# Patient Record
Sex: FEMALE | Race: WHITE | NOT HISPANIC OR LATINO | Employment: FULL TIME | URBAN - METROPOLITAN AREA
[De-identification: names, ages, dates, MRNs, and addresses within clinical notes are randomized per-mention and may not be internally consistent; named-entity substitution may affect disease eponyms.]

---

## 2017-01-20 ENCOUNTER — OFFICE VISIT (OUTPATIENT)
Dept: FAMILY MEDICINE | Facility: CLINIC | Age: 57
End: 2017-01-20
Payer: COMMERCIAL

## 2017-01-20 VITALS
DIASTOLIC BLOOD PRESSURE: 90 MMHG | HEIGHT: 70 IN | TEMPERATURE: 99 F | HEART RATE: 76 BPM | SYSTOLIC BLOOD PRESSURE: 122 MMHG

## 2017-01-20 DIAGNOSIS — R50.9 FEVER, UNSPECIFIED FEVER CAUSE: ICD-10-CM

## 2017-01-20 DIAGNOSIS — J02.9 PHARYNGITIS, UNSPECIFIED ETIOLOGY: Primary | ICD-10-CM

## 2017-01-20 LAB
CTP QC/QA: YES
FLUAV AG SPEC QL IA: NEGATIVE
FLUBV AG SPEC QL IA: NEGATIVE
S PYO RRNA THROAT QL PROBE: NEGATIVE
SPECIMEN SOURCE: NORMAL

## 2017-01-20 PROCEDURE — 87081 CULTURE SCREEN ONLY: CPT

## 2017-01-20 PROCEDURE — 99999 PR PBB SHADOW E&M-EST. PATIENT-LVL IV: CPT | Mod: PBBFAC,,, | Performed by: NURSE PRACTITIONER

## 2017-01-20 PROCEDURE — 87400 INFLUENZA A/B EACH AG IA: CPT | Mod: 59,PO

## 2017-01-20 PROCEDURE — 99213 OFFICE O/P EST LOW 20 MIN: CPT | Mod: S$GLB,,, | Performed by: NURSE PRACTITIONER

## 2017-01-20 NOTE — MR AVS SNAPSHOT
[facility]  [address]  [place] [address]  Phone: [phone]  Fax: [phone]                  Yumiko Ariasp   2017 12:20 PM   Office Visit    Description:  Female : 1960   Provider:  Jenna Roth NP   Department:  [facility]           Reason for Visit     Nasal Congestion     Sore Throat     Headache           Diagnoses this Visit        Comments    Pharyngitis, unspecified etiology    -  Primary     Fever, unspecified fever cause                To Do List           Future Appointments        Provider Department Dept Phone    2017 12:20 PM Jenna Roth NP [facility] [phone]      Goals (5 Years of Data)     None      [facility]     [facility] On Call Nurse Care Line -  Assistance  Registered nurses in the [facility] provide clinical advisement, health education, appointment booking, and other advisory services.  Call for this free service at [phone].             Medications           Message regarding Medications     Verify the changes and/or additions to your medication regime listed below are the same as discussed with your clinician today.  If any of these changes or additions are incorrect, please notify your healthcare provider.             Verify that the below list of medications is an accurate representation of the medications you are currently taking.  If none reported, the list may be blank. If incorrect, please contact your healthcare provider. Carry this list with you in case of emergency.           Current Medications     albuterol (PROVENTIL) 2.5 mg /3 mL (0.083 %) nebulizer solution Take 3 mLs (2.5 mg total) by nebulization every 6 (six) hours as needed for Wheezing.    alprazolam (XANAX) 0.25 MG tablet TAKE ONE TABLET BY MOUTH NIGHTLY AS NEEDED FOR INSOMNIA    buPROPion (WELLBUTRIN XL) 150 MG TB24 tablet TAKE TWO TABLETS BY MOUTH EVERY DAY    cyanocobalamin (VITAMIN B-12)  "1000 MCG tablet Take 500 mcg by mouth once daily.     fluconazole (DIFLUCAN) 150 MG Tab Take 150 mg by mouth once daily.    fluconazole (DIFLUCAN) 200 MG Tab TAKE 1 TABLET BY MOUTH ONCE DAILY AS SINGLE DOSE    meloxicam (MOBIC) 15 MG tablet Take 1 tablet (15 mg total) by mouth once daily.    metaxalone (SKELAXIN) 800 MG tablet Take 800 mg by mouth daily as needed for Pain.    metoprolol tartrate (LOPRESSOR) 25 MG tablet TAKE ONE TABLET BY MOUTH TWICE A DAY    naltrexone-bupropion 8-90 mg TbSR Take 2 tablets by mouth 2 (two) times daily. If this is the first month then follow  or physicians specific instructions.    nystatin-triamcinolone (MYCOLOG II) cream Apply to affected area BID as needed    tizanidine (ZANAFLEX) 2 MG tablet Take 1-2 po TID prn muscle spasm    vitamin E 400 UNIT capsule Take 400 Units by mouth once daily.    acetaminophen (TYLENOL) 650 MG TbSR Take 1,300 mg by mouth as needed.           Clinical Reference Information           Vital Signs - Last Recorded  Most recent update: 1/20/2017 10:48 AM by Danyelle Nichols LPN    BP Pulse Temp Ht          (!) 122/90 (BP Location: Left arm, Patient Position: Sitting, BP Method: Manual) 76 99 °F (37.2 °C) (Oral) 5' 10" (1.778 m)        Blood Pressure          Most Recent Value    BP  (!)  122/90      Allergies as of 1/20/2017     No Known Drug Allergies      Immunizations Administered on Date of Encounter - 1/20/2017     None      Orders Placed During Today's Visit      Normal Orders This Visit    Influenza antigen Nasopharyngeal Swab     POCT Rapid Strep A     Strep A culture, throat          1/20/2017 11:09 AM - Casandra Haro LPN      Component Results     Component Value Flag Ref Range Units Status    Rapid Strep A Screen Negative  Negative  Final     Acceptable Yes    Final      "

## 2017-01-20 NOTE — PROGRESS NOTES
Subjective:       Patient ID: Yumiko Gonzalez is a 56 y.o. female.    Chief Complaint: Nasal Congestion; Sore Throat; and Headache    Sinus Problem   This is a new problem. Episode onset: 3 days ago. The problem is unchanged. The maximum temperature recorded prior to her arrival was 100.4 - 100.9 F. Associated symptoms include congestion, coughing, headaches, sinus pressure, sneezing, a sore throat and swollen glands. Pertinent negatives include no chills, diaphoresis, ear pain, neck pain or shortness of breath. Treatments tried: otc medications. The treatment provided mild relief.     Review of Systems   Constitutional: Negative for chills and diaphoresis.   HENT: Positive for congestion, sinus pressure, sneezing and sore throat. Negative for ear pain.    Respiratory: Positive for cough. Negative for shortness of breath.    Musculoskeletal: Negative for neck pain.   Neurological: Positive for headaches.       Objective:      Physical Exam   Constitutional: She is oriented to person, place, and time. She appears well-nourished.   HENT:   Head: Normocephalic and atraumatic.   Right Ear: Hearing, tympanic membrane, external ear and ear canal normal.   Left Ear: Hearing, tympanic membrane, external ear and ear canal normal.   Nose: Mucosal edema and rhinorrhea present.   Mouth/Throat: Oropharynx is clear and moist. No oropharyngeal exudate or posterior oropharyngeal edema.   Eyes: Conjunctivae and EOM are normal. Pupils are equal, round, and reactive to light.   Neck: Normal range of motion. Neck supple.   Cardiovascular: Normal rate, regular rhythm, normal heart sounds and intact distal pulses.    Pulmonary/Chest: Effort normal and breath sounds normal. She has no wheezes. She has no rales.   Abdominal: Soft. Bowel sounds are normal. There is no tenderness.   Lymphadenopathy:     She has no cervical adenopathy.   Neurological: She is alert and oriented to person, place, and time.   Skin: Skin is warm and dry. No rash  noted.   Vitals reviewed.      Assessment:       1. Pharyngitis, unspecified etiology    2. Fever, unspecified fever cause        Plan:       Yumiko was seen today for nasal congestion, sore throat and headache.    Diagnoses and all orders for this visit:    Pharyngitis, unspecified etiology  -     POCT Rapid Strep A  -     Strep A culture, throat  -     Influenza antigen Nasopharyngeal Swab    Fever, unspecified fever cause  -     POCT Rapid Strep A  -     Strep A culture, throat  -     Influenza antigen Nasopharyngeal Swab    Symptomatic treatment with otc meds  Rest and increase fluids  Return if symptoms worsen or fail to improve.

## 2017-01-23 LAB — BACTERIA THROAT CULT: NORMAL

## 2017-01-25 ENCOUNTER — PATIENT MESSAGE (OUTPATIENT)
Dept: FAMILY MEDICINE | Facility: CLINIC | Age: 57
End: 2017-01-25

## 2017-02-16 ENCOUNTER — TELEPHONE (OUTPATIENT)
Dept: PRIMARY CARE CLINIC | Facility: CLINIC | Age: 57
End: 2017-02-16

## 2017-02-16 DIAGNOSIS — J01.90 ACUTE SINUSITIS, RECURRENCE NOT SPECIFIED, UNSPECIFIED LOCATION: Primary | ICD-10-CM

## 2017-02-16 RX ORDER — AMOXICILLIN AND CLAVULANATE POTASSIUM 875; 125 MG/1; MG/1
1 TABLET, FILM COATED ORAL 2 TIMES DAILY
Qty: 14 TABLET | Refills: 0 | Status: SHIPPED | OUTPATIENT
Start: 2017-02-16 | End: 2017-02-23

## 2017-02-16 RX ORDER — AMOXICILLIN AND CLAVULANATE POTASSIUM 875; 125 MG/1; MG/1
1 TABLET, FILM COATED ORAL 2 TIMES DAILY
Qty: 14 TABLET | Refills: 0 | Status: SHIPPED | OUTPATIENT
Start: 2017-02-16 | End: 2017-02-16 | Stop reason: SDUPTHER

## 2017-02-17 NOTE — TELEPHONE ENCOUNTER
S:  Pt was ill 2 weeks ago with a viral illness, missed 5 days being ill.  Now with green nasal d/c, swollen glands and feeling ill 4 days ago.  Worsening.    O:  Alert, coop 56 yr old female in NAD.        TMs dull, mildly erythematous.        Pharynx not injected.        Bilat ant cerv nodes tender, palp.        Lungs CTA bilat.        Heart RRR, no MRG.        Responds normally t/o the interview.    A:  Sinusitis, acute    P:  Augmentin 875 bid for 7days.       Fluids, increased rest, saline and/or flonase nasal spray, salt water gargles.  RTC if not better in 3-5 days, if worse or concerns.  Pt states understanding.

## 2017-03-27 ENCOUNTER — PATIENT MESSAGE (OUTPATIENT)
Dept: FAMILY MEDICINE | Facility: CLINIC | Age: 57
End: 2017-03-27

## 2017-03-27 DIAGNOSIS — E66.01 MORBID OBESITY, UNSPECIFIED OBESITY TYPE: Primary | ICD-10-CM

## 2017-03-28 NOTE — TELEPHONE ENCOUNTER
Orders signed.  I think that's the right referral.  When they contact the patient to schedule I think they will go over their options and adjust accordingly

## 2017-03-31 RX ORDER — METOPROLOL TARTRATE 25 MG/1
TABLET, FILM COATED ORAL
Qty: 60 TABLET | Refills: 5 | Status: SHIPPED | OUTPATIENT
Start: 2017-03-31 | End: 2017-04-11 | Stop reason: SDUPTHER

## 2017-04-11 ENCOUNTER — OFFICE VISIT (OUTPATIENT)
Dept: BARIATRICS | Facility: CLINIC | Age: 57
End: 2017-04-11
Payer: COMMERCIAL

## 2017-04-11 VITALS
TEMPERATURE: 99 F | DIASTOLIC BLOOD PRESSURE: 89 MMHG | RESPIRATION RATE: 20 BRPM | BODY MASS INDEX: 41.95 KG/M2 | WEIGHT: 293 LBS | HEIGHT: 70 IN | HEART RATE: 82 BPM | SYSTOLIC BLOOD PRESSURE: 176 MMHG

## 2017-04-11 DIAGNOSIS — E66.01 MORBID OBESITY DUE TO EXCESS CALORIES: Primary | ICD-10-CM

## 2017-04-11 DIAGNOSIS — E66.01 MORBID OBESITY WITH BMI OF 40.0-44.9, ADULT: ICD-10-CM

## 2017-04-11 DIAGNOSIS — I10 ESSENTIAL HYPERTENSION: ICD-10-CM

## 2017-04-11 DIAGNOSIS — G47.33 OSA (OBSTRUCTIVE SLEEP APNEA): ICD-10-CM

## 2017-04-11 DIAGNOSIS — F32.A DEPRESSION, UNSPECIFIED DEPRESSION TYPE: ICD-10-CM

## 2017-04-11 DIAGNOSIS — R16.1 SPLENOMEGALY: ICD-10-CM

## 2017-04-11 PROCEDURE — 99999 PR PBB SHADOW E&M-EST. PATIENT-LVL IV: CPT | Mod: PBBFAC,,, | Performed by: SURGERY

## 2017-04-11 PROCEDURE — 99205 OFFICE O/P NEW HI 60 MIN: CPT | Mod: S$GLB,,, | Performed by: SURGERY

## 2017-04-11 NOTE — MR AVS SNAPSHOT
[facility]   [facility], [address]  [place] [address]  Phone: [phone]  Fax: [phone]                  Yumiko Ariasp   2017 10:30 AM   Office Visit    Description:  Female : 1960   Provider:  Cuco Ulrich MD   Department:  [place] [facility]           Reason for Visit     Weight Loss           Diagnoses this Visit        Comments    Morbid obesity due to excess calories    -  Primary     Morbid obesity with BMI of 40.0-44.9, adult         Essential hypertension         DUNG (obstructive sleep apnea)         Depression, unspecified depression type         Splenomegaly                To Do List           Future Appointments        Provider Department Dept Phone    2017 8:30 AM LAB, [place] [facility] [phone]    2017 9:15 AM [facility] CT1 LIMIT [address] [facility] [phone]    2017 1:30 PM EKG, [facility] [phone]    2017 1:40 PM Aneudy Tanner MD [facility] [phone]    2017 10:30 AM [facility] [phone]      Goals (5 Years of Data)     None      Follow-Up and Disposition     Return in about 1 month (around 2017).      [facility]     [facility] Nurse Care [facility] -  Assistance  Unless otherwise directed by your provider, please contact [facility], our nurse care line that is available for  assistance.     Registered nurses in the [facility] provide: appointment scheduling, clinical advisement, health education, and other advisory services.  Call: [phone] (toll free)               Medications           Message regarding Medications     Verify the changes and/or additions to your medication regime listed below are the same as discussed with your clinician today.  If any of these changes or additions are incorrect, please notify your healthcare provider.             Verify  "that the below list of medications is an accurate representation of the medications you are currently taking.  If none reported, the list may be blank. If incorrect, please contact your healthcare provider. Carry this list with you in case of emergency.           Current Medications     buPROPion (WELLBUTRIN XL) 150 MG TB24 tablet TAKE TWO TABLETS BY MOUTH EVERY DAY    cyanocobalamin (VITAMIN B-12) 1000 MCG tablet Take 500 mcg by mouth once daily.     metaxalone (SKELAXIN) 800 MG tablet Take 800 mg by mouth daily as needed for Pain.    metoprolol tartrate (LOPRESSOR) 25 MG tablet TAKE ONE TABLET BY MOUTH TWICE A DAY    vitamin E 400 UNIT capsule Take 400 Units by mouth once daily.    acetaminophen (TYLENOL) 650 MG TbSR Take 1,300 mg by mouth as needed.    albuterol (PROVENTIL) 2.5 mg /3 mL (0.083 %) nebulizer solution Take 3 mLs (2.5 mg total) by nebulization every 6 (six) hours as needed for Wheezing.    alprazolam (XANAX) 0.25 MG tablet TAKE ONE TABLET BY MOUTH NIGHTLY AS NEEDED FOR INSOMNIA    fluconazole (DIFLUCAN) 150 MG Tab Take 150 mg by mouth once daily.    fluconazole (DIFLUCAN) 200 MG Tab TAKE 1 TABLET BY MOUTH ONCE DAILY AS SINGLE DOSE    meloxicam (MOBIC) 15 MG tablet Take 1 tablet (15 mg total) by mouth once daily.    naltrexone-bupropion 8-90 mg TbSR Take 2 tablets by mouth 2 (two) times daily. If this is the first month then follow  or physicians specific instructions.    nystatin-triamcinolone (MYCOLOG II) cream Apply to affected area BID as needed    tizanidine (ZANAFLEX) 2 MG tablet Take 1-2 po TID prn muscle spasm           Clinical Reference Information           Your Vitals Were     BP Pulse Temp Resp Height Weight    176/89 82 98.8 °F (37.1 °C) (Oral) 20 5' 10" (1.778 m) 138 kg (304 lb 5.5 oz)    BMI                43.67 kg/m2          Blood Pressure          Most Recent Value    BP  (!)  176/89      Allergies as of 4/11/2017     No Known Drug Allergies      Immunizations " Administered on Date of Encounter - 4/11/2017     None      Orders Placed During Today's Visit      Normal Orders This Visit    Ambulatory consult to Cardiology     Ambulatory consult to Nutrition Services     Ambulatory consult to Psychology     Ambulatory Referral to Hepatology     Future Labs/Procedures Expected by Expires    BMP  4/11/2017 6/10/2018    CBC w/ Auto Differential  4/11/2017 6/10/2018    CT Abdomen Pelvis  Without Contrast  4/11/2017 4/11/2018    Folate Serum  4/11/2017 6/10/2018    Free T4  4/11/2017 6/10/2018    H. Pylori Antibody, IGG  4/11/2017 6/10/2018    Hepatic Panel  4/11/2017 6/10/2018    Hg A1c  4/11/2017 6/10/2018    Iron & TIBC  4/11/2017 6/10/2018    Lipid Profile  4/11/2017 6/10/2018    Magnesium  4/11/2017 6/10/2018    Phosphorus  4/11/2017 6/10/2018    PROTIME-INR  4/11/2017 6/10/2018    T3  4/11/2017 6/10/2018    T4  4/11/2017 6/10/2018    TSH  4/11/2017 6/10/2018    Vitamin B12  4/11/2017 6/10/2018    Vitamin B1  4/11/2017 6/10/2018    Vitamin D 25 Hydroxy  4/11/2017 6/10/2018    EKG 12-lead  As directed 4/11/2018      Language Assistance Services     ATTENTION: Language assistance services are available, free of charge. Please call [phone].      ATENCIÓN: Si habla español, tiene a coughlin disposición servicios gratuitos de asistencia lingüística. Llame al [phone].     CHÚ Ý: N?u b?n nói Ti?ng Vi?t, có các d?ch v? h? tr? ngôn ng? mi?n phí dành cho b?n. G?i s? [phone].         [facility] complies with applicable Federal civil rights laws and does not discriminate on the basis of race, color, national origin, age, disability, or sex.

## 2017-04-11 NOTE — PROGRESS NOTES
Initial Consult    Chief Complaint   Patient presents with    Weight Loss     New Bariatric/Weight Loss       History of Present Illness:  Patient is a 56 y.o. female who is referred for evaluation of surgical treatment of morbid obesity. Her Body mass index is 43.67 kg/(m^2). She has known comorbidities of depression, GERD and hypertension. She has attended the bariatric seminar and is most interested in hearing options.      Past attempts at weight loss include: Unsupervised: calorie counting, high protein, hypnosis, low carbohydrates, slim fast, liquid protein diet, many fad diets, cabbage soup diet, hot dog diet, egg diet;  Supervised:  Physician weight loss center, weight watchers;  Diet pills: amphetamines, dexatrim, fen-phen, herbal remedies, phentermine;  Exercise attempts: walking or running    Weight history:   At current weight:  3 years  Obese for 40 years.  More than 35 pounds overweight for 40 years.  More than 100 pounds overweight for 25 years.  Started dieting at 15 years old.  Maximum weight reached: 301 pounds  Most weight [facility].  She describes Her eating habits as emotional eater, snacker/grazer, sweet eater, volume eater, binge eater.    DUNG screening: wakes frequently at night, daytime fatigue, morning headaches, snores    Reflux screening: occasionally and takes tums    MELD: 10 on recent labs    Review of patient's allergies indicates:   Allergen Reactions    No known drug allergies        Current Outpatient Prescriptions   Medication Sig Dispense Refill    buPROPion (WELLBUTRIN XL) 150 MG TB24 tablet TAKE TWO TABLETS BY MOUTH EVERY  tablet 1    cyanocobalamin (VITAMIN B-12) 1000 MCG tablet Take 500 mcg by mouth once daily.       metaxalone (SKELAXIN) 800 MG tablet Take 800 mg by mouth daily as needed for Pain.      metoprolol tartrate (LOPRESSOR) 25 MG tablet TAKE ONE TABLET BY MOUTH TWICE A DAY 60 tablet 5     vitamin E 400 UNIT capsule Take 400 Units by mouth once daily.      acetaminophen (TYLENOL) 650 MG TbSR Take 1,300 mg by mouth as needed.      albuterol (PROVENTIL) 2.5 mg /3 mL (0.083 %) nebulizer solution Take 3 mLs (2.5 mg total) by nebulization every 6 (six) hours as needed for Wheezing. 72 mL 2    alprazolam (XANAX) 0.25 MG tablet TAKE ONE TABLET BY MOUTH NIGHTLY AS NEEDED FOR INSOMNIA 30 tablet 2    fluconazole (DIFLUCAN) 150 MG Tab Take 150 mg by mouth once daily.      fluconazole (DIFLUCAN) 200 MG Tab TAKE 1 TABLET BY MOUTH ONCE DAILY AS SINGLE DOSE 1 tablet 1    meloxicam (MOBIC) 15 MG tablet Take 1 tablet (15 mg total) by mouth once daily. 30 tablet 0    naltrexone-bupropion 8-90 mg TbSR Take 2 tablets by mouth 2 (two) times daily. If this is the first month then follow  or physicians specific instructions. 120 tablet 5    nystatin-triamcinolone (MYCOLOG II) cream Apply to affected area BID as needed 60 g 3    tizanidine (ZANAFLEX) 2 MG tablet Take 1-2 po TID prn muscle spasm 30 tablet 1     No current facility-administered medications for this visit.      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, postitive antismooth muscle antibody    Blood transfusion     Bronchitis     bronchospasm on occasion     Cancer 1987    cervical CA    Cataract     OU    Cervical neck pain with evidence of disc disease 3/22/2012    can bend neck    Cirrhosis     non-alcoholic, compensated    Colon polyps 3/22/2012    Depression 3/22/2012    Hx panic attacks    Diverticular disease 3/22/2012    never diverticulitis    Fatty liver 3/22/2012     "Fibrocystic breast     Fine tremor     hands,chronic    Hepatosplenomegaly     History of cervical cancer 3/22/2012    carcinoma in situ    Hypertension 2013    Knee pain, bilateral     chronic, with hands,feet,fingers also painful    Liver disease     "partially sclerosed liver" per pt    Migraines past Hx    Pleural effusion     small, compensated, good bilateral breath sounds per Dr Suresh GUTIERRES (postoperative nausea and vomiting)     twice after childbirth    Postpartum depression     Splenomegaly     Thrombocytopenia      Past Surgical History:   Procedure Laterality Date    ADENOIDECTOMY      CERVICAL CONIZATION   W/ LASER       SECTION      x3    COLONOSCOPY N/A 2016    Procedure: COLONOSCOPY;  Surgeon: James Palomares MD;  Location: [facility] ENDO;  Service: Endoscopy;  Laterality: N/A;    ENDOMETRIAL ABLATION      LIVER BIOPSY      PELVIC LAPAROSCOPY      TONSILLECTOMY      TUBAL LIGATION       Family History   Problem Relation Age of Onset    Breast cancer Maternal Grandmother     Diabetes Father     Heart disease Father     Breast cancer Mother     Heart disease Mother     Hypertension Mother     Diabetes Brother     Diabetes Sister     Breast cancer Maternal Aunt     Breast cancer Other     Ovarian cancer Neg Hx      Social History   Substance Use Topics    Smoking status: Former Smoker     Quit date: 2/3/2006    Smokeless tobacco: Never Used    Alcohol use Yes      Comment: rarely        Chart review:    16: Dr. Cervantes: annual gyn exam0 PAP, MMG  16: Dr. Webb: Treated for HTN- monitor bp; leukopenia- cbc; thrombocytopenia: CBC; consider starting contrave   3/22/12: Dr. 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 Amato: hepatology: Cirrhosis of liver, avoid salts, HCC surveillance, US q 6 months, annual EGD, " labs q6 months, and propranolol    Lab review:    Lab Results   Component Value Date    WBC 4.33 10/04/2016    HGB 15.4 10/04/2016    HCT 45.7 10/04/2016    MCV 92 10/04/2016    PLT 72 (L) 10/04/2016     CMP  Sodium   Date Value Ref Range Status   09/16/2016 144 136 - 145 mmol/L Final     Potassium   Date Value Ref Range Status   09/16/2016 4.3 3.5 - 5.1 mmol/L Final     Chloride   Date Value Ref Range Status   09/16/2016 113 (H) 95 - 110 mmol/L Final     CO2   Date Value Ref Range Status   09/16/2016 22 (L) 23 - 29 mmol/L Final     Glucose   Date Value Ref Range Status   09/16/2016 111 (H) 70 - 110 mg/dL Final     BUN, Bld   Date Value Ref Range Status   09/16/2016 21 (H) 6 - 20 mg/dL Final     Creatinine   Date Value Ref Range Status   09/16/2016 0.8 0.5 - 1.4 mg/dL Final     Calcium   Date Value Ref Range Status   09/16/2016 9.1 8.7 - 10.5 mg/dL Final     Total Protein   Date Value Ref Range Status   09/16/2016 5.9 (L) 6.0 - 8.4 g/dL Final     Albumin   Date Value Ref Range Status   09/16/2016 3.5 3.5 - 5.2 g/dL Final     Total Bilirubin   Date Value Ref Range Status   09/16/2016 1.5 (H) 0.1 - 1.0 mg/dL Final     Comment:     For infants and newborns, interpretation of results should be based  on gestational age, weight and in agreement with clinical  observations.  Premature Infant recommended reference ranges:  Up to 24 hours.............<8.0 mg/dL  Up to 48 hours............<12.0 mg/dL  3-5 days..................<15.0 mg/dL  6-29 days.................<15.0 mg/dL       Alkaline Phosphatase   Date Value Ref Range Status   09/16/2016 102 55 - 135 U/L Final     AST   Date Value Ref Range Status   09/16/2016 60 (H) 10 - 40 U/L Final     ALT   Date Value Ref Range Status   09/16/2016 53 (H) 10 - 44 U/L Final     Anion Gap   Date Value Ref Range Status   09/16/2016 9 8 - 16 mmol/L Final     eGFR if    Date Value Ref Range Status   09/16/2016 >60.0 >60 mL/min/1.73 m^2 Final     eGFR if non African  American   Date Value Ref Range Status   09/16/2016 >60.0 >60 mL/min/1.73 m^2 Final     Comment:     Calculation used to obtain the estimated glomerular filtration  rate (eGFR) is the CKD-EPI equation. Since race is unknown   in our information system, the eGFR values for   -American and Non--American patients are given   for each creatinine result.       Lab Results   Component Value Date    INR 1.2 04/12/2017    INR 1.2 12/16/2013    INR 1.2 12/26/2012         Radiology review:    7/15/16 US abdomen: recanalized umbilical vein and splenomegaly- evidence of cirrhosis- images independently reviewed- no gallstones, normal cbd, enlarged spleen, irregular contour of spleen    Review of Systems:  Review of Systems   Constitutional: Negative for activity change, appetite change, chills, diaphoresis, fatigue, fever and unexpected weight change.   HENT: Positive for sneezing. Negative for congestion, sinus pressure, sore throat, tinnitus, trouble swallowing and voice change.    Eyes: Positive for itching. Negative for pain, redness and visual disturbance.   Respiratory: Negative for apnea, cough, choking, chest tightness, shortness of breath, wheezing and stridor.    Cardiovascular: Positive for palpitations and leg swelling. Negative for chest pain.   Gastrointestinal: Positive for diarrhea. Negative for abdominal distention, abdominal pain, anal bleeding, blood in stool, constipation, nausea, rectal pain and vomiting.   Endocrine: Negative for cold intolerance and heat intolerance.   Genitourinary: Negative for difficulty urinating and dysuria.   Musculoskeletal: Positive for arthralgias, back pain and joint swelling. Negative for gait problem, myalgias, neck pain and neck stiffness.   Skin: Positive for rash. Negative for wound.   Allergic/Immunologic: Positive for environmental allergies. Negative for food allergies.   Neurological: Negative for dizziness, light-headedness and headaches.   Hematological:  "Positive for adenopathy. Bruises/bleeds easily.   Psychiatric/Behavioral: Negative for agitation and confusion.       Physical:     Vital Signs (Most Recent)  Temp: 98.8 °F (37.1 °C) (04/11/17 1033)  Pulse: 82 (04/11/17 1033)  Resp: 20 (04/11/17 1033)  BP: (!) 176/89 (04/11/17 1033)  5' 10" (1.778 m)  (!) 138 kg (304 lb 5.5 oz)   Neck 15.5 inches     Body comp:  Fat Percent:  50.5 %  Fat Mass:  153.4 lb  FFM:  150.4 lb  TBW: 111.4 lb  TBW %:  36.7 %  BMR: 2163 kcal          Physical Exam:  Physical Exam   Constitutional: She is oriented to person, place, and time. She appears well-developed and well-nourished. No distress.   HENT:   Head: Normocephalic and atraumatic.   Mouth/Throat: No oropharyngeal exudate.   Eyes: Conjunctivae and EOM are normal. Pupils are equal, round, and reactive to light. No scleral icterus.   Neck: Normal range of motion. Neck supple. No JVD present. No tracheal deviation present. No thyromegaly present.   Cardiovascular: Normal rate, regular rhythm and normal heart sounds.  Exam reveals no gallop and no friction rub.    No murmur heard.  Pulmonary/Chest: Effort normal and breath sounds normal. No stridor. No respiratory distress. She has no wheezes. She has no rales. She exhibits no tenderness.   Abdominal: Soft. Bowel sounds are normal. She exhibits no distension and no mass. There is no tenderness. There is no rebound and no guarding.   Lower midline   Musculoskeletal: Normal range of motion. She exhibits edema. She exhibits no tenderness.   Lymphadenopathy:     She has no cervical adenopathy.   Neurological: She is alert and oriented to person, place, and time. No cranial nerve deficit.   Skin: Skin is warm and dry. No rash noted. She is not diaphoretic. No erythema.   Psychiatric: She has a normal mood and affect. Her behavior is normal.   Nursing note and vitals reviewed.      ASSESSMENT/PLAN:        1. Morbid obesity due to excess calories  BMP    CBC w/ Auto Differential    Folate " Serum    H. Pylori Antibody, IGG    Hg A1c    Hepatic Panel    Iron & TIBC    Lipid Profile    Magnesium    Phosphorus    T3    T4    TSH    Free T4    Vitamin B12    Vitamin B1    Vitamin D 25 Hydroxy    Ambulatory consult to Nutrition Services    EKG 12-lead    Ambulatory consult to Psychology    Ambulatory consult to Cardiology   2. Morbid obesity with BMI of 40.0-44.9, adult     3. Essential hypertension     4. DUNG (obstructive sleep apnea)     5. Depression, unspecified depression type     6. Splenomegaly  CT Abdomen Pelvis  Without Contrast    Ambulatory Referral to Hepatology    PROTIME-INR       Plan:  Yumiko Gonzalez has morbid obesity as their Body mass index is 43.67 kg/(m^2). She would benefit from weight loss surgery and has chosen gastric sleeve surgery as the preferred procedure. She understands that this is a tool and lifestyle change will be necessary to keep weight off. I went over possible complications of all surgeries with the patient and she is agreeable to surgery.    She will need:    Labs  dietary consult  EKG    Ct can  psych consult   Seminar- done    I will obtain the following clearances prior to surgery: Cardiology, hepatology        Diet plan: high protein low carb- mainly meats and vegetables  Exercise plan: Cardiovascular exercise, get HR over 100 for 20 minutes 3 times per week.  Start multivitamin

## 2017-04-12 ENCOUNTER — HOSPITAL ENCOUNTER (OUTPATIENT)
Dept: RADIOLOGY | Facility: HOSPITAL | Age: 57
Discharge: HOME | End: 2017-04-12
Attending: SURGERY
Payer: COMMERCIAL

## 2017-04-12 DIAGNOSIS — R16.1 SPLENOMEGALY: ICD-10-CM

## 2017-04-12 PROCEDURE — 74176 CT ABD & PELVIS W/O CONTRAST: CPT | Mod: 26,,, | Performed by: RADIOLOGY

## 2017-04-12 PROCEDURE — 74176 CT ABD & PELVIS W/O CONTRAST: CPT | Mod: TC,PO

## 2017-04-12 PROCEDURE — 25500020 PHARM REV CODE 255: Mod: PO | Performed by: SURGERY

## 2017-04-12 RX ADMIN — IOHEXOL 30 ML: 350 INJECTION, SOLUTION INTRAVENOUS at 10:04

## 2017-04-13 ENCOUNTER — DOCUMENTATION ONLY (OUTPATIENT)
Dept: TRANSPLANT | Facility: CLINIC | Age: 57
End: 2017-04-13

## 2017-04-13 ENCOUNTER — TELEPHONE (OUTPATIENT)
Dept: HEPATOLOGY | Facility: CLINIC | Age: 57
End: 2017-04-13

## 2017-04-13 NOTE — NURSING
Pt records reviewed.   Pt will be referred to Hepatology.  Splenomegaly  Initial referral received  from the [facility].   Referring Provider/diagnosis  Cuco Ulrich MD  Referral letter sent to provider and patient.

## 2017-04-13 NOTE — LETTER
April 13, 2017    Tiffanie Gonzalez  [address]  [place] [address]      Dear Tiffanie Gonzalez:    Your doctor has referred you to the [facility]. You will be contacted by our office and an initial appointment will then be scheduled for you.    We look forward to seeing you soon. If you have any further questions, please contact us at [phone].       Sincerely,        [facility]   [address] [place] [address]  [phone]

## 2017-04-13 NOTE — LETTER
April 13, 2017    Cuco Ulrich MD  [address] [address]      Dear Dr. Ulrich    Patient: Yumiko Gonzalez   MR Number: [id number]   YOB: 1960     Thank you for the referral of Yumiko Gonzalez to the [facility]. An initial appointment will be scheduled for your patient with one of our Hepatologists.      Thank you again for your trust in our program.  If there is anything we can do for you or your staff, please feel free to contact us.        Sincerely,        [facility]  [address] [place] [id number]  [phone]

## 2017-04-13 NOTE — TELEPHONE ENCOUNTER
----- Message from Alyssa Starr sent at 4/13/2017 10:36 AM CDT -----  Contact: pt  Called to schedule an appt in [place], please call back [phone]

## 2017-04-13 NOTE — TELEPHONE ENCOUNTER
MA spoke with pt no available appt in [place] or [facility]. I offer patient an appt with NP or another provider sooner.   Pt decline. Will wait to see Dr. Prince in June. Pt name put on waiting list.

## 2017-04-18 ENCOUNTER — CLINICAL SUPPORT (OUTPATIENT)
Dept: CARDIOLOGY | Facility: CLINIC | Age: 57
End: 2017-04-18
Payer: COMMERCIAL

## 2017-04-18 ENCOUNTER — OFFICE VISIT (OUTPATIENT)
Dept: CARDIOLOGY | Facility: CLINIC | Age: 57
End: 2017-04-18
Payer: COMMERCIAL

## 2017-04-18 VITALS
HEIGHT: 70 IN | DIASTOLIC BLOOD PRESSURE: 86 MMHG | HEART RATE: 66 BPM | SYSTOLIC BLOOD PRESSURE: 144 MMHG | BODY MASS INDEX: 41.95 KG/M2 | WEIGHT: 293 LBS

## 2017-04-18 DIAGNOSIS — Z01.810 PREOP CARDIOVASCULAR EXAM: ICD-10-CM

## 2017-04-18 DIAGNOSIS — E66.01 MORBID OBESITY WITH BMI OF 40.0-44.9, ADULT: ICD-10-CM

## 2017-04-18 DIAGNOSIS — I10 ESSENTIAL HYPERTENSION: ICD-10-CM

## 2017-04-18 DIAGNOSIS — E66.01 MORBID OBESITY DUE TO EXCESS CALORIES: ICD-10-CM

## 2017-04-18 DIAGNOSIS — K76.0 FATTY LIVER: ICD-10-CM

## 2017-04-18 PROCEDURE — 99999 PR PBB SHADOW E&M-EST. PATIENT-LVL III: CPT | Mod: PBBFAC,,, | Performed by: INTERNAL MEDICINE

## 2017-04-18 PROCEDURE — 93000 ELECTROCARDIOGRAM COMPLETE: CPT | Mod: S$GLB,,, | Performed by: INTERNAL MEDICINE

## 2017-04-18 PROCEDURE — 99204 OFFICE O/P NEW MOD 45 MIN: CPT | Mod: S$GLB,,, | Performed by: INTERNAL MEDICINE

## 2017-04-18 RX ORDER — HYDROCHLOROTHIAZIDE 12.5 MG/1
12.5 TABLET ORAL DAILY
Qty: 30 TABLET | Refills: 11 | Status: SHIPPED | OUTPATIENT
Start: 2017-04-18 | End: 2017-09-26 | Stop reason: SDUPTHER

## 2017-04-18 RX ORDER — MULTIVITAMIN
1 TABLET ORAL DAILY
Status: ON HOLD | COMMUNITY
End: 2023-12-18 | Stop reason: HOSPADM

## 2017-04-18 NOTE — PROGRESS NOTES
Subjective:    Patient ID:  Yumiko Gonzalez is a 56 y.o. female who presents for evaluation of Hypertension (cardiac clearance bariatric surgery)      HPI Comments: Pt with no previous HD here for pre-op evaluation for bariatric surgery. She reports no symptoms and generally feels well. She has started walking for exercise and seems to be able to increase the exercise w/o too much difficulty. She denies any dizziness, syncope or pre-syncope. She denies any chest pain, SOB, PND, orthopnea.      Review of Systems   Constitution: Negative for weight gain and weight loss.   HENT: Negative.    Eyes: Negative.    Cardiovascular: Negative for chest pain, claudication, cyanosis, irregular heartbeat, leg swelling, near-syncope, orthopnea (no PND), palpitations and syncope.   Respiratory: Negative for cough, hemoptysis, shortness of breath and snoring.    Endocrine: Negative.    Skin: Negative.    Musculoskeletal: Negative for joint pain, muscle cramps, muscle weakness and myalgias.   Gastrointestinal: Negative for diarrhea, hematemesis, nausea and vomiting.   Genitourinary: Negative.    Neurological: Negative for dizziness, focal weakness, light-headedness, loss of balance, numbness, paresthesias and seizures.   Psychiatric/Behavioral: Negative.         Objective:    Physical Exam   Constitutional: She is oriented to person, place, and time. She appears well-developed and well-nourished.   Eyes: Pupils are equal, round, and reactive to light.   Neck: Normal range of motion. No thyromegaly present.   Cardiovascular: Normal rate, regular rhythm, S1 normal, S2 normal, normal heart sounds, intact distal pulses and normal pulses.   No extrasystoles are present. PMI is not displaced.  Exam reveals no friction rub.    No murmur heard.  Pulmonary/Chest: Effort normal and breath sounds normal. She has no wheezes. She has no rales. She exhibits no tenderness.   Abdominal: Soft. Bowel sounds are normal. She exhibits no distension and no  mass. There is no tenderness.   Musculoskeletal: Normal range of motion. She exhibits edema (1+).   Neurological: She is alert and oriented to person, place, and time.   Skin: Skin is warm and dry.   Vitals reviewed.      Test(s) Reviewed  I have reviewed the following in detail:  [] Stress test   [] Angiography   [] Echocardiogram   [x] Labs   [x] Other:  ECG - NSR; voltage for LVH       Assessment:       1. Essential hypertension    2. Fatty liver    3. Morbid obesity with BMI of 40.0-44.9, adult    4. Preop cardiovascular exam         Plan:       Add HCTZ 12.5 mg daily  Echo to evaluate LV function  If Echo ok, see no contraindication to planned bariatric procedure.  Continue all meds forrest-op.

## 2017-04-18 NOTE — LETTER
April 18, 2017      Cuco Ulrich MD  [address] [facility] [facility] [address]  [place] [address]           [facility]  [address] [facility] [address] [place] [address]  Phone: [phone]          Patient: Yumiko Gonzalez   MR Number: [id number]   YOB: 1960   Date of Visit: 4/18/2017       Dear Dr. Cuco Ulrich:    Thank you for referring Yumiko Gonzalez to me for evaluation. Attached you will find relevant portions of my assessment and plan of care.    If you have questions, please do not hesitate to call me. I look forward to following Yumiko Gonzalez along with you.    Sincerely,    Aneudy Tanner MD    Enclosure  CC:  No Recipients    If you would like to receive this communication electronically, please contact [email or web address] or [phone] to request more information on [employer] access.    For providers and/or their staff who would like to refer a patient to [facility], please contact us through our one-stop-shop provider referral line, [facility], at [phone].    If you feel you have received this communication in error or would no longer like to receive these types of communications, please e-mail [email or web address]

## 2017-04-18 NOTE — MR AVS SNAPSHOT
[facility]  [address] [facility] [address] [place] [address]  Phone: [phone]                  Yumiko Gonzalez   2017 1:40 PM   Office Visit    Description:  Female : 1960   Provider:  Aneudy Tanner MD   Department:  [place] - Cardiology           Reason for Visit     Hypertension           Diagnoses this Visit        Comments    Essential hypertension         Fatty liver         Morbid obesity with BMI of 40.0-44.9, adult         Preop cardiovascular exam                To Do List           Future Appointments        Provider Department Dept Phone    2017 10:30 AM Sue Bello RD [facility] [phone]    2017 9:15 AM VASCULAR [facility] [phone]    2017 10:30 AM Cuco Ulrich MD [facility] [phone]      Goals (5 Years of Data)     None      Follow-Up and Disposition     Call patient with results       These Medications        Disp Refills Start End    hydrochlorothiazide (HYDRODIURIL) 12.5 MG Tab 30 tablet 11 2017    Take 1 tablet (12.5 mg total) by mouth once daily. - Oral    Pharmacy: [facility] [place], [place] - [address] [facility] [address] Ph #: [phone]         [facility]     [facility] Nurse Care Line -  Assistance  Unless otherwise directed by your provider, please contact [facility], our nurse care line that is available for  assistance.     Registered nurses in the [facility] provide: appointment scheduling, clinical advisement, health education, and other advisory services.  Call: [phone] (toll free)               Medications           Message regarding Medications     Verify the changes and/or additions to your medication regime listed below are the same as discussed with your clinician today.  If any of these changes or additions are incorrect, please notify your healthcare provider.        START taking these NEW medications         Refills    hydrochlorothiazide (HYDRODIURIL) 12.5 MG Tab 11    Sig: Take 1 tablet (12.5 mg total) by mouth once daily.    Class: Normal    Route: Oral      STOP taking these medications     meloxicam (MOBIC) 15 MG tablet Take 1 tablet (15 mg total) by mouth once daily.    metaxalone (SKELAXIN) 800 MG tablet Take 800 mg by mouth daily as needed for Pain.    tizanidine (ZANAFLEX) 2 MG tablet Take 1-2 po TID prn muscle spasm    naltrexone-bupropion 8-90 mg TbSR Take 2 tablets by mouth 2 (two) times daily. If this is the first month then follow  or physicians specific instructions.           Verify that the below list of medications is an accurate representation of the medications you are currently taking.  If none reported, the list may be blank. If incorrect, please contact your healthcare provider. Carry this list with you in case of emergency.           Current Medications     acetaminophen (TYLENOL) 650 MG TbSR Take 1,300 mg by mouth as needed.    albuterol (PROVENTIL) 2.5 mg /3 mL (0.083 %) nebulizer solution Take 3 mLs (2.5 mg total) by nebulization every 6 (six) hours as needed for Wheezing.    alprazolam (XANAX) 0.25 MG tablet TAKE ONE TABLET BY MOUTH NIGHTLY AS NEEDED FOR INSOMNIA    buPROPion (WELLBUTRIN XL) 150 MG TB24 tablet TAKE TWO TABLETS BY MOUTH EVERY DAY    cyanocobalamin (VITAMIN B-12) 1000 MCG tablet Take 500 mcg by mouth once daily.     fluconazole (DIFLUCAN) 150 MG Tab Take 150 mg by mouth once daily.    hydrochlorothiazide (HYDRODIURIL) 12.5 MG Tab Take 1 tablet (12.5 mg total) by mouth once daily.    metoprolol tartrate (LOPRESSOR) 25 MG tablet TAKE ONE TABLET BY MOUTH TWICE A DAY    multivitamin (THERAGRAN) per tablet Take 1 tablet by mouth once daily.    nystatin-triamcinolone (MYCOLOG II) cream Apply to affected area BID as needed    vitamin E 400 UNIT capsule Take 400 Units by mouth once daily.           Clinical Reference Information           Your Vitals Were     BP Pulse  "Height Weight BMI    144/86 66 5' 10" (1.778 m) 138 kg (304 lb 3.8 oz) 43.65 kg/m2      Blood Pressure          Most Recent Value    BP  (!)  144/86      Allergies as of 4/18/2017     No Known Drug Allergies      Immunizations Administered on Date of Encounter - 4/18/2017     None      Orders Placed During Today's Visit     Future Labs/Procedures Expected by Expires    2D Echo w/ Color Flow Doppler  As directed 4/18/2018      Language Assistance Services     ATTENTION: Language assistance services are available, free of charge. Please call [phone].      ATENCIÓN: Si habla español, tiene a coughlin disposición servicios gratuitos de asistencia lingüística. Llame al [phone].     CHÚ Ý: N?u b?n nói Ti?ng Vi?t, có các d?ch v? h? tr? ngôn ng? mi?n phí dành cho b?n. G?i s? [phone].         [facility] complies with applicable Federal civil rights laws and does not discriminate on the basis of race, color, national origin, age, disability, or sex.        "

## 2017-04-25 ENCOUNTER — CLINICAL SUPPORT (OUTPATIENT)
Dept: BARIATRICS | Facility: CLINIC | Age: 57
End: 2017-04-25
Payer: COMMERCIAL

## 2017-04-25 VITALS — WEIGHT: 291.69 LBS | BODY MASS INDEX: 41.85 KG/M2

## 2017-04-25 DIAGNOSIS — E66.01 MORBID OBESITY WITH BMI OF 40.0-44.9, ADULT: Primary | ICD-10-CM

## 2017-04-25 PROCEDURE — 97802 MEDICAL NUTRITION INDIV IN: CPT | Mod: S$GLB,,, | Performed by: DIETITIAN, REGISTERED

## 2017-04-25 NOTE — PROGRESS NOTES
PCP: Cuco Ulrich MD  REFERRING PROVIDER:  Cuco Ulrich MD      HISTORY OF PRESENT ILLNESS:  56 y.o. female patient is in clinic today for bariatric surgery assessment. Patient interested in sleeve or dieting.    Patient denies nausea, vomiting and diarrhea. Weight difficulties for 40 years.  Weight loss strategies include Unsupervised: calorie counting, high protein, hypnosis, low carbohydrates, slim fast, liquid protein diet, many fad diets, cabbage soup diet, hot dog diet, egg diet; Supervised: Physician weight loss center, weight watchers; Diet pills: amphetamines, dexatrim, fen-phen, herbal remedies, phentermine; Exercise attempts: walking or running.    VITAL SIGNS:  Height: 70 in  Weight: 290 lb  IBW: 150 lbs +/-10%    ALLERGIES & MEDICATIONS: Reviewed and Reconciled  MEDICAL/SURGICAL & FAMILY HISTORY: Reviewed and Reconciled    LABORATORY:  A1C:   Hemoglobin A1C   Date Value Ref Range Status   04/12/2017 4.9 4.5 - 6.2 % Final     Comment:     According to ADA guidelines, hemoglobin A1C <7.0% represents  optimal control in non-pregnant diabetic patients.  Different  metrics may apply to specific populations.   Standards of Medical Care in Diabetes - 2016.  For the purpose of screening for the presence of diabetes:  <5.7%     Consistent with the absence of diabetes  5.7-6.4%  Consistent with increasing risk for diabetes   (prediabetes)  >or=6.5%  Consistent with diabetes  Currently no consensus exists for use of hemoglobin A1C  for diagnosis of diabetes for children.       Chol:   Cholesterol   Date Value Ref Range Status   04/12/2017 124 120 - 199 mg/dL Final     Comment:     The National Cholesterol Education Program (NCEP) has set the  following guidelines (reference ranges) for Cholesterol:  Optimal.....................<200 mg/dL  Borderline High.............200-239 mg/dL  High........................> or = 240 mg/dL       Trig:   Triglycerides   Date Value Ref Range Status   04/12/2017 58 30 -  150 mg/dL Final     Comment:     The National Cholesterol Education Program (NCEP) has set the  following guidelines (reference values) for triglycerides:  Normal......................<150 mg/dL  Borderline High.............150-199 mg/dL  High........................200-499 mg/dL       HDL:   HDL   Date Value Ref Range Status   04/12/2017 58 40 - 75 mg/dL Final     Comment:     The National Cholesterol Education Program (NCEP) has set the  following guidelines (reference values) for HDL Cholesterol:  Low...............<40 mg/dL  Optimal...........>60 mg/dL       LDL: No components found for: LDL   BUN:      BUN, Bld   Date Value Ref Range Status   04/12/2017 12 6 - 20 mg/dL Final     AST:    AST   Date Value Ref Range Status   04/12/2017 76 (H) 10 - 40 U/L Final         ALT:   ALT   Date Value Ref Range Status   04/12/2017 69 (H) 10 - 44 U/L Final     Micro/Cr Ratio:    Creatinine   Date Value Ref Range Status   04/12/2017 0.7 0.5 - 1.4 mg/dL Final       SELF-MONITORING:  Food - none are avail    ACTIVITY LEVEL: Aerobic 20 min 2 d/wk. Resistance none. 2x a week walking for 20min, ab lounger     NUTRITION INTAKE: Meal patterns include 3 meals, 1-2 snacks daily  Wake up: 6am   730 B - 2 scrambled eggs with 1tbsp of cheese and 1 cup of coffee with half and half or yogurt or slim fast advantage or premier protein shake   2 pmL - 2 slices of ham and cheese with avacado. Lettuce light raspberry vignerette, apple or   S - apple   730 D - cheese stick  830 S - yogurt and apple with penut butter   Beverages - water- not getting full amount yet, unsweet tea   Dining out - 1x every 2 weeks   Alochol- very little   Supplements- MVI, b-12, vit e     Feeling pretty good since starting diet    Stress relivers- read, go in room    supportive of surgery    PSYCHOSOCIAL: Stage of change - action  Barriers to change - none  Motivation to change (10high): 8  Predicted compliance (10high): 9  Realistic expectation for wt loss  (10high): 8  Verbalizes understanding of dietary changes post procedure and willingness to participate in physical activity (10high): 9    MNT ASSESSMENT:   1200 calories, 60-70 grams protein daily  increase fruit 2 serv/d, vegetables 2+ cups/d  low-fat, low-sodium  150 min physical activity per week, moderate intensity, as tolerated    PLAN:    Reviewed MNT guidelines for obesity and weight management.   Encouraged daily self-monitoring of food & activity patterns.    Provided pre & post surgery meal-planning instruction via bariatric diet manual, foodlists, plate method, food models, food labels and/or ADA booklet. Reviewed micro/macronutrient effect on weight management. Discussed carbohydrate counting and spacing techniques with emphasis on supplementation necessary for altered metabolism. Reviewed principles of energy metabolism to assist weight and health management.                                                          Discussed physical activity with review of benefits, methods, precautions.    Discussed bx strategies for improving, strategies for improving social & environmental support of lifestyle changes.     GOALS: Self monitoring: daily food & activity journal. Meal plan-90% accuracy, Physical activity-150 minutes per week.   FU: as per bariatric MD   Visit Time Spent:  60 minutes    Thank you for the opportunity to work with your patient.  Sue Bello RD, LDN  Bariatric Dietitian

## 2017-04-28 ENCOUNTER — CLINICAL SUPPORT (OUTPATIENT)
Dept: CARDIOLOGY | Facility: CLINIC | Age: 57
End: 2017-04-28
Payer: COMMERCIAL

## 2017-04-28 DIAGNOSIS — I10 ESSENTIAL HYPERTENSION: ICD-10-CM

## 2017-04-28 DIAGNOSIS — E66.01 MORBID OBESITY WITH BMI OF 40.0-44.9, ADULT: ICD-10-CM

## 2017-04-28 DIAGNOSIS — Z01.810 PREOP CARDIOVASCULAR EXAM: ICD-10-CM

## 2017-04-28 DIAGNOSIS — K76.0 FATTY LIVER: ICD-10-CM

## 2017-04-28 PROCEDURE — 93306 TTE W/DOPPLER COMPLETE: CPT | Mod: S$GLB,,, | Performed by: INTERNAL MEDICINE

## 2017-05-01 LAB
DIASTOLIC DYSFUNCTION: NO
ESTIMATED PA SYSTOLIC PRESSURE: 26.43
MITRAL VALVE REGURGITATION: NORMAL
RETIRED EF AND QEF - SEE NOTES: 60 (ref 55–65)
TRICUSPID VALVE REGURGITATION: NORMAL

## 2017-05-04 ENCOUNTER — TELEPHONE (OUTPATIENT)
Dept: CARDIOLOGY | Facility: CLINIC | Age: 57
End: 2017-05-04

## 2017-05-04 NOTE — TELEPHONE ENCOUNTER
Test(s) Reviewed  I have reviewed the following in detail:  [] Stress test   [] Angiography   [x] Echocardiogram   [] Labs   [] Other:       Call Pt and tell her tests are ok  No contraindication to planned bariatric surgery. Continue all meds forrest-op.

## 2017-05-05 ENCOUNTER — PATIENT MESSAGE (OUTPATIENT)
Dept: CARDIOLOGY | Facility: CLINIC | Age: 57
End: 2017-05-05

## 2017-05-11 ENCOUNTER — OFFICE VISIT (OUTPATIENT)
Dept: BARIATRICS | Facility: CLINIC | Age: 57
End: 2017-05-11
Payer: COMMERCIAL

## 2017-05-11 VITALS
TEMPERATURE: 99 F | WEIGHT: 280.19 LBS | SYSTOLIC BLOOD PRESSURE: 145 MMHG | HEIGHT: 70 IN | HEART RATE: 68 BPM | DIASTOLIC BLOOD PRESSURE: 69 MMHG | RESPIRATION RATE: 16 BRPM | BODY MASS INDEX: 40.11 KG/M2

## 2017-05-11 DIAGNOSIS — E66.01 MORBID OBESITY DUE TO EXCESS CALORIES: Primary | ICD-10-CM

## 2017-05-11 DIAGNOSIS — E66.01 MORBID OBESITY WITH BMI OF 40.0-44.9, ADULT: ICD-10-CM

## 2017-05-11 DIAGNOSIS — R16.1 SPLENOMEGALY: ICD-10-CM

## 2017-05-11 DIAGNOSIS — I10 ESSENTIAL HYPERTENSION: ICD-10-CM

## 2017-05-11 DIAGNOSIS — D69.6 THROMBOCYTOPENIA: ICD-10-CM

## 2017-05-11 PROCEDURE — 99213 OFFICE O/P EST LOW 20 MIN: CPT | Mod: S$GLB,,, | Performed by: SURGERY

## 2017-05-11 PROCEDURE — 99999 PR PBB SHADOW E&M-EST. PATIENT-LVL III: CPT | Mod: PBBFAC,,, | Performed by: SURGERY

## 2017-05-11 NOTE — MR AVS SNAPSHOT
[facility]   [facility], [address]  [place] [address]  Phone: [phone]  Fax: [phone]                  Yumiko Gonzalez   2017 10:30 AM   Office Visit    Description:  Female : 1960   Provider:  Cuco Ulrich MD   Department:  [place] [facility]           Reason for Visit     Follow-up           Diagnoses this Visit        Comments    Thrombocytopenia    -  Primary     Morbid obesity due to excess calories         Morbid obesity with BMI of 40.0-44.9, adult         Essential hypertension         Splenomegaly                To Do List           Future Appointments        Provider Department Dept Phone    2017 10:15 AM MD [facility] [phone]    2017 4:00 PM Hetal Prince MD [facility] Hepatology [phone]      Goals (5 Years of Data)     None      [facility]     [facility] On Call Nurse Care Line -  Assistance  Unless otherwise directed by your provider, please contact [facility], our nurse care line that is available for  assistance.     Registered nurses in the [facility] On Call Center provide: appointment scheduling, clinical advisement, health education, and other advisory services.  Call: [phone] (toll free)               Medications           Message regarding Medications     Verify the changes and/or additions to your medication regime listed below are the same as discussed with your clinician today.  If any of these changes or additions are incorrect, please notify your healthcare provider.             Verify that the below list of medications is an accurate representation of the medications you are currently taking.  If none reported, the list may be blank. If incorrect, please contact your healthcare provider. Carry this list with you in case of emergency.           Current Medications     buPROPion (WELLBUTRIN XL) 150 MG TB24 tablet TAKE TWO TABLETS BY MOUTH EVERY DAY     "cyanocobalamin (VITAMIN B-12) 1000 MCG tablet Take 500 mcg by mouth once daily.     hydrochlorothiazide (HYDRODIURIL) 12.5 MG Tab Take 1 tablet (12.5 mg total) by mouth once daily.    metoprolol tartrate (LOPRESSOR) 25 MG tablet TAKE ONE TABLET BY MOUTH TWICE A DAY    multivitamin (THERAGRAN) per tablet Take 1 tablet by mouth once daily.    vitamin E 400 UNIT capsule Take 400 Units by mouth once daily.    acetaminophen (TYLENOL) 650 MG TbSR Take 1,300 mg by mouth as needed.    albuterol (PROVENTIL) 2.5 mg /3 mL (0.083 %) nebulizer solution Take 3 mLs (2.5 mg total) by nebulization every 6 (six) hours as needed for Wheezing.    alprazolam (XANAX) 0.25 MG tablet TAKE ONE TABLET BY MOUTH NIGHTLY AS NEEDED FOR INSOMNIA    fluconazole (DIFLUCAN) 150 MG Tab Take 150 mg by mouth once daily.    nystatin-triamcinolone (MYCOLOG II) cream Apply to affected area BID as needed           Clinical Reference Information           Your Vitals Were     BP Pulse Temp Resp Height Weight    145/69 68 98.6 °F (37 °C) (Oral) 16 5' 10" (1.778 m) 127.1 kg (280 lb 3.2 oz)    BMI                40.2 kg/m2          Blood Pressure          Most Recent Value    BP  (!)  145/69      Allergies as of 5/11/2017     No Known Drug Allergies      Immunizations Administered on Date of Encounter - 5/11/2017     None      Orders Placed During Today's Visit      Normal Orders This Visit    Ambulatory consult to Hematology       Language Assistance Services     ATTENTION: Language assistance services are available, free of charge. Please call [phone].      ATENCIÓN: Si veronica gibson, tiene a coughlin disposición servicios gratuitos de asistencia lingüística. Llame al [phone].     RENEE Ý: N?u b?n nói Ti?ng Vi?t, có các d?ch v? h? tr? ngôn ng? mi?n phí dành cho b?n. G?i s? [phone].         [facility] complies with applicable Federal civil rights laws and does not discriminate on the basis of race, color, national origin, age, " disability, or sex.

## 2017-05-11 NOTE — PROGRESS NOTES
"Medically Supervised Weight Loss Documentation    Date of Visit: 05/11/2017    Patient: Yumiko Gonzalez    Current Weight: 280  Current BMI: Body mass index is 40.2 kg/(m^2).  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, postitive antismooth muscle antibody    Blood transfusion     Bronchitis     bronchospasm on occasion     Cancer 1987    cervical CA    Cataract     OU    Cervical neck pain with evidence of disc disease 3/22/2012    can bend neck    Cirrhosis     non-alcoholic, compensated    Colon polyps 3/22/2012    Depression 3/22/2012    Hx panic attacks    Diverticular disease 3/22/2012    never diverticulitis    Fatty liver 3/22/2012    Fibrocystic breast     Fine tremor     hands,chronic    Hepatosplenomegaly     History of cervical cancer 3/22/2012    carcinoma in situ    Hypertension 4/24/2013    Knee pain, bilateral     chronic, with hands,feet,fingers also painful    Liver disease     "partially sclerosed liver" per pt    Migraines past Hx    Pleural effusion     small, compensated, good bilateral breath sounds per Dr Suresh GUTIERRES (postoperative nausea and vomiting)     twice after childbirth    Postpartum depression     Splenomegaly     Thrombocytopenia        Medications:  Current Outpatient Prescriptions on File Prior to Visit   Medication Sig Dispense Refill    buPROPion (WELLBUTRIN XL) 150 MG TB24 tablet TAKE TWO TABLETS BY MOUTH EVERY  tablet 1    cyanocobalamin (VITAMIN B-12) 1000 MCG tablet Take 500 mcg by mouth once daily.       hydrochlorothiazide (HYDRODIURIL) 12.5 MG Tab Take 1 tablet (12.5 mg total) by mouth once daily. " 30 tablet 11    metoprolol tartrate (LOPRESSOR) 25 MG tablet TAKE ONE TABLET BY MOUTH TWICE A DAY 60 tablet 5    multivitamin (THERAGRAN) per tablet Take 1 tablet by mouth once daily.      vitamin E 400 UNIT capsule Take 400 Units by mouth once daily.      acetaminophen (TYLENOL) 650 MG TbSR Take 1,300 mg by mouth as needed.      albuterol (PROVENTIL) 2.5 mg /3 mL (0.083 %) nebulizer solution Take 3 mLs (2.5 mg total) by nebulization every 6 (six) hours as needed for Wheezing. 72 mL 2    alprazolam (XANAX) 0.25 MG tablet TAKE ONE TABLET BY MOUTH NIGHTLY AS NEEDED FOR INSOMNIA 30 tablet 2    fluconazole (DIFLUCAN) 150 MG Tab Take 150 mg by mouth once daily.      nystatin-triamcinolone (MYCOLOG II) cream Apply to affected area BID as needed 60 g 3     No current facility-administered medications on file prior to visit.          Body comp:  Fat Percent:  49.2 %  Fat Mass:  137.8 lb  FFM:  142.4 lb  TBW: 104.8 lb  TBW %:  37.4 %  BMR: 2030 kcal      Diet Education Discussed:    Breakfast:  2 scrambled eggs sometimes with cheese or yogurt or protein shake  Lunch:  Tuna fish with a slice of cheese provalone with lettuce with 2 tbsp of vinagerrette light  Dinner:  Cottage cheese with peaches    Stopped eating sweets and cakes altogether    Exercise/Activity Discussed:    Walking when possible- every day of the week- more active at work  No dedicated time to exercising    Behavior or Diet Goals for this patient:    She has done well with diet and continues to lose weight.  We talked about surgery.  I will consider operating on her if she can continue to lose weight.  I would like to see what consultants say about her having surgery and will refer her to hematology for their advice on what to do about her thrombocytopenia perioperatively.  I am cautiously optimistic that if she continues to lose weight that she will be a better candidate for surgery.    Needs to dedicate at least 20 minutes 3 times per week to  exercising.    Stop b12    She will need:    Labs- reviewed  dietary consult- done  EKG- done    Ct can- reviewed  psych consult - pending- June 6, 2017  Seminar- done    I will obtain the following clearances prior to surgery:   Cardiology- Cleared with Dr. Tanner- started on HCTZ  Hepatology- June 20, 2017- Dr. Prince    : I met with the patient for 15 minutes and counseled her for over 50% of that time

## 2017-05-25 ENCOUNTER — TELEPHONE (OUTPATIENT)
Dept: INFUSION THERAPY | Facility: HOSPITAL | Age: 57
End: 2017-05-25

## 2017-06-06 ENCOUNTER — OFFICE VISIT (OUTPATIENT)
Dept: PSYCHIATRY | Facility: CLINIC | Age: 57
End: 2017-06-06
Payer: COMMERCIAL

## 2017-06-06 VITALS
DIASTOLIC BLOOD PRESSURE: 75 MMHG | WEIGHT: 279.19 LBS | HEIGHT: 70 IN | HEART RATE: 62 BPM | BODY MASS INDEX: 39.97 KG/M2 | SYSTOLIC BLOOD PRESSURE: 127 MMHG

## 2017-06-06 DIAGNOSIS — Z86.59 HISTORY OF POSTTRAUMATIC STRESS DISORDER (PTSD): ICD-10-CM

## 2017-06-06 DIAGNOSIS — E66.01 MORBID OBESITY WITH BMI OF 40.0-44.9, ADULT: ICD-10-CM

## 2017-06-06 DIAGNOSIS — F33.42 RECURRENT MAJOR DEPRESSIVE DISORDER, IN FULL REMISSION: Primary | ICD-10-CM

## 2017-06-06 PROCEDURE — 99999 PR PBB SHADOW E&M-EST. PATIENT-LVL II: CPT | Mod: PBBFAC,,, | Performed by: PSYCHIATRY & NEUROLOGY

## 2017-06-06 PROCEDURE — 90792 PSYCH DIAG EVAL W/MED SRVCS: CPT | Mod: S$GLB,,, | Performed by: PSYCHIATRY & NEUROLOGY
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
## 2017-06-12 RX ORDER — BUPROPION HYDROCHLORIDE 150 MG/1
TABLET ORAL
Qty: 180 TABLET | Refills: 1 | Status: SHIPPED | OUTPATIENT
Start: 2017-06-12 | End: 2017-09-26 | Stop reason: SDUPTHER

## 2017-06-13 ENCOUNTER — TELEPHONE (OUTPATIENT)
Dept: INFUSION THERAPY | Facility: HOSPITAL | Age: 57
End: 2017-06-13

## 2017-06-20 ENCOUNTER — OFFICE VISIT (OUTPATIENT)
Dept: BARIATRICS | Facility: CLINIC | Age: 57
End: 2017-06-20
Payer: COMMERCIAL

## 2017-06-20 ENCOUNTER — OFFICE VISIT (OUTPATIENT)
Dept: HEPATOLOGY | Facility: CLINIC | Age: 57
End: 2017-06-20
Payer: COMMERCIAL

## 2017-06-20 VITALS
TEMPERATURE: 99 F | RESPIRATION RATE: 16 BRPM | WEIGHT: 271.63 LBS | HEART RATE: 67 BPM | BODY MASS INDEX: 38.89 KG/M2 | SYSTOLIC BLOOD PRESSURE: 152 MMHG | HEIGHT: 70 IN | DIASTOLIC BLOOD PRESSURE: 74 MMHG

## 2017-06-20 VITALS
RESPIRATION RATE: 20 BRPM | TEMPERATURE: 98 F | OXYGEN SATURATION: 95 % | SYSTOLIC BLOOD PRESSURE: 138 MMHG | HEIGHT: 70 IN | BODY MASS INDEX: 39.42 KG/M2 | WEIGHT: 275.38 LBS | DIASTOLIC BLOOD PRESSURE: 63 MMHG | HEART RATE: 71 BPM

## 2017-06-20 DIAGNOSIS — E66.01 MORBID OBESITY WITH BMI OF 40.0-44.9, ADULT: ICD-10-CM

## 2017-06-20 DIAGNOSIS — K75.81 NONALCOHOLIC STEATOHEPATITIS: ICD-10-CM

## 2017-06-20 DIAGNOSIS — K74.60 CIRRHOSIS OF LIVER WITHOUT ASCITES, UNSPECIFIED HEPATIC CIRRHOSIS TYPE: Primary | ICD-10-CM

## 2017-06-20 DIAGNOSIS — E66.01 MORBID OBESITY DUE TO EXCESS CALORIES: Primary | ICD-10-CM

## 2017-06-20 PROCEDURE — 99204 OFFICE O/P NEW MOD 45 MIN: CPT | Mod: S$GLB,,, | Performed by: INTERNAL MEDICINE

## 2017-06-20 PROCEDURE — 99213 OFFICE O/P EST LOW 20 MIN: CPT | Mod: S$GLB,,, | Performed by: SURGERY

## 2017-06-20 PROCEDURE — 99999 PR PBB SHADOW E&M-EST. PATIENT-LVL III: CPT | Mod: PBBFAC,,, | Performed by: INTERNAL MEDICINE

## 2017-06-20 PROCEDURE — 99999 PR PBB SHADOW E&M-EST. PATIENT-LVL III: CPT | Mod: PBBFAC,,, | Performed by: SURGERY

## 2017-06-20 NOTE — Clinical Note
1. Continue low-carbohydrate diet.  2. Reduce Vitamin E to 400 units daily.  3. The patient advised generally to be on salt restriction in the diet.  4. HCC surveillance with AFP and ultrasound of the abdomen every six months, next due in October 2017.    5. CBC, CMP, PT/INR every 6 months, starting October 2017. 6.  Annual EGD to be done in [facility] 7. Switch to Propranolol 10 mg BID if varices are found, hold dose if pulse <50/min, or SBP <100.  8.  Return in 1 yr.  9.  Concern for decompensation of liver with abd surgery, hence defer bariatric surgery

## 2017-06-20 NOTE — PROGRESS NOTES
"Medically Supervised Weight Loss Documentation    Date of Visit: 06/20/2017    Patient: Yumiko Gonzalez    Current Weight: 271   Current BMI: Body mass index is 38.97 kg/m².  Weight Change: - 33 pounds    Last Weight: 280    Beginning Weight: 304      Vitals:   Vitals:    06/20/17 1115   BP: (!) 152/74   Pulse: 67   Resp: 16   Temp: 98.5 °F (36.9 °C)       Comorbidities:   Past Medical History:   Diagnosis Date    Abnormal Pap smear     ckc/leep/ablation    Anemia     Asthma     Hx bronchospasm, mostly when exposed to cold    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, postitive antismooth muscle antibody    Blood transfusion     Bronchitis     bronchospasm on occasion     Cancer 1987    cervical CA    Cataract     OU    Cervical neck pain with evidence of disc disease 3/22/2012    can bend neck    Cirrhosis     non-alcoholic, compensated    Colon polyps 3/22/2012    Depression 3/22/2012    Hx panic attacks    Diverticular disease 3/22/2012    never diverticulitis    Fatty liver 3/22/2012    Fibrocystic breast     Fine tremor     hands,chronic    Hepatosplenomegaly     History of cervical cancer 3/22/2012    carcinoma in situ    Hypertension 4/24/2013    Knee pain, bilateral     chronic, with hands,feet,fingers also painful    Liver disease     "partially sclerosed liver" per pt    Migraines past Hx    Pleural effusion     small, compensated, good bilateral breath sounds per Dr Suresh GUTIERRES (postoperative nausea and vomiting)     twice after childbirth    Postpartum depression     Splenomegaly     Thrombocytopenia        Medications:  Current Outpatient Prescriptions on File Prior to Visit   Medication Sig Dispense Refill    buPROPion (WELLBUTRIN XL) 150 MG TB24 tablet TAKE TWO TABLETS BY MOUTH EVERY  tablet 1    hydrochlorothiazide (HYDRODIURIL) 12.5 MG Tab Take 1 tablet (12.5 mg total) by mouth once daily. 30 tablet 11    metoprolol tartrate (LOPRESSOR) 25 MG tablet TAKE ONE TABLET BY " MOUTH TWICE A DAY 60 tablet 5    multivitamin (THERAGRAN) per tablet Take 1 tablet by mouth once daily.      vitamin E 400 UNIT capsule Take 400 Units by mouth once daily.      albuterol (PROVENTIL) 2.5 mg /3 mL (0.083 %) nebulizer solution Take 3 mLs (2.5 mg total) by nebulization every 6 (six) hours as needed for Wheezing. 72 mL 2    alprazolam (XANAX) 0.25 MG tablet TAKE ONE TABLET BY MOUTH NIGHTLY AS NEEDED FOR INSOMNIA 30 tablet 2    fluconazole (DIFLUCAN) 150 MG Tab Take 150 mg by mouth once daily.      nystatin-triamcinolone (MYCOLOG II) cream Apply to affected area BID as needed 60 g 3     No current facility-administered medications on file prior to visit.            Diet Education Discussed:    Continues to eat mainly meats and vegetables    Exercise/Activity Discussed:    No dedicated time    Behavior or Diet Goals for this patient:    We talked about starting an exercise routine at least 30 minutes 3 times per week.  Continue to eat low carb high protein      Labs- reviewed  dietary consult- done  EKG- done    Ct can- reviewed  psych consult - clear  Seminar- done    I will obtain the following clearances prior to surgery:   Cardiology- Cleared with Dr. Tanner- started on HCTZ  Hepatology- June 20, 2017- Dr. Prince    : I met with the patient for 15 minutes and counseled her for over 50% of that time

## 2017-06-20 NOTE — PROGRESS NOTES
Subjective:       Patient ID: Yumiko Gonzalez is a 57 y.o. female.    Chief Complaint: Cirrhosis      HPI:     The patient with   1.  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   2.  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   3.  Obesity.   4.  Weakly positive antismooth muscle antibody, is probably not significant  5.  Being evaluated for bariatric surgery (gastric sleeve) by Yarely Lezama, since mid-April 2017 (two months).  Currently on very low carb diet.  Has lost 32 lbs in last 2 months.         Offers no complaints.    Review of Systems   Constitutional: Positive for fatigue. Negative for appetite change, chills, fever and unexpected weight change.        Lost 320 lb since 4/2017.    HENT: Negative for congestion, nosebleeds and trouble swallowing.    Eyes: Negative for photophobia and visual disturbance.        Partially deaf for a long time.     Respiratory: Positive for cough. Negative for chest tightness and shortness of breath.         Cough at night.   Cardiovascular: Negative for chest pain and leg swelling.        Left Leg swelling improved since starting HCTZ.      Gastrointestinal: Positive for constipation. Negative for abdominal distention, abdominal pain, diarrhea, nausea and vomiting.        Occasional right sided pain.    Genitourinary: Negative for difficulty urinating, flank pain and frequency.   Musculoskeletal: Negative for arthralgias, back pain, gait problem and joint swelling.   Skin: Negative for color change and rash.   Neurological: Positive for tremors. Negative for dizziness, seizures, syncope, speech difficulty, weakness, light-headedness, numbness and headaches.        Long standing familial tremors   Hematological: Does not bruise/bleed easily.   Psychiatric/Behavioral: Negative for confusion, decreased concentration, sleep disturbance and suicidal ideas. The patient is not  nervous/anxious.         Stress related to 's illness.        Objective:      Physical Exam   Constitutional: She is oriented to person, place, and time. Vital signs are normal. She appears well-developed and well-nourished.   HENT:   Head: Normocephalic.   Mouth/Throat: No oropharyngeal exudate.   Eyes: Conjunctivae are normal. Pupils are equal, round, and reactive to light. Right eye exhibits no discharge. Left eye exhibits no discharge. No scleral icterus.   Cardiovascular: Normal rate, regular rhythm and normal heart sounds.    No murmur heard.  Pulmonary/Chest: No respiratory distress. She has no wheezes. She has no rales.   Abdominal: Soft. Bowel sounds are normal. She exhibits no distension and no mass. There is no tenderness. There is no rebound and no guarding.   Musculoskeletal: Normal range of motion. She exhibits no edema or tenderness.   Lymphadenopathy:     She has no cervical adenopathy.   Neurological: She is alert and oriented to person, place, and time. She has normal reflexes. Coordination normal.   Skin: Skin is warm and dry. No rash noted. No erythema. No pallor.   Psychiatric: She has a normal mood and affect. Her behavior is normal. Judgment and thought content normal.         CT abd 4/12/17:  Nodular liver, no mass in the liver. Multiple collaterals and splenomegaly 20 cm.    NO AFP since 2013.     Assessment:       1. Cirrhosis of liver without ascites, unspecified hepatic cirrhosis type    2. Nonalcoholic steatohepatitis      well compensated cirrhosis, but with severe thrombocytopenia, splenomegaly, small edema in left leg.  Losing wt 32 lb in 2 months.    No HCC, no SBP No GI bleeding, no encephalopathy.  Cirrhosis with marked splenomegaly are of concern and don't believe she should get upper abdominal surgery.  Chances of decompensation likely to occur following surgery.      Plan:   1. Continue low-carbohydrate diet.   2. Reduce Vitamin E to 400 units daily.   3. The patient  advised generally to be on salt restriction in the diet.   4. HCC surveillance with AFP and ultrasound of the abdomen every six months, next due in October 2017.     5. CBC, CMP, PT/INR every 6 months, starting October 2017.  6.  Annual EGD to be done in [facility] endoscopy center  7. Switch to Propranolol 10 mg BID if varices are found, hold dose if pulse <50/min, or SBP <100.   8.  Return in 1 yr.   9.  Concern for decompensation of liver with abd surgery, hence defer bariatric surgery

## 2017-06-20 NOTE — PATIENT INSTRUCTIONS
1. Continue low-carbohydrate diet.   2. Reduce Vitamin E to 400 units daily.   3. The patient advised generally to be on salt restriction in the diet.   4. HCC surveillance with AFP and ultrasound of the abdomen every six months, next due in October 2017.     5. CBC, CMP, PT/INR every 6 months, starting October 2017.  6.  Annual EGD to be done in [facility] endoscopy center  7. Switch to Propranolol 10 mg BID if varices are found, hold dose if pulse <50/min, or SBP <100.   8.  Return in 1 yr.   9.  Concern for decompensation of liver with abd surgery, hence defer bariatric surgery

## 2017-06-22 ENCOUNTER — TELEPHONE (OUTPATIENT)
Dept: HEMATOLOGY/ONCOLOGY | Facility: CLINIC | Age: 57
End: 2017-06-22

## 2017-06-23 ENCOUNTER — LAB VISIT (OUTPATIENT)
Dept: LAB | Facility: HOSPITAL | Age: 57
End: 2017-06-23
Attending: INTERNAL MEDICINE
Payer: COMMERCIAL

## 2017-06-23 DIAGNOSIS — K74.60 CIRRHOSIS OF LIVER WITHOUT ASCITES, UNSPECIFIED HEPATIC CIRRHOSIS TYPE: ICD-10-CM

## 2017-06-23 DIAGNOSIS — K75.81 NONALCOHOLIC STEATOHEPATITIS: ICD-10-CM

## 2017-06-23 LAB — AFP SERPL-MCNC: 3.4 NG/ML

## 2017-06-23 PROCEDURE — 82105 ALPHA-FETOPROTEIN SERUM: CPT

## 2017-06-23 PROCEDURE — 36415 COLL VENOUS BLD VENIPUNCTURE: CPT | Mod: PO

## 2017-06-26 ENCOUNTER — TELEPHONE (OUTPATIENT)
Dept: HEPATOLOGY | Facility: CLINIC | Age: 57
End: 2017-06-26

## 2017-06-26 NOTE — TELEPHONE ENCOUNTER
----- Message from Hetal Prince MD sent at 6/24/2017  5:23 PM CDT -----  Please inform patient results are OK.

## 2017-07-11 ENCOUNTER — OFFICE VISIT (OUTPATIENT)
Dept: HEMATOLOGY/ONCOLOGY | Facility: CLINIC | Age: 57
End: 2017-07-11
Payer: COMMERCIAL

## 2017-07-11 VITALS
TEMPERATURE: 98 F | WEIGHT: 270.06 LBS | HEART RATE: 66 BPM | DIASTOLIC BLOOD PRESSURE: 63 MMHG | SYSTOLIC BLOOD PRESSURE: 140 MMHG | HEIGHT: 70 IN | RESPIRATION RATE: 16 BRPM | BODY MASS INDEX: 38.66 KG/M2

## 2017-07-11 DIAGNOSIS — D69.6 THROMBOCYTOPENIA: Primary | ICD-10-CM

## 2017-07-11 DIAGNOSIS — D73.1 HYPERSPLENISM: ICD-10-CM

## 2017-07-11 DIAGNOSIS — K75.81 LIVER CIRRHOSIS SECONDARY TO NASH: ICD-10-CM

## 2017-07-11 DIAGNOSIS — D72.819 LEUKOPENIA, UNSPECIFIED TYPE: ICD-10-CM

## 2017-07-11 DIAGNOSIS — K74.60 LIVER CIRRHOSIS SECONDARY TO NASH: ICD-10-CM

## 2017-07-11 PROCEDURE — 99999 PR PBB SHADOW E&M-EST. PATIENT-LVL III: CPT | Mod: PBBFAC,,, | Performed by: INTERNAL MEDICINE

## 2017-07-11 PROCEDURE — 99244 OFF/OP CNSLTJ NEW/EST MOD 40: CPT | Mod: S$GLB,,, | Performed by: INTERNAL MEDICINE

## 2017-07-11 NOTE — LETTER
July 11, 2017        Cuco Ulrich MD  [address] [facility]  [address]  [place] [address]             [facility]  [address] [place] [address] [address]  [facility] [address]  Phone: [phone]  Fax: [phone]   Patient: Yumiko Gonzalez   MR Number: [id number]   YOB: 1960   Date of Visit: 7/11/2017       Dear Dr. Ulrich:    Thank you for referring Yumiko Gonzalez to me for evaluation of chronic leukopenia and thrombocytopenia in the setting of cirrhosis and hypersplenism. Below are the relevant portions of my assessment and plan of care.  If you have questions, please do not hesitate to call me. I look forward to following Yumiko along with you.    Sincerely,      Donis Hernandez MD           CC  Hetal Prince MD

## 2017-07-11 NOTE — PROGRESS NOTES
Chief complaint: Thrombocytopenia    History of present illness:  The patient is a 57-year-old white female who suffers from fatty infiltration liver/cirrhosis/hypersplenism and is chronically thrombocytopenic.  She has been actively dieting and managed to lose 33 pounds.  She has been seen and evaluated by Dr. Ulrich in regard to possible bariatric surgery.  However, in light of the degree of splenomegaly she has and the severity of her thrombocytopenia, they have decided to pursue a more conservative clinical course at this time.  Consultation is requested regarding management of thrombocytopenia should the patient elect for surgery in the future.  No other complaints or pertinent findings on a 14 point review of systems.    Past medical history:  1.  Morbid obesity.  2.  Fatty infiltration of the liver with resultant cirrhosis and hypersplenism as detailed above.  3.  Hypertension.  4.  Posterior Mattix stress disorder.  5.  History of colon polyps.  6.  Diverticulosis.  7.  DJD of the cervical spine.  8.  Status post tonsillectomy.    ALLERGIES:  None known.    Medications:  1.  Albuterol nebulizer treatments every 6 hours as needed for dyspnea.  2.  Xanax 0.25 mg 3 times daily as needed for anxiety.  3.  Wellbutrin  mg by mouth daily.  4.  Diflucan 150 mg by mouth daily.  5.  Hydrodiuril 12.5 mg daily.  6.  Lopressor 25 mg twice daily.  7.  MVI one daily.  8.  Mycolog cream as directed.  9.  Vitamin E 400 units daily.    Family/social history:  For smoker has not consumed tobacco products and 12 years.  No alcohol consumption.  Patient's follow suffers from hypertension, diabetes mellitus, and cardiomyopathy.  Mother is  due to old age.    Physical examination:  Well-developed, obese, white female, in no acute distress, who is alert and oriented ×4, and has a weight of 270 pounds.  VITAL SIGNS: Documented  and reviewed this visit.  HEENT: Normocephalic, atraumatic. Oral mucosa pink and moist.  Lips without   lesions. Tongue midline. Oropharynx clear. Nonicteric sclerae.   NECK: Supple, no adenopathy. No carotid bruits, thyromegaly or thyroid nodule.   HEART: Regular rate and rhythm without murmur, gallop or rub.   LUNGS: Clear to auscultation bilaterally. Normal respiratory effort.   ABDOMEN: Soft, nontender, nondistended with positive normoactive bowel sounds, patient's spleen tip is palpable below the left costal margin.  EXTREMITIES: No cyanosis, clubbing or edema. Distal pulses are intact.   AXILLAE AND GROIN: No palpable pathologic lymphadenopathy is appreciated.   SKIN: Intact/turgor normal   NEUROLOGIC: Sensorimotor function is grossly intact.    Laboratory:  White count 2.3, hemoglobin 14, hematocrit 41.7, platelets 49.  Indices and differential are unremarkable.  Serum iron 145, TIBC 376, saturated iron 39%, folate 10, B12 greater than 2000.  Sodium 142, potassium 4, chloride 110, CO2 26, BUN 12, creatinine 0.7, glucose 120, calcium 8.7, magnesium 1.7, phosphorus 4.2, total bilirubin 1.8, AST 76, ALT 69, alkaline phosphatase 94, GFR greater than 60.    Impression:  1.  Fatty infiltration liver/cirrhosis/hypersplenism.  2.  Chronic leukopenia and thrombocytopenia secondary to #1.  3.  Morbid obesity with improvement on current diet regimen.    Plan:  1.  No specific hematologic evaluation or treatment to be rendered at this time.  Should patient elect for bariatric surgery in the future she could be supported with platelet transfusion in the perioperative period.  2.  Patient may return to clinic on an as-needed basis.

## 2017-07-13 ENCOUNTER — ANESTHESIA (OUTPATIENT)
Dept: ENDOSCOPY | Facility: HOSPITAL | Age: 57
End: 2017-07-13
Payer: COMMERCIAL

## 2017-07-13 ENCOUNTER — HOSPITAL ENCOUNTER (OUTPATIENT)
Facility: HOSPITAL | Age: 57
Discharge: HOME | End: 2017-07-13
Attending: INTERNAL MEDICINE | Admitting: INTERNAL MEDICINE
Payer: COMMERCIAL

## 2017-07-13 ENCOUNTER — SURGERY (OUTPATIENT)
Age: 57
End: 2017-07-13

## 2017-07-13 ENCOUNTER — ANESTHESIA EVENT (OUTPATIENT)
Dept: ENDOSCOPY | Facility: HOSPITAL | Age: 57
End: 2017-07-13
Payer: COMMERCIAL

## 2017-07-13 VITALS
OXYGEN SATURATION: 96 % | DIASTOLIC BLOOD PRESSURE: 78 MMHG | BODY MASS INDEX: 38.8 KG/M2 | RESPIRATION RATE: 15 BRPM | RESPIRATION RATE: 16 BRPM | WEIGHT: 271 LBS | TEMPERATURE: 98 F | HEIGHT: 70 IN | HEART RATE: 63 BPM | SYSTOLIC BLOOD PRESSURE: 135 MMHG

## 2017-07-13 DIAGNOSIS — K74.60 CIRRHOSIS: ICD-10-CM

## 2017-07-13 PROCEDURE — 63600175 PHARM REV CODE 636 W HCPCS: Mod: PO | Performed by: NURSE ANESTHETIST, CERTIFIED REGISTERED

## 2017-07-13 PROCEDURE — D9220A PRA ANESTHESIA: Mod: ANES,,, | Performed by: ANESTHESIOLOGY

## 2017-07-13 PROCEDURE — 43235 EGD DIAGNOSTIC BRUSH WASH: CPT | Mod: PO | Performed by: INTERNAL MEDICINE

## 2017-07-13 PROCEDURE — D9220A PRA ANESTHESIA: Mod: CRNA,,, | Performed by: NURSE ANESTHETIST, CERTIFIED REGISTERED

## 2017-07-13 PROCEDURE — 37000009 HC ANESTHESIA EA ADD 15 MINS: Mod: PO | Performed by: INTERNAL MEDICINE

## 2017-07-13 PROCEDURE — 37000008 HC ANESTHESIA 1ST 15 MINUTES: Mod: PO | Performed by: INTERNAL MEDICINE

## 2017-07-13 PROCEDURE — 25000003 PHARM REV CODE 250: Mod: PO | Performed by: INTERNAL MEDICINE

## 2017-07-13 PROCEDURE — 43235 EGD DIAGNOSTIC BRUSH WASH: CPT | Mod: ,,, | Performed by: INTERNAL MEDICINE

## 2017-07-13 PROCEDURE — 25000003 PHARM REV CODE 250: Mod: PO | Performed by: NURSE ANESTHETIST, CERTIFIED REGISTERED

## 2017-07-13 RX ORDER — PROPOFOL 10 MG/ML
VIAL (ML) INTRAVENOUS
Status: DISCONTINUED | OUTPATIENT
Start: 2017-07-13 | End: 2017-07-13

## 2017-07-13 RX ORDER — SODIUM CHLORIDE, SODIUM LACTATE, POTASSIUM CHLORIDE, CALCIUM CHLORIDE 600; 310; 30; 20 MG/100ML; MG/100ML; MG/100ML; MG/100ML
INJECTION, SOLUTION INTRAVENOUS CONTINUOUS
Status: DISCONTINUED | OUTPATIENT
Start: 2017-07-13 | End: 2017-07-13 | Stop reason: HOSPADM

## 2017-07-13 RX ORDER — LIDOCAINE HCL/PF 100 MG/5ML
SYRINGE (ML) INTRAVENOUS
Status: DISCONTINUED | OUTPATIENT
Start: 2017-07-13 | End: 2017-07-13

## 2017-07-13 RX ADMIN — PROPOFOL 50 MG: 10 INJECTION, EMULSION INTRAVENOUS at 09:07

## 2017-07-13 RX ADMIN — PROPOFOL 150 MG: 10 INJECTION, EMULSION INTRAVENOUS at 09:07

## 2017-07-13 RX ADMIN — LIDOCAINE HYDROCHLORIDE 100 MG: 20 INJECTION, SOLUTION INTRAVENOUS at 09:07

## 2017-07-13 RX ADMIN — SODIUM CHLORIDE, SODIUM LACTATE, POTASSIUM CHLORIDE, AND CALCIUM CHLORIDE: .6; .31; .03; .02 INJECTION, SOLUTION INTRAVENOUS at 07:07

## 2017-07-13 NOTE — H&P
History & Physical - Short Stay  Gastroenterology      SUBJECTIVE:     Procedure: EGD    Chief Complaint/Indication for Procedure: Cirrhosis    PTA Medications   Medication Sig    alprazolam (XANAX) 0.25 MG tablet TAKE ONE TABLET BY MOUTH NIGHTLY AS NEEDED FOR INSOMNIA    buPROPion (WELLBUTRIN XL) 150 MG TB24 tablet TAKE TWO TABLETS BY MOUTH EVERY DAY    hydrochlorothiazide (HYDRODIURIL) 12.5 MG Tab Take 1 tablet (12.5 mg total) by mouth once daily.    metoprolol tartrate (LOPRESSOR) 25 MG tablet TAKE ONE TABLET BY MOUTH TWICE A DAY    multivitamin (THERAGRAN) per tablet Take 1 tablet by mouth once daily.    vitamin E 400 UNIT capsule Take 400 Units by mouth once daily.    albuterol (PROVENTIL) 2.5 mg /3 mL (0.083 %) nebulizer solution Take 3 mLs (2.5 mg total) by nebulization every 6 (six) hours as needed for Wheezing.    fluconazole (DIFLUCAN) 150 MG Tab Take 150 mg by mouth once daily.    nystatin-triamcinolone (MYCOLOG II) cream Apply to affected area BID as needed       Review of patient's allergies indicates:   Allergen Reactions    No known drug allergies         Past Medical History:   Diagnosis Date    Abnormal Pap smear     ckc/leep/ablation    Anemia     Asthma     Hx bronchospasm, mostly when exposed to cold    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, postitive antismooth muscle antibody    Blood transfusion     Bronchitis     bronchospasm on occasion     Cancer 1987    cervical CA    Cervical neck pain with evidence of disc disease 3/22/2012    can bend neck    Cirrhosis     non-alcoholic, compensated    Colon polyps 3/22/2012    Depression 3/22/2012    Hx panic attacks    Diverticular disease 3/22/2012    never diverticulitis    Fatty liver 3/22/2012    Fibrocystic breast     Fine tremor     hands,chronic    Hepatosplenomegaly     History of cervical cancer 3/22/2012    carcinoma in situ    Hypertension 4/24/2013    Knee pain, bilateral     chronic, with hands,feet,fingers also  "painful    Liver disease     "partially sclerosed liver" per pt    Migraines past Hx    Pleural effusion     small, compensated, good bilateral breath sounds per Dr Suresh GUTIERRES (postoperative nausea and vomiting)     twice after childbirth    Postpartum depression     Splenomegaly     Thrombocytopenia      Past Surgical History:   Procedure Laterality Date    ADENOIDECTOMY      CERVICAL CONIZATION   W/ LASER       SECTION      x3    COLONOSCOPY N/A 2016    Procedure: COLONOSCOPY;  Surgeon: James Palomares MD;  [facility];  Service: Endoscopy;  Laterality: N/A;    ENDOMETRIAL ABLATION      LIVER BIOPSY      PELVIC LAPAROSCOPY      TONSILLECTOMY      TUBAL LIGATION       Family History   Problem Relation Age of Onset    Breast cancer Maternal Grandmother     Diabetes Father     Heart disease Father     Breast cancer Mother     Heart disease Mother     Hypertension Mother     Diabetes Brother     Diabetes Sister     Breast cancer Maternal Aunt     Breast cancer Other     Diabetes Sister     Emphysema Brother     Ovarian cancer Neg Hx      Social History   Substance Use Topics    Smoking status: Former Smoker     Quit date: 2/3/2006    Smokeless tobacco: Never Used    Alcohol use Yes      Comment: rarely         OBJECTIVE:     Vital Signs (Most Recent)  Temp: 97.9 °F (36.6 °C) (17 0750)  Pulse: 66 (17 0750)  Resp: 18 (17 0750)  BP: 135/76 (17 0750)  SpO2: 96 % (17 0750)    Physical Exam:                                                       GENERAL:  Comfortable, in no acute distress.                                 HEENT EXAM:  Nonicteric.  No adenopathy.  Oropharynx is clear.               NECK:  Supple.                                                               LUNGS:  Clear.                                                               CARDIAC:  Regular rate and rhythm.  S1, S2.  No murmur.                      ABDOMEN:  " Soft, positive bowel sounds, nontender.  No hepatosplenomegaly or masses.  No rebound or guarding.                                             EXTREMITIES:  No edema.     MENTAL STATUS:  Normal, alert and oriented.      ASSESSMENT/PLAN:     Assessment: Cirrhosis    Plan: EGD    Anesthesia Plan: General    ASA Grade: ASA 3 - Patient with moderate systemic disease with functional limitations    MALLAMPATI SCORE:  I (soft palate, uvula, fauces, and tonsillar pillars visible)

## 2017-07-13 NOTE — PLAN OF CARE
PT TO PACU S/P DX F/U EGD.  STABLE AND SLEEPY.  SEE EPIC DETAILS FURTHER.  DISCHARGE INSTRUCTIONS TO PT AND  INCLUDING DR. MATA'S PRE-PRINTED INSTRUCTIONS.

## 2017-07-13 NOTE — ANESTHESIA PREPROCEDURE EVALUATION
07/13/2017  Yumiko Gonzalez is a 57 y.o., female.    Anesthesia Evaluation    I have reviewed the Patient Summary Reports.    I have reviewed the Nursing Notes.      Review of Systems  Cardiovascular:   Hypertension, well controlled    Pulmonary:   Asthma asymptomatic        Physical Exam  General:  Obesity                 Anesthesia Plan  Type of Anesthesia, risks & benefits discussed:  Anesthesia Type:  general  Patient's Preference:   Intra-op Monitoring Plan:   Intra-op Monitoring Plan Comments:   Post Op Pain Control Plan:   Post Op Pain Control Plan Comments:   Induction:   IV  Beta Blocker:  Patient is not currently on a Beta-Blocker (No further documentation required).       Informed Consent: Patient understands risks and agrees with Anesthesia plan.  Questions answered. Anesthesia consent signed with patient.  ASA Score: 2     Day of Surgery Review of History & Physical:    H&P update referred to the surgeon.         Ready For Surgery From Anesthesia Perspective.

## 2017-07-13 NOTE — DISCHARGE INSTRUCTIONS
Procedural Sedation (Adult)  You have been given medicine by vein to make you sleep during your surgery. This may have included both a pain medicine and sleeping medicine. Most of the effects have worn off. But you may still have some drowsiness for the next 6 to 8 hours.  Home care  Follow these guidelines when you get home:  · For the next 8 hours, you should be watched by a responsible adult. This person should make sure your condition is not getting worse.  · Don't take any medicine by mouth for pain or for sleep during the next 4 hours. These might react with the medicines you were given in the hospital. This could cause a much stronger response than usual.  · Don't drink any alcohol for the next 24 hours.  · Don't drive, operate dangerous machinery, or make important business or personal decisions during the next 24 hours.  Follow-up care  Follow up with your healthcare provider if you are not alert and back to your usual level of activity within 12 hours.  When to seek medical advice  Call your healthcare provider right away if any of these occur:  · Drowsiness gets worse  · Weakness or dizziness gets worse  · Repeated vomiting  · You cannot be awakened   Date Last Reviewed: 10/18/2016  © 9956-6634 [employer]. [address] [place] [address], [place] [address]. All rights reserved. This information is not intended as a substitute for professional medical care. Always follow your healthcare professional's instructions.

## 2017-07-13 NOTE — DISCHARGE SUMMARY
Discharge Note  Short Stay      SUMMARY     Admit Date: 7/13/2017    Attending Physician: James Palomares MD     Discharge Physician: James Palomares MD    Discharge Date: 7/13/2017 9:14 AM    Final Diagnosis: Nonalcoholic steatohepatitis [K75.81]  Cirrhosis of liver without ascites, unspecified hepatic cirrhosis type [K74.60]    Disposition: HOME OR SELF CARE    Patient Instructions:   Current Discharge Medication List      CONTINUE these medications which have NOT CHANGED    Details   alprazolam (XANAX) 0.25 MG tablet TAKE ONE TABLET BY MOUTH NIGHTLY AS NEEDED FOR INSOMNIA  Qty: 30 tablet, Refills: 2      buPROPion (WELLBUTRIN XL) 150 MG TB24 tablet TAKE TWO TABLETS BY MOUTH EVERY DAY  Qty: 180 tablet, Refills: 1      hydrochlorothiazide (HYDRODIURIL) 12.5 MG Tab Take 1 tablet (12.5 mg total) by mouth once daily.  Qty: 30 tablet, Refills: 11      metoprolol tartrate (LOPRESSOR) 25 MG tablet TAKE ONE TABLET BY MOUTH TWICE A DAY  Qty: 60 tablet, Refills: 5      multivitamin (THERAGRAN) per tablet Take 1 tablet by mouth once daily.      vitamin E 400 UNIT capsule Take 400 Units by mouth once daily.      albuterol (PROVENTIL) 2.5 mg /3 mL (0.083 %) nebulizer solution Take 3 mLs (2.5 mg total) by nebulization every 6 (six) hours as needed for Wheezing.  Qty: 72 mL, Refills: 2      fluconazole (DIFLUCAN) 150 MG Tab Take 150 mg by mouth once daily.      nystatin-triamcinolone (MYCOLOG II) cream Apply to affected area BID as needed  Qty: 60 g, Refills: 3             Discharge Procedure Orders (must include Diet, Follow-up, Activity)    Follow Up:  Follow up with PCP as previously scheduled  Resume routine diet.  Activity as tolerated.    No driving day of procedure.

## 2017-07-13 NOTE — ANESTHESIA POSTPROCEDURE EVALUATION
"Anesthesia Post Evaluation    Patient: Yumiko Gonzalez    Procedure(s) Performed: Procedure(s) (LRB):  ESOPHAGOGASTRODUODENOSCOPY (EGD) (N/A)    Final Anesthesia Type: general  Patient location during evaluation: PACU  Patient participation: Yes- Able to Participate  Level of consciousness: awake and alert  Post-procedure vital signs: reviewed and stable  Pain management: adequate  Airway patency: patent  PONV status at discharge: No PONV  Anesthetic complications: no      Cardiovascular status: blood pressure returned to baseline and hemodynamically stable  Respiratory status: unassisted  Hydration status: euvolemic  Follow-up not needed.        Visit Vitals  /78   Pulse 63   Temp 36.6 °C (97.9 °F)   Resp 16   Ht 5' 10" (1.778 m)   Wt 122.9 kg (271 lb)   SpO2 96%   Breastfeeding? No   BMI 38.88 kg/m²       Pain/Niharika Score: Pain Assessment Performed: Yes (7/13/2017  9:35 AM)  Presence of Pain: denies (7/13/2017  9:35 AM)  Niharika Score: 10 (7/13/2017  9:35 AM)      "

## 2017-07-14 ENCOUNTER — TELEPHONE (OUTPATIENT)
Dept: BARIATRICS | Facility: CLINIC | Age: 57
End: 2017-07-14

## 2017-07-26 ENCOUNTER — OFFICE VISIT (OUTPATIENT)
Dept: BARIATRICS | Facility: CLINIC | Age: 57
End: 2017-07-26
Payer: COMMERCIAL

## 2017-07-26 VITALS
HEIGHT: 70 IN | HEART RATE: 66 BPM | BODY MASS INDEX: 37.42 KG/M2 | RESPIRATION RATE: 16 BRPM | DIASTOLIC BLOOD PRESSURE: 69 MMHG | WEIGHT: 261.38 LBS | SYSTOLIC BLOOD PRESSURE: 145 MMHG | TEMPERATURE: 98 F

## 2017-07-26 DIAGNOSIS — E66.01 MORBID OBESITY DUE TO EXCESS CALORIES: Primary | ICD-10-CM

## 2017-07-26 DIAGNOSIS — E66.01 MORBID OBESITY WITH BMI OF 40.0-44.9, ADULT: ICD-10-CM

## 2017-07-26 DIAGNOSIS — I10 ESSENTIAL HYPERTENSION: ICD-10-CM

## 2017-07-26 PROCEDURE — 99213 OFFICE O/P EST LOW 20 MIN: CPT | Mod: S$GLB,,, | Performed by: SURGERY

## 2017-07-26 PROCEDURE — 99999 PR PBB SHADOW E&M-EST. PATIENT-LVL III: CPT | Mod: PBBFAC,,, | Performed by: SURGERY

## 2017-07-26 NOTE — PROGRESS NOTES
"Medically Supervised Weight Loss Documentation    Date of Visit: 07/26/2017    Patient: Yumiko Gonzalez    Current Weight: 261  Current BMI: Body mass index is 37.51 kg/m².  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, postitive antismooth muscle antibody    Blood transfusion     Bronchitis     bronchospasm on occasion     Cancer 1987    cervical CA    Cervical neck pain with evidence of disc disease 3/22/2012    can bend neck    Cirrhosis     non-alcoholic, compensated    Colon polyps 3/22/2012    Depression 3/22/2012    Hx panic attacks    Diverticular disease 3/22/2012    never diverticulitis    Fatty liver 3/22/2012    Fibrocystic breast     Fine tremor     hands,chronic    Hepatosplenomegaly     History of cervical cancer 3/22/2012    carcinoma in situ    Hypertension 4/24/2013    Knee pain, bilateral     chronic, with hands,feet,fingers also painful    Liver disease     "partially sclerosed liver" per pt    Migraines past Hx    Pleural effusion     small, compensated, good bilateral breath sounds per Dr Suresh GUTIERRES (postoperative nausea and vomiting)     twice after childbirth    Postpartum depression     Splenomegaly     Thrombocytopenia        Medications:  Current Outpatient Prescriptions on File Prior to Visit   Medication Sig Dispense Refill    alprazolam (XANAX) 0.25 MG tablet TAKE ONE TABLET BY MOUTH NIGHTLY AS NEEDED FOR INSOMNIA 30 tablet 2    buPROPion (WELLBUTRIN XL) 150 MG TB24 tablet TAKE TWO TABLETS BY MOUTH EVERY  tablet 1    fluconazole (DIFLUCAN) 150 MG Tab Take 150 mg by mouth once daily.      hydrochlorothiazide " (HYDRODIURIL) 12.5 MG Tab Take 1 tablet (12.5 mg total) by mouth once daily. 30 tablet 11    metoprolol tartrate (LOPRESSOR) 25 MG tablet TAKE ONE TABLET BY MOUTH TWICE A DAY 60 tablet 5    multivitamin (THERAGRAN) per tablet Take 1 tablet by mouth once daily.      nystatin-triamcinolone (MYCOLOG II) cream Apply to affected area BID as needed 60 g 3    vitamin E 400 UNIT capsule Take 400 Units by mouth once daily.      albuterol (PROVENTIL) 2.5 mg /3 mL (0.083 %) nebulizer solution Take 3 mLs (2.5 mg total) by nebulization every 6 (six) hours as needed for Wheezing. 72 mL 2     No current facility-administered medications on file prior to visit.          Body comp:  Fat Percent:  48.6 %  Fat Mass:  127 lb  FFM:  134.4 lb  TBW: 98.4 lb  TBW %:  37.6 %  BMR: 1906 kcal      Diet Education Discussed:    Following high protein low carb diet  Recently added pre made meal which has some carbs but not many    Exercise/Activity Discussed:    Walking when has a chance  Mow lawn     Behavior or Diet Goals for this patient:    Keep following the low carb lifestyle. We talked about portion control.  We talked about surgery and deferring based on Dr. Prince recommendations.    Must come up with exercise routine, 20 minutes 3 times  Per week    Labs- reviewed  dietary consult- done  EKG- done    Ct can- reviewed  psych consult - clear  Seminar- done    I will obtain the following clearances prior to surgery:   Cardiology- Cleared with Dr. Tanner- started on HCTZ  Hepatology- June 20, 2017- Dr. Prince- defer surgery      : I met with the patient for 15 minutes and counseled her for over 50% of that time

## 2017-08-10 ENCOUNTER — OFFICE VISIT (OUTPATIENT)
Dept: OPTOMETRY | Facility: CLINIC | Age: 57
End: 2017-08-10
Payer: COMMERCIAL

## 2017-08-10 DIAGNOSIS — H52.203 HYPEROPIA WITH ASTIGMATISM AND PRESBYOPIA, BILATERAL: Primary | ICD-10-CM

## 2017-08-10 DIAGNOSIS — H52.03 HYPEROPIA WITH ASTIGMATISM AND PRESBYOPIA, BILATERAL: Primary | ICD-10-CM

## 2017-08-10 DIAGNOSIS — H25.13 NUCLEAR SCLEROSIS, BILATERAL: ICD-10-CM

## 2017-08-10 DIAGNOSIS — H52.4 HYPEROPIA WITH ASTIGMATISM AND PRESBYOPIA, BILATERAL: Primary | ICD-10-CM

## 2017-08-10 PROCEDURE — 92015 DETERMINE REFRACTIVE STATE: CPT | Mod: S$GLB,,, | Performed by: OPTOMETRIST

## 2017-08-10 PROCEDURE — 92014 COMPRE OPH EXAM EST PT 1/>: CPT | Mod: S$GLB,,, | Performed by: OPTOMETRIST

## 2017-08-10 PROCEDURE — 99999 PR PBB SHADOW E&M-EST. PATIENT-LVL II: CPT | Mod: PBBFAC,,, | Performed by: OPTOMETRIST

## 2017-08-10 NOTE — PROGRESS NOTES
HPI     Annual Exam    Additional comments: MELVI  (isabel)    ocular health exam            Blurred Vision    Additional comments: at both near & distance            Dry Eye    Additional comments: +ATS OU prn           Spots and/or Floaters    Additional comments: OU -- no light flashes           Comments   Agree above  Notes [facility] slightly reduced   Floaters, no flash  Needs new specs       Last edited by CHARLES Basilio, OD on 8/10/2017 10:07 AM. (History)        ROS     Positive for: Eyes    Negative for: Constitutional, Gastrointestinal, Neurological, Skin,   Genitourinary, Musculoskeletal, HENT, Endocrine, Cardiovascular,   Respiratory, Psychiatric, Allergic/Imm, Heme/Lymph    Last edited by CHARLES Basilio, OD on 8/10/2017 10:07 AM. (History)        Assessment /Plan     For exam results, see Encounter Report.    Hyperopia with astigmatism and presbyopia, bilateral    Nuclear sclerosis, bilateral      1. Updated specs rx, gave copy  Prefers FT, advised higher add will blur computer distance  Still using computer readers with success    2. Early changes, not vis sig    Discussed and educated patient on current findings /plan.  RTC 1 year, prn if any changes / issues

## 2017-09-12 ENCOUNTER — PATIENT OUTREACH (OUTPATIENT)
Dept: ADMINISTRATIVE | Facility: HOSPITAL | Age: 57
End: 2017-09-12

## 2017-09-12 NOTE — LETTER
September 18, 2017    Yumiko Gonzalez  [id number]  [place] [address]             [facility]  [address]  [facility] [address]  Phone: [phone] Dear Mrs. Gonzalez:    We have tried to reach you by levihart unsuccessfully.    [facility] is committed to your overall health.  To help you get the most out of each of your visits, we will review your information to make sure you are up to date on all of your recommended tests and/or procedures.       Dr. Garrett Webb has found that you may be due for pneumonia, flu, and tetanus immunizations.     If you have had any of the above done at another facility, please bring the records or information with you so that your record at [facility] will be complete.  If you would like to schedule any of these, please contact me.     If you are currently taking medication, please bring it with you to your appointment for review.     If you have any questions or concerns, please don't hesitate to call.    Thank you for letting us care for you,  Pratima Walters LPN Clinical Care Coordinator  [facility]  [phone]

## 2017-09-26 ENCOUNTER — OFFICE VISIT (OUTPATIENT)
Dept: FAMILY MEDICINE | Facility: CLINIC | Age: 57
End: 2017-09-26
Payer: COMMERCIAL

## 2017-09-26 ENCOUNTER — OFFICE VISIT (OUTPATIENT)
Dept: OBSTETRICS AND GYNECOLOGY | Facility: CLINIC | Age: 57
End: 2017-09-26
Payer: COMMERCIAL

## 2017-09-26 ENCOUNTER — HOSPITAL ENCOUNTER (OUTPATIENT)
Dept: RADIOLOGY | Facility: HOSPITAL | Age: 57
Discharge: HOME | End: 2017-09-26
Attending: OBSTETRICS & GYNECOLOGY
Payer: COMMERCIAL

## 2017-09-26 VITALS
TEMPERATURE: 99 F | BODY MASS INDEX: 37.29 KG/M2 | HEIGHT: 70 IN | OXYGEN SATURATION: 99 % | DIASTOLIC BLOOD PRESSURE: 82 MMHG | HEART RATE: 73 BPM | WEIGHT: 260.5 LBS | SYSTOLIC BLOOD PRESSURE: 126 MMHG

## 2017-09-26 VITALS — HEIGHT: 70 IN | BODY MASS INDEX: 37.22 KG/M2 | WEIGHT: 260 LBS

## 2017-09-26 VITALS
SYSTOLIC BLOOD PRESSURE: 124 MMHG | HEIGHT: 70 IN | WEIGHT: 260.38 LBS | BODY MASS INDEX: 37.28 KG/M2 | DIASTOLIC BLOOD PRESSURE: 72 MMHG

## 2017-09-26 DIAGNOSIS — D69.6 THROMBOCYTOPENIA: ICD-10-CM

## 2017-09-26 DIAGNOSIS — M79.671 PAIN IN BOTH FEET: ICD-10-CM

## 2017-09-26 DIAGNOSIS — Z23 NEED FOR PNEUMOCOCCAL VACCINE: ICD-10-CM

## 2017-09-26 DIAGNOSIS — M79.672 PAIN IN BOTH FEET: ICD-10-CM

## 2017-09-26 DIAGNOSIS — D72.819 LEUKOPENIA, UNSPECIFIED TYPE: ICD-10-CM

## 2017-09-26 DIAGNOSIS — K75.81 NONALCOHOLIC STEATOHEPATITIS: ICD-10-CM

## 2017-09-26 DIAGNOSIS — Z01.419 VISIT FOR GYNECOLOGIC EXAMINATION: Primary | ICD-10-CM

## 2017-09-26 DIAGNOSIS — I10 ESSENTIAL HYPERTENSION: ICD-10-CM

## 2017-09-26 DIAGNOSIS — Z12.31 VISIT FOR SCREENING MAMMOGRAM: ICD-10-CM

## 2017-09-26 DIAGNOSIS — M25.569 KNEE PAIN, UNSPECIFIED CHRONICITY, UNSPECIFIED LATERALITY: ICD-10-CM

## 2017-09-26 DIAGNOSIS — Z00.00 WELLNESS EXAMINATION: Primary | ICD-10-CM

## 2017-09-26 DIAGNOSIS — Z12.4 ENCOUNTER FOR PAP SMEAR OF CERVIX WITH HPV DNA COTESTING: ICD-10-CM

## 2017-09-26 PROCEDURE — 90670 PCV13 VACCINE IM: CPT | Mod: S$GLB,,, | Performed by: FAMILY MEDICINE

## 2017-09-26 PROCEDURE — 99999 PR PBB SHADOW E&M-EST. PATIENT-LVL IV: CPT | Mod: PBBFAC,,, | Performed by: FAMILY MEDICINE

## 2017-09-26 PROCEDURE — 88175 CYTOPATH C/V AUTO FLUID REDO: CPT

## 2017-09-26 PROCEDURE — 77063 BREAST TOMOSYNTHESIS BI: CPT | Mod: 26,,, | Performed by: RADIOLOGY

## 2017-09-26 PROCEDURE — 99396 PREV VISIT EST AGE 40-64: CPT | Mod: 25,S$GLB,, | Performed by: FAMILY MEDICINE

## 2017-09-26 PROCEDURE — 99999 PR PBB SHADOW E&M-EST. PATIENT-LVL III: CPT | Mod: PBBFAC,,, | Performed by: OBSTETRICS & GYNECOLOGY

## 2017-09-26 PROCEDURE — 77067 SCR MAMMO BI INCL CAD: CPT | Mod: 26,,, | Performed by: RADIOLOGY

## 2017-09-26 PROCEDURE — 87624 HPV HI-RISK TYP POOLED RSLT: CPT

## 2017-09-26 PROCEDURE — 99396 PREV VISIT EST AGE 40-64: CPT | Mod: S$GLB,,, | Performed by: OBSTETRICS & GYNECOLOGY

## 2017-09-26 PROCEDURE — 90471 IMMUNIZATION ADMIN: CPT | Mod: S$GLB,,, | Performed by: FAMILY MEDICINE

## 2017-09-26 PROCEDURE — 77067 SCR MAMMO BI INCL CAD: CPT | Mod: TC

## 2017-09-26 RX ORDER — BUPROPION HYDROCHLORIDE 150 MG/1
300 TABLET ORAL DAILY
Qty: 180 TABLET | Refills: 3 | Status: SHIPPED | OUTPATIENT
Start: 2017-09-26 | End: 2018-10-08

## 2017-09-26 RX ORDER — HYDROCHLOROTHIAZIDE 12.5 MG/1
12.5 TABLET ORAL DAILY
Qty: 90 TABLET | Refills: 3 | Status: SHIPPED | OUTPATIENT
Start: 2017-09-26 | End: 2018-02-02 | Stop reason: SDUPTHER

## 2017-09-26 RX ORDER — METOPROLOL TARTRATE 25 MG/1
25 TABLET, FILM COATED ORAL 2 TIMES DAILY
Qty: 180 TABLET | Refills: 3 | Status: SHIPPED | OUTPATIENT
Start: 2017-09-26 | End: 2018-10-30 | Stop reason: SDUPTHER

## 2017-09-26 NOTE — PROGRESS NOTES
"Chief Complaint   Patient presents with    Well Woman       History and Physical:  No LMP recorded. Patient is postmenopausal.       Yumiko Gonzalez is a 57 y.o.  female who presents today for her routine annual GYN exam. The patient has no Gynecology complaints today. No bowel or bladder complaints.       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, postitive antismooth muscle antibody    Blood transfusion     Bronchitis     bronchospasm on occasion     Cancer     cervical CA    Cervical neck pain with evidence of disc disease 3/22/2012    can bend neck    Cirrhosis     non-alcoholic, compensated    Colon polyps 3/22/2012    Depression 3/22/2012    Hx panic attacks    Diverticular disease 3/22/2012    never diverticulitis    Fatty liver 3/22/2012    Fibrocystic breast     Fine tremor     hands,chronic    Hepatosplenomegaly     History of cervical cancer 3/22/2012    carcinoma in situ    Hypertension 2013    Knee pain, bilateral     chronic, with hands,feet,fingers also painful    Liver disease     "partially sclerosed liver" per pt    Migraines past Hx    Pleural effusion     small, compensated, good bilateral breath sounds per Dr Suresh GUTIERRES (postoperative nausea and vomiting)     twice after childbirth    Postpartum depression     Splenomegaly     Thrombocytopenia        Past Surgical History:   Procedure Laterality Date    ADENOIDECTOMY      CERVICAL CONIZATION   W/ LASER       SECTION      x3    COLONOSCOPY N/A 2016    Procedure: COLONOSCOPY;  Surgeon: James Palomares MD;  Location[facility];  Service: Endoscopy;  Laterality: N/A;    ENDOMETRIAL ABLATION      LIVER BIOPSY      PELVIC LAPAROSCOPY      TONSILLECTOMY      " TUBAL LIGATION         MEDS:   Current Outpatient Prescriptions on File Prior to Visit   Medication Sig Dispense Refill    alprazolam (XANAX) 0.25 MG tablet TAKE ONE TABLET BY MOUTH NIGHTLY AS NEEDED FOR INSOMNIA 30 tablet 2    buPROPion (WELLBUTRIN XL) 150 MG TB24 tablet TAKE TWO TABLETS BY MOUTH EVERY  tablet 1    hydrochlorothiazide (HYDRODIURIL) 12.5 MG Tab Take 1 tablet (12.5 mg total) by mouth once daily. 30 tablet 11    metoprolol tartrate (LOPRESSOR) 25 MG tablet TAKE ONE TABLET BY MOUTH TWICE A DAY 60 tablet 5    multivitamin (THERAGRAN) per tablet Take 1 tablet by mouth once daily.      nystatin-triamcinolone (MYCOLOG II) cream Apply to affected area BID as needed 60 g 3    vitamin E 400 UNIT capsule Take 400 Units by mouth once daily.      albuterol (PROVENTIL) 2.5 mg /3 mL (0.083 %) nebulizer solution Take 3 mLs (2.5 mg total) by nebulization every 6 (six) hours as needed for Wheezing. 72 mL 2    fluconazole (DIFLUCAN) 150 MG Tab Take 150 mg by mouth once daily.       No current facility-administered medications on file prior to visit.        OB History      Para Term  AB Living    4 3     1      SAB TAB Ectopic Multiple Live Births    1                  Social History     Social History    Marital status:      Spouse name: N/A    Number of children: N/A    Years of education: N/A     Occupational History    Not on file.     Social History Main Topics    Smoking status: Former Smoker     Quit date: 2/3/2006    Smokeless tobacco: Never Used    Alcohol use Yes      Comment: rarely    Drug use: No    Sexual activity: Not Currently     Other Topics Concern    Not on file     Social History Narrative    No narrative on file       Family History   Problem Relation Age of Onset    Breast cancer Maternal Grandmother     Diabetes Father     Heart disease Father     Breast cancer Mother     Heart disease Mother     Hypertension Mother     Diabetes Brother      "Diabetes Sister     Breast cancer Maternal Aunt     Breast cancer Other     Diabetes Sister     Emphysema Brother     Ovarian cancer Neg Hx          Past medical and surgical history reviewed.   I have reviewed the patient's medical history in detail and updated the computerized patient record.        Review of System:   General: no chills, fever, night sweats, weight gain or weight loss  Psychological: no depression or suicidal ideation  Breasts: no new or changing breast lumps, nipple discharge or masses.  Respiratory: no cough, shortness of breath, or wheezing  Cardiovascular: no chest pain or dyspnea on exertion  Gastrointestinal: no abdominal pain, change in bowel habits, or black or bloody stools  Genito-Urinary: no incontinence, urinary frequency/urgency or vulvar/vaginal symptoms, pelvic pain or abnormal vaginal bleeding.  Musculoskeletal: no gait disturbance or muscular weakness      Physical Exam:     /72   Ht 5' 10" (1.778 m)   Wt 118.1 kg (260 lb 5.8 oz)   BMI 37.36 kg/m²   Constitutional: She is oriented to person, place, and time. She appears well-developed and well-nourished. No distress.   HENT:   Head: Normocephalic and atraumatic.   Eyes: Conjunctivae and EOM are normal. No scleral icterus.   Neck: Normal range of motion. Neck supple. No tracheal deviation present.   Cardiovascular: Normal rate.    Pulmonary/Chest: Effort normal. No respiratory distress. She exhibits no tenderness.  Breasts: are symmetrical.   Right breast exhibits no inverted nipple, no mass, no nipple discharge, no skin change and no tenderness.   Left breast exhibits no inverted nipple, no mass, no nipple discharge, no skin change and no tenderness.  Abdominal: Soft. She exhibits no distension and no mass. There is no tenderness. There is no rebound and no guarding.   Genitourinary:    External rectal exam shows no thrombosed external hemorrhoids.    Pelvic exam was performed with patient supine.   No labial " fusion.   There is no rash, lesion or injury on the right labia.   There is no rash, lesion or injury on the left labia.   No bleeding and no signs of injury around the vaginal introitus, urethra is without lesions and well supported. The cervix is visualized with no discharge, lesions or friability.   No vaginal discharge found.    No significant Cystocele, Enterocele or rectocele, and uterus well supported.   Bimanual exam:   The urethra is normal to palpation and there are no palpable vaginal wall masses.   Uterus is not deviated, not enlarged, not fixed, normal shape and not tender.   Cervix exhibits no motion tenderness.    Right adnexum displays no mass and no tenderness.   Left adnexum displays no mass and no tenderness.  Musculoskeletal: Normal range of motion.   Lymphadenopathy: No inguinal adenopathy present.   Neurological: She is alert and oriented to person, place, and time. Coordination normal.   Skin: Skin is warm and dry. She is not diaphoretic.   Psychiatric: She has a normal mood and affect.      Assessment:   Normal annual GYN exam  1. Encounter for Pap smear of cervix with HPV DNA cotesting  Liquid-based pap smear, screening    HPV High Risk Genotypes, PCR   2. Visit for screening mammogram  CANCELED: Mammo Digital Screening Bilat With CAD       Plan:   PAP  Mammogram done  Follow up in 1 year.

## 2017-09-26 NOTE — PROGRESS NOTES
Subjective:     THIS DOCUMENT WAS MADE IN PART WITH [employer] DICTATION SOFTWARE.  OCCASIONALLY THIS SOFTWARE WILL MISINTERPRET WORDS OR PHRASES.     Patient ID: Yumiko Gonzalez is a 57 y.o. female.    Chief Complaint: Annual Exam    HPI    annual checkup/wellness     some concerns about memory loss. Minor concerns developed several years ago but perhaps mild progression. She is generally sleeping well. There's some stress. Really no new medications or medications that would cause chronic memory impairment are identified.    DURAN/ cirrhosis, She does follow with hepatology. But she's concerned if this could be having impact on memory and concentration    Bilateral knee pain, left worse, feels unstable  Feet hurt, no injury.     She has been eating healthy and has lost significant weight but unfortunately the lower extremity arthralgias have not improved    Active Ambulatory Problems     Diagnosis Date Noted    Acute bronchospasm 10/13/2010    Hypermetropia 09/01/2010    Enthesopathy of ankle and tarsus, unspecified 10/26/2011    Cervical neck pain with evidence of disc disease 03/22/2012    Fatty liver 03/22/2012    History of cervical cancer 03/22/2012    Depression 03/22/2012    Diverticular disease 03/22/2012    Colon polyps 03/22/2012    Hypertension 04/24/2013    Hx of colonic polyps 12/12/2016    Morbid obesity due to excess calories 04/11/2017    Morbid obesity with BMI of 40.0-44.9, adult 04/11/2017    Splenomegaly 04/11/2017    Preop cardiovascular exam 04/18/2017    History of posttraumatic stress disorder (PTSD) 06/06/2017    Nonalcoholic steatohepatitis 06/20/2017    Cirrhosis of liver without ascites 06/20/2017    Cirrhosis 07/13/2017     Resolved Ambulatory Problems     Diagnosis Date Noted    DUNG (obstructive sleep apnea) 03/22/2012    Postop check 05/24/2013     Past Medical History:   Diagnosis Date    Abnormal Pap smear     Anemia     Asthma     Autoimmune disease     Blood  transfusion     Bronchitis     Cancer 1987    Cervical neck pain with evidence of disc disease 3/22/2012    Cirrhosis     Colon polyps 3/22/2012    Depression 3/22/2012    Diverticular disease 3/22/2012    Fatty liver 3/22/2012    Fibrocystic breast     Fine tremor     Hepatosplenomegaly     History of cervical cancer 3/22/2012    Hypertension 4/24/2013    Knee pain, bilateral     Liver disease     Migraines past Hx    Pleural effusion     PONV (postoperative nausea and vomiting)     Postpartum depression     Splenomegaly     Thrombocytopenia      Current Outpatient Prescriptions on File Prior to Visit   Medication Sig Dispense Refill    albuterol (PROVENTIL) 2.5 mg /3 mL (0.083 %) nebulizer solution Take 3 mLs (2.5 mg total) by nebulization every 6 (six) hours as needed for Wheezing. 72 mL 2    alprazolam (XANAX) 0.25 MG tablet TAKE ONE TABLET BY MOUTH NIGHTLY AS NEEDED FOR INSOMNIA 30 tablet 2    fluconazole (DIFLUCAN) 150 MG Tab Take 150 mg by mouth once daily.      multivitamin (THERAGRAN) per tablet Take 1 tablet by mouth once daily.      nystatin-triamcinolone (MYCOLOG II) cream Apply to affected area BID as needed 60 g 3    vitamin E 400 UNIT capsule Take 400 Units by mouth once daily.      [DISCONTINUED] buPROPion (WELLBUTRIN XL) 150 MG TB24 tablet TAKE TWO TABLETS BY MOUTH EVERY  tablet 1    [DISCONTINUED] hydrochlorothiazide (HYDRODIURIL) 12.5 MG Tab Take 1 tablet (12.5 mg total) by mouth once daily. 30 tablet 11    [DISCONTINUED] metoprolol tartrate (LOPRESSOR) 25 MG tablet TAKE ONE TABLET BY MOUTH TWICE A DAY 60 tablet 5     No current facility-administered medications on file prior to visit.      Review of patient's allergies indicates:   Allergen Reactions    No known drug allergies      Social History   Substance Use Topics    Smoking status: Former Smoker     Quit date: 2/3/2006    Smokeless tobacco: Never Used    Alcohol use Yes      Comment: rarely      Family History   Problem Relation Age of Onset    Breast cancer Maternal Grandmother     Diabetes Father     Heart disease Father     Breast cancer Mother     Heart disease Mother     Hypertension Mother     Diabetes Brother     Diabetes Sister     Breast cancer Maternal Aunt     Breast cancer Other     Diabetes Sister     Emphysema Brother     Ovarian cancer Neg Hx          Review of Systems   Constitutional: Negative for unexpected weight change.   Eyes: Negative.    Respiratory: Negative.    Cardiovascular: Negative.    Gastrointestinal: Negative.  Negative for abdominal pain.   Endocrine: Negative.    Genitourinary: Negative.    Musculoskeletal: Positive for arthralgias and gait problem.   Psychiatric/Behavioral: Positive for decreased concentration.       Objective:      Physical Exam   Constitutional: She is oriented to person, place, and time. She appears well-developed and well-nourished.   HENT:   Head: Normocephalic and atraumatic.   Right Ear: Tympanic membrane, external ear and ear canal normal.   Left Ear: Tympanic membrane, external ear and ear canal normal.   Nose: Nose normal.   Mouth/Throat: Oropharynx is clear and moist.   Eyes: Conjunctivae are normal. Pupils are equal, round, and reactive to light. Right eye exhibits no discharge. Left eye exhibits no discharge. No scleral icterus.   Neck: Normal range of motion. Neck supple. No JVD present.   Cardiovascular: Normal rate, regular rhythm and normal heart sounds.    No murmur heard.  Pulmonary/Chest: Effort normal and breath sounds normal. No respiratory distress.   Abdominal: Soft. Bowel sounds are normal. She exhibits no distension and no mass. There is no tenderness.   Musculoskeletal:   She does have arthritis changes of the knees with mild valgus deformity, left greater than right   Lymphadenopathy:     She has no cervical adenopathy.   Neurological: She is alert and oriented to person, place, and time.   Skin: Skin is dry. No  rash noted. She is not diaphoretic.   Psychiatric: She has a normal mood and affect. Her behavior is normal.   Vitals reviewed.      Assessment:       1. Wellness examination    2. Essential hypertension    3. Thrombocytopenia    4. Leukopenia, unspecified type    5. Nonalcoholic steatohepatitis    6. Need for pneumococcal vaccine    7. Knee pain, unspecified chronicity, unspecified laterality    8. Pain in both feet        Plan:       Yumiko was seen today for annual exam.    Diagnoses and all orders for this visit:    Wellness examination  -     (In Office Administered) Pneumococcal Conjugate Vaccine (13 Valent) (IM)    Essential hypertension  -     Comprehensive metabolic panel; Future  -     Lipid panel; Future  -     TSH; Future    Thrombocytopenia  -     CBC auto differential; Future    Leukopenia, unspecified type    Nonalcoholic steatohepatitis  -     Ammonia; Future    Need for pneumococcal vaccine  -     (In Office Administered) Pneumococcal Conjugate Vaccine (13 Valent) (IM)    Knee pain, unspecified chronicity, unspecified laterality  -     X-ray AP Standing Knees with Both Lateral; Future    Pain in both feet  -     Ambulatory referral to Podiatry    Other orders  -     buPROPion (WELLBUTRIN XL) 150 MG TB24 tablet; Take 2 tablets (300 mg total) by mouth once daily.  -     hydrochlorothiazide (HYDRODIURIL) 12.5 MG Tab; Take 1 tablet (12.5 mg total) by mouth once daily.  -     metoprolol tartrate (LOPRESSOR) 25 MG tablet; Take 1 tablet (25 mg total) by mouth 2 (two) times daily.

## 2017-09-27 ENCOUNTER — LAB VISIT (OUTPATIENT)
Dept: LAB | Facility: HOSPITAL | Age: 57
End: 2017-09-27
Attending: FAMILY MEDICINE
Payer: COMMERCIAL

## 2017-09-27 DIAGNOSIS — K75.81 NONALCOHOLIC STEATOHEPATITIS: ICD-10-CM

## 2017-09-27 DIAGNOSIS — I10 ESSENTIAL HYPERTENSION: ICD-10-CM

## 2017-09-27 DIAGNOSIS — D69.6 THROMBOCYTOPENIA: ICD-10-CM

## 2017-09-27 LAB
ALBUMIN SERPL BCP-MCNC: 3.5 G/DL
ALP SERPL-CCNC: 92 U/L
ALT SERPL W/O P-5'-P-CCNC: 52 U/L
AMMONIA PLAS-SCNC: 34 UMOL/L
ANION GAP SERPL CALC-SCNC: 8 MMOL/L
AST SERPL-CCNC: 52 U/L
BASOPHILS # BLD AUTO: 0.01 K/UL
BASOPHILS NFR BLD: 0.3 %
BILIRUB SERPL-MCNC: 1.7 MG/DL
BUN SERPL-MCNC: 14 MG/DL
CALCIUM SERPL-MCNC: 10.1 MG/DL
CHLORIDE SERPL-SCNC: 106 MMOL/L
CHOLEST SERPL-MCNC: 126 MG/DL
CHOLEST/HDLC SERPL: 2.3 {RATIO}
CO2 SERPL-SCNC: 27 MMOL/L
CREAT SERPL-MCNC: 0.8 MG/DL
DIFFERENTIAL METHOD: ABNORMAL
EOSINOPHIL # BLD AUTO: 0.2 K/UL
EOSINOPHIL NFR BLD: 6.9 %
ERYTHROCYTE [DISTWIDTH] IN BLOOD BY AUTOMATED COUNT: 14.1 %
EST. GFR  (AFRICAN AMERICAN): >60 ML/MIN/1.73 M^2
EST. GFR  (NON AFRICAN AMERICAN): >60 ML/MIN/1.73 M^2
GLUCOSE SERPL-MCNC: 105 MG/DL
HCT VFR BLD AUTO: 43.5 %
HDLC SERPL-MCNC: 55 MG/DL
HDLC SERPL: 43.7 %
HGB BLD-MCNC: 14.4 G/DL
LDLC SERPL CALC-MCNC: 59.8 MG/DL
LYMPHOCYTES # BLD AUTO: 0.7 K/UL
LYMPHOCYTES NFR BLD: 20.4 %
MCH RBC QN AUTO: 30.8 PG
MCHC RBC AUTO-ENTMCNC: 33.1 G/DL
MCV RBC AUTO: 93 FL
MONOCYTES # BLD AUTO: 0.4 K/UL
MONOCYTES NFR BLD: 12.6 %
NEUTROPHILS # BLD AUTO: 2.1 K/UL
NEUTROPHILS NFR BLD: 59.5 %
NONHDLC SERPL-MCNC: 71 MG/DL
PLATELET # BLD AUTO: 63 K/UL
PMV BLD AUTO: 12.1 FL
POTASSIUM SERPL-SCNC: 3.9 MMOL/L
PROT SERPL-MCNC: 6.2 G/DL
RBC # BLD AUTO: 4.67 M/UL
SODIUM SERPL-SCNC: 141 MMOL/L
TRIGL SERPL-MCNC: 56 MG/DL
TSH SERPL DL<=0.005 MIU/L-ACNC: 1.36 UIU/ML
WBC # BLD AUTO: 3.48 K/UL

## 2017-09-27 PROCEDURE — 85025 COMPLETE CBC W/AUTO DIFF WBC: CPT

## 2017-09-27 PROCEDURE — 80061 LIPID PANEL: CPT

## 2017-09-27 PROCEDURE — 80053 COMPREHEN METABOLIC PANEL: CPT

## 2017-09-27 PROCEDURE — 36415 COLL VENOUS BLD VENIPUNCTURE: CPT | Mod: PO

## 2017-09-27 PROCEDURE — 82140 ASSAY OF AMMONIA: CPT

## 2017-09-27 PROCEDURE — 84443 ASSAY THYROID STIM HORMONE: CPT

## 2017-09-29 ENCOUNTER — HOSPITAL ENCOUNTER (OUTPATIENT)
Dept: RADIOLOGY | Facility: HOSPITAL | Age: 57
Discharge: HOME | End: 2017-09-29
Attending: FAMILY MEDICINE
Payer: COMMERCIAL

## 2017-09-29 ENCOUNTER — PATIENT MESSAGE (OUTPATIENT)
Dept: FAMILY MEDICINE | Facility: CLINIC | Age: 57
End: 2017-09-29

## 2017-09-29 DIAGNOSIS — M25.569 KNEE PAIN, UNSPECIFIED CHRONICITY, UNSPECIFIED LATERALITY: ICD-10-CM

## 2017-09-29 LAB
HPV HR 12 DNA CVX QL NAA+PROBE: NEGATIVE
HPV16 DNA SPEC QL NAA+PROBE: NEGATIVE
HPV18 DNA SPEC QL NAA+PROBE: NEGATIVE

## 2017-09-29 PROCEDURE — 73562 X-RAY EXAM OF KNEE 3: CPT | Mod: 26,50,, | Performed by: RADIOLOGY

## 2017-09-29 PROCEDURE — 73562 X-RAY EXAM OF KNEE 3: CPT | Mod: TC,50,PO

## 2017-10-06 ENCOUNTER — OFFICE VISIT (OUTPATIENT)
Dept: PODIATRY | Facility: CLINIC | Age: 57
End: 2017-10-06
Payer: COMMERCIAL

## 2017-10-06 VITALS — BODY MASS INDEX: 37.02 KG/M2 | WEIGHT: 258.63 LBS | HEIGHT: 70 IN

## 2017-10-06 DIAGNOSIS — M21.6X1 ACQUIRED EQUINUS DEFORMITY OF BOTH FEET: ICD-10-CM

## 2017-10-06 DIAGNOSIS — M25.572 SINUS TARSI SYNDROME, LEFT: ICD-10-CM

## 2017-10-06 DIAGNOSIS — M21.41 ACQUIRED PES PLANOVALGUS OF RIGHT FOOT: ICD-10-CM

## 2017-10-06 DIAGNOSIS — M25.571 SINUS TARSI SYNDROME, RIGHT: ICD-10-CM

## 2017-10-06 DIAGNOSIS — M21.42 ACQUIRED PES PLANOVALGUS OF LEFT FOOT: Primary | ICD-10-CM

## 2017-10-06 DIAGNOSIS — M21.6X2 ACQUIRED EQUINUS DEFORMITY OF BOTH FEET: ICD-10-CM

## 2017-10-06 PROCEDURE — 99999 PR PBB SHADOW E&M-EST. PATIENT-LVL III: CPT | Mod: PBBFAC,,, | Performed by: PODIATRIST

## 2017-10-06 PROCEDURE — 99203 OFFICE O/P NEW LOW 30 MIN: CPT | Mod: S$GLB,,, | Performed by: PODIATRIST

## 2017-10-06 RX ORDER — DICLOFENAC SODIUM 10 MG/G
2 GEL TOPICAL 3 TIMES DAILY
Qty: 1 TUBE | Refills: 3 | Status: SHIPPED | OUTPATIENT
Start: 2017-10-06 | End: 2022-12-09

## 2017-10-06 NOTE — PROGRESS NOTES
Subjective:      Patient ID: Yumiko Gonzalez is a 57 y.o. female.    Chief Complaint: Foot Pain (Aldo - right foot worse at times) and Other Misc (Dr Webb  9/26/17)    Yumiko is a 57 y.o. female who presents to the podiatry clinic  with complaint of  bilateral foot pain R>L. Onset of the symptoms was several years ago. Precipitating event: none known. Current symptoms include: ability to bear weight, but with some pain, worsening symptoms after a period of activity and sharp pains at times even with sof touch to lateral foot. Aggravating factors: any weight bearing. Symptoms have gradually worsened. Patient has had prior foot problems, she saw Dr. Simms several years ago and was given Alimed inserts but was unable to tolerate them. Patients rates pain 2/10 on pain scale today, bu on other days it gets to 8-9/10 at times.    Review of Systems   Constitution: Negative for chills and fever.   Cardiovascular: Negative for claudication and leg swelling.   Respiratory: Negative for shortness of breath.    Skin: Negative for color change, itching, nail changes, poor wound healing and rash.   Musculoskeletal: Positive for arthritis. Negative for muscle cramps, muscle weakness and myalgias.   Gastrointestinal: Negative for nausea and vomiting.   Neurological: Negative for focal weakness, loss of balance, numbness and paresthesias.           Objective:      Physical Exam   Constitutional: She is oriented to person, place, and time. She appears well-developed and well-nourished. No distress.   Cardiovascular:   Pulses:       Dorsalis pedis pulses are 2+ on the right side, and 2+ on the left side.        Posterior tibial pulses are 2+ on the right side, and 2+ on the left side.   < 3 sec capillary refill time to toes 1-5 bilateral. Toes and feet are warm to touch proximally with normal distal cooling b/l. There is some hair growth on the feet and toes b/l. There is no edema b/l. No spider veins or varicosities present b/l.       Musculoskeletal:   Pain with palpation to the sinus tarsi bilateral, no pain along the peroneals, PT tendon, achilles.     She has flexible flat foot with medial arch collapse with weight bearing with forefoot abduction with ambulation, apropulsive gait and noted lateral column impingement.     Equinus noted b/l ankles with < 10 deg DF noted. MMT 5/5 in DF/PF/Inv/Ev resistance with no reproduction of pain in any direction. Passive range of motion of ankle and pedal joints is painless b/l.     Neurological: She is alert and oriented to person, place, and time. She has normal strength. She displays no atrophy and no tremor. No sensory deficit. She exhibits normal muscle tone.   Reflex Scores:       Achilles reflexes are 2+ on the right side and 2+ on the left side.  Negative tinel sign bilateral.   Skin: Skin is warm, dry and intact. No abrasion, no bruising, no burn, no ecchymosis, no laceration, no lesion, no petechiae and no rash noted. She is not diaphoretic. No cyanosis or erythema. No pallor. Nails show no clubbing.   Skin temperature, texture and turgor within normal limits.   Psychiatric: She has a normal mood and affect. Her behavior is normal.             Assessment:       Encounter Diagnoses   Name Primary?    Acquired pes planovalgus of left foot Yes    Acquired pes planovalgus of right foot     Acquired equinus deformity of both feet     Sinus tarsi syndrome, left     Sinus tarsi syndrome, right          Plan:       Yumiko was seen today for foot pain and other misc.    Diagnoses and all orders for this visit:    Acquired pes planovalgus of left foot    Acquired pes planovalgus of right foot    Acquired equinus deformity of both feet    Sinus tarsi syndrome, left    Sinus tarsi syndrome, right    Other orders  -     diclofenac sodium (VOLTAREN) 1 % Gel; Apply 2 g topically 3 (three) times daily.      I counseled the patient on her conditions, their implications and medical  management.    Voltaren gel for pain and inflammation    Patient will stretch the tendo achilles complex three times daily as demonstrated in the office.  Literature was dispensed illustrating proper stretching technique.    Patient will obtain over the counter arch supports and wear them in shoes whenever possible.  Athletic shoes intended for walking or running are usually best. Recommended sof sole fit series neutral arch or spenco inserts, discussed that she may need to gradually break the inserts in over two weeks wearing them incrementally more each day to allow her feet to adjust to them. Avoid walking barefoot or in flats.    Ice as needed for inflammation    Return in 6 weeks for follow up, consider PT, injections to tarsal tunnel if there is no improvement.    Mehran Reid DPM

## 2017-10-06 NOTE — LETTER
October 6, 2017      Cuco Ulrich MD  [address]  [place] [address]           [facility]  [address] [facility] [address] [place] [address]  Phone: [phone]          Patient: Yumiko Gonzalez   MR Number: [id number]   YOB: 1960   Date of Visit: 10/6/2017       Dear Dr. Cuco Ulrich:    Thank you for referring Yumiko Gonzalez to me for evaluation. Attached you will find relevant portions of my assessment and plan of care.    If you have questions, please do not hesitate to call me. I look forward to following Yumiko Gonzalez along with you.    Sincerely,    Mehran Reid, DPKAILEY    Enclosure  CC:  No Recipients    If you would like to receive this communication electronically, please contact [email or web address] or [phone] to request more information on [employer] access.    For providers and/or their staff who would like to refer a patient to [facility], please contact us through our one-stop-shop provider referral line, [facility] , at [phone].    If you feel you have received this communication in error or would no longer like to receive these types of communications, please e-mail [email or web address]

## 2017-10-06 NOTE — PATIENT INSTRUCTIONS
1. Stretch calf 3x per day for 30 sec     2. Supportive shoes at all times (athletic shoe including samano, new balance, asics, HOKA or casual shoes like Dansko, Jenn, Naot, Vionoic, Fit flop  clog or wedge with extra heel padding and arch support.    (Varsity sports, [employer], [place] running company, Masseys, Goodfeet, Cantilever, Feet First, Foot Solutions, Therapeutic shoes, SAS, [facility]ge Networks[facility] fitness center pro shop) http://[email or web address]/    3. Orthotic (recommend the following brands: Superfeet, Spenco, Powerstep, Sof Sole Fit Series)    4. Voltaren gel up to 3 times a day as needed    5. ICE as needed.    6. Consider custom orthotics, physical therapy and/or steroid injection      Lower Body Exercises: Calf Stretch    This exercise both stretches and strengthens your lower body to help your back. Do the exercise as often as suggested by your health care provider. As you work out, dont rush or strain. Use an exercise mat, pillow, or folded towel to protect your knees and other sensitive areas.  · Face a wall 2 feet away. Step toward the wall with one foot.  · Place both palms on the wall and bend your front knee.  · Lean forward, keeping the back leg straight and the heel on the floor.  · Hold for 20 seconds. Switch legs.  © 8232-0058 [employer]. [address] [place] [address], [place] [address]. All rights reserved. This information is not intended as a substitute for professional medical care. Always follow your healthcare professional's instructions.

## 2017-10-17 ENCOUNTER — PATIENT MESSAGE (OUTPATIENT)
Dept: FAMILY MEDICINE | Facility: CLINIC | Age: 57
End: 2017-10-17

## 2017-10-17 DIAGNOSIS — M25.562 LEFT KNEE PAIN, UNSPECIFIED CHRONICITY: Primary | ICD-10-CM

## 2017-10-18 ENCOUNTER — OFFICE VISIT (OUTPATIENT)
Dept: ORTHOPEDICS | Facility: CLINIC | Age: 57
End: 2017-10-18
Payer: COMMERCIAL

## 2017-10-18 VITALS — HEIGHT: 70 IN | WEIGHT: 258 LBS | BODY MASS INDEX: 36.94 KG/M2

## 2017-10-18 DIAGNOSIS — M17.12 ARTHRITIS OF LEFT KNEE: Primary | ICD-10-CM

## 2017-10-18 PROCEDURE — 99999 PR PBB SHADOW E&M-EST. PATIENT-LVL II: CPT | Mod: PBBFAC,,, | Performed by: ORTHOPAEDIC SURGERY

## 2017-10-18 PROCEDURE — 20610 DRAIN/INJ JOINT/BURSA W/O US: CPT | Mod: LT,S$GLB,, | Performed by: ORTHOPAEDIC SURGERY

## 2017-10-18 PROCEDURE — 99243 OFF/OP CNSLTJ NEW/EST LOW 30: CPT | Mod: 25,S$GLB,, | Performed by: ORTHOPAEDIC SURGERY

## 2017-10-18 RX ORDER — TRIAMCINOLONE ACETONIDE 40 MG/ML
40 INJECTION, SUSPENSION INTRA-ARTICULAR; INTRAMUSCULAR
Status: DISCONTINUED | OUTPATIENT
Start: 2017-10-18 | End: 2017-10-18 | Stop reason: HOSPADM

## 2017-10-18 RX ORDER — METOPROLOL TARTRATE 25 MG/1
TABLET, FILM COATED ORAL
Qty: 60 TABLET | Refills: 5 | Status: SHIPPED | OUTPATIENT
Start: 2017-10-18 | End: 2018-05-24 | Stop reason: SDUPTHER

## 2017-10-18 RX ADMIN — TRIAMCINOLONE ACETONIDE 40 MG: 40 INJECTION, SUSPENSION INTRA-ARTICULAR; INTRAMUSCULAR at 03:10

## 2017-10-18 NOTE — LETTER
October 18, 2017        Garrett Webb MD  [address] [facility] [address] [place] [address]             [facility]  [address] [facility] [address] [place] [address]  Phone: [phone]   Patient: Yumiko Gonzalez   MR Number: [id number]   YOB: 1960   Date of Visit: 10/18/2017       Dear Dr. Webb:    Thank you for referring Yumiko Gonzalez to me for evaluation. Below are the relevant portions of my assessment and plan of care.            If you have questions, please do not hesitate to call me. I look forward to following Yumiko along with you.    Sincerely,      Pop Schneider MD           CC  No Recipients

## 2017-10-18 NOTE — PROCEDURES
Large Joint Aspiration/Injection  Date/Time: 10/18/2017 3:02 PM  Performed by: JUDY NEGRO  Authorized by: JUDY NEGRO     Consent Done?:  Yes (Verbal)  Indications:  Pain  Procedure site marked: Yes    Timeout: Prior to procedure the correct patient, procedure, and site was verified      Location:  Knee  Site:  L knee  Prep: Patient was prepped and draped in usual sterile fashion    Needle size:  20 G  Approach:  Anterolateral  Medications:  40 mg triamcinolone acetonide 40 mg/mL  Patient tolerance:  Patient tolerated the procedure well with no immediate complications

## 2017-10-18 NOTE — PROGRESS NOTES
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, postitive antismooth muscle antibody    Blood transfusion     Bronchitis     bronchospasm on occasion     Cancer 1987    cervical CA    Cervical neck pain with evidence of disc disease 3/22/2012    can bend neck    Cirrhosis     non-alcoholic, compensated    Colon polyps 3/22/2012    Depression 3/22/2012    Hx panic attacks    Diverticular disease 3/22/2012    never diverticulitis    Fatty liver 3/22/2012    Fibrocystic breast     Fine tremor     hands,chronic    Hepatosplenomegaly     History of cervical cancer 3/22/2012    carcinoma in situ    Hypertension 2013    Knee pain, bilateral     chronic, with hands,feet,fingers also painful    Liver disease     "partially sclerosed liver" per pt    Migraines past Hx    Pleural effusion     small, compensated, good bilateral breath sounds per Dr Suresh GUTIERRES (postoperative nausea and vomiting)     twice after childbirth    Postpartum depression     Splenomegaly     Thrombocytopenia        Past Surgical History:   Procedure Laterality Date    ADENOIDECTOMY      CERVICAL CONIZATION   W/ LASER       SECTION      x3    COLONOSCOPY N/A 2016    Procedure: COLONOSCOPY;  Surgeon: James Palomares MD;  Location: [facility];  Service: Endoscopy;  Laterality: N/A;    ENDOMETRIAL ABLATION      LIVER BIOPSY      PELVIC LAPAROSCOPY      TONSILLECTOMY      TUBAL LIGATION         Current Outpatient Prescriptions   Medication Sig    alprazolam (XANAX) 0.25 MG tablet TAKE ONE TABLET BY MOUTH NIGHTLY AS NEEDED FOR INSOMNIA    buPROPion (WELLBUTRIN XL) 150 MG TB24 tablet Take 2 tablets (300 mg total) by mouth once daily.    fluconazole (DIFLUCAN) 150 MG Tab Take 150 mg by mouth once daily.    hydrochlorothiazide (HYDRODIURIL) 12.5 MG Tab Take 1 " tablet (12.5 mg total) by mouth once daily.    metoprolol tartrate (LOPRESSOR) 25 MG tablet Take 1 tablet (25 mg total) by mouth 2 (two) times daily.    metoprolol tartrate (LOPRESSOR) 25 MG tablet TAKE ONE TABLET BY MOUTH TWICE A DAY    multivitamin (THERAGRAN) per tablet Take 1 tablet by mouth once daily.    nystatin-triamcinolone (MYCOLOG II) cream Apply to affected area BID as needed    vitamin E 400 UNIT capsule Take 400 Units by mouth once daily.    albuterol (PROVENTIL) 2.5 mg /3 mL (0.083 %) nebulizer solution Take 3 mLs (2.5 mg total) by nebulization every 6 (six) hours as needed for Wheezing.    diclofenac sodium (VOLTAREN) 1 % Gel Apply 2 g topically 3 (three) times daily.     No current facility-administered medications for this visit.        Review of patient's allergies indicates:   Allergen Reactions    No known drug allergies        Family History   Problem Relation Age of Onset    Breast cancer Maternal Grandmother     Diabetes Father     Heart disease Father     Breast cancer Mother     Heart disease Mother     Hypertension Mother     Diabetes Brother     Diabetes Sister     Breast cancer Maternal Aunt     Breast cancer Other     Diabetes Sister     Emphysema Brother     Ovarian cancer Neg Hx        Social History     Social History    Marital status:      Spouse name: N/A    Number of children: N/A    Years of education: N/A     Occupational History    Not on file.     Social History Main Topics    Smoking status: Former Smoker     Quit date: 2/3/2006    Smokeless tobacco: Never Used    Alcohol use Yes      Comment: rarely    Drug use: No    Sexual activity: Not Currently     Other Topics Concern    Not on file     Social History Narrative    No narrative on file       Chief Complaint:   Chief Complaint   Patient presents with    Left Knee - Pain       History of present illness: 57-year-old female seen in consultation for Dr. Webb for left knee pain.   Pain started about a month ago.  No known injury.  No trauma.  Patient has been trying to lose weight but has difficulty because of the knee pain.  Knee feels very tight and stiff.  Pain located Globally around the knee.  She has been using some AndroGel.  Pain as a 4 out of 10.      Answers for HPI/ROS submitted by the patient on 10/13/2017   Leg pain  unexpected weight change: No  appetite change : No  sleep disturbance: No  IMMUNOCOMPROMISED: No  nervous/ anxious: No  dysphoric mood: No  rash: No  visual disturbance: No  eye redness: No  eye pain: No  ear pain: No  tinnitus: No  hearing loss: No  sinus pressure : No  nosebleeds: No  enviro allergies: No  food allergies: No  cough: No  shortness of breath: No  sweating: No  dysuria: No  frequency: No  difficulty urinating: No  hematuria: No  painful intercourse: No  chest pain: No  palpitations: No  nausea: No  vomiting: No  diarrhea: No  blood in stool: No  constipation: No  headaches: No  dizziness: No  numbness: No  seizures: No  joint swelling: Yes  myalgia: Yes  weakness: Yes  back pain: No  Pain Chronicity: new  History of trauma: No  Onset: more than 1 month ago  Frequency: constantly  Progression since onset: waxing and waning  injury location: at work  pain- numeric: 6/10  pain location: left knee  pain quality: throbbing  Radiating Pain: No  Aggravating factors: standing  fever: No  inability to bear weight: No  itching: No  joint locking: No  limited range of motion: Yes  stiffness: Yes  tingling: No  Treatments tried: movement  physical therapy: not tried  Improvement on treatment: no relief        Physical Examination:    Vital Signs:  There were no vitals filed for this visit.    Body mass index is 37.02 kg/m².    This a well-developed, well nourished patient in no acute distress.  They are alert and oriented and cooperative to examination.  Pt. walks without an antalgic gait.      Examination of the left knee shows no rashes or erythema. There are  no masses ecchymosis and trace effusion. Patient has full range of motion from 0-130°. Patient is nontender to palpation over lateral joint line and nontender to palpation over the medial joint line. Patient has a - Lachman exam, - anterior drawer exam, and - posterior drawer exam. - Dwain's exam. Knee is stable to varus and valgus stress. 5 out of 5 motor strength. Palpable distal pulses. Intact light touch sensation. Negative Patellofemoral crepitus    Examination of the right knee shows no rashes or erythema. There are no masses ecchymosis or effusion. Patient has full range of motion from 0-130°. Patient is nontender to palpation over lateral joint line and nontender to palpation over the medial joint line. Patient has a - Lachman exam, - anterior drawer exam, and - posterior drawer exam. - Dwain's exam. Knee is stable to varus and valgus stress. 5 out of 5 motor strength. Palpable distal pulses. Intact light touch sensation. Negative Patellofemoral crepitus    X-rays: X-rays left knee are ordered and reviewed which show small ossicle along the medial patellar retinaculum possibly from old injury.  Mild left knee effusion.  Mild knee arthritis     Assessment:: Left knee arthritic flare    Plan:  I reviewed the findings and the x-rays with her today.  I recommended a cortisone injection to hopefully calm her knee down and get her back in the gym.    This note was created using Dragon voice recognition software that occasionally misinterpreted phrases or words.    Consult note is delivered via Epic messaging service.

## 2017-11-17 ENCOUNTER — OFFICE VISIT (OUTPATIENT)
Dept: PODIATRY | Facility: CLINIC | Age: 57
End: 2017-11-17
Payer: COMMERCIAL

## 2017-11-17 VITALS — BODY MASS INDEX: 36.93 KG/M2 | WEIGHT: 257.94 LBS | HEIGHT: 70 IN

## 2017-11-17 DIAGNOSIS — M21.6X1 ACQUIRED EQUINUS DEFORMITY OF BOTH FEET: ICD-10-CM

## 2017-11-17 DIAGNOSIS — M21.6X2 ACQUIRED EQUINUS DEFORMITY OF BOTH FEET: ICD-10-CM

## 2017-11-17 DIAGNOSIS — M21.41 ACQUIRED PES PLANOVALGUS OF RIGHT FOOT: ICD-10-CM

## 2017-11-17 DIAGNOSIS — M21.42 ACQUIRED PES PLANOVALGUS OF LEFT FOOT: Primary | ICD-10-CM

## 2017-11-17 PROCEDURE — 99212 OFFICE O/P EST SF 10 MIN: CPT | Mod: S$GLB,,, | Performed by: PODIATRIST

## 2017-11-17 PROCEDURE — 99999 PR PBB SHADOW E&M-EST. PATIENT-LVL II: CPT | Mod: PBBFAC,,, | Performed by: PODIATRIST

## 2017-11-17 NOTE — PROGRESS NOTES
Subjective:      Patient ID: Yumiko Gonzalez is a 57 y.o. female.    Chief Complaint: Follow-up (6 week f/u for renee foot pain) and Other Misc (PCP Dr. Webb 9/26/2017)    Yumiko is a 57 y.o. female who presents to the podiatry clinic  with complaint of  bilateral foot pain R>L. Onset of the symptoms was several years ago. Precipitating event: none known. Current symptoms include: ability to bear weight, but with some pain, worsening symptoms after a period of activity and sharp pains at times even with sof touch to lateral foot. Aggravating factors: any weight bearing. Symptoms have gradually worsened. Patient has had prior foot problems, she saw Dr. Simms several years ago and was given Alimed inserts but was unable to tolerate them. Patients rates pain 2/10 on pain scale today, bu on other days it gets to 8-9/10 at times.    11/17/17: Patient presents for follow up 6 weeks bilateral foot pain. She got new shoes, inserts, has been stretching daily. Reports pain has about resolved, her feet have no pain today. She is doing well, no new complaints.    Review of Systems   Constitution: Negative for chills and fever.   Cardiovascular: Negative for claudication and leg swelling.   Respiratory: Negative for shortness of breath.    Skin: Negative for color change, itching, nail changes, poor wound healing and rash.   Musculoskeletal: Positive for arthritis. Negative for muscle cramps, muscle weakness and myalgias.   Gastrointestinal: Negative for nausea and vomiting.   Neurological: Negative for focal weakness, loss of balance, numbness and paresthesias.           Objective:      Physical Exam   Constitutional: She is oriented to person, place, and time. She appears well-developed and well-nourished. No distress.   Cardiovascular:   Pulses:       Dorsalis pedis pulses are 2+ on the right side, and 2+ on the left side.        Posterior tibial pulses are 2+ on the right side, and 2+ on the left side.   < 3 sec capillary  refill time to toes 1-5 bilateral. Toes and feet are warm to touch proximally with normal distal cooling b/l. There is some hair growth on the feet and toes b/l. There is no edema b/l. No spider veins or varicosities present b/l.      Musculoskeletal:   Pain with palpation to the sinus tarsi bilateral has improved greatly, minimal discomfort noted, no pain along the peroneals, PT tendon, achilles.     She has flexible flat foot with medial arch collapse with weight bearing with forefoot abduction with ambulation, apropulsive gait and noted lateral column impingement.     Equinus noted b/l ankles with < 10 deg DF noted. MMT 5/5 in DF/PF/Inv/Ev resistance with no reproduction of pain in any direction. Passive range of motion of ankle and pedal joints is painless b/l.     Neurological: She is alert and oriented to person, place, and time. She has normal strength. She displays no atrophy and no tremor. No sensory deficit. She exhibits normal muscle tone.   Reflex Scores:       Achilles reflexes are 2+ on the right side and 2+ on the left side.  Negative tinel sign bilateral.   Skin: Skin is warm, dry and intact. No abrasion, no bruising, no burn, no ecchymosis, no laceration, no lesion, no petechiae and no rash noted. She is not diaphoretic. No cyanosis or erythema. No pallor. Nails show no clubbing.   Skin temperature, texture and turgor within normal limits.   Psychiatric: She has a normal mood and affect. Her behavior is normal.             Assessment:       Encounter Diagnoses   Name Primary?    Acquired pes planovalgus of left foot Yes    Acquired pes planovalgus of right foot     Acquired equinus deformity of both feet          Plan:       Yumiko was seen today for follow-up and other misc.    Diagnoses and all orders for this visit:    Acquired pes planovalgus of left foot    Acquired pes planovalgus of right foot    Acquired equinus deformity of both feet      I counseled the patient on her conditions, their  implications and medical management.    Voltaren gel for pain and inflammation as needed    Patient will continue to stretch the tendo achilles complex three times daily as demonstrated in the office.      Continue wearing the OTC inserts in athletic tennis shoe, supportive slipper around the house, no walking barefoot or in flats.    Return PRN if pain returns.    Mehran Reid DPM

## 2017-11-21 RX ORDER — BUPROPION HYDROCHLORIDE 150 MG/1
TABLET ORAL
Qty: 180 TABLET | Refills: 1 | OUTPATIENT
Start: 2017-11-21

## 2017-11-21 NOTE — TELEPHONE ENCOUNTER
Request has already been approved on 9/17 for a year's supply worth.  Please consult Patient Station for more details.  Thanks

## 2017-11-28 ENCOUNTER — PATIENT MESSAGE (OUTPATIENT)
Dept: FAMILY MEDICINE | Facility: CLINIC | Age: 57
End: 2017-11-28

## 2017-12-06 ENCOUNTER — OFFICE VISIT (OUTPATIENT)
Dept: ORTHOPEDICS | Facility: CLINIC | Age: 57
End: 2017-12-06
Payer: COMMERCIAL

## 2017-12-06 VITALS
BODY MASS INDEX: 36.79 KG/M2 | HEART RATE: 66 BPM | WEIGHT: 257 LBS | HEIGHT: 70 IN | DIASTOLIC BLOOD PRESSURE: 87 MMHG | SYSTOLIC BLOOD PRESSURE: 145 MMHG

## 2017-12-06 DIAGNOSIS — M84.362A STRESS FRACTURE OF LEFT TIBIA, INITIAL ENCOUNTER: Primary | ICD-10-CM

## 2017-12-06 DIAGNOSIS — M25.562 LEFT KNEE PAIN, UNSPECIFIED CHRONICITY: Primary | ICD-10-CM

## 2017-12-06 PROCEDURE — 99999 PR PBB SHADOW E&M-EST. PATIENT-LVL III: CPT | Mod: PBBFAC,,, | Performed by: ORTHOPAEDIC SURGERY

## 2017-12-06 PROCEDURE — 99213 OFFICE O/P EST LOW 20 MIN: CPT | Mod: S$GLB,,, | Performed by: ORTHOPAEDIC SURGERY

## 2017-12-06 NOTE — PROGRESS NOTES
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, postitive antismooth muscle antibody    Blood transfusion     Bronchitis     bronchospasm on occasion     Cancer 1987    cervical CA    Cervical neck pain with evidence of disc disease 3/22/2012    can bend neck    Cirrhosis     non-alcoholic, compensated    Colon polyps 3/22/2012    Depression 3/22/2012    Hx panic attacks    Diverticular disease 3/22/2012    never diverticulitis    Fatty liver 3/22/2012    Fibrocystic breast     Fine tremor     hands,chronic    Hepatosplenomegaly     History of cervical cancer 3/22/2012    carcinoma in situ    Hypertension 2013    Knee pain, bilateral     chronic, with hands,feet,fingers also painful    Liver disease     "partially sclerosed liver" per pt    Migraines past Hx    Pleural effusion     small, compensated, good bilateral breath sounds per Dr Suresh GUTIERRES (postoperative nausea and vomiting)     twice after childbirth    Postpartum depression     Splenomegaly     Thrombocytopenia        Past Surgical History:   Procedure Laterality Date    ADENOIDECTOMY      CERVICAL CONIZATION   W/ LASER       SECTION      x3    COLONOSCOPY N/A 2016    Procedure: COLONOSCOPY;  Surgeon: James Palomares MD;  Location: [facility];  Service: Endoscopy;  Laterality: N/A;    ENDOMETRIAL ABLATION      LIVER BIOPSY      PELVIC LAPAROSCOPY      TONSILLECTOMY      TUBAL LIGATION         Current Outpatient Prescriptions   Medication Sig    alprazolam (XANAX) 0.25 MG tablet TAKE ONE TABLET BY MOUTH NIGHTLY AS NEEDED FOR INSOMNIA    buPROPion (WELLBUTRIN XL) 150 MG TB24 tablet Take 2 tablets (300 mg total) by mouth once daily.    fluconazole (DIFLUCAN) 150 MG Tab Take 150 mg by mouth once daily.    hydrochlorothiazide (HYDRODIURIL) 12.5 " MG Tab Take 1 tablet (12.5 mg total) by mouth once daily.    metoprolol tartrate (LOPRESSOR) 25 MG tablet Take 1 tablet (25 mg total) by mouth 2 (two) times daily.    metoprolol tartrate (LOPRESSOR) 25 MG tablet TAKE ONE TABLET BY MOUTH TWICE A DAY    multivitamin (THERAGRAN) per tablet Take 1 tablet by mouth once daily.    nystatin-triamcinolone (MYCOLOG II) cream Apply to affected area BID as needed    vitamin E 400 UNIT capsule Take 400 Units by mouth once daily.    albuterol (PROVENTIL) 2.5 mg /3 mL (0.083 %) nebulizer solution Take 3 mLs (2.5 mg total) by nebulization every 6 (six) hours as needed for Wheezing.    diclofenac sodium (VOLTAREN) 1 % Gel Apply 2 g topically 3 (three) times daily.     No current facility-administered medications for this visit.        Review of patient's allergies indicates:   Allergen Reactions    No known drug allergies        Family History   Problem Relation Age of Onset    Breast cancer Maternal Grandmother     Diabetes Father     Heart disease Father     Breast cancer Mother     Heart disease Mother     Hypertension Mother     Diabetes Brother     Diabetes Sister     Breast cancer Maternal Aunt     Breast cancer Other     Diabetes Sister     Emphysema Brother     Ovarian cancer Neg Hx        Social History     Social History    Marital status:      Spouse name: N/A    Number of children: N/A    Years of education: N/A     Occupational History    Not on file.     Social History Main Topics    Smoking status: Former Smoker     Quit date: 2/3/2006    Smokeless tobacco: Never Used    Alcohol use Yes      Comment: rarely    Drug use: No    Sexual activity: Not Currently     Other Topics Concern    Not on file     Social History Narrative    No narrative on file       Chief Complaint:   Chief Complaint   Patient presents with    Knee Pain     left knee pain      interval history: 57-year-old female seen in consultation for Dr. Webb for  left knee pain.  Pain started about a month ago.  No known injury.  No trauma.  Patient has been trying to lose weight but has difficulty because of the knee pain.  Knee feels very tight and stiff.  Pain located Globally around the knee.  She has been using some AndroGel.  Pain as a 4 out of 10.    History of present illness: The injection that we did back in October only helped for about a week and a half.  Pain comes and goes.  Increased pain at night.  Pain during the day as a 5 out of 10 but up to 9 at night.  Exquisite tenderness with extension.      Answers for HPI/ROS submitted by the patient on 10/13/2017   Leg pain  unexpected weight change: No  appetite change : No  sleep disturbance: No  IMMUNOCOMPROMISED: No  nervous/ anxious: No  dysphoric mood: No  rash: No  visual disturbance: No  eye redness: No  eye pain: No  ear pain: No  tinnitus: No  hearing loss: No  sinus pressure : No  nosebleeds: No  enviro allergies: No  food allergies: No  cough: No  shortness of breath: No  sweating: No  dysuria: No  frequency: No  difficulty urinating: No  hematuria: No  painful intercourse: No  chest pain: No  palpitations: No  nausea: No  vomiting: No  diarrhea: No  blood in stool: No  constipation: No  headaches: No  dizziness: No  numbness: No  seizures: No  joint swelling: Yes  myalgia: Yes  weakness: Yes  back pain: No  Pain Chronicity: new  History of trauma: No  Onset: more than 1 month ago  Frequency: constantly  Progression since onset: waxing and waning  injury location: at work  pain- numeric: 6/10  pain location: left knee  pain quality: throbbing  Radiating Pain: No  Aggravating factors: standing  fever: No  inability to bear weight: No  itching: No  joint locking: No  limited range of motion: Yes  stiffness: Yes  tingling: No  Treatments tried: movement  physical therapy: not tried  Improvement on treatment: no relief        Physical Examination:    Vital Signs:    Vitals:    12/06/17 1401   BP: (!) 145/87    Pulse: 66       Body mass index is 36.88 kg/m².    This a well-developed, well nourished patient in no acute distress.  They are alert and oriented and cooperative to examination.  Pt. walks without an antalgic gait.      Examination of the left knee shows no rashes or erythema. There are no masses ecchymosis and trace effusion. Patient has full range of motion from 0-130°. Patient is nontender to palpation over lateral joint line and nontender to palpation over the medial joint line.  Knee is stable to varus and valgus stress. 5 out of 5 motor strength. Palpable distal pulses. Intact light touch sensation. Negative Patellofemoral crepitus and pain with knee extension.       X-rays: X-rays left knee are  reviewed which show small ossicle along the medial patellar retinaculum possibly from old injury.  Mild left knee effusion.  Mild knee arthritis     Assessment:: Left knechondral injury versus possible stress fracture     Plan:  I recommended an MRI.  Patient has exquisite tenderness with knee extension.  Given the patient's weight and age, I recommended an MRI to evaluate for possible stress fracture.  Follow-up after the MRI is completed.        This note was created using Dragon voice recognition software that occasionally misinterpreted phrases or words.    Consult note is delivered via Epic messaging service.

## 2017-12-21 ENCOUNTER — HOSPITAL ENCOUNTER (OUTPATIENT)
Dept: RADIOLOGY | Facility: HOSPITAL | Age: 57
Discharge: HOME | End: 2017-12-21
Attending: ORTHOPAEDIC SURGERY
Payer: COMMERCIAL

## 2017-12-21 DIAGNOSIS — M25.562 LEFT KNEE PAIN, UNSPECIFIED CHRONICITY: ICD-10-CM

## 2017-12-21 PROCEDURE — 73721 MRI JNT OF LWR EXTRE W/O DYE: CPT | Mod: TC,PO,LT

## 2017-12-21 PROCEDURE — 73721 MRI JNT OF LWR EXTRE W/O DYE: CPT | Mod: 26,LT,, | Performed by: RADIOLOGY

## 2017-12-22 ENCOUNTER — TELEPHONE (OUTPATIENT)
Dept: ORTHOPEDICS | Facility: CLINIC | Age: 57
End: 2017-12-22

## 2017-12-22 NOTE — TELEPHONE ENCOUNTER
----- Message from Pop Schneider MD sent at 12/22/2017 10:18 AM CST -----  Results noted. Pt needs appt to discuss results and treatment options.

## 2017-12-26 ENCOUNTER — TELEPHONE (OUTPATIENT)
Dept: ORTHOPEDICS | Facility: CLINIC | Age: 57
End: 2017-12-26

## 2017-12-27 ENCOUNTER — OFFICE VISIT (OUTPATIENT)
Dept: ORTHOPEDICS | Facility: CLINIC | Age: 57
End: 2017-12-27
Payer: COMMERCIAL

## 2017-12-27 VITALS
BODY MASS INDEX: 36.79 KG/M2 | DIASTOLIC BLOOD PRESSURE: 82 MMHG | HEIGHT: 70 IN | SYSTOLIC BLOOD PRESSURE: 142 MMHG | WEIGHT: 257 LBS | HEART RATE: 80 BPM

## 2017-12-27 DIAGNOSIS — S83.282A ACUTE LATERAL MENISCAL TEAR, LEFT, INITIAL ENCOUNTER: Primary | ICD-10-CM

## 2017-12-27 PROCEDURE — 99213 OFFICE O/P EST LOW 20 MIN: CPT | Mod: 25,S$GLB,, | Performed by: ORTHOPAEDIC SURGERY

## 2017-12-27 PROCEDURE — 99999 PR PBB SHADOW E&M-EST. PATIENT-LVL III: CPT | Mod: PBBFAC,,, | Performed by: ORTHOPAEDIC SURGERY

## 2017-12-27 PROCEDURE — 20610 DRAIN/INJ JOINT/BURSA W/O US: CPT | Mod: LT,S$GLB,, | Performed by: ORTHOPAEDIC SURGERY

## 2017-12-27 RX ORDER — HYALURONATE SODIUM 20 MG/2 ML
20 SYRINGE (ML) INTRAARTICULAR
Status: DISCONTINUED | OUTPATIENT
Start: 2017-12-27 | End: 2022-06-24

## 2017-12-27 RX ADMIN — Medication 20 MG: at 02:12

## 2017-12-27 NOTE — PROGRESS NOTES
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, postitive antismooth muscle antibody    Blood transfusion     Bronchitis     bronchospasm on occasion     Cancer 1987    cervical CA    Cervical neck pain with evidence of disc disease 3/22/2012    can bend neck    Cirrhosis     non-alcoholic, compensated    Colon polyps 3/22/2012    Depression 3/22/2012    Hx panic attacks    Diverticular disease 3/22/2012    never diverticulitis    Fatty liver 3/22/2012    Fibrocystic breast     Fine tremor     hands,chronic    Hepatosplenomegaly     History of cervical cancer 3/22/2012    carcinoma in situ    Hypertension 2013    Knee pain, bilateral     chronic, with hands,feet,fingers also painful    Liver disease     "partially sclerosed liver" per pt    Migraines past Hx    Pleural effusion     small, compensated, good bilateral breath sounds per Dr Suresh GUTIERRES (postoperative nausea and vomiting)     twice after childbirth    Postpartum depression     Splenomegaly     Thrombocytopenia        Past Surgical History:   Procedure Laterality Date    ADENOIDECTOMY      CERVICAL CONIZATION   W/ LASER       SECTION      x3    COLONOSCOPY N/A 2016    Procedure: COLONOSCOPY;  Surgeon: James Palomares MD;  Location: [facility];  Service: Endoscopy;  Laterality: N/A;    ENDOMETRIAL ABLATION      LIVER BIOPSY      PELVIC LAPAROSCOPY      TONSILLECTOMY      TUBAL LIGATION         Current Outpatient Prescriptions   Medication Sig    alprazolam (XANAX) 0.25 MG tablet TAKE ONE TABLET BY MOUTH NIGHTLY AS NEEDED FOR INSOMNIA    buPROPion (WELLBUTRIN XL) 150 MG TB24 tablet Take 2 tablets (300 mg total) by mouth once daily.    fluconazole (DIFLUCAN) 150 MG Tab Take 150 mg by mouth once daily.    hydrochlorothiazide (HYDRODIURIL) 12.5 " MG Tab Take 1 tablet (12.5 mg total) by mouth once daily.    metoprolol tartrate (LOPRESSOR) 25 MG tablet Take 1 tablet (25 mg total) by mouth 2 (two) times daily.    metoprolol tartrate (LOPRESSOR) 25 MG tablet TAKE ONE TABLET BY MOUTH TWICE A DAY    multivitamin (THERAGRAN) per tablet Take 1 tablet by mouth once daily.    nystatin-triamcinolone (MYCOLOG II) cream Apply to affected area BID as needed    vitamin E 400 UNIT capsule Take 400 Units by mouth once daily.    albuterol (PROVENTIL) 2.5 mg /3 mL (0.083 %) nebulizer solution Take 3 mLs (2.5 mg total) by nebulization every 6 (six) hours as needed for Wheezing.    diclofenac sodium (VOLTAREN) 1 % Gel Apply 2 g topically 3 (three) times daily.     No current facility-administered medications for this visit.        Review of patient's allergies indicates:   Allergen Reactions    No known drug allergies        Family History   Problem Relation Age of Onset    Breast cancer Maternal Grandmother     Diabetes Father     Heart disease Father     Breast cancer Mother     Heart disease Mother     Hypertension Mother     Diabetes Brother     Diabetes Sister     Breast cancer Maternal Aunt     Breast cancer Other     Diabetes Sister     Emphysema Brother     Ovarian cancer Neg Hx        Social History     Social History    Marital status:      Spouse name: N/A    Number of children: N/A    Years of education: N/A     Occupational History    Not on file.     Social History Main Topics    Smoking status: Former Smoker     Quit date: 2/3/2006    Smokeless tobacco: Never Used    Alcohol use Yes      Comment: rarely    Drug use: No    Sexual activity: Not Currently     Other Topics Concern    Not on file     Social History Narrative    No narrative on file       Chief Complaint:   Chief Complaint   Patient presents with    Knee Pain     left knee-MRI Results       interval history: 57-year-old female seen in consultation for   Jon for left knee pain.  Pain started about a month ago.  No known injury.  No trauma.  Patient has been trying to lose weight but has difficulty because of the knee pain.  Knee feels very tight and stiff.  Pain located Globally around the knee.  She has been using some AndroGel.  Pain as a 4 out of 10.    History of present illness: The injection that we did back in October only helped for about a week and a half.  Pain comes and goes.  Increased pain at night.  Pain during the day as a 4 out of 10 but up to 9 at night.  Exquisite tenderness with extension.MRI showed a lateral meniscal tear with tibial stress reaction.      Answers for HPI/ROS submitted by the patient on 10/13/2017   Leg pain  unexpected weight change: No  appetite change : No  sleep disturbance: No  IMMUNOCOMPROMISED: No  nervous/ anxious: No  dysphoric mood: No  rash: No  visual disturbance: No  eye redness: No  eye pain: No  ear pain: No  tinnitus: No  hearing loss: No  sinus pressure : No  nosebleeds: No  enviro allergies: No  food allergies: No  cough: No  shortness of breath: No  sweating: No  dysuria: No  frequency: No  difficulty urinating: No  hematuria: No  painful intercourse: No  chest pain: No  palpitations: No  nausea: No  vomiting: No  diarrhea: No  blood in stool: No  constipation: No  headaches: No  dizziness: No  numbness: No  seizures: No  joint swelling: Yes  myalgia: Yes  weakness: Yes  back pain: No  Pain Chronicity: new  History of trauma: No  Onset: more than 1 month ago  Frequency: constantly  Progression since onset: waxing and waning  injury location: at work  pain- numeric: 6/10  pain location: left knee  pain quality: throbbing  Radiating Pain: No  Aggravating factors: standing  fever: No  inability to bear weight: No  itching: No  joint locking: No  limited range of motion: Yes  stiffness: Yes  tingling: No  Treatments tried: movement  physical therapy: not tried  Improvement on treatment: no  relief        Physical Examination:    Vital Signs:    Vitals:    12/27/17 1407   BP: (!) 142/82   Pulse: 80       Body mass index is 36.88 kg/m².    This a well-developed, well nourished patient in no acute distress.  They are alert and oriented and cooperative to examination.  Pt. walks without an antalgic gait.      Examination of the left knee shows no rashes or erythema. There are no masses ecchymosis and trace effusion. Patient has full range of motion from 0-130°. Patient is nontender to palpation over lateral joint line and nontender to palpation over the medial joint line.  Knee is stable to varus and valgus stress. 5 out of 5 motor strength. Palpable distal pulses. Intact light touch sensation. Negative Patellofemoral crepitus and pain with knee extension.       X-rays: X-rays left knee are  reviewed which show small ossicle along the medial patellar retinaculum possibly from old injury.  Mild left knee effusion.  Mild knee arthritis    MRI of the left knee:Partial distal ACL tear with likely associated contusions in the lateral compartment.  Tricompartmental osteoarthritis and evidence of lateral meniscal tearing. There is a moderate joint effusion and there is subcutaneous edema.     Assessment:: Left lateral meniscal tear with tibial stress response    Plan:  I reviewed the MRI with her today.  Most of her pain is likely due to the stress reaction in the lateral plateau.  I injected her left knee with Euflexxa 1 of 3.  Follow-up next week.    This note was created using Dragon voice recognition software that occasionally misinterpreted phrases or words.    Consult note is delivered via Epic messaging service.

## 2017-12-27 NOTE — PROCEDURES
Large Joint Aspiration/Injection  Date/Time: 12/27/2017 2:31 PM  Performed by: JUDY NEGRO  Authorized by: JUDY NEGRO     Consent Done?:  Yes (Verbal)  Indications:  Pain  Procedure site marked: Yes    Timeout: Prior to procedure the correct patient, procedure, and site was verified      Location:  Knee  Site:  L knee  Prep: Patient was prepped and draped in usual sterile fashion    Needle size:  20 G  Approach:  Anterolateral  Medications:  20 mg EUFLEXXA 10 mg/mL(mw 2.4 -3.6 million)  Patient tolerance:  Patient tolerated the procedure well with no immediate complications

## 2018-01-03 ENCOUNTER — OFFICE VISIT (OUTPATIENT)
Dept: ORTHOPEDICS | Facility: CLINIC | Age: 58
End: 2018-01-03
Payer: COMMERCIAL

## 2018-01-03 DIAGNOSIS — M17.12 ARTHRITIS OF KNEE, LEFT: Primary | ICD-10-CM

## 2018-01-03 PROCEDURE — 20610 DRAIN/INJ JOINT/BURSA W/O US: CPT | Mod: LT,S$GLB,, | Performed by: ORTHOPAEDIC SURGERY

## 2018-01-03 PROCEDURE — 99499 UNLISTED E&M SERVICE: CPT | Mod: S$GLB,,, | Performed by: ORTHOPAEDIC SURGERY

## 2018-01-03 PROCEDURE — 99999 PR PBB SHADOW E&M-EST. PATIENT-LVL II: CPT | Mod: PBBFAC,,, | Performed by: ORTHOPAEDIC SURGERY

## 2018-01-03 RX ORDER — HYALURONATE SODIUM 20 MG/2 ML
20 SYRINGE (ML) INTRAARTICULAR
Status: DISCONTINUED | OUTPATIENT
Start: 2018-01-03 | End: 2018-01-03 | Stop reason: HOSPADM

## 2018-01-03 RX ADMIN — Medication 20 MG: at 02:01

## 2018-01-03 NOTE — PROGRESS NOTES
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, postitive antismooth muscle antibody    Blood transfusion     Bronchitis     bronchospasm on occasion     Cancer 1987    cervical CA    Cervical neck pain with evidence of disc disease 3/22/2012    can bend neck    Cirrhosis     non-alcoholic, compensated    Colon polyps 3/22/2012    Depression 3/22/2012    Hx panic attacks    Diverticular disease 3/22/2012    never diverticulitis    Fatty liver 3/22/2012    Fibrocystic breast     Fine tremor     hands,chronic    Hepatosplenomegaly     History of cervical cancer 3/22/2012    carcinoma in situ    Hypertension 2013    Knee pain, bilateral     chronic, with hands,feet,fingers also painful    Liver disease     "partially sclerosed liver" per pt    Migraines past Hx    Pleural effusion     small, compensated, good bilateral breath sounds per Dr Suresh GUTIERRES (postoperative nausea and vomiting)     twice after childbirth    Postpartum depression     Splenomegaly     Thrombocytopenia        Past Surgical History:   Procedure Laterality Date    ADENOIDECTOMY      CERVICAL CONIZATION   W/ LASER       SECTION      x3    COLONOSCOPY N/A 2016    Procedure: COLONOSCOPY;  Surgeon: James Palomares MD;  Location: [facility];  Service: Endoscopy;  Laterality: N/A;    ENDOMETRIAL ABLATION      LIVER BIOPSY      PELVIC LAPAROSCOPY      TONSILLECTOMY      TUBAL LIGATION         Current Outpatient Prescriptions   Medication Sig    alprazolam (XANAX) 0.25 MG tablet TAKE ONE TABLET BY MOUTH NIGHTLY AS NEEDED FOR INSOMNIA    buPROPion (WELLBUTRIN XL) 150 MG TB24 tablet Take 2 tablets (300 mg total) by mouth once daily.    fluconazole (DIFLUCAN) 150 MG Tab Take 150 mg by mouth once daily.    hydrochlorothiazide (HYDRODIURIL) 12.5 " MG Tab Take 1 tablet (12.5 mg total) by mouth once daily.    metoprolol tartrate (LOPRESSOR) 25 MG tablet Take 1 tablet (25 mg total) by mouth 2 (two) times daily.    metoprolol tartrate (LOPRESSOR) 25 MG tablet TAKE ONE TABLET BY MOUTH TWICE A DAY    multivitamin (THERAGRAN) per tablet Take 1 tablet by mouth once daily.    nystatin-triamcinolone (MYCOLOG II) cream Apply to affected area BID as needed    vitamin E 400 UNIT capsule Take 400 Units by mouth once daily.    albuterol (PROVENTIL) 2.5 mg /3 mL (0.083 %) nebulizer solution Take 3 mLs (2.5 mg total) by nebulization every 6 (six) hours as needed for Wheezing.    diclofenac sodium (VOLTAREN) 1 % Gel Apply 2 g topically 3 (three) times daily.     No current facility-administered medications for this visit.      Facility-Administered Medications Ordered in Other Visits   Medication    EUFLEXXA 10 mg/mL(mw 2.4 -3.6 million) injection Syrg 20 mg       Review of patient's allergies indicates:   Allergen Reactions    No known drug allergies        Family History   Problem Relation Age of Onset    Breast cancer Maternal Grandmother     Diabetes Father     Heart disease Father     Breast cancer Mother     Heart disease Mother     Hypertension Mother     Diabetes Brother     Diabetes Sister     Breast cancer Maternal Aunt     Breast cancer Other     Diabetes Sister     Emphysema Brother     Ovarian cancer Neg Hx        Social History     Social History    Marital status:      Spouse name: N/A    Number of children: N/A    Years of education: N/A     Occupational History    Not on file.     Social History Main Topics    Smoking status: Former Smoker     Quit date: 2/3/2006    Smokeless tobacco: Never Used    Alcohol use Yes      Comment: rarely    Drug use: No    Sexual activity: Not Currently     Other Topics Concern    Not on file     Social History Narrative    No narrative on file       Chief Complaint:   Chief Complaint    Patient presents with    Knee Pain     L knee euflexxa 2/3      interval history: 57-year-old female seen in consultation for Dr. Webb for left knee pain.  Pain started about a month ago.  No known injury.  No trauma.  Patient has been trying to lose weight but has difficulty because of the knee pain.  Knee feels very tight and stiff.  Pain located Globally around the knee.  She has been using some AndroGel.  Pain as a 4 out of 10.    History of present illness: The injection that we did back in October only helped for about a week and a half.  Pain comes and goes.  Increased pain at night.  Pain during the day as a 4 out of 10 but up to 9 at night.  Exquisite tenderness with extension.MRI showed a lateral meniscal tear with tibial stress reaction.      Answers for HPI/ROS submitted by the patient on 10/13/2017   Leg pain  unexpected weight change: No  appetite change : No  sleep disturbance: No  IMMUNOCOMPROMISED: No  nervous/ anxious: No  dysphoric mood: No  rash: No  visual disturbance: No  eye redness: No  eye pain: No  ear pain: No  tinnitus: No  hearing loss: No  sinus pressure : No  nosebleeds: No  enviro allergies: No  food allergies: No  cough: No  shortness of breath: No  sweating: No  dysuria: No  frequency: No  difficulty urinating: No  hematuria: No  painful intercourse: No  chest pain: No  palpitations: No  nausea: No  vomiting: No  diarrhea: No  blood in stool: No  constipation: No  headaches: No  dizziness: No  numbness: No  seizures: No  joint swelling: Yes  myalgia: Yes  weakness: Yes  back pain: No  Pain Chronicity: new  History of trauma: No  Onset: more than 1 month ago  Frequency: constantly  Progression since onset: waxing and waning  injury location: at work  pain- numeric: 6/10  pain location: left knee  pain quality: throbbing  Radiating Pain: No  Aggravating factors: standing  fever: No  inability to bear weight: No  itching: No  joint locking: No  limited range of motion:  Yes  stiffness: Yes  tingling: No  Treatments tried: movement  physical therapy: not tried  Improvement on treatment: no relief        Physical Examination:    Vital Signs:    There were no vitals filed for this visit.    There is no height or weight on file to calculate BMI.    This a well-developed, well nourished patient in no acute distress.  They are alert and oriented and cooperative to examination.  Pt. walks without an antalgic gait.      Examination of the left knee shows no rashes or erythema. There are no masses ecchymosis and trace effusion. Patient has full range of motion from 0-130°. Patient is nontender to palpation over lateral joint line and nontender to palpation over the medial joint line.  Knee is stable to varus and valgus stress. 5 out of 5 motor strength. Palpable distal pulses. Intact light touch sensation. Negative Patellofemoral crepitus and pain with knee extension.       X-rays: X-rays left knee are  reviewed which show small ossicle along the medial patellar retinaculum possibly from old injury.  Mild left knee effusion.  Mild knee arthritis    MRI of the left knee:Partial distal ACL tear with likely associated contusions in the lateral compartment.  Tricompartmental osteoarthritis and evidence of lateral meniscal tearing. There is a moderate joint effusion and there is subcutaneous edema.     Assessment:: Left lateral meniscal tear with tibial stress response    Plan:   I injected her left knee with Euflexxa 2 of 3.  Follow-up next week.    This note was created using Dragon voice recognition software that occasionally misinterpreted phrases or words.    Consult note is delivered via Epic messaging service.

## 2018-01-03 NOTE — PROCEDURES
Large Joint Aspiration/Injection  Date/Time: 1/3/2018 2:21 PM  Performed by: JUDY NEGRO  Authorized by: JUDY NEGRO     Consent Done?:  Yes (Verbal)  Indications:  Pain  Procedure site marked: Yes    Timeout: Prior to procedure the correct patient, procedure, and site was verified      Location:  Knee  Site:  L knee  Prep: Patient was prepped and draped in usual sterile fashion    Needle size:  20 G  Approach:  Anterolateral  Medications:  20 mg EUFLEXXA 10 mg/mL(mw 2.4 -3.6 million)  Patient tolerance:  Patient tolerated the procedure well with no immediate complications

## 2018-01-05 ENCOUNTER — OFFICE VISIT (OUTPATIENT)
Dept: FAMILY MEDICINE | Facility: CLINIC | Age: 58
End: 2018-01-05
Payer: COMMERCIAL

## 2018-01-05 VITALS
WEIGHT: 257.94 LBS | RESPIRATION RATE: 16 BRPM | TEMPERATURE: 99 F | BODY MASS INDEX: 36.93 KG/M2 | SYSTOLIC BLOOD PRESSURE: 136 MMHG | HEIGHT: 70 IN | DIASTOLIC BLOOD PRESSURE: 86 MMHG | OXYGEN SATURATION: 97 % | HEART RATE: 75 BPM

## 2018-01-05 DIAGNOSIS — Z20.828 EXPOSURE TO THE FLU: ICD-10-CM

## 2018-01-05 DIAGNOSIS — R68.83 CHILLS WITHOUT FEVER: Primary | ICD-10-CM

## 2018-01-05 DIAGNOSIS — M79.10 MYALGIA: ICD-10-CM

## 2018-01-05 DIAGNOSIS — J30.2 ACUTE SEASONAL ALLERGIC RHINITIS DUE TO OTHER ALLERGEN: ICD-10-CM

## 2018-01-05 DIAGNOSIS — J34.89 RHINORRHEA: ICD-10-CM

## 2018-01-05 LAB
FLUAV AG SPEC QL IA: NEGATIVE
FLUBV AG SPEC QL IA: NEGATIVE
SPECIMEN SOURCE: NORMAL

## 2018-01-05 PROCEDURE — 99213 OFFICE O/P EST LOW 20 MIN: CPT | Mod: S$GLB,,, | Performed by: NURSE PRACTITIONER

## 2018-01-05 PROCEDURE — 87400 INFLUENZA A/B EACH AG IA: CPT | Mod: PO

## 2018-01-05 PROCEDURE — 99999 PR PBB SHADOW E&M-EST. PATIENT-LVL IV: CPT | Mod: PBBFAC,,, | Performed by: NURSE PRACTITIONER

## 2018-01-05 RX ORDER — AZELASTINE 1 MG/ML
1 SPRAY, METERED NASAL 2 TIMES DAILY
Qty: 30 ML | Refills: 3 | Status: SHIPPED | OUTPATIENT
Start: 2018-01-05 | End: 2018-06-20

## 2018-01-05 NOTE — PROGRESS NOTES
Subjective:       Patient ID: Yumiko Gonzalez is a 57 y.o. female.    Chief Complaint: Fever; Generalized Body Aches; and Cough  She was last seen in primary care by me on 09/26/2017.   HPI   States had fever of 101 on Tuesday night but none noted since that time. Having myalgia and chills.  Vitals:    01/05/18 1626   BP: 136/86   Pulse: 75   Resp: 16   Temp: 98.7 °F (37.1 °C)     Review of Systems   Constitutional: Positive for chills and fever.   Musculoskeletal: Positive for myalgias.       Objective:      Physical Exam   Constitutional: She is oriented to person, place, and time. Vital signs are normal. She appears well-developed and well-nourished. She is active and cooperative.   HENT:   Head: Normocephalic and atraumatic.   Right Ear: Hearing, tympanic membrane, external ear and ear canal normal.   Left Ear: Hearing, tympanic membrane, external ear and ear canal normal.   Nose: Rhinorrhea present.   Mouth/Throat: Uvula is midline, oropharynx is clear and moist and mucous membranes are normal.   Redness/wet to mucosal area to nose  Post nasal drip to posterior pharynx   Eyes: Lids are normal.   Neck: Trachea normal, normal range of motion, full passive range of motion without pain and phonation normal. Neck supple.   Cardiovascular: Normal rate, regular rhythm, S1 normal, S2 normal and normal heart sounds.    Pulmonary/Chest: Effort normal and breath sounds normal.   Abdominal: Soft.       Musculoskeletal: Normal range of motion.   Lymphadenopathy:        Head (right side): No submental, no submandibular, no tonsillar, no preauricular, no posterior auricular and no occipital adenopathy present.        Head (left side): No submental, no submandibular, no tonsillar, no preauricular, no posterior auricular and no occipital adenopathy present.     She has no cervical adenopathy.   Neurological: She is alert and oriented to person, place, and time.   Skin: Skin is warm, dry and intact.   Psychiatric: She has a normal  mood and affect. Her speech is normal and behavior is normal. Judgment and thought content normal. Cognition and memory are normal.   Nursing note and vitals reviewed.      Assessment & Plan:       Chills without fever  -     Influenza antigen Nasopharyngeal Swab    Myalgia  -     Influenza antigen Nasopharyngeal Swab    Exposure to the flu  -     Influenza antigen Nasopharyngeal Swab    Rhinorrhea  -     azelastine (ASTELIN) 137 mcg (0.1 %) nasal spray; 1 spray (137 mcg total) by Nasal route 2 (two) times daily.  Dispense: 30 mL; Refill: 3    Acute seasonal allergic rhinitis due to other allergen  -     azelastine (ASTELIN) 137 mcg (0.1 %) nasal spray; 1 spray (137 mcg total) by Nasal route 2 (two) times daily.  Dispense: 30 mL; Refill: 3    Saline nasal spray in shower at night time  Notify if temperature        Return if symptoms worsen or fail to improve.

## 2018-01-10 ENCOUNTER — OFFICE VISIT (OUTPATIENT)
Dept: ORTHOPEDICS | Facility: CLINIC | Age: 58
End: 2018-01-10
Payer: COMMERCIAL

## 2018-01-10 VITALS — WEIGHT: 257 LBS | BODY MASS INDEX: 36.79 KG/M2 | HEIGHT: 70 IN

## 2018-01-10 DIAGNOSIS — M17.12 ARTHRITIS OF KNEE, LEFT: Primary | ICD-10-CM

## 2018-01-10 DIAGNOSIS — J06.9 UPPER RESPIRATORY TRACT INFECTION, UNSPECIFIED TYPE: Primary | ICD-10-CM

## 2018-01-10 PROCEDURE — 20610 DRAIN/INJ JOINT/BURSA W/O US: CPT | Mod: LT,S$GLB,, | Performed by: ORTHOPAEDIC SURGERY

## 2018-01-10 PROCEDURE — 99999 PR PBB SHADOW E&M-EST. PATIENT-LVL II: CPT | Mod: PBBFAC,,, | Performed by: ORTHOPAEDIC SURGERY

## 2018-01-10 PROCEDURE — 99499 UNLISTED E&M SERVICE: CPT | Mod: S$GLB,,, | Performed by: ORTHOPAEDIC SURGERY

## 2018-01-10 RX ORDER — AZITHROMYCIN 250 MG/1
250 TABLET, FILM COATED ORAL DAILY
Qty: 6 TABLET | Refills: 0 | Status: SHIPPED | OUTPATIENT
Start: 2018-01-10 | End: 2018-01-15

## 2018-01-10 RX ORDER — HYALURONATE SODIUM 20 MG/2 ML
20 SYRINGE (ML) INTRAARTICULAR
Status: DISCONTINUED | OUTPATIENT
Start: 2018-01-10 | End: 2018-01-10 | Stop reason: HOSPADM

## 2018-01-10 RX ADMIN — Medication 20 MG: at 03:01

## 2018-01-10 NOTE — PROGRESS NOTES
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, postitive antismooth muscle antibody    Blood transfusion     Bronchitis     bronchospasm on occasion     Cancer 1987    cervical CA    Cervical neck pain with evidence of disc disease 3/22/2012    can bend neck    Cirrhosis     non-alcoholic, compensated    Colon polyps 3/22/2012    Depression 3/22/2012    Hx panic attacks    Diverticular disease 3/22/2012    never diverticulitis    Fatty liver 3/22/2012    Fibrocystic breast     Fine tremor     hands,chronic    Hepatosplenomegaly     History of cervical cancer 3/22/2012    carcinoma in situ    Hypertension 2013    Knee pain, bilateral     chronic, with hands,feet,fingers also painful    Liver disease     "partially sclerosed liver" per pt    Migraines past Hx    Pleural effusion     small, compensated, good bilateral breath sounds per Dr Suresh GUTIERRES (postoperative nausea and vomiting)     twice after childbirth    Postpartum depression     Splenomegaly     Thrombocytopenia        Past Surgical History:   Procedure Laterality Date    ADENOIDECTOMY      CERVICAL CONIZATION   W/ LASER       SECTION      x3    COLONOSCOPY N/A 2016    Procedure: COLONOSCOPY;  Surgeon: James Palomares MD;  Location: [facility];  Service: Endoscopy;  Laterality: N/A;    ENDOMETRIAL ABLATION      LIVER BIOPSY      PELVIC LAPAROSCOPY      TONSILLECTOMY      TUBAL LIGATION         Current Outpatient Prescriptions   Medication Sig    alprazolam (XANAX) 0.25 MG tablet TAKE ONE TABLET BY MOUTH NIGHTLY AS NEEDED FOR INSOMNIA    azelastine (ASTELIN) 137 mcg (0.1 %) nasal spray 1 spray (137 mcg total) by Nasal route 2 (two) times daily.    azithromycin (Z-NORIS) 250 MG tablet Take 1 tablet (250 mg total) by mouth once daily. Take 2 " tablets on day 1, then one tablet on days 2-5.    buPROPion (WELLBUTRIN XL) 150 MG TB24 tablet Take 2 tablets (300 mg total) by mouth once daily.    fluconazole (DIFLUCAN) 150 MG Tab Take 150 mg by mouth once daily.    hydrochlorothiazide (HYDRODIURIL) 12.5 MG Tab Take 1 tablet (12.5 mg total) by mouth once daily.    metoprolol tartrate (LOPRESSOR) 25 MG tablet Take 1 tablet (25 mg total) by mouth 2 (two) times daily.    metoprolol tartrate (LOPRESSOR) 25 MG tablet TAKE ONE TABLET BY MOUTH TWICE A DAY    multivitamin (THERAGRAN) per tablet Take 1 tablet by mouth once daily.    nystatin-triamcinolone (MYCOLOG II) cream Apply to affected area BID as needed    vitamin E 400 UNIT capsule Take 400 Units by mouth once daily.    albuterol (PROVENTIL) 2.5 mg /3 mL (0.083 %) nebulizer solution Take 3 mLs (2.5 mg total) by nebulization every 6 (six) hours as needed for Wheezing.    diclofenac sodium (VOLTAREN) 1 % Gel Apply 2 g topically 3 (three) times daily.     No current facility-administered medications for this visit.      Facility-Administered Medications Ordered in Other Visits   Medication    EUFLEXXA 10 mg/mL(mw 2.4 -3.6 million) injection Syrg 20 mg       Review of patient's allergies indicates:   Allergen Reactions    No known drug allergies        Family History   Problem Relation Age of Onset    Breast cancer Maternal Grandmother     Diabetes Father     Heart disease Father     Breast cancer Mother     Heart disease Mother     Hypertension Mother     Diabetes Brother     Diabetes Sister     Breast cancer Maternal Aunt     Breast cancer Other     Diabetes Sister     Emphysema Brother     Ovarian cancer Neg Hx        Social History     Social History    Marital status:      Spouse name: N/A    Number of children: N/A    Years of education: N/A     Occupational History    Not on file.     Social History Main Topics    Smoking status: Former Smoker     Quit date: 2/3/2006     Smokeless tobacco: Never Used    Alcohol use Yes      Comment: rarely    Drug use: No    Sexual activity: Not Currently     Other Topics Concern    Not on file     Social History Narrative    No narrative on file       Chief Complaint:   Chief Complaint   Patient presents with    Left Knee - Pain     euflexxa #3/3       interval history: 57-year-old female seen in consultation for Dr. Webb for left knee pain.  Pain started about a month ago.  No known injury.  No trauma.  Patient has been trying to lose weight but has difficulty because of the knee pain.  Knee feels very tight and stiff.  Pain located Globally around the knee.  She has been using some AndroGel.  Pain as a 4 out of 10.    History of present illness: The injection that we did back in October only helped for about a week and a half.  Pain comes and goes.  Increased pain at night.  Pain during the day as a 4 out of 10 but up to 9 at night.  Exquisite tenderness with extension.MRI showed a lateral meniscal tear with tibial stress reaction.      Answers for HPI/ROS submitted by the patient on 10/13/2017   Leg pain  unexpected weight change: No  appetite change : No  sleep disturbance: No  IMMUNOCOMPROMISED: No  nervous/ anxious: No  dysphoric mood: No  rash: No  visual disturbance: No  eye redness: No  eye pain: No  ear pain: No  tinnitus: No  hearing loss: No  sinus pressure : No  nosebleeds: No  enviro allergies: No  food allergies: No  cough: No  shortness of breath: No  sweating: No  dysuria: No  frequency: No  difficulty urinating: No  hematuria: No  painful intercourse: No  chest pain: No  palpitations: No  nausea: No  vomiting: No  diarrhea: No  blood in stool: No  constipation: No  headaches: No  dizziness: No  numbness: No  seizures: No  joint swelling: Yes  myalgia: Yes  weakness: Yes  back pain: No  Pain Chronicity: new  History of trauma: No  Onset: more than 1 month ago  Frequency: constantly  Progression since onset: waxing and  waning  injury location: at work  pain- numeric: 6/10  pain location: left knee  pain quality: throbbing  Radiating Pain: No  Aggravating factors: standing  fever: No  inability to bear weight: No  itching: No  joint locking: No  limited range of motion: Yes  stiffness: Yes  tingling: No  Treatments tried: movement  physical therapy: not tried  Improvement on treatment: no relief        Physical Examination:    Vital Signs:    There were no vitals filed for this visit.    Body mass index is 36.88 kg/m².    This a well-developed, well nourished patient in no acute distress.  They are alert and oriented and cooperative to examination.  Pt. walks without an antalgic gait.      Examination of the left knee shows no rashes or erythema. There are no masses ecchymosis and trace effusion. Patient has full range of motion from 0-130°. Patient is nontender to palpation over lateral joint line and nontender to palpation over the medial joint line.  Knee is stable to varus and valgus stress. 5 out of 5 motor strength. Palpable distal pulses. Intact light touch sensation. Negative Patellofemoral crepitus and pain with knee extension.       X-rays: X-rays left knee are  reviewed which show small ossicle along the medial patellar retinaculum possibly from old injury.  Mild left knee effusion.  Mild knee arthritis    MRI of the left knee:Partial distal ACL tear with likely associated contusions in the lateral compartment.  Tricompartmental osteoarthritis and evidence of lateral meniscal tearing. There is a moderate joint effusion and there is subcutaneous edema.     Assessment:: Left lateral meniscal tear with tibial stress response    Plan:   I injected her left knee with Euflexxa 3 of 3.  Follow-up as needed.    This note was created using Dragon voice recognition software that occasionally misinterpreted phrases or words.    Consult note is delivered via Epic messaging service.

## 2018-01-10 NOTE — PROCEDURES
Large Joint Aspiration/Injection  Date/Time: 1/10/2018 3:11 PM  Performed by: JUDY NEGRO  Authorized by: JUDY NEGRO     Consent Done?:  Yes (Verbal)  Indications:  Pain  Procedure site marked: Yes    Timeout: Prior to procedure the correct patient, procedure, and site was verified      Location:  Knee  Site:  L knee  Prep: Patient was prepped and draped in usual sterile fashion    Needle size:  20 G  Approach:  Anterolateral  Medications:  20 mg EUFLEXXA 10 mg/mL(mw 2.4 -3.6 million)  Patient tolerance:  Patient tolerated the procedure well with no immediate complications

## 2018-02-05 RX ORDER — HYDROCHLOROTHIAZIDE 12.5 MG/1
12.5 TABLET ORAL DAILY
Qty: 90 TABLET | Refills: 0 | Status: SHIPPED | OUTPATIENT
Start: 2018-02-05 | End: 2018-05-02 | Stop reason: SDUPTHER

## 2018-03-16 RX ORDER — FLUCONAZOLE 150 MG/1
150 TABLET ORAL DAILY
Qty: 1 TABLET | Refills: 1 | Status: SHIPPED | OUTPATIENT
Start: 2018-03-16 | End: 2018-06-21 | Stop reason: SDUPTHER

## 2018-04-06 ENCOUNTER — HOSPITAL ENCOUNTER (OUTPATIENT)
Dept: RADIOLOGY | Facility: HOSPITAL | Age: 58
Discharge: HOME | End: 2018-04-06
Attending: FAMILY MEDICINE
Payer: COMMERCIAL

## 2018-04-06 ENCOUNTER — PATIENT MESSAGE (OUTPATIENT)
Dept: FAMILY MEDICINE | Facility: CLINIC | Age: 58
End: 2018-04-06

## 2018-04-06 DIAGNOSIS — R05.9 COUGH: Primary | ICD-10-CM

## 2018-04-06 DIAGNOSIS — R05.9 COUGH: ICD-10-CM

## 2018-04-06 PROCEDURE — 71046 X-RAY EXAM CHEST 2 VIEWS: CPT | Mod: TC,FY,PO

## 2018-04-06 PROCEDURE — 71046 X-RAY EXAM CHEST 2 VIEWS: CPT | Mod: 26,,, | Performed by: RADIOLOGY

## 2018-04-09 ENCOUNTER — OFFICE VISIT (OUTPATIENT)
Dept: FAMILY MEDICINE | Facility: CLINIC | Age: 58
End: 2018-04-09
Payer: COMMERCIAL

## 2018-04-09 ENCOUNTER — LAB VISIT (OUTPATIENT)
Dept: LAB | Facility: HOSPITAL | Age: 58
End: 2018-04-09
Attending: NURSE PRACTITIONER
Payer: COMMERCIAL

## 2018-04-09 VITALS
HEART RATE: 67 BPM | TEMPERATURE: 99 F | SYSTOLIC BLOOD PRESSURE: 130 MMHG | WEIGHT: 267.19 LBS | BODY MASS INDEX: 38.25 KG/M2 | OXYGEN SATURATION: 96 % | HEIGHT: 70 IN | DIASTOLIC BLOOD PRESSURE: 90 MMHG

## 2018-04-09 DIAGNOSIS — R05.8 RECURRENT COUGH: ICD-10-CM

## 2018-04-09 DIAGNOSIS — R04.2 HEMOPTYSIS: ICD-10-CM

## 2018-04-09 DIAGNOSIS — J02.9 PHARYNGITIS, UNSPECIFIED ETIOLOGY: Primary | ICD-10-CM

## 2018-04-09 DIAGNOSIS — R91.8 MASS OF RIGHT LUNG: ICD-10-CM

## 2018-04-09 DIAGNOSIS — R59.9 ADENOPATHY: ICD-10-CM

## 2018-04-09 LAB
CTP QC/QA: YES
S PYO RRNA THROAT QL PROBE: NEGATIVE

## 2018-04-09 PROCEDURE — 3080F DIAST BP >= 90 MM HG: CPT | Mod: CPTII,S$GLB,, | Performed by: NURSE PRACTITIONER

## 2018-04-09 PROCEDURE — 3075F SYST BP GE 130 - 139MM HG: CPT | Mod: CPTII,S$GLB,, | Performed by: NURSE PRACTITIONER

## 2018-04-09 PROCEDURE — 99214 OFFICE O/P EST MOD 30 MIN: CPT | Mod: S$GLB,,, | Performed by: NURSE PRACTITIONER

## 2018-04-09 PROCEDURE — 86480 TB TEST CELL IMMUN MEASURE: CPT

## 2018-04-09 PROCEDURE — 99999 PR PBB SHADOW E&M-EST. PATIENT-LVL V: CPT | Mod: PBBFAC,,, | Performed by: NURSE PRACTITIONER

## 2018-04-09 PROCEDURE — 87070 CULTURE OTHR SPECIMN AEROBIC: CPT

## 2018-04-09 PROCEDURE — 36415 COLL VENOUS BLD VENIPUNCTURE: CPT | Mod: PO

## 2018-04-09 PROCEDURE — 87880 STREP A ASSAY W/OPTIC: CPT | Mod: QW,S$GLB,, | Performed by: NURSE PRACTITIONER

## 2018-04-09 RX ORDER — CEFDINIR 300 MG/1
300 CAPSULE ORAL 2 TIMES DAILY
Qty: 20 CAPSULE | Refills: 0 | Status: SHIPPED | OUTPATIENT
Start: 2018-04-09 | End: 2018-04-19

## 2018-04-09 NOTE — PROGRESS NOTES
"Subjective:       Patient ID: Yumiko Gonzalez is a 57 y.o. female.  First time seeing pt in clinic.  Last seen by RYLEE Valero NP on 1/5/2018. Last seen PCP, Dr Webb on 9/26/2017.   Reviewed pt's medical, surgical, family, and social hx.     Chief Complaint: Cough (since Jan took Clairitin. Chest XR on Fri spot on lung referred to Pulmonologist. coughted up blood looks like tissue) and Adenopathy  Pt presents today with c/o recurrent cough since January.  Taking Claritin. She reports her PCP gave her the results of her chest xray and recommended for her to see a Pulmonologist. Pt concerned because she coughed up blood that looks like tissue. She also reports swollen glands to her neck. Denies fever today.     Pt reports Dr Webb notified pt of her recent chest xray results from 4/6/2018, "Known right lung mass with possible interval cavitation. Otherwise no change relative to July 15, 2016."    Dr Webb noted" Tiffanie, I don't see any emergent findings on the x-ray. There is a chronic right-sided lung mass that was documented previously. This has changed in that there might be a small cavity inside. This could indicate possible chronic infection or could just be scarring/chronic change. This may or may not be related to current symptoms.   I would like you consult with a pulmonologist. If you're running a fever or having a lot of colored sputum then we may consider an antibiotic as a precaution. Also we need to make certain that your annual tuberculosis test have been negative. I see that you have an appointment on Monday. I think this is fine but if you notice any significant changes you may want to reach out to the on-call physician."     Cough   The current episode started more than 1 month ago. The problem has been unchanged. The cough is productive of blood-tinged sputum. Associated symptoms include a fever ( not recently), nasal congestion (some), postnasal drip and rhinorrhea. Pertinent " negatives include no chest pain, chills, ear congestion, ear pain, sore throat or shortness of breath. Associated symptoms comments: Had fever Friday, but went away Sunday am. . Treatments tried: Claritin  and Mucinex, DayQuil , NyQuil. The treatment provided mild relief.     Vitals:    04/09/18 0910   BP: (!) 130/90   Pulse: 67   Temp: 98.7 °F (37.1 °C)     Review of Systems   Constitutional: Positive for fever ( not recently). Negative for chills and diaphoresis.   HENT: Positive for postnasal drip and rhinorrhea. Negative for congestion, ear pain, sinus pain, sinus pressure, sore throat and voice change.    Eyes: Negative for visual disturbance.   Respiratory: Positive for cough. Negative for chest tightness and shortness of breath.    Cardiovascular: Negative for chest pain.       Objective:      Physical Exam   Constitutional: She is oriented to person, place, and time. Vital signs are normal. She appears well-developed and well-nourished. She is cooperative.   HENT:   Head: Normocephalic and atraumatic.   Right Ear: Hearing, external ear and ear canal normal. Tympanic membrane is scarred.   Left Ear: Hearing, external ear and ear canal normal. Tympanic membrane is scarred.   Nose: Mucosal edema present.   Mouth/Throat: Uvula is midline and mucous membranes are normal. Posterior oropharyngeal erythema present.   Eyes: Conjunctivae and EOM are normal.   Neck: Normal range of motion. Neck supple.   Cardiovascular: Normal rate, regular rhythm, S1 normal, S2 normal and normal heart sounds.    Pulmonary/Chest: Effort normal and breath sounds normal. No respiratory distress.   Cough during exam.   Musculoskeletal: Normal range of motion.   Lymphadenopathy:        Head (right side): Submandibular adenopathy present. No submental, no tonsillar, no preauricular and no posterior auricular adenopathy present.        Head (left side): Submandibular adenopathy present. No submental, no tonsillar, no preauricular and no  posterior auricular adenopathy present.   Neurological: She is alert and oriented to person, place, and time.   Skin: Skin is warm and dry. Capillary refill takes less than 2 seconds.   Psychiatric: She has a normal mood and affect. Her speech is normal and behavior is normal. Judgment and thought content normal.   Nursing note and vitals reviewed.      Assessment & Plan:       Pharyngitis, unspecified etiology  -     POCT Rapid Strep A, Negative strep in clinic.  -     Throat culture, pending.  -     cefdinir (OMNICEF) 300 MG capsule; Take 1 capsule (300 mg total) by mouth 2 (two) times daily.  Dispense: 20 capsule; Refill: 0    Recurrent cough  -     QUANTIFERON GOLD TB; Future; Expected date: 04/09/2018  -     cefdinir (OMNICEF) 300 MG capsule; Take 1 capsule (300 mg total) by mouth 2 (two) times daily.  Dispense: 20 capsule; Refill: 0    Hemoptysis  -     QUANTIFERON GOLD TB; Future; Expected date: 04/09/2018    Adenopathy  -     cefdinir (OMNICEF) 300 MG capsule; Take 1 capsule (300 mg total) by mouth 2 (two) times daily.  Dispense: 20 capsule; Refill: 0    Mass of right lung  -     Ambulatory referral to Pulmonology      Will call pt with results and further recommendations.        Follow-up if symptoms worsen or fail to improve.

## 2018-04-11 LAB
BACTERIA THROAT CULT: NORMAL
MITOGEN NIL: >10 IU/ML
NIL: 0.03 IU/ML
TB ANTIGEN NIL: 0.01 IU/ML
TB ANTIGEN: 0.04 IU/ML
TB GOLD: NEGATIVE

## 2018-04-12 ENCOUNTER — TELEPHONE (OUTPATIENT)
Dept: FAMILY MEDICINE | Facility: CLINIC | Age: 58
End: 2018-04-12

## 2018-04-30 RX ORDER — METOPROLOL TARTRATE 25 MG/1
TABLET, FILM COATED ORAL
Qty: 60 TABLET | Refills: 5 | OUTPATIENT
Start: 2018-04-30

## 2018-04-30 NOTE — PROGRESS NOTES
Refill Authorization Note     is requesting a refill authorization.    Brief assessment and rationale for refill: Quick DC: RTS until 9/18                       Medication-related problems identified: Therapeutic duplication     Name and strength of medication: METOPROLOL TARTRATE 25MG TABS         Comments: Dc'd duplicate meds

## 2018-05-01 ENCOUNTER — LAB VISIT (OUTPATIENT)
Dept: LAB | Facility: HOSPITAL | Age: 58
End: 2018-05-01
Attending: INTERNAL MEDICINE
Payer: COMMERCIAL

## 2018-05-01 DIAGNOSIS — R93.89 ABNORMAL RADIOLOGICAL FINDINGS IN SKIN AND SUBCUTANEOUS TISSUE: Primary | ICD-10-CM

## 2018-05-01 DIAGNOSIS — R05.9 COUGH: ICD-10-CM

## 2018-05-01 LAB — KOH PREP SPEC: NORMAL

## 2018-05-01 PROCEDURE — 87070 CULTURE OTHR SPECIMN AEROBIC: CPT

## 2018-05-01 PROCEDURE — 87205 SMEAR GRAM STAIN: CPT

## 2018-05-01 PROCEDURE — 87106 FUNGI IDENTIFICATION YEAST: CPT

## 2018-05-01 PROCEDURE — 87015 SPECIMEN INFECT AGNT CONCNTJ: CPT

## 2018-05-01 PROCEDURE — 87206 SMEAR FLUORESCENT/ACID STAI: CPT

## 2018-05-01 PROCEDURE — 87116 MYCOBACTERIA CULTURE: CPT

## 2018-05-01 PROCEDURE — 87210 SMEAR WET MOUNT SALINE/INK: CPT

## 2018-05-01 PROCEDURE — 87102 FUNGUS ISOLATION CULTURE: CPT

## 2018-05-02 RX ORDER — HYDROCHLOROTHIAZIDE 12.5 MG/1
12.5 TABLET ORAL DAILY
Qty: 90 TABLET | Refills: 0 | Status: SHIPPED | OUTPATIENT
Start: 2018-05-02 | End: 2018-08-01

## 2018-05-03 ENCOUNTER — HOSPITAL ENCOUNTER (OUTPATIENT)
Dept: RADIOLOGY | Facility: HOSPITAL | Age: 58
Discharge: HOME | End: 2018-05-03
Attending: INTERNAL MEDICINE
Payer: COMMERCIAL

## 2018-05-03 DIAGNOSIS — R05.9 COUGH: ICD-10-CM

## 2018-05-03 DIAGNOSIS — R93.89 ABNORMAL FINDINGS ON IMAGING TEST: ICD-10-CM

## 2018-05-03 PROCEDURE — 71260 CT THORAX DX C+: CPT | Mod: TC,PO

## 2018-05-03 PROCEDURE — 25500020 PHARM REV CODE 255: Mod: PO | Performed by: INTERNAL MEDICINE

## 2018-05-03 PROCEDURE — 71260 CT THORAX DX C+: CPT | Mod: 26,,, | Performed by: RADIOLOGY

## 2018-05-03 RX ADMIN — IOHEXOL 75 ML: 350 INJECTION, SOLUTION INTRAVENOUS at 10:05

## 2018-05-04 LAB
BACTERIA SPEC AEROBE CULT: NORMAL
GRAM STN SPEC: NORMAL

## 2018-05-23 DIAGNOSIS — R91.8 LUNG MASS: Primary | ICD-10-CM

## 2018-05-24 ENCOUNTER — OFFICE VISIT (OUTPATIENT)
Dept: CARDIOTHORACIC SURGERY | Facility: CLINIC | Age: 58
End: 2018-05-24
Payer: COMMERCIAL

## 2018-05-24 VITALS
SYSTOLIC BLOOD PRESSURE: 139 MMHG | OXYGEN SATURATION: 94 % | DIASTOLIC BLOOD PRESSURE: 72 MMHG | BODY MASS INDEX: 39.33 KG/M2 | HEIGHT: 70 IN | HEART RATE: 64 BPM | WEIGHT: 274.69 LBS

## 2018-05-24 DIAGNOSIS — R91.8 LUNG MASS: Primary | ICD-10-CM

## 2018-05-24 PROCEDURE — 99999 PR PBB SHADOW E&M-EST. PATIENT-LVL IV: CPT | Mod: PBBFAC,,, | Performed by: THORACIC SURGERY (CARDIOTHORACIC VASCULAR SURGERY)

## 2018-05-24 PROCEDURE — 99245 OFF/OP CONSLTJ NEW/EST HI 55: CPT | Mod: S$GLB,,, | Performed by: THORACIC SURGERY (CARDIOTHORACIC VASCULAR SURGERY)

## 2018-05-24 NOTE — PROGRESS NOTES
History & Physical    SUBJECTIVE:     History of Present Illness:  58 year old female with PMH of HTN, DMII, obesity, DURAN with thrombocytopenia, asthma and cervical cancer presents for evaluation of right lower lobe cavitary mass.  Patient reports she has known about mass since at least 2009. TB work up has been negative. She reports few respiratory symptoms until January 2018 when she had a bad URI and possible pneumonia. She reports daily productive cough with scant hemoptysis every time. She does report one episode of coughing up 2-4 Tbsp of bright right blood, however it resolved spontaneously. States cough is worse at night and in the afternoons. Sleeps with head elevated. Denies fever, chills, SOB, CP, palpitations, N/V or changes in bowel and bladder functioning.     PSH only significant for tonsils and adenoids, liver bx, colonoscopy and multiple GYN procedures.   Former smoker. 20 pack year history. Rarely drinks EtOH. Works at the  in a [employer] [facility] and [employer] at night.     Chief Complaint   Patient presents with    Consult     Review of patient's allergies indicates:   Allergen Reactions    No known drug allergies      Current Outpatient Prescriptions   Medication Sig Dispense Refill    alprazolam (XANAX) 0.25 MG tablet TAKE ONE TABLET BY MOUTH NIGHTLY AS NEEDED FOR INSOMNIA 30 tablet 2    azelastine (ASTELIN) 137 mcg (0.1 %) nasal spray 1 spray (137 mcg total) by Nasal route 2 (two) times daily. 30 mL 3    buPROPion (WELLBUTRIN XL) 150 MG TB24 tablet Take 2 tablets (300 mg total) by mouth once daily. 180 tablet 3    fluconazole (DIFLUCAN) 150 MG Tab Take 1 tablet (150 mg total) by mouth once daily. 1 tablet 1    hydroCHLOROthiazide (HYDRODIURIL) 12.5 MG Tab Take 1 tablet (12.5 mg total) by mouth once daily. 90 tablet 0    metoprolol tartrate (LOPRESSOR) 25 MG tablet Take 1 tablet (25 mg total) by mouth 2 (two) times daily. 180 tablet 3    multivitamin (THERAGRAN) per  "tablet Take 1 tablet by mouth once daily.      nystatin-triamcinolone (MYCOLOG II) cream Apply to affected area BID as needed 60 g 3    vitamin E 400 UNIT capsule Take 400 Units by mouth once daily.      albuterol (PROVENTIL) 2.5 mg /3 mL (0.083 %) nebulizer solution Take 3 mLs (2.5 mg total) by nebulization every 6 (six) hours as needed for Wheezing. 72 mL 2    diclofenac sodium (VOLTAREN) 1 % Gel Apply 2 g topically 3 (three) times daily. 1 Tube 3     No current facility-administered medications for this visit.      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, postitive antismooth muscle antibody    Blood transfusion     Bronchitis     bronchospasm on occasion     Cancer 1987    cervical CA    Cervical neck pain with evidence of disc disease 3/22/2012    can bend neck    Cirrhosis     non-alcoholic, compensated    Colon polyps 3/22/2012    Depression 3/22/2012    Hx panic attacks    Diverticular disease 3/22/2012    never diverticulitis    Fatty liver 3/22/2012    Fibrocystic breast     Fine tremor     hands,chronic    Hepatosplenomegaly     History of cervical cancer 3/22/2012    carcinoma in situ    Hypertension 4/24/2013    Knee pain, bilateral     chronic, with hands,feet,fingers also painful    Liver disease     "partially sclerosed liver" per pt    Migraines past Hx    Pleural effusion     small, compensated, good bilateral breath sounds per Dr Suresh GUTIERRES (postoperative nausea and vomiting)     twice after childbirth    Postpartum depression     Splenomegaly  " "   Thrombocytopenia      Past Surgical History:   Procedure Laterality Date    ADENOIDECTOMY      CERVICAL CONIZATION   W/ LASER       SECTION      x3    COLONOSCOPY N/A 2016    Procedure: COLONOSCOPY;  Surgeon: James Palomares MD;  Location[facility];  Service: Endoscopy;  Laterality: N/A;    ENDOMETRIAL ABLATION      LIVER BIOPSY      PELVIC LAPAROSCOPY      TONSILLECTOMY      TUBAL LIGATION       Family History   Problem Relation Age of Onset    Breast cancer Maternal Grandmother     Diabetes Father     Heart disease Father     Breast cancer Mother     Heart disease Mother     Hypertension Mother     Diabetes Brother     Diabetes Sister     Breast cancer Maternal Aunt     Breast cancer Other     Diabetes Sister     Emphysema Brother     Ovarian cancer Neg Hx      Social History   Substance Use Topics    Smoking status: Former Smoker     Quit date: 2/3/2006    Smokeless tobacco: Never Used    Alcohol use Yes      Comment: rarely        Review of Systems:  Review of Systems   Constitutional: Positive for fatigue. Negative for appetite change, diaphoresis and fever.   HENT: Positive for congestion and voice change (In the afternoon). Negative for trouble swallowing.    Respiratory: Positive for cough (Blood tinged sputum), chest tightness and shortness of breath. Negative for wheezing.    Cardiovascular: Negative for chest pain, palpitations and leg swelling.   Gastrointestinal: Negative for abdominal distention, abdominal pain, diarrhea, nausea and vomiting.   Genitourinary: Negative for difficulty urinating.   Musculoskeletal: Negative for arthralgias.   Neurological: Negative for dizziness, weakness and headaches.   Hematological: Negative for adenopathy.       OBJECTIVE:     Vital Signs (Most Recent)  Pulse: 64 (18 1025)  BP: 139/72 (18 1025)  SpO2: (!) 94 % (18 1025)  5' 10" (1.778 m)  124.6 kg (274 lb 11.1 oz)     Physical Exam:  Physical Exam "   Constitutional: She is oriented to person, place, and time. She appears well-developed and well-nourished.   HENT:   Head: Normocephalic and atraumatic.   Eyes: Pupils are equal, round, and reactive to light.   Neck: Normal range of motion. Neck supple.   Cardiovascular: Normal rate, regular rhythm, normal heart sounds and intact distal pulses.    Pulmonary/Chest: Effort normal and breath sounds normal. She has no wheezes. She has no rales. She exhibits no tenderness.   Abdominal: Soft. Bowel sounds are normal. She exhibits no distension. There is no tenderness.   Musculoskeletal: She exhibits no edema.   Neurological: She is alert and oriented to person, place, and time.   Skin: Skin is warm.   Psychiatric: She has a normal mood and affect.   Vitals reviewed.    PFTS  FEV1- 2.77 87%  DLCO- 22 113%    Diagnostic Results:    4/28/17- 2D  CONCLUSIONS     1 - Normal left ventricular systolic function (EF 60-65%).     2 - Normal right ventricular systolic function .     3 - Trivial mitral regurgitation.     4 - Trivial tricuspid regurgitation.     5 - The estimated PA systolic pressure is 26 mmHg.     5/3/18- CT Chest  1. Round cavitary mass with some high density only slightly larger than on 06/02/2009.  The minimal growth favors a benign etiology most likely residual from a round pneumonia or atypical infection.  The gas raises the question of walled-off abscess formation.  A cavitary neoplasm is not entirely excluded.  Please clinically correlate, pulmonary consultation may be of use  2. Reticular nodular consolidation is noted adjacent to the mass in the right upper lobe new since 06/02/2009 that may relate to an infectious process that is more acute or to a granulomatous process.  Follow-up in 3-6 months for resolution and/or stability may be of use.  3. Evidence of cirrhosis with splenomegaly and multiple varicosities in the upper abdomen with evidence of portal hypertension  4. Multiple calcified mediastinal  nodes as can be seen with a prior granulomatous process or treated neoplastic process    ASSESSMENT/PLAN:     58 year old female with PMH of HTN, DMII, obesity, DURAN with thrombocytopenia, asthma and cervical cancer presents for evaluation of right lower lobe cavitary mass.     PLAN:Plan   Will present case at multidisciplinary thoracic tumor board to discuss lobectomy vs prophylactic bronchial artery embolization.     ATTENDING ATTESTATION:    I evaluated the patient and I agree with the assessment and plan.  High-risk candidate for lung resection given liver disease as she was deemed not suitable for gastric sleeve a year ago.  Will present at multi-disciplinary thoracic conference to assess utility of bronchoscopy/ENB with biopsy/BAL and prophylactic embolization by IR.

## 2018-05-24 NOTE — LETTER
May 24, 2018      Alfonso Gutiérrez MD  [address]  [address]  [facility] [address]           [facility]  [address]  [place] [address]  Phone: [phone]  Fax: [phone]          Patient: Yumiko Gonzalez   MR Number: [id number]   YOB: 1960   Date of Visit: 5/24/2018       Dear Dr. Alfonso Gutiérrez:    Thank you for referring Yumiko Gonzalez to me for evaluation. Attached you will find relevant portions of my assessment and plan of care.    If you have questions, please do not hesitate to call me. I look forward to following Yumiko Gonzalez along with you.    Sincerely,    Denton Pak MD    Enclosure  CC:  No Recipients    If you would like to receive this communication electronically, please contact [email or web address] or [phone] to request more information on [facility] access.    For providers and/or their staff who would like to refer a patient to [facility], please contact us through our one-stop-shop provider referral line, [facility] [place], at [phone].    If you feel you have received this communication in error or would no longer like to receive these types of communications, please e-mail [email or web address]

## 2018-05-29 ENCOUNTER — PATIENT MESSAGE (OUTPATIENT)
Dept: CARDIOTHORACIC SURGERY | Facility: CLINIC | Age: 58
End: 2018-05-29

## 2018-05-30 ENCOUNTER — DOCUMENTATION ONLY (OUTPATIENT)
Dept: CARDIOTHORACIC SURGERY | Facility: HOSPITAL | Age: 58
End: 2018-05-30

## 2018-05-30 NOTE — PATIENT CARE CONFERENCE
[facility]      THORACIC MULTIDISCIPLINARY TUMOR BOARD  PATIENT REVIEW FORM  ________________________________________________________________________    CLINIC #: [id number]  DATE: 05/30/2018    DIAGNOSIS: Cavitary Lung Mass     PRESENTER: Dr. Pak    PATIENT SUMMARY:   58 year old female with PMH of HTN, DMII, obesity, DURAN with thrombocytopenia, asthma and cervical cancer presents for evaluation of right lower lobe cavitary mass.   Patient reports she has known about mass since at least 2009. TB work up has been negative. She reports daily productive cough with scant hemoptysis every time. She does report one episode of coughing up 2-4 Tbsp of bright right blood, however it resolved spontaneously. High-risk candidate for lung resection given liver disease as she was deemed not suitable for gastric sleeve a year ago.  Will present at multi-disciplinary thoracic conference to assess utility of bronchoscopy/ENB with biopsy/BAL and prophylactic embolization by IR.     BOARD RECOMMENDATIONS:   Recommend consulting IR for prophylactic embolization as she is currently at risk for massive hemoptysis, especially in light of her coagulopathy.      CONSULT NEEDED:     [] Surgery    [] Hem/Onc    [] Rad/Onc    [] Dietary                 [] Social Service    [] Psychology       [] Pulmonology   [x] Interventional Radiology     Clinical Stage: Tumor  Node(s)  Metastasis     Pathologic Stage: Tumor  Node(s)  Metastasis     GROUP STAGE:     [] O    [] 1A    [] IB    [] IIA    [] IIB     [] IIIA     [] IIIB     [] IIIC    [] IV                               [] Local recurrence     [] Regional recurrence     [] Distant recurrence                   [] NSCLC     [] SCLC     Tumor type     Unstageable:      [] Yes     [] No  Metastatic site(s):          [x] [place] Treatment Guidelines reviewed and care planned is consistent with guidelines.         (i.e., NCCN, NCI, PD, ACO, AUA, etc.)    PRESENTATION AT CANCER CONFERENCE:          [] Prospective    [] Retrospective     [] Follow-Up          [] Eligible for clinical trial

## 2018-05-31 ENCOUNTER — TELEPHONE (OUTPATIENT)
Dept: CARDIOTHORACIC SURGERY | Facility: CLINIC | Age: 58
End: 2018-05-31

## 2018-05-31 NOTE — TELEPHONE ENCOUNTER
Called to inform that IR clinic would be scheduling her for an IR embolization on 6/20 @ 2:30 PM.  Agreeable with date and time.

## 2018-06-05 ENCOUNTER — PATIENT MESSAGE (OUTPATIENT)
Dept: FAMILY MEDICINE | Facility: CLINIC | Age: 58
End: 2018-06-05

## 2018-06-05 LAB — FUNGUS SPEC CULT: NORMAL

## 2018-06-20 ENCOUNTER — INITIAL CONSULT (OUTPATIENT)
Dept: INTERVENTIONAL RADIOLOGY/VASCULAR | Facility: CLINIC | Age: 58
End: 2018-06-20
Payer: COMMERCIAL

## 2018-06-20 VITALS
DIASTOLIC BLOOD PRESSURE: 79 MMHG | SYSTOLIC BLOOD PRESSURE: 142 MMHG | BODY MASS INDEX: 38.46 KG/M2 | HEIGHT: 70 IN | HEART RATE: 66 BPM | WEIGHT: 268.63 LBS

## 2018-06-20 DIAGNOSIS — R04.2 HEMOPTYSIS: ICD-10-CM

## 2018-06-20 DIAGNOSIS — R91.8 MASS OF RIGHT LUNG: Primary | ICD-10-CM

## 2018-06-20 PROCEDURE — 3077F SYST BP >= 140 MM HG: CPT | Mod: CPTII,S$GLB,, | Performed by: FAMILY MEDICINE

## 2018-06-20 PROCEDURE — 3008F BODY MASS INDEX DOCD: CPT | Mod: CPTII,S$GLB,, | Performed by: FAMILY MEDICINE

## 2018-06-20 PROCEDURE — 3078F DIAST BP <80 MM HG: CPT | Mod: CPTII,S$GLB,, | Performed by: FAMILY MEDICINE

## 2018-06-20 PROCEDURE — 99999 PR PBB SHADOW E&M-EST. PATIENT-LVL III: CPT | Mod: PBBFAC,,,

## 2018-06-20 PROCEDURE — 99214 OFFICE O/P EST MOD 30 MIN: CPT | Mod: S$GLB,,, | Performed by: FAMILY MEDICINE

## 2018-06-20 NOTE — PROGRESS NOTES
Subjective:       Patient ID: Yumiko Gonzalez is a 58 y.o. female.    Chief Complaint: Mass    Patient here to discuss treatment of a right lung mass. She tells me she has had this mass since 2013, but was asymptomatic. In January of this year she began coughing. She tells me when she coughs, her sputum has blood in it. She tells me twice she believes she coughed approximately a tablespoon of blood. A chest x-ray and CT scan were ordered. The mass had increased in size and changed in appearance. She has tried antibiotics, cough syrups, and allergy medications without relief. Her cough is worse in the evening. She is unable to sleep lying down. She is accompanied by her .      Review of Systems   Constitutional: Positive for activity change (decreased due to lack of sleep and cough), appetite change (from stress eating) and fatigue. Negative for chills and fever.   HENT: Negative for congestion, drooling, ear discharge, nosebleeds, postnasal drip, sneezing and trouble swallowing.    Eyes: Negative for pain, discharge, redness and itching.   Respiratory: Positive for cough and shortness of breath (with exertion such as climbing stairs). Negative for wheezing and stridor.    Cardiovascular: Positive for chest pain (occasionally due to coughing; points to right side under axilla) and leg swelling (improves with elevation). Negative for palpitations.   Gastrointestinal: Positive for constipation (occasionally). Negative for abdominal distention, abdominal pain, diarrhea, nausea and vomiting.   Genitourinary: Negative for difficulty urinating, dysuria, flank pain and urgency.   Musculoskeletal: Positive for arthralgias (arthritis; left knee pain). Negative for back pain, gait problem, joint swelling, myalgias and neck pain.   Skin: Negative for color change, pallor and rash.   Neurological: Positive for dizziness (very occasionally) and headaches (related to coughing spells; alleviated with advil or aleve). Negative  for weakness.   Psychiatric/Behavioral: Positive for sleep disturbance (due to cough).       Objective:      Physical Exam   Constitutional: She is oriented to person, place, and time. She appears well-developed and well-nourished. No distress.   HENT:   Head: Normocephalic and atraumatic.   Right Ear: External ear normal.   Left Ear: External ear normal.   Nose: Nose normal.   Mouth/Throat: Oropharynx is clear and moist. No oropharyngeal exudate.   Eyes: Conjunctivae are normal. Pupils are equal, round, and reactive to light. Right eye exhibits no discharge. Left eye exhibits no discharge. No scleral icterus.   Neck: Neck supple. No tracheal deviation present. No thyromegaly present.   Cardiovascular: Normal rate, regular rhythm, normal heart sounds and intact distal pulses.  Exam reveals no gallop and no friction rub.    No murmur heard.  Pulmonary/Chest: Effort normal and breath sounds normal. No stridor. No respiratory distress. She has no wheezes. She has no rales.   Abdominal: Soft. Bowel sounds are normal. She exhibits no distension and no mass. There is no hepatosplenomegaly. There is no tenderness. There is no rebound and no guarding.   Musculoskeletal: She exhibits edema (1+ bilateral lower extremities).   Lymphadenopathy:     She has no cervical adenopathy.   Neurological: She is alert and oriented to person, place, and time. Gait normal.   Skin: Skin is warm and dry. She is not diaphoretic. No cyanosis. Nails show no clubbing.   Psychiatric: She has a normal mood and affect.   Vitals reviewed.      Assessment:       1. Mass of right lung    2. Hemoptysis        Plan:         Explained recommendation of Dr. Son is to embolize artery that is feeding mass to see if this will alleviate her cough and hemoptysis. Discussed how the procedure will be performed, risk/benefits (including stroke, heart attack, and cardiac collapse), possible complications, and pre-post procedure expectations. Discussed  possibility that this may not help her symptoms at all. Patient and  verbalize understanding and agreement. Patient would like to wait until after the 4th of July to have this done. We will call once the physician's schedule has been posted. Clinic phone number provided.

## 2018-06-21 RX ORDER — FLUCONAZOLE 150 MG/1
150 TABLET ORAL DAILY
Qty: 1 TABLET | Refills: 1 | Status: SHIPPED | OUTPATIENT
Start: 2018-06-21 | End: 2020-05-14

## 2018-06-26 RX ORDER — NYSTATIN AND TRIAMCINOLONE ACETONIDE 100000; 1 [USP'U]/G; MG/G
CREAM TOPICAL
Qty: 60 G | Refills: 3 | Status: SHIPPED | OUTPATIENT
Start: 2018-06-26 | End: 2023-04-13

## 2018-06-26 NOTE — TELEPHONE ENCOUNTER
"See mychart messages and advise please.  Pt requesting nystatin cream     Sorry, just now opening  portal.   Nothing new, just the same yeast rash that I tend to get in the summer time, under my breasts and abdominal "apron". I ran out of the medication and ointment that I had on hand at home.   Thanks.   Yvonne   "

## 2018-07-03 LAB
ACID FAST MOD KINY STN SPEC: NORMAL
MYCOBACTERIUM SPEC QL CULT: NORMAL

## 2018-07-10 ENCOUNTER — PATIENT MESSAGE (OUTPATIENT)
Dept: FAMILY MEDICINE | Facility: CLINIC | Age: 58
End: 2018-07-10

## 2018-07-10 DIAGNOSIS — K92.2 GASTROINTESTINAL HEMORRHAGE, UNSPECIFIED GASTROINTESTINAL HEMORRHAGE TYPE: Primary | ICD-10-CM

## 2018-07-11 ENCOUNTER — LAB VISIT (OUTPATIENT)
Dept: LAB | Facility: HOSPITAL | Age: 58
End: 2018-07-11
Attending: FAMILY MEDICINE
Payer: COMMERCIAL

## 2018-07-11 DIAGNOSIS — K92.2 GASTROINTESTINAL HEMORRHAGE, UNSPECIFIED GASTROINTESTINAL HEMORRHAGE TYPE: ICD-10-CM

## 2018-07-11 DIAGNOSIS — R91.8 MASS OF RIGHT LUNG: Primary | ICD-10-CM

## 2018-07-11 LAB
BASOPHILS # BLD AUTO: 0.03 K/UL
BASOPHILS NFR BLD: 0.7 %
DIFFERENTIAL METHOD: ABNORMAL
EOSINOPHIL # BLD AUTO: 0.3 K/UL
EOSINOPHIL NFR BLD: 5.9 %
ERYTHROCYTE [DISTWIDTH] IN BLOOD BY AUTOMATED COUNT: 13.7 %
FERRITIN SERPL-MCNC: 182 NG/ML
HCT VFR BLD AUTO: 43 %
HGB BLD-MCNC: 14.2 G/DL
IMM GRANULOCYTES # BLD AUTO: 0.03 K/UL
IMM GRANULOCYTES NFR BLD AUTO: 0.7 %
IRON SERPL-MCNC: 77 UG/DL
LYMPHOCYTES # BLD AUTO: 0.8 K/UL
LYMPHOCYTES NFR BLD: 17.6 %
MCH RBC QN AUTO: 30.7 PG
MCHC RBC AUTO-ENTMCNC: 33 G/DL
MCV RBC AUTO: 93 FL
MONOCYTES # BLD AUTO: 0.5 K/UL
MONOCYTES NFR BLD: 12.5 %
NEUTROPHILS # BLD AUTO: 2.7 K/UL
NEUTROPHILS NFR BLD: 62.6 %
NRBC BLD-RTO: 0 /100 WBC
PLATELET # BLD AUTO: 61 K/UL
PMV BLD AUTO: 11.7 FL
RBC # BLD AUTO: 4.62 M/UL
SATURATED IRON: 21 %
TOTAL IRON BINDING CAPACITY: 364 UG/DL
TRANSFERRIN SERPL-MCNC: 246 MG/DL
WBC # BLD AUTO: 4.25 K/UL

## 2018-07-11 PROCEDURE — 85025 COMPLETE CBC W/AUTO DIFF WBC: CPT

## 2018-07-11 PROCEDURE — 36415 COLL VENOUS BLD VENIPUNCTURE: CPT | Mod: PO

## 2018-07-11 PROCEDURE — 82728 ASSAY OF FERRITIN: CPT

## 2018-07-11 PROCEDURE — 83540 ASSAY OF IRON: CPT

## 2018-07-18 ENCOUNTER — PATIENT MESSAGE (OUTPATIENT)
Dept: FAMILY MEDICINE | Facility: CLINIC | Age: 58
End: 2018-07-18

## 2018-07-23 ENCOUNTER — ANESTHESIA EVENT (OUTPATIENT)
Dept: ENDOSCOPY | Facility: HOSPITAL | Age: 58
End: 2018-07-23
Payer: COMMERCIAL

## 2018-07-23 DIAGNOSIS — R04.2 HEMOPTYSIS: ICD-10-CM

## 2018-07-23 DIAGNOSIS — R91.8 MASS OF RIGHT LUNG: Primary | ICD-10-CM

## 2018-07-23 NOTE — PRE-PROCEDURE INSTRUCTIONS
Preop instructions: NPO solids/ milk products  after midnight or clears up to 2 hours before arrival (clear liquids are: water, apple juice, black coffee/no cream or creamer, Gatorade & Jello- avoid red or orange), shower instructions, directions, leave all valuables at home, medication instructions for PM prior & am of procedure explained. Patient stated an understanding.     Patient states has had PONV with anesthesia in the past

## 2018-07-23 NOTE — ANESTHESIA PREPROCEDURE EVALUATION
07/24/2018  Yumiko Gonzalez is a 58 y.o., female.    Pre-operative evaluation for Procedure(s) (LRB):  EMBOLIZATION, BLOOD VESSEL (N/A)    Yumiko Gonzalez is a 58 y.o. female     Patient Active Problem List   Diagnosis    Acute bronchospasm    Hypermetropia    Enthesopathy of ankle and tarsus, unspecified    Cervical neck pain with evidence of disc disease    Fatty liver    History of cervical cancer    Depression    Diverticular disease    Colon polyps    Hypertension    Hx of colonic polyps    Morbid obesity due to excess calories    Morbid obesity with BMI of 40.0-44.9, adult    Splenomegaly    Preop cardiovascular exam    History of posttraumatic stress disorder (PTSD)    Nonalcoholic steatohepatitis    Cirrhosis of liver without ascites    Cirrhosis       Review of patient's allergies indicates:   Allergen Reactions    No known drug allergies        Current Facility-Administered Medications on File Prior to Encounter   Medication Dose Route Frequency Provider Last Rate Last Dose    EUFLEXXA 10 mg/mL(mw 2.4 -3.6 million) injection Syrg 20 mg  20 mg Intra-articular  Pop Schneider MD   20 mg at 12/27/17 1431     Current Outpatient Prescriptions on File Prior to Encounter   Medication Sig Dispense Refill    buPROPion (WELLBUTRIN XL) 150 MG TB24 tablet Take 2 tablets (300 mg total) by mouth once daily. 180 tablet 3    hydroCHLOROthiazide (HYDRODIURIL) 12.5 MG Tab Take 1 tablet (12.5 mg total) by mouth once daily. 90 tablet 0    metoprolol tartrate (LOPRESSOR) 25 MG tablet Take 1 tablet (25 mg total) by mouth 2 (two) times daily. (Patient taking differently: Take 50 mg by mouth once daily. ) 180 tablet 3    multivitamin (THERAGRAN) per tablet Take 1 tablet by mouth once daily.      vitamin E 400 UNIT capsule Take 400 Units by mouth once daily.      albuterol (PROVENTIL) 2.5 mg  /3 mL (0.083 %) nebulizer solution Take 3 mLs (2.5 mg total) by nebulization every 6 (six) hours as needed for Wheezing. 72 mL 2    alprazolam (XANAX) 0.25 MG tablet TAKE ONE TABLET BY MOUTH NIGHTLY AS NEEDED FOR INSOMNIA 30 tablet 2    diclofenac sodium (VOLTAREN) 1 % Gel Apply 2 g topically 3 (three) times daily. 1 Tube 3    fluconazole (DIFLUCAN) 150 MG Tab Take 1 tablet (150 mg total) by mouth once daily. (Patient taking differently: Take 150 mg by mouth daily as needed. ) 1 tablet 1    nystatin-triamcinolone (MYCOLOG II) cream Apply to affected area twice daily as needed 60 g 3       Past Surgical History:   Procedure Laterality Date    ADENOIDECTOMY      CERVICAL CONIZATION   W/ LASER       SECTION      x3    COLONOSCOPY N/A 2016    Procedure: COLONOSCOPY;  Surgeon: Jamse Palomares MD;  Location: [facility];  Service: Endoscopy;  Laterality: N/A;    ENDOMETRIAL ABLATION      LIVER BIOPSY      PELVIC LAPAROSCOPY      TONSILLECTOMY      TUBAL LIGATION         Social History     Social History    Marital status:      Spouse name: N/A    Number of children: N/A    Years of education: N/A     Occupational History    Not on file.     Social History Main Topics    Smoking status: Former Smoker     Quit date: 2/3/2006    Smokeless tobacco: Never Used    Alcohol use Yes      Comment: rarely    Drug use: No    Sexual activity: Not Currently     Other Topics Concern    Not on file     Social History Narrative    No narrative on file         CBC: No results for input(s): WBC, RBC, HGB, HCT, PLT, MCV, MCH, MCHC in the last 72 hours.    CMP: No results for input(s): NA, K, CL, CO2, BUN, CREATININE, GLU, MG, PHOS, CALCIUM, ALBUMIN, PROT, ALKPHOS, ALT, AST, BILITOT in the last 72 hours.    INR  Recent Labs      18   1031   INR  1.2           Diagnostic Studies:      EKD Echo:  Results for orders placed or performed in visit on 17   2D Echo w/ Color Flow  "Doppler   Result Value Ref Range    EF 60 55 - 65    Mitral Valve Regurgitation TRIVIAL     Diastolic Dysfunction No     Est. PA Systolic Pressure 26.43     Tricuspid Valve Regurgitation TRIVIAL      Last 3 sets of Vitals    Vitals - 1 value per visit 4/9/2018 5/24/2018 6/20/2018   SYSTOLIC 130 139 142   DIASTOLIC 90 72 79   PULSE 67 64 66   TEMPERATURE 98.7 - -   RESPIRATIONS - - -   SPO2 96 94 -   Weight (lb) 267.2 274.69 268.6   Weight (kg) 121.2 124.6 121.836   HEIGHT 5' 10" 5' 10" 5' 10"   BODY MASS INDEX 38.34 39.41 38.54   VISIT REPORT - - -   Pain Score  0 0 -   Some encounter information is confidential and restricted. Go to Review Flowsheets activity to see all data.   Some recent data might be hidden                                                                                                                    07/23/2018  Yumiko Gonzalez is a 58 y.o., female.    Anesthesia Evaluation         Review of Systems  Anesthesia Hx:  Personal Hx of Anesthesia complications, Post-Operative Nausea/Vomiting   Cardiovascular:   Hypertension    Pulmonary:   Asthma    Hepatic/GI:   Liver Disease, (cirrhosis ) Hepatitis    Neurological:   Headaches (Migraines)    Endocrine:  Metabolic Disorders, Morbid Obesity / BMI > 40  Psych:   depression      [facility] Anesthesia Evaluation      Review of Systems  Anesthesia Hx:  No problems with previous Anesthesia Hx of Anesthetic complications (PONV)    Social:  Non-Smoker, No Alcohol Use    Cardiovascular:   Hypertension Denies MI.  Denies CAD.    Denies CABG/stent.     Pulmonary:   Asthma (exercise induced) Denies Sleep Apnea.    Renal/:  Renal/ Normal     Hepatic/GI:   Liver Disease,    Musculoskeletal:  Spine Disorders: cervical    Neurological:  Neurology Normal    Endocrine:  Endocrine Normal    Psych:   anxiety      hysical Exam  General:  Morbid Obesity    Airway/Jaw/Neck:  Airway Findings: Mouth Opening: Normal General Airway Assessment: Adult  Mallampati: II  Jaw/Neck " Findings:  Neck ROM: Extension Decreased, Mild       Chest/Lungs:  Chest/Lungs Findings: Clear to auscultation, Normal Respiratory Rate     Heart/Vascular:  Heart Findings: Rate: Normal  Rhythm: Regular Rhythm  Sounds: Normal  Heart murmur: negative Vascular Findings: Normal (No carotid bruits.)       Mental Status:  Mental Status Findings:  Cooperative, Alert and Oriented          Anesthesia Plan  Type of Anesthesia, risks & benefits discussed:  Anesthesia Type:  general  Patient's Preference: same   Intra-op Monitoring Plan: standard ASA monitors  Intra-op Monitoring Plan Comments:   Post Op Pain Control Plan: per primary service following discharge from PACU and IV/PO Opioids PRN  Post Op Pain Control Plan Comments:   Induction:   IV  Beta Blocker:  Patient is on a Beta-Blocker and has received one dose within the past 24 hours (No further documentation required).       Informed Consent: Patient understands risks and agrees with Anesthesia plan.  Questions answered. Anesthesia consent signed with patient.  ASA Score: 3     Day of Surgery Review of History & Physical:    H&P update referred to the surgeon.         Ready For Surgery From Anesthesia Perspective.

## 2018-07-24 ENCOUNTER — ANESTHESIA (OUTPATIENT)
Dept: ENDOSCOPY | Facility: HOSPITAL | Age: 58
End: 2018-07-24
Payer: COMMERCIAL

## 2018-07-24 ENCOUNTER — SURGERY (OUTPATIENT)
Age: 58
End: 2018-07-24

## 2018-07-24 ENCOUNTER — HOSPITAL ENCOUNTER (OUTPATIENT)
Facility: HOSPITAL | Age: 58
Discharge: HOME | End: 2018-07-24
Attending: RADIOLOGY | Admitting: RADIOLOGY
Payer: COMMERCIAL

## 2018-07-24 VITALS
WEIGHT: 265 LBS | HEART RATE: 78 BPM | TEMPERATURE: 98 F | BODY MASS INDEX: 37.94 KG/M2 | SYSTOLIC BLOOD PRESSURE: 138 MMHG | RESPIRATION RATE: 17 BRPM | OXYGEN SATURATION: 99 % | DIASTOLIC BLOOD PRESSURE: 60 MMHG | HEIGHT: 70 IN

## 2018-07-24 DIAGNOSIS — R91.8 MASS OF RIGHT LUNG: ICD-10-CM

## 2018-07-24 DIAGNOSIS — R04.2 HEMOPTYSIS: ICD-10-CM

## 2018-07-24 PROCEDURE — 25500020 PHARM REV CODE 255: Performed by: RADIOLOGY

## 2018-07-24 PROCEDURE — 37000008 HC ANESTHESIA 1ST 15 MINUTES

## 2018-07-24 PROCEDURE — 63600175 PHARM REV CODE 636 W HCPCS: Performed by: FAMILY MEDICINE

## 2018-07-24 PROCEDURE — 25000003 PHARM REV CODE 250: Performed by: NURSE ANESTHETIST, CERTIFIED REGISTERED

## 2018-07-24 PROCEDURE — 25000003 PHARM REV CODE 250: Performed by: FAMILY MEDICINE

## 2018-07-24 PROCEDURE — 63600175 PHARM REV CODE 636 W HCPCS: Performed by: NURSE ANESTHETIST, CERTIFIED REGISTERED

## 2018-07-24 PROCEDURE — D9220A PRA ANESTHESIA: Mod: CRNA,,, | Performed by: NURSE ANESTHETIST, CERTIFIED REGISTERED

## 2018-07-24 PROCEDURE — 37000009 HC ANESTHESIA EA ADD 15 MINS

## 2018-07-24 PROCEDURE — D9220A PRA ANESTHESIA: Mod: ANES,,, | Performed by: ANESTHESIOLOGY

## 2018-07-24 RX ORDER — CEFAZOLIN SODIUM 1 G/3ML
1 INJECTION, POWDER, FOR SOLUTION INTRAMUSCULAR; INTRAVENOUS ONCE
Status: COMPLETED | OUTPATIENT
Start: 2018-07-24 | End: 2018-07-24

## 2018-07-24 RX ORDER — SODIUM CHLORIDE 9 MG/ML
500 INJECTION, SOLUTION INTRAVENOUS ONCE
Status: COMPLETED | OUTPATIENT
Start: 2018-07-24 | End: 2018-07-24

## 2018-07-24 RX ORDER — FENTANYL CITRATE 50 UG/ML
25 INJECTION, SOLUTION INTRAMUSCULAR; INTRAVENOUS EVERY 5 MIN PRN
Status: DISCONTINUED | OUTPATIENT
Start: 2018-07-24 | End: 2018-07-24 | Stop reason: HOSPADM

## 2018-07-24 RX ORDER — FENTANYL CITRATE 50 UG/ML
INJECTION, SOLUTION INTRAMUSCULAR; INTRAVENOUS
Status: DISCONTINUED | OUTPATIENT
Start: 2018-07-24 | End: 2018-07-24

## 2018-07-24 RX ORDER — SODIUM CHLORIDE 9 MG/ML
INJECTION, SOLUTION INTRAVENOUS CONTINUOUS PRN
Status: DISCONTINUED | OUTPATIENT
Start: 2018-07-24 | End: 2018-07-24

## 2018-07-24 RX ORDER — SODIUM CHLORIDE 0.9 % (FLUSH) 0.9 %
3 SYRINGE (ML) INJECTION
Status: DISCONTINUED | OUTPATIENT
Start: 2018-07-24 | End: 2018-07-24 | Stop reason: HOSPADM

## 2018-07-24 RX ORDER — MIDAZOLAM HYDROCHLORIDE 1 MG/ML
INJECTION, SOLUTION INTRAMUSCULAR; INTRAVENOUS
Status: DISCONTINUED | OUTPATIENT
Start: 2018-07-24 | End: 2018-07-24

## 2018-07-24 RX ADMIN — MIDAZOLAM HYDROCHLORIDE 2 MG: 1 INJECTION, SOLUTION INTRAMUSCULAR; INTRAVENOUS at 02:07

## 2018-07-24 RX ADMIN — FENTANYL CITRATE 50 MCG: 50 INJECTION, SOLUTION INTRAMUSCULAR; INTRAVENOUS at 02:07

## 2018-07-24 RX ADMIN — SODIUM CHLORIDE 1000 ML: 0.9 INJECTION, SOLUTION INTRAVENOUS at 12:07

## 2018-07-24 RX ADMIN — CEFAZOLIN 2 G: 330 INJECTION, POWDER, FOR SOLUTION INTRAMUSCULAR; INTRAVENOUS at 02:07

## 2018-07-24 RX ADMIN — SODIUM CHLORIDE, SODIUM GLUCONATE, SODIUM ACETATE, POTASSIUM CHLORIDE, MAGNESIUM CHLORIDE, SODIUM PHOSPHATE, DIBASIC, AND POTASSIUM PHOSPHATE: .53; .5; .37; .037; .03; .012; .00082 INJECTION, SOLUTION INTRAVENOUS at 02:07

## 2018-07-24 RX ADMIN — SODIUM CHLORIDE: 0.9 INJECTION, SOLUTION INTRAVENOUS at 01:07

## 2018-07-24 RX ADMIN — IOHEXOL 50 ML: 300 INJECTION, SOLUTION INTRAVENOUS at 03:07

## 2018-07-24 NOTE — TRANSFER OF CARE
"Anesthesia Transfer of Care Note    Patient: uYmiko Gonzalez    Procedure(s) Performed: Procedure(s) (LRB):  EMBOLIZATION, BLOOD VESSEL (N/A)    Patient location: [facility]    Anesthesia Type: MAC    Transport from OR: Transported from OR on room air with adequate spontaneous ventilation    Post pain: adequate analgesia    Post assessment: no apparent anesthetic complications and tolerated procedure well    Post vital signs: stable    Level of consciousness: awake, alert and oriented    Nausea/Vomiting: no nausea/vomiting    Complications: none    Transfer of care protocol was followed      Last vitals:   Visit Vitals  Temp 36.7 °C (98.1 °F) (Oral)   Resp 16   Ht 5' 10" (1.778 m)   Wt 120.2 kg (265 lb)   BMI 38.02 kg/m²     "

## 2018-07-24 NOTE — PROGRESS NOTES
Notified IR (Konrad) that pt is ready for procedure, still needs consents,update. Konrad spoke w/pt and family to update on start of procedure.

## 2018-07-24 NOTE — DISCHARGE INSTRUCTIONS
Catheter Embolization  Embolization is a procedure to block a blood vessel. It is done to stop severe bleeding or to stop blood flow to a part of the body. To do this, a thin, flexible tube, called a catheter,  is put into the specific blood vessel that needs to be blocked. Initially, this would be done after entering a peripheral artery or vein in your arm or groin. It is often done by a doctor called an interventional radiologist. This procedure may be done alone, or before or after surgery or other treatment.  Why is catheter embolization done?  The procedure may be done to:  · Control bleeding in an emergency  · Block blood flow to a cancerous tumor  · Block blood flow to a uterine fibroid  · Treat arteriovenous malformations (AVMs) in the brain and body  What are the risks of catheter embolization?  Risks depend upon the part of the body being treated. Talk to your radiologist. Risks include:  · Blood clots, damage to an artery  · Stroke  · Death  · Infection or bruising around the catheter insertion site  · Problems due to contrast medium, including allergic reaction or kidney damage  · The chance that the embolic agent could lodge in the wrong place and deprive normal tissue of its oxygen supply  For uterine fibroid embolization risks include:  · Cramping  · Fever  · Cessation of menstrual cycles (rare)  · May affect future fertility      How do I prepare for the procedure?  You will be told how to prepare for your procedure. Follow these instructions carefully. Also do the following:  · Do not to eat or drink before the procedure, as instructed by your doctor.  · Tell the doctor:  ¨ What medicines you take. This includes herbs and supplements.  ¨ If you are pregnant or may be pregnant.  ¨ If you are allergic to contrast medium (X-ray dye) or other medicines.  What happens during the procedure?  Before the procedure starts, you may be given a sedative to make you relaxed and sleepy. Or you will be given  general anesthesia to put you in a deep sleep through the procedure. Local anesthetic is also used. This blocks pain at the site where the catheter is inserted. The procedure is then started.  · A small incision is made over the insertion site.  A catheter is put into the blood vessel. It is then moved to the area to be treated.  · Contrast medium is injected through the catheter. This makes the artery and catheter stand out on X-ray pictures. The movement of the catheter is viewed on a video screen.  · A material or medicine is sent through the catheter. It goes to the treatment site.  · The procedure is repeated in each blood vessel that needs to be blocked.  · The catheter is then removed. Firm pressure is put on the insertion site for about 15 minutes to stop bleeding.  What happens after the procedure?  You will need to lie flat with your leg straight for several hours. You may go home the same day. Or you may stay in the hospital or surgery center one or more nights. Follow any instructions you have been given about recovering at home.      Incision Care  Remember: Follow-up visits allow your healthcare provider to make sure your incision is healing well. Be sure to keep your appointments.     Stitches (sutures), surgical staples, special strips of surgical tape, or surgical skin glue may be used to close incisions. They also help stop bleeding and speed healing. To help your incision heal, follow the tips on this handout.  Home care  Tips for home care include the following:  · Always wash your hands before touching your incision.  · Keep your incision clean and dry.  · Avoid doing things that could cause dirt or sweat to get on your incision.  · Dont pick at scabs. They help protect the wound.  · Keep your incision out of water.  · Take a sponge bath to avoid getting your incision wet, unless your healthcare provider tells you otherwise.  · Ask your provider when can you take a shower or bathe.  · Ask your  provider about the best way to keep your incision dry when bathing or showering.  · Pat stitches dry if they get wet. Dont rub.  · Leave the bandage (dressing) in place until you are told to remove it or change it. Change it only as directed, using clean hands.  · After the first 12 hours, change your dressing every 24 hours, or as directed by your healthcare provider.  · Change your dressing if it gets wet or soiled.  Care for specific closures  Follow these guidelines unless your healthcare provider tells you otherwise:  · Stitches or staples. Once you no longer need to keep these dry, clean the wound daily. First remove the bandage using clean hands. Then wash the area gently with soap and warm water. Use a wet cotton swab to loosen and remove any blood or crust that forms. After cleaning, put a thin layer of antibiotic ointment on. Then put on a new bandage.  · Skin glue. Dont put liquid, ointment, or cream on your wound while the glue is in place. Avoid activities that cause heavy sweating. Protect the wound from sunlight. Do not scratch, rub, or pick at the glue. Do not put tape directly over the glue. The glue should peel off within 5 to 10 days.  · Surgical tape. Keep the area dry. If it gets wet, blot the area dry with a clean towel. Surgical tape usually falls off within 7 to 10 days. If it has not fallen off after 10 days, contact your healthcare provider before taking it off yourself. If you are told to remove the tape, put mineral oil or petroleum jelly on a cotton ball. Gently rub the tape until it is removed.  Changing your dressing  Leave the dressing (bandage) in place until you are told to remove it or change it. Follow the instructions below unless told otherwise by your healthcare provider:  · Always wash your hands before changing your dressing.  · After the first 48 hours, the incision wound usually will have closed. At this point, leave the incision uncovered and open to the air. If the  incision has not closed keep it covered.  · Cover your incision only if your clothing is rubbing it or causing irritation.  · Change your dressing if it gets wet or soiled.  Follow-up care  Follow up with your healthcare provider to ask how long sutures or staples should be left in place. Be sure to return for stitch or staple removal as directed. If dissolving stitches were used in your mouth, these will not need to be removed. They should fall out or dissolve on their own.  If tape closures were used, remove them yourself when your provider recommends if they have not fallen off on their own. If skin glue was used, the glue will wear off by itself.  When to seek medical care  Call your healthcare provider if you have any of the following:  · More pain, redness, swelling, bleeding, or foul-smelling discharge around the incision area  · Fever of 100.4°F (38ºC) or higher, or as directed by your healthcare provider  · Shaking chills  · Vomiting or nausea that doesn't go away  · Numbness, coldness, or tingling around the incision area, or changes in skin color  · Opening of the sutures or wound  · Stitches or staples come apart or fall out or surgical tape falls off before 7 days or as directed by your healthcare provider             PATIENT INSTRUCTIONS  POST-ANESTHESIA    IMMEDIATELY FOLLOWING SURGERY:  Do not drive or operate machinery for the first twenty four hours after surgery.  Do not make any important decisions for twenty four hours after surgery or while taking narcotic pain medications or sedatives.  If you develop intractable nausea and vomiting or a severe headache please notify your doctor immediately.    FOLLOW-UP:  Please make an appointment with your surgeon as instructed. You do not need to follow up with anesthesia unless specifically instructed to do so.    WOUND CARE INSTRUCTIONS (if applicable):  Keep a dry clean dressing on the anesthesia/puncture wound site if there is drainage.  Once the wound  has quit draining you may leave it open to air.  Generally you should leave the bandage intact for twenty four hours unless there is drainage.  If the epidural site drains for more than 36-48 hours please call the anesthesia department.    QUESTIONS?:  Please feel free to call your physician or the hospital  if you have any questions, and they will be happy to assist you.       [facility] Anesthesia Department  [address] [facility]  [phone]

## 2018-07-24 NOTE — PROGRESS NOTES
Pressure held to right groin venous access, patient to lay flat and keep right leg straight for two hours, patient to recover at [facility] 30

## 2018-07-24 NOTE — PROCEDURES
"Radiology Post-Procedure Note    Pre Op Diagnosis: Pulmonary mass resulting in hemoptysis  Post Op Diagnosis: Same    Procedure: Pulmonary angiogram    Procedure performed by: Maci    Written Informed Consent Obtained: Yes  Specimen Removed: NO  Estimated Blood Loss: Minimal    Findings:   The right common femoral vein was accessed with a micropuncture needle under US guidance. A 5Fr sheath was placed and a Johnson cath was used to access the right pulmonary artery. Several angiograms were obtained, however no source of hemorrhage was visualized. The catheter and sheath were then removed and hemostasis was achieved with manual compression.    Patient tolerated procedure well.    Raghavendra Arroyo MD ([place])  Radiology PGY-5  [phone]      "

## 2018-07-24 NOTE — H&P
"Radiology History & Physical      SUBJECTIVE:     Chief Complaint: hemoptysis    History of Present Illness:  Yumiko Gonzalez is a 58 y.o. female with hemoptysis and found to have a right lower lobe pulmonary cavitary mass who presents today for embolization.     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, postitive antismooth muscle antibody    Blood transfusion     Bronchitis     bronchospasm on occasion     Cancer     carcinoma in situ    Cervical neck pain with evidence of disc disease 3/22/2012    can bend neck    Cirrhosis     non-alcoholic, compensated    Colon polyps 3/22/2012    Depression 3/22/2012    Hx panic attacks    Diverticular disease 3/22/2012    never diverticulitis    Fatty liver 3/22/2012    Fibrocystic breast     Fine tremor     hands,chronic    Hepatosplenomegaly     Hypertension 2013    Knee pain, bilateral     chronic, with hands,feet,fingers also painful    Liver disease     "partially sclerosed liver" per pt    Migraines past Hx    Pleural effusion     small, compensated, good bilateral breath sounds per Dr Suresh GUTIERRES (postoperative nausea and vomiting)     twice after childbirth    Postpartum depression     Splenomegaly     Thrombocytopenia      Past Surgical History:   Procedure Laterality Date    ADENOIDECTOMY      CERVICAL CONIZATION   W/ LASER       SECTION      x3    COLONOSCOPY N/A 2016    Procedure: COLONOSCOPY;  Surgeon: James Palomares MD;  Location[facility];  Service: Endoscopy;  Laterality: N/A;    ENDOMETRIAL ABLATION      LIVER BIOPSY      PELVIC LAPAROSCOPY      TONSILLECTOMY      TUBAL LIGATION         Home Meds:   Prior to Admission medications    Medication Sig Start Date End Date Taking? Authorizing Provider   alprazolam (XANAX) 0.25 MG tablet TAKE " ONE TABLET BY MOUTH NIGHTLY AS NEEDED FOR INSOMNIA 11/10/16  Yes Garrett Webb MD   buPROPion (WELLBUTRIN XL) 150 MG TB24 tablet Take 2 tablets (300 mg total) by mouth once daily. 9/26/17  Yes Garrett Webb MD   hydroCHLOROthiazide (HYDRODIURIL) 12.5 MG Tab Take 1 tablet (12.5 mg total) by mouth once daily. 5/2/18 5/2/19 Yes Garrett Webb MD   metoprolol tartrate (LOPRESSOR) 25 MG tablet Take 1 tablet (25 mg total) by mouth 2 (two) times daily.  Patient taking differently: Take 50 mg by mouth once daily.  9/26/17  Yes Garrett Webb MD   multivitamin (THERAGRAN) per tablet Take 1 tablet by mouth once daily.   Yes Historical Provider, MD   vitamin E 400 UNIT capsule Take 400 Units by mouth once daily.   Yes Historical Provider, MD   albuterol (PROVENTIL) 2.5 mg /3 mL (0.083 %) nebulizer solution Take 3 mLs (2.5 mg total) by nebulization every 6 (six) hours as needed for Wheezing. 11/29/13 1/5/18  Garrett Webb MD   diclofenac sodium (VOLTAREN) 1 % Gel Apply 2 g topically 3 (three) times daily. 10/6/17 1/5/18  Mehran Reid DPM   fluconazole (DIFLUCAN) 150 MG Tab Take 1 tablet (150 mg total) by mouth once daily.  Patient taking differently: Take 150 mg by mouth daily as needed.  6/21/18   Garrett Webb MD   nystatin-triamcinolone (MYCOLOG II) cream Apply to affected area twice daily as needed 6/26/18   Garrett Webb MD     Anticoagulants/Antiplatelets: no anticoagulation    Allergies:   Review of patient's allergies indicates:   Allergen Reactions    No known drug allergies      Sedation History:  no adverse reactions    Review of Systems:   Hematological: no known coagulopathies  Respiratory: no shortness of breath  Cardiovascular: no chest pain  Gastrointestinal: no abdominal pain  Genito-Urinary: no dysuria  Musculoskeletal: negative  Neurological: no TIA or stroke symptoms         OBJECTIVE:     Vital Signs (Most Recent)  Temp: 98.1 °F (36.7 °C)  (07/24/18 1130)  Resp: 16 (07/24/18 1130)    Physical Exam:  ASA: 2  Mallampati: 2    General: no acute distress  Mental Status: alert and oriented to person, place and time  HEENT: normocephalic, atraumatic  Chest: unlabored breathing  Heart: regular heart rate  Abdomen: nondistended  Extremity: moves all extremities    Laboratory  Lab Results   Component Value Date    INR 1.2 07/24/2018       Lab Results   Component Value Date    WBC 4.25 07/11/2018    HGB 14.2 07/11/2018    HCT 43.0 07/11/2018    MCV 93 07/11/2018    PLT 61 (L) 07/11/2018      Lab Results   Component Value Date     (H) 07/24/2018     07/24/2018    K 4.3 07/24/2018     (H) 07/24/2018    CO2 26 07/24/2018    BUN 15 07/24/2018    CREATININE 0.7 07/24/2018    CALCIUM 9.4 07/24/2018    MG 1.7 04/12/2017    ALT 49 (H) 07/24/2018    AST 51 (H) 07/24/2018    ALBUMIN 3.5 07/24/2018    BILITOT 1.1 (H) 07/24/2018    BILIDIR 0.8 (H) 04/12/2017       ASSESSMENT/PLAN:     Sedation Plan: per anesthesia    Patient will undergo right lung mass embolization.      Estela Oneal MD  Department of Radiology  Resident

## 2018-07-24 NOTE — H&P
"Radiology History & Physical      SUBJECTIVE:     Chief Complaint: Hemoptysis    History of Present Illness:  Yumiko Gonzalez is a 58 y.o. female who presents for Pulmonary artery angiogram with possible embolization.    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, postitive antismooth muscle antibody    Blood transfusion     Bronchitis     bronchospasm on occasion     Cancer 1987    carcinoma in situ    Cervical neck pain with evidence of disc disease 3/22/2012    can bend neck    Cirrhosis     non-alcoholic, compensated    Colon polyps 3/22/2012    Depression 3/22/2012    Hx panic attacks    Diverticular disease 3/22/2012    never diverticulitis    Fatty liver 3/22/2012    Fibrocystic breast     Fine tremor     hands,chronic    Hepatosplenomegaly     Hypertension 2013    Knee pain, bilateral     chronic, with hands,feet,fingers also painful    Liver disease     "partially sclerosed liver" per pt    Migraines past Hx    Pleural effusion     small, compensated, good bilateral breath sounds per Dr Suresh GUTIERRES (postoperative nausea and vomiting)     twice after childbirth    Postpartum depression     Splenomegaly     Thrombocytopenia      Past Surgical History:   Procedure Laterality Date    ADENOIDECTOMY      CERVICAL CONIZATION   W/ LASER       SECTION      x3    COLONOSCOPY N/A 2016    Procedure: COLONOSCOPY;  Surgeon: James Palomares MD;  Location: [facility];  Service: Endoscopy;  Laterality: N/A;    ENDOMETRIAL ABLATION      LIVER BIOPSY      PELVIC LAPAROSCOPY      TONSILLECTOMY      TUBAL LIGATION         Home Meds:   Prior to Admission medications    Medication Sig Start Date End Date Taking? Authorizing Provider   alprazolam (XANAX) 0.25 MG tablet TAKE ONE TABLET BY MOUTH NIGHTLY AS NEEDED FOR " INSOMNIA 11/10/16  Yes Garrett Webb MD   buPROPion (WELLBUTRIN XL) 150 MG TB24 tablet Take 2 tablets (300 mg total) by mouth once daily. 9/26/17  Yes Garrett Webb MD   hydroCHLOROthiazide (HYDRODIURIL) 12.5 MG Tab Take 1 tablet (12.5 mg total) by mouth once daily. 5/2/18 5/2/19 Yes Garrett Webb MD   metoprolol tartrate (LOPRESSOR) 25 MG tablet Take 1 tablet (25 mg total) by mouth 2 (two) times daily.  Patient taking differently: Take 50 mg by mouth once daily.  9/26/17  Yes Garrett Webb MD   multivitamin (THERAGRAN) per tablet Take 1 tablet by mouth once daily.   Yes Historical Provider, MD   vitamin E 400 UNIT capsule Take 400 Units by mouth once daily.   Yes Historical Provider, MD   albuterol (PROVENTIL) 2.5 mg /3 mL (0.083 %) nebulizer solution Take 3 mLs (2.5 mg total) by nebulization every 6 (six) hours as needed for Wheezing. 11/29/13 1/5/18  Garrett Webb MD   diclofenac sodium (VOLTAREN) 1 % Gel Apply 2 g topically 3 (three) times daily. 10/6/17 1/5/18  Mehran Reid DPM   fluconazole (DIFLUCAN) 150 MG Tab Take 1 tablet (150 mg total) by mouth once daily.  Patient taking differently: Take 150 mg by mouth daily as needed.  6/21/18   Garrett Wbeb MD   nystatin-triamcinolone (MYCOLOG II) cream Apply to affected area twice daily as needed 6/26/18   Garrett Webb MD     Anticoagulants/Antiplatelets: no anticoagulation    Allergies:   Review of patient's allergies indicates:   Allergen Reactions    No known drug allergies      Sedation History:  no adverse reactions    Review of Systems:   Hematological: no known coagulopathies  Respiratory: no shortness of breath  Cardiovascular: no chest pain  Gastrointestinal: no abdominal pain  Genito-Urinary: no dysuria  Musculoskeletal: negative  Neurological: no TIA or stroke symptoms         OBJECTIVE:     Vital Signs (Most Recent)  Temp: 98.1 °F (36.7 °C) (07/24/18 1130)  Resp: 16 (07/24/18  "6279)    Physical Exam:  ASA: 2  Mallampati: per anesthesia    General: no acute distress  Mental Status: alert and oriented to person, place and time  HEENT: normocephalic, atraumatic  Chest: unlabored breathing  Heart: regular heart rate  Abdomen: nondistended  Extremity: moves all extremities    Laboratory  Lab Results   Component Value Date    INR 1.2 07/24/2018       Lab Results   Component Value Date    WBC 4.25 07/11/2018    HGB 14.2 07/11/2018    HCT 43.0 07/11/2018    MCV 93 07/11/2018    PLT 61 (L) 07/11/2018      Lab Results   Component Value Date     (H) 07/24/2018     07/24/2018    K 4.3 07/24/2018     (H) 07/24/2018    CO2 26 07/24/2018    BUN 15 07/24/2018    CREATININE 0.7 07/24/2018    CALCIUM 9.4 07/24/2018    MG 1.7 04/12/2017    ALT 49 (H) 07/24/2018    AST 51 (H) 07/24/2018    ALBUMIN 3.5 07/24/2018    BILITOT 1.1 (H) 07/24/2018    BILIDIR 0.8 (H) 04/12/2017       ASSESSMENT/PLAN:     Sedation Plan: per anesthesia  Patient will undergo pulmonary artery angiogram with possible embolization.    Raghavendra Arroyo MD ([facility])  Radiology PGY-5  [phone]      "

## 2018-07-24 NOTE — DISCHARGE SUMMARY
"Radiology Discharge Summary      Hospital Course: No complications    Admit Date: 7/24/2018  Discharge Date: 07/24/2018     Instructions Given to Patient: Yes  Diet: Resume prior diet  Activity: activity as tolerated    Description of Condition on Discharge: Stable  Vital Signs (Most Recent): Temp: 98.2 °F (36.8 °C) (07/24/18 1500)  Pulse: 78 (07/24/18 1500)  Resp: 17 (07/24/18 1500)  BP: 138/60 (07/24/18 1600)  SpO2: 99 % (07/24/18 1500)    Discharge Disposition: Home    Discharge Diagnosis: Pulmonary mass     Follow-up: with pulmonology    Raghavendra Arroyo MD ([facility])  Radiology PGY-5  [phone]      "

## 2018-07-25 ENCOUNTER — OFFICE VISIT (OUTPATIENT)
Dept: FAMILY MEDICINE | Facility: CLINIC | Age: 58
End: 2018-07-25
Payer: COMMERCIAL

## 2018-07-25 VITALS
HEART RATE: 75 BPM | OXYGEN SATURATION: 97 % | BODY MASS INDEX: 38.7 KG/M2 | WEIGHT: 270.31 LBS | HEIGHT: 70 IN | TEMPERATURE: 98 F

## 2018-07-25 DIAGNOSIS — R09.81 NASAL CONGESTION: Primary | ICD-10-CM

## 2018-07-25 DIAGNOSIS — R04.2 HEMOPTYSIS: ICD-10-CM

## 2018-07-25 DIAGNOSIS — R91.8 MASS OF RIGHT LUNG: ICD-10-CM

## 2018-07-25 PROCEDURE — 99999 PR PBB SHADOW E&M-EST. PATIENT-LVL III: CPT | Mod: PBBFAC,,, | Performed by: FAMILY MEDICINE

## 2018-07-25 PROCEDURE — 99214 OFFICE O/P EST MOD 30 MIN: CPT | Mod: S$GLB,,, | Performed by: FAMILY MEDICINE

## 2018-07-25 PROCEDURE — 3008F BODY MASS INDEX DOCD: CPT | Mod: CPTII,S$GLB,, | Performed by: FAMILY MEDICINE

## 2018-07-25 NOTE — PROGRESS NOTES
THIS DOCUMENT WAS MADE IN PART WITH VOICE RECOGNITION SOFTWARE.  OCCASIONALLY THIS SOFTWARE WILL MISINTERPRET WORDS OR PHRASES.      Yumiko WILHELM Carlos  1960    Subjective     Chief Complaint   Patient presents with    Advice Only       HPI     she's following up for some advice and counseling regarding recent medical findings. Primary concern is a persistent cough for the last several months. She has had hemoptysis. Other times she's had some mucus. But also at times has coughed up what may be necrotic tissue. She has a chronic right-sided lung lesion that has been present for at least 10 years. Although the appearance on CT scan has recently changed as there is now a cavitary component whereas before it appeared solid.     She has seen pulmonology. She has underwent cultures which were negative for acid fast bacteria. She has not had a biopsy. She underwent a procedure to attempt to symbolize the lesion but apparently there was no significant blood flow into the lesion so this was not successful.     her  is concerned there could be a sinus problem that is being overlooked,  Possibly posterior epistaxis leading to the hemoptysis. She does have chronic nasal congestion for years. She's not noticed any obvious epistaxis     Active Ambulatory Problems     Diagnosis Date Noted    Acute bronchospasm 10/13/2010    Hypermetropia 09/01/2010    Enthesopathy of ankle and tarsus, unspecified 10/26/2011    Cervical neck pain with evidence of disc disease 03/22/2012    Fatty liver 03/22/2012    History of cervical cancer 03/22/2012    Depression 03/22/2012    Diverticular disease 03/22/2012    Colon polyps 03/22/2012    Hypertension 04/24/2013    Hx of colonic polyps 12/12/2016    Morbid obesity due to excess calories 04/11/2017    Morbid obesity with BMI of 40.0-44.9, adult 04/11/2017    Splenomegaly 04/11/2017    Preop cardiovascular exam 04/18/2017    History of posttraumatic stress disorder (PTSD)  06/06/2017    Nonalcoholic steatohepatitis 06/20/2017    Cirrhosis of liver without ascites 06/20/2017    Cirrhosis 07/13/2017    Mass of right lung 07/24/2018     Resolved Ambulatory Problems     Diagnosis Date Noted    DUNG (obstructive sleep apnea) 03/22/2012    Postop check 05/24/2013     Past Medical History:   Diagnosis Date    Abnormal Pap smear     Anemia     Arthritis     Asthma     Autoimmune disease     Blood transfusion     Bronchitis     Cancer 1987    Cervical neck pain with evidence of disc disease 3/22/2012    Cirrhosis     Colon polyps 3/22/2012    Depression 3/22/2012    Diverticular disease 3/22/2012    Fatty liver 3/22/2012    Fibrocystic breast     Fine tremor     Hepatosplenomegaly     Hypertension 4/24/2013    Knee pain, bilateral     Liver disease     Migraines past Hx    Pleural effusion     PONV (postoperative nausea and vomiting)     Postpartum depression     Splenomegaly     Thrombocytopenia          Review of Systems As above, note she's had no fever or chills but does have a persistent cough mix productive as discussed above    Objective     Physical Exam   Constitutional: She is oriented to person, place, and time. She appears well-developed and well-nourished. No distress.   HENT:   Head: Normocephalic and atraumatic.   Right Ear: Tympanic membrane, external ear and ear canal normal.   Left Ear: Tympanic membrane, external ear and ear canal normal.   Mouth/Throat: No oropharyngeal exudate.   She does have nasal erythema more prominent on the left side. But no active bleeding. Her throat is clear I don't see any post nasal drainage, no your edema and no exudate   Eyes: No scleral icterus.   Cardiovascular: Normal rate, regular rhythm and normal heart sounds.    No murmur heard.  Pulmonary/Chest: Effort normal and breath sounds normal. No respiratory distress.   Neurological: She is alert and oriented to person, place, and time.   Skin: Skin is dry. No  "rash noted. She is not diaphoretic.   Psychiatric: She has a normal mood and affect. Her behavior is normal.   Vitals reviewed.    Vitals:    07/25/18 1508   Pulse: 75   Temp: 98.2 °F (36.8 °C)   TempSrc: Oral   SpO2: 97%   Weight: 122.6 kg (270 lb 4.5 oz)   Height: 5' 10" (1.778 m)     Results for orders placed or performed in visit on 07/24/18   Protime-INR   Result Value Ref Range    Prothrombin Time 12.2 9.0 - 12.5 sec    INR 1.2 0.8 - 1.2   Comprehensive metabolic panel   Result Value Ref Range    Sodium 142 136 - 145 mmol/L    Potassium 4.3 3.5 - 5.1 mmol/L    Chloride 112 (H) 95 - 110 mmol/L    CO2 26 23 - 29 mmol/L    Glucose 123 (H) 70 - 110 mg/dL    BUN, Bld 15 6 - 20 mg/dL    Creatinine 0.7 0.5 - 1.4 mg/dL    Calcium 9.4 8.7 - 10.5 mg/dL    Total Protein 6.2 6.0 - 8.4 g/dL    Albumin 3.5 3.5 - 5.2 g/dL    Total Bilirubin 1.1 (H) 0.1 - 1.0 mg/dL    Alkaline Phosphatase 113 55 - 135 U/L    AST 51 (H) 10 - 40 U/L    ALT 49 (H) 10 - 44 U/L    Anion Gap 4 (L) 8 - 16 mmol/L    eGFR if African American >60.0 >60 mL/min/1.73 m^2    eGFR if non African American >60.0 >60 mL/min/1.73 m^2         Yumiko was seen today for advice only.    Diagnoses and all orders for this visit:    Nasal congestion  -     Ambulatory referral to ENT    Hemoptysis    Mass of right lung       discussion, she does have some chronic nasal congestion but I don't think this is the source of her hemoptysis. But for peace of mind I think it's reasonable to consult with ENT for better examination as my nasal exam is limited with the equipment that I have.     It certainly sounds possible that her hemoptysis and even possibly coughing up necrotic material is coming from this right lung mass. There's no obvious sign of infection but this lesion could becoming necrotic accounting for much of her symptoms. So I continue to defer to pulmonology as far as to the best management and monitoring recommendations.     Today's visit was spent almost " entirely counseling     I have advised her to monitor for signs of infection and if anything changes or worsens to let me know right away for reevaluation.

## 2018-07-25 NOTE — ANESTHESIA POSTPROCEDURE EVALUATION
"Anesthesia Post Evaluation    Patient: Yumiko Gonzalez    Procedure(s) Performed: Procedure(s) (LRB):  EMBOLIZATION, BLOOD VESSEL (N/A)    Final Anesthesia Type: MAC  Patient location during evaluation: [facility]  Patient participation: Yes- Able to Participate  Level of consciousness: awake and alert  Post-procedure vital signs: reviewed and stable  Pain management: adequate  Airway patency: patent  PONV status at discharge: No PONV  Anesthetic complications: no      Cardiovascular status: blood pressure returned to baseline  Respiratory status: unassisted, spontaneous ventilation and room air  Hydration status: euvolemic  Follow-up not needed.        Visit Vitals  /60   Pulse 78   Temp 36.8 °C (98.2 °F) (Temporal)   Resp 17   Ht 5' 10" (1.778 m)   Wt 120.2 kg (265 lb)   SpO2 99%   BMI 38.02 kg/m²       Pain/Niharika Score: Pain Assessment Performed: Yes (7/24/2018  3:00 PM)  Presence of Pain: denies (7/24/2018  3:00 PM)  Niharika Score: 10 (7/24/2018  3:00 PM)      "

## 2018-07-31 ENCOUNTER — PATIENT MESSAGE (OUTPATIENT)
Dept: FAMILY MEDICINE | Facility: CLINIC | Age: 58
End: 2018-07-31

## 2018-08-01 RX ORDER — HYDROCHLOROTHIAZIDE 12.5 MG/1
12.5 TABLET ORAL DAILY
Qty: 90 TABLET | Refills: 1 | Status: SHIPPED | OUTPATIENT
Start: 2018-08-01 | End: 2019-01-28

## 2018-08-09 ENCOUNTER — OFFICE VISIT (OUTPATIENT)
Dept: OTOLARYNGOLOGY | Facility: CLINIC | Age: 58
End: 2018-08-09
Payer: COMMERCIAL

## 2018-08-09 VITALS — BODY MASS INDEX: 38.73 KG/M2 | WEIGHT: 270.5 LBS | HEIGHT: 70 IN

## 2018-08-09 DIAGNOSIS — J30.1 ALLERGIC RHINITIS DUE TO POLLEN, UNSPECIFIED SEASONALITY: ICD-10-CM

## 2018-08-09 DIAGNOSIS — R04.2 HEMOPTYSIS: Primary | ICD-10-CM

## 2018-08-09 DIAGNOSIS — R91.8 MASS OF RIGHT LUNG: ICD-10-CM

## 2018-08-09 PROCEDURE — 99999 PR PBB SHADOW E&M-EST. PATIENT-LVL III: CPT | Mod: PBBFAC,,, | Performed by: OTOLARYNGOLOGY

## 2018-08-09 PROCEDURE — 31575 DIAGNOSTIC LARYNGOSCOPY: CPT | Mod: S$GLB,,, | Performed by: OTOLARYNGOLOGY

## 2018-08-09 PROCEDURE — 99244 OFF/OP CNSLTJ NEW/EST MOD 40: CPT | Mod: 25,S$GLB,, | Performed by: OTOLARYNGOLOGY

## 2018-08-09 RX ORDER — TRIAMCINOLONE ACETONIDE 55 UG/1
2 SPRAY, METERED NASAL DAILY
Qty: 17 G | Refills: 12 | Status: SHIPPED | OUTPATIENT
Start: 2018-08-09 | End: 2022-12-09

## 2018-08-09 NOTE — PROGRESS NOTES
Subjective:       Patient ID: Yumiko Gonzalez is a 58 y.o. female.    Chief Complaint: Nasal Congestion and Cough (bloody sputum)    Yumiko is here for nasal issues and chronic cough/hemoptysis. Hemoptysis has been going on since January - occurs a few times per day - bloody sputum. She does have occasional bright blood 1 tsp to tblsp - she feels it comes from deep chest. She has a known cavitary lesion which has been in the lungs for many years but there is a new aspect about it that caused a cavitation. She has seen Pulm and CT surg. No bronchoscopy. She has an attempted IR embolization of this area thinking this was the cause but unable to localize it. She had an EGD last year with Grade II varices but not addressed. Denies blood in stool.   No voice changes. + chronic cough and throat clearing. She has occasional reflux and heartburn.     Not many sinus infections. She has been taking Claritin with mild relief. Congestion is mainly when she lays down. + itchy and watery eyes. + thin PND that improves slightly with Claritin but not long enough. No allergy testing. No epistaxis.   TB has been negative.     Pertinent meds:  Previous surgery: no nasal or head/neck surgery    Social History     Tobacco Use   Smoking Status Former Smoker    Last attempt to quit: 2/3/2006    Years since quittin.5   Smokeless Tobacco Never Used     Social History     Substance and Sexual Activity   Alcohol Use Yes    Comment: rarely          Review of Systems   Constitutional: Negative for activity change and appetite change.   Eyes: Negative for discharge.   Respiratory: Negative for difficulty breathing and wheezing   Cardiovascular: Negative for chest pain.   Gastrointestinal: Negative for abdominal distention and abdominal pain.   Endocrine: Negative for cold intolerance and heat intolerance.   Genitourinary: Negative for dysuria.   Musculoskeletal: Negative for gait problem and joint swelling.   Skin: Negative for color change  and pallor.   Neurological: Negative for syncope and weakness.   Psychiatric/Behavioral: Negative for agitation and confusion.     Objective:        Constitutional:   She is oriented to person, place, and time. She appears well-developed and well-nourished. She appears alert. She is active. Normal speech.      Head:  Normocephalic and atraumatic. Head is without TMJ tenderness. No scars. Salivary glands normal.  Facial strength is normal.      Ears:    Right Ear: No drainage or swelling. No middle ear effusion.   Left Ear: No drainage or swelling.  No middle ear effusion.     Nose:  Septal deviation (severe right) present. No mucosal edema, rhinorrhea or sinus tenderness. No turbinate hypertrophy (mild).      Mouth/Throat  Oropharynx clear and moist without lesions or asymmetry, normal uvula midline and mirror exam normal. Normal dentition. No uvula swelling, lacerations or trismus. No oropharyngeal exudate. Tonsillar erythema, tonsillar exudate.      Neck:  Full range of motion with neck supple and no adenopathy. Thyroid tenderness is present. No tracheal deviation, no edema, no erythema, normal range of motion, no stridor, no crepitus and no neck rigidity present. No thyroid mass present.     Cardiovascular:   Intact distal pulses and normal pulses.      Pulmonary/Chest:   Effort normal and breath sounds normal. No stridor.     Psychiatric:   Her speech is normal and behavior is normal. Her mood appears not anxious. Her affect is not labile.     Neurological:   She is alert and oriented to person, place, and time. No sensory deficit.     Skin:   No abrasions, lacerations, lesions, or rashes. No abrasion and no bruising noted.         Tests / Results:  Hgb 14  Plt 61  PT 1.2  Iron studies nml  TB negative    Pre-procedure diagnosis: The primary encounter diagnosis was Hemoptysis. Diagnoses of Mass of right lung and Allergic rhinitis due to pollen, unspecified seasonality were also pertinent to this visit.      Post-procedure diagnosis: same    Procedure: Flexible fiberoptic laryngoscopy    Surgeon: Jb Nevarez MD    Anesthesia: 2% Lidocaine with 4% Oxymetazoline    Risks, benefits, and alternatives of the procedure were discussed with the patient, and the patient consented to the fiberoptic examination.  We applied a topical nasal decongestant and analgesic.  After adequate anesthesia was obtained, the flexible fiberoptic scope was passed through the nasal cavities. The entire pharynx (nasopharynx to hypopharynx) and the larynx were visualized. At the end of the examination, the scope was removed. The patient tolerated the procedure well with no complications.     Findings:  -     Laryngeal mucosa is normal  -     Post-cricoid region: moderate PCE  -     Lingual tonsils have moderate hypertrophy   -     Adenoids have no  Hypertrophy, but granularity/inflammation as below  -     Right vocal fold: normal mobility     mass/lesion: none  -     Left vocal fold: normal mobility     mass/lesion: none  -     Other findings: scant blood streaking on laryngeal mucosa, but cleared after swallow - no source seen   -     Nasal cavities: right sev NSD, mild ITH, no purulence, polyps masses bilateral SE or MM. Mild granularity to adenoid tissue without hypertrophy. No friability or bleeding with manipulation.       Assessment:       1. Hemoptysis    2. Mass of right lung    3. Allergic rhinitis due to pollen, unspecified seasonality          Plan:         She had scant blood on the surface of her supraglottic structures on initial pass of scope. This cleared with swallow. I did not see a source and the blood did not re-accumulate. She does have mild granularity of her adenoid bed but it did not bleed when I manipulated it so I do not think this is the cause for now. Regarding bleeding, I would consider repeat EGD (I do not see a FOBT?) given her history of varices or bronchoscopy given her history of lung lesion of undetermined  significance.     Regarding nasal issues:  Claritin in am, Zyrtec at night  Nasal steroid (trial Nasacort given her intolerance for Flonase)  Consider astelin but she doesn't like taste usually  Consider PPI for LPR if cough not improved  Consider CT Scan if persistent sinus issues

## 2018-08-09 NOTE — LETTER
August 13, 2018      Garrett Webb MD  [address] [facility] [address] [place] [address]           [facility]  [address] [facility] [address] [place] [address]  Phone: [phone]  Fax: [phone]          Patient: Yumiko Gonzalez   MR Number: [id number]   YOB: 1960   Date of Visit: 8/9/2018       Dear Dr. Garrett Webb:    Thank you for referring Yumiko Gonzalez to me for evaluation. Attached you will find relevant portions of my assessment and plan of care.    If you have questions, please do not hesitate to call me. I look forward to following Yumiko Gonzalez along with you.    Sincerely,    Jb Nevarez MD    Enclosure  CC:  No Recipients    If you would like to receive this communication electronically, please contact [email or web address] or [phone] to request more information on [facility] Link access.    For providers and/or their staff who would like to refer a patient to [facility], please contact us through our one-stop-shop provider referral line, [facility], at [phone].    If you feel you have received this communication in error or would no longer like to receive these types of communications, please e-mail [email or web address]

## 2018-09-05 ENCOUNTER — LAB VISIT (OUTPATIENT)
Dept: LAB | Facility: HOSPITAL | Age: 58
End: 2018-09-05
Attending: INTERNAL MEDICINE
Payer: COMMERCIAL

## 2018-09-05 DIAGNOSIS — R05.9 COUGH: Primary | ICD-10-CM

## 2018-09-05 PROCEDURE — 87205 SMEAR GRAM STAIN: CPT

## 2018-09-05 PROCEDURE — 87070 CULTURE OTHR SPECIMN AEROBIC: CPT

## 2018-09-05 PROCEDURE — 87102 FUNGUS ISOLATION CULTURE: CPT

## 2018-09-05 PROCEDURE — 87185 SC STD ENZYME DETCJ PER NZM: CPT

## 2018-09-05 PROCEDURE — 87106 FUNGI IDENTIFICATION YEAST: CPT

## 2018-09-05 PROCEDURE — 87206 SMEAR FLUORESCENT/ACID STAI: CPT

## 2018-09-05 PROCEDURE — 87077 CULTURE AEROBIC IDENTIFY: CPT

## 2018-09-05 PROCEDURE — 87116 MYCOBACTERIA CULTURE: CPT

## 2018-09-05 PROCEDURE — 87015 SPECIMEN INFECT AGNT CONCNTJ: CPT

## 2018-09-08 LAB
BACTERIA SPEC AEROBE CULT: NORMAL
GRAM STN SPEC: NORMAL

## 2018-10-02 ENCOUNTER — HOSPITAL ENCOUNTER (OUTPATIENT)
Dept: RADIOLOGY | Facility: HOSPITAL | Age: 58
Discharge: HOME | End: 2018-10-02
Attending: OBSTETRICS & GYNECOLOGY
Payer: COMMERCIAL

## 2018-10-02 ENCOUNTER — OFFICE VISIT (OUTPATIENT)
Dept: OBSTETRICS AND GYNECOLOGY | Facility: CLINIC | Age: 58
End: 2018-10-02
Payer: COMMERCIAL

## 2018-10-02 VITALS — HEIGHT: 70 IN | WEIGHT: 276 LBS | BODY MASS INDEX: 39.51 KG/M2

## 2018-10-02 VITALS
RESPIRATION RATE: 18 BRPM | HEIGHT: 70 IN | SYSTOLIC BLOOD PRESSURE: 122 MMHG | DIASTOLIC BLOOD PRESSURE: 82 MMHG | BODY MASS INDEX: 39.64 KG/M2 | WEIGHT: 276.88 LBS

## 2018-10-02 DIAGNOSIS — Z12.39 SCREENING FOR MALIGNANT NEOPLASM OF BREAST: ICD-10-CM

## 2018-10-02 DIAGNOSIS — Z01.419 WELL WOMAN EXAM WITH ROUTINE GYNECOLOGICAL EXAM: Primary | ICD-10-CM

## 2018-10-02 PROCEDURE — 77067 SCR MAMMO BI INCL CAD: CPT | Mod: 26,,, | Performed by: RADIOLOGY

## 2018-10-02 PROCEDURE — 77063 BREAST TOMOSYNTHESIS BI: CPT | Mod: 26,,, | Performed by: RADIOLOGY

## 2018-10-02 PROCEDURE — 77063 BREAST TOMOSYNTHESIS BI: CPT | Mod: TC,PN

## 2018-10-02 PROCEDURE — 3074F SYST BP LT 130 MM HG: CPT | Mod: CPTII,S$GLB,, | Performed by: OBSTETRICS & GYNECOLOGY

## 2018-10-02 PROCEDURE — 87624 HPV HI-RISK TYP POOLED RSLT: CPT

## 2018-10-02 PROCEDURE — 99999 PR PBB SHADOW E&M-EST. PATIENT-LVL V: CPT | Mod: PBBFAC,,, | Performed by: OBSTETRICS & GYNECOLOGY

## 2018-10-02 PROCEDURE — 3079F DIAST BP 80-89 MM HG: CPT | Mod: CPTII,S$GLB,, | Performed by: OBSTETRICS & GYNECOLOGY

## 2018-10-02 PROCEDURE — 99396 PREV VISIT EST AGE 40-64: CPT | Mod: S$GLB,,, | Performed by: OBSTETRICS & GYNECOLOGY

## 2018-10-02 PROCEDURE — 88175 CYTOPATH C/V AUTO FLUID REDO: CPT

## 2018-10-02 NOTE — PROGRESS NOTES
"Chief Complaint   Patient presents with    Well Woman       History and Physical:  No LMP recorded. Patient is postmenopausal.       Yumiko Gonzalez is a 58 y.o.  female who presents today for her routine annual GYN exam. The patient has no Gynecology complaints today. No bowel or bladder complaints., no Vaginal Bleeding or breast masses.       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, postitive antismooth muscle antibody    Blood transfusion     Bronchitis     bronchospasm on occasion     Cancer     carcinoma in situ    Cervical neck pain with evidence of disc disease 3/22/2012    can bend neck    Cirrhosis     non-alcoholic, compensated    Colon polyps 3/22/2012    Depression 3/22/2012    Hx panic attacks    Diverticular disease 3/22/2012    never diverticulitis    Fatty liver 3/22/2012    Fibrocystic breast     Fine tremor     hands,chronic    Hepatosplenomegaly     Hypertension 2013    Knee pain, bilateral     chronic, with hands,feet,fingers also painful    Liver disease     "partially sclerosed liver" per pt    Migraines past Hx    Pleural effusion     small, compensated, good bilateral breath sounds per Dr Suresh GUTIERRES (postoperative nausea and vomiting)     twice after childbirth    Postpartum depression     Splenomegaly     Thrombocytopenia        Past Surgical History:   Procedure Laterality Date    ADENOIDECTOMY      CERVICAL CONIZATION   W/ LASER       SECTION      x3    COLONOSCOPY N/A 2016    Procedure: COLONOSCOPY;  Surgeon: James Palomares MD;  Location: [facility];  Service: Endoscopy;  Laterality: N/A;    COLONOSCOPY N/A 2016    Performed by James Palomares, " MD at [facility] ENDO    EMBOLIZATION N/A 7/24/2018    Procedure: EMBOLIZATION, BLOOD VESSEL;  Surgeon: Joselin Surgeon;  Location: [facility];  Service: Radiology;  Laterality: N/A;    EMBOLIZATION, BLOOD VESSEL N/A 7/24/2018    Performed by Joselin Surgeon at [facility]    ENDOMETRIAL ABLATION      ESOPHAGOGASTRODUODENOSCOPY (EGD) N/A 7/13/2017    Performed by James Pal[facility]res MD at [facility] ENDO    EXCISION, CYST Left 5/13/2013    Performed by Matthieu Cervantes MD at [facility] OR    LIVER BIOPSY      PELVIC LAPAROSCOPY      TONSILLECTOMY      TUBAL LIGATION         MEDS:   Current Outpatient Medications on File Prior to Visit   Medication Sig Dispense Refill    alprazolam (XANAX) 0.25 MG tablet TAKE ONE TABLET BY MOUTH NIGHTLY AS NEEDED FOR INSOMNIA 30 tablet 2    buPROPion (WELLBUTRIN XL) 150 MG TB24 tablet Take 2 tablets (300 mg total) by mouth once daily. 180 tablet 3    fluconazole (DIFLUCAN) 150 MG Tab Take 1 tablet (150 mg total) by mouth once daily. (Patient taking differently: Take 150 mg by mouth daily as needed. ) 1 tablet 1    hydroCHLOROthiazide (HYDRODIURIL) 12.5 MG Tab Take 1 tablet (12.5 mg total) by mouth once daily. 90 tablet 1    lidocaine HCL 10 mg/ml, 1%, 10 mg/mL (1 %) injection Inhale 3ml via nebulizer four times daily as needed 350 mL 5    metoprolol tartrate (LOPRESSOR) 25 MG tablet Take 1 tablet (25 mg total) by mouth 2 (two) times daily. (Patient taking differently: Take 50 mg by mouth once daily. ) 180 tablet 3    multivitamin (THERAGRAN) per tablet Take 1 tablet by mouth once daily.      nystatin-triamcinolone (MYCOLOG II) cream Apply to affected area twice daily as needed 60 g 3    sodium chloride 7% 7 % nebulizer solution Use in nebulizer as directed 3 times a day for 30 days 240 mL 5    triamcinolone (NASACORT) 55 mcg nasal inhaler Use 2 sprays by Nasal route once daily. 17 g 12    vitamin E 400 UNIT capsule Take 400 Units by mouth once daily.      albuterol (PROVENTIL) 2.5 mg /3 mL  (0.083 %) nebulizer solution Take 3 mLs (2.5 mg total) by nebulization every 6 (six) hours as needed for Wheezing. 72 mL 2    diclofenac sodium (VOLTAREN) 1 % Gel Apply 2 g topically 3 (three) times daily. 1 Tube 3     Current Facility-Administered Medications on File Prior to Visit   Medication Dose Route Frequency Provider Last Rate Last Dose    EUFLEXXA 10 mg/mL(mw 2.4 -3.6 million) injection Syrg 20 mg  20 mg Intra-articular  Pop Schneider MD   20 mg at 17 1431       OB History      Para Term  AB Living    7 6 3   1      SAB TAB Ectopic Multiple Live Births    1                  Social History     Socioeconomic History    Marital status:      Spouse name: Not on file    Number of children: Not on file    Years of education: Not on file    Highest education level: Not on file   Social Needs    Financial resource strain: Not on file    Food insecurity - worry: Not on file    Food insecurity - inability: Not on file    Transportation needs - medical: Not on file    Transportation needs - non-medical: Not on file   Occupational History    Not on file   Tobacco Use    Smoking status: Former Smoker     Last attempt to quit: 2/3/2006     Years since quittin.6    Smokeless tobacco: Never Used   Substance and Sexual Activity    Alcohol use: Yes     Comment: rarely    Drug use: No    Sexual activity: Not Currently   Other Topics Concern    Not on file   Social History Narrative    Not on file       Family History   Problem Relation Age of Onset    Breast cancer Maternal Grandmother     Diabetes Father     Heart disease Father     Breast cancer Mother     Heart disease Mother     Hypertension Mother     Diabetes Brother     Diabetes Sister     Breast cancer Maternal Aunt     Breast cancer Other     Diabetes Sister     Emphysema Brother     Ovarian cancer Neg Hx          Past medical and surgical history reviewed.   I have reviewed the patient's  "medical history in detail and updated the computerized patient record.        Review of System:   General: no chills, fever, night sweats, weight gain or weight loss  Psychological: no depression or suicidal ideation  Breasts: no new or changing breast lumps, nipple discharge or masses.  Respiratory: no cough, shortness of breath, or wheezing  Cardiovascular: no chest pain or dyspnea on exertion  Gastrointestinal: no abdominal pain, change in bowel habits, or black or bloody stools  Genito-Urinary: no incontinence, urinary frequency/urgency or vulvar/vaginal symptoms, pelvic pain or abnormal vaginal bleeding.  Musculoskeletal: no gait disturbance or muscular weakness      Physical Exam:   /82   Resp 18   Ht 5' 10" (1.778 m)   Wt 125.6 kg (276 lb 14.4 oz)   BMI 39.73 kg/m²   Constitutional: She is oriented to person, place, and time. She appears well-developed and well-nourished. No distress.   HENT:   Head: Normocephalic and atraumatic.   Eyes: Conjunctivae and EOM are normal. No scleral icterus.   Neck: Normal range of motion. Neck supple. No tracheal deviation present.   Cardiovascular: Normal rate.    Pulmonary/Chest: Effort normal. No respiratory distress. She exhibits no tenderness.  Breasts: are symmetrical.   Right breast exhibits no inverted nipple, no mass, no nipple discharge, no skin change and no tenderness.   Left breast exhibits no inverted nipple, no mass, no nipple discharge, no skin change and no tenderness.  Abdominal: Soft. She exhibits no distension and no mass. There is no tenderness. There is no rebound and no guarding.   Genitourinary:    External rectal exam shows no thrombosed external hemorrhoids.    Pelvic exam was performed with patient supine.   No labial fusion.   There is no rash, lesion or injury on the right labia.   There is no rash, lesion or injury on the left labia.   No bleeding and no signs of injury around the vaginal introitus, urethra is without lesions and well " supported. The cervix is visualized with no discharge, lesions or friability.   No vaginal discharge found. Pale and atrophic   No significant Cystocele, Enterocele or rectocele, and uterus well supported.   Bimanual exam:   The urethra is normal to palpation and there are no palpable vaginal wall masses.   Uterus is not deviated, not enlarged, not fixed, normal shape and not tender.   Cervix exhibits no motion tenderness.    Right adnexum displays no mass and no tenderness.   Left adnexum displays no mass and no tenderness.  Musculoskeletal: Normal range of motion.   Lymphadenopathy: No inguinal adenopathy present.   Neurological: She is alert and oriented to person, place, and time. Coordination normal.   Skin: Skin is warm and dry. She is not diaphoretic.   Psychiatric: She has a normal mood and affect.      Assessment:   Normal annual GYN exam  1. Well woman exam with routine gynecological exam  Liquid-based pap smear, screening    HPV High Risk Genotypes, PCR   2. Screening for malignant neoplasm of breast  Mammo Digital Screening Bilat With CAD       Plan:   PAP  Mammogram  Follow up in 1 year.  Patient informed will be contacted with results within 2 weeks. Encouraged to please call back or email if she has not heard from us by then.

## 2018-10-08 RX ORDER — BUPROPION HYDROCHLORIDE 150 MG/1
300 TABLET ORAL DAILY
Qty: 180 TABLET | Refills: 3 | Status: SHIPPED | OUTPATIENT
Start: 2018-10-08 | End: 2019-10-28 | Stop reason: SDUPTHER

## 2018-10-09 ENCOUNTER — LAB VISIT (OUTPATIENT)
Dept: LAB | Facility: HOSPITAL | Age: 58
End: 2018-10-09
Attending: FAMILY MEDICINE
Payer: COMMERCIAL

## 2018-10-09 ENCOUNTER — OFFICE VISIT (OUTPATIENT)
Dept: FAMILY MEDICINE | Facility: CLINIC | Age: 58
End: 2018-10-09
Payer: COMMERCIAL

## 2018-10-09 VITALS
OXYGEN SATURATION: 98 % | HEIGHT: 70 IN | BODY MASS INDEX: 17.9 KG/M2 | RESPIRATION RATE: 17 BRPM | SYSTOLIC BLOOD PRESSURE: 132 MMHG | HEART RATE: 62 BPM | DIASTOLIC BLOOD PRESSURE: 82 MMHG | WEIGHT: 125.06 LBS

## 2018-10-09 DIAGNOSIS — R82.90 MALODOROUS URINE: ICD-10-CM

## 2018-10-09 DIAGNOSIS — Z00.00 PREVENTATIVE HEALTH CARE: ICD-10-CM

## 2018-10-09 DIAGNOSIS — K76.0 FATTY LIVER: ICD-10-CM

## 2018-10-09 DIAGNOSIS — Z00.00 WELLNESS EXAMINATION: Primary | ICD-10-CM

## 2018-10-09 DIAGNOSIS — Z00.00 WELLNESS EXAMINATION: ICD-10-CM

## 2018-10-09 DIAGNOSIS — K74.60 CIRRHOSIS OF LIVER WITHOUT ASCITES, UNSPECIFIED HEPATIC CIRRHOSIS TYPE: ICD-10-CM

## 2018-10-09 LAB
BILIRUB UR QL STRIP: NEGATIVE
CLARITY UR REFRACT.AUTO: ABNORMAL
COLOR UR AUTO: YELLOW
FUNGUS SPEC CULT: NORMAL
GLUCOSE UR QL STRIP: NEGATIVE
HGB UR QL STRIP: NEGATIVE
HPV HR 12 DNA CVX QL NAA+PROBE: NEGATIVE
HPV16 AG SPEC QL: NEGATIVE
HPV18 DNA SPEC QL NAA+PROBE: NEGATIVE
KETONES UR QL STRIP: NEGATIVE
LEUKOCYTE ESTERASE UR QL STRIP: NEGATIVE
MICROSCOPIC COMMENT: NORMAL
NITRITE UR QL STRIP: NEGATIVE
PH UR STRIP: 7 [PH] (ref 5–8)
PROT UR QL STRIP: NEGATIVE
SP GR UR STRIP: 1.01 (ref 1–1.03)
URN SPEC COLLECT METH UR: ABNORMAL
UROBILINOGEN UR STRIP-ACNC: 2 EU/DL

## 2018-10-09 PROCEDURE — 99396 PREV VISIT EST AGE 40-64: CPT | Mod: S$GLB,,, | Performed by: FAMILY MEDICINE

## 2018-10-09 PROCEDURE — 99999 PR PBB SHADOW E&M-EST. PATIENT-LVL III: CPT | Mod: PBBFAC,,, | Performed by: FAMILY MEDICINE

## 2018-10-09 PROCEDURE — 87086 URINE CULTURE/COLONY COUNT: CPT

## 2018-10-09 PROCEDURE — 3079F DIAST BP 80-89 MM HG: CPT | Mod: CPTII,S$GLB,, | Performed by: FAMILY MEDICINE

## 2018-10-09 PROCEDURE — 81001 URINALYSIS AUTO W/SCOPE: CPT

## 2018-10-09 PROCEDURE — 3075F SYST BP GE 130 - 139MM HG: CPT | Mod: CPTII,S$GLB,, | Performed by: FAMILY MEDICINE

## 2018-10-09 RX ORDER — LORATADINE 10 MG/1
10 TABLET ORAL DAILY PRN
Status: ON HOLD | COMMUNITY
End: 2023-12-18 | Stop reason: HOSPADM

## 2018-10-09 RX ORDER — CETIRIZINE HYDROCHLORIDE 10 MG/1
10 TABLET ORAL DAILY
Status: ON HOLD | COMMUNITY
End: 2023-01-26 | Stop reason: HOSPADM

## 2018-10-09 NOTE — PROGRESS NOTES
THIS DOCUMENT WAS MADE IN PART WITH VOICE RECOGNITION SOFTWARE.  OCCASIONALLY THIS SOFTWARE WILL MISINTERPRET WORDS OR PHRASES.      Yumiko WILHELM Carlos  1960    Yumiko was seen today for annual exam.    Diagnoses and all orders for this visit:    Wellness examination  -     Comprehensive metabolic panel; Future  -     TSH; Future  -     Lipid panel; Future  -     Hemoglobin A1c; Future  -     US Abdomen Complete; Future  -     AFP TUMOR MARKER; Future  -     Protime-INR; Future  -     APTT; Future  -     Urinalysis; Future  -     Urine culture; Future  -     Urinalysis Microscopic; Future    Preventative health care    Cirrhosis of liver without ascites, unspecified hepatic cirrhosis type  -     US Abdomen Complete; Future  -     AFP TUMOR MARKER; Future  -     Protime-INR; Future  -     APTT; Future  Hepatology recommended 6 month evaluations with ultrasound and labs.  So I have ordered the test but I have asked her to schedule a follow-up with them as well.    Fatty liver  As above    Malodorous urine  -     Urinalysis; Future  -     Urine culture; Future  -     Urinalysis Microscopic; Future    Regarding sputum cultures.  Positive Haemophilus influenzae.  Probably represents chronic colonization.  She is not showing signs of an acute bacterial infection therefore recommend that she monitor this and I defer to the ordering physician.  But if she develops worsening or new symptoms she should contact me and or her pulmonologist immediately.    Subjective     Chief Complaint   Patient presents with    Annual Exam       HPI    Reviewed sputum results, culture which suggests colonization with H. Inf.  She still does have a lingering cough but is not having any fever or obvious or copious purulent sputum        HPI elements addressed above in the assessment and plan including problems, diagnosis, stability/instability,  improving/worsening, and chronicity will not be duplicated in this section. Any important  additional HPI topics will be discussed here if needed.    Active Ambulatory Problems     Diagnosis Date Noted    Acute bronchospasm 10/13/2010    Hypermetropia 09/01/2010    Enthesopathy of ankle and tarsus, unspecified 10/26/2011    Cervical neck pain with evidence of disc disease 03/22/2012    Fatty liver 03/22/2012    History of cervical cancer 03/22/2012    Depression 03/22/2012    Diverticular disease 03/22/2012    Colon polyps 03/22/2012    Hypertension 04/24/2013    Hx of colonic polyps 12/12/2016    Morbid obesity due to excess calories 04/11/2017    Morbid obesity with BMI of 40.0-44.9, adult 04/11/2017    Splenomegaly 04/11/2017    Preop cardiovascular exam 04/18/2017    History of posttraumatic stress disorder (PTSD) 06/06/2017    Nonalcoholic steatohepatitis 06/20/2017    Cirrhosis of liver without ascites 06/20/2017    Cirrhosis 07/13/2017    Mass of right lung 07/24/2018     Resolved Ambulatory Problems     Diagnosis Date Noted    DUNG (obstructive sleep apnea) 03/22/2012    Postop check 05/24/2013     Past Medical History:   Diagnosis Date    Abnormal Pap smear     Anemia     Arthritis     Asthma     Autoimmune disease     Blood transfusion     Bronchitis     Cancer 1987    Cervical neck pain with evidence of disc disease 3/22/2012    Cirrhosis     Colon polyps 3/22/2012    Depression 3/22/2012    Diverticular disease 3/22/2012    Fatty liver 3/22/2012    Fibrocystic breast     Fine tremor     Hepatosplenomegaly     Hypertension 4/24/2013    Knee pain, bilateral     Liver disease     Migraines past Hx    Pleural effusion     PONV (postoperative nausea and vomiting)     Postpartum depression     Splenomegaly     Thrombocytopenia      Current Outpatient Medications on File Prior to Visit   Medication Sig Dispense Refill    alprazolam (XANAX) 0.25 MG tablet TAKE ONE TABLET BY MOUTH NIGHTLY AS NEEDED FOR INSOMNIA 30 tablet 2    buPROPion (WELLBUTRIN XL)  150 MG TB24 tablet Take 2 tablets (300 mg total) by mouth once daily. 180 tablet 3    cetirizine (ZYRTEC) 10 MG tablet Take 10 mg by mouth once daily.      hydroCHLOROthiazide (HYDRODIURIL) 12.5 MG Tab Take 1 tablet (12.5 mg total) by mouth once daily. 90 tablet 1    loratadine (CLARITIN) 10 mg tablet Take 10 mg by mouth once daily.      metoprolol tartrate (LOPRESSOR) 25 MG tablet Take 1 tablet (25 mg total) by mouth 2 (two) times daily. (Patient taking differently: Take 50 mg by mouth once daily. ) 180 tablet 3    multivitamin (THERAGRAN) per tablet Take 1 tablet by mouth once daily.      sodium chloride 7% 7 % nebulizer solution Use in nebulizer as directed 3 times a day for 30 days 240 mL 5    triamcinolone (NASACORT) 55 mcg nasal inhaler Use 2 sprays by Nasal route once daily. 17 g 12    vitamin E 400 UNIT capsule Take 400 Units by mouth once daily.      albuterol (PROVENTIL) 2.5 mg /3 mL (0.083 %) nebulizer solution Take 3 mLs (2.5 mg total) by nebulization every 6 (six) hours as needed for Wheezing. 72 mL 2    diclofenac sodium (VOLTAREN) 1 % Gel Apply 2 g topically 3 (three) times daily. 1 Tube 3    fluconazole (DIFLUCAN) 150 MG Tab Take 1 tablet (150 mg total) by mouth once daily. (Patient taking differently: Take 150 mg by mouth daily as needed. ) 1 tablet 1    lidocaine HCL 10 mg/ml, 1%, 10 mg/mL (1 %) injection Inhale 3ml via nebulizer four times daily as needed 350 mL 5    nystatin-triamcinolone (MYCOLOG II) cream Apply to affected area twice daily as needed 60 g 3     Current Facility-Administered Medications on File Prior to Visit   Medication Dose Route Frequency Provider Last Rate Last Dose    EUFLEXXA 10 mg/mL(mw 2.4 -3.6 million) injection Syrg 20 mg  20 mg Intra-articular  Pop Schneider MD   20 mg at 12/27/17 1431     Review of patient's allergies indicates:   Allergen Reactions    No known drug allergies      Social History     Tobacco Use    Smoking status: Former  Smoker     Last attempt to quit: 2/3/2006     Years since quittin.6    Smokeless tobacco: Never Used   Substance Use Topics    Alcohol use: Yes     Comment: rarely    Drug use: No     Family History   Problem Relation Age of Onset    Breast cancer Maternal Grandmother     Diabetes Father     Heart disease Father     Breast cancer Mother     Heart disease Mother     Hypertension Mother     Diabetes Brother     Diabetes Sister     Breast cancer Maternal Aunt     Breast cancer Other     Diabetes Sister     Emphysema Brother     Ovarian cancer Neg Hx        Review of Systems   HENT: Negative.    Eyes: Negative.    Respiratory: Positive for cough.    Cardiovascular: Positive for leg swelling. Negative for chest pain.   Gastrointestinal: Negative.    Genitourinary: Negative for dysuria, frequency and hematuria.   Musculoskeletal: Positive for arthralgias.   Skin: Negative.        Objective     Physical Exam   Constitutional: She is oriented to person, place, and time. She appears well-developed and well-nourished.  Non-toxic appearance. No distress.   HENT:   Head: Normocephalic and atraumatic.   Right Ear: Tympanic membrane, external ear and ear canal normal.   Left Ear: Tympanic membrane, external ear and ear canal normal.   Nose: Nose normal.   Mouth/Throat: Oropharynx is clear and moist. No oropharyngeal exudate.   Eyes: Conjunctivae and EOM are normal. Pupils are equal, round, and reactive to light. Right eye exhibits no discharge. Left eye exhibits no discharge. No scleral icterus.   Neck: Normal range of motion. Neck supple. No JVD present. No tracheal deviation present. No thyromegaly present.   Cardiovascular: Normal rate, regular rhythm, normal heart sounds and intact distal pulses. PMI is not displaced. Exam reveals no gallop and no friction rub.   No murmur heard.  Pulmonary/Chest: Effort normal and breath sounds normal. No respiratory distress. She has no wheezes. She has no rales.  "  Abdominal: Soft. Bowel sounds are normal. She exhibits no distension and no mass. There is no tenderness. There is no rebound and no guarding.   Lymphadenopathy:     She has no cervical adenopathy.   Neurological: She is alert and oriented to person, place, and time. She displays normal reflexes. No cranial nerve deficit. She exhibits normal muscle tone.   Skin: No rash noted. She is not diaphoretic.   Psychiatric: She has a normal mood and affect. Her behavior is normal.   Vitals reviewed.    Vitals:    10/09/18 0843   BP: 132/82   Pulse: 62   Resp: 17   SpO2: 98%   Weight: 56.7 kg (125 lb 0.8 oz)   Height: 5' 10" (1.778 m)       MOST RECENT LABS IN OUR ELECTRONIC MEDICAL RECORD:     Results for orders placed or performed in visit on 09/05/18   Culture, Respiratory with Gram Stain   Result Value Ref Range    Respiratory Culture       HAEMOPHILUS INFLUENZAE  Moderate  Beta Lactamase positive  Normal respiratory black also present      Gram Stain (Respiratory) <10 epithelial cells per low power field.     Gram Stain (Respiratory) Rare WBC's     Gram Stain (Respiratory) Rare Gram negative rods    CULTURE, FUNGUS   Result Value Ref Range    Fungus (Mycology) Culture PAMELA ALBICANS  Rare      AFB CULTURE & SMEAR   Result Value Ref Range    AFB Culture & Smear Culture in progress     AFB CULTURE STAIN No acid fast bacilli seen.          "

## 2018-10-10 ENCOUNTER — LAB VISIT (OUTPATIENT)
Dept: LAB | Facility: HOSPITAL | Age: 58
End: 2018-10-10
Attending: FAMILY MEDICINE
Payer: COMMERCIAL

## 2018-10-10 DIAGNOSIS — Z00.00 WELLNESS EXAMINATION: ICD-10-CM

## 2018-10-10 DIAGNOSIS — K74.60 CIRRHOSIS OF LIVER WITHOUT ASCITES, UNSPECIFIED HEPATIC CIRRHOSIS TYPE: ICD-10-CM

## 2018-10-10 LAB
AFP SERPL-MCNC: 3.2 NG/ML
ALBUMIN SERPL BCP-MCNC: 3.5 G/DL
ALP SERPL-CCNC: 93 U/L
ALT SERPL W/O P-5'-P-CCNC: 37 U/L
ANION GAP SERPL CALC-SCNC: 8 MMOL/L
APTT BLDCRRT: 31.4 SEC
AST SERPL-CCNC: 45 U/L
BILIRUB SERPL-MCNC: 1.3 MG/DL
BUN SERPL-MCNC: 13 MG/DL
CALCIUM SERPL-MCNC: 9.4 MG/DL
CHLORIDE SERPL-SCNC: 109 MMOL/L
CHOLEST SERPL-MCNC: 135 MG/DL
CHOLEST/HDLC SERPL: 2.1 {RATIO}
CO2 SERPL-SCNC: 25 MMOL/L
CREAT SERPL-MCNC: 0.7 MG/DL
EST. GFR  (AFRICAN AMERICAN): >60 ML/MIN/1.73 M^2
EST. GFR  (NON AFRICAN AMERICAN): >60 ML/MIN/1.73 M^2
ESTIMATED AVG GLUCOSE: 91 MG/DL
GLUCOSE SERPL-MCNC: 109 MG/DL
HBA1C MFR BLD HPLC: 4.8 %
HDLC SERPL-MCNC: 63 MG/DL
HDLC SERPL: 46.7 %
INR PPP: 1.2
LDLC SERPL CALC-MCNC: 63.8 MG/DL
NONHDLC SERPL-MCNC: 72 MG/DL
POTASSIUM SERPL-SCNC: 4 MMOL/L
PROT SERPL-MCNC: 6.3 G/DL
PROTHROMBIN TIME: 12.2 SEC
SODIUM SERPL-SCNC: 142 MMOL/L
TRIGL SERPL-MCNC: 41 MG/DL
TSH SERPL DL<=0.005 MIU/L-ACNC: 1.29 UIU/ML

## 2018-10-10 PROCEDURE — 83036 HEMOGLOBIN GLYCOSYLATED A1C: CPT

## 2018-10-10 PROCEDURE — 36415 COLL VENOUS BLD VENIPUNCTURE: CPT | Mod: PO

## 2018-10-10 PROCEDURE — 82105 ALPHA-FETOPROTEIN SERUM: CPT

## 2018-10-10 PROCEDURE — 80053 COMPREHEN METABOLIC PANEL: CPT

## 2018-10-10 PROCEDURE — 80061 LIPID PANEL: CPT

## 2018-10-10 PROCEDURE — 85610 PROTHROMBIN TIME: CPT | Mod: PO

## 2018-10-10 PROCEDURE — 84443 ASSAY THYROID STIM HORMONE: CPT

## 2018-10-10 PROCEDURE — 85730 THROMBOPLASTIN TIME PARTIAL: CPT | Mod: PO

## 2018-10-11 LAB — BACTERIA UR CULT: NORMAL

## 2018-10-18 ENCOUNTER — HOSPITAL ENCOUNTER (OUTPATIENT)
Dept: RADIOLOGY | Facility: HOSPITAL | Age: 58
Discharge: HOME | End: 2018-10-18
Attending: FAMILY MEDICINE
Payer: COMMERCIAL

## 2018-10-18 DIAGNOSIS — K74.60 CIRRHOSIS OF LIVER WITHOUT ASCITES, UNSPECIFIED HEPATIC CIRRHOSIS TYPE: ICD-10-CM

## 2018-10-18 DIAGNOSIS — Z00.00 WELLNESS EXAMINATION: ICD-10-CM

## 2018-10-18 PROCEDURE — 76700 US EXAM ABDOM COMPLETE: CPT | Mod: 26,,, | Performed by: RADIOLOGY

## 2018-10-18 PROCEDURE — 76700 US EXAM ABDOM COMPLETE: CPT | Mod: TC,PO

## 2018-10-30 RX ORDER — METOPROLOL TARTRATE 25 MG/1
25 TABLET, FILM COATED ORAL 2 TIMES DAILY
Qty: 180 TABLET | Refills: 3 | Status: SHIPPED | OUTPATIENT
Start: 2018-10-30 | End: 2019-10-14

## 2018-10-30 RX ORDER — METOPROLOL TARTRATE 25 MG/1
25 TABLET, FILM COATED ORAL 2 TIMES DAILY
Qty: 180 TABLET | Refills: 3 | Status: CANCELLED | OUTPATIENT
Start: 2018-10-30

## 2018-11-06 ENCOUNTER — HOSPITAL ENCOUNTER (OUTPATIENT)
Dept: RADIOLOGY | Facility: HOSPITAL | Age: 58
Discharge: HOME | End: 2018-11-06
Attending: INTERNAL MEDICINE
Payer: COMMERCIAL

## 2018-11-06 DIAGNOSIS — R93.89 ABNORMAL FINDINGS ON DIAGNOSTIC IMAGING OF BODY STRUCTURES: ICD-10-CM

## 2018-11-06 DIAGNOSIS — R91.8 LUNG MASS: ICD-10-CM

## 2018-11-06 PROCEDURE — 71250 CT THORAX DX C-: CPT | Mod: TC,PO

## 2018-11-06 PROCEDURE — 71250 CT THORAX DX C-: CPT | Mod: 26,,, | Performed by: RADIOLOGY

## 2018-11-08 LAB
ACID FAST MOD KINY STN SPEC: NORMAL
MYCOBACTERIUM SPEC QL CULT: NORMAL

## 2018-11-09 ENCOUNTER — OFFICE VISIT (OUTPATIENT)
Dept: OPTOMETRY | Facility: CLINIC | Age: 58
End: 2018-11-09
Payer: COMMERCIAL

## 2018-11-09 DIAGNOSIS — Z01.00 EXAMINATION OF EYES AND VISION: Primary | ICD-10-CM

## 2018-11-09 DIAGNOSIS — H52.4 HYPEROPIA WITH ASTIGMATISM AND PRESBYOPIA, BILATERAL: ICD-10-CM

## 2018-11-09 DIAGNOSIS — H25.13 NUCLEAR SCLEROSIS, BILATERAL: ICD-10-CM

## 2018-11-09 DIAGNOSIS — H52.203 HYPEROPIA WITH ASTIGMATISM AND PRESBYOPIA, BILATERAL: ICD-10-CM

## 2018-11-09 DIAGNOSIS — H52.03 HYPEROPIA WITH ASTIGMATISM AND PRESBYOPIA, BILATERAL: ICD-10-CM

## 2018-11-09 PROCEDURE — 99999 PR PBB SHADOW E&M-EST. PATIENT-LVL II: CPT | Mod: PBBFAC,,, | Performed by: OPTOMETRIST

## 2018-11-09 PROCEDURE — 92015 DETERMINE REFRACTIVE STATE: CPT | Mod: S$GLB,,, | Performed by: OPTOMETRIST

## 2018-11-09 PROCEDURE — 92014 COMPRE OPH EXAM EST PT 1/>: CPT | Mod: S$GLB,,, | Performed by: OPTOMETRIST

## 2018-11-09 NOTE — PROGRESS NOTES
HPI     DLS- 8-     Pt here for annual eye exam. Pt states she notices herself straining at   the computer more and more. Denies eye pain. Pt suffers from dry eyes,   occasionally she will have headaches behind the eye. Pt's computer glasses   rx is 3 years old. Her bifocals are about a year old. High BP is   controlled with meds.     Last edited by Matthieu Vaughn on 11/9/2018  3:13 PM. (History)        ROS     Positive for: Eyes    Negative for: Constitutional, Gastrointestinal, Neurological, Skin,   Genitourinary, Musculoskeletal, HENT, Endocrine, Cardiovascular,   Respiratory, Psychiatric, Allergic/Imm, Heme/Lymph    Last edited by CHARLES Basilio, OD on 11/9/2018  3:44 PM. (History)        Assessment /Plan     For exam results, see Encounter Report.    Examination of eyes and vision    Hyperopia with astigmatism and presbyopia, bilateral    Nuclear sclerosis, bilateral      1. Ocular health exam OU  2. Updated specs rx, gave copy, fill prn  3. Early changes, not vis sig, gave info    Discussed and educated patient on current findings /plan.  RTC 1 year, prn if any changes / issues

## 2018-11-19 ENCOUNTER — HOSPITAL ENCOUNTER (OUTPATIENT)
Dept: RADIOLOGY | Facility: HOSPITAL | Age: 58
Discharge: HOME | End: 2018-11-19
Attending: NURSE PRACTITIONER
Payer: COMMERCIAL

## 2018-11-19 ENCOUNTER — OFFICE VISIT (OUTPATIENT)
Dept: PRIMARY CARE CLINIC | Facility: CLINIC | Age: 58
End: 2018-11-19
Payer: COMMERCIAL

## 2018-11-19 VITALS
OXYGEN SATURATION: 95 % | SYSTOLIC BLOOD PRESSURE: 128 MMHG | WEIGHT: 281.5 LBS | HEART RATE: 76 BPM | TEMPERATURE: 99 F | BODY MASS INDEX: 40.3 KG/M2 | DIASTOLIC BLOOD PRESSURE: 80 MMHG | HEIGHT: 70 IN

## 2018-11-19 DIAGNOSIS — R68.89 FLU-LIKE SYMPTOMS: Primary | ICD-10-CM

## 2018-11-19 DIAGNOSIS — R68.89 FLU-LIKE SYMPTOMS: ICD-10-CM

## 2018-11-19 DIAGNOSIS — R05.9 COUGH: ICD-10-CM

## 2018-11-19 LAB
FLUAV AG SPEC QL IA: NEGATIVE
FLUBV AG SPEC QL IA: NEGATIVE
SPECIMEN SOURCE: NORMAL

## 2018-11-19 PROCEDURE — 99999 PR PBB SHADOW E&M-EST. PATIENT-LVL V: CPT | Mod: PBBFAC,,, | Performed by: NURSE PRACTITIONER

## 2018-11-19 PROCEDURE — 3074F SYST BP LT 130 MM HG: CPT | Mod: CPTII,S$GLB,, | Performed by: NURSE PRACTITIONER

## 2018-11-19 PROCEDURE — 3079F DIAST BP 80-89 MM HG: CPT | Mod: CPTII,S$GLB,, | Performed by: NURSE PRACTITIONER

## 2018-11-19 PROCEDURE — 87400 INFLUENZA A/B EACH AG IA: CPT | Mod: PO

## 2018-11-19 PROCEDURE — 99214 OFFICE O/P EST MOD 30 MIN: CPT | Mod: S$GLB,,, | Performed by: NURSE PRACTITIONER

## 2018-11-19 PROCEDURE — 71046 X-RAY EXAM CHEST 2 VIEWS: CPT | Mod: 26,,, | Performed by: RADIOLOGY

## 2018-11-19 PROCEDURE — 3008F BODY MASS INDEX DOCD: CPT | Mod: CPTII,S$GLB,, | Performed by: NURSE PRACTITIONER

## 2018-11-19 PROCEDURE — 71046 X-RAY EXAM CHEST 2 VIEWS: CPT | Mod: TC,FY,PO

## 2018-11-19 NOTE — PROGRESS NOTES
"Yumiko Gonzalez is a 58 y.o. female patient of Garrett Webb MD who presents to the clinic today for   Chief Complaint   Patient presents with    Sore Throat    Fever    Nasal Congestion    Headache   .    HPI    Pt, who is not known to me, reports a new problem to me: sudden onset of chills/shivers overnight.  At 5 a.m. She felt achey all over and had axillary temp of 101.8.  Also with runny nose, drip, "soft palate feels heavy", feels drip, brownish-red spitting up, ST, no earache, neck and back muscle pain, no change in coughing.   Another at the [facility] yesterday was sick with flu like illness.  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    These symptoms began hours ago and status is worsening.     Symptoms are + acute.    Pt denies the following symptoms: new onset of cough    Aggravating factors include nothing .    Relieving factors include nothing .    OTC Medications tried are nothing.    Prescription medications taken for symptoms are none.    Pertinent medical history:  H/o allergies.  Has a known lesion in her lung causing a cough.  Last imaging study showed that this is shrinking..    Smoking status:  never    ROS    Constitutional:   +  fever, + fatigue, decrease in appetite.    Head:   + just starting with headache  Ears:   no pain.  Eyes:  No sxs except some redness  Nose:   + sinus pain, + congestion, + runny nose, + post nasal drip.  Throat:  + ST pain.    Heart:  No palpitations, chest pain.    Lungs:  No difficulty breathing, + coughing, occ sputum production, occ wheezing.              Symptoms are community acquired.    GI/:  No sxs    MS:  No new bone, joint or muscle problems.  + Body aches.    Skin:  No rashes, itching.      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, postitive " "antismooth muscle antibody    Blood transfusion     Bronchitis     bronchospasm on occasion     Cancer 1987    carcinoma in situ    Cervical neck pain with evidence of disc disease 3/22/2012    can bend neck    Cirrhosis     non-alcoholic, compensated    Colon polyps 3/22/2012    Depression 3/22/2012    Hx panic attacks    Diverticular disease 3/22/2012    never diverticulitis    Fatty liver 3/22/2012    Fibrocystic breast     Fine tremor     hands,chronic    Hepatosplenomegaly     Hypertension 2013    Knee pain, bilateral     chronic, with hands,feet,fingers also painful    Liver disease     "partially sclerosed liver" per pt    Migraines past Hx    Pleural effusion     small, compensated, good bilateral breath sounds per Dr Suresh GUTIERRES (postoperative nausea and vomiting)     twice after childbirth    Postpartum depression     Splenomegaly     Thrombocytopenia        PAST SURGICAL HISTORY:  Past Surgical History:   Procedure Laterality Date    ADENOIDECTOMY      CERVICAL CONIZATION   W/ LASER       SECTION      x3    COLONOSCOPY N/A 2016    Procedure: COLONOSCOPY;  Surgeon: James Palomares MD;  Location[facility];  Service: Endoscopy;  Laterality: N/A;    COLONOSCOPY N/A 2016    Performed by James [facility]res MD at [facility] ENDO    EMBOLIZATION N/A 2018    Procedure: EMBOLIZATION, BLOOD VESSEL;  Surgeon: Joselin Surgeon;  Location: [facility];  Service: Radiology;  Laterality: N/A;    EMBOLIZATION, BLOOD VESSEL N/A 2018    Performed by [facility]elin Surgeon at [facility]    ENDOMETRIAL ABLATION      ESOPHAGOGASTRODUODENOSCOPY (EGD) N/A 2017    Performed by [facility] at [facility] ENDO    EXCISION, CYST Left 2013    Performed by Matthieu Cervantes MD at [facility] OR    LIVER BIOPSY      PELVIC LAPAROSCOPY      TONSILLECTOMY      TUBAL LIGATION         SOCIAL HISTORY:  Social History     Socioeconomic History    Marital status:      Spouse " name: Not on file    Number of children: Not on file    Years of education: Not on file    Highest education level: Not on file   Social Needs    Financial resource strain: Not on file    Food insecurity - worry: Not on file    Food insecurity - inability: Not on file    Transportation needs - medical: Not on file    Transportation needs - non-medical: Not on file   Occupational History    Not on file   Tobacco Use    Smoking status: Former Smoker     Last attempt to quit: 2/3/2006     Years since quittin.8    Smokeless tobacco: Never Used   Substance and Sexual Activity    Alcohol use: Yes     Comment: rarely    Drug use: No    Sexual activity: Not Currently   Other Topics Concern    Not on file   Social History Narrative    Not on file       FAMILY HISTORY:  Family History   Problem Relation Age of Onset    Breast cancer Maternal Grandmother     Diabetes Father     Heart disease Father     Breast cancer Mother     Heart disease Mother     Hypertension Mother     Diabetes Brother     Diabetes Sister     Breast cancer Maternal Aunt     Breast cancer Sister     Diabetes Sister     Emphysema Brother     Ovarian cancer Neg Hx        ALLERGIES AND MEDICATIONS: updated and reviewed.  Review of patient's allergies indicates:   Allergen Reactions    No known drug allergies      Current Outpatient Medications   Medication Sig Dispense Refill    alprazolam (XANAX) 0.25 MG tablet TAKE ONE TABLET BY MOUTH NIGHTLY AS NEEDED FOR INSOMNIA 30 tablet 2    buPROPion (WELLBUTRIN XL) 150 MG TB24 tablet Take 2 tablets (300 mg total) by mouth once daily. 180 tablet 3    cetirizine (ZYRTEC) 10 MG tablet Take 10 mg by mouth once daily.      hydroCHLOROthiazide (HYDRODIURIL) 12.5 MG Tab Take 1 tablet (12.5 mg total) by mouth once daily. 90 tablet 1    loratadine (CLARITIN) 10 mg tablet Take 10 mg by mouth once daily.      metoprolol tartrate (LOPRESSOR) 25 MG tablet Take 1 tablet (25 mg total) by  mouth 2 (two) times daily. 180 tablet 3    multivitamin (THERAGRAN) per tablet Take 1 tablet by mouth once daily.      sodium chloride 7% 7 % nebulizer solution Use in nebulizer as directed 3 times a day for 30 days 240 mL 5    triamcinolone (NASACORT) 55 mcg nasal inhaler Use 2 sprays by Nasal route once daily. 17 g 12    vitamin E 400 UNIT capsule Take 400 Units by mouth once daily.      albuterol (PROVENTIL) 2.5 mg /3 mL (0.083 %) nebulizer solution Take 3 mLs (2.5 mg total) by nebulization every 6 (six) hours as needed for Wheezing. 72 mL 2    diclofenac sodium (VOLTAREN) 1 % Gel Apply 2 g topically 3 (three) times daily. 1 Tube 3    fluconazole (DIFLUCAN) 150 MG Tab Take 1 tablet (150 mg total) by mouth once daily. (Patient taking differently: Take 150 mg by mouth daily as needed. ) 1 tablet 1    lidocaine HCL 10 mg/ml, 1%, 10 mg/mL (1 %) injection Inhale 3ml via nebulizer four times daily as needed 350 mL 5    nystatin-triamcinolone (MYCOLOG II) cream Apply to affected area twice daily as needed 60 g 3     No current facility-administered medications for this visit.      Facility-Administered Medications Ordered in Other Visits   Medication Dose Route Frequency Provider Last Rate Last Dose    EUFLEXXA 10 mg/mL(mw 2.4 -3.6 million) injection Syrg 20 mg  20 mg Intra-articular  Pop Schneider MD   20 mg at 12/27/17 1431             PHYSICAL EXAM    Alert, coop 58 y.o. female patient in no acute distress.    Vitals:    11/19/18 1007   BP: 128/80   Pulse: 76   Temp: 99 °F (37.2 °C)     VS reviewed.  VS stab;e.  CC, nursing note, medications & PMH all reviewed today.    Head:  Normocephalic, atraumatic.    EENT:  EACs patent.  TMs no erythema, normal LR, no effusions, no TM perforation.                              Eye lids normal, no discharge present.       Sinus tenderness to palp--none.               Nares--mild edema, + d/c present.    Pharynx + injected.                Tonsils + erythematous  , + 2 enlarged, no exudate present.    Bilat tender anterior, no posterior cervical lymph nodes palpable.    No submental, submandibular or supraclavicular lymph nodes palp.             Resp:  Respirations even, unlabored   Lungs CTA bilat.  No wheezing.  No crackles.  Moves air to bases bilat.    Heart:  RRR.              Bilat pedal edema.    MS:  Ambulates normally.             NEURO:  Alert and oriented x 4.  Responds appropriately during interaction.    Skin:  Warm, dry, color good.          Pt today presents with sudden onset approx 11 hrs ago with shivers and chills then fever.  This morning she started with body aches.  She has been having allergy sxs and has a chronic cough r/t a lesion in her right upper lobe.   There has been no change in her cough.  She has significant nasal congestion and drip, spitting up brownish mucus.  On exam her lung fields are CTA, voice with nasal quality and decreased air movement through nareas.    This is a new problem to me and the following work up is planned.    Lab & Radiological Tests Ordered: influenza test.  The results are neg.  CXR ordered.  Result is pending.      Pt advised to perform comfort measures recommended on patient instruction sheet .    Recommendations will be based on the outcome of the CXR.  Explained exam findings, diagnosis and treatment plan to patient.  Questions answered and patient states understanding.

## 2018-11-19 NOTE — PATIENT INSTRUCTIONS
"Your symptoms today are consistent with fever, body aches and cough.  This is likely a viral illness that needs to run its course.  The flu test is negative, but we'll check a chest xray and call with the result.    Comfort measures:  Increase fluids  Get plenty of rest  Vaporizer or frequent showers may be helpful.  Take tylenol of ibuprofen as needed for pain or fever  For cough and thick mucus secretions, you can take over the counter guaifenesin (mucinex), drink plenty of water while taking this to help loosen mucus.  To suppress the cough you may use a cough product with dextromethorphan (delsym or a product with DM designation).  Dayquil/Nyquil for symptom relief.  If you have high blood pressure or atrial fibrillation, avoid any over the counter medications with "D" or decongestant.    Salt water gargles.  Saline nasal spray  Wash cloth with warm water placed over the sinus area can lessen discomfort and pressure.  Sleep with head of bed elevated to decrease drainage, cough and congestion.    I recommend frequent handwashing to limit spread of infection to others     Evenings and weekends [facility] is available if symptoms worsen or fail to improve.  When you call the number, a registered nurse answers and takes your information.  The nurse can look at your medical record and make recommendations or call the on call physician, if warranted.      [facility] Address: [address] [place] [address] [place] [address] Phone: [phone]    [facility] Address:  Address: [address] [place] [address] [place] [address] Phone: [phone]    If you are not better in 3-4 days, if worse or you have concerns or questions, please do not hesitate to call.  You can reach us at [phone] Monday through Thursday (except holidays) 10 a.m. to 6 p.m.  Or you can follow up with your primary care provider/NP.    Thank you for using the [facility]!    NENA Dias, CNP, " NIESHAP-BC [facility]

## 2018-11-19 NOTE — LETTER
November 19, 2018    Yumiko Gonzalez  [id number]  [facility] [address]         [facility]  [address] [facility] [address]  Phone: [phone] November 19, 2018     Patient: Yumiko Gonzalez   YOB: 1960   Date of Visit: 11/19/2018       To Whom It May Concern:    It is my medical opinion that Yumiko Gonzalez should remain out of work until 11/25/18..    If you have any questions or concerns, please don't hesitate to call.    Sincerely,        Jennifer Wharton, NP

## 2018-11-19 NOTE — Clinical Note
Garrett Webb MD,  I saw your patient today in the [facility].  If you have any questions, please do not hesitate to contact me.  Thank you!  UMM Dias

## 2018-11-27 ENCOUNTER — TELEPHONE (OUTPATIENT)
Dept: FAMILY MEDICINE | Facility: CLINIC | Age: 58
End: 2018-11-27

## 2018-11-27 DIAGNOSIS — B96.89 ACUTE BACTERIAL SINUSITIS: Primary | ICD-10-CM

## 2018-11-27 DIAGNOSIS — J01.90 ACUTE BACTERIAL SINUSITIS: Primary | ICD-10-CM

## 2018-11-27 RX ORDER — DOXYCYCLINE 100 MG/1
100 CAPSULE ORAL 2 TIMES DAILY
Qty: 10 CAPSULE | Refills: 0 | Status: SHIPPED | OUTPATIENT
Start: 2018-11-27 | End: 2018-12-04

## 2018-11-27 NOTE — TELEPHONE ENCOUNTER
Pt has been having cold sxs for > a week and now with green blood-tinged mucus.    Will be going out of town on an airplane in the next couple of weeks.  Alert, coop, no acute distress.  Normal, unlabored resp.  Rx for doxycycline.

## 2019-01-28 RX ORDER — HYDROCHLOROTHIAZIDE 12.5 MG/1
12.5 TABLET ORAL DAILY
Qty: 90 TABLET | Refills: 1 | Status: SHIPPED | OUTPATIENT
Start: 2019-01-28 | End: 2019-07-18

## 2019-02-13 ENCOUNTER — OFFICE VISIT (OUTPATIENT)
Dept: FAMILY MEDICINE | Facility: CLINIC | Age: 59
End: 2019-02-13
Payer: COMMERCIAL

## 2019-02-13 VITALS
HEART RATE: 68 BPM | OXYGEN SATURATION: 97 % | DIASTOLIC BLOOD PRESSURE: 84 MMHG | BODY MASS INDEX: 40.87 KG/M2 | WEIGHT: 285.5 LBS | TEMPERATURE: 98 F | SYSTOLIC BLOOD PRESSURE: 150 MMHG | HEIGHT: 70 IN

## 2019-02-13 DIAGNOSIS — I10 ESSENTIAL HYPERTENSION: Primary | ICD-10-CM

## 2019-02-13 DIAGNOSIS — F41.9 ANXIETY: ICD-10-CM

## 2019-02-13 DIAGNOSIS — E66.01 MORBID OBESITY WITH BMI OF 40.0-44.9, ADULT: ICD-10-CM

## 2019-02-13 PROCEDURE — 3008F BODY MASS INDEX DOCD: CPT | Mod: CPTII,S$GLB,, | Performed by: NURSE PRACTITIONER

## 2019-02-13 PROCEDURE — 99999 PR PBB SHADOW E&M-EST. PATIENT-LVL V: ICD-10-PCS | Mod: PBBFAC,,, | Performed by: NURSE PRACTITIONER

## 2019-02-13 PROCEDURE — 3077F PR MOST RECENT SYSTOLIC BLOOD PRESSURE >= 140 MM HG: ICD-10-PCS | Mod: CPTII,S$GLB,, | Performed by: NURSE PRACTITIONER

## 2019-02-13 PROCEDURE — 99999 PR PBB SHADOW E&M-EST. PATIENT-LVL V: CPT | Mod: PBBFAC,,, | Performed by: NURSE PRACTITIONER

## 2019-02-13 PROCEDURE — 3079F DIAST BP 80-89 MM HG: CPT | Mod: CPTII,S$GLB,, | Performed by: NURSE PRACTITIONER

## 2019-02-13 PROCEDURE — 3077F SYST BP >= 140 MM HG: CPT | Mod: CPTII,S$GLB,, | Performed by: NURSE PRACTITIONER

## 2019-02-13 PROCEDURE — 3079F PR MOST RECENT DIASTOLIC BLOOD PRESSURE 80-89 MM HG: ICD-10-PCS | Mod: CPTII,S$GLB,, | Performed by: NURSE PRACTITIONER

## 2019-02-13 PROCEDURE — 99213 PR OFFICE/OUTPT VISIT, EST, LEVL III, 20-29 MIN: ICD-10-PCS | Mod: S$GLB,,, | Performed by: NURSE PRACTITIONER

## 2019-02-13 PROCEDURE — 3008F PR BODY MASS INDEX (BMI) DOCUMENTED: ICD-10-PCS | Mod: CPTII,S$GLB,, | Performed by: NURSE PRACTITIONER

## 2019-02-13 PROCEDURE — 99213 OFFICE O/P EST LOW 20 MIN: CPT | Mod: S$GLB,,, | Performed by: NURSE PRACTITIONER

## 2019-02-13 NOTE — PROGRESS NOTES
Subjective:       Patient ID: Yumiko Gonzalez is a 58 y.o. female.    Chief Complaint: Hypertension  She was last seen in primary care by Jon on 10/09/2018.  I las saw her on 01/05/2018.  HPI   States head feels like a balloon and can feel pressure from her head up. States it has felt like this for a couple of days. States she is taking blood pressure medication every day. Sometimes gets sharp temporal pain on the right side and it goes away quickly  Vitals:    02/13/19 1623   BP: (!) 150/84   Pulse:    Temp:      BP Readings from Last 3 Encounters:   02/13/19 (!) 150/84   11/19/18 128/80   10/09/18 132/82     Review of Systems   HENT: Positive for sinus pressure.         Head feels like a balloon       History of panic attacks in the past but it did not feel like this.  She is under a lot of stress and financial concerns  Has only one cup of coffee in the morning  Objective:      Physical Exam   Constitutional: She is oriented to person, place, and time. Vital signs are normal. She appears well-developed and well-nourished. She is active and cooperative.   HENT:   Head: Normocephalic and atraumatic.   Right Ear: External ear normal.   Left Ear: External ear normal.   Nose: Nose normal.   Mouth/Throat: Oropharynx is clear and moist.   Neck: Trachea normal, normal range of motion, full passive range of motion without pain and phonation normal. Neck supple. Carotid bruit is not present.   Cardiovascular: Normal rate, regular rhythm and normal heart sounds.   Pulmonary/Chest: Effort normal and breath sounds normal.   Abdominal: Soft. Bowel sounds are normal. There is no tenderness.   Musculoskeletal: Normal range of motion.   Lymphadenopathy:        Head (right side): No submental, no submandibular, no tonsillar, no preauricular, no posterior auricular and no occipital adenopathy present.        Head (left side): No submental, no submandibular, no tonsillar, no preauricular, no posterior auricular and no  occipital adenopathy present.     She has no cervical adenopathy.   Neurological: She is alert and oriented to person, place, and time.   Skin: Skin is warm and dry.   Psychiatric: She has a normal mood and affect. Her speech is normal and behavior is normal. Judgment and thought content normal. Cognition and memory are normal.   Nursing note and vitals reviewed.      Assessment & Plan:       Essential hypertension - Uncontrolled, HCTZ, continue to monitor daily and consider changes as needed with medication regimen    Anxiety - Continue xanax and Wellbutrin, current financial stressors causing exacerbation    Morbid obesity with BMI of 40.0-44.9, adult    Continue current medications  Take blood pressure daily and record   Medication List with Changes/Refills   Current Medications    ALBUTEROL (PROVENTIL) 2.5 MG /3 ML (0.083 %) NEBULIZER SOLUTION    Take 3 mLs (2.5 mg total) by nebulization every 6 (six) hours as needed for Wheezing.    ALPRAZOLAM (XANAX) 0.25 MG TABLET    TAKE ONE TABLET BY MOUTH NIGHTLY AS NEEDED FOR INSOMNIA    BUPROPION (WELLBUTRIN XL) 150 MG TB24 TABLET    Take 2 tablets (300 mg total) by mouth once daily.    CETIRIZINE (ZYRTEC) 10 MG TABLET    Take 10 mg by mouth once daily.    DICLOFENAC SODIUM (VOLTAREN) 1 % GEL    Apply 2 g topically 3 (three) times daily.    FLUCONAZOLE (DIFLUCAN) 150 MG TAB    Take 1 tablet (150 mg total) by mouth once daily.    HYDROCHLOROTHIAZIDE (HYDRODIURIL) 12.5 MG TAB    Take 1 tablet (12.5 mg total) by mouth once daily.    LIDOCAINE HCL 10 MG/ML, 1%, 10 MG/ML (1 %) INJECTION    Inhale 3ml via nebulizer four times daily as needed    LORATADINE (CLARITIN) 10 MG TABLET    Take 10 mg by mouth once daily.    METOPROLOL TARTRATE (LOPRESSOR) 25 MG TABLET    Take 1 tablet (25 mg total) by mouth 2 (two) times daily.    MULTIVITAMIN (THERAGRAN) PER TABLET    Take 1 tablet by mouth once daily.    NYSTATIN-TRIAMCINOLONE (MYCOLOG II) CREAM    Apply to affected area twice daily  as needed    SODIUM CHLORIDE 7% 7 % NEBULIZER SOLUTION    Use in nebulizer as directed 3 times a day for 30 days    TRIAMCINOLONE (NASACORT) 55 MCG NASAL INHALER    Use 2 sprays by Nasal route once daily.    VITAMIN E 400 UNIT CAPSULE    Take 400 Units by mouth once daily.         Follow-up if symptoms worsen or fail to improve.

## 2019-02-15 PROBLEM — Z01.810 PREOP CARDIOVASCULAR EXAM: Status: RESOLVED | Noted: 2017-04-18 | Resolved: 2019-02-15

## 2019-03-11 ENCOUNTER — PATIENT MESSAGE (OUTPATIENT)
Dept: FAMILY MEDICINE | Facility: CLINIC | Age: 59
End: 2019-03-11

## 2019-03-11 DIAGNOSIS — R04.2 HEMOPTYSIS: Primary | ICD-10-CM

## 2019-03-12 ENCOUNTER — HOSPITAL ENCOUNTER (OUTPATIENT)
Dept: RADIOLOGY | Facility: HOSPITAL | Age: 59
Discharge: HOME | End: 2019-03-12
Attending: FAMILY MEDICINE
Payer: COMMERCIAL

## 2019-03-12 DIAGNOSIS — R04.2 HEMOPTYSIS: ICD-10-CM

## 2019-03-12 PROCEDURE — 71046 X-RAY EXAM CHEST 2 VIEWS: CPT | Mod: TC,FY,PO

## 2019-03-12 PROCEDURE — 71046 X-RAY EXAM CHEST 2 VIEWS: CPT | Mod: 26,,, | Performed by: RADIOLOGY

## 2019-03-12 PROCEDURE — 71046 XR CHEST PA AND LATERAL: ICD-10-PCS | Mod: 26,,, | Performed by: RADIOLOGY

## 2019-03-12 NOTE — TELEPHONE ENCOUNTER
See my chart message.  I entered order for a chest x-ray but if she is feeling badly, having significant congestion, change in breathing or reoccurring hemoptysis she needs to be seen and or get in with her pulmonologist right away.

## 2019-03-18 ENCOUNTER — LAB VISIT (OUTPATIENT)
Dept: LAB | Facility: HOSPITAL | Age: 59
End: 2019-03-18
Attending: FAMILY MEDICINE
Payer: COMMERCIAL

## 2019-03-18 DIAGNOSIS — R06.2 WHEEZING: ICD-10-CM

## 2019-03-18 DIAGNOSIS — R05.9 COUGH: ICD-10-CM

## 2019-03-18 DIAGNOSIS — R04.2 COUGHING UP BLOOD: ICD-10-CM

## 2019-03-18 PROCEDURE — 87205 SMEAR GRAM STAIN: CPT

## 2019-03-18 PROCEDURE — 87070 CULTURE OTHR SPECIMN AEROBIC: CPT

## 2019-03-21 LAB
BACTERIA SPEC AEROBE CULT: NORMAL
BACTERIA SPEC AEROBE CULT: NORMAL
GRAM STN SPEC: NORMAL

## 2019-05-13 ENCOUNTER — PATIENT MESSAGE (OUTPATIENT)
Dept: HEPATOLOGY | Facility: CLINIC | Age: 59
End: 2019-05-13

## 2019-05-15 DIAGNOSIS — K74.60 CIRRHOSIS OF LIVER WITHOUT ASCITES, UNSPECIFIED HEPATIC CIRRHOSIS TYPE: Primary | ICD-10-CM

## 2019-06-14 ENCOUNTER — OFFICE VISIT (OUTPATIENT)
Dept: FAMILY MEDICINE | Facility: CLINIC | Age: 59
End: 2019-06-14
Payer: COMMERCIAL

## 2019-06-14 VITALS
WEIGHT: 285.5 LBS | SYSTOLIC BLOOD PRESSURE: 122 MMHG | DIASTOLIC BLOOD PRESSURE: 73 MMHG | BODY MASS INDEX: 40.87 KG/M2 | HEIGHT: 70 IN | HEART RATE: 77 BPM | RESPIRATION RATE: 16 BRPM | OXYGEN SATURATION: 97 %

## 2019-06-14 DIAGNOSIS — H57.12 ACUTE LEFT EYE PAIN: Primary | ICD-10-CM

## 2019-06-14 PROCEDURE — 3074F SYST BP LT 130 MM HG: CPT | Mod: CPTII,S$GLB,, | Performed by: PHYSICIAN ASSISTANT

## 2019-06-14 PROCEDURE — 3008F BODY MASS INDEX DOCD: CPT | Mod: CPTII,S$GLB,, | Performed by: PHYSICIAN ASSISTANT

## 2019-06-14 PROCEDURE — 3078F DIAST BP <80 MM HG: CPT | Mod: CPTII,S$GLB,, | Performed by: PHYSICIAN ASSISTANT

## 2019-06-14 PROCEDURE — 3078F PR MOST RECENT DIASTOLIC BLOOD PRESSURE < 80 MM HG: ICD-10-PCS | Mod: CPTII,S$GLB,, | Performed by: PHYSICIAN ASSISTANT

## 2019-06-14 PROCEDURE — 3008F PR BODY MASS INDEX (BMI) DOCUMENTED: ICD-10-PCS | Mod: CPTII,S$GLB,, | Performed by: PHYSICIAN ASSISTANT

## 2019-06-14 PROCEDURE — 99213 PR OFFICE/OUTPT VISIT, EST, LEVL III, 20-29 MIN: ICD-10-PCS | Mod: S$GLB,,, | Performed by: PHYSICIAN ASSISTANT

## 2019-06-14 PROCEDURE — 3074F PR MOST RECENT SYSTOLIC BLOOD PRESSURE < 130 MM HG: ICD-10-PCS | Mod: CPTII,S$GLB,, | Performed by: PHYSICIAN ASSISTANT

## 2019-06-14 PROCEDURE — 99999 PR PBB SHADOW E&M-EST. PATIENT-LVL III: CPT | Mod: PBBFAC,,, | Performed by: PHYSICIAN ASSISTANT

## 2019-06-14 PROCEDURE — 99999 PR PBB SHADOW E&M-EST. PATIENT-LVL III: ICD-10-PCS | Mod: PBBFAC,,, | Performed by: PHYSICIAN ASSISTANT

## 2019-06-14 PROCEDURE — 99213 OFFICE O/P EST LOW 20 MIN: CPT | Mod: S$GLB,,, | Performed by: PHYSICIAN ASSISTANT

## 2019-06-14 NOTE — PATIENT INSTRUCTIONS
Put warm compresses on left eye and try Zyrtec eye drops.  Go to eye doctor on Monday if pain continues.    Thanks for seeing me,  Mercy Portillo PA-C

## 2019-06-14 NOTE — PROGRESS NOTES
"Subjective:      Patient ID: Yumiko Gonzalez is a 59 y.o. female.    Chief Complaint: Eye Pain    HPI   Patient has been having left eye irritation for 2 weeks.  No additional blurry vision.  Clear discharge, and eyes have not been matted in the mornings.    Review of Systems   Constitutional: Negative for chills and fever.   Eyes: Positive for pain and discharge (clear). Negative for redness and itching.   Respiratory: Negative for shortness of breath.    Cardiovascular: Negative for chest pain.   Allergic/Immunologic: Negative for environmental allergies.   Neurological: Positive for headaches (baseline). Negative for dizziness and light-headedness.       Objective:   /73   Pulse 77   Resp 16   Ht 5' 10" (1.778 m)   Wt 129.5 kg (285 lb 7.9 oz)   SpO2 97%   BMI 40.96 kg/m²      Physical Exam   Constitutional: She is oriented to person, place, and time. Vital signs are normal. She appears well-developed and well-nourished. She is active and cooperative. No distress.   HENT:   Head: Normocephalic and atraumatic.   Right Ear: Hearing and external ear normal.   Left Ear: Hearing and external ear normal.   Nose: Nose normal.   Eyes: Conjunctivae and EOM are normal. Right eye exhibits no discharge, no exudate and no hordeolum. Left eye exhibits discharge (clear). Left eye exhibits no exudate and no hordeolum. No foreign body present in the left eye. Right conjunctiva is not injected. Right conjunctiva has no hemorrhage. Left conjunctiva is not injected. Left conjunctiva has no hemorrhage. No scleral icterus.   Neck: Normal range of motion and phonation normal.   Cardiovascular: Normal rate, regular rhythm and normal heart sounds. Exam reveals no gallop and no friction rub.   No murmur heard.  Pulmonary/Chest: Effort normal and breath sounds normal. No stridor. No respiratory distress. She has no decreased breath sounds. She has no wheezes. She has no rhonchi. She has no rales.   Musculoskeletal: Normal range of " motion.   Neurological: She is alert and oriented to person, place, and time.   Skin: Skin is warm, dry and intact. No rash noted.   Psychiatric: She has a normal mood and affect. Her speech is normal and behavior is normal. Judgment and thought content normal. Cognition and memory are normal.   Vitals reviewed.     Assessment:      1. Acute left eye pain       Plan:   1. Acute left eye pain  Put warm compresses on left eye and try Zyrtec eye drops.  Go to eye doctor on Monday if pain continues.    Follow up as needed.  Patient agreed with plan and expressed understanding.

## 2019-06-28 ENCOUNTER — HOSPITAL ENCOUNTER (OUTPATIENT)
Dept: RADIOLOGY | Facility: HOSPITAL | Age: 59
Discharge: HOME | End: 2019-06-28
Attending: INTERNAL MEDICINE
Payer: COMMERCIAL

## 2019-06-28 DIAGNOSIS — K74.60 CIRRHOSIS OF LIVER WITHOUT ASCITES, UNSPECIFIED HEPATIC CIRRHOSIS TYPE: ICD-10-CM

## 2019-06-28 PROCEDURE — 76700 US EXAM ABDOM COMPLETE: CPT | Mod: 26,,, | Performed by: RADIOLOGY

## 2019-06-28 PROCEDURE — 76700 US ABDOMEN COMPLETE: ICD-10-PCS | Mod: 26,,, | Performed by: RADIOLOGY

## 2019-06-28 PROCEDURE — 76700 US EXAM ABDOM COMPLETE: CPT | Mod: TC,PO

## 2019-07-01 ENCOUNTER — TELEPHONE (OUTPATIENT)
Dept: HEPATOLOGY | Facility: CLINIC | Age: 59
End: 2019-07-01

## 2019-07-01 NOTE — TELEPHONE ENCOUNTER
----- Message from Hetal Prince MD sent at 6/29/2019 11:47 AM CDT -----  Please inform patient results are OK.

## 2019-07-13 ENCOUNTER — TELEPHONE (OUTPATIENT)
Dept: HEPATOLOGY | Facility: CLINIC | Age: 59
End: 2019-07-13

## 2019-07-13 DIAGNOSIS — K74.69 OTHER CIRRHOSIS OF LIVER: Primary | ICD-10-CM

## 2019-07-15 ENCOUNTER — TELEPHONE (OUTPATIENT)
Dept: HEPATOLOGY | Facility: CLINIC | Age: 59
End: 2019-07-15

## 2019-07-15 NOTE — TELEPHONE ENCOUNTER
----- Message from Hetal Prince MD sent at 7/13/2019  3:33 PM CDT -----  Pl inform patient;  Ultrasound findings consistent with cirrhosis and enlarged spleen as a result of cirrhosis.  No tumor seen in the liver.  No fluid in the abdomen.   Continue surveillance for liver cancer with Ultrasound of abd and AFP every 6 months, next due in December 2019.

## 2019-07-18 RX ORDER — HYDROCHLOROTHIAZIDE 12.5 MG/1
12.5 TABLET ORAL DAILY
Qty: 90 TABLET | Refills: 1 | Status: CANCELLED | OUTPATIENT
Start: 2019-07-18 | End: 2020-07-17

## 2019-07-18 RX ORDER — HYDROCHLOROTHIAZIDE 12.5 MG/1
12.5 TABLET ORAL DAILY
Qty: 90 TABLET | Refills: 1 | Status: SHIPPED | OUTPATIENT
Start: 2019-07-18 | End: 2020-02-02 | Stop reason: SDUPTHER

## 2019-07-30 ENCOUNTER — OFFICE VISIT (OUTPATIENT)
Dept: HEPATOLOGY | Facility: CLINIC | Age: 59
End: 2019-07-30
Payer: COMMERCIAL

## 2019-07-30 VITALS
BODY MASS INDEX: 41.95 KG/M2 | HEIGHT: 70 IN | DIASTOLIC BLOOD PRESSURE: 68 MMHG | HEART RATE: 74 BPM | RESPIRATION RATE: 18 BRPM | OXYGEN SATURATION: 96 % | SYSTOLIC BLOOD PRESSURE: 149 MMHG | WEIGHT: 293 LBS

## 2019-07-30 DIAGNOSIS — I85.00 ESOPHAGEAL VARICES WITHOUT BLEEDING, UNSPECIFIED ESOPHAGEAL VARICES TYPE: Primary | ICD-10-CM

## 2019-07-30 PROCEDURE — 3077F PR MOST RECENT SYSTOLIC BLOOD PRESSURE >= 140 MM HG: ICD-10-PCS | Mod: CPTII,S$GLB,, | Performed by: INTERNAL MEDICINE

## 2019-07-30 PROCEDURE — 3078F DIAST BP <80 MM HG: CPT | Mod: CPTII,S$GLB,, | Performed by: INTERNAL MEDICINE

## 2019-07-30 PROCEDURE — 99214 PR OFFICE/OUTPT VISIT, EST, LEVL IV, 30-39 MIN: ICD-10-PCS | Mod: S$GLB,,, | Performed by: INTERNAL MEDICINE

## 2019-07-30 PROCEDURE — 99214 OFFICE O/P EST MOD 30 MIN: CPT | Mod: S$GLB,,, | Performed by: INTERNAL MEDICINE

## 2019-07-30 PROCEDURE — 3077F SYST BP >= 140 MM HG: CPT | Mod: CPTII,S$GLB,, | Performed by: INTERNAL MEDICINE

## 2019-07-30 PROCEDURE — 3008F BODY MASS INDEX DOCD: CPT | Mod: CPTII,S$GLB,, | Performed by: INTERNAL MEDICINE

## 2019-07-30 PROCEDURE — 99999 PR PBB SHADOW E&M-EST. PATIENT-LVL III: ICD-10-PCS | Mod: PBBFAC,,, | Performed by: INTERNAL MEDICINE

## 2019-07-30 PROCEDURE — 3078F PR MOST RECENT DIASTOLIC BLOOD PRESSURE < 80 MM HG: ICD-10-PCS | Mod: CPTII,S$GLB,, | Performed by: INTERNAL MEDICINE

## 2019-07-30 PROCEDURE — 99999 PR PBB SHADOW E&M-EST. PATIENT-LVL III: CPT | Mod: PBBFAC,,, | Performed by: INTERNAL MEDICINE

## 2019-07-30 PROCEDURE — 3008F PR BODY MASS INDEX (BMI) DOCUMENTED: ICD-10-PCS | Mod: CPTII,S$GLB,, | Performed by: INTERNAL MEDICINE

## 2019-07-30 NOTE — Clinical Note
-  Needs surveillance EGD now. Pl make appt with Dr. Palomares [place]. -. HCC surveillance with AFP and ultrasound of the abdomen every six months, next due in December 2019.   -. CBC, CMP, PT/INR every 3 months, starting December 2019.-. Continue low-carbohydrate diet. -. Low salt diet - 2 gm sodium -.  Return in 6 months. -.  Concern for decompensation of liver with abd surgery, hence defer bariatric surgery

## 2019-07-30 NOTE — PATIENT INSTRUCTIONS
-  Needs surveillance EGD now. Pl make appt with Dr. Palomares [place].   -. HCC surveillance with AFP and ultrasound of the abdomen every six months, next due in December 2019.     -. CBC, CMP, PT/INR every 3 months, starting December 2019.  -. Continue low-carbohydrate diet.   -. Low salt diet - 2 gm sodium   -.  Return in 6 months.   -.  Concern for decompensation of liver with abd surgery, hence defer bariatric surgery

## 2019-08-07 ENCOUNTER — PATIENT MESSAGE (OUTPATIENT)
Dept: FAMILY MEDICINE | Facility: CLINIC | Age: 59
End: 2019-08-07

## 2019-08-07 DIAGNOSIS — R25.1 TREMOR: Primary | ICD-10-CM

## 2019-08-13 ENCOUNTER — TELEPHONE (OUTPATIENT)
Dept: NEUROLOGY | Facility: CLINIC | Age: 59
End: 2019-08-13

## 2019-08-13 NOTE — TELEPHONE ENCOUNTER
----- Message from Domenica Hitchcock sent at 8/13/2019  1:35 PM CDT -----  Contact: Patient is needing an appointment on the [facility]   Patient is needing an appointment with Ashlee Andrade in the [facility] location please. Patient is an employee with [employer] at [employer].  Patient is already scheduled in December with Dr. Paul but would like something on the [facility] where patient works and resides.       Thanks

## 2019-08-13 NOTE — TELEPHONE ENCOUNTER
----- Message from Domenica Hitchcock sent at 8/13/2019  1:35 PM CDT -----  Contact: Patient is needing an appointment on the [facility]   Patient is needing an appointment with Ashlee Andrade in the [facility] location please. Patient is an employee with [employer] at [employer].  Patient is already scheduled in December with Dr. Paul but would like something on the [facility] where patient works and resides.       Thanks

## 2019-08-14 NOTE — TELEPHONE ENCOUNTER
Offered patient sooner appt with CK on 10/14/19 in [place]. And cancelled dec appt with Dr PETERSON per her request.

## 2019-09-30 ENCOUNTER — PATIENT OUTREACH (OUTPATIENT)
Dept: ADMINISTRATIVE | Facility: HOSPITAL | Age: 59
End: 2019-09-30

## 2019-10-14 ENCOUNTER — TELEPHONE (OUTPATIENT)
Dept: NEUROLOGY | Facility: CLINIC | Age: 59
End: 2019-10-14

## 2019-10-14 ENCOUNTER — OFFICE VISIT (OUTPATIENT)
Dept: NEUROLOGY | Facility: CLINIC | Age: 59
End: 2019-10-14
Payer: COMMERCIAL

## 2019-10-14 ENCOUNTER — OFFICE VISIT (OUTPATIENT)
Dept: FAMILY MEDICINE | Facility: CLINIC | Age: 59
End: 2019-10-14
Payer: COMMERCIAL

## 2019-10-14 ENCOUNTER — PATIENT MESSAGE (OUTPATIENT)
Dept: NEUROLOGY | Facility: CLINIC | Age: 59
End: 2019-10-14

## 2019-10-14 ENCOUNTER — OFFICE VISIT (OUTPATIENT)
Dept: OBSTETRICS AND GYNECOLOGY | Facility: CLINIC | Age: 59
End: 2019-10-14
Payer: COMMERCIAL

## 2019-10-14 ENCOUNTER — HOSPITAL ENCOUNTER (OUTPATIENT)
Dept: RADIOLOGY | Facility: HOSPITAL | Age: 59
Discharge: HOME | End: 2019-10-14
Attending: OBSTETRICS & GYNECOLOGY
Payer: COMMERCIAL

## 2019-10-14 VITALS
BODY MASS INDEX: 41.76 KG/M2 | HEIGHT: 70 IN | HEART RATE: 64 BPM | DIASTOLIC BLOOD PRESSURE: 88 MMHG | RESPIRATION RATE: 20 BRPM | SYSTOLIC BLOOD PRESSURE: 124 MMHG | TEMPERATURE: 98 F | WEIGHT: 291.69 LBS

## 2019-10-14 VITALS — HEIGHT: 70 IN | WEIGHT: 291.44 LBS | BODY MASS INDEX: 41.72 KG/M2

## 2019-10-14 VITALS
DIASTOLIC BLOOD PRESSURE: 82 MMHG | WEIGHT: 291.44 LBS | SYSTOLIC BLOOD PRESSURE: 126 MMHG | BODY MASS INDEX: 41.82 KG/M2

## 2019-10-14 VITALS
BODY MASS INDEX: 41.77 KG/M2 | RESPIRATION RATE: 20 BRPM | SYSTOLIC BLOOD PRESSURE: 144 MMHG | HEART RATE: 72 BPM | DIASTOLIC BLOOD PRESSURE: 72 MMHG | HEIGHT: 70 IN | WEIGHT: 291.75 LBS

## 2019-10-14 DIAGNOSIS — Z01.419 ROUTINE GYNECOLOGICAL EXAMINATION: ICD-10-CM

## 2019-10-14 DIAGNOSIS — G25.0 ESSENTIAL TREMOR: Primary | ICD-10-CM

## 2019-10-14 DIAGNOSIS — Z12.31 VISIT FOR SCREENING MAMMOGRAM: Primary | ICD-10-CM

## 2019-10-14 DIAGNOSIS — Z91.89 AT RISK FOR CORONARY ARTERY DISEASE: ICD-10-CM

## 2019-10-14 DIAGNOSIS — K74.60 CIRRHOSIS OF LIVER WITHOUT ASCITES, UNSPECIFIED HEPATIC CIRRHOSIS TYPE: ICD-10-CM

## 2019-10-14 DIAGNOSIS — K75.81 NONALCOHOLIC STEATOHEPATITIS: ICD-10-CM

## 2019-10-14 DIAGNOSIS — I10 ESSENTIAL HYPERTENSION: ICD-10-CM

## 2019-10-14 DIAGNOSIS — R25.1 TREMOR: ICD-10-CM

## 2019-10-14 DIAGNOSIS — R07.9 CHEST PAIN, UNSPECIFIED TYPE: ICD-10-CM

## 2019-10-14 DIAGNOSIS — Z12.31 VISIT FOR SCREENING MAMMOGRAM: ICD-10-CM

## 2019-10-14 DIAGNOSIS — Z00.00 PREVENTATIVE HEALTH CARE: Primary | ICD-10-CM

## 2019-10-14 DIAGNOSIS — R25.9 ABNORMAL MOVEMENTS: ICD-10-CM

## 2019-10-14 PROCEDURE — 93005 ELECTROCARDIOGRAM TRACING: CPT | Mod: S$GLB,,, | Performed by: FAMILY MEDICINE

## 2019-10-14 PROCEDURE — 3074F PR MOST RECENT SYSTOLIC BLOOD PRESSURE < 130 MM HG: ICD-10-PCS | Mod: CPTII,S$GLB,, | Performed by: OBSTETRICS & GYNECOLOGY

## 2019-10-14 PROCEDURE — 93010 ELECTROCARDIOGRAM REPORT: CPT | Mod: S$GLB,,, | Performed by: INTERNAL MEDICINE

## 2019-10-14 PROCEDURE — 93010 EKG 12-LEAD: ICD-10-PCS | Mod: S$GLB,,, | Performed by: INTERNAL MEDICINE

## 2019-10-14 PROCEDURE — 3079F DIAST BP 80-89 MM HG: CPT | Mod: CPTII,S$GLB,, | Performed by: OBSTETRICS & GYNECOLOGY

## 2019-10-14 PROCEDURE — 88175 CYTOPATH C/V AUTO FLUID REDO: CPT

## 2019-10-14 PROCEDURE — 77067 MAMMO DIGITAL SCREENING BILAT WITH TOMOSYNTHESIS_CAD: ICD-10-PCS | Mod: 26,,, | Performed by: RADIOLOGY

## 2019-10-14 PROCEDURE — 3079F PR MOST RECENT DIASTOLIC BLOOD PRESSURE 80-89 MM HG: ICD-10-PCS | Mod: CPTII,S$GLB,, | Performed by: FAMILY MEDICINE

## 2019-10-14 PROCEDURE — 99205 OFFICE O/P NEW HI 60 MIN: CPT | Mod: S$GLB,,, | Performed by: NURSE PRACTITIONER

## 2019-10-14 PROCEDURE — 3078F PR MOST RECENT DIASTOLIC BLOOD PRESSURE < 80 MM HG: ICD-10-PCS | Mod: CPTII,S$GLB,, | Performed by: NURSE PRACTITIONER

## 2019-10-14 PROCEDURE — 3078F DIAST BP <80 MM HG: CPT | Mod: CPTII,S$GLB,, | Performed by: NURSE PRACTITIONER

## 2019-10-14 PROCEDURE — 99205 PR OFFICE/OUTPT VISIT, NEW, LEVL V, 60-74 MIN: ICD-10-PCS | Mod: S$GLB,,, | Performed by: NURSE PRACTITIONER

## 2019-10-14 PROCEDURE — 3074F SYST BP LT 130 MM HG: CPT | Mod: CPTII,S$GLB,, | Performed by: OBSTETRICS & GYNECOLOGY

## 2019-10-14 PROCEDURE — 99999 PR PBB SHADOW E&M-EST. PATIENT-LVL III: ICD-10-PCS | Mod: PBBFAC,,, | Performed by: OBSTETRICS & GYNECOLOGY

## 2019-10-14 PROCEDURE — 3074F SYST BP LT 130 MM HG: CPT | Mod: CPTII,S$GLB,, | Performed by: NURSE PRACTITIONER

## 2019-10-14 PROCEDURE — 3074F PR MOST RECENT SYSTOLIC BLOOD PRESSURE < 130 MM HG: ICD-10-PCS | Mod: CPTII,S$GLB,, | Performed by: FAMILY MEDICINE

## 2019-10-14 PROCEDURE — 3074F PR MOST RECENT SYSTOLIC BLOOD PRESSURE < 130 MM HG: ICD-10-PCS | Mod: CPTII,S$GLB,, | Performed by: NURSE PRACTITIONER

## 2019-10-14 PROCEDURE — 99396 PREV VISIT EST AGE 40-64: CPT | Mod: S$GLB,,, | Performed by: FAMILY MEDICINE

## 2019-10-14 PROCEDURE — 99999 PR PBB SHADOW E&M-EST. PATIENT-LVL III: ICD-10-PCS | Mod: PBBFAC,,, | Performed by: FAMILY MEDICINE

## 2019-10-14 PROCEDURE — 3008F BODY MASS INDEX DOCD: CPT | Mod: CPTII,S$GLB,, | Performed by: NURSE PRACTITIONER

## 2019-10-14 PROCEDURE — 99999 PR PBB SHADOW E&M-EST. PATIENT-LVL III: CPT | Mod: PBBFAC,,, | Performed by: OBSTETRICS & GYNECOLOGY

## 2019-10-14 PROCEDURE — 77067 SCR MAMMO BI INCL CAD: CPT | Mod: TC,PN

## 2019-10-14 PROCEDURE — 77063 BREAST TOMOSYNTHESIS BI: CPT | Mod: 26,,, | Performed by: RADIOLOGY

## 2019-10-14 PROCEDURE — 3079F DIAST BP 80-89 MM HG: CPT | Mod: CPTII,S$GLB,, | Performed by: FAMILY MEDICINE

## 2019-10-14 PROCEDURE — 99999 PR PBB SHADOW E&M-EST. PATIENT-LVL III: CPT | Mod: PBBFAC,,, | Performed by: FAMILY MEDICINE

## 2019-10-14 PROCEDURE — 77067 SCR MAMMO BI INCL CAD: CPT | Mod: 26,,, | Performed by: RADIOLOGY

## 2019-10-14 PROCEDURE — 99999 PR PBB SHADOW E&M-EST. PATIENT-LVL III: ICD-10-PCS | Mod: PBBFAC,,, | Performed by: NURSE PRACTITIONER

## 2019-10-14 PROCEDURE — 99396 PR PREVENTIVE VISIT,EST,40-64: ICD-10-PCS | Mod: S$GLB,,, | Performed by: OBSTETRICS & GYNECOLOGY

## 2019-10-14 PROCEDURE — 3079F PR MOST RECENT DIASTOLIC BLOOD PRESSURE 80-89 MM HG: ICD-10-PCS | Mod: CPTII,S$GLB,, | Performed by: OBSTETRICS & GYNECOLOGY

## 2019-10-14 PROCEDURE — 99999 PR PBB SHADOW E&M-EST. PATIENT-LVL III: CPT | Mod: PBBFAC,,, | Performed by: NURSE PRACTITIONER

## 2019-10-14 PROCEDURE — 99396 PR PREVENTIVE VISIT,EST,40-64: ICD-10-PCS | Mod: S$GLB,,, | Performed by: FAMILY MEDICINE

## 2019-10-14 PROCEDURE — 99396 PREV VISIT EST AGE 40-64: CPT | Mod: S$GLB,,, | Performed by: OBSTETRICS & GYNECOLOGY

## 2019-10-14 PROCEDURE — 3074F SYST BP LT 130 MM HG: CPT | Mod: CPTII,S$GLB,, | Performed by: FAMILY MEDICINE

## 2019-10-14 PROCEDURE — 3008F PR BODY MASS INDEX (BMI) DOCUMENTED: ICD-10-PCS | Mod: CPTII,S$GLB,, | Performed by: NURSE PRACTITIONER

## 2019-10-14 PROCEDURE — 77063 MAMMO DIGITAL SCREENING BILAT WITH TOMOSYNTHESIS_CAD: ICD-10-PCS | Mod: 26,,, | Performed by: RADIOLOGY

## 2019-10-14 PROCEDURE — 93005 EKG 12-LEAD: ICD-10-PCS | Mod: S$GLB,,, | Performed by: FAMILY MEDICINE

## 2019-10-14 RX ORDER — PROPRANOLOL HYDROCHLORIDE 20 MG/1
20 TABLET ORAL 2 TIMES DAILY
Qty: 60 TABLET | Refills: 2 | Status: SHIPPED | OUTPATIENT
Start: 2019-10-14 | End: 2020-03-19

## 2019-10-14 NOTE — PROGRESS NOTES
"Chief Complaint   Patient presents with    Well Woman       History and Physical:  No LMP recorded. Patient is postmenopausal.       Yumiko Gonzalez is a 59 y.o.  female who presents today for her routine annual GYN exam. The patient has no Gynecology complaints today. No bowel or bladder complaints. 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, postitive antismooth muscle antibody    Blood transfusion     Bronchitis     bronchospasm on occasion     Cancer     carcinoma in situ    Cervical neck pain with evidence of disc disease 3/22/2012    can bend neck    Cirrhosis     non-alcoholic, compensated    Colon polyps 3/22/2012    Depression 3/22/2012    Hx panic attacks    Diverticular disease 3/22/2012    never diverticulitis    Fatty liver 3/22/2012    Fibrocystic breast     Fine tremor     hands,chronic    Hepatosplenomegaly     Hypertension 2013    Knee pain, bilateral     chronic, with hands,feet,fingers also painful    Liver disease     "partially sclerosed liver" per pt    Migraines past Hx    Pleural effusion     small, compensated, good bilateral breath sounds per Dr Suresh GUTIERRES (postoperative nausea and vomiting)     twice after childbirth    Postpartum depression     Splenomegaly     Thrombocytopenia        Past Surgical History:   Procedure Laterality Date    ADENOIDECTOMY      CERVICAL CONIZATION   W/ LASER       SECTION      x3    COLONOSCOPY N/A 2016    Procedure: COLONOSCOPY;  Surgeon: James Palomares MD;  Location: [facility];  Service: Endoscopy;  Laterality: N/A;    EMBOLIZATION N/A 2018    Procedure: EMBOLIZATION, BLOOD VESSEL;  Surgeon: Joselin Grimes;  " Location: [facility];  Service: Radiology;  Laterality: N/A;    ENDOMETRIAL ABLATION      LIVER BIOPSY      PELVIC LAPAROSCOPY      TONSILLECTOMY      TUBAL LIGATION         MEDS:   Current Outpatient Medications on File Prior to Visit   Medication Sig Dispense Refill    alprazolam (XANAX) 0.25 MG tablet TAKE ONE TABLET BY MOUTH NIGHTLY AS NEEDED FOR INSOMNIA 30 tablet 2    buPROPion (WELLBUTRIN XL) 150 MG TB24 tablet Take 2 tablets (300 mg total) by mouth once daily. 180 tablet 3    cetirizine (ZYRTEC) 10 MG tablet Take 10 mg by mouth once daily.      fluconazole (DIFLUCAN) 150 MG Tab Take 1 tablet (150 mg total) by mouth once daily. (Patient taking differently: Take 150 mg by mouth daily as needed. ) 1 tablet 1    hydroCHLOROthiazide (HYDRODIURIL) 12.5 MG Tab Take 1 tablet (12.5 mg total) by mouth once daily. 90 tablet 1    loratadine (CLARITIN) 10 mg tablet Take 10 mg by mouth as needed.       metoprolol tartrate (LOPRESSOR) 25 MG tablet Take 1 tablet (25 mg total) by mouth 2 (two) times daily. 180 tablet 3    multivitamin (THERAGRAN) per tablet Take 1 tablet by mouth once daily.      nystatin-triamcinolone (MYCOLOG II) cream Apply to affected area twice daily as needed 60 g 3    triamcinolone (NASACORT) 55 mcg nasal inhaler Use 2 sprays by Nasal route once daily. 17 g 12    vitamin E 400 UNIT capsule Take 400 Units by mouth once daily.      albuterol (PROVENTIL) 2.5 mg /3 mL (0.083 %) nebulizer solution Take 3 mLs (2.5 mg total) by nebulization every 6 (six) hours as needed for Wheezing. 72 mL 2    diclofenac sodium (VOLTAREN) 1 % Gel Apply 2 g topically 3 (three) times daily. 1 Tube 3    lidocaine HCL 10 mg/ml, 1%, 10 mg/mL (1 %) injection Inhale 3ml via nebulizer four times daily as needed 350 mL 5    sodium chloride 7% 7 % nebulizer solution Use in nebulizer as directed 3 times a day for 30 days 240 mL 5     Current Facility-Administered Medications on File Prior to Visit   Medication Dose  Route Frequency Provider Last Rate Last Dose    EUFLEXXA 10 mg/mL(mw 2.4 -3.6 million) injection Syrg 20 mg  20 mg Intra-articular  Pop Schneider MD   20 mg at 17 1431       OB History        7    Para   6    Term   3            AB   1    Living           SAB   1    TAB        Ectopic        Multiple        Live Births                     Social History     Socioeconomic History    Marital status:      Spouse name: Not on file    Number of children: Not on file    Years of education: Not on file    Highest education level: Not on file   Occupational History    Not on file   Social Needs    Financial resource strain: Not on file    Food insecurity:     Worry: Not on file     Inability: Not on file    Transportation needs:     Medical: Not on file     Non-medical: Not on file   Tobacco Use    Smoking status: Former Smoker     Last attempt to quit: 2/3/2006     Years since quittin.7    Smokeless tobacco: Never Used   Substance and Sexual Activity    Alcohol use: Yes     Comment: rarely    Drug use: No    Sexual activity: Not Currently   Lifestyle    Physical activity:     Days per week: Not on file     Minutes per session: Not on file    Stress: Not on file   Relationships    Social connections:     Talks on phone: Not on file     Gets together: Not on file     Attends Anabaptist service: Not on file     Active member of club or organization: Not on file     Attends meetings of clubs or organizations: Not on file     Relationship status: Not on file   Other Topics Concern    Not on file   Social History Narrative    Not on file       Family History   Problem Relation Age of Onset    Breast cancer Maternal Grandmother     Diabetes Father     Heart disease Father     Breast cancer Mother     Heart disease Mother     Hypertension Mother     Diabetes Brother     Diabetes Sister     Breast cancer Maternal Aunt     Breast cancer Sister     Diabetes Sister      Emphysema Brother     Ovarian cancer Neg Hx          Past medical and surgical history reviewed.   I have reviewed the patient's medical history in detail and updated the computerized patient record.        Review of System:   General: no chills, fever, night sweats, weight gain or weight loss  Psychological: no depression or suicidal ideation  Breasts: no new or changing breast lumps, nipple discharge or masses.  Respiratory: no cough, shortness of breath, or wheezing  Cardiovascular: no chest pain or dyspnea on exertion  Gastrointestinal: no abdominal pain, change in bowel habits, or black or bloody stools  Genito-Urinary: no incontinence, urinary frequency/urgency or vulvar/vaginal symptoms, pelvic pain or abnormal vaginal bleeding.  Musculoskeletal: no gait disturbance or muscular weakness      Physical Exam:   /82   Wt 132.2 kg (291 lb 7.2 oz)   BMI 41.82 kg/m²   Constitutional: She appears alert and responsive. She appears well-developed, well-groomed, and well-nourished. No distress. OverWeight   HENT:   Head: Normocephalic and atraumatic.   Eyes: Conjunctivae and EOM are normal. No scleral icterus.   Neck: Symmetrical. Normal range of motion. Neck supple. No tracheal deviation present. THYROID: without masses or tenderness.  Cardiovascular: Normal rate, no rhythm abnormality noted. Extremities without swelling or edema, warm.    Pulmonary/Chest: Normal respiratory Effort. No distress or retractions. She exhibits no tenderness.  Breasts: are symmetrical.   Right breast exhibits no inverted nipple, no mass, no nipple discharge, no skin change and no tenderness.   Left breast exhibits no inverted nipple, no mass, no nipple discharge, no skin change and no tenderness.  Abdominal: Soft. She exhibits no distension, hernias or masses. There is no tenderness. No enlargement of liver edge or spleen.  There is no rebound and no guarding.   Genitourinary:    External rectal exam shows no thrombosed  external hemorrhoids, no lesions.     Pelvic exam was performed with patient supine.   No labial fusion, and symmetrical.    There is no rash, lesion or injury on the right labia.   There is no rash, lesion or injury on the left labia.   No bleeding and no signs of injury around the vaginal introitus, urethral meatus is normal size and without prolapse or lesions, urethra well supported. The cervix is visualized with no discharge, lesions or friability.   No vaginal discharge found.   No significant Cystocele, Enterocele or rectocele, and cervix and uterus well supported.   Bimanual exam:   The urethra is normal to palpation and there are no palpable vaginal wall masses.   Uterus is not deviated, not enlarged, not fixed, normal shape and not tender.   Cervix exhibits no motion tenderness.    Right adnexum displays no mass or nodularity and no tenderness.   Left adnexum displays no mass or nodularity and no tenderness.  Musculoskeletal: Normal range of motion.   Lymphadenopathy: No inguinal adenopathy present.   Neurological: She is alert and oriented to person, place, and time. Coordination normal.   Skin: Skin is warm and dry. She is not diaphoretic. No rashes, lesions or ulcers.   Psychiatric: She has a normal mood and affect, oriented to person, place, and time.      Assessment:   Normal annual GYN exam  1. Visit for screening mammogram  Liquid-based pap smear, screening    CANCELED: Mammo Digital Screening Bilat With CAD   2. Routine gynecological examination  Liquid-based pap smear, screening       Plan:   PAP  Mammogram  Follow up in 1 year.  Patient informed will be contacted with results within 2 weeks. Encouraged to please call back or email if she has not heard from us by then.

## 2019-10-14 NOTE — TELEPHONE ENCOUNTER
----- Message from Ashlee Andrade NP sent at 10/14/2019  2:37 PM CDT -----  Follow up with me in [place] in 4 months.

## 2019-10-14 NOTE — PROGRESS NOTES
Name: Yumiko Gonzalez  MRN: [id number]   CSN: [id number]      Date: 10-14-19      Referring physician:  Garrett Lee  [address] [place] [id number]    Subjective:      Chief Complaint: tremor    History of Present Illness (HPI):    Yumiko Gonzalez is a 59 y.o. right-handed female with HTN, nonalcoholic cirrhosis of liver, who presents today for an initial evaluation of tremor and is alone.     Developed tremor in her 30s just in R thumb or at least from what she can recall. Now involves BH (R>L).  Occurs daily and is bothersome. Gotten to the point that she struggles to keep food on fork.   Handwriting is not a problem, alma if she concentrates, it is not as bad. Still legible.   Does have problems with putting in earrings and picking up small coins/objects.  No issues with typing.   Uses funnel to pour liquids because of tremor.  No toruble drinking from cup.     She has talked to PCP about tremor. Told wellbutrin may help with it but has not noted improvement.   She is also on metoprolol for BP and told this may help tremor. No improvements thus far.    Nothing makes tremor better.  Fear, anxiety make it worse. Trying to do fine dexterity increases tremor.     Mother had voice tremor and dtr is starting with tremor.     Review of Systems   Constitutional: Negative for appetite change and fatigue.   HENT: Negative for drooling and trouble swallowing.    Eyes: Positive for visual disturbance (wears glasses).   Respiratory: Positive for shortness of breath (occasionally).    Cardiovascular: Positive for leg swelling (little bit). Negative for chest pain.   Gastrointestinal: Positive for constipation (occasional) and diarrhea (occasional).   Genitourinary: Negative.    Musculoskeletal: Positive for neck stiffness (DDD). Negative for gait problem.   Neurological: Positive for tremors and headaches (used to have migraines). Negative for dizziness and light-headedness.   Psychiatric/Behavioral:  Positive for sleep disturbance (not quality sleep). Negative for dysphoric mood. The patient is nervous/anxious (mostly situational).        Past Medical History: The patient  has a past medical history of Abnormal Pap smear, Anemia, Arthritis, Asthma, Autoimmune disease, Blood transfusion, Bronchitis, Cancer (1987), Cervical neck pain with evidence of disc disease (3/22/2012), Cirrhosis, Colon polyps (3/22/2012), Depression (3/22/2012), Diverticular disease (3/22/2012), Fatty liver (3/22/2012), Fibrocystic breast, Fine tremor, Hepatosplenomegaly, Hypertension (4/24/2013), Knee pain, bilateral, Lesion of right lung, Liver disease, Migraines (past Hx), Pleural effusion, PONV (postoperative nausea and vomiting), Postpartum depression, Splenomegaly, Thrombocytopenia, and Tremors of nervous system.    Social History: The patient  reports that she quit smoking about 13 years ago. She has never used smokeless tobacco. She reports that she drinks alcohol. She reports that she does not use drugs.    Family History: Their family history includes Breast cancer in her maternal grandmother and sister; Breast cancer (age of onset: 40) in her maternal cousin; Breast cancer (age of onset: 70) in her maternal aunt and mother; Diabetes in her brother, father, sister, and sister; Emphysema in her brother; Heart disease in her father and mother; Hypertension in her mother; Multiple sclerosis in her maternal aunt and maternal aunt; Tremor in her daughter and mother.    Allergies: No known drug allergies     Meds:   Current Outpatient Medications on File Prior to Visit   Medication Sig Dispense Refill    buPROPion (WELLBUTRIN XL) 150 MG TB24 tablet Take 2 tablets (300 mg total) by mouth once daily. 180 tablet 3    hydroCHLOROthiazide (HYDRODIURIL) 12.5 MG Tab Take 1 tablet (12.5 mg total) by mouth once daily. 90 tablet 1    loratadine (CLARITIN) 10 mg tablet Take 10 mg by mouth as needed.       metoprolol tartrate (LOPRESSOR) 25 MG  "tablet Take 1 tablet (25 mg total) by mouth 2 (two) times daily. 180 tablet 3    multivitamin (THERAGRAN) per tablet Take 1 tablet by mouth once daily.      vitamin E 400 UNIT capsule Take 400 Units by mouth once daily.      albuterol (PROVENTIL) 2.5 mg /3 mL (0.083 %) nebulizer solution Take 3 mLs (2.5 mg total) by nebulization every 6 (six) hours as needed for Wheezing. (Patient not taking: Reported on 10/14/2019) 72 mL 2    alprazolam (XANAX) 0.25 MG tablet TAKE ONE TABLET BY MOUTH NIGHTLY AS NEEDED FOR INSOMNIA (Patient not taking: Reported on 10/14/2019) 30 tablet 2    cetirizine (ZYRTEC) 10 MG tablet Take 10 mg by mouth once daily.      diclofenac sodium (VOLTAREN) 1 % Gel Apply 2 g topically 3 (three) times daily. 1 Tube 3    fluconazole (DIFLUCAN) 150 MG Tab Take 1 tablet (150 mg total) by mouth once daily. (Patient not taking: Reported on 10/14/2019) 1 tablet 1    lidocaine HCL 10 mg/ml, 1%, 10 mg/mL (1 %) injection Inhale 3ml via nebulizer four times daily as needed (Patient not taking: Reported on 10/14/2019) 350 mL 5    nystatin-triamcinolone (MYCOLOG II) cream Apply to affected area twice daily as needed (Patient not taking: Reported on 10/14/2019) 60 g 3    sodium chloride 7% 7 % nebulizer solution Use in nebulizer as directed 3 times a day for 30 days (Patient not taking: Reported on 10/14/2019) 240 mL 5    triamcinolone (NASACORT) 55 mcg nasal inhaler Use 2 sprays by Nasal route once daily. (Patient not taking: Reported on 10/14/2019) 17 g 12     Current Facility-Administered Medications on File Prior to Visit   Medication Dose Route Frequency Provider Last Rate Last Dose    EUFLEXXA 10 mg/mL(mw 2.4 -3.6 million) injection Syrg 20 mg  20 mg Intra-articular  Pop Schneider MD   20 mg at 12/27/17 1431       Objective:     Physical Exam:    Vitals:    10/14/19 1315   BP: (!) 144/72   Pulse: 72   Resp: 20   Weight: 132.3 kg (291 lb 12.5 oz)   Height: 5' 10" (1.778 m)     Body mass " index is 41.87 kg/m².    Constitutional  Well-developed, well-nourished, appears stated age   I Cardiovascular  Radial pulses 2+ and symmetric, no LE edema bilaterally     ..  Neurological    * Mental status  MOCA = deferred    - Orientation Oriented to: person, place, time and situation     - Memory     Intact recent and remote     - Attention/concentration  Normal     - Language  spontaneous, fluent     - Fund of knowledge  Aware of current events     - Executive  Well-organized thoughts     ..  * Cranial nerves       - CN II  visual fields full to confrontation     - CN III, IV, VI  Extraocular movements full, normal pursuits and saccades     - CN V  Sensation V1 - V3 intact     - CN VII  Face strong and symmetric bilaterally     - CN VIII  Hearing intact bilaterally     - CN IX, X  Palate raises midline and symmetric     - CN XI  SCM and trapezius 5/5 bilaterally     - CN XII  Tongue midline   * Motor  Muscle bulk normal, strength 5/5 throughout   * Sensory   Intact to LT throughout   * Coordination  No dysmetria with finger-to-nose   * Gait  See below.           * Specialized movement exam  No hypophonic speech.    No facial masking.    Subtle cogwheel rigidity RUE only but does not increase on diversion. Otherwise, tone is normal.       GREG: No bradykinesia.    Subtle rest tremor RH and LLE on diversion only.   Near mild postural tremor RH and more subtle LH.   Subtle tremor with kinesis BH.  LH near mild with intention and mild intention with RH.   Tremor is rhythmic with mod frequency and low amplitude.   No evidence of tremor in head, chin, voice.      No other dystonia, chorea, athetosis, myoclonus, or tics.       No motor impersistence.   Normal-based gait.   No shortened stride length.   Slightly reduced R arm swing.                        Laboratory Results:  No visits with results within 3 Month(s) from this visit.   Latest known visit with results is:   Lab Visit on 06/28/2019   Component Date Value  Ref Range Status    AFP 06/28/2019 3.3  0.0 - 8.4 ng/mL Final    WBC 06/28/2019 2.72* 3.90 - 12.70 K/uL Final    RBC 06/28/2019 4.57  4.00 - 5.40 M/uL Final    Hemoglobin 06/28/2019 14.0  12.0 - 16.0 g/dL Final    Hematocrit 06/28/2019 42.7  37.0 - 48.5 % Final    Mean Corpuscular Volume 06/28/2019 93  82 - 98 fL Final    Mean Corpuscular Hemoglobin 06/28/2019 30.6  27.0 - 31.0 pg Final    Mean Corpuscular Hemoglobin Conc 06/28/2019 32.8  32.0 - 36.0 g/dL Final    RDW 06/28/2019 14.0  11.5 - 14.5 % Final    Platelets 06/28/2019 57* 150 - 350 K/uL Final    MPV 06/28/2019 11.6  9.2 - 12.9 fL Final    Immature Granulocytes 06/28/2019 0.4  0.0 - 0.5 % Final    Gran # (ANC) 06/28/2019 1.5* 1.8 - 7.7 K/uL Final    Immature Grans (Abs) 06/28/2019 0.01  0.00 - 0.04 K/uL Final    Lymph # 06/28/2019 0.6* 1.0 - 4.8 K/uL Final    Mono # 06/28/2019 0.4  0.3 - 1.0 K/uL Final    Eos # 06/28/2019 0.2  0.0 - 0.5 K/uL Final    Baso # 06/28/2019 0.02  0.00 - 0.20 K/uL Final    nRBC 06/28/2019 0  0 /100 WBC Final    Gran% 06/28/2019 56.2  38.0 - 73.0 % Final    Lymph% 06/28/2019 23.2  18.0 - 48.0 % Final    Mono% 06/28/2019 12.9  4.0 - 15.0 % Final    Eosinophil% 06/28/2019 6.6  0.0 - 8.0 % Final    Basophil% 06/28/2019 0.7  0.0 - 1.9 % Final    Differential Method 06/28/2019 Automated   Final    Sodium 06/28/2019 138  136 - 145 mmol/L Final    Potassium 06/28/2019 3.9  3.5 - 5.1 mmol/L Final    Chloride 06/28/2019 106  95 - 110 mmol/L Final    CO2 06/28/2019 27  23 - 29 mmol/L Final    Glucose 06/28/2019 130* 70 - 110 mg/dL Final    BUN, Bld 06/28/2019 15  6 - 20 mg/dL Final    Creatinine 06/28/2019 0.7  0.5 - 1.4 mg/dL Final    Calcium 06/28/2019 9.5  8.7 - 10.5 mg/dL Final    Total Protein 06/28/2019 5.8* 6.0 - 8.4 g/dL Final    Albumin 06/28/2019 3.3* 3.5 - 5.2 g/dL Final    Total Bilirubin 06/28/2019 0.9  0.1 - 1.0 mg/dL Final    Alkaline Phosphatase 06/28/2019 101  55 - 135 U/L Final    AST  06/28/2019 49* 10 - 40 U/L Final    ALT 06/28/2019 40  10 - 44 U/L Final    Anion Gap 06/28/2019 5* 8 - 16 mmol/L Final    eGFR if African American 06/28/2019 >60.0  >60 mL/min/1.73 m^2 Final    eGFR if non African American 06/28/2019 >60.0  >60 mL/min/1.73 m^2 Final    Prothrombin Time 06/28/2019 12.4  9.0 - 12.5 sec Final    INR 06/28/2019 1.2  0.8 - 1.2 Final           Assessment and Plan     Essential tremor    Abnormal movements  Comments:  tremor  Orders:  -     Vitamin B1; Future; Expected date: 10/14/2019  -     Vitamin B12; Future; Expected date: 10/14/2019  -     T4, FREE; Future; Expected date: 10/14/2019  -     TSH; Future; Expected date: 10/14/2019        Medical Decision Making:    Tremor is most consistent today with ET. She does have subtle rest tremor on diversion only, will watch.     Discussed ET with her. She was concerned that this may be Parkinson's disease. Discussed cardinal motor features with her. No evidence of this today.     Wellbutrin may increase tremor. She is taking this for depression/anxiety.    She is on metoprolol. She has an appt with her PCP this afternoon. I have asked her to see if he is willing to change this to propranolol as this is better for tremor control. She will let me know.     Another option is primidone but I would want to clear this with Dr. Prince (hepatology).     Follow up 4 months in [place]. I will have her see Dr. Wise in [place] after the next appt.      I spent 55 minutes face-to-face with the patient with >50% of the time spent with counseling and education regarding:  - results of data, diagnosis, and recommendations stated above  - the prognosis of tremor  - risks and benefits of Wellbutrin, metoprolol, propranolol  - importance of diet and exercise    Ashlee Andrade, DNP, NP-C  Division of Movement and Memory Disorders  [facility]  [phone]

## 2019-10-14 NOTE — LETTER
October 14, 2019      Garrett Lee  [address] [address]           [facility]  [address] [place] [address]  Phone: [phone]  Fax: [phone]          Patient: Yumiko Gonzalez   MR Number: [id number]   YOB: 1960   Date of Visit: 10/14/2019       Dear Garrett Lee:    Thank you for referring Yumiko Gonzalez to me for evaluation. Attached you will find relevant portions of my assessment and plan of care.    If you have questions, please do not hesitate to call me. I look forward to following Yumiko Gonzalez along with you.    Sincerely,    Ashlee Andrade, NP    Enclosure  CC:  No Recipients    If you would like to receive this communication electronically, please contact [email or web address] or [phone] to request more information on [facility] access.    For providers and/or their staff who would like to refer a patient to [facility], please contact us through our one-stop-shop provider referral line, Yarely Prince, at [phone].    If you feel you have received this communication in error or would no longer like to receive these types of communications, please e-mail [email or web address]

## 2019-10-14 NOTE — PROGRESS NOTES
THIS DOCUMENT WAS MADE IN PART WITH VOICE RECOGNITION SOFTWARE.  OCCASIONALLY THIS SOFTWARE WILL MISINTERPRET WORDS OR PHRASES.      Yumiko WILHELM Carlos  1960    Yumiko was seen today for annual exam.    Diagnoses and all orders for this visit:    Preventative health care  -     NM Myocardial Perfusion Spect Multi Pharmacologic; Future  -     Comprehensive metabolic panel; Future  -     Lipid panel; Future  -     CBC auto differential; Future    Essential hypertension  -     NM Myocardial Perfusion Spect Multi Pharmacologic; Future  -     EKG 12-lead    Nonalcoholic steatohepatitis    At risk for coronary artery disease  -     NM Myocardial Perfusion Spect Multi Pharmacologic; Future    Chest pain, unspecified type  -     NM Myocardial Perfusion Spect Multi Pharmacologic; Future  -     EKG 12-lead    Tremor    Other orders  -     propranolol (INDERAL) 20 MG tablet; Take 1 tablet (20 mg total) by mouth 2 (two) times daily.     continue metoprolol until stress test.  Recommend 1/2 tablet night before the stress test, hold the morning of the stress test.  Then transition to propranolol.  Will titrate up or down depending on tolerability and affect on tremor.      Subjective     Chief Complaint   Patient presents with    Annual Exam       HPI:    Annual    Still seeing Dr. Gutiérrez, last CT in 11/2018, will be due to see him     Upper left chest pain, random  Not exertional, rubbing helps,   No nausea, no diaphoresis, no shortness of breath from baseline      Active Ambulatory Problems     Diagnosis Date Noted    Acute bronchospasm 10/13/2010    Hypermetropia 09/01/2010    Enthesopathy of ankle and tarsus, unspecified 10/26/2011    Cervical neck pain with evidence of disc disease 03/22/2012    Fatty liver 03/22/2012    History of cervical cancer 03/22/2012    Depression 03/22/2012    Diverticular disease 03/22/2012    Colon polyps 03/22/2012    Hypertension 04/24/2013    Hx of colonic polyps 12/12/2016     Morbid obesity due to excess calories 04/11/2017    Morbid obesity with BMI of 40.0-44.9, adult 04/11/2017    Splenomegaly 04/11/2017    History of posttraumatic stress disorder (PTSD) 06/06/2017    Nonalcoholic steatohepatitis 06/20/2017    Cirrhosis of liver without ascites 06/20/2017    Cirrhosis 07/13/2017    Mass of right lung 07/24/2018     Resolved Ambulatory Problems     Diagnosis Date Noted    DUNG (obstructive sleep apnea) 03/22/2012    Postop check 05/24/2013    Preop cardiovascular exam 04/18/2017     Past Medical History:   Diagnosis Date    Abnormal Pap smear     Anemia     Arthritis     Asthma     Autoimmune disease     Blood transfusion     Bronchitis     Cancer 1987    Fibrocystic breast     Fine tremor     Hepatosplenomegaly     Knee pain, bilateral     Lesion of right lung     Liver disease     Migraines past Hx    Pleural effusion     PONV (postoperative nausea and vomiting)     Postpartum depression     Thrombocytopenia     Tremors of nervous system        Current Outpatient Medications on File Prior to Visit   Medication Sig Dispense Refill    albuterol (PROVENTIL) 2.5 mg /3 mL (0.083 %) nebulizer solution Take 3 mLs (2.5 mg total) by nebulization every 6 (six) hours as needed for Wheezing. 72 mL 2    alprazolam (XANAX) 0.25 MG tablet TAKE ONE TABLET BY MOUTH NIGHTLY AS NEEDED FOR INSOMNIA 30 tablet 2    buPROPion (WELLBUTRIN XL) 150 MG TB24 tablet Take 2 tablets (300 mg total) by mouth once daily. 180 tablet 3    cetirizine (ZYRTEC) 10 MG tablet Take 10 mg by mouth once daily.      diclofenac sodium (VOLTAREN) 1 % Gel Apply 2 g topically 3 (three) times daily. 1 Tube 3    fluconazole (DIFLUCAN) 150 MG Tab Take 1 tablet (150 mg total) by mouth once daily. 1 tablet 1    hydroCHLOROthiazide (HYDRODIURIL) 12.5 MG Tab Take 1 tablet (12.5 mg total) by mouth once daily. 90 tablet 1    lidocaine HCL 10 mg/ml, 1%, 10 mg/mL (1 %) injection Inhale 3ml via nebulizer  four times daily as needed 350 mL 5    loratadine (CLARITIN) 10 mg tablet Take 10 mg by mouth as needed.       multivitamin (THERAGRAN) per tablet Take 1 tablet by mouth once daily.      nystatin-triamcinolone (MYCOLOG II) cream Apply to affected area twice daily as needed 60 g 3    sodium chloride 7% 7 % nebulizer solution Use in nebulizer as directed 3 times a day for 30 days 240 mL 5    triamcinolone (NASACORT) 55 mcg nasal inhaler Use 2 sprays by Nasal route once daily. 17 g 12    vitamin E 400 UNIT capsule Take 400 Units by mouth once daily.      [DISCONTINUED] metoprolol tartrate (LOPRESSOR) 25 MG tablet Take 1 tablet (25 mg total) by mouth 2 (two) times daily. 180 tablet 3     Current Facility-Administered Medications on File Prior to Visit   Medication Dose Route Frequency Provider Last Rate Last Dose    EUFLEXXA 10 mg/mL(mw 2.4 -3.6 million) injection Syrg 20 mg  20 mg Intra-articular  Pop Schneider MD   20 mg at 17 1431       Review of patient's allergies indicates:   Allergen Reactions    No known drug allergies        Family History   Problem Relation Age of Onset    Breast cancer Maternal Grandmother     Diabetes Father     Heart disease Father     Breast cancer Mother 70    Heart disease Mother     Hypertension Mother     Tremor Mother     Diabetes Brother     Diabetes Sister     Breast cancer Maternal Aunt 70    Multiple sclerosis Maternal Aunt     Breast cancer Sister     Diabetes Sister     Emphysema Brother     Breast cancer Maternal Cousin 40    Tremor Daughter     Multiple sclerosis Maternal Aunt     Ovarian cancer Neg Hx     Parkinsonism Neg Hx        Social History     Tobacco Use    Smoking status: Former Smoker     Last attempt to quit: 2/3/2006     Years since quittin.7    Smokeless tobacco: Never Used   Substance Use Topics    Alcohol use: Yes     Comment: rarely    Drug use: No         Review of Systems   Constitutional: Positive for  unexpected weight change.   HENT: Negative.    Respiratory: Negative.    Cardiovascular: Positive for chest pain.   Gastrointestinal: Negative.    Genitourinary: Negative.    Musculoskeletal: Positive for arthralgias.   Neurological: Negative.        Objective     Physical Exam   Constitutional: She is oriented to person, place, and time. She appears well-developed and well-nourished.  Non-toxic appearance. No distress.   HENT:   Head: Normocephalic and atraumatic.   Right Ear: Tympanic membrane, external ear and ear canal normal.   Left Ear: Tympanic membrane, external ear and ear canal normal.   Nose: Nose normal.   Mouth/Throat: Oropharynx is clear and moist. No oropharyngeal exudate.   Eyes: Pupils are equal, round, and reactive to light. Conjunctivae and EOM are normal. Right eye exhibits no discharge. Left eye exhibits no discharge. No scleral icterus.   Neck: Normal range of motion. Neck supple. No JVD present. No tracheal deviation present. No thyromegaly present.   Cardiovascular: Normal rate, regular rhythm, normal heart sounds and intact distal pulses. PMI is not displaced. Exam reveals no gallop and no friction rub.   No murmur heard.  Pulmonary/Chest: Effort normal and breath sounds normal. No respiratory distress. She has no wheezes. She has no rales.   Abdominal: Soft. Bowel sounds are normal. She exhibits no distension and no mass. There is no tenderness. There is no rebound and no guarding.   Lymphadenopathy:     She has no cervical adenopathy.   Neurological: She is alert and oriented to person, place, and time. She displays normal reflexes. No cranial nerve deficit. She exhibits normal muscle tone.   Skin: No rash noted. She is not diaphoretic.   Psychiatric: She has a normal mood and affect. Her behavior is normal.   Vitals reviewed.      Vitals:    10/14/19 1536 10/14/19 1933   BP: (!) 128/90 124/88   BP Location: Left arm    Patient Position: Sitting    BP Method: Large (Manual)    Pulse: 64   "  Resp: 20    Temp: 98 °F (36.7 °C)    TempSrc: Oral    Weight: 132.3 kg (291 lb 10.7 oz)    Height: 5' 10" (1.778 m)        MOST RECENT LABS IN OUR ELECTRONIC MEDICAL RECORD:     Results for orders placed or performed in visit on 06/28/19   AFP tumor marker   Result Value Ref Range    AFP 3.3 0.0 - 8.4 ng/mL   CBC auto differential   Result Value Ref Range    WBC 2.72 (L) 3.90 - 12.70 K/uL    RBC 4.57 4.00 - 5.40 M/uL    Hemoglobin 14.0 12.0 - 16.0 g/dL    Hematocrit 42.7 37.0 - 48.5 %    Mean Corpuscular Volume 93 82 - 98 fL    Mean Corpuscular Hemoglobin 30.6 27.0 - 31.0 pg    Mean Corpuscular Hemoglobin Conc 32.8 32.0 - 36.0 g/dL    RDW 14.0 11.5 - 14.5 %    Platelets 57 (L) 150 - 350 K/uL    MPV 11.6 9.2 - 12.9 fL    Immature Granulocytes 0.4 0.0 - 0.5 %    Gran # (ANC) 1.5 (L) 1.8 - 7.7 K/uL    Immature Grans (Abs) 0.01 0.00 - 0.04 K/uL    Lymph # 0.6 (L) 1.0 - 4.8 K/uL    Mono # 0.4 0.3 - 1.0 K/uL    Eos # 0.2 0.0 - 0.5 K/uL    Baso # 0.02 0.00 - 0.20 K/uL    nRBC 0 0 /100 WBC    Gran% 56.2 38.0 - 73.0 %    Lymph% 23.2 18.0 - 48.0 %    Mono% 12.9 4.0 - 15.0 %    Eosinophil% 6.6 0.0 - 8.0 %    Basophil% 0.7 0.0 - 1.9 %    Differential Method Automated    Comprehensive metabolic panel   Result Value Ref Range    Sodium 138 136 - 145 mmol/L    Potassium 3.9 3.5 - 5.1 mmol/L    Chloride 106 95 - 110 mmol/L    CO2 27 23 - 29 mmol/L    Glucose 130 (H) 70 - 110 mg/dL    BUN, Bld 15 6 - 20 mg/dL    Creatinine 0.7 0.5 - 1.4 mg/dL    Calcium 9.5 8.7 - 10.5 mg/dL    Total Protein 5.8 (L) 6.0 - 8.4 g/dL    Albumin 3.3 (L) 3.5 - 5.2 g/dL    Total Bilirubin 0.9 0.1 - 1.0 mg/dL    Alkaline Phosphatase 101 55 - 135 U/L    AST 49 (H) 10 - 40 U/L    ALT 40 10 - 44 U/L    Anion Gap 5 (L) 8 - 16 mmol/L    eGFR if African American >60.0 >60 mL/min/1.73 m^2    eGFR if non African American >60.0 >60 mL/min/1.73 m^2   Protime-INR   Result Value Ref Range    Prothrombin Time 12.4 9.0 - 12.5 sec    INR 1.2 0.8 - 1.2         Age " specific and appropriate preventative healthcare discussed/ health maintenance reviewed. Discussed healthy diet and regular exercise. Discussed age-appropriate preventative screening tests and recommendations. Discussed recommended follow-up based on age and conditions.

## 2019-10-14 NOTE — PATIENT INSTRUCTIONS
Since you are seeing your PCP today, please see if he is willing to change metoprolol to propranolol. Propranolol is more effective for tremor.  If this is not an option for you, we can look at other medications for tremor control.

## 2019-10-16 ENCOUNTER — LAB VISIT (OUTPATIENT)
Dept: LAB | Facility: HOSPITAL | Age: 59
End: 2019-10-16
Attending: FAMILY MEDICINE
Payer: COMMERCIAL

## 2019-10-16 DIAGNOSIS — R25.9 ABNORMAL MOVEMENTS: ICD-10-CM

## 2019-10-16 LAB
T4 FREE SERPL-MCNC: 0.9 NG/DL (ref 0.71–1.51)
TSH SERPL DL<=0.005 MIU/L-ACNC: 1.44 UIU/ML (ref 0.4–4)
VIT B12 SERPL-MCNC: 1587 PG/ML (ref 210–950)

## 2019-10-16 PROCEDURE — 82607 VITAMIN B-12: CPT

## 2019-10-16 PROCEDURE — 84439 ASSAY OF FREE THYROXINE: CPT

## 2019-10-16 PROCEDURE — 84443 ASSAY THYROID STIM HORMONE: CPT

## 2019-10-16 PROCEDURE — 84425 ASSAY OF VITAMIN B-1: CPT

## 2019-10-19 LAB — VIT B1 BLD-MCNC: 63 UG/L (ref 38–122)

## 2019-10-28 RX ORDER — BUPROPION HYDROCHLORIDE 150 MG/1
300 TABLET ORAL DAILY
Qty: 180 TABLET | Refills: 3 | Status: SHIPPED | OUTPATIENT
Start: 2019-10-28 | End: 2020-12-18 | Stop reason: SDUPTHER

## 2019-11-01 ENCOUNTER — HOSPITAL ENCOUNTER (OUTPATIENT)
Dept: RADIOLOGY | Facility: HOSPITAL | Age: 59
Discharge: HOME | End: 2019-11-01
Attending: FAMILY MEDICINE
Payer: COMMERCIAL

## 2019-11-01 ENCOUNTER — CLINICAL SUPPORT (OUTPATIENT)
Dept: CARDIOLOGY | Facility: CLINIC | Age: 59
End: 2019-11-01
Attending: FAMILY MEDICINE
Payer: COMMERCIAL

## 2019-11-01 VITALS — WEIGHT: 291 LBS | BODY MASS INDEX: 41.66 KG/M2 | HEIGHT: 70 IN

## 2019-11-01 DIAGNOSIS — R07.9 CHEST PAIN, UNSPECIFIED TYPE: ICD-10-CM

## 2019-11-01 DIAGNOSIS — Z91.89 AT RISK FOR CORONARY ARTERY DISEASE: ICD-10-CM

## 2019-11-01 DIAGNOSIS — I10 ESSENTIAL HYPERTENSION: ICD-10-CM

## 2019-11-01 LAB
CV STRESS BASE HR: 68 BPM
DIASTOLIC BLOOD PRESSURE: 79 MMHG
NUC REST EJECTION FRACTION: 63
OHS CV CPX 1 MINUTE RECOVERY HEART RATE: 76 BPM
OHS CV CPX 85 PERCENT MAX PREDICTED HEART RATE MALE: 130
OHS CV CPX MAX PREDICTED HEART RATE: 153
OHS CV CPX PATIENT IS FEMALE: 1
OHS CV CPX PATIENT IS MALE: 0
OHS CV CPX PEAK DIASTOLIC BLOOD PRESSURE: 91 MMHG
OHS CV CPX PEAK HEAR RATE: 81 BPM
OHS CV CPX PEAK RATE PRESSURE PRODUCT: NORMAL
OHS CV CPX PEAK SYSTOLIC BLOOD PRESSURE: 152 MMHG
OHS CV CPX PERCENT MAX PREDICTED HEART RATE ACHIEVED: 53
OHS CV CPX RATE PRESSURE PRODUCT PRESENTING: 9860
STRESS ECHO TARGET HR: 136 BPM
SYSTOLIC BLOOD PRESSURE: 145 MMHG

## 2019-11-01 PROCEDURE — 78452 STRESS TEST WITH MYOCARDIAL PERFUSION (CUPID ONLY): ICD-10-PCS | Mod: 26,,, | Performed by: INTERNAL MEDICINE

## 2019-11-01 PROCEDURE — 78452 HT MUSCLE IMAGE SPECT MULT: CPT | Mod: 26,,, | Performed by: INTERNAL MEDICINE

## 2019-11-01 PROCEDURE — 99999 PR PBB SHADOW E&M-EST. PATIENT-LVL I: ICD-10-PCS | Mod: PBBFAC,,,

## 2019-11-01 PROCEDURE — 93015 STRESS TEST WITH MYOCARDIAL PERFUSION (CUPID ONLY): ICD-10-PCS | Mod: ,,, | Performed by: INTERNAL MEDICINE

## 2019-11-01 PROCEDURE — 93015 CV STRESS TEST SUPVJ I&R: CPT | Mod: ,,, | Performed by: INTERNAL MEDICINE

## 2019-11-01 PROCEDURE — 99999 PR PBB SHADOW E&M-EST. PATIENT-LVL I: CPT | Mod: PBBFAC,,,

## 2019-11-05 ENCOUNTER — PATIENT MESSAGE (OUTPATIENT)
Dept: FAMILY MEDICINE | Facility: CLINIC | Age: 59
End: 2019-11-05

## 2019-11-07 ENCOUNTER — PATIENT MESSAGE (OUTPATIENT)
Dept: FAMILY MEDICINE | Facility: CLINIC | Age: 59
End: 2019-11-07

## 2019-11-22 ENCOUNTER — OFFICE VISIT (OUTPATIENT)
Dept: OPTOMETRY | Facility: CLINIC | Age: 59
End: 2019-11-22
Payer: COMMERCIAL

## 2019-11-22 DIAGNOSIS — H52.203 HYPEROPIA WITH ASTIGMATISM AND PRESBYOPIA, BILATERAL: ICD-10-CM

## 2019-11-22 DIAGNOSIS — Z01.00 EXAMINATION OF EYES AND VISION: Primary | ICD-10-CM

## 2019-11-22 DIAGNOSIS — H52.4 HYPEROPIA WITH ASTIGMATISM AND PRESBYOPIA, BILATERAL: ICD-10-CM

## 2019-11-22 DIAGNOSIS — H52.03 HYPEROPIA WITH ASTIGMATISM AND PRESBYOPIA, BILATERAL: ICD-10-CM

## 2019-11-22 PROCEDURE — 92015 DETERMINE REFRACTIVE STATE: CPT | Mod: S$GLB,,, | Performed by: OPTOMETRIST

## 2019-11-22 PROCEDURE — 92014 PR EYE EXAM, EST PATIENT,COMPREHESV: ICD-10-PCS | Mod: S$GLB,,, | Performed by: OPTOMETRIST

## 2019-11-22 PROCEDURE — 99999 PR PBB SHADOW E&M-EST. PATIENT-LVL II: CPT | Mod: PBBFAC,,, | Performed by: OPTOMETRIST

## 2019-11-22 PROCEDURE — 92015 PR REFRACTION: ICD-10-PCS | Mod: S$GLB,,, | Performed by: OPTOMETRIST

## 2019-11-22 PROCEDURE — 99999 PR PBB SHADOW E&M-EST. PATIENT-LVL II: ICD-10-PCS | Mod: PBBFAC,,, | Performed by: OPTOMETRIST

## 2019-11-22 PROCEDURE — 92014 COMPRE OPH EXAM EST PT 1/>: CPT | Mod: S$GLB,,, | Performed by: OPTOMETRIST

## 2019-11-22 NOTE — PROGRESS NOTES
HPI     Routine eye exam-dle-11/9/18    Pt complains of slight blurred vision at distance and near since last   appt. Wears bifocals for everyday and a pair of rx reading glasses for   computer-both needing updated rx. Complains of difficulty driving at night   with headlight glare. Denies any eye pain. No flashes, no new floaters.     Last edited by Nory Cowan on 11/22/2019  3:28 PM. (History)        ROS     Positive for: Eyes    Negative for: Constitutional, Gastrointestinal, Neurological, Skin,   Genitourinary, Musculoskeletal, HENT, Endocrine, Cardiovascular,   Respiratory, Psychiatric, Allergic/Imm, Heme/Lymph    Last edited by CHARLES Basilio, OD on 11/22/2019  3:38 PM. (History)        Assessment /Plan     For exam results, see Encounter Report.    Examination of eyes and vision    Hyperopia with astigmatism and presbyopia, bilateral      1. Ocular health exam OU  2. Updated specs rx, gave copy for PAL and computer readers   Slight myopic shift may be due to very early cataracts  Internal health otherwise normal OU    Discussed and educated patient on current findings /plan.  RTC 1 year, prn if any changes / issues

## 2019-12-19 ENCOUNTER — TELEPHONE (OUTPATIENT)
Dept: GASTROENTEROLOGY | Facility: CLINIC | Age: 59
End: 2019-12-19

## 2019-12-19 ENCOUNTER — TELEPHONE (OUTPATIENT)
Dept: HEPATOLOGY | Facility: CLINIC | Age: 59
End: 2019-12-19

## 2019-12-19 NOTE — TELEPHONE ENCOUNTER
----- Message from Alana Campo MA sent at 12/19/2019 12:45 PM CST -----  Type: Needs Medical Advice    Who Called: Patient    Best Call Back Number: [phone]    Additional Information: Patient was referred by Dr. Hetal Prince for EGD since July 2019, has not received a call regarding scheduling. Please contact patient to schedule thank you

## 2019-12-19 NOTE — TELEPHONE ENCOUNTER
----- Message from Winter Thomas sent at 12/19/2019 12:50 PM CST -----  Contact: [employer] request  Message     Appointment Request From: Yumiko Gonzalez    With Provider: Hetal Prince MD [Y[facility] Linder - Hepatology]    Preferred Date Range: 2/3/2020 - 2/28/2020    Preferred Times: Wednesday Morning, Thursday Morning, Friday Morning    Reason for visit: Existing Patient    Comments:  6 month follow up   (if possible on the [facility]. if [place] [facility] [place] 930 before 2)

## 2019-12-19 NOTE — TELEPHONE ENCOUNTER
Spoke with pt. Scheduled ordered EGD. Pt verbalized understanding.   Prep instructions & reminder letter given to pt in clinic.

## 2019-12-31 ENCOUNTER — HOSPITAL ENCOUNTER (OUTPATIENT)
Dept: RADIOLOGY | Facility: HOSPITAL | Age: 59
Discharge: HOME | End: 2019-12-31
Attending: INTERNAL MEDICINE
Payer: COMMERCIAL

## 2019-12-31 DIAGNOSIS — K74.69 OTHER CIRRHOSIS OF LIVER: ICD-10-CM

## 2019-12-31 PROCEDURE — 76705 ECHO EXAM OF ABDOMEN: CPT | Mod: 26,,, | Performed by: RADIOLOGY

## 2019-12-31 PROCEDURE — 76705 ECHO EXAM OF ABDOMEN: CPT | Mod: TC,PO

## 2019-12-31 PROCEDURE — 76705 US ABDOMEN LIMITED: ICD-10-PCS | Mod: 26,,, | Performed by: RADIOLOGY

## 2020-01-02 ENCOUNTER — TELEPHONE (OUTPATIENT)
Dept: HEPATOLOGY | Facility: CLINIC | Age: 60
End: 2020-01-02

## 2020-01-02 NOTE — TELEPHONE ENCOUNTER
----- Message from Antonio Martin MA sent at 12/31/2019  4:44 PM CST -----      ----- Message -----  From: Hetal Prince MD  Sent: 12/31/2019   4:44 PM CST  To: Suresh Fong Staff    Results consistent with cirrhosis.  Tumor marker normal.

## 2020-01-02 NOTE — TELEPHONE ENCOUNTER
----- Message from Antonio Martin MA sent at 12/31/2019  4:49 PM CST -----      ----- Message -----  From: Hetal Prince MD  Sent: 12/31/2019   4:45 PM CST  To: Suresh Fong Staff    Pl inform patient:  U/s showed no tumor in the liver.  Continue liver cancer surveillance with AFP and U/S every 6 months, next due in June 2020.

## 2020-01-21 ENCOUNTER — PATIENT MESSAGE (OUTPATIENT)
Dept: FAMILY MEDICINE | Facility: CLINIC | Age: 60
End: 2020-01-21

## 2020-01-28 ENCOUNTER — HOSPITAL ENCOUNTER (OUTPATIENT)
Facility: HOSPITAL | Age: 60
Discharge: HOME | End: 2020-01-28
Attending: INTERNAL MEDICINE | Admitting: INTERNAL MEDICINE
Payer: COMMERCIAL

## 2020-01-28 ENCOUNTER — ANESTHESIA (OUTPATIENT)
Dept: ENDOSCOPY | Facility: HOSPITAL | Age: 60
End: 2020-01-28
Payer: COMMERCIAL

## 2020-01-28 ENCOUNTER — ANESTHESIA EVENT (OUTPATIENT)
Dept: ENDOSCOPY | Facility: HOSPITAL | Age: 60
End: 2020-01-28
Payer: COMMERCIAL

## 2020-01-28 DIAGNOSIS — K74.60 CIRRHOSIS: ICD-10-CM

## 2020-01-28 PROCEDURE — 43235 EGD DIAGNOSTIC BRUSH WASH: CPT | Mod: PO | Performed by: INTERNAL MEDICINE

## 2020-01-28 PROCEDURE — 63600175 PHARM REV CODE 636 W HCPCS: Mod: PO | Performed by: NURSE ANESTHETIST, CERTIFIED REGISTERED

## 2020-01-28 PROCEDURE — D9220A PRA ANESTHESIA: Mod: CRNA,,, | Performed by: NURSE ANESTHETIST, CERTIFIED REGISTERED

## 2020-01-28 PROCEDURE — 37000008 HC ANESTHESIA 1ST 15 MINUTES: Mod: PO | Performed by: INTERNAL MEDICINE

## 2020-01-28 PROCEDURE — D9220A PRA ANESTHESIA: ICD-10-PCS | Mod: ANES,,, | Performed by: ANESTHESIOLOGY

## 2020-01-28 PROCEDURE — 63600175 PHARM REV CODE 636 W HCPCS: Mod: PO | Performed by: INTERNAL MEDICINE

## 2020-01-28 PROCEDURE — 43235 PR EGD, FLEX, DIAGNOSTIC: ICD-10-PCS | Mod: ,,, | Performed by: INTERNAL MEDICINE

## 2020-01-28 PROCEDURE — D9220A PRA ANESTHESIA: ICD-10-PCS | Mod: CRNA,,, | Performed by: NURSE ANESTHETIST, CERTIFIED REGISTERED

## 2020-01-28 PROCEDURE — 43235 EGD DIAGNOSTIC BRUSH WASH: CPT | Mod: ,,, | Performed by: INTERNAL MEDICINE

## 2020-01-28 PROCEDURE — D9220A PRA ANESTHESIA: Mod: ANES,,, | Performed by: ANESTHESIOLOGY

## 2020-01-28 RX ORDER — SODIUM CHLORIDE 0.9 % (FLUSH) 0.9 %
10 SYRINGE (ML) INJECTION
Status: DISCONTINUED | OUTPATIENT
Start: 2020-01-28 | End: 2020-01-28 | Stop reason: HOSPADM

## 2020-01-28 RX ORDER — SODIUM CHLORIDE, SODIUM LACTATE, POTASSIUM CHLORIDE, CALCIUM CHLORIDE 600; 310; 30; 20 MG/100ML; MG/100ML; MG/100ML; MG/100ML
INJECTION, SOLUTION INTRAVENOUS CONTINUOUS
Status: DISCONTINUED | OUTPATIENT
Start: 2020-01-28 | End: 2020-01-28 | Stop reason: HOSPADM

## 2020-01-28 RX ORDER — LIDOCAINE HCL/PF 100 MG/5ML
SYRINGE (ML) INTRAVENOUS
Status: DISCONTINUED | OUTPATIENT
Start: 2020-01-28 | End: 2020-01-28

## 2020-01-28 RX ORDER — PROPOFOL 10 MG/ML
VIAL (ML) INTRAVENOUS
Status: DISCONTINUED | OUTPATIENT
Start: 2020-01-28 | End: 2020-01-28

## 2020-01-28 RX ADMIN — SODIUM CHLORIDE, SODIUM LACTATE, POTASSIUM CHLORIDE, AND CALCIUM CHLORIDE: .6; .31; .03; .02 INJECTION, SOLUTION INTRAVENOUS at 10:01

## 2020-01-28 RX ADMIN — PROPOFOL 130 MG: 10 INJECTION, EMULSION INTRAVENOUS at 11:01

## 2020-01-28 RX ADMIN — PROPOFOL 30 MG: 10 INJECTION, EMULSION INTRAVENOUS at 11:01

## 2020-01-28 RX ADMIN — LIDOCAINE HYDROCHLORIDE 100 MG: 20 INJECTION, SOLUTION INTRAVENOUS at 11:01

## 2020-01-28 NOTE — TRANSFER OF CARE
"Anesthesia Transfer of Care Note    Patient: Yumiko Gonzalez    Procedure(s) Performed: Procedure(s) (LRB):  ESOPHAGOGASTRODUODENOSCOPY (EGD) (N/A)    Patient location: PACU    Anesthesia Type: general    Transport from OR: Transported from OR on room air with adequate spontaneous ventilation    Post pain: adequate analgesia    Post assessment: no apparent anesthetic complications and tolerated procedure well    Post vital signs: stable    Level of consciousness: sedated    Nausea/Vomiting: no nausea/vomiting    Complications: none    Transfer of care protocol was followed      Last vitals:   Visit Vitals  BP (!) 158/74 (BP Location: Right arm, Patient Position: Lying)   Pulse 82   Temp 36.4 °C (97.5 °F) (Skin)   Resp 18   Ht 5' 10" (1.778 m)   Wt 135.6 kg (299 lb)   SpO2 97%   Breastfeeding? No   BMI 42.90 kg/m²     "

## 2020-01-28 NOTE — [employer] PATIENT INSTRUCTIONS
Discharge Summary/Instructions after an Endoscopic Procedure  Patient Name: Yumiko Gonzalez  Patient MRN: [id number]  Patient YOB: 1960 Tuesday, January 28, 2020  James Palomares MD  RESTRICTIONS:  During your procedure today, you received medications for sedation.  These   medications may affect your judgment, balance and coordination.  Therefore,   for 24 hours, you have the following restrictions:   - DO NOT drive a car, operate machinery, make legal/financial decisions,   sign important papers or drink alcohol.    ACTIVITY:  Today: no heavy lifting, straining or running due to procedural   sedation/anesthesia.  The following day: return to full activity including work.  DIET:  Eat and drink normally unless instructed otherwise.     TREATMENT FOR COMMON SIDE EFFECTS:  - Mild abdominal pain, nausea, belching, bloating or excessive gas:  rest,   eat lightly and use a heating pad.  - Sore Throat: treat with throat lozenges and/or gargle with warm salt   water.  - Because air was used during the procedure, expelling large amounts of air   from your rectum or belching is normal.  - If a bowel prep was taken, you may not have a bowel movement for 1-3 days.    This is normal.  SYMPTOMS TO WATCH FOR AND REPORT TO YOUR PHYSICIAN:  1. Abdominal pain or bloating, other than gas cramps.  2. Chest pain.  3. Back pain.  4. Signs of infection such as: chills or fever occurring within 24 hours   after the procedure.  5. Rectal bleeding, which would show as bright red, maroon, or black stools.   (A tablespoon of blood from the rectum is not serious, especially if   hemorrhoids are present.)  6. Vomiting.  7. Weakness or dizziness.  GO DIRECTLY TO THE NEAREST EMERGENCY ROOM IF YOU HAVE ANY OF THE FOLLOWING:      Difficulty breathing              Chills and/or fever over 101 F   Persistent vomiting and/or vomiting blood   Severe abdominal pain   Severe chest pain   Black, tarry stools   Bleeding- more than one  tablespoon   Any other symptom or condition that you feel may need urgent attention  Your doctor recommends these additional instructions:  If any biopsies were taken, your doctors clinic will contact you in 1 to 2   weeks with any results.  Continue your present medications.   Return to your referring physician as previously scheduled.   You are being discharged to home.  For questions, problems or results please call your physician - James Palomares MD at Work:  [phone].  EMERGENCY PHONE NUMBER: [phone], LAB RESULTS: [phone]  IF A COMPLICATION OR EMERGENCY SITUATION ARISES AND YOU ARE UNABLE TO REACH   YOUR PHYSICIAN - GO DIRECTLY TO THE EMERGENCY ROOM.  ___________________________________________  Nurse Signature  ___________________________________________  Patient/Designated Responsible Party Signature  James Palomares MD  1/28/2020 11:37:22 AM  This report has been verified and signed electronically.  [employer]

## 2020-01-28 NOTE — ANESTHESIA POSTPROCEDURE EVALUATION
Anesthesia Post Evaluation    Patient: Yumiko Gonzalez    Procedure(s) Performed: Procedure(s) (LRB):  ESOPHAGOGASTRODUODENOSCOPY (EGD) (N/A)    Final Anesthesia Type: general    Patient location during evaluation: PACU  Patient participation: Yes- Able to Participate  Level of consciousness: sedated and awake  Post-procedure vital signs: reviewed and stable  Pain management: adequate  Airway patency: patent    PONV status at discharge: No PONV  Anesthetic complications: no      Cardiovascular status: blood pressure returned to baseline  Respiratory status: spontaneous ventilation  Hydration status: euvolemic  Follow-up not needed.          Vitals Value Taken Time   /69 1/28/2020 12:01 PM   Temp 36.2 °C (97.2 °F) 1/28/2020 11:37 AM   Pulse 64 1/28/2020 12:01 PM   Resp 14 1/28/2020 12:01 PM   SpO2 98 % 1/28/2020 12:01 PM         No case tracking events are documented in the log.      Pain/Niharika Score: Niharika Score: 10 (1/28/2020 12:01 PM)

## 2020-01-28 NOTE — DISCHARGE SUMMARY
Discharge Note  Short Stay      SUMMARY     Admit Date: 1/28/2020    Attending Physician: James Palomares MD     Discharge Physician: James Palomares MD    Discharge Date: 1/28/2020 11:38 AM    Final Diagnosis: Esophageal varices without bleeding, unspecified esophageal varices type [I85.00]    Disposition: HOME OR SELF CARE    Patient Instructions:   Current Discharge Medication List      CONTINUE these medications which have NOT CHANGED    Details   buPROPion (WELLBUTRIN XL) 150 MG TB24 tablet Take 2 tablets (300 mg total) by mouth once daily.  Qty: 180 tablet, Refills: 3      fluconazole (DIFLUCAN) 150 MG Tab Take 1 tablet (150 mg total) by mouth once daily.  Qty: 1 tablet, Refills: 1      hydroCHLOROthiazide (HYDRODIURIL) 12.5 MG Tab Take 1 tablet (12.5 mg total) by mouth once daily.  Qty: 90 tablet, Refills: 1    Comments: Patient not followed by us - seen one time for pre-op clearance. Future refills need to go to PCP      lidocaine HCL 10 mg/ml, 1%, 10 mg/mL (1 %) injection Inhale 3ml via nebulizer four times daily as needed  Qty: 350 mL, Refills: 5    Comments: per mendel pt is to recap and re-use syringe until empty. 30 day supply with 5 refills via provider [facility] 8/15/18 10:22am      loratadine (CLARITIN) 10 mg tablet Take 10 mg by mouth as needed.       multivitamin (THERAGRAN) per tablet Take 1 tablet by mouth once daily.      nystatin-triamcinolone (MYCOLOG II) cream Apply to affected area twice daily as needed  Qty: 60 g, Refills: 3      propranolol (INDERAL) 20 MG tablet Take 1 tablet (20 mg total) by mouth 2 (two) times daily.  Qty: 60 tablet, Refills: 2      sodium chloride 7% 7 % nebulizer solution Use in nebulizer as directed 3 times a day for 30 days  Qty: 240 mL, Refills: 5      triamcinolone (NASACORT) 55 mcg nasal inhaler Use 2 sprays by Nasal route once daily.  Qty: 17 g, Refills: 12      vitamin E 400 UNIT capsule Take 400 Units by mouth once daily.      albuterol (PROVENTIL) 2.5 mg /3  mL (0.083 %) nebulizer solution Take 3 mLs (2.5 mg total) by nebulization every 6 (six) hours as needed for Wheezing.  Qty: 72 mL, Refills: 2      alprazolam (XANAX) 0.25 MG tablet TAKE ONE TABLET BY MOUTH NIGHTLY AS NEEDED FOR INSOMNIA  Qty: 30 tablet, Refills: 2      cetirizine (ZYRTEC) 10 MG tablet Take 10 mg by mouth once daily.      diclofenac sodium (VOLTAREN) 1 % Gel Apply 2 g topically 3 (three) times daily.  Qty: 1 Tube, Refills: 3             Discharge Procedure Orders (must include Diet, Follow-up, Activity)    Follow Up:  Follow up with PCP as previously scheduled  Resume routine diet.  Activity as tolerated.    No driving day of procedure.

## 2020-01-28 NOTE — ANESTHESIA PREPROCEDURE EVALUATION
01/28/2020  Yumiko Gonzalez is a 59 y.o., female.    Anesthesia Evaluation    I have reviewed the Patient Summary Reports.    I have reviewed the Nursing Notes.   I have reviewed the Medications.     Review of Systems  Anesthesia Hx:  Hx of Anesthetic complications PONV   Hematology/Oncology:        Hematology Comments: Thrombocytopenia 70K, PT 1.1   Cardiovascular:   Hypertension    Pulmonary:   Asthma mild Lung mass being followed up   Hepatic/GI:   GERD Liver Disease, Denies Hepatitis. Esophageal varices   Musculoskeletal:  Spine Disorders: cervical    Neurological:   Headaches    Psych:   Psychiatric History anxiety depression          Physical Exam  General:  Morbid Obesity    Airway/Jaw/Neck:  Airway Findings: Mouth Opening: Normal Tongue: Normal  General Airway Assessment: Adult  Mallampati: III  Improves to II with phonation.      Dental:  Dental Findings: In tact   Chest/Lungs:  Chest/Lungs Findings: Clear to auscultation, Normal Respiratory Rate         Mental Status:  Mental Status Findings:  Cooperative, Alert and Oriented         Anesthesia Plan  Type of Anesthesia, risks & benefits discussed:  Anesthesia Type:  general  Patient's Preference:   Intra-op Monitoring Plan: standard ASA monitors  Intra-op Monitoring Plan Comments:   Post Op Pain Control Plan:   Post Op Pain Control Plan Comments:   Induction:   IV  Beta Blocker:  Patient is on a Beta-Blocker and has received one dose within the past 24 hours (No further documentation required).       Informed Consent: Patient understands risks and agrees with Anesthesia plan.  Questions answered. Anesthesia consent signed with patient.  ASA Score: 3     Day of Surgery Review of History & Physical:            Ready For Surgery From Anesthesia Perspective.

## 2020-01-28 NOTE — PLAN OF CARE
Patient tolerating oral liquids without difficulty. No apparent s&s of distress noted at this time, no complaints voiced at this time. Discharge instructions reviewed with patient/family with good verbal feedback received. Patient ready for discharge

## 2020-01-28 NOTE — DISCHARGE INSTRUCTIONS
Esophageal Varices     With esophageal varices, blood vessels in the esophagus become abnormally enlarged. They may then burst (rupture) and bleed.   Esophageal varices are enlarged veins at the lower end of the esophagus. The esophagus is the tube that carries food from your mouth to your stomach. Varices most often occur because of problems with blood flow in the liver caused by chronic liver disease. Normally, a blood vessel called the portal vein carries blood from the digestive organs to the liver. But with liver disease, blood flow can be blocked due to scarring of the liver. This increases the blood pressure in the portal vein (a condition known as portal hypertension). Blood then backs up in nearby veins in the esophagus and stomach, causing varices. Varices are a serious and deadly problem. Treatment is needed to prevent them from bursting (rupturing) and bleeding. If bleeding occurs, it can be fatal.  Symptoms of esophageal varices  Symptoms do not occur unless the varices are bleeding. This is an emergency problem. If you have any of the following symptoms, get medical attention right away:  · Vomiting blood  · Black, tarry, or bloody stools  · Feeling lightheaded, or fainting (loss of consciousness)  Diagnosing esophageal \varices  Youll likely be checked for varices if you have liver disease or other health problems that can cause them. Your healthcare provider will ask about your symptoms and health history. Youll also be examined. Tests are then done to confirm the problem. Tests can include:  · Upper endoscopy. This is done to see inside the upper digestive tract. During the test, an endoscope is used. This is a thin, flexible tube with a tiny camera on the end. Its inserted through your mouth. Its then guided down through your esophagus, stomach, and first part of your small intestine. This allows the provider to check for varices and find any bleeding.  · Imaging tests. These provide pictures  of the liver or blood flow in the liver. They allow the provider to check for enlarged veins around the liver and assess the risk of bleeding. Common imaging tests done include ultrasound and CT scans.  Treating esophageal varices  The goal of treatment is to reduce the risk of bleeding or to control bleeding. Treatment can include 1 or more of the following:  · Medicines. These may be prescribed to lower the blood pressure inside the enlarged veins. This reduces the risk of bleeding. Beta-blockers are the most common medicine used.  · Endoscopic therapy. These are treatments for enlarged or bleeding veins that are done using an endoscope. With ligation, small rubber bands are placed around the veins to close them off and stop any bleeding. With sclerotherapy, a blood-clotting medicine is injected into the veins to cause scarring and shrink them.  · Balloon tamponade. A tube with a balloon is guided down into your esophagus and stomach. The balloon is then filled with air. This puts pressure on enlarged or bleeding veins to control bleeding. This is a short-term (temporary) way to control bleeding until other treatments are available.   · Surgery. This may be done to place a tubelike device (stent) in the liver. The stent helps redirect blood flow in the liver to lower the blood pressure in enlarged veins. Sometimes, the enlarged veins may be connected to other nearby veins to redirect blood flow. In severe cases, a liver transplant may be needed. For this surgery, a diseased liver is replaced with a healthy liver from another person.   Follow-up  Regular visits with your provider are needed to check for bleeding of the varices. If bleeding occurs, it is likely to occur again. More treatments will then be needed in the future. Once endoscopic therapy (banding) is performed, regular follow-up endoscopic scans with banding are done to completely get rid of the varices. If you are given medicines to take by mouth, be  sure to take them as directed. Work closely with your provider to manage your condition. Know when to seek emergency care.  Date Last Reviewed: 7/1/2016  © 6854-5762 [employer]. [address] [place] [address], [place] [address]. All rights reserved. This information is not intended as a substitute for professional medical care. Always follow your healthcare professional's instructions.

## 2020-01-28 NOTE — H&P
History & Physical - Short Stay  Gastroenterology      SUBJECTIVE:     Procedure: EGD    Chief Complaint/Indication for Procedure: Cirrhosis    PTA Medications   Medication Sig    buPROPion (WELLBUTRIN XL) 150 MG TB24 tablet Take 2 tablets (300 mg total) by mouth once daily.    fluconazole (DIFLUCAN) 150 MG Tab Take 1 tablet (150 mg total) by mouth once daily.    hydroCHLOROthiazide (HYDRODIURIL) 12.5 MG Tab Take 1 tablet (12.5 mg total) by mouth once daily.    lidocaine HCL 10 mg/ml, 1%, 10 mg/mL (1 %) injection Inhale 3ml via nebulizer four times daily as needed    loratadine (CLARITIN) 10 mg tablet Take 10 mg by mouth as needed.     multivitamin (THERAGRAN) per tablet Take 1 tablet by mouth once daily.    nystatin-triamcinolone (MYCOLOG II) cream Apply to affected area twice daily as needed    propranolol (INDERAL) 20 MG tablet Take 1 tablet (20 mg total) by mouth 2 (two) times daily.    sodium chloride 7% 7 % nebulizer solution Use in nebulizer as directed 3 times a day for 30 days    triamcinolone (NASACORT) 55 mcg nasal inhaler Use 2 sprays by Nasal route once daily.    vitamin E 400 UNIT capsule Take 400 Units by mouth once daily.    albuterol (PROVENTIL) 2.5 mg /3 mL (0.083 %) nebulizer solution Take 3 mLs (2.5 mg total) by nebulization every 6 (six) hours as needed for Wheezing.    alprazolam (XANAX) 0.25 MG tablet TAKE ONE TABLET BY MOUTH NIGHTLY AS NEEDED FOR INSOMNIA    cetirizine (ZYRTEC) 10 MG tablet Take 10 mg by mouth once daily.    diclofenac sodium (VOLTAREN) 1 % Gel Apply 2 g topically 3 (three) times daily.       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, postitive antismooth muscle antibody    Blood transfusion      "Bronchitis     bronchospasm on occasion     Cancer 1987    carcinoma in situ    Cervical neck pain with evidence of disc disease 3/22/2012    can bend neck    Cirrhosis     non-alcoholic, compensated    Colon polyps 3/22/2012    Depression 3/22/2012    Hx panic attacks    Diverticular disease 3/22/2012    never diverticulitis    Fatty liver 3/22/2012    Fibrocystic breast     Fine tremor     hands,chronic    Hepatosplenomegaly     Hypertension 2013    Knee pain, bilateral     chronic, with hands,feet,fingers also painful    Lesion of right lung     Liver disease     "partially sclerosed liver" per pt    Migraines past Hx    Pleural effusion     small, compensated, good bilateral breath sounds per Dr Suresh GUTIERRES (postoperative nausea and vomiting)     twice after childbirth    Postpartum depression     Splenomegaly     Thrombocytopenia     Tremors of nervous system      Past Surgical History:   Procedure Laterality Date    ADENOIDECTOMY      CERVICAL CONIZATION   W/ LASER       SECTION      x3    COLONOSCOPY N/A 2016    Procedure: COLONOSCOPY;  Surgeon: James Palomares MD;  Location: [facility];  Service: Endoscopy;  Laterality: N/A;    EMBOLIZATION N/A 2018    Procedure: EMBOLIZATION, BLOOD VESSEL;  Surgeon: Joselin Surgeon;  Location: [facility];  Service: Radiology;  Laterality: N/A;    ENDOMETRIAL ABLATION      LIVER BIOPSY      PELVIC LAPAROSCOPY      TONSILLECTOMY      TUBAL LIGATION       Family History   Problem Relation Age of Onset    Breast cancer Maternal Grandmother     Diabetes Father     Heart disease Father     Breast cancer Mother 70    Heart disease Mother     Hypertension Mother     Tremor Mother     Diabetes Brother     Diabetes Sister     Breast cancer Maternal Aunt 70    Multiple sclerosis Maternal Aunt     Breast cancer Sister     Diabetes Sister     Emphysema Brother     Breast cancer Maternal Cousin 40    Tremor Daughter  "    Multiple sclerosis Maternal Aunt     Ovarian cancer Neg Hx     Parkinsonism Neg Hx     Glaucoma Neg Hx      Social History     Tobacco Use    Smoking status: Former Smoker     Last attempt to quit: 2/3/2006     Years since quittin.9    Smokeless tobacco: Never Used   Substance Use Topics    Alcohol use: Yes     Comment: rarely    Drug use: No         OBJECTIVE:     Vital Signs (Most Recent)  Temp: 97.5 °F (36.4 °C) (20 1031)  Pulse: 82 (20 1031)  Resp: 18 (20 103)  BP: (!) 158/74 (20 1031)  SpO2: 97 % (20 103)    Physical Exam:                                                       GENERAL:  Comfortable, in no acute distress.                                 HEENT EXAM:  Nonicteric.  No adenopathy.  Oropharynx is clear.               NECK:  Supple.                                                               LUNGS:  Clear.                                                               CARDIAC:  Regular rate and rhythm.  S1, S2.  No murmur.                      ABDOMEN:  Soft, positive bowel sounds, nontender.  No hepatosplenomegaly or masses.  No rebound or guarding.                                             EXTREMITIES:  No edema.     MENTAL STATUS:  Normal, alert and oriented.      ASSESSMENT/PLAN:     Assessment: Cirrhosis    Plan: EGD    Anesthesia Plan: General    ASA Grade: ASA 3 - Patient with moderate systemic disease with functional limitations    MALLAMPATI SCORE:  I (soft palate, uvula, fauces, and tonsillar pillars visible)

## 2020-01-29 VITALS
SYSTOLIC BLOOD PRESSURE: 119 MMHG | BODY MASS INDEX: 41.95 KG/M2 | TEMPERATURE: 97 F | HEART RATE: 64 BPM | WEIGHT: 293 LBS | OXYGEN SATURATION: 98 % | HEIGHT: 70 IN | RESPIRATION RATE: 14 BRPM | DIASTOLIC BLOOD PRESSURE: 69 MMHG

## 2020-01-30 NOTE — PHYSICIAN QUERY
PT Name: Yumiko Gonzalez  MR #: [id number]     Physician Query Form - Documentation Clarification      CDS/: Licha Cain               Contact information: [email or web address]    This form is a permanent document in the medical record.     Query Date: January 30, 2020    By submitting this query, we are merely seeking further clarification of documentation. Please utilize your independent clinical judgment when addressing the question(s) below.    The Medical record reflects the following:    Supporting Clinical Findings Location in Medical Record      - Grade I esophageal varices.                       - Mild portal gastropathy.                       - Normal examined duodenum.                       - No specimens collected       Provocation report                                                                                      Doctor, Please specify type associated with above clinical findings.    Provider Use Only     __x__ congestive portal gastropathy     ____ erythematous gastropathy     ____ exudative gastropathy     ____ portal hypertensive gastropathy     ____ other/unspecified                                                                                                             [  ] Clinically Undetermined

## 2020-02-01 ENCOUNTER — PATIENT OUTREACH (OUTPATIENT)
Dept: ADMINISTRATIVE | Facility: OTHER | Age: 60
End: 2020-02-01

## 2020-02-02 RX ORDER — HYDROCHLOROTHIAZIDE 12.5 MG/1
12.5 TABLET ORAL DAILY
Qty: 90 TABLET | Refills: 1 | Status: CANCELLED | OUTPATIENT
Start: 2020-02-02 | End: 2021-02-01

## 2020-02-03 ENCOUNTER — OFFICE VISIT (OUTPATIENT)
Dept: HEPATOLOGY | Facility: CLINIC | Age: 60
End: 2020-02-03
Payer: COMMERCIAL

## 2020-02-03 VITALS
OXYGEN SATURATION: 96 % | RESPIRATION RATE: 18 BRPM | DIASTOLIC BLOOD PRESSURE: 70 MMHG | HEIGHT: 70 IN | HEART RATE: 84 BPM | WEIGHT: 293 LBS | BODY MASS INDEX: 41.95 KG/M2 | SYSTOLIC BLOOD PRESSURE: 166 MMHG

## 2020-02-03 DIAGNOSIS — K75.81 NONALCOHOLIC STEATOHEPATITIS: ICD-10-CM

## 2020-02-03 DIAGNOSIS — K74.60 HEPATIC CIRRHOSIS, UNSPECIFIED HEPATIC CIRRHOSIS TYPE, UNSPECIFIED WHETHER ASCITES PRESENT: Primary | ICD-10-CM

## 2020-02-03 PROCEDURE — 3077F PR MOST RECENT SYSTOLIC BLOOD PRESSURE >= 140 MM HG: ICD-10-PCS | Mod: CPTII,S$GLB,, | Performed by: INTERNAL MEDICINE

## 2020-02-03 PROCEDURE — 3078F PR MOST RECENT DIASTOLIC BLOOD PRESSURE < 80 MM HG: ICD-10-PCS | Mod: CPTII,S$GLB,, | Performed by: INTERNAL MEDICINE

## 2020-02-03 PROCEDURE — 99999 PR PBB SHADOW E&M-EST. PATIENT-LVL IV: CPT | Mod: PBBFAC,,, | Performed by: INTERNAL MEDICINE

## 2020-02-03 PROCEDURE — 99999 PR PBB SHADOW E&M-EST. PATIENT-LVL IV: ICD-10-PCS | Mod: PBBFAC,,, | Performed by: INTERNAL MEDICINE

## 2020-02-03 PROCEDURE — 3008F PR BODY MASS INDEX (BMI) DOCUMENTED: ICD-10-PCS | Mod: CPTII,S$GLB,, | Performed by: INTERNAL MEDICINE

## 2020-02-03 PROCEDURE — 99214 OFFICE O/P EST MOD 30 MIN: CPT | Mod: S$GLB,,, | Performed by: INTERNAL MEDICINE

## 2020-02-03 PROCEDURE — 99214 PR OFFICE/OUTPT VISIT, EST, LEVL IV, 30-39 MIN: ICD-10-PCS | Mod: S$GLB,,, | Performed by: INTERNAL MEDICINE

## 2020-02-03 PROCEDURE — 3008F BODY MASS INDEX DOCD: CPT | Mod: CPTII,S$GLB,, | Performed by: INTERNAL MEDICINE

## 2020-02-03 PROCEDURE — 3078F DIAST BP <80 MM HG: CPT | Mod: CPTII,S$GLB,, | Performed by: INTERNAL MEDICINE

## 2020-02-03 PROCEDURE — 3077F SYST BP >= 140 MM HG: CPT | Mod: CPTII,S$GLB,, | Performed by: INTERNAL MEDICINE

## 2020-02-03 RX ORDER — HYDROCHLOROTHIAZIDE 12.5 MG/1
12.5 TABLET ORAL DAILY
Qty: 90 TABLET | Refills: 1 | Status: SHIPPED | OUTPATIENT
Start: 2020-02-03 | End: 2020-09-10 | Stop reason: SDUPTHER

## 2020-02-03 NOTE — PROGRESS NOTES
Subjective:       Patient ID: Yumiko Gonzalez is a 59 y.o. female.    Chief Complaint: Cirrhosis      HPI:     The patient with:   1.  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   2.  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    2.  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   3.  Obesity.   4.  Weakly positive antismooth muscle antibody, is probably not significant  5.  Being evaluated for bariatric surgery (gastric sleeve) by Yarely Lezama, since mid-April 2017 (two months).  Currently on very low carb diet.  Has regained 30 lbs in last few months.         Offers no complaints.    Review of Systems   Constitutional: Positive for fatigue. Negative for appetite change, chills, fever and unexpected weight change.        Lost 320 lb since 4/2017.    HENT: Negative for congestion, nosebleeds and trouble swallowing.    Eyes: Negative for photophobia and visual disturbance.        Partially deaf for a long time.     Respiratory: Positive for cough. Negative for chest tightness and shortness of breath.         Cough at night.   Cardiovascular: Negative for chest pain and leg swelling.        Left Leg swelling improved since starting HCTZ.      Gastrointestinal: Positive for constipation. Negative for abdominal distention, abdominal pain, diarrhea, nausea and vomiting.        Occasional right sided pain.    Genitourinary: Negative for difficulty urinating, flank pain and frequency.   Musculoskeletal: Negative for arthralgias, back pain, gait problem and joint swelling.   Skin: Negative for color change and rash.   Neurological: Positive for tremors. Negative for dizziness, seizures, syncope, speech difficulty, weakness, light-headedness, numbness and headaches.        Long standing familial tremors   Hematological: Does not bruise/bleed easily.    Psychiatric/Behavioral: Negative for confusion, decreased concentration, sleep disturbance and suicidal ideas. The patient is not nervous/anxious.         Stress related to 's illness.        Objective:      Physical Exam   Constitutional: She is oriented to person, place, and time. Vital signs are normal. She appears well-developed and well-nourished.   HENT:   Head: Normocephalic.   Mouth/Throat: No oropharyngeal exudate.   Eyes: Pupils are equal, round, and reactive to light. Conjunctivae are normal. Right eye exhibits no discharge. Left eye exhibits no discharge. No scleral icterus.   Cardiovascular: Normal rate, regular rhythm and normal heart sounds.   No murmur heard.  Pulmonary/Chest: No respiratory distress. She has no wheezes. She has no rales.   Abdominal: Soft. Bowel sounds are normal. She exhibits no distension and no mass. There is no tenderness. There is no rebound and no guarding.   Musculoskeletal: Normal range of motion. She exhibits no edema or tenderness.   Lymphadenopathy:     She has no cervical adenopathy.   Neurological: She is alert and oriented to person, place, and time. She has normal reflexes. Coordination normal.   Skin: Skin is warm and dry. No rash noted. No erythema. No pallor.   Psychiatric: She has a normal mood and affect. Her behavior is normal. Judgment and thought content normal.         CT abd 4/12/17:  Nodular liver, no mass in the liver. Multiple collaterals and splenomegaly 20 cm.    NO AFP since 2013.     Assessment:       1. Hepatic cirrhosis, unspecified hepatic cirrhosis type, unspecified whether ascites present    2. Nonalcoholic steatohepatitis        -  Decompensated cirrhosis, but with severe thrombocytopenia, marked splenomegaly,   -  Mild edema in left leg.  Losing wt 32 lb in 2 months.    -  Grade 1 esoph varices on EGD in 1/2020.  Needs surveillance EGD now.   -  No HCC, no SBP No GI bleeding, no encephalopathy.    Cirrhosis with marked splenomegaly are  of concern and don't believe she should get upper abdominal surgery.  Chances of decompensation likely to occur following surgery.      Plan:   -  Needs surveillance EGD every 2 yrs.  Pl make appt with Derick Obrien.   -. HCC surveillance with AFP and ultrasound of the abdomen every six months, next due in June 2020.  -. CBC, CMP, PT/INR every 3 months, starting June 2020.   -. Continue low-carbohydrate diet.   -. Low salt diet - 2 gm sodium   -.  Return in 1 yr.   -.  Concern for decompensation of liver with abd surgery, hence defer bariatric surgery

## 2020-02-03 NOTE — PATIENT INSTRUCTIONS
-  Needs surveillance EGD every 2 yrs.  Pl make appt with Dr. Palomares [place].   -. HCC surveillance with AFP and ultrasound of the abdomen every six months, next due in June 2020.  -. CBC, CMP, PT/INR every 3 months, starting June 2020.   -. Continue low-carbohydrate diet.   -. Low salt diet - 2 gm sodium   -.  Return in 1 yr.   -.  Concern for decompensation of liver with abd surgery, hence defer bariatric surgery

## 2020-02-16 ENCOUNTER — PATIENT OUTREACH (OUTPATIENT)
Dept: ADMINISTRATIVE | Facility: OTHER | Age: 60
End: 2020-02-16

## 2020-02-17 ENCOUNTER — OFFICE VISIT (OUTPATIENT)
Dept: NEUROLOGY | Facility: CLINIC | Age: 60
End: 2020-02-17
Payer: COMMERCIAL

## 2020-02-17 VITALS
SYSTOLIC BLOOD PRESSURE: 142 MMHG | DIASTOLIC BLOOD PRESSURE: 78 MMHG | HEART RATE: 71 BPM | HEIGHT: 70 IN | RESPIRATION RATE: 20 BRPM | BODY MASS INDEX: 41.95 KG/M2 | WEIGHT: 293 LBS

## 2020-02-17 DIAGNOSIS — I10 ESSENTIAL HYPERTENSION: ICD-10-CM

## 2020-02-17 DIAGNOSIS — G25.0 ESSENTIAL TREMOR: Primary | ICD-10-CM

## 2020-02-17 DIAGNOSIS — K74.60 CIRRHOSIS OF LIVER WITHOUT ASCITES, UNSPECIFIED HEPATIC CIRRHOSIS TYPE: ICD-10-CM

## 2020-02-17 PROCEDURE — 99999 PR PBB SHADOW E&M-EST. PATIENT-LVL III: CPT | Mod: PBBFAC,,, | Performed by: NURSE PRACTITIONER

## 2020-02-17 PROCEDURE — 3078F DIAST BP <80 MM HG: CPT | Mod: CPTII,S$GLB,, | Performed by: NURSE PRACTITIONER

## 2020-02-17 PROCEDURE — 99999 PR PBB SHADOW E&M-EST. PATIENT-LVL III: ICD-10-PCS | Mod: PBBFAC,,, | Performed by: NURSE PRACTITIONER

## 2020-02-17 PROCEDURE — 3077F SYST BP >= 140 MM HG: CPT | Mod: CPTII,S$GLB,, | Performed by: NURSE PRACTITIONER

## 2020-02-17 PROCEDURE — 3078F PR MOST RECENT DIASTOLIC BLOOD PRESSURE < 80 MM HG: ICD-10-PCS | Mod: CPTII,S$GLB,, | Performed by: NURSE PRACTITIONER

## 2020-02-17 PROCEDURE — 3008F BODY MASS INDEX DOCD: CPT | Mod: CPTII,S$GLB,, | Performed by: NURSE PRACTITIONER

## 2020-02-17 PROCEDURE — 99213 PR OFFICE/OUTPT VISIT, EST, LEVL III, 20-29 MIN: ICD-10-PCS | Mod: S$GLB,,, | Performed by: NURSE PRACTITIONER

## 2020-02-17 PROCEDURE — 3008F PR BODY MASS INDEX (BMI) DOCUMENTED: ICD-10-PCS | Mod: CPTII,S$GLB,, | Performed by: NURSE PRACTITIONER

## 2020-02-17 PROCEDURE — 3077F PR MOST RECENT SYSTOLIC BLOOD PRESSURE >= 140 MM HG: ICD-10-PCS | Mod: CPTII,S$GLB,, | Performed by: NURSE PRACTITIONER

## 2020-02-17 PROCEDURE — 99213 OFFICE O/P EST LOW 20 MIN: CPT | Mod: S$GLB,,, | Performed by: NURSE PRACTITIONER

## 2020-02-17 NOTE — Clinical Note
Saw in clinic today. Prescribed propranolol 20 mg BID. Often forgets HS dose. Tremor persists. Was wanting to see if you'd be willing to change propranolol to 60 mg LA?

## 2020-02-17 NOTE — PROGRESS NOTES
Name: Yumiko Gonzalez  MRN: [id number]   CSN: [id number]      Date: 2-      Referring physician:  No referring provider defined for this encounter.    Subjective:      Chief Complaint: tremor unchanged    History of Present Illness (HPI):    Yumiko Gonzalez is a 59 y.o. right-handed female with HTN, non-alcoholic cirrhosis of loverwho presents today for a follow-up evaluation of Essential Tremor and is alone. Last seen in office 10-14-19. At that time, discussed seeing if PCP would change metoprolol to propranolol to also cover tremor. Was started on 20 mg BID.     Stress test was fine. Completed in November.   Started propranolol in December from what she recalls.   Taking 1 tablet in morning and 2nd dose nearing bedtime (if recalls to take the hs dose).     Got new glasses January.     Not seeing much difference with tremor control but again misses the HS dose at least half the time.     Review of Systems   HENT: Negative for drooling and trouble swallowing.    Musculoskeletal: Negative for gait problem.   Neurological: Positive for tremors.   Psychiatric/Behavioral: Negative for sleep disturbance.       Past Medical History: The patient  has a past medical history of Abnormal Pap smear, Anemia, Arthritis, Asthma, Autoimmune disease, Blood transfusion, Bronchitis, Cancer (1987), Cervical neck pain with evidence of disc disease (3/22/2012), Cirrhosis, Colon polyps (3/22/2012), Depression (3/22/2012), Diverticular disease (3/22/2012), Fatty liver (3/22/2012), Fibrocystic breast, Fine tremor, Hepatosplenomegaly, Hypertension (4/24/2013), Knee pain, bilateral, Lesion of right lung, Liver disease, Migraines (past Hx), Pleural effusion, PONV (postoperative nausea and vomiting), Postpartum depression, Splenomegaly, Thrombocytopenia, and Tremors of nervous system.    Social History: The patient  reports that she quit smoking about 14 years ago. She has never used smokeless tobacco. She reports that she drinks alcohol. She  reports that she does not use drugs.    Family History: Their family history includes Breast cancer in her maternal grandmother and sister; Breast cancer (age of onset: 40) in her maternal cousin; Breast cancer (age of onset: 70) in her maternal aunt and mother; Diabetes in her brother, father, sister, and sister; Emphysema in her brother; Heart disease in her father and mother; Hypertension in her mother; Multiple sclerosis in her maternal aunt and maternal aunt; Tremor in her daughter and mother.    Allergies: No known drug allergies     Meds:   Current Outpatient Medications on File Prior to Visit   Medication Sig Dispense Refill    albuterol (PROVENTIL) 2.5 mg /3 mL (0.083 %) nebulizer solution Take 3 mLs (2.5 mg total) by nebulization every 6 (six) hours as needed for Wheezing. 72 mL 2    alprazolam (XANAX) 0.25 MG tablet TAKE ONE TABLET BY MOUTH NIGHTLY AS NEEDED FOR INSOMNIA 30 tablet 2    buPROPion (WELLBUTRIN XL) 150 MG TB24 tablet Take 2 tablets (300 mg total) by mouth once daily. 180 tablet 3    cetirizine (ZYRTEC) 10 MG tablet Take 10 mg by mouth once daily.      diclofenac sodium (VOLTAREN) 1 % Gel Apply 2 g topically 3 (three) times daily. 1 Tube 3    hydroCHLOROthiazide (HYDRODIURIL) 12.5 MG Tab Take 1 tablet (12.5 mg total) by mouth once daily. 90 tablet 1    lidocaine HCL 10 mg/ml, 1%, 10 mg/mL (1 %) injection Inhale 3ml via nebulizer four times daily as needed 350 mL 5    loratadine (CLARITIN) 10 mg tablet Take 10 mg by mouth once daily.       multivitamin (THERAGRAN) per tablet Take 1 tablet by mouth once daily.      nystatin-triamcinolone (MYCOLOG II) cream Apply to affected area twice daily as needed 60 g 3    propranolol (INDERAL) 20 MG tablet Take 1 tablet (20 mg total) by mouth 2 (two) times daily. 60 tablet 2    sodium chloride 7% 7 % nebulizer solution Use in nebulizer as directed 3 times a day for 30 days 240 mL 5    triamcinolone (NASACORT) 55 mcg nasal inhaler Use 2 sprays  "by Nasal route once daily. (Patient taking differently: 2 sprays by Nasal route as needed. ) 17 g 12    vitamin E 400 UNIT capsule Take 400 Units by mouth once daily.      fluconazole (DIFLUCAN) 150 MG Tab Take 1 tablet (150 mg total) by mouth once daily. (Patient not taking: Reported on 2/17/2020) 1 tablet 1     Current Facility-Administered Medications on File Prior to Visit   Medication Dose Route Frequency Provider Last Rate Last Dose    EUFLEXXA 10 mg/mL(mw 2.4 -3.6 million) injection Syrg 20 mg  20 mg Intra-articular  Pop Schneider MD   20 mg at 12/27/17 1431       Objective:     Physical Exam:    Vitals:    02/17/20 1018   BP: (!) 142/78   Pulse: 71   Resp: 20   Weight: (!) 138.1 kg (304 lb 7.3 oz)   Height: 5' 10" (1.778 m)     Body mass index is 43.68 kg/m².    Constitutional  Well-developed, well-nourished, appears stated age     ..  * Specialized movement exam  No hypophonic speech.    No facial masking.   No cogwheel rigidity.     No bradykinesia.   No tremor with rest.   Postural -Slight to mild tremor RH and slight LH  Subtle kinetic tremor BH  Near mild intention tremor RH and slight to near mild LH.    No other dystonia, chorea, athetosis, myoclonus, or tics.    Slow to arise from seated position but does so without push.  No motor impersistence.  Bit antalgic.   Normal-based gait.   Shortened stride length.   No abnormal arm swing.               Laboratory Results:  Lab Visit on 12/31/2019   Component Date Value Ref Range Status    WBC 12/31/2019 3.29* 3.90 - 12.70 K/uL Final    RBC 12/31/2019 4.75  4.00 - 5.40 M/uL Final    Hemoglobin 12/31/2019 14.5  12.0 - 16.0 g/dL Final    Hematocrit 12/31/2019 46.1  37.0 - 48.5 % Final    Mean Corpuscular Volume 12/31/2019 97  82 - 98 fL Final    Mean Corpuscular Hemoglobin 12/31/2019 30.5  27.0 - 31.0 pg Final    Mean Corpuscular Hemoglobin Conc 12/31/2019 31.5* 32.0 - 36.0 g/dL Final    RDW 12/31/2019 14.2  11.5 - 14.5 % Final    " Platelets 12/31/2019 70* 150 - 350 K/uL Final    MPV 12/31/2019 11.1  9.2 - 12.9 fL Final    Immature Granulocytes 12/31/2019 0.3  0.0 - 0.5 % Final    Gran # (ANC) 12/31/2019 1.8  1.8 - 7.7 K/uL Final    Immature Grans (Abs) 12/31/2019 0.01  0.00 - 0.04 K/uL Final    Lymph # 12/31/2019 0.8* 1.0 - 4.8 K/uL Final    Mono # 12/31/2019 0.4  0.3 - 1.0 K/uL Final    Eos # 12/31/2019 0.3  0.0 - 0.5 K/uL Final    Baso # 12/31/2019 0.03  0.00 - 0.20 K/uL Final    nRBC 12/31/2019 0  0 /100 WBC Final    Gran% 12/31/2019 54.4  38.0 - 73.0 % Final    Lymph% 12/31/2019 23.4  18.0 - 48.0 % Final    Mono% 12/31/2019 12.8  4.0 - 15.0 % Final    Eosinophil% 12/31/2019 8.2* 0.0 - 8.0 % Final    Basophil% 12/31/2019 0.9  0.0 - 1.9 % Final    Differential Method 12/31/2019 Automated   Final    Sodium 12/31/2019 144  136 - 145 mmol/L Final    Potassium 12/31/2019 3.8  3.5 - 5.1 mmol/L Final    Chloride 12/31/2019 113* 95 - 110 mmol/L Final    CO2 12/31/2019 26  23 - 29 mmol/L Final    Glucose 12/31/2019 151* 70 - 110 mg/dL Final    BUN, Bld 12/31/2019 16  6 - 20 mg/dL Final    Creatinine 12/31/2019 0.7  0.5 - 1.4 mg/dL Final    Calcium 12/31/2019 9.1  8.7 - 10.5 mg/dL Final    Total Protein 12/31/2019 6.0  6.0 - 8.4 g/dL Final    Albumin 12/31/2019 3.4* 3.5 - 5.2 g/dL Final    Total Bilirubin 12/31/2019 0.8  0.1 - 1.0 mg/dL Final    Alkaline Phosphatase 12/31/2019 142* 55 - 135 U/L Final    AST 12/31/2019 58* 10 - 40 U/L Final    ALT 12/31/2019 48* 10 - 44 U/L Final    Anion Gap 12/31/2019 5* 8 - 16 mmol/L Final    eGFR if African American 12/31/2019 >60.0  >60 mL/min/1.73 m^2 Final    eGFR if non African American 12/31/2019 >60.0  >60 mL/min/1.73 m^2 Final    Prothrombin Time 12/31/2019 12.0  9.0 - 12.5 sec Final    INR 12/31/2019 1.1  0.8 - 1.2 Final    AFP 12/31/2019 3.3  0.0 - 8.4 ng/mL Final           Assessment and Plan     Essential tremor    Essential hypertension    Cirrhosis of liver without  ascites, unspecified hepatic cirrhosis type        Medical Decision Making:    She is currently prescribed propranolol 20 mg BID. At least half the time, she misses her evening dose.   Will dw Dr. Webb to see if he is agreeable to changing to 60 mg LA.   I will message her after I hear from him.   Follow up 6 months, unless needed sooner.     I spent 15 minutes face-to-face with the patient with >50% of the time spent with counseling and education regarding:  - results of data, diagnosis, and recommendations stated above  - the prognosis of tremor  - risks and benefits of propranolol   - importance of diet and exercise    Ashlee Andrade, JOSE MIGUEL, NP-C  Division of Movement and Memory Disorders  [facility]  [phone]

## 2020-02-17 NOTE — PATIENT INSTRUCTIONS
I will message Dr. Webb to see if he is okay with switching the propranolol to a long acting formulation. This way you'd only have to take one tablet daily. I will message you when I hear from him.     Follow up 6 months, unless needed sooner.

## 2020-03-19 RX ORDER — PROPRANOLOL HYDROCHLORIDE 20 MG/1
20 TABLET ORAL 2 TIMES DAILY
Qty: 60 TABLET | Refills: 2 | Status: CANCELLED | OUTPATIENT
Start: 2020-03-19 | End: 2021-03-19

## 2020-03-19 RX ORDER — PROPRANOLOL HYDROCHLORIDE 60 MG/1
60 CAPSULE, EXTENDED RELEASE ORAL DAILY
Qty: 30 CAPSULE | Refills: 11 | Status: SHIPPED | OUTPATIENT
Start: 2020-03-19 | End: 2021-01-28 | Stop reason: SDUPTHER

## 2020-03-19 NOTE — TELEPHONE ENCOUNTER
Discussed with patient and will switch to the long-acting propranolol 60 mg which may help improve compliance and help better with her tremor.  Previously on 20 mg b.i.d. but sometimes not taking at the 2nd dose

## 2020-03-23 ENCOUNTER — PATIENT MESSAGE (OUTPATIENT)
Dept: ADMINISTRATIVE | Facility: OTHER | Age: 60
End: 2020-03-23

## 2020-03-23 ENCOUNTER — PATIENT MESSAGE (OUTPATIENT)
Dept: FAMILY MEDICINE | Facility: CLINIC | Age: 60
End: 2020-03-23

## 2020-03-24 ENCOUNTER — OFFICE VISIT (OUTPATIENT)
Dept: FAMILY MEDICINE | Facility: CLINIC | Age: 60
End: 2020-03-24
Payer: COMMERCIAL

## 2020-03-24 DIAGNOSIS — J32.9 SINUSITIS, UNSPECIFIED CHRONICITY, UNSPECIFIED LOCATION: ICD-10-CM

## 2020-03-24 DIAGNOSIS — J02.9 SORE THROAT: Primary | ICD-10-CM

## 2020-03-24 PROCEDURE — 99213 OFFICE O/P EST LOW 20 MIN: CPT | Mod: 95,,, | Performed by: FAMILY MEDICINE

## 2020-03-24 PROCEDURE — 99213 PR OFFICE/OUTPT VISIT, EST, LEVL III, 20-29 MIN: ICD-10-PCS | Mod: 95,,, | Performed by: FAMILY MEDICINE

## 2020-03-24 RX ORDER — AMOXICILLIN 875 MG/1
875 TABLET, FILM COATED ORAL 2 TIMES DAILY
Qty: 20 TABLET | Refills: 0 | Status: SHIPPED | OUTPATIENT
Start: 2020-03-24 | End: 2020-04-03

## 2020-03-24 NOTE — PROGRESS NOTES
THIS DOCUMENT WAS MADE IN PART WITH VOICE RECOGNITION SOFTWARE.  OCCASIONALLY THIS SOFTWARE WILL MISINTERPRET WORDS OR PHRASES.      Yumiko WILHELM Carlos  1960    Diagnoses and all orders for this visit:    Sore throat    Sinusitis, unspecified chronicity, unspecified location  -     amoxicillin (AMOXIL) 875 MG tablet; Take 1 tablet (875 mg total) by mouth 2 (two) times daily. for 10 days     Patient does not have signs symptoms or criteria to meet COVID19 testing and or restrictions.  I feel that if she begins to improve with the antibiotic she may be able to return to work later this week.  If she has any worsening symptoms she will let me know.    Subjective     The patient location is:  Patient Home   The chief complaint leading to consultation is:  Sore throat elevated family I can barely area can use speak up okay best at [place] year well now okay I really obviously not work right now a scratch the the the    Visit type: Virtual visit with synchronous audio and video  Total time spent with patient: 8 minutes  Each patient to whom he or she provides medical services by telemedicine is:  (1) informed of the relationship between the physician and patient and the respective role of any other health care provider with respect to management of the patient; and (2) notified that he or she may decline to receive medical services by telemedicine and may withdraw from such care at any time.      Sore Throat    This is a new problem. The current episode started in the past 7 days. The problem has been waxing and waning. There has been no fever. Associated symptoms include congestion, a hoarse voice, shortness of breath (She has mild baseline shortness of breath no exacerbation, no coughing or wheezing) and trouble swallowing ( sore throat no significant difficulty swallowing). Pertinent negatives include no ear pain or vomiting. Swollen glands:  glands in the neck are tender. Associated symptoms comments: Post nasal  drainage, sore glands in throat, . Exposure to: No obvious known exposure though she does work at the check-in desk in our [employer].. She has tried gargles for the symptoms. The treatment provided mild relief.    59-year-old female who works at our [employer], .  She has had a sore throat that began last week and was sent home from work until further evaluation.  Her symptoms initially included more congestion and postnasal drainage those are a little better but the sore throat persists.  She denies any fever at any point.  She has no significant wheezing or shortness of breath, she has some baseline dyspnea with exertion but no change from baseline.  Again exposure is potential but unknown.      Active Ambulatory Problems     Diagnosis Date Noted    Acute bronchospasm 10/13/2010    Hypermetropia 09/01/2010    Enthesopathy of ankle and tarsus, unspecified 10/26/2011    Cervical neck pain with evidence of disc disease 03/22/2012    Fatty liver 03/22/2012    History of cervical cancer 03/22/2012    Depression 03/22/2012    Diverticular disease 03/22/2012    Colon polyps 03/22/2012    Hypertension 04/24/2013    Hx of colonic polyps 12/12/2016    Morbid obesity due to excess calories 04/11/2017    Morbid obesity with BMI of 40.0-44.9, adult 04/11/2017    Splenomegaly 04/11/2017    History of posttraumatic stress disorder (PTSD) 06/06/2017    Nonalcoholic steatohepatitis 06/20/2017    Cirrhosis of liver without ascites 06/20/2017    Cirrhosis 07/13/2017    Mass of right lung 07/24/2018     Resolved Ambulatory Problems     Diagnosis Date Noted    DUNG (obstructive sleep apnea) 03/22/2012    Postop check 05/24/2013    Preop cardiovascular exam 04/18/2017     Past Medical History:   Diagnosis Date    Abnormal Pap smear     Anemia     Arthritis     Asthma     Autoimmune disease     Blood transfusion     Bronchitis     Cancer 1987    Fibrocystic breast     Fine tremor      Hepatosplenomegaly     Knee pain, bilateral     Lesion of right lung     Liver disease     Migraines past Hx    Pleural effusion     PONV (postoperative nausea and vomiting)     Postpartum depression     Thrombocytopenia     Tremors of nervous system          Review of Systems   HENT: Positive for congestion, hoarse voice, sore throat and trouble swallowing ( sore throat no significant difficulty swallowing). Negative for ear pain.    Respiratory: Positive for shortness of breath (She has mild baseline shortness of breath no exacerbation, no coughing or wheezing).    Gastrointestinal: Negative for vomiting.       Objective     Physical Exam  Physical exam limited as this was a virtual visit.  But it is readily apparent that the individual is in no acute distress, well groomed, well kept.  Speech was normal.  Patient is alert and oriented x3.  Mood and affect appear normal.  Respirations appear normal and unlabored, I estimate less than 18.  Patient did not report any significant blood pressure elevation or reported increase in pulse rate

## 2020-04-21 DIAGNOSIS — Z01.84 ANTIBODY RESPONSE EXAMINATION: ICD-10-CM

## 2020-04-23 ENCOUNTER — LAB VISIT (OUTPATIENT)
Dept: LAB | Facility: HOSPITAL | Age: 60
End: 2020-04-23
Attending: INTERNAL MEDICINE
Payer: COMMERCIAL

## 2020-04-23 DIAGNOSIS — Z01.84 ANTIBODY RESPONSE EXAMINATION: ICD-10-CM

## 2020-04-23 LAB — SARS-COV-2 IGG SERPL QL IA: NEGATIVE

## 2020-04-23 PROCEDURE — 36415 COLL VENOUS BLD VENIPUNCTURE: CPT | Mod: PO

## 2020-04-23 PROCEDURE — 86769 SARS-COV-2 COVID-19 ANTIBODY: CPT

## 2020-05-06 ENCOUNTER — PATIENT MESSAGE (OUTPATIENT)
Dept: ADMINISTRATIVE | Facility: HOSPITAL | Age: 60
End: 2020-05-06

## 2020-05-14 RX ORDER — FLUCONAZOLE 150 MG/1
150 TABLET ORAL DAILY
Qty: 1 TABLET | Refills: 1 | Status: SHIPPED | OUTPATIENT
Start: 2020-05-14 | End: 2022-08-31

## 2020-05-28 ENCOUNTER — PATIENT MESSAGE (OUTPATIENT)
Dept: FAMILY MEDICINE | Facility: CLINIC | Age: 60
End: 2020-05-28

## 2020-05-28 NOTE — TELEPHONE ENCOUNTER
Please review and advise it pf is due for any repeat testing at this time. Will contact pt to schedule.

## 2020-05-29 ENCOUNTER — PATIENT MESSAGE (OUTPATIENT)
Dept: HEPATOLOGY | Facility: CLINIC | Age: 60
End: 2020-05-29

## 2020-05-29 ENCOUNTER — TELEPHONE (OUTPATIENT)
Dept: HEPATOLOGY | Facility: CLINIC | Age: 60
End: 2020-05-29

## 2020-05-29 NOTE — TELEPHONE ENCOUNTER
----- Message from Fany Ramey sent at 5/28/2020  5:44 PM CDT -----  Contact: self  [phone]  Appointment Request From: Yumiko Gonzalez    With Provider: Hetal Prince MD [[facility] - Hepatology]    Preferred Date Range: 7/31/2020 - 8/14/2020    Preferred Times: Friday Morning, Friday Afternoon    Reason for visit: Office Visit    Comments:  annual follow up of Nonalcoholic steatohepatitis & Cirrhosis of liver w/o ascites    Via pt portal

## 2020-06-22 ENCOUNTER — PATIENT MESSAGE (OUTPATIENT)
Dept: FAMILY MEDICINE | Facility: CLINIC | Age: 60
End: 2020-06-22

## 2020-06-22 DIAGNOSIS — R93.89 ABNORMAL CHEST X-RAY: Primary | ICD-10-CM

## 2020-06-22 RX ORDER — CELECOXIB 200 MG/1
200 CAPSULE ORAL DAILY
Qty: 14 CAPSULE | Refills: 0 | Status: SHIPPED | OUTPATIENT
Start: 2020-06-22 | End: 2020-07-07

## 2020-06-24 ENCOUNTER — HOSPITAL ENCOUNTER (OUTPATIENT)
Dept: RADIOLOGY | Facility: HOSPITAL | Age: 60
Discharge: HOME | End: 2020-06-24
Attending: FAMILY MEDICINE
Payer: COMMERCIAL

## 2020-06-24 DIAGNOSIS — R93.89 ABNORMAL CHEST X-RAY: ICD-10-CM

## 2020-06-24 PROCEDURE — 71046 XR CHEST PA AND LATERAL: ICD-10-PCS | Mod: 26,,, | Performed by: RADIOLOGY

## 2020-06-24 PROCEDURE — 71046 X-RAY EXAM CHEST 2 VIEWS: CPT | Mod: TC,FY,PO

## 2020-06-24 PROCEDURE — 71046 X-RAY EXAM CHEST 2 VIEWS: CPT | Mod: 26,,, | Performed by: RADIOLOGY

## 2020-06-26 ENCOUNTER — LAB VISIT (OUTPATIENT)
Dept: LAB | Facility: HOSPITAL | Age: 60
End: 2020-06-26
Attending: INTERNAL MEDICINE
Payer: COMMERCIAL

## 2020-06-26 ENCOUNTER — PATIENT MESSAGE (OUTPATIENT)
Dept: HEPATOLOGY | Facility: CLINIC | Age: 60
End: 2020-06-26

## 2020-06-26 DIAGNOSIS — K75.81 STEATOHEPATITIS, NON-ALCOHOLIC: ICD-10-CM

## 2020-06-26 DIAGNOSIS — K74.60 HEPATIC CIRRHOSIS, UNSPECIFIED HEPATIC CIRRHOSIS TYPE, UNSPECIFIED WHETHER ASCITES PRESENT: Primary | ICD-10-CM

## 2020-06-26 DIAGNOSIS — K74.69 OTHER CIRRHOSIS OF LIVER: ICD-10-CM

## 2020-06-26 LAB — AFP SERPL-MCNC: 3.5 NG/ML (ref 0–8.4)

## 2020-06-26 PROCEDURE — 82105 ALPHA-FETOPROTEIN SERUM: CPT

## 2020-06-26 PROCEDURE — 36415 COLL VENOUS BLD VENIPUNCTURE: CPT | Mod: PO

## 2020-06-29 ENCOUNTER — TELEPHONE (OUTPATIENT)
Dept: HEPATOLOGY | Facility: CLINIC | Age: 60
End: 2020-06-29

## 2020-06-29 NOTE — TELEPHONE ENCOUNTER
----- Message from Hetal Prince MD sent at 6/27/2020  7:50 PM CDT -----  Please inform patient results are OK.

## 2020-07-10 ENCOUNTER — LAB VISIT (OUTPATIENT)
Dept: LAB | Facility: HOSPITAL | Age: 60
End: 2020-07-10
Attending: INTERNAL MEDICINE
Payer: COMMERCIAL

## 2020-07-10 DIAGNOSIS — K74.60 HEPATIC CIRRHOSIS, UNSPECIFIED HEPATIC CIRRHOSIS TYPE, UNSPECIFIED WHETHER ASCITES PRESENT: ICD-10-CM

## 2020-07-10 DIAGNOSIS — K75.81 STEATOHEPATITIS, NON-ALCOHOLIC: ICD-10-CM

## 2020-07-10 LAB
INR PPP: 1.2 (ref 0.8–1.2)
PROTHROMBIN TIME: 12.8 SEC (ref 9–12.5)

## 2020-07-10 PROCEDURE — 85025 COMPLETE CBC W/AUTO DIFF WBC: CPT

## 2020-07-10 PROCEDURE — 36415 COLL VENOUS BLD VENIPUNCTURE: CPT | Mod: PO

## 2020-07-10 PROCEDURE — 85610 PROTHROMBIN TIME: CPT | Mod: PO

## 2020-07-10 PROCEDURE — 80053 COMPREHEN METABOLIC PANEL: CPT

## 2020-07-11 LAB
ALBUMIN SERPL BCP-MCNC: 3.5 G/DL (ref 3.5–5.2)
ALP SERPL-CCNC: 89 U/L (ref 55–135)
ALT SERPL W/O P-5'-P-CCNC: 44 U/L (ref 10–44)
ANION GAP SERPL CALC-SCNC: 8 MMOL/L (ref 8–16)
AST SERPL-CCNC: 53 U/L (ref 10–40)
BASOPHILS # BLD AUTO: 0.03 K/UL (ref 0–0.2)
BASOPHILS NFR BLD: 0.8 % (ref 0–1.9)
BILIRUB SERPL-MCNC: 2 MG/DL (ref 0.1–1)
BUN SERPL-MCNC: 15 MG/DL (ref 8–23)
CALCIUM SERPL-MCNC: 9.8 MG/DL (ref 8.7–10.5)
CHLORIDE SERPL-SCNC: 111 MMOL/L (ref 95–110)
CO2 SERPL-SCNC: 27 MMOL/L (ref 23–29)
CREAT SERPL-MCNC: 0.8 MG/DL (ref 0.5–1.4)
DIFFERENTIAL METHOD: ABNORMAL
EOSINOPHIL # BLD AUTO: 0.2 K/UL (ref 0–0.5)
EOSINOPHIL NFR BLD: 6.2 % (ref 0–8)
ERYTHROCYTE [DISTWIDTH] IN BLOOD BY AUTOMATED COUNT: 14.1 % (ref 11.5–14.5)
EST. GFR  (AFRICAN AMERICAN): >60 ML/MIN/1.73 M^2
EST. GFR  (NON AFRICAN AMERICAN): >60 ML/MIN/1.73 M^2
GLUCOSE SERPL-MCNC: 147 MG/DL (ref 70–110)
HCT VFR BLD AUTO: 46.9 % (ref 37–48.5)
HGB BLD-MCNC: 14.9 G/DL (ref 12–16)
IMM GRANULOCYTES # BLD AUTO: 0.02 K/UL (ref 0–0.04)
IMM GRANULOCYTES NFR BLD AUTO: 0.5 % (ref 0–0.5)
LYMPHOCYTES # BLD AUTO: 0.9 K/UL (ref 1–4.8)
LYMPHOCYTES NFR BLD: 23.6 % (ref 18–48)
MCH RBC QN AUTO: 30.8 PG (ref 27–31)
MCHC RBC AUTO-ENTMCNC: 31.8 G/DL (ref 32–36)
MCV RBC AUTO: 97 FL (ref 82–98)
MONOCYTES # BLD AUTO: 0.5 K/UL (ref 0.3–1)
MONOCYTES NFR BLD: 12.1 % (ref 4–15)
NEUTROPHILS # BLD AUTO: 2.2 K/UL (ref 1.8–7.7)
NEUTROPHILS NFR BLD: 56.8 % (ref 38–73)
NRBC BLD-RTO: 0 /100 WBC
PLATELET # BLD AUTO: 70 K/UL (ref 150–350)
PMV BLD AUTO: 12.1 FL (ref 9.2–12.9)
POTASSIUM SERPL-SCNC: 3.7 MMOL/L (ref 3.5–5.1)
PROT SERPL-MCNC: 6.2 G/DL (ref 6–8.4)
RBC # BLD AUTO: 4.84 M/UL (ref 4–5.4)
SODIUM SERPL-SCNC: 146 MMOL/L (ref 136–145)
WBC # BLD AUTO: 3.9 K/UL (ref 3.9–12.7)

## 2020-07-13 ENCOUNTER — TELEPHONE (OUTPATIENT)
Dept: HEPATOLOGY | Facility: CLINIC | Age: 60
End: 2020-07-13

## 2020-07-13 NOTE — TELEPHONE ENCOUNTER
----- Message from Hetal Prince MD sent at 7/12/2020 11:43 AM CDT -----  Please inform patient results are OK.

## 2020-07-24 ENCOUNTER — TELEPHONE (OUTPATIENT)
Dept: HEPATOLOGY | Facility: CLINIC | Age: 60
End: 2020-07-24

## 2020-07-24 ENCOUNTER — HOSPITAL ENCOUNTER (OUTPATIENT)
Dept: RADIOLOGY | Facility: HOSPITAL | Age: 60
Discharge: HOME | End: 2020-07-24
Attending: INTERNAL MEDICINE
Payer: COMMERCIAL

## 2020-07-24 DIAGNOSIS — K74.69 OTHER CIRRHOSIS OF LIVER: ICD-10-CM

## 2020-07-24 PROCEDURE — 76705 ECHO EXAM OF ABDOMEN: CPT | Mod: 26,,, | Performed by: RADIOLOGY

## 2020-07-24 PROCEDURE — 76705 US ABDOMEN LIMITED: ICD-10-PCS | Mod: 26,,, | Performed by: RADIOLOGY

## 2020-07-24 PROCEDURE — 76705 ECHO EXAM OF ABDOMEN: CPT | Mod: TC,PO

## 2020-07-24 NOTE — TELEPHONE ENCOUNTER
----- Message from Hetal Prince MD sent at 7/24/2020  1:40 PM CDT -----  Cirrhosis and enlarged spleen as a result of cirrhosis  Inform patient

## 2020-08-16 ENCOUNTER — PATIENT OUTREACH (OUTPATIENT)
Dept: ADMINISTRATIVE | Facility: OTHER | Age: 60
End: 2020-08-16

## 2020-08-16 NOTE — PROGRESS NOTES
Health Maintenance Due   Topic Date Due    TETANUS VACCINE  08/03/2017     Updates were requested from care everywhere.  Chart was reviewed for overdue Proactive [facility] Encounters ([facility]) topics (CRS, Breast Cancer Screening, Eye exam)  Health Maintenance has been updated.  LINKS immunization registry triggered.  Immunizations were reconciled.

## 2020-08-17 ENCOUNTER — OFFICE VISIT (OUTPATIENT)
Dept: NEUROLOGY | Facility: CLINIC | Age: 60
End: 2020-08-17
Payer: COMMERCIAL

## 2020-08-17 VITALS — SYSTOLIC BLOOD PRESSURE: 160 MMHG | DIASTOLIC BLOOD PRESSURE: 70 MMHG | TEMPERATURE: 98 F | HEART RATE: 64 BPM

## 2020-08-17 DIAGNOSIS — K74.60 HEPATIC CIRRHOSIS, UNSPECIFIED HEPATIC CIRRHOSIS TYPE, UNSPECIFIED WHETHER ASCITES PRESENT: ICD-10-CM

## 2020-08-17 DIAGNOSIS — I10 ESSENTIAL HYPERTENSION: ICD-10-CM

## 2020-08-17 DIAGNOSIS — G25.0 ESSENTIAL TREMOR: Primary | ICD-10-CM

## 2020-08-17 PROCEDURE — 3077F SYST BP >= 140 MM HG: CPT | Mod: CPTII,S$GLB,, | Performed by: NURSE PRACTITIONER

## 2020-08-17 PROCEDURE — 99214 OFFICE O/P EST MOD 30 MIN: CPT | Mod: S$GLB,,, | Performed by: NURSE PRACTITIONER

## 2020-08-17 PROCEDURE — 99214 PR OFFICE/OUTPT VISIT, EST, LEVL IV, 30-39 MIN: ICD-10-PCS | Mod: S$GLB,,, | Performed by: NURSE PRACTITIONER

## 2020-08-17 PROCEDURE — 99999 PR PBB SHADOW E&M-EST. PATIENT-LVL II: ICD-10-PCS | Mod: PBBFAC,,, | Performed by: NURSE PRACTITIONER

## 2020-08-17 PROCEDURE — 99999 PR PBB SHADOW E&M-EST. PATIENT-LVL II: CPT | Mod: PBBFAC,,, | Performed by: NURSE PRACTITIONER

## 2020-08-17 PROCEDURE — 3078F PR MOST RECENT DIASTOLIC BLOOD PRESSURE < 80 MM HG: ICD-10-PCS | Mod: CPTII,S$GLB,, | Performed by: NURSE PRACTITIONER

## 2020-08-17 PROCEDURE — 3078F DIAST BP <80 MM HG: CPT | Mod: CPTII,S$GLB,, | Performed by: NURSE PRACTITIONER

## 2020-08-17 PROCEDURE — 3077F PR MOST RECENT SYSTOLIC BLOOD PRESSURE >= 140 MM HG: ICD-10-PCS | Mod: CPTII,S$GLB,, | Performed by: NURSE PRACTITIONER

## 2020-08-17 NOTE — PROGRESS NOTES
Name: Yumiko Gonzalez  MRN: [id number]   CSN: [id number]      Date: 8-      Referring physician:  No referring provider defined for this encounter.    Subjective:      Chief Complaint: tremor      History of Present Illness (HPI):    Yumiko Gonzalez is a 60 y.o. right-handed female with HTN, non-alcoholic cirrhosis of liver who presents today for a follow-up evaluation of Essential Tremor and is alone. Last seen in office 2-. At that time, messaged her PCP to see if he would mind changing propranolol to LA as she was having some missed doses in the evenings and was not seeing much improvement in tremor.     She feels tremor is better than it was with the change to propranolol LA 60 mg in AM. She generally feels her tremor is tolerable.   Tremor increases with fatigue, anxiety.   Describes stress with daughter and her son-in-law with financial trouble.     Notes tremor in BH only (R>L).   Holding utensils is not as much of problem anymore.   Handwriting remains bad but not worse than last visit.   Notes more when she concentrates.  Taking pics are difficult as too shaky.   Can't paint her own nails.     She does not feel her meds need adjustment at this point.     Works at [employer] (12-15 hours) and [employer] (40 hours).     Mother had voice tremor. Dtr is starting with tremor.       Review of Systems   HENT: Negative for trouble swallowing.    Musculoskeletal: Negative for gait problem.        Left knee pain   Neurological: Positive for tremors. Negative for dizziness.   Psychiatric/Behavioral: Positive for dysphoric mood (take anti-depressants, helpful) and sleep disturbance (especially if caught up in good book). The patient is nervous/anxious.        Past Medical History: The patient  has a past medical history of Abnormal Pap smear, Anemia, Arthritis, Asthma, Autoimmune disease, Blood transfusion, Bronchitis, Cancer (1987), Cervical neck pain with evidence of disc disease (3/22/2012), Cirrhosis, Colon  polyps (3/22/2012), Depression (3/22/2012), Diverticular disease (3/22/2012), Fatty liver (3/22/2012), Fibrocystic breast, Fine tremor, Hepatosplenomegaly, Hypertension (4/24/2013), Knee pain, bilateral, Lesion of right lung, Liver disease, Migraines (past Hx), Pleural effusion, PONV (postoperative nausea and vomiting), Postpartum depression, Splenomegaly, Thrombocytopenia, and Tremors of nervous system.    Social History: The patient  reports that she quit smoking about 14 years ago. She has never used smokeless tobacco. She reports current alcohol use. She reports that she does not use drugs.    Family History: Their family history includes Breast cancer in her maternal grandmother and sister; Breast cancer (age of onset: 40) in her maternal cousin; Breast cancer (age of onset: 70) in her maternal aunt and mother; Diabetes in her brother, father, sister, and sister; Emphysema in her brother; Heart disease in her father and mother; Hypertension in her mother; Multiple sclerosis in her maternal aunt and maternal aunt; Tremor in her daughter and mother.    Allergies: No known drug allergies     Meds:   Current Outpatient Medications on File Prior to Visit   Medication Sig Dispense Refill    albuterol (PROVENTIL) 2.5 mg /3 mL (0.083 %) nebulizer solution Take 3 mLs (2.5 mg total) by nebulization every 6 (six) hours as needed for Wheezing. 72 mL 2    alprazolam (XANAX) 0.25 MG tablet TAKE ONE TABLET BY MOUTH NIGHTLY AS NEEDED FOR INSOMNIA 30 tablet 2    buPROPion (WELLBUTRIN XL) 150 MG TB24 tablet Take 2 tablets (300 mg total) by mouth once daily. 180 tablet 3    cetirizine (ZYRTEC) 10 MG tablet Take 10 mg by mouth once daily.      diclofenac sodium (VOLTAREN) 1 % Gel Apply 2 g topically 3 (three) times daily. 1 Tube 3    fluconazole (DIFLUCAN) 150 MG Tab Take 1 tablet (150 mg total) by mouth once daily. 1 tablet 1    hydroCHLOROthiazide (HYDRODIURIL) 12.5 MG Tab Take 1 tablet (12.5 mg total) by mouth once  daily. 90 tablet 1    lidocaine HCL 10 mg/ml, 1%, 10 mg/mL (1 %) injection Inhale 3ml via nebulizer four times daily as needed 350 mL 5    loratadine (CLARITIN) 10 mg tablet Take 10 mg by mouth once daily.       multivitamin (THERAGRAN) per tablet Take 1 tablet by mouth once daily.      nystatin-triamcinolone (MYCOLOG II) cream Apply to affected area twice daily as needed 60 g 3    propranoloL (INDERAL LA) 60 MG 24 hr capsule Take 1 capsule (60 mg total) by mouth once daily. 30 capsule 11    sodium chloride 7% 7 % nebulizer solution Use in nebulizer as directed 3 times a day for 30 days 240 mL 5    triamcinolone (NASACORT) 55 mcg nasal inhaler Use 2 sprays by Nasal route once daily. (Patient taking differently: 2 sprays by Nasal route as needed. ) 17 g 12    vitamin E 400 UNIT capsule Take 400 Units by mouth once daily.       Current Facility-Administered Medications on File Prior to Visit   Medication Dose Route Frequency Provider Last Rate Last Dose    EUFLEXXA 10 mg/mL(mw 2.4 -3.6 million) injection Syrg 20 mg  20 mg Intra-articular  Pop Schneider MD   20 mg at 12/27/17 1431       Objective:     Physical Exam:  T 97.9  HR- 64 manual, radial  /70 (RUE- taken with machine)    Constitutional  obese, well-nourished, appears stated age         ..  Neurological    * Mental status     - Orientation Oriented to: person, place and situation     - Memory     Intact recent and remote     - Attention/concentration  Normal     - Language  spontaneous, fluent     - Fund of knowledge  Aware of current events     - Executive  Well-organized thoughts     FACE MASK ON.   * Cranial nerves       - CN II  PERRL, visual fields full to confrontation     - CN III, IV, VI  Extraocular movements full, normal pursuits and saccades     - CN VIII  Hearing intact to conversation   * Coordination  No dysmetria with finger-to-nose    * Gait  See below.       ..  * Specialized movement exam  No hypophonic speech.    No  facial masking.   No cogwheel rigidity.     No bradykinesia.    Very brief subtle tremor in head noted.   No voice tremor.    No tremor with rest.  Subtle postural tremor BH outstretched.   When pulled to core, RH near mild and LH remains subtle. Subtle kinetic tremor BH.   Near mild intention tremor, slight LH.       No other dystonia, chorea, athetosis, myoclonus, or tics.    Pushes self to stand.    No motor impersistence.   Normal-based gait.   No shortened stride length.  No abnormal arm swing.   No tremor on ambulation.               Laboratory Results:  Lab Visit on 07/10/2020   Component Date Value Ref Range Status    WBC 07/10/2020 3.90  3.90 - 12.70 K/uL Final    RBC 07/10/2020 4.84  4.00 - 5.40 M/uL Final    Hemoglobin 07/10/2020 14.9  12.0 - 16.0 g/dL Final    Hematocrit 07/10/2020 46.9  37.0 - 48.5 % Final    Mean Corpuscular Volume 07/10/2020 97  82 - 98 fL Final    Mean Corpuscular Hemoglobin 07/10/2020 30.8  27.0 - 31.0 pg Final    Mean Corpuscular Hemoglobin Conc 07/10/2020 31.8* 32.0 - 36.0 g/dL Final    RDW 07/10/2020 14.1  11.5 - 14.5 % Final    Platelets 07/10/2020 70* 150 - 350 K/uL Final    MPV 07/10/2020 12.1  9.2 - 12.9 fL Final    Immature Granulocytes 07/10/2020 0.5  0.0 - 0.5 % Final    Gran # (ANC) 07/10/2020 2.2  1.8 - 7.7 K/uL Final    Immature Grans (Abs) 07/10/2020 0.02  0.00 - 0.04 K/uL Final    Lymph # 07/10/2020 0.9* 1.0 - 4.8 K/uL Final    Mono # 07/10/2020 0.5  0.3 - 1.0 K/uL Final    Eos # 07/10/2020 0.2  0.0 - 0.5 K/uL Final    Baso # 07/10/2020 0.03  0.00 - 0.20 K/uL Final    nRBC 07/10/2020 0  0 /100 WBC Final    Gran% 07/10/2020 56.8  38.0 - 73.0 % Final    Lymph% 07/10/2020 23.6  18.0 - 48.0 % Final    Mono% 07/10/2020 12.1  4.0 - 15.0 % Final    Eosinophil% 07/10/2020 6.2  0.0 - 8.0 % Final    Basophil% 07/10/2020 0.8  0.0 - 1.9 % Final    Differential Method 07/10/2020 Automated   Final    Sodium 07/10/2020 146* 136 - 145 mmol/L Final     Potassium 07/10/2020 3.7  3.5 - 5.1 mmol/L Final    Chloride 07/10/2020 111* 95 - 110 mmol/L Final    CO2 07/10/2020 27  23 - 29 mmol/L Final    Glucose 07/10/2020 147* 70 - 110 mg/dL Final    BUN, Bld 07/10/2020 15  8 - 23 mg/dL Final    Creatinine 07/10/2020 0.8  0.5 - 1.4 mg/dL Final    Calcium 07/10/2020 9.8  8.7 - 10.5 mg/dL Final    Total Protein 07/10/2020 6.2  6.0 - 8.4 g/dL Final    Albumin 07/10/2020 3.5  3.5 - 5.2 g/dL Final    Total Bilirubin 07/10/2020 2.0* 0.1 - 1.0 mg/dL Final    Alkaline Phosphatase 07/10/2020 89  55 - 135 U/L Final    AST 07/10/2020 53* 10 - 40 U/L Final    ALT 07/10/2020 44  10 - 44 U/L Final    Anion Gap 07/10/2020 8  8 - 16 mmol/L Final    eGFR if African American 07/10/2020 >60.0  >60 mL/min/1.73 m^2 Final    eGFR if non African American 07/10/2020 >60.0  >60 mL/min/1.73 m^2 Final    Prothrombin Time 07/10/2020 12.8* 9.0 - 12.5 sec Final    INR 07/10/2020 1.2  0.8 - 1.2 Final   Lab Visit on 06/26/2020   Component Date Value Ref Range Status    AFP 06/26/2020 3.5  0.0 - 8.4 ng/mL Final         Assessment and Plan     Essential tremor    Essential hypertension    Hepatic cirrhosis, unspecified hepatic cirrhosis type, unspecified whether ascites present        Medical Decision Making:    Continue propranolol LA 60 mg daily.   Call for problems.   Watch stress/anxiety.   Follow up 6 months.     I spent 25 minutes face-to-face with the patient with >50% of the time spent with counseling and education regarding:  - results of data, diagnosis, and recommendations stated above  - the prognosis of tremor  -impact of stress, anxiety, fatigue on tremor  - risks and benefits of propranolol  - importance of diet and exercise    Ashlee Andrade, DNP, NP-C  Division of Movement and Memory Disorders  [facility]  [phone]

## 2020-09-10 RX ORDER — HYDROCHLOROTHIAZIDE 12.5 MG/1
12.5 TABLET ORAL DAILY
Qty: 90 TABLET | Refills: 1 | Status: SHIPPED | OUTPATIENT
Start: 2020-09-10 | End: 2020-11-06 | Stop reason: SDUPTHER

## 2020-09-10 RX ORDER — HYDROCHLOROTHIAZIDE 12.5 MG/1
12.5 TABLET ORAL DAILY
Qty: 90 TABLET | Refills: 1 | Status: CANCELLED | OUTPATIENT
Start: 2020-09-10 | End: 2021-09-10

## 2020-09-29 ENCOUNTER — PATIENT MESSAGE (OUTPATIENT)
Dept: OTHER | Facility: OTHER | Age: 60
End: 2020-09-29

## 2020-09-29 ENCOUNTER — IMMUNIZATION (OUTPATIENT)
Dept: PHARMACY | Facility: CLINIC | Age: 60
End: 2020-09-29
Payer: COMMERCIAL

## 2020-10-30 ENCOUNTER — PATIENT OUTREACH (OUTPATIENT)
Dept: ADMINISTRATIVE | Facility: OTHER | Age: 60
End: 2020-10-30

## 2020-10-30 ENCOUNTER — HOSPITAL ENCOUNTER (OUTPATIENT)
Dept: RADIOLOGY | Facility: HOSPITAL | Age: 60
Discharge: HOME | End: 2020-10-30
Attending: OBSTETRICS & GYNECOLOGY
Payer: COMMERCIAL

## 2020-10-30 ENCOUNTER — OFFICE VISIT (OUTPATIENT)
Dept: OBSTETRICS AND GYNECOLOGY | Facility: CLINIC | Age: 60
End: 2020-10-30
Payer: COMMERCIAL

## 2020-10-30 VITALS — WEIGHT: 293 LBS | HEIGHT: 70 IN | BODY MASS INDEX: 41.95 KG/M2

## 2020-10-30 DIAGNOSIS — Z12.4 PAP SMEAR FOR CERVICAL CANCER SCREENING: ICD-10-CM

## 2020-10-30 DIAGNOSIS — Z12.31 ENCOUNTER FOR SCREENING MAMMOGRAM FOR BREAST CANCER: Primary | ICD-10-CM

## 2020-10-30 DIAGNOSIS — Z12.31 ENCOUNTER FOR SCREENING MAMMOGRAM FOR BREAST CANCER: ICD-10-CM

## 2020-10-30 PROCEDURE — 77067 SCR MAMMO BI INCL CAD: CPT | Mod: TC,PN

## 2020-10-30 PROCEDURE — 99396 PR PREVENTIVE VISIT,EST,40-64: ICD-10-PCS | Mod: S$GLB,,, | Performed by: OBSTETRICS & GYNECOLOGY

## 2020-10-30 PROCEDURE — 88175 CYTOPATH C/V AUTO FLUID REDO: CPT

## 2020-10-30 PROCEDURE — 77067 SCR MAMMO BI INCL CAD: CPT | Mod: 26,,, | Performed by: RADIOLOGY

## 2020-10-30 PROCEDURE — 77063 MAMMO DIGITAL SCREENING BILAT WITH TOMO: ICD-10-PCS | Mod: 26,,, | Performed by: RADIOLOGY

## 2020-10-30 PROCEDURE — 99999 PR PBB SHADOW E&M-EST. PATIENT-LVL IV: CPT | Mod: PBBFAC,,, | Performed by: OBSTETRICS & GYNECOLOGY

## 2020-10-30 PROCEDURE — 77067 MAMMO DIGITAL SCREENING BILAT WITH TOMO: ICD-10-PCS | Mod: 26,,, | Performed by: RADIOLOGY

## 2020-10-30 PROCEDURE — 77063 BREAST TOMOSYNTHESIS BI: CPT | Mod: 26,,, | Performed by: RADIOLOGY

## 2020-10-30 PROCEDURE — 99999 PR PBB SHADOW E&M-EST. PATIENT-LVL IV: ICD-10-PCS | Mod: PBBFAC,,, | Performed by: OBSTETRICS & GYNECOLOGY

## 2020-10-30 PROCEDURE — 3008F PR BODY MASS INDEX (BMI) DOCUMENTED: ICD-10-PCS | Mod: CPTII,S$GLB,, | Performed by: OBSTETRICS & GYNECOLOGY

## 2020-10-30 PROCEDURE — 99396 PREV VISIT EST AGE 40-64: CPT | Mod: S$GLB,,, | Performed by: OBSTETRICS & GYNECOLOGY

## 2020-10-30 PROCEDURE — 3008F BODY MASS INDEX DOCD: CPT | Mod: CPTII,S$GLB,, | Performed by: OBSTETRICS & GYNECOLOGY

## 2020-10-30 NOTE — PROGRESS NOTES
Health Maintenance Due    HIV Screening  05/22/1975    TETANUS VACCINE  08/03/2017     Updates were requested from care everywhere.  Chart was reviewed for overdue Proactive [facility] Encounters (MELVIN) topics (CRS, Breast Cancer Screening, Eye exam)  Health Maintenance has been updated.  LINKS immunization registry triggered.  Immunizations were reconciled.      
Patient-parent interaction appropriate

## 2020-10-30 NOTE — PROGRESS NOTES
"Chief Complaint   Patient presents with    Well Woman    Urinary Incontinence     Especially when she stands up       History and Physical:  No LMP recorded. Patient is postmenopausal.       Yumiko Gonzalez is a 60 y.o.  female who presents today for her routine annual GYN exam. The patient has no Gynecology complaints today. No bowel or bladder complaints.       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, postitive antismooth muscle antibody    Blood transfusion     Bronchitis     bronchospasm on occasion     Cancer     carcinoma in situ    Cervical neck pain with evidence of disc disease 3/22/2012    can bend neck    Cirrhosis     non-alcoholic, compensated    Colon polyps 3/22/2012    Depression 3/22/2012    Hx panic attacks    Diverticular disease 3/22/2012    never diverticulitis    Fatty liver 3/22/2012    Fibrocystic breast     Fine tremor     hands,chronic    Hepatosplenomegaly     Hypertension 2013    Knee pain, bilateral     chronic, with hands,feet,fingers also painful    Lesion of right lung     Liver disease     "partially sclerosed liver" per pt    Migraines past Hx    Pleural effusion     small, compensated, good bilateral breath sounds per Dr Suresh GUTIERRES (postoperative nausea and vomiting)     twice after childbirth    Postpartum depression     Splenomegaly     Thrombocytopenia     Tremors of nervous system        Past Surgical History:   Procedure Laterality Date    ADENOIDECTOMY      CERVICAL CONIZATION   W/ LASER       SECTION      x3    COLONOSCOPY N/A 2016    Procedure: COLONOSCOPY;  Surgeon: James Palomares MD;  Location: [facility]" ENDO;  Service: Endoscopy;  Laterality: N/A;    EMBOLIZATION N/A 7/24/2018    Procedure: EMBOLIZATION, BLOOD VESSEL;  Surgeon: Joselin Surgeon;  Location: [facility];  Service: Radiology;  Laterality: N/A;    ENDOMETRIAL ABLATION      ESOPHAGOGASTRODUODENOSCOPY N/A 1/28/2020    Procedure: ESOPHAGOGASTRODUODENOSCOPY (EGD);  Surgeon: James Palomares MD;  Location: [facility] ENDO;  Service: Endoscopy;  Laterality: N/A;    LIVER BIOPSY      PELVIC LAPAROSCOPY      TONSILLECTOMY      TUBAL LIGATION         MEDS:   Current Outpatient Medications on File Prior to Visit   Medication Sig Dispense Refill    albuterol (PROVENTIL) 2.5 mg /3 mL (0.083 %) nebulizer solution Take 3 mLs (2.5 mg total) by nebulization every 6 (six) hours as needed for Wheezing. 72 mL 2    alprazolam (XANAX) 0.25 MG tablet TAKE ONE TABLET BY MOUTH NIGHTLY AS NEEDED FOR INSOMNIA 30 tablet 2    buPROPion (WELLBUTRIN XL) 150 MG TB24 tablet Take 2 tablets (300 mg total) by mouth once daily. 180 tablet 3    cetirizine (ZYRTEC) 10 MG tablet Take 10 mg by mouth once daily.      diclofenac sodium (VOLTAREN) 1 % Gel Apply 2 g topically 3 (three) times daily. 1 Tube 3    fluconazole (DIFLUCAN) 150 MG Tab Take 1 tablet (150 mg total) by mouth once daily. 1 tablet 1    hydroCHLOROthiazide (HYDRODIURIL) 12.5 MG Tab Take 1 tablet (12.5 mg total) by mouth once daily. 90 tablet 1    loratadine (CLARITIN) 10 mg tablet Take 10 mg by mouth once daily.       multivitamin (THERAGRAN) per tablet Take 1 tablet by mouth once daily.      nystatin-triamcinolone (MYCOLOG II) cream Apply to affected area twice daily as needed 60 g 3    propranoloL (INDERAL LA) 60 MG 24 hr capsule Take 1 capsule (60 mg total) by mouth once daily. 30 capsule 11    sodium chloride 7% 7 % nebulizer solution Use in nebulizer as directed 3 times a day for 30 days 240 mL 5    vitamin E 400 UNIT capsule Take 400 Units by mouth once daily.      lidocaine HCL 10 mg/ml, 1%, 10 mg/mL (1 %)  injection Inhale 3ml via nebulizer four times daily as needed (Patient not taking: Reported on 10/30/2020) 350 mL 5    triamcinolone (NASACORT) 55 mcg nasal inhaler Use 2 sprays by Nasal route once daily. (Patient not taking: Reported on 10/30/2020) 17 g 12     Current Facility-Administered Medications on File Prior to Visit   Medication Dose Route Frequency Provider Last Rate Last Dose    EUFLEXXA 10 mg/mL(mw 2.4 -3.6 million) injection Syrg 20 mg  20 mg Intra-articular  Pop Schneider MD   20 mg at 17 1431       OB History        4    Para   3    Term   0            AB   1    Living           SAB   1    TAB        Ectopic        Multiple        Live Births                     Social History     Socioeconomic History    Marital status:      Spouse name: Not on file    Number of children: Not on file    Years of education: Not on file    Highest education level: Not on file   Occupational History    Not on file   Social Needs    Financial resource strain: Not very hard    Food insecurity     Worry: Never true     Inability: Never true    Transportation needs     Medical: No     Non-medical: No   Tobacco Use    Smoking status: Former Smoker     Quit date: 2/3/2006     Years since quittin.7    Smokeless tobacco: Never Used   Substance and Sexual Activity    Alcohol use: Yes     Frequency: Monthly or less     Drinks per session: 1 or 2     Binge frequency: Never     Comment: rarely    Drug use: No    Sexual activity: Not Currently   Lifestyle    Physical activity     Days per week: Not on file     Minutes per session: Not on file    Stress: Not on file   Relationships    Social connections     Talks on phone: More than three times a week     Gets together: Once a week     Attends Spiritism service: 1 to 4 times per year     Active member of club or organization: No     Attends meetings of clubs or organizations: Never     Relationship status:    Other  "Topics Concern    Not on file   Social History Narrative    Not on file       Family History   Problem Relation Age of Onset    Breast cancer Maternal Grandmother     Diabetes Father     Heart disease Father     Breast cancer Mother 70    Heart disease Mother     Hypertension Mother     Tremor Mother     Diabetes Brother     Diabetes Sister     Cancer Sister     Breast cancer Maternal Aunt 70    Multiple sclerosis Maternal Aunt     Breast cancer Sister     Diabetes Sister     Emphysema Brother     Breast cancer Maternal Cousin 40    Tremor Daughter     Multiple sclerosis Maternal Aunt     Ovarian cancer Neg Hx     Parkinsonism Neg Hx     Glaucoma Neg Hx          Past medical and surgical history reviewed.   I have reviewed the patient's medical history in detail and updated the computerized patient record.        Review of System:   General: no chills, fever, night sweats, weight gain or weight loss  Psychological: no depression or suicidal ideation  Breasts: no new or changing breast lumps, nipple discharge or masses.  Respiratory: no cough, shortness of breath, or wheezing  Cardiovascular: no chest pain or dyspnea on exertion  Gastrointestinal: no abdominal pain, change in bowel habits, or black or bloody stools  Genito-Urinary: no incontinence, urinary frequency/urgency or vulvar/vaginal symptoms, pelvic pain or abnormal vaginal bleeding.  Musculoskeletal: no gait disturbance or muscular weakness      Physical Exam:   Ht 5' 10" (1.778 m)   Wt 133.7 kg (294 lb 12.1 oz)   BMI 42.29 kg/m²   Constitutional: She appears alert and responsive. She appears well-developed, well-groomed, and well-nourished. No distress. OverWeight   HENT:   Head: Normocephalic and atraumatic.   Eyes: Conjunctivae and EOM are normal. No scleral icterus.   Neck: Symmetrical. Normal range of motion. Neck supple. No tracheal deviation present. THYROID: without masses or tenderness.  Cardiovascular: Normal rate, no " rhythm abnormality noted. Extremities without swelling or edema, warm.    Pulmonary/Chest: Normal respiratory Effort. No distress or retractions. She exhibits no tenderness.  Breasts: are symmetrical.   Right breast exhibits no inverted nipple, no mass, no nipple discharge, no skin change and no tenderness.   Left breast exhibits no inverted nipple, no mass, no nipple discharge, no skin change and no tenderness.  Abdominal: Soft. She exhibits no distension, hernias or masses. There is no tenderness. No enlargement of liver edge or spleen.  There is no rebound and no guarding.   Genitourinary:    External rectal exam shows no thrombosed external hemorrhoids, no lesions.     Pelvic exam was performed with patient supine.   No labial fusion, and symmetrical.    There is no rash, lesion or injury on the right labia.   There is no rash, lesion or injury on the left labia.   No bleeding and no signs of injury around the vaginal introitus, urethral meatus is normal size and without prolapse or lesions, urethra well supported. The cervix is visualized with no discharge, lesions or friability.   No vaginal discharge found.   No significant Cystocele, Enterocele or rectocele, and cervix and uterus well supported.   Bimanual exam:   The urethra is normal to palpation and there are no palpable vaginal wall masses.   Uterus is not deviated, not enlarged, not fixed, normal shape and not tender.   Cervix exhibits no motion tenderness.    Right adnexum displays no mass or nodularity and no tenderness.   Left adnexum displays no mass or nodularity and no tenderness.  Musculoskeletal: Normal range of motion.   Lymphadenopathy: No inguinal adenopathy present.   Neurological: She is alert and oriented to person, place, and time. Coordination normal.   Skin: Skin is warm and dry. She is not diaphoretic. No rashes, lesions or ulcers.   Psychiatric: She has a normal mood and affect, oriented to person, place, and time.      Assessment:    Normal annual GYN exam  1. Encounter for screening mammogram for breast cancer  Mammo Digital Screening Bilat w/ [facility]   2. Pap smear for cervical cancer screening         Plan:   PAP  Mammogram  Follow up in 1 year.  Patient informed will be contacted with results within 2 weeks. Encouraged to please call back or email if she has not heard from us by then.

## 2020-11-04 ENCOUNTER — TELEPHONE (OUTPATIENT)
Dept: RADIOLOGY | Facility: HOSPITAL | Age: 60
End: 2020-11-04

## 2020-11-05 ENCOUNTER — HOSPITAL ENCOUNTER (OUTPATIENT)
Dept: RADIOLOGY | Facility: HOSPITAL | Age: 60
Discharge: HOME | End: 2020-11-05
Attending: OBSTETRICS & GYNECOLOGY
Payer: COMMERCIAL

## 2020-11-05 DIAGNOSIS — R92.8 ABNORMAL MAMMOGRAM OF LEFT BREAST: ICD-10-CM

## 2020-11-05 PROCEDURE — 76642 US BREAST LEFT LIMITED: ICD-10-PCS | Mod: 26,LT,, | Performed by: RADIOLOGY

## 2020-11-05 PROCEDURE — 77065 DX MAMMO INCL CAD UNI: CPT | Mod: 26,,, | Performed by: RADIOLOGY

## 2020-11-05 PROCEDURE — 76642 ULTRASOUND BREAST LIMITED: CPT | Mod: TC,PO,LT

## 2020-11-05 PROCEDURE — 77061 BREAST TOMOSYNTHESIS UNI: CPT | Mod: TC,PO,LT

## 2020-11-05 PROCEDURE — 77065 DX MAMMO INCL CAD UNI: CPT | Mod: TC,PO,LT

## 2020-11-05 PROCEDURE — G0279 TOMOSYNTHESIS, MAMMO: HCPCS | Mod: 26,,, | Performed by: RADIOLOGY

## 2020-11-05 PROCEDURE — 76642 ULTRASOUND BREAST LIMITED: CPT | Mod: 26,LT,, | Performed by: RADIOLOGY

## 2020-11-05 PROCEDURE — 77065 MAMMO DIGITAL DIAGNOSTIC LEFT WITH TOMO: ICD-10-PCS | Mod: 26,,, | Performed by: RADIOLOGY

## 2020-11-05 PROCEDURE — G0279 MAMMO DIGITAL DIAGNOSTIC LEFT WITH TOMO: ICD-10-PCS | Mod: 26,,, | Performed by: RADIOLOGY

## 2020-11-06 ENCOUNTER — LAB VISIT (OUTPATIENT)
Dept: LAB | Facility: HOSPITAL | Age: 60
End: 2020-11-06
Attending: INTERNAL MEDICINE
Payer: COMMERCIAL

## 2020-11-06 ENCOUNTER — OFFICE VISIT (OUTPATIENT)
Dept: FAMILY MEDICINE | Facility: CLINIC | Age: 60
End: 2020-11-06
Payer: COMMERCIAL

## 2020-11-06 VITALS
BODY MASS INDEX: 41.88 KG/M2 | SYSTOLIC BLOOD PRESSURE: 130 MMHG | HEIGHT: 70 IN | OXYGEN SATURATION: 97 % | DIASTOLIC BLOOD PRESSURE: 86 MMHG | WEIGHT: 292.56 LBS | TEMPERATURE: 98 F | HEART RATE: 75 BPM

## 2020-11-06 DIAGNOSIS — R25.1 TREMOR: ICD-10-CM

## 2020-11-06 DIAGNOSIS — Z00.00 PREVENTATIVE HEALTH CARE: Primary | ICD-10-CM

## 2020-11-06 DIAGNOSIS — R00.2 PALPITATIONS: ICD-10-CM

## 2020-11-06 DIAGNOSIS — K74.60 HEPATIC CIRRHOSIS, UNSPECIFIED HEPATIC CIRRHOSIS TYPE, UNSPECIFIED WHETHER ASCITES PRESENT: ICD-10-CM

## 2020-11-06 DIAGNOSIS — K74.69 OTHER CIRRHOSIS OF LIVER: ICD-10-CM

## 2020-11-06 DIAGNOSIS — Z00.00 PREVENTATIVE HEALTH CARE: ICD-10-CM

## 2020-11-06 DIAGNOSIS — K75.81 STEATOHEPATITIS, NON-ALCOHOLIC: ICD-10-CM

## 2020-11-06 DIAGNOSIS — D48.5 NEOPLASM OF UNCERTAIN BEHAVIOR OF SKIN: ICD-10-CM

## 2020-11-06 PROCEDURE — 80053 COMPREHEN METABOLIC PANEL: CPT

## 2020-11-06 PROCEDURE — 99396 PR PREVENTIVE VISIT,EST,40-64: ICD-10-PCS | Mod: S$GLB,,, | Performed by: FAMILY MEDICINE

## 2020-11-06 PROCEDURE — 3008F PR BODY MASS INDEX (BMI) DOCUMENTED: ICD-10-PCS | Mod: CPTII,S$GLB,, | Performed by: FAMILY MEDICINE

## 2020-11-06 PROCEDURE — 80061 LIPID PANEL: CPT

## 2020-11-06 PROCEDURE — 90715 TDAP VACCINE GREATER THAN OR EQUAL TO 7YO IM: ICD-10-PCS | Mod: S$GLB,,, | Performed by: FAMILY MEDICINE

## 2020-11-06 PROCEDURE — 99999 PR PBB SHADOW E&M-EST. PATIENT-LVL IV: ICD-10-PCS | Mod: PBBFAC,,, | Performed by: FAMILY MEDICINE

## 2020-11-06 PROCEDURE — 36415 COLL VENOUS BLD VENIPUNCTURE: CPT | Mod: PN

## 2020-11-06 PROCEDURE — 85025 COMPLETE CBC W/AUTO DIFF WBC: CPT

## 2020-11-06 PROCEDURE — 83036 HEMOGLOBIN GLYCOSYLATED A1C: CPT

## 2020-11-06 PROCEDURE — 90471 TDAP VACCINE GREATER THAN OR EQUAL TO 7YO IM: ICD-10-PCS | Mod: S$GLB,,, | Performed by: FAMILY MEDICINE

## 2020-11-06 PROCEDURE — 99396 PREV VISIT EST AGE 40-64: CPT | Mod: S$GLB,,, | Performed by: FAMILY MEDICINE

## 2020-11-06 PROCEDURE — 90715 TDAP VACCINE 7 YRS/> IM: CPT | Mod: S$GLB,,, | Performed by: FAMILY MEDICINE

## 2020-11-06 PROCEDURE — 90471 IMMUNIZATION ADMIN: CPT | Mod: S$GLB,,, | Performed by: FAMILY MEDICINE

## 2020-11-06 PROCEDURE — 3075F PR MOST RECENT SYSTOLIC BLOOD PRESS GE 130-139MM HG: ICD-10-PCS | Mod: CPTII,S$GLB,, | Performed by: FAMILY MEDICINE

## 2020-11-06 PROCEDURE — 3079F PR MOST RECENT DIASTOLIC BLOOD PRESSURE 80-89 MM HG: ICD-10-PCS | Mod: CPTII,S$GLB,, | Performed by: FAMILY MEDICINE

## 2020-11-06 PROCEDURE — 84443 ASSAY THYROID STIM HORMONE: CPT

## 2020-11-06 PROCEDURE — 82105 ALPHA-FETOPROTEIN SERUM: CPT

## 2020-11-06 PROCEDURE — 3075F SYST BP GE 130 - 139MM HG: CPT | Mod: CPTII,S$GLB,, | Performed by: FAMILY MEDICINE

## 2020-11-06 PROCEDURE — 3079F DIAST BP 80-89 MM HG: CPT | Mod: CPTII,S$GLB,, | Performed by: FAMILY MEDICINE

## 2020-11-06 PROCEDURE — 99999 PR PBB SHADOW E&M-EST. PATIENT-LVL IV: CPT | Mod: PBBFAC,,, | Performed by: FAMILY MEDICINE

## 2020-11-06 PROCEDURE — 85610 PROTHROMBIN TIME: CPT

## 2020-11-06 PROCEDURE — 3008F BODY MASS INDEX DOCD: CPT | Mod: CPTII,S$GLB,, | Performed by: FAMILY MEDICINE

## 2020-11-06 RX ORDER — HYDROCHLOROTHIAZIDE 12.5 MG/1
12.5 TABLET ORAL DAILY
Qty: 90 TABLET | Refills: 3 | Status: SHIPPED | OUTPATIENT
Start: 2020-11-06 | End: 2021-11-03 | Stop reason: SDUPTHER

## 2020-11-06 NOTE — PROGRESS NOTES
THIS DOCUMENT WAS MADE IN PART WITH VOICE RECOGNITION SOFTWARE.  OCCASIONALLY THIS SOFTWARE WILL MISINTERPRET WORDS OR PHRASES.      Yumiko WILHELM Carlos  1960    Yumiko was seen today for annual exam.    Diagnoses and all orders for this visit:    Preventative health care  -     Hemoglobin A1C; Future  -     Lipid Panel; Future  -     TSH; Future    Tremor  Improved on propranolol, still some days worse than othes    Neoplasm of uncertain behavior of skin  -     Ambulatory referral/consult to Dermatology; Future    Other orders  -     hydroCHLOROthiazide (HYDRODIURIL) 12.5 MG Tab; Take 1 tablet (12.5 mg total) by mouth once daily.          Subjective     Chief Complaint   Patient presents with    Annual Exam       HPI:      Skin lesions, derm requested      Active Ambulatory Problems     Diagnosis Date Noted    Acute bronchospasm 10/13/2010    Hypermetropia 09/01/2010    Enthesopathy of ankle and tarsus, unspecified 10/26/2011    Cervical neck pain with evidence of disc disease 03/22/2012    Fatty liver 03/22/2012    History of cervical cancer 03/22/2012    Depression 03/22/2012    Diverticular disease 03/22/2012    Colon polyps 03/22/2012    Hypertension 04/24/2013    Hx of colonic polyps 12/12/2016    Morbid obesity due to excess calories 04/11/2017    Morbid obesity with BMI of 40.0-44.9, adult 04/11/2017    Splenomegaly 04/11/2017    History of posttraumatic stress disorder (PTSD) 06/06/2017    Nonalcoholic steatohepatitis 06/20/2017    Cirrhosis of liver without ascites 06/20/2017    Cirrhosis 07/13/2017    Mass of right lung 07/24/2018     Resolved Ambulatory Problems     Diagnosis Date Noted    DUNG (obstructive sleep apnea) 03/22/2012    Postop check 05/24/2013    Preop cardiovascular exam 04/18/2017     Past Medical History:   Diagnosis Date    Abnormal Pap smear     Abnormal Pap smear of cervix     Anemia     Arthritis     Asthma     Autoimmune disease     Blood transfusion      Bronchitis     Cancer 1987    Fibrocystic breast     Fine tremor     Hepatosplenomegaly     Knee pain, bilateral     Lesion of right lung     Liver disease     Migraines past Hx    Pleural effusion     PONV (postoperative nausea and vomiting)     Postpartum depression     Thrombocytopenia     Tremors of nervous system        Current Outpatient Medications on File Prior to Visit   Medication Sig Dispense Refill    albuterol (PROVENTIL) 2.5 mg /3 mL (0.083 %) nebulizer solution Take 3 mLs (2.5 mg total) by nebulization every 6 (six) hours as needed for Wheezing. 72 mL 2    alprazolam (XANAX) 0.25 MG tablet TAKE ONE TABLET BY MOUTH NIGHTLY AS NEEDED FOR INSOMNIA 30 tablet 2    buPROPion (WELLBUTRIN XL) 150 MG TB24 tablet Take 2 tablets (300 mg total) by mouth once daily. 180 tablet 3    cetirizine (ZYRTEC) 10 MG tablet Take 10 mg by mouth once daily.      diclofenac sodium (VOLTAREN) 1 % Gel Apply 2 g topically 3 (three) times daily. 1 Tube 3    fluconazole (DIFLUCAN) 150 MG Tab Take 1 tablet (150 mg total) by mouth once daily. 1 tablet 1    loratadine (CLARITIN) 10 mg tablet Take 10 mg by mouth once daily.       multivitamin (THERAGRAN) per tablet Take 1 tablet by mouth once daily.      nystatin-triamcinolone (MYCOLOG II) cream Apply to affected area twice daily as needed 60 g 3    propranoloL (INDERAL LA) 60 MG 24 hr capsule Take 1 capsule (60 mg total) by mouth once daily. 30 capsule 11    sodium chloride 7% 7 % nebulizer solution Use in nebulizer as directed 3 times a day for 30 days 240 mL 5    triamcinolone (NASACORT) 55 mcg nasal inhaler Use 2 sprays by Nasal route once daily. 17 g 12    vitamin E 400 UNIT capsule Take 400 Units by mouth once daily.      [DISCONTINUED] hydroCHLOROthiazide (HYDRODIURIL) 12.5 MG Tab Take 1 tablet (12.5 mg total) by mouth once daily. 90 tablet 1    [DISCONTINUED] lidocaine HCL 10 mg/ml, 1%, 10 mg/mL (1 %) injection Inhale 3ml via nebulizer four times  daily as needed 350 mL 5     Current Facility-Administered Medications on File Prior to Visit   Medication Dose Route Frequency Provider Last Rate Last Dose    EUFLEXXA 10 mg/mL(mw 2.4 -3.6 million) injection Syrg 20 mg  20 mg Intra-articular  Pop Schneider MD   20 mg at 17 1431       Review of patient's allergies indicates:   Allergen Reactions    No known drug allergies        Family History   Problem Relation Age of Onset    Breast cancer Maternal Grandmother     Diabetes Father     Heart disease Father     Breast cancer Mother 70    Heart disease Mother     Hypertension Mother     Tremor Mother     Diabetes Brother     Diabetes Sister     Cancer Sister     Breast cancer Maternal Aunt 70    Multiple sclerosis Maternal Aunt     Breast cancer Sister     Diabetes Sister     Emphysema Brother     Breast cancer Maternal Cousin 40    Tremor Daughter     Multiple sclerosis Maternal Aunt     Ovarian cancer Neg Hx     Parkinsonism Neg Hx     Glaucoma Neg Hx        Social History     Tobacco Use    Smoking status: Former Smoker     Quit date: 2/3/2006     Years since quittin.7    Smokeless tobacco: Never Used   Substance Use Topics    Alcohol use: Yes     Frequency: Monthly or less     Drinks per session: 1 or 2     Binge frequency: Never     Comment: rarely    Drug use: No         Review of Systems   Constitutional: Positive for fatigue.   HENT: Negative.    Eyes: Negative.    Respiratory: Negative.    Cardiovascular: Positive for palpitations and leg swelling.   Genitourinary: Negative.    Musculoskeletal: Positive for arthralgias.       Objective     Physical Exam  Vitals signs reviewed.   Constitutional:       General: She is not in acute distress.     Appearance: She is well-developed. She is not toxic-appearing or diaphoretic.   HENT:      Head: Normocephalic and atraumatic.      Right Ear: Tympanic membrane, ear canal and external ear normal.      Left Ear: Tympanic  "membrane, ear canal and external ear normal.      Nose: Nose normal.      Mouth/Throat:      Pharynx: No oropharyngeal exudate.   Eyes:      General: No scleral icterus.        Right eye: No discharge.         Left eye: No discharge.      Conjunctiva/sclera: Conjunctivae normal.      Pupils: Pupils are equal, round, and reactive to light.   Neck:      Musculoskeletal: Normal range of motion and neck supple.      Thyroid: No thyromegaly.      Vascular: No JVD.      Trachea: No tracheal deviation.   Cardiovascular:      Rate and Rhythm: Normal rate and regular rhythm.      Chest Wall: PMI is not displaced.      Heart sounds: Normal heart sounds. No murmur. No friction rub. No gallop.       Comments: 2+ bilateral ankle edema    Pulmonary:      Effort: Pulmonary effort is normal. No respiratory distress.      Breath sounds: Normal breath sounds. No wheezing or rales.   Abdominal:      General: Bowel sounds are normal. There is no distension.      Palpations: Abdomen is soft. There is no mass.      Tenderness: There is no abdominal tenderness. There is no guarding or rebound.   Lymphadenopathy:      Cervical: No cervical adenopathy.   Skin:     Findings: No rash.   Neurological:      Mental Status: She is alert and oriented to person, place, and time.      Cranial Nerves: No cranial nerve deficit.      Motor: No abnormal muscle tone.      Deep Tendon Reflexes: Reflexes normal.   Psychiatric:         Behavior: Behavior normal.         Vitals:    11/06/20 1351   BP: 130/86   BP Location: Left arm   Patient Position: Sitting   BP Method: Large (Manual)   Pulse: 75   Temp: 97.9 °F (36.6 °C)   TempSrc: Skin   SpO2: 97%   Weight: 132.7 kg (292 lb 8.8 oz)   Height: 5' 10" (1.778 m)       MOST RECENT LABS IN OUR ELECTRONIC MEDICAL RECORD:     Results for orders placed or performed in visit on 07/10/20   CBC auto differential   Result Value Ref Range    WBC 3.90 3.90 - 12.70 K/uL    RBC 4.84 4.00 - 5.40 M/uL    Hemoglobin 14.9 " 12.0 - 16.0 g/dL    Hematocrit 46.9 37.0 - 48.5 %    MCV 97 82 - 98 fL    MCH 30.8 27.0 - 31.0 pg    MCHC 31.8 (L) 32.0 - 36.0 g/dL    RDW 14.1 11.5 - 14.5 %    Platelets 70 (L) 150 - 350 K/uL    MPV 12.1 9.2 - 12.9 fL    Immature Granulocytes 0.5 0.0 - 0.5 %    Gran # (ANC) 2.2 1.8 - 7.7 K/uL    Immature Grans (Abs) 0.02 0.00 - 0.04 K/uL    Lymph # 0.9 (L) 1.0 - 4.8 K/uL    Mono # 0.5 0.3 - 1.0 K/uL    Eos # 0.2 0.0 - 0.5 K/uL    Baso # 0.03 0.00 - 0.20 K/uL    nRBC 0 0 /100 WBC    Gran % 56.8 38.0 - 73.0 %    Lymph % 23.6 18.0 - 48.0 %    Mono % 12.1 4.0 - 15.0 %    Eosinophil % 6.2 0.0 - 8.0 %    Basophil % 0.8 0.0 - 1.9 %    Differential Method Automated    Comprehensive metabolic panel   Result Value Ref Range    Sodium 146 (H) 136 - 145 mmol/L    Potassium 3.7 3.5 - 5.1 mmol/L    Chloride 111 (H) 95 - 110 mmol/L    CO2 27 23 - 29 mmol/L    Glucose 147 (H) 70 - 110 mg/dL    BUN 15 8 - 23 mg/dL    Creatinine 0.8 0.5 - 1.4 mg/dL    Calcium 9.8 8.7 - 10.5 mg/dL    Total Protein 6.2 6.0 - 8.4 g/dL    Albumin 3.5 3.5 - 5.2 g/dL    Total Bilirubin 2.0 (H) 0.1 - 1.0 mg/dL    Alkaline Phosphatase 89 55 - 135 U/L    AST 53 (H) 10 - 40 U/L    ALT 44 10 - 44 U/L    Anion Gap 8 8 - 16 mmol/L    eGFR if African American >60.0 >60 mL/min/1.73 m^2    eGFR if non African American >60.0 >60 mL/min/1.73 m^2   Protime-INR   Result Value Ref Range    Prothrombin Time 12.8 (H) 9.0 - 12.5 sec    INR 1.2 0.8 - 1.2         Age specific and appropriate preventative healthcare discussed/ health maintenance reviewed. Discussed healthy diet and regular exercise. Discussed age-appropriate preventative screening tests and recommendations. Discussed recommended follow-up based on age and conditions.

## 2020-11-07 LAB
AFP SERPL-MCNC: 3.3 NG/ML (ref 0–8.4)
ALBUMIN SERPL BCP-MCNC: 3.4 G/DL (ref 3.5–5.2)
ALP SERPL-CCNC: 94 U/L (ref 55–135)
ALT SERPL W/O P-5'-P-CCNC: 46 U/L (ref 10–44)
ANION GAP SERPL CALC-SCNC: 7 MMOL/L (ref 8–16)
AST SERPL-CCNC: 57 U/L (ref 10–40)
BASOPHILS # BLD AUTO: 0.04 K/UL (ref 0–0.2)
BASOPHILS NFR BLD: 1.3 % (ref 0–1.9)
BILIRUB SERPL-MCNC: 1.8 MG/DL (ref 0.1–1)
BUN SERPL-MCNC: 13 MG/DL (ref 8–23)
CALCIUM SERPL-MCNC: 8.8 MG/DL (ref 8.7–10.5)
CHLORIDE SERPL-SCNC: 112 MMOL/L (ref 95–110)
CHOLEST SERPL-MCNC: 129 MG/DL (ref 120–199)
CHOLEST/HDLC SERPL: 2.2 {RATIO} (ref 2–5)
CO2 SERPL-SCNC: 26 MMOL/L (ref 23–29)
CREAT SERPL-MCNC: 0.7 MG/DL (ref 0.5–1.4)
DIFFERENTIAL METHOD: ABNORMAL
EOSINOPHIL # BLD AUTO: 0.2 K/UL (ref 0–0.5)
EOSINOPHIL NFR BLD: 7.7 % (ref 0–8)
ERYTHROCYTE [DISTWIDTH] IN BLOOD BY AUTOMATED COUNT: 14 % (ref 11.5–14.5)
EST. GFR  (AFRICAN AMERICAN): >60 ML/MIN/1.73 M^2
EST. GFR  (NON AFRICAN AMERICAN): >60 ML/MIN/1.73 M^2
ESTIMATED AVG GLUCOSE: 97 MG/DL (ref 68–131)
GLUCOSE SERPL-MCNC: 129 MG/DL (ref 70–110)
HBA1C MFR BLD HPLC: 5 % (ref 4–5.6)
HCT VFR BLD AUTO: 46.8 % (ref 37–48.5)
HDLC SERPL-MCNC: 58 MG/DL (ref 40–75)
HDLC SERPL: 45 % (ref 20–50)
HGB BLD-MCNC: 14.9 G/DL (ref 12–16)
IMM GRANULOCYTES # BLD AUTO: 0.02 K/UL (ref 0–0.04)
IMM GRANULOCYTES NFR BLD AUTO: 0.7 % (ref 0–0.5)
INR PPP: 1.2 (ref 0.8–1.2)
LDLC SERPL CALC-MCNC: 59.6 MG/DL (ref 63–159)
LYMPHOCYTES # BLD AUTO: 0.8 K/UL (ref 1–4.8)
LYMPHOCYTES NFR BLD: 25.7 % (ref 18–48)
MCH RBC QN AUTO: 31.2 PG (ref 27–31)
MCHC RBC AUTO-ENTMCNC: 31.8 G/DL (ref 32–36)
MCV RBC AUTO: 98 FL (ref 82–98)
MONOCYTES # BLD AUTO: 0.4 K/UL (ref 0.3–1)
MONOCYTES NFR BLD: 12 % (ref 4–15)
NEUTROPHILS # BLD AUTO: 1.6 K/UL (ref 1.8–7.7)
NEUTROPHILS NFR BLD: 52.6 % (ref 38–73)
NONHDLC SERPL-MCNC: 71 MG/DL
NRBC BLD-RTO: 0 /100 WBC
PLATELET # BLD AUTO: 58 K/UL (ref 150–350)
PMV BLD AUTO: 12.2 FL (ref 9.2–12.9)
POTASSIUM SERPL-SCNC: 3.9 MMOL/L (ref 3.5–5.1)
PROT SERPL-MCNC: 5.9 G/DL (ref 6–8.4)
PROTHROMBIN TIME: 12.7 SEC (ref 9–12.5)
RBC # BLD AUTO: 4.77 M/UL (ref 4–5.4)
SODIUM SERPL-SCNC: 145 MMOL/L (ref 136–145)
TRIGL SERPL-MCNC: 57 MG/DL (ref 30–150)
TSH SERPL DL<=0.005 MIU/L-ACNC: 0.96 UIU/ML (ref 0.4–4)
WBC # BLD AUTO: 3 K/UL (ref 3.9–12.7)

## 2020-11-08 ENCOUNTER — PATIENT MESSAGE (OUTPATIENT)
Dept: OBSTETRICS AND GYNECOLOGY | Facility: CLINIC | Age: 60
End: 2020-11-08

## 2020-11-09 ENCOUNTER — TELEPHONE (OUTPATIENT)
Dept: HEPATOLOGY | Facility: CLINIC | Age: 60
End: 2020-11-09

## 2020-11-09 NOTE — TELEPHONE ENCOUNTER
----- Message from Hetal Prince MD sent at 11/9/2020 10:36 AM CST -----  Please inform patient:  Tumor marker is within normal limits.  All other labs are consistent with cirrhosis, but are stable.    MELD-Na score: 11 at 11/6/2020  2:51 PM    Continue surveillance every 6 months with AFP and ultrasound every 6 months. Also routine labs CBC, CMP, PT INR every 6 months.

## 2020-11-13 ENCOUNTER — CLINICAL SUPPORT (OUTPATIENT)
Dept: CARDIOLOGY | Facility: CLINIC | Age: 60
End: 2020-11-13
Attending: FAMILY MEDICINE
Payer: COMMERCIAL

## 2020-11-13 DIAGNOSIS — R00.2 PALPITATIONS: ICD-10-CM

## 2020-11-13 PROCEDURE — 93224 XTRNL ECG REC UP TO 48 HRS: CPT | Mod: S$GLB,,, | Performed by: INTERNAL MEDICINE

## 2020-11-13 PROCEDURE — 93224 HOLTER MONITOR - 48 HOUR (CUPID ONLY): ICD-10-PCS | Mod: S$GLB,,, | Performed by: INTERNAL MEDICINE

## 2020-11-17 LAB
OHS CV EVENT MONITOR DAY: 0
OHS CV HOLTER LENGTH DECIMAL HOURS: 48
OHS CV HOLTER LENGTH HOURS: 48
OHS CV HOLTER LENGTH MINUTES: 0

## 2020-12-01 ENCOUNTER — PATIENT MESSAGE (OUTPATIENT)
Dept: PHARMACY | Facility: CLINIC | Age: 60
End: 2020-12-01

## 2020-12-05 LAB
FINAL PATHOLOGIC DIAGNOSIS: NORMAL
Lab: NORMAL

## 2020-12-09 ENCOUNTER — OFFICE VISIT (OUTPATIENT)
Dept: DERMATOLOGY | Facility: CLINIC | Age: 60
End: 2020-12-09
Payer: COMMERCIAL

## 2020-12-09 VITALS — RESPIRATION RATE: 18 BRPM | HEIGHT: 70 IN | BODY MASS INDEX: 41.95 KG/M2 | WEIGHT: 293 LBS

## 2020-12-09 DIAGNOSIS — L91.8 CUTANEOUS SKIN TAGS: ICD-10-CM

## 2020-12-09 DIAGNOSIS — D23.9 DERMATOFIBROMA: ICD-10-CM

## 2020-12-09 DIAGNOSIS — D48.5 NEOPLASM OF UNCERTAIN BEHAVIOR OF SKIN: ICD-10-CM

## 2020-12-09 DIAGNOSIS — D22.9 BENIGN NEVUS: Primary | ICD-10-CM

## 2020-12-09 PROCEDURE — 88304 TISSUE EXAM BY PATHOLOGIST: CPT | Mod: 26,,, | Performed by: PATHOLOGY

## 2020-12-09 PROCEDURE — 88305 TISSUE EXAM BY PATHOLOGIST: ICD-10-PCS | Mod: 26,,, | Performed by: PATHOLOGY

## 2020-12-09 PROCEDURE — 88305 TISSUE EXAM BY PATHOLOGIST: CPT | Performed by: PATHOLOGY

## 2020-12-09 PROCEDURE — 11102 PR TANGENTIAL BIOPSY, SKIN, SINGLE LESION: ICD-10-PCS | Mod: S$GLB,,, | Performed by: DERMATOLOGY

## 2020-12-09 PROCEDURE — 1126F PR PAIN SEVERITY QUANTIFIED, NO PAIN PRESENT: ICD-10-PCS | Mod: S$GLB,,, | Performed by: DERMATOLOGY

## 2020-12-09 PROCEDURE — 11102 TANGNTL BX SKIN SINGLE LES: CPT | Mod: S$GLB,,, | Performed by: DERMATOLOGY

## 2020-12-09 PROCEDURE — 99202 PR OFFICE/OUTPT VISIT, NEW, LEVL II, 15-29 MIN: ICD-10-PCS | Mod: 25,S$GLB,, | Performed by: DERMATOLOGY

## 2020-12-09 PROCEDURE — 88304 TISSUE EXAM BY PATHOLOGIST: CPT | Performed by: PATHOLOGY

## 2020-12-09 PROCEDURE — 99999 PR PBB SHADOW E&M-EST. PATIENT-LVL III: CPT | Mod: PBBFAC,,, | Performed by: DERMATOLOGY

## 2020-12-09 PROCEDURE — 99999 PR PBB SHADOW E&M-EST. PATIENT-LVL III: ICD-10-PCS | Mod: PBBFAC,,, | Performed by: DERMATOLOGY

## 2020-12-09 PROCEDURE — 3008F PR BODY MASS INDEX (BMI) DOCUMENTED: ICD-10-PCS | Mod: CPTII,S$GLB,, | Performed by: DERMATOLOGY

## 2020-12-09 PROCEDURE — 11103 TANGNTL BX SKIN EA SEP/ADDL: CPT | Mod: 59,S$GLB,, | Performed by: DERMATOLOGY

## 2020-12-09 PROCEDURE — 1126F AMNT PAIN NOTED NONE PRSNT: CPT | Mod: S$GLB,,, | Performed by: DERMATOLOGY

## 2020-12-09 PROCEDURE — 3008F BODY MASS INDEX DOCD: CPT | Mod: CPTII,S$GLB,, | Performed by: DERMATOLOGY

## 2020-12-09 PROCEDURE — 88305 TISSUE EXAM BY PATHOLOGIST: CPT | Mod: 26,,, | Performed by: PATHOLOGY

## 2020-12-09 PROCEDURE — 11103 PR TANGENTIAL BIOPSY, SKIN, EA ADDTL LESION: ICD-10-PCS | Mod: 59,S$GLB,, | Performed by: DERMATOLOGY

## 2020-12-09 PROCEDURE — 88304 PR  SURG PATH,LEVEL III: ICD-10-PCS | Mod: 26,,, | Performed by: PATHOLOGY

## 2020-12-09 PROCEDURE — 99202 OFFICE O/P NEW SF 15 MIN: CPT | Mod: 25,S$GLB,, | Performed by: DERMATOLOGY

## 2020-12-09 NOTE — LETTER
December 9, 2020      Garrett Webb MD  [address]  [facility] [address]           [facility]  [address] [place] [address]  Phone: [phone]  Fax: [phone]          Patient: Yumiko Gonzalez   MR Number: [id number]   YOB: 1960   Date of Visit: 12/9/2020       Dear Dr. Garrett Webb:    Thank you for referring Yumiko Gonzalez to me for evaluation. Attached you will find relevant portions of my assessment and plan of care.    If you have questions, please do not hesitate to call me. I look forward to following Yumiko Gonzalez along with you.    Sincerely,    Angely Cheung MD    Enclosure  CC:  No Recipients    If you would like to receive this communication electronically, please contact [email or web address] or [phone] to request more information on [facility] Link access.    For providers and/or their staff who would like to refer a patient to [facility], please contact us through our one-stop-shop provider referral line, [facility], at [phone].    If you feel you have received this communication in error or would no longer like to receive these types of communications, please e-mail [email or web address]

## 2020-12-09 NOTE — PROGRESS NOTES
"  Subjective:       Patient ID:  Yumiko Gonzalez is a 60 y.o. female who presents for   Chief Complaint   Patient presents with    Lesion     61 y/o F presents for initial visit .    L flank - painful at times, turned black. 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, postitive antismooth muscle antibody    Blood transfusion     Bronchitis     bronchospasm on occasion     Cancer 1987    carcinoma in situ    Cervical neck pain with evidence of disc disease 3/22/2012    can bend neck    Cirrhosis     non-alcoholic, compensated    Colon polyps 3/22/2012    Depression 3/22/2012    Hx panic attacks    Diverticular disease 3/22/2012    never diverticulitis    Fatty liver 3/22/2012    Fibrocystic breast     Fine tremor     hands,chronic    Hepatosplenomegaly     Hypertension 4/24/2013    Knee pain, bilateral     chronic, with hands,feet,fingers also painful    Lesion of right lung     Liver disease     "partially sclerosed liver" per pt    Migraines past Hx    Pleural effusion     small, compensated, good bilateral breath sounds per Dr Suresh GUTIERRES (postoperative nausea and vomiting)     twice after childbirth    Postpartum depression     Splenomegaly     Thrombocytopenia     Tremors of nervous system        Review of Systems   Constitutional: Negative for fever.   Skin: Negative for daily sunscreen use, activity-related sunscreen use, sensitivity to antibiotic ointment, sensitivity to bandage adhesive, tendency to form keloidal scars and wears hat. "   Hematologic/Lymphatic: Bruises/bleeds easily.        Objective:    Physical Exam   Constitutional: She appears well-developed and well-nourished. No distress.   Neurological: She is alert and oriented to person, place, and time. She is not disoriented.   Psychiatric: She has a normal mood and affect.   Skin:   Areas Examined (abnormalities noted in diagram):   Scalp / Hair Palpated and Inspected  Head / Face Inspection Performed  Neck Inspection Performed  Chest / Axilla Inspection Performed  Back Inspection Performed  RUE Inspected  LUE Inspection Performed                  Diagram Legend     Erythematous scaling macule/papule c/w actinic keratosis       Vascular papule c/w angioma      Pigmented verrucoid papule/plaque c/w seborrheic keratosis      Yellow umbilicated papule c/w sebaceous hyperplasia      Irregularly shaped tan macule c/w lentigo     1-2 mm smooth white papules consistent with Milia      Movable subcutaneous cyst with punctum c/w epidermal inclusion cyst      Subcutaneous movable cyst c/w pilar cyst      Firm pink to brown papule c/w dermatofibroma      Pedunculated fleshy papule(s) c/w skin tag(s)      Evenly pigmented macule c/w junctional nevus     Mildly variegated pigmented, slightly irregular-bordered macule c/w mildly atypical nevus      Flesh colored to evenly pigmented papule c/w intradermal nevus       Pink pearly papule/plaque c/w basal cell carcinoma      Erythematous hyperkeratotic cursted plaque c/w SCC      Surgical scar with no sign of skin cancer recurrence      Open and closed comedones      Inflammatory papules and pustules      Verrucoid papule consistent consistent with wart     Erythematous eczematous patches and plaques     Dystrophic onycholytic nail with subungual debris c/w onychomycosis     Umbilicated papule    Erythematous-base heme-crusted tan verrucoid plaque consistent with inflamed seborrheic keratosis     Erythematous Silvery Scaling Plaque c/w Psoriasis     See  annotation      Assessment / Plan:      Pathology Orders:     Normal Orders This Visit    Specimen to Pathology, Dermatology     Comments:    Number of Specimens:->2  ------------------------->-------------------------  Spec 1 Procedure:->Biopsy  Spec 1 Clinical Impression:->NMSC vs DF vs other  Spec 1 Source:->R shoulder  ------------------------->-------------------------  Spec 2 Procedure:->Biopsy  Spec 2 Clinical Impression:->acrochordon vs other  Spec 2 Source:->L abdomen    Questions:    Procedure Type: Dermatology and skin neoplasms    Number of Specimens: 2    ------------------------: -------------------------    Spec 1 Procedure: Biopsy    Spec 1 Clinical Impression: NMSC vs DF vs other    Spec 1 Source: R shoulder    ------------------------: -------------------------    Spec 2 Procedure: Biopsy    Spec 2 Clinical Impression: acrochordon vs other    Spec 2 Source: L abdomen        Benign nevus  Reassurance that her nevi appear benign with regular and consistent pigment pattern on dermoscopy    Neoplasm of uncertain behavior of skin  -     Specimen to Pathology, Dermatology  Shave biopsy procedure note:    Shave biopsy performed after verbal consent including risk of infection, scar, recurrence, need for additional treatment of site. Area prepped with alcohol, anesthetized with approximately 1.0cc of 1% lidocaine with epinephrine. Lesional tissue shaved with razor blade. Hemostasis achieved with application of aluminum chloride followed by hyfrecation. No complications. Dressing applied. Wound care explained.    Dermatofibroma  Reassurance that this is a benign collection of scar tissue and it is commonly located on the legs    Cutaneous skin tags  Reassurance           Follow up for pending Pathology.

## 2020-12-11 ENCOUNTER — IMMUNIZATION (OUTPATIENT)
Dept: PHARMACY | Facility: CLINIC | Age: 60
End: 2020-12-11
Payer: COMMERCIAL

## 2020-12-11 ENCOUNTER — PATIENT MESSAGE (OUTPATIENT)
Dept: OTHER | Facility: OTHER | Age: 60
End: 2020-12-11

## 2020-12-11 LAB
FINAL PATHOLOGIC DIAGNOSIS: NORMAL
GROSS: NORMAL
MICROSCOPIC EXAM: NORMAL

## 2020-12-14 ENCOUNTER — TELEPHONE (OUTPATIENT)
Dept: DERMATOLOGY | Facility: CLINIC | Age: 60
End: 2020-12-14

## 2020-12-14 NOTE — TELEPHONE ENCOUNTER
----- Message from Angely Cheung MD sent at 12/12/2020  3:38 PM CST -----  Skin, right shoulder, shave biopsy:   -DERMATOFIBROMA (FIBROUS HISTIOCYTOMA)   Skin, left abdomen, shave biopsy:   -ACROCHORDON    Dermatofibroma on shoulder  Skin tag on abdomen  No further action necessary  Thanks  RAJENDRA

## 2020-12-18 ENCOUNTER — IMMUNIZATION (OUTPATIENT)
Dept: FAMILY MEDICINE | Facility: CLINIC | Age: 60
End: 2020-12-18
Payer: COMMERCIAL

## 2020-12-18 DIAGNOSIS — Z23 NEED FOR VACCINATION: ICD-10-CM

## 2020-12-18 PROCEDURE — 0001A COVID-19, MRNA, LNP-S, PF, 30 MCG/0.3 ML DOSE VACCINE: CPT | Mod: CV19,,, | Performed by: FAMILY MEDICINE

## 2020-12-18 PROCEDURE — 0001A COVID-19, MRNA, LNP-S, PF, 30 MCG/0.3 ML DOSE VACCINE: ICD-10-PCS | Mod: CV19,,, | Performed by: FAMILY MEDICINE

## 2020-12-18 PROCEDURE — 91300 COVID-19, MRNA, LNP-S, PF, 30 MCG/0.3 ML DOSE VACCINE: ICD-10-PCS | Mod: ,,, | Performed by: FAMILY MEDICINE

## 2020-12-18 PROCEDURE — 91300 COVID-19, MRNA, LNP-S, PF, 30 MCG/0.3 ML DOSE VACCINE: CPT | Mod: ,,, | Performed by: FAMILY MEDICINE

## 2020-12-18 RX ORDER — BUPROPION HYDROCHLORIDE 150 MG/1
300 TABLET ORAL DAILY
Qty: 180 TABLET | Refills: 3 | Status: SHIPPED | OUTPATIENT
Start: 2020-12-18 | End: 2021-12-08 | Stop reason: SDUPTHER

## 2020-12-23 ENCOUNTER — OFFICE VISIT (OUTPATIENT)
Dept: OPTOMETRY | Facility: CLINIC | Age: 60
End: 2020-12-23
Payer: COMMERCIAL

## 2020-12-23 DIAGNOSIS — G43.109 OCULAR MIGRAINE: ICD-10-CM

## 2020-12-23 DIAGNOSIS — H52.4 HYPEROPIA WITH ASTIGMATISM AND PRESBYOPIA, BILATERAL: ICD-10-CM

## 2020-12-23 DIAGNOSIS — H52.203 HYPEROPIA WITH ASTIGMATISM AND PRESBYOPIA, BILATERAL: ICD-10-CM

## 2020-12-23 DIAGNOSIS — Z01.00 EXAMINATION OF EYES AND VISION: Primary | ICD-10-CM

## 2020-12-23 DIAGNOSIS — H52.03 HYPEROPIA WITH ASTIGMATISM AND PRESBYOPIA, BILATERAL: ICD-10-CM

## 2020-12-23 DIAGNOSIS — H43.393 VITREOUS FLOATERS, BILATERAL: ICD-10-CM

## 2020-12-23 PROCEDURE — 92015 PR REFRACTION: ICD-10-PCS | Mod: S$GLB,,, | Performed by: OPTOMETRIST

## 2020-12-23 PROCEDURE — 92014 PR EYE EXAM, EST PATIENT,COMPREHESV: ICD-10-PCS | Mod: S$GLB,,, | Performed by: OPTOMETRIST

## 2020-12-23 PROCEDURE — 99999 PR PBB SHADOW E&M-EST. PATIENT-LVL III: ICD-10-PCS | Mod: PBBFAC,,, | Performed by: OPTOMETRIST

## 2020-12-23 PROCEDURE — 99999 PR PBB SHADOW E&M-EST. PATIENT-LVL III: CPT | Mod: PBBFAC,,, | Performed by: OPTOMETRIST

## 2020-12-23 PROCEDURE — 1126F PR PAIN SEVERITY QUANTIFIED, NO PAIN PRESENT: ICD-10-PCS | Mod: S$GLB,,, | Performed by: OPTOMETRIST

## 2020-12-23 PROCEDURE — 1126F AMNT PAIN NOTED NONE PRSNT: CPT | Mod: S$GLB,,, | Performed by: OPTOMETRIST

## 2020-12-23 PROCEDURE — 92015 DETERMINE REFRACTIVE STATE: CPT | Mod: S$GLB,,, | Performed by: OPTOMETRIST

## 2020-12-23 PROCEDURE — 92014 COMPRE OPH EXAM EST PT 1/>: CPT | Mod: S$GLB,,, | Performed by: OPTOMETRIST

## 2020-12-23 NOTE — PROGRESS NOTES
HPI     Routine eye exam-dle-11/22/19    Pt complains of blurred vision at near. Complains of new flashes and some   sharp pain behind eyes. Thinks depth perception is off.     Last edited by Nory Cowan on 12/23/2020  3:51 PM. (History)        ROS     Positive for: Eyes    Negative for: Constitutional, Gastrointestinal, Neurological, Skin,   Genitourinary, Musculoskeletal, HENT, Endocrine, Cardiovascular,   Respiratory, Psychiatric, Allergic/Imm, Heme/Lymph    Last edited by CHARLES Basilio, OD on 12/23/2020  4:18 PM. (History)        Assessment /Plan     For exam results, see Encounter Report.    Examination of eyes and vision    Hyperopia with astigmatism and presbyopia, bilateral    Vitreous floaters, bilateral    Ocular migraine      1. Ocular health exam OU  2. Updated specs rx, gave copy, fill prn  Sep pair at +1.75 over for computer readers    3. RD precautions given and reviewed. Patient knows to call/ message if any further changes in symptoms occur.  Partial pvd OS  4. Longstanding, although no confirmed recent migraine with aura    Discussed and educated patient on current findings /plan.  RTC 1 year, prn if any changes / issues

## 2020-12-23 NOTE — PATIENT INSTRUCTIONS
"DRY EYES -- BURNING OR LESA SYMPTOMS:  Use Over The Counter artificial tears as needed for dry eye symptoms.   Some common brands include:  Systane, Optive, Refresh, and Thera-Tears.  These drops can be used as frequently as desired, but may be most helpful use during long periods of concentrated work.  For example, reading / working at the computer. Start with 3-4x per day.     Nighttime Ophthalmic gel or ointments are available: Refresh PM, Genteal, and Lacrilube.    Avoid drops that "get redness out" (Visine, Murine, Clear Eyes), as these may contain medication that could further irritate the eyes, especially with chronic use.    ALLERGY EYES -- ITCHING SYMPTOMS:  Over the counter medications include--Pataday, Zaditor, and Alaway.  Use as directed 1-2 drops daily for symptoms of itching / watering eyes.  These drops will not help for dry eye or exposure symptoms.    REDNESS RELIEF:  Lumify---is a good redness reliever that will not cause irritation if used chronically.         FLASHES / FLOATERS / POSTERIOR VITREOUS DETACHMENT    Call the clinic if you have any further changes in symptoms.  Including:  Increased numbers of floaters or flashing lights, dimness or darkness that moves through or stays constant in your vision, or any pain in the eye (s).    You may sometimes see small specks or clouds moving in your field of vision.  They are called FLOATERS.  You can often see them when looking at a plain background, like a blank wall or blue ronny.  Floaters are actually tiny clumps of gel or cells inside the VITREOUS, the clear jelly-like fluid that fills the inside of your eye.    While these objects look like they are in front of your eye, they are actually floating inside.  What you see are the shadows they cast on the RETINA, the nerve layer at the back of the eye that senses light and allows you to see.      POSTERIOR VITREOUS DETACHMENT    The appearance of new floaters may be alarming.  If you suddenly " develop new floaters, you should contact your eye care professional  right away.    The retina can tear if the shrinking vitreous pulls away from the wall of the eye.  This sometimes causes a small amount of bleeding in the eye that may appear as new floaters.    A torn retina is always a serious problem, since it can lead to a retinal detachment.  You should see your eye care professional as soon as possible if:     even one new floater appears suddenly;   you see sudden flashes of light;   you notice other symptoms, like the loss of side vision, or a curtain closes down in your vision        POSTERIOR VITREOUS DETACHMENT is more common for people who:     are nearsighted;   have had cataract surgery;   have had YAG laser surgery of the eye;   have had inflammation inside the eye;   are over age 60.      While some floaters may remain visible, many of them will fade over time and become less noticeable.  Even if you've had some floaters for years, you should have your eyes checked as soon as possible if you notice new ones.    FLASHING LIGHTS    When the vitreous gel rubs or pulls on the retina, you may see what look like flashing lights or lightning streaks.  These flashes can appear off and on for several weeks or months.      Some people experience flashes of light that appear as jagged lines or heat waves in both eyes, lasting 10-20 minutes.  These flashes are caused by a spasm of blood vessels in the brain, which is called a migraine.    If a headache follows these flashes, it's called a migraine headache.  If   no headache occurs, these flashes are called Ophthalmic or Ocular Migraine.            Ophthalmic (Ocular) Migraine  Ophthalmic migraine is quite common. Patients usually experience visual symptoms of seeing bright zigzag type lines in their central or peripheral (side) vision. These bright lines may have associated flashing light sensations and sometimes can interfere with vision. There  "are many variations of these vision changes including spots, sparkles, or heat waves.  Painless symptoms usually resolve spontaneously between 15 and 60 minutes. Often, rest in a darkened room can be helpful during a migraine attack. Medical treatment is typically not necessary.    Sometimes, there can be a headache after the visual symptoms resolve. This is called migraine headache with visual prodrome, or Classic Migraine.  Some patients have migraine headaches without any visual symptoms, or Common Migraine.      Further, some patients have visual symptoms without the headache. This is called Ophthalmic Migraine. The cause is due to temporary spasms in the blood vessels behind the eye called "vasospasm" Typically, this resolves without treatment and many people never have another episode. Some people may continue to have them, months or years apart. An examination of the eye is important to rule out any other causes for these symptoms.    In the rare instance that these symptoms continue to recur on a regular basis and interfere with one's quality of life, then treatment may be recommended that both decrease the frequency and severity of the attacks. Treatment is usually started in consultation with an internist, family physician, or neurologist.     Please contact our office if you have any sudden changes in your symptoms, or any other questions concerning your vision.          "

## 2021-01-08 ENCOUNTER — IMMUNIZATION (OUTPATIENT)
Dept: FAMILY MEDICINE | Facility: CLINIC | Age: 61
End: 2021-01-08
Payer: COMMERCIAL

## 2021-01-08 DIAGNOSIS — Z23 NEED FOR VACCINATION: ICD-10-CM

## 2021-01-08 PROCEDURE — 0002A COVID-19, MRNA, LNP-S, PF, 30 MCG/0.3 ML DOSE VACCINE: CPT | Mod: PBBFAC | Performed by: INTERNAL MEDICINE

## 2021-01-08 PROCEDURE — 91300 COVID-19, MRNA, LNP-S, PF, 30 MCG/0.3 ML DOSE VACCINE: CPT | Mod: PBBFAC | Performed by: INTERNAL MEDICINE

## 2021-01-15 ENCOUNTER — NURSE TRIAGE (OUTPATIENT)
Dept: ADMINISTRATIVE | Facility: CLINIC | Age: 61
End: 2021-01-15

## 2021-01-19 ENCOUNTER — PATIENT MESSAGE (OUTPATIENT)
Dept: FAMILY MEDICINE | Facility: CLINIC | Age: 61
End: 2021-01-19

## 2021-01-19 DIAGNOSIS — N20.0 KIDNEY STONE: Primary | ICD-10-CM

## 2021-01-20 ENCOUNTER — OFFICE VISIT (OUTPATIENT)
Dept: UROLOGY | Facility: CLINIC | Age: 61
End: 2021-01-20
Payer: COMMERCIAL

## 2021-01-20 VITALS
BODY MASS INDEX: 41.95 KG/M2 | HEART RATE: 73 BPM | DIASTOLIC BLOOD PRESSURE: 77 MMHG | SYSTOLIC BLOOD PRESSURE: 151 MMHG | HEIGHT: 70 IN | WEIGHT: 293 LBS

## 2021-01-20 DIAGNOSIS — N20.0 KIDNEY STONE: Primary | ICD-10-CM

## 2021-01-20 DIAGNOSIS — N23 RENAL COLIC ON LEFT SIDE: ICD-10-CM

## 2021-01-20 LAB
BILIRUB SERPL-MCNC: NORMAL MG/DL
BLOOD URINE, POC: NORMAL
CLARITY, POC UA: CLEAR
COLOR, POC UA: YELLOW
GLUCOSE UR QL STRIP: NORMAL
KETONES UR QL STRIP: NORMAL
LEUKOCYTE ESTERASE URINE, POC: NORMAL
NITRITE, POC UA: NORMAL
PH, POC UA: 6.5
PROTEIN, POC: NORMAL
SPECIFIC GRAVITY, POC UA: 1.02
UROBILINOGEN, POC UA: NORMAL

## 2021-01-20 PROCEDURE — 3008F BODY MASS INDEX DOCD: CPT | Mod: CPTII,S$GLB,, | Performed by: UROLOGY

## 2021-01-20 PROCEDURE — 81002 POCT URINE DIPSTICK WITHOUT MICROSCOPE: ICD-10-PCS | Mod: S$GLB,,, | Performed by: UROLOGY

## 2021-01-20 PROCEDURE — 99204 PR OFFICE/OUTPT VISIT, NEW, LEVL IV, 45-59 MIN: ICD-10-PCS | Mod: 25,S$GLB,, | Performed by: UROLOGY

## 2021-01-20 PROCEDURE — 99999 PR PBB SHADOW E&M-EST. PATIENT-LVL IV: CPT | Mod: PBBFAC,,, | Performed by: UROLOGY

## 2021-01-20 PROCEDURE — 3077F SYST BP >= 140 MM HG: CPT | Mod: CPTII,S$GLB,, | Performed by: UROLOGY

## 2021-01-20 PROCEDURE — 3078F PR MOST RECENT DIASTOLIC BLOOD PRESSURE < 80 MM HG: ICD-10-PCS | Mod: CPTII,S$GLB,, | Performed by: UROLOGY

## 2021-01-20 PROCEDURE — 99999 PR PBB SHADOW E&M-EST. PATIENT-LVL IV: ICD-10-PCS | Mod: PBBFAC,,, | Performed by: UROLOGY

## 2021-01-20 PROCEDURE — 3008F PR BODY MASS INDEX (BMI) DOCUMENTED: ICD-10-PCS | Mod: CPTII,S$GLB,, | Performed by: UROLOGY

## 2021-01-20 PROCEDURE — 1125F AMNT PAIN NOTED PAIN PRSNT: CPT | Mod: S$GLB,,, | Performed by: UROLOGY

## 2021-01-20 PROCEDURE — 3078F DIAST BP <80 MM HG: CPT | Mod: CPTII,S$GLB,, | Performed by: UROLOGY

## 2021-01-20 PROCEDURE — 99204 OFFICE O/P NEW MOD 45 MIN: CPT | Mod: 25,S$GLB,, | Performed by: UROLOGY

## 2021-01-20 PROCEDURE — 3077F PR MOST RECENT SYSTOLIC BLOOD PRESSURE >= 140 MM HG: ICD-10-PCS | Mod: CPTII,S$GLB,, | Performed by: UROLOGY

## 2021-01-20 PROCEDURE — 1125F PR PAIN SEVERITY QUANTIFIED, PAIN PRESENT: ICD-10-PCS | Mod: S$GLB,,, | Performed by: UROLOGY

## 2021-01-20 PROCEDURE — 81002 URINALYSIS NONAUTO W/O SCOPE: CPT | Mod: S$GLB,,, | Performed by: UROLOGY

## 2021-01-28 RX ORDER — PROPRANOLOL HYDROCHLORIDE 60 MG/1
60 CAPSULE, EXTENDED RELEASE ORAL DAILY
Qty: 30 CAPSULE | Refills: 11 | Status: CANCELLED | OUTPATIENT
Start: 2021-01-28 | End: 2022-01-28

## 2021-01-28 RX ORDER — PROPRANOLOL HYDROCHLORIDE 60 MG/1
60 CAPSULE, EXTENDED RELEASE ORAL DAILY
Qty: 30 CAPSULE | Refills: 11 | Status: SHIPPED | OUTPATIENT
Start: 2021-01-28 | End: 2022-01-28 | Stop reason: SDUPTHER

## 2021-02-04 ENCOUNTER — HOSPITAL ENCOUNTER (OUTPATIENT)
Dept: RADIOLOGY | Facility: HOSPITAL | Age: 61
Discharge: HOME | End: 2021-02-04
Attending: INTERNAL MEDICINE
Payer: COMMERCIAL

## 2021-02-04 DIAGNOSIS — K74.69 OTHER CIRRHOSIS OF LIVER: ICD-10-CM

## 2021-02-04 PROCEDURE — 76705 ECHO EXAM OF ABDOMEN: CPT | Mod: TC,PO

## 2021-02-04 PROCEDURE — 76705 ECHO EXAM OF ABDOMEN: CPT | Mod: 26,,, | Performed by: RADIOLOGY

## 2021-02-04 PROCEDURE — 76705 US ABDOMEN LIMITED: ICD-10-PCS | Mod: 26,,, | Performed by: RADIOLOGY

## 2021-02-05 ENCOUNTER — TELEPHONE (OUTPATIENT)
Dept: HEPATOLOGY | Facility: CLINIC | Age: 61
End: 2021-02-05

## 2021-04-28 ENCOUNTER — CLINICAL SUPPORT (OUTPATIENT)
Dept: OTHER | Facility: CLINIC | Age: 61
End: 2021-04-28
Payer: COMMERCIAL

## 2021-04-28 DIAGNOSIS — Z00.8 ENCOUNTER FOR OTHER GENERAL EXAMINATION: ICD-10-CM

## 2021-04-29 VITALS — HEIGHT: 70 IN | BODY MASS INDEX: 42.2 KG/M2

## 2021-04-29 LAB
GLUCOSE SERPL-MCNC: 110 MG/DL (ref 60–140)
HDLC SERPL-MCNC: 63 MG/DL
POC CHOLESTEROL, LDL (DOCK): 67 MG/DL
POC CHOLESTEROL, TOTAL: 142 MG/DL
TRIGL SERPL-MCNC: 58 MG/DL

## 2021-05-03 ENCOUNTER — PATIENT MESSAGE (OUTPATIENT)
Dept: OPTOMETRY | Facility: CLINIC | Age: 61
End: 2021-05-03

## 2021-05-04 ENCOUNTER — TELEPHONE (OUTPATIENT)
Dept: HEPATOLOGY | Facility: CLINIC | Age: 61
End: 2021-05-04

## 2021-06-16 ENCOUNTER — PATIENT MESSAGE (OUTPATIENT)
Dept: OPTOMETRY | Facility: CLINIC | Age: 61
End: 2021-06-16

## 2021-06-24 ENCOUNTER — OFFICE VISIT (OUTPATIENT)
Dept: OPTOMETRY | Facility: CLINIC | Age: 61
End: 2021-06-24
Payer: COMMERCIAL

## 2021-06-24 DIAGNOSIS — Z46.0 CONTACT LENS/GLASSES FITTING: Primary | ICD-10-CM

## 2021-06-24 PROBLEM — Z97.3 WEARS CONTACT LENSES: Status: ACTIVE | Noted: 2021-06-24

## 2021-06-24 PROCEDURE — 92310 PR CONTACT LENS FITTING (NO CHANGE): ICD-10-PCS | Mod: S$GLB,,, | Performed by: OPTOMETRIST

## 2021-06-24 PROCEDURE — 1126F PR PAIN SEVERITY QUANTIFIED, NO PAIN PRESENT: ICD-10-PCS | Mod: S$GLB,,, | Performed by: OPTOMETRIST

## 2021-06-24 PROCEDURE — 99499 UNLISTED E&M SERVICE: CPT | Mod: S$GLB,,, | Performed by: OPTOMETRIST

## 2021-06-24 PROCEDURE — 99999 PR PBB SHADOW E&M-EST. PATIENT-LVL III: ICD-10-PCS | Mod: PBBFAC,,, | Performed by: OPTOMETRIST

## 2021-06-24 PROCEDURE — 1126F AMNT PAIN NOTED NONE PRSNT: CPT | Mod: S$GLB,,, | Performed by: OPTOMETRIST

## 2021-06-24 PROCEDURE — 99999 PR PBB SHADOW E&M-EST. PATIENT-LVL III: CPT | Mod: PBBFAC,,, | Performed by: OPTOMETRIST

## 2021-06-24 PROCEDURE — 99499 NO LOS: ICD-10-PCS | Mod: S$GLB,,, | Performed by: OPTOMETRIST

## 2021-06-24 PROCEDURE — 92310 CONTACT LENS FITTING OU: CPT | Mod: S$GLB,,, | Performed by: OPTOMETRIST

## 2021-08-06 ENCOUNTER — LAB VISIT (OUTPATIENT)
Dept: LAB | Facility: HOSPITAL | Age: 61
End: 2021-08-06
Attending: INTERNAL MEDICINE
Payer: COMMERCIAL

## 2021-08-06 DIAGNOSIS — K74.60 HEPATIC CIRRHOSIS, UNSPECIFIED HEPATIC CIRRHOSIS TYPE, UNSPECIFIED WHETHER ASCITES PRESENT: ICD-10-CM

## 2021-08-06 DIAGNOSIS — K75.81 STEATOHEPATITIS, NON-ALCOHOLIC: ICD-10-CM

## 2021-08-06 LAB
ALBUMIN SERPL BCP-MCNC: 3.5 G/DL (ref 3.5–5.2)
ALP SERPL-CCNC: 112 U/L (ref 55–135)
ALT SERPL W/O P-5'-P-CCNC: 42 U/L (ref 10–44)
ANION GAP SERPL CALC-SCNC: 9 MMOL/L (ref 8–16)
AST SERPL-CCNC: 57 U/L (ref 10–40)
BASOPHILS # BLD AUTO: 0.03 K/UL (ref 0–0.2)
BASOPHILS NFR BLD: 1 % (ref 0–1.9)
BILIRUB SERPL-MCNC: 1.8 MG/DL (ref 0.1–1)
BUN SERPL-MCNC: 11 MG/DL (ref 8–23)
CALCIUM SERPL-MCNC: 9.8 MG/DL (ref 8.7–10.5)
CHLORIDE SERPL-SCNC: 109 MMOL/L (ref 95–110)
CO2 SERPL-SCNC: 25 MMOL/L (ref 23–29)
CREAT SERPL-MCNC: 0.7 MG/DL (ref 0.5–1.4)
DIFFERENTIAL METHOD: ABNORMAL
EOSINOPHIL # BLD AUTO: 0.2 K/UL (ref 0–0.5)
EOSINOPHIL NFR BLD: 6.8 % (ref 0–8)
ERYTHROCYTE [DISTWIDTH] IN BLOOD BY AUTOMATED COUNT: 14 % (ref 11.5–14.5)
EST. GFR  (AFRICAN AMERICAN): >60 ML/MIN/1.73 M^2
EST. GFR  (NON AFRICAN AMERICAN): >60 ML/MIN/1.73 M^2
GLUCOSE SERPL-MCNC: 136 MG/DL (ref 70–110)
HCT VFR BLD AUTO: 41.3 % (ref 37–48.5)
HGB BLD-MCNC: 14.1 G/DL (ref 12–16)
IMM GRANULOCYTES # BLD AUTO: 0.01 K/UL (ref 0–0.04)
IMM GRANULOCYTES NFR BLD AUTO: 0.3 % (ref 0–0.5)
INR PPP: 1.2 (ref 0.8–1.2)
LYMPHOCYTES # BLD AUTO: 0.8 K/UL (ref 1–4.8)
LYMPHOCYTES NFR BLD: 25.1 % (ref 18–48)
MCH RBC QN AUTO: 32.3 PG (ref 27–31)
MCHC RBC AUTO-ENTMCNC: 34.1 G/DL (ref 32–36)
MCV RBC AUTO: 95 FL (ref 82–98)
MONOCYTES # BLD AUTO: 0.4 K/UL (ref 0.3–1)
MONOCYTES NFR BLD: 14.3 % (ref 4–15)
NEUTROPHILS # BLD AUTO: 1.6 K/UL (ref 1.8–7.7)
NEUTROPHILS NFR BLD: 52.5 % (ref 38–73)
NRBC BLD-RTO: 0 /100 WBC
PLATELET # BLD AUTO: 63 K/UL (ref 150–450)
PMV BLD AUTO: 11.6 FL (ref 9.2–12.9)
POTASSIUM SERPL-SCNC: 3.8 MMOL/L (ref 3.5–5.1)
PROT SERPL-MCNC: 5.9 G/DL (ref 6–8.4)
PROTHROMBIN TIME: 12.4 SEC (ref 9–12.5)
RBC # BLD AUTO: 4.37 M/UL (ref 4–5.4)
SODIUM SERPL-SCNC: 143 MMOL/L (ref 136–145)
WBC # BLD AUTO: 3.07 K/UL (ref 3.9–12.7)

## 2021-08-06 PROCEDURE — 85025 COMPLETE CBC W/AUTO DIFF WBC: CPT | Performed by: INTERNAL MEDICINE

## 2021-08-06 PROCEDURE — 36415 COLL VENOUS BLD VENIPUNCTURE: CPT | Mod: PO | Performed by: INTERNAL MEDICINE

## 2021-08-06 PROCEDURE — 85610 PROTHROMBIN TIME: CPT | Mod: PO | Performed by: INTERNAL MEDICINE

## 2021-08-06 PROCEDURE — 80053 COMPREHEN METABOLIC PANEL: CPT | Performed by: INTERNAL MEDICINE

## 2021-08-09 ENCOUNTER — TELEPHONE (OUTPATIENT)
Dept: HEPATOLOGY | Facility: CLINIC | Age: 61
End: 2021-08-09

## 2021-08-18 ENCOUNTER — PATIENT MESSAGE (OUTPATIENT)
Dept: FAMILY MEDICINE | Facility: CLINIC | Age: 61
End: 2021-08-18

## 2021-08-18 DIAGNOSIS — M79.671 RIGHT FOOT PAIN: ICD-10-CM

## 2021-08-18 DIAGNOSIS — R30.0 DYSURIA: Primary | ICD-10-CM

## 2021-08-19 ENCOUNTER — APPOINTMENT (OUTPATIENT)
Dept: LAB | Facility: HOSPITAL | Age: 61
End: 2021-08-19
Attending: FAMILY MEDICINE
Payer: COMMERCIAL

## 2021-08-19 DIAGNOSIS — R30.0 DYSURIA: Primary | ICD-10-CM

## 2021-08-19 LAB
BILIRUB UR QL STRIP: NEGATIVE
CLARITY UR: CLEAR
COLOR UR: YELLOW
GLUCOSE UR QL STRIP: NEGATIVE
HGB UR QL STRIP: NEGATIVE
KETONES UR QL STRIP: NEGATIVE
LEUKOCYTE ESTERASE UR QL STRIP: NEGATIVE
NITRITE UR QL STRIP: NEGATIVE
PH UR STRIP: 7 [PH] (ref 5–8)
PROT UR QL STRIP: NEGATIVE
SP GR UR STRIP: 1.02 (ref 1–1.03)
URN SPEC COLLECT METH UR: NORMAL

## 2021-08-19 PROCEDURE — 81003 URINALYSIS AUTO W/O SCOPE: CPT | Mod: PO | Performed by: FAMILY MEDICINE

## 2021-09-09 ENCOUNTER — OFFICE VISIT (OUTPATIENT)
Dept: PODIATRY | Facility: CLINIC | Age: 61
End: 2021-09-09
Payer: COMMERCIAL

## 2021-09-09 ENCOUNTER — HOSPITAL ENCOUNTER (OUTPATIENT)
Dept: RADIOLOGY | Facility: HOSPITAL | Age: 61
Discharge: HOME | End: 2021-09-09
Attending: PODIATRIST
Payer: COMMERCIAL

## 2021-09-09 DIAGNOSIS — M21.6X2 ACQUIRED EQUINUS DEFORMITY OF BOTH FEET: ICD-10-CM

## 2021-09-09 DIAGNOSIS — M21.6X1 ACQUIRED EQUINUS DEFORMITY OF BOTH FEET: ICD-10-CM

## 2021-09-09 DIAGNOSIS — M76.61 TENDONITIS, ACHILLES, RIGHT: ICD-10-CM

## 2021-09-09 DIAGNOSIS — M72.2 PLANTAR FASCIITIS OF RIGHT FOOT: Primary | ICD-10-CM

## 2021-09-09 DIAGNOSIS — M72.2 PLANTAR FASCIITIS OF RIGHT FOOT: ICD-10-CM

## 2021-09-09 DIAGNOSIS — M79.671 RIGHT FOOT PAIN: ICD-10-CM

## 2021-09-09 PROCEDURE — 73650 XR CALCANEUS 2 VIEW RIGHT: ICD-10-PCS | Mod: 26,RT,, | Performed by: RADIOLOGY

## 2021-09-09 PROCEDURE — 73650 X-RAY EXAM OF HEEL: CPT | Mod: TC,FY,PO,RT

## 2021-09-09 PROCEDURE — 1159F MED LIST DOCD IN RCRD: CPT | Mod: CPTII,S$GLB,, | Performed by: PODIATRIST

## 2021-09-09 PROCEDURE — 1160F PR REVIEW ALL MEDS BY PRESCRIBER/CLIN PHARMACIST DOCUMENTED: ICD-10-PCS | Mod: CPTII,S$GLB,, | Performed by: PODIATRIST

## 2021-09-09 PROCEDURE — 99999 PR PBB SHADOW E&M-EST. PATIENT-LVL IV: CPT | Mod: PBBFAC,,, | Performed by: PODIATRIST

## 2021-09-09 PROCEDURE — 73650 X-RAY EXAM OF HEEL: CPT | Mod: 26,RT,, | Performed by: RADIOLOGY

## 2021-09-09 PROCEDURE — 1159F PR MEDICATION LIST DOCUMENTED IN MEDICAL RECORD: ICD-10-PCS | Mod: CPTII,S$GLB,, | Performed by: PODIATRIST

## 2021-09-09 PROCEDURE — 99204 PR OFFICE/OUTPT VISIT, NEW, LEVL IV, 45-59 MIN: ICD-10-PCS | Mod: S$GLB,,, | Performed by: PODIATRIST

## 2021-09-09 PROCEDURE — 99999 PR PBB SHADOW E&M-EST. PATIENT-LVL IV: ICD-10-PCS | Mod: PBBFAC,,, | Performed by: PODIATRIST

## 2021-09-09 PROCEDURE — 1160F RVW MEDS BY RX/DR IN RCRD: CPT | Mod: CPTII,S$GLB,, | Performed by: PODIATRIST

## 2021-09-09 PROCEDURE — 99204 OFFICE O/P NEW MOD 45 MIN: CPT | Mod: S$GLB,,, | Performed by: PODIATRIST

## 2021-09-09 RX ORDER — MELOXICAM 7.5 MG/1
7.5 TABLET ORAL DAILY
Qty: 30 TABLET | Refills: 0 | Status: SHIPPED | OUTPATIENT
Start: 2021-09-09 | End: 2022-06-11

## 2021-09-21 ENCOUNTER — PATIENT MESSAGE (OUTPATIENT)
Dept: FAMILY MEDICINE | Facility: CLINIC | Age: 61
End: 2021-09-21

## 2021-09-30 ENCOUNTER — PATIENT MESSAGE (OUTPATIENT)
Dept: OBSTETRICS AND GYNECOLOGY | Facility: CLINIC | Age: 61
End: 2021-09-30

## 2021-10-01 ENCOUNTER — IMMUNIZATION (OUTPATIENT)
Dept: FAMILY MEDICINE | Facility: CLINIC | Age: 61
End: 2021-10-01
Payer: COMMERCIAL

## 2021-10-01 DIAGNOSIS — Z23 NEED FOR VACCINATION: Primary | ICD-10-CM

## 2021-10-01 PROCEDURE — 0003A COVID-19, MRNA, LNP-S, PF, 30 MCG/0.3 ML DOSE VACCINE: CPT | Mod: PBBFAC | Performed by: INTERNAL MEDICINE

## 2021-10-01 PROCEDURE — 91300 COVID-19, MRNA, LNP-S, PF, 30 MCG/0.3 ML DOSE VACCINE: CPT | Mod: PBBFAC | Performed by: INTERNAL MEDICINE

## 2021-10-19 ENCOUNTER — OFFICE VISIT (OUTPATIENT)
Dept: FAMILY MEDICINE | Facility: CLINIC | Age: 61
End: 2021-10-19
Payer: COMMERCIAL

## 2021-10-19 VITALS
OXYGEN SATURATION: 98 % | SYSTOLIC BLOOD PRESSURE: 128 MMHG | BODY MASS INDEX: 41.66 KG/M2 | WEIGHT: 291 LBS | HEART RATE: 71 BPM | DIASTOLIC BLOOD PRESSURE: 76 MMHG | HEIGHT: 70 IN

## 2021-10-19 DIAGNOSIS — J22 LOWER RESPIRATORY INFECTION (E.G., BRONCHITIS, PNEUMONIA, PNEUMONITIS, PULMONITIS): Primary | ICD-10-CM

## 2021-10-19 DIAGNOSIS — R05.3 PERSISTENT COUGH: ICD-10-CM

## 2021-10-19 PROCEDURE — 3078F DIAST BP <80 MM HG: CPT | Mod: CPTII,S$GLB,, | Performed by: PHYSICIAN ASSISTANT

## 2021-10-19 PROCEDURE — 99999 PR PBB SHADOW E&M-EST. PATIENT-LVL IV: ICD-10-PCS | Mod: PBBFAC,,, | Performed by: PHYSICIAN ASSISTANT

## 2021-10-19 PROCEDURE — 99213 PR OFFICE/OUTPT VISIT, EST, LEVL III, 20-29 MIN: ICD-10-PCS | Mod: S$GLB,,, | Performed by: PHYSICIAN ASSISTANT

## 2021-10-19 PROCEDURE — 99999 PR PBB SHADOW E&M-EST. PATIENT-LVL IV: CPT | Mod: PBBFAC,,, | Performed by: PHYSICIAN ASSISTANT

## 2021-10-19 PROCEDURE — 3078F PR MOST RECENT DIASTOLIC BLOOD PRESSURE < 80 MM HG: ICD-10-PCS | Mod: CPTII,S$GLB,, | Performed by: PHYSICIAN ASSISTANT

## 2021-10-19 PROCEDURE — 3008F BODY MASS INDEX DOCD: CPT | Mod: CPTII,S$GLB,, | Performed by: PHYSICIAN ASSISTANT

## 2021-10-19 PROCEDURE — 3074F PR MOST RECENT SYSTOLIC BLOOD PRESSURE < 130 MM HG: ICD-10-PCS | Mod: CPTII,S$GLB,, | Performed by: PHYSICIAN ASSISTANT

## 2021-10-19 PROCEDURE — 3008F PR BODY MASS INDEX (BMI) DOCUMENTED: ICD-10-PCS | Mod: CPTII,S$GLB,, | Performed by: PHYSICIAN ASSISTANT

## 2021-10-19 PROCEDURE — 3074F SYST BP LT 130 MM HG: CPT | Mod: CPTII,S$GLB,, | Performed by: PHYSICIAN ASSISTANT

## 2021-10-19 PROCEDURE — 99213 OFFICE O/P EST LOW 20 MIN: CPT | Mod: S$GLB,,, | Performed by: PHYSICIAN ASSISTANT

## 2021-10-19 RX ORDER — ALBUTEROL SULFATE 0.83 MG/ML
2.5 SOLUTION RESPIRATORY (INHALATION) EVERY 6 HOURS PRN
Qty: 75 ML | Refills: 4 | Status: SHIPPED | OUTPATIENT
Start: 2021-10-19 | End: 2022-10-19

## 2021-10-19 RX ORDER — AMOXICILLIN AND CLAVULANATE POTASSIUM 875; 125 MG/1; MG/1
1 TABLET, FILM COATED ORAL EVERY 12 HOURS
Qty: 14 TABLET | Refills: 0 | Status: SHIPPED | OUTPATIENT
Start: 2021-10-19 | End: 2021-10-27

## 2021-10-21 ENCOUNTER — OFFICE VISIT (OUTPATIENT)
Dept: PODIATRY | Facility: CLINIC | Age: 61
End: 2021-10-21
Payer: COMMERCIAL

## 2021-10-21 VITALS — RESPIRATION RATE: 20 BRPM | HEIGHT: 70 IN | WEIGHT: 293 LBS | BODY MASS INDEX: 41.95 KG/M2

## 2021-10-21 DIAGNOSIS — M21.6X1 ACQUIRED EQUINUS DEFORMITY OF BOTH FEET: ICD-10-CM

## 2021-10-21 DIAGNOSIS — M21.6X2 ACQUIRED EQUINUS DEFORMITY OF BOTH FEET: ICD-10-CM

## 2021-10-21 DIAGNOSIS — M72.2 PLANTAR FASCIITIS OF RIGHT FOOT: ICD-10-CM

## 2021-10-21 DIAGNOSIS — M76.61 TENDONITIS, ACHILLES, RIGHT: ICD-10-CM

## 2021-10-21 DIAGNOSIS — M79.671 RIGHT FOOT PAIN: Primary | ICD-10-CM

## 2021-10-21 PROCEDURE — 3008F PR BODY MASS INDEX (BMI) DOCUMENTED: ICD-10-PCS | Mod: CPTII,S$GLB,, | Performed by: PODIATRIST

## 2021-10-21 PROCEDURE — 99999 PR PBB SHADOW E&M-EST. PATIENT-LVL III: CPT | Mod: PBBFAC,,, | Performed by: PODIATRIST

## 2021-10-21 PROCEDURE — 1159F MED LIST DOCD IN RCRD: CPT | Mod: CPTII,S$GLB,, | Performed by: PODIATRIST

## 2021-10-21 PROCEDURE — 3008F BODY MASS INDEX DOCD: CPT | Mod: CPTII,S$GLB,, | Performed by: PODIATRIST

## 2021-10-21 PROCEDURE — 99212 OFFICE O/P EST SF 10 MIN: CPT | Mod: S$GLB,,, | Performed by: PODIATRIST

## 2021-10-21 PROCEDURE — 1160F PR REVIEW ALL MEDS BY PRESCRIBER/CLIN PHARMACIST DOCUMENTED: ICD-10-PCS | Mod: CPTII,S$GLB,, | Performed by: PODIATRIST

## 2021-10-21 PROCEDURE — 1160F RVW MEDS BY RX/DR IN RCRD: CPT | Mod: CPTII,S$GLB,, | Performed by: PODIATRIST

## 2021-10-21 PROCEDURE — 99212 PR OFFICE/OUTPT VISIT, EST, LEVL II, 10-19 MIN: ICD-10-PCS | Mod: S$GLB,,, | Performed by: PODIATRIST

## 2021-10-21 PROCEDURE — 99999 PR PBB SHADOW E&M-EST. PATIENT-LVL III: ICD-10-PCS | Mod: PBBFAC,,, | Performed by: PODIATRIST

## 2021-10-21 PROCEDURE — 1159F PR MEDICATION LIST DOCUMENTED IN MEDICAL RECORD: ICD-10-PCS | Mod: CPTII,S$GLB,, | Performed by: PODIATRIST

## 2021-11-04 RX ORDER — HYDROCHLOROTHIAZIDE 12.5 MG/1
12.5 TABLET ORAL DAILY
Qty: 90 TABLET | Refills: 1 | Status: SHIPPED | OUTPATIENT
Start: 2021-11-04 | End: 2022-06-19 | Stop reason: SDUPTHER

## 2021-11-10 DIAGNOSIS — Z12.31 OTHER SCREENING MAMMOGRAM: ICD-10-CM

## 2021-11-18 ENCOUNTER — HOSPITAL ENCOUNTER (OUTPATIENT)
Dept: RADIOLOGY | Facility: HOSPITAL | Age: 61
Discharge: HOME | End: 2021-11-18
Attending: FAMILY MEDICINE
Payer: COMMERCIAL

## 2021-11-18 ENCOUNTER — OFFICE VISIT (OUTPATIENT)
Dept: PRIMARY CARE CLINIC | Facility: CLINIC | Age: 61
End: 2021-11-18
Payer: COMMERCIAL

## 2021-11-18 ENCOUNTER — OFFICE VISIT (OUTPATIENT)
Dept: OBSTETRICS AND GYNECOLOGY | Facility: CLINIC | Age: 61
End: 2021-11-18
Payer: COMMERCIAL

## 2021-11-18 ENCOUNTER — PATIENT OUTREACH (OUTPATIENT)
Dept: ADMINISTRATIVE | Facility: OTHER | Age: 61
End: 2021-11-18
Payer: COMMERCIAL

## 2021-11-18 VITALS
BODY MASS INDEX: 41.66 KG/M2 | SYSTOLIC BLOOD PRESSURE: 132 MMHG | WEIGHT: 291 LBS | DIASTOLIC BLOOD PRESSURE: 70 MMHG | HEIGHT: 70 IN

## 2021-11-18 VITALS
WEIGHT: 293 LBS | DIASTOLIC BLOOD PRESSURE: 82 MMHG | OXYGEN SATURATION: 98 % | BODY MASS INDEX: 41.95 KG/M2 | HEART RATE: 77 BPM | SYSTOLIC BLOOD PRESSURE: 122 MMHG | HEIGHT: 70 IN | RESPIRATION RATE: 18 BRPM

## 2021-11-18 DIAGNOSIS — Z01.419 WELL WOMAN EXAM WITH ROUTINE GYNECOLOGICAL EXAM: Primary | ICD-10-CM

## 2021-11-18 DIAGNOSIS — K74.60 CIRRHOSIS OF LIVER WITHOUT ASCITES, UNSPECIFIED HEPATIC CIRRHOSIS TYPE: Chronic | ICD-10-CM

## 2021-11-18 DIAGNOSIS — Z12.31 OTHER SCREENING MAMMOGRAM: ICD-10-CM

## 2021-11-18 DIAGNOSIS — Z78.0 POSTMENOPAUSAL: ICD-10-CM

## 2021-11-18 DIAGNOSIS — I10 PRIMARY HYPERTENSION: ICD-10-CM

## 2021-11-18 DIAGNOSIS — Z12.11 COLON CANCER SCREENING: ICD-10-CM

## 2021-11-18 DIAGNOSIS — K75.81 NONALCOHOLIC STEATOHEPATITIS: Chronic | ICD-10-CM

## 2021-11-18 DIAGNOSIS — R39.9 UTI SYMPTOMS: ICD-10-CM

## 2021-11-18 DIAGNOSIS — Z00.00 PREVENTATIVE HEALTH CARE: Primary | ICD-10-CM

## 2021-11-18 PROCEDURE — 99396 PR PREVENTIVE VISIT,EST,40-64: ICD-10-PCS | Mod: S$GLB,,, | Performed by: FAMILY MEDICINE

## 2021-11-18 PROCEDURE — 99999 PR PBB SHADOW E&M-EST. PATIENT-LVL IV: ICD-10-PCS | Mod: PBBFAC,,, | Performed by: OBSTETRICS & GYNECOLOGY

## 2021-11-18 PROCEDURE — 1159F PR MEDICATION LIST DOCUMENTED IN MEDICAL RECORD: ICD-10-PCS | Mod: CPTII,S$GLB,, | Performed by: OBSTETRICS & GYNECOLOGY

## 2021-11-18 PROCEDURE — 77063 MAMMO DIGITAL SCREENING BILAT WITH TOMO: ICD-10-PCS | Mod: 26,,, | Performed by: RADIOLOGY

## 2021-11-18 PROCEDURE — 1159F MED LIST DOCD IN RCRD: CPT | Mod: CPTII,S$GLB,, | Performed by: OBSTETRICS & GYNECOLOGY

## 2021-11-18 PROCEDURE — 87624 HPV HI-RISK TYP POOLED RSLT: CPT | Performed by: OBSTETRICS & GYNECOLOGY

## 2021-11-18 PROCEDURE — 3008F BODY MASS INDEX DOCD: CPT | Mod: CPTII,S$GLB,, | Performed by: FAMILY MEDICINE

## 2021-11-18 PROCEDURE — 3008F BODY MASS INDEX DOCD: CPT | Mod: CPTII,S$GLB,, | Performed by: OBSTETRICS & GYNECOLOGY

## 2021-11-18 PROCEDURE — 99396 PREV VISIT EST AGE 40-64: CPT | Mod: S$GLB,,, | Performed by: FAMILY MEDICINE

## 2021-11-18 PROCEDURE — 87086 URINE CULTURE/COLONY COUNT: CPT | Performed by: OBSTETRICS & GYNECOLOGY

## 2021-11-18 PROCEDURE — 3078F DIAST BP <80 MM HG: CPT | Mod: CPTII,S$GLB,, | Performed by: OBSTETRICS & GYNECOLOGY

## 2021-11-18 PROCEDURE — 3079F DIAST BP 80-89 MM HG: CPT | Mod: CPTII,S$GLB,, | Performed by: FAMILY MEDICINE

## 2021-11-18 PROCEDURE — 1159F MED LIST DOCD IN RCRD: CPT | Mod: CPTII,S$GLB,, | Performed by: FAMILY MEDICINE

## 2021-11-18 PROCEDURE — 88175 CYTOPATH C/V AUTO FLUID REDO: CPT | Performed by: OBSTETRICS & GYNECOLOGY

## 2021-11-18 PROCEDURE — 99396 PREV VISIT EST AGE 40-64: CPT | Mod: S$GLB,,, | Performed by: OBSTETRICS & GYNECOLOGY

## 2021-11-18 PROCEDURE — 77067 SCR MAMMO BI INCL CAD: CPT | Mod: TC,PN

## 2021-11-18 PROCEDURE — 87186 SC STD MICRODIL/AGAR DIL: CPT | Mod: 59 | Performed by: OBSTETRICS & GYNECOLOGY

## 2021-11-18 PROCEDURE — 87077 CULTURE AEROBIC IDENTIFY: CPT | Performed by: OBSTETRICS & GYNECOLOGY

## 2021-11-18 PROCEDURE — 99396 PR PREVENTIVE VISIT,EST,40-64: ICD-10-PCS | Mod: S$GLB,,, | Performed by: OBSTETRICS & GYNECOLOGY

## 2021-11-18 PROCEDURE — 3074F SYST BP LT 130 MM HG: CPT | Mod: CPTII,S$GLB,, | Performed by: FAMILY MEDICINE

## 2021-11-18 PROCEDURE — 3008F PR BODY MASS INDEX (BMI) DOCUMENTED: ICD-10-PCS | Mod: CPTII,S$GLB,, | Performed by: OBSTETRICS & GYNECOLOGY

## 2021-11-18 PROCEDURE — 3008F PR BODY MASS INDEX (BMI) DOCUMENTED: ICD-10-PCS | Mod: CPTII,S$GLB,, | Performed by: FAMILY MEDICINE

## 2021-11-18 PROCEDURE — 3078F PR MOST RECENT DIASTOLIC BLOOD PRESSURE < 80 MM HG: ICD-10-PCS | Mod: CPTII,S$GLB,, | Performed by: OBSTETRICS & GYNECOLOGY

## 2021-11-18 PROCEDURE — 77063 BREAST TOMOSYNTHESIS BI: CPT | Mod: 26,,, | Performed by: RADIOLOGY

## 2021-11-18 PROCEDURE — 77067 MAMMO DIGITAL SCREENING BILAT WITH TOMO: ICD-10-PCS | Mod: 26,,, | Performed by: RADIOLOGY

## 2021-11-18 PROCEDURE — 1160F RVW MEDS BY RX/DR IN RCRD: CPT | Mod: CPTII,S$GLB,, | Performed by: FAMILY MEDICINE

## 2021-11-18 PROCEDURE — 1159F PR MEDICATION LIST DOCUMENTED IN MEDICAL RECORD: ICD-10-PCS | Mod: CPTII,S$GLB,, | Performed by: FAMILY MEDICINE

## 2021-11-18 PROCEDURE — 87088 URINE BACTERIA CULTURE: CPT | Performed by: OBSTETRICS & GYNECOLOGY

## 2021-11-18 PROCEDURE — 1160F PR REVIEW ALL MEDS BY PRESCRIBER/CLIN PHARMACIST DOCUMENTED: ICD-10-PCS | Mod: CPTII,S$GLB,, | Performed by: FAMILY MEDICINE

## 2021-11-18 PROCEDURE — 99999 PR PBB SHADOW E&M-EST. PATIENT-LVL V: CPT | Mod: PBBFAC,,, | Performed by: FAMILY MEDICINE

## 2021-11-18 PROCEDURE — 3075F SYST BP GE 130 - 139MM HG: CPT | Mod: CPTII,S$GLB,, | Performed by: OBSTETRICS & GYNECOLOGY

## 2021-11-18 PROCEDURE — 99999 PR PBB SHADOW E&M-EST. PATIENT-LVL V: ICD-10-PCS | Mod: PBBFAC,,, | Performed by: FAMILY MEDICINE

## 2021-11-18 PROCEDURE — 3074F PR MOST RECENT SYSTOLIC BLOOD PRESSURE < 130 MM HG: ICD-10-PCS | Mod: CPTII,S$GLB,, | Performed by: FAMILY MEDICINE

## 2021-11-18 PROCEDURE — 99999 PR PBB SHADOW E&M-EST. PATIENT-LVL IV: CPT | Mod: PBBFAC,,, | Performed by: OBSTETRICS & GYNECOLOGY

## 2021-11-18 PROCEDURE — 3079F PR MOST RECENT DIASTOLIC BLOOD PRESSURE 80-89 MM HG: ICD-10-PCS | Mod: CPTII,S$GLB,, | Performed by: FAMILY MEDICINE

## 2021-11-18 PROCEDURE — 3075F PR MOST RECENT SYSTOLIC BLOOD PRESS GE 130-139MM HG: ICD-10-PCS | Mod: CPTII,S$GLB,, | Performed by: OBSTETRICS & GYNECOLOGY

## 2021-11-18 PROCEDURE — 77067 SCR MAMMO BI INCL CAD: CPT | Mod: 26,,, | Performed by: RADIOLOGY

## 2021-11-18 RX ORDER — ALPRAZOLAM 0.25 MG/1
0.25 TABLET ORAL NIGHTLY PRN
Qty: 30 TABLET | Refills: 2 | Status: SHIPPED | OUTPATIENT
Start: 2021-11-18 | End: 2022-10-31 | Stop reason: SDUPTHER

## 2021-11-18 RX ORDER — TOLTERODINE 4 MG/1
4 CAPSULE, EXTENDED RELEASE ORAL DAILY
Qty: 30 CAPSULE | Refills: 2 | Status: SHIPPED | OUTPATIENT
Start: 2021-11-18 | End: 2022-06-22

## 2021-11-21 ENCOUNTER — PATIENT MESSAGE (OUTPATIENT)
Dept: OBSTETRICS AND GYNECOLOGY | Facility: CLINIC | Age: 61
End: 2021-11-21
Payer: COMMERCIAL

## 2021-11-21 RX ORDER — NITROFURANTOIN 25; 75 MG/1; MG/1
100 CAPSULE ORAL 2 TIMES DAILY
Qty: 20 CAPSULE | Refills: 0 | Status: SHIPPED | OUTPATIENT
Start: 2021-11-21 | End: 2021-12-02

## 2021-11-22 ENCOUNTER — PATIENT MESSAGE (OUTPATIENT)
Dept: GASTROENTEROLOGY | Facility: CLINIC | Age: 61
End: 2021-11-22
Payer: COMMERCIAL

## 2021-11-22 ENCOUNTER — TELEPHONE (OUTPATIENT)
Dept: GASTROENTEROLOGY | Facility: CLINIC | Age: 61
End: 2021-11-22
Payer: COMMERCIAL

## 2021-11-22 LAB — BACTERIA UR CULT: ABNORMAL

## 2021-11-24 LAB
HPV HR 12 DNA SPEC QL NAA+PROBE: NEGATIVE
HPV16 AG SPEC QL: NEGATIVE
HPV18 DNA SPEC QL NAA+PROBE: NEGATIVE

## 2021-11-26 LAB
FINAL PATHOLOGIC DIAGNOSIS: NORMAL
Lab: NORMAL

## 2021-12-08 RX ORDER — BUPROPION HYDROCHLORIDE 150 MG/1
300 TABLET ORAL DAILY
Qty: 180 TABLET | Refills: 3 | Status: SHIPPED | OUTPATIENT
Start: 2021-12-08 | End: 2023-03-10 | Stop reason: SDUPTHER

## 2021-12-09 ENCOUNTER — PATIENT MESSAGE (OUTPATIENT)
Dept: HEPATOLOGY | Facility: CLINIC | Age: 61
End: 2021-12-09
Payer: COMMERCIAL

## 2021-12-13 ENCOUNTER — TELEPHONE (OUTPATIENT)
Dept: HEPATOLOGY | Facility: CLINIC | Age: 61
End: 2021-12-13
Payer: COMMERCIAL

## 2021-12-13 DIAGNOSIS — K74.60 HEPATIC CIRRHOSIS, UNSPECIFIED HEPATIC CIRRHOSIS TYPE, UNSPECIFIED WHETHER ASCITES PRESENT: Primary | ICD-10-CM

## 2021-12-16 ENCOUNTER — PATIENT MESSAGE (OUTPATIENT)
Dept: HEPATOLOGY | Facility: CLINIC | Age: 61
End: 2021-12-16
Payer: COMMERCIAL

## 2021-12-16 ENCOUNTER — HOSPITAL ENCOUNTER (OUTPATIENT)
Dept: RADIOLOGY | Facility: HOSPITAL | Age: 61
Discharge: HOME | End: 2021-12-16
Attending: FAMILY MEDICINE
Payer: COMMERCIAL

## 2021-12-16 ENCOUNTER — OFFICE VISIT (OUTPATIENT)
Dept: PODIATRY | Facility: CLINIC | Age: 61
End: 2021-12-16
Payer: COMMERCIAL

## 2021-12-16 ENCOUNTER — TELEPHONE (OUTPATIENT)
Dept: HEPATOLOGY | Facility: CLINIC | Age: 61
End: 2021-12-16
Payer: COMMERCIAL

## 2021-12-16 DIAGNOSIS — M21.6X2 ACQUIRED EQUINUS DEFORMITY OF BOTH FEET: ICD-10-CM

## 2021-12-16 DIAGNOSIS — Z78.0 POSTMENOPAUSAL: ICD-10-CM

## 2021-12-16 DIAGNOSIS — M72.2 PLANTAR FASCIITIS OF RIGHT FOOT: Primary | ICD-10-CM

## 2021-12-16 DIAGNOSIS — M21.6X1 ACQUIRED EQUINUS DEFORMITY OF BOTH FEET: ICD-10-CM

## 2021-12-16 PROCEDURE — 99999 PR PBB SHADOW E&M-EST. PATIENT-LVL III: ICD-10-PCS | Mod: PBBFAC,,, | Performed by: PODIATRIST

## 2021-12-16 PROCEDURE — 77080 DXA BONE DENSITY AXIAL: CPT | Mod: TC,PO

## 2021-12-16 PROCEDURE — 20550 PR INJECT TENDON SHEATH/LIGAMENT: ICD-10-PCS | Mod: RT,S$GLB,, | Performed by: PODIATRIST

## 2021-12-16 PROCEDURE — 77080 DXA BONE DENSITY AXIAL: CPT | Mod: 26,,, | Performed by: RADIOLOGY

## 2021-12-16 PROCEDURE — 99999 PR PBB SHADOW E&M-EST. PATIENT-LVL III: CPT | Mod: PBBFAC,,, | Performed by: PODIATRIST

## 2021-12-16 PROCEDURE — 99499 NO LOS: ICD-10-PCS | Mod: S$GLB,,, | Performed by: PODIATRIST

## 2021-12-16 PROCEDURE — 20550 NJX 1 TENDON SHEATH/LIGAMENT: CPT | Mod: RT,S$GLB,, | Performed by: PODIATRIST

## 2021-12-16 PROCEDURE — 99499 UNLISTED E&M SERVICE: CPT | Mod: S$GLB,,, | Performed by: PODIATRIST

## 2021-12-16 PROCEDURE — 77080 DEXA BONE DENSITY SPINE HIP: ICD-10-PCS | Mod: 26,,, | Performed by: RADIOLOGY

## 2021-12-16 RX ORDER — METHYLPREDNISOLONE ACETATE 40 MG/ML
40 INJECTION, SUSPENSION INTRA-ARTICULAR; INTRALESIONAL; INTRAMUSCULAR; SOFT TISSUE
Status: COMPLETED | OUTPATIENT
Start: 2021-12-16 | End: 2021-12-16

## 2021-12-16 RX ADMIN — METHYLPREDNISOLONE ACETATE 40 MG: 40 INJECTION, SUSPENSION INTRA-ARTICULAR; INTRALESIONAL; INTRAMUSCULAR; SOFT TISSUE at 08:12

## 2021-12-17 ENCOUNTER — TELEPHONE (OUTPATIENT)
Dept: HEPATOLOGY | Facility: CLINIC | Age: 61
End: 2021-12-17
Payer: COMMERCIAL

## 2021-12-17 DIAGNOSIS — R74.8 ELEVATED ALKALINE PHOSPHATASE IN NEWBORN: Primary | ICD-10-CM

## 2021-12-17 DIAGNOSIS — R74.8 ELEVATED ALKALINE PHOSPHATASE LEVEL: ICD-10-CM

## 2021-12-20 ENCOUNTER — PATIENT MESSAGE (OUTPATIENT)
Dept: HEPATOLOGY | Facility: CLINIC | Age: 61
End: 2021-12-20
Payer: COMMERCIAL

## 2021-12-27 ENCOUNTER — HOSPITAL ENCOUNTER (OUTPATIENT)
Dept: RADIOLOGY | Facility: HOSPITAL | Age: 61
Discharge: HOME | End: 2021-12-27
Attending: INTERNAL MEDICINE
Payer: COMMERCIAL

## 2021-12-27 DIAGNOSIS — K74.60 HEPATIC CIRRHOSIS, UNSPECIFIED HEPATIC CIRRHOSIS TYPE, UNSPECIFIED WHETHER ASCITES PRESENT: ICD-10-CM

## 2021-12-27 PROCEDURE — 76700 US EXAM ABDOM COMPLETE: CPT | Mod: TC,PO

## 2021-12-27 PROCEDURE — 76700 US EXAM ABDOM COMPLETE: CPT | Mod: 26,,, | Performed by: RADIOLOGY

## 2021-12-27 PROCEDURE — 76700 US ABDOMEN COMPLETE: ICD-10-PCS | Mod: 26,,, | Performed by: RADIOLOGY

## 2021-12-28 ENCOUNTER — TELEPHONE (OUTPATIENT)
Dept: HEPATOLOGY | Facility: CLINIC | Age: 61
End: 2021-12-28
Payer: COMMERCIAL

## 2021-12-29 ENCOUNTER — OFFICE VISIT (OUTPATIENT)
Dept: HEPATOLOGY | Facility: CLINIC | Age: 61
End: 2021-12-29
Payer: COMMERCIAL

## 2021-12-29 ENCOUNTER — TELEPHONE (OUTPATIENT)
Dept: HEPATOLOGY | Facility: CLINIC | Age: 61
End: 2021-12-29

## 2021-12-29 DIAGNOSIS — K75.81 NONALCOHOLIC STEATOHEPATITIS: ICD-10-CM

## 2021-12-29 DIAGNOSIS — I85.00 ESOPHAGEAL VARICES DETERMINED BY ENDOSCOPY: ICD-10-CM

## 2021-12-29 DIAGNOSIS — Z08 ENCOUNTER FOR FOLLOW-UP SURVEILLANCE OF LIVER CANCER: ICD-10-CM

## 2021-12-29 DIAGNOSIS — K31.89 PORTAL HYPERTENSIVE GASTROPATHY: ICD-10-CM

## 2021-12-29 DIAGNOSIS — K76.6 PORTAL HYPERTENSIVE GASTROPATHY: ICD-10-CM

## 2021-12-29 DIAGNOSIS — R18.8 CIRRHOSIS OF LIVER WITH ASCITES, UNSPECIFIED HEPATIC CIRRHOSIS TYPE: Primary | ICD-10-CM

## 2021-12-29 DIAGNOSIS — Z85.05 ENCOUNTER FOR FOLLOW-UP SURVEILLANCE OF LIVER CANCER: ICD-10-CM

## 2021-12-29 DIAGNOSIS — K74.60 CIRRHOSIS OF LIVER WITH ASCITES, UNSPECIFIED HEPATIC CIRRHOSIS TYPE: Primary | ICD-10-CM

## 2021-12-29 PROCEDURE — 1160F RVW MEDS BY RX/DR IN RCRD: CPT | Mod: CPTII,95,, | Performed by: NURSE PRACTITIONER

## 2021-12-29 PROCEDURE — 1159F PR MEDICATION LIST DOCUMENTED IN MEDICAL RECORD: ICD-10-PCS | Mod: CPTII,95,, | Performed by: NURSE PRACTITIONER

## 2021-12-29 PROCEDURE — 99214 OFFICE O/P EST MOD 30 MIN: CPT | Mod: 95,,, | Performed by: NURSE PRACTITIONER

## 2021-12-29 PROCEDURE — 99214 PR OFFICE/OUTPT VISIT, EST, LEVL IV, 30-39 MIN: ICD-10-PCS | Mod: 95,,, | Performed by: NURSE PRACTITIONER

## 2021-12-29 PROCEDURE — 1160F PR REVIEW ALL MEDS BY PRESCRIBER/CLIN PHARMACIST DOCUMENTED: ICD-10-PCS | Mod: CPTII,95,, | Performed by: NURSE PRACTITIONER

## 2021-12-29 PROCEDURE — 1159F MED LIST DOCD IN RCRD: CPT | Mod: CPTII,95,, | Performed by: NURSE PRACTITIONER

## 2021-12-30 ENCOUNTER — TELEPHONE (OUTPATIENT)
Dept: GASTROENTEROLOGY | Facility: CLINIC | Age: 61
End: 2021-12-30
Payer: COMMERCIAL

## 2021-12-30 DIAGNOSIS — Z01.818 PRE-OP TESTING: ICD-10-CM

## 2022-01-03 ENCOUNTER — PATIENT OUTREACH (OUTPATIENT)
Dept: ADMINISTRATIVE | Facility: OTHER | Age: 62
End: 2022-01-03
Payer: COMMERCIAL

## 2022-01-03 NOTE — PROGRESS NOTES
LINKS immunization registry updated  Care Everywhere updated  Health Maintenance updated  Chart reviewed for overdue Proactive [facility] Encounters (MELVIN) health maintenance testing (CRS, Breast Ca, Diabetic Eye Exam)   Orders entered:N/A  Colonoscopy scheduled foe 2/3/21

## 2022-01-06 ENCOUNTER — OFFICE VISIT (OUTPATIENT)
Dept: OPTOMETRY | Facility: CLINIC | Age: 62
End: 2022-01-06
Payer: COMMERCIAL

## 2022-01-06 DIAGNOSIS — Z01.00 EXAMINATION OF EYES AND VISION: Primary | ICD-10-CM

## 2022-01-06 DIAGNOSIS — Z46.0 CONTACT LENS/GLASSES FITTING: ICD-10-CM

## 2022-01-06 DIAGNOSIS — H52.4 HYPEROPIA WITH ASTIGMATISM AND PRESBYOPIA, BILATERAL: ICD-10-CM

## 2022-01-06 DIAGNOSIS — H52.203 HYPEROPIA WITH ASTIGMATISM AND PRESBYOPIA, BILATERAL: ICD-10-CM

## 2022-01-06 DIAGNOSIS — H52.03 HYPEROPIA WITH ASTIGMATISM AND PRESBYOPIA, BILATERAL: ICD-10-CM

## 2022-01-06 DIAGNOSIS — Z46.0 CONTACT LENS/GLASSES FITTING: Primary | ICD-10-CM

## 2022-01-06 DIAGNOSIS — H43.393 VITREOUS FLOATERS, BILATERAL: ICD-10-CM

## 2022-01-06 PROCEDURE — 1159F MED LIST DOCD IN RCRD: CPT | Mod: CPTII,S$GLB,, | Performed by: OPTOMETRIST

## 2022-01-06 PROCEDURE — 92014 COMPRE OPH EXAM EST PT 1/>: CPT | Mod: S$GLB,,, | Performed by: OPTOMETRIST

## 2022-01-06 PROCEDURE — 92014 PR EYE EXAM, EST PATIENT,COMPREHESV: ICD-10-PCS | Mod: S$GLB,,, | Performed by: OPTOMETRIST

## 2022-01-06 PROCEDURE — 1160F PR REVIEW ALL MEDS BY PRESCRIBER/CLIN PHARMACIST DOCUMENTED: ICD-10-PCS | Mod: CPTII,S$GLB,, | Performed by: OPTOMETRIST

## 2022-01-06 PROCEDURE — 92310 PR CONTACT LENS FITTING (NO CHANGE): ICD-10-PCS | Mod: S$GLB,,, | Performed by: OPTOMETRIST

## 2022-01-06 PROCEDURE — 1160F RVW MEDS BY RX/DR IN RCRD: CPT | Mod: CPTII,S$GLB,, | Performed by: OPTOMETRIST

## 2022-01-06 PROCEDURE — 99999 PR PBB SHADOW E&M-EST. PATIENT-LVL III: CPT | Mod: PBBFAC,,, | Performed by: OPTOMETRIST

## 2022-01-06 PROCEDURE — 99499 NO LOS: ICD-10-PCS | Mod: S$GLB,,, | Performed by: OPTOMETRIST

## 2022-01-06 PROCEDURE — 92310 CONTACT LENS FITTING OU: CPT | Mod: S$GLB,,, | Performed by: OPTOMETRIST

## 2022-01-06 PROCEDURE — 99999 PR PBB SHADOW E&M-EST. PATIENT-LVL III: ICD-10-PCS | Mod: PBBFAC,,, | Performed by: OPTOMETRIST

## 2022-01-06 PROCEDURE — 92015 PR REFRACTION: ICD-10-PCS | Mod: S$GLB,,, | Performed by: OPTOMETRIST

## 2022-01-06 PROCEDURE — 99499 UNLISTED E&M SERVICE: CPT | Mod: S$GLB,,, | Performed by: OPTOMETRIST

## 2022-01-06 PROCEDURE — 92015 DETERMINE REFRACTIVE STATE: CPT | Mod: S$GLB,,, | Performed by: OPTOMETRIST

## 2022-01-06 PROCEDURE — 1159F PR MEDICATION LIST DOCUMENTED IN MEDICAL RECORD: ICD-10-PCS | Mod: CPTII,S$GLB,, | Performed by: OPTOMETRIST

## 2022-01-06 NOTE — PATIENT INSTRUCTIONS
"DRY EYES -- BURNING OR LESA SYMPTOMS:  Use Over The Counter artificial tears as needed for dry eye symptoms.   Some common brands include:  Systane, Optive, Refresh, and Thera-Tears.  These drops can be used as frequently as desired, but may be most helpful use during long periods of concentrated work.  For example, reading / working at the computer. Start with 3-4x per day.     Nighttime Ophthalmic gel or ointments are available: Refresh PM, Genteal, and Lacrilube.    Avoid drops that "get redness out" (Visine, Murine, Clear Eyes), as these may contain medication that could further irritate the eyes, especially with chronic use.    ALLERGY EYES -- ITCHING SYMPTOMS:  Over the counter medications include--Pataday, Zaditor, and Alaway.  Use as directed 1-2 drops daily for symptoms of itching / watering eyes.  These drops will not help for dry eye or exposure symptoms.    REDNESS RELIEF:  Lumify---is a good redness reliever that will not cause irritation if used chronically.         FLASHES / FLOATERS / POSTERIOR VITREOUS DETACHMENT    Call the clinic if you have any further changes in symptoms.  Including:  Increased numbers of floaters or flashing lights, dimness or darkness that moves through or stays constant in your vision, or any pain in the eye (s).    You may sometimes see small specks or clouds moving in your field of vision.  They are called FLOATERS.  You can often see them when looking at a plain background, like a blank wall or blue ronny.  Floaters are actually tiny clumps of gel or cells inside the VITREOUS, the clear jelly-like fluid that fills the inside of your eye.    While these objects look like they are in front of your eye, they are actually floating inside.  What you see are the shadows they cast on the RETINA, the nerve layer at the back of the eye that senses light and allows you to see.      POSTERIOR VITREOUS DETACHMENT    The appearance of new floaters may be alarming.  If you suddenly " develop new floaters, you should contact your eye care professional  right away.    The retina can tear if the shrinking vitreous pulls away from the wall of the eye.  This sometimes causes a small amount of bleeding in the eye that may appear as new floaters.    A torn retina is always a serious problem, since it can lead to a retinal detachment.  You should see your eye care professional as soon as possible if:     even one new floater appears suddenly;   you see sudden flashes of light;   you notice other symptoms, like the loss of side vision, or a curtain closes down in your vision        POSTERIOR VITREOUS DETACHMENT is more common for people who:     are nearsighted;   have had cataract surgery;   have had YAG laser surgery of the eye;   have had inflammation inside the eye;   are over age 60.      While some floaters may remain visible, many of them will fade over time and become less noticeable.  Even if you've had some floaters for years, you should have your eyes checked as soon as possible if you notice new ones.    FLASHING LIGHTS    When the vitreous gel rubs or pulls on the retina, you may see what look like flashing lights or lightning streaks.  These flashes can appear off and on for several weeks or months.      Some people experience flashes of light that appear as jagged lines or heat waves in both eyes, lasting 10-20 minutes.  These flashes are caused by a spasm of blood vessels in the brain, which is called a migraine.    If a headache follows these flashes, it's called a migraine headache.  If   no headache occurs, these flashes are called Ophthalmic or Ocular Migraine.            DAILY WEAR CONTACT LENSES  Continue with Daily Wear of contact lenses, up to all waking hours.  Continue with approved contact lens disinfection / rewetting drops as discussed.  Dispose of lenses monthly.  Stop wearing your lenses and call our office if redness, blurred vision, or pain persists more than  12 hours.  We recommend an annual eye exam for contact lens patients.

## 2022-01-06 NOTE — PROGRESS NOTES
HPI     Routine eye exam-dle-6/24/21    Pt complains of blurred vision at near. Needing updated glasses and clrx.   Occasional flashes OD. No floaters.     Last edited by Nory Cowan on 1/6/2022  4:13 PM. (History)        ROS     Positive for: Eyes    Negative for: Constitutional, Gastrointestinal, Neurological, Skin,   Genitourinary, Musculoskeletal, HENT, Endocrine, Cardiovascular,   Respiratory, Psychiatric, Allergic/Imm, Heme/Lymph    Last edited by CHARLES Basilio, OD on 1/6/2022  4:36 PM. (History)        Assessment /Plan     For exam results, see Encounter Report.    Examination of eyes and vision    Hyperopia with astigmatism and presbyopia, bilateral    Contact lens/glasses fitting    Vitreous floaters, bilateral      1. Ocular health exam OU  2. Updated specs rx, gave copy, fill prn  3. Dispense new trials, higher alejandro for monovision and MF Air optix vs Ultra   Message with report, then will finalize    4. RD precautions given and reviewed. Patient knows to call/ message if any further changes in symptoms occur.    DAILY WEAR CONTACT LENSES  Continue with Daily Wear of contact lenses, up to all waking hours.  Continue with approved contact lens disinfection / rewetting drops as discussed.  Dispose of lenses monthly.  Stop wearing your lenses and call our office if redness, blurred vision, or pain persists more than 12 hours.  We recommend an annual eye exam for contact lens patients.      Discussed and educated patient on current findings /plan.  RTC 1 year, prn if any changes / issues

## 2022-01-13 ENCOUNTER — LAB VISIT (OUTPATIENT)
Dept: LAB | Facility: HOSPITAL | Age: 62
End: 2022-01-13
Attending: INTERNAL MEDICINE
Payer: COMMERCIAL

## 2022-01-13 DIAGNOSIS — R74.8 ELEVATED ALKALINE PHOSPHATASE LEVEL: ICD-10-CM

## 2022-01-13 LAB
ALBUMIN SERPL BCP-MCNC: 3.3 G/DL (ref 3.5–5.2)
ALP SERPL-CCNC: 109 U/L (ref 55–135)
ALT SERPL W/O P-5'-P-CCNC: 44 U/L (ref 10–44)
AST SERPL-CCNC: 61 U/L (ref 10–40)
BILIRUB DIRECT SERPL-MCNC: 0.6 MG/DL (ref 0.1–0.3)
BILIRUB SERPL-MCNC: 1.7 MG/DL (ref 0.1–1)
PROT SERPL-MCNC: 6 G/DL (ref 6–8.4)

## 2022-01-13 PROCEDURE — 36415 COLL VENOUS BLD VENIPUNCTURE: CPT | Mod: PO | Performed by: INTERNAL MEDICINE

## 2022-01-13 PROCEDURE — 80076 HEPATIC FUNCTION PANEL: CPT | Performed by: INTERNAL MEDICINE

## 2022-01-19 ENCOUNTER — OFFICE VISIT (OUTPATIENT)
Dept: PODIATRY | Facility: CLINIC | Age: 62
End: 2022-01-19
Payer: COMMERCIAL

## 2022-01-19 VITALS — WEIGHT: 293 LBS | BODY MASS INDEX: 41.95 KG/M2 | HEIGHT: 70 IN

## 2022-01-19 DIAGNOSIS — M21.6X1 ACQUIRED EQUINUS DEFORMITY OF BOTH FEET: ICD-10-CM

## 2022-01-19 DIAGNOSIS — E11.51 TYPE II DIABETES MELLITUS WITH PERIPHERAL CIRCULATORY DISORDER: Primary | ICD-10-CM

## 2022-01-19 DIAGNOSIS — M21.6X2 ACQUIRED EQUINUS DEFORMITY OF BOTH FEET: ICD-10-CM

## 2022-01-19 DIAGNOSIS — M72.2 PLANTAR FASCIITIS OF RIGHT FOOT: ICD-10-CM

## 2022-01-19 PROCEDURE — 3008F BODY MASS INDEX DOCD: CPT | Mod: CPTII,S$GLB,, | Performed by: PODIATRIST

## 2022-01-19 PROCEDURE — 99499 UNLISTED E&M SERVICE: CPT | Mod: S$GLB,,, | Performed by: PODIATRIST

## 2022-01-19 PROCEDURE — 20550 NJX 1 TENDON SHEATH/LIGAMENT: CPT | Mod: RT,S$GLB,, | Performed by: PODIATRIST

## 2022-01-19 PROCEDURE — 99999 PR PBB SHADOW E&M-EST. PATIENT-LVL IV: ICD-10-PCS | Mod: PBBFAC,,, | Performed by: PODIATRIST

## 2022-01-19 PROCEDURE — 1159F MED LIST DOCD IN RCRD: CPT | Mod: CPTII,S$GLB,, | Performed by: PODIATRIST

## 2022-01-19 PROCEDURE — 1160F PR REVIEW ALL MEDS BY PRESCRIBER/CLIN PHARMACIST DOCUMENTED: ICD-10-PCS | Mod: CPTII,S$GLB,, | Performed by: PODIATRIST

## 2022-01-19 PROCEDURE — 1159F PR MEDICATION LIST DOCUMENTED IN MEDICAL RECORD: ICD-10-PCS | Mod: CPTII,S$GLB,, | Performed by: PODIATRIST

## 2022-01-19 PROCEDURE — 3008F PR BODY MASS INDEX (BMI) DOCUMENTED: ICD-10-PCS | Mod: CPTII,S$GLB,, | Performed by: PODIATRIST

## 2022-01-19 PROCEDURE — 1160F RVW MEDS BY RX/DR IN RCRD: CPT | Mod: CPTII,S$GLB,, | Performed by: PODIATRIST

## 2022-01-19 PROCEDURE — 99999 PR PBB SHADOW E&M-EST. PATIENT-LVL IV: CPT | Mod: PBBFAC,,, | Performed by: PODIATRIST

## 2022-01-19 PROCEDURE — 20550 PR INJECT TENDON SHEATH/LIGAMENT: ICD-10-PCS | Mod: RT,S$GLB,, | Performed by: PODIATRIST

## 2022-01-19 PROCEDURE — 99499 NO LOS: ICD-10-PCS | Mod: S$GLB,,, | Performed by: PODIATRIST

## 2022-01-19 RX ORDER — METHYLPREDNISOLONE ACETATE 40 MG/ML
40 INJECTION, SUSPENSION INTRA-ARTICULAR; INTRALESIONAL; INTRAMUSCULAR; SOFT TISSUE
Status: COMPLETED | OUTPATIENT
Start: 2022-01-19 | End: 2022-01-19

## 2022-01-19 RX ADMIN — METHYLPREDNISOLONE ACETATE 40 MG: 40 INJECTION, SUSPENSION INTRA-ARTICULAR; INTRALESIONAL; INTRAMUSCULAR; SOFT TISSUE at 06:01

## 2022-01-20 ENCOUNTER — TELEPHONE (OUTPATIENT)
Dept: HEPATOLOGY | Facility: CLINIC | Age: 62
End: 2022-01-20
Payer: COMMERCIAL

## 2022-01-20 NOTE — TELEPHONE ENCOUNTER
Per Dr Prince, patient notified Bilirubin a litle higher than usual, but used ot be this range previously. Other enzymes stable.    Dr Prince would like to check CMP a week from last results.  Patient agreed           Appointment for lab scheduled.        ----- Message from Fany Ruiz RN sent at 1/14/2022  4:22 PM CST -----    ----- Message -----  From: Hetal Prince MD  Sent: 1/14/2022   3:48 PM CST  To: Suresh Fong Staff    Bilirubin a litle higher than usual, but used ot be this range previously.  Other enzymes stable.  Check CMP in a week.

## 2022-01-20 NOTE — PROGRESS NOTES
Subjective:      Patient ID: Yumiko Gonzalez is a 61 y.o. female.    Chief Complaint: Follow-up (Plantar Fasc)    Yumiko is a 61 y.o. female who presents to the clinic complaining of heel pain in the right foot, especially aggravated the more she is on her feet. The pain is described as Aching and Sharp. The onset of the pain was gradual and has worsened over the past several weeks. Yumiko rates the pain as 5/10. She denies a history of trauma. Prior treatments include stretching and rest. She relates the pain has progressed to where it now hurts along the back of the heel as well.     10/21/21: patient returns for follow up right heel pain relates that the pain is about the same despite stretching and better shoes.     12/16/21: Patient returns for follow up right heel pain no improvement despite wearing the orthopedic boot and stretches. Here seeking an injection today    1/19/22: Patient returns for follow up right plantar fasciitis, injections last visit helped a lot but the pain moved a little and is still there seeking possible second injection today    Review of Systems   Constitutional: Negative for chills and fever.   Cardiovascular: Negative for claudication and leg swelling.   Respiratory: Negative for shortness of breath.    Skin: Negative for itching and rash.   Musculoskeletal: Negative for muscle cramps, muscle weakness and myalgias.        Right heel pain   Gastrointestinal: Negative for nausea and vomiting.   Neurological: Negative for focal weakness, loss of balance, numbness and paresthesias.           Objective:      Physical Exam  Constitutional:       General: She is not in acute distress.     Appearance: She is well-developed. She is not diaphoretic.   Cardiovascular:      Pulses:           Dorsalis pedis pulses are 2+ on the right side and 2+ on the left side.        Posterior tibial pulses are 2+ on the right side and 2+ on the left side.      Comments: < 3 sec capillary refill time to toes  1-5 bilateral. Toes and feet are warm to touch proximally with normal distal cooling b/l. There is some hair growth on the feet and toes b/l. There is no edema b/l. No spider veins or varicosities present b/l.     Musculoskeletal:      Comments: Equinus noted b/l ankles with < 10 deg DF noted. MMT 5/5 in DF/PF/Inv/Ev resistance with no reproduction of pain in any direction. Passive range of motion of ankle and pedal joints is painless b/l.    Mild pain on palpation plantar medial and central heel as well as at the posterior heel at the achilles insertion on the calcaneus. There is pain at end ROM with DF at the ankle and there is some pain with side to side compression as well.     Skin:     General: Skin is warm and dry.      Coloration: Skin is not pale.      Findings: No abrasion, bruising, burn, ecchymosis, erythema, laceration, lesion, petechiae or rash.      Nails: There is no clubbing.      Comments: Skin temperature, texture and turgor within normal limits.   Neurological:      Mental Status: She is alert and oriented to person, place, and time.      Sensory: No sensory deficit.      Motor: No tremor, atrophy or abnormal muscle tone.      Comments: Negative tinel sign bilateral.   Psychiatric:         Behavior: Behavior normal.               Assessment:       Encounter Diagnoses   Name Primary?    Type II diabetes mellitus with peripheral circulatory disorder Yes    Plantar fasciitis of right foot     Acquired equinus deformity of both feet          Plan:       Yumiko was seen today for follow-up.    Diagnoses and all orders for this visit:    Type II diabetes mellitus with peripheral circulatory disorder    Plantar fasciitis of right foot    Acquired equinus deformity of both feet    Other orders  -     methylPREDNISolone acetate injection 40 mg      I counseled the patient on her conditions, their implications and medical management.    Patient will continue stretch the tendo achilles complex three  times daily as demonstrated in the office.  Literature was dispensed illustrating proper stretching technique.    Patient will wear over the counter arch supports and wear them in shoes whenever possible.  Athletic shoes intended for walking or running are usually best.    Avoid barefoot or flats    Conservative vs surgical treatment options for Plantar Fasciitis were explained in detail. Today the patient received an injection in plantar right heel. The area was prepped with alcohol, skin anesthestized with cold spray and a mixture of 40 mg Depomedrol 1 mL 1% plain lidocaine was injected. The patient tolerated the procedure well. Instructed to rest, ice and elevate.    Declined PT or MRI    Return PRN    Mehran Reid DPM

## 2022-01-21 ENCOUNTER — LAB VISIT (OUTPATIENT)
Dept: LAB | Facility: HOSPITAL | Age: 62
End: 2022-01-21
Attending: INTERNAL MEDICINE
Payer: COMMERCIAL

## 2022-01-21 DIAGNOSIS — Z08 ENCOUNTER FOR FOLLOW-UP SURVEILLANCE OF LIVER CANCER: ICD-10-CM

## 2022-01-21 DIAGNOSIS — I85.00 ESOPHAGEAL VARICES DETERMINED BY ENDOSCOPY: ICD-10-CM

## 2022-01-21 DIAGNOSIS — Z85.05 ENCOUNTER FOR FOLLOW-UP SURVEILLANCE OF LIVER CANCER: ICD-10-CM

## 2022-01-21 DIAGNOSIS — R74.8 ELEVATED ALKALINE PHOSPHATASE LEVEL: ICD-10-CM

## 2022-01-21 DIAGNOSIS — K74.60 CIRRHOSIS OF LIVER WITH ASCITES, UNSPECIFIED HEPATIC CIRRHOSIS TYPE: ICD-10-CM

## 2022-01-21 DIAGNOSIS — K31.89 PORTAL HYPERTENSIVE GASTROPATHY: ICD-10-CM

## 2022-01-21 DIAGNOSIS — R18.8 CIRRHOSIS OF LIVER WITH ASCITES, UNSPECIFIED HEPATIC CIRRHOSIS TYPE: ICD-10-CM

## 2022-01-21 DIAGNOSIS — K75.81 NONALCOHOLIC STEATOHEPATITIS: ICD-10-CM

## 2022-01-21 DIAGNOSIS — K76.6 PORTAL HYPERTENSIVE GASTROPATHY: ICD-10-CM

## 2022-01-21 LAB
ALBUMIN SERPL BCP-MCNC: 3.3 G/DL (ref 3.5–5.2)
ALBUMIN SERPL BCP-MCNC: 3.3 G/DL (ref 3.5–5.2)
ALP SERPL-CCNC: 124 U/L (ref 55–135)
ALP SERPL-CCNC: 124 U/L (ref 55–135)
ALT SERPL W/O P-5'-P-CCNC: 51 U/L (ref 10–44)
ALT SERPL W/O P-5'-P-CCNC: 51 U/L (ref 10–44)
ANION GAP SERPL CALC-SCNC: 7 MMOL/L (ref 8–16)
AST SERPL-CCNC: 59 U/L (ref 10–40)
AST SERPL-CCNC: 59 U/L (ref 10–40)
BILIRUB DIRECT SERPL-MCNC: 0.7 MG/DL (ref 0.1–0.3)
BILIRUB SERPL-MCNC: 1.7 MG/DL (ref 0.1–1)
BILIRUB SERPL-MCNC: 1.7 MG/DL (ref 0.1–1)
BUN SERPL-MCNC: 14 MG/DL (ref 8–23)
CALCIUM SERPL-MCNC: 9.7 MG/DL (ref 8.7–10.5)
CHLORIDE SERPL-SCNC: 109 MMOL/L (ref 95–110)
CO2 SERPL-SCNC: 27 MMOL/L (ref 23–29)
CREAT SERPL-MCNC: 0.7 MG/DL (ref 0.5–1.4)
EST. GFR  (AFRICAN AMERICAN): >60 ML/MIN/1.73 M^2
EST. GFR  (NON AFRICAN AMERICAN): >60 ML/MIN/1.73 M^2
GLUCOSE SERPL-MCNC: 133 MG/DL (ref 70–110)
POTASSIUM SERPL-SCNC: 3.8 MMOL/L (ref 3.5–5.1)
PROT SERPL-MCNC: 6 G/DL (ref 6–8.4)
PROT SERPL-MCNC: 6 G/DL (ref 6–8.4)
SODIUM SERPL-SCNC: 143 MMOL/L (ref 136–145)

## 2022-01-21 PROCEDURE — 36415 COLL VENOUS BLD VENIPUNCTURE: CPT | Mod: PO | Performed by: INTERNAL MEDICINE

## 2022-01-21 PROCEDURE — 80053 COMPREHEN METABOLIC PANEL: CPT | Performed by: NURSE PRACTITIONER

## 2022-01-21 PROCEDURE — 80076 HEPATIC FUNCTION PANEL: CPT | Performed by: INTERNAL MEDICINE

## 2022-01-25 ENCOUNTER — TELEPHONE (OUTPATIENT)
Dept: HEPATOLOGY | Facility: CLINIC | Age: 62
End: 2022-01-25
Payer: COMMERCIAL

## 2022-01-28 RX ORDER — PROPRANOLOL HYDROCHLORIDE 60 MG/1
60 CAPSULE, EXTENDED RELEASE ORAL DAILY
Qty: 30 CAPSULE | Refills: 11 | Status: SHIPPED | OUTPATIENT
Start: 2022-01-28 | End: 2023-03-23 | Stop reason: SDUPTHER

## 2022-01-28 NOTE — TELEPHONE ENCOUNTER
No new care gaps identified.  Powered by [employer]. Reference number: [id number].   1/28/2022 8:18:14 AM CST

## 2022-01-31 ENCOUNTER — LAB VISIT (OUTPATIENT)
Dept: FAMILY MEDICINE | Facility: CLINIC | Age: 62
End: 2022-01-31
Payer: COMMERCIAL

## 2022-01-31 DIAGNOSIS — Z01.818 PRE-OP TESTING: ICD-10-CM

## 2022-01-31 PROCEDURE — U0003 INFECTIOUS AGENT DETECTION BY NUCLEIC ACID (DNA OR RNA); SEVERE ACUTE RESPIRATORY SYNDROME CORONAVIRUS 2 (SARS-COV-2) (CORONAVIRUS DISEASE [COVID-19]), AMPLIFIED PROBE TECHNIQUE, MAKING USE OF HIGH THROUGHPUT TECHNOLOGIES AS DESCRIBED BY CMS-2020-01-R: HCPCS | Performed by: INTERNAL MEDICINE

## 2022-01-31 PROCEDURE — U0005 INFEC AGEN DETEC AMPLI PROBE: HCPCS | Performed by: INTERNAL MEDICINE

## 2022-02-01 LAB — SARS-COV-2 RNA RESP QL NAA+PROBE: NOT DETECTED

## 2022-02-03 ENCOUNTER — ANESTHESIA (OUTPATIENT)
Dept: ENDOSCOPY | Facility: HOSPITAL | Age: 62
End: 2022-02-03
Payer: COMMERCIAL

## 2022-02-03 ENCOUNTER — HOSPITAL ENCOUNTER (OUTPATIENT)
Facility: HOSPITAL | Age: 62
Discharge: HOME | End: 2022-02-03
Attending: INTERNAL MEDICINE | Admitting: INTERNAL MEDICINE
Payer: COMMERCIAL

## 2022-02-03 ENCOUNTER — ANESTHESIA EVENT (OUTPATIENT)
Dept: ENDOSCOPY | Facility: HOSPITAL | Age: 62
End: 2022-02-03
Payer: COMMERCIAL

## 2022-02-03 VITALS
HEART RATE: 68 BPM | SYSTOLIC BLOOD PRESSURE: 108 MMHG | OXYGEN SATURATION: 98 % | RESPIRATION RATE: 16 BRPM | BODY MASS INDEX: 40.8 KG/M2 | TEMPERATURE: 98 F | WEIGHT: 285 LBS | DIASTOLIC BLOOD PRESSURE: 55 MMHG | HEIGHT: 70 IN

## 2022-02-03 DIAGNOSIS — Z86.010 HX OF COLONIC POLYPS: ICD-10-CM

## 2022-02-03 PROCEDURE — D9220A PRA ANESTHESIA: Mod: ,,, | Performed by: ANESTHESIOLOGY

## 2022-02-03 PROCEDURE — G0105 COLORECTAL SCRN; HI RISK IND: HCPCS | Mod: ,,, | Performed by: INTERNAL MEDICINE

## 2022-02-03 PROCEDURE — G0105 COLORECTAL SCRN; HI RISK IND: ICD-10-PCS | Mod: ,,, | Performed by: INTERNAL MEDICINE

## 2022-02-03 PROCEDURE — 37000009 HC ANESTHESIA EA ADD 15 MINS: Mod: PO | Performed by: INTERNAL MEDICINE

## 2022-02-03 PROCEDURE — 63600175 PHARM REV CODE 636 W HCPCS: Mod: PO | Performed by: NURSE ANESTHETIST, CERTIFIED REGISTERED

## 2022-02-03 PROCEDURE — 63600175 PHARM REV CODE 636 W HCPCS: Mod: PO | Performed by: INTERNAL MEDICINE

## 2022-02-03 PROCEDURE — 25000003 PHARM REV CODE 250: Mod: PO | Performed by: NURSE ANESTHETIST, CERTIFIED REGISTERED

## 2022-02-03 PROCEDURE — G0105 COLORECTAL SCRN; HI RISK IND: HCPCS | Mod: PO | Performed by: INTERNAL MEDICINE

## 2022-02-03 PROCEDURE — 37000008 HC ANESTHESIA 1ST 15 MINUTES: Mod: PO | Performed by: INTERNAL MEDICINE

## 2022-02-03 PROCEDURE — D9220A PRA ANESTHESIA: ICD-10-PCS | Mod: ,,, | Performed by: ANESTHESIOLOGY

## 2022-02-03 RX ORDER — SODIUM CHLORIDE 0.9 % (FLUSH) 0.9 %
10 SYRINGE (ML) INJECTION
Status: DISCONTINUED | OUTPATIENT
Start: 2022-02-03 | End: 2022-02-03 | Stop reason: HOSPADM

## 2022-02-03 RX ORDER — PROPOFOL 10 MG/ML
VIAL (ML) INTRAVENOUS
Status: DISCONTINUED | OUTPATIENT
Start: 2022-02-03 | End: 2022-02-03

## 2022-02-03 RX ORDER — LIDOCAINE HCL/PF 100 MG/5ML
SYRINGE (ML) INTRAVENOUS
Status: DISCONTINUED | OUTPATIENT
Start: 2022-02-03 | End: 2022-02-03

## 2022-02-03 RX ORDER — SODIUM CHLORIDE, SODIUM LACTATE, POTASSIUM CHLORIDE, CALCIUM CHLORIDE 600; 310; 30; 20 MG/100ML; MG/100ML; MG/100ML; MG/100ML
INJECTION, SOLUTION INTRAVENOUS CONTINUOUS
Status: DISCONTINUED | OUTPATIENT
Start: 2022-02-03 | End: 2022-02-03 | Stop reason: HOSPADM

## 2022-02-03 RX ADMIN — LIDOCAINE HYDROCHLORIDE 100 MG: 20 INJECTION, SOLUTION INTRAVENOUS at 07:02

## 2022-02-03 RX ADMIN — PROPOFOL 50 MG: 10 INJECTION, EMULSION INTRAVENOUS at 07:02

## 2022-02-03 RX ADMIN — SODIUM CHLORIDE, SODIUM LACTATE, POTASSIUM CHLORIDE, AND CALCIUM CHLORIDE: .6; .31; .03; .02 INJECTION, SOLUTION INTRAVENOUS at 06:02

## 2022-02-03 RX ADMIN — PROPOFOL 25 MG: 10 INJECTION, EMULSION INTRAVENOUS at 07:02

## 2022-02-03 RX ADMIN — PROPOFOL 125 MG: 10 INJECTION, EMULSION INTRAVENOUS at 07:02

## 2022-02-03 NOTE — H&P
History & Physical - Short Stay  Gastroenterology      SUBJECTIVE:     Procedure: Colonoscopy    Chief Complaint/Indication for Procedure: Previous Polyps    PTA Medications   Medication Sig    aspirin/salicylamide/caffeine (BC HEADACHE POWDER ORAL) Take by mouth.    buPROPion (WELLBUTRIN XL) 150 MG TB24 tablet Take 2 tablets (300 mg total) by mouth once daily.    cetirizine (ZYRTEC) 10 MG tablet Take 10 mg by mouth once daily.    hydroCHLOROthiazide (HYDRODIURIL) 12.5 MG Tab Take 1 tablet (12.5 mg total) by mouth once daily.    loratadine (CLARITIN) 10 mg tablet Take 10 mg by mouth once daily.     multivitamin (THERAGRAN) per tablet Take 1 tablet by mouth once daily.    propranoloL (INDERAL LA) 60 MG 24 hr capsule Take 1 capsule (60 mg total) by mouth once daily.    tolterodine (DETROL LA) 4 MG 24 hr capsule Take 1 capsule (4 mg total) by mouth once daily.    vitamin E 400 UNIT capsule Take 400 Units by mouth once daily.    albuterol (PROVENTIL) 2.5 mg /3 mL (0.083 %) nebulizer solution Inhale the contents of 1 vial (3 mLs) by nebulization every 6 (six) hours as needed for Wheezing.    ALPRAZolam (XANAX) 0.25 MG tablet Take 1 tablet (0.25 mg total) by mouth nightly as needed. FOR INSOMNIA    diclofenac sodium (VOLTAREN) 1 % Gel Apply 2 g topically 3 (three) times daily.    fluconazole (DIFLUCAN) 150 MG Tab Take 1 tablet (150 mg total) by mouth once daily. (Patient taking differently: Take 150 mg by mouth daily as needed.)    meloxicam (MOBIC) 7.5 MG tablet Take 1 tablet (7.5 mg total) by mouth once daily. (Patient not taking: Reported on 2/2/2022)    nystatin-triamcinolone (MYCOLOG II) cream Apply to affected area twice daily as needed (Patient taking differently: Apply to affected area twice daily as needed)    sodium chloride 7% 7 % nebulizer solution Use in nebulizer as directed 3 times a day for 30 days    triamcinolone (NASACORT) 55 mcg nasal inhaler Use 2 sprays by Nasal route once daily.  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, postitive antismooth muscle antibody    Blood transfusion     Bronchitis     bronchospasm on occasion     Cancer 1987    carcinoma in situ    Cervical neck pain with evidence of disc disease 3/22/2012    can bend neck    Cirrhosis     non-alcoholic, compensated    Colon polyps 3/22/2012    Depression 3/22/2012    Hx panic attacks    Diverticular disease 3/22/2012    never diverticulitis    Fatty liver 3/22/2012    Fibrocystic breast     Fine tremor     hands,chronic    Hepatosplenomegaly     Hypertension 2013    Knee pain, bilateral     chronic, with hands,feet,fingers also painful    Lesion of right lung     Liver disease     "partially sclerosed liver" per pt    Migraines past Hx    Nephrolithiasis     stone episode 2021    Pleural effusion     small, compensated, good bilateral breath sounds per Dr Suresh GUTIERRES (postoperative nausea and vomiting)     twice after childbirth    Postpartum depression     Splenomegaly     Thrombocytopenia     Tremors of nervous system      Past Surgical History:   Procedure Laterality Date    ADENOIDECTOMY      CERVICAL CONIZATION   W/ LASER       SECTION      x3    COLONOSCOPY N/A 2016    Procedure: COLONOSCOPY;  Surgeon: James Palomares MD;  Location: [facility] ENDO;  Service: Endoscopy;  Laterality: N/A;    EMBOLIZATION N/A 2018    Procedure: EMBOLIZATION, BLOOD VESSEL;  Surgeon: Joselin Surgeon;  [facility];  Service: Radiology;  Laterality: N/A;    ENDOMETRIAL ABLATION      ESOPHAGOGASTRODUODENOSCOPY N/A 2020    Procedure: ESOPHAGOGASTRODUODENOSCOPY (EGD);  Surgeon: James" MERRICK Palomares MD;  Location: [facility] ENDO;  Service: Endoscopy;  Laterality: N/A;    LIVER BIOPSY      PELVIC LAPAROSCOPY      TONSILLECTOMY      TUBAL LIGATION       Family History   Problem Relation Age of Onset    Breast cancer Maternal Grandmother 70    Diabetes Father     Heart disease Father     Breast cancer Mother 70    Heart disease Mother     Hypertension Mother     Tremor Mother     Diabetes Brother     Diabetes Sister     Cancer Sister     Breast cancer Maternal Aunt 70    Multiple sclerosis Maternal Aunt     Breast cancer Sister     Diabetes Sister     Emphysema Brother     Breast cancer Maternal Cousin 40    Tremor Daughter     Multiple sclerosis Maternal Aunt     Ovarian cancer Neg Hx     Parkinsonism Neg Hx     Glaucoma Neg Hx      Social History     Tobacco Use    Smoking status: Former Smoker     Quit date: 2/3/2006     Years since quittin.0    Smokeless tobacco: Never Used   Substance Use Topics    Alcohol use: Not Currently    Drug use: No         OBJECTIVE:     Vital Signs (Most Recent)       Physical Exam:                                                       GENERAL:  Comfortable, in no acute distress.                                 HEENT EXAM:  Nonicteric.  No adenopathy.  Oropharynx is clear.               NECK:  Supple.                                                               LUNGS:  Clear.                                                               CARDIAC:  Regular rate and rhythm.  S1, S2.  No murmur.                      ABDOMEN:  Soft, positive bowel sounds, nontender.  No hepatosplenomegaly or masses.  No rebound or guarding.                                             EXTREMITIES:  No edema.     MENTAL STATUS:  Normal, alert and oriented.      ASSESSMENT/PLAN:     Assessment: Previous Polyps    Plan: Colonoscopy    Anesthesia Plan: General    ASA Grade: ASA 2 - Patient with mild systemic disease with no functional limitations    MALLAMPATI SCORE:   I (soft palate, uvula, fauces, and tonsillar pillars visible)

## 2022-02-03 NOTE — ANESTHESIA PREPROCEDURE EVALUATION
02/03/2022  Yumiko Gonzalez is a 61 y.o., female.    Anesthesia Evaluation    I have reviewed the Patient Summary Reports.    I have reviewed the Nursing Notes. I have reviewed the NPO Status.   I have reviewed the Medications.     Review of Systems  Anesthesia Hx:  No problems with previous Anesthesia Hx of Anesthetic complications    Social:  Former Smoker    Cardiovascular:   Hypertension Denies MI.  Denies CAD.    Denies CABG/stent.   Denies Angina.    Pulmonary:   Denies COPD. Asthma  Denies Recent URI.    Renal/:   Chronic Renal Disease    Hepatic/GI:   Denies GERD. Liver Disease, Hepatitis    Neurological:   Denies TIA. Denies CVA. Headaches Denies Seizures.    Endocrine:   Denies Diabetes. Denies Hypothyroidism.    Psych:   Psychiatric History          Physical Exam  General:  Morbid Obesity    Airway/Jaw/Neck:  Airway Findings: Mouth Opening: Normal Tongue: Normal  General Airway Assessment: Adult, Good  Mallampati: II  Improves to II with phonation.  TM Distance: 4-6 cm      Dental:  Dental Findings: In tact   Chest/Lungs:  Chest/Lungs Findings: Clear to auscultation, Normal Respiratory Rate     Heart/Vascular:  Heart Findings: Rate: Normal  Rhythm: Regular Rhythm  Sounds: Normal  Heart murmur: negative       Mental Status:  Mental Status Findings:  Cooperative, Alert and Oriented         Anesthesia Plan  Type of Anesthesia, risks & benefits discussed:  Anesthesia Type:  general    Patient's Preference:   Plan Factors:          Intra-op Monitoring Plan: standard ASA monitors  Intra-op Monitoring Plan Comments:   Post Op Pain Control Plan:   Post Op Pain Control Plan Comments:     Induction:   IV  Beta Blocker:  Patient is not currently on a Beta-Blocker (No further documentation required).       Informed Consent: Patient understands risks and agrees with Anesthesia plan.  Questions answered.  Anesthesia consent signed with patient.  ASA Score: 3     Day of Surgery Review of History & Physical: I have interviewed and examined the patient. I have reviewed the patient's H&P dated:  There are no significant changes.  H&P update referred to the surgeon.         Ready For Surgery From Anesthesia Perspective.

## 2022-02-03 NOTE — TRANSFER OF CARE
"Anesthesia Transfer of Care Note    Patient: Yumiko Gonzalez    Procedure(s) Performed: Procedure(s) (LRB):  COLONOSCOPY (N/A)    Patient location: PACU    Anesthesia Type: general    Transport from OR: Transported from OR on room air with adequate spontaneous ventilation    Post pain: adequate analgesia    Post assessment: no apparent anesthetic complications and tolerated procedure well    Post vital signs: stable    Level of consciousness: sedated and awake    Nausea/Vomiting: no nausea/vomiting    Complications: none    Transfer of care protocol was followed      Last vitals:   Visit Vitals  BP (!) 141/67 (BP Location: Right arm, Patient Position: Sitting)   Pulse 67   Temp 36.6 °C (97.9 °F) (Skin)   Resp 17   Ht 5' 10" (1.778 m)   Wt 129.3 kg (285 lb)   SpO2 95%   Breastfeeding No   BMI 40.89 kg/m²     "

## 2022-02-03 NOTE — ANESTHESIA POSTPROCEDURE EVALUATION
Anesthesia Post Evaluation    Patient: Yumiko Gonzalez    Procedure(s) Performed: Procedure(s) (LRB):  COLONOSCOPY (N/A)    Final Anesthesia Type: general      Patient location during evaluation: PACU  Patient participation: Yes- Able to Participate  Level of consciousness: awake and alert and oriented  Post-procedure vital signs: reviewed and stable  Pain management: adequate  Airway patency: patent    PONV status at discharge: No PONV  Anesthetic complications: no      Cardiovascular status: blood pressure returned to baseline  Respiratory status: unassisted, spontaneous ventilation and room air  Hydration status: euvolemic  Follow-up not needed.          Vitals Value Taken Time   /54 02/03/22 0739   Temp 36.4 °C (97.5 °F) 02/03/22 0724   Pulse 65 02/03/22 0739   Resp 16 02/03/22 0739   SpO2 98 % 02/03/22 0739         No case tracking events are documented in the log.      Pain/Niharika Score: Niharika Score: 9 (2/3/2022  7:24 AM)

## 2022-02-03 NOTE — DISCHARGE SUMMARY
[place] - Endoscopy  Discharge Note  Short Stay    Discharge Note  Short Stay      SUMMARY     Admit Date: 2/3/2022    Attending Physician: James Palomares MD     Discharge Physician: James Palomares MD    Discharge Date: 2/3/2022 7:26 AM    Final Diagnosis: Colon cancer screening [Z12.11]    Disposition: HOME OR SELF CARE    Patient Instructions:   Current Discharge Medication List      CONTINUE these medications which have NOT CHANGED    Details   aspirin/salicylamide/caffeine (BC HEADACHE POWDER ORAL) Take by mouth.      buPROPion (WELLBUTRIN XL) 150 MG TB24 tablet Take 2 tablets (300 mg total) by mouth once daily.  Qty: 180 tablet, Refills: 3      cetirizine (ZYRTEC) 10 MG tablet Take 10 mg by mouth once daily.      hydroCHLOROthiazide (HYDRODIURIL) 12.5 MG Tab Take 1 tablet (12.5 mg total) by mouth once daily.  Qty: 90 tablet, Refills: 1    Comments: Patient not followed by us - seen one time for pre-op clearance. Future refills need to go to PCP      loratadine (CLARITIN) 10 mg tablet Take 10 mg by mouth once daily.       multivitamin (THERAGRAN) per tablet Take 1 tablet by mouth once daily.      propranoloL (INDERAL LA) 60 MG 24 hr capsule Take 1 capsule (60 mg total) by mouth once daily.  Qty: 30 capsule, Refills: 11    Comments: .      tolterodine (DETROL LA) 4 MG 24 hr capsule Take 1 capsule (4 mg total) by mouth once daily.  Qty: 30 capsule, Refills: 2      vitamin E 400 UNIT capsule Take 400 Units by mouth once daily.      albuterol (PROVENTIL) 2.5 mg /3 mL (0.083 %) nebulizer solution Inhale the contents of 1 vial (3 mLs) by nebulization every 6 (six) hours as needed for Wheezing.  Qty: 75 mL, Refills: 4    Associated Diagnoses: Persistent cough      ALPRAZolam (XANAX) 0.25 MG tablet Take 1 tablet (0.25 mg total) by mouth nightly as needed. FOR INSOMNIA  Qty: 30 tablet, Refills: 2      diclofenac sodium (VOLTAREN) 1 % Gel Apply 2 g topically 3 (three) times daily.  Qty: 1 Tube, Refills: 3       fluconazole (DIFLUCAN) 150 MG Tab Take 1 tablet (150 mg total) by mouth once daily.  Qty: 1 tablet, Refills: 1      meloxicam (MOBIC) 7.5 MG tablet Take 1 tablet (7.5 mg total) by mouth once daily.  Qty: 30 tablet, Refills: 0      nystatin-triamcinolone (MYCOLOG II) cream Apply to affected area twice daily as needed  Qty: 60 g, Refills: 3      sodium chloride 7% 7 % nebulizer solution Use in nebulizer as directed 3 times a day for 30 days  Qty: 240 mL, Refills: 5      triamcinolone (NASACORT) 55 mcg nasal inhaler Use 2 sprays by Nasal route once daily.  Qty: 17 g, Refills: 12             Discharge Procedure Orders (must include Diet, Follow-up, Activity)    Follow Up:  Follow up with PCP as previously scheduled  Resume routine diet.  Activity as tolerated.    No driving day of procedure.

## 2022-02-03 NOTE — PROVATION PATIENT INSTRUCTIONS
Discharge Summary/Instructions after an Endoscopic Procedure  Patient Name: Yumiko Gonzalez  Patient MRN: [id number]  Patient YOB: 1960  Thursday, February 3, 2022  James Palomares MD  Dear patient,  As a result of recent federal legislation (The Federal Cures Act), you may   receive lab or pathology results from your procedure in your [facility]   account before your physician is able to contact you. Your physician or   their representative will relay the results to you with their   recommendations at their soonest availability.  Thank you,  RESTRICTIONS:  During your procedure today, you received medications for sedation.  These   medications may affect your judgment, balance and coordination.  Therefore,   for 24 hours, you have the following restrictions:   - DO NOT drive a car, operate machinery, make legal/financial decisions,   sign important papers or drink alcohol.    ACTIVITY:  Today: no heavy lifting, straining or running due to procedural   sedation/anesthesia.  The following day: return to full activity including work.  DIET:  Eat and drink normally unless instructed otherwise.     TREATMENT FOR COMMON SIDE EFFECTS:  - Mild abdominal pain, nausea, belching, bloating or excessive gas:  rest,   eat lightly and use a heating pad.  - Sore Throat: treat with throat lozenges and/or gargle with warm salt   water.  - Because air was used during the procedure, expelling large amounts of air   from your rectum or belching is normal.  - If a bowel prep was taken, you may not have a bowel movement for 1-3 days.    This is normal.  SYMPTOMS TO WATCH FOR AND REPORT TO YOUR PHYSICIAN:  1. Abdominal pain or bloating, other than gas cramps.  2. Chest pain.  3. Back pain.  4. Signs of infection such as: chills or fever occurring within 24 hours   after the procedure.  5. Rectal bleeding, which would show as bright red, maroon, or black stools.   (A tablespoon of blood from the rectum is not serious, especially  if   hemorrhoids are present.)  6. Vomiting.  7. Weakness or dizziness.  GO DIRECTLY TO THE NEAREST EMERGENCY ROOM IF YOU HAVE ANY OF THE FOLLOWING:      Difficulty breathing              Chills and/or fever over 101 F   Persistent vomiting and/or vomiting blood   Severe abdominal pain   Severe chest pain   Black, tarry stools   Bleeding- more than one tablespoon   Any other symptom or condition that you feel may need urgent attention  Your doctor recommends these additional instructions:  If any biopsies were taken, your doctors clinic will contact you in 1 to 2   weeks with any results.  Your physician has recommended a repeat colonoscopy in five years for   surveillance.   You are being discharged to home.  For questions, problems or results please call your physician - James Palomares MD at Work:  [phone].  EMERGENCY PHONE NUMBER: [phone], LAB RESULTS: [phone]  IF A COMPLICATION OR EMERGENCY SITUATION ARISES AND YOU ARE UNABLE TO REACH   YOUR PHYSICIAN - GO DIRECTLY TO THE EMERGENCY ROOM.  ___________________________________________  Nurse Signature  ___________________________________________  Patient/Designated Responsible Party Signature  James Palomares MD  2/3/2022 7:24:25 AM  This report has been verified and signed electronically.  Dear patient,  As a result of recent federal legislation (The Federal Cures Act), you may   receive lab or pathology results from your procedure in your [facility]   account before your physician is able to contact you. Your physician or   their representative will relay the results to you with their   recommendations at their soonest availability.  Thank you.  PROVATION

## 2022-03-05 ENCOUNTER — LAB VISIT (OUTPATIENT)
Dept: FAMILY MEDICINE | Facility: CLINIC | Age: 62
End: 2022-03-05
Payer: COMMERCIAL

## 2022-03-05 DIAGNOSIS — Z01.818 PRE-OP TESTING: ICD-10-CM

## 2022-03-05 PROCEDURE — U0003 INFECTIOUS AGENT DETECTION BY NUCLEIC ACID (DNA OR RNA); SEVERE ACUTE RESPIRATORY SYNDROME CORONAVIRUS 2 (SARS-COV-2) (CORONAVIRUS DISEASE [COVID-19]), AMPLIFIED PROBE TECHNIQUE, MAKING USE OF HIGH THROUGHPUT TECHNOLOGIES AS DESCRIBED BY CMS-2020-01-R: HCPCS | Performed by: INTERNAL MEDICINE

## 2022-03-05 PROCEDURE — U0005 INFEC AGEN DETEC AMPLI PROBE: HCPCS | Performed by: INTERNAL MEDICINE

## 2022-03-06 LAB
SARS-COV-2 RNA RESP QL NAA+PROBE: NOT DETECTED
SARS-COV-2- CYCLE NUMBER: NORMAL

## 2022-03-07 ENCOUNTER — ANESTHESIA EVENT (OUTPATIENT)
Dept: ENDOSCOPY | Facility: HOSPITAL | Age: 62
End: 2022-03-07
Payer: COMMERCIAL

## 2022-03-08 ENCOUNTER — HOSPITAL ENCOUNTER (OUTPATIENT)
Facility: HOSPITAL | Age: 62
Discharge: HOME | End: 2022-03-08
Attending: INTERNAL MEDICINE | Admitting: INTERNAL MEDICINE
Payer: COMMERCIAL

## 2022-03-08 ENCOUNTER — ANESTHESIA (OUTPATIENT)
Dept: ENDOSCOPY | Facility: HOSPITAL | Age: 62
End: 2022-03-08
Payer: COMMERCIAL

## 2022-03-08 VITALS
RESPIRATION RATE: 16 BRPM | HEART RATE: 65 BPM | TEMPERATURE: 98 F | HEIGHT: 70 IN | BODY MASS INDEX: 40.09 KG/M2 | SYSTOLIC BLOOD PRESSURE: 126 MMHG | DIASTOLIC BLOOD PRESSURE: 56 MMHG | WEIGHT: 280 LBS | OXYGEN SATURATION: 97 %

## 2022-03-08 DIAGNOSIS — K74.60 CIRRHOSIS: ICD-10-CM

## 2022-03-08 PROCEDURE — D9220A PRA ANESTHESIA: Mod: ,,, | Performed by: ANESTHESIOLOGY

## 2022-03-08 PROCEDURE — 43235 EGD DIAGNOSTIC BRUSH WASH: CPT | Mod: ,,, | Performed by: INTERNAL MEDICINE

## 2022-03-08 PROCEDURE — 37000008 HC ANESTHESIA 1ST 15 MINUTES: Mod: PO | Performed by: INTERNAL MEDICINE

## 2022-03-08 PROCEDURE — 37000009 HC ANESTHESIA EA ADD 15 MINS: Mod: PO | Performed by: INTERNAL MEDICINE

## 2022-03-08 PROCEDURE — D9220A PRA ANESTHESIA: ICD-10-PCS | Mod: ,,, | Performed by: ANESTHESIOLOGY

## 2022-03-08 PROCEDURE — 63600175 PHARM REV CODE 636 W HCPCS: Mod: PO | Performed by: NURSE ANESTHETIST, CERTIFIED REGISTERED

## 2022-03-08 PROCEDURE — 25000003 PHARM REV CODE 250: Mod: PO | Performed by: NURSE ANESTHETIST, CERTIFIED REGISTERED

## 2022-03-08 PROCEDURE — 43235 PR EGD, FLEX, DIAGNOSTIC: ICD-10-PCS | Mod: ,,, | Performed by: INTERNAL MEDICINE

## 2022-03-08 PROCEDURE — 63600175 PHARM REV CODE 636 W HCPCS: Mod: PO | Performed by: INTERNAL MEDICINE

## 2022-03-08 PROCEDURE — 43235 EGD DIAGNOSTIC BRUSH WASH: CPT | Mod: PO | Performed by: INTERNAL MEDICINE

## 2022-03-08 RX ORDER — PROPOFOL 10 MG/ML
VIAL (ML) INTRAVENOUS
Status: DISCONTINUED | OUTPATIENT
Start: 2022-03-08 | End: 2022-03-08

## 2022-03-08 RX ORDER — SODIUM CHLORIDE, SODIUM LACTATE, POTASSIUM CHLORIDE, CALCIUM CHLORIDE 600; 310; 30; 20 MG/100ML; MG/100ML; MG/100ML; MG/100ML
INJECTION, SOLUTION INTRAVENOUS CONTINUOUS
Status: DISCONTINUED | OUTPATIENT
Start: 2022-03-08 | End: 2022-03-08 | Stop reason: HOSPADM

## 2022-03-08 RX ORDER — SODIUM CHLORIDE 0.9 % (FLUSH) 0.9 %
10 SYRINGE (ML) INJECTION
Status: DISCONTINUED | OUTPATIENT
Start: 2022-03-08 | End: 2022-03-08 | Stop reason: HOSPADM

## 2022-03-08 RX ORDER — LIDOCAINE HCL/PF 100 MG/5ML
SYRINGE (ML) INTRAVENOUS
Status: DISCONTINUED | OUTPATIENT
Start: 2022-03-08 | End: 2022-03-08

## 2022-03-08 RX ADMIN — PROPOFOL 200 MG: 10 INJECTION, EMULSION INTRAVENOUS at 09:03

## 2022-03-08 RX ADMIN — PROPOFOL 25 MG: 10 INJECTION, EMULSION INTRAVENOUS at 09:03

## 2022-03-08 RX ADMIN — LIDOCAINE HYDROCHLORIDE 100 MG: 20 INJECTION, SOLUTION INTRAVENOUS at 09:03

## 2022-03-08 RX ADMIN — PROPOFOL 50 MG: 10 INJECTION, EMULSION INTRAVENOUS at 09:03

## 2022-03-08 RX ADMIN — SODIUM CHLORIDE, SODIUM LACTATE, POTASSIUM CHLORIDE, AND CALCIUM CHLORIDE: .6; .31; .03; .02 INJECTION, SOLUTION INTRAVENOUS at 08:03

## 2022-03-08 NOTE — H&P
History & Physical - Short Stay  Gastroenterology      SUBJECTIVE:     Procedure: EGD    Chief Complaint/Indication for Procedure: Cirrhosis      PTA Medications   Medication Sig    albuterol (PROVENTIL) 2.5 mg /3 mL (0.083 %) nebulizer solution Inhale the contents of 1 vial (3 mLs) by nebulization every 6 (six) hours as needed for Wheezing.    ALPRAZolam (XANAX) 0.25 MG tablet Take 1 tablet (0.25 mg total) by mouth nightly as needed. FOR INSOMNIA    buPROPion (WELLBUTRIN XL) 150 MG TB24 tablet Take 2 tablets (300 mg total) by mouth once daily.    cetirizine (ZYRTEC) 10 MG tablet Take 10 mg by mouth once daily.    diclofenac sodium (VOLTAREN) 1 % Gel Apply 2 g topically 3 (three) times daily.    fluconazole (DIFLUCAN) 150 MG Tab Take 1 tablet (150 mg total) by mouth once daily. (Patient taking differently: Take 150 mg by mouth daily as needed.)    hydroCHLOROthiazide (HYDRODIURIL) 12.5 MG Tab Take 1 tablet (12.5 mg total) by mouth once daily.    loratadine (CLARITIN) 10 mg tablet Take 10 mg by mouth once daily.     multivitamin (THERAGRAN) per tablet Take 1 tablet by mouth once daily.    nystatin-triamcinolone (MYCOLOG II) cream Apply to affected area twice daily as needed (Patient taking differently: Apply to affected area twice daily as needed)    propranoloL (INDERAL LA) 60 MG 24 hr capsule Take 1 capsule (60 mg total) by mouth once daily.    tolterodine (DETROL LA) 4 MG 24 hr capsule Take 1 capsule (4 mg total) by mouth once daily.    triamcinolone (NASACORT) 55 mcg nasal inhaler Use 2 sprays by Nasal route once daily.    vitamin E 400 UNIT capsule Take 400 Units by mouth once daily.    aspirin/salicylamide/caffeine (BC HEADACHE POWDER ORAL) Take by mouth.    meloxicam (MOBIC) 7.5 MG tablet Take 1 tablet (7.5 mg total) by mouth once daily. 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, postitive antismooth muscle antibody    Blood transfusion     Bronchitis     bronchospasm on occasion     Cancer     carcinoma in situ    Cervical neck pain with evidence of disc disease 3/22/2012    can bend neck    Cirrhosis     non-alcoholic, compensated    Colon polyps 3/22/2012    Depression 3/22/2012    Hx panic attacks    Diverticular disease 3/22/2012    never diverticulitis    Fatty liver 3/22/2012    Fibrocystic breast     Fine tremor     hands,chronic    Hepatosplenomegaly     Hypertension 2013    Knee pain, bilateral     chronic, with hands,feet,fingers also painful    Lesion of right lung     Liver disease     "partially sclerosed liver" per pt    Migraines past Hx    Nephrolithiasis     stone episode 2021    Pleural effusion     small, compensated, good bilateral breath sounds per Dr Suresh GUTIERRES (postoperative nausea and vomiting)     twice after childbirth    Postpartum depression     Splenomegaly     Thrombocytopenia     Tremors of nervous system      Past Surgical History:   Procedure Laterality Date    ADENOIDECTOMY      CERVICAL CONIZATION   W/ LASER       SECTION      x3    COLONOSCOPY N/A 2016    Procedure: COLONOSCOPY;  Surgeon: James Palomares MD;  Location: [facility] ENDO;  Service: Endoscopy;  Laterality: N/A;    COLONOSCOPY N/A 2/3/2022    Procedure: COLONOSCOPY;  Surgeon: James Palomares MD;  Location: [facility] ENDO;  Service: Endoscopy;  Laterality: N/A;    EMBOLIZATION N/A 2018    Procedure: EMBOLIZATION, BLOOD VESSEL;  Surgeon: Joselin Surgeon;  Location: [facility];  " Service: Radiology;  Laterality: N/A;    ENDOMETRIAL ABLATION      ESOPHAGOGASTRODUODENOSCOPY N/A 2020    Procedure: ESOPHAGOGASTRODUODENOSCOPY (EGD);  Surgeon: James Palomares MD;  Location: [facility];  Service: Endoscopy;  Laterality: N/A;    LIVER BIOPSY      PELVIC LAPAROSCOPY      TONSILLECTOMY      TUBAL LIGATION       Family History   Problem Relation Age of Onset    Breast cancer Maternal Grandmother 70    Diabetes Father     Heart disease Father     Breast cancer Mother 70    Heart disease Mother     Hypertension Mother     Tremor Mother     Diabetes Brother     Diabetes Sister     Cancer Sister     Breast cancer Maternal Aunt 70    Multiple sclerosis Maternal Aunt     Breast cancer Sister     Diabetes Sister     Emphysema Brother     Breast cancer Maternal Cousin 40    Tremor Daughter     Multiple sclerosis Maternal Aunt     Ovarian cancer Neg Hx     Parkinsonism Neg Hx     Glaucoma Neg Hx      Social History     Tobacco Use    Smoking status: Former Smoker     Quit date: 2/3/2006     Years since quittin.1    Smokeless tobacco: Never Used   Substance Use Topics    Alcohol use: Not Currently    Drug use: No         OBJECTIVE:     Vital Signs (Most Recent)  Temp: 98.1 °F (36.7 °C) (22 0842)  Pulse: 72 (22 0855)  Resp: 18 (22 0855)  BP: (!) 156/80 (22 0855)  SpO2: 97 % (22 0855)    Physical Exam:                                                       GENERAL:  Comfortable, in no acute distress.                                 HEENT EXAM:  Nonicteric.  No adenopathy.  Oropharynx is clear.               NECK:  Supple.                                                               LUNGS:  Clear.                                                               CARDIAC:  Regular rate and rhythm.  S1, S2.  No murmur.                      ABDOMEN:  Soft, positive bowel sounds, nontender.  No hepatosplenomegaly or masses.  No rebound or guarding.                                              EXTREMITIES:  No edema.     MENTAL STATUS:  Normal, alert and oriented.      ASSESSMENT/PLAN:     Assessment: Cirrhosis    Plan: EGD    Anesthesia Plan: General    ASA Grade: ASA 3 - Patient with moderate systemic disease with functional limitations    MALLAMPATI SCORE:  I (soft palate, uvula, fauces, and tonsillar pillars visible)

## 2022-03-08 NOTE — ANESTHESIA PREPROCEDURE EVALUATION
03/08/2022  Yumiko Gonzalez is a 61 y.o., female.    Pre-op Assessment    I have reviewed the Patient Summary Reports.    I have reviewed the Nursing Notes. I have reviewed the NPO Status.   I have reviewed the Medications.     Review of Systems  Anesthesia Hx:  No problems with previous Anesthesia Hx of Anesthetic complications    Social:  Former Smoker    Cardiovascular:   Hypertension Denies MI.  Denies CAD.    Denies CABG/stent.   Denies Angina.    Pulmonary:   Denies COPD. Asthma  Denies Recent URI.    Renal/:   Chronic Renal Disease    Hepatic/GI:   Denies GERD. Liver Disease, Hepatitis    Neurological:   Denies TIA. Denies CVA. Headaches Denies Seizures.    Endocrine:   Denies Diabetes. Denies Hypothyroidism.    Psych:   Psychiatric History          Physical Exam  General:  Morbid Obesity      Airway/Jaw/Neck:  Airway Findings: Mouth Opening: Normal   Tongue: Normal   General Airway Assessment: Adult, Good Mallampati: II  Improves to II with phonation.  TM Distance: 4-6 cm       Dental:  Dental Findings: In tact     Chest/Lungs:  Chest/Lungs Findings: Clear to auscultation, Normal Respiratory Rate      Heart/Vascular:  Heart Findings: Rate: Normal  Rhythm: Regular Rhythm  Sounds: Normal  Heart murmur: negative       Mental Status:  Mental Status Findings:  Cooperative, Alert and Oriented         Anesthesia Plan  Type of Anesthesia, risks & benefits discussed:  Anesthesia Type:  general    Patient's Preference:   Plan Factors:          Intra-op Monitoring Plan: standard ASA monitors  Intra-op Monitoring Plan Comments:   Post Op Pain Control Plan:   Post Op Pain Control Plan Comments:     Induction:   IV  Beta Blocker:  Patient is not currently on a Beta-Blocker (No further documentation required).       Informed Consent: Informed consent signed with the Patient and all parties understand the risks and  agree with anesthesia plan.  All questions answered.  Anesthesia consent signed with patient.  ASA Score: 3     Day of Surgery Review of History & Physical: I have interviewed and examined the patient. I have reviewed the patient's H&P dated:  There are no significant changes.            Ready For Surgery From Anesthesia Perspective.           Physical Exam  General: Morbid Obesity    Airway:  Mallampati: II / II  Mouth Opening: Normal  TM Distance: 4-6 cm  Tongue: Normal    Dental:  In tact    Chest/Lungs:  Clear to auscultation, Normal Respiratory Rate    Heart:  Rate: Normal  Rhythm: Regular Rhythm  Sounds: Normal          Anesthesia Plan  Type of Anesthesia, risks & benefits discussed:    Anesthesia Type: general  Intra-op Monitoring Plan: standard ASA monitors  Induction:  IV  Informed Consent: Informed consent signed with the Patient and all parties understand the risks and agree with anesthesia plan.  All questions answered.   ASA Score: 3  Day of Surgery Review of History & Physical: I have interviewed and examined the patient. I have reviewed the patient's H&P dated:     Ready For Surgery From Anesthesia Perspective.       .

## 2022-03-08 NOTE — DISCHARGE SUMMARY
[place] - Endoscopy  Discharge Note  Short Stay    Discharge Note  Short Stay      SUMMARY     Admit Date: 3/8/2022    Attending Physician: James Palomares MD     Discharge Physician: James Palomares MD    Discharge Date: 3/8/2022 9:43 AM    Final Diagnosis: Cirrhosis of liver with ascites, unspecified hepatic cirrhosis type [K74.60, R18.8]  Nonalcoholic steatohepatitis [K75.81]  Esophageal varices determined by endoscopy [I85.00]  Portal hypertensive gastropathy [K76.6, K31.89]    Disposition: HOME OR SELF CARE    Patient Instructions:   Current Discharge Medication List      CONTINUE these medications which have NOT CHANGED    Details   albuterol (PROVENTIL) 2.5 mg /3 mL (0.083 %) nebulizer solution Inhale the contents of 1 vial (3 mLs) by nebulization every 6 (six) hours as needed for Wheezing.  Qty: 75 mL, Refills: 4    Associated Diagnoses: Persistent cough      ALPRAZolam (XANAX) 0.25 MG tablet Take 1 tablet (0.25 mg total) by mouth nightly as needed. FOR INSOMNIA  Qty: 30 tablet, Refills: 2      buPROPion (WELLBUTRIN XL) 150 MG TB24 tablet Take 2 tablets (300 mg total) by mouth once daily.  Qty: 180 tablet, Refills: 3      cetirizine (ZYRTEC) 10 MG tablet Take 10 mg by mouth once daily.      diclofenac sodium (VOLTAREN) 1 % Gel Apply 2 g topically 3 (three) times daily.  Qty: 1 Tube, Refills: 3      fluconazole (DIFLUCAN) 150 MG Tab Take 1 tablet (150 mg total) by mouth once daily.  Qty: 1 tablet, Refills: 1      hydroCHLOROthiazide (HYDRODIURIL) 12.5 MG Tab Take 1 tablet (12.5 mg total) by mouth once daily.  Qty: 90 tablet, Refills: 1    Comments: Patient not followed by us - seen one time for pre-op clearance. Future refills need to go to PCP      loratadine (CLARITIN) 10 mg tablet Take 10 mg by mouth once daily.       multivitamin (THERAGRAN) per tablet Take 1 tablet by mouth once daily.      nystatin-triamcinolone (MYCOLOG II) cream Apply to affected area twice daily as needed  Qty: 60 g,  Refills: 3      propranoloL (INDERAL LA) 60 MG 24 hr capsule Take 1 capsule (60 mg total) by mouth once daily.  Qty: 30 capsule, Refills: 11    Comments: .      tolterodine (DETROL LA) 4 MG 24 hr capsule Take 1 capsule (4 mg total) by mouth once daily.  Qty: 30 capsule, Refills: 2      triamcinolone (NASACORT) 55 mcg nasal inhaler Use 2 sprays by Nasal route once daily.  Qty: 17 g, Refills: 12      vitamin E 400 UNIT capsule Take 400 Units by mouth once daily.      aspirin/salicylamide/caffeine (BC HEADACHE POWDER ORAL) Take by mouth.      meloxicam (MOBIC) 7.5 MG tablet Take 1 tablet (7.5 mg total) by mouth once daily.  Qty: 30 tablet, Refills: 0      sodium chloride 7% 7 % nebulizer solution Use in nebulizer as directed 3 times a day for 30 days  Qty: 240 mL, Refills: 5             Discharge Procedure Orders (must include Diet, Follow-up, Activity)    Follow Up:  Follow up with PCP as previously scheduled  Resume routine diet.  Activity as tolerated.    No driving day of procedure.

## 2022-03-08 NOTE — PROVATION PATIENT INSTRUCTIONS
Discharge Summary/Instructions after an Endoscopic Procedure  Patient Name: Yumiko Gonzalez  Patient MRN: [id number]  Patient YOB: 1960 Tuesday, March 8, 2022  James Palomares MD  Dear patient,  As a result of recent federal legislation (The Federal Cures Act), you may   receive lab or pathology results from your procedure in your [facility]   account before your physician is able to contact you. Your physician or   their representative will relay the results to you with their   recommendations at their soonest availability.  Thank you,  RESTRICTIONS:  During your procedure today, you received medications for sedation.  These   medications may affect your judgment, balance and coordination.  Therefore,   for 24 hours, you have the following restrictions:   - DO NOT drive a car, operate machinery, make legal/financial decisions,   sign important papers or drink alcohol.    ACTIVITY:  Today: no heavy lifting, straining or running due to procedural   sedation/anesthesia.  The following day: return to full activity including work.  DIET:  Eat and drink normally unless instructed otherwise.     TREATMENT FOR COMMON SIDE EFFECTS:  - Mild abdominal pain, nausea, belching, bloating or excessive gas:  rest,   eat lightly and use a heating pad.  - Sore Throat: treat with throat lozenges and/or gargle with warm salt   water.  - Because air was used during the procedure, expelling large amounts of air   from your rectum or belching is normal.  - If a bowel prep was taken, you may not have a bowel movement for 1-3 days.    This is normal.  SYMPTOMS TO WATCH FOR AND REPORT TO YOUR PHYSICIAN:  1. Abdominal pain or bloating, other than gas cramps.  2. Chest pain.  3. Back pain.  4. Signs of infection such as: chills or fever occurring within 24 hours   after the procedure.  5. Rectal bleeding, which would show as bright red, maroon, or black stools.   (A tablespoon of blood from the rectum is not serious, especially if    hemorrhoids are present.)  6. Vomiting.  7. Weakness or dizziness.  GO DIRECTLY TO THE NEAREST EMERGENCY ROOM IF YOU HAVE ANY OF THE FOLLOWING:      Difficulty breathing              Chills and/or fever over 101 F   Persistent vomiting and/or vomiting blood   Severe abdominal pain   Severe chest pain   Black, tarry stools   Bleeding- more than one tablespoon   Any other symptom or condition that you feel may need urgent attention  Your doctor recommends these additional instructions:  If any biopsies were taken, your doctors clinic will contact you in 1 to 2   weeks with any results.  Continue your present medications.   You are being discharged to home.  For questions, problems or results please call your physician - James Palomares MD at Work:  [phone].  EMERGENCY PHONE NUMBER: [phone], LAB RESULTS: [phone]  IF A COMPLICATION OR EMERGENCY SITUATION ARISES AND YOU ARE UNABLE TO REACH   YOUR PHYSICIAN - GO DIRECTLY TO THE EMERGENCY ROOM.  ___________________________________________  Nurse Signature  ___________________________________________  Patient/Designated Responsible Party Signature  James Palomares MD  3/8/2022 9:42:12 AM  This report has been verified and signed electronically.  Dear patient,  As a result of recent federal legislation (The Federal Cures Act), you may   receive lab or pathology results from your procedure in your [facility]   account before your physician is able to contact you. Your physician or   their representative will relay the results to you with their   recommendations at their soonest availability.  Thank you.  PROVATION

## 2022-03-08 NOTE — ANESTHESIA POSTPROCEDURE EVALUATION
Anesthesia Post Evaluation    Patient: Yumiko Gonzalez    Procedure(s) Performed: Procedure(s) (LRB):  EGD (ESOPHAGOGASTRODUODENOSCOPY) (N/A)    Final Anesthesia Type: general      Patient location during evaluation: PACU  Patient participation: Yes- Able to Participate  Level of consciousness: awake and alert, oriented and awake  Post-procedure vital signs: reviewed and stable  Pain management: adequate  Airway patency: patent    PONV status at discharge: No PONV  Anesthetic complications: no      Cardiovascular status: blood pressure returned to baseline and hemodynamically stable  Respiratory status: unassisted, spontaneous ventilation and room air  Hydration status: euvolemic  Follow-up not needed.          Vitals Value Taken Time   /56 03/08/22 0940   Temp  03/08/22 1007   Pulse 66 03/08/22 0940   Resp 14 03/08/22 0940   SpO2 96 % 03/08/22 0940         No case tracking events are documented in the log.      Pain/Niharika Score: Niharika Score: 6 (3/8/2022  9:40 AM)

## 2022-03-08 NOTE — TRANSFER OF CARE
"Anesthesia Transfer of Care Note    Patient: Yumiko Gonzalez    Procedure(s) Performed: Procedure(s) (LRB):  EGD (ESOPHAGOGASTRODUODENOSCOPY) (N/A)    Patient location: PACU    Anesthesia Type: general    Transport from OR: Transported from OR on room air with adequate spontaneous ventilation    Post pain: adequate analgesia    Post assessment: no apparent anesthetic complications and tolerated procedure well    Post vital signs: stable    Level of consciousness: sedated and awake    Nausea/Vomiting: no nausea/vomiting    Complications: none    Transfer of care protocol was followed      Last vitals:   Visit Vitals  BP (!) 105/56   Pulse 72   Temp 36.7 °C (98.1 °F) (Skin)   Resp 18   Ht 5' 10" (1.778 m)   Wt 127 kg (280 lb)   SpO2 97%   Breastfeeding No   BMI 40.18 kg/m²     "

## 2022-04-04 PROBLEM — Z85.05 ENCOUNTER FOR FOLLOW-UP SURVEILLANCE OF LIVER CANCER: Status: RESOLVED | Noted: 2021-12-29 | Resolved: 2022-04-04

## 2022-04-04 PROBLEM — Z08 ENCOUNTER FOR FOLLOW-UP SURVEILLANCE OF LIVER CANCER: Status: RESOLVED | Noted: 2021-12-29 | Resolved: 2022-04-04

## 2022-04-11 ENCOUNTER — TELEPHONE (OUTPATIENT)
Dept: PHARMACY | Facility: CLINIC | Age: 62
End: 2022-04-11
Payer: COMMERCIAL

## 2022-04-11 NOTE — TELEPHONE ENCOUNTER
Ms. Moreno has contacted the pharmacy and asked us to reach out to see if there is a cheaper alternative she can try for her Detrol La 4mg capsules. The formulary alternative would be Oxbutynin 5mg tablets 1 bid for a $9 copay. The Detrol is a $45 copay per month. If the change is ok with you please send a new order to [facility]. Thank you.

## 2022-04-12 RX ORDER — OXYBUTYNIN CHLORIDE 10 MG/1
10 TABLET, EXTENDED RELEASE ORAL DAILY
Qty: 90 TABLET | Refills: 3 | Status: SHIPPED | OUTPATIENT
Start: 2022-04-12 | End: 2023-02-16

## 2022-06-02 ENCOUNTER — OFFICE VISIT (OUTPATIENT)
Dept: PRIMARY CARE CLINIC | Facility: CLINIC | Age: 62
End: 2022-06-02
Payer: COMMERCIAL

## 2022-06-02 VITALS
HEART RATE: 72 BPM | WEIGHT: 290.44 LBS | DIASTOLIC BLOOD PRESSURE: 70 MMHG | HEIGHT: 70 IN | SYSTOLIC BLOOD PRESSURE: 132 MMHG | BODY MASS INDEX: 41.58 KG/M2

## 2022-06-02 DIAGNOSIS — F33.42 RECURRENT MAJOR DEPRESSIVE DISORDER, IN FULL REMISSION: ICD-10-CM

## 2022-06-02 DIAGNOSIS — M25.562 CHRONIC PAIN OF LEFT KNEE: ICD-10-CM

## 2022-06-02 DIAGNOSIS — K75.81 NONALCOHOLIC STEATOHEPATITIS: Chronic | ICD-10-CM

## 2022-06-02 DIAGNOSIS — G89.29 CHRONIC PAIN OF LEFT KNEE: ICD-10-CM

## 2022-06-02 DIAGNOSIS — R91.8 ABNORMAL CT SCAN OF LUNG: ICD-10-CM

## 2022-06-02 DIAGNOSIS — I10 PRIMARY HYPERTENSION: Primary | Chronic | ICD-10-CM

## 2022-06-02 DIAGNOSIS — R91.1 SOLITARY PULMONARY NODULE: ICD-10-CM

## 2022-06-02 DIAGNOSIS — R91.8 MASS OF RIGHT LUNG: Chronic | ICD-10-CM

## 2022-06-02 DIAGNOSIS — K74.60 CIRRHOSIS OF LIVER WITHOUT ASCITES, UNSPECIFIED HEPATIC CIRRHOSIS TYPE: Chronic | ICD-10-CM

## 2022-06-02 PROBLEM — I85.00 ESOPHAGEAL VARICES DETERMINED BY ENDOSCOPY: Chronic | Status: ACTIVE | Noted: 2021-12-29

## 2022-06-02 PROBLEM — K76.6 PORTAL HYPERTENSIVE GASTROPATHY: Chronic | Status: ACTIVE | Noted: 2021-12-29

## 2022-06-02 PROBLEM — K31.89 PORTAL HYPERTENSIVE GASTROPATHY: Chronic | Status: ACTIVE | Noted: 2021-12-29

## 2022-06-02 PROCEDURE — 99999 PR PBB SHADOW E&M-EST. PATIENT-LVL III: ICD-10-PCS | Mod: PBBFAC,,, | Performed by: FAMILY MEDICINE

## 2022-06-02 PROCEDURE — 1159F MED LIST DOCD IN RCRD: CPT | Mod: CPTII,S$GLB,, | Performed by: FAMILY MEDICINE

## 2022-06-02 PROCEDURE — 1160F RVW MEDS BY RX/DR IN RCRD: CPT | Mod: CPTII,S$GLB,, | Performed by: FAMILY MEDICINE

## 2022-06-02 PROCEDURE — 3075F PR MOST RECENT SYSTOLIC BLOOD PRESS GE 130-139MM HG: ICD-10-PCS | Mod: CPTII,S$GLB,, | Performed by: FAMILY MEDICINE

## 2022-06-02 PROCEDURE — 99214 PR OFFICE/OUTPT VISIT, EST, LEVL IV, 30-39 MIN: ICD-10-PCS | Mod: S$GLB,,, | Performed by: FAMILY MEDICINE

## 2022-06-02 PROCEDURE — 3008F PR BODY MASS INDEX (BMI) DOCUMENTED: ICD-10-PCS | Mod: CPTII,S$GLB,, | Performed by: FAMILY MEDICINE

## 2022-06-02 PROCEDURE — 99214 OFFICE O/P EST MOD 30 MIN: CPT | Mod: S$GLB,,, | Performed by: FAMILY MEDICINE

## 2022-06-02 PROCEDURE — 1160F PR REVIEW ALL MEDS BY PRESCRIBER/CLIN PHARMACIST DOCUMENTED: ICD-10-PCS | Mod: CPTII,S$GLB,, | Performed by: FAMILY MEDICINE

## 2022-06-02 PROCEDURE — 3008F BODY MASS INDEX DOCD: CPT | Mod: CPTII,S$GLB,, | Performed by: FAMILY MEDICINE

## 2022-06-02 PROCEDURE — 1159F PR MEDICATION LIST DOCUMENTED IN MEDICAL RECORD: ICD-10-PCS | Mod: CPTII,S$GLB,, | Performed by: FAMILY MEDICINE

## 2022-06-02 PROCEDURE — 3078F PR MOST RECENT DIASTOLIC BLOOD PRESSURE < 80 MM HG: ICD-10-PCS | Mod: CPTII,S$GLB,, | Performed by: FAMILY MEDICINE

## 2022-06-02 PROCEDURE — 3078F DIAST BP <80 MM HG: CPT | Mod: CPTII,S$GLB,, | Performed by: FAMILY MEDICINE

## 2022-06-02 PROCEDURE — 99999 PR PBB SHADOW E&M-EST. PATIENT-LVL III: CPT | Mod: PBBFAC,,, | Performed by: FAMILY MEDICINE

## 2022-06-02 PROCEDURE — 3075F SYST BP GE 130 - 139MM HG: CPT | Mod: CPTII,S$GLB,, | Performed by: FAMILY MEDICINE

## 2022-06-02 NOTE — PROGRESS NOTES
THIS DOCUMENT WAS MADE IN PART WITH VOICE RECOGNITION SOFTWARE.  OCCASIONALLY THIS SOFTWARE WILL MISINTERPRET WORDS OR PHRASES.      Primary Care Provider Appointment - [facility],   [facility] ([facility])      Patient ID: Yumiko Gonzalez is a 62 y.o. female.    ASSESSMENT/PLAN by Problem List:  Problem List Items Addressed This Visit     Hypertension - Primary (Chronic)     This is a chronic condition.  This condition has been reviewed and evaluated and it is currently stable.  Continue current medications           Nonalcoholic steatohepatitis (Chronic)     Stable, she will continue to follow with hepatology           Cirrhosis of liver without ascites (Chronic)    Mass of right lung (Chronic)     Reviewed previous images and consults.  Last chest x-ray one year ago was stable, however I do recommend continuing to follow with pulmonology           Depression     Stable on wellbutrin, but guarded with ongoing stress, 2 jobs, considering [employer].  But seems well so continue current meds but she will let me know if any change and follow in 3-6 months             Other Visit Diagnoses     Solitary pulmonary nodule        Abnormal CT scan of lung        Relevant Orders    CT Chest Without Contrast    Ambulatory referral/consult to Pulmonology    Chronic pain of left knee        Relevant Orders    Ambulatory referral/consult to Orthopedics        Follow Up:  3-6 months      Health Maintenance       Date Due Completion Date    HIV Screening Never done ---    COVID-19 Vaccine (4 - Booster for Pfizer series) 02/01/2022 10/1/2021    Mammogram 11/18/2022 11/18/2021    Lipid Panel 12/16/2022 12/16/2021    Cervical Cancer Screening 11/18/2026 11/18/2021    Colorectal Cancer Screening 02/03/2027 2/3/2022    TETANUS VACCINE 11/06/2030 11/6/2020          Advance Care Planning     Living will and HPA on file, no changes             Subjective:     Chief Complaint   Patient presents with    Follow-up     6  month      I have reviewed the information entered by the ancillary staff regarding the chief complaint as well as the related history.    HPI    Patient is a/an 62 y.o.  female   RISK of ADMIT/ED: 3    See details above for problems addressed today    For complete problem list, past medical history, surgical history, social history, etc., see appropriate section in the electronic medical record    Review of Systems   HENT: Negative.    Respiratory: Negative.    Cardiovascular: Positive for leg swelling.   Gastrointestinal: Negative.    Genitourinary: Negative.    Musculoskeletal: Positive for arthralgias.       Objective     Physical Exam  Vitals reviewed.   Constitutional:       General: She is not in acute distress.     Appearance: She is well-developed. She is not toxic-appearing or diaphoretic.   HENT:      Head: Normocephalic and atraumatic.      Right Ear: Tympanic membrane, ear canal and external ear normal. There is no impacted cerumen.      Left Ear: Tympanic membrane, ear canal and external ear normal. There is no impacted cerumen.      Nose: Nose normal.      Mouth/Throat:      Pharynx: Oropharynx is clear. No oropharyngeal exudate or posterior oropharyngeal erythema.   Eyes:      General: No scleral icterus.     Conjunctiva/sclera: Conjunctivae normal.      Pupils: Pupils are equal, round, and reactive to light.   Neck:      Thyroid: No thyromegaly.      Vascular: No JVD.      Trachea: No tracheal deviation.   Cardiovascular:      Rate and Rhythm: Normal rate and regular rhythm.      Chest Wall: PMI is not displaced.      Heart sounds: Normal heart sounds. No murmur heard.    No friction rub. No gallop.      Comments: 2+ bilateral ankle edema    Pulmonary:      Effort: Pulmonary effort is normal. No respiratory distress.      Breath sounds: Normal breath sounds. No wheezing or rales.   Musculoskeletal:      Cervical back: Normal range of motion and neck supple.   Lymphadenopathy:      Cervical: No  "cervical adenopathy.   Neurological:      Mental Status: She is alert and oriented to person, place, and time.      Motor: No abnormal muscle tone.   Psychiatric:         Mood and Affect: Mood normal.         Behavior: Behavior normal.       Vitals:    06/02/22 1538   BP: 132/70   BP Location: Left arm   Patient Position: Sitting   BP Method: Large (Manual)   Pulse: 72   Weight: 131.8 kg (290 lb 7.3 oz)   Height: 5' 10" (1.778 m)       RECENT LABS:    Lab Results   Component Value Date    WBC 3.33 (L) 12/16/2021    WBC 3.33 (L) 12/16/2021    HGB 13.9 12/16/2021    HGB 13.9 12/16/2021    HCT 42.2 12/16/2021    HCT 42.2 12/16/2021    PLT 63 (L) 12/16/2021    PLT 63 (L) 12/16/2021    CHOL 125 12/16/2021    TRIG 78 12/16/2021    HDL 52 12/16/2021    ALT 51 (H) 01/21/2022    ALT 51 (H) 01/21/2022    AST 59 (H) 01/21/2022    AST 59 (H) 01/21/2022     01/21/2022    K 3.8 01/21/2022     01/21/2022    CREATININE 0.7 01/21/2022    BUN 14 01/21/2022    CO2 27 01/21/2022    TSH 1.618 12/16/2021    INR 1.2 12/16/2021    HGBA1C 5.0 11/06/2020       Results for orders placed or performed in visit on 03/05/22   COVID-19 Routine Screening   Result Value Ref Range    SARS-CoV2 (COVID-19) Qualitative PCR Not Detected Not Detected   SARS-COV-2- Cycle Number   Result Value Ref Range    SARS-COV-2- Cycle Number N/A        "

## 2022-06-02 NOTE — ASSESSMENT & PLAN NOTE
Reviewed previous images and consults.  Last chest x-ray one year ago was stable, however I do recommend continuing to follow with pulmonology

## 2022-06-12 NOTE — ASSESSMENT & PLAN NOTE
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  But seems well so continue current meds but she will let me know if any change and follow in 3-6 months

## 2022-06-12 NOTE — ASSESSMENT & PLAN NOTE
This is a chronic condition.  This condition has been reviewed and evaluated and it is currently stable.  Continue current medications

## 2022-06-15 ENCOUNTER — HOSPITAL ENCOUNTER (OUTPATIENT)
Dept: RADIOLOGY | Facility: HOSPITAL | Age: 62
Discharge: HOME | End: 2022-06-15
Attending: FAMILY MEDICINE
Payer: COMMERCIAL

## 2022-06-15 DIAGNOSIS — M25.562 LEFT KNEE PAIN, UNSPECIFIED CHRONICITY: Primary | ICD-10-CM

## 2022-06-15 DIAGNOSIS — R91.8 ABNORMAL CT SCAN OF LUNG: ICD-10-CM

## 2022-06-15 PROCEDURE — 71250 CT THORAX DX C-: CPT | Mod: 26,,, | Performed by: RADIOLOGY

## 2022-06-15 PROCEDURE — 71250 CT THORAX DX C-: CPT | Mod: TC,PO

## 2022-06-15 PROCEDURE — 71250 CT CHEST WITHOUT CONTRAST: ICD-10-PCS | Mod: 26,,, | Performed by: RADIOLOGY

## 2022-06-17 ENCOUNTER — TELEPHONE (OUTPATIENT)
Dept: HEPATOLOGY | Facility: CLINIC | Age: 62
End: 2022-06-17
Payer: COMMERCIAL

## 2022-06-17 ENCOUNTER — HOSPITAL ENCOUNTER (OUTPATIENT)
Dept: RADIOLOGY | Facility: HOSPITAL | Age: 62
Discharge: HOME | End: 2022-06-17
Attending: NURSE PRACTITIONER
Payer: COMMERCIAL

## 2022-06-17 ENCOUNTER — PATIENT MESSAGE (OUTPATIENT)
Dept: PRIMARY CARE CLINIC | Facility: CLINIC | Age: 62
End: 2022-06-17
Payer: COMMERCIAL

## 2022-06-17 DIAGNOSIS — R18.8 CIRRHOSIS OF LIVER WITH ASCITES, UNSPECIFIED HEPATIC CIRRHOSIS TYPE: ICD-10-CM

## 2022-06-17 DIAGNOSIS — Z08 ENCOUNTER FOR FOLLOW-UP SURVEILLANCE OF LIVER CANCER: ICD-10-CM

## 2022-06-17 DIAGNOSIS — Z85.05 ENCOUNTER FOR FOLLOW-UP SURVEILLANCE OF LIVER CANCER: ICD-10-CM

## 2022-06-17 DIAGNOSIS — K76.6 PORTAL HYPERTENSIVE GASTROPATHY: ICD-10-CM

## 2022-06-17 DIAGNOSIS — K74.60 HEPATIC CIRRHOSIS, UNSPECIFIED HEPATIC CIRRHOSIS TYPE, UNSPECIFIED WHETHER ASCITES PRESENT: ICD-10-CM

## 2022-06-17 DIAGNOSIS — I85.00 ESOPHAGEAL VARICES DETERMINED BY ENDOSCOPY: ICD-10-CM

## 2022-06-17 DIAGNOSIS — K76.9 LIVER DISEASE, UNSPECIFIED: ICD-10-CM

## 2022-06-17 DIAGNOSIS — K74.60 CIRRHOSIS OF LIVER WITH ASCITES, UNSPECIFIED HEPATIC CIRRHOSIS TYPE: ICD-10-CM

## 2022-06-17 DIAGNOSIS — K76.9 LIVER LESION, LEFT LOBE: Primary | ICD-10-CM

## 2022-06-17 DIAGNOSIS — K75.81 NONALCOHOLIC STEATOHEPATITIS: ICD-10-CM

## 2022-06-17 DIAGNOSIS — K31.89 PORTAL HYPERTENSIVE GASTROPATHY: ICD-10-CM

## 2022-06-17 PROCEDURE — 76700 US EXAM ABDOM COMPLETE: CPT | Mod: TC,PO

## 2022-06-17 PROCEDURE — 76700 US EXAM ABDOM COMPLETE: CPT | Mod: 26,,, | Performed by: RADIOLOGY

## 2022-06-17 PROCEDURE — 76700 US ABDOMEN COMPLETE: ICD-10-PCS | Mod: 26,,, | Performed by: RADIOLOGY

## 2022-06-17 NOTE — TELEPHONE ENCOUNTER
Contacted patient and got her scheduled for an MRI. She said she will try to contact the mri department in [place] to get a sooner appointment before her virtual visit with her liver provider. If not, we will just reschedule virtual visit few days after her mri appointment.

## 2022-06-17 NOTE — TELEPHONE ENCOUNTER
----- Message from Laila Will NP sent at 6/17/2022  1:20 PM CDT -----  Please schedule MRI of the Abdomen ASAP at [facility], order in [facility]. Thanks!

## 2022-06-19 NOTE — TELEPHONE ENCOUNTER
No new care gaps identified.  [facility] Embedded Care Gaps. Reference number: [id number]. 6/19/2022   6:59:16 PM CDT

## 2022-06-20 ENCOUNTER — LAB VISIT (OUTPATIENT)
Dept: LAB | Facility: HOSPITAL | Age: 62
End: 2022-06-20
Attending: NURSE PRACTITIONER
Payer: COMMERCIAL

## 2022-06-20 DIAGNOSIS — R18.8 CIRRHOSIS OF LIVER WITH ASCITES, UNSPECIFIED HEPATIC CIRRHOSIS TYPE: Primary | ICD-10-CM

## 2022-06-20 DIAGNOSIS — K74.60 CIRRHOSIS OF LIVER WITH ASCITES, UNSPECIFIED HEPATIC CIRRHOSIS TYPE: Primary | ICD-10-CM

## 2022-06-20 DIAGNOSIS — K74.60 CIRRHOSIS OF LIVER WITH ASCITES, UNSPECIFIED HEPATIC CIRRHOSIS TYPE: ICD-10-CM

## 2022-06-20 DIAGNOSIS — R18.8 CIRRHOSIS OF LIVER WITH ASCITES, UNSPECIFIED HEPATIC CIRRHOSIS TYPE: ICD-10-CM

## 2022-06-20 LAB
CREAT SERPL-MCNC: 0.7 MG/DL (ref 0.5–1.4)
EST. GFR  (AFRICAN AMERICAN): >60 ML/MIN/1.73 M^2
EST. GFR  (NON AFRICAN AMERICAN): >60 ML/MIN/1.73 M^2

## 2022-06-20 PROCEDURE — 36415 COLL VENOUS BLD VENIPUNCTURE: CPT | Mod: PO | Performed by: NURSE PRACTITIONER

## 2022-06-20 PROCEDURE — 82565 ASSAY OF CREATININE: CPT | Mod: PO | Performed by: NURSE PRACTITIONER

## 2022-06-20 RX ORDER — HYDROCHLOROTHIAZIDE 12.5 MG/1
12.5 TABLET ORAL DAILY
Qty: 90 TABLET | Refills: 0 | Status: SHIPPED | OUTPATIENT
Start: 2022-06-20 | End: 2022-10-02 | Stop reason: SDUPTHER

## 2022-06-20 NOTE — TELEPHONE ENCOUNTER
Refill Decision Note   Yumiko Gonzalez  is requesting a refill authorization.  Brief Assessment and Rationale for Refill:  Approve     Medication Therapy Plan:       Medication Reconciliation Completed: No   Comments:     No Care Gaps recommended.     Note composed:3:31 PM 06/20/2022

## 2022-06-21 ENCOUNTER — HOSPITAL ENCOUNTER (OUTPATIENT)
Dept: RADIOLOGY | Facility: HOSPITAL | Age: 62
Discharge: HOME | End: 2022-06-21
Attending: NURSE PRACTITIONER
Payer: COMMERCIAL

## 2022-06-21 DIAGNOSIS — K74.60 HEPATIC CIRRHOSIS, UNSPECIFIED HEPATIC CIRRHOSIS TYPE, UNSPECIFIED WHETHER ASCITES PRESENT: ICD-10-CM

## 2022-06-21 DIAGNOSIS — K76.9 LIVER LESION, LEFT LOBE: ICD-10-CM

## 2022-06-21 PROCEDURE — 74183 MRI ABDOMEN W WO CONTRAST: ICD-10-PCS | Mod: 26,,, | Performed by: RADIOLOGY

## 2022-06-21 PROCEDURE — 74183 MRI ABD W/O CNTR FLWD CNTR: CPT | Mod: 26,,, | Performed by: RADIOLOGY

## 2022-06-21 PROCEDURE — A9577 INJ MULTIHANCE: HCPCS | Mod: PO | Performed by: NURSE PRACTITIONER

## 2022-06-21 PROCEDURE — 74183 MRI ABD W/O CNTR FLWD CNTR: CPT | Mod: TC,PO

## 2022-06-21 PROCEDURE — 25500020 PHARM REV CODE 255: Mod: PO | Performed by: NURSE PRACTITIONER

## 2022-06-21 RX ADMIN — GADOBENATE DIMEGLUMINE 20 ML: 529 INJECTION, SOLUTION INTRAVENOUS at 01:06

## 2022-06-22 ENCOUNTER — OFFICE VISIT (OUTPATIENT)
Dept: ORTHOPEDICS | Facility: CLINIC | Age: 62
End: 2022-06-22
Payer: COMMERCIAL

## 2022-06-22 ENCOUNTER — HOSPITAL ENCOUNTER (OUTPATIENT)
Dept: RADIOLOGY | Facility: HOSPITAL | Age: 62
Discharge: HOME | End: 2022-06-22
Attending: ORTHOPAEDIC SURGERY
Payer: COMMERCIAL

## 2022-06-22 VITALS — HEIGHT: 70 IN | WEIGHT: 290 LBS | RESPIRATION RATE: 18 BRPM | BODY MASS INDEX: 41.52 KG/M2

## 2022-06-22 DIAGNOSIS — G89.29 CHRONIC PAIN OF LEFT KNEE: ICD-10-CM

## 2022-06-22 DIAGNOSIS — M25.562 LEFT KNEE PAIN, UNSPECIFIED CHRONICITY: ICD-10-CM

## 2022-06-22 DIAGNOSIS — M17.0 BILATERAL PRIMARY OSTEOARTHRITIS OF KNEE: Primary | ICD-10-CM

## 2022-06-22 DIAGNOSIS — M25.562 CHRONIC PAIN OF LEFT KNEE: ICD-10-CM

## 2022-06-22 PROCEDURE — 3008F PR BODY MASS INDEX (BMI) DOCUMENTED: ICD-10-PCS | Mod: CPTII,S$GLB,, | Performed by: ORTHOPAEDIC SURGERY

## 2022-06-22 PROCEDURE — 99999 PR PBB SHADOW E&M-EST. PATIENT-LVL IV: CPT | Mod: PBBFAC,,, | Performed by: ORTHOPAEDIC SURGERY

## 2022-06-22 PROCEDURE — 1160F PR REVIEW ALL MEDS BY PRESCRIBER/CLIN PHARMACIST DOCUMENTED: ICD-10-PCS | Mod: CPTII,S$GLB,, | Performed by: ORTHOPAEDIC SURGERY

## 2022-06-22 PROCEDURE — 1159F PR MEDICATION LIST DOCUMENTED IN MEDICAL RECORD: ICD-10-PCS | Mod: CPTII,S$GLB,, | Performed by: ORTHOPAEDIC SURGERY

## 2022-06-22 PROCEDURE — 73562 X-RAY EXAM OF KNEE 3: CPT | Mod: TC,PO,RT

## 2022-06-22 PROCEDURE — 73562 X-RAY EXAM OF KNEE 3: CPT | Mod: 26,RT,, | Performed by: RADIOLOGY

## 2022-06-22 PROCEDURE — 73562 XR KNEE ORTHO LEFT WITH FLEXION: ICD-10-PCS | Mod: 26,RT,, | Performed by: RADIOLOGY

## 2022-06-22 PROCEDURE — 3008F BODY MASS INDEX DOCD: CPT | Mod: CPTII,S$GLB,, | Performed by: ORTHOPAEDIC SURGERY

## 2022-06-22 PROCEDURE — 99203 PR OFFICE/OUTPT VISIT, NEW, LEVL III, 30-44 MIN: ICD-10-PCS | Mod: 25,S$GLB,, | Performed by: ORTHOPAEDIC SURGERY

## 2022-06-22 PROCEDURE — 1160F RVW MEDS BY RX/DR IN RCRD: CPT | Mod: CPTII,S$GLB,, | Performed by: ORTHOPAEDIC SURGERY

## 2022-06-22 PROCEDURE — 99203 OFFICE O/P NEW LOW 30 MIN: CPT | Mod: 25,S$GLB,, | Performed by: ORTHOPAEDIC SURGERY

## 2022-06-22 PROCEDURE — 1159F MED LIST DOCD IN RCRD: CPT | Mod: CPTII,S$GLB,, | Performed by: ORTHOPAEDIC SURGERY

## 2022-06-22 PROCEDURE — 20610 LARGE JOINT ASPIRATION/INJECTION: L KNEE: ICD-10-PCS | Mod: LT,S$GLB,, | Performed by: ORTHOPAEDIC SURGERY

## 2022-06-22 PROCEDURE — 99999 PR PBB SHADOW E&M-EST. PATIENT-LVL IV: ICD-10-PCS | Mod: PBBFAC,,, | Performed by: ORTHOPAEDIC SURGERY

## 2022-06-22 PROCEDURE — 20610 DRAIN/INJ JOINT/BURSA W/O US: CPT | Mod: LT,S$GLB,, | Performed by: ORTHOPAEDIC SURGERY

## 2022-06-22 PROCEDURE — 73564 X-RAY EXAM KNEE 4 OR MORE: CPT | Mod: 26,LT,, | Performed by: RADIOLOGY

## 2022-06-22 PROCEDURE — 73564 XR KNEE ORTHO LEFT WITH FLEXION: ICD-10-PCS | Mod: 26,LT,, | Performed by: RADIOLOGY

## 2022-06-22 RX ORDER — TRIAMCINOLONE ACETONIDE 40 MG/ML
40 INJECTION, SUSPENSION INTRA-ARTICULAR; INTRAMUSCULAR
Status: DISCONTINUED | OUTPATIENT
Start: 2022-06-22 | End: 2022-06-22 | Stop reason: HOSPADM

## 2022-06-22 RX ADMIN — TRIAMCINOLONE ACETONIDE 40 MG: 40 INJECTION, SUSPENSION INTRA-ARTICULAR; INTRAMUSCULAR at 11:06

## 2022-06-22 NOTE — PROCEDURES
Large Joint Aspiration/Injection: L knee    Date/Time: 6/22/2022 11:00 AM  Performed by: Pop Schneider MD  Authorized by: Pop Schneider MD     Consent Done?:  Yes (Verbal)  Indications:  Pain  Site marked: the procedure site was marked    Timeout: prior to procedure the correct patient, procedure, and site was verified    Local anesthetic:  Lidocaine 1% without epinephrine and bupivacaine 0.25% without epinephrine  Anesthetic total (ml):  6      Details:  Needle Size:  20 G  Approach:  Anterolateral  Location:  Knee  Site:  L knee  Medications:  40 mg triamcinolone acetonide 40 mg/mL  Patient tolerance:  Patient tolerated the procedure well with no immediate complications

## 2022-06-22 NOTE — PROGRESS NOTES
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, postitive antismooth muscle antibody    Blood transfusion     Bronchitis     bronchospasm on occasion     Cancer 1987    carcinoma in situ    Cervical neck pain with evidence of disc disease 3/22/2012    can bend neck    Cirrhosis     non-alcoholic, compensated    Colon polyps 3/22/2012    Depression 3/22/2012    Hx panic attacks    Diverticular disease 3/22/2012    never diverticulitis    Fatty liver 3/22/2012    Fibrocystic breast     Fine tremor     hands,chronic    Hepatosplenomegaly     Hypertension 2013    Knee pain, bilateral     chronic, with hands,feet,fingers also painful    Lesion of right lung     Liver disease     "partially sclerosed liver" per pt    Migraines past Hx    Nephrolithiasis     stone episode 2021    Pleural effusion     small, compensated, good bilateral breath sounds per Dr Suresh GUTIERRES (postoperative nausea and vomiting)     twice after childbirth    Postpartum depression     Splenomegaly     Thrombocytopenia     Tremors of nervous system        Past Surgical History:   Procedure Laterality Date    ADENOIDECTOMY      CERVICAL CONIZATION   W/ LASER       SECTION      x3    COLONOSCOPY N/A 2016    Procedure: COLONOSCOPY;  Surgeon: James Palomares MD;  Location: [facility] ENDO;  Service: Endoscopy;  Laterality: N/A;    COLONOSCOPY N/A 2/3/2022    Procedure: COLONOSCOPY;  Surgeon: James Palomares MD;  Location: [facility] ENDO;  Service: Endoscopy;  Laterality: N/A;    EMBOLIZATION N/A 2018    Procedure: EMBOLIZATION, BLOOD VESSEL;  Surgeon: Joselin Surgeon;  Location: [facility];  Service: Radiology;  Laterality: N/A;    ENDOMETRIAL ABLATION      ESOPHAGOGASTRODUODENOSCOPY N/A 2020    Procedure: " ESOPHAGOGASTRODUODENOSCOPY (EGD);  Surgeon: James Palomares MD;  Location: [facility];  Service: Endoscopy;  Laterality: N/A;    ESOPHAGOGASTRODUODENOSCOPY N/A 3/8/2022    Procedure: EGD (ESOPHAGOGASTRODUODENOSCOPY);  Surgeon: James Palomares MD;  Location: [facility];  Service: Endoscopy;  Laterality: N/A;    LIVER BIOPSY      PELVIC LAPAROSCOPY      TONSILLECTOMY      TUBAL LIGATION         Current Outpatient Medications   Medication Sig    albuterol (PROVENTIL) 2.5 mg /3 mL (0.083 %) nebulizer solution Inhale the contents of 1 vial (3 mLs) by nebulization every 6 (six) hours as needed for Wheezing.    ALPRAZolam (XANAX) 0.25 MG tablet Take 1 tablet (0.25 mg total) by mouth nightly as needed. FOR INSOMNIA    buPROPion (WELLBUTRIN XL) 150 MG TB24 tablet Take 2 tablets (300 mg total) by mouth once daily.    cetirizine (ZYRTEC) 10 MG tablet Take 10 mg by mouth once daily.    fluconazole (DIFLUCAN) 150 MG Tab Take 1 tablet (150 mg total) by mouth once daily. (Patient taking differently: Take 150 mg by mouth daily as needed.)    hydroCHLOROthiazide (HYDRODIURIL) 12.5 MG Tab Take 1 tablet (12.5 mg total) by mouth once daily.    loratadine (CLARITIN) 10 mg tablet Take 10 mg by mouth once daily.     multivitamin (THERAGRAN) per tablet Take 1 tablet by mouth once daily.    nystatin-triamcinolone (MYCOLOG II) cream Apply to affected area twice daily as needed (Patient taking differently: Apply to affected area twice daily as needed)    oxybutynin (DITROPAN XL) 10 MG 24 hr tablet Take 1 tablet (10 mg total) by mouth once daily.    propranoloL (INDERAL LA) 60 MG 24 hr capsule Take 1 capsule (60 mg total) by mouth once daily.    triamcinolone (NASACORT) 55 mcg nasal inhaler Use 2 sprays by Nasal route once daily.    vitamin E 400 UNIT capsule Take 400 Units by mouth once daily.    aspirin/salicylamide/caffeine (BC HEADACHE POWDER ORAL) Take by mouth.    diclofenac sodium (VOLTAREN) 1 % Gel Apply 2 g  topically 3 (three) times daily.    tolterodine (DETROL LA) 4 MG 24 hr capsule Take 1 capsule (4 mg total) by mouth once daily.     No current facility-administered medications for this visit.     Facility-Administered Medications Ordered in Other Visits   Medication    EUFLEXXA 10 mg/mL(mw 2.4 -3.6 million) injection Syrg 20 mg       Review of patient's allergies indicates:   Allergen Reactions    No known drug allergies        Family History   Problem Relation Age of Onset    Breast cancer Maternal Grandmother 70    Diabetes Father     Heart disease Father     Breast cancer Mother 70    Heart disease Mother     Hypertension Mother     Tremor Mother     Diabetes Brother     Diabetes Sister     Cancer Sister     Breast cancer Maternal Aunt 70    Multiple sclerosis Maternal Aunt     Breast cancer Sister     Diabetes Sister     Emphysema Brother     Breast cancer Maternal Cousin 40    Tremor Daughter     Multiple sclerosis Maternal Aunt     Ovarian cancer Neg Hx     Parkinsonism Neg Hx     Glaucoma Neg Hx        Social History     Socioeconomic History    Marital status:    Occupational History    Occupation: [employer] employee x 30 yrs   Tobacco Use    Smoking status: Former Smoker     Quit date: 2/3/2006     Years since quittin.3    Smokeless tobacco: Never Used   Substance and Sexual Activity    Alcohol use: Not Currently    Drug use: No    Sexual activity: Not Currently       Chief Complaint:   Chief Complaint   Patient presents with    Left Knee - Pain     History of present illness: 62-year-old female seen for recurrence of left knee pain.  We saw her 5 years ago for a similar problem.  We treated her with Euflexxa which worked well for several years.  Pain is back up to 5/10.  It is been ongoing now for about 6-9 months.  Pain with prolonged sitting to standing.        Answers for HPI/ROS submitted by the patient on 2022  unexpected weight change: No  appetite  change : No  sleep disturbance: No  IMMUNOCOMPROMISED: No  nervous/ anxious: Yes  dysphoric mood: No  rash: No  visual disturbance: No  eye redness: No  eye pain: No  ear pain: No  tinnitus: No  hearing loss: No  sinus pressure : Yes  nosebleeds: No  enviro allergies: Yes  food allergies: No  cough: No  shortness of breath: No  sweating: Yes  dysuria: No  frequency: Yes  difficulty urinating: No  hematuria: No  painful intercourse: No  chest pain: No  palpitations: No  nausea: No  vomiting: No  diarrhea: Yes  blood in stool: No  constipation: Yes  headaches: Yes  dizziness: No  numbness: No  seizures: No  joint swelling: No  myalgia: Yes  weakness: Yes  back pain: Yes  Pain Chronicity: chronic  History of trauma: No  Onset: more than 1 year ago  Frequency: constantly  Progression since onset: gradually worsening  injury location: at home  pain- numeric: 6/10  pain location: left knee  pain quality: aching, sharp, throbbing  Radiating Pain: No  Aggravating factors: activity, bending, bearing weight, cold, twisting, sitting  fever: No  inability to bear weight: Yes  itching: No  joint locking: Yes  limited range of motion: Yes  stiffness: Yes  tingling: No  Treatments tried: heat, injection treatment, NSAIDs, OTC pain meds  physical therapy: not tried  Improvement on treatment: mild          Physical Examination:    Vital Signs:    Vitals:    06/22/22 1038   Resp: 18       Body mass index is 41.61 kg/m².    This a well-developed, well nourished patient in no acute distress.  They are alert and oriented and cooperative to examination.  Pt. walks without an antalgic gait.      Examination of the left knee shows no rashes or erythema. There are no masses ecchymosis and trace effusion. Patient has full range of motion from 0-130°. Patient is nontender to palpation over lateral joint line and nontender to palpation over the medial joint line.  Knee is stable to varus and valgus stress. 5 out of 5 motor strength. Palpable  distal pulses. Intact light touch sensation. Negative Patellofemoral crepitus and pain with knee extension.       X-rays: X-rays left knee are  ordered and reviewed which show severe lateral compartment narrowing of the left knee and more moderate lateral compartment narrowing of the right knee.  Significant progression when compared x-rays from 5 years ago.    MRI of the left knee from 2017:Partial distal ACL tear with likely associated contusions in the lateral compartment.  Tricompartmental osteoarthritis and evidence of lateral meniscal tearing. There is a moderate joint effusion and there is subcutaneous edema.     Assessment:: Left severe valgus knee arthritis    Plan:   I reviewed the x-rays with her today.  Patient has significant progression of the arthritic changes in her knee.  Offered her a cortisone injection to help calm the knees down.  Recommended repeating the Euflexxa since it worked so well for her last time.  We will do Euflexxa of both knees.    The patient has tried a self guided exercise program that has included walking without significant improvement. Minimal relief with tylenol or OTC Nsaids. Reports less than 8 weeks relief with IA steroid injection. Kellgren Mookie scale of 4. They are not receiving another HA injectable at this time. I will precert for gel injection.       This note was created using  voice recognition software that occasionally misinterpreted phrases or words.    Consult note is delivered via Epic messaging service.

## 2022-06-24 ENCOUNTER — OFFICE VISIT (OUTPATIENT)
Dept: HEPATOLOGY | Facility: CLINIC | Age: 62
End: 2022-06-24
Payer: COMMERCIAL

## 2022-06-24 VITALS
WEIGHT: 293 LBS | HEIGHT: 70 IN | RESPIRATION RATE: 18 BRPM | TEMPERATURE: 97 F | HEART RATE: 76 BPM | OXYGEN SATURATION: 97 % | BODY MASS INDEX: 41.95 KG/M2 | DIASTOLIC BLOOD PRESSURE: 87 MMHG | SYSTOLIC BLOOD PRESSURE: 187 MMHG

## 2022-06-24 DIAGNOSIS — Z08 ENCOUNTER FOR FOLLOW-UP SURVEILLANCE OF LIVER CANCER: ICD-10-CM

## 2022-06-24 DIAGNOSIS — K74.60 CIRRHOSIS OF LIVER WITHOUT ASCITES, UNSPECIFIED HEPATIC CIRRHOSIS TYPE: Primary | ICD-10-CM

## 2022-06-24 DIAGNOSIS — Z85.05 ENCOUNTER FOR FOLLOW-UP SURVEILLANCE OF LIVER CANCER: ICD-10-CM

## 2022-06-24 DIAGNOSIS — K76.82 HEPATIC ENCEPHALOPATHY: ICD-10-CM

## 2022-06-24 DIAGNOSIS — K75.81 NONALCOHOLIC STEATOHEPATITIS: ICD-10-CM

## 2022-06-24 DIAGNOSIS — I85.00 ESOPHAGEAL VARICES DETERMINED BY ENDOSCOPY: ICD-10-CM

## 2022-06-24 PROCEDURE — 3079F PR MOST RECENT DIASTOLIC BLOOD PRESSURE 80-89 MM HG: ICD-10-PCS | Mod: CPTII,S$GLB,, | Performed by: NURSE PRACTITIONER

## 2022-06-24 PROCEDURE — 3008F PR BODY MASS INDEX (BMI) DOCUMENTED: ICD-10-PCS | Mod: CPTII,S$GLB,, | Performed by: NURSE PRACTITIONER

## 2022-06-24 PROCEDURE — 1159F MED LIST DOCD IN RCRD: CPT | Mod: CPTII,S$GLB,, | Performed by: NURSE PRACTITIONER

## 2022-06-24 PROCEDURE — 1159F PR MEDICATION LIST DOCUMENTED IN MEDICAL RECORD: ICD-10-PCS | Mod: CPTII,S$GLB,, | Performed by: NURSE PRACTITIONER

## 2022-06-24 PROCEDURE — 99999 PR PBB SHADOW E&M-EST. PATIENT-LVL V: CPT | Mod: PBBFAC,,, | Performed by: NURSE PRACTITIONER

## 2022-06-24 PROCEDURE — 3077F PR MOST RECENT SYSTOLIC BLOOD PRESSURE >= 140 MM HG: ICD-10-PCS | Mod: CPTII,S$GLB,, | Performed by: NURSE PRACTITIONER

## 2022-06-24 PROCEDURE — 1160F RVW MEDS BY RX/DR IN RCRD: CPT | Mod: CPTII,S$GLB,, | Performed by: NURSE PRACTITIONER

## 2022-06-24 PROCEDURE — 3008F BODY MASS INDEX DOCD: CPT | Mod: CPTII,S$GLB,, | Performed by: NURSE PRACTITIONER

## 2022-06-24 PROCEDURE — 1160F PR REVIEW ALL MEDS BY PRESCRIBER/CLIN PHARMACIST DOCUMENTED: ICD-10-PCS | Mod: CPTII,S$GLB,, | Performed by: NURSE PRACTITIONER

## 2022-06-24 PROCEDURE — 99999 PR PBB SHADOW E&M-EST. PATIENT-LVL V: ICD-10-PCS | Mod: PBBFAC,,, | Performed by: NURSE PRACTITIONER

## 2022-06-24 PROCEDURE — 3079F DIAST BP 80-89 MM HG: CPT | Mod: CPTII,S$GLB,, | Performed by: NURSE PRACTITIONER

## 2022-06-24 PROCEDURE — 3077F SYST BP >= 140 MM HG: CPT | Mod: CPTII,S$GLB,, | Performed by: NURSE PRACTITIONER

## 2022-06-24 PROCEDURE — 99214 PR OFFICE/OUTPT VISIT, EST, LEVL IV, 30-39 MIN: ICD-10-PCS | Mod: S$GLB,,, | Performed by: NURSE PRACTITIONER

## 2022-06-24 PROCEDURE — 99214 OFFICE O/P EST MOD 30 MIN: CPT | Mod: S$GLB,,, | Performed by: NURSE PRACTITIONER

## 2022-06-24 RX ORDER — LACTULOSE 10 G/15ML
10 SOLUTION ORAL; RECTAL 3 TIMES DAILY
Qty: 5000 ML | Refills: 6 | Status: SHIPPED | OUTPATIENT
Start: 2022-06-24 | End: 2022-12-09

## 2022-06-24 NOTE — PATIENT INSTRUCTIONS
-  Repeat EGD every 2 yrs for variceal surveillance next due 3/2024.  -. Recommend HCC surveillance with AFP and ultrasound of the abdomen every six months, next due in 12/2022.  -. CBC, CMP, PT/INR every 3 months, next due 9/2022  -. Continue low-carbohydrate diet.   -. Recommend high protein, low salt diet (2 gm of sodium per day or less).  -. Avoid alcohol and herbal supplements/alternative remedies.  - Continue weekly weights - call the office if you gain more than 3-5 pounds in 1 week.  - Start Lactulose 10 grams/15 mL up to three times per day to treatment Hepatic Encephalopathy. Titrate dose/frequency to achieve 2-3 soft bowel movements/day.  - Return to clinic in 3 months, sooner if needed.

## 2022-06-24 NOTE — PROGRESS NOTES
"[facility] Established Patient Visit    Reason for Visit:  Cirrhosis    PCP: Garrett Webb    HPI:  This is a 62 y.o. female with PMH noted below, here for follow up for DURAN Cirrhosis. She is accompanied by her , and was last seen in clinic by myself in 12/2021. She was previously evaluated for bariatric surgery, but was declined for surgery due to advanced liver disease. Her weight has remained stable (280's-290's) since last visit. Most recent US showed cirrhosis and portal hypertension with no ascites. US did show "isoechoic solid nodule along the anterior border of the lateral segment of left hepatic lobe". AFP normal. Follow up MRI showed "no focal mass of the liver parenchyma is identified; the nodule seen on the prior ultrasound is felt to have been a a contour nodule of the cirrhotic liver". LFT's and platelet counts are stable. MELD-Na 11. EGD in 1/2020 showed Grade 1 EV and PHG. Repeat exam in March showed Grade 1 EV. She endorses brain fog and slowed mentation, and chronic lower extremity edema (on low dose HCTZ), but denies jaundice, dark urine, abdominal distention, hematemesis, or melena.    MELD-Na score: 11 at 6/17/2022  9:49 AM  MELD score: 11 at 6/17/2022  9:49 AM  Calculated from:  Serum Creatinine: 0.7 mg/dL (Using min of 1 mg/dL) at 6/17/2022  9:49 AM  Serum Sodium: 143 mmol/L (Using max of 137 mmol/L) at 6/17/2022  9:49 AM  Total Bilirubin: 1.8 mg/dL at 6/17/2022  9:49 AM  INR(ratio): 1.2 at 6/17/2022  9:49 AM  Age: 62 years    PMHX:  has a past medical history of Abnormal Pap smear, Abnormal Pap smear of cervix, Anemia, Arthritis, Asthma, Autoimmune disease, Blood transfusion, Bronchitis, Cancer (1987), Cervical neck pain with evidence of disc disease (3/22/2012), Cirrhosis, Colon polyps (3/22/2012), Depression (3/22/2012), Diverticular disease (3/22/2012), Fatty liver (3/22/2012), Fibrocystic breast, Fine tremor, Hepatosplenomegaly, Hypertension (4/24/2013), Knee " pain, bilateral, Lesion of right lung, Liver disease, Migraines (past Hx), Nephrolithiasis, Pleural effusion, PONV (postoperative nausea and vomiting), Postpartum depression, Splenomegaly, Thrombocytopenia, and Tremors of nervous system.    PSHX:  has a past surgical history that includes Cervical conization w/ laser; Tubal ligation; Endometrial ablation;  section; Pelvic laparoscopy; Tonsillectomy; Adenoidectomy; Liver biopsy; Colonoscopy (N/A, 2016); Embolization (N/A, 2018); Esophagogastroduodenoscopy (N/A, 2020); Colonoscopy (N/A, 2/3/2022); and Esophagogastroduodenoscopy (N/A, 3/8/2022).    The patient's social and family histories were reviewed by me and updated in the appropriate section of the electronic medical record.    Review of patient's allergies indicates:   Allergen Reactions    No known drug allergies      Current Outpatient Medications on File Prior to Visit   Medication Sig Dispense Refill    albuterol (PROVENTIL) 2.5 mg /3 mL (0.083 %) nebulizer solution Inhale the contents of 1 vial (3 mLs) by nebulization every 6 (six) hours as needed for Wheezing. 75 mL 4    ALPRAZolam (XANAX) 0.25 MG tablet Take 1 tablet (0.25 mg total) by mouth nightly as needed. FOR INSOMNIA 30 tablet 2    buPROPion (WELLBUTRIN XL) 150 MG TB24 tablet Take 2 tablets (300 mg total) by mouth once daily. 180 tablet 3    cetirizine (ZYRTEC) 10 MG tablet Take 10 mg by mouth once daily.      fluconazole (DIFLUCAN) 150 MG Tab Take 1 tablet (150 mg total) by mouth once daily. (Patient taking differently: Take 150 mg by mouth daily as needed.) 1 tablet 1    hydroCHLOROthiazide (HYDRODIURIL) 12.5 MG Tab Take 1 tablet (12.5 mg total) by mouth once daily. 90 tablet 0    loratadine (CLARITIN) 10 mg tablet Take 10 mg by mouth once daily.       multivitamin (THERAGRAN) per tablet Take 1 tablet by mouth once daily.      nystatin-triamcinolone (MYCOLOG II) cream Apply to affected area twice daily as needed  (Patient taking differently: Apply to affected area twice daily as needed) 60 g 3    oxybutynin (DITROPAN XL) 10 MG 24 hr tablet Take 1 tablet (10 mg total) by mouth once daily. 90 tablet 3    propranoloL (INDERAL LA) 60 MG 24 hr capsule Take 1 capsule (60 mg total) by mouth once daily. 30 capsule 11    triamcinolone (NASACORT) 55 mcg nasal inhaler Use 2 sprays by Nasal route once daily. 17 g 12    vitamin E 400 UNIT capsule Take 400 Units by mouth once daily.      diclofenac sodium (VOLTAREN) 1 % Gel Apply 2 g topically 3 (three) times daily. 1 Tube 3     Current Facility-Administered Medications on File Prior to Visit   Medication Dose Route Frequency Provider Last Rate Last Admin    EUFLEXXA 10 mg/mL(mw 2.4 -3.6 million) injection Syrg 20 mg  20 mg Intra-articular  Pop Schneider MD   20 mg at 17 1431     SOCIAL HISTORY:   Social History     Tobacco Use   Smoking Status Former Smoker    Quit date: 2/3/2006    Years since quittin.3   Smokeless Tobacco Never Used     Social History     Substance and Sexual Activity   Alcohol Use Not Currently     Social History     Substance and Sexual Activity   Drug Use No     ROS:   GENERAL: Denies fever, chills, weight loss/gain, fatigue  HEENT: Denies headaches, dizziness, vision/hearing changes  CARDIOVASCULAR: Denies chest pain, palpitations, + lower extremity edema  RESPIRATORY: Denies dyspnea, cough  GI: Denies abdominal pain, rectal bleeding, nausea, vomiting. No change in bowel pattern or color  : Denies dysuria, hematuria   SKIN: Denies rash, itching   NEURO: Denies memory loss, or mood changes + brain fog/slowed mentation  PSYCH: Denies depression or anxiety  HEME/LYMPH: Denies easy bruising or bleeding    Objective Findings:    PHYSICAL EXAM:   Friendly White female, in no acute distress; alert and oriented to person, place and time.  VITALS: BP (!) 187/87 (BP Location: Left arm, Patient Position: Sitting, BP Method: Large (Automatic))    "Pulse 76   Temp 96.6 °F (35.9 °C) (Oral)   Resp 18   Ht 5' 10" (1.778 m)   Wt 133.8 kg (294 lb 15.6 oz)   SpO2 97%   BMI 42.32 kg/m² (Not done Telehealth visit).  HENT: Normocephalic.   EYES: Anicertic sclerae.  NECK: No obvious masses.  CARDIOVASCULAR: RRR. + Systolic murmur noted - last Echo showed EF 60-65%.  RESPIRATORY: Normal respiratory effort.   GI: Soft, obese abdomen.  EXTREMITIES: + Lower extremity edema.  SKIN: + Telangiectasis noted to chest wall   NEURO: No asterixis. Normal gait.  PSYCH:  Memory intact. Thought and speech pattern appropriate. Behavior normal. No depression or anxiety noted.    DIAGNOSTIC STUDIES:    EGD 1/28/2020:    Findings:        Grade I varices were found in the lower third of the esophagus.        The entire examined stomach was normal except mild portal        gastropathy.        The examined duodenum was normal.   Impression:  - Grade I esophageal varices.                        - Mild portal gastropathy.                        - Normal examined duodenum.                 EGD 3/8/2022:    Findings:        Grade I varices were found in the lower third of the esophagus.        The entire examined stomach was normal.        The examined duodenum was normal.   Impression:    - Grade I esophageal varices.                          - Normal stomach.                          - Normal examined duodenum.                          - No specimens collected.     US ABDOMEN COMPLETE 6/17/2022:    FINDINGS:    Pancreas: The pancreas is normal in echogenicity without focal mass or definite pseudocyst where visualized.     Aorta: The visualized abdominal aorta is normal is caliber without evidence of aneurysm.     Liver: The liver is normal in size measuring 11.7 cm in sagittal dimension.  The liver demonstrates a diffusely coarse heterogeneous parenchymal echotexture compatible with the provided history of cirrhosis.  Today's examination reveals an approximately 25 x 23 x 22 mm isoechoic " solid nodule which protrudes into the liver capsule along the anterior border of the lateral segment of the left hepatic lobe.  Consider additional evaluation with contrast enhanced MRI of the liver to exclude abnormal arterial phase enhancement.  The portal vein is patent with normal hepatopetal flow.  There is enlargement of the main portal vein which measures 17.2 mm with quiet respiration and 17 mm with deep inspiration.  This suggest portal venous hypertension.The IVC is patent where visualized.  There is recanalization of the umbilical vein.     Biliary system: The gallbladder shows no evidence of shadowing stones, distension, pericholecystic fluid, or sonographic Acevedo sign.The common bile duct is not dilated, measuring 2 mm. No intrahepatic biliary ductal dilatation.     Kidneys: The kidneys are normal in size measuring 12.1 cm on the right and 12.7 cm on the left.No hydronephrosis, stones, or renal mass.     Spleen: The spleen isenlargedin size measuring 17.5 x 6.1 cm and shows a normal homogenous parenchymal echotexture without solid mass.  There is a calcified granuloma present within the spleen.  There are multiple splenic collaterals present.     Miscellaneous: No ascites.     Impression:     1. Sonographic findings which are compatible with hepatic cirrhosis and portal venous hypertension with interval development a new 25 x 23 x 22 mm isoechoic solid nodule along the anterior border of the lateral segment of left hepatic lobe.  Additional evaluation with contrast enhanced MRI of the liver is recommended.  2. Recanalization of the umbilical vein.  3. Splenic collateral vessels and pronounced splenomegaly in this patient with portal venous hypertension..    MRI ABDOMEN W/W/O CONTRAST 6/21/2022:    FINDINGS:    The liver is small quite nodular and heterogeneous in signal intensity throughout.  There is recanalization of the umbilical artery enlarged varicoceles in the abdominal wall.  There or splenic  varices noted..  No focal mass of the liver parenchyma is identified.  The nodule seen on the prior ultrasound is felt to have been a a contour nodule of the cirrhotic liver.     There is splenomegaly measuring 18 cm front to back without a focal mass seen.     The kidneys are of normal size contour and CT density without mass stone or hydronephrosis.  The abdominal aorta and inferior vena cava are of normal caliber.  The pancreas is of normal contour and CT density without edema or mass.     Impression:     Severe cirrhosis with the a quite heterogeneous lobular liver.  A focal mass however is not identified.  Splenomegaly.  There is recanalization of the umbilical artery with abdominal wall varices.  There are splenic varices noted.    ASSESSMENT & PLAN:  62 y.o. White female with:    1. Cirrhosis of liver without ascites, unspecified hepatic cirrhosis type  Comprehensive Metabolic Panel    CBC Auto Differential    Protime-INR    US Abdomen Complete    Hemoglobin A1C    AMMONIA   2. Nonalcoholic steatohepatitis  Comprehensive Metabolic Panel    CBC Auto Differential    Protime-INR    US Abdomen Complete    Hemoglobin A1C    AMMONIA   3. Esophageal varices determined by endoscopy  Comprehensive Metabolic Panel    CBC Auto Differential    Protime-INR    US Abdomen Complete    Hemoglobin A1C    AMMONIA   4. Hepatic encephalopathy  lactulose (CHRONULAC) 10 gram/15 mL solution    Hemoglobin A1C    AMMONIA   5. Encounter for follow-up surveillance of liver cancer  AMMONIA     -  Repeat EGD every 2 yrs for variceal surveillance, next due 3/2024.  -. Recommend HCC surveillance with AFP and ultrasound of the abdomen every six months, next due in 12/2022.  -. CBC, CMP, PT/INR every 3 months, next due 9/2022.  -. Continue low-carbohydrate diet.   -. Recommend high protein, low salt diet (2 gm of sodium per day or less).  -. Avoid alcohol and herbal supplements/alternative remedies.  - Continue weekly weights - call the  office if you gain more than 3-5 pounds in 1 week.  - Start Lactulose 10 grams/15 mL PO TID for the treatment of presumed Hepatic Encephalopathy - titrate dose/frequency to achieve 2-3 soft bowel movements per day.    Follow up in about 3 months (around 9/24/2022)., with labs drawn prior to visit.     Thank you for allowing me to participate in the care of Yumiko Gonzalez       Hepatology Nurse Practitioner  [facility]  6/24/2022 @ 1860    CC'ed note to:   No ref. provider found  Garrett Webb MD

## 2022-07-12 ENCOUNTER — OFFICE VISIT (OUTPATIENT)
Dept: PULMONOLOGY | Facility: CLINIC | Age: 62
End: 2022-07-12
Payer: COMMERCIAL

## 2022-07-12 ENCOUNTER — PATIENT MESSAGE (OUTPATIENT)
Dept: HEPATOLOGY | Facility: CLINIC | Age: 62
End: 2022-07-12
Payer: COMMERCIAL

## 2022-07-12 VITALS — WEIGHT: 288.5 LBS | BODY MASS INDEX: 41.3 KG/M2 | HEIGHT: 70 IN

## 2022-07-12 DIAGNOSIS — J47.9 BRONCHIECTASIS WITHOUT COMPLICATION: Primary | ICD-10-CM

## 2022-07-12 DIAGNOSIS — R91.8 ABNORMAL CT SCAN OF LUNG: ICD-10-CM

## 2022-07-12 DIAGNOSIS — J42 CHRONIC BRONCHITIS, UNSPECIFIED CHRONIC BRONCHITIS TYPE: ICD-10-CM

## 2022-07-12 DIAGNOSIS — G47.30 SLEEP APNEA, UNSPECIFIED TYPE: ICD-10-CM

## 2022-07-12 DIAGNOSIS — R91.8 LUNG NODULES: ICD-10-CM

## 2022-07-12 PROCEDURE — 1159F MED LIST DOCD IN RCRD: CPT | Mod: CPTII,S$GLB,, | Performed by: INTERNAL MEDICINE

## 2022-07-12 PROCEDURE — 99205 PR OFFICE/OUTPT VISIT, NEW, LEVL V, 60-74 MIN: ICD-10-PCS | Mod: S$GLB,,, | Performed by: INTERNAL MEDICINE

## 2022-07-12 PROCEDURE — 99999 PR PBB SHADOW E&M-EST. PATIENT-LVL IV: CPT | Mod: PBBFAC,,, | Performed by: INTERNAL MEDICINE

## 2022-07-12 PROCEDURE — 99999 PR PBB SHADOW E&M-EST. PATIENT-LVL IV: ICD-10-PCS | Mod: PBBFAC,,, | Performed by: INTERNAL MEDICINE

## 2022-07-12 PROCEDURE — 99205 OFFICE O/P NEW HI 60 MIN: CPT | Mod: S$GLB,,, | Performed by: INTERNAL MEDICINE

## 2022-07-12 PROCEDURE — 3008F BODY MASS INDEX DOCD: CPT | Mod: CPTII,S$GLB,, | Performed by: INTERNAL MEDICINE

## 2022-07-12 PROCEDURE — 1159F PR MEDICATION LIST DOCUMENTED IN MEDICAL RECORD: ICD-10-PCS | Mod: CPTII,S$GLB,, | Performed by: INTERNAL MEDICINE

## 2022-07-12 PROCEDURE — 3008F PR BODY MASS INDEX (BMI) DOCUMENTED: ICD-10-PCS | Mod: CPTII,S$GLB,, | Performed by: INTERNAL MEDICINE

## 2022-07-12 RX ORDER — ALBUTEROL SULFATE 90 UG/1
AEROSOL, METERED RESPIRATORY (INHALATION)
Qty: 18 G | Refills: 11 | Status: SHIPPED | OUTPATIENT
Start: 2022-07-12 | End: 2023-10-03 | Stop reason: SDUPTHER

## 2022-07-12 RX ORDER — FLUTICASONE FUROATE, UMECLIDINIUM BROMIDE AND VILANTEROL TRIFENATATE 200; 62.5; 25 UG/1; UG/1; UG/1
1 POWDER RESPIRATORY (INHALATION) DAILY
Qty: 60 EACH | Refills: 11 | Status: SHIPPED | OUTPATIENT
Start: 2022-07-12 | End: 2024-01-17

## 2022-07-12 NOTE — PROGRESS NOTES
"7/12/2022    Yumiko Gonzalez  New Patient Consult    Chief Complaint   Patient presents with    Abnormal Ct Scan     Scan done on 6/15/2022 -        HPI: pt had walking pneumonia as kid and another pneumonia as adult.  Pt had fatty liver and later developed cirrhosis.     In 2009 saw Dr Goode for  rul centrally calcified lung mass  And rll density also.  rul density cavitated in May 2018 , was followed up in Nov and looked to be progressively smaller.    Pt had hemoptysis with tablespoon /cough for wks -- none in last 3- 4 yrs--- ppt eval and considered for embolization.  Dr Goode may considered interventional radiology yrs ago.      Pt has white mucous in am.       Had bronchitis in  Past with reduction post pneumovax around age 49.     Has chr edema.      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, postitive antismooth muscle antibody    Blood transfusion     Bronchitis     bronchospasm on occasion     Cancer 1987    carcinoma in situ    Cervical neck pain with evidence of disc disease 3/22/2012    can bend neck    Cirrhosis     non-alcoholic, compensated    Colon polyps 3/22/2012    Depression 3/22/2012    Hx panic attacks    Diverticular disease 3/22/2012    never diverticulitis    Fatty liver 3/22/2012    Fibrocystic breast     Fine tremor     hands,chronic    Hepatosplenomegaly     Hypertension 4/24/2013    Knee pain, bilateral     chronic, with hands,feet,fingers also painful    Lesion of right lung     Liver disease     "partially sclerosed liver" per pt    Migraines past Hx    Nephrolithiasis     stone episode 1/2021    Pleural effusion     small, compensated, good bilateral breath sounds per Dr Suresh GUTIERRES (postoperative " nausea and vomiting)     twice after childbirth    Postpartum depression     Splenomegaly     Thrombocytopenia     Tremors of nervous system          Past Surgical History:   Procedure Laterality Date    ADENOIDECTOMY      CERVICAL CONIZATION   W/ LASER       SECTION      x3    COLONOSCOPY N/A 2016    Procedure: COLONOSCOPY;  Surgeon: James Palomares MD;  Location: [facility] ENDO;  Service: Endoscopy;  Laterality: N/A;    COLONOSCOPY N/A 2/3/2022    Procedure: COLONOSCOPY;  Surgeon: James Palomares MD;  Location: [facility] ENDO;  Service: Endoscopy;  Laterality: N/A;    EMBOLIZATION N/A 2018    Procedure: EMBOLIZATION, BLOOD VESSEL;  Surgeon: Joselin Surgeon;  Location: [facility];  Service: Radiology;  Laterality: N/A;    ENDOMETRIAL ABLATION      ESOPHAGOGASTRODUODENOSCOPY N/A 2020    Procedure: ESOPHAGOGASTRODUODENOSCOPY (EGD);  Surgeon: James Palomares MD;  Location: [facility];  Service: Endoscopy;  Laterality: N/A;    ESOPHAGOGASTRODUODENOSCOPY N/A 3/8/2022    Procedure: EGD (ESOPHAGOGASTRODUODENOSCOPY);  Surgeon: James Palomares MD;  Location: [facility];  Service: Endoscopy;  Laterality: N/A;    LIVER BIOPSY      PELVIC LAPAROSCOPY      TONSILLECTOMY      TUBAL LIGATION       Social History     Tobacco Use    Smoking status: Former Smoker     Quit date: 2/3/2006     Years since quittin.4    Smokeless tobacco: Never Used   Substance Use Topics    Alcohol use: Not Currently    Drug use: No     Family History   Problem Relation Age of Onset    Breast cancer Maternal Grandmother 70    Diabetes Father     Heart disease Father     Breast cancer Mother 70    Heart disease Mother     Hypertension Mother     Tremor Mother     Diabetes Brother     Diabetes Sister     Cancer Sister     Breast cancer Maternal Aunt 70    Multiple sclerosis Maternal Aunt     Breast cancer Sister     Diabetes Sister     Emphysema Brother     Breast cancer Maternal Cousin 40  "   Tremor Daughter     Multiple sclerosis Maternal Aunt     Ovarian cancer Neg Hx     Parkinsonism Neg Hx     Glaucoma Neg Hx      Review of patient's allergies indicates:   Allergen Reactions    No known drug allergies        Performance Status:The patient's activity level is functions out of house.      Review of Systems:  a review of eleven systems covering constitutional, Eye, HEENT, Psych, Respiratory, Cardiac, GI, , Musculoskeletal, Endocrine, Dermatologic was negative except for pertinent findings as listed ABOVE and below:  pertinent positive as above, rest is good       Exam:Comprehensive exam done. Ht 5' 10" (1.778 m)   Wt 130.8 kg (288 lb 7.6 oz)   BMI 41.39 kg/m²   Exam included Vitals as listed, and patient's appearance and affect and alertness and mood, oral exam for yeast and hygiene and pharynx lesions and Mallapatti (M) score, neck with inspection for jvd and masses and thyroid abnormalities and lymph nodes (supraclavicular and infraclavicular nodes and axillary also examined and noted if abn), chest exam included symmetry and effort and fremitus and percussion and auscultation, cardiac exam included rhythm and gallops and murmur and rubs and jvd and edema, abdominal exam for mass and hepatosplenomegaly and tenderness and hernias and bowel sounds, Musculoskeletal exam with muscle tone and posture and mobility/gait and  strength, and skin for rashes and cyanosis and pallor and turgor, extremity for clubbing.  Findings were normal except for pertinent findings listed below:  M2, chest is symmetric, no distress, normal percussion, normal fremitus and good normal breath sounds  Edema +2, rest good       Radiographs (ct chest and cxr) reviewed: view by direct vision  Ct chest 2009 with large centrally calcified rounded density rll post lat, last ct chest 6/15/2022 smaller mass with central calcification  And pleural thickening.  Bronchiectasis seen in 11/2018    Labs reviewed           PFT " was not done       Plan:  Clinical impression is apparently straight forward and impression with management as below.     Yumiko was seen today for abnormal ct scan.    Diagnoses and all orders for this visit:    Bronchiectasis without complication  -     Alpha 1 Antitrypsin Phenotype; Future    Abnormal CT scan of lung  -     Ambulatory referral/consult to Pulmonology    Lung nodules    Sleep apnea, unspecified type    Chronic bronchitis, unspecified chronic bronchitis type  -     fluticasone-umeclidin-vilanter (TRELEGY ELLIPTA) 200-62.5-25 mcg inhaler; Inhale 1 puff into the lungs once daily.  -     Culture, Respiratory with Gram Stain; Future  -     albuterol (PROVENTIL/VENTOLIN HFA) 90 mcg/actuation inhaler; 2 puffs every 4 hours as needed for cough, wheeze, or shortness of breath        Follow up if symptoms worsen or fail to improve.    Discussed with patient above for education the following:      Patient Instructions   You have had calcified densities in right upper lobe and right lower lobe.  The abnormalities should have been there over 15 yrs when initially found in 2009.    Some areas of bronchiectasis are seen -- not impressive- may  cause chr mucous and cough up blood.    You had break down of areas -- alma right upper lung spot-- with reduction in size -- 2018 and seems stable at June 2022.    You may have sleep apnea-- over 5 times an hour considered abnormal. Would recommend home study.     Would order home sleep study -- needs to be done in November - at [facility].    With h/o short breath --try trelegy once daily -- continue If helps

## 2022-07-12 NOTE — PATIENT INSTRUCTIONS
You have had calcified densities in right upper lobe and right lower lobe.  The abnormalities should have been there over 15 yrs when initially found in 2009.    Some areas of bronchiectasis are seen -- not impressive- may  cause chr mucous and cough up blood.    You had break down of areas -- alma right upper lung spot-- with reduction in size -- 2018 and seems stable at June 2022.    You may have sleep apnea-- over 5 times an hour considered abnormal. Would recommend home study.     Would order home sleep study -- needs to be done in November - at [facility].    With h/o short breath --try trelegy once daily -- continue If helps

## 2022-07-13 ENCOUNTER — OFFICE VISIT (OUTPATIENT)
Dept: ORTHOPEDICS | Facility: CLINIC | Age: 62
End: 2022-07-13
Payer: COMMERCIAL

## 2022-07-13 VITALS — RESPIRATION RATE: 18 BRPM | WEIGHT: 288 LBS | BODY MASS INDEX: 41.23 KG/M2 | HEIGHT: 70 IN

## 2022-07-13 DIAGNOSIS — M17.0 BILATERAL PRIMARY OSTEOARTHRITIS OF KNEE: Primary | ICD-10-CM

## 2022-07-13 PROCEDURE — 1160F RVW MEDS BY RX/DR IN RCRD: CPT | Mod: CPTII,S$GLB,, | Performed by: ORTHOPAEDIC SURGERY

## 2022-07-13 PROCEDURE — 3008F BODY MASS INDEX DOCD: CPT | Mod: CPTII,S$GLB,, | Performed by: ORTHOPAEDIC SURGERY

## 2022-07-13 PROCEDURE — 1159F PR MEDICATION LIST DOCUMENTED IN MEDICAL RECORD: ICD-10-PCS | Mod: CPTII,S$GLB,, | Performed by: ORTHOPAEDIC SURGERY

## 2022-07-13 PROCEDURE — 99999 PR PBB SHADOW E&M-EST. PATIENT-LVL III: ICD-10-PCS | Mod: PBBFAC,,, | Performed by: ORTHOPAEDIC SURGERY

## 2022-07-13 PROCEDURE — 1160F PR REVIEW ALL MEDS BY PRESCRIBER/CLIN PHARMACIST DOCUMENTED: ICD-10-PCS | Mod: CPTII,S$GLB,, | Performed by: ORTHOPAEDIC SURGERY

## 2022-07-13 PROCEDURE — 20610 DRAIN/INJ JOINT/BURSA W/O US: CPT | Mod: 50,S$GLB,, | Performed by: ORTHOPAEDIC SURGERY

## 2022-07-13 PROCEDURE — 20610 LARGE JOINT ASPIRATION/INJECTION: BILATERAL KNEE: ICD-10-PCS | Mod: 50,S$GLB,, | Performed by: ORTHOPAEDIC SURGERY

## 2022-07-13 PROCEDURE — 1159F MED LIST DOCD IN RCRD: CPT | Mod: CPTII,S$GLB,, | Performed by: ORTHOPAEDIC SURGERY

## 2022-07-13 PROCEDURE — 99499 NO LOS: ICD-10-PCS | Mod: S$GLB,,, | Performed by: ORTHOPAEDIC SURGERY

## 2022-07-13 PROCEDURE — 99499 UNLISTED E&M SERVICE: CPT | Mod: S$GLB,,, | Performed by: ORTHOPAEDIC SURGERY

## 2022-07-13 PROCEDURE — 99999 PR PBB SHADOW E&M-EST. PATIENT-LVL III: CPT | Mod: PBBFAC,,, | Performed by: ORTHOPAEDIC SURGERY

## 2022-07-13 PROCEDURE — 3008F PR BODY MASS INDEX (BMI) DOCUMENTED: ICD-10-PCS | Mod: CPTII,S$GLB,, | Performed by: ORTHOPAEDIC SURGERY

## 2022-07-13 NOTE — PROCEDURES
Large Joint Aspiration/Injection: bilateral knee    Date/Time: 7/13/2022 1:15 PM  Performed by: Pop Schneider MD  Authorized by: Pop Schneider MD     Consent Done?:  Yes (Verbal)  Indications:  Pain  Site marked: the procedure site was marked    Timeout: prior to procedure the correct patient, procedure, and site was verified      Details:  Needle Size:  18 G  Approach:  Anterolateral  Location:  Knee  Laterality:  Bilateral  Site:  Bilateral knee  Medications (Right):  48 mg hylan g-f 20 48 mg/6 mL  Medications (Left):  48 mg hylan g-f 20 48 mg/6 mL  Patient tolerance:  Patient tolerated the procedure well with no immediate complications

## 2022-07-13 NOTE — PROGRESS NOTES
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, postitive antismooth muscle antibody    Blood transfusion     Bronchitis     bronchospasm on occasion     Cancer 1987    carcinoma in situ    Cervical neck pain with evidence of disc disease 3/22/2012    can bend neck    Cirrhosis     non-alcoholic, compensated    Colon polyps 3/22/2012    Depression 3/22/2012    Hx panic attacks    Diverticular disease 3/22/2012    never diverticulitis    Fatty liver 3/22/2012    Fibrocystic breast     Fine tremor     hands,chronic    Hepatosplenomegaly     Hypertension 2013    Knee pain, bilateral     chronic, with hands,feet,fingers also painful    Lesion of right lung     Liver disease     "partially sclerosed liver" per pt    Migraines past Hx    Nephrolithiasis     stone episode 2021    Pleural effusion     small, compensated, good bilateral breath sounds per Dr Suresh GUTIERRES (postoperative nausea and vomiting)     twice after childbirth    Postpartum depression     Splenomegaly     Thrombocytopenia     Tremors of nervous system        Past Surgical History:   Procedure Laterality Date    ADENOIDECTOMY      CERVICAL CONIZATION   W/ LASER       SECTION      x3    COLONOSCOPY N/A 2016    Procedure: COLONOSCOPY;  Surgeon: James Palomares MD;  Location: [facility] ENDO;  Service: Endoscopy;  Laterality: N/A;    COLONOSCOPY N/A 2/3/2022    Procedure: COLONOSCOPY;  Surgeon: James Palomares MD;  Location: [facility] ENDO;  Service: Endoscopy;  Laterality: N/A;    EMBOLIZATION N/A 2018    Procedure: EMBOLIZATION, BLOOD VESSEL;  Surgeon: Joselin Surgeon;  Location: [facility];  Service: Radiology;  Laterality: N/A;    ENDOMETRIAL ABLATION      ESOPHAGOGASTRODUODENOSCOPY N/A 2020    Procedure: " ESOPHAGOGASTRODUODENOSCOPY (EGD);  Surgeon: James Palomares MD;  Location: [facility];  Service: Endoscopy;  Laterality: N/A;    ESOPHAGOGASTRODUODENOSCOPY N/A 3/8/2022    Procedure: EGD (ESOPHAGOGASTRODUODENOSCOPY);  Surgeon: James Palomares MD;  Location: [facility];  Service: Endoscopy;  Laterality: N/A;    LIVER BIOPSY      PELVIC LAPAROSCOPY      TONSILLECTOMY      TUBAL LIGATION         Current Outpatient Medications   Medication Sig    albuterol (PROVENTIL) 2.5 mg /3 mL (0.083 %) nebulizer solution Inhale the contents of 1 vial (3 mLs) by nebulization every 6 (six) hours as needed for Wheezing.    albuterol (PROVENTIL/VENTOLIN HFA) 90 mcg/actuation inhaler Inhale 2 puffs every 4 hours as needed for cough, wheeze, or shortness of breath    ALPRAZolam (XANAX) 0.25 MG tablet Take 1 tablet (0.25 mg total) by mouth nightly as needed. FOR INSOMNIA    buPROPion (WELLBUTRIN XL) 150 MG TB24 tablet Take 2 tablets (300 mg total) by mouth once daily.    cetirizine (ZYRTEC) 10 MG tablet Take 10 mg by mouth once daily.    diclofenac sodium (VOLTAREN) 1 % Gel Apply 2 g topically 3 (three) times daily.    fluconazole (DIFLUCAN) 150 MG Tab Take 1 tablet (150 mg total) by mouth once daily. (Patient taking differently: Take 150 mg by mouth daily as needed.)    fluticasone-umeclidin-vilanter (TRELEGY ELLIPTA) 200-62.5-25 mcg inhaler Inhale 1 puff into the lungs once daily.    hydroCHLOROthiazide (HYDRODIURIL) 12.5 MG Tab Take 1 tablet (12.5 mg total) by mouth once daily.    lactulose (CHRONULAC) 10 gram/15 mL solution Take 15 mLs (10 g total) by mouth 3 (three) times daily. Adjust dose for goal Goal of 2-3 soft bowel movements daily.    loratadine (CLARITIN) 10 mg tablet Take 10 mg by mouth once daily.     multivitamin (THERAGRAN) per tablet Take 1 tablet by mouth once daily.    nystatin-triamcinolone (MYCOLOG II) cream Apply to affected area twice daily as needed (Patient taking differently: Apply to  affected area twice daily as needed)    oxybutynin (DITROPAN XL) 10 MG 24 hr tablet Take 1 tablet (10 mg total) by mouth once daily.    propranoloL (INDERAL LA) 60 MG 24 hr capsule Take 1 capsule (60 mg total) by mouth once daily.    triamcinolone (NASACORT) 55 mcg nasal inhaler Use 2 sprays by Nasal route once daily.    vitamin E 400 UNIT capsule Take 400 Units by mouth once daily.     No current facility-administered medications for this visit.       Review of patient's allergies indicates:   Allergen Reactions    No known drug allergies        Family History   Problem Relation Age of Onset    Breast cancer Maternal Grandmother 70    Diabetes Father     Heart disease Father     Breast cancer Mother 70    Heart disease Mother     Hypertension Mother     Tremor Mother     Diabetes Brother     Diabetes Sister     Cancer Sister     Breast cancer Maternal Aunt 70    Multiple sclerosis Maternal Aunt     Breast cancer Sister     Diabetes Sister     Emphysema Brother     Breast cancer Maternal Cousin 40    Tremor Daughter     Multiple sclerosis Maternal Aunt     Ovarian cancer Neg Hx     Parkinsonism Neg Hx     Glaucoma Neg Hx        Social History     Socioeconomic History    Marital status:    Occupational History    Occupation: [employer] employee x 30 yrs   Tobacco Use    Smoking status: Former Smoker     Quit date: 2/3/2006     Years since quittin.4    Smokeless tobacco: Never Used   Substance and Sexual Activity    Alcohol use: Not Currently    Drug use: No    Sexual activity: Not Currently       Chief Complaint:   Chief Complaint   Patient presents with    Injections     bilateral synvisc one inj     History of present illness: 62-year-old female seen for recurrence of left knee pain.  We saw her 5 years ago for a similar problem.  We treated her with Euflexxa which worked well for several years.  Pain is back up to 5/10.  It is been ongoing now for about 6-9 months.  Pain  with prolonged sitting to standing.        Answers for HPI/ROS submitted by the patient on 6/22/2022  unexpected weight change: No  appetite change : No  sleep disturbance: No  IMMUNOCOMPROMISED: No  nervous/ anxious: Yes  dysphoric mood: No  rash: No  visual disturbance: No  eye redness: No  eye pain: No  ear pain: No  tinnitus: No  hearing loss: No  sinus pressure : Yes  nosebleeds: No  enviro allergies: Yes  food allergies: No  cough: No  shortness of breath: No  sweating: Yes  dysuria: No  frequency: Yes  difficulty urinating: No  hematuria: No  painful intercourse: No  chest pain: No  palpitations: No  nausea: No  vomiting: No  diarrhea: Yes  blood in stool: No  constipation: Yes  headaches: Yes  dizziness: No  numbness: No  seizures: No  joint swelling: No  myalgia: Yes  weakness: Yes  back pain: Yes  Pain Chronicity: chronic  History of trauma: No  Onset: more than 1 year ago  Frequency: constantly  Progression since onset: gradually worsening  injury location: at home  pain- numeric: 6/10  pain location: left knee  pain quality: aching, sharp, throbbing  Radiating Pain: No  Aggravating factors: activity, bending, bearing weight, cold, twisting, sitting  fever: No  inability to bear weight: Yes  itching: No  joint locking: Yes  limited range of motion: Yes  stiffness: Yes  tingling: No  Treatments tried: heat, injection treatment, NSAIDs, OTC pain meds  physical therapy: not tried  Improvement on treatment: mild          Physical Examination:    Vital Signs:    Vitals:    07/13/22 1315   Resp: 18       Body mass index is 41.32 kg/m².    This a well-developed, well nourished patient in no acute distress.  They are alert and oriented and cooperative to examination.  Pt. walks without an antalgic gait.      Examination of the left knee shows no rashes or erythema. There are no masses ecchymosis and trace effusion. Patient has full range of motion from 0-130°. Patient is nontender to palpation over lateral joint  line and nontender to palpation over the medial joint line.  Knee is stable to varus and valgus stress. 5 out of 5 motor strength. Palpable distal pulses. Intact light touch sensation. Negative Patellofemoral crepitus and pain with knee extension.       X-rays: X-rays left knee are reviewed which show severe lateral compartment narrowing of the left knee and more moderate lateral compartment narrowing of the right knee.  Significant progression when compared x-rays from 5 years ago.    MRI of the left knee from 2017:Partial distal ACL tear with likely associated contusions in the lateral compartment.  Tricompartmental osteoarthritis and evidence of lateral meniscal tearing. There is a moderate joint effusion and there is subcutaneous edema.     Assessment:: Left severe valgus knee arthritis    Plan:   I reviewed the x-rays with her today.  Patient has significant progression of the arthritic changes in her knee.  Injected both knees with Synvisc-One today.  Follow-up in 6 weeks to see how she is doing.    The patient has tried a self guided exercise program that has included walking without significant improvement. Minimal relief with tylenol or OTC Nsaids. Reports less than 8 weeks relief with IA steroid injection. Kellgren Mookie scale of 4. They are not receiving another HA injectable at this time. I will precert for gel injection.       This note was created using  voice recognition software that occasionally misinterpreted phrases or words.    Consult note is delivered via Epic messaging service.

## 2022-07-18 ENCOUNTER — PATIENT MESSAGE (OUTPATIENT)
Dept: PRIMARY CARE CLINIC | Facility: CLINIC | Age: 62
End: 2022-07-18
Payer: COMMERCIAL

## 2022-08-10 ENCOUNTER — PATIENT MESSAGE (OUTPATIENT)
Dept: PRIMARY CARE CLINIC | Facility: CLINIC | Age: 62
End: 2022-08-10
Payer: COMMERCIAL

## 2022-08-17 ENCOUNTER — HOSPITAL ENCOUNTER (OUTPATIENT)
Dept: RADIOLOGY | Facility: HOSPITAL | Age: 62
Discharge: HOME | End: 2022-08-17
Attending: FAMILY MEDICINE
Payer: COMMERCIAL

## 2022-08-17 ENCOUNTER — OFFICE VISIT (OUTPATIENT)
Dept: FAMILY MEDICINE | Facility: CLINIC | Age: 62
End: 2022-08-17
Payer: COMMERCIAL

## 2022-08-17 VITALS
OXYGEN SATURATION: 97 % | DIASTOLIC BLOOD PRESSURE: 74 MMHG | HEART RATE: 57 BPM | WEIGHT: 288 LBS | HEIGHT: 70 IN | BODY MASS INDEX: 41.23 KG/M2 | SYSTOLIC BLOOD PRESSURE: 128 MMHG

## 2022-08-17 DIAGNOSIS — S69.92XA INJURY OF LEFT WRIST, INITIAL ENCOUNTER: ICD-10-CM
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
## 2022-08-17 DIAGNOSIS — S89.91XA INJURY OF RIGHT KNEE, INITIAL ENCOUNTER: ICD-10-CM

## 2022-08-17 DIAGNOSIS — M25.561 ACUTE PAIN OF RIGHT KNEE: ICD-10-CM

## 2022-08-17 DIAGNOSIS — M25.532 LEFT WRIST PAIN: ICD-10-CM

## 2022-08-17 PROCEDURE — 73110 XR WRIST COMPLETE 3 VIEWS LEFT: ICD-10-PCS | Mod: 26,LT,, | Performed by: RADIOLOGY

## 2022-08-17 PROCEDURE — 73110 X-RAY EXAM OF WRIST: CPT | Mod: 26,LT,, | Performed by: RADIOLOGY

## 2022-08-17 PROCEDURE — 1160F RVW MEDS BY RX/DR IN RCRD: CPT | Mod: CPTII,S$GLB,, | Performed by: PHYSICIAN ASSISTANT

## 2022-08-17 PROCEDURE — 99213 PR OFFICE/OUTPT VISIT, EST, LEVL III, 20-29 MIN: ICD-10-PCS | Mod: S$GLB,,, | Performed by: PHYSICIAN ASSISTANT

## 2022-08-17 PROCEDURE — 3074F SYST BP LT 130 MM HG: CPT | Mod: CPTII,S$GLB,, | Performed by: PHYSICIAN ASSISTANT

## 2022-08-17 PROCEDURE — 99213 OFFICE O/P EST LOW 20 MIN: CPT | Mod: S$GLB,,, | Performed by: PHYSICIAN ASSISTANT

## 2022-08-17 PROCEDURE — 73110 X-RAY EXAM OF WRIST: CPT | Mod: TC,FY,PO,LT

## 2022-08-17 PROCEDURE — 3008F PR BODY MASS INDEX (BMI) DOCUMENTED: ICD-10-PCS | Mod: CPTII,S$GLB,, | Performed by: PHYSICIAN ASSISTANT

## 2022-08-17 PROCEDURE — 1160F PR REVIEW ALL MEDS BY PRESCRIBER/CLIN PHARMACIST DOCUMENTED: ICD-10-PCS | Mod: CPTII,S$GLB,, | Performed by: PHYSICIAN ASSISTANT

## 2022-08-17 PROCEDURE — 73562 X-RAY EXAM OF KNEE 3: CPT | Mod: TC,FY,PO,RT

## 2022-08-17 PROCEDURE — 3078F DIAST BP <80 MM HG: CPT | Mod: CPTII,S$GLB,, | Performed by: PHYSICIAN ASSISTANT

## 2022-08-17 PROCEDURE — 73562 X-RAY EXAM OF KNEE 3: CPT | Mod: 26,RT,, | Performed by: RADIOLOGY

## 2022-08-17 PROCEDURE — 99999 PR PBB SHADOW E&M-EST. PATIENT-LVL IV: ICD-10-PCS | Mod: PBBFAC,,, | Performed by: PHYSICIAN ASSISTANT

## 2022-08-17 PROCEDURE — 3008F BODY MASS INDEX DOCD: CPT | Mod: CPTII,S$GLB,, | Performed by: PHYSICIAN ASSISTANT

## 2022-08-17 PROCEDURE — 99999 PR PBB SHADOW E&M-EST. PATIENT-LVL IV: CPT | Mod: PBBFAC,,, | Performed by: PHYSICIAN ASSISTANT

## 2022-08-17 PROCEDURE — 1159F PR MEDICATION LIST DOCUMENTED IN MEDICAL RECORD: ICD-10-PCS | Mod: CPTII,S$GLB,, | Performed by: PHYSICIAN ASSISTANT

## 2022-08-17 PROCEDURE — 3078F PR MOST RECENT DIASTOLIC BLOOD PRESSURE < 80 MM HG: ICD-10-PCS | Mod: CPTII,S$GLB,, | Performed by: PHYSICIAN ASSISTANT

## 2022-08-17 PROCEDURE — 1159F MED LIST DOCD IN RCRD: CPT | Mod: CPTII,S$GLB,, | Performed by: PHYSICIAN ASSISTANT

## 2022-08-17 PROCEDURE — 3074F PR MOST RECENT SYSTOLIC BLOOD PRESSURE < 130 MM HG: ICD-10-PCS | Mod: CPTII,S$GLB,, | Performed by: PHYSICIAN ASSISTANT

## 2022-08-17 PROCEDURE — 73562 XR KNEE 3 VIEW RIGHT: ICD-10-PCS | Mod: 26,RT,, | Performed by: RADIOLOGY

## 2022-08-17 RX ORDER — TIZANIDINE 2 MG/1
4 TABLET ORAL NIGHTLY PRN
Qty: 20 TABLET | Refills: 0 | Status: SHIPPED | OUTPATIENT
Start: 2022-08-17 | End: 2022-08-31 | Stop reason: SDUPTHER

## 2022-08-17 NOTE — PROGRESS NOTES
"Subjective:      Patient ID: Yumiko Gonzalez is a 62 y.o. female.    Chief Complaint: Knee Pain (Bilateral knee pain. Right side worst. Bruising on knees and lower legs. Numbness in right knee )    HPI   Patient has PMH of depression/PTSD, cervical cancer, HTN, cirrhosis of liver, and colon polyps.    Patient fell Monday night in [employer] parking lot and hurt bilateral knees and left wrist.  Taking 2 Aleve BID with some resolution of symptoms.  Did not hit head or LOC.  Reviewed xrays with patient.  No fractures.    Review of Systems   Constitutional: Negative for activity change, chills, fever and unexpected weight change.   HENT: Negative for hearing loss, rhinorrhea and trouble swallowing.    Eyes: Negative for discharge and visual disturbance.   Respiratory: Negative for chest tightness, shortness of breath and wheezing.    Cardiovascular: Negative for chest pain and palpitations.   Gastrointestinal: Negative for blood in stool, constipation, diarrhea and vomiting.   Endocrine: Negative for polydipsia and polyuria.   Genitourinary: Negative for difficulty urinating, dysuria, hematuria and menstrual problem.   Musculoskeletal: Positive for arthralgias (knee pain-right worse, wrists-left worse). Negative for joint swelling and neck pain.   Neurological: Negative for weakness and headaches.   Psychiatric/Behavioral: Negative for confusion and dysphoric mood.       Objective:   /74   Pulse (!) 57   Ht 5' 10" (1.778 m)   Wt 130.6 kg (288 lb)   SpO2 97%   BMI 41.32 kg/m²      Physical Exam  Vitals reviewed.   Constitutional:       General: She is not in acute distress.     Appearance: Normal appearance. She is well-developed.   HENT:      Head: Normocephalic and atraumatic.      Right Ear: External ear normal.      Left Ear: External ear normal.   Eyes:      Conjunctiva/sclera: Conjunctivae normal.   Cardiovascular:      Rate and Rhythm: Normal rate and regular rhythm.      Heart sounds: Normal heart " sounds. No murmur heard.    No friction rub. No gallop.   Pulmonary:      Effort: Pulmonary effort is normal. No respiratory distress.      Breath sounds: Normal breath sounds. No wheezing or rales.   Musculoskeletal:         General: Normal range of motion.      Right wrist: Normal range of motion.      Left wrist: Bony tenderness present. Normal range of motion.      Cervical back: Normal range of motion.      Right knee: Swelling (slight) and ecchymosis present. Normal range of motion. Tenderness present.      Left knee: Normal range of motion.      Right lower le+ Pitting Edema present.      Left lower le+ Pitting Edema present.   Skin:     General: Skin is warm and dry.      Findings: No rash.   Neurological:      General: No focal deficit present.      Mental Status: She is alert and oriented to person, place, and time.   Psychiatric:         Mood and Affect: Mood normal.         Behavior: Behavior normal.         Judgment: Judgment normal.        Assessment:      1. Fall, initial encounter    2. Acute pain of right knee    3. Left wrist pain       Plan:   1. Fall, initial encounter  Do not drink alcohol or drive while on medication.  Will give steroids in the future if pain persists.  - tiZANidine (ZANAFLEX) 2 MG tablet; Take 2 tablets (4 mg total) by mouth nightly as needed.  Dispense: 20 tablet; Refill: 0    2. Acute pain of right knee  Rest, ice, elevation.  Continue Aleve BID.  Discussed to watch for swelling and tenderness.    3. Left wrist pain  Ice and brace if necessary.    Follow up as needed.  Patient agreed with plan and expressed understanding.    Thank you for allowing me to serve you,

## 2022-08-22 DIAGNOSIS — M79.604 RIGHT LEG PAIN: Primary | ICD-10-CM

## 2022-08-22 NOTE — PROGRESS NOTES
Patient reports that right leg is not improving and has worsening ecchymosis, tenderness, and swelling since fall.  Ordered us of right leg veins.  Patient will get this done today or tomorrow.  Denies new shortness of breath or chest pain.    Mercy Portillo PA-C

## 2022-08-23 ENCOUNTER — PATIENT MESSAGE (OUTPATIENT)
Dept: PRIMARY CARE CLINIC | Facility: CLINIC | Age: 62
End: 2022-08-23
Payer: COMMERCIAL

## 2022-08-23 DIAGNOSIS — R60.9 EDEMA, UNSPECIFIED TYPE: Primary | ICD-10-CM

## 2022-08-24 NOTE — TELEPHONE ENCOUNTER
See mychart message:  The visible bruising may 'settle' in lower extremity over several weeks.  All though if worsening swelling I can't exclude development of DVT especially if she has been sedentary.    So I'll enter and order for venous ultrasound.  If negative then I need her to increase activity, move more, circulate the blood

## 2022-08-25 ENCOUNTER — HOSPITAL ENCOUNTER (OUTPATIENT)
Dept: RADIOLOGY | Facility: HOSPITAL | Age: 62
Discharge: HOME | End: 2022-08-25
Attending: FAMILY MEDICINE
Payer: COMMERCIAL

## 2022-08-25 DIAGNOSIS — R60.9 EDEMA, UNSPECIFIED TYPE: ICD-10-CM

## 2022-08-25 PROCEDURE — 93970 EXTREMITY STUDY: CPT | Mod: 26,,, | Performed by: RADIOLOGY

## 2022-08-25 PROCEDURE — 93970 EXTREMITY STUDY: CPT | Mod: TC,PO

## 2022-08-25 PROCEDURE — 93970 US LOWER EXTREMITY VEINS BILATERAL: ICD-10-PCS | Mod: 26,,, | Performed by: RADIOLOGY

## 2022-08-29 ENCOUNTER — PATIENT MESSAGE (OUTPATIENT)
Dept: PRIMARY CARE CLINIC | Facility: CLINIC | Age: 62
End: 2022-08-29
Payer: COMMERCIAL

## 2022-08-29 ENCOUNTER — OFFICE VISIT (OUTPATIENT)
Dept: FAMILY MEDICINE | Facility: CLINIC | Age: 62
End: 2022-08-29
Payer: COMMERCIAL

## 2022-08-29 ENCOUNTER — HOSPITAL ENCOUNTER (OUTPATIENT)
Dept: RADIOLOGY | Facility: HOSPITAL | Age: 62
Discharge: HOME | End: 2022-08-29
Attending: FAMILY MEDICINE
Payer: COMMERCIAL

## 2022-08-29 VITALS
BODY MASS INDEX: 41.31 KG/M2 | SYSTOLIC BLOOD PRESSURE: 136 MMHG | WEIGHT: 287.94 LBS | DIASTOLIC BLOOD PRESSURE: 88 MMHG | OXYGEN SATURATION: 98 % | HEART RATE: 72 BPM

## 2022-08-29 DIAGNOSIS — D69.6 THROMBOCYTOPENIA: ICD-10-CM

## 2022-08-29 DIAGNOSIS — L03.115 CELLULITIS OF RIGHT LOWER EXTREMITY: ICD-10-CM

## 2022-08-29 DIAGNOSIS — T14.8XXA HEMATOMA: ICD-10-CM

## 2022-08-29 DIAGNOSIS — L03.115 CELLULITIS OF RIGHT LOWER EXTREMITY: Primary | ICD-10-CM

## 2022-08-29 PROCEDURE — 99999 PR PBB SHADOW E&M-EST. PATIENT-LVL IV: CPT | Mod: PBBFAC,,, | Performed by: FAMILY MEDICINE

## 2022-08-29 PROCEDURE — 3075F PR MOST RECENT SYSTOLIC BLOOD PRESS GE 130-139MM HG: ICD-10-PCS | Mod: CPTII,S$GLB,, | Performed by: FAMILY MEDICINE

## 2022-08-29 PROCEDURE — 3079F DIAST BP 80-89 MM HG: CPT | Mod: CPTII,S$GLB,, | Performed by: FAMILY MEDICINE

## 2022-08-29 PROCEDURE — 3008F PR BODY MASS INDEX (BMI) DOCUMENTED: ICD-10-PCS | Mod: CPTII,S$GLB,, | Performed by: FAMILY MEDICINE

## 2022-08-29 PROCEDURE — 3075F SYST BP GE 130 - 139MM HG: CPT | Mod: CPTII,S$GLB,, | Performed by: FAMILY MEDICINE

## 2022-08-29 PROCEDURE — 93971 EXTREMITY STUDY: CPT | Mod: 26,RT,, | Performed by: RADIOLOGY

## 2022-08-29 PROCEDURE — 3079F PR MOST RECENT DIASTOLIC BLOOD PRESSURE 80-89 MM HG: ICD-10-PCS | Mod: CPTII,S$GLB,, | Performed by: FAMILY MEDICINE

## 2022-08-29 PROCEDURE — 93971 EXTREMITY STUDY: CPT | Mod: TC,PO,RT

## 2022-08-29 PROCEDURE — 3008F BODY MASS INDEX DOCD: CPT | Mod: CPTII,S$GLB,, | Performed by: FAMILY MEDICINE

## 2022-08-29 PROCEDURE — 1159F MED LIST DOCD IN RCRD: CPT | Mod: CPTII,S$GLB,, | Performed by: FAMILY MEDICINE

## 2022-08-29 PROCEDURE — 93971 US LOWER EXTREMITY VEINS RIGHT: ICD-10-PCS | Mod: 26,RT,, | Performed by: RADIOLOGY

## 2022-08-29 PROCEDURE — 99214 PR OFFICE/OUTPT VISIT, EST, LEVL IV, 30-39 MIN: ICD-10-PCS | Mod: S$GLB,,, | Performed by: FAMILY MEDICINE

## 2022-08-29 PROCEDURE — 1160F RVW MEDS BY RX/DR IN RCRD: CPT | Mod: CPTII,S$GLB,, | Performed by: FAMILY MEDICINE

## 2022-08-29 PROCEDURE — 1159F PR MEDICATION LIST DOCUMENTED IN MEDICAL RECORD: ICD-10-PCS | Mod: CPTII,S$GLB,, | Performed by: FAMILY MEDICINE

## 2022-08-29 PROCEDURE — 99214 OFFICE O/P EST MOD 30 MIN: CPT | Mod: S$GLB,,, | Performed by: FAMILY MEDICINE

## 2022-08-29 PROCEDURE — 99999 PR PBB SHADOW E&M-EST. PATIENT-LVL IV: ICD-10-PCS | Mod: PBBFAC,,, | Performed by: FAMILY MEDICINE

## 2022-08-29 PROCEDURE — 1160F PR REVIEW ALL MEDS BY PRESCRIBER/CLIN PHARMACIST DOCUMENTED: ICD-10-PCS | Mod: CPTII,S$GLB,, | Performed by: FAMILY MEDICINE

## 2022-08-29 RX ORDER — SULFAMETHOXAZOLE AND TRIMETHOPRIM 800; 160 MG/1; MG/1
1 TABLET ORAL 2 TIMES DAILY
Qty: 20 TABLET | Refills: 0 | Status: SHIPPED | OUTPATIENT
Start: 2022-08-29 | End: 2022-08-31

## 2022-08-29 NOTE — PROGRESS NOTES
Subjective:       Patient ID: Yumiko Gonzalez is a 62 y.o. female    Chief Complaint: Leg Swelling (Right leg)    HPI  Patient had an accidental fall 8/15.  Since that time, she developed swelling, ecchymosis, and redness of the right leg.  She now has a low grade fever (approximately 100), warmth to the leg and tenderness over the lateral ankle.  No numbness.  She underwent a negative venous ultrasound 8/25.  She is unable to elevate or wear compression stockings at this time.    Review of Systems     Objective:   Physical Exam  Vitals reviewed.   Constitutional:       Appearance: Normal appearance. She is well-developed.   Musculoskeletal:         General: Swelling (with ecchymosis and redness of the right leg.) present.   Neurological:      General: No focal deficit present.      Mental Status: She is alert and oriented to person, place, and time.      Sensory: No sensory deficit.      Motor: No weakness.        Assessment:       1. Cellulitis of right lower extremity  sulfamethoxazole-trimethoprim 800-160mg (BACTRIM DS) 800-160 mg Tab    US Lower Extremity Veins Right      2. Hematoma        3. Thrombocytopenia             Plan:       Cellulitis of right lower extremity with Hematoma secondary to thrombocytopenia  -     sulfamethoxazole-trimethoprim 800-160mg (BACTRIM DS) 800-160 mg Tab; Take 1 tablet by mouth 2 (two) times daily. for 10 days  Dispense: 20 tablet; Refill: 0  -     US Lower Extremity Veins Right; Future; Expected date: 08/29/2022  - Patient will need to elevate her legs above her heart until she is able to resume compression stockings, suspect that will be 3-5 days.

## 2022-08-31 ENCOUNTER — OFFICE VISIT (OUTPATIENT)
Dept: PRIMARY CARE CLINIC | Facility: CLINIC | Age: 62
End: 2022-08-31
Payer: COMMERCIAL

## 2022-08-31 ENCOUNTER — HOSPITAL ENCOUNTER (OUTPATIENT)
Dept: RADIOLOGY | Facility: HOSPITAL | Age: 62
Discharge: HOME | End: 2022-08-31
Attending: FAMILY MEDICINE
Payer: COMMERCIAL

## 2022-08-31 VITALS
OXYGEN SATURATION: 98 % | BODY MASS INDEX: 41.35 KG/M2 | HEART RATE: 75 BPM | RESPIRATION RATE: 18 BRPM | WEIGHT: 288.81 LBS | HEIGHT: 70 IN

## 2022-08-31 DIAGNOSIS — S99.921A INJURY OF RIGHT FOOT, INITIAL ENCOUNTER: Primary | ICD-10-CM

## 2022-08-31 DIAGNOSIS — S99.921A INJURY OF RIGHT FOOT, INITIAL ENCOUNTER: ICD-10-CM

## 2022-08-31 DIAGNOSIS — I87.2 ACUTE VENOUS STASIS DERMATITIS OF BOTH LEGS: ICD-10-CM

## 2022-08-31 DIAGNOSIS — S80.11XA HEMATOMA OF RIGHT LOWER LEG: ICD-10-CM

## 2022-08-31 DIAGNOSIS — S99.911A INJURY OF RIGHT ANKLE, INITIAL ENCOUNTER: ICD-10-CM
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
## 2022-08-31 DIAGNOSIS — S80.00XS CONTUSION OF KNEE, UNSPECIFIED LATERALITY, SEQUELA: ICD-10-CM

## 2022-08-31 PROCEDURE — 73610 X-RAY EXAM OF ANKLE: CPT | Mod: TC,FY,PO,RT

## 2022-08-31 PROCEDURE — 99215 PR OFFICE/OUTPT VISIT, EST, LEVL V, 40-54 MIN: ICD-10-PCS | Mod: S$GLB,,, | Performed by: FAMILY MEDICINE

## 2022-08-31 PROCEDURE — 1160F PR REVIEW ALL MEDS BY PRESCRIBER/CLIN PHARMACIST DOCUMENTED: ICD-10-PCS | Mod: CPTII,S$GLB,, | Performed by: FAMILY MEDICINE

## 2022-08-31 PROCEDURE — 3008F BODY MASS INDEX DOCD: CPT | Mod: CPTII,S$GLB,, | Performed by: FAMILY MEDICINE

## 2022-08-31 PROCEDURE — 73630 XR FOOT COMPLETE 3 VIEW RIGHT: ICD-10-PCS | Mod: 26,RT,, | Performed by: RADIOLOGY

## 2022-08-31 PROCEDURE — 1159F MED LIST DOCD IN RCRD: CPT | Mod: CPTII,S$GLB,, | Performed by: FAMILY MEDICINE

## 2022-08-31 PROCEDURE — 1160F RVW MEDS BY RX/DR IN RCRD: CPT | Mod: CPTII,S$GLB,, | Performed by: FAMILY MEDICINE

## 2022-08-31 PROCEDURE — 73630 X-RAY EXAM OF FOOT: CPT | Mod: 26,RT,, | Performed by: RADIOLOGY

## 2022-08-31 PROCEDURE — 73630 X-RAY EXAM OF FOOT: CPT | Mod: TC,FY,PO,RT

## 2022-08-31 PROCEDURE — 99999 PR PBB SHADOW E&M-EST. PATIENT-LVL V: ICD-10-PCS | Mod: PBBFAC,,, | Performed by: FAMILY MEDICINE

## 2022-08-31 PROCEDURE — 73610 XR ANKLE COMPLETE 3 VIEW RIGHT: ICD-10-PCS | Mod: 26,RT,, | Performed by: RADIOLOGY

## 2022-08-31 PROCEDURE — 1159F PR MEDICATION LIST DOCUMENTED IN MEDICAL RECORD: ICD-10-PCS | Mod: CPTII,S$GLB,, | Performed by: FAMILY MEDICINE

## 2022-08-31 PROCEDURE — 99215 OFFICE O/P EST HI 40 MIN: CPT | Mod: S$GLB,,, | Performed by: FAMILY MEDICINE

## 2022-08-31 PROCEDURE — 99999 PR PBB SHADOW E&M-EST. PATIENT-LVL V: CPT | Mod: PBBFAC,,, | Performed by: FAMILY MEDICINE

## 2022-08-31 PROCEDURE — 3008F PR BODY MASS INDEX (BMI) DOCUMENTED: ICD-10-PCS | Mod: CPTII,S$GLB,, | Performed by: FAMILY MEDICINE

## 2022-08-31 PROCEDURE — 73610 X-RAY EXAM OF ANKLE: CPT | Mod: 26,RT,, | Performed by: RADIOLOGY

## 2022-08-31 RX ORDER — TIZANIDINE 2 MG/1
4 TABLET ORAL NIGHTLY PRN
Qty: 30 TABLET | Refills: 1 | Status: SHIPPED | OUTPATIENT
Start: 2022-08-31 | End: 2022-09-30

## 2022-08-31 NOTE — PROGRESS NOTES
THIS DOCUMENT WAS MADE IN PART WITH VOICE RECOGNITION SOFTWARE.  OCCASIONALLY THIS SOFTWARE WILL MISINTERPRET WORDS OR PHRASES.      Primary Care Provider Appointment   [facility] ([facility])  [address], [place] [address]   Ph: [phone]  Fax: [phone]      Patient ID: Yumiko Gonzalez is a 62 y.o. female.    Yumiko was seen today for leg swelling.    Diagnoses and all orders for this visit:    Injury of right foot, initial encounter  -     X-Ray Foot Complete 3 view Right; Future  -     X-Ray Ankle Complete Right; Future    Injury of right ankle, initial encounter  -     X-Ray Foot Complete 3 view Right; Future  -     X-Ray Ankle Complete Right; Future    Fall, initial encounter  -     tiZANidine (ZANAFLEX) 2 MG tablet; Take 2 tablets (4 mg total) by mouth nightly as needed (muscle pain).    Contusion of knee, unspecified laterality, sequela    Hematoma of right lower leg    Acute venous stasis dermatitis of both legs    Discussion of the above diagnoses:  Patient suffered a fall with contusions to both knees about two and half weeks ago while in a parking lot at a [employer].  She experience significant pain to both knees more so on the right as well as a left wrist injury.  X-rays done a day or two later did not show any fractures.  She reports the left wrist is doing well and the left knee is doing better still some bruising of the left lower leg.  But she still having significant pain of the right lower leg.  On exam there is evidence of contusion, hematoma, probably acute stasis dermatitis secondary to be hematomas exacerbating her underlying venous insufficiency.  Cannot exclude cellulitis as well and she has been placed on Bactrim by my colleague and did report subjective fevers.  Those fevers are better and she is tolerating the Bactrim.    She fell yesterday at home suffering some pain to the right ankle and toes in the right foot so will get new  x-rays as a precaution.      Most importantly I think she needs to remain home for the next 1-2 weeks where she can elevate her feet and legs more regularly to prevent the acute stasis dermatitis from worsening.  Yet at the same time she should remain very active in between.  Right now she is having too much discomfort to use compression but hopefully in another week we can consider compression to help.  I do not want her returning to work until we can supplement with compression.      No she did have two venous ultrasounds most recent being two days ago without evidence of DVT.  So as stated she should elevate her feet but take breaks and walk around, move her feet around, etc. to reduce risk of developing a DVT.  I will see her back next Friday.      Follow Up:  Next Friday  Total time 44 minutes.  This included examination time, reviewing previous x-rays and studies, previous physician visits regarding the same issues.    Health Maintenance         Date Due Completion Date    HIV Screening Never done ---    COVID-19 Vaccine (4 - Booster for Pfizer series) 02/01/2022 10/1/2021    Influenza Vaccine (1) 09/01/2022 10/22/2021    Override on 10/11/2019: Done    Mammogram 11/18/2022 11/18/2021    Lipid Panel 12/16/2022 12/16/2021    Pneumococcal Vaccines (Age 0-64) (3 - PPSV23 or PCV20) 05/22/2024 9/26/2017    Cervical Cancer Screening 11/18/2026 11/18/2021    Colorectal Cancer Screening 02/03/2027 2/3/2022    TETANUS VACCINE 11/06/2030 11/6/2020          Subjective:     Chief Complaint   Patient presents with    Leg Swelling     Had a fall 2.5 weeks ago in the parking lot of [employer]. She has redness and swelling in Rt leg and bruising to the three middle toes of her Rt foot with limited ROM. Patient also had another fall at home yesterday she lost her balance and fell on her Rt buttocks, she states that is when she bruised her toes.      I have reviewed the information entered by the ancillary staff regarding the  chief complaint as well as the related history.    HPI    Patient is a/an 62 y.o.  female   RISK of ADMIT/ED: 3    2.5 weeks, fell at [employer]    Yesterday fell getting out of recliner, lost balance, caught foot rest    Right middle 3 toes    Right knee and wrist ok now    Fell forward onto both knees  Left side bruising present but minimal pain  Right worse  Had been back at work, but this week swelling worse and sent home    Friday and sat temp 100.00, started bactrim monday    For complete problem list, past medical history, surgical history, social history, etc., see appropriate section in the electronic medical record    Review of Systems   Constitutional:  Positive for activity change and fever.   Respiratory: Negative.     Cardiovascular: Negative.    Musculoskeletal:  Positive for arthralgias and gait problem.     Objective     Physical Exam  Vitals reviewed.   Constitutional:       General: She is not in acute distress.     Appearance: She is well-developed. She is not diaphoretic.   HENT:      Head: Normocephalic and atraumatic.   Eyes:      General: No scleral icterus.  Cardiovascular:      Rate and Rhythm: Normal rate and regular rhythm.   Pulmonary:      Effort: Pulmonary effort is normal. No respiratory distress.   Musculoskeletal:      Comments: Left leg reveals some residual bruising and mild tenderness over the patellar region but minimal discomfort.  Normal range of motion.    Right lower leg reveals significant discomfort over the lower patella also over the anterior and anterior lateral lower leg where there is notable bruising, likely palpable hematomas in the area of the tibialis anterior muscle.  There is some redness, irritation about midway shin and down suggesting stasis dermatitis possibly mild cellulitis.  There is some bruising that extends down into the foot and even into the toes.  She is tender over the 2nd and 3rd toe on the right side.   Neurological:      Mental Status:  "She is alert and oriented to person, place, and time.      Comments: Ambulatory with an antalgic gait   Psychiatric:         Behavior: Behavior normal.     Vitals:    08/31/22 1512   Pulse: 75   Resp: 18   SpO2: 98%   Weight: 131 kg (288 lb 12.8 oz)   Height: 5' 10" (1.778 m)       RECENT LABS:    Lab Results   Component Value Date    WBC 2.39 (L) 06/17/2022    HGB 13.9 06/17/2022    HCT 42.5 06/17/2022    PLT 54 (L) 06/17/2022    CHOL 125 12/16/2021    TRIG 78 12/16/2021    HDL 52 12/16/2021    ALT 42 06/17/2022    AST 60 (H) 06/17/2022     06/17/2022    K 3.8 06/17/2022     06/17/2022    CREATININE 0.7 06/20/2022    BUN 14 06/17/2022    CO2 29 06/17/2022    TSH 1.618 12/16/2021    INR 1.2 06/17/2022    HGBA1C 5.0 11/06/2020       Results for orders placed or performed in visit on 06/20/22   Creatinine Serum, ASAP   Result Value Ref Range    Creatinine 0.7 0.5 - 1.4 mg/dL    eGFR if African American >60 >60 mL/min/1.73 m^2    eGFR if non African American >60 >60 mL/min/1.73 m^2         "

## 2022-09-09 ENCOUNTER — OFFICE VISIT (OUTPATIENT)
Dept: PRIMARY CARE CLINIC | Facility: CLINIC | Age: 62
End: 2022-09-09
Payer: COMMERCIAL

## 2022-09-09 VITALS
WEIGHT: 276.13 LBS | BODY MASS INDEX: 39.53 KG/M2 | RESPIRATION RATE: 18 BRPM | HEIGHT: 70 IN | OXYGEN SATURATION: 96 % | HEART RATE: 90 BPM

## 2022-09-09 DIAGNOSIS — S80.00XS CONTUSION OF KNEE, UNSPECIFIED LATERALITY, SEQUELA: Primary | ICD-10-CM

## 2022-09-09 DIAGNOSIS — R60.9 EDEMA, UNSPECIFIED TYPE: ICD-10-CM

## 2022-09-09 DIAGNOSIS — S99.921A INJURY OF RIGHT FOOT, INITIAL ENCOUNTER: ICD-10-CM

## 2022-09-09 PROCEDURE — 1160F PR REVIEW ALL MEDS BY PRESCRIBER/CLIN PHARMACIST DOCUMENTED: ICD-10-PCS | Mod: CPTII,S$GLB,, | Performed by: FAMILY MEDICINE

## 2022-09-09 PROCEDURE — 1159F PR MEDICATION LIST DOCUMENTED IN MEDICAL RECORD: ICD-10-PCS | Mod: CPTII,S$GLB,, | Performed by: FAMILY MEDICINE

## 2022-09-09 PROCEDURE — 3044F PR MOST RECENT HEMOGLOBIN A1C LEVEL <7.0%: ICD-10-PCS | Mod: CPTII,S$GLB,, | Performed by: FAMILY MEDICINE

## 2022-09-09 PROCEDURE — 99214 PR OFFICE/OUTPT VISIT, EST, LEVL IV, 30-39 MIN: ICD-10-PCS | Mod: S$GLB,,, | Performed by: FAMILY MEDICINE

## 2022-09-09 PROCEDURE — 1159F MED LIST DOCD IN RCRD: CPT | Mod: CPTII,S$GLB,, | Performed by: FAMILY MEDICINE

## 2022-09-09 PROCEDURE — 3008F BODY MASS INDEX DOCD: CPT | Mod: CPTII,S$GLB,, | Performed by: FAMILY MEDICINE

## 2022-09-09 PROCEDURE — 1160F RVW MEDS BY RX/DR IN RCRD: CPT | Mod: CPTII,S$GLB,, | Performed by: FAMILY MEDICINE

## 2022-09-09 PROCEDURE — 99999 PR PBB SHADOW E&M-EST. PATIENT-LVL IV: ICD-10-PCS | Mod: PBBFAC,,, | Performed by: FAMILY MEDICINE

## 2022-09-09 PROCEDURE — 99999 PR PBB SHADOW E&M-EST. PATIENT-LVL IV: CPT | Mod: PBBFAC,,, | Performed by: FAMILY MEDICINE

## 2022-09-09 PROCEDURE — 99214 OFFICE O/P EST MOD 30 MIN: CPT | Mod: S$GLB,,, | Performed by: FAMILY MEDICINE

## 2022-09-09 PROCEDURE — 3008F PR BODY MASS INDEX (BMI) DOCUMENTED: ICD-10-PCS | Mod: CPTII,S$GLB,, | Performed by: FAMILY MEDICINE

## 2022-09-09 PROCEDURE — 3044F HG A1C LEVEL LT 7.0%: CPT | Mod: CPTII,S$GLB,, | Performed by: FAMILY MEDICINE

## 2022-09-09 NOTE — PROGRESS NOTES
THIS DOCUMENT WAS MADE IN PART WITH VOICE RECOGNITION SOFTWARE.  OCCASIONALLY THIS SOFTWARE WILL MISINTERPRET WORDS OR PHRASES.      Primary Care Provider Appointment   [facility] ([facility])  [address], [place] [address]   Ph: [phone]  Fax: [phone]      Patient ID: Yumiko Gonzalez is a 62 y.o. female.    ASSESSMENT/PLAN by Problem List:  Problem List Items Addressed This Visit    None  Visit Diagnoses       Contusion of knee, unspecified laterality, sequela    -  Primary    Injury of right foot, initial encounter        Edema, unspecified type              Overall she is showing signs of improvement, pain is diminishing.  Bruising is much better.  Still has some soreness particularly over the location of the hematoma in the leg and some soreness in the foot but doing better.  Some improvement in the swelling.  Although I do not think she is ready to return to work which would require her to be sitting and upright with her feet down for hours at a time.      Plan out for 2 weeks then back part time 4-5 hours day x 1 week.  Plan return on 9/27, has follow up on 9/23 1st day of leave 8/29  Injury on 8/15    Note she did ask about a D-dimer.  Family member who is registered nurse asked to check this.  I informed her that a D-dimer will not help us in the situation.  She has had an injury, has known hematoma to lower leg.  These will cause an elevation in the D-dimer.  She has already had two venous ultrasounds that were negative for blood clot.  I did advise her if there is sudden change in symptoms, worsening, particularly pain or swelling in the back of the leg to let us know and we could consider repeating a venous ultrasound.  Nothing today would suggest evidence of DVT although she remains at risk as long she is immobile.  But again a D-dimer would be positive regardless because of her injuries and hematoma and would not rule out a DVT, therefore would not  provide clinical value.  If suspicion of DVT should develop she would need an ultrasound.    Follow Up:  4 weeks      Health Maintenance         Date Due Completion Date    HIV Screening Never done ---    COVID-19 Vaccine (4 - Booster for Pfizer series) 02/01/2022 10/1/2021    Influenza Vaccine (1) 09/01/2022 10/22/2021    Override on 10/11/2019: Done    Mammogram 11/18/2022 11/18/2021    Lipid Panel 12/16/2022 12/16/2021    Pneumococcal Vaccines (Age 0-64) (3 - PPSV23 or PCV20) 05/22/2024 9/26/2017    Cervical Cancer Screening 11/18/2026 11/18/2021    Colorectal Cancer Screening 02/03/2027 2/3/2022    TETANUS VACCINE 11/06/2030 11/6/2020              Subjective:     Chief Complaint   Patient presents with    Foot Injury     F/u visit      I have reviewed the information entered by the ancillary staff regarding the chief complaint as well as the related history.    Foot Injury       Patient is a/an 62 y.o.  female   RISK of ADMIT/ED: 3    Improving, still swelling and hematoma to lateral right lower leg  Still very painful to walk, even sit without elevating leg  Toes right foot still very stiff but improving  Hematoma to right hip area      For complete problem list, past medical history, surgical history, social history, etc., see appropriate section in the electronic medical record    Review of Systems   Constitutional:  Positive for activity change.   Cardiovascular:  Positive for leg swelling.   Musculoskeletal:  Positive for arthralgias and gait problem.     Objective     Physical Exam  Vitals reviewed.   Constitutional:       General: She is not in acute distress.     Appearance: She is well-developed. She is not diaphoretic.   HENT:      Head: Normocephalic and atraumatic.   Eyes:      General: No scleral icterus.  Cardiovascular:      Rate and Rhythm: Normal rate and regular rhythm.   Pulmonary:      Effort: Pulmonary effort is normal. No respiratory distress.   Musculoskeletal:      Comments: Right lower  "leg, tender and swelling over tibialis anterior region.  Much less bruising but still area of swelling suspected hematoma.  Right foot with less bruising but still noticeable swelling and tenderness across the top of her toes and distal top of the foot  Left lower leg, less bruising.  Less swelling.   Neurological:      Mental Status: She is alert and oriented to person, place, and time.      Comments: Ambulatory with an antalgic gait   Psychiatric:         Behavior: Behavior normal.     Vitals:    09/09/22 1346   Pulse: 90   Resp: 18   SpO2: 96%   Weight: 125.3 kg (276 lb 2 oz)   Height: 5' 10" (1.778 m)       RECENT LABS:    Lab Results   Component Value Date    WBC 4.25 09/16/2022    HGB 14.5 09/16/2022    HCT 42.3 09/16/2022    PLT 84 (L) 09/16/2022    CHOL 125 12/16/2021    TRIG 78 12/16/2021    HDL 52 12/16/2021    ALT 42 09/16/2022    AST 62 (H) 09/16/2022     09/16/2022    K 3.7 09/16/2022     09/16/2022    CREATININE 0.7 09/16/2022    BUN 12 09/16/2022    CO2 25 09/16/2022    TSH 1.618 12/16/2021    INR 1.3 (H) 09/16/2022    HGBA1C 4.9 09/16/2022       Results for orders placed or performed in visit on 06/20/22   Creatinine Serum, ASAP   Result Value Ref Range    Creatinine 0.7 0.5 - 1.4 mg/dL    eGFR if African American >60 >60 mL/min/1.73 m^2    eGFR if non African American >60 >60 mL/min/1.73 m^2         "

## 2022-09-16 ENCOUNTER — LAB VISIT (OUTPATIENT)
Dept: LAB | Facility: HOSPITAL | Age: 62
End: 2022-09-16
Payer: COMMERCIAL

## 2022-09-16 DIAGNOSIS — Z08 ENCOUNTER FOR FOLLOW-UP SURVEILLANCE OF LIVER CANCER: ICD-10-CM

## 2022-09-16 DIAGNOSIS — K75.81 NONALCOHOLIC STEATOHEPATITIS: ICD-10-CM

## 2022-09-16 DIAGNOSIS — K74.60 CIRRHOSIS OF LIVER WITHOUT ASCITES, UNSPECIFIED HEPATIC CIRRHOSIS TYPE: ICD-10-CM

## 2022-09-16 DIAGNOSIS — I85.00 ESOPHAGEAL VARICES DETERMINED BY ENDOSCOPY: ICD-10-CM

## 2022-09-16 DIAGNOSIS — Z85.05 ENCOUNTER FOR FOLLOW-UP SURVEILLANCE OF LIVER CANCER: ICD-10-CM

## 2022-09-16 DIAGNOSIS — K76.82 HEPATIC ENCEPHALOPATHY: ICD-10-CM

## 2022-09-16 LAB
ALBUMIN SERPL BCP-MCNC: 3.1 G/DL (ref 3.5–5.2)
ALP SERPL-CCNC: 125 U/L (ref 55–135)
ALT SERPL W/O P-5'-P-CCNC: 42 U/L (ref 10–44)
AMMONIA PLAS-SCNC: 69 UMOL/L (ref 10–50)
ANION GAP SERPL CALC-SCNC: 5 MMOL/L (ref 8–16)
AST SERPL-CCNC: 62 U/L (ref 10–40)
BASOPHILS # BLD AUTO: 0.05 K/UL (ref 0–0.2)
BASOPHILS NFR BLD: 1.2 % (ref 0–1.9)
BILIRUB SERPL-MCNC: 3 MG/DL (ref 0.1–1)
BUN SERPL-MCNC: 12 MG/DL (ref 8–23)
CALCIUM SERPL-MCNC: 9.1 MG/DL (ref 8.7–10.5)
CHLORIDE SERPL-SCNC: 110 MMOL/L (ref 95–110)
CO2 SERPL-SCNC: 25 MMOL/L (ref 23–29)
CREAT SERPL-MCNC: 0.7 MG/DL (ref 0.5–1.4)
DIFFERENTIAL METHOD: ABNORMAL
EOSINOPHIL # BLD AUTO: 0.4 K/UL (ref 0–0.5)
EOSINOPHIL NFR BLD: 9.9 % (ref 0–8)
ERYTHROCYTE [DISTWIDTH] IN BLOOD BY AUTOMATED COUNT: 15 % (ref 11.5–14.5)
EST. GFR  (NO RACE VARIABLE): >60 ML/MIN/1.73 M^2
ESTIMATED AVG GLUCOSE: 94 MG/DL (ref 68–131)
GLUCOSE SERPL-MCNC: 173 MG/DL (ref 70–110)
HBA1C MFR BLD: 4.9 % (ref 4–5.6)
HCT VFR BLD AUTO: 42.3 % (ref 37–48.5)
HGB BLD-MCNC: 14.5 G/DL (ref 12–16)
IMM GRANULOCYTES # BLD AUTO: 0.05 K/UL (ref 0–0.04)
IMM GRANULOCYTES NFR BLD AUTO: 1.2 % (ref 0–0.5)
INR PPP: 1.3 (ref 0.8–1.2)
LYMPHOCYTES # BLD AUTO: 1 K/UL (ref 1–4.8)
LYMPHOCYTES NFR BLD: 22.8 % (ref 18–48)
MCH RBC QN AUTO: 33.2 PG (ref 27–31)
MCHC RBC AUTO-ENTMCNC: 34.3 G/DL (ref 32–36)
MCV RBC AUTO: 97 FL (ref 82–98)
MONOCYTES # BLD AUTO: 0.5 K/UL (ref 0.3–1)
MONOCYTES NFR BLD: 11.3 % (ref 4–15)
NEUTROPHILS # BLD AUTO: 2.3 K/UL (ref 1.8–7.7)
NEUTROPHILS NFR BLD: 53.6 % (ref 38–73)
NRBC BLD-RTO: 0 /100 WBC
PLATELET # BLD AUTO: 84 K/UL (ref 150–450)
PMV BLD AUTO: 11.3 FL (ref 9.2–12.9)
POTASSIUM SERPL-SCNC: 3.7 MMOL/L (ref 3.5–5.1)
PROT SERPL-MCNC: 5.5 G/DL (ref 6–8.4)
PROTHROMBIN TIME: 13.3 SEC (ref 9–12.5)
RBC # BLD AUTO: 4.37 M/UL (ref 4–5.4)
SODIUM SERPL-SCNC: 140 MMOL/L (ref 136–145)
WBC # BLD AUTO: 4.25 K/UL (ref 3.9–12.7)

## 2022-09-16 PROCEDURE — 85025 COMPLETE CBC W/AUTO DIFF WBC: CPT | Performed by: NURSE PRACTITIONER

## 2022-09-16 PROCEDURE — 82140 ASSAY OF AMMONIA: CPT | Performed by: NURSE PRACTITIONER

## 2022-09-16 PROCEDURE — 85610 PROTHROMBIN TIME: CPT | Mod: PO | Performed by: NURSE PRACTITIONER

## 2022-09-16 PROCEDURE — 80053 COMPREHEN METABOLIC PANEL: CPT | Performed by: NURSE PRACTITIONER

## 2022-09-16 PROCEDURE — 83036 HEMOGLOBIN GLYCOSYLATED A1C: CPT | Performed by: NURSE PRACTITIONER

## 2022-09-16 PROCEDURE — 36415 COLL VENOUS BLD VENIPUNCTURE: CPT | Mod: PO | Performed by: NURSE PRACTITIONER

## 2022-09-19 ENCOUNTER — PATIENT MESSAGE (OUTPATIENT)
Dept: PRIMARY CARE CLINIC | Facility: CLINIC | Age: 62
End: 2022-09-19
Payer: COMMERCIAL

## 2022-09-23 ENCOUNTER — OFFICE VISIT (OUTPATIENT)
Dept: PRIMARY CARE CLINIC | Facility: CLINIC | Age: 62
End: 2022-09-23
Payer: COMMERCIAL

## 2022-09-23 VITALS
DIASTOLIC BLOOD PRESSURE: 82 MMHG | BODY MASS INDEX: 41.12 KG/M2 | OXYGEN SATURATION: 98 % | HEIGHT: 70 IN | SYSTOLIC BLOOD PRESSURE: 134 MMHG | HEART RATE: 70 BPM | WEIGHT: 287.25 LBS | RESPIRATION RATE: 18 BRPM

## 2022-09-23 DIAGNOSIS — S80.00XS CONTUSION OF KNEE, UNSPECIFIED LATERALITY, SEQUELA: ICD-10-CM

## 2022-09-23 DIAGNOSIS — S80.11XA HEMATOMA OF RIGHT LOWER LEG: ICD-10-CM
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
## 2022-09-23 DIAGNOSIS — S99.921A INJURY OF RIGHT FOOT, INITIAL ENCOUNTER: Primary | ICD-10-CM

## 2022-09-23 PROCEDURE — 99213 OFFICE O/P EST LOW 20 MIN: CPT | Mod: S$GLB,,, | Performed by: FAMILY MEDICINE

## 2022-09-23 PROCEDURE — 3008F BODY MASS INDEX DOCD: CPT | Mod: CPTII,S$GLB,, | Performed by: FAMILY MEDICINE

## 2022-09-23 PROCEDURE — 3075F PR MOST RECENT SYSTOLIC BLOOD PRESS GE 130-139MM HG: ICD-10-PCS | Mod: CPTII,S$GLB,, | Performed by: FAMILY MEDICINE

## 2022-09-23 PROCEDURE — 3079F PR MOST RECENT DIASTOLIC BLOOD PRESSURE 80-89 MM HG: ICD-10-PCS | Mod: CPTII,S$GLB,, | Performed by: FAMILY MEDICINE

## 2022-09-23 PROCEDURE — 99999 PR PBB SHADOW E&M-EST. PATIENT-LVL IV: CPT | Mod: PBBFAC,,, | Performed by: FAMILY MEDICINE

## 2022-09-23 PROCEDURE — 1160F RVW MEDS BY RX/DR IN RCRD: CPT | Mod: CPTII,S$GLB,, | Performed by: FAMILY MEDICINE

## 2022-09-23 PROCEDURE — 3075F SYST BP GE 130 - 139MM HG: CPT | Mod: CPTII,S$GLB,, | Performed by: FAMILY MEDICINE

## 2022-09-23 PROCEDURE — 99999 PR PBB SHADOW E&M-EST. PATIENT-LVL IV: ICD-10-PCS | Mod: PBBFAC,,, | Performed by: FAMILY MEDICINE

## 2022-09-23 PROCEDURE — 1159F PR MEDICATION LIST DOCUMENTED IN MEDICAL RECORD: ICD-10-PCS | Mod: CPTII,S$GLB,, | Performed by: FAMILY MEDICINE

## 2022-09-23 PROCEDURE — 3079F DIAST BP 80-89 MM HG: CPT | Mod: CPTII,S$GLB,, | Performed by: FAMILY MEDICINE

## 2022-09-23 PROCEDURE — 3044F HG A1C LEVEL LT 7.0%: CPT | Mod: CPTII,S$GLB,, | Performed by: FAMILY MEDICINE

## 2022-09-23 PROCEDURE — 1160F PR REVIEW ALL MEDS BY PRESCRIBER/CLIN PHARMACIST DOCUMENTED: ICD-10-PCS | Mod: CPTII,S$GLB,, | Performed by: FAMILY MEDICINE

## 2022-09-23 PROCEDURE — 1159F MED LIST DOCD IN RCRD: CPT | Mod: CPTII,S$GLB,, | Performed by: FAMILY MEDICINE

## 2022-09-23 PROCEDURE — 3044F PR MOST RECENT HEMOGLOBIN A1C LEVEL <7.0%: ICD-10-PCS | Mod: CPTII,S$GLB,, | Performed by: FAMILY MEDICINE

## 2022-09-23 PROCEDURE — 3008F PR BODY MASS INDEX (BMI) DOCUMENTED: ICD-10-PCS | Mod: CPTII,S$GLB,, | Performed by: FAMILY MEDICINE

## 2022-09-23 PROCEDURE — 99213 PR OFFICE/OUTPT VISIT, EST, LEVL III, 20-29 MIN: ICD-10-PCS | Mod: S$GLB,,, | Performed by: FAMILY MEDICINE

## 2022-09-23 NOTE — PROGRESS NOTES
THIS DOCUMENT WAS MADE IN PART WITH VOICE RECOGNITION SOFTWARE.  OCCASIONALLY THIS SOFTWARE WILL MISINTERPRET WORDS OR PHRASES.      Primary Care Provider Appointment   [facility] ([facility])  [address], [place] [address]   Ph: [phone]  Fax: [phone]      Patient ID: Yumiko Gonzalez is a 62 y.o. female.    Yumiko was seen today for hypertension.    Diagnoses and all orders for this visit:    Injury of right foot, initial encounter    Contusion of knee, unspecified laterality, sequela    Fall, initial encounter    Hematoma of right lower leg  Improving, mild soreness and swelling present.  She may return to work part time next week, then full time following week    Follow Up:  return to routine f/u     Health Maintenance         Date Due Completion Date    HIV Screening Never done ---    COVID-19 Vaccine (4 - Booster for Pfizer series) 11/26/2021 10/1/2021    Influenza Vaccine (1) 09/01/2022 10/22/2021    Override on 10/11/2019: Done    Mammogram 11/18/2022 11/18/2021    Lipid Panel 12/16/2022 12/16/2021    Pneumococcal Vaccines (Age 0-64) (3 - PPSV23 or PCV20) 05/22/2024 9/26/2017    Cervical Cancer Screening 11/18/2026 11/18/2021    Colorectal Cancer Screening 02/03/2027 2/3/2022    TETANUS VACCINE 11/06/2030 11/6/2020            Subjective:     Chief Complaint   Patient presents with    Hypertension     3 month f/u visit      I have reviewed the information entered by the ancillary staff regarding the chief complaint as well as the related history.    HPI    Patient is a/an 62 y.o.  female   RISK of ADMIT/ED: 3    Follow up injuries after fall    For complete problem list, past medical history, surgical history, social history, etc., see appropriate section in the electronic medical record    Review of Systems   Constitutional:  Negative for chills and fever.   Respiratory: Negative.     Cardiovascular: Negative.      Objective     Physical Exam  Vitals  "reviewed.   Constitutional:       General: She is not in acute distress.     Appearance: She is well-developed. She is not diaphoretic.   HENT:      Head: Normocephalic and atraumatic.   Eyes:      General: No scleral icterus.  Cardiovascular:      Rate and Rhythm: Normal rate and regular rhythm.   Pulmonary:      Effort: Pulmonary effort is normal. No respiratory distress.   Musculoskeletal:      Comments: Right lower leg, still swelling tibialis anterior region, no bruising, not tender.  Bruising mostly resolved.   Neurological:      Mental Status: She is alert and oriented to person, place, and time.      Comments: Ambulatory with an antalgic gait   Psychiatric:         Behavior: Behavior normal.     Vitals:    09/23/22 1447   BP: (!) 166/90   BP Location: Left arm   Patient Position: Sitting   BP Method: Large (Manual)   Pulse: 70   Resp: 18   SpO2: 98%   Weight: 130.3 kg (287 lb 4.2 oz)   Height: 5' 10" (1.778 m)       RECENT LABS:    Lab Results   Component Value Date    WBC 4.25 09/16/2022    HGB 14.5 09/16/2022    HCT 42.3 09/16/2022    PLT 84 (L) 09/16/2022    CHOL 125 12/16/2021    TRIG 78 12/16/2021    HDL 52 12/16/2021    ALT 42 09/16/2022    AST 62 (H) 09/16/2022     09/16/2022    K 3.7 09/16/2022     09/16/2022    CREATININE 0.7 09/16/2022    BUN 12 09/16/2022    CO2 25 09/16/2022    TSH 1.618 12/16/2021    INR 1.3 (H) 09/16/2022    HGBA1C 4.9 09/16/2022       Results for orders placed or performed in visit on 09/16/22   Comprehensive Metabolic Panel   Result Value Ref Range    Sodium 140 136 - 145 mmol/L    Potassium 3.7 3.5 - 5.1 mmol/L    Chloride 110 95 - 110 mmol/L    CO2 25 23 - 29 mmol/L    Glucose 173 (H) 70 - 110 mg/dL    BUN 12 8 - 23 mg/dL    Creatinine 0.7 0.5 - 1.4 mg/dL    Calcium 9.1 8.7 - 10.5 mg/dL    Total Protein 5.5 (L) 6.0 - 8.4 g/dL    Albumin 3.1 (L) 3.5 - 5.2 g/dL    Total Bilirubin 3.0 (H) 0.1 - 1.0 mg/dL    Alkaline Phosphatase 125 55 - 135 U/L    AST 62 (H) 10 - " 40 U/L    ALT 42 10 - 44 U/L    Anion Gap 5 (L) 8 - 16 mmol/L    eGFR >60.0 >60 mL/min/1.73 m^2   CBC Auto Differential   Result Value Ref Range    WBC 4.25 3.90 - 12.70 K/uL    RBC 4.37 4.00 - 5.40 M/uL    Hemoglobin 14.5 12.0 - 16.0 g/dL    Hematocrit 42.3 37.0 - 48.5 %    MCV 97 82 - 98 fL    MCH 33.2 (H) 27.0 - 31.0 pg    MCHC 34.3 32.0 - 36.0 g/dL    RDW 15.0 (H) 11.5 - 14.5 %    Platelets 84 (L) 150 - 450 K/uL    MPV 11.3 9.2 - 12.9 fL    Immature Granulocytes 1.2 (H) 0.0 - 0.5 %    Gran # (ANC) 2.3 1.8 - 7.7 K/uL    Immature Grans (Abs) 0.05 (H) 0.00 - 0.04 K/uL    Lymph # 1.0 1.0 - 4.8 K/uL    Mono # 0.5 0.3 - 1.0 K/uL    Eos # 0.4 0.0 - 0.5 K/uL    Baso # 0.05 0.00 - 0.20 K/uL    nRBC 0 0 /100 WBC    Gran % 53.6 38.0 - 73.0 %    Lymph % 22.8 18.0 - 48.0 %    Mono % 11.3 4.0 - 15.0 %    Eosinophil % 9.9 (H) 0.0 - 8.0 %    Basophil % 1.2 0.0 - 1.9 %    Differential Method Automated    Protime-INR   Result Value Ref Range    Prothrombin Time 13.3 (H) 9.0 - 12.5 sec    INR 1.3 (H) 0.8 - 1.2   Hemoglobin A1C   Result Value Ref Range    Hemoglobin A1C 4.9 4.0 - 5.6 %    Estimated Avg Glucose 94 68 - 131 mg/dL   AMMONIA   Result Value Ref Range    Ammonia 69 (H) 10 - 50 umol/L

## 2022-09-26 ENCOUNTER — OFFICE VISIT (OUTPATIENT)
Dept: HEPATOLOGY | Facility: CLINIC | Age: 62
End: 2022-09-26
Payer: COMMERCIAL

## 2022-09-26 DIAGNOSIS — E66.01 MORBID OBESITY WITH BMI OF 40.0-44.9, ADULT: ICD-10-CM

## 2022-09-26 DIAGNOSIS — I85.00 ESOPHAGEAL VARICES DETERMINED BY ENDOSCOPY: Chronic | ICD-10-CM

## 2022-09-26 DIAGNOSIS — K75.81 NONALCOHOLIC STEATOHEPATITIS: Chronic | ICD-10-CM

## 2022-09-26 DIAGNOSIS — I10 PRIMARY HYPERTENSION: Chronic | ICD-10-CM

## 2022-09-26 DIAGNOSIS — K76.82 HEPATIC ENCEPHALOPATHY: ICD-10-CM

## 2022-09-26 DIAGNOSIS — R16.1 SPLENOMEGALY: ICD-10-CM

## 2022-09-26 DIAGNOSIS — K74.60 CIRRHOSIS OF LIVER WITHOUT ASCITES, UNSPECIFIED HEPATIC CIRRHOSIS TYPE: Primary | Chronic | ICD-10-CM

## 2022-09-26 PROBLEM — Z85.05 ENCOUNTER FOR FOLLOW-UP SURVEILLANCE OF LIVER CANCER: Status: RESOLVED | Noted: 2021-12-29 | Resolved: 2022-09-26

## 2022-09-26 PROBLEM — Z08 ENCOUNTER FOR FOLLOW-UP SURVEILLANCE OF LIVER CANCER: Status: RESOLVED | Noted: 2021-12-29 | Resolved: 2022-09-26

## 2022-09-26 PROCEDURE — 99214 PR OFFICE/OUTPT VISIT, EST, LEVL IV, 30-39 MIN: ICD-10-PCS | Mod: 95,,, | Performed by: NURSE PRACTITIONER

## 2022-09-26 PROCEDURE — 3044F HG A1C LEVEL LT 7.0%: CPT | Mod: CPTII,95,, | Performed by: NURSE PRACTITIONER

## 2022-09-26 PROCEDURE — 1159F PR MEDICATION LIST DOCUMENTED IN MEDICAL RECORD: ICD-10-PCS | Mod: CPTII,95,, | Performed by: NURSE PRACTITIONER

## 2022-09-26 PROCEDURE — 99214 OFFICE O/P EST MOD 30 MIN: CPT | Mod: 95,,, | Performed by: NURSE PRACTITIONER

## 2022-09-26 PROCEDURE — 1160F RVW MEDS BY RX/DR IN RCRD: CPT | Mod: CPTII,95,, | Performed by: NURSE PRACTITIONER

## 2022-09-26 PROCEDURE — 3044F PR MOST RECENT HEMOGLOBIN A1C LEVEL <7.0%: ICD-10-PCS | Mod: CPTII,95,, | Performed by: NURSE PRACTITIONER

## 2022-09-26 PROCEDURE — 1160F PR REVIEW ALL MEDS BY PRESCRIBER/CLIN PHARMACIST DOCUMENTED: ICD-10-PCS | Mod: CPTII,95,, | Performed by: NURSE PRACTITIONER

## 2022-09-26 PROCEDURE — 1159F MED LIST DOCD IN RCRD: CPT | Mod: CPTII,95,, | Performed by: NURSE PRACTITIONER

## 2022-09-26 NOTE — Clinical Note
Please arrange repeat labs in 1 month at [facility] with RTC for a follow up visit with me a few days later to review the results.

## 2022-09-26 NOTE — PROGRESS NOTES
"The patient location is: [place]  The chief complaint leading to consultation is: Cirrhosis    Visit type: audiovisual    Face to Face time with patient: 15  30 minutes of total time spent on the encounter, which includes face to face time and non-face to face time preparing to see the patient (eg, review of tests), Obtaining and/or reviewing separately obtained history, Documenting clinical information in the electronic or other health record, Independently interpreting results (not separately reported) and communicating results to the patient/family/caregiver, or Care coordination (not separately reported).       Each patient to whom he or she provides medical services by telemedicine is:  (1) informed of the relationship between the physician and patient and the respective role of any other health care provider with respect to management of the patient; and (2) notified that he or she may decline to receive medical services by telemedicine and may withdraw from such care at any time.    Notes:       [facility] Established Patient Visit    Reason for Visit:  Cirrhosis    PCP: Garrett Webb    HPI:  This is a 62 y.o. female with PMH noted below, here for follow up for DURAN Cirrhosis. She is unaccompanied, and was last seen in clinic by myself in 6/2022. She was previously evaluated for bariatric surgery, but was declined for surgery due to advanced liver disease. Her weight has remained stable (280's-290's) since last visit. Abdominal US in 6/2022 showed cirrhosis and portal hypertension with no ascites. US did show "isoechoic solid nodule along the anterior border of the lateral segment of left hepatic lobe". AFP normal. Follow up MRI showed "no focal mass of the liver parenchyma is identified; the nodule seen on the prior ultrasound is felt to have been a a contour nodule of the cirrhotic liver".   Since last visit, she has fallen twice. Ammonia level was mildly elevated at 69. Platelet " counts are stable. LFT's have trended up (T. Bili 3.0, INR 1.3). MELD-Na 14, based on most recent labs (previously 11 in ). EGD in 2020 showed Grade 1 EV and PHG. Repeat exam in 2022 showed Grade 1 EV. Her symptoms of brain fog and slowed mentation have improved with Lactulose (stopped after 2nd fall, due to mobility issues). She is well appearing, and denies jaundice, dark urine, pruritus, abdominal distention, hematemesis, or melena.    MELD-Na score: 14 at 2022 10:23 AM  MELD score: 14 at 2022 10:23 AM  Calculated from:  Serum Creatinine: 0.7 mg/dL (Using min of 1 mg/dL) at 2022 10:23 AM  Serum Sodium: 140 mmol/L (Using max of 137 mmol/L) at 2022 10:23 AM  Total Bilirubin: 3.0 mg/dL at 2022 10:23 AM  INR(ratio): 1.3 at 2022 10:23 AM  Age: 62 years    PMHX:  has a past medical history of Abnormal Pap smear, Abnormal Pap smear of cervix, Anemia, Arthritis, Asthma, Autoimmune disease, Blood transfusion, Bronchitis, Cancer (), Cervical neck pain with evidence of disc disease (3/22/2012), Cirrhosis, Colon polyps (3/22/2012), Depression (3/22/2012), Diverticular disease (3/22/2012), Fatty liver (3/22/2012), Fibrocystic breast, Fine tremor, Hepatosplenomegaly, Hypertension (2013), Knee pain, bilateral, Lesion of right lung, Liver disease, Migraines (past Hx), Nephrolithiasis, Pleural effusion, PONV (postoperative nausea and vomiting), Postpartum depression, Splenomegaly, Thrombocytopenia, and Tremors of nervous system.    PSHX:  has a past surgical history that includes Cervical conization w/ laser; Tubal ligation; Endometrial ablation;  section; Pelvic laparoscopy; Tonsillectomy; Adenoidectomy; Liver biopsy; Colonoscopy (N/A, 2016); Embolization (N/A, 2018); Esophagogastroduodenoscopy (N/A, 2020); Colonoscopy (N/A, 2/3/2022); and Esophagogastroduodenoscopy (N/A, 3/8/2022).    The patient's social and family histories were reviewed by me and  updated in the appropriate section of the electronic medical record.    Review of patient's allergies indicates:   Allergen Reactions    No known drug allergies      Current Outpatient Medications on File Prior to Visit   Medication Sig Dispense Refill    albuterol (PROVENTIL) 2.5 mg /3 mL (0.083 %) nebulizer solution Inhale the contents of 1 vial (3 mLs) by nebulization every 6 (six) hours as needed for Wheezing. 75 mL 4    albuterol (PROVENTIL/VENTOLIN HFA) 90 mcg/actuation inhaler Inhale 2 puffs every 4 hours as needed for cough, wheeze, or shortness of breath 18 g 11    ALPRAZolam (XANAX) 0.25 MG tablet Take 1 tablet (0.25 mg total) by mouth nightly as needed. FOR INSOMNIA 30 tablet 2    buPROPion (WELLBUTRIN XL) 150 MG TB24 tablet Take 2 tablets (300 mg total) by mouth once daily. 180 tablet 3    cetirizine (ZYRTEC) 10 MG tablet Take 10 mg by mouth once daily.      diclofenac sodium (VOLTAREN) 1 % Gel Apply 2 g topically 3 (three) times daily. 1 Tube 3    fluticasone-umeclidin-vilanter (TRELEGY ELLIPTA) 200-62.5-25 mcg inhaler Inhale 1 puff into the lungs once daily. 60 each 11    hydroCHLOROthiazide (HYDRODIURIL) 12.5 MG Tab Take 1 tablet (12.5 mg total) by mouth once daily. 90 tablet 0    lactulose (CHRONULAC) 10 gram/15 mL solution Take 15 mLs (10 g total) by mouth 3 (three) times daily. Adjust dose for goal Goal of 2-3 soft bowel movements daily. 5000 mL 6    loratadine (CLARITIN) 10 mg tablet Take 10 mg by mouth once daily.       multivitamin (THERAGRAN) per tablet Take 1 tablet by mouth once daily.      nystatin-triamcinolone (MYCOLOG II) cream Apply to affected area twice daily as needed 60 g 3    oxybutynin (DITROPAN XL) 10 MG 24 hr tablet Take 1 tablet (10 mg total) by mouth once daily. 90 tablet 3    propranoloL (INDERAL LA) 60 MG 24 hr capsule Take 1 capsule (60 mg total) by mouth once daily. 30 capsule 11    tiZANidine (ZANAFLEX) 2 MG tablet Take 2 tablets (4 mg total) by mouth nightly as needed  (muscle pain). 30 tablet 1    triamcinolone (NASACORT) 55 mcg nasal inhaler Use 2 sprays by Nasal route once daily. 17 g 12    vitamin E 400 UNIT capsule Take 400 Units by mouth once daily.       No current facility-administered medications on file prior to visit.     SOCIAL HISTORY:   Social History     Tobacco Use   Smoking Status Former    Types: Cigarettes    Quit date: 2/3/2006    Years since quittin.6   Smokeless Tobacco Never     Social History     Substance and Sexual Activity   Alcohol Use Not Currently     Social History     Substance and Sexual Activity   Drug Use No     ROS:   GENERAL: Denies fever, chills, weight loss/gain, fatigue  HEENT: Denies headaches, dizziness, vision/hearing changes  CARDIOVASCULAR: Denies chest pain, palpitations, + lower extremity edema  RESPIRATORY: Denies dyspnea, cough  GI: Denies abdominal pain, rectal bleeding, nausea, vomiting. No change in bowel pattern or color  : Denies dysuria, hematuria   SKIN: Denies rash, itching   NEURO: Denies memory loss, or mood changes + brain fog/slowed mentation  PSYCH: Denies depression or anxiety  HEME/LYMPH: Denies easy bruising or bleeding    Objective Findings:    PHYSICAL EXAM:   Friendly White female, in no acute distress; alert and oriented to person, place and time.  VITALS: There were no vitals taken for this visit.(Not done Telehealth visit).  HENT: Normocephalic.   EYES: Anicertic sclerae.  NECK: No obvious masses.  CARDIOVASCULAR: Unable to assess.  RESPIRATORY: Normal respiratory effort.   GI: Soft, obese abdomen.  EXTREMITIES: + Lower extremity edema, per patient report.  SKIN: + Telangiectasis noted to chest wall.  NEURO: No asterixis.   PSYCH:  Memory intact. Thought and speech pattern appropriate. Behavior normal. No depression or anxiety noted.    DIAGNOSTIC STUDIES:    EGD 2020:    Findings:        Grade I varices were found in the lower third of the esophagus.        The entire examined stomach was normal  except mild portal        gastropathy.        The examined duodenum was normal.   Impression:  - Grade I esophageal varices.                        - Mild portal gastropathy.                        - Normal examined duodenum.                 EGD 3/8/2022:    Findings:        Grade I varices were found in the lower third of the esophagus.        The entire examined stomach was normal.        The examined duodenum was normal.   Impression:    - Grade I esophageal varices.                          - Normal stomach.                          - Normal examined duodenum.                          - No specimens collected.     US ABDOMEN COMPLETE 6/17/2022:    FINDINGS:    Pancreas: The pancreas is normal in echogenicity without focal mass or definite pseudocyst where visualized.     Aorta: The visualized abdominal aorta is normal is caliber without evidence of aneurysm.     Liver: The liver is normal in size measuring 11.7 cm in sagittal dimension.  The liver demonstrates a diffusely coarse heterogeneous parenchymal echotexture compatible with the provided history of cirrhosis.  Today's examination reveals an approximately 25 x 23 x 22 mm isoechoic solid nodule which protrudes into the liver capsule along the anterior border of the lateral segment of the left hepatic lobe.  Consider additional evaluation with contrast enhanced MRI of the liver to exclude abnormal arterial phase enhancement.  The portal vein is patent with normal hepatopetal flow.  There is enlargement of the main portal vein which measures 17.2 mm with quiet respiration and 17 mm with deep inspiration.  This suggest portal venous hypertension.The IVC is patent where visualized.  There is recanalization of the umbilical vein.     Biliary system: The gallbladder shows no evidence of shadowing stones, distension, pericholecystic fluid, or sonographic Acevedo sign.The common bile duct is not dilated, measuring 2 mm. No intrahepatic biliary ductal dilatation.      Kidneys: The kidneys are normal in size measuring 12.1 cm on the right and 12.7 cm on the left.No hydronephrosis, stones, or renal mass.     Spleen: The spleen isenlargedin size measuring 17.5 x 6.1 cm and shows a normal homogenous parenchymal echotexture without solid mass.  There is a calcified granuloma present within the spleen.  There are multiple splenic collaterals present.     Miscellaneous: No ascites.     Impression:     1. Sonographic findings which are compatible with hepatic cirrhosis and portal venous hypertension with interval development a new 25 x 23 x 22 mm isoechoic solid nodule along the anterior border of the lateral segment of left hepatic lobe.  Additional evaluation with contrast enhanced MRI of the liver is recommended.  2. Recanalization of the umbilical vein.  3. Splenic collateral vessels and pronounced splenomegaly in this patient with portal venous hypertension..    MRI ABDOMEN W/W/O CONTRAST 6/21/2022:    FINDINGS:    The liver is small quite nodular and heterogeneous in signal intensity throughout.  There is recanalization of the umbilical artery enlarged varicoceles in the abdominal wall.  There or splenic varices noted..  No focal mass of the liver parenchyma is identified.  The nodule seen on the prior ultrasound is felt to have been a a contour nodule of the cirrhotic liver.     There is splenomegaly measuring 18 cm front to back without a focal mass seen.     The kidneys are of normal size contour and CT density without mass stone or hydronephrosis.  The abdominal aorta and inferior vena cava are of normal caliber.  The pancreas is of normal contour and CT density without edema or mass.     Impression:     Severe cirrhosis with the a quite heterogeneous lobular liver.  A focal mass however is not identified.  Splenomegaly.  There is recanalization of the umbilical artery with abdominal wall varices.  There are splenic varices noted.    ASSESSMENT & PLAN:  62 y.o. White female  with:    1. Cirrhosis of liver without ascites, unspecified hepatic cirrhosis type  Comprehensive Metabolic Panel    CBC Auto Differential    Protime-INR    AMMONIA      2. Nonalcoholic steatohepatitis  Comprehensive Metabolic Panel    CBC Auto Differential    Protime-INR      3. Splenomegaly        4. Esophageal varices determined by endoscopy  Comprehensive Metabolic Panel    CBC Auto Differential    Protime-INR      5. Hepatic encephalopathy  AMMONIA      6. Morbid obesity with BMI of 40.0-44.9, adult        7. Primary hypertension          -. Repeat labs in 1 month to monitor liver function and blood counts.  -  Repeat EGD every 2 yrs for variceal surveillance, next due 3/2024.  -. Recommend HCC surveillance with AFP and ultrasound of the abdomen every six months, next due in 12/2022.  -. Recommend high protein, low carbohydrate, low salt diet (2 gm of sodium per day or less).  -. Avoid alcohol and herbal supplements/alternative remedies.  - Continue weekly weights - call the office if you gain more than 3-5 pounds in 1 week.  - Re-start Lactulose - 10 grams/15 mL PO TID for the treatment of presumed Hepatic Encephalopathy - titrate dose/frequency to achieve 2-3 soft bowel movements per day.    Follow up in about 4 weeks (around 10/24/2022)., with labs drawn prior to visit.     Thank you for allowing me to participate in the care of Yumiko CHOCO Gonzalez.       Hepatology Nurse Practitioner  [facility]  9/26/2022 @ 2242    CC'ed note to:   No ref. provider found  Garrett Webb MD

## 2022-09-28 ENCOUNTER — PATIENT MESSAGE (OUTPATIENT)
Dept: HEPATOLOGY | Facility: CLINIC | Age: 62
End: 2022-09-28
Payer: COMMERCIAL

## 2022-09-28 ENCOUNTER — TELEPHONE (OUTPATIENT)
Dept: HEPATOLOGY | Facility: CLINIC | Age: 62
End: 2022-09-28
Payer: COMMERCIAL

## 2022-09-28 NOTE — TELEPHONE ENCOUNTER
----- Message from Laila Will NP sent at 9/26/2022  2:30 PM CDT -----  Please arrange repeat labs in 1 month at [facility] with RTC for a follow up visit with me a few days later to review the results.

## 2022-10-02 ENCOUNTER — PATIENT MESSAGE (OUTPATIENT)
Dept: PRIMARY CARE CLINIC | Facility: CLINIC | Age: 62
End: 2022-10-02
Payer: COMMERCIAL

## 2022-10-03 ENCOUNTER — PATIENT MESSAGE (OUTPATIENT)
Dept: PRIMARY CARE CLINIC | Facility: CLINIC | Age: 62
End: 2022-10-03
Payer: COMMERCIAL

## 2022-10-03 RX ORDER — HYDROCHLOROTHIAZIDE 12.5 MG/1
12.5 TABLET ORAL DAILY
Qty: 90 TABLET | Refills: 3 | Status: SHIPPED | OUTPATIENT
Start: 2022-10-03 | End: 2023-01-31 | Stop reason: ALTCHOICE

## 2022-10-03 NOTE — TELEPHONE ENCOUNTER
Refill Routing Note   Medication(s) are not appropriate for processing by [facility] for the following reason(s):      - Drug-Disease Interaction (hydroCHLOROthiazide and Hepatic encephalopathy)    [facility] action(s):  Defer Medication-related problems identified: Drug-disease interaction        Medication reconciliation completed: No     Appointments  past 12m or future 3m with PCP    Date Provider   Last Visit   9/23/2022 Garrett Webb MD   Next Visit   Visit date not found Garrett Webb MD   ED visits in past 90 days: 0        Note composed:2:29 PM 10/03/2022

## 2022-10-03 NOTE — TELEPHONE ENCOUNTER
No new care gaps identified.  [facility] Embedded Care Gaps. Reference number: [id number]. 10/02/2022   10:34:16 PM CDT

## 2022-10-21 ENCOUNTER — LAB VISIT (OUTPATIENT)
Dept: LAB | Facility: HOSPITAL | Age: 62
End: 2022-10-21
Attending: NURSE PRACTITIONER
Payer: COMMERCIAL

## 2022-10-21 DIAGNOSIS — K75.81 NONALCOHOLIC STEATOHEPATITIS: Chronic | ICD-10-CM

## 2022-10-21 DIAGNOSIS — K74.60 CIRRHOSIS OF LIVER WITHOUT ASCITES, UNSPECIFIED HEPATIC CIRRHOSIS TYPE: Chronic | ICD-10-CM

## 2022-10-21 DIAGNOSIS — K76.82 HEPATIC ENCEPHALOPATHY: ICD-10-CM

## 2022-10-21 DIAGNOSIS — I85.00 ESOPHAGEAL VARICES DETERMINED BY ENDOSCOPY: Chronic | ICD-10-CM

## 2022-10-21 LAB
ALBUMIN SERPL BCP-MCNC: 3 G/DL (ref 3.5–5.2)
ALP SERPL-CCNC: 135 U/L (ref 55–135)
ALT SERPL W/O P-5'-P-CCNC: 44 U/L (ref 10–44)
AMMONIA PLAS-SCNC: 43 UMOL/L (ref 10–50)
ANION GAP SERPL CALC-SCNC: 7 MMOL/L (ref 8–16)
AST SERPL-CCNC: 60 U/L (ref 10–40)
BASOPHILS # BLD AUTO: 0.04 K/UL (ref 0–0.2)
BASOPHILS NFR BLD: 1.2 % (ref 0–1.9)
BILIRUB SERPL-MCNC: 1.9 MG/DL (ref 0.1–1)
BUN SERPL-MCNC: 9 MG/DL (ref 8–23)
CALCIUM SERPL-MCNC: 9.3 MG/DL (ref 8.7–10.5)
CHLORIDE SERPL-SCNC: 110 MMOL/L (ref 95–110)
CO2 SERPL-SCNC: 25 MMOL/L (ref 23–29)
CREAT SERPL-MCNC: 0.7 MG/DL (ref 0.5–1.4)
DIFFERENTIAL METHOD: ABNORMAL
EOSINOPHIL # BLD AUTO: 0.3 K/UL (ref 0–0.5)
EOSINOPHIL NFR BLD: 7.6 % (ref 0–8)
ERYTHROCYTE [DISTWIDTH] IN BLOOD BY AUTOMATED COUNT: 14.5 % (ref 11.5–14.5)
EST. GFR  (NO RACE VARIABLE): >60 ML/MIN/1.73 M^2
GLUCOSE SERPL-MCNC: 147 MG/DL (ref 70–110)
HCT VFR BLD AUTO: 43.2 % (ref 37–48.5)
HGB BLD-MCNC: 14.3 G/DL (ref 12–16)
IMM GRANULOCYTES # BLD AUTO: 0.03 K/UL (ref 0–0.04)
IMM GRANULOCYTES NFR BLD AUTO: 0.9 % (ref 0–0.5)
INR PPP: 1.3 (ref 0.8–1.2)
LYMPHOCYTES # BLD AUTO: 0.8 K/UL (ref 1–4.8)
LYMPHOCYTES NFR BLD: 24.2 % (ref 18–48)
MCH RBC QN AUTO: 32.3 PG (ref 27–31)
MCHC RBC AUTO-ENTMCNC: 33.1 G/DL (ref 32–36)
MCV RBC AUTO: 98 FL (ref 82–98)
MONOCYTES # BLD AUTO: 0.4 K/UL (ref 0.3–1)
MONOCYTES NFR BLD: 12.4 % (ref 4–15)
NEUTROPHILS # BLD AUTO: 1.8 K/UL (ref 1.8–7.7)
NEUTROPHILS NFR BLD: 53.7 % (ref 38–73)
NRBC BLD-RTO: 0 /100 WBC
PLATELET # BLD AUTO: 74 K/UL (ref 150–450)
PMV BLD AUTO: 12.1 FL (ref 9.2–12.9)
POTASSIUM SERPL-SCNC: 3.8 MMOL/L (ref 3.5–5.1)
PROT SERPL-MCNC: 5.3 G/DL (ref 6–8.4)
PROTHROMBIN TIME: 13.3 SEC (ref 9–12.5)
RBC # BLD AUTO: 4.43 M/UL (ref 4–5.4)
SODIUM SERPL-SCNC: 142 MMOL/L (ref 136–145)
WBC # BLD AUTO: 3.3 K/UL (ref 3.9–12.7)

## 2022-10-21 PROCEDURE — 85025 COMPLETE CBC W/AUTO DIFF WBC: CPT | Performed by: NURSE PRACTITIONER

## 2022-10-21 PROCEDURE — 82140 ASSAY OF AMMONIA: CPT | Performed by: NURSE PRACTITIONER

## 2022-10-21 PROCEDURE — 36415 COLL VENOUS BLD VENIPUNCTURE: CPT | Mod: PO | Performed by: NURSE PRACTITIONER

## 2022-10-21 PROCEDURE — 85610 PROTHROMBIN TIME: CPT | Mod: PO | Performed by: NURSE PRACTITIONER

## 2022-10-21 PROCEDURE — 80053 COMPREHEN METABOLIC PANEL: CPT | Performed by: NURSE PRACTITIONER

## 2022-10-25 ENCOUNTER — LAB VISIT (OUTPATIENT)
Dept: LAB | Facility: HOSPITAL | Age: 62
End: 2022-10-25
Attending: INTERNAL MEDICINE
Payer: COMMERCIAL

## 2022-10-25 DIAGNOSIS — J47.9 BRONCHIECTASIS WITHOUT COMPLICATION: ICD-10-CM

## 2022-10-25 PROCEDURE — 82103 ALPHA-1-ANTITRYPSIN TOTAL: CPT | Performed by: INTERNAL MEDICINE

## 2022-10-25 PROCEDURE — 36415 COLL VENOUS BLD VENIPUNCTURE: CPT | Mod: PO | Performed by: INTERNAL MEDICINE

## 2022-10-27 ENCOUNTER — TELEPHONE (OUTPATIENT)
Dept: HEPATOLOGY | Facility: CLINIC | Age: 62
End: 2022-10-27

## 2022-10-27 ENCOUNTER — OFFICE VISIT (OUTPATIENT)
Dept: HEPATOLOGY | Facility: CLINIC | Age: 62
End: 2022-10-27
Payer: COMMERCIAL

## 2022-10-27 DIAGNOSIS — E66.01 MORBID OBESITY DUE TO EXCESS CALORIES: ICD-10-CM

## 2022-10-27 DIAGNOSIS — K76.82 HEPATIC ENCEPHALOPATHY: ICD-10-CM

## 2022-10-27 DIAGNOSIS — I85.00 ESOPHAGEAL VARICES DETERMINED BY ENDOSCOPY: Chronic | ICD-10-CM

## 2022-10-27 DIAGNOSIS — I10 PRIMARY HYPERTENSION: Chronic | ICD-10-CM

## 2022-10-27 DIAGNOSIS — K74.60 CIRRHOSIS OF LIVER WITHOUT ASCITES, UNSPECIFIED HEPATIC CIRRHOSIS TYPE: Primary | Chronic | ICD-10-CM

## 2022-10-27 DIAGNOSIS — K75.81 NONALCOHOLIC STEATOHEPATITIS: Chronic | ICD-10-CM

## 2022-10-27 PROCEDURE — 1159F MED LIST DOCD IN RCRD: CPT | Mod: CPTII,95,, | Performed by: NURSE PRACTITIONER

## 2022-10-27 PROCEDURE — 1160F RVW MEDS BY RX/DR IN RCRD: CPT | Mod: CPTII,95,, | Performed by: NURSE PRACTITIONER

## 2022-10-27 PROCEDURE — 1160F PR REVIEW ALL MEDS BY PRESCRIBER/CLIN PHARMACIST DOCUMENTED: ICD-10-PCS | Mod: CPTII,95,, | Performed by: NURSE PRACTITIONER

## 2022-10-27 PROCEDURE — 99214 PR OFFICE/OUTPT VISIT, EST, LEVL IV, 30-39 MIN: ICD-10-PCS | Mod: 95,,, | Performed by: NURSE PRACTITIONER

## 2022-10-27 PROCEDURE — 99214 OFFICE O/P EST MOD 30 MIN: CPT | Mod: 95,,, | Performed by: NURSE PRACTITIONER

## 2022-10-27 PROCEDURE — 3044F HG A1C LEVEL LT 7.0%: CPT | Mod: CPTII,95,, | Performed by: NURSE PRACTITIONER

## 2022-10-27 PROCEDURE — 1159F PR MEDICATION LIST DOCUMENTED IN MEDICAL RECORD: ICD-10-PCS | Mod: CPTII,95,, | Performed by: NURSE PRACTITIONER

## 2022-10-27 PROCEDURE — 3044F PR MOST RECENT HEMOGLOBIN A1C LEVEL <7.0%: ICD-10-PCS | Mod: CPTII,95,, | Performed by: NURSE PRACTITIONER

## 2022-10-27 NOTE — PROGRESS NOTES
"The patient location is: [place]  The chief complaint leading to consultation is: DURAN Cirrhosis    Visit type: audiovisual    Face to Face time with patient: 15  30 minutes of total time spent on the encounter, which includes face to face time and non-face to face time preparing to see the patient (eg, review of tests), Obtaining and/or reviewing separately obtained history, Documenting clinical information in the electronic or other health record, Independently interpreting results (not separately reported) and communicating results to the patient/family/caregiver, or Care coordination (not separately reported).       Each patient to whom he or she provides medical services by telemedicine is:  (1) informed of the relationship between the physician and patient and the respective role of any other health care provider with respect to management of the patient; and (2) notified that he or she may decline to receive medical services by telemedicine and may withdraw from such care at any time.    Notes:       [facility] Established Patient Visit    Reason for Visit:  Cirrhosis    PCP: Garrett Webb    HPI:  This is a 62 y.o. female with PMH noted below, here for follow up for DURAN Cirrhosis. She is unaccompanied, and was last seen in clinic by myself in 9/2022. She was previously evaluated for bariatric surgery, but was declined for surgery due to advanced liver disease. Her weight has remained stable (280's-290's) since last visit. Abdominal US in 6/2022 showed cirrhosis and portal hypertension with no ascites. US did show "isoechoic solid nodule along the anterior border of the lateral segment of left hepatic lobe". AFP normal. Follow up MRI showed "no focal mass of the liver parenchyma is identified; the nodule seen on the prior ultrasound is felt to have been a a contour nodule of the cirrhotic liver".   EGD in 1/2020 showed Grade 1 EV and PHG. Repeat exam in March 2022 showed Grade 1 EV. " Most recent labs in 10/2022 show that her platelet counts are stable. LFT's are also stable. MELD-Na 12 (previously 14 in September). Earlier this year, she had multiple falls, and was experiencing brain fog. Her ammonia level was noted to be mildly elevated at 69. She restarted Lactulose after last visit, and her ammonia level improved (43). She also notes improvement in mentation, but is still experiencing significant fatigue. She is well appearing, and has no other signs or symptoms of hepatic decompensation including jaundice, dark urine, pruritus, abdominal distention, hematemesis, or melena.    MELD-Na score: 12 at 10/21/2022  9:44 AM  MELD score: 12 at 10/21/2022  9:44 AM  Calculated from:  Serum Creatinine: 0.7 mg/dL (Using min of 1 mg/dL) at 10/21/2022  9:44 AM  Serum Sodium: 142 mmol/L (Using max of 137 mmol/L) at 10/21/2022  9:44 AM  Total Bilirubin: 1.9 mg/dL at 10/21/2022  9:44 AM  INR(ratio): 1.3 at 10/21/2022  9:44 AM  Age: 62 years    PMHX:  has a past medical history of Abnormal Pap smear, Abnormal Pap smear of cervix, Anemia, Arthritis, Asthma, Autoimmune disease, Blood transfusion, Bronchitis, Cancer (), Cervical neck pain with evidence of disc disease (3/22/2012), Cirrhosis, Colon polyps (3/22/2012), Depression (3/22/2012), Diverticular disease (3/22/2012), Fatty liver (3/22/2012), Fibrocystic breast, Fine tremor, Hepatosplenomegaly, Hypertension (2013), Knee pain, bilateral, Lesion of right lung, Liver disease, Migraines (past Hx), Nephrolithiasis, Pleural effusion, PONV (postoperative nausea and vomiting), Postpartum depression, Splenomegaly, Thrombocytopenia, and Tremors of nervous system.    PSHX:  has a past surgical history that includes Cervical conization w/ laser; Tubal ligation; Endometrial ablation;  section; Pelvic laparoscopy; Tonsillectomy; Adenoidectomy; Liver biopsy; Colonoscopy (N/A, 2016); Embolization (N/A, 2018); Esophagogastroduodenoscopy (N/A,  1/28/2020); Colonoscopy (N/A, 2/3/2022); and Esophagogastroduodenoscopy (N/A, 3/8/2022).    The patient's social and family histories were reviewed by me and updated in the appropriate section of the electronic medical record.    Review of patient's allergies indicates:   Allergen Reactions    No known drug allergies      Current Outpatient Medications on File Prior to Visit   Medication Sig Dispense Refill    albuterol (PROVENTIL/VENTOLIN HFA) 90 mcg/actuation inhaler Inhale 2 puffs every 4 hours as needed for cough, wheeze, or shortness of breath 18 g 11    ALPRAZolam (XANAX) 0.25 MG tablet Take 1 tablet (0.25 mg total) by mouth nightly as needed. FOR INSOMNIA 30 tablet 2    buPROPion (WELLBUTRIN XL) 150 MG TB24 tablet Take 2 tablets (300 mg total) by mouth once daily. 180 tablet 3    cetirizine (ZYRTEC) 10 MG tablet Take 10 mg by mouth once daily.      diclofenac sodium (VOLTAREN) 1 % Gel Apply 2 g topically 3 (three) times daily. 1 Tube 3    fluticasone-umeclidin-vilanter (TRELEGY ELLIPTA) 200-62.5-25 mcg inhaler Inhale 1 puff into the lungs once daily. 60 each 11    hydroCHLOROthiazide (HYDRODIURIL) 12.5 MG Tab Take 1 tablet (12.5 mg total) by mouth once daily. 90 tablet 3    lactulose (CHRONULAC) 10 gram/15 mL solution Take 15 mLs (10 g total) by mouth 3 (three) times daily. Adjust dose for goal Goal of 2-3 soft bowel movements daily. 5000 mL 6    loratadine (CLARITIN) 10 mg tablet Take 10 mg by mouth once daily.       multivitamin (THERAGRAN) per tablet Take 1 tablet by mouth once daily.      nystatin-triamcinolone (MYCOLOG II) cream Apply to affected area twice daily as needed 60 g 3    oxybutynin (DITROPAN XL) 10 MG 24 hr tablet Take 1 tablet (10 mg total) by mouth once daily. 90 tablet 3    propranoloL (INDERAL LA) 60 MG 24 hr capsule Take 1 capsule (60 mg total) by mouth once daily. 30 capsule 11    triamcinolone (NASACORT) 55 mcg nasal inhaler Use 2 sprays by Nasal route once daily. 17 g 12    vitamin E  400 UNIT capsule Take 400 Units by mouth once daily.       No current facility-administered medications on file prior to visit.     SOCIAL HISTORY:   Social History     Tobacco Use   Smoking Status Former    Types: Cigarettes    Quit date: 2/3/2006    Years since quittin.7   Smokeless Tobacco Never     Social History     Substance and Sexual Activity   Alcohol Use Not Currently     Social History     Substance and Sexual Activity   Drug Use No     ROS:   GENERAL: Denies fever, chills, weight loss/gain, + fatigue  HEENT: Denies headaches, dizziness, vision/hearing changes  CARDIOVASCULAR: Denies chest pain, palpitations, + lower extremity edema - stable  RESPIRATORY: Denies dyspnea, cough  GI: Denies abdominal pain, rectal bleeding, nausea, vomiting. No change in bowel pattern or color  : Denies dysuria, hematuria   SKIN: Denies rash, itching   NEURO: Denies memory loss, or mood changes + brain fog/slowed mentation - improved  PSYCH: Denies depression or anxiety  HEME/LYMPH: Denies easy bruising or bleeding    Objective Findings:    PHYSICAL EXAM:   Friendly White female, in no acute distress; alert and oriented to person, place and time.  VITALS: There were no vitals taken for this visit.(Not done Telehealth visit).  HENT: Normocephalic.   EYES: Anicertic sclerae.  NECK: No obvious masses.  CARDIOVASCULAR: Unable to assess.  RESPIRATORY: Normal respiratory effort.   GI: Soft, obese abdomen.  EXTREMITIES: + Lower extremity edema, per patient report.  SKIN: + Telangiectasis noted to chest wall.  NEURO: No asterixis.   PSYCH:  Memory intact. Thought and speech pattern appropriate. Behavior normal. No depression or anxiety noted.    DIAGNOSTIC STUDIES:    EGD 2020:    Findings:        Grade I varices were found in the lower third of the esophagus.        The entire examined stomach was normal except mild portal        gastropathy.        The examined duodenum was normal.   Impression:  - Grade I esophageal  varices.                        - Mild portal gastropathy.                        - Normal examined duodenum.                 EGD 3/8/2022:    Findings:        Grade I varices were found in the lower third of the esophagus.        The entire examined stomach was normal.        The examined duodenum was normal.   Impression:    - Grade I esophageal varices.                          - Normal stomach.                          - Normal examined duodenum.                          - No specimens collected.     US ABDOMEN COMPLETE 6/17/2022:    FINDINGS:    Pancreas: The pancreas is normal in echogenicity without focal mass or definite pseudocyst where visualized.     Aorta: The visualized abdominal aorta is normal is caliber without evidence of aneurysm.     Liver: The liver is normal in size measuring 11.7 cm in sagittal dimension.  The liver demonstrates a diffusely coarse heterogeneous parenchymal echotexture compatible with the provided history of cirrhosis.  Today's examination reveals an approximately 25 x 23 x 22 mm isoechoic solid nodule which protrudes into the liver capsule along the anterior border of the lateral segment of the left hepatic lobe.  Consider additional evaluation with contrast enhanced MRI of the liver to exclude abnormal arterial phase enhancement.  The portal vein is patent with normal hepatopetal flow.  There is enlargement of the main portal vein which measures 17.2 mm with quiet respiration and 17 mm with deep inspiration.  This suggest portal venous hypertension.The IVC is patent where visualized.  There is recanalization of the umbilical vein.     Biliary system: The gallbladder shows no evidence of shadowing stones, distension, pericholecystic fluid, or sonographic Acevedo sign.The common bile duct is not dilated, measuring 2 mm. No intrahepatic biliary ductal dilatation.     Kidneys: The kidneys are normal in size measuring 12.1 cm on the right and 12.7 cm on the left.No hydronephrosis,  stones, or renal mass.     Spleen: The spleen isenlargedin size measuring 17.5 x 6.1 cm and shows a normal homogenous parenchymal echotexture without solid mass.  There is a calcified granuloma present within the spleen.  There are multiple splenic collaterals present.     Miscellaneous: No ascites.     Impression:     1. Sonographic findings which are compatible with hepatic cirrhosis and portal venous hypertension with interval development a new 25 x 23 x 22 mm isoechoic solid nodule along the anterior border of the lateral segment of left hepatic lobe.  Additional evaluation with contrast enhanced MRI of the liver is recommended.  2. Recanalization of the umbilical vein.  3. Splenic collateral vessels and pronounced splenomegaly in this patient with portal venous hypertension..    MRI ABDOMEN W/W/O CONTRAST 6/21/2022:    FINDINGS:    The liver is small quite nodular and heterogeneous in signal intensity throughout.  There is recanalization of the umbilical artery enlarged varicoceles in the abdominal wall.  There or splenic varices noted..  No focal mass of the liver parenchyma is identified.  The nodule seen on the prior ultrasound is felt to have been a a contour nodule of the cirrhotic liver.     There is splenomegaly measuring 18 cm front to back without a focal mass seen.     The kidneys are of normal size contour and CT density without mass stone or hydronephrosis.  The abdominal aorta and inferior vena cava are of normal caliber.  The pancreas is of normal contour and CT density without edema or mass.     Impression:     Severe cirrhosis with the a quite heterogeneous lobular liver.  A focal mass however is not identified.  Splenomegaly.  There is recanalization of the umbilical artery with abdominal wall varices.  There are splenic varices noted.    ASSESSMENT & PLAN:  62 y.o. White female with:    1. Cirrhosis of liver without ascites, unspecified hepatic cirrhosis type  AFP Tumor Marker    Comprehensive  Metabolic Panel    CBC Auto Differential    Protime-INR    US Abdomen Complete    AMMONIA    rifAXIMin (XIFAXAN) 550 mg Tab      2. Nonalcoholic steatohepatitis  AFP Tumor Marker    Comprehensive Metabolic Panel    CBC Auto Differential    Protime-INR    US Abdomen Complete    AMMONIA    rifAXIMin (XIFAXAN) 550 mg Tab      3. Esophageal varices determined by endoscopy  AFP Tumor Marker    Comprehensive Metabolic Panel    CBC Auto Differential    Protime-INR    US Abdomen Complete    AMMONIA      4. Hepatic encephalopathy  AFP Tumor Marker    Comprehensive Metabolic Panel    CBC Auto Differential    Protime-INR    US Abdomen Complete    AMMONIA    rifAXIMin (XIFAXAN) 550 mg Tab      5. Morbid obesity due to excess calories  AFP Tumor Marker    Comprehensive Metabolic Panel    CBC Auto Differential    Protime-INR    US Abdomen Complete    AMMONIA      6. Primary hypertension  AFP Tumor Marker    Comprehensive Metabolic Panel    CBC Auto Differential    Protime-INR    US Abdomen Complete    AMMONIA        MELD-Na score: 12 at 10/21/2022  9:44 AM  MELD score: 12 at 10/21/2022  9:44 AM  Calculated from:  Serum Creatinine: 0.7 mg/dL (Using min of 1 mg/dL) at 10/21/2022  9:44 AM  Serum Sodium: 142 mmol/L (Using max of 137 mmol/L) at 10/21/2022  9:44 AM  Total Bilirubin: 1.9 mg/dL at 10/21/2022  9:44 AM  INR(ratio): 1.3 at 10/21/2022  9:44 AM  Age: 62 years    -. Repeat labs in 3 months to monitor liver function and blood counts.  -  Repeat EGD every 2 yrs for variceal surveillance, next due 3/2024.  -. Recommend HCC surveillance with AFP and ultrasound of the abdomen every six months, next due in 12/2022.  -. Recommend high protein, low carbohydrate, low salt diet (2 gm of sodium per day or less).  -. Avoid alcohol and herbal supplements/alternative remedies.  - Continue Lactulose - 10 grams/15 mL PO TID - titrate dose/frequency to achieve 3-4 soft bowel movements per day.  - Start Xifaxan 550 mg PO BID for the treatment  of hepatic encephalopathy.     Follow up in about 3 months (around 1/27/2023)., with labs and US prior to visit.     Thank you for allowing me to participate in the care of Yumiko Gonzalez.       Hepatology Nurse Practitioner  [facility]  10/27/2022 @ 1500    CC'ed note to:   No ref. provider found  Garrett Webb MD

## 2022-10-27 NOTE — TELEPHONE ENCOUNTER
----- Message from Laila Will NP sent at 10/27/2022  3:03 PM CDT -----  Please contact patient to arrange labs and US in 3 months (can be done at [facility]), with RTC 1 week later to review results (Virtual visit okay). Orders in Epic. Thanks!

## 2022-10-27 NOTE — TELEPHONE ENCOUNTER
Spoke with patient and arranged labs, u/s and follow up visit in 3 months. Mailed appointment letter to patient.

## 2022-10-27 NOTE — PATIENT INSTRUCTIONS
Because you have cirrhosis, it is important to attend clinic visits every 3-6 months with an Ultrasound and blood tests every 3-6 months to screen for liver cancer (you are at risk of developing liver cancer due to scar tissue in the liver)    Signs and symptoms of worsening liver disease include jaundice, fluid in the belly (ascites), and confusion/disorientation/slowed thought processes due to hepatic encephalopathy (toxins building up because of liver problems).   You should seek medical attention if any of these things occur.    Also, possible bleeding from esophageal varices (blood vessels in the stomach and foodpipe can burst and cause fatal bleeding).  Therefore, if you have symptoms of vomiting blood, blood in your stool, dark or black stools or vomiting coffee ground vomit, YOU SHOULD GO TO THE EMERGENCY ROOM IMMEDIATELY.     Cirrhosis can increase the risk of liver cancer, liver failure, and death. However, we will watch your liver function score (MELD score) closely with each clinic visit. A normal MELD score is 6, highest is 40. Your last one was an 12. We will check this with every clinic visit. A MELD 15 or higher is when we start to consider transplant because MELD 15 or higher indicates that the liver is not functioning as well.    Cirrhosis Counseling  - NO alcohol use (includes beer, wine, and/or liquor).  --  take your lactulose and adjust dosing to obtain 3-4 soft,/oatmeal like bowel movements daily to treat hepatic encephalopathy (confusion due to high ammonia level).  --  take rifaximin twice daily for treatment of hepatic encephalopathy (confusion due to high ammonia level).  - avoid non-steroidal anti-inflammatory drugs (NSAIDs) such as ibuprofen, Motrin, naprosyn, Alleve due to the risk of kidney damage.  - can take acetaminophen (Tylenol), no more than 2,000 mg per day.  - low sodium (salt) 2 gram per day diet.  - high protein diet: 130 grams per day to prevent muscle mass loss. Drink at  least 1 protein shake daily (Premier Protein is best option because it is very high protein and low sugar). Ok to use this as nighttime snack to fit it in.  - resistance exercises for muscle strength.  - avoid raw seafoods due to the risk of fatal Vibrio vulnificus infection.  - ultrasound of the liver every 6 months for liver cancer screening (you are at risk of developing liver cancer due to scar tissue in the liver).  - Upper endoscopy every 1-2 years to screen for varices in the stomach and foodpipe which can burst and cause fatal bleeding.

## 2022-10-30 ENCOUNTER — PATIENT MESSAGE (OUTPATIENT)
Dept: PRIMARY CARE CLINIC | Facility: CLINIC | Age: 62
End: 2022-10-30
Payer: COMMERCIAL

## 2022-10-30 RX ORDER — ALPRAZOLAM 0.25 MG/1
0.25 TABLET ORAL NIGHTLY PRN
Qty: 30 TABLET | Refills: 2 | Status: CANCELLED | OUTPATIENT
Start: 2022-10-30

## 2022-10-31 ENCOUNTER — TELEPHONE (OUTPATIENT)
Dept: PHARMACY | Facility: CLINIC | Age: 62
End: 2022-10-31
Payer: COMMERCIAL

## 2022-10-31 RX ORDER — ALPRAZOLAM 0.25 MG/1
0.25 TABLET ORAL NIGHTLY PRN
Qty: 30 TABLET | Refills: 2 | Status: SHIPPED | OUTPATIENT
Start: 2022-10-31

## 2022-10-31 NOTE — TELEPHONE ENCOUNTER
Pt requesting xanax to be refilled.  Her  has passed away and she is not able to sleep.    Confirmed [facility] in [place] please.

## 2022-11-02 LAB
A1AT PHENOTYP SERPL-IMP: NORMAL
A1AT SERPL NEPH-MCNC: 115 MG/DL (ref 100–190)

## 2022-11-04 ENCOUNTER — PATIENT MESSAGE (OUTPATIENT)
Dept: PRIMARY CARE CLINIC | Facility: CLINIC | Age: 62
End: 2022-11-04
Payer: COMMERCIAL

## 2022-11-04 RX ORDER — AMOXICILLIN 875 MG/1
875 TABLET, FILM COATED ORAL 2 TIMES DAILY
Qty: 20 TABLET | Refills: 0 | Status: SHIPPED | OUTPATIENT
Start: 2022-11-04 | End: 2022-11-14

## 2022-11-04 NOTE — TELEPHONE ENCOUNTER
Spoke with Tiffanie. She stated she has sore throat. She is stopped up, post nasal drip.  In mornings, she has hard green mucous and rest of day she has green mucous.    She stated her whole family has been getting sick with same symptoms.    Pt was not eating or drinking because her  passed away.     Explained that she did not have to eat right now but she had to drink. Water, juice, tea, but she had to drink or she would cause herself harm and possibly end up in the hospital. She stated that is not what she wanted.  Explained she needed to at least drink fluids to keep her body working.  Pt verbalized understanding.    Also, explained that she could use flonase nasal spray for her sinuses and to use saline spray after flonase.  Pt verbalized understanding.    Confirmed pharmacy, [facility] on [address].     Explained that paperwork for bereavement was not found, pt stated she would call to see if it was faxed. Gave pt fax number, [phone], pt will call next week to see if paperwork was received.

## 2022-11-07 ENCOUNTER — OFFICE VISIT (OUTPATIENT)
Dept: FAMILY MEDICINE | Facility: CLINIC | Age: 62
End: 2022-11-07
Payer: COMMERCIAL

## 2022-11-07 VITALS
HEART RATE: 78 BPM | SYSTOLIC BLOOD PRESSURE: 132 MMHG | BODY MASS INDEX: 41.78 KG/M2 | OXYGEN SATURATION: 96 % | HEIGHT: 70 IN | WEIGHT: 291.88 LBS | DIASTOLIC BLOOD PRESSURE: 78 MMHG | TEMPERATURE: 99 F

## 2022-11-07 DIAGNOSIS — K21.9 GASTROESOPHAGEAL REFLUX DISEASE, UNSPECIFIED WHETHER ESOPHAGITIS PRESENT: ICD-10-CM

## 2022-11-07 DIAGNOSIS — J20.9 ACUTE BRONCHITIS, UNSPECIFIED ORGANISM: Primary | ICD-10-CM

## 2022-11-07 DIAGNOSIS — G47.00 INSOMNIA, UNSPECIFIED TYPE: ICD-10-CM

## 2022-11-07 DIAGNOSIS — F43.21 GRIEF REACTION: ICD-10-CM

## 2022-11-07 PROCEDURE — 3078F PR MOST RECENT DIASTOLIC BLOOD PRESSURE < 80 MM HG: ICD-10-PCS | Mod: CPTII,S$GLB,, | Performed by: PHYSICIAN ASSISTANT

## 2022-11-07 PROCEDURE — 3008F PR BODY MASS INDEX (BMI) DOCUMENTED: ICD-10-PCS | Mod: CPTII,S$GLB,, | Performed by: PHYSICIAN ASSISTANT

## 2022-11-07 PROCEDURE — 99214 PR OFFICE/OUTPT VISIT, EST, LEVL IV, 30-39 MIN: ICD-10-PCS | Mod: S$GLB,,, | Performed by: PHYSICIAN ASSISTANT

## 2022-11-07 PROCEDURE — 3075F PR MOST RECENT SYSTOLIC BLOOD PRESS GE 130-139MM HG: ICD-10-PCS | Mod: CPTII,S$GLB,, | Performed by: PHYSICIAN ASSISTANT

## 2022-11-07 PROCEDURE — 1159F MED LIST DOCD IN RCRD: CPT | Mod: CPTII,S$GLB,, | Performed by: PHYSICIAN ASSISTANT

## 2022-11-07 PROCEDURE — 99999 PR PBB SHADOW E&M-EST. PATIENT-LVL V: CPT | Mod: PBBFAC,,, | Performed by: PHYSICIAN ASSISTANT

## 2022-11-07 PROCEDURE — 3044F PR MOST RECENT HEMOGLOBIN A1C LEVEL <7.0%: ICD-10-PCS | Mod: CPTII,S$GLB,, | Performed by: PHYSICIAN ASSISTANT

## 2022-11-07 PROCEDURE — 3044F HG A1C LEVEL LT 7.0%: CPT | Mod: CPTII,S$GLB,, | Performed by: PHYSICIAN ASSISTANT

## 2022-11-07 PROCEDURE — 99999 PR PBB SHADOW E&M-EST. PATIENT-LVL V: ICD-10-PCS | Mod: PBBFAC,,, | Performed by: PHYSICIAN ASSISTANT

## 2022-11-07 PROCEDURE — 3078F DIAST BP <80 MM HG: CPT | Mod: CPTII,S$GLB,, | Performed by: PHYSICIAN ASSISTANT

## 2022-11-07 PROCEDURE — 1159F PR MEDICATION LIST DOCUMENTED IN MEDICAL RECORD: ICD-10-PCS | Mod: CPTII,S$GLB,, | Performed by: PHYSICIAN ASSISTANT

## 2022-11-07 PROCEDURE — 3008F BODY MASS INDEX DOCD: CPT | Mod: CPTII,S$GLB,, | Performed by: PHYSICIAN ASSISTANT

## 2022-11-07 PROCEDURE — 99214 OFFICE O/P EST MOD 30 MIN: CPT | Mod: S$GLB,,, | Performed by: PHYSICIAN ASSISTANT

## 2022-11-07 PROCEDURE — 3075F SYST BP GE 130 - 139MM HG: CPT | Mod: CPTII,S$GLB,, | Performed by: PHYSICIAN ASSISTANT

## 2022-11-07 RX ORDER — OMEPRAZOLE 20 MG/1
20 CAPSULE, DELAYED RELEASE ORAL DAILY
Qty: 90 CAPSULE | Refills: 3 | Status: ON HOLD | OUTPATIENT
Start: 2022-11-07 | End: 2023-12-18 | Stop reason: HOSPADM

## 2022-11-07 RX ORDER — TRAZODONE HYDROCHLORIDE 50 MG/1
50 TABLET ORAL NIGHTLY
Qty: 30 TABLET | Refills: 3 | Status: ON HOLD | OUTPATIENT
Start: 2022-11-07 | End: 2023-01-17 | Stop reason: CLARIF

## 2022-11-07 NOTE — PATIENT INSTRUCTIONS
Take 1/2 trazodone 50mg nightly for sleep.    Take omeprazole in the mornings.    Increase using nebulizer in the mornings and an hour before sleep.    Thanks for seeing me,  Mercy Portillo PA-C

## 2022-11-07 NOTE — PROGRESS NOTES
"Subjective:      Patient ID: Yumiko Gonzalez is a 62 y.o. female.    Chief Complaint: URI (Fever at night; green mucus; cough)    Cough  This is a new problem. The current episode started in the past 7 days. The problem has been gradually worsening. The problem occurs every few minutes. The cough is Productive of sputum. Associated symptoms include chest pain, chills, a fever (99F), headaches, nasal congestion, postnasal drip, rhinorrhea, a sore throat, shortness of breath and wheezing. Pertinent negatives include no ear congestion, ear pain, heartburn, hemoptysis, myalgias, rash or sweats. The symptoms are aggravated by exercise, fumes and lying down. She has tried OTC cough suppressant and a beta-agonist inhaler for the symptoms. The treatment provided mild relief. Her past medical history is significant for asthma, bronchiectasis, bronchitis and pneumonia. There is no history of COPD, emphysema or environmental allergies.     Patient has PMH of depression/PTSD, cervical cancer, HTN, cirrhosis of liver, and colon polyps.    Patient reports cough, wheezing, chest tightness, and headaches since Thursday.  Started amoxicillin 875mg BID on Saturday.  Sick contacts.  Used nebulizer twice.     Review of Systems   Constitutional:  Positive for chills and fever (99F). Negative for appetite change.   HENT:  Positive for postnasal drip, rhinorrhea and sore throat. Negative for ear pain.    Respiratory:  Positive for cough, chest tightness, shortness of breath and wheezing. Negative for hemoptysis.    Cardiovascular:  Positive for chest pain.   Gastrointestinal:  Negative for heartburn.   Musculoskeletal:  Negative for myalgias.   Skin:  Negative for rash.   Allergic/Immunologic: Negative for environmental allergies.   Neurological:  Positive for headaches.       Objective:   /78   Pulse 78   Temp 98.5 °F (36.9 °C) (Oral)   Ht 5' 10" (1.778 m)   Wt 132.4 kg (291 lb 14.2 oz)   SpO2 96%   BMI 41.88 kg/m²     Physical " Exam  Vitals reviewed.   Constitutional:       Appearance: Normal appearance. She is well-developed. She is ill-appearing.   HENT:      Head: Normocephalic and atraumatic.      Right Ear: Hearing, tympanic membrane, ear canal and external ear normal.      Left Ear: Hearing, tympanic membrane, ear canal and external ear normal.      Nose: Nose normal.      Right Sinus: No maxillary sinus tenderness or frontal sinus tenderness.      Left Sinus: No maxillary sinus tenderness or frontal sinus tenderness.      Mouth/Throat:      Lips: Pink.      Mouth: Mucous membranes are moist.      Pharynx: Oropharynx is clear.   Eyes:      General: Lids are normal.      Conjunctiva/sclera: Conjunctivae normal.   Neck:      Trachea: Phonation normal.   Cardiovascular:      Rate and Rhythm: Normal rate and regular rhythm.      Heart sounds: Normal heart sounds. No murmur heard.    No friction rub. No gallop.   Pulmonary:      Effort: Pulmonary effort is normal. No respiratory distress.      Breath sounds: No wheezing, rhonchi or rales.      Comments: Coarse breath sounds.  Musculoskeletal:         General: Normal range of motion.   Lymphadenopathy:      Cervical: No cervical adenopathy.   Skin:     General: Skin is warm and dry.      Findings: No rash.   Neurological:      General: No focal deficit present.      Mental Status: She is alert and oriented to person, place, and time.   Psychiatric:         Attention and Perception: Attention normal.         Mood and Affect: Mood normal.         Speech: Speech normal.         Behavior: Behavior normal. Behavior is cooperative.         Judgment: Judgment normal.     Assessment:      1. Acute bronchitis, unspecified organism    2. Gastroesophageal reflux disease, unspecified whether esophagitis present    3. Insomnia, unspecified type    4. Grief reaction       Plan:   1. Acute bronchitis, unspecified organism  Continue amoxicillin as prescribed.    2. Gastroesophageal reflux disease,  unspecified whether esophagitis present  - START omeprazole (PRILOSEC) 20 MG capsule; Take 1 capsule (20 mg total) by mouth once daily.  Dispense: 90 capsule; Refill: 3    3. Insomnia, unspecified type  Take 1/2 nightly for sleep.  - traZODone (DESYREL) 50 MG tablet; Take 1 tablet (50 mg total) by mouth every evening.  Dispense: 30 tablet; Refill: 3    4. Grief reaction   just passed away.    Follow up as needed.  Patient agreed with plan and expressed understanding.    Thank you for allowing me to serve you,

## 2022-11-10 ENCOUNTER — PATIENT MESSAGE (OUTPATIENT)
Dept: FAMILY MEDICINE | Facility: CLINIC | Age: 62
End: 2022-11-10
Payer: COMMERCIAL

## 2022-11-10 DIAGNOSIS — J20.9 ACUTE BRONCHITIS, UNSPECIFIED ORGANISM: Primary | ICD-10-CM

## 2022-11-10 RX ORDER — PREDNISONE 20 MG/1
20 TABLET ORAL DAILY
Qty: 5 TABLET | Refills: 0 | Status: SHIPPED | OUTPATIENT
Start: 2022-11-10 | End: 2022-11-16

## 2022-11-15 ENCOUNTER — HOSPITAL ENCOUNTER (OUTPATIENT)
Dept: RADIOLOGY | Facility: HOSPITAL | Age: 62
Discharge: HOME | End: 2022-11-15
Attending: PHYSICIAN ASSISTANT
Payer: COMMERCIAL

## 2022-11-15 ENCOUNTER — OFFICE VISIT (OUTPATIENT)
Dept: FAMILY MEDICINE | Facility: CLINIC | Age: 62
End: 2022-11-15
Payer: COMMERCIAL

## 2022-11-15 VITALS
WEIGHT: 293 LBS | BODY MASS INDEX: 41.95 KG/M2 | HEART RATE: 73 BPM | OXYGEN SATURATION: 96 % | DIASTOLIC BLOOD PRESSURE: 74 MMHG | SYSTOLIC BLOOD PRESSURE: 128 MMHG | HEIGHT: 70 IN

## 2022-11-15 DIAGNOSIS — J22 LOWER RESPIRATORY INFECTION (E.G., BRONCHITIS, PNEUMONIA, PNEUMONITIS, PULMONITIS): Primary | ICD-10-CM

## 2022-11-15 DIAGNOSIS — J45.21 MILD INTERMITTENT REACTIVE AIRWAY DISEASE WITH ACUTE EXACERBATION: ICD-10-CM

## 2022-11-15 DIAGNOSIS — J22 LOWER RESPIRATORY INFECTION (E.G., BRONCHITIS, PNEUMONIA, PNEUMONITIS, PULMONITIS): ICD-10-CM

## 2022-11-15 PROCEDURE — 3044F HG A1C LEVEL LT 7.0%: CPT | Mod: CPTII,S$GLB,, | Performed by: PHYSICIAN ASSISTANT

## 2022-11-15 PROCEDURE — 3078F PR MOST RECENT DIASTOLIC BLOOD PRESSURE < 80 MM HG: ICD-10-PCS | Mod: CPTII,S$GLB,, | Performed by: PHYSICIAN ASSISTANT

## 2022-11-15 PROCEDURE — 3008F BODY MASS INDEX DOCD: CPT | Mod: CPTII,S$GLB,, | Performed by: PHYSICIAN ASSISTANT

## 2022-11-15 PROCEDURE — 99214 PR OFFICE/OUTPT VISIT, EST, LEVL IV, 30-39 MIN: ICD-10-PCS | Mod: S$GLB,,, | Performed by: PHYSICIAN ASSISTANT

## 2022-11-15 PROCEDURE — 3074F SYST BP LT 130 MM HG: CPT | Mod: CPTII,S$GLB,, | Performed by: PHYSICIAN ASSISTANT

## 2022-11-15 PROCEDURE — 99214 OFFICE O/P EST MOD 30 MIN: CPT | Mod: S$GLB,,, | Performed by: PHYSICIAN ASSISTANT

## 2022-11-15 PROCEDURE — 99999 PR PBB SHADOW E&M-EST. PATIENT-LVL IV: ICD-10-PCS | Mod: PBBFAC,,, | Performed by: PHYSICIAN ASSISTANT

## 2022-11-15 PROCEDURE — 71046 XR CHEST PA AND LATERAL: ICD-10-PCS | Mod: 26,,, | Performed by: RADIOLOGY

## 2022-11-15 PROCEDURE — 1159F MED LIST DOCD IN RCRD: CPT | Mod: CPTII,S$GLB,, | Performed by: PHYSICIAN ASSISTANT

## 2022-11-15 PROCEDURE — 3074F PR MOST RECENT SYSTOLIC BLOOD PRESSURE < 130 MM HG: ICD-10-PCS | Mod: CPTII,S$GLB,, | Performed by: PHYSICIAN ASSISTANT

## 2022-11-15 PROCEDURE — 3078F DIAST BP <80 MM HG: CPT | Mod: CPTII,S$GLB,, | Performed by: PHYSICIAN ASSISTANT

## 2022-11-15 PROCEDURE — 71046 X-RAY EXAM CHEST 2 VIEWS: CPT | Mod: TC,FY,PO

## 2022-11-15 PROCEDURE — 1159F PR MEDICATION LIST DOCUMENTED IN MEDICAL RECORD: ICD-10-PCS | Mod: CPTII,S$GLB,, | Performed by: PHYSICIAN ASSISTANT

## 2022-11-15 PROCEDURE — 3008F PR BODY MASS INDEX (BMI) DOCUMENTED: ICD-10-PCS | Mod: CPTII,S$GLB,, | Performed by: PHYSICIAN ASSISTANT

## 2022-11-15 PROCEDURE — 3044F PR MOST RECENT HEMOGLOBIN A1C LEVEL <7.0%: ICD-10-PCS | Mod: CPTII,S$GLB,, | Performed by: PHYSICIAN ASSISTANT

## 2022-11-15 PROCEDURE — 71046 X-RAY EXAM CHEST 2 VIEWS: CPT | Mod: 26,,, | Performed by: RADIOLOGY

## 2022-11-15 PROCEDURE — 99999 PR PBB SHADOW E&M-EST. PATIENT-LVL IV: CPT | Mod: PBBFAC,,, | Performed by: PHYSICIAN ASSISTANT

## 2022-11-15 RX ORDER — ALBUTEROL SULFATE 0.83 MG/ML
2.5 SOLUTION RESPIRATORY (INHALATION) EVERY 6 HOURS PRN
Qty: 75 ML | Refills: 2 | Status: SHIPPED | OUTPATIENT
Start: 2022-11-15 | End: 2023-11-15

## 2022-11-15 RX ORDER — DOXYCYCLINE 100 MG/1
100 CAPSULE ORAL 2 TIMES DAILY
Qty: 14 CAPSULE | Refills: 0 | Status: SHIPPED | OUTPATIENT
Start: 2022-11-15 | End: 2022-11-22
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
## 2022-11-22 ENCOUNTER — PATIENT MESSAGE (OUTPATIENT)
Dept: FAMILY MEDICINE | Facility: CLINIC | Age: 62
End: 2022-11-22
Payer: COMMERCIAL

## 2022-11-25 ENCOUNTER — TELEPHONE (OUTPATIENT)
Dept: FAMILY MEDICINE | Facility: CLINIC | Age: 62
End: 2022-11-25
Payer: COMMERCIAL

## 2022-11-25 DIAGNOSIS — B37.31 VAGINAL YEAST INFECTION: Primary | ICD-10-CM

## 2022-11-25 RX ORDER — FLUCONAZOLE 150 MG/1
150 TABLET ORAL DAILY
Qty: 1 TABLET | Refills: 1 | Status: SHIPPED | OUTPATIENT
Start: 2022-11-25 | End: 2022-11-26

## 2022-12-07 ENCOUNTER — PATIENT MESSAGE (OUTPATIENT)
Dept: PRIMARY CARE CLINIC | Facility: CLINIC | Age: 62
End: 2022-12-07
Payer: COMMERCIAL

## 2022-12-08 NOTE — TELEPHONE ENCOUNTER
Please see patients MyChart message and advise. She is asking if she needs to be seen by her neurologist about her tremors and if her cough could be related to the tremor.

## 2022-12-09 ENCOUNTER — OFFICE VISIT (OUTPATIENT)
Dept: FAMILY MEDICINE | Facility: CLINIC | Age: 62
End: 2022-12-09
Payer: COMMERCIAL

## 2022-12-09 VITALS
HEART RATE: 83 BPM | OXYGEN SATURATION: 98 % | DIASTOLIC BLOOD PRESSURE: 76 MMHG | BODY MASS INDEX: 41.95 KG/M2 | HEIGHT: 70 IN | WEIGHT: 293 LBS | SYSTOLIC BLOOD PRESSURE: 148 MMHG | RESPIRATION RATE: 19 BRPM

## 2022-12-09 DIAGNOSIS — R05.8 POST-VIRAL COUGH SYNDROME: Primary | ICD-10-CM

## 2022-12-09 PROCEDURE — 99999 PR PBB SHADOW E&M-EST. PATIENT-LVL IV: CPT | Mod: PBBFAC,,, | Performed by: FAMILY MEDICINE

## 2022-12-09 PROCEDURE — 3078F PR MOST RECENT DIASTOLIC BLOOD PRESSURE < 80 MM HG: ICD-10-PCS | Mod: CPTII,S$GLB,, | Performed by: FAMILY MEDICINE

## 2022-12-09 PROCEDURE — 1159F MED LIST DOCD IN RCRD: CPT | Mod: CPTII,S$GLB,, | Performed by: FAMILY MEDICINE

## 2022-12-09 PROCEDURE — 3077F SYST BP >= 140 MM HG: CPT | Mod: CPTII,S$GLB,, | Performed by: FAMILY MEDICINE

## 2022-12-09 PROCEDURE — 99214 PR OFFICE/OUTPT VISIT, EST, LEVL IV, 30-39 MIN: ICD-10-PCS | Mod: S$GLB,,, | Performed by: FAMILY MEDICINE

## 2022-12-09 PROCEDURE — 3008F PR BODY MASS INDEX (BMI) DOCUMENTED: ICD-10-PCS | Mod: CPTII,S$GLB,, | Performed by: FAMILY MEDICINE

## 2022-12-09 PROCEDURE — 1160F RVW MEDS BY RX/DR IN RCRD: CPT | Mod: CPTII,S$GLB,, | Performed by: FAMILY MEDICINE

## 2022-12-09 PROCEDURE — 1159F PR MEDICATION LIST DOCUMENTED IN MEDICAL RECORD: ICD-10-PCS | Mod: CPTII,S$GLB,, | Performed by: FAMILY MEDICINE

## 2022-12-09 PROCEDURE — 3077F PR MOST RECENT SYSTOLIC BLOOD PRESSURE >= 140 MM HG: ICD-10-PCS | Mod: CPTII,S$GLB,, | Performed by: FAMILY MEDICINE

## 2022-12-09 PROCEDURE — 99214 OFFICE O/P EST MOD 30 MIN: CPT | Mod: S$GLB,,, | Performed by: FAMILY MEDICINE

## 2022-12-09 PROCEDURE — 3008F BODY MASS INDEX DOCD: CPT | Mod: CPTII,S$GLB,, | Performed by: FAMILY MEDICINE

## 2022-12-09 PROCEDURE — 99999 PR PBB SHADOW E&M-EST. PATIENT-LVL IV: ICD-10-PCS | Mod: PBBFAC,,, | Performed by: FAMILY MEDICINE

## 2022-12-09 PROCEDURE — 1160F PR REVIEW ALL MEDS BY PRESCRIBER/CLIN PHARMACIST DOCUMENTED: ICD-10-PCS | Mod: CPTII,S$GLB,, | Performed by: FAMILY MEDICINE

## 2022-12-09 PROCEDURE — 3044F HG A1C LEVEL LT 7.0%: CPT | Mod: CPTII,S$GLB,, | Performed by: FAMILY MEDICINE

## 2022-12-09 PROCEDURE — 3078F DIAST BP <80 MM HG: CPT | Mod: CPTII,S$GLB,, | Performed by: FAMILY MEDICINE

## 2022-12-09 PROCEDURE — 3044F PR MOST RECENT HEMOGLOBIN A1C LEVEL <7.0%: ICD-10-PCS | Mod: CPTII,S$GLB,, | Performed by: FAMILY MEDICINE

## 2022-12-09 RX ORDER — PREDNISONE 20 MG/1
TABLET ORAL
Qty: 24 TABLET | Refills: 0 | Status: SHIPPED | OUTPATIENT
Start: 2022-12-09 | End: 2022-12-19

## 2022-12-09 NOTE — PROGRESS NOTES
"Subjective:       Patient ID: Yumiko Gonzalez is a 62 y.o. female.    Chief Complaint: Cough (1 month)    HPI:  Pleasant 62-year-old female here with a persistent cough now for about 5 or 6 weeks.  She became ill with a viral illness more than likely about 5 or 6 weeks ago but has not been able to stop coughing.  The cough is really causing her to have headache and upsetting her ability to rest.  She does typically wheeze from time to time with illnesses on the expiration breath.  She does not hear that right now.  Her breath sounds are coarse though and consistent with the diagnoses postviral cough syndrome.    Review of Systems   Constitutional: Negative.    HENT: Negative.     Eyes: Negative.    Respiratory:  Positive for cough.    Cardiovascular: Negative.    Gastrointestinal: Negative.    Endocrine: Negative.    Genitourinary: Negative.    Musculoskeletal: Negative.    Skin: Negative.    Allergic/Immunologic: Negative.    Neurological: Negative.    Hematological: Negative.    Psychiatric/Behavioral: Negative.       Objective:      Vitals:    12/09/22 1522   BP: (!) 148/76   Pulse: 83   Resp: 19   SpO2: 98%   Weight: 136.1 kg (300 lb)   Height: 5' 10" (1.778 m)      Physical Exam  Vitals and nursing note reviewed.   Constitutional:       Appearance: Normal appearance. She is normal weight.   HENT:      Head: Normocephalic and atraumatic.      Nose: Nose normal.      Mouth/Throat:      Mouth: Mucous membranes are moist.      Pharynx: Oropharynx is clear.   Eyes:      Extraocular Movements: Extraocular movements intact.      Conjunctiva/sclera: Conjunctivae normal.      Pupils: Pupils are equal, round, and reactive to light.   Cardiovascular:      Rate and Rhythm: Normal rate and regular rhythm.      Pulses: Normal pulses.      Heart sounds: Normal heart sounds.   Pulmonary:      Effort: Pulmonary effort is normal.      Breath sounds: Normal breath sounds.      Comments: Coarse breath sounds but no rhonchi or " rales.  Abdominal:      General: Abdomen is flat. Bowel sounds are normal.      Palpations: Abdomen is soft.   Musculoskeletal:         General: Normal range of motion.      Cervical back: Normal range of motion and neck supple.   Skin:     General: Skin is warm and dry.      Capillary Refill: Capillary refill takes less than 2 seconds.   Neurological:      General: No focal deficit present.      Mental Status: She is alert and oriented to person, place, and time. Mental status is at baseline.   Psychiatric:         Mood and Affect: Mood normal.         Behavior: Behavior normal.         Thought Content: Thought content normal.         Judgment: Judgment normal.       Results for orders placed or performed in visit on 10/25/22   Alpha 1 Antitrypsin Phenotype   Result Value Ref Range    Alpha 1 Antitrypsin Phenotype SEE BELOW     Alpha-1 Anti-Trypsin 115 100 - 190 mg/dL      Assessment:       1. Post-viral cough syndrome        Plan:       Post-viral cough syndrome  -     predniSONE (DELTASONE) 20 MG tablet; Take 3 tabs a day for 4 days and then take 2 tabs a day for 6 days.  Take his early in the day as possible with your 1st meal of the day.  Dispense: 24 tablet; Refill: 0        Medication List with Changes/Refills   New Medications    PREDNISONE (DELTASONE) 20 MG TABLET    Take 3 tabs a day for 4 days and then take 2 tabs a day for 6 days.  Take his early in the day as possible with your 1st meal of the day.   Current Medications    ALBUTEROL (PROVENTIL) 2.5 MG /3 ML (0.083 %) NEBULIZER SOLUTION    Take 3 mLs (2.5 mg total) by nebulization every 6 (six) hours as needed for Wheezing. Rescue    ALBUTEROL (PROVENTIL/VENTOLIN HFA) 90 MCG/ACTUATION INHALER    Inhale 2 puffs every 4 hours as needed for cough, wheeze, or shortness of breath    ALPRAZOLAM (XANAX) 0.25 MG TABLET    Take 1 tablet (0.25 mg total) by mouth nightly as needed. FOR INSOMNIA    BUPROPION (WELLBUTRIN XL) 150 MG TB24 TABLET    Take 2 tablets (300  mg total) by mouth once daily.    CETIRIZINE (ZYRTEC) 10 MG TABLET    Take 10 mg by mouth once daily.    FLUTICASONE-UMECLIDIN-VILANTER (TRELEGY ELLIPTA) 200-62.5-25 MCG INHALER    Inhale 1 puff into the lungs once daily.    HYDROCHLOROTHIAZIDE (HYDRODIURIL) 12.5 MG TAB    Take 1 tablet (12.5 mg total) by mouth once daily.    LORATADINE (CLARITIN) 10 MG TABLET    Take 10 mg by mouth once daily.     MULTIVITAMIN (THERAGRAN) PER TABLET    Take 1 tablet by mouth once daily.    NYSTATIN-TRIAMCINOLONE (MYCOLOG II) CREAM    Apply to affected area twice daily as needed    OMEPRAZOLE (PRILOSEC) 20 MG CAPSULE    Take 1 capsule (20 mg total) by mouth once daily.    OXYBUTYNIN (DITROPAN XL) 10 MG 24 HR TABLET    Take 1 tablet (10 mg total) by mouth once daily.    PROPRANOLOL (INDERAL LA) 60 MG 24 HR CAPSULE    Take 1 capsule (60 mg total) by mouth once daily.    TRAZODONE (DESYREL) 50 MG TABLET    Take 1 tablet (50 mg total) by mouth every evening.    VITAMIN E 400 UNIT CAPSULE    Take 400 Units by mouth once daily.   Discontinued Medications    DICLOFENAC SODIUM (VOLTAREN) 1 % GEL    Apply 2 g topically 3 (three) times daily.    LACTULOSE (CHRONULAC) 10 GRAM/15 ML SOLUTION    Take 15 mLs (10 g total) by mouth 3 (three) times daily. Adjust dose for goal Goal of 2-3 soft bowel movements daily.    RIFAXIMIN (XIFAXAN) 550 MG TAB    Take 1 tablet (550 mg total) by mouth 2 (two) times daily.    TRIAMCINOLONE (NASACORT) 55 MCG NASAL INHALER    Use 2 sprays by Nasal route once daily.

## 2022-12-14 ENCOUNTER — PATIENT MESSAGE (OUTPATIENT)
Dept: NEUROLOGY | Facility: CLINIC | Age: 62
End: 2022-12-14
Payer: COMMERCIAL

## 2022-12-19 ENCOUNTER — PATIENT MESSAGE (OUTPATIENT)
Dept: NEUROLOGY | Facility: CLINIC | Age: 62
End: 2022-12-19
Payer: COMMERCIAL

## 2022-12-19 ENCOUNTER — PATIENT MESSAGE (OUTPATIENT)
Dept: FAMILY MEDICINE | Facility: CLINIC | Age: 62
End: 2022-12-19

## 2022-12-19 ENCOUNTER — OFFICE VISIT (OUTPATIENT)
Dept: FAMILY MEDICINE | Facility: CLINIC | Age: 62
End: 2022-12-19
Payer: COMMERCIAL

## 2022-12-19 DIAGNOSIS — D72.818 OTHER DECREASED WHITE BLOOD CELL (WBC) COUNT: Primary | ICD-10-CM

## 2022-12-19 DIAGNOSIS — K74.60 CIRRHOSIS OF LIVER WITHOUT ASCITES, UNSPECIFIED HEPATIC CIRRHOSIS TYPE: Chronic | ICD-10-CM

## 2022-12-19 DIAGNOSIS — J06.9 UPPER RESPIRATORY INFECTION WITH COUGH AND CONGESTION: ICD-10-CM

## 2022-12-19 PROCEDURE — 99213 PR OFFICE/OUTPT VISIT, EST, LEVL III, 20-29 MIN: ICD-10-PCS | Mod: 95,,, | Performed by: PHYSICIAN ASSISTANT

## 2022-12-19 PROCEDURE — 3044F HG A1C LEVEL LT 7.0%: CPT | Mod: CPTII,95,, | Performed by: PHYSICIAN ASSISTANT

## 2022-12-19 PROCEDURE — 3044F PR MOST RECENT HEMOGLOBIN A1C LEVEL <7.0%: ICD-10-PCS | Mod: CPTII,95,, | Performed by: PHYSICIAN ASSISTANT

## 2022-12-19 PROCEDURE — 99213 OFFICE O/P EST LOW 20 MIN: CPT | Mod: 95,,, | Performed by: PHYSICIAN ASSISTANT

## 2022-12-19 NOTE — LETTER
December 19, 2022    Yumiko Gonzalez  [address]  [facility] [id number]             [facility]  [address]  [facility] [address]  Phone: [phone]  Fax: [phone]   December 19, 2022     Patient: Yumiko Gonzalez   YOB: 1960   Date of Visit: 12/19/2022       To Whom it May Concern:    Yumiko Gonzalez was seen in my clinic on 12/19/2022. She may return to work on 12/22/2022.    Please excuse her from any classes or work missed.    If you have any questions or concerns, please don't hesitate to call.    Sincerely,         Mercy Portillo PA-C

## 2022-12-19 NOTE — PROGRESS NOTES
Subjective:      Patient ID: Yumiko Gonzalez is a 62 y.o. female.    Chief Complaint: Cough    Cough  This is a recurrent problem. The current episode started more than 1 month ago. The problem has been waxing and waning. The problem occurs every few hours. The cough is Non-productive. Associated symptoms include chest pain (with coughing), chills, a fever (100.2F nightly since Saturday), headaches, postnasal drip, rhinorrhea, shortness of breath, sweats and wheezing. Pertinent negatives include no ear congestion, ear pain, heartburn, hemoptysis, myalgias, nasal congestion, rash, sore throat or weight loss. The symptoms are aggravated by cold air, lying down and stress. She has tried OTC cough suppressant and a beta-agonist inhaler for the symptoms. The treatment provided moderate relief. Her past medical history is significant for asthma, bronchiectasis, bronchitis, environmental allergies and pneumonia. There is no history of COPD or emphysema.     Patient saw Dr. Ríos 12/9/2022 for post-viral cough and prescribed prednisone course.  Still taking prednisone now.      Patient reports continued cough, fever, and headaches.  No sick contacts.  Taken amoxicillin and doxycycline last month.  Using breathing treatments prn.    Review of Systems   Constitutional:  Positive for appetite change, chills, fatigue and fever (100.2F nightly since Saturday). Negative for weight loss.   HENT:  Positive for postnasal drip and rhinorrhea. Negative for ear pain and sore throat.    Respiratory:  Positive for cough (continued.), shortness of breath and wheezing. Negative for hemoptysis.    Cardiovascular:  Positive for chest pain (with coughing).   Gastrointestinal:  Positive for abdominal pain (from coughing). Negative for heartburn.   Musculoskeletal:  Negative for myalgias.   Skin:  Negative for rash.   Allergic/Immunologic: Positive for environmental allergies.   Neurological:  Positive for headaches.   Psychiatric/Behavioral:   Negative for sleep disturbance.        Objective:   There were no vitals taken for this visit.    Physical Exam  Constitutional:       Appearance: Normal appearance.   HENT:      Head: Normocephalic and atraumatic.      Right Ear: External ear normal.      Left Ear: External ear normal.      Nose: Nose normal.   Eyes:      Conjunctiva/sclera: Conjunctivae normal.   Pulmonary:      Effort: No respiratory distress.   Skin:     Coloration: Skin is not jaundiced or pale.   Neurological:      General: No focal deficit present.      Mental Status: She is alert and oriented to person, place, and time.   Psychiatric:         Mood and Affect: Mood normal.         Behavior: Behavior normal.         Judgment: Judgment normal.     Assessment:      1. Other decreased white blood cell (WBC) count    2. Cirrhosis of liver without ascites, unspecified hepatic cirrhosis type    3. Upper respiratory infection with cough and congestion       Plan:   1. Other decreased white blood cell (WBC) count  - CBC Auto Differential; Future    2. Cirrhosis of liver without ascites, unspecified hepatic cirrhosis type  - Comprehensive Metabolic Panel; Future    3. Upper respiratory infection with cough and congestion  Stop prednisone now.  Advised supportive care.  Will do home covid test and give us results.    Follow up as needed.  Patient agreed with plan and expressed understanding.  The patient location is:  Patient Home   The chief complaint leading to consultation is: cough  Visit type: Virtual visit with synchronous audio and video  Total time spent with patient: 22 minutes  Each patient to whom he or she provides medical services by telemedicine is:  (1) informed of the relationship between the physician and patient and the respective role of any other health care provider with respect to management of the patient; and (2) notified that he or she may decline to receive medical services by telemedicine and may withdraw from such care at any  time.

## 2022-12-28 ENCOUNTER — LAB VISIT (OUTPATIENT)
Dept: LAB | Facility: HOSPITAL | Age: 62
End: 2022-12-28
Payer: COMMERCIAL

## 2022-12-28 DIAGNOSIS — D72.818 OTHER DECREASED WHITE BLOOD CELL (WBC) COUNT: ICD-10-CM

## 2022-12-28 DIAGNOSIS — K74.60 CIRRHOSIS OF LIVER WITHOUT ASCITES, UNSPECIFIED HEPATIC CIRRHOSIS TYPE: Chronic | ICD-10-CM

## 2022-12-28 LAB
ALBUMIN SERPL BCP-MCNC: 2.7 G/DL (ref 3.5–5.2)
ALP SERPL-CCNC: 145 U/L (ref 55–135)
ALT SERPL W/O P-5'-P-CCNC: 39 U/L (ref 10–44)
ANION GAP SERPL CALC-SCNC: 8 MMOL/L (ref 8–16)
AST SERPL-CCNC: 69 U/L (ref 10–40)
BASOPHILS # BLD AUTO: 0.03 K/UL (ref 0–0.2)
BASOPHILS NFR BLD: 0.7 % (ref 0–1.9)
BILIRUB SERPL-MCNC: 3.4 MG/DL (ref 0.1–1)
BUN SERPL-MCNC: 12 MG/DL (ref 8–23)
CALCIUM SERPL-MCNC: 8.9 MG/DL (ref 8.7–10.5)
CHLORIDE SERPL-SCNC: 108 MMOL/L (ref 95–110)
CO2 SERPL-SCNC: 24 MMOL/L (ref 23–29)
CREAT SERPL-MCNC: 0.6 MG/DL (ref 0.5–1.4)
DIFFERENTIAL METHOD: ABNORMAL
EOSINOPHIL # BLD AUTO: 0.2 K/UL (ref 0–0.5)
EOSINOPHIL NFR BLD: 4.8 % (ref 0–8)
ERYTHROCYTE [DISTWIDTH] IN BLOOD BY AUTOMATED COUNT: 15.9 % (ref 11.5–14.5)
EST. GFR  (NO RACE VARIABLE): >60 ML/MIN/1.73 M^2
GLUCOSE SERPL-MCNC: 145 MG/DL (ref 70–110)
HCT VFR BLD AUTO: 41.4 % (ref 37–48.5)
HGB BLD-MCNC: 13.6 G/DL (ref 12–16)
IMM GRANULOCYTES # BLD AUTO: 0.04 K/UL (ref 0–0.04)
IMM GRANULOCYTES NFR BLD AUTO: 0.9 % (ref 0–0.5)
LYMPHOCYTES # BLD AUTO: 0.7 K/UL (ref 1–4.8)
LYMPHOCYTES NFR BLD: 15.2 % (ref 18–48)
MCH RBC QN AUTO: 31.9 PG (ref 27–31)
MCHC RBC AUTO-ENTMCNC: 32.9 G/DL (ref 32–36)
MCV RBC AUTO: 97 FL (ref 82–98)
MONOCYTES # BLD AUTO: 0.6 K/UL (ref 0.3–1)
MONOCYTES NFR BLD: 13.1 % (ref 4–15)
NEUTROPHILS # BLD AUTO: 2.8 K/UL (ref 1.8–7.7)
NEUTROPHILS NFR BLD: 65.3 % (ref 38–73)
NRBC BLD-RTO: 0 /100 WBC
PLATELET # BLD AUTO: 89 K/UL (ref 150–450)
PMV BLD AUTO: 11.4 FL (ref 9.2–12.9)
POTASSIUM SERPL-SCNC: 3.8 MMOL/L (ref 3.5–5.1)
PROT SERPL-MCNC: 5.3 G/DL (ref 6–8.4)
RBC # BLD AUTO: 4.27 M/UL (ref 4–5.4)
SODIUM SERPL-SCNC: 140 MMOL/L (ref 136–145)
WBC # BLD AUTO: 4.34 K/UL (ref 3.9–12.7)

## 2022-12-28 PROCEDURE — 85025 COMPLETE CBC W/AUTO DIFF WBC: CPT | Performed by: PHYSICIAN ASSISTANT

## 2022-12-28 PROCEDURE — 36415 COLL VENOUS BLD VENIPUNCTURE: CPT | Mod: PO | Performed by: PHYSICIAN ASSISTANT

## 2022-12-28 PROCEDURE — 80053 COMPREHEN METABOLIC PANEL: CPT | Performed by: PHYSICIAN ASSISTANT

## 2023-01-12 ENCOUNTER — OFFICE VISIT (OUTPATIENT)
Dept: OPTOMETRY | Facility: CLINIC | Age: 63
End: 2023-01-12
Payer: COMMERCIAL

## 2023-01-12 DIAGNOSIS — Z01.00 EXAMINATION OF EYES AND VISION: Primary | ICD-10-CM

## 2023-01-12 DIAGNOSIS — H52.203 HYPEROPIA WITH ASTIGMATISM AND PRESBYOPIA, BILATERAL: ICD-10-CM

## 2023-01-12 DIAGNOSIS — H43.393 VITREOUS FLOATERS, BILATERAL: ICD-10-CM

## 2023-01-12 DIAGNOSIS — H52.03 HYPEROPIA WITH ASTIGMATISM AND PRESBYOPIA, BILATERAL: ICD-10-CM

## 2023-01-12 DIAGNOSIS — H52.4 HYPEROPIA WITH ASTIGMATISM AND PRESBYOPIA, BILATERAL: ICD-10-CM

## 2023-01-12 PROCEDURE — 1159F PR MEDICATION LIST DOCUMENTED IN MEDICAL RECORD: ICD-10-PCS | Mod: CPTII,S$GLB,, | Performed by: OPTOMETRIST

## 2023-01-12 PROCEDURE — 92015 DETERMINE REFRACTIVE STATE: CPT | Mod: S$GLB,,, | Performed by: OPTOMETRIST

## 2023-01-12 PROCEDURE — 1159F MED LIST DOCD IN RCRD: CPT | Mod: CPTII,S$GLB,, | Performed by: OPTOMETRIST

## 2023-01-12 PROCEDURE — 92014 COMPRE OPH EXAM EST PT 1/>: CPT | Mod: S$GLB,,, | Performed by: OPTOMETRIST

## 2023-01-12 PROCEDURE — 99999 PR PBB SHADOW E&M-EST. PATIENT-LVL III: CPT | Mod: PBBFAC,,, | Performed by: OPTOMETRIST

## 2023-01-12 PROCEDURE — 99999 PR PBB SHADOW E&M-EST. PATIENT-LVL III: ICD-10-PCS | Mod: PBBFAC,,, | Performed by: OPTOMETRIST

## 2023-01-12 PROCEDURE — 92014 PR EYE EXAM, EST PATIENT,COMPREHESV: ICD-10-PCS | Mod: S$GLB,,, | Performed by: OPTOMETRIST

## 2023-01-12 PROCEDURE — 92015 PR REFRACTION: ICD-10-PCS | Mod: S$GLB,,, | Performed by: OPTOMETRIST

## 2023-01-12 NOTE — PROGRESS NOTES
HPI    Dle- 01/06/2022    Pt here for routine eye exam. Pt sts changes to distance and near va.   Denies flashes/floaters/eye pain. Pt sts changes to depth perception. No   gtts. Dryness, occasional.   Last edited by Mercy Gibbs MA on 1/12/2023  3:44 PM.        ROS    Positive for: Eyes  Negative for: Constitutional, Gastrointestinal, Neurological, Skin,   Genitourinary, Musculoskeletal, HENT, Endocrine, Cardiovascular,   Respiratory, Psychiatric, Allergic/Imm, Heme/Lymph  Last edited by CHARLES Basilio, OD on 1/12/2023  4:19 PM.        Assessment /Plan     For exam results, see Encounter Report.    Examination of eyes and vision    Hyperopia with astigmatism and presbyopia, bilateral    Vitreous floaters, bilateral      Ocular health exam OU   Updated specs rx, gave copy, fill prn --sep pair for computer / intermediate   RD precautions given and reviewed. Patient knows to call/ message if any further changes in symptoms occur.    Early NS is present, but not vis sig     Discussed and educated patient on current findings /plan.  RTC 1 year, prn if any changes / issues

## 2023-01-12 NOTE — PATIENT INSTRUCTIONS
"DRY EYES -- BURNING OR LESA SYMPTOMS:  Use Over The Counter artificial tears as needed for dry eye symptoms.   Some common brands include:  Systane, Optive, Refresh, and Thera-Tears.  These drops can be used as frequently as desired, but may be most helpful use during long periods of concentrated work.  For example, reading / working at the computer. Start with 3-4x per day.     Nighttime Ophthalmic gel or ointments are available: Refresh PM, Genteal, and Lacrilube.    Avoid drops that "get redness out" (Visine, Murine, Clear Eyes), as these may contain medication that could further irritate the eyes, especially with chronic use.    ALLERGY EYES -- ITCHING SYMPTOMS:  Over the counter medications include--Pataday, Zaditor, and Alaway.  Use as directed 1-2 drops daily for symptoms of itching / watering eyes.  These drops will not help for dry eye or exposure symptoms.    REDNESS RELIEF:  Lumify---is a good redness reliever that will not cause irritation if used chronically.        FLASHES / FLOATERS / POSTERIOR VITREOUS DETACHMENT    Call the clinic if you have any further changes in symptoms.  Including:  Increased numbers of floaters or flashing lights, dimness or darkness that moves through or stays constant in your vision, or any pain in the eye (s).    You may sometimes see small specks or clouds moving in your field of vision.  They are called FLOATERS.  You can often see them when looking at a plain background, like a blank wall or blue ronny.  Floaters are actually tiny clumps of gel or cells inside the VITREOUS, the clear jelly-like fluid that fills the inside of your eye.    While these objects look like they are in front of your eye, they are actually floating inside.  What you see are the shadows they cast on the RETINA, the nerve layer at the back of the eye that senses light and allows you to see.      POSTERIOR VITREOUS DETACHMENT    The appearance of new floaters may be alarming.  If you suddenly " develop new floaters, you should contact your eye care professional  right away.    The retina can tear if the shrinking vitreous pulls away from the wall of the eye.  This sometimes causes a small amount of bleeding in the eye that may appear as new floaters.    A torn retina is always a serious problem, since it can lead to a retinal detachment.  You should see your eye care professional as soon as possible if:    even one new floater appears suddenly;  you see sudden flashes of light;  you notice other symptoms, like the loss of side vision, or a curtain closes down in your vision        POSTERIOR VITREOUS DETACHMENT is more common for people who:    are nearsighted;  have had cataract surgery;  have had YAG laser surgery of the eye;  have had inflammation inside the eye;  are over age 60.      While some floaters may remain visible, many of them will fade over time and become less noticeable.  Even if you've had some floaters for years, you should have your eyes checked as soon as possible if you notice new ones.    FLASHING LIGHTS    When the vitreous gel rubs or pulls on the retina, you may see what look like flashing lights or lightning streaks.  These flashes can appear off and on for several weeks or months.      Some people experience flashes of light that appear as jagged lines or heat waves in both eyes, lasting 10-20 minutes.  These flashes are caused by a spasm of blood vessels in the brain, which is called a migraine.    If a headache follows these flashes, it's called a migraine headache.  If   no headache occurs, these flashes are called Ophthalmic or Ocular Migraine.           Early Cataracts--not visually significant for surgery consultation.    What Are Cataracts?  A clear lens in the eye focuses light. This lets the eye see images sharply. With age, the lens slowly becomes cloudy. The cloudy lens is a cataract. A cataract scatters light and makes it hard for the eye to focus. Cataracts often  form in both eyes. But one lens may cloud faster than the other.      The Aging of Your Lens    Your lens may cloud so slowly that you don`t notice any vision changes at first. But as the cataract gets worse, the eye has a harder time focusing. In early stages, glasses may help you see better. As the lens gets cloudier, your doctor may recommend surgery to restore your vision.

## 2023-01-17 PROBLEM — Z71.89 ACP (ADVANCE CARE PLANNING): Status: ACTIVE | Noted: 2023-01-17

## 2023-01-17 PROBLEM — R18.8 CIRRHOSIS OF LIVER WITH ASCITES: Status: ACTIVE | Noted: 2017-06-20

## 2023-01-18 ENCOUNTER — PATIENT MESSAGE (OUTPATIENT)
Dept: PRIMARY CARE CLINIC | Facility: CLINIC | Age: 63
End: 2023-01-18
Payer: COMMERCIAL

## 2023-01-18 ENCOUNTER — PATIENT MESSAGE (OUTPATIENT)
Dept: HEPATOLOGY | Facility: CLINIC | Age: 63
End: 2023-01-18
Payer: COMMERCIAL

## 2023-01-19 ENCOUNTER — PATIENT MESSAGE (OUTPATIENT)
Dept: PRIMARY CARE CLINIC | Facility: CLINIC | Age: 63
End: 2023-01-19
Payer: COMMERCIAL

## 2023-01-22 PROBLEM — L03.116 CELLULITIS OF LEFT LOWER EXTREMITY: Status: ACTIVE | Noted: 2023-01-22

## 2023-01-23 ENCOUNTER — PATIENT MESSAGE (OUTPATIENT)
Dept: PRIMARY CARE CLINIC | Facility: CLINIC | Age: 63
End: 2023-01-23
Payer: COMMERCIAL

## 2023-01-31 ENCOUNTER — LAB VISIT (OUTPATIENT)
Dept: LAB | Facility: HOSPITAL | Age: 63
End: 2023-01-31
Payer: COMMERCIAL

## 2023-01-31 ENCOUNTER — OFFICE VISIT (OUTPATIENT)
Dept: HEPATOLOGY | Facility: CLINIC | Age: 63
End: 2023-01-31
Payer: COMMERCIAL

## 2023-01-31 VITALS
WEIGHT: 293 LBS | HEART RATE: 77 BPM | BODY MASS INDEX: 41.95 KG/M2 | DIASTOLIC BLOOD PRESSURE: 74 MMHG | RESPIRATION RATE: 18 BRPM | SYSTOLIC BLOOD PRESSURE: 164 MMHG | TEMPERATURE: 97 F | OXYGEN SATURATION: 94 % | HEIGHT: 70 IN

## 2023-01-31 DIAGNOSIS — K76.82 HEPATIC ENCEPHALOPATHY: ICD-10-CM

## 2023-01-31 DIAGNOSIS — K74.60 CIRRHOSIS OF LIVER WITH ASCITES, UNSPECIFIED HEPATIC CIRRHOSIS TYPE: Primary | ICD-10-CM

## 2023-01-31 DIAGNOSIS — I85.00 ESOPHAGEAL VARICES DETERMINED BY ENDOSCOPY: Chronic | ICD-10-CM

## 2023-01-31 DIAGNOSIS — K75.81 NONALCOHOLIC STEATOHEPATITIS: ICD-10-CM

## 2023-01-31 DIAGNOSIS — R18.8 CIRRHOSIS OF LIVER WITH ASCITES, UNSPECIFIED HEPATIC CIRRHOSIS TYPE: Primary | ICD-10-CM

## 2023-01-31 DIAGNOSIS — K76.6 PORTAL HYPERTENSION: ICD-10-CM

## 2023-01-31 DIAGNOSIS — R60.0 LOWER EXTREMITY EDEMA: ICD-10-CM

## 2023-01-31 DIAGNOSIS — K75.81 NONALCOHOLIC STEATOHEPATITIS: Chronic | ICD-10-CM

## 2023-01-31 DIAGNOSIS — K74.60 CIRRHOSIS OF LIVER WITH ASCITES, UNSPECIFIED HEPATIC CIRRHOSIS TYPE: ICD-10-CM

## 2023-01-31 DIAGNOSIS — I85.00 ESOPHAGEAL VARICES DETERMINED BY ENDOSCOPY: ICD-10-CM

## 2023-01-31 DIAGNOSIS — R18.8 CIRRHOSIS OF LIVER WITH ASCITES, UNSPECIFIED HEPATIC CIRRHOSIS TYPE: ICD-10-CM

## 2023-01-31 LAB
AFP SERPL-MCNC: 2.4 NG/ML (ref 0–8.4)
ALBUMIN SERPL BCP-MCNC: 2.9 G/DL (ref 3.5–5.2)
ALP SERPL-CCNC: 117 U/L (ref 55–135)
ALT SERPL W/O P-5'-P-CCNC: 27 U/L (ref 10–44)
ANION GAP SERPL CALC-SCNC: 5 MMOL/L (ref 8–16)
AST SERPL-CCNC: 53 U/L (ref 10–40)
BASOPHILS # BLD AUTO: 0.09 K/UL (ref 0–0.2)
BASOPHILS NFR BLD: 1.2 % (ref 0–1.9)
BILIRUB SERPL-MCNC: 3.4 MG/DL (ref 0.1–1)
BUN SERPL-MCNC: 12 MG/DL (ref 8–23)
CALCIUM SERPL-MCNC: 10.1 MG/DL (ref 8.7–10.5)
CHLORIDE SERPL-SCNC: 105 MMOL/L (ref 95–110)
CO2 SERPL-SCNC: 25 MMOL/L (ref 23–29)
CREAT SERPL-MCNC: 0.6 MG/DL (ref 0.5–1.4)
DIFFERENTIAL METHOD: ABNORMAL
EOSINOPHIL # BLD AUTO: 0.3 K/UL (ref 0–0.5)
EOSINOPHIL NFR BLD: 4.3 % (ref 0–8)
ERYTHROCYTE [DISTWIDTH] IN BLOOD BY AUTOMATED COUNT: 15.2 % (ref 11.5–14.5)
EST. GFR  (NO RACE VARIABLE): >60 ML/MIN/1.73 M^2
GLUCOSE SERPL-MCNC: 102 MG/DL (ref 70–110)
HCT VFR BLD AUTO: 46.3 % (ref 37–48.5)
HGB BLD-MCNC: 15.1 G/DL (ref 12–16)
IMM GRANULOCYTES # BLD AUTO: 0.21 K/UL (ref 0–0.04)
IMM GRANULOCYTES NFR BLD AUTO: 2.8 % (ref 0–0.5)
INR PPP: 1.4 (ref 0.8–1.2)
LYMPHOCYTES # BLD AUTO: 1.2 K/UL (ref 1–4.8)
LYMPHOCYTES NFR BLD: 15.1 % (ref 18–48)
MCH RBC QN AUTO: 32.2 PG (ref 27–31)
MCHC RBC AUTO-ENTMCNC: 32.6 G/DL (ref 32–36)
MCV RBC AUTO: 99 FL (ref 82–98)
MONOCYTES # BLD AUTO: 1 K/UL (ref 0.3–1)
MONOCYTES NFR BLD: 12.7 % (ref 4–15)
NEUTROPHILS # BLD AUTO: 4.9 K/UL (ref 1.8–7.7)
NEUTROPHILS NFR BLD: 63.9 % (ref 38–73)
NRBC BLD-RTO: 0 /100 WBC
PLATELET # BLD AUTO: 150 K/UL (ref 150–450)
PMV BLD AUTO: 10.2 FL (ref 9.2–12.9)
POTASSIUM SERPL-SCNC: 4.3 MMOL/L (ref 3.5–5.1)
PROT SERPL-MCNC: 6.1 G/DL (ref 6–8.4)
PROTHROMBIN TIME: 14 SEC (ref 9–12.5)
RBC # BLD AUTO: 4.69 M/UL (ref 4–5.4)
SODIUM SERPL-SCNC: 135 MMOL/L (ref 136–145)
WBC # BLD AUTO: 7.61 K/UL (ref 3.9–12.7)

## 2023-01-31 PROCEDURE — 3077F PR MOST RECENT SYSTOLIC BLOOD PRESSURE >= 140 MM HG: ICD-10-PCS | Mod: CPTII,S$GLB,, | Performed by: NURSE PRACTITIONER

## 2023-01-31 PROCEDURE — 85025 COMPLETE CBC W/AUTO DIFF WBC: CPT | Performed by: NURSE PRACTITIONER

## 2023-01-31 PROCEDURE — 1159F MED LIST DOCD IN RCRD: CPT | Mod: CPTII,S$GLB,, | Performed by: NURSE PRACTITIONER

## 2023-01-31 PROCEDURE — 1111F PR DISCHARGE MEDS RECONCILED W/ CURRENT OUTPATIENT MED LIST: ICD-10-PCS | Mod: CPTII,S$GLB,, | Performed by: NURSE PRACTITIONER

## 2023-01-31 PROCEDURE — 99999 PR PBB SHADOW E&M-EST. PATIENT-LVL V: CPT | Mod: PBBFAC,,, | Performed by: NURSE PRACTITIONER

## 2023-01-31 PROCEDURE — 3008F BODY MASS INDEX DOCD: CPT | Mod: CPTII,S$GLB,, | Performed by: NURSE PRACTITIONER

## 2023-01-31 PROCEDURE — 3078F PR MOST RECENT DIASTOLIC BLOOD PRESSURE < 80 MM HG: ICD-10-PCS | Mod: CPTII,S$GLB,, | Performed by: NURSE PRACTITIONER

## 2023-01-31 PROCEDURE — 99215 PR OFFICE/OUTPT VISIT, EST, LEVL V, 40-54 MIN: ICD-10-PCS | Mod: S$GLB,,, | Performed by: NURSE PRACTITIONER

## 2023-01-31 PROCEDURE — 36415 COLL VENOUS BLD VENIPUNCTURE: CPT | Performed by: NURSE PRACTITIONER

## 2023-01-31 PROCEDURE — 1160F PR REVIEW ALL MEDS BY PRESCRIBER/CLIN PHARMACIST DOCUMENTED: ICD-10-PCS | Mod: CPTII,S$GLB,, | Performed by: NURSE PRACTITIONER

## 2023-01-31 PROCEDURE — 3077F SYST BP >= 140 MM HG: CPT | Mod: CPTII,S$GLB,, | Performed by: NURSE PRACTITIONER

## 2023-01-31 PROCEDURE — 3078F DIAST BP <80 MM HG: CPT | Mod: CPTII,S$GLB,, | Performed by: NURSE PRACTITIONER

## 2023-01-31 PROCEDURE — 1160F RVW MEDS BY RX/DR IN RCRD: CPT | Mod: CPTII,S$GLB,, | Performed by: NURSE PRACTITIONER

## 2023-01-31 PROCEDURE — 99215 OFFICE O/P EST HI 40 MIN: CPT | Mod: S$GLB,,, | Performed by: NURSE PRACTITIONER

## 2023-01-31 PROCEDURE — 82105 ALPHA-FETOPROTEIN SERUM: CPT | Performed by: NURSE PRACTITIONER

## 2023-01-31 PROCEDURE — 1159F PR MEDICATION LIST DOCUMENTED IN MEDICAL RECORD: ICD-10-PCS | Mod: CPTII,S$GLB,, | Performed by: NURSE PRACTITIONER

## 2023-01-31 PROCEDURE — 1111F DSCHRG MED/CURRENT MED MERGE: CPT | Mod: CPTII,S$GLB,, | Performed by: NURSE PRACTITIONER

## 2023-01-31 PROCEDURE — 99999 PR PBB SHADOW E&M-EST. PATIENT-LVL V: ICD-10-PCS | Mod: PBBFAC,,, | Performed by: NURSE PRACTITIONER

## 2023-01-31 PROCEDURE — 80053 COMPREHEN METABOLIC PANEL: CPT | Performed by: NURSE PRACTITIONER

## 2023-01-31 PROCEDURE — 3008F PR BODY MASS INDEX (BMI) DOCUMENTED: ICD-10-PCS | Mod: CPTII,S$GLB,, | Performed by: NURSE PRACTITIONER

## 2023-01-31 PROCEDURE — 85610 PROTHROMBIN TIME: CPT | Performed by: NURSE PRACTITIONER

## 2023-01-31 RX ORDER — LACTULOSE 10 G/15ML
20 SOLUTION ORAL; RECTAL 2 TIMES DAILY
Qty: 5000 ML | Refills: 6 | Status: ON HOLD | OUTPATIENT
Start: 2023-01-31 | End: 2023-12-18 | Stop reason: HOSPADM

## 2023-01-31 RX ORDER — FUROSEMIDE 40 MG/1
40 TABLET ORAL DAILY
Qty: 30 TABLET | Refills: 6 | Status: SHIPPED | OUTPATIENT
Start: 2023-01-31 | End: 2023-07-06 | Stop reason: SDUPTHER

## 2023-01-31 RX ORDER — SPIRONOLACTONE 100 MG/1
100 TABLET, FILM COATED ORAL DAILY
Qty: 30 TABLET | Refills: 6 | Status: SHIPPED | OUTPATIENT
Start: 2023-01-31 | End: 2023-08-04 | Stop reason: SDUPTHER

## 2023-01-31 NOTE — PROGRESS NOTES
"[facility] Established Patient Visit    Reason for Visit: DURAN Cirrhosis    PCP: Garrett Webb    HPI:  This is a 62 y.o. female with PMH noted below, here for follow up for decompensated DURAN Cirrhosis. She is accompanied by her daughter, and was last seen in clinic by myself in 10/2022. She was previously evaluated for bariatric surgery, but was declined for surgery due to advanced liver disease. Abdominal US in 6/2022 showed cirrhosis and portal hypertension with no ascites. US did show "isoechoic solid nodule along the anterior border of the lateral segment of left hepatic lobe". AFP normal. Follow up MRI showed "no focal mass of the liver parenchyma is identified; the nodule seen on the prior ultrasound is felt to have been a a contour nodule of the cirrhotic liver".   EGD in 1/2020 showed Grade 1 EV and PHG. Repeat exam in March 2022 showed Grade 1 EV. Beginning early last year she had multiple falls, and was experiencing brain fog. Her ammonia level was noted to be mildly elevated at 69. She restarted Lactulose, and her mentation and ammonia level improved.    Since last visit, she notes that unfortunately her  passed away. She lives alone in [place], with a daughter nearby in [place]. She was recently admitted at [facility] from 1/17-1/26 with worsening ascites and pitting LE edema. INR was noted to be elevated (1.6). She was given IV diuretics (Lasix and Aldactone). Additionally, she underwent paracentesis with 8700 mL of ascitic fluid removed (albumin given post para). During her hospital stay, she developed cellulitis of the left foot and leg requiring IV Rocephin. MELD-Na a few days prior to discharge was 17.    MELD-Na score: 17 at 1/23/2023  6:13 AM  MELD score: 15 at 1/23/2023  6:13 AM  Calculated from:  Serum Creatinine: 0.54 mg/dL (Using min of 1 mg/dL) at 1/23/2023  6:13 AM  Serum Sodium: 135 mmol/L at 1/23/2023  6:13 AM  Total Bilirubin: 2.5 mg/dL at 1/23/2023  6:13 AM  INR(ratio): " 1.6 at 2023  6:26 AM  Age: 62 years    PMHX:  has a past medical history of Abnormal Pap smear, Abnormal Pap smear of cervix, Anemia, Arthritis, Asthma, Autoimmune disease, Blood transfusion, Bronchitis, Cancer (), Cervical neck pain with evidence of disc disease (3/22/2012), Cirrhosis, Colon polyps (3/22/2012), Depression (3/22/2012), Diverticular disease (3/22/2012), Fatty liver (3/22/2012), Fibrocystic breast, Fine tremor, Hepatosplenomegaly, Hypertension (2013), Knee pain, bilateral, Lesion of right lung, Liver disease, Migraines (past Hx), Nephrolithiasis, Pleural effusion, PONV (postoperative nausea and vomiting), Postpartum depression, Splenomegaly, Thrombocytopenia, and Tremors of nervous system.    PSHX:  has a past surgical history that includes Cervical conization w/ laser; Tubal ligation; Endometrial ablation;  section; Pelvic laparoscopy; Tonsillectomy; Adenoidectomy; Liver biopsy; Colonoscopy (N/A, 2016); Embolization (N/A, 2018); Esophagogastroduodenoscopy (N/A, 2020); Colonoscopy (N/A, 2/3/2022); and Esophagogastroduodenoscopy (N/A, 3/8/2022).    The patient's social and family histories were reviewed by me and updated in the appropriate section of the electronic medical record.    Review of patient's allergies indicates:   Allergen Reactions    No known drug allergies      Current Outpatient Medications on File Prior to Visit   Medication Sig Dispense Refill    albuterol (PROVENTIL) 2.5 mg /3 mL (0.083 %) nebulizer solution Take 3 mLs (2.5 mg total) by nebulization every 6 (six) hours as needed for Wheezing. Rescue 75 mL 2    albuterol (PROVENTIL/VENTOLIN HFA) 90 mcg/actuation inhaler Inhale 2 puffs every 4 hours as needed for cough, wheeze, or shortness of breath 18 g 11    ALPRAZolam (XANAX) 0.25 MG tablet Take 1 tablet (0.25 mg total) by mouth nightly as needed. FOR INSOMNIA 30 tablet 2    buPROPion (WELLBUTRIN XL) 150 MG TB24 tablet Take 2 tablets (300 mg  total) by mouth once daily. 180 tablet 3    fluticasone-umeclidin-vilanter (TRELEGY ELLIPTA) 200-62.5-25 mcg inhaler Inhale 1 puff into the lungs once daily. 60 each 11    Lactobacillus rhamnosus GG (CULTURELLE) 10 billion cell capsule Take 1 capsule by mouth once daily. for 20 days 20 capsule 0    loratadine (CLARITIN) 10 mg tablet Take 10 mg by mouth once daily.       multivitamin (THERAGRAN) per tablet Take 1 tablet by mouth once daily.      nystatin-triamcinolone (MYCOLOG II) cream Apply to affected area twice daily as needed 60 g 3    omeprazole (PRILOSEC) 20 MG capsule Take 1 capsule (20 mg total) by mouth once daily. 90 capsule 3    oxybutynin (DITROPAN XL) 10 MG 24 hr tablet Take 1 tablet (10 mg total) by mouth once daily. 90 tablet 3    propranoloL (INDERAL LA) 60 MG 24 hr capsule Take 1 capsule (60 mg total) by mouth once daily. 30 capsule 11    vitamin E 400 UNIT capsule Take 400 Units by mouth once daily.       No current facility-administered medications on file prior to visit.     SOCIAL HISTORY:   Social History     Tobacco Use   Smoking Status Former    Types: Cigarettes    Quit date: 2/3/2006    Years since quittin.0   Smokeless Tobacco Never     Social History     Substance and Sexual Activity   Alcohol Use Not Currently     Social History     Substance and Sexual Activity   Drug Use No     ROS:   GENERAL: Denies fever, chills, weight loss/gain, + fatigue  HEENT: Denies headaches, dizziness, vision/hearing changes  CARDIOVASCULAR: Denies chest pain, palpitations, + lower extremity edema   RESPIRATORY: Denies dyspnea, cough  GI: Denies abdominal pain, rectal bleeding, nausea, vomiting. No change in bowel pattern or color  : Denies dysuria, hematuria   SKIN: Denies rash, itching   NEURO: Denies memory loss, or mood changes + brain fog/slowed mentation - improved  PSYCH: Denies depression or anxiety  HEME/LYMPH: Denies easy bruising or bleeding    Objective Findings:    PHYSICAL EXAM:  "  Friendly White female, in no acute distress; alert and oriented to person, place and time.  VITALS: BP (!) 164/74 (BP Location: Right arm, Patient Position: Sitting, BP Method: Large (Automatic))   Pulse 77   Temp 96.5 °F (35.8 °C) (Oral)   Resp 18   Ht 5' 10" (1.778 m)   Wt 135.4 kg (298 lb 8.1 oz)   SpO2 (!) 94%   BMI 42.83 kg/m²   HENT: Normocephalic.   EYES: Anicertic sclerae.  NECK: No obvious masses.  CARDIOVASCULAR: Regular Rate. + pitting LE edema.  RESPIRATORY: Normal respiratory effort.   GI: + Distended abdomen.  EXTREMITIES: + Lower extremity edema, per patient report.  SKIN: + Telangiectasis noted to chest wall.  NEURO: No asterixis.   PSYCH:  Memory intact. Thought and speech pattern appropriate. Behavior normal. No depression or anxiety noted.    DIAGNOSTIC STUDIES:    EGD 1/28/2020:    Findings:        Grade I varices were found in the lower third of the esophagus.        The entire examined stomach was normal except mild portal        gastropathy.        The examined duodenum was normal.   Impression:  - Grade I esophageal varices.                        - Mild portal gastropathy.                        - Normal examined duodenum.                 EGD 3/8/2022:    Findings:        Grade I varices were found in the lower third of the esophagus.        The entire examined stomach was normal.        The examined duodenum was normal.   Impression:    - Grade I esophageal varices.                          - Normal stomach.                          - Normal examined duodenum.                          - No specimens collected.     US ABDOMEN COMPLETE 6/17/2022:    FINDINGS:    Pancreas: The pancreas is normal in echogenicity without focal mass or definite pseudocyst where visualized.     Aorta: The visualized abdominal aorta is normal is caliber without evidence of aneurysm.     Liver: The liver is normal in size measuring 11.7 cm in sagittal dimension.  The liver demonstrates a diffusely coarse " heterogeneous parenchymal echotexture compatible with the provided history of cirrhosis.  Today's examination reveals an approximately 25 x 23 x 22 mm isoechoic solid nodule which protrudes into the liver capsule along the anterior border of the lateral segment of the left hepatic lobe.  Consider additional evaluation with contrast enhanced MRI of the liver to exclude abnormal arterial phase enhancement.  The portal vein is patent with normal hepatopetal flow.  There is enlargement of the main portal vein which measures 17.2 mm with quiet respiration and 17 mm with deep inspiration.  This suggest portal venous hypertension.The IVC is patent where visualized.  There is recanalization of the umbilical vein.     Biliary system: The gallbladder shows no evidence of shadowing stones, distension, pericholecystic fluid, or sonographic Acevedo sign.The common bile duct is not dilated, measuring 2 mm. No intrahepatic biliary ductal dilatation.     Kidneys: The kidneys are normal in size measuring 12.1 cm on the right and 12.7 cm on the left.No hydronephrosis, stones, or renal mass.     Spleen: The spleen isenlargedin size measuring 17.5 x 6.1 cm and shows a normal homogenous parenchymal echotexture without solid mass.  There is a calcified granuloma present within the spleen.  There are multiple splenic collaterals present.     Miscellaneous: No ascites.     Impression:     1. Sonographic findings which are compatible with hepatic cirrhosis and portal venous hypertension with interval development a new 25 x 23 x 22 mm isoechoic solid nodule along the anterior border of the lateral segment of left hepatic lobe.  Additional evaluation with contrast enhanced MRI of the liver is recommended.  2. Recanalization of the umbilical vein.  3. Splenic collateral vessels and pronounced splenomegaly in this patient with portal venous hypertension..    MRI ABDOMEN W/W/O CONTRAST 6/21/2022:    FINDINGS:    The liver is small quite nodular  and heterogeneous in signal intensity throughout.  There is recanalization of the umbilical artery enlarged varicoceles in the abdominal wall.  There or splenic varices noted..  No focal mass of the liver parenchyma is identified.  The nodule seen on the prior ultrasound is felt to have been a a contour nodule of the cirrhotic liver.     There is splenomegaly measuring 18 cm front to back without a focal mass seen.     The kidneys are of normal size contour and CT density without mass stone or hydronephrosis.  The abdominal aorta and inferior vena cava are of normal caliber.  The pancreas is of normal contour and CT density without edema or mass.     Impression:     Severe cirrhosis with the a quite heterogeneous lobular liver.  A focal mass however is not identified.  Splenomegaly.  There is recanalization of the umbilical artery with abdominal wall varices.  There are splenic varices noted.    US ABDOMEN COMPLETE 1/19/2023:    FINDINGS:    Liver: The liver is normal in size but heterogeneous in echotexture.  The liver has a length of 14 cm.  There is no suspicious focal hepatic abnormality.  Hepatopetal flow is documented in the portal vein.  Flow within the portal vein measures 14 centimeter/second.  There is a recanalized umbilical vein.     Gallbladder:    There are no inflammatory changes in the gallbladder fossa.  The common duct measures   mm.     Pancreas:  The visualized portions are unremarkable.     Spleen: The spleen is enlarged with a maximum diameter of 14 cm.     Kidneys: No hydronephrosis or obstructing urinary tract calculus.  The right kidney measures 13 cm in length. The left kidney measures 13 cm in length.     There is no aneurysmal dilatation of the abdominal aorta.  The IVC is within normal limits.  There is free fluid in the abdomen.  The urinary bladder is unremarkable.     Impression:     Cirrhosis, splenomegaly, portal hypertension and ascites.     ASSESSMENT & PLAN:  62 y.o. White female  with:    1. Cirrhosis of liver with ascites, unspecified hepatic cirrhosis type  rifAXIMin (XIFAXAN) 550 mg Tab    lactulose (CONSTULOSE) 10 gram/15 mL solution    Comprehensive Metabolic Panel    AFP Tumor Marker    CBC Auto Differential    Protime-INR    furosemide (LASIX) 40 MG tablet    spironolactone (ALDACTONE) 100 MG tablet      2. Nonalcoholic steatohepatitis  Comprehensive Metabolic Panel    AFP Tumor Marker    CBC Auto Differential    Protime-INR      3. Portal hypertension        4. Esophageal varices determined by endoscopy  Comprehensive Metabolic Panel    AFP Tumor Marker    CBC Auto Differential    Protime-INR      5. Lower extremity edema  furosemide (LASIX) 40 MG tablet    spironolactone (ALDACTONE) 100 MG tablet      6. Hepatic encephalopathy  rifAXIMin (XIFAXAN) 550 mg Tab    lactulose (CONSTULOSE) 10 gram/15 mL solution    Comprehensive Metabolic Panel    AFP Tumor Marker    CBC Auto Differential    Protime-INR          MELD-Na score: 17 at 1/23/2023  6:13 AM  MELD score: 15 at 1/23/2023  6:13 AM  Calculated from:  Serum Creatinine: 0.54 mg/dL (Using min of 1 mg/dL) at 1/23/2023  6:13 AM  Serum Sodium: 135 mmol/L at 1/23/2023  6:13 AM  Total Bilirubin: 2.5 mg/dL at 1/23/2023  6:13 AM  INR(ratio): 1.6 at 1/21/2023  6:26 AM  Age: 62 years    -. Repeat labs now to monitor liver and kidney function. If MELD-Na remains > 15, will refer for liver transplant evaluation.  - For the treatment of ascites and LE edema - start Furosemide 40 mg daily, along with Spironolactone 100 mg daily.  - Continue Lactulose - titrate dose/frequency to achieve 3-4 soft bowel movements per day.  - Start Xifaxan 550 mg PO BID for the treatment of hepatic encephalopathy.   -  Repeat EGD every 2 yrs for variceal surveillance, next due 3/2024, sooner if needed.  -. Recommend HCC surveillance with AFP and ultrasound of the abdomen every six months, next due in 7/2023.  -. Recommend high protein, low carbohydrate, low salt diet  (2 gm of sodium per day or less).  -. Avoid alcohol and herbal supplements/alternative remedies.    Follow up in about 2 weeks (around 2/14/2023)., with MELD labs prior to visit.     Thank you for allowing me to participate in the care of Yumiko Gonzalez.       Hepatology Nurse Practitioner  [facility]    CC'ed note to:   No ref. provider found  Garrett Webb MD

## 2023-02-01 ENCOUNTER — TELEPHONE (OUTPATIENT)
Dept: HEPATOLOGY | Facility: CLINIC | Age: 63
End: 2023-02-01
Payer: COMMERCIAL

## 2023-02-01 NOTE — TELEPHONE ENCOUNTER
----- Message from Laila Will NP sent at 1/31/2023  7:39 PM CST -----  Please arrange repeat labs in 1 week. Standing orders in Epic. Thank You.

## 2023-02-02 ENCOUNTER — PATIENT MESSAGE (OUTPATIENT)
Dept: HEPATOLOGY | Facility: CLINIC | Age: 63
End: 2023-02-02
Payer: COMMERCIAL

## 2023-02-02 NOTE — TELEPHONE ENCOUNTER
Call returned to the patient at [phone].   Patient stated she took something for the right flank pain near kidney area last night.  So that has eased up.  Also I had blood in my urine.    Are you having any blood in your urine now?  Well, the 1st time I noticed it was yesterday and it was very minimal that I noticed on the tissue.  This morning was the 2nd time and it was more pronounced this morning in my urine.  I wear Depends and I can see pinkish amt on the pad now.  Are you having a fever? No.    Laila thinks you need to be Followed up with your PCP or an Urgent Care today. We do not want you to wait until Thurs of next week to follow up with your PCP.   Patient stated I have had problems before with Kidney Stones, that is why I contacted Laila to see if it was my kidney's.  You need to be examined and give a urine specimen to help determine what is going on.    The problem I am having is transportation. My daughter does not want me to drive and she lives 30 minutes away.    I will call her about going when she gets off from work. There is an Urgent Care near me.  If that does not work, I will call the [place] Office to see if one of the NP's can see me in the AM. Maybe my daughter can go into work a little later.    Please keep us updated on how you are doing. I will let Laila know.  Patient voiced understanding.

## 2023-02-03 ENCOUNTER — OFFICE VISIT (OUTPATIENT)
Dept: FAMILY MEDICINE | Facility: CLINIC | Age: 63
End: 2023-02-03
Payer: COMMERCIAL

## 2023-02-03 ENCOUNTER — LAB VISIT (OUTPATIENT)
Dept: LAB | Facility: HOSPITAL | Age: 63
End: 2023-02-03
Attending: FAMILY MEDICINE
Payer: COMMERCIAL

## 2023-02-03 VITALS
RESPIRATION RATE: 20 BRPM | SYSTOLIC BLOOD PRESSURE: 150 MMHG | TEMPERATURE: 99 F | BODY MASS INDEX: 41.04 KG/M2 | DIASTOLIC BLOOD PRESSURE: 88 MMHG | HEIGHT: 70 IN | WEIGHT: 286.69 LBS | HEART RATE: 68 BPM

## 2023-02-03 DIAGNOSIS — R10.9 FLANK PAIN: ICD-10-CM

## 2023-02-03 DIAGNOSIS — N39.0 ACUTE UTI: ICD-10-CM

## 2023-02-03 DIAGNOSIS — N39.0 ACUTE UTI: Primary | ICD-10-CM

## 2023-02-03 LAB
BACTERIA #/AREA URNS HPF: ABNORMAL /HPF
BILIRUB UR QL STRIP: ABNORMAL
CAOX CRY URNS QL MICRO: ABNORMAL
CLARITY UR: ABNORMAL
COLOR UR: ABNORMAL
GLUCOSE UR QL STRIP: ABNORMAL
HGB UR QL STRIP: ABNORMAL
HYALINE CASTS #/AREA URNS LPF: 0 /LPF
KETONES UR QL STRIP: ABNORMAL
LEUKOCYTE ESTERASE UR QL STRIP: NEGATIVE
MICROSCOPIC COMMENT: ABNORMAL
NITRITE UR QL STRIP: POSITIVE
PH UR STRIP: 6 [PH] (ref 5–8)
PROT UR QL STRIP: ABNORMAL
RBC #/AREA URNS HPF: >100 /HPF (ref 0–4)
SP GR UR STRIP: 1.02 (ref 1–1.03)
SQUAMOUS #/AREA URNS HPF: 2 /HPF
URN SPEC COLLECT METH UR: ABNORMAL
WBC #/AREA URNS HPF: 2 /HPF (ref 0–5)

## 2023-02-03 PROCEDURE — 3077F PR MOST RECENT SYSTOLIC BLOOD PRESSURE >= 140 MM HG: ICD-10-PCS | Mod: CPTII,S$GLB,, | Performed by: NURSE PRACTITIONER

## 2023-02-03 PROCEDURE — 3079F DIAST BP 80-89 MM HG: CPT | Mod: CPTII,S$GLB,, | Performed by: NURSE PRACTITIONER

## 2023-02-03 PROCEDURE — 99214 OFFICE O/P EST MOD 30 MIN: CPT | Mod: S$GLB,,, | Performed by: NURSE PRACTITIONER

## 2023-02-03 PROCEDURE — 3077F SYST BP >= 140 MM HG: CPT | Mod: CPTII,S$GLB,, | Performed by: NURSE PRACTITIONER

## 2023-02-03 PROCEDURE — 81000 URINALYSIS NONAUTO W/SCOPE: CPT | Mod: PO | Performed by: NURSE PRACTITIONER

## 2023-02-03 PROCEDURE — 99999 PR PBB SHADOW E&M-EST. PATIENT-LVL III: CPT | Mod: PBBFAC,,, | Performed by: NURSE PRACTITIONER

## 2023-02-03 PROCEDURE — 1111F DSCHRG MED/CURRENT MED MERGE: CPT | Mod: CPTII,S$GLB,, | Performed by: NURSE PRACTITIONER

## 2023-02-03 PROCEDURE — 3008F BODY MASS INDEX DOCD: CPT | Mod: CPTII,S$GLB,, | Performed by: NURSE PRACTITIONER

## 2023-02-03 PROCEDURE — 3079F PR MOST RECENT DIASTOLIC BLOOD PRESSURE 80-89 MM HG: ICD-10-PCS | Mod: CPTII,S$GLB,, | Performed by: NURSE PRACTITIONER

## 2023-02-03 PROCEDURE — 1111F PR DISCHARGE MEDS RECONCILED W/ CURRENT OUTPATIENT MED LIST: ICD-10-PCS | Mod: CPTII,S$GLB,, | Performed by: NURSE PRACTITIONER

## 2023-02-03 PROCEDURE — 99214 PR OFFICE/OUTPT VISIT, EST, LEVL IV, 30-39 MIN: ICD-10-PCS | Mod: S$GLB,,, | Performed by: NURSE PRACTITIONER

## 2023-02-03 PROCEDURE — 99999 PR PBB SHADOW E&M-EST. PATIENT-LVL III: ICD-10-PCS | Mod: PBBFAC,,, | Performed by: NURSE PRACTITIONER

## 2023-02-03 PROCEDURE — 3008F PR BODY MASS INDEX (BMI) DOCUMENTED: ICD-10-PCS | Mod: CPTII,S$GLB,, | Performed by: NURSE PRACTITIONER

## 2023-02-03 RX ORDER — CEFACLOR 250 MG
250 CAPSULE ORAL EVERY 8 HOURS
Qty: 21 CAPSULE | Refills: 0 | Status: SHIPPED | OUTPATIENT
Start: 2023-02-03 | End: 2023-02-15

## 2023-02-03 NOTE — PROGRESS NOTES
"Yumiko Gonzalez is a 62 y.o. female patient of Garrett Webb MD who presents to the clinic today for No chief complaint on file.  .    HPI    Pt, who is known to me, reports a new problem to me:  frequency, hematuria, nocturia, and flank pain left.    These symptoms began 2 days ago and status is has slightly improved     Also reports:  pelvic pain.    Pt denies the following symptoms:   fever      Aggravating factors include (--) urinating.    Relieving factors include  none; on 3 diruetics. ,  (+) OTC analgesic Tylenol #3    OTC Medications tried are above.    Prescription medications taken for symptoms are above.      Pertinent history:  (+) h/o UTIs/ (--) pyelonephritis.  Ceftin, Cefzil, Keflex, and Nitrofurantoin has been effective in the past.      ROS    Constitutional: abdominal pain and urinary tract symptoms   GI:  flank pain, blood in urine, low pelvic pain    Urinary:  frequency, hematuria, and urgency    HEENT/Heart/Lung/MS/Skin:  No symptoms or no changes.    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, postitive antismooth muscle antibody    Blood transfusion     Bronchitis     bronchospasm on occasion     Cancer 1987    carcinoma in situ    Cervical neck pain with evidence of disc disease 3/22/2012    can bend neck    Cirrhosis     non-alcoholic, compensated    Colon polyps 3/22/2012    Depression 3/22/2012    Hx panic attacks    Diverticular disease 3/22/2012    never diverticulitis    Fatty liver 3/22/2012    Fibrocystic breast     Fine tremor     hands,chronic    Hepatosplenomegaly     Hypertension 4/24/2013    Knee pain, bilateral     chronic, with hands,feet,fingers also painful    Lesion of right lung     Liver disease     "partially sclerosed liver" per pt    Migraines past Hx    Nephrolithiasis     " stone episode 1/2021    Pleural effusion     small, compensated, good bilateral breath sounds per Dr Suresh GUTIERRES (postoperative nausea and vomiting)     twice after childbirth    Postpartum depression     Splenomegaly     Thrombocytopenia     Tremors of nervous system        Current Outpatient Medications:     albuterol (PROVENTIL) 2.5 mg /3 mL (0.083 %) nebulizer solution, Take 3 mLs (2.5 mg total) by nebulization every 6 (six) hours as needed for Wheezing. Rescue, Disp: 75 mL, Rfl: 2    albuterol (PROVENTIL/VENTOLIN HFA) 90 mcg/actuation inhaler, Inhale 2 puffs every 4 hours as needed for cough, wheeze, or shortness of breath, Disp: 18 g, Rfl: 11    ALPRAZolam (XANAX) 0.25 MG tablet, Take 1 tablet (0.25 mg total) by mouth nightly as needed. FOR INSOMNIA, Disp: 30 tablet, Rfl: 2    buPROPion (WELLBUTRIN XL) 150 MG TB24 tablet, Take 2 tablets (300 mg total) by mouth once daily., Disp: 180 tablet, Rfl: 3    fluticasone-umeclidin-vilanter (TRELEGY ELLIPTA) 200-62.5-25 mcg inhaler, Inhale 1 puff into the lungs once daily., Disp: 60 each, Rfl: 11    furosemide (LASIX) 40 MG tablet, Take 1 tablet (40 mg total) by mouth once daily., Disp: 30 tablet, Rfl: 6    Lactobacillus rhamnosus GG (CULTURELLE) 10 billion cell capsule, Take 1 capsule by mouth once daily. for 20 days, Disp: 20 capsule, Rfl: 0    lactulose (CONSTULOSE) 10 gram/15 mL solution, Take 30 mLs (20 g total) by mouth 2 (two) times daily., Disp: 5000 mL, Rfl: 6    loratadine (CLARITIN) 10 mg tablet, Take 10 mg by mouth once daily. , Disp: , Rfl:     multivitamin (THERAGRAN) per tablet, Take 1 tablet by mouth once daily., Disp: , Rfl:     nystatin-triamcinolone (MYCOLOG II) cream, Apply to affected area twice daily as needed, Disp: 60 g, Rfl: 3    omeprazole (PRILOSEC) 20 MG capsule, Take 1 capsule (20 mg total) by mouth once daily., Disp: 90 capsule, Rfl: 3    oxybutynin (DITROPAN XL) 10 MG 24 hr tablet, Take 1 tablet (10 mg total) by mouth once daily., Disp:  90 tablet, Rfl: 3    propranoloL (INDERAL LA) 60 MG 24 hr capsule, Take 1 capsule (60 mg total) by mouth once daily., Disp: 30 capsule, Rfl: 11    rifAXIMin (XIFAXAN) 550 mg Tab, Take 1 tablet (550 mg total) by mouth 2 (two) times daily., Disp: 60 tablet, Rfl: 11    spironolactone (ALDACTONE) 100 MG tablet, Take 1 tablet (100 mg total) by mouth once daily., Disp: 30 tablet, Rfl: 6    vitamin E 400 UNIT capsule, Take 400 Units by mouth once daily., Disp: , Rfl:     Patient has never been a smoker.    ALLERGIES AND MEDICATIONS: updated and reviewed.  Review of patient's allergies indicates:   Allergen Reactions    No known drug allergies      Current Outpatient Medications   Medication Sig Dispense Refill    albuterol (PROVENTIL) 2.5 mg /3 mL (0.083 %) nebulizer solution Take 3 mLs (2.5 mg total) by nebulization every 6 (six) hours as needed for Wheezing. Rescue 75 mL 2    albuterol (PROVENTIL/VENTOLIN HFA) 90 mcg/actuation inhaler Inhale 2 puffs every 4 hours as needed for cough, wheeze, or shortness of breath 18 g 11    ALPRAZolam (XANAX) 0.25 MG tablet Take 1 tablet (0.25 mg total) by mouth nightly as needed. FOR INSOMNIA 30 tablet 2    buPROPion (WELLBUTRIN XL) 150 MG TB24 tablet Take 2 tablets (300 mg total) by mouth once daily. 180 tablet 3    fluticasone-umeclidin-vilanter (TRELEGY ELLIPTA) 200-62.5-25 mcg inhaler Inhale 1 puff into the lungs once daily. 60 each 11    furosemide (LASIX) 40 MG tablet Take 1 tablet (40 mg total) by mouth once daily. 30 tablet 6    Lactobacillus rhamnosus GG (CULTURELLE) 10 billion cell capsule Take 1 capsule by mouth once daily. for 20 days 20 capsule 0    lactulose (CONSTULOSE) 10 gram/15 mL solution Take 30 mLs (20 g total) by mouth 2 (two) times daily. 5000 mL 6    loratadine (CLARITIN) 10 mg tablet Take 10 mg by mouth once daily.       multivitamin (THERAGRAN) per tablet Take 1 tablet by mouth once daily.      nystatin-triamcinolone (MYCOLOG II) cream Apply to affected area  "twice daily as needed 60 g 3    omeprazole (PRILOSEC) 20 MG capsule Take 1 capsule (20 mg total) by mouth once daily. 90 capsule 3    oxybutynin (DITROPAN XL) 10 MG 24 hr tablet Take 1 tablet (10 mg total) by mouth once daily. 90 tablet 3    propranoloL (INDERAL LA) 60 MG 24 hr capsule Take 1 capsule (60 mg total) by mouth once daily. 30 capsule 11    rifAXIMin (XIFAXAN) 550 mg Tab Take 1 tablet (550 mg total) by mouth 2 (two) times daily. 60 tablet 11    spironolactone (ALDACTONE) 100 MG tablet Take 1 tablet (100 mg total) by mouth once daily. 30 tablet 6    vitamin E 400 UNIT capsule Take 400 Units by mouth once daily.       No current facility-administered medications for this visit.         PHYSICAL EXAM    Alert, coop 62 y.o. female patient in(--) distress, is ill-appearing.    Vitals:    02/03/23 1031   BP: (!) 150/88   BP Location: Left arm   Patient Position: Sitting   BP Method: Large (Manual)   Pulse: 68   Resp: 20   Temp: 98.7 °F (37.1 °C)   Weight: 130 kg (286 lb 11.2 oz)   Height: 5' 10" (1.778 m)       VS reviewed.  SBP elevated..    CC, nursing note, medications & PMH all reviewed today.    Head:  Normocephalic, atraumatic.    EENT:  Ext nose/ears normal.               Eye lids normal, no discharge present.                       Resp:  Respirations even, unlabored             Lungs CTA bilat.    Heart:  Heart rate normal.  RRR, no MRG on ausculation.              Significant bilat pedal edema noted.    ABD:  no CVA tenderness.  no guarding or rigidity.  no rebound tenderness.    MS:  MS:  Ambulates 2. Mild impairment: Walks 20', uses assistive devices, slower speed, mild gait deviations. , with no ambulation aid     NEURO:  Alert and oriented x 4.  Responds appropriately during interaction.    Skin:  no rashes    Psych:  Responds appropriately throughout the visit.               Mood:  pleasant and appropriate               Appearance: well-groomed, appropriate .               Affect:  congruent and " appropriate, blunted    Acute UTI  -     Urinalysis, Reflex to Urine Culture Urine, Clean Catch; Future; Expected date: 02/03/2023  -     cefaclor (CECLOR) 250 mg Cap; Take 1 capsule (250 mg total) by mouth every 8 (eight) hours. for 7 days  Dispense: 21 capsule; Refill: 0    Flank pain  -     Urinalysis, Reflex to Urine Culture Urine, Clean Catch; Future; Expected date: 02/03/2023    Pt today presents with report of flank pain on the left and hematuria  Exam shows:  temp afebrile, abd Normal, benign.,  (--) CVAT.      This is a new problem to me.  the above  work up is planned.        Known Diagnostic Lab/Radiological/Pulmonary/CardiacTests Results   Other none.        Prescribing Considerations:  I have reviewed the following additional tests today: Renal function: >60, Liver function is is not normal.  elevated ALT, elevated AST, Allergies to medications @allergies@, and Sensitivity testing shows infection sensitive to nitrofurantoin, cephalosporins and fluroquinalones .    Medication adjustments are not necessary, based on this.  I have also reviewed past urine cultures.  Sensitivity testing is noted above.      Referred to No referrals made at this visit. .           Education:    Pt advised to perform comfort measures/treatment recommended on patient instruction sheet, which were reviewed at the time of the visit..    Follow Up:    If not better in 3 days, the patient is advised to call here, PCP office or go for an in-person/follow up evaluation.  If worse or concerns, the patient is advised to call for advice to this office or call [facility] ON CALL or go to the nearest URGENT CARE or ER.    Explained exam findings, diagnosis and treatment plan to patient accompanied by daughter.  Questions answered and patient states understanding.

## 2023-02-05 PROBLEM — R60.0 LOWER EXTREMITY EDEMA: Status: ACTIVE | Noted: 2023-02-05

## 2023-02-05 NOTE — PATIENT INSTRUCTIONS
-. Repeat labs now to monitor liver and kidney function. If MELD-Na remains > 15, will refer for liver transplant evaluation.  - For the treatment of ascites and LE edema - start Furosemide 40 mg daily, along with Spironolactone 100 mg daily.  - Continue Lactulose - titrate dose/frequency to achieve 3-4 soft bowel movements per day.  - Start Xifaxan 550 mg PO BID for the treatment of hepatic encephalopathy.   -  Repeat EGD every 2 yrs for variceal surveillance, next due 3/2024, sooner if needed.  -. Recommend HCC surveillance with AFP and ultrasound of the abdomen every six months, next due in 7/2023.  -. Recommend high protein, low carbohydrate, low salt diet (2 gm of sodium per day or less).  -. Avoid alcohol and herbal supplements/alternative remedies.

## 2023-02-08 ENCOUNTER — LAB VISIT (OUTPATIENT)
Dept: LAB | Facility: HOSPITAL | Age: 63
End: 2023-02-08
Payer: COMMERCIAL

## 2023-02-08 ENCOUNTER — OFFICE VISIT (OUTPATIENT)
Dept: PRIMARY CARE CLINIC | Facility: CLINIC | Age: 63
End: 2023-02-08
Payer: COMMERCIAL

## 2023-02-08 VITALS
RESPIRATION RATE: 18 BRPM | BODY MASS INDEX: 39.65 KG/M2 | OXYGEN SATURATION: 98 % | HEIGHT: 70 IN | HEART RATE: 66 BPM | WEIGHT: 277 LBS

## 2023-02-08 DIAGNOSIS — K75.81 NONALCOHOLIC STEATOHEPATITIS: Chronic | ICD-10-CM

## 2023-02-08 DIAGNOSIS — K74.60 CIRRHOSIS OF LIVER WITH ASCITES, UNSPECIFIED HEPATIC CIRRHOSIS TYPE: Primary | ICD-10-CM

## 2023-02-08 DIAGNOSIS — K74.60 CIRRHOSIS OF LIVER WITH ASCITES, UNSPECIFIED HEPATIC CIRRHOSIS TYPE: ICD-10-CM

## 2023-02-08 DIAGNOSIS — R18.8 CIRRHOSIS OF LIVER WITH ASCITES, UNSPECIFIED HEPATIC CIRRHOSIS TYPE: ICD-10-CM

## 2023-02-08 DIAGNOSIS — K75.81 NONALCOHOLIC STEATOHEPATITIS: ICD-10-CM

## 2023-02-08 DIAGNOSIS — K76.82 HEPATIC ENCEPHALOPATHY: ICD-10-CM

## 2023-02-08 DIAGNOSIS — I85.00 ESOPHAGEAL VARICES DETERMINED BY ENDOSCOPY: ICD-10-CM

## 2023-02-08 DIAGNOSIS — N39.0 URINARY TRACT INFECTION WITHOUT HEMATURIA, SITE UNSPECIFIED: ICD-10-CM

## 2023-02-08 DIAGNOSIS — R60.0 LOWER EXTREMITY EDEMA: ICD-10-CM

## 2023-02-08 DIAGNOSIS — R18.8 CIRRHOSIS OF LIVER WITH ASCITES, UNSPECIFIED HEPATIC CIRRHOSIS TYPE: Primary | ICD-10-CM

## 2023-02-08 LAB
ALBUMIN SERPL BCP-MCNC: 2.9 G/DL (ref 3.5–5.2)
ALP SERPL-CCNC: 117 U/L (ref 55–135)
ALT SERPL W/O P-5'-P-CCNC: 34 U/L (ref 10–44)
ANION GAP SERPL CALC-SCNC: 6 MMOL/L (ref 8–16)
AST SERPL-CCNC: 62 U/L (ref 10–40)
BASOPHILS # BLD AUTO: 0.05 K/UL (ref 0–0.2)
BASOPHILS NFR BLD: 1 % (ref 0–1.9)
BILIRUB SERPL-MCNC: 3.2 MG/DL (ref 0.1–1)
BUN SERPL-MCNC: 12 MG/DL (ref 8–23)
CALCIUM SERPL-MCNC: 10.1 MG/DL (ref 8.7–10.5)
CHLORIDE SERPL-SCNC: 105 MMOL/L (ref 95–110)
CO2 SERPL-SCNC: 26 MMOL/L (ref 23–29)
CREAT SERPL-MCNC: 0.6 MG/DL (ref 0.5–1.4)
DIFFERENTIAL METHOD: ABNORMAL
EOSINOPHIL # BLD AUTO: 0.3 K/UL (ref 0–0.5)
EOSINOPHIL NFR BLD: 5.2 % (ref 0–8)
ERYTHROCYTE [DISTWIDTH] IN BLOOD BY AUTOMATED COUNT: 16.8 % (ref 11.5–14.5)
EST. GFR  (NO RACE VARIABLE): >60 ML/MIN/1.73 M^2
GLUCOSE SERPL-MCNC: 132 MG/DL (ref 70–110)
HCT VFR BLD AUTO: 45 % (ref 37–48.5)
HGB BLD-MCNC: 14.7 G/DL (ref 12–16)
IMM GRANULOCYTES # BLD AUTO: 0.06 K/UL (ref 0–0.04)
IMM GRANULOCYTES NFR BLD AUTO: 1.1 % (ref 0–0.5)
INR PPP: 1.4 (ref 0.8–1.2)
LYMPHOCYTES # BLD AUTO: 0.8 K/UL (ref 1–4.8)
LYMPHOCYTES NFR BLD: 15.3 % (ref 18–48)
MCH RBC QN AUTO: 32.2 PG (ref 27–31)
MCHC RBC AUTO-ENTMCNC: 32.7 G/DL (ref 32–36)
MCV RBC AUTO: 99 FL (ref 82–98)
MONOCYTES # BLD AUTO: 0.7 K/UL (ref 0.3–1)
MONOCYTES NFR BLD: 14.1 % (ref 4–15)
NEUTROPHILS # BLD AUTO: 3.3 K/UL (ref 1.8–7.7)
NEUTROPHILS NFR BLD: 63.3 % (ref 38–73)
NRBC BLD-RTO: 0 /100 WBC
PLATELET # BLD AUTO: 119 K/UL (ref 150–450)
PMV BLD AUTO: 10.3 FL (ref 9.2–12.9)
POTASSIUM SERPL-SCNC: 4 MMOL/L (ref 3.5–5.1)
PROT SERPL-MCNC: 5.8 G/DL (ref 6–8.4)
PROTHROMBIN TIME: 15 SEC (ref 9–12.5)
RBC # BLD AUTO: 4.56 M/UL (ref 4–5.4)
SODIUM SERPL-SCNC: 137 MMOL/L (ref 136–145)
WBC # BLD AUTO: 5.24 K/UL (ref 3.9–12.7)

## 2023-02-08 PROCEDURE — 1111F PR DISCHARGE MEDS RECONCILED W/ CURRENT OUTPATIENT MED LIST: ICD-10-PCS | Mod: CPTII,S$GLB,, | Performed by: FAMILY MEDICINE

## 2023-02-08 PROCEDURE — 99214 PR OFFICE/OUTPT VISIT, EST, LEVL IV, 30-39 MIN: ICD-10-PCS | Mod: S$GLB,,, | Performed by: FAMILY MEDICINE

## 2023-02-08 PROCEDURE — 36415 COLL VENOUS BLD VENIPUNCTURE: CPT | Mod: PO | Performed by: NURSE PRACTITIONER

## 2023-02-08 PROCEDURE — 1159F MED LIST DOCD IN RCRD: CPT | Mod: CPTII,S$GLB,, | Performed by: FAMILY MEDICINE

## 2023-02-08 PROCEDURE — 1111F DSCHRG MED/CURRENT MED MERGE: CPT | Mod: CPTII,S$GLB,, | Performed by: FAMILY MEDICINE

## 2023-02-08 PROCEDURE — 1159F PR MEDICATION LIST DOCUMENTED IN MEDICAL RECORD: ICD-10-PCS | Mod: CPTII,S$GLB,, | Performed by: FAMILY MEDICINE

## 2023-02-08 PROCEDURE — 1160F PR REVIEW ALL MEDS BY PRESCRIBER/CLIN PHARMACIST DOCUMENTED: ICD-10-PCS | Mod: CPTII,S$GLB,, | Performed by: FAMILY MEDICINE

## 2023-02-08 PROCEDURE — 85025 COMPLETE CBC W/AUTO DIFF WBC: CPT | Performed by: NURSE PRACTITIONER

## 2023-02-08 PROCEDURE — 99214 OFFICE O/P EST MOD 30 MIN: CPT | Mod: S$GLB,,, | Performed by: FAMILY MEDICINE

## 2023-02-08 PROCEDURE — 80053 COMPREHEN METABOLIC PANEL: CPT | Performed by: NURSE PRACTITIONER

## 2023-02-08 PROCEDURE — 99999 PR PBB SHADOW E&M-EST. PATIENT-LVL V: CPT | Mod: PBBFAC,,, | Performed by: FAMILY MEDICINE

## 2023-02-08 PROCEDURE — 99999 PR PBB SHADOW E&M-EST. PATIENT-LVL V: ICD-10-PCS | Mod: PBBFAC,,, | Performed by: FAMILY MEDICINE

## 2023-02-08 PROCEDURE — 3008F BODY MASS INDEX DOCD: CPT | Mod: CPTII,S$GLB,, | Performed by: FAMILY MEDICINE

## 2023-02-08 PROCEDURE — 3008F PR BODY MASS INDEX (BMI) DOCUMENTED: ICD-10-PCS | Mod: CPTII,S$GLB,, | Performed by: FAMILY MEDICINE

## 2023-02-08 PROCEDURE — 85610 PROTHROMBIN TIME: CPT | Mod: PO | Performed by: NURSE PRACTITIONER

## 2023-02-08 PROCEDURE — 1160F RVW MEDS BY RX/DR IN RCRD: CPT | Mod: CPTII,S$GLB,, | Performed by: FAMILY MEDICINE

## 2023-02-13 ENCOUNTER — PATIENT MESSAGE (OUTPATIENT)
Dept: PRIMARY CARE CLINIC | Facility: CLINIC | Age: 63
End: 2023-02-13
Payer: COMMERCIAL

## 2023-02-14 ENCOUNTER — OFFICE VISIT (OUTPATIENT)
Dept: OBSTETRICS AND GYNECOLOGY | Facility: CLINIC | Age: 63
End: 2023-02-14
Payer: COMMERCIAL

## 2023-02-14 ENCOUNTER — LAB VISIT (OUTPATIENT)
Dept: LAB | Facility: HOSPITAL | Age: 63
End: 2023-02-14
Payer: COMMERCIAL

## 2023-02-14 VITALS
BODY MASS INDEX: 37.43 KG/M2 | WEIGHT: 261.44 LBS | DIASTOLIC BLOOD PRESSURE: 70 MMHG | SYSTOLIC BLOOD PRESSURE: 118 MMHG | HEIGHT: 70 IN

## 2023-02-14 DIAGNOSIS — K76.82 HEPATIC ENCEPHALOPATHY: ICD-10-CM

## 2023-02-14 DIAGNOSIS — R60.0 LOWER EXTREMITY EDEMA: ICD-10-CM

## 2023-02-14 DIAGNOSIS — K74.60 CIRRHOSIS OF LIVER WITH ASCITES, UNSPECIFIED HEPATIC CIRRHOSIS TYPE: ICD-10-CM

## 2023-02-14 DIAGNOSIS — I85.00 ESOPHAGEAL VARICES DETERMINED BY ENDOSCOPY: ICD-10-CM

## 2023-02-14 DIAGNOSIS — R31.9 HEMATURIA, UNSPECIFIED TYPE: ICD-10-CM

## 2023-02-14 DIAGNOSIS — R10.2 PELVIC PAIN: Primary | ICD-10-CM

## 2023-02-14 DIAGNOSIS — R18.8 CIRRHOSIS OF LIVER WITH ASCITES, UNSPECIFIED HEPATIC CIRRHOSIS TYPE: ICD-10-CM

## 2023-02-14 DIAGNOSIS — K75.81 NONALCOHOLIC STEATOHEPATITIS: ICD-10-CM

## 2023-02-14 LAB
ALBUMIN SERPL BCP-MCNC: 3.1 G/DL (ref 3.5–5.2)
ALP SERPL-CCNC: 115 U/L (ref 55–135)
ALT SERPL W/O P-5'-P-CCNC: 33 U/L (ref 10–44)
ANION GAP SERPL CALC-SCNC: 10 MMOL/L (ref 8–16)
AST SERPL-CCNC: 68 U/L (ref 10–40)
BASOPHILS # BLD AUTO: 0.05 K/UL (ref 0–0.2)
BASOPHILS NFR BLD: 0.9 % (ref 0–1.9)
BILIRUB SERPL-MCNC: 3.4 MG/DL (ref 0.1–1)
BILIRUBIN, UA POC OHS: NEGATIVE
BLOOD, UA POC OHS: ABNORMAL
BUN SERPL-MCNC: 13 MG/DL (ref 8–23)
CALCIUM SERPL-MCNC: 9.9 MG/DL (ref 8.7–10.5)
CHLORIDE SERPL-SCNC: 102 MMOL/L (ref 95–110)
CLARITY, UA POC OHS: CLEAR
CO2 SERPL-SCNC: 26 MMOL/L (ref 23–29)
COLOR, UA POC OHS: YELLOW
CREAT SERPL-MCNC: 0.8 MG/DL (ref 0.5–1.4)
DIFFERENTIAL METHOD: ABNORMAL
EOSINOPHIL # BLD AUTO: 0.3 K/UL (ref 0–0.5)
EOSINOPHIL NFR BLD: 5.5 % (ref 0–8)
ERYTHROCYTE [DISTWIDTH] IN BLOOD BY AUTOMATED COUNT: 16.5 % (ref 11.5–14.5)
EST. GFR  (NO RACE VARIABLE): >60 ML/MIN/1.73 M^2
GLUCOSE SERPL-MCNC: 133 MG/DL (ref 70–110)
GLUCOSE, UA POC OHS: NEGATIVE
HCT VFR BLD AUTO: 45.9 % (ref 37–48.5)
HGB BLD-MCNC: 15.2 G/DL (ref 12–16)
IMM GRANULOCYTES # BLD AUTO: 0.07 K/UL (ref 0–0.04)
IMM GRANULOCYTES NFR BLD AUTO: 1.3 % (ref 0–0.5)
INR PPP: 1.4 (ref 0.8–1.2)
KETONES, UA POC OHS: NEGATIVE
LEUKOCYTES, UA POC OHS: ABNORMAL
LYMPHOCYTES # BLD AUTO: 1 K/UL (ref 1–4.8)
LYMPHOCYTES NFR BLD: 17.7 % (ref 18–48)
MCH RBC QN AUTO: 32.3 PG (ref 27–31)
MCHC RBC AUTO-ENTMCNC: 33.1 G/DL (ref 32–36)
MCV RBC AUTO: 98 FL (ref 82–98)
MONOCYTES # BLD AUTO: 0.8 K/UL (ref 0.3–1)
MONOCYTES NFR BLD: 14.6 % (ref 4–15)
NEUTROPHILS # BLD AUTO: 3.3 K/UL (ref 1.8–7.7)
NEUTROPHILS NFR BLD: 60 % (ref 38–73)
NITRITE, UA POC OHS: NEGATIVE
NRBC BLD-RTO: 0 /100 WBC
PH, UA POC OHS: 6.5
PLATELET # BLD AUTO: 132 K/UL (ref 150–450)
PMV BLD AUTO: 10.4 FL (ref 9.2–12.9)
POTASSIUM SERPL-SCNC: 4.1 MMOL/L (ref 3.5–5.1)
PROT SERPL-MCNC: 6.2 G/DL (ref 6–8.4)
PROTEIN, UA POC OHS: NEGATIVE
PROTHROMBIN TIME: 14.8 SEC (ref 9–12.5)
RBC # BLD AUTO: 4.7 M/UL (ref 4–5.4)
SODIUM SERPL-SCNC: 138 MMOL/L (ref 136–145)
SPECIFIC GRAVITY, UA POC OHS: 1.01
UROBILINOGEN, UA POC OHS: 1
WBC # BLD AUTO: 5.47 K/UL (ref 3.9–12.7)

## 2023-02-14 PROCEDURE — 87086 URINE CULTURE/COLONY COUNT: CPT | Performed by: OBSTETRICS & GYNECOLOGY

## 2023-02-14 PROCEDURE — 3008F BODY MASS INDEX DOCD: CPT | Mod: CPTII,S$GLB,, | Performed by: OBSTETRICS & GYNECOLOGY

## 2023-02-14 PROCEDURE — 1111F PR DISCHARGE MEDS RECONCILED W/ CURRENT OUTPATIENT MED LIST: ICD-10-PCS | Mod: CPTII,S$GLB,, | Performed by: OBSTETRICS & GYNECOLOGY

## 2023-02-14 PROCEDURE — 3008F PR BODY MASS INDEX (BMI) DOCUMENTED: ICD-10-PCS | Mod: CPTII,S$GLB,, | Performed by: OBSTETRICS & GYNECOLOGY

## 2023-02-14 PROCEDURE — 3074F SYST BP LT 130 MM HG: CPT | Mod: CPTII,S$GLB,, | Performed by: OBSTETRICS & GYNECOLOGY

## 2023-02-14 PROCEDURE — 85610 PROTHROMBIN TIME: CPT | Mod: PO | Performed by: NURSE PRACTITIONER

## 2023-02-14 PROCEDURE — 85025 COMPLETE CBC W/AUTO DIFF WBC: CPT | Performed by: NURSE PRACTITIONER

## 2023-02-14 PROCEDURE — 99999 PR PBB SHADOW E&M-EST. PATIENT-LVL IV: ICD-10-PCS | Mod: PBBFAC,,, | Performed by: OBSTETRICS & GYNECOLOGY

## 2023-02-14 PROCEDURE — 99396 PREV VISIT EST AGE 40-64: CPT | Mod: S$GLB,,, | Performed by: OBSTETRICS & GYNECOLOGY

## 2023-02-14 PROCEDURE — 3074F PR MOST RECENT SYSTOLIC BLOOD PRESSURE < 130 MM HG: ICD-10-PCS | Mod: CPTII,S$GLB,, | Performed by: OBSTETRICS & GYNECOLOGY

## 2023-02-14 PROCEDURE — 81003 URINALYSIS AUTO W/O SCOPE: CPT | Mod: QW,S$GLB,, | Performed by: OBSTETRICS & GYNECOLOGY

## 2023-02-14 PROCEDURE — 99396 PR PREVENTIVE VISIT,EST,40-64: ICD-10-PCS | Mod: S$GLB,,, | Performed by: OBSTETRICS & GYNECOLOGY

## 2023-02-14 PROCEDURE — 80053 COMPREHEN METABOLIC PANEL: CPT | Performed by: NURSE PRACTITIONER

## 2023-02-14 PROCEDURE — 3078F PR MOST RECENT DIASTOLIC BLOOD PRESSURE < 80 MM HG: ICD-10-PCS | Mod: CPTII,S$GLB,, | Performed by: OBSTETRICS & GYNECOLOGY

## 2023-02-14 PROCEDURE — 1111F DSCHRG MED/CURRENT MED MERGE: CPT | Mod: CPTII,S$GLB,, | Performed by: OBSTETRICS & GYNECOLOGY

## 2023-02-14 PROCEDURE — 3078F DIAST BP <80 MM HG: CPT | Mod: CPTII,S$GLB,, | Performed by: OBSTETRICS & GYNECOLOGY

## 2023-02-14 PROCEDURE — 1159F MED LIST DOCD IN RCRD: CPT | Mod: CPTII,S$GLB,, | Performed by: OBSTETRICS & GYNECOLOGY

## 2023-02-14 PROCEDURE — 81003 POCT URINALYSIS(INSTRUMENT): ICD-10-PCS | Mod: QW,S$GLB,, | Performed by: OBSTETRICS & GYNECOLOGY

## 2023-02-14 PROCEDURE — 1159F PR MEDICATION LIST DOCUMENTED IN MEDICAL RECORD: ICD-10-PCS | Mod: CPTII,S$GLB,, | Performed by: OBSTETRICS & GYNECOLOGY

## 2023-02-14 PROCEDURE — 99999 PR PBB SHADOW E&M-EST. PATIENT-LVL IV: CPT | Mod: PBBFAC,,, | Performed by: OBSTETRICS & GYNECOLOGY

## 2023-02-14 PROCEDURE — 36415 COLL VENOUS BLD VENIPUNCTURE: CPT | Mod: PO | Performed by: NURSE PRACTITIONER

## 2023-02-14 NOTE — PROGRESS NOTES
"Chief Complaint   Patient presents with    Pelvic Pain    Well Woman    Mammogram       History of Present Illness   62 y.o.  female   patient presents today for bleeding on pad, unsure urine or bladder, pelvic pain- cousneled.     Past medical and surgical history reviewed.   I have reviewed the patient's medical history in detail and updated the computerized patient record.    Review of patient's allergies indicates:   Allergen Reactions    No known drug allergies          Review of Systems - Negative except HPI  GEN ROS: negative for - chills  Breast ROS: negative for breast lumps  Genito-Urinary ROS: no dysuria, trouble voiding, or hematuria      Physical Examination:  /70   Ht 5' 10" (1.778 m)   Wt 118.6 kg (261 lb 7.5 oz)   BMI 37.52 kg/m²    Constitutional: She appears alert and responsive. She appears well-developed, well-groomed, and well-nourished. No distress. OverWeight   HENT:   Head: Normocephalic and atraumatic.   Eyes: Conjunctivae and EOM are normal. No scleral icterus.   Neck: Symmetrical. Normal range of motion. Neck supple. No tracheal deviation present. THYROID: without masses or tenderness.  Cardiovascular: Normal rate, no rhythm abnormality noted. Extremities without swelling or edema, warm.    Pulmonary/Chest: Normal respiratory Effort. No distress or retractions. She exhibits no tenderness  Abdominal: Soft. She exhibits no distension, hernias or masses. There is no tenderness. No enlargement of liver edge or spleen.  There is no rebound and no guarding.   Genitourinary:    External rectal exam shows no thrombosed external hemorrhoids, no lesions.     Pelvic exam was performed with patient supine.   No labial fusion, and symmetrical.    There is no rash, lesion or injury on the right labia.   There is no rash, lesion or injury on the left labia.   No bleeding and no signs of injury around the vaginal introitus, urethral meatus is normal size and without prolapse or " lesions, urethra well supported. The cervix is visualized with no discharge, lesions or friability.   No vaginal discharge found. Pale and atrophic   No significant Cystocele, Enterocele or rectocele, and cervix and uterus well supported.   Bimanual exam:   The urethra is normal to palpation and there are no palpable vaginal wall masses.   Uterus is not deviated, not enlarged, not fixed, normal shape and not tender.   Cervix exhibits no motion tenderness.    Right adnexum displays no mass or nodularity and no tenderness.   Left adnexum displays no mass or nodularity and no tenderness.  Musculoskeletal: Normal range of motion.   Lymphadenopathy: No inguinal adenopathy present.   Neurological: She is alert and oriented to person, place, and time. Coordination normal.   Skin: Skin is warm and dry. She is not diaphoretic. No rashes, lesions or ulcers.   Psychiatric: She has a normal mood and affect, oriented to person, place, and time.            Assessment:  Normal pelvic exam  1. Pelvic pain  POCT Urinalysis(Instrument)    US Pelvis Complete Non OB    Ambulatory referral/consult to Urology      2. Hematuria, unspecified type  US Pelvis Complete Non OB    Ambulatory referral/consult to Urology          Plan:  Urology referral  Pelvic u/s, urine culture  Patient informed will be contacted with results within 2 weeks. Encouraged to please call back or email if she has not heard from us by then.

## 2023-02-16 ENCOUNTER — OFFICE VISIT (OUTPATIENT)
Dept: HEPATOLOGY | Facility: CLINIC | Age: 63
End: 2023-02-16
Payer: COMMERCIAL

## 2023-02-16 VITALS
HEART RATE: 66 BPM | DIASTOLIC BLOOD PRESSURE: 69 MMHG | RESPIRATION RATE: 20 BRPM | BODY MASS INDEX: 37.09 KG/M2 | HEIGHT: 70 IN | WEIGHT: 259.06 LBS | SYSTOLIC BLOOD PRESSURE: 159 MMHG | OXYGEN SATURATION: 93 %

## 2023-02-16 DIAGNOSIS — K76.82 HEPATIC ENCEPHALOPATHY: ICD-10-CM

## 2023-02-16 DIAGNOSIS — R18.8 CIRRHOSIS OF LIVER WITH ASCITES, UNSPECIFIED HEPATIC CIRRHOSIS TYPE: Primary | ICD-10-CM

## 2023-02-16 DIAGNOSIS — I85.00 ESOPHAGEAL VARICES DETERMINED BY ENDOSCOPY: Chronic | ICD-10-CM

## 2023-02-16 DIAGNOSIS — K76.6 PORTAL HYPERTENSION: ICD-10-CM

## 2023-02-16 DIAGNOSIS — R60.0 LOWER EXTREMITY EDEMA: ICD-10-CM

## 2023-02-16 DIAGNOSIS — I10 PRIMARY HYPERTENSION: Chronic | ICD-10-CM

## 2023-02-16 DIAGNOSIS — K74.60 CIRRHOSIS OF LIVER WITH ASCITES, UNSPECIFIED HEPATIC CIRRHOSIS TYPE: Primary | ICD-10-CM

## 2023-02-16 DIAGNOSIS — K75.81 NONALCOHOLIC STEATOHEPATITIS: Chronic | ICD-10-CM

## 2023-02-16 DIAGNOSIS — E66.9 OBESITY, UNSPECIFIED CLASSIFICATION, UNSPECIFIED OBESITY TYPE, UNSPECIFIED WHETHER SERIOUS COMORBIDITY PRESENT: ICD-10-CM

## 2023-02-16 LAB — BACTERIA UR CULT: NORMAL

## 2023-02-16 PROCEDURE — 1160F PR REVIEW ALL MEDS BY PRESCRIBER/CLIN PHARMACIST DOCUMENTED: ICD-10-PCS | Mod: CPTII,S$GLB,, | Performed by: NURSE PRACTITIONER

## 2023-02-16 PROCEDURE — 3077F PR MOST RECENT SYSTOLIC BLOOD PRESSURE >= 140 MM HG: ICD-10-PCS | Mod: CPTII,S$GLB,, | Performed by: NURSE PRACTITIONER

## 2023-02-16 PROCEDURE — 1111F DSCHRG MED/CURRENT MED MERGE: CPT | Mod: CPTII,S$GLB,, | Performed by: NURSE PRACTITIONER

## 2023-02-16 PROCEDURE — 1159F MED LIST DOCD IN RCRD: CPT | Mod: CPTII,S$GLB,, | Performed by: NURSE PRACTITIONER

## 2023-02-16 PROCEDURE — 3077F SYST BP >= 140 MM HG: CPT | Mod: CPTII,S$GLB,, | Performed by: NURSE PRACTITIONER

## 2023-02-16 PROCEDURE — 3008F BODY MASS INDEX DOCD: CPT | Mod: CPTII,S$GLB,, | Performed by: NURSE PRACTITIONER

## 2023-02-16 PROCEDURE — 99999 PR PBB SHADOW E&M-EST. PATIENT-LVL V: CPT | Mod: PBBFAC,,, | Performed by: NURSE PRACTITIONER

## 2023-02-16 PROCEDURE — 1160F RVW MEDS BY RX/DR IN RCRD: CPT | Mod: CPTII,S$GLB,, | Performed by: NURSE PRACTITIONER

## 2023-02-16 PROCEDURE — 1159F PR MEDICATION LIST DOCUMENTED IN MEDICAL RECORD: ICD-10-PCS | Mod: CPTII,S$GLB,, | Performed by: NURSE PRACTITIONER

## 2023-02-16 PROCEDURE — 1111F PR DISCHARGE MEDS RECONCILED W/ CURRENT OUTPATIENT MED LIST: ICD-10-PCS | Mod: CPTII,S$GLB,, | Performed by: NURSE PRACTITIONER

## 2023-02-16 PROCEDURE — 3078F DIAST BP <80 MM HG: CPT | Mod: CPTII,S$GLB,, | Performed by: NURSE PRACTITIONER

## 2023-02-16 PROCEDURE — 99215 PR OFFICE/OUTPT VISIT, EST, LEVL V, 40-54 MIN: ICD-10-PCS | Mod: S$GLB,,, | Performed by: NURSE PRACTITIONER

## 2023-02-16 PROCEDURE — 99999 PR PBB SHADOW E&M-EST. PATIENT-LVL V: ICD-10-PCS | Mod: PBBFAC,,, | Performed by: NURSE PRACTITIONER

## 2023-02-16 PROCEDURE — 3008F PR BODY MASS INDEX (BMI) DOCUMENTED: ICD-10-PCS | Mod: CPTII,S$GLB,, | Performed by: NURSE PRACTITIONER

## 2023-02-16 PROCEDURE — 99215 OFFICE O/P EST HI 40 MIN: CPT | Mod: S$GLB,,, | Performed by: NURSE PRACTITIONER

## 2023-02-16 PROCEDURE — 3078F PR MOST RECENT DIASTOLIC BLOOD PRESSURE < 80 MM HG: ICD-10-PCS | Mod: CPTII,S$GLB,, | Performed by: NURSE PRACTITIONER

## 2023-02-16 NOTE — PROGRESS NOTES
"[facility] Established Patient Visit    Reason for Visit: DURAN Cirrhosis    PCP: Garrett Webb    HPI:  This is a 62 y.o. female with PMH noted below, here for follow up for decompensated DURAN Cirrhosis. She is accompanied by her daughter, and was last seen in clinic by myself in 1/2023. She was previously evaluated for bariatric surgery, but was declined for surgery due to advanced liver disease. Abdominal US in 6/2022 showed cirrhosis and portal hypertension with no ascites. US did show "isoechoic solid nodule along the anterior border of the lateral segment of left hepatic lobe". AFP normal. Follow up MRI showed "no focal mass of the liver parenchyma is identified; the nodule seen on the prior ultrasound is felt to have been a a contour nodule of the cirrhotic liver". EGD in 1/2020 showed Grade 1 EV and PHG. Repeat exam in March 2022 showed Grade 1 EV. Beginning early last year she had multiple falls, and was experiencing brain fog. Her ammonia level was noted to be mildly elevated at 69. She restarted Lactulose, and her mentation and ammonia level improved.     Unfortunately, her  passed away late last year. She lives alone in [place], with a daughter nearby in [place]. She was recently admitted at [facility] from 1/17-1/26 with worsening ascites and pitting LE edema. INR was noted to be elevated (1.6). She was given IV diuretics (Lasix and Aldactone). Additionally, she underwent paracentesis with 8700 mL of ascitic fluid removed (albumin given post para). During her hospital stay, she developed cellulitis of the left foot and leg requiring IV Rocephin. MELD-Na was 17 at discharge. At last visit, furosemide 40 mg and spironolactone 100 mg daily were added. She has lost nearly 40 lbs in fluid weight. MELD-Na 15, CTP Class C cirrhosis, based on labs drawn earlier this week.     MELD-Na score: 15 at 2/14/2023  3:28 PM  MELD score: 15 at 2/14/2023  3:28 PM  Calculated from:  Serum Creatinine: 0.8 " mg/dL (Using min of 1 mg/dL) at 2023  3:28 PM  Serum Sodium: 138 mmol/L (Using max of 137 mmol/L) at 2023  3:28 PM  Total Bilirubin: 3.4 mg/dL at 2023  3:28 PM  INR(ratio): 1.4 at 2023  3:28 PM  Age: 62 years    PMHX:  has a past medical history of Abnormal Pap smear, Abnormal Pap smear of cervix, Anemia, Arthritis, Asthma, Autoimmune disease, Blood transfusion, Bronchitis, Cancer (), Cervical neck pain with evidence of disc disease (2012), Cirrhosis, Colon polyps (2012), Depression (2012), Diverticular disease (2012), End-stage renal disease, Fatty liver (2012), Fibrocystic breast, Fine tremor, Hepatosplenomegaly, History of abdominal paracentesis (2023), Hypertension (2013), Knee pain, bilateral, Lesion of right lung, Liver disease, Migraines (past Hx), Nephrolithiasis, Pleural effusion, PONV (postoperative nausea and vomiting), Postpartum depression, Splenomegaly, Thrombocytopenia, and Tremors of nervous system.    PSHX:  has a past surgical history that includes Cervical conization w/ laser; Tubal ligation; Endometrial ablation;  section; Pelvic laparoscopy; Tonsillectomy; Adenoidectomy; Liver biopsy; Colonoscopy (N/A, 2016); Embolization (N/A, 2018); Esophagogastroduodenoscopy (N/A, 2020); Colonoscopy (N/A, 2/3/2022); and Esophagogastroduodenoscopy (N/A, 3/8/2022).    The patient's social and family histories were reviewed by me and updated in the appropriate section of the electronic medical record.    Review of patient's allergies indicates:   Allergen Reactions    No known drug allergies      Current Outpatient Medications on File Prior to Visit   Medication Sig Dispense Refill    albuterol (PROVENTIL) 2.5 mg /3 mL (0.083 %) nebulizer solution Take 3 mLs (2.5 mg total) by nebulization every 6 (six) hours as needed for Wheezing. Rescue 75 mL 2    albuterol (PROVENTIL/VENTOLIN HFA) 90 mcg/actuation inhaler Inhale 2 puffs  every 4 hours as needed for cough, wheeze, or shortness of breath 18 g 11    ALPRAZolam (XANAX) 0.25 MG tablet Take 1 tablet (0.25 mg total) by mouth nightly as needed. FOR INSOMNIA 30 tablet 2    buPROPion (WELLBUTRIN XL) 150 MG TB24 tablet Take 2 tablets (300 mg total) by mouth once daily. 180 tablet 3    fluticasone-umeclidin-vilanter (TRELEGY ELLIPTA) 200-62.5-25 mcg inhaler Inhale 1 puff into the lungs once daily. 60 each 11    furosemide (LASIX) 40 MG tablet Take 1 tablet (40 mg total) by mouth once daily. 30 tablet 6    Lactobacillus rhamnosus GG (CULTURELLE) 10 billion cell capsule Take 1 capsule by mouth once daily. for 20 days 20 capsule 0    lactulose (CONSTULOSE) 10 gram/15 mL solution Take 30 mLs (20 g total) by mouth 2 (two) times daily. 5000 mL 6    loratadine (CLARITIN) 10 mg tablet Take 10 mg by mouth once daily.       multivitamin (THERAGRAN) per tablet Take 1 tablet by mouth once daily.      nystatin-triamcinolone (MYCOLOG II) cream Apply to affected area twice daily as needed 60 g 3    omeprazole (PRILOSEC) 20 MG capsule Take 1 capsule (20 mg total) by mouth once daily. 90 capsule 3    propranoloL (INDERAL LA) 60 MG 24 hr capsule Take 1 capsule (60 mg total) by mouth once daily. 30 capsule 11    rifAXIMin (XIFAXAN) 550 mg Tab Take 1 tablet (550 mg total) by mouth 2 (two) times daily. 60 tablet 11    spironolactone (ALDACTONE) 100 MG tablet Take 1 tablet (100 mg total) by mouth once daily. 30 tablet 6    vitamin E 400 UNIT capsule Take 400 Units by mouth once daily.      [DISCONTINUED] oxybutynin (DITROPAN XL) 10 MG 24 hr tablet Take 1 tablet (10 mg total) by mouth once daily. 90 tablet 3    [] cefaclor (CECLOR) 250 mg Cap Take 1 capsule (250 mg total) by mouth every 8 (eight) hours. for 7 days 21 capsule 0     No current facility-administered medications on file prior to visit.     SOCIAL HISTORY:   Social History     Tobacco Use   Smoking Status Former    Types: Cigarettes    Quit date:  "2/3/2006    Years since quittin.0   Smokeless Tobacco Never     Social History     Substance and Sexual Activity   Alcohol Use Not Currently     Social History     Substance and Sexual Activity   Drug Use No     ROS:   GENERAL: Denies fever, chills, weight loss/gain, + fatigue  HEENT: Denies headaches, dizziness, vision/hearing changes  CARDIOVASCULAR: Denies chest pain, palpitations, + lower extremity edema - improved  RESPIRATORY: Denies dyspnea, cough  GI: Denies abdominal pain, rectal bleeding, nausea, vomiting. No change in bowel pattern or color  : Denies dysuria, hematuria   SKIN: Denies rash, itching   NEURO: Denies memory loss, or mood changes + brain fog/slowed mentation - improved  PSYCH: Denies depression or anxiety  HEME/LYMPH: Denies easy bruising or bleeding    Objective Findings:    PHYSICAL EXAM:   Friendly White female, in no acute distress; alert and oriented to person, place and time.  VITALS: BP (!) 159/69   Pulse 66   Resp 20   Ht 5' 10" (1.778 m)   Wt 117.5 kg (259 lb 0.7 oz)   SpO2 (!) 93%   BMI 37.17 kg/m²   HENT: Normocephalic.   EYES: Anicertic sclerae.  NECK: No obvious masses.  CARDIOVASCULAR: Regular Rate. + LE edema.  RESPIRATORY: Normal respiratory effort.   GI: Soft, obese abdomen.  EXTREMITIES: + Lower extremity edema.  SKIN: + Telangiectasis noted to chest wall.  NEURO: No asterixis.   PSYCH:  Memory intact. Thought and speech pattern appropriate. Behavior normal. No depression or anxiety noted.    DIAGNOSTIC STUDIES:    EGD 2020:    Findings:        Grade I varices were found in the lower third of the esophagus.        The entire examined stomach was normal except mild portal        gastropathy.        The examined duodenum was normal.   Impression:  - Grade I esophageal varices.                        - Mild portal gastropathy.                        - Normal examined duodenum.                 EGD 3/8/2022:    Findings:        Grade I varices were found in the " lower third of the esophagus.        The entire examined stomach was normal.        The examined duodenum was normal.   Impression:    - Grade I esophageal varices.                          - Normal stomach.                          - Normal examined duodenum.                          - No specimens collected.     US ABDOMEN COMPLETE 6/17/2022:    FINDINGS:    Pancreas: The pancreas is normal in echogenicity without focal mass or definite pseudocyst where visualized.     Aorta: The visualized abdominal aorta is normal is caliber without evidence of aneurysm.     Liver: The liver is normal in size measuring 11.7 cm in sagittal dimension.  The liver demonstrates a diffusely coarse heterogeneous parenchymal echotexture compatible with the provided history of cirrhosis.  Today's examination reveals an approximately 25 x 23 x 22 mm isoechoic solid nodule which protrudes into the liver capsule along the anterior border of the lateral segment of the left hepatic lobe.  Consider additional evaluation with contrast enhanced MRI of the liver to exclude abnormal arterial phase enhancement.  The portal vein is patent with normal hepatopetal flow.  There is enlargement of the main portal vein which measures 17.2 mm with quiet respiration and 17 mm with deep inspiration.  This suggest portal venous hypertension.The IVC is patent where visualized.  There is recanalization of the umbilical vein.     Biliary system: The gallbladder shows no evidence of shadowing stones, distension, pericholecystic fluid, or sonographic Acevedo sign.The common bile duct is not dilated, measuring 2 mm. No intrahepatic biliary ductal dilatation.     Kidneys: The kidneys are normal in size measuring 12.1 cm on the right and 12.7 cm on the left.No hydronephrosis, stones, or renal mass.     Spleen: The spleen isenlargedin size measuring 17.5 x 6.1 cm and shows a normal homogenous parenchymal echotexture without solid mass.  There is a calcified granuloma  present within the spleen.  There are multiple splenic collaterals present.     Miscellaneous: No ascites.     Impression:     1. Sonographic findings which are compatible with hepatic cirrhosis and portal venous hypertension with interval development a new 25 x 23 x 22 mm isoechoic solid nodule along the anterior border of the lateral segment of left hepatic lobe.  Additional evaluation with contrast enhanced MRI of the liver is recommended.  2. Recanalization of the umbilical vein.  3. Splenic collateral vessels and pronounced splenomegaly in this patient with portal venous hypertension..    MRI ABDOMEN W/W/O CONTRAST 6/21/2022:    FINDINGS:    The liver is small quite nodular and heterogeneous in signal intensity throughout.  There is recanalization of the umbilical artery enlarged varicoceles in the abdominal wall.  There or splenic varices noted..  No focal mass of the liver parenchyma is identified.  The nodule seen on the prior ultrasound is felt to have been a a contour nodule of the cirrhotic liver.     There is splenomegaly measuring 18 cm front to back without a focal mass seen.     The kidneys are of normal size contour and CT density without mass stone or hydronephrosis.  The abdominal aorta and inferior vena cava are of normal caliber.  The pancreas is of normal contour and CT density without edema or mass.     Impression:     Severe cirrhosis with the a quite heterogeneous lobular liver.  A focal mass however is not identified.  Splenomegaly.  There is recanalization of the umbilical artery with abdominal wall varices.  There are splenic varices noted.    US ABDOMEN COMPLETE 1/19/2023:    FINDINGS:    Liver: The liver is normal in size but heterogeneous in echotexture.  The liver has a length of 14 cm.  There is no suspicious focal hepatic abnormality.  Hepatopetal flow is documented in the portal vein.  Flow within the portal vein measures 14 centimeter/second.  There is a recanalized umbilical  vein.     Gallbladder:    There are no inflammatory changes in the gallbladder fossa.  The common duct measures   mm.     Pancreas:  The visualized portions are unremarkable.     Spleen: The spleen is enlarged with a maximum diameter of 14 cm.     Kidneys: No hydronephrosis or obstructing urinary tract calculus.  The right kidney measures 13 cm in length. The left kidney measures 13 cm in length.     There is no aneurysmal dilatation of the abdominal aorta.  The IVC is within normal limits.  There is free fluid in the abdomen.  The urinary bladder is unremarkable.     Impression:     Cirrhosis, splenomegaly, portal hypertension and ascites.     ASSESSMENT & PLAN:  62 y.o. White female with:    1. Cirrhosis of liver with ascites, unspecified hepatic cirrhosis type  Comprehensive Metabolic Panel    CBC Auto Differential    Protime-INR      2. Nonalcoholic steatohepatitis        3. Esophageal varices determined by endoscopy        4. Portal hypertension        5. Lower extremity edema        6. Hepatic encephalopathy        7. Obesity, unspecified classification, unspecified obesity type, unspecified whether serious comorbidity present        8. Primary hypertension          MELD-Na score: 15 at 2/14/2023  3:28 PM  MELD score: 15 at 2/14/2023  3:28 PM  Calculated from:  Serum Creatinine: 0.8 mg/dL (Using min of 1 mg/dL) at 2/14/2023  3:28 PM  Serum Sodium: 138 mmol/L (Using max of 137 mmol/L) at 2/14/2023  3:28 PM  Total Bilirubin: 3.4 mg/dL at 2/14/2023  3:28 PM  INR(ratio): 1.4 at 2/14/2023  3:28 PM  Age: 62 years    - Repeat labs in 2 weeks to monitor liver and kidney function. If MELD-Na remains > 15, will refer for liver transplant evaluation.  - For the treatment of ascites and LE edema - continue Furosemide 40 mg daily, along with Spironolactone 100 mg daily.  - Continue Lactulose - titrate dose/frequency to achieve 3-4 soft bowel movements per day.  - Continue Xifaxan 550 mg PO BID for the treatment of hepatic  encephalopathy.   - Repeat EGD every 2 yrs for variceal surveillance, next due 3/2024, sooner if needed.  - Recommend HCC surveillance with AFP and ultrasound of the abdomen every six months, next due in 7/2023.  - Recommend high protein, low carbohydrate, low salt diet (2 gm of sodium per day or less).  - Avoid alcohol and herbal supplements/alternative remedies.  - Return to clinic to be determined by lab results.     Thank you for allowing me to participate in the care of Yumiko Gonzalez.       Hepatology Nurse Practitioner  [facility]    CC'ed note to:   No ref. provider found  Garrett Webb MD

## 2023-02-16 NOTE — PATIENT INSTRUCTIONS
Because you have cirrhosis, it is important to attend clinic visits every 6 months with an Ultrasound and blood tests every 6 months to screen for liver cancer (you are at risk of developing liver cancer due to scar tissue in the liver)    Signs and symptoms of worsening liver disease include jaundice, fluid in the belly (ascites), and confusion/disorientation/slowed thought processes due to hepatic encephalopathy (toxins building up because of liver problems).   You should seek medical attention if any of these things occur.    Also, possible bleeding from esophageal varices (blood vessels in the stomach and foodpipe can burst and cause fatal bleeding).  Therefore, if you have symptoms of vomiting blood, blood in your stool, dark or black stools or vomiting coffee ground vomit, YOU SHOULD GO TO THE EMERGENCY ROOM IMMEDIATELY.     Cirrhosis can increase the risk of liver cancer, liver failure, and death. However, we will watch your liver function score (MELD score) closely with each clinic visit. A normal MELD score is 6, highest is 40. Your last one was 15. We will check this with every clinic visit. A MELD 15 or higher is when we start to consider transplant because MELD 15 or higher indicates that the liver is not functioning as well     Cirrhosis Counseling  - NO alcohol use (includes beer, wine, and/or liquor)  -- take your lactulose and adjust dosing to obtain 3-4 soft,/oatmeal like bowel movements daily to treat hepatic encephalopathy (confusion due to high ammonia level)  -- take your rifaximin twice daily for treatment of hepatic encephalopathy (confusion due to high ammonia level)  - avoid non-steroidal anti-inflammatory drugs (NSAIDs) such as ibuprofen, Motrin, naprosyn, Alleve due to the risk of kidney damage  - can take acetaminophen (Tylenol), no more than 2000 mg per day  - low sodium (salt) 2 gram per day diet  - high protein diet: 120 grams per day to prevent muscle mass loss. Drink at least 1  protein shake daily (Premier Protein is best option because it is very high protein and low sugar). Ok to use this as nighttime snack to fit it in   - resistance exercises for muscle strength  - avoid raw seafoods due to the risk of fatal Vibrio vulnificus infection  - ultrasound of the liver every 6 months for liver cancer screening (you are at risk of developing liver cancer due to scar tissue in the liver)  - Upper endoscopy every 1-2 years to screen for varices in the stomach and foodpipe which can burst and cause fatal bleeding

## 2023-02-22 ENCOUNTER — PATIENT MESSAGE (OUTPATIENT)
Dept: HEPATOLOGY | Facility: CLINIC | Age: 63
End: 2023-02-22
Payer: COMMERCIAL

## 2023-02-22 ENCOUNTER — PATIENT MESSAGE (OUTPATIENT)
Dept: PRIMARY CARE CLINIC | Facility: CLINIC | Age: 63
End: 2023-02-22
Payer: COMMERCIAL

## 2023-02-22 ENCOUNTER — OFFICE VISIT (OUTPATIENT)
Dept: CARDIOLOGY | Facility: CLINIC | Age: 63
End: 2023-02-22
Payer: COMMERCIAL

## 2023-02-22 VITALS
BODY MASS INDEX: 35.83 KG/M2 | HEIGHT: 70 IN | HEART RATE: 64 BPM | DIASTOLIC BLOOD PRESSURE: 73 MMHG | SYSTOLIC BLOOD PRESSURE: 128 MMHG | WEIGHT: 250.25 LBS

## 2023-02-22 DIAGNOSIS — I10 PRIMARY HYPERTENSION: ICD-10-CM

## 2023-02-22 DIAGNOSIS — R18.8 OTHER ASCITES: ICD-10-CM

## 2023-02-22 DIAGNOSIS — K74.60 CIRRHOSIS OF LIVER WITH ASCITES, UNSPECIFIED HEPATIC CIRRHOSIS TYPE: Primary | ICD-10-CM

## 2023-02-22 DIAGNOSIS — K76.0 NAFLD (NONALCOHOLIC FATTY LIVER DISEASE): ICD-10-CM

## 2023-02-22 DIAGNOSIS — R18.8 CIRRHOSIS OF LIVER WITH ASCITES, UNSPECIFIED HEPATIC CIRRHOSIS TYPE: Primary | ICD-10-CM

## 2023-02-22 PROCEDURE — 3074F SYST BP LT 130 MM HG: CPT | Mod: CPTII,S$GLB,, | Performed by: INTERNAL MEDICINE

## 2023-02-22 PROCEDURE — 99999 PR PBB SHADOW E&M-EST. PATIENT-LVL III: CPT | Mod: PBBFAC,,, | Performed by: INTERNAL MEDICINE

## 2023-02-22 PROCEDURE — 99204 OFFICE O/P NEW MOD 45 MIN: CPT | Mod: S$GLB,,, | Performed by: INTERNAL MEDICINE

## 2023-02-22 PROCEDURE — 3078F PR MOST RECENT DIASTOLIC BLOOD PRESSURE < 80 MM HG: ICD-10-PCS | Mod: CPTII,S$GLB,, | Performed by: INTERNAL MEDICINE

## 2023-02-22 PROCEDURE — 1111F DSCHRG MED/CURRENT MED MERGE: CPT | Mod: CPTII,S$GLB,, | Performed by: INTERNAL MEDICINE

## 2023-02-22 PROCEDURE — 3008F BODY MASS INDEX DOCD: CPT | Mod: CPTII,S$GLB,, | Performed by: INTERNAL MEDICINE

## 2023-02-22 PROCEDURE — 1111F PR DISCHARGE MEDS RECONCILED W/ CURRENT OUTPATIENT MED LIST: ICD-10-PCS | Mod: CPTII,S$GLB,, | Performed by: INTERNAL MEDICINE

## 2023-02-22 PROCEDURE — 1159F PR MEDICATION LIST DOCUMENTED IN MEDICAL RECORD: ICD-10-PCS | Mod: CPTII,S$GLB,, | Performed by: INTERNAL MEDICINE

## 2023-02-22 PROCEDURE — 3078F DIAST BP <80 MM HG: CPT | Mod: CPTII,S$GLB,, | Performed by: INTERNAL MEDICINE

## 2023-02-22 PROCEDURE — 99204 PR OFFICE/OUTPT VISIT, NEW, LEVL IV, 45-59 MIN: ICD-10-PCS | Mod: S$GLB,,, | Performed by: INTERNAL MEDICINE

## 2023-02-22 PROCEDURE — 99999 PR PBB SHADOW E&M-EST. PATIENT-LVL III: ICD-10-PCS | Mod: PBBFAC,,, | Performed by: INTERNAL MEDICINE

## 2023-02-22 PROCEDURE — 3074F PR MOST RECENT SYSTOLIC BLOOD PRESSURE < 130 MM HG: ICD-10-PCS | Mod: CPTII,S$GLB,, | Performed by: INTERNAL MEDICINE

## 2023-02-22 PROCEDURE — 1159F MED LIST DOCD IN RCRD: CPT | Mod: CPTII,S$GLB,, | Performed by: INTERNAL MEDICINE

## 2023-02-22 PROCEDURE — 3008F PR BODY MASS INDEX (BMI) DOCUMENTED: ICD-10-PCS | Mod: CPTII,S$GLB,, | Performed by: INTERNAL MEDICINE

## 2023-02-22 NOTE — PATIENT INSTRUCTIONS
Continue medications without change as long as you continue to lose weight  Echocardiogram (heart ultrasound) to make sure you don't have PULMONARY hypertension  Chest x-ray  Return in 12 months

## 2023-02-22 NOTE — PROGRESS NOTES
Cardiology Clinic Note      Patient: Yumiko Gonzalez, 1960, [id number]  Primary Care Provider: Garrett Webb MD     Chief Complaint/Reason for Referral: cirrhosis     Subjective:       Yumiko Gonzalez is a 62 y.o. female who presents for hospital follow up. They are accompanied by her daughter Rachel.    Weight continues to go down. Still with edema, improved. No orthopnea. Some hematuria. No syncope or palpitations. No CP. No SOBOE. Lasix 40 and spironolactone 100.     Focused Past History includes:  HTN  DURAN cirrhosis with ascites requiring paracentesis, Esophageal varices (grade I - 3/2022), INR 1.4  Child-[place] class C   Normal NTproBNP  Normal renal function for age  HTN  COPD  Thrombocytopenia (fluctuates - not below 50k) and anemia TTE 4/2017 by report unremarkable  Morbid obesity   MPI with normal perfusion 11/2019 by report  TTE unremarkable 4/2017 by report  Holter unremarkable 11/2020 by report       Review of Systems  Constitutional: negative for fevers, night sweats, and weight loss  Eyes: negative for visual disturbance, diplopia  Respiratory: negative for cough, hemoptysis, sputum, and wheezing  Cardiovascular: see HPI  Gastrointestinal: negative for abdominal pain, bright red blood per rectum, change in bowel habits, dysphagia, melena, and reflux symptoms  Genitourinary:negative for dysuria, frequency, and hematuria  Hematologic/lymphatic: negative for bleeding, easy bruising, and lymphadenopathy  Musculoskeletal:negative for arthralgias, back pain, and myalgias  Neurological: negative for gait problems, paresthesia, speech problems, vertigo, and weakness  Behavioral/Psych: negative for excessive alcohol consumption, illegal drug usage, and sleep disturbance    ----------------------------  Abnormal Pap smear      Comment:  ckc/leep/ablation  Abnormal Pap smear of cervix  Anemia  Arthritis  Asthma      Comment:  Hx bronchospasm, mostly when exposed to cold  Autoimmune disease      Comment:   "possible, postitive antismooth muscle antibody  Blood transfusion  Bronchitis      Comment:  bronchospasm on occasion   Cancer      Comment:  carcinoma in situ  Cervical neck pain with evidence of disc disease      Comment:  can bend neck  Cirrhosis      Comment:  non-alcoholic, compensated  Colon polyps  Depression      Comment:  Hx panic attacks  Diverticular disease      Comment:  never diverticulitis  End-stage renal disease  Fatty liver  Fibrocystic breast  Fine tremor      Comment:  hands,chronic  Hepatosplenomegaly  History of abdominal paracentesis      Comment:  8.7L of fluid drained  Hypertension  Knee pain, bilateral      Comment:  chronic, with hands,feet,fingers also painful  Lesion of right lung  Liver disease      Comment:  "partially sclerosed liver" per pt  Migraines  Nephrolithiasis      Comment:  stone episode 2021  Pleural effusion      Comment:  small, compensated, good bilateral breath sounds per Dr Suresh GUTIERRES (postoperative nausea and vomiting)      Comment:  twice after childbirth  Postpartum depression  Splenomegaly  Thrombocytopenia  Tremors of nervous system  ----------------------------  Adenoidectomy  Cervical conization   w/ laser   section      Comment:  x3  Colonoscopy      Comment:  Procedure: COLONOSCOPY;  Surgeon: James Palomares MD;  Location: [facility] ENDO;  Service: Endoscopy;                 Laterality: N/A;  Colonoscopy      Comment:  Procedure: COLONOSCOPY;  Surgeon: James Palomares MD;  Location: [facility] ENDO;  Service: Endoscopy;                 Laterality: N/A;  Embolization      Comment:  Procedure: EMBOLIZATION, BLOOD VESSEL;  Surgeon: Joselin                Surgeon;  Location: [facility];  Service: Radiology;                 Laterality: N/A;  Endometrial ablation  Esophagogastroduodenoscopy      Comment:  Procedure: ESOPHAGOGASTRODUODENOSCOPY (EGD);  Surgeon:                James Palomares MD;  Location: [facility] " ENDO;  Service:                Endoscopy;  Laterality: N/A;  Esophagogastroduodenoscopy      Comment:  Procedure: EGD (ESOPHAGOGASTRODUODENOSCOPY);  Surgeon:                James Palomares MD;  Location: [facility] ENDO;  Service:                Endoscopy;  Laterality: N/A;  Liver biopsy  Pelvic laparoscopy  Tonsillectomy  Tubal ligation     Family History   Problem Relation Age of Onset    Breast cancer Mother 70    Heart disease Mother     Hypertension Mother     Tremor Mother     Diabetes Father     Heart disease Father     Diabetes Sister     Cancer Sister     Breast cancer Sister     Diabetes Sister     Diabetes Brother     Emphysema Brother     Breast cancer Maternal Aunt 70    Multiple sclerosis Maternal Aunt     Multiple sclerosis Maternal Aunt     Breast cancer Maternal Grandmother 70    Tremor Daughter     Breast cancer Maternal Cousin 40    Ovarian cancer Neg Hx     Parkinsonism Neg Hx     Glaucoma Neg Hx     Macular degeneration Neg Hx      Social History     Tobacco Use    Smoking status: Former     Types: Cigarettes     Quit date: 2/3/2006     Years since quittin.0    Smokeless tobacco: Never   Substance Use Topics    Alcohol use: Not Currently    Drug use: No       Current Outpatient Medications   Medication Sig Dispense Refill    albuterol (PROVENTIL) 2.5 mg /3 mL (0.083 %) nebulizer solution Take 3 mLs (2.5 mg total) by nebulization every 6 (six) hours as needed for Wheezing. Rescue 75 mL 2    albuterol (PROVENTIL/VENTOLIN HFA) 90 mcg/actuation inhaler Inhale 2 puffs every 4 hours as needed for cough, wheeze, or shortness of breath 18 g 11    ALPRAZolam (XANAX) 0.25 MG tablet Take 1 tablet (0.25 mg total) by mouth nightly as needed. FOR INSOMNIA 30 tablet 2    buPROPion (WELLBUTRIN XL) 150 MG TB24 tablet Take 2 tablets (300 mg total) by mouth once daily. 180 tablet 3    fluticasone-umeclidin-vilanter (TRELEGY ELLIPTA) 200-62.5-25 mcg inhaler Inhale 1 puff into the lungs once daily. 60 each 11     "furosemide (LASIX) 40 MG tablet Take 1 tablet (40 mg total) by mouth once daily. 30 tablet 6    lactulose (CONSTULOSE) 10 gram/15 mL solution Take 30 mLs (20 g total) by mouth 2 (two) times daily. 5000 mL 6    loratadine (CLARITIN) 10 mg tablet Take 10 mg by mouth once daily.       multivitamin (THERAGRAN) per tablet Take 1 tablet by mouth once daily.      nystatin-triamcinolone (MYCOLOG II) cream Apply to affected area twice daily as needed 60 g 3    omeprazole (PRILOSEC) 20 MG capsule Take 1 capsule (20 mg total) by mouth once daily. 90 capsule 3    propranoloL (INDERAL LA) 60 MG 24 hr capsule Take 1 capsule (60 mg total) by mouth once daily. 30 capsule 11    rifAXIMin (XIFAXAN) 550 mg Tab Take 1 tablet (550 mg total) by mouth 2 (two) times daily. 60 tablet 11    spironolactone (ALDACTONE) 100 MG tablet Take 1 tablet (100 mg total) by mouth once daily. 30 tablet 6    vitamin E 400 UNIT capsule Take 400 Units by mouth once daily.       No current facility-administered medications for this visit.        Objective:      Physical Exam  /73 (BP Location: Left arm, Patient Position: Sitting, BP Method: Large (Automatic))   Pulse 64   Ht 5' 10" (1.778 m)   Wt 113.5 kg (250 lb 3.6 oz)   BMI 35.90 kg/m²   Body surface area is 2.37 meters squared.  Body mass index is 35.9 kg/m².    General appearance: alert, appears stated age, cooperative, and no distress  Head: Normocephalic, without obvious abnormality, atraumatic  Neck: no carotid bruit, no JVD, and supple, symmetrical, trachea midline  Lungs:  Decreased breath sounds over right lung base  Heart: regular rate and rhythm; S1, S2 normal, no murmur, click, rub or gallop  Abdomen: soft, non-tender, no distended  Extremities: extremities atraumatic, 2+ pitting edema  Skin: warm, no cyanosis, no pathologic ecchymosis in exposed portions  Neurologic: Grossly normal. A&O x3      Lab Review   Lab Results   Component Value Date    WBC 5.47 02/14/2023    HGB 15.2 " 02/14/2023    HCT 45.9 02/14/2023    MCV 98 02/14/2023     (L) 02/14/2023         BMP  Lab Results   Component Value Date     02/14/2023    K 4.1 02/14/2023     02/14/2023    CO2 26 02/14/2023    BUN 13 02/14/2023    CREATININE 0.8 02/14/2023    CALCIUM 9.9 02/14/2023    ANIONGAP 10 02/14/2023    ESTGFRAFRICA >60 06/20/2022    EGFRNONAA >60 06/20/2022       Lab Results   Component Value Date    LABPROT 14.8 (H) 02/14/2023    ALBUMIN 3.1 (L) 02/14/2023       Lab Results   Component Value Date    ALT 33 02/14/2023    AST 68 (H) 02/14/2023    ALKPHOS 115 02/14/2023    BILITOT 3.4 (H) 02/14/2023       Lab Results   Component Value Date    TSH 1.618 12/16/2021       Lab Results   Component Value Date    CHOL 125 12/16/2021    CHOL 129 11/06/2020    CHOL 137 10/16/2019     Lab Results   Component Value Date    HDL 52 12/16/2021    HDL 58 11/06/2020    HDL 59 10/16/2019     Lab Results   Component Value Date    LDLCALC 57.4 (L) 12/16/2021    LDLCALC 59.6 (L) 11/06/2020    LDLCALC 65.0 10/16/2019     Lab Results   Component Value Date    TRIG 78 12/16/2021    TRIG 57 11/06/2020    TRIG 65 10/16/2019     Lab Results   Component Value Date    CHOLHDL 41.6 12/16/2021    CHOLHDL 45.0 11/06/2020    CHOLHDL 43.1 10/16/2019       EKG 1/17/23:  NSR, LVH    Assessment & Plan:      This is a 62 y.o. pleasant  female with class C nonalcoholic cirrhosis as detailed above.  From a cardiac standpoint her blood pressure is well-controlled with propranolol (for portal hypertension).  We discussed ruling out portal pulmonary hypertension and hepatopulmonary syndrome.     1. Cirrhosis of liver with ascites, unspecified hepatic cirrhosis type  X-Ray Chest PA And Lateral    Echo Saline Bubble? Yes    CANCELED: Echo      2. NAFLD (nonalcoholic fatty liver disease)        3. Other ascites        4. Primary hypertension             TTE with bubbles to rule out HPS and PPH  Diuresis with furosemide and spironolactone  per hepatology - she continues to lose weight so will not increase at this time  No statin at this time  2-view CXR to rule out pleural effusion      I appreciate the opportunity to participate in Yumiko Gonzalez 's care today.  Please follow up with me in 12 months.      Meme Pelletier MD, [facility]  Interventional Cardiology/Structural Heart Disease  [facility]  Office: [phone]     Parts of this note were completed using voice recognition software. Please excuse any misspellings or syntax errors and reach out to me with questions.

## 2023-02-23 ENCOUNTER — OFFICE VISIT (OUTPATIENT)
Dept: UROLOGY | Facility: CLINIC | Age: 63
End: 2023-02-23
Payer: COMMERCIAL

## 2023-02-23 ENCOUNTER — HOSPITAL ENCOUNTER (OUTPATIENT)
Dept: RADIOLOGY | Facility: HOSPITAL | Age: 63
Discharge: HOME | End: 2023-02-23
Attending: OBSTETRICS & GYNECOLOGY
Payer: COMMERCIAL

## 2023-02-23 VITALS — BODY MASS INDEX: 35.29 KG/M2 | WEIGHT: 246.5 LBS | HEIGHT: 70 IN

## 2023-02-23 DIAGNOSIS — R10.2 PELVIC PAIN: ICD-10-CM

## 2023-02-23 DIAGNOSIS — R31.0 GROSS HEMATURIA: Primary | ICD-10-CM

## 2023-02-23 DIAGNOSIS — R31.9 HEMATURIA, UNSPECIFIED TYPE: ICD-10-CM

## 2023-02-23 LAB
BACTERIA #/AREA URNS HPF: ABNORMAL /HPF
BILIRUBIN, UA POC OHS: ABNORMAL
BLOOD, UA POC OHS: ABNORMAL
CAOX CRY URNS QL MICRO: ABNORMAL
CLARITY, UA POC OHS: CLEAR
COLOR, UA POC OHS: YELLOW
GLUCOSE, UA POC OHS: NEGATIVE
HYALINE CASTS #/AREA URNS LPF: 0 /LPF
KETONES, UA POC OHS: ABNORMAL
LEUKOCYTES, UA POC OHS: NEGATIVE
MICROSCOPIC COMMENT: ABNORMAL
NITRITE, UA POC OHS: NEGATIVE
PH, UA POC OHS: 5.5
PROTEIN, UA POC OHS: 30
RBC #/AREA URNS HPF: 0 /HPF (ref 0–4)
SPECIFIC GRAVITY, UA POC OHS: >=1.03
SQUAMOUS #/AREA URNS HPF: 5 /HPF
UROBILINOGEN, UA POC OHS: 1
WBC #/AREA URNS HPF: 2 /HPF (ref 0–5)
YEAST URNS QL MICRO: ABNORMAL

## 2023-02-23 PROCEDURE — 88112 CYTOPATH CELL ENHANCE TECH: CPT | Performed by: PATHOLOGY

## 2023-02-23 PROCEDURE — 76830 TRANSVAGINAL US NON-OB: CPT | Mod: 26,,, | Performed by: RADIOLOGY

## 2023-02-23 PROCEDURE — 81003 POCT URINALYSIS(INSTRUMENT): ICD-10-PCS | Mod: QW,S$GLB,,

## 2023-02-23 PROCEDURE — 1111F DSCHRG MED/CURRENT MED MERGE: CPT | Mod: CPTII,S$GLB,,

## 2023-02-23 PROCEDURE — 99214 OFFICE O/P EST MOD 30 MIN: CPT | Mod: S$GLB,,,

## 2023-02-23 PROCEDURE — 1159F PR MEDICATION LIST DOCUMENTED IN MEDICAL RECORD: ICD-10-PCS | Mod: CPTII,S$GLB,,

## 2023-02-23 PROCEDURE — 99214 PR OFFICE/OUTPT VISIT, EST, LEVL IV, 30-39 MIN: ICD-10-PCS | Mod: S$GLB,,,

## 2023-02-23 PROCEDURE — 76856 US EXAM PELVIC COMPLETE: CPT | Mod: TC,PN

## 2023-02-23 PROCEDURE — 76856 US PELVIS COMP WITH TRANSVAG NON-OB (XPD): ICD-10-PCS | Mod: 26,,, | Performed by: RADIOLOGY

## 2023-02-23 PROCEDURE — 99999 PR PBB SHADOW E&M-EST. PATIENT-LVL IV: ICD-10-PCS | Mod: PBBFAC,,,

## 2023-02-23 PROCEDURE — 81000 URINALYSIS NONAUTO W/SCOPE: CPT | Mod: PO

## 2023-02-23 PROCEDURE — 1160F RVW MEDS BY RX/DR IN RCRD: CPT | Mod: CPTII,S$GLB,,

## 2023-02-23 PROCEDURE — 76856 US EXAM PELVIC COMPLETE: CPT | Mod: 26,,, | Performed by: RADIOLOGY

## 2023-02-23 PROCEDURE — 1160F PR REVIEW ALL MEDS BY PRESCRIBER/CLIN PHARMACIST DOCUMENTED: ICD-10-PCS | Mod: CPTII,S$GLB,,

## 2023-02-23 PROCEDURE — 3008F PR BODY MASS INDEX (BMI) DOCUMENTED: ICD-10-PCS | Mod: CPTII,S$GLB,,

## 2023-02-23 PROCEDURE — 88112 PR  CYTOPATH, CELL ENHANCE TECH: ICD-10-PCS | Mod: 26,,, | Performed by: PATHOLOGY

## 2023-02-23 PROCEDURE — 88112 CYTOPATH CELL ENHANCE TECH: CPT | Mod: 26,,, | Performed by: PATHOLOGY

## 2023-02-23 PROCEDURE — 81003 URINALYSIS AUTO W/O SCOPE: CPT | Mod: QW,S$GLB,,

## 2023-02-23 PROCEDURE — 3008F BODY MASS INDEX DOCD: CPT | Mod: CPTII,S$GLB,,

## 2023-02-23 PROCEDURE — 99999 PR PBB SHADOW E&M-EST. PATIENT-LVL IV: CPT | Mod: PBBFAC,,,

## 2023-02-23 PROCEDURE — 1159F MED LIST DOCD IN RCRD: CPT | Mod: CPTII,S$GLB,,

## 2023-02-23 PROCEDURE — 1111F PR DISCHARGE MEDS RECONCILED W/ CURRENT OUTPATIENT MED LIST: ICD-10-PCS | Mod: CPTII,S$GLB,,

## 2023-02-23 PROCEDURE — 76830 US PELVIS COMP WITH TRANSVAG NON-OB (XPD): ICD-10-PCS | Mod: 26,,, | Performed by: RADIOLOGY

## 2023-02-23 NOTE — PROGRESS NOTES
[facility] Note  Staff: Cass Irizarry FNP-C    PCP: MD Jon    Chief Complaint: Gross Hematuria    Subjective:        HPI: Yumiko Gonzalez is a 62 y.o. female new patient to me presents today for evaluation of gross hematuria. She is accompanied be her daughter whom is helpful with her medical history and interview. She states she had had a few episodes of seeing blood in the toilet and on her pad. She was referred over by her GYN whom she just saw 2/14/2023. Urine culture from 2/14/2023 was negative for growth. She is scheduled for pelvic US later today. She denies dysuria, urgency, frequency, fever, flank pain, and difficulty urinating. She does have a history of kidney stones. Abdominal US from 1/19/2023 showed Kidneys: No hydronephrosis or obstructing urinary tract calculus.  The right kidney measures 13 cm in length. The left kidney measures 13 cm in length. The urinary bladder is unremarkable. I reviewed imaging with her today. She is currently not taking any bladder medications. She is a former smoker.     Questions asked pt during office visit today:  Urgency:No, incontinence with urgency? No;   DysuriaNo  Gross Hematuria  Yes  History of UTI: No    History of Kidney Stones?:  Yes    Constipation issues?:  Yes    REVIEW OF SYSTEMS:  Review of Systems   Constitutional: Negative.  Negative for chills and fever.   HENT: Negative.     Eyes: Negative.    Respiratory: Negative.     Cardiovascular: Negative.    Gastrointestinal: Negative.  Negative for abdominal pain, nausea and vomiting.   Genitourinary:  Positive for hematuria. Negative for dysuria, flank pain, frequency and urgency.   Musculoskeletal: Negative.  Negative for back pain.   Skin: Negative.    Neurological: Negative.    Endo/Heme/Allergies: Negative.    Psychiatric/Behavioral: Negative.       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, postitive antismooth muscle antibody    Blood transfusion     Bronchitis     bronchospasm on occasion     Cancer     carcinoma in situ    Cervical neck pain with evidence of disc disease 2012    can bend neck    Cirrhosis     non-alcoholic, compensated    Colon polyps 2012    Depression 2012    Hx panic attacks    Diverticular disease 2012    never diverticulitis    End-stage renal disease     Fatty liver 2012    Fibrocystic breast     Fine tremor     hands,chronic    Hepatosplenomegaly     History of abdominal paracentesis 2023    8.7L of fluid drained    Hypertension 2013    Knee pain, bilateral     chronic, with hands,feet,fingers also painful    Lesion of right lung     Liver disease     "partially sclerosed liver" per pt    Migraines past Hx    Nephrolithiasis     stone episode 2021    Pleural effusion     small, compensated, good bilateral breath sounds per Dr Suresh GUTIERRES (postoperative nausea and vomiting)     twice after childbirth    Postpartum depression     Splenomegaly     Thrombocytopenia     Tremors of nervous system        PSHx:  Past Surgical History:   Procedure Laterality Date    ADENOIDECTOMY      CERVICAL CONIZATION   W/ LASER       SECTION      x3    COLONOSCOPY N/A 2016    Procedure: COLONOSCOPY;  Surgeon: James Palomares MD;  Location: [facility] ENDO;  Service: Endoscopy;  Laterality: N/A;    COLONOSCOPY N/A 2/3/2022    Procedure: COLONOSCOPY;  Surgeon: James Palomares MD;  Location: [facility] ENDO;  Service: Endoscopy;  Laterality: N/A;    EMBOLIZATION N/A 2018    Procedure: EMBOLIZATION, BLOOD VESSEL;  Surgeon: Joselin Surgeon;  Location: [facility];  Service: Radiology;  Laterality: N/A;    ENDOMETRIAL ABLATION      ESOPHAGOGASTRODUODENOSCOPY N/A 2020    Procedure: ESOPHAGOGASTRODUODENOSCOPY (EGD);  Surgeon: " James Palomares MD;  Location: [facility];  Service: Endoscopy;  Laterality: N/A;    ESOPHAGOGASTRODUODENOSCOPY N/A 3/8/2022    Procedure: EGD (ESOPHAGOGASTRODUODENOSCOPY);  Surgeon: James Palomares MD;  Location: [facility];  Service: Endoscopy;  Laterality: N/A;    LIVER BIOPSY      PELVIC LAPAROSCOPY      TONSILLECTOMY      TUBAL LIGATION         Fam Hx:   malignancies: No , gyn malignancies: Yes - mother and sister breast cancer   kidney stones: No     Soc Hx:  Lives in [place]    Allergies:  No known drug allergies    Medications: reviewed     Objective:   There were no vitals filed for this visit.    Physical Exam  Constitutional:       Appearance: Normal appearance.   HENT:      Head: Normocephalic.      Mouth/Throat:      Mouth: Mucous membranes are dry.   Eyes:      Conjunctiva/sclera: Conjunctivae normal.   Pulmonary:      Effort: Pulmonary effort is normal.   Abdominal:      General: There is no distension.      Palpations: Abdomen is soft.      Tenderness: There is no abdominal tenderness. There is no right CVA tenderness or left CVA tenderness.   Musculoskeletal:         General: Normal range of motion.      Cervical back: Normal range of motion.   Skin:     General: Skin is warm.   Neurological:      Mental Status: She is alert and oriented to person, place, and time.   Psychiatric:         Mood and Affect: Mood normal.         Behavior: Behavior normal.       LABS REVIEW:  UA today:   Color:Clear, Yellow  Spec. Grav.  >1.030  PH  5.5  Negative for leukocytes, nitrates, glucose, urobili  Moderate bili, trace ketones, trace blood, and positive protein    Assessment:       1. Pelvic pain    2. Gross hematuria            Plan:      Will proceed with micro UA, urine cytology, and in office cystoscopy    F/u As Needed per Treatment Plan    [facility]: Active    LISHA Eason

## 2023-02-23 NOTE — PROGRESS NOTES
Short term disability paperwork filled out and faxed to [employer].       Hepatology Nurse Practitioner  [facility] [place] [facility] [place]

## 2023-02-25 ENCOUNTER — PATIENT MESSAGE (OUTPATIENT)
Dept: OBSTETRICS AND GYNECOLOGY | Facility: CLINIC | Age: 63
End: 2023-02-25
Payer: COMMERCIAL

## 2023-02-27 ENCOUNTER — PATIENT MESSAGE (OUTPATIENT)
Dept: PRIMARY CARE CLINIC | Facility: CLINIC | Age: 63
End: 2023-02-27
Payer: COMMERCIAL

## 2023-02-27 LAB
FINAL PATHOLOGIC DIAGNOSIS: ABNORMAL
Lab: ABNORMAL

## 2023-03-02 ENCOUNTER — EXTERNAL HOME HEALTH (OUTPATIENT)
Dept: HOME HEALTH SERVICES | Facility: HOSPITAL | Age: 63
End: 2023-03-02
Payer: COMMERCIAL

## 2023-03-02 ENCOUNTER — HOSPITAL ENCOUNTER (OUTPATIENT)
Dept: RADIOLOGY | Facility: HOSPITAL | Age: 63
Discharge: HOME | End: 2023-03-02
Attending: INTERNAL MEDICINE
Payer: COMMERCIAL

## 2023-03-02 DIAGNOSIS — R18.8 CIRRHOSIS OF LIVER WITH ASCITES, UNSPECIFIED HEPATIC CIRRHOSIS TYPE: ICD-10-CM

## 2023-03-02 DIAGNOSIS — K74.60 CIRRHOSIS OF LIVER WITH ASCITES, UNSPECIFIED HEPATIC CIRRHOSIS TYPE: ICD-10-CM

## 2023-03-02 PROCEDURE — 71046 X-RAY EXAM CHEST 2 VIEWS: CPT | Mod: 26,,, | Performed by: RADIOLOGY

## 2023-03-02 PROCEDURE — 71046 XR CHEST PA AND LATERAL: ICD-10-PCS | Mod: 26,,, | Performed by: RADIOLOGY

## 2023-03-02 PROCEDURE — 71046 X-RAY EXAM CHEST 2 VIEWS: CPT | Mod: TC,FY,PO

## 2023-03-03 ENCOUNTER — TELEPHONE (OUTPATIENT)
Dept: TRANSPLANT | Facility: CLINIC | Age: 63
End: 2023-03-03
Payer: COMMERCIAL

## 2023-03-03 NOTE — TELEPHONE ENCOUNTER
Referral received from Laila Will NP   Initial  referral from Suresh  Patient with DURAN CIRRHOSIS MELD 15  ICD-10:  k74.60   Referred for liver transplant for EVALUATION.    Referral completed and forwarded to [facility].          Insurance: [employer]     NEEDS TO SEE SURESH FOR EVALUATION APPT.

## 2023-03-03 NOTE — TELEPHONE ENCOUNTER
----- Message from Laila Will NP sent at 3/3/2023 10:01 AM CST -----  Regarding: Liver Transplant Evaluation  Good Morning Kaur,    I would like to refer patient for liver transplant evaluation.  MELD-Na 15. Dx is decompensated DURAN cirrhosis. Please see chart notes for further medical history, including recent hospitalization for decompensated cirrhosis.    With Regards,    Laila Will, APRN

## 2023-03-08 ENCOUNTER — OFFICE VISIT (OUTPATIENT)
Dept: PRIMARY CARE CLINIC | Facility: CLINIC | Age: 63
End: 2023-03-08
Payer: COMMERCIAL

## 2023-03-08 ENCOUNTER — TELEPHONE (OUTPATIENT)
Dept: PRIMARY CARE CLINIC | Facility: CLINIC | Age: 63
End: 2023-03-08
Payer: COMMERCIAL

## 2023-03-08 VITALS
WEIGHT: 242.31 LBS | RESPIRATION RATE: 18 BRPM | HEIGHT: 70 IN | BODY MASS INDEX: 34.69 KG/M2 | SYSTOLIC BLOOD PRESSURE: 120 MMHG | OXYGEN SATURATION: 98 % | HEART RATE: 74 BPM | DIASTOLIC BLOOD PRESSURE: 82 MMHG

## 2023-03-08 DIAGNOSIS — K75.81 NONALCOHOLIC STEATOHEPATITIS: Chronic | ICD-10-CM

## 2023-03-08 DIAGNOSIS — K74.60 CIRRHOSIS OF LIVER WITH ASCITES, UNSPECIFIED HEPATIC CIRRHOSIS TYPE: ICD-10-CM

## 2023-03-08 DIAGNOSIS — F43.21 GRIEF REACTION: Primary | ICD-10-CM

## 2023-03-08 DIAGNOSIS — R18.8 CIRRHOSIS OF LIVER WITH ASCITES, UNSPECIFIED HEPATIC CIRRHOSIS TYPE: ICD-10-CM

## 2023-03-08 PROCEDURE — 1159F MED LIST DOCD IN RCRD: CPT | Mod: CPTII,NTX,S$GLB, | Performed by: FAMILY MEDICINE

## 2023-03-08 PROCEDURE — 99999 PR PBB SHADOW E&M-EST. PATIENT-LVL V: CPT | Mod: PBBFAC,TXP,, | Performed by: FAMILY MEDICINE

## 2023-03-08 PROCEDURE — 99999 PR PBB SHADOW E&M-EST. PATIENT-LVL V: ICD-10-PCS | Mod: PBBFAC,TXP,, | Performed by: FAMILY MEDICINE

## 2023-03-08 PROCEDURE — 3008F BODY MASS INDEX DOCD: CPT | Mod: CPTII,NTX,S$GLB, | Performed by: FAMILY MEDICINE

## 2023-03-08 PROCEDURE — 3079F DIAST BP 80-89 MM HG: CPT | Mod: CPTII,NTX,S$GLB, | Performed by: FAMILY MEDICINE

## 2023-03-08 PROCEDURE — 99214 PR OFFICE/OUTPT VISIT, EST, LEVL IV, 30-39 MIN: ICD-10-PCS | Mod: NTX,S$GLB,, | Performed by: FAMILY MEDICINE

## 2023-03-08 PROCEDURE — 1159F PR MEDICATION LIST DOCUMENTED IN MEDICAL RECORD: ICD-10-PCS | Mod: CPTII,NTX,S$GLB, | Performed by: FAMILY MEDICINE

## 2023-03-08 PROCEDURE — 3079F PR MOST RECENT DIASTOLIC BLOOD PRESSURE 80-89 MM HG: ICD-10-PCS | Mod: CPTII,NTX,S$GLB, | Performed by: FAMILY MEDICINE

## 2023-03-08 PROCEDURE — 1160F PR REVIEW ALL MEDS BY PRESCRIBER/CLIN PHARMACIST DOCUMENTED: ICD-10-PCS | Mod: CPTII,NTX,S$GLB, | Performed by: FAMILY MEDICINE

## 2023-03-08 PROCEDURE — 3074F SYST BP LT 130 MM HG: CPT | Mod: CPTII,NTX,S$GLB, | Performed by: FAMILY MEDICINE

## 2023-03-08 PROCEDURE — 99214 OFFICE O/P EST MOD 30 MIN: CPT | Mod: NTX,S$GLB,, | Performed by: FAMILY MEDICINE

## 2023-03-08 PROCEDURE — 3008F PR BODY MASS INDEX (BMI) DOCUMENTED: ICD-10-PCS | Mod: CPTII,NTX,S$GLB, | Performed by: FAMILY MEDICINE

## 2023-03-08 PROCEDURE — 3074F PR MOST RECENT SYSTOLIC BLOOD PRESSURE < 130 MM HG: ICD-10-PCS | Mod: CPTII,NTX,S$GLB, | Performed by: FAMILY MEDICINE

## 2023-03-08 PROCEDURE — 1160F RVW MEDS BY RX/DR IN RCRD: CPT | Mod: CPTII,NTX,S$GLB, | Performed by: FAMILY MEDICINE

## 2023-03-08 NOTE — PROGRESS NOTES
THIS DOCUMENT WAS MADE IN PART WITH VOICE RECOGNITION SOFTWARE.  OCCASIONALLY THIS SOFTWARE WILL MISINTERPRET WORDS OR PHRASES.      Primary Care Provider Appointment   [facility] Derick Tran       Patient ID: Yumiko Gonzalez is a 62 y.o. female.    Yumiko was seen today for cirrhosis of liver.    Diagnoses and all orders for this visit:    Grief reaction  -     Ambulatory referral/consult to [facility]; Future    Nonalcoholic steatohepatitis  Cirrhosis of liver with ascites, unspecified hepatic cirrhosis type  Weight has plateaued, lasix not working as well, edema overall improved but persistent more then ideal.  Mild improvement cognition, but worsens as day progresses  I recommend continuing current medications, following a low-sodium diet and gradually increasing activity.    Follow Up:  Six weeks      Subjective:     Chief Complaint   Patient presents with    Cirrhosis of Liver     F/u visit      I have reviewed the information entered by the ancillary staff regarding the chief complaint as well as the related history.    HPI    Patient is a/an 62 y.o.  female     Follow-up liver disease, nonalcoholic, with recent exacerbation and development of hepatic encephalopathy.  Continuing slow improvement in overall cognitive function.  At least early in the day but easily fatigued with changes in concentration and memory worsening as the day progresses  Grief and depression,  passed away shortly before her hospitalization and exacerbation of her liver disease.    For complete problem list, past medical history, surgical history, social history, etc., see appropriate section in the electronic medical record    Review of Systems   Constitutional:  Negative for chills and fever.   Cardiovascular:  Positive for leg swelling. Negative for chest pain.   Psychiatric/Behavioral:  Positive for dysphoric mood. Negative for suicidal ideas.      Objective     Physical  "Exam  Vitals reviewed.   Constitutional:       General: She is not in acute distress.     Appearance: She is well-developed. She is not ill-appearing.   HENT:      Head: Normocephalic and atraumatic.   Eyes:      General: No scleral icterus.     Conjunctiva/sclera: Conjunctivae normal.   Cardiovascular:      Rate and Rhythm: Normal rate and regular rhythm.      Heart sounds: Normal heart sounds.      Comments: One to 2+ bilateral ankle and pretibial edema  Pulmonary:      Effort: Pulmonary effort is normal. No respiratory distress.      Breath sounds: Normal breath sounds. No wheezing or rales.   Skin:     General: Skin is dry.      Findings: No rash.   Neurological:      Mental Status: She is alert and oriented to person, place, and time.      Comments: Improvement in cognition and clarity   Psychiatric:         Behavior: Behavior normal.     Vitals:    03/08/23 1110   BP: 120/82   BP Location: Left arm   Patient Position: Sitting   BP Method: Large (Manual)   Pulse: 74   Resp: 18   SpO2: 98%   Weight: 109.9 kg (242 lb 4.6 oz)   Height: 5' 10" (1.778 m)       RECENT LABS:    Lab Results   Component Value Date    WBC 4.65 03/02/2023    HGB 14.8 03/02/2023    HCT 46.2 03/02/2023    PLT 98 (L) 03/02/2023    CHOL 125 12/16/2021    TRIG 78 12/16/2021    HDL 52 12/16/2021    ALT 41 03/02/2023    AST 70 (H) 03/02/2023     03/02/2023    K 3.7 03/02/2023     03/02/2023    CREATININE 0.7 03/02/2023    BUN 15 03/02/2023    CO2 29 03/02/2023    TSH 1.618 12/16/2021    INR 1.4 (H) 03/02/2023    HGBA1C 4.9 09/16/2022       Results for orders placed or performed in visit on 03/02/23   Comprehensive Metabolic Panel   Result Value Ref Range    Sodium 138 136 - 145 mmol/L    Potassium 3.7 3.5 - 5.1 mmol/L    Chloride 105 95 - 110 mmol/L    CO2 29 23 - 29 mmol/L    Glucose 184 (H) 70 - 110 mg/dL    BUN 15 8 - 23 mg/dL    Creatinine 0.7 0.5 - 1.4 mg/dL    Calcium 9.7 8.7 - 10.5 mg/dL    Total Protein 6.0 6.0 - 8.4 g/dL    " Albumin 3.0 (L) 3.5 - 5.2 g/dL    Total Bilirubin 3.2 (H) 0.1 - 1.0 mg/dL    Alkaline Phosphatase 130 55 - 135 U/L    AST 70 (H) 10 - 40 U/L    ALT 41 10 - 44 U/L    Anion Gap 4 (L) 8 - 16 mmol/L    eGFR >60.0 >60 mL/min/1.73 m^2   CBC Auto Differential   Result Value Ref Range    WBC 4.65 3.90 - 12.70 K/uL    RBC 4.58 4.00 - 5.40 M/uL    Hemoglobin 14.8 12.0 - 16.0 g/dL    Hematocrit 46.2 37.0 - 48.5 %     (H) 82 - 98 fL    MCH 32.3 (H) 27.0 - 31.0 pg    MCHC 32.0 32.0 - 36.0 g/dL    RDW 16.4 (H) 11.5 - 14.5 %    Platelets 98 (L) 150 - 450 K/uL    MPV 11.7 9.2 - 12.9 fL    Immature Granulocytes 1.1 (H) 0.0 - 0.5 %    Gran # (ANC) 3.0 1.8 - 7.7 K/uL    Immature Grans (Abs) 0.05 (H) 0.00 - 0.04 K/uL    Lymph # 0.8 (L) 1.0 - 4.8 K/uL    Mono # 0.6 0.3 - 1.0 K/uL    Eos # 0.2 0.0 - 0.5 K/uL    Baso # 0.04 0.00 - 0.20 K/uL    nRBC 0 0 /100 WBC    Gran % 63.3 38.0 - 73.0 %    Lymph % 16.6 (L) 18.0 - 48.0 %    Mono % 13.8 4.0 - 15.0 %    Eosinophil % 4.3 0.0 - 8.0 %    Basophil % 0.9 0.0 - 1.9 %    Differential Method Automated    Protime-INR   Result Value Ref Range    Prothrombin Time 14.7 (H) 9.0 - 12.5 sec    INR 1.4 (H) 0.8 - 1.2

## 2023-03-08 NOTE — TELEPHONE ENCOUNTER
Pt had requested a Hgb A1C, but Dr. Webb said that she had one is Sept and it didn't need to be repeated at this time. Pt agreed and said that she didn't realize it had been drawn that recently. No additional concerns noted at this time.

## 2023-03-09 ENCOUNTER — TELEPHONE (OUTPATIENT)
Dept: PRIMARY CARE CLINIC | Facility: CLINIC | Age: 63
End: 2023-03-09
Payer: COMMERCIAL

## 2023-03-09 NOTE — TELEPHONE ENCOUNTER
Patient is referred to clinician secondary to grief and long hx of depression. Clinician contacted patient regarding interest in receiving individual therapy. She acknowledged having numerous health issues and loss of her  suddenly 10/30/2022. She c/o grief sx, such as crying spells. She said her kids are supportive, but doesn't like to cry in front of them. She identified one grand daughter, aged 15 y/o, who is special to her. Patient denied having thoughts, plans, or intentions to harm herself or others. She identified her grandchildren as protective factors. Patient verbalized agreement to meet with clinician and is scheduled to return on  3/15/2023 at 11:00.

## 2023-03-10 ENCOUNTER — PROCEDURE VISIT (OUTPATIENT)
Dept: UROLOGY | Facility: CLINIC | Age: 63
End: 2023-03-10
Payer: COMMERCIAL

## 2023-03-10 ENCOUNTER — TELEPHONE (OUTPATIENT)
Dept: OBSTETRICS AND GYNECOLOGY | Facility: CLINIC | Age: 63
End: 2023-03-10
Payer: COMMERCIAL

## 2023-03-10 ENCOUNTER — TELEPHONE (OUTPATIENT)
Dept: UROLOGY | Facility: CLINIC | Age: 63
End: 2023-03-10

## 2023-03-10 VITALS — BODY MASS INDEX: 34.69 KG/M2 | WEIGHT: 242.31 LBS | HEIGHT: 70 IN

## 2023-03-10 DIAGNOSIS — Z94.4 LIVER TRANSPLANT STATUS: Primary | ICD-10-CM

## 2023-03-10 DIAGNOSIS — R31.0 GROSS HEMATURIA: Primary | ICD-10-CM

## 2023-03-10 PROCEDURE — 52000 CYSTOURETHROSCOPY: CPT | Mod: NTX,S$GLB,, | Performed by: STUDENT IN AN ORGANIZED HEALTH CARE EDUCATION/TRAINING PROGRAM

## 2023-03-10 PROCEDURE — 52000 CYSTOSCOPY: ICD-10-PCS | Mod: NTX,S$GLB,, | Performed by: STUDENT IN AN ORGANIZED HEALTH CARE EDUCATION/TRAINING PROGRAM

## 2023-03-10 RX ORDER — BUPROPION HYDROCHLORIDE 150 MG/1
300 TABLET ORAL DAILY
Qty: 180 TABLET | Refills: 3 | Status: SHIPPED | OUTPATIENT
Start: 2023-03-10 | End: 2024-03-26 | Stop reason: SDUPTHER

## 2023-03-10 NOTE — TELEPHONE ENCOUNTER
----- Message from Anup Diaz MD sent at 3/10/2023 11:12 AM CST -----  Please schedule CT urogram for this patient.

## 2023-03-10 NOTE — TELEPHONE ENCOUNTER
No new care gaps identified.  Health system Embedded Care Gaps. Reference number: [id number]. 3/10/2023   9:24:32 AM CST

## 2023-03-10 NOTE — TELEPHONE ENCOUNTER
----- Message from Eden Alfredo sent at 3/10/2023  1:28 PM CST -----  Type:  Sooner Apoointment Request    Caller is requesting a sooner appointment.  Caller declined first available appointment listed below.  Caller will not accept being placed on the waitlist and is requesting a message be sent to doctor.  Name of Caller:pt  When is the first available appointment?4/14  Symptoms:  Would the patient rather a call back or a response via [facility]? Call back  Best Call Back Number:[phone]     Additional Information: Sts she needs sooner because of transplant. Please advise -- thank you.

## 2023-03-10 NOTE — TELEPHONE ENCOUNTER
Spoke with patient. Appt scheduled for 3- at 3:20 pm with , added appt to wait list, patient verb understanding.

## 2023-03-10 NOTE — PROCEDURES
Cystoscopy    Date/Time: 3/10/2023 8:30 AM  Performed by: Anup Diaz MD  Authorized by: Cass Irizarry NP     Procedure:   Flexible cysto-urethroscopy    Pre Procedure Diagnosis:  gross hematuria    Post Procedure Diagnosis:  Same    Surgeon: Anup Diaz MD     Anesthesia: 2% uro-jet lidocaine jelly for local analgesia    Procedure note:  The risks and benefits were explained and informed consent was obtained. 2% lidocaine urojet was used for local analgesia. The genitalia was prepped and draped in the sterile fashion.    The flexible cystoscope was inserted through the urethra, and this passed easily.   The bladder was completely surveyed in a systematic fashion. The bilateral ureteral orifices were identified in their normal orthotopic locations with efflux noted bilaterally. The remainder of the bladder was evaluated. There were no foreign bodies, stones, diverticula, or trabeculations. No bladder tumors or lesions suspicious for malignancy were seen.     As the flexible cystoscope was being removed, the urethra was evaluated and noted to be normal.    The patient tolerated the procedure well without complication.     Findings in summary:  Normal cystoscopy    Assessment: 62 y.o. female with gross hematuria    Plan:  CT urogram ordered to complete hematuria workup

## 2023-03-14 ENCOUNTER — OFFICE VISIT (OUTPATIENT)
Dept: NEUROLOGY | Facility: CLINIC | Age: 63
End: 2023-03-14
Payer: COMMERCIAL

## 2023-03-14 VITALS
WEIGHT: 242.75 LBS | SYSTOLIC BLOOD PRESSURE: 141 MMHG | HEIGHT: 70 IN | DIASTOLIC BLOOD PRESSURE: 83 MMHG | BODY MASS INDEX: 34.75 KG/M2 | HEART RATE: 73 BPM

## 2023-03-14 DIAGNOSIS — K74.60 CIRRHOSIS OF LIVER WITH ASCITES, UNSPECIFIED HEPATIC CIRRHOSIS TYPE: ICD-10-CM

## 2023-03-14 DIAGNOSIS — R18.8 CIRRHOSIS OF LIVER WITH ASCITES, UNSPECIFIED HEPATIC CIRRHOSIS TYPE: ICD-10-CM

## 2023-03-14 DIAGNOSIS — R25.1 TREMOR: Primary | ICD-10-CM

## 2023-03-14 DIAGNOSIS — F33.42 RECURRENT MAJOR DEPRESSIVE DISORDER, IN FULL REMISSION: ICD-10-CM

## 2023-03-14 DIAGNOSIS — I10 PRIMARY HYPERTENSION: Chronic | ICD-10-CM

## 2023-03-14 DIAGNOSIS — Z86.59 HISTORY OF POSTTRAUMATIC STRESS DISORDER (PTSD): ICD-10-CM

## 2023-03-14 DIAGNOSIS — K76.82 HEPATIC ENCEPHALOPATHY: ICD-10-CM

## 2023-03-14 PROCEDURE — 3079F DIAST BP 80-89 MM HG: CPT | Mod: CPTII,S$GLB,, | Performed by: NURSE PRACTITIONER

## 2023-03-14 PROCEDURE — 3077F PR MOST RECENT SYSTOLIC BLOOD PRESSURE >= 140 MM HG: ICD-10-PCS | Mod: CPTII,S$GLB,, | Performed by: NURSE PRACTITIONER

## 2023-03-14 PROCEDURE — 99999 PR PBB SHADOW E&M-EST. PATIENT-LVL IV: CPT | Mod: PBBFAC,,, | Performed by: NURSE PRACTITIONER

## 2023-03-14 PROCEDURE — 3008F PR BODY MASS INDEX (BMI) DOCUMENTED: ICD-10-PCS | Mod: CPTII,S$GLB,, | Performed by: NURSE PRACTITIONER

## 2023-03-14 PROCEDURE — 3077F SYST BP >= 140 MM HG: CPT | Mod: CPTII,S$GLB,, | Performed by: NURSE PRACTITIONER

## 2023-03-14 PROCEDURE — 3079F PR MOST RECENT DIASTOLIC BLOOD PRESSURE 80-89 MM HG: ICD-10-PCS | Mod: CPTII,S$GLB,, | Performed by: NURSE PRACTITIONER

## 2023-03-14 PROCEDURE — 99215 PR OFFICE/OUTPT VISIT, EST, LEVL V, 40-54 MIN: ICD-10-PCS | Mod: S$GLB,,, | Performed by: NURSE PRACTITIONER

## 2023-03-14 PROCEDURE — 99999 PR PBB SHADOW E&M-EST. PATIENT-LVL IV: ICD-10-PCS | Mod: PBBFAC,,, | Performed by: NURSE PRACTITIONER

## 2023-03-14 PROCEDURE — 3008F BODY MASS INDEX DOCD: CPT | Mod: CPTII,S$GLB,, | Performed by: NURSE PRACTITIONER

## 2023-03-14 PROCEDURE — 99215 OFFICE O/P EST HI 40 MIN: CPT | Mod: S$GLB,,, | Performed by: NURSE PRACTITIONER

## 2023-03-14 NOTE — PROGRESS NOTES
Name: Yumiko Gonzalez  MRN: [id number]   CSN: [id number]      Date: 3-14-23      Referring physician:  No referring provider defined for this encounter.    Subjective:      Chief Complaint: tremor     History of Present Illness (HPI):    Yumiko Gonzalez is a 62 y.o. right-handed female who presents today for a follow-up evaluation of tremor and is accompanied by daughter, Gauri.    Wanting to retire but not sure this will be possible.  Been on leave since January due to liver. She is being considered for a liver transplant    Tremors getting worse in hands but can feel in whole body (vibration).    She has been retaining fluid. Had paracentesis in  and lost over 90 lbs since that time with diuretics and strict diet. Being evaluated next month for liver transplant. Told beginning of HE.    She remains on propranolol LA 60 mg once daily. It has been very helpful for her until recently.     Tremor does bother her. Finds it embarrassing.  Handwriting was never good but now not always legible.   Eating is getting to be a problem.   As long as holds cup on teeth, she does ok.   If she has something to steady her hand, she is usually better.   Mousing and texting are difficult.     Uses albuteraol inhaler PRN, no asthma. Had bronchitis.     Now walks with cane for the past year. L knee gives out at times.  Last fall was 2022.     Dizziness since hospitalization in January. Told meds for liver could cause.     Since   10-30-22, she has had diff falling and staying asleep.  In the stage between being awake and asleep, she has twice seen thousands of moths come out from bed or pillow.     Mother had tremor in her voice. She is not sure if she had hand tremor.  Oldest daughter is beginning with hand tremor.   Does not drink alcohol. Was never a big drinker and does not recall that it helped.     Occ hurts to walk depending on knee or feet.    Feels like bones are not meshing like they should. Feels like the  bones slip sometimes.     Review of Systems   Constitutional:  Positive for appetite change (not what it used to be since on current diet due to liver)).   HENT:  Positive for drooling (at night) and trouble swallowing (large pills).    Respiratory:  Negative for cough, choking and shortness of breath.    Cardiovascular:  Positive for leg swelling (ongoing).   Gastrointestinal:  Positive for constipation, nausea (one recent bout) and vomiting (one recent bout). Negative for diarrhea.   Genitourinary:  Positive for frequency and urgency.   Musculoskeletal:  Positive for gait problem. Negative for back pain and neck pain.   Neurological:  Positive for dizziness and tremors. Negative for syncope and numbness.        Denies tingling or burning    Psychiatric/Behavioral:  Positive for dysphoric mood (longstanding but worse since  passed) and sleep disturbance. Negative for hallucinations (since  passed).      Past Medical History: The patient  has a past medical history of Abnormal Pap smear, Abnormal Pap smear of cervix, Anemia, Arthritis, Asthma, Autoimmune disease, Blood transfusion, Bronchitis, Cancer (1987), Cervical neck pain with evidence of disc disease (03/22/2012), Cirrhosis, Colon polyps (03/22/2012), Depression (03/22/2012), Diverticular disease (03/22/2012), End-stage renal disease, Fatty liver (03/22/2012), Fibrocystic breast, Fine tremor, Hepatosplenomegaly, History of abdominal paracentesis (01/18/2023), Hypertension (04/24/2013), Knee pain, bilateral, Lesion of right lung, Liver disease, Migraines (past Hx), Nephrolithiasis, Pleural effusion, PONV (postoperative nausea and vomiting), Postpartum depression, Splenomegaly, Thrombocytopenia, and Tremors of nervous system.    Social History: The patient  reports that she quit smoking about 17 years ago. Her smoking use included cigarettes. She has never used smokeless tobacco. She reports that she does not currently use alcohol. She reports that  she does not use drugs.    Family History: Their family history includes Breast cancer in her sister; Breast cancer (age of onset: 40) in her maternal cousin; Breast cancer (age of onset: 70) in her maternal aunt, maternal grandmother, and mother; Cancer in her sister; Diabetes in her brother, father, sister, and sister; Emphysema in her brother; Heart disease in her father and mother; Hypertension in her mother; Multiple sclerosis in her maternal aunt and maternal aunt; Tremor in her daughter and mother.    Allergies: No known drug allergies     Meds:   Current Outpatient Medications on File Prior to Visit   Medication Sig Dispense Refill    albuterol (PROVENTIL) 2.5 mg /3 mL (0.083 %) nebulizer solution Take 3 mLs (2.5 mg total) by nebulization every 6 (six) hours as needed for Wheezing. Rescue 75 mL 2    albuterol (PROVENTIL/VENTOLIN HFA) 90 mcg/actuation inhaler Inhale 2 puffs every 4 hours as needed for cough, wheeze, or shortness of breath 18 g 11    ALPRAZolam (XANAX) 0.25 MG tablet Take 1 tablet (0.25 mg total) by mouth nightly as needed. FOR INSOMNIA 30 tablet 2    buPROPion (WELLBUTRIN XL) 150 MG TB24 tablet Take 2 tablets (300 mg total) by mouth once daily. 180 tablet 3    fluticasone-umeclidin-vilanter (TRELEGY ELLIPTA) 200-62.5-25 mcg inhaler Inhale 1 puff into the lungs once daily. 60 each 11    furosemide (LASIX) 40 MG tablet Take 1 tablet (40 mg total) by mouth once daily. 30 tablet 6    lactulose (CONSTULOSE) 10 gram/15 mL solution Take 30 mLs (20 g total) by mouth 2 (two) times daily. 5000 mL 6    loratadine (CLARITIN) 10 mg tablet Take 10 mg by mouth once daily.       multivitamin (THERAGRAN) per tablet Take 1 tablet by mouth once daily.      nystatin-triamcinolone (MYCOLOG II) cream Apply to affected area twice daily as needed 60 g 3    omeprazole (PRILOSEC) 20 MG capsule Take 1 capsule (20 mg total) by mouth once daily. 90 capsule 3    rifAXIMin (XIFAXAN) 550 mg Tab Take 1 tablet (550 mg total)  "by mouth 2 (two) times daily. 60 tablet 11    spironolactone (ALDACTONE) 100 MG tablet Take 1 tablet (100 mg total) by mouth once daily. 30 tablet 6    vitamin E 400 UNIT capsule Take 400 Units by mouth once daily.      [DISCONTINUED] propranoloL (INDERAL LA) 60 MG 24 hr capsule Take 1 capsule (60 mg total) by mouth once daily. 30 capsule 11     No current facility-administered medications on file prior to visit.       Objective:     Physical Exam:    Vitals:    03/14/23 1517   BP: (!) 141/83   Pulse: 73   Weight: 110.1 kg (242 lb 11.6 oz)   Height: 5' 10" (1.778 m)     Body mass index is 34.83 kg/m².    Constitutional  Well-developed, well-nourished, appears stated age   Cardiovascular  BLE edema bilaterally     ..  Neurological    * Mental status    - Orientation Oriented to: person, place, and situation     - Memory     Not Tested but provides good hx     - Attention/concentration  Attends to interview without distraction     - Language  Spontaneous, fluent     - Fund of knowledge  Aware of current events     - Executive  Well-organized thoughts       ..  * Specialized movement exam  No hypophonic speech.    No facial masking.   No cogwheel rigidity.     No bradykinesia in hands. BTT slowed but no decrementing movements.  No asterixis.   No tremor with rest.   Mild postural tremor BH with some distractibility but never completely stops.   No kinetic tremor.  Intention tremor near mild RH and slight LH.      No other dystonia, chorea, athetosis, myoclonus, or tics.   Arises with push.    No motor impersistence.  Somewhat broad based gait.  Shortened stride length.   Ambulates with arms out to her side, both swing slightly, intermittent irregular tremor RH on ambulation.  Antalgic.              Laboratory Results:  Lab Visit on 03/02/2023   Component Date Value Ref Range Status    Sodium 03/02/2023 138  136 - 145 mmol/L Final    Potassium 03/02/2023 3.7  3.5 - 5.1 mmol/L Final    Chloride 03/02/2023 105  95 - 110 " mmol/L Final    CO2 03/02/2023 29  23 - 29 mmol/L Final    Glucose 03/02/2023 184 (H)  70 - 110 mg/dL Final    BUN 03/02/2023 15  8 - 23 mg/dL Final    Creatinine 03/02/2023 0.7  0.5 - 1.4 mg/dL Final    Calcium 03/02/2023 9.7  8.7 - 10.5 mg/dL Final    Total Protein 03/02/2023 6.0  6.0 - 8.4 g/dL Final    Albumin 03/02/2023 3.0 (L)  3.5 - 5.2 g/dL Final    Total Bilirubin 03/02/2023 3.2 (H)  0.1 - 1.0 mg/dL Final    Alkaline Phosphatase 03/02/2023 130  55 - 135 U/L Final    AST 03/02/2023 70 (H)  10 - 40 U/L Final    ALT 03/02/2023 41  10 - 44 U/L Final    Anion Gap 03/02/2023 4 (L)  8 - 16 mmol/L Final    eGFR 03/02/2023 >60.0  >60 mL/min/1.73 m^2 Final    WBC 03/02/2023 4.65  3.90 - 12.70 K/uL Final    RBC 03/02/2023 4.58  4.00 - 5.40 M/uL Final    Hemoglobin 03/02/2023 14.8  12.0 - 16.0 g/dL Final    Hematocrit 03/02/2023 46.2  37.0 - 48.5 % Final    MCV 03/02/2023 101 (H)  82 - 98 fL Final    MCH 03/02/2023 32.3 (H)  27.0 - 31.0 pg Final    MCHC 03/02/2023 32.0  32.0 - 36.0 g/dL Final    RDW 03/02/2023 16.4 (H)  11.5 - 14.5 % Final    Platelets 03/02/2023 98 (L)  150 - 450 K/uL Final    MPV 03/02/2023 11.7  9.2 - 12.9 fL Final    Immature Granulocytes 03/02/2023 1.1 (H)  0.0 - 0.5 % Final    Gran # (ANC) 03/02/2023 3.0  1.8 - 7.7 K/uL Final    Immature Grans (Abs) 03/02/2023 0.05 (H)  0.00 - 0.04 K/uL Final    Lymph # 03/02/2023 0.8 (L)  1.0 - 4.8 K/uL Final    Mono # 03/02/2023 0.6  0.3 - 1.0 K/uL Final    Eos # 03/02/2023 0.2  0.0 - 0.5 K/uL Final    Baso # 03/02/2023 0.04  0.00 - 0.20 K/uL Final    nRBC 03/02/2023 0  0 /100 WBC Final    Gran % 03/02/2023 63.3  38.0 - 73.0 % Final    Lymph % 03/02/2023 16.6 (L)  18.0 - 48.0 % Final    Mono % 03/02/2023 13.8  4.0 - 15.0 % Final    Eosinophil % 03/02/2023 4.3  0.0 - 8.0 % Final    Basophil % 03/02/2023 0.9  0.0 - 1.9 % Final    Differential Method 03/02/2023 Automated   Final    Prothrombin Time 03/02/2023 14.7 (H)  9.0 - 12.5 sec Final    INR 03/02/2023 1.4  (H)  0.8 - 1.2 Final   Office Visit on 02/23/2023   Component Date Value Ref Range Status    Final Pathologic Diagnosis 02/23/2023  (A)   Final                    Value:Urine, voided, cytology:  Atypical urothelial cells.  Fungal organisms consistent with Amparo      Disclaimer 02/23/2023  (A)   Final                    Value:Screening was performed at [facility], [address] [place] [place] [address].      RBC, UA 02/23/2023 0  0 - 4 /hpf Final    WBC, UA 02/23/2023 2  0 - 5 /hpf Final    Bacteria 02/23/2023 Many (A)  None-Occ /hpf Final    Yeast, UA 02/23/2023 Rare (A)  None Final    Squam Epithel, UA 02/23/2023 5  /hpf Final    Hyaline Casts, UA 02/23/2023 0  0-1/lpf /lpf Final    Ca Oxalate Brigette, UA 02/23/2023 Occasional  None-Moderate Final    Microscopic Comment 02/23/2023 SEE COMMENT   Final    Color, POC UA 02/23/2023 Yellow  Yellow, Straw, Colorless Final    Clarity, POC UA 02/23/2023 Clear  Clear Final    Glucose, POC UA 02/23/2023 Negative  Negative Final    Bilirubin, POC UA 02/23/2023 Moderate (A)  Negative Final    Ketones, POC UA 02/23/2023 Trace (A)  Negative Final    Spec Grav POC UA 02/23/2023 >=1.030  1.005 - 1.030 Final    Blood, POC UA 02/23/2023 Trace-intact (A)  Negative Final    pH, POC UA 02/23/2023 5.5  5.0 - 8.0 Final    Protein, POC UA 02/23/2023 30 (A)  Negative Final    Urobilinogen, POC UA 02/23/2023 1.0  <=1.0 Final    Nitrite, POC UA 02/23/2023 Negative  Negative Final    WBC, POC UA 02/23/2023 Negative  Negative Final   Lab Visit on 02/14/2023   Component Date Value Ref Range Status    WBC 02/14/2023 5.47  3.90 - 12.70 K/uL Final    RBC 02/14/2023 4.70  4.00 - 5.40 M/uL Final    Hemoglobin 02/14/2023 15.2  12.0 - 16.0 g/dL Final    Hematocrit 02/14/2023 45.9  37.0 - 48.5 % Final    MCV 02/14/2023 98  82 - 98 fL Final    MCH 02/14/2023 32.3 (H)  27.0 - 31.0 pg Final    MCHC 02/14/2023 33.1  32.0 - 36.0 g/dL Final    RDW 02/14/2023  16.5 (H)  11.5 - 14.5 % Final    Platelets 02/14/2023 132 (L)  150 - 450 K/uL Final    MPV 02/14/2023 10.4  9.2 - 12.9 fL Final    Immature Granulocytes 02/14/2023 1.3 (H)  0.0 - 0.5 % Final    Gran # (ANC) 02/14/2023 3.3  1.8 - 7.7 K/uL Final    Immature Grans (Abs) 02/14/2023 0.07 (H)  0.00 - 0.04 K/uL Final    Lymph # 02/14/2023 1.0  1.0 - 4.8 K/uL Final    Mono # 02/14/2023 0.8  0.3 - 1.0 K/uL Final    Eos # 02/14/2023 0.3  0.0 - 0.5 K/uL Final    Baso # 02/14/2023 0.05  0.00 - 0.20 K/uL Final    nRBC 02/14/2023 0  0 /100 WBC Final    Gran % 02/14/2023 60.0  38.0 - 73.0 % Final    Lymph % 02/14/2023 17.7 (L)  18.0 - 48.0 % Final    Mono % 02/14/2023 14.6  4.0 - 15.0 % Final    Eosinophil % 02/14/2023 5.5  0.0 - 8.0 % Final    Basophil % 02/14/2023 0.9  0.0 - 1.9 % Final    Differential Method 02/14/2023 Automated   Final    Prothrombin Time 02/14/2023 14.8 (H)  9.0 - 12.5 sec Final    INR 02/14/2023 1.4 (H)  0.8 - 1.2 Final    Sodium 02/14/2023 138  136 - 145 mmol/L Final    Potassium 02/14/2023 4.1  3.5 - 5.1 mmol/L Final    Chloride 02/14/2023 102  95 - 110 mmol/L Final    CO2 02/14/2023 26  23 - 29 mmol/L Final    Glucose 02/14/2023 133 (H)  70 - 110 mg/dL Final    BUN 02/14/2023 13  8 - 23 mg/dL Final    Creatinine 02/14/2023 0.8  0.5 - 1.4 mg/dL Final    Calcium 02/14/2023 9.9  8.7 - 10.5 mg/dL Final    Total Protein 02/14/2023 6.2  6.0 - 8.4 g/dL Final    Albumin 02/14/2023 3.1 (L)  3.5 - 5.2 g/dL Final    Total Bilirubin 02/14/2023 3.4 (H)  0.1 - 1.0 mg/dL Final    Alkaline Phosphatase 02/14/2023 115  55 - 135 U/L Final    AST 02/14/2023 68 (H)  10 - 40 U/L Final    ALT 02/14/2023 33  10 - 44 U/L Final    Anion Gap 02/14/2023 10  8 - 16 mmol/L Final    eGFR 02/14/2023 >60.0  >60 mL/min/1.73 m^2 Final   Office Visit on 02/14/2023   Component Date Value Ref Range Status    Color, POC UA 02/14/2023 Yellow  Yellow, Straw, Colorless Final    Clarity, POC UA 02/14/2023 Clear  Clear Final    Glucose, POC UA  02/14/2023 Negative  Negative Final    Bilirubin, POC UA 02/14/2023 Negative  Negative Final    Ketones, POC UA 02/14/2023 Negative  Negative Final    Spec Grav POC UA 02/14/2023 1.015  1.005 - 1.030 Final    Blood, POC UA 02/14/2023 Large (A)  Negative Final    pH, POC UA 02/14/2023 6.5  5.0 - 8.0 Final    Protein, POC UA 02/14/2023 Negative  Negative Final    Urobilinogen, POC UA 02/14/2023 1.0  <=1.0 Final    Nitrite, POC UA 02/14/2023 Negative  Negative Final    WBC, POC UA 02/14/2023 Trace (A)  Negative Final    Urine Culture, Routine 02/14/2023 No significant growth   Final   Lab Visit on 02/08/2023   Component Date Value Ref Range Status    WBC 02/08/2023 5.24  3.90 - 12.70 K/uL Final    RBC 02/08/2023 4.56  4.00 - 5.40 M/uL Final    Hemoglobin 02/08/2023 14.7  12.0 - 16.0 g/dL Final    Hematocrit 02/08/2023 45.0  37.0 - 48.5 % Final    MCV 02/08/2023 99 (H)  82 - 98 fL Final    MCH 02/08/2023 32.2 (H)  27.0 - 31.0 pg Final    MCHC 02/08/2023 32.7  32.0 - 36.0 g/dL Final    RDW 02/08/2023 16.8 (H)  11.5 - 14.5 % Final    Platelets 02/08/2023 119 (L)  150 - 450 K/uL Final    MPV 02/08/2023 10.3  9.2 - 12.9 fL Final    Immature Granulocytes 02/08/2023 1.1 (H)  0.0 - 0.5 % Final    Gran # (ANC) 02/08/2023 3.3  1.8 - 7.7 K/uL Final    Immature Grans (Abs) 02/08/2023 0.06 (H)  0.00 - 0.04 K/uL Final    Lymph # 02/08/2023 0.8 (L)  1.0 - 4.8 K/uL Final    Mono # 02/08/2023 0.7  0.3 - 1.0 K/uL Final    Eos # 02/08/2023 0.3  0.0 - 0.5 K/uL Final    Baso # 02/08/2023 0.05  0.00 - 0.20 K/uL Final    nRBC 02/08/2023 0  0 /100 WBC Final    Gran % 02/08/2023 63.3  38.0 - 73.0 % Final    Lymph % 02/08/2023 15.3 (L)  18.0 - 48.0 % Final    Mono % 02/08/2023 14.1  4.0 - 15.0 % Final    Eosinophil % 02/08/2023 5.2  0.0 - 8.0 % Final    Basophil % 02/08/2023 1.0  0.0 - 1.9 % Final    Differential Method 02/08/2023 Automated   Final    Prothrombin Time 02/08/2023 15.0 (H)  9.0 - 12.5 sec Final    INR 02/08/2023 1.4 (H)  0.8 -  1.2 Final    Sodium 02/08/2023 137  136 - 145 mmol/L Final    Potassium 02/08/2023 4.0  3.5 - 5.1 mmol/L Final    Chloride 02/08/2023 105  95 - 110 mmol/L Final    CO2 02/08/2023 26  23 - 29 mmol/L Final    Glucose 02/08/2023 132 (H)  70 - 110 mg/dL Final    BUN 02/08/2023 12  8 - 23 mg/dL Final    Creatinine 02/08/2023 0.6  0.5 - 1.4 mg/dL Final    Calcium 02/08/2023 10.1  8.7 - 10.5 mg/dL Final    Total Protein 02/08/2023 5.8 (L)  6.0 - 8.4 g/dL Final    Albumin 02/08/2023 2.9 (L)  3.5 - 5.2 g/dL Final    Total Bilirubin 02/08/2023 3.2 (H)  0.1 - 1.0 mg/dL Final    Alkaline Phosphatase 02/08/2023 117  55 - 135 U/L Final    AST 02/08/2023 62 (H)  10 - 40 U/L Final    ALT 02/08/2023 34  10 - 44 U/L Final    Anion Gap 02/08/2023 6 (L)  8 - 16 mmol/L Final    eGFR 02/08/2023 >60.0  >60 mL/min/1.73 m^2 Final   Lab Visit on 02/03/2023   Component Date Value Ref Range Status    Specimen UA 02/03/2023 Urine, Clean Catch   Final    Color, UA 02/03/2023 Orange (A)  Yellow, Straw, Stacie Final    Appearance, UA 02/03/2023 Hazy (A)  Clear Final    pH, UA 02/03/2023 6.0  5.0 - 8.0 Final    Specific Gravity, UA 02/03/2023 1.025  1.005 - 1.030 Final    Protein, UA 02/03/2023 1+ (A)  Negative Final    Glucose, UA 02/03/2023 Trace (A)  Negative Final    Ketones, UA 02/03/2023 Trace (A)  Negative Final    Bilirubin (UA) 02/03/2023 2+ (A)  Negative Final    Occult Blood UA 02/03/2023 3+ (A)  Negative Final    Nitrite, UA 02/03/2023 Positive (A)  Negative Final    Leukocytes, UA 02/03/2023 Negative  Negative Final    RBC, UA 02/03/2023 >100 (H)  0 - 4 /hpf Final    WBC, UA 02/03/2023 2  0 - 5 /hpf Final    Bacteria 02/03/2023 Moderate (A)  None-Occ /hpf Final    Squam Epithel, UA 02/03/2023 2  /hpf Final    Hyaline Casts, UA 02/03/2023 0  0-1/lpf /lpf Final    Ca Oxalate Brigette, UA 02/03/2023 Few  None-Moderate Final    Microscopic Comment 02/03/2023 SEE COMMENT   Final   Lab Visit on 01/31/2023   Component Date Value Ref Range Status     Sodium 01/31/2023 135 (L)  136 - 145 mmol/L Final    Potassium 01/31/2023 4.3  3.5 - 5.1 mmol/L Final    Chloride 01/31/2023 105  95 - 110 mmol/L Final    CO2 01/31/2023 25  23 - 29 mmol/L Final    Glucose 01/31/2023 102  70 - 110 mg/dL Final    BUN 01/31/2023 12  8 - 23 mg/dL Final    Creatinine 01/31/2023 0.6  0.5 - 1.4 mg/dL Final    Calcium 01/31/2023 10.1  8.7 - 10.5 mg/dL Final    Total Protein 01/31/2023 6.1  6.0 - 8.4 g/dL Final    Albumin 01/31/2023 2.9 (L)  3.5 - 5.2 g/dL Final    Total Bilirubin 01/31/2023 3.4 (H)  0.1 - 1.0 mg/dL Final    Alkaline Phosphatase 01/31/2023 117  55 - 135 U/L Final    AST 01/31/2023 53 (H)  10 - 40 U/L Final    ALT 01/31/2023 27  10 - 44 U/L Final    Anion Gap 01/31/2023 5 (L)  8 - 16 mmol/L Final    eGFR 01/31/2023 >60.0  >60 mL/min/1.73 m^2 Final    AFP 01/31/2023 2.4  0.0 - 8.4 ng/mL Final    WBC 01/31/2023 7.61  3.90 - 12.70 K/uL Final    RBC 01/31/2023 4.69  4.00 - 5.40 M/uL Final    Hemoglobin 01/31/2023 15.1  12.0 - 16.0 g/dL Final    Hematocrit 01/31/2023 46.3  37.0 - 48.5 % Final    MCV 01/31/2023 99 (H)  82 - 98 fL Final    MCH 01/31/2023 32.2 (H)  27.0 - 31.0 pg Final    MCHC 01/31/2023 32.6  32.0 - 36.0 g/dL Final    RDW 01/31/2023 15.2 (H)  11.5 - 14.5 % Final    Platelets 01/31/2023 150  150 - 450 K/uL Final    MPV 01/31/2023 10.2  9.2 - 12.9 fL Final    Immature Granulocytes 01/31/2023 2.8 (H)  0.0 - 0.5 % Final    Gran # (ANC) 01/31/2023 4.9  1.8 - 7.7 K/uL Final    Immature Grans (Abs) 01/31/2023 0.21 (H)  0.00 - 0.04 K/uL Final    Lymph # 01/31/2023 1.2  1.0 - 4.8 K/uL Final    Mono # 01/31/2023 1.0  0.3 - 1.0 K/uL Final    Eos # 01/31/2023 0.3  0.0 - 0.5 K/uL Final    Baso # 01/31/2023 0.09  0.00 - 0.20 K/uL Final    nRBC 01/31/2023 0  0 /100 WBC Final    Gran % 01/31/2023 63.9  38.0 - 73.0 % Final    Lymph % 01/31/2023 15.1 (L)  18.0 - 48.0 % Final    Mono % 01/31/2023 12.7  4.0 - 15.0 % Final    Eosinophil % 01/31/2023 4.3  0.0 - 8.0 % Final     Basophil % 01/31/2023 1.2  0.0 - 1.9 % Final    Differential Method 01/31/2023 Automated   Final    Prothrombin Time 01/31/2023 14.0 (H)  9.0 - 12.5 sec Final    INR 01/31/2023 1.4 (H)  0.8 - 1.2 Final   No results displayed because visit has over 200 results.      Lab Visit on 12/28/2022   Component Date Value Ref Range Status    WBC 12/28/2022 4.34  3.90 - 12.70 K/uL Final    RBC 12/28/2022 4.27  4.00 - 5.40 M/uL Final    Hemoglobin 12/28/2022 13.6  12.0 - 16.0 g/dL Final    Hematocrit 12/28/2022 41.4  37.0 - 48.5 % Final    MCV 12/28/2022 97  82 - 98 fL Final    MCH 12/28/2022 31.9 (H)  27.0 - 31.0 pg Final    MCHC 12/28/2022 32.9  32.0 - 36.0 g/dL Final    RDW 12/28/2022 15.9 (H)  11.5 - 14.5 % Final    Platelets 12/28/2022 89 (L)  150 - 450 K/uL Final    MPV 12/28/2022 11.4  9.2 - 12.9 fL Final    Immature Granulocytes 12/28/2022 0.9 (H)  0.0 - 0.5 % Final    Gran # (ANC) 12/28/2022 2.8  1.8 - 7.7 K/uL Final    Immature Grans (Abs) 12/28/2022 0.04  0.00 - 0.04 K/uL Final    Lymph # 12/28/2022 0.7 (L)  1.0 - 4.8 K/uL Final    Mono # 12/28/2022 0.6  0.3 - 1.0 K/uL Final    Eos # 12/28/2022 0.2  0.0 - 0.5 K/uL Final    Baso # 12/28/2022 0.03  0.00 - 0.20 K/uL Final    nRBC 12/28/2022 0  0 /100 WBC Final    Gran % 12/28/2022 65.3  38.0 - 73.0 % Final    Lymph % 12/28/2022 15.2 (L)  18.0 - 48.0 % Final    Mono % 12/28/2022 13.1  4.0 - 15.0 % Final    Eosinophil % 12/28/2022 4.8  0.0 - 8.0 % Final    Basophil % 12/28/2022 0.7  0.0 - 1.9 % Final    Differential Method 12/28/2022 Automated   Final    Sodium 12/28/2022 140  136 - 145 mmol/L Final    Potassium 12/28/2022 3.8  3.5 - 5.1 mmol/L Final    Chloride 12/28/2022 108  95 - 110 mmol/L Final    CO2 12/28/2022 24  23 - 29 mmol/L Final    Glucose 12/28/2022 145 (H)  70 - 110 mg/dL Final    BUN 12/28/2022 12  8 - 23 mg/dL Final    Creatinine 12/28/2022 0.6  0.5 - 1.4 mg/dL Final    Calcium 12/28/2022 8.9  8.7 - 10.5 mg/dL Final    Total Protein 12/28/2022 5.3 (L)  6.0  - 8.4 g/dL Final    Albumin 12/28/2022 2.7 (L)  3.5 - 5.2 g/dL Final    Total Bilirubin 12/28/2022 3.4 (H)  0.1 - 1.0 mg/dL Final    Alkaline Phosphatase 12/28/2022 145 (H)  55 - 135 U/L Final    AST 12/28/2022 69 (H)  10 - 40 U/L Final    ALT 12/28/2022 39  10 - 44 U/L Final    Anion Gap 12/28/2022 8  8 - 16 mmol/L Final    eGFR 12/28/2022 >60.0  >60 mL/min/1.73 m^2 Final           Imaging:   No results found for this or any previous visit.      Assessment and Plan     Tremor    Cirrhosis of liver with ascites, unspecified hepatic cirrhosis type    Hepatic encephalopathy    Primary hypertension    History of posttraumatic stress disorder (PTSD)    Recurrent major depressive disorder, in full remission        Medical Decision Making:    She will continue propranolol LA 60 mg daily for now. I have messaged  PCP and NP about poss increase of prorapnolol LA to 80 mg daily. I will message her back.    ..Total time: 65 minutes spent on day of the encounter, which includes face to face time and non-face to face time preparing to see the patient (eg, review of tests), Obtaining and/or reviewing separately obtained history, Documenting clinical information in the electronic or other health record, Independently interpreting results (not separately reported) and communicating results to the patient/family/caregiver, or Care coordination (not separately reported).       Ashlee Andrade DNP, NP-C  Division of Movement and Memory Disorders  [facility]  [phone]    3-23-23 ADDENDUM:  Rec'd ok to proceed with increase propranolol LA. Messaged pt thru portal. Will see back in 4 mos- VV is fine.     ..

## 2023-03-14 NOTE — Clinical Note
Saw pt in clinic yesterday for increased tremor. She is taking propranolol LA 60 mg qd, which has worked well until recently. I understand she is being worked up for poss liver transplant in near future. Do either of you have any objections to me increasing propranolol LA to 80 mg daily?

## 2023-03-14 NOTE — PROGRESS NOTES
"  Name: Yumiko Gonzalez  MRN: [id number]   CSN: [id number]      Date: 3-14-23      Referring physician:  No referring provider defined for this encounter.    Subjective:      Chief Complaint: tremor    History of Present Illness (HPI):    Yumiko Gonzalez is a 62 y.o. right-handed female who presents today for a follow-up evaluation of {Blank single:38500::"Parkinson's Disease","tremor","Essential Tremor","memory loss","Dementia","*** dystonia","[facility] Disease","chorea","tics","Tourette's","Tardive Dyskinesia","ataxia","falls","DBS","gait disturbance","blepharospasm","bryon-facial spasm"} and is {Pc accompanied by:5710}.     Review of Systems    Past Medical History: The patient  has a past medical history of Abnormal Pap smear, Abnormal Pap smear of cervix, Anemia, Arthritis, Asthma, Autoimmune disease, Blood transfusion, Bronchitis, Cancer (1987), Cervical neck pain with evidence of disc disease (03/22/2012), Cirrhosis, Colon polyps (03/22/2012), Depression (03/22/2012), Diverticular disease (03/22/2012), End-stage renal disease, Fatty liver (03/22/2012), Fibrocystic breast, Fine tremor, Hepatosplenomegaly, History of abdominal paracentesis (01/18/2023), Hypertension (04/24/2013), Knee pain, bilateral, Lesion of right lung, Liver disease, Migraines (past Hx), Nephrolithiasis, Pleural effusion, PONV (postoperative nausea and vomiting), Postpartum depression, Splenomegaly, Thrombocytopenia, and Tremors of nervous system.    Social History: The patient  reports that she quit smoking about 17 years ago. Her smoking use included cigarettes. She has never used smokeless tobacco. She reports that she does not currently use alcohol. She reports that she does not use drugs.    Family History: Their family history includes Breast cancer in her sister; Breast cancer (age of onset: 40) in her maternal cousin; Breast cancer (age of onset: 70) in her maternal aunt, maternal grandmother, and mother; Cancer in her sister; Diabetes in " her brother, father, sister, and sister; Emphysema in her brother; Heart disease in her father and mother; Hypertension in her mother; Multiple sclerosis in her maternal aunt and maternal aunt; Tremor in her daughter and mother.    Allergies: No known drug allergies     Meds:   Current Outpatient Medications on File Prior to Visit   Medication Sig Dispense Refill    albuterol (PROVENTIL) 2.5 mg /3 mL (0.083 %) nebulizer solution Take 3 mLs (2.5 mg total) by nebulization every 6 (six) hours as needed for Wheezing. Rescue 75 mL 2    albuterol (PROVENTIL/VENTOLIN HFA) 90 mcg/actuation inhaler Inhale 2 puffs every 4 hours as needed for cough, wheeze, or shortness of breath 18 g 11    ALPRAZolam (XANAX) 0.25 MG tablet Take 1 tablet (0.25 mg total) by mouth nightly as needed. FOR INSOMNIA 30 tablet 2    buPROPion (WELLBUTRIN XL) 150 MG TB24 tablet Take 2 tablets (300 mg total) by mouth once daily. 180 tablet 3    fluticasone-umeclidin-vilanter (TRELEGY ELLIPTA) 200-62.5-25 mcg inhaler Inhale 1 puff into the lungs once daily. 60 each 11    furosemide (LASIX) 40 MG tablet Take 1 tablet (40 mg total) by mouth once daily. 30 tablet 6    lactulose (CONSTULOSE) 10 gram/15 mL solution Take 30 mLs (20 g total) by mouth 2 (two) times daily. 5000 mL 6    loratadine (CLARITIN) 10 mg tablet Take 10 mg by mouth once daily.       multivitamin (THERAGRAN) per tablet Take 1 tablet by mouth once daily.      nystatin-triamcinolone (MYCOLOG II) cream Apply to affected area twice daily as needed 60 g 3    omeprazole (PRILOSEC) 20 MG capsule Take 1 capsule (20 mg total) by mouth once daily. 90 capsule 3    propranoloL (INDERAL LA) 60 MG 24 hr capsule Take 1 capsule (60 mg total) by mouth once daily. 30 capsule 11    rifAXIMin (XIFAXAN) 550 mg Tab Take 1 tablet (550 mg total) by mouth 2 (two) times daily. 60 tablet 11    spironolactone (ALDACTONE) 100 MG tablet Take 1 tablet (100 mg total) by mouth once daily. 30 tablet 6    vitamin E 400 UNIT  capsule Take 400 Units by mouth once daily.       No current facility-administered medications on file prior to visit.       Objective:     Physical Exam:    There were no vitals filed for this visit.  There is no height or weight on file to calculate BMI.    Constitutional  Well-developed, well-nourished, appears stated age   Ophthalmoscopic  No papilledema with no hemorrhages or exudates bilaterally   Cardiovascular  Radial pulses 2+ and symmetric, no LE edema bilaterally     ***    Laboratory Results:  Lab Visit on 03/02/2023   Component Date Value Ref Range Status    Sodium 03/02/2023 138  136 - 145 mmol/L Final    Potassium 03/02/2023 3.7  3.5 - 5.1 mmol/L Final    Chloride 03/02/2023 105  95 - 110 mmol/L Final    CO2 03/02/2023 29  23 - 29 mmol/L Final    Glucose 03/02/2023 184 (H)  70 - 110 mg/dL Final    BUN 03/02/2023 15  8 - 23 mg/dL Final    Creatinine 03/02/2023 0.7  0.5 - 1.4 mg/dL Final    Calcium 03/02/2023 9.7  8.7 - 10.5 mg/dL Final    Total Protein 03/02/2023 6.0  6.0 - 8.4 g/dL Final    Albumin 03/02/2023 3.0 (L)  3.5 - 5.2 g/dL Final    Total Bilirubin 03/02/2023 3.2 (H)  0.1 - 1.0 mg/dL Final    Alkaline Phosphatase 03/02/2023 130  55 - 135 U/L Final    AST 03/02/2023 70 (H)  10 - 40 U/L Final    ALT 03/02/2023 41  10 - 44 U/L Final    Anion Gap 03/02/2023 4 (L)  8 - 16 mmol/L Final    eGFR 03/02/2023 >60.0  >60 mL/min/1.73 m^2 Final    WBC 03/02/2023 4.65  3.90 - 12.70 K/uL Final    RBC 03/02/2023 4.58  4.00 - 5.40 M/uL Final    Hemoglobin 03/02/2023 14.8  12.0 - 16.0 g/dL Final    Hematocrit 03/02/2023 46.2  37.0 - 48.5 % Final    MCV 03/02/2023 101 (H)  82 - 98 fL Final    MCH 03/02/2023 32.3 (H)  27.0 - 31.0 pg Final    MCHC 03/02/2023 32.0  32.0 - 36.0 g/dL Final    RDW 03/02/2023 16.4 (H)  11.5 - 14.5 % Final    Platelets 03/02/2023 98 (L)  150 - 450 K/uL Final    MPV 03/02/2023 11.7  9.2 - 12.9 fL Final    Immature Granulocytes 03/02/2023 1.1 (H)  0.0 - 0.5 % Final    Gran # (ANC)  03/02/2023 3.0  1.8 - 7.7 K/uL Final    Immature Grans (Abs) 03/02/2023 0.05 (H)  0.00 - 0.04 K/uL Final    Lymph # 03/02/2023 0.8 (L)  1.0 - 4.8 K/uL Final    Mono # 03/02/2023 0.6  0.3 - 1.0 K/uL Final    Eos # 03/02/2023 0.2  0.0 - 0.5 K/uL Final    Baso # 03/02/2023 0.04  0.00 - 0.20 K/uL Final    nRBC 03/02/2023 0  0 /100 WBC Final    Gran % 03/02/2023 63.3  38.0 - 73.0 % Final    Lymph % 03/02/2023 16.6 (L)  18.0 - 48.0 % Final    Mono % 03/02/2023 13.8  4.0 - 15.0 % Final    Eosinophil % 03/02/2023 4.3  0.0 - 8.0 % Final    Basophil % 03/02/2023 0.9  0.0 - 1.9 % Final    Differential Method 03/02/2023 Automated   Final    Prothrombin Time 03/02/2023 14.7 (H)  9.0 - 12.5 sec Final    INR 03/02/2023 1.4 (H)  0.8 - 1.2 Final   Office Visit on 02/23/2023   Component Date Value Ref Range Status    Final Pathologic Diagnosis 02/23/2023  (A)   Final                    Value:Urine, voided, cytology:  Atypical urothelial cells.  Fungal organisms consistent with Amparo      Disclaimer 02/23/2023  (A)   Final                    Value:Screening was performed at [facility], [address], [place] [address].      RBC, UA 02/23/2023 0  0 - 4 /hpf Final    WBC, UA 02/23/2023 2  0 - 5 /hpf Final    Bacteria 02/23/2023 Many (A)  None-Occ /hpf Final    Yeast, UA 02/23/2023 Rare (A)  None Final    Squam Epithel, UA 02/23/2023 5  /hpf Final    Hyaline Casts, UA 02/23/2023 0  0-1/lpf /lpf Final    Ca Oxalate Brigette, UA 02/23/2023 Occasional  None-Moderate Final    Microscopic Comment 02/23/2023 SEE COMMENT   Final    Color, POC UA 02/23/2023 Yellow  Yellow, Straw, Colorless Final    Clarity, POC UA 02/23/2023 Clear  Clear Final    Glucose, POC UA 02/23/2023 Negative  Negative Final    Bilirubin, POC UA 02/23/2023 Moderate (A)  Negative Final    Ketones, POC UA 02/23/2023 Trace (A)  Negative Final    Spec Grav POC UA 02/23/2023 >=1.030  1.005 - 1.030 Final    Blood, POC UA  02/23/2023 Trace-intact (A)  Negative Final    pH, POC UA 02/23/2023 5.5  5.0 - 8.0 Final    Protein, POC UA 02/23/2023 30 (A)  Negative Final    Urobilinogen, POC UA 02/23/2023 1.0  <=1.0 Final    Nitrite, POC UA 02/23/2023 Negative  Negative Final    WBC, POC UA 02/23/2023 Negative  Negative Final   Lab Visit on 02/14/2023   Component Date Value Ref Range Status    WBC 02/14/2023 5.47  3.90 - 12.70 K/uL Final    RBC 02/14/2023 4.70  4.00 - 5.40 M/uL Final    Hemoglobin 02/14/2023 15.2  12.0 - 16.0 g/dL Final    Hematocrit 02/14/2023 45.9  37.0 - 48.5 % Final    MCV 02/14/2023 98  82 - 98 fL Final    MCH 02/14/2023 32.3 (H)  27.0 - 31.0 pg Final    MCHC 02/14/2023 33.1  32.0 - 36.0 g/dL Final    RDW 02/14/2023 16.5 (H)  11.5 - 14.5 % Final    Platelets 02/14/2023 132 (L)  150 - 450 K/uL Final    MPV 02/14/2023 10.4  9.2 - 12.9 fL Final    Immature Granulocytes 02/14/2023 1.3 (H)  0.0 - 0.5 % Final    Gran # (ANC) 02/14/2023 3.3  1.8 - 7.7 K/uL Final    Immature Grans (Abs) 02/14/2023 0.07 (H)  0.00 - 0.04 K/uL Final    Lymph # 02/14/2023 1.0  1.0 - 4.8 K/uL Final    Mono # 02/14/2023 0.8  0.3 - 1.0 K/uL Final    Eos # 02/14/2023 0.3  0.0 - 0.5 K/uL Final    Baso # 02/14/2023 0.05  0.00 - 0.20 K/uL Final    nRBC 02/14/2023 0  0 /100 WBC Final    Gran % 02/14/2023 60.0  38.0 - 73.0 % Final    Lymph % 02/14/2023 17.7 (L)  18.0 - 48.0 % Final    Mono % 02/14/2023 14.6  4.0 - 15.0 % Final    Eosinophil % 02/14/2023 5.5  0.0 - 8.0 % Final    Basophil % 02/14/2023 0.9  0.0 - 1.9 % Final    Differential Method 02/14/2023 Automated   Final    Prothrombin Time 02/14/2023 14.8 (H)  9.0 - 12.5 sec Final    INR 02/14/2023 1.4 (H)  0.8 - 1.2 Final    Sodium 02/14/2023 138  136 - 145 mmol/L Final    Potassium 02/14/2023 4.1  3.5 - 5.1 mmol/L Final    Chloride 02/14/2023 102  95 - 110 mmol/L Final    CO2 02/14/2023 26  23 - 29 mmol/L Final    Glucose 02/14/2023 133 (H)  70 - 110 mg/dL Final    BUN 02/14/2023 13  8 - 23 mg/dL Final     Creatinine 02/14/2023 0.8  0.5 - 1.4 mg/dL Final    Calcium 02/14/2023 9.9  8.7 - 10.5 mg/dL Final    Total Protein 02/14/2023 6.2  6.0 - 8.4 g/dL Final    Albumin 02/14/2023 3.1 (L)  3.5 - 5.2 g/dL Final    Total Bilirubin 02/14/2023 3.4 (H)  0.1 - 1.0 mg/dL Final    Alkaline Phosphatase 02/14/2023 115  55 - 135 U/L Final    AST 02/14/2023 68 (H)  10 - 40 U/L Final    ALT 02/14/2023 33  10 - 44 U/L Final    Anion Gap 02/14/2023 10  8 - 16 mmol/L Final    eGFR 02/14/2023 >60.0  >60 mL/min/1.73 m^2 Final   Office Visit on 02/14/2023   Component Date Value Ref Range Status    Color, POC UA 02/14/2023 Yellow  Yellow, Straw, Colorless Final    Clarity, POC UA 02/14/2023 Clear  Clear Final    Glucose, POC UA 02/14/2023 Negative  Negative Final    Bilirubin, POC UA 02/14/2023 Negative  Negative Final    Ketones, POC UA 02/14/2023 Negative  Negative Final    Spec Grav POC UA 02/14/2023 1.015  1.005 - 1.030 Final    Blood, POC UA 02/14/2023 Large (A)  Negative Final    pH, POC UA 02/14/2023 6.5  5.0 - 8.0 Final    Protein, POC UA 02/14/2023 Negative  Negative Final    Urobilinogen, POC UA 02/14/2023 1.0  <=1.0 Final    Nitrite, POC UA 02/14/2023 Negative  Negative Final    WBC, POC UA 02/14/2023 Trace (A)  Negative Final    Urine Culture, Routine 02/14/2023 No significant growth   Final   Lab Visit on 02/08/2023   Component Date Value Ref Range Status    WBC 02/08/2023 5.24  3.90 - 12.70 K/uL Final    RBC 02/08/2023 4.56  4.00 - 5.40 M/uL Final    Hemoglobin 02/08/2023 14.7  12.0 - 16.0 g/dL Final    Hematocrit 02/08/2023 45.0  37.0 - 48.5 % Final    MCV 02/08/2023 99 (H)  82 - 98 fL Final    MCH 02/08/2023 32.2 (H)  27.0 - 31.0 pg Final    MCHC 02/08/2023 32.7  32.0 - 36.0 g/dL Final    RDW 02/08/2023 16.8 (H)  11.5 - 14.5 % Final    Platelets 02/08/2023 119 (L)  150 - 450 K/uL Final    MPV 02/08/2023 10.3  9.2 - 12.9 fL Final    Immature Granulocytes 02/08/2023 1.1 (H)  0.0 - 0.5 % Final    Gran # (ANC) 02/08/2023 3.3   1.8 - 7.7 K/uL Final    Immature Grans (Abs) 02/08/2023 0.06 (H)  0.00 - 0.04 K/uL Final    Lymph # 02/08/2023 0.8 (L)  1.0 - 4.8 K/uL Final    Mono # 02/08/2023 0.7  0.3 - 1.0 K/uL Final    Eos # 02/08/2023 0.3  0.0 - 0.5 K/uL Final    Baso # 02/08/2023 0.05  0.00 - 0.20 K/uL Final    nRBC 02/08/2023 0  0 /100 WBC Final    Gran % 02/08/2023 63.3  38.0 - 73.0 % Final    Lymph % 02/08/2023 15.3 (L)  18.0 - 48.0 % Final    Mono % 02/08/2023 14.1  4.0 - 15.0 % Final    Eosinophil % 02/08/2023 5.2  0.0 - 8.0 % Final    Basophil % 02/08/2023 1.0  0.0 - 1.9 % Final    Differential Method 02/08/2023 Automated   Final    Prothrombin Time 02/08/2023 15.0 (H)  9.0 - 12.5 sec Final    INR 02/08/2023 1.4 (H)  0.8 - 1.2 Final    Sodium 02/08/2023 137  136 - 145 mmol/L Final    Potassium 02/08/2023 4.0  3.5 - 5.1 mmol/L Final    Chloride 02/08/2023 105  95 - 110 mmol/L Final    CO2 02/08/2023 26  23 - 29 mmol/L Final    Glucose 02/08/2023 132 (H)  70 - 110 mg/dL Final    BUN 02/08/2023 12  8 - 23 mg/dL Final    Creatinine 02/08/2023 0.6  0.5 - 1.4 mg/dL Final    Calcium 02/08/2023 10.1  8.7 - 10.5 mg/dL Final    Total Protein 02/08/2023 5.8 (L)  6.0 - 8.4 g/dL Final    Albumin 02/08/2023 2.9 (L)  3.5 - 5.2 g/dL Final    Total Bilirubin 02/08/2023 3.2 (H)  0.1 - 1.0 mg/dL Final    Alkaline Phosphatase 02/08/2023 117  55 - 135 U/L Final    AST 02/08/2023 62 (H)  10 - 40 U/L Final    ALT 02/08/2023 34  10 - 44 U/L Final    Anion Gap 02/08/2023 6 (L)  8 - 16 mmol/L Final    eGFR 02/08/2023 >60.0  >60 mL/min/1.73 m^2 Final   Lab Visit on 02/03/2023   Component Date Value Ref Range Status    Specimen UA 02/03/2023 Urine, Clean Catch   Final    Color, UA 02/03/2023 Orange (A)  Yellow, Straw, Stacie Final    Appearance, UA 02/03/2023 Hazy (A)  Clear Final    pH, UA 02/03/2023 6.0  5.0 - 8.0 Final    Specific Gravity, UA 02/03/2023 1.025  1.005 - 1.030 Final    Protein, UA 02/03/2023 1+ (A)  Negative Final    Glucose, UA 02/03/2023 Trace  (A)  Negative Final    Ketones, UA 02/03/2023 Trace (A)  Negative Final    Bilirubin (UA) 02/03/2023 2+ (A)  Negative Final    Occult Blood UA 02/03/2023 3+ (A)  Negative Final    Nitrite, UA 02/03/2023 Positive (A)  Negative Final    Leukocytes, UA 02/03/2023 Negative  Negative Final    RBC, UA 02/03/2023 >100 (H)  0 - 4 /hpf Final    WBC, UA 02/03/2023 2  0 - 5 /hpf Final    Bacteria 02/03/2023 Moderate (A)  None-Occ /hpf Final    Squam Epithel, UA 02/03/2023 2  /hpf Final    Hyaline Casts, UA 02/03/2023 0  0-1/lpf /lpf Final    Ca Oxalate Brigette, UA 02/03/2023 Few  None-Moderate Final    Microscopic Comment 02/03/2023 SEE COMMENT   Final   Lab Visit on 01/31/2023   Component Date Value Ref Range Status    Sodium 01/31/2023 135 (L)  136 - 145 mmol/L Final    Potassium 01/31/2023 4.3  3.5 - 5.1 mmol/L Final    Chloride 01/31/2023 105  95 - 110 mmol/L Final    CO2 01/31/2023 25  23 - 29 mmol/L Final    Glucose 01/31/2023 102  70 - 110 mg/dL Final    BUN 01/31/2023 12  8 - 23 mg/dL Final    Creatinine 01/31/2023 0.6  0.5 - 1.4 mg/dL Final    Calcium 01/31/2023 10.1  8.7 - 10.5 mg/dL Final    Total Protein 01/31/2023 6.1  6.0 - 8.4 g/dL Final    Albumin 01/31/2023 2.9 (L)  3.5 - 5.2 g/dL Final    Total Bilirubin 01/31/2023 3.4 (H)  0.1 - 1.0 mg/dL Final    Alkaline Phosphatase 01/31/2023 117  55 - 135 U/L Final    AST 01/31/2023 53 (H)  10 - 40 U/L Final    ALT 01/31/2023 27  10 - 44 U/L Final    Anion Gap 01/31/2023 5 (L)  8 - 16 mmol/L Final    eGFR 01/31/2023 >60.0  >60 mL/min/1.73 m^2 Final    AFP 01/31/2023 2.4  0.0 - 8.4 ng/mL Final    WBC 01/31/2023 7.61  3.90 - 12.70 K/uL Final    RBC 01/31/2023 4.69  4.00 - 5.40 M/uL Final    Hemoglobin 01/31/2023 15.1  12.0 - 16.0 g/dL Final    Hematocrit 01/31/2023 46.3  37.0 - 48.5 % Final    MCV 01/31/2023 99 (H)  82 - 98 fL Final    MCH 01/31/2023 32.2 (H)  27.0 - 31.0 pg Final    MCHC 01/31/2023 32.6  32.0 - 36.0 g/dL Final    RDW 01/31/2023 15.2 (H)  11.5 - 14.5 % Final     Platelets 01/31/2023 150  150 - 450 K/uL Final    MPV 01/31/2023 10.2  9.2 - 12.9 fL Final    Immature Granulocytes 01/31/2023 2.8 (H)  0.0 - 0.5 % Final    Gran # (ANC) 01/31/2023 4.9  1.8 - 7.7 K/uL Final    Immature Grans (Abs) 01/31/2023 0.21 (H)  0.00 - 0.04 K/uL Final    Lymph # 01/31/2023 1.2  1.0 - 4.8 K/uL Final    Mono # 01/31/2023 1.0  0.3 - 1.0 K/uL Final    Eos # 01/31/2023 0.3  0.0 - 0.5 K/uL Final    Baso # 01/31/2023 0.09  0.00 - 0.20 K/uL Final    nRBC 01/31/2023 0  0 /100 WBC Final    Gran % 01/31/2023 63.9  38.0 - 73.0 % Final    Lymph % 01/31/2023 15.1 (L)  18.0 - 48.0 % Final    Mono % 01/31/2023 12.7  4.0 - 15.0 % Final    Eosinophil % 01/31/2023 4.3  0.0 - 8.0 % Final    Basophil % 01/31/2023 1.2  0.0 - 1.9 % Final    Differential Method 01/31/2023 Automated   Final    Prothrombin Time 01/31/2023 14.0 (H)  9.0 - 12.5 sec Final    INR 01/31/2023 1.4 (H)  0.8 - 1.2 Final   No results displayed because visit has over 200 results.      Lab Visit on 12/28/2022   Component Date Value Ref Range Status    WBC 12/28/2022 4.34  3.90 - 12.70 K/uL Final    RBC 12/28/2022 4.27  4.00 - 5.40 M/uL Final    Hemoglobin 12/28/2022 13.6  12.0 - 16.0 g/dL Final    Hematocrit 12/28/2022 41.4  37.0 - 48.5 % Final    MCV 12/28/2022 97  82 - 98 fL Final    MCH 12/28/2022 31.9 (H)  27.0 - 31.0 pg Final    MCHC 12/28/2022 32.9  32.0 - 36.0 g/dL Final    RDW 12/28/2022 15.9 (H)  11.5 - 14.5 % Final    Platelets 12/28/2022 89 (L)  150 - 450 K/uL Final    MPV 12/28/2022 11.4  9.2 - 12.9 fL Final    Immature Granulocytes 12/28/2022 0.9 (H)  0.0 - 0.5 % Final    Gran # (ANC) 12/28/2022 2.8  1.8 - 7.7 K/uL Final    Immature Grans (Abs) 12/28/2022 0.04  0.00 - 0.04 K/uL Final    Lymph # 12/28/2022 0.7 (L)  1.0 - 4.8 K/uL Final    Mono # 12/28/2022 0.6  0.3 - 1.0 K/uL Final    Eos # 12/28/2022 0.2  0.0 - 0.5 K/uL Final    Baso # 12/28/2022 0.03  0.00 - 0.20 K/uL Final    nRBC 12/28/2022 0  0 /100 WBC Final    Gran %  12/28/2022 65.3  38.0 - 73.0 % Final    Lymph % 12/28/2022 15.2 (L)  18.0 - 48.0 % Final    Mono % 12/28/2022 13.1  4.0 - 15.0 % Final    Eosinophil % 12/28/2022 4.8  0.0 - 8.0 % Final    Basophil % 12/28/2022 0.7  0.0 - 1.9 % Final    Differential Method 12/28/2022 Automated   Final    Sodium 12/28/2022 140  136 - 145 mmol/L Final    Potassium 12/28/2022 3.8  3.5 - 5.1 mmol/L Final    Chloride 12/28/2022 108  95 - 110 mmol/L Final    CO2 12/28/2022 24  23 - 29 mmol/L Final    Glucose 12/28/2022 145 (H)  70 - 110 mg/dL Final    BUN 12/28/2022 12  8 - 23 mg/dL Final    Creatinine 12/28/2022 0.6  0.5 - 1.4 mg/dL Final    Calcium 12/28/2022 8.9  8.7 - 10.5 mg/dL Final    Total Protein 12/28/2022 5.3 (L)  6.0 - 8.4 g/dL Final    Albumin 12/28/2022 2.7 (L)  3.5 - 5.2 g/dL Final    Total Bilirubin 12/28/2022 3.4 (H)  0.1 - 1.0 mg/dL Final    Alkaline Phosphatase 12/28/2022 145 (H)  55 - 135 U/L Final    AST 12/28/2022 69 (H)  10 - 40 U/L Final    ALT 12/28/2022 39  10 - 44 U/L Final    Anion Gap 12/28/2022 8  8 - 16 mmol/L Final    eGFR 12/28/2022 >60.0  >60 mL/min/1.73 m^2 Final           Imaging:   No results found for this or any previous visit.    Independent review of images: ***    Assessment and Plan     There are no diagnoses linked to this encounter.    Medical Decision Making:    No follow-ups on file.    I spent *** minutes face-to-face with the patient with >50% of the time spent with counseling and education regarding:  - results of data, diagnosis, and recommendations stated above  - the prognosis of a ***  - risks and benefits of ***  - importance of diet and exercise    Ashlee Andrade, JOSE MIGUEL, NP-C  Division of Movement and Memory Disorders  [facility]  [phone]

## 2023-03-15 ENCOUNTER — CLINICAL SUPPORT (OUTPATIENT)
Dept: PRIMARY CARE CLINIC | Facility: CLINIC | Age: 63
End: 2023-03-15
Payer: COMMERCIAL

## 2023-03-15 ENCOUNTER — DOCUMENT SCAN (OUTPATIENT)
Dept: HOME HEALTH SERVICES | Facility: HOSPITAL | Age: 63
End: 2023-03-15
Payer: COMMERCIAL

## 2023-03-15 DIAGNOSIS — F32.A DEPRESSIVE DISORDER: Primary | ICD-10-CM

## 2023-03-15 DIAGNOSIS — F43.21 GRIEF REACTION: ICD-10-CM

## 2023-03-15 DIAGNOSIS — F41.1 GAD (GENERALIZED ANXIETY DISORDER): ICD-10-CM

## 2023-03-15 PROCEDURE — 99417 PROLNG OP E/M EACH 15 MIN: CPT | Mod: NTX,S$GLB,, | Performed by: INTERNAL MEDICINE

## 2023-03-15 PROCEDURE — 99215 OFFICE O/P EST HI 40 MIN: CPT | Mod: NTX,S$GLB,, | Performed by: INTERNAL MEDICINE

## 2023-03-15 PROCEDURE — 99999 PR PBB SHADOW E&M-EST. PATIENT-LVL I: ICD-10-PCS | Mod: PBBFAC,TXP,, | Performed by: SOCIAL WORKER

## 2023-03-15 PROCEDURE — 99215 PR OFFICE/OUTPT VISIT, EST, LEVL V, 40-54 MIN: ICD-10-PCS | Mod: NTX,S$GLB,, | Performed by: INTERNAL MEDICINE

## 2023-03-15 PROCEDURE — 99999 PR PBB SHADOW E&M-EST. PATIENT-LVL I: CPT | Mod: PBBFAC,TXP,, | Performed by: SOCIAL WORKER

## 2023-03-15 PROCEDURE — 99417 PR PROLONGED SVC, OUTPT, W/WO DIRECT PT CONTACT,  EA ADDTL 15 MIN: ICD-10-PCS | Mod: NTX,S$GLB,, | Performed by: INTERNAL MEDICINE

## 2023-03-20 ENCOUNTER — HOSPITAL ENCOUNTER (OUTPATIENT)
Dept: RADIOLOGY | Facility: HOSPITAL | Age: 63
Discharge: HOME | End: 2023-03-20
Attending: OBSTETRICS & GYNECOLOGY
Payer: COMMERCIAL

## 2023-03-20 ENCOUNTER — OFFICE VISIT (OUTPATIENT)
Dept: OBSTETRICS AND GYNECOLOGY | Facility: CLINIC | Age: 63
End: 2023-03-20
Payer: COMMERCIAL

## 2023-03-20 VITALS
DIASTOLIC BLOOD PRESSURE: 82 MMHG | HEIGHT: 70 IN | SYSTOLIC BLOOD PRESSURE: 122 MMHG | BODY MASS INDEX: 34.09 KG/M2 | WEIGHT: 238.13 LBS

## 2023-03-20 DIAGNOSIS — Z12.31 VISIT FOR SCREENING MAMMOGRAM: ICD-10-CM

## 2023-03-20 DIAGNOSIS — Z01.419 GYNECOLOGIC EXAM NORMAL: Primary | ICD-10-CM

## 2023-03-20 PROCEDURE — 77067 SCR MAMMO BI INCL CAD: CPT | Mod: 26,NTX,, | Performed by: RADIOLOGY

## 2023-03-20 PROCEDURE — 99396 PR PREVENTIVE VISIT,EST,40-64: ICD-10-PCS | Mod: S$GLB,,, | Performed by: OBSTETRICS & GYNECOLOGY

## 2023-03-20 PROCEDURE — 3079F PR MOST RECENT DIASTOLIC BLOOD PRESSURE 80-89 MM HG: ICD-10-PCS | Mod: CPTII,S$GLB,, | Performed by: OBSTETRICS & GYNECOLOGY

## 2023-03-20 PROCEDURE — 77063 MAMMO DIGITAL SCREENING BILAT WITH TOMO: ICD-10-PCS | Mod: 26,NTX,, | Performed by: RADIOLOGY

## 2023-03-20 PROCEDURE — 99999 PR PBB SHADOW E&M-EST. PATIENT-LVL IV: ICD-10-PCS | Mod: PBBFAC,,, | Performed by: OBSTETRICS & GYNECOLOGY

## 2023-03-20 PROCEDURE — 3079F DIAST BP 80-89 MM HG: CPT | Mod: CPTII,S$GLB,, | Performed by: OBSTETRICS & GYNECOLOGY

## 2023-03-20 PROCEDURE — 77067 MAMMO DIGITAL SCREENING BILAT WITH TOMO: ICD-10-PCS | Mod: 26,NTX,, | Performed by: RADIOLOGY

## 2023-03-20 PROCEDURE — 3074F SYST BP LT 130 MM HG: CPT | Mod: CPTII,S$GLB,, | Performed by: OBSTETRICS & GYNECOLOGY

## 2023-03-20 PROCEDURE — 77067 SCR MAMMO BI INCL CAD: CPT | Mod: TC,PN,TXP

## 2023-03-20 PROCEDURE — 99396 PREV VISIT EST AGE 40-64: CPT | Mod: S$GLB,,, | Performed by: OBSTETRICS & GYNECOLOGY

## 2023-03-20 PROCEDURE — 3008F PR BODY MASS INDEX (BMI) DOCUMENTED: ICD-10-PCS | Mod: CPTII,S$GLB,, | Performed by: OBSTETRICS & GYNECOLOGY

## 2023-03-20 PROCEDURE — 77063 BREAST TOMOSYNTHESIS BI: CPT | Mod: 26,NTX,, | Performed by: RADIOLOGY

## 2023-03-20 PROCEDURE — 3074F PR MOST RECENT SYSTOLIC BLOOD PRESSURE < 130 MM HG: ICD-10-PCS | Mod: CPTII,S$GLB,, | Performed by: OBSTETRICS & GYNECOLOGY

## 2023-03-20 PROCEDURE — 1159F MED LIST DOCD IN RCRD: CPT | Mod: CPTII,S$GLB,, | Performed by: OBSTETRICS & GYNECOLOGY

## 2023-03-20 PROCEDURE — 1159F PR MEDICATION LIST DOCUMENTED IN MEDICAL RECORD: ICD-10-PCS | Mod: CPTII,S$GLB,, | Performed by: OBSTETRICS & GYNECOLOGY

## 2023-03-20 PROCEDURE — 99999 PR PBB SHADOW E&M-EST. PATIENT-LVL IV: CPT | Mod: PBBFAC,,, | Performed by: OBSTETRICS & GYNECOLOGY

## 2023-03-20 PROCEDURE — 88175 CYTOPATH C/V AUTO FLUID REDO: CPT | Performed by: OBSTETRICS & GYNECOLOGY

## 2023-03-20 PROCEDURE — 3008F BODY MASS INDEX DOCD: CPT | Mod: CPTII,S$GLB,, | Performed by: OBSTETRICS & GYNECOLOGY

## 2023-03-20 NOTE — PROGRESS NOTES
"Chief Complaint   Patient presents with    PAP    Mammogram       History and Physical:  No LMP recorded. Patient is postmenopausal.       Yumiko Gonzalez is a 62 y.o.   female who presents today for her routine annual GYN exam. The patient has no Gynecology complaints today. No bowel or bladder complaints. 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, postitive antismooth muscle antibody    Blood transfusion     Bronchitis     bronchospasm on occasion     Cancer     carcinoma in situ    Cervical neck pain with evidence of disc disease 2012    can bend neck    Cirrhosis     non-alcoholic, compensated    Colon polyps 2012    Depression 2012    Hx panic attacks    Diverticular disease 2012    never diverticulitis    End-stage renal disease     Fatty liver 2012    Fibrocystic breast     Fine tremor     hands,chronic    Hepatosplenomegaly     History of abdominal paracentesis 2023    8.7L of fluid drained    Hypertension 2013    Knee pain, bilateral     chronic, with hands,feet,fingers also painful    Lesion of right lung     Liver disease     "partially sclerosed liver" per pt    Migraines past Hx    Nephrolithiasis     stone episode 2021    Pleural effusion     small, compensated, good bilateral breath sounds per Dr Suresh GUTIERRES (postoperative nausea and vomiting)     twice after childbirth    Postpartum depression     Splenomegaly     Thrombocytopenia     Tremors of nervous system        Past Surgical History:   Procedure Laterality Date    ADENOIDECTOMY      CERVICAL CONIZATION   W/ LASER       SECTION      x3    " COLONOSCOPY N/A 12/12/2016    Procedure: COLONOSCOPY;  Surgeon: James Palomares MD;  Location: [facility] ENDO;  Service: Endoscopy;  Laterality: N/A;    COLONOSCOPY N/A 2/3/2022    Procedure: COLONOSCOPY;  Surgeon: James Palomares MD;  Location: [facility] ENDO;  Service: Endoscopy;  Laterality: N/A;    EMBOLIZATION N/A 7/24/2018    Procedure: EMBOLIZATION, BLOOD VESSEL;  Surgeon: Joselin Surgeon;  Location: [facility];  Service: Radiology;  Laterality: N/A;    ENDOMETRIAL ABLATION      ESOPHAGOGASTRODUODENOSCOPY N/A 1/28/2020    Procedure: ESOPHAGOGASTRODUODENOSCOPY (EGD);  Surgeon: James Palomares MD;  Location: [facility] ENDO;  Service: Endoscopy;  Laterality: N/A;    ESOPHAGOGASTRODUODENOSCOPY N/A 3/8/2022    Procedure: EGD (ESOPHAGOGASTRODUODENOSCOPY);  Surgeon: James Palomares MD;  Location: [facility] ENDO;  Service: Endoscopy;  Laterality: N/A;    LIVER BIOPSY      PELVIC LAPAROSCOPY      TONSILLECTOMY      TUBAL LIGATION         MEDS:   Current Outpatient Medications on File Prior to Visit   Medication Sig Dispense Refill    albuterol (PROVENTIL) 2.5 mg /3 mL (0.083 %) nebulizer solution Take 3 mLs (2.5 mg total) by nebulization every 6 (six) hours as needed for Wheezing. Rescue 75 mL 2    albuterol (PROVENTIL/VENTOLIN HFA) 90 mcg/actuation inhaler Inhale 2 puffs every 4 hours as needed for cough, wheeze, or shortness of breath 18 g 11    ALPRAZolam (XANAX) 0.25 MG tablet Take 1 tablet (0.25 mg total) by mouth nightly as needed. FOR INSOMNIA 30 tablet 2    buPROPion (WELLBUTRIN XL) 150 MG TB24 tablet Take 2 tablets (300 mg total) by mouth once daily. 180 tablet 3    fluticasone-umeclidin-vilanter (TRELEGY ELLIPTA) 200-62.5-25 mcg inhaler Inhale 1 puff into the lungs once daily. 60 each 11    furosemide (LASIX) 40 MG tablet Take 1 tablet (40 mg total) by mouth once daily. 30 tablet 6    lactulose (CONSTULOSE) 10 gram/15 mL solution Take 30 mLs (20 g total) by mouth 2 (two) times daily. 5000 mL 6    loratadine  (CLARITIN) 10 mg tablet Take 10 mg by mouth once daily.       multivitamin (THERAGRAN) per tablet Take 1 tablet by mouth once daily.      nystatin-triamcinolone (MYCOLOG II) cream Apply to affected area twice daily as needed 60 g 3    omeprazole (PRILOSEC) 20 MG capsule Take 1 capsule (20 mg total) by mouth once daily. 90 capsule 3    rifAXIMin (XIFAXAN) 550 mg Tab Take 1 tablet (550 mg total) by mouth 2 (two) times daily. 60 tablet 11    spironolactone (ALDACTONE) 100 MG tablet Take 1 tablet (100 mg total) by mouth once daily. 30 tablet 6    vitamin E 400 UNIT capsule Take 400 Units by mouth once daily.      propranoloL (INDERAL LA) 60 MG 24 hr capsule Take 1 capsule (60 mg total) by mouth once daily. 30 capsule 11     No current facility-administered medications on file prior to visit.       OB History          7    Para   6    Term   3            AB   1    Living             SAB   1    IAB        Ectopic        Multiple        Live Births                     Social History     Socioeconomic History    Marital status:    Occupational History    Occupation: [employer] employee x 30 yrs   Tobacco Use    Smoking status: Former     Types: Cigarettes     Quit date: 2/3/2006     Years since quittin.1    Smokeless tobacco: Never   Substance and Sexual Activity    Alcohol use: Not Currently    Drug use: No    Sexual activity: Not Currently       Family History   Problem Relation Age of Onset    Breast cancer Mother 70    Heart disease Mother     Hypertension Mother     Tremor Mother     Diabetes Father     Heart disease Father     Diabetes Sister     Cancer Sister     Breast cancer Sister     Diabetes Sister     Diabetes Brother     Emphysema Brother     Breast cancer Maternal Aunt 70    Multiple sclerosis Maternal Aunt     Multiple sclerosis Maternal Aunt     Breast cancer Maternal Grandmother 70    Tremor Daughter     Breast cancer Maternal Cousin 40    Ovarian cancer Neg Hx     Parkinsonism Neg  "Hx     Glaucoma Neg Hx     Macular degeneration Neg Hx          Past medical and surgical history reviewed.   I have reviewed the patient's medical history in detail and updated the computerized patient record.    Review of System:   General: no chills, fever, night sweats, weight gain or weight loss  Psychological: no depression or suicidal ideation  Breasts: no new or changing breast lumps, nipple discharge or masses.  Respiratory: no cough, shortness of breath, or wheezing  Cardiovascular: no chest pain or dyspnea on exertion  Gastrointestinal: no abdominal pain, change in bowel habits, or black or bloody stools  Genito-Urinary: no incontinence, urinary frequency/urgency or vulvar/vaginal symptoms, pelvic pain or abnormal vaginal bleeding.  Musculoskeletal: no gait disturbance or muscular weakness      Physical Exam:   /82   Ht 5' 10" (1.778 m)   Wt 108 kg (238 lb 1.6 oz)   BMI 34.16 kg/m²   Constitutional: She appears alert and responsive. She appears well-developed, well-groomed, and well-nourished. No distress. overweight  HENT:   Head: Normocephalic and atraumatic.   Eyes: Conjunctivae and EOM are normal. No scleral icterus.   Neck: Symmetrical. Normal range of motion. Neck supple. No tracheal deviation present.   Cardiovascular: Normal rate, no rhythm abnormality noted. Extremities without swelling or edema, warm.    Pulmonary/Chest: Normal respiratory Effort. No distress or retractions. She exhibits no tenderness.  Breasts: are symmetrical.   Right breast exhibits no inverted nipple, no mass, no nipple discharge, no skin change and no tenderness.   Left breast exhibits no inverted nipple, no mass, no nipple discharge, no skin change and no tenderness.  Abdominal: Soft. She exhibits no distension, hernias or masses. There is no tenderness. No enlargement of liver edge or spleen.  There is no rebound and no guarding.   Genitourinary:    External rectal exam shows no thrombosed external hemorrhoids, " no lesions.     Pelvic exam was performed with patient supine.   No labial fusion, and symmetrical.    There is no rash, lesion or injury on the right labia.   There is no rash, lesion or injury on the left labia.   No bleeding and no signs of injury around the vaginal introitus, urethral meatus is normal size and without prolapse or lesions, urethra well supported. The cervix is visualized with no discharge, lesions or friability.   No vaginal discharge found.    No significant Cystocele, Enterocele or rectocele, and cervix and uterus well supported.   Bimanual exam:   The urethra is normal to palpation and there are no palpable vaginal wall masses.   Uterus is not deviated, not enlarged, not fixed, normal shape and not tender.   Cervix exhibits no motion tenderness.    Right adnexum displays no mass or nodularity and no tenderness.   Left adnexum displays no mass or nodularity and no tenderness.  Musculoskeletal: Normal range of motion.   Neurological: She is alert and oriented to person, place, and time. Coordination normal.   Skin: Skin is warm and dry. She is not diaphoretic. No rashes, lesions or ulcers.   Psychiatric: She has a normal mood and affect, oriented to person, place, and time.      Assessment:   Normal annual GYN exam  1. Gynecologic exam normal  Liquid-Based Pap Smear, Screening      2. Visit for screening mammogram  Mammo Digital Screening Bilat w/ [facility]          Plan:   PAP  Mammogram  Follow up in 1 year.  Patient informed will be contacted with results within 2 weeks. Encouraged to please call back or email if she has not heard from us by then.

## 2023-03-22 ENCOUNTER — HOSPITAL ENCOUNTER (OUTPATIENT)
Dept: RADIOLOGY | Facility: HOSPITAL | Age: 63
Discharge: HOME | End: 2023-03-22
Attending: STUDENT IN AN ORGANIZED HEALTH CARE EDUCATION/TRAINING PROGRAM
Payer: COMMERCIAL

## 2023-03-22 DIAGNOSIS — R31.0 GROSS HEMATURIA: ICD-10-CM

## 2023-03-22 PROCEDURE — 74178 CT UROGRAM ABD PELVIS W WO: ICD-10-PCS | Mod: 26,NTX,, | Performed by: RADIOLOGY

## 2023-03-22 PROCEDURE — 74178 CT ABD&PLV WO CNTR FLWD CNTR: CPT | Mod: 26,NTX,, | Performed by: RADIOLOGY

## 2023-03-22 PROCEDURE — 25500020 PHARM REV CODE 255: Mod: PO,NTX | Performed by: STUDENT IN AN ORGANIZED HEALTH CARE EDUCATION/TRAINING PROGRAM

## 2023-03-22 PROCEDURE — 74178 CT ABD&PLV WO CNTR FLWD CNTR: CPT | Mod: TC,PO,TXP

## 2023-03-22 RX ADMIN — IOHEXOL 125 ML: 350 INJECTION, SOLUTION INTRAVENOUS at 12:03

## 2023-03-23 ENCOUNTER — PATIENT MESSAGE (OUTPATIENT)
Dept: NEUROLOGY | Facility: CLINIC | Age: 63
End: 2023-03-23
Payer: COMMERCIAL

## 2023-03-23 ENCOUNTER — TELEPHONE (OUTPATIENT)
Dept: TRANSPLANT | Facility: CLINIC | Age: 63
End: 2023-03-23
Payer: COMMERCIAL

## 2023-03-23 DIAGNOSIS — G25.0 ESSENTIAL TREMOR: Primary | ICD-10-CM

## 2023-03-23 PROBLEM — R25.1 TREMOR: Status: ACTIVE | Noted: 2023-03-23

## 2023-03-23 RX ORDER — PROPRANOLOL HYDROCHLORIDE 80 MG/1
80 CAPSULE, EXTENDED RELEASE ORAL DAILY
Qty: 30 CAPSULE | Refills: 11 | Status: ON HOLD | OUTPATIENT
Start: 2023-03-23 | End: 2023-12-18 | Stop reason: HOSPADM

## 2023-03-23 NOTE — TELEPHONE ENCOUNTER
Called pt in regards to [facility] Liver Transplant Evaluation scheduled to begin on April 13th and 14th.   Patient confirms that she will be able to keep appointments as scheduled.  Patient agrees to arrive to Transplant Clinic on Thursday @ 7:30 am.  Patient is aware, that if she is more than 30 minutes late, the evaluation will need to be rescheduled.  Patient voiced understanding of the need to have a caregiver present for the  and surgeon consult, as well as for the patient education. Informed her/ him of all special instructions, including the need to fast for CT scan, u/s of abd, and stress test.  All questions/ concerns regarding liver transplant evaluation answered/ addressed.     Mammogram, EGD, Colonoscopy, OBGYN, and pap smear completed and is in Epic.

## 2023-03-24 ENCOUNTER — DOCUMENT SCAN (OUTPATIENT)
Dept: HOME HEALTH SERVICES | Facility: HOSPITAL | Age: 63
End: 2023-03-24
Payer: COMMERCIAL

## 2023-03-24 ENCOUNTER — CLINICAL SUPPORT (OUTPATIENT)
Dept: PRIMARY CARE CLINIC | Facility: CLINIC | Age: 63
End: 2023-03-24
Payer: COMMERCIAL

## 2023-03-24 ENCOUNTER — TELEPHONE (OUTPATIENT)
Dept: UROLOGY | Facility: CLINIC | Age: 63
End: 2023-03-24
Payer: COMMERCIAL

## 2023-03-24 DIAGNOSIS — F43.21 GRIEF REACTION: ICD-10-CM

## 2023-03-24 DIAGNOSIS — F32.A DEPRESSIVE DISORDER: Primary | ICD-10-CM

## 2023-03-24 DIAGNOSIS — F41.1 GAD (GENERALIZED ANXIETY DISORDER): ICD-10-CM

## 2023-03-24 PROCEDURE — 99499 NO LOS: ICD-10-PCS | Mod: NTX,S$GLB,, | Performed by: SOCIAL WORKER

## 2023-03-24 PROCEDURE — 99499 UNLISTED E&M SERVICE: CPT | Mod: NTX,S$GLB,, | Performed by: SOCIAL WORKER

## 2023-03-24 NOTE — TELEPHONE ENCOUNTER
----- Message from Anup Diaz MD sent at 3/24/2023  8:05 AM CDT -----  Please let the patient know that the CT Urogram showed no urologic abnormalities to explain her hematuria. She should reach out to her PCP regarding the findings on the liver which may require further follow up and evaluation.

## 2023-03-24 NOTE — PROGRESS NOTES
"Individual Psychotherapy (PhD/LCSW)    3/24/2023    Site:  [place] [address]      Chief complaint/reason for encounter: interpersonal.     Mood check: scale of:0 best, 10 worst. Patient rated:  Depression at   Anxiety at    Risk parameters:  Patient reports no suicidal ideation  Patient reports no homicidal ideation  Patient reports no self-injurious behavior  Patient reports no violent behavior    Bridge: N/A - this her first session since initial assessment       Review of home assignment:  N/A      [place]:  Goals  Identified areas of clinical concern.       History of present condition/content of session: Pt. Pt was seen last week for Behavioral Health Assessment. She was given the worksheet to help identify goals. She stated one goal would be "to stop crying, obsessing." She said that is leaning more towards tolerance and life events rather than acceptance.     In discussing goals, she acknowledged a long hx of worry, leads to anxiety that leads to panic attacks. She said the panic attacks onset in adulthood. She gave example of worrying about the liver transplant. She stated she is currently on the list for evaluation. She expressed concern about insurance, worried about not meeting criteria for the transplant, and the evaluation itself. She explained about her current financial position, decrease due to loss of her 's income, and on short term disability. Discussion was held about filing for her Disability benefits with Social Security. She said she has met with an  to help.     Decreased sleep is noted, with no change in appetite.     Patient admitted feeling distressed talking about her son buying her property and other financial px. Clinician used a Mindfulness technique for ACT Book of Metaphors. Bubbles were blown and used to help patient focus on the here and now, observing the bubbles, like thoughts that float through your mind.     Therapeutic Intervention:   Pt was assessed for present " condition and areas of clinical concern. She was provided with supportive therapy. Mindfulness was discussed and utilized Mindfulness exercise of Bubbles. Active Listening was used in the session.     Treatment plan:  Target symptoms: Anxiety, worry, stress  Why chosen therapy is appropriate versus another modality: evidence based practice  Outcome monitoring methods: checklist/rating scale  Therapeutic intervention type: supportive psychotherapy      Patient's response to intervention:  The patient's response to intervention is motivated.     Progress toward goals and other mental status changes:  The patient's progress toward goals is  undetermined, this is the first session since initial intake.  .    Diagnosis:   Major Depressive Disorder   Generalized Anxiety Disorder  Grief Reaction     Plan:  individual psychotherapy    Return to clinic: 2 weeks or less 3/31/2023    Length of Service (minutes): 45

## 2023-03-27 LAB
FINAL PATHOLOGIC DIAGNOSIS: NORMAL
Lab: NORMAL

## 2023-03-28 ENCOUNTER — PATIENT MESSAGE (OUTPATIENT)
Dept: OBSTETRICS AND GYNECOLOGY | Facility: CLINIC | Age: 63
End: 2023-03-28
Payer: COMMERCIAL

## 2023-03-28 ENCOUNTER — PATIENT MESSAGE (OUTPATIENT)
Dept: NEUROLOGY | Facility: CLINIC | Age: 63
End: 2023-03-28
Payer: COMMERCIAL

## 2023-03-28 RX ORDER — TERCONAZOLE 4 MG/G
1 CREAM VAGINAL NIGHTLY
Qty: 45 G | Refills: 1 | Status: SHIPPED | OUTPATIENT
Start: 2023-03-28 | End: 2023-04-27

## 2023-03-30 ENCOUNTER — TELEPHONE (OUTPATIENT)
Dept: PRIMARY CARE CLINIC | Facility: CLINIC | Age: 63
End: 2023-03-30
Payer: COMMERCIAL

## 2023-03-30 DIAGNOSIS — Z78.0 POSTMENOPAUSAL: Primary | ICD-10-CM

## 2023-03-30 NOTE — TELEPHONE ENCOUNTER
LOV: 3/8/23  NOV: 4/19/23    Pt would like a bone density scan. Last one performed on 12/16/21.     Please review signed order.

## 2023-03-30 NOTE — TELEPHONE ENCOUNTER
----- Message from Karen Joel sent at 3/30/2023  3:14 PM CDT -----  Contact: MAAME SPANGLER [phone]  Type:  BONE DENSITY ORDER         Caller is requesting to schedule bone density appointment.  Order is not listed in EPIC.  Please enter order and contact patient to schedule.    Name of Caller:  MAAME SPANGLER    Where would they like the bone density performed?  [facility]     Best Call Back Number:  [phone] (home) [phone] (work)    Additional Information: Patient called to speak with nurse/MA regarding bone density order.   Please call back and advise. Thanks

## 2023-03-31 ENCOUNTER — CLINICAL SUPPORT (OUTPATIENT)
Dept: PRIMARY CARE CLINIC | Facility: CLINIC | Age: 63
End: 2023-03-31
Payer: COMMERCIAL

## 2023-03-31 DIAGNOSIS — F43.21 GRIEF REACTION: Primary | ICD-10-CM

## 2023-03-31 DIAGNOSIS — F32.A DEPRESSIVE DISORDER: ICD-10-CM

## 2023-03-31 DIAGNOSIS — F41.1 GAD (GENERALIZED ANXIETY DISORDER): ICD-10-CM

## 2023-03-31 NOTE — PROGRESS NOTES
"Individual Psychotherapy (PhD/LCSW)    3/31/2023    Site:  [place] [address]      Chief complaint/reason for encounter: interpersonal, grief, anxiety.    Mood check: scale of:0 best, 10 worst. Patient rated:  Depression at 4  Anxiety at 3    Risk parameters:  Patient reports no suicidal ideation  Patient reports no homicidal ideation  Patient reports no self-injurious behavior  Patient reports no violent behavior    Bridge: She remember blowing bubbles in the last session to introduce Mindfulness.       Review of home assignment:  N/A      [place]:  Goals  Identified areas of clinical concern; direction of therapy.       History of present condition/content of session:   She began the session, saying she continues to have px with sleep. She reported racing thoughts when trying to sleep. She does not feel worthless, but admitted to feeling hopeless.  Crying spells are noted, and attributed to grief. She  identified having so many losses in a short period of time, but having to adjusting to a new normal (cope without him). She said they met when she was aged 19 y/o and  for 44 years. She said he's not there to do his normal chores, and she is having to do them. Not only having to cut the grass, but trying to figure out how to use the lawnmower.      Time was spent discussing the racing thoughts. She talked about the bubbles (from last session) and made connections to bubbles and thoughts. She stated "If I could get them to disappear.... get the thought to disappear like the bubbles do." The "thoughts are about things I need to do and forgot about or get distracted, and then worry about it later when trying to sleep. "     Discussion was held on what patient needs to from clinician. She has many stressors and not sure what direction to go in. She is struggling with benefits to retire, due to costs. She identified needing support, and putting some of the emotion work on the shelf. She was encouraged to allow herself " "to grieve. She was encouraged to journal. She noted it was helpful in the past.  She can do it, in the early morning hours when she can't sleep, and she can control. Segued into acknowledged by pt that she has many losses of control.... and she agreed she lost control of major things. Like driving, "feeling like a shut in" and also feelings of loss of her independence. Biggest of loss because she can't go get small necessities.     Therapeutic Intervention:   Pt was assessed for present condition and areas of clinical concern. She was provided with supportive therapy. Active Listening was used in the session. Grief sx were normalized. Goals and direction of therapy was discussed.Coping skills and self care were discussed. She was instructed on deep breathing skills.     Treatment plan:  Target symptoms: Anxiety, worry, financial stress, and grief  Why chosen therapy is appropriate versus another modality: evidence based practice  Outcome monitoring methods: checklist/rating scale  Therapeutic intervention type: supportive psychotherapy      Patient's response to intervention:  The patient's response to intervention is motivated.     Progress toward goals and other mental status changes:  The patient's progress toward goals is  fair  .     Diagnosis:   Major Depressive Disorder   Generalized Anxiety Disorder  Grief Reaction     Plan:  individual psychotherapy -     Return to clinic: 4/6/2023     Length of Service (minutes): 45 -         "

## 2023-04-01 ENCOUNTER — DOCUMENT SCAN (OUTPATIENT)
Dept: HOME HEALTH SERVICES | Facility: HOSPITAL | Age: 63
End: 2023-04-01
Payer: COMMERCIAL

## 2023-04-03 ENCOUNTER — TELEPHONE (OUTPATIENT)
Dept: TRANSPLANT | Facility: CLINIC | Age: 63
End: 2023-04-03
Payer: COMMERCIAL

## 2023-04-03 DIAGNOSIS — R91.1 SOLITARY PULMONARY NODULE: ICD-10-CM

## 2023-04-03 DIAGNOSIS — Z76.82 ORGAN TRANSPLANT CANDIDATE: Primary | ICD-10-CM

## 2023-04-03 NOTE — TELEPHONE ENCOUNTER
Called patient to let her know that the OLT evaluation orders have been put into Epic. Discussed with patient and informed her that a dobutamine stress echo is being ordered as part of her evaluation.  Patient has an ECHO scheduled next week by Dr. Pelletier. Patient will ask Dr. Pelletier if she still needs the echo he ordered.

## 2023-04-04 ENCOUNTER — PATIENT MESSAGE (OUTPATIENT)
Dept: CARDIOLOGY | Facility: CLINIC | Age: 63
End: 2023-04-04
Payer: COMMERCIAL

## 2023-04-04 ENCOUNTER — HOSPITAL ENCOUNTER (OUTPATIENT)
Dept: RADIOLOGY | Facility: HOSPITAL | Age: 63
Discharge: HOME | End: 2023-04-04
Attending: FAMILY MEDICINE
Payer: COMMERCIAL

## 2023-04-04 DIAGNOSIS — Z78.0 POSTMENOPAUSAL: ICD-10-CM

## 2023-04-04 PROCEDURE — 77080 DXA BONE DENSITY AXIAL SKELETON 1 OR MORE SITES: ICD-10-PCS | Mod: 26,NTX,, | Performed by: RADIOLOGY

## 2023-04-04 PROCEDURE — 77080 DXA BONE DENSITY AXIAL: CPT | Mod: TC,PO,TXP

## 2023-04-04 PROCEDURE — 77080 DXA BONE DENSITY AXIAL: CPT | Mod: 26,NTX,, | Performed by: RADIOLOGY

## 2023-04-06 ENCOUNTER — PATIENT MESSAGE (OUTPATIENT)
Dept: TRANSPLANT | Facility: CLINIC | Age: 63
End: 2023-04-06
Payer: COMMERCIAL

## 2023-04-06 ENCOUNTER — TELEPHONE (OUTPATIENT)
Dept: TRANSPLANT | Facility: CLINIC | Age: 63
End: 2023-04-06
Payer: COMMERCIAL

## 2023-04-06 ENCOUNTER — CLINICAL SUPPORT (OUTPATIENT)
Dept: PRIMARY CARE CLINIC | Facility: CLINIC | Age: 63
End: 2023-04-06
Payer: COMMERCIAL

## 2023-04-06 DIAGNOSIS — F32.A DEPRESSIVE DISORDER: ICD-10-CM

## 2023-04-06 DIAGNOSIS — F43.21 GRIEF REACTION: Primary | ICD-10-CM

## 2023-04-06 PROCEDURE — 99999 PR PBB SHADOW E&M-EST. PATIENT-LVL I: CPT | Mod: PBBFAC,TXP,, | Performed by: SOCIAL WORKER

## 2023-04-06 PROCEDURE — 99999 PR PBB SHADOW E&M-EST. PATIENT-LVL I: ICD-10-PCS | Mod: PBBFAC,TXP,, | Performed by: SOCIAL WORKER

## 2023-04-06 PROCEDURE — 99499 UNLISTED E&M SERVICE: CPT | Mod: NTX,S$GLB,, | Performed by: SOCIAL WORKER

## 2023-04-06 PROCEDURE — 99499 NO LOS: ICD-10-PCS | Mod: NTX,S$GLB,, | Performed by: SOCIAL WORKER

## 2023-04-06 NOTE — TELEPHONE ENCOUNTER
Patient states she was told to ask her cardiologists if the DSCFD is compatible with the TTE bubble study. States she was told it is as long as the bubble study and color doppler are done before the dobutamine; information sent via portal message, also. States bubble study's scheduled 4/11 and dobutamine w/CFD 4/14 and would like to know if both are still needed. Advised I will review with Jayden on her return to the office next week, to confirm DSCFD order (ensure bubble contrast was ordered). Advised someone will be in contact with her Monday to advise whether or not the bubble study is still needed. Understanding expressed. Denies any additional questions or concerns at this time.     Note and portal message routed to Jayden.     ----- Message from Barron Raphael sent at 4/6/2023 12:04 PM CDT -----  Regarding: call back  Pt call to speak with Jayden in regards to lab order requesting call back    Call

## 2023-04-06 NOTE — PROGRESS NOTES
Individual Psychotherapy (PhD/LCSW) - patient is 15 minutes late for session.     2023    Site:  [place] [address]      Chief complaint/reason for encounter: interpersonal, grief, anxiety.    Mood check: scale of:0 best, 10 worst. Patient rated:  Depression  - did not assess   Anxiety - did not assess     Risk parameters:  Patient reports no suicidal ideation  Patient reports no homicidal ideation  Patient reports no self-injurious behavior  Patient reports no violent behavior    Bridge: Pt was unable to recall. She was encouraged to take notes during the session.        Review of home assignment:  She said she was able to journal some.       [place]:  Cognitive Model   Grief        History of present condition/content of session:     Review of sx:  She reported poor concentration, easily distracted, and tearful.     She reported that in the past that journal helped her sort out some feeling when her daughter was getting  for the first time.  The hurt feelings were associated with belief that she identified was being left out of the planning of the wedding. Clinician used this to as an example of how the cognitive model works and how thoughts are rooted in experiences. This was also used to explain the CBT technique of learning to challenge irrational thoughts, by examining the evidence. She was able to provide ample enough evidence to support her belief. Therefore, she was encouraged to understand this thought was not irrational; there was a basis for it. Time was spent exploring sources of thoughts.  For example, patient voiced belief and feelings of guilt that she was mean to her  who  in Oct 2022 (3 years after her father). She said that he accused of her getting him back for the way he was when they were first . She explained - he said they would not have martial px if she would be a better house keeper. Discussed black and white thinking (cognitive distortion).  She verbalized  "understanding that being around someone like that affects how she sees herself and the world.  She talked  of the guilt because of being mean to him, was encouraged her to see that maybe it was the stress of being a caregiver.     Session segued into patient's expression/feelings of grief and loss. She verbalized understanding of the Carlozber Nelson's 5 stages of grief. However, expressed being worried that she is not grievng property because she did not go through the stages. She verbalized understanding that not everyone goes through all the stages of grief, and there is no way to determine "the proper" way to grieve.       Therapeutic Intervention:   Pt was assessed for present condition and areas of clinical concern. She was provided with empathy and support through expression of grief.  Active Listening was used in the session. Cognitive Distortions of black and whiter thinking and personalizing were introduced. The pt. was assisted in generating alternatives to distorted thoughts to correct the biases. Grief sx were normalized. Patient encouraged to begin to restructure irrational beliefs by reviewing reality-based evidence and misinterpretation.      Treatment plan:  Target symptoms: Anxiety, worry, financial stress, and grief  Why chosen therapy is appropriate versus another modality: evidence based practice  Outcome monitoring methods: checklist/rating scale  Therapeutic intervention type: supportive psychotherapy    Patient's response to intervention:  The patient's response to intervention is motivated.     Progress toward goals and other mental status changes:  The patient's progress toward goals is  fair  .     Diagnosis:   Major Depressive Disorder   Generalized Anxiety Disorder  Grief Reaction     Plan:  individual psychotherapy -     Return to clinic: 420/2023     Length of Service (minutes): 45 -           "

## 2023-04-06 NOTE — TELEPHONE ENCOUNTER
The dobutamine stress echo is good enough as long as they also do a bubble study and color doppler at baseline before dobutamine.

## 2023-04-11 ENCOUNTER — TELEPHONE (OUTPATIENT)
Dept: TRANSPLANT | Facility: CLINIC | Age: 63
End: 2023-04-11
Payer: COMMERCIAL

## 2023-04-11 NOTE — TELEPHONE ENCOUNTER
----- Message from Moy Devries sent at 4/11/2023  1:11 PM CDT -----  Regarding: FW: Discuss Appt(s)    Pt had question about her TTE that is audrey'ed today and her DSCFD audrey'ed for 4/14. Sp to pt nurse and cath lab and confirmed that TTE can be cx'ed for today.  .  ----- Message -----  From: Hero Alcazar  Sent: 4/11/2023  11:39 AM CDT  To: [facility] Pre-Liver Transplant Non-Clinical  Subject: Discuss Appt(s)                                  Patient called in requesting to speak with her pre liver care team regarding a question she has about an appointment she has scheduled for today. No other info provided. Requested a call back to assist further.                    Contact: [phone]

## 2023-04-12 ENCOUNTER — TELEPHONE (OUTPATIENT)
Dept: TRANSPLANT | Facility: CLINIC | Age: 63
End: 2023-04-12
Payer: COMMERCIAL

## 2023-04-12 NOTE — TELEPHONE ENCOUNTER
Patient called to confirm that they will be attending the scheduled appointments for Liver Transplant Fast Pass Evaluation scheduled to start 4/43/23  at 0730.  Patient confirms that caregivers will be present for the scheduled appointments.  Patient appointments reviewed along with location and special instructions.  Patient questions answered at this time and number provided to call the office if there is any problem.

## 2023-04-13 ENCOUNTER — CLINICAL SUPPORT (OUTPATIENT)
Dept: TRANSPLANT | Facility: CLINIC | Age: 63
End: 2023-04-13
Payer: COMMERCIAL

## 2023-04-13 ENCOUNTER — PATIENT MESSAGE (OUTPATIENT)
Dept: TRANSPLANT | Facility: CLINIC | Age: 63
End: 2023-04-13

## 2023-04-13 ENCOUNTER — SOCIAL WORK (OUTPATIENT)
Dept: TRANSPLANT | Facility: CLINIC | Age: 63
End: 2023-04-13
Payer: COMMERCIAL

## 2023-04-13 ENCOUNTER — HOSPITAL ENCOUNTER (OUTPATIENT)
Dept: RADIOLOGY | Facility: HOSPITAL | Age: 63
Discharge: HOME | End: 2023-04-13
Attending: STUDENT IN AN ORGANIZED HEALTH CARE EDUCATION/TRAINING PROGRAM
Payer: COMMERCIAL

## 2023-04-13 ENCOUNTER — EVALUATION (OUTPATIENT)
Dept: TRANSPLANT | Facility: CLINIC | Age: 63
End: 2023-04-13
Payer: COMMERCIAL

## 2023-04-13 VITALS
BODY MASS INDEX: 35.92 KG/M2 | SYSTOLIC BLOOD PRESSURE: 115 MMHG | HEIGHT: 68 IN | SYSTOLIC BLOOD PRESSURE: 115 MMHG | DIASTOLIC BLOOD PRESSURE: 57 MMHG | OXYGEN SATURATION: 95 % | WEIGHT: 237 LBS | WEIGHT: 237 LBS | TEMPERATURE: 97 F | RESPIRATION RATE: 16 BRPM | BODY MASS INDEX: 35.92 KG/M2 | OXYGEN SATURATION: 95 % | HEART RATE: 71 BPM | HEIGHT: 68 IN | RESPIRATION RATE: 16 BRPM | TEMPERATURE: 97 F | DIASTOLIC BLOOD PRESSURE: 57 MMHG | HEART RATE: 71 BPM

## 2023-04-13 DIAGNOSIS — I85.10 SECONDARY ESOPHAGEAL VARICES WITHOUT BLEEDING: ICD-10-CM

## 2023-04-13 DIAGNOSIS — K76.82 HEPATIC ENCEPHALOPATHY: ICD-10-CM

## 2023-04-13 DIAGNOSIS — K75.81 LIVER CIRRHOSIS SECONDARY TO NASH: Primary | ICD-10-CM

## 2023-04-13 DIAGNOSIS — R91.8 LUNG NODULES: ICD-10-CM

## 2023-04-13 DIAGNOSIS — Z76.82 ORGAN TRANSPLANT CANDIDATE: ICD-10-CM

## 2023-04-13 DIAGNOSIS — K74.60 LIVER CIRRHOSIS SECONDARY TO NASH: Primary | ICD-10-CM

## 2023-04-13 DIAGNOSIS — Z01.818 PRE-TRANSPLANT EVALUATION FOR LIVER TRANSPLANT: ICD-10-CM

## 2023-04-13 DIAGNOSIS — R18.8 OTHER ASCITES: ICD-10-CM

## 2023-04-13 DIAGNOSIS — R91.1 SOLITARY PULMONARY NODULE: ICD-10-CM

## 2023-04-13 PROBLEM — E66.9 OBESITY, CLASS II, BMI 35-39.9: Status: ACTIVE | Noted: 2017-04-11

## 2023-04-13 PROBLEM — E66.812 OBESITY, CLASS II, BMI 35-39.9: Status: ACTIVE | Noted: 2017-04-11

## 2023-04-13 LAB
AMPHET+METHAMPHET UR QL: NEGATIVE
BACTERIA #/AREA URNS AUTO: ABNORMAL /HPF
BARBITURATES UR QL SCN>200 NG/ML: NEGATIVE
BENZODIAZ UR QL SCN>200 NG/ML: NEGATIVE
BILIRUB UR QL STRIP: NEGATIVE
BZE UR QL SCN: NEGATIVE
CANNABINOIDS UR QL SCN: NEGATIVE
CLARITY UR REFRACT.AUTO: ABNORMAL
COLOR UR AUTO: YELLOW
CREAT UR-MCNC: 186 MG/DL (ref 15–325)
ETHANOL UR-MCNC: <10 MG/DL
GLUCOSE UR QL STRIP: NEGATIVE
HGB UR QL STRIP: NEGATIVE
HYALINE CASTS UR QL AUTO: 3 /LPF
KETONES UR QL STRIP: NEGATIVE
LEUKOCYTE ESTERASE UR QL STRIP: ABNORMAL
METHADONE UR QL SCN>300 NG/ML: NEGATIVE
MICROSCOPIC COMMENT: ABNORMAL
NITRITE UR QL STRIP: NEGATIVE
OPIATES UR QL SCN: NEGATIVE
PCP UR QL SCN>25 NG/ML: NEGATIVE
PH UR STRIP: 6 [PH] (ref 5–8)
PROT UR QL STRIP: ABNORMAL
RBC #/AREA URNS AUTO: 23 /HPF (ref 0–4)
SP GR UR STRIP: 1.02 (ref 1–1.03)
SQUAMOUS #/AREA URNS AUTO: 12 /HPF
TOXICOLOGY INFORMATION: NORMAL
URN SPEC COLLECT METH UR: ABNORMAL
WBC #/AREA URNS AUTO: 5 /HPF (ref 0–5)

## 2023-04-13 PROCEDURE — 99205 PR OFFICE/OUTPT VISIT, NEW, LEVL V, 60-74 MIN: ICD-10-PCS | Mod: S$GLB,TXP,, | Performed by: STUDENT IN AN ORGANIZED HEALTH CARE EDUCATION/TRAINING PROGRAM

## 2023-04-13 PROCEDURE — 99999 PR PBB SHADOW E&M-EST. PATIENT-LVL III: CPT | Mod: PBBFAC,TXP,,

## 2023-04-13 PROCEDURE — 97802 PR MED NUTR THER, 1ST, INDIV, EA 15 MIN: ICD-10-PCS | Mod: S$GLB,TXP,, | Performed by: DIETITIAN, REGISTERED

## 2023-04-13 PROCEDURE — 71250 CT CHEST WITHOUT CONTRAST: ICD-10-PCS | Mod: 26,TXP,, | Performed by: RADIOLOGY

## 2023-04-13 PROCEDURE — 99204 PR OFFICE/OUTPT VISIT, NEW, LEVL IV, 45-59 MIN: ICD-10-PCS | Mod: S$GLB,TXP,, | Performed by: PHYSICIAN ASSISTANT

## 2023-04-13 PROCEDURE — 97802 MEDICAL NUTRITION INDIV IN: CPT | Mod: S$GLB,TXP,, | Performed by: DIETITIAN, REGISTERED

## 2023-04-13 PROCEDURE — 81001 URINALYSIS AUTO W/SCOPE: CPT | Mod: TXP | Performed by: STUDENT IN AN ORGANIZED HEALTH CARE EDUCATION/TRAINING PROGRAM

## 2023-04-13 PROCEDURE — 99999 PR PBB SHADOW E&M-EST. PATIENT-LVL I: CPT | Mod: PBBFAC,TXP,,

## 2023-04-13 PROCEDURE — 99999 PR PBB SHADOW E&M-EST. PATIENT-LVL V: ICD-10-PCS | Mod: PBBFAC,TXP,, | Performed by: STUDENT IN AN ORGANIZED HEALTH CARE EDUCATION/TRAINING PROGRAM

## 2023-04-13 PROCEDURE — 99205 OFFICE O/P NEW HI 60 MIN: CPT | Mod: S$GLB,TXP,, | Performed by: STUDENT IN AN ORGANIZED HEALTH CARE EDUCATION/TRAINING PROGRAM

## 2023-04-13 PROCEDURE — 99999 PR PBB SHADOW E&M-EST. PATIENT-LVL I: ICD-10-PCS | Mod: PBBFAC,TXP,,

## 2023-04-13 PROCEDURE — 74160 CT ABDOMEN WITH CONTRAST: ICD-10-PCS | Mod: 26,TXP,, | Performed by: RADIOLOGY

## 2023-04-13 PROCEDURE — 71250 CT THORAX DX C-: CPT | Mod: 26,TXP,, | Performed by: RADIOLOGY

## 2023-04-13 PROCEDURE — 80307 DRUG TEST PRSMV CHEM ANLYZR: CPT | Mod: TXP | Performed by: STUDENT IN AN ORGANIZED HEALTH CARE EDUCATION/TRAINING PROGRAM

## 2023-04-13 PROCEDURE — 99204 OFFICE O/P NEW MOD 45 MIN: CPT | Mod: S$GLB,TXP,, | Performed by: PHYSICIAN ASSISTANT

## 2023-04-13 PROCEDURE — 99999 PR PBB SHADOW E&M-EST. PATIENT-LVL III: ICD-10-PCS | Mod: PBBFAC,TXP,,

## 2023-04-13 PROCEDURE — 99999 PR PBB SHADOW E&M-EST. PATIENT-LVL V: CPT | Mod: PBBFAC,TXP,, | Performed by: STUDENT IN AN ORGANIZED HEALTH CARE EDUCATION/TRAINING PROGRAM

## 2023-04-13 PROCEDURE — 71250 CT THORAX DX C-: CPT | Mod: TC,TXP

## 2023-04-13 PROCEDURE — 25500020 PHARM REV CODE 255: Mod: TXP | Performed by: STUDENT IN AN ORGANIZED HEALTH CARE EDUCATION/TRAINING PROGRAM

## 2023-04-13 PROCEDURE — 74160 CT ABDOMEN W/CONTRAST: CPT | Mod: TC,TXP

## 2023-04-13 PROCEDURE — 74160 CT ABDOMEN W/CONTRAST: CPT | Mod: 26,TXP,, | Performed by: RADIOLOGY

## 2023-04-13 RX ADMIN — IOHEXOL 100 ML: 350 INJECTION, SOLUTION INTRAVENOUS at 04:04
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
## 2023-04-13 NOTE — PROGRESS NOTES
Transplant Hepatology   Transplant Evaluation Consult Note    Referring provider: Dr. James Brambila  PCP: Garrett Webb MD    Chief complaint:  DURAN cirrhosis, transplant evaluation    HPI: Yumiko Gonzalez is a 62 y.o. female who was referred to Transplant Hepatology Clinic for DURAN related cirrhosis. She is accompanied by her daughter.    She was told approximately 30 years ago that she had a fatty liver, and approximately 10 years ago she was told that she had cirrhosis.  Last year she developed lower extremity edema and abdominal distention.  In January 2023 she was started on diuretics and underwent paracentesis.  She has not required repeat paracentesis and denies recent abdominal distention.  Her cirrhosis has also been complicated by hepatic encephalopathy currently controlled with lactulose and rifaximin.  She denies other signs of decompensated cirrhosis including no recent jaundice or GI bleeding.    She does not drink alcohol and has never been a heavy drinker.  Testing has been negative for chronic viral hepatitis.  No known family history of liver disease.    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, postitive antismooth muscle antibody    Blood transfusion     Bronchitis     bronchospasm on occasion     Cancer 1987    carcinoma in situ    Cervical neck pain with evidence of disc disease 03/22/2012    can bend neck    Cirrhosis     non-alcoholic, compensated    Colon polyps 03/22/2012    Depression 03/22/2012    Hx panic attacks    Diverticular disease 03/22/2012    never diverticulitis    End-stage renal disease     Fatty liver 03/22/2012    Fibrocystic breast     Fine tremor     hands,chronic    Hepatosplenomegaly     History of abdominal paracentesis 01/18/2023    8.7L of fluid drained    Hypertension  "2013    Knee pain, bilateral     chronic, with hands,feet,fingers also painful    Lesion of right lung     Liver disease     "partially sclerosed liver" per pt    Migraines past Hx    Nephrolithiasis     stone episode 2021    Pleural effusion     small, compensated, good bilateral breath sounds per Dr Suresh GUTIERRES (postoperative nausea and vomiting)     twice after childbirth    Postpartum depression     Splenomegaly     Thrombocytopenia     Tremors of nervous system        Past Surgical History:   Procedure Laterality Date    ADENOIDECTOMY      CERVICAL CONIZATION   W/ LASER       SECTION      x3    COLONOSCOPY N/A 2016    Procedure: COLONOSCOPY;  Surgeon: James Palomares MD;  Location: [facility] ENDO;  Service: Endoscopy;  Laterality: N/A;    COLONOSCOPY N/A 2/3/2022    Procedure: COLONOSCOPY;  Surgeon: James Palomares MD;  Location: [facility] ENDO;  Service: Endoscopy;  Laterality: N/A;    EMBOLIZATION N/A 2018    Procedure: EMBOLIZATION, BLOOD VESSEL;  Surgeon: Joselin Surgeon;  Location: [facility];  Service: Radiology;  Laterality: N/A;    ENDOMETRIAL ABLATION      ESOPHAGOGASTRODUODENOSCOPY N/A 2020    Procedure: ESOPHAGOGASTRODUODENOSCOPY (EGD);  Surgeon: James Palomares MD;  Location: [facility];  Service: Endoscopy;  Laterality: N/A;    ESOPHAGOGASTRODUODENOSCOPY N/A 3/8/2022    Procedure: EGD (ESOPHAGOGASTRODUODENOSCOPY);  Surgeon: James Palomares MD;  Location: [facility];  Service: Endoscopy;  Laterality: N/A;    LIVER BIOPSY      PELVIC LAPAROSCOPY      TONSILLECTOMY      TUBAL LIGATION         Family History   Problem Relation Age of Onset    Breast cancer Mother 70    Heart disease Mother     Hypertension Mother     Tremor Mother     Diabetes Father     Heart disease Father     Diabetes Sister     Cancer Sister     Breast cancer Sister     Diabetes Sister     Tremor Daughter     Breast cancer Maternal Aunt 70    Multiple sclerosis Maternal Aunt     Multiple sclerosis " Maternal Aunt     Breast cancer Maternal Grandmother 70    Diabetes Brother     Emphysema Brother     Breast cancer Maternal Cousin 40    Ovarian cancer Neg Hx     Parkinsonism Neg Hx     Glaucoma Neg Hx     Macular degeneration Neg Hx        Social History     Tobacco Use    Smoking status: Former     Types: Cigarettes     Quit date: 2/3/2006     Years since quittin.2    Smokeless tobacco: Never   Substance Use Topics    Alcohol use: Not Currently    Drug use: No       Current Outpatient Medications   Medication Sig Dispense Refill    albuterol (PROVENTIL) 2.5 mg /3 mL (0.083 %) nebulizer solution Take 3 mLs (2.5 mg total) by nebulization every 6 (six) hours as needed for Wheezing. Rescue 75 mL 2    albuterol (PROVENTIL/VENTOLIN HFA) 90 mcg/actuation inhaler Inhale 2 puffs every 4 hours as needed for cough, wheeze, or shortness of breath 18 g 11    ALPRAZolam (XANAX) 0.25 MG tablet Take 1 tablet (0.25 mg total) by mouth nightly as needed. FOR INSOMNIA (Patient taking differently: Take 0.125-0.25 mg by mouth nightly as needed. FOR INSOMNIA) 30 tablet 2    buPROPion (WELLBUTRIN XL) 150 MG TB24 tablet Take 2 tablets (300 mg total) by mouth once daily. 180 tablet 3    furosemide (LASIX) 40 MG tablet Take 1 tablet (40 mg total) by mouth once daily. 30 tablet 6    lactulose (CONSTULOSE) 10 gram/15 mL solution Take 30 mLs (20 g total) by mouth 2 (two) times daily. 5000 mL 6    loratadine (CLARITIN) 10 mg tablet Take 10 mg by mouth daily as needed.      multivitamin (THERAGRAN) per tablet Take 1 tablet by mouth once daily.      omeprazole (PRILOSEC) 20 MG capsule Take 1 capsule (20 mg total) by mouth once daily. 90 capsule 3    propranoloL (INDERAL LA) 80 MG 24 hr capsule Take 1 capsule (80 mg total) by mouth once daily. 30 capsule 11    rifAXIMin (XIFAXAN) 550 mg Tab Take 1 tablet (550 mg total) by mouth 2 (two) times daily. 60 tablet 11    spironolactone (ALDACTONE) 100 MG tablet Take 1 tablet (100 mg total) by  "mouth once daily. 30 tablet 6    vitamin E 400 UNIT capsule Take 400 Units by mouth once daily.      fluticasone-umeclidin-vilanter (TRELEGY ELLIPTA) 200-62.5-25 mcg inhaler Inhale 1 puff into the lungs once daily. (Patient not taking: Reported on 4/13/2023) 60 each 11    terconazole (TERAZOL 7) 0.4 % Crea Place 1 applicator vaginally every evening. (Patient not taking: Reported on 4/13/2023) 45 g 1     No current facility-administered medications for this visit.       Review of patient's allergies indicates:   Allergen Reactions    No known drug allergies        Review of Systems   Constitutional:  Negative for fever and weight loss.   Cardiovascular:  Positive for leg swelling.   Gastrointestinal:  Negative for abdominal pain, blood in stool, constipation, diarrhea, heartburn, melena, nausea and vomiting.     Vitals:    04/13/23 0758   BP: (!) 115/57   Pulse: 71   Resp: 16   Temp: 97.2 °F (36.2 °C)   TempSrc: Tympanic   SpO2: 95%   Weight: 107.5 kg (236 lb 15.9 oz)   Height: 5' 7.72" (1.72 m)       Physical Exam  Constitutional:       General: She is not in acute distress.  Eyes:      General: Scleral icterus present.   Cardiovascular:      Rate and Rhythm: Normal rate and regular rhythm.   Pulmonary:      Effort: Pulmonary effort is normal. No respiratory distress.   Abdominal:      General: Bowel sounds are normal. There is no distension.      Palpations: Abdomen is soft.      Tenderness: There is no abdominal tenderness. There is no guarding or rebound.   Musculoskeletal:      Right lower leg: Edema present.      Left lower leg: Edema present.   Skin:     Coloration: Skin is not jaundiced.       LABS: I personally reviewed pertinent laboratory findings.    Lab Results   Component Value Date    ALT 41 03/02/2023    AST 70 (H) 03/02/2023    ALKPHOS 130 03/02/2023    BILITOT 3.2 (H) 03/02/2023       Lab Results   Component Value Date    WBC 4.08 04/13/2023    HGB 14.6 04/13/2023    HCT 42.7 04/13/2023    MCV 97 " 04/13/2023    PLT 85 (L) 04/13/2023       Lab Results   Component Value Date     03/02/2023    K 3.7 03/02/2023     03/02/2023    CO2 29 03/02/2023    BUN 15 03/02/2023    CREATININE 0.7 03/02/2023    CALCIUM 9.7 03/02/2023    ANIONGAP 4 (L) 03/02/2023    ESTGFRAFRICA >60 06/20/2022    EGFRNONAA >60 06/20/2022       Lab Results   Component Value Date    INR 1.3 (H) 04/13/2023    INR 1.4 (H) 03/02/2023    INR 1.4 (H) 02/14/2023       Imaging:  I personally reviewed recent imaging studies available on the chart and from outside medical records.      Assessment:  62 y.o. female with DURAN related cirrhosis. She is decompensated with ascites, HE.    1. Liver cirrhosis secondary to DURAN    2. Secondary esophageal varices without bleeding    3. Hepatic encephalopathy    4. Other ascites    5. Organ transplant candidate    6. Pre-transplant evaluation for liver transplant    7. Lung nodules        MELD-Na score: 15 at 3/2/2023 12:06 PM  MELD score: 15 at 3/2/2023 12:06 PM  Calculated from:  Serum Creatinine: 0.7 mg/dL (Using min of 1 mg/dL) at 3/2/2023 12:06 PM  Serum Sodium: 138 mmol/L (Using max of 137 mmol/L) at 3/2/2023 12:06 PM  Total Bilirubin: 3.2 mg/dL at 3/2/2023 12:06 PM  INR(ratio): 1.4 at 3/2/2023 12:06 PM  Age: 62 years      Transplant Candidacy: Patient is a 62 y.o. female with DURAN related cirrhosis with MELD-Na 15 here for evaluation for possible OLT. She is undergoing liver transplant evaluation and will be presented to Liver Transplant Selection Committee after all tests and evaluations are complete.    Recommendations:  Ascites/Edema: 2 gm Na diet. Continue Lasix 40 mg daily and Aldactone 100 mg daily    Encephalopathy: Continue lactulose. Titrate to maintain 3-4 bowel movements per day. Continue rifaximin 550mg BID    Variceal screening: EGD in 03/2022 showed grade I varices.  She takes propranolol and does not warrant surveillance EGD as long as she remains on propranolol.    HCC screening:  US in 04/2023 without HCC. AFP 2.7. Repeat abdominal US and AFP every 6 months.    Immunizations: Recommend HAV and HBV vaccinations if not immune    UNOS Patient Status  Functional Status: 60% - Requires occasional assistance but is able to care for needs  Physical Capacity: No Limitations    Diabetes: No  Any previous malignancy: No  Neoadjuvant Therapy: no  Has patient ever had a dx of HCC: no  Previous Abdominal Surgery: no  Spontaneous Bacterial Peritonitis: no  History of Portal Vein Thrombosis: no  Transjugular Intrahepatic Portosystemic Shunt: no    Return to clinic in 2-3 months or sooner if needed.    I have sent communication to the referring physician and/or primary care provider.    I spent a total of 60 minutes on the day of the visit. This includes face to face time and non-face to face time preparing to see the patient (eg, review of tests), obtaining and/or reviewing separately obtained history, documenting clinical information in the electronic or other health record, independently interpreting results, and communicating results to the patient/family/caregiver, or care coordination.    This note includes dictation done using M*Modal speech recognition program. Word recognition mistakes are occasionally missed on review.    James Brambila MD  Staff Physician  Hepatology and Liver Transplant  [facility]  [facility]

## 2023-04-13 NOTE — PROGRESS NOTES
PRE-TRANSPLANT INFECTIOUS DISEASE CONSULT    Reason for Visit:  Pre-transplant evaluation  Referring Provider: Dr. James Brambila     History of Present Illness:    62 y.o. female with a history of cirrhosis presents for pre-liver transplant evaluation. She has hx of ascites and paracentesis but denies SBP.    Infectious History:  Recent hospital admissions: Yes - jan 2023 for fluid overload  Recent infections: Yes - cellulitis LLE in Jan - treated resolved - no recurrence.  Recent or current antibiotic use: No  History of recurrent infections *(sinus / pneumonia / UTI / SBP)*: No  Recent dental infections, issues or procedures: No  History of chicken pox: Yes  History of shingles: No  History of STI: Yes - many years ago - treated no recurrence  History of COVID infection: No    History of Immunosuppression:  Prior chemotherapy / immunosuppression: No  Prior transplant: No  History of splenectomy: No    Tuberculosis:  Prior screening for latent TB: Yes - 2018 - negative   Prior diagnosis of latent TB: No  Risk factors for TB *known exposure, incarceration, homelessness*: Yes    Geographical exposures:  Currently lives in [place] with alone   Lived in the following states: [place], [place], [place]  Lived or travelled to the [facility] [place]: No  International travel: No  Travel-associated illness: No    Social/Environmental:  Occupation:  Works for [employer]  Pets: Yes - dogs   Livestock: No  Fishing / hunting: No  Hobbies: reading  Water: Well water  Consumption of raw/undercooked meat or seafood?  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, postitive antismooth muscle antibody    Blood transfusion     Bronchitis     bronchospasm on occasion   "   Cancer 1987    carcinoma in situ    Cervical neck pain with evidence of disc disease 2012    can bend neck    Cirrhosis     non-alcoholic, compensated    Colon polyps 2012    Depression 2012    Hx panic attacks    Diverticular disease 2012    never diverticulitis    End-stage renal disease     Fatty liver 2012    Fibrocystic breast     Fine tremor     hands,chronic    Hepatosplenomegaly     History of abdominal paracentesis 2023    8.7L of fluid drained    Hypertension 2013    Knee pain, bilateral     chronic, with hands,feet,fingers also painful    Lesion of right lung     Liver disease     "partially sclerosed liver" per pt    Migraines past Hx    Nephrolithiasis     stone episode 2021    Pleural effusion     small, compensated, good bilateral breath sounds per Dr Suresh GUTIERRES (postoperative nausea and vomiting)     twice after childbirth    Postpartum depression     Splenomegaly     Thrombocytopenia     Tremors of nervous system      Past Surgical History:   Procedure Laterality Date    ADENOIDECTOMY      CERVICAL CONIZATION   W/ LASER       SECTION      x3    COLONOSCOPY N/A 2016    Procedure: COLONOSCOPY;  Surgeon: James Palomares MD;  Location: [facility];  Service: Endoscopy;  Laterality: N/A;    COLONOSCOPY N/A 2/3/2022    Procedure: COLONOSCOPY;  Surgeon: James Palomares MD;  Location: [facility];  Service: Endoscopy;  Laterality: N/A;    EMBOLIZATION N/A 2018    Procedure: EMBOLIZATION, BLOOD VESSEL;  Surgeon: Joselin Surgeon;  Location[facility];  Service: Radiology;  Laterality: N/A;    ENDOMETRIAL ABLATION      ESOPHAGOGASTRODUODENOSCOPY N/A 2020    Procedure: ESOPHAGOGASTRODUODENOSCOPY (EGD);  Surgeon: James Palomares MD;  Location: [facility];  Service: Endoscopy;  Laterality: N/A;    ESOPHAGOGASTRODUODENOSCOPY N/A 3/8/2022    Procedure: EGD (ESOPHAGOGASTRODUODENOSCOPY);  Surgeon: James Palomares MD;  Location: [facility]" ENDO;  Service: Endoscopy;  Laterality: N/A;    LIVER BIOPSY      PELVIC LAPAROSCOPY      TONSILLECTOMY      TUBAL LIGATION       Family History   Problem Relation Age of Onset    Breast cancer Mother 70    Heart disease Mother     Hypertension Mother     Tremor Mother     Diabetes Father     Heart disease Father     Diabetes Sister     Cancer Sister     Breast cancer Sister     Diabetes Sister     Tremor Daughter     Breast cancer Maternal Aunt 70    Multiple sclerosis Maternal Aunt     Multiple sclerosis Maternal Aunt     Breast cancer Maternal Grandmother 70    Diabetes Brother     Emphysema Brother     Breast cancer Maternal Cousin 40    Ovarian cancer Neg Hx     Parkinsonism Neg Hx     Glaucoma Neg Hx     Macular degeneration Neg Hx      Social History     Tobacco Use    Smoking status: Former     Types: Cigarettes     Quit date: 2/3/2006     Years since quittin.2    Smokeless tobacco: Never   Substance Use Topics    Alcohol use: Not Currently    Drug use: No     Review of patient's allergies indicates:   Allergen Reactions    No known drug allergies          Immunization History:  Received all childhood vaccines: Yes  All household members receive annual flu vaccine: Yes  All household members are up to date on COVID vaccine: No    Immunization History   Administered Date(s) Administered    COVID-19, MRNA, LN-S, PF (Pfizer) (Purple Cap) 2020, 2021, 10/01/2021    Influenza 2007, 10/14/2008, 2009, 10/08/2018, 10/09/2020, 10/22/2021    Influenza - Quadrivalent - MDCK - PF 10/11/2019    Influenza - Quadrivalent - PF *Preferred* (6 months and older) 2016, 10/13/2017, 10/15/2018, 10/28/2020, 10/22/2021, 10/14/2022    Influenza - Trivalent (ADULT) 2007, 10/14/2008, 2009    PPD Test 2008, 2008, 2009, 2009, 2010, 2010    Pneumococcal Conjugate - 13 Valent 2017    Pneumococcal Polysaccharide - 23 Valent 2013    Td (ADULT)  08/03/2007    Tdap 11/06/2020    Zoster Recombinant 09/29/2020, 12/11/2020          Current antibiotics:  Antibiotics (From admission, onward)      None              Review of Systems  Review of Systems   Constitutional: Negative for chills, fever, malaise/fatigue and night sweats.   Cardiovascular:  Negative for chest pain.   Respiratory:  Negative for cough, hemoptysis, shortness of breath, sputum production and wheezing.    Skin:  Negative for rash and suspicious lesions.   Gastrointestinal:  Negative for abdominal pain, constipation, diarrhea, heartburn, nausea and vomiting.   Genitourinary:  Negative for dysuria and hematuria.        Objective  Physical Exam  Constitutional:       General: She is not in acute distress.     Appearance: Normal appearance. She is well-developed. She is not ill-appearing, toxic-appearing or diaphoretic.       HENT:      Head: Normocephalic and atraumatic.      Mouth/Throat:      Lips: Pink. No lesions.      Mouth: Mucous membranes are moist. No oral lesions.      Dentition: Abnormal dentition (one broken tooth to gumline). Dental caries (filling and crowns) present.   Cardiovascular:      Rate and Rhythm: Normal rate and regular rhythm.      Heart sounds: Normal heart sounds. No murmur heard.    No friction rub. No gallop.   Pulmonary:      Effort: Pulmonary effort is normal. No respiratory distress.      Breath sounds: Normal breath sounds. No wheezing or rales.   Abdominal:      General: Bowel sounds are normal. There is no distension.      Palpations: Abdomen is soft. There is no mass.      Tenderness: There is no abdominal tenderness. There is no guarding or rebound.   Skin:     General: Skin is warm and dry.   Neurological:      Mental Status: She is alert and oriented to person, place, and time.   Psychiatric:         Behavior: Behavior normal.       Labs:    CBC:   Lab Results   Component Value Date    WBC 4.65 03/02/2023    HGB 14.8 03/02/2023    HCT 46.2 03/02/2023    MCV  101 (H) 03/02/2023    PLT 98 (L) 03/02/2023    GRAN 3.0 03/02/2023    GRAN 63.3 03/02/2023    LYMPH 0.8 (L) 03/02/2023    LYMPH 16.6 (L) 03/02/2023    MONO 0.6 03/02/2023    MONO 13.8 03/02/2023    EOSINOPHIL 4.3 03/02/2023       Syphilis screening: No results found for: RPR, PRPQ, FTAABS     TB screening: No results found for: TBGOLDPLUS, TSPOTSCREN    HIV screening: No results found for: [id number]    Strongyloides IgG: No results found for: STRONGANTIGG    Hepatitis Serologies:   Lab Results   Component Value Date    HEPCAB Negative 03/01/2012        Varicella IgG: No results found for: VARICELLAINT      Assessment and Plan    1. Risks of Infection: Available serologies were reviewed. No unusual risks of infection or significant barriers to transplantation were identified from the infectious disease standpoint given the information available at this time pending acceptable fungal serologies which were added on given hx of lung lesions.  It is recommended the patient have their teeth treated prior to transplant but that should not delay transplant.  .    - Acute infectious issues: None   - Pending serologies: Quantiferon gold / T-spot, RPR, Strongyloides IgG, and VZV IgG, Fungal serologies.   - Please call if any pending serologic testing is positive.    2. Immunizations:  Based on the patient's immunization history and serologies, the following immunizations are recommended:  - Hepatitis A    Patient does have immunity to hepatitis A    Vaccination ordered today: No. Reason for not ordering: immunity demonstrated on serology   - Hepatitis B    Patient does have immunity to hepatitis B    Vaccination ordered today: No. Reason for not ordering: Immunity   - COVID    Current CDC vaccination recommendations were discussed with the patient   - Annual high dose influenza     Vaccination ordered today: No. Reason for not ordering: vaccination up to date   - Prevnar 20    Vaccination ordered today: Yes   -  Tdap    Vaccination ordered today: No. Reason for not ordering: vaccination up to date   - Shingrix    Vaccination ordered today: No. Reason for not ordering: vaccination up to date    Recommended Pre-Transplant Immunization Schedule   Vaccine  0m 1m 2m 6m   Pneumococcal conjugate vaccine (Prevnar 20) X      Tetanus-diphtheria-pertussis (Tdap)* X      Hepatitis A Vaccine (Havrix)** X   X   Hepatitis B Vaccine (Heplisav)** X X     Influenza (annual) X      Zoster Recombinant Vaccine (Shingrix) X  X           *Administer booster every 10 years.       **Administer if no immunity demonstrated on serologies               Patient will receive vaccines at local pharmacy. A written prescription was provided for all vaccine doses.       3. Counseling:   I discussed with the patient the risk for increased susceptibility to infections following transplantation including increased risk for infection right after transplant and if rejection should occur.  The patient has been counseled on the importance of vaccinations to decrease risk of infection and severe illness. Specific guidance has been provided to the patient regarding the patient's occupation, hobbies and activities to avoid future infectious complications.     4. Transplant Candidacy: Based on available information, there are no identified significant barriers to transplantation from an infectious disease standpoint.  Final determination of transplant candidacy will be made once evaluation is complete and reviewed by the Selection Committee.      Follow up with infectious disease as needed.       The total time for evaluation and management services performed on 04/13/2023 was greater than 45 minutes.

## 2023-04-13 NOTE — PROGRESS NOTES
"TRANSPLANT NUTRITIONAL ASSESSMENT    Referring Provider: James Brambila MD    Reason for Visit: Pre-liver transplant work-up    Age: 62 y.o.  Sex: female    Patient Active Problem List   Diagnosis    Acute bronchospasm    Hypermetropia    Enthesopathy of ankle and tarsus, unspecified    Cervical neck pain with evidence of disc disease    History of cervical cancer    Depression    Diverticular disease    Colon polyps    Hypertension    Hx of colonic polyps    Obesity    Morbid obesity with BMI of 40.0-44.9, adult    Splenomegaly    History of posttraumatic stress disorder (PTSD)    Nonalcoholic steatohepatitis    Cirrhosis of liver with ascites    Cirrhosis    Mass of right lung    Wears contact lenses    Esophageal varices determined by endoscopy    Portal hypertension    Hepatic encephalopathy    ACP (advance care planning)    Cellulitis of left lower extremity    Lower extremity edema    Tremor     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, postitive antismooth muscle antibody    Blood transfusion     Bronchitis     bronchospasm on occasion     Cancer 1987    carcinoma in situ    Cervical neck pain with evidence of disc disease 03/22/2012    can bend neck    Cirrhosis     non-alcoholic, compensated    Colon polyps 03/22/2012    Depression 03/22/2012    Hx panic attacks    Diverticular disease 03/22/2012    never diverticulitis    End-stage renal disease     Fatty liver 03/22/2012    Fibrocystic breast     Fine tremor     hands,chronic    Hepatosplenomegaly     History of abdominal paracentesis 01/18/2023    8.7L of fluid drained    Hypertension 04/24/2013    Knee pain, bilateral     chronic, with hands,feet,fingers also painful    Lesion of right lung     Liver disease     "partially sclerosed liver" per pt    Migraines past " Hx    Nephrolithiasis     stone episode 1/2021    Pleural effusion     small, compensated, good bilateral breath sounds per Dr Suresh GUTIERRES (postoperative nausea and vomiting)     twice after childbirth    Postpartum depression     Splenomegaly     Thrombocytopenia     Tremors of nervous system      Lab Results   Component Value Date     (H) 04/13/2023    K 3.6 04/13/2023    PHOS 4.2 04/12/2017    MG 2.0 01/26/2023    CHOL 125 12/16/2021    HDL 52 12/16/2021    TRIG 78 12/16/2021    ALBUMIN 2.9 (L) 04/13/2023    AMMONIA <9 (A) 01/24/2023    HGBA1C 4.6 04/13/2023    CALCIUM 9.9 04/13/2023     Other Pertinent Labs: none    Current Outpatient Medications   Medication Sig    albuterol (PROVENTIL) 2.5 mg /3 mL (0.083 %) nebulizer solution Take 3 mLs (2.5 mg total) by nebulization every 6 (six) hours as needed for Wheezing. Rescue    albuterol (PROVENTIL/VENTOLIN HFA) 90 mcg/actuation inhaler Inhale 2 puffs every 4 hours as needed for cough, wheeze, or shortness of breath    ALPRAZolam (XANAX) 0.25 MG tablet Take 1 tablet (0.25 mg total) by mouth nightly as needed. FOR INSOMNIA (Patient taking differently: Take 0.125-0.25 mg by mouth nightly as needed. FOR INSOMNIA)    buPROPion (WELLBUTRIN XL) 150 MG TB24 tablet Take 2 tablets (300 mg total) by mouth once daily.    furosemide (LASIX) 40 MG tablet Take 1 tablet (40 mg total) by mouth once daily.    lactulose (CONSTULOSE) 10 gram/15 mL solution Take 30 mLs (20 g total) by mouth 2 (two) times daily.    loratadine (CLARITIN) 10 mg tablet Take 10 mg by mouth daily as needed.    multivitamin (THERAGRAN) per tablet Take 1 tablet by mouth once daily.    omeprazole (PRILOSEC) 20 MG capsule Take 1 capsule (20 mg total) by mouth once daily.    propranoloL (INDERAL LA) 80 MG 24 hr capsule Take 1 capsule (80 mg total) by mouth once daily.    rifAXIMin (XIFAXAN) 550 mg Tab Take 1 tablet (550 mg total) by mouth 2 (two) times daily.    spironolactone (ALDACTONE) 100 MG tablet  "Take 1 tablet (100 mg total) by mouth once daily.    vitamin E 400 UNIT capsule Take 400 Units by mouth once daily.    fluticasone-umeclidin-vilanter (TRELEGY ELLIPTA) 200-62.5-25 mcg inhaler Inhale 1 puff into the lungs once daily. (Patient not taking: Reported on 4/13/2023)    terconazole (TERAZOL 7) 0.4 % Crea Place 1 applicator vaginally every evening. (Patient not taking: Reported on 4/13/2023)     No current facility-administered medications for this visit.     Allergies: No known drug allergies    Ht Readings from Last 1 Encounters:   04/13/23 5' 7.72" (1.72 m)     Wt Readings from Last 1 Encounters:   04/13/23 107.5 kg (236 lb 15.9 oz)      BMI: Body mass index is 36.34 kg/m².    Usual Weight: 229-235 lb  Weight Change/Time: reports about 100 lb weight loss since January 2023, lately around 229 lb  Current Diet: regular (limiting sodium, carb's)   Appetite/Current Intake: fair   Exercise/Physical Activity: functional in ADL's, no regular exercise, knee issues, uses cane for balance  LFI: 4.62    Nutritional/Herbal Supplements: Premier protein shake (1x /day)  Potential Food/Medication Interactions: reviewed  Chewing/Swallowing Problems: none  Symptoms: none  Assessment of Lab Values: Glu 121, Alb 2.9  Support System: pt's daughter present, voices support in pt's nutrition /diet, pt can shop / cook for herself    Estimated Kcal Need: 2140 kcal (20 kcal/kg)  Estimated Protein Need: 107 gm (1 gm/kg)    Nutritional History:   Pt typically eats 2 meals, some snacks. Pt has been advised to limit sodium intake, limit carb's and sugar, fluid intake as well. She is not adding salt to her food. Pt stays up late at night then sleeps until 11 / 12 pm. Diet recall was as follows:    B: 5 oz fruit juice, Protein shake or 1 piece of toast w/ unsalted butter or margarine & tsp jelly ('hull wheat' toast) or yogurt w/ granola  Beverage: water, fruit juice (8-10 oz)  L: skip  D: delivered home-cooked meals; low sodium/ " protein & vegetable (no starch); fresh vegetables, chicken/shrimp/catfish/pork/beef  Snack: bread & butter pickes on bread w/ butter, banana, peeled apples (not much whole fruit)    Nutritional Diagnoses  Problem: food- and nutrition-related knowledge deficit  Etiology: r/t no prior edu on pre liver transplant nutrition recommendations  Symptoms: aeb diet recall    Educational Need? yes  Barriers: none identified  Discussed with: patient and daughter  Interventions: Patient taught nutrition information regarding Pre-liver transplant work-up  . Pre liver transplant nutrition packet provided and discussed. Pt advised on the recommendations to follow a low sodium diet while taking in adequate protein.  This packet includes label low sodium reading tips, seasoning suggestions, protein intake goal amount (gm) for the individual patient, as well as oral supplement suggestions.   Exercise and physical activity are encouraged.    Goals/Recommendations: diet adherence, small frequent meals and snacks, and consumption of dane nutritional supplements  Actions Taken: instruct/provide written information  Strategies Used: problem solving, goal setting, motivational interviewing  Patient and/or family comprehend instructions: yes , adherence expected  Outcome: Verbalizes understanding  Monitoring: Contact information provided, will f/u in clinic and communicate with the care team as needed.     Counseling Time: 15 minutes

## 2023-04-13 NOTE — PROGRESS NOTES
Yumiko Ariasp seen in clinic for Fast Pass evaluation.  Handbook on pre-liver transplant information (see outline below) was given to the patient.  Patient's daughter, accompanied him. Per clinic staff, patient viewed pre-liver transplant education slides via desktop in transplant clinic.  Informed consent signed and written information given on selection criteria.    LIVER TRANSPLANT WORK-UP EDUCATION   I. UNDERSTANDING THE TRANSPLANT PROCESS     A. Transplant team      B. MELD score      C. Balancing urgency and outcome     D. Liver Transplant Options         1.  Donor         2. Living Donor--rationale, benefits     E. Transplant Work-up         1. Medical         2. Psychological and Social--lifetime commitment, life changes, personal plan/ goal         3. Financial--fundraising     F.  Completed work-up and Next Steps    G. Wait Time         1.  Can be listed at more than one center         2.  Can transfer wait time     H. The Call       I. Possible donor options         1. DCD         2. Hep B Core and Hep C Positive         3. Increased Risk     J.  Liver Transplant Surgery         1. Length         2. Transfusions, cell saver         3. Surgical risks         4. What to expect after sugery--Central lines, drains, Henry catheter, incision, endotracheal              tube, NG tube, length of stay in ICU/ TSU  II.  HOW TO BEST CARE FOR YOURSELF (Take Five To Thrive)  III. UNDERSTANDING LIFE AFTER TRANSPLANT  A. Medicines after transplant      1. Immunosuppression--infection and rejection  B. Labs   IV. ADULT LIVER SURVIVAL RATES   V.  Patient notified that HIV Testing is required for transplant evaluation and              repeated at scheduled intervals before and after transplant. Explained that              education will be provided, results will be discussed, and the appropriate              consults will be scheduled if needed.

## 2023-04-13 NOTE — PROGRESS NOTES
Transplant Surgery Liver Transplant Recipient Evaluation    Referring Physician: Laila Will  Corresponding Physician: Laila Will    Subjective:     Reason for Visit: evaluate liver transplant candidacy    History of Present Illness: Yumiko Gonzalez is a 62 y.o. year old female who is being evaluated for liver transplantation.    Transplant History: N/A    Native Liver Disease (UNOS terminology): Cirrhosis: Fatty Liver (DURAN) (based on medical records from referral).    Manifestations of liver disease: ascites (diuretic-dependent)  MELD-Na score: 14 at 4/13/2023  8:55 AM  MELD score: 14 at 4/13/2023  8:55 AM  Calculated from:  Serum Creatinine: 0.7 mg/dL (Using min of 1 mg/dL) at 4/13/2023  8:10 AM  Serum Sodium: 140 mmol/L (Using max of 137 mmol/L) at 4/13/2023  8:10 AM  Total Bilirubin: 3.3 mg/dL at 4/13/2023  8:10 AM  INR(ratio): 1.3 at 4/13/2023  8:55 AM  Age: 62 years    External provider notes reviewed: Yes    PMH: reviewed  PSH: reviewed    Review of Systems  Objective:     Physical Exam:  Constitutional:   Vitals reviewed: yes   Well-nourished and well-groomed: yes  Eyes:   Sclerae icteric: no   Extraocular movements intact: yes  GI:    Bowel sounds normal: yes   Tenderness: no    If yes, quadrant/location: not applicable   Palpable masses: no    If yes, quadrant/location: not applicable   Hepatosplenomegaly: no   Ascites: no   Hernia: no    If yes, type/location: not applicable   Surgical scars: no    If yes, type/location: not applicable  Resp:   Effort normal: yes   Breath sounds normal: yes    CV:   Regular rate and rhythm: yes   Heart sounds normal: yes   Femoral pulses normal: yes   Extremities edematous: no  Skin:   Rashes or lesions present: no    If yes, describe:not applicable   Jaundice:: no    Musculoskeletal:   Gait normal: yes   Strength normal: yes  Psych:   Oriented to person, place, and time: yes   Affect and mood normal: yes    Additional comments: not  applicable    Diagnostics:  The following labs have been reviewed: CBC  CMP  PT  INR  The following radiology images have been independently reviewed and interpreted: CT Abd/Pelvis         Transplant Surgery - Candidacy   Assessment/Plan:   I see no surgical contraindication to liver transplantation. Based on available information, Yumiko Gonzalez is a suitable liver transplant candidate. Final determination of transplant candidacy will be made once evaluation is complete and reviewed by the Liver Transplant Selection Committee.    Patient advised that it is recommended that all transplant candidates, and their close contacts and household members receive Covid vaccination.    Additional testing to be completed according to Liver : Written Order Guideline for Adult Liver Transplant Evaluation (LI-02)    Interpretation of tests and discussion of patient management involves all members of the multidisciplinary transplant team.    Toby Young MD         Counseling: We provided Yumiko Gonzalez with a group education session today.  We discussed liver transplantation at length with her, including risks, potential complications, and alternatives in the management of her liver disease.  The discussion included complications related to anesthesia, bleeding, infection, primary nonfunction, and vascular thrombosis.  I discussed the typical postoperative course, length of hospitalization, the need for long-term immunosuppression, and the need for long-term routine follow-up.  I discussed living-donor and -donor transplantation and the relative advantages and disadvantages of each.  I also discussed average waiting times for both living donation and  donation.  I discussed national and center-specific survival rates.  I also mentioned the potential benefit of multicenter listing to candidates listed with centers within more than one organ procurement organization.  All questions were answered.    Coronavirus  disease (COVID-19) caused by severe acute respiratory virus coronavirus 2 (SARS-C0V 2) is associated with increased mortality in solid organ transplant recipients (SOT) compared to non-transplant patients. Vaccine responses to vaccination are depressed against SARS-CoV2 compared to normal individuals but improve with third vaccination doses. Vaccination prior to SOT provides both the best opportunity for transplant candidates to develop protective immunity and to reduce the risk of serious COVID19 infections post transplantation. Organ transplant candidates at [facility] will be required to receive SARS-CoV-2 vaccination prior to being listed with a an active status, whenever possible. Exceptions will be made for disability related reasons or for sincerely held Faith beliefs.     PHS: I discussed the use of organs from donors with PHS risk criteria, including the testing protocols utilized, as well as data from the literature regarding the likelihood of transmission of hepatitis or HIV.  The patient is willing to consider such grafts.  DCD: I discussed the use of organs recovered by donation after cardiac death (DCD), including slightly decreased graft survival and greater risk of arterial and biliary complications. The potential advantage to the recipient is possibly receiving a transplant sooner by accepting such an organ. The patient is willing to consider such grafts.  HBcAb: I discussed the use of organs from donors with HBcAb in conjunction with long term use of HBV antiviral drugs, such as lamivudine. The small but measurable risk of hepatitis B seroconversion was discussed as well as the potentially life long need to continue antiviral drugs. The patient is willing to consider such grafts.  HCV Non-viremic recipient: I discussed the use of HCV-positive organs in naive recipients, including the risk of viral transmission to the patients or others, potential insurance  barriers for antiviral medication coverage, risk for fibrosing cholestatic hepatitis, death or graft loss. The potential advantage to the recipient is the possibility of receiving a transplant sooner with decreased mortality risk by accepting such an organ. The patient is willing to consider such grafts.  LDLT: I discussed the nature of living donor liver transplant, including donor risks and more frequent recipient complications. The patient is willing to consider such grafts.

## 2023-04-13 NOTE — PROGRESS NOTES
PHARM.D. PRE-TRANSPLANT NOTE:    This patient's medication therapy was evaluated as part of her pre-transplant evaluation.      The following general pharmacologic concerns were noted: none    The following concerns for post-operative pain management were noted: none    The following pharmacologic concerns related to HCV therapy were noted: none      This patient's medication profile was reviewed for considerations for DAA Hepatitis C therapy:    [X]  No current inducers of CYP 3A4 or PGP  [X]  No amiodarone on this patient's EMR profile in the last 24 months  [X  No past or current atrial fibrillation on this patient's EMR profile       Current Outpatient Medications   Medication Sig Dispense Refill    albuterol (PROVENTIL) 2.5 mg /3 mL (0.083 %) nebulizer solution Take 3 mLs (2.5 mg total) by nebulization every 6 (six) hours as needed for Wheezing. Rescue 75 mL 2    albuterol (PROVENTIL/VENTOLIN HFA) 90 mcg/actuation inhaler Inhale 2 puffs every 4 hours as needed for cough, wheeze, or shortness of breath 18 g 11    ALPRAZolam (XANAX) 0.25 MG tablet Take 1 tablet (0.25 mg total) by mouth nightly as needed. FOR INSOMNIA (Patient taking differently: Take 0.125-0.25 mg by mouth nightly as needed. FOR INSOMNIA) 30 tablet 2    buPROPion (WELLBUTRIN XL) 150 MG TB24 tablet Take 2 tablets (300 mg total) by mouth once daily. 180 tablet 3    furosemide (LASIX) 40 MG tablet Take 1 tablet (40 mg total) by mouth once daily. 30 tablet 6    lactulose (CONSTULOSE) 10 gram/15 mL solution Take 30 mLs (20 g total) by mouth 2 (two) times daily. 5000 mL 6    loratadine (CLARITIN) 10 mg tablet Take 10 mg by mouth daily as needed.      multivitamin (THERAGRAN) per tablet Take 1 tablet by mouth once daily.      omeprazole (PRILOSEC) 20 MG capsule Take 1 capsule (20 mg total) by mouth once daily. 90 capsule 3    propranoloL (INDERAL LA) 80 MG 24 hr capsule Take 1 capsule (80 mg total) by mouth once daily. 30 capsule 11    rifAXIMin  (XIFAXAN) 550 mg Tab Take 1 tablet (550 mg total) by mouth 2 (two) times daily. 60 tablet 11    spironolactone (ALDACTONE) 100 MG tablet Take 1 tablet (100 mg total) by mouth once daily. 30 tablet 6    vitamin E 400 UNIT capsule Take 400 Units by mouth once daily.      fluticasone-umeclidin-vilanter (TRELEGY ELLIPTA) 200-62.5-25 mcg inhaler Inhale 1 puff into the lungs once daily. (Patient not taking: Reported on 4/13/2023) 60 each 11    terconazole (TERAZOL 7) 0.4 % Crea Place 1 applicator vaginally every evening. (Patient not taking: Reported on 4/13/2023) 45 g 1     No current facility-administered medications for this visit.           I am available for consultation and can be contacted, as needed by the other members of the Transplant team.

## 2023-04-13 NOTE — PROGRESS NOTES
Yumiko Shaffer seen during Fast Pass evaluation w/ daughter present.  Consents complete and signed.  Information regarding the live donor program provided.   Patient's daughter requesting additional information about living donor.  Will send via [facility] portal as requested.   Patient denies that she has a living donor at this time, but reports that she will work on identifying someone.  Patient to proceed w/ remainder of liver transplant evaluation as scheduled.  She denied the need for any further assistance and no questions noted.

## 2023-04-14 ENCOUNTER — TELEPHONE (OUTPATIENT)
Dept: TRANSPLANT | Facility: CLINIC | Age: 63
End: 2023-04-14
Payer: COMMERCIAL

## 2023-04-14 ENCOUNTER — HOSPITAL ENCOUNTER (OUTPATIENT)
Dept: CARDIOLOGY | Facility: HOSPITAL | Age: 63
Discharge: HOME | End: 2023-04-14
Attending: STUDENT IN AN ORGANIZED HEALTH CARE EDUCATION/TRAINING PROGRAM
Payer: COMMERCIAL

## 2023-04-14 ENCOUNTER — HOSPITAL ENCOUNTER (OUTPATIENT)
Dept: RADIOLOGY | Facility: HOSPITAL | Age: 63
Discharge: HOME | End: 2023-04-14
Attending: STUDENT IN AN ORGANIZED HEALTH CARE EDUCATION/TRAINING PROGRAM
Payer: COMMERCIAL

## 2023-04-14 VITALS
SYSTOLIC BLOOD PRESSURE: 107 MMHG | HEIGHT: 67 IN | DIASTOLIC BLOOD PRESSURE: 50 MMHG | HEART RATE: 68 BPM | BODY MASS INDEX: 37.04 KG/M2 | RESPIRATION RATE: 16 BRPM | WEIGHT: 236 LBS

## 2023-04-14 DIAGNOSIS — Z76.82 ORGAN TRANSPLANT CANDIDATE: ICD-10-CM

## 2023-04-14 LAB
ASCENDING AORTA: 3.22 CM
AV INDEX (PROSTH): 0.87
AV MEAN GRADIENT: 6 MMHG
AV PEAK GRADIENT: 12 MMHG
AV VALVE AREA: 3.01 CM2
AV VELOCITY RATIO: 0.83
BSA FOR ECHO PROCEDURE: 2.25 M2
CV ECHO LV RWT: 0.4 CM
CV STRESS BASE HR: 63 BPM
DIASTOLIC BLOOD PRESSURE: 59 MMHG
DOP CALC AO PEAK VEL: 1.75 M/S
DOP CALC AO VTI: 34.51 CM
DOP CALC LVOT AREA: 3.5 CM2
DOP CALC LVOT DIAMETER: 2.1 CM
DOP CALC LVOT PEAK VEL: 1.45 M/S
DOP CALC LVOT STROKE VOLUME: 103.72 CM3
DOP CALCLVOT PEAK VEL VTI: 29.96 CM
E WAVE DECELERATION TIME: 213.57 MSEC
E/A RATIO: 0.92
E/E' RATIO: 12.78 M/S
ECHO LV POSTERIOR WALL: 0.96 CM (ref 0.6–1.1)
EJECTION FRACTION: 65 %
FRACTIONAL SHORTENING: 31 % (ref 28–44)
INTERVENTRICULAR SEPTUM: 0.97 CM (ref 0.6–1.1)
IVRT: 99.9 MSEC
LA MAJOR: 5.61 CM
LA MINOR: 6.61 CM
LA WIDTH: 3.46 CM
LEFT ATRIUM SIZE: 5.15 CM
LEFT ATRIUM VOLUME INDEX: 42.4 ML/M2
LEFT ATRIUM VOLUME: 91.92 CM3
LEFT INTERNAL DIMENSION IN SYSTOLE: 3.35 CM (ref 2.1–4)
LEFT VENTRICLE DIASTOLIC VOLUME INDEX: 51.03 ML/M2
LEFT VENTRICLE DIASTOLIC VOLUME: 110.74 ML
LEFT VENTRICLE MASS INDEX: 76 G/M2
LEFT VENTRICLE SYSTOLIC VOLUME INDEX: 21.1 ML/M2
LEFT VENTRICLE SYSTOLIC VOLUME: 45.87 ML
LEFT VENTRICULAR INTERNAL DIMENSION IN DIASTOLE: 4.86 CM (ref 3.5–6)
LEFT VENTRICULAR MASS: 165.55 G
LV LATERAL E/E' RATIO: 10.45 M/S
LV SEPTAL E/E' RATIO: 16.43 M/S
MV A" WAVE DURATION": 11.7 MSEC
MV PEAK A VEL: 1.25 M/S
MV PEAK E VEL: 1.15 M/S
MV STENOSIS PRESSURE HALF TIME: 61.94 MS
MV VALVE AREA P 1/2 METHOD: 3.55 CM2
OHS CV CPX 1 MINUTE RECOVERY HEART RATE: 129 BPM
OHS CV CPX 85 PERCENT MAX PREDICTED HEART RATE MALE: 128
OHS CV CPX MAX PREDICTED HEART RATE: 151
OHS CV CPX PATIENT IS FEMALE: 1
OHS CV CPX PATIENT IS MALE: 0
OHS CV CPX PEAK DIASTOLIC BLOOD PRESSURE: 72 MMHG
OHS CV CPX PEAK HEAR RATE: 131 BPM
OHS CV CPX PEAK RATE PRESSURE PRODUCT: NORMAL
OHS CV CPX PEAK SYSTOLIC BLOOD PRESSURE: 114 MMHG
OHS CV CPX PERCENT MAX PREDICTED HEART RATE ACHIEVED: 87
OHS CV CPX RATE PRESSURE PRODUCT PRESENTING: 6741
PISA TR MAX VEL: 2.74 M/S
PULM VEIN S/D RATIO: 1.5
PV PEAK D VEL: 0.54 M/S
PV PEAK S VEL: 0.81 M/S
RA MAJOR: 5.5 CM
RA PRESSURE: 3 MMHG
RA WIDTH: 2.9 CM
RIGHT VENTRICULAR END-DIASTOLIC DIMENSION: 3.33 CM
RV TISSUE DOPPLER FREE WALL SYSTOLIC VELOCITY 1 (APICAL 4 CHAMBER VIEW): 17.75 CM/S
SINUS: 3.29 CM
STJ: 2.88 CM
SYSTOLIC BLOOD PRESSURE: 107 MMHG
TDI LATERAL: 0.11 M/S
TDI SEPTAL: 0.07 M/S
TDI: 0.09 M/S
TR MAX PG: 30 MMHG
TRICUSPID ANNULAR PLANE SYSTOLIC EXCURSION: 3.43 CM
TV REST PULMONARY ARTERY PRESSURE: 33 MMHG

## 2023-04-14 PROCEDURE — 93325 DOPPLER ECHO COLOR FLOW MAPG: CPT | Mod: 26,TXP,, | Performed by: INTERNAL MEDICINE

## 2023-04-14 PROCEDURE — 93975 VASCULAR STUDY: CPT | Mod: TC,TXP

## 2023-04-14 PROCEDURE — 93975 VASCULAR STUDY: CPT | Mod: 26,TXP,, | Performed by: STUDENT IN AN ORGANIZED HEALTH CARE EDUCATION/TRAINING PROGRAM

## 2023-04-14 PROCEDURE — 63600175 PHARM REV CODE 636 W HCPCS: Mod: TXP | Performed by: STUDENT IN AN ORGANIZED HEALTH CARE EDUCATION/TRAINING PROGRAM

## 2023-04-14 PROCEDURE — 93320 DOPPLER ECHO COMPLETE: CPT | Mod: 26,TXP,, | Performed by: INTERNAL MEDICINE

## 2023-04-14 PROCEDURE — 93351 STRESS TTE COMPLETE: CPT | Mod: TXP

## 2023-04-14 PROCEDURE — 93325 STRESS ECHO (CUPID ONLY): ICD-10-PCS | Mod: 26,TXP,, | Performed by: INTERNAL MEDICINE

## 2023-04-14 PROCEDURE — 76700 US EXAM ABDOM COMPLETE: CPT | Mod: TC,TXP

## 2023-04-14 PROCEDURE — 93351 STRESS TTE COMPLETE: CPT | Mod: 26,TXP,, | Performed by: INTERNAL MEDICINE

## 2023-04-14 PROCEDURE — 93351 STRESS ECHO (CUPID ONLY): ICD-10-PCS | Mod: 26,TXP,, | Performed by: INTERNAL MEDICINE

## 2023-04-14 PROCEDURE — 76700 US EXAM ABDOM COMPLETE: CPT | Mod: 26,TXP,, | Performed by: STUDENT IN AN ORGANIZED HEALTH CARE EDUCATION/TRAINING PROGRAM

## 2023-04-14 PROCEDURE — 76700 US ABDOMEN COMP WITH DOPPLER_PRE LIVER TRANSPLANT: ICD-10-PCS | Mod: 26,TXP,, | Performed by: STUDENT IN AN ORGANIZED HEALTH CARE EDUCATION/TRAINING PROGRAM

## 2023-04-14 PROCEDURE — 93320 STRESS ECHO (CUPID ONLY): ICD-10-PCS | Mod: 26,TXP,, | Performed by: INTERNAL MEDICINE

## 2023-04-14 PROCEDURE — 93975 US ABDOMEN COMP WITH DOPPLER_PRE LIVER TRANSPLANT: ICD-10-PCS | Mod: 26,TXP,, | Performed by: STUDENT IN AN ORGANIZED HEALTH CARE EDUCATION/TRAINING PROGRAM

## 2023-04-14 RX ORDER — DOBUTAMINE HYDROCHLORIDE 400 MG/100ML
10 INJECTION INTRAVENOUS
Status: COMPLETED | OUTPATIENT
Start: 2023-04-14 | End: 2023-04-14

## 2023-04-14 RX ORDER — ATROPINE SULFATE 0.1 MG/ML
1 INJECTION INTRAVENOUS
Status: COMPLETED | OUTPATIENT
Start: 2023-04-14 | End: 2023-04-14

## 2023-04-14 RX ADMIN — DOBUTAMINE HYDROCHLORIDE 10 MCG/KG/MIN: 400 INJECTION INTRAVENOUS at 10:04

## 2023-04-14 RX ADMIN — ATROPINE SULFATE 2 MG: 0.1 INJECTION INTRAVENOUS at 10:04

## 2023-04-14 NOTE — TELEPHONE ENCOUNTER
Patient: Yumiko Gonzalez       MRN: [id number]      : 1960     Age: 62 y.o.  [address]  [facility] [address]    Providers  Hepatologists: James Brambila MD    Priority of review: Cancer    Patient Transplant Status: In Evaluation    Reason for presentation: Reassessment    Clinical Summary: 62-year-old female with DURAN related cirrhosis. CT 2023 during transplant evaluation showed 1cm enhancing lesion in right hepatic lobe with washout concerning for HCC.    Imaging to be reviewed: CT 2023    HCC Treatment History: N/A    ABO: O NEG    Platelets:   Lab Results   Component Value Date/Time    PLT 85 (L) 2023 08:55 AM     Creatinine:   Lab Results   Component Value Date/Time    CREATININE 0.7 2023 08:10 AM     Bilirubin:   Lab Results   Component Value Date/Time    BILITOT 3.3 (H) 2023 08:10 AM     AFP Last 3 each if available:   Lab Results   Component Value Date/Time    AFP 2.7 2023 08:54 AM    AFP 2.4 2023 03:49 PM    AFP 3.2 2022 09:49 AM       MELD: MELD-Na score: 14 at 2023  8:55 AM  MELD score: 14 at 2023  8:55 AM  Calculated from:  Serum Creatinine: 0.7 mg/dL (Using min of 1 mg/dL) at 2023  8:10 AM  Serum Sodium: 140 mmol/L (Using max of 137 mmol/L) at 2023  8:10 AM  Total Bilirubin: 3.3 mg/dL at 2023  8:10 AM  INR(ratio): 1.3 at 2023  8:55 AM  Age: 62 years    Plan:     Follow-up Provider:

## 2023-04-18 ENCOUNTER — TELEPHONE (OUTPATIENT)
Dept: PRIMARY CARE CLINIC | Facility: CLINIC | Age: 63
End: 2023-04-18
Payer: COMMERCIAL

## 2023-04-18 ENCOUNTER — DOCUMENTATION ONLY (OUTPATIENT)
Dept: TRANSPLANT | Facility: CLINIC | Age: 63
End: 2023-04-18
Payer: COMMERCIAL

## 2023-04-18 NOTE — TELEPHONE ENCOUNTER
Contacted client per message concerning changing her appointment. Clinician will not be in the clinic on the day for which she is requesting to change appointment. She verbalized agreement to keep it scheduled for Thursday, 4/20/2023 at 11:00.    Note: patient reported that she had test run to determine if she is a candidate for liver transplant. She will get results in a week to 2 weeks.

## 2023-04-19 ENCOUNTER — OFFICE VISIT (OUTPATIENT)
Dept: PRIMARY CARE CLINIC | Facility: CLINIC | Age: 63
End: 2023-04-19
Payer: COMMERCIAL

## 2023-04-19 ENCOUNTER — TELEPHONE (OUTPATIENT)
Dept: TRANSPLANT | Facility: CLINIC | Age: 63
End: 2023-04-19
Payer: COMMERCIAL

## 2023-04-19 ENCOUNTER — COMMITTEE REVIEW (OUTPATIENT)
Dept: TRANSPLANT | Facility: CLINIC | Age: 63
End: 2023-04-19
Payer: COMMERCIAL

## 2023-04-19 VITALS
HEART RATE: 63 BPM | WEIGHT: 235.13 LBS | SYSTOLIC BLOOD PRESSURE: 106 MMHG | RESPIRATION RATE: 17 BRPM | TEMPERATURE: 98 F | BODY MASS INDEX: 36.9 KG/M2 | DIASTOLIC BLOOD PRESSURE: 68 MMHG | HEIGHT: 67 IN | OXYGEN SATURATION: 96 %

## 2023-04-19 DIAGNOSIS — K75.81 NONALCOHOLIC STEATOHEPATITIS: Chronic | ICD-10-CM

## 2023-04-19 DIAGNOSIS — I10 PRIMARY HYPERTENSION: Chronic | ICD-10-CM

## 2023-04-19 DIAGNOSIS — K76.82 HEPATIC ENCEPHALOPATHY: Primary | ICD-10-CM

## 2023-04-19 PROCEDURE — 3008F PR BODY MASS INDEX (BMI) DOCUMENTED: ICD-10-PCS | Mod: CPTII,NTX,S$GLB, | Performed by: FAMILY MEDICINE

## 2023-04-19 PROCEDURE — 99999 PR PBB SHADOW E&M-EST. PATIENT-LVL V: CPT | Mod: PBBFAC,TXP,, | Performed by: FAMILY MEDICINE

## 2023-04-19 PROCEDURE — 3078F PR MOST RECENT DIASTOLIC BLOOD PRESSURE < 80 MM HG: ICD-10-PCS | Mod: CPTII,NTX,S$GLB, | Performed by: FAMILY MEDICINE

## 2023-04-19 PROCEDURE — 99214 PR OFFICE/OUTPT VISIT, EST, LEVL IV, 30-39 MIN: ICD-10-PCS | Mod: NTX,S$GLB,, | Performed by: FAMILY MEDICINE

## 2023-04-19 PROCEDURE — 99999 PR PBB SHADOW E&M-EST. PATIENT-LVL V: ICD-10-PCS | Mod: PBBFAC,TXP,, | Performed by: FAMILY MEDICINE

## 2023-04-19 PROCEDURE — 99214 OFFICE O/P EST MOD 30 MIN: CPT | Mod: NTX,S$GLB,, | Performed by: FAMILY MEDICINE

## 2023-04-19 PROCEDURE — 1160F RVW MEDS BY RX/DR IN RCRD: CPT | Mod: CPTII,NTX,S$GLB, | Performed by: FAMILY MEDICINE

## 2023-04-19 PROCEDURE — 3008F BODY MASS INDEX DOCD: CPT | Mod: CPTII,NTX,S$GLB, | Performed by: FAMILY MEDICINE

## 2023-04-19 PROCEDURE — 3074F PR MOST RECENT SYSTOLIC BLOOD PRESSURE < 130 MM HG: ICD-10-PCS | Mod: CPTII,NTX,S$GLB, | Performed by: FAMILY MEDICINE

## 2023-04-19 PROCEDURE — 1159F PR MEDICATION LIST DOCUMENTED IN MEDICAL RECORD: ICD-10-PCS | Mod: CPTII,NTX,S$GLB, | Performed by: FAMILY MEDICINE

## 2023-04-19 PROCEDURE — 1160F PR REVIEW ALL MEDS BY PRESCRIBER/CLIN PHARMACIST DOCUMENTED: ICD-10-PCS | Mod: CPTII,NTX,S$GLB, | Performed by: FAMILY MEDICINE

## 2023-04-19 PROCEDURE — 3074F SYST BP LT 130 MM HG: CPT | Mod: CPTII,NTX,S$GLB, | Performed by: FAMILY MEDICINE

## 2023-04-19 PROCEDURE — 3044F PR MOST RECENT HEMOGLOBIN A1C LEVEL <7.0%: ICD-10-PCS | Mod: CPTII,NTX,S$GLB, | Performed by: FAMILY MEDICINE

## 2023-04-19 PROCEDURE — 3078F DIAST BP <80 MM HG: CPT | Mod: CPTII,NTX,S$GLB, | Performed by: FAMILY MEDICINE

## 2023-04-19 PROCEDURE — 3044F HG A1C LEVEL LT 7.0%: CPT | Mod: CPTII,NTX,S$GLB, | Performed by: FAMILY MEDICINE

## 2023-04-19 PROCEDURE — 1159F MED LIST DOCD IN RCRD: CPT | Mod: CPTII,NTX,S$GLB, | Performed by: FAMILY MEDICINE

## 2023-04-19 NOTE — PROGRESS NOTES
THIS DOCUMENT WAS MADE IN PART WITH VOICE RECOGNITION SOFTWARE.  OCCASIONALLY THIS SOFTWARE WILL MISINTERPRET WORDS OR PHRASES.      [facility] Care Provider Appointment   [facility]       Patient ID: uYmiko Gonzalez is a 62 y.o. female.    ASSESSMENT/PLAN by Problem List:    1. Hepatic encephalopathy    2. Nonalcoholic steatohepatitis    3. Primary hypertension       There is some improvement.  Thought process and fatigue have slightly improved but she still is very tired by mid day.  Concentration is still not back to normal.  She remains on the lactulose.  She is following closely with hepatology.  She informs me that they found a lesion on the liver that is suspicious for HCC.  This is affecting the urgency of her transplant candidacy.  discussed labs, vit supp  Continue current medications.  Will see her back in four weeks.  She still not able to return to work.    Follow Up:  Four weeks    Subjective:     Chief Complaint   Patient presents with    Follow-up     Discuss info from visit with transplant clinic and lab work     I have reviewed the information entered by the ancillary staff regarding the chief complaint as well as the related history.    HPI    Patient is a/an 62 y.o.  female     Follow-up several topics.  She is seeing some improvement but still not back to baseline.  See above for details    For complete problem list, past medical history, surgical history, social history, etc., see appropriate section in the electronic medical record    Review of Systems   Constitutional:  Positive for fatigue. Negative for chills and fever.   Cardiovascular:  Positive for leg swelling. Negative for chest pain.   Psychiatric/Behavioral:  Positive for decreased concentration and dysphoric mood. Negative for suicidal ideas.      Objective     Physical Exam  Vitals reviewed.   Constitutional:       General: She is not in acute distress.     Appearance: She is well-developed. She is  "not ill-appearing.   HENT:      Head: Normocephalic and atraumatic.   Eyes:      General: No scleral icterus.     Conjunctiva/sclera: Conjunctivae normal.   Cardiovascular:      Rate and Rhythm: Normal rate and regular rhythm.      Heart sounds: Normal heart sounds.      Comments: 1+ bilateral ankle and pretibial edema  Pulmonary:      Effort: Pulmonary effort is normal. No respiratory distress.      Breath sounds: Normal breath sounds. No wheezing or rales.   Skin:     General: Skin is dry.      Findings: No rash.   Neurological:      Mental Status: She is alert and oriented to person, place, and time.   Psychiatric:         Behavior: Behavior normal.     Vitals:    04/19/23 1318   BP: 106/68   BP Location: Left arm   Patient Position: Sitting   BP Method: Large (Manual)   Pulse: 63   Resp: 17   Temp: 97.6 °F (36.4 °C)   TempSrc: Oral   SpO2: 96%   Weight: 106.7 kg (235 lb 1.9 oz)   Height: 5' 7" (1.702 m)       "

## 2023-04-19 NOTE — COMMITTEE REVIEW
Yumiko Gonzalez's case presented to selection committee.  Patient has been accepted for liver transplant due to Cirrhosis: Fatty Liver (DURAN) and complications of end stage liver disease including liver lesion, Edema, ascites, encephalopathy, esophageal varices, splenomegaly, portal hypertension, thrombocytopenia, coagulopathy, hyperbilirubinemia, and hypoalbuminemia with a MELD score of 14.  Patient has no absolute contraindications for liver transplant.  Patient will be listed pending financial approval.  Will request HCC exception.    Patient will accept HBcAb positive livers.  Patient will accept HCVAB positive livers.  Patient will accept DCD livers.  Patient will accept HCV PEDRO positive livers  Patient will not accept HBV PEDRO positive livers  I was present at the committee meeting and attest to the decision of the committee.    Ruel Ninoondtameka  04/21/2023

## 2023-04-19 NOTE — TELEPHONE ENCOUNTER
Returned call to Tiffanie. Explained that her case was discussed by the transplant selection committee and she has been approved for liver transplant. Next steps are for the coordinator - getting clinicals to the insurance company for approval & when that is obtained, scheduling labs for listing. Explained the process of insurance clearance can take 5-10 business days depending on the company. Also explained to patient that Shabnam will be taking over her case at some point as she is the HCC transplant coordinator for our team. Pt states that she has a Y90 consult scheduled & will await our further instructions.

## 2023-04-19 NOTE — TELEPHONE ENCOUNTER
"----- Message from Moy Devries sent at 4/19/2023  4:00 PM CDT -----  Consult/Advisory:   Name Of Caller: Self   Contact Preference?: [phone]       What is the nature of the call?: Returning call to [facility]     ----- Message -----  From: Дмитрий Campoverde  Sent: 4/19/2023   3:49 PM CDT  To: [facility] Pre-Liver Transplant Non-Clinical    Consult/Advisory:          Name Of Caller: Self      Contact Preference?: [phone]       What is the nature of the call?: Returning call to [facility]          Additional Notes:  "Thank you for all that you do for our patients"         "

## 2023-04-20 ENCOUNTER — CLINICAL SUPPORT (OUTPATIENT)
Dept: PRIMARY CARE CLINIC | Facility: CLINIC | Age: 63
End: 2023-04-20
Payer: COMMERCIAL

## 2023-04-20 DIAGNOSIS — F32.A DEPRESSIVE DISORDER: ICD-10-CM

## 2023-04-20 DIAGNOSIS — F43.21 GRIEF REACTION: Primary | ICD-10-CM

## 2023-04-20 PROCEDURE — 99499 UNLISTED E&M SERVICE: CPT | Mod: NTX,S$GLB,, | Performed by: FAMILY MEDICINE

## 2023-04-20 PROCEDURE — 99999 PR PBB SHADOW E&M-EST. PATIENT-LVL I: ICD-10-PCS | Mod: PBBFAC,TXP,, | Performed by: SOCIAL WORKER

## 2023-04-20 PROCEDURE — 99499 NO LOS: ICD-10-PCS | Mod: NTX,S$GLB,, | Performed by: FAMILY MEDICINE

## 2023-04-20 PROCEDURE — 99999 PR PBB SHADOW E&M-EST. PATIENT-LVL I: CPT | Mod: PBBFAC,TXP,, | Performed by: SOCIAL WORKER
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
## 2023-04-21 ENCOUNTER — PATIENT MESSAGE (OUTPATIENT)
Dept: PRIMARY CARE CLINIC | Facility: CLINIC | Age: 63
End: 2023-04-21
Payer: COMMERCIAL

## 2023-04-26 ENCOUNTER — TELEPHONE (OUTPATIENT)
Dept: PRIMARY CARE CLINIC | Facility: CLINIC | Age: 63
End: 2023-04-26
Payer: COMMERCIAL

## 2023-04-26 NOTE — TELEPHONE ENCOUNTER
Spoke with pt and informed her that [employer] disability paperwork was completed, faxed to them and confirmation was received. Copy made and filed for our records. Original copy mailed to pt's home per her request to have a copy.

## 2023-04-27 ENCOUNTER — TELEPHONE (OUTPATIENT)
Dept: TRANSPLANT | Facility: CLINIC | Age: 63
End: 2023-04-27
Payer: COMMERCIAL

## 2023-04-27 ENCOUNTER — OFFICE VISIT (OUTPATIENT)
Dept: INTERVENTIONAL RADIOLOGY/VASCULAR | Facility: CLINIC | Age: 63
End: 2023-04-27
Payer: COMMERCIAL

## 2023-04-27 DIAGNOSIS — C22.0 HCC (HEPATOCELLULAR CARCINOMA): Primary | ICD-10-CM

## 2023-04-27 PROBLEM — N18.6 END-STAGE RENAL DISEASE: Status: ACTIVE | Noted: 2023-04-27

## 2023-04-27 PROCEDURE — 1159F PR MEDICATION LIST DOCUMENTED IN MEDICAL RECORD: ICD-10-PCS | Mod: CPTII,95,TXP, | Performed by: FAMILY MEDICINE

## 2023-04-27 PROCEDURE — 1160F PR REVIEW ALL MEDS BY PRESCRIBER/CLIN PHARMACIST DOCUMENTED: ICD-10-PCS | Mod: CPTII,95,TXP, | Performed by: FAMILY MEDICINE

## 2023-04-27 PROCEDURE — 3044F HG A1C LEVEL LT 7.0%: CPT | Mod: CPTII,95,TXP, | Performed by: FAMILY MEDICINE

## 2023-04-27 PROCEDURE — 1159F MED LIST DOCD IN RCRD: CPT | Mod: CPTII,95,TXP, | Performed by: FAMILY MEDICINE

## 2023-04-27 PROCEDURE — 1160F RVW MEDS BY RX/DR IN RCRD: CPT | Mod: CPTII,95,TXP, | Performed by: FAMILY MEDICINE

## 2023-04-27 PROCEDURE — 3044F PR MOST RECENT HEMOGLOBIN A1C LEVEL <7.0%: ICD-10-PCS | Mod: CPTII,95,TXP, | Performed by: FAMILY MEDICINE

## 2023-04-27 PROCEDURE — 99204 PR OFFICE/OUTPT VISIT, NEW, LEVL IV, 45-59 MIN: ICD-10-PCS | Mod: 95,TXP,, | Performed by: FAMILY MEDICINE

## 2023-04-27 PROCEDURE — 99204 OFFICE O/P NEW MOD 45 MIN: CPT | Mod: 95,TXP,, | Performed by: FAMILY MEDICINE

## 2023-04-27 NOTE — TELEPHONE ENCOUNTER
Patient has been placed on liver transplant waiting list.  Called patient to inform her and answer any questions she has.  Discussed that her new liver transplant coordinator will be Shabnam Navarrete RN.  Explained that Shabnam coordinates care for HCC transplant patients. Ms. Gonzalez had an appointment today with Ree Rothman NP to discuss treatment for HCC. Y90 radioactivation discussed during her appointment. Patient scheduled for mapping on 5/16/202.

## 2023-04-27 NOTE — TELEPHONE ENCOUNTER
"  LIVER WAIT LISTING NOTE    **NOTE:   IF ANY EXTERNAL LABS ARE USED FOR LISTING THE VALUES AND DATES MUST BE ENTERED IN EPIC TO GENERATE THIS NOTE**    Date of Financial clearance to list: 23    [facility]:        Organ: Liver    Last Name: Carlos  First Name: Yumiko         : 1960       Gender:     female   MRN#: [id number]                                   State of Permanent Residence: [address] [place] [id number]    Ethnicity: Not  or /a   Race:      White    CLINICAL INFORMATION       ABO  ABO Blood Group:   O NEG     ABO Confirmation: (THESE DATES MUST BE PRIOR TO THE LIST DATE AND SUPPORTED BY SEPARATE LAB REPORTS)    Internal Results    Lab Results   Component Value Date    GROUPTRH O NEG 2023    GROUPTRH O NEG 2023     No results found for: ABO    External Results    ABO Date 1:  ABO Result 1:  ABO Date 2:  ABO Result 2:     Are either of these ABO results based on External Labs? NO        VITALS  Height:    Ht Readings from Last 1 Encounters:   23 5' 7" (1.702 m)     Weight:    Wt Readings from Last 1 Encounters:   23 106.7 kg (235 lb 1.9 oz)       LIVER ORGAN INFORMATION  Candidate Medical Urgency Status: ACTIVE    Number of Previous Transplants: 0    MELD/PELD Data Collection:  Had dialysis twice, or 24 hours of CVVHD, within 1 week prior to the serum creatinine test: No  Encephalopathy: 1 - 2 Date: 2023  Ascites: moderate Date: 2023          MELD Score:  MELD-Na score: 14 at 2023 11:31 AM  MELD score: 14 at 2023 11:31 AM  Calculated from:  Serum Creatinine: 0.7 mg/dL (Using min of 1 mg/dL) at 2023 11:31 AM  Serum Sodium: 140 mmol/L (Using max of 137 mmol/L) at 2023  8:10 AM  Total Bilirubin: 3.3 mg/dL at 2023  8:10 AM  INR(ratio): 1.3 at 2023  8:55 AM  Age: 62 years  Lab Results   Component Value Date    ALBUMIN 2.9 (L) 2023       Additional Organs: none  Kidney: No    If Kidney is "Yes" above, check " Diagnosis and enter the medical eligibility below for a simultaneous liver/kidney:    Diagnosis: Chronic kidney disease (CKD) with measured or calculated GFR less than or equal to 60 ml/min for greater than 90 consecutive days. At least one of the following must qualify for CKD:  Date Begun Dialysis CrCl (ml/min)  Must be < or = 30 eGFR (ml/min) Must be < or = 30    Yes/no:                    Diagnosis: Sustained acute kidney injury (must be confirmed at least once every 7 days). Please select at least one of the following criteria:  Date of test or treatment Dialysis received CrCl (ml/min) Must be < or = 30 eGFR (ml/min) Must be < or = 25 Number of days since previous test or treatment (must be less than or equal to 7 days)    Yes/No:                  Diagnosis: Metabolic disease, Check all diagnosis that apply:     Hyperoxaluria    Atypical hemolytic uremic syndrome (HUS) from mutations in factor H or factor I    Familial non-neuropathic systemic amyloidosis    Methylmalonic aciduria     Transplant nephrologist confirming candidate's most recent diagnosis for SLK: N/A               Will Recipient Accept?   Accept HBcAB Positive Organ:  YES  Accept HBV PEDRO Positive Organ:  NO  Accept HCV Antibody Positive Organ: YES  Accept HCV PEDRO Positive Organ:  YES                        Local: No                           Import: No  Accept DCD Organ:    YES  Minimum acceptable donor age:  5 years  Maximum acceptable donor age:  99 years  Minimum acceptable donor weight:  40 lbs    Maximum acceptable donor weight:  440 lb  Maximum miles the organ or  Recovery team will travel:   5000 miles    TCR Information    Citizenship: [place] Citizen   Country of permanent residence:   Year of entry to the [place]:   Highest education level: Attended College/Technical School    Patient on Life Support: No  Functional Status: 60% - requires occasional assistance but is able to care for needs   Working for income: No  If no, reason not working:  Demands of Treatment  Previous Pancreas [facility] Infusion - No  Source of payment: Private Insurance  Diabetes: No  Any previous malignancy: YES - newly diagnosed with HCC  Neoadjuvant Therapy: No  Has candidate ever had a diagnosis of HCC: YES    Liver Medical Factors  Previous abdominal surgery: Yes  Spontaneous Bacterial Peritonitis: No  History of Portal Vein Thrombosis: No  Transjugular Intrahepatic Portosystemic Shunt: No      Blood type was verified by myself and Shabnam Navarrete RN

## 2023-04-27 NOTE — Clinical Note
Thank you for referring Ms. Gonzalez to Interventional Radiology at the [facility]. Please don't hesitate to contact us if there are any questions regarding this evaluation at [phone].   Sincerely, NENA Leonard, FNP Interventional Radiology

## 2023-04-27 NOTE — PROGRESS NOTES
"Subjective     Patient ID: Yumiko Gonzalez is a 62 y.o. female.    Chief Complaint: Lesion and Hepatocellular Carcinoma    Virtual visit with patient referred to Interventional Radiology by James Brambila MD to discuss treatment of hepatocellular carcinoma. Patient has a history of DURAN related cirrhosis. HCC was identified during transplant workup. A CT scan obtained on 2023 noted "1.0 cm right hepatic lobe segment VI enhancing lesion with washout concerning for HCC in this patient with hepatic cirrhosis.  Hemangioma also possible." Patient has complaints of abdominal discomfort associated with constipation. She also has complaints of fatigue. Her daughter is present via speaker phone for our visit. She was reviewed in liver conference: "2.2 cm s6/7 w/enhancement and washout; largest measurement best appreciated on coronal images."    Review of Systems   Constitutional:  Positive for activity change (describes as "sporadic") and fatigue. Negative for appetite change, chills and fever.   Respiratory:  Positive for cough (believes due to post nasal drip). Negative for shortness of breath, wheezing and stridor.    Cardiovascular:  Positive for leg swelling (improves with elevation). Negative for chest pain and palpitations.   Gastrointestinal:  Positive for constipation (lactulose helps). Negative for abdominal distention, abdominal pain, diarrhea, nausea and vomiting.        Objective     Physical Exam  Constitutional:       General: She is not in acute distress.     Appearance: She is well-developed. She is not diaphoretic.   HENT:      Head: Normocephalic and atraumatic.   Pulmonary:      Effort: Pulmonary effort is normal. No respiratory distress.   Neurological:      Mental Status: She is alert and oriented to person, place, and time.   Psychiatric:         Behavior: Behavior normal.         Thought Content: Thought content normal.         Judgment: Judgment normal.     ECO  MELD-Na score: 14 at 2023 " 11:31 AM  MELD score: 14 at 4/14/2023 11:31 AM  Calculated from:  Serum Creatinine: 0.7 mg/dL (Using min of 1 mg/dL) at 4/14/2023 11:31 AM  Serum Sodium: 140 mmol/L (Using max of 137 mmol/L) at 4/13/2023  8:10 AM  Total Bilirubin: 3.3 mg/dL at 4/13/2023  8:10 AM  INR(ratio): 1.3 at 4/13/2023  8:55 AM  Age: 62 years  Child [place]: Class C  Transplant Status: in evaluation    Reviewed hepatology progress note    CT 4/13/2023         Assessment and Plan     Problem List Items Addressed This Visit    None  Visit Diagnoses       HCC (hepatocellular carcinoma)    -  Primary    Relevant Orders    IR Embolization for Tumor_Organ Ischemia    NM Liver Imaging Static PRE Y-90 Emboliz    NM Liver Imaging Static POST Y-90 Emboli    NM Spect/CT Single Area    NM Spect/CT Single Area    IR Embolization for Tumor_Organ Ischemia    Comprehensive Metabolic Panel    Bilirubin, Direct    Protime-INR    CBC Auto Differential            The patient location is: [place]  The chief complaint leading to consultation is: HCC    Visit type: audiovisual    Face to Face time with patient: 20 minutes  25 minutes of total time spent on the encounter, which includes face to face time and non-face to face time preparing to see the patient (eg, review of tests), Obtaining and/or reviewing separately obtained history, Documenting clinical information in the electronic or other health record, Independently interpreting results (not separately reported) and communicating results to the patient/family/caregiver, or Care coordination (not separately reported).         Each patient to whom he or she provides medical services by telemedicine is:  (1) informed of the relationship between the physician and patient and the respective role of any other health care provider with respect to management of the patient; and (2) notified that he or she may decline to receive medical services by telemedicine and may withdraw from such care at any time.    Notes:    Explained to patient can offer treatment with radioembolization. Yttrium 90 Radioembolization discussed in detail with the patient including risks (including, but not limited to, pain, bleeding, infection, damage to nearby structures, and the need for additional procedures), benefits, potential complications, usual pre and post procedure course.  Discussed the need for initial Angiogram mapping study prior to scheduling the actual Radioembolization procedure. Utilized images from the patient's chart, the internet, and illustrations to help explain procedure. The patient voices understanding and all questions have been answered.  The patient agrees to proceed as planned. Patient scheduled for mapping on 5/16/2023. Clinic phone number provided.

## 2023-04-28 DIAGNOSIS — Z76.82 ORGAN TRANSPLANT CANDIDATE: Primary | ICD-10-CM

## 2023-05-01 ENCOUNTER — PATIENT MESSAGE (OUTPATIENT)
Dept: PRIMARY CARE CLINIC | Facility: CLINIC | Age: 63
End: 2023-05-01
Payer: COMMERCIAL

## 2023-05-01 ENCOUNTER — PATIENT MESSAGE (OUTPATIENT)
Dept: TRANSPLANT | Facility: CLINIC | Age: 63
End: 2023-05-01
Payer: COMMERCIAL

## 2023-05-01 RX ORDER — SULFAMETHOXAZOLE AND TRIMETHOPRIM 800; 160 MG/1; MG/1
1 TABLET ORAL 2 TIMES DAILY
Qty: 20 TABLET | Refills: 0 | Status: SHIPPED | OUTPATIENT
Start: 2023-05-01 | End: 2023-07-09

## 2023-05-01 NOTE — TELEPHONE ENCOUNTER
See my chart message from her daughter  .   We do worry about abdominal infections in patients with cirrhosis.  So if she is having fever with mental status changes are just feels really poorly the safest thing to do is go to the emergency room.  If she is generally feeling well with mild symptoms then I sent in a prescription for Bactrim.

## 2023-05-01 NOTE — TELEPHONE ENCOUNTER
Spoke with pt, discussed your message, pt verbalized understanding to all.       She reports that she feels generally well, but suspects that she may have the flu. Denies body aches. I asked pt if she could come for a flu test and she will be here at 11.

## 2023-05-02 ENCOUNTER — PATIENT MESSAGE (OUTPATIENT)
Dept: TRANSPLANT | Facility: CLINIC | Age: 63
End: 2023-05-02
Payer: COMMERCIAL

## 2023-05-03 ENCOUNTER — TELEPHONE (OUTPATIENT)
Dept: TRANSPLANT | Facility: CLINIC | Age: 63
End: 2023-05-03
Payer: COMMERCIAL

## 2023-05-03 ENCOUNTER — TELEPHONE (OUTPATIENT)
Dept: PRIMARY CARE CLINIC | Facility: CLINIC | Age: 63
End: 2023-05-03
Payer: COMMERCIAL

## 2023-05-03 ENCOUNTER — PATIENT MESSAGE (OUTPATIENT)
Dept: PRIMARY CARE CLINIC | Facility: CLINIC | Age: 63
End: 2023-05-03
Payer: COMMERCIAL

## 2023-05-03 DIAGNOSIS — K74.60 LIVER CIRRHOSIS SECONDARY TO NASH: ICD-10-CM

## 2023-05-03 DIAGNOSIS — C22.0 HEPATOCELLULAR CARCINOMA: ICD-10-CM

## 2023-05-03 DIAGNOSIS — Z76.82 ORGAN TRANSPLANT CANDIDATE: ICD-10-CM

## 2023-05-03 DIAGNOSIS — K75.81 LIVER CIRRHOSIS SECONDARY TO NASH: ICD-10-CM

## 2023-05-03 DIAGNOSIS — R91.1 SOLITARY PULMONARY NODULE: Primary | ICD-10-CM

## 2023-05-03 NOTE — TELEPHONE ENCOUNTER
Patient notified. Orders entered and  messaged to schedule follow-up chest CT in 3 months (July 2023)       ----- Message from Jayden Polanco RN sent at 4/28/2023  9:02 AM CDT -----  Regarding: repeat CT chest in 3 months.  repeat CT chest in 3 months.    ----- Message -----  From: Jayden Polanco RN  Sent: 4/25/2023  10:14 AM CDT  To: Jayden Polanco RN      ----- Message -----  From: James Brambila MD  Sent: 4/14/2023  10:30 AM CDT  To: [facility] Pre-Liver Transplant Clinical    Reviewed. CT shows two small lung nodules. Please repeat CT chest in 3 months.

## 2023-05-03 NOTE — TELEPHONE ENCOUNTER
Returned call to pt. She needed to cancel appt for tomorrow and reschedule. She is rescheduled for 5/9/2023 at 11:00.     Time was spent providing supportive therapy secondary to continued health px. She was encouraged to utilize good self care.

## 2023-05-06 ENCOUNTER — PATIENT MESSAGE (OUTPATIENT)
Dept: PRIMARY CARE CLINIC | Facility: CLINIC | Age: 63
End: 2023-05-06
Payer: COMMERCIAL

## 2023-05-08 ENCOUNTER — TELEPHONE (OUTPATIENT)
Dept: TRANSPLANT | Facility: CLINIC | Age: 63
End: 2023-05-08
Payer: COMMERCIAL

## 2023-05-08 NOTE — TELEPHONE ENCOUNTER
----- Message from Moy Devries sent at 5/8/2023  9:46 AM CDT -----  Regarding: RE: July--chest CT    Called and sp to pt\; ct scan audrey'ed for 7/19.  .  ----- Message -----  From: Shabnam Navarrete  Sent: 5/3/2023   2:31 PM CDT  To: Moy Devries  Subject: July--chest CT                                   Please schedule chest CT in July 2023

## 2023-05-09 ENCOUNTER — CLINICAL SUPPORT (OUTPATIENT)
Dept: PRIMARY CARE CLINIC | Facility: CLINIC | Age: 63
End: 2023-05-09
Payer: COMMERCIAL

## 2023-05-09 DIAGNOSIS — F32.A DEPRESSIVE DISORDER: Primary | ICD-10-CM

## 2023-05-09 DIAGNOSIS — F41.1 GAD (GENERALIZED ANXIETY DISORDER): ICD-10-CM

## 2023-05-09 PROCEDURE — 99499 UNLISTED E&M SERVICE: CPT | Mod: NTX,S$GLB,, | Performed by: SOCIAL WORKER

## 2023-05-09 PROCEDURE — 99999 PR PBB SHADOW E&M-EST. PATIENT-LVL I: CPT | Mod: PBBFAC,TXP,, | Performed by: SOCIAL WORKER

## 2023-05-09 PROCEDURE — 99999 PR PBB SHADOW E&M-EST. PATIENT-LVL I: ICD-10-PCS | Mod: PBBFAC,TXP,, | Performed by: SOCIAL WORKER

## 2023-05-09 PROCEDURE — 99499 NO LOS: ICD-10-PCS | Mod: NTX,S$GLB,, | Performed by: SOCIAL WORKER

## 2023-05-09 NOTE — PROGRESS NOTES
"Individual Psychotherapy (PhD/LCSW) - patient is 15 minutes late for session.     2023    Site:  [place] [address]      Chief complaint/reason for encounter: interpersonal, grief, anxiety.    Mood check: scale of:0 best, 10 worst. Patient rated:  Depression  - 3 - comes in waves   Anxiety - "I've been having a little bit that lately because of what is going on."    Risk parameters:  Patient reports no suicidal ideation  Patient reports no homicidal ideation  Patient reports no self-injurious behavior  Patient reports no violent behavior    Bridge: N/A    Review of home assignment:    N/A    [place]:  [place]   Health px  Daughter in law    History of present condition/content of session:     Review of sx:  She reported poor concentration, easily distracted, and tearful. She c/o hopelessness and worthlessness. She admitted to a hx low self esteem. In the past, and had a fear that her  would leave her because of her weight. She acknowledged hx of weight px since the 4th grade. Hx of being bullied in school due to her weight.  She said that she has taken care of others at her own expense and has always been a people pleaser. This came up as a result of self validation in relation to her daughter in law (encouraging pt to not justify feeling angry with her daughter in law, she is entitled to her feelings).      She said what brought this about is her daughter in law being commenting on the way that pt spent life insurance money from her . She said that her  was cremated and there was not a  service. It was decided to have a [place] on May 20, close to both of their birthdays. May 16th for has appointment for Mapping through Radiology. Legion on her liver is suspicious for cancer. But pt said she does not want to have a biopsy on the liver to determine for sure if it is cancer. Because she doesn't want another invasive procedure. She said she forgot that her " maternal aunt  from liver cancer, and encouraged to remind her PCP about that. Time was spent exploring thoughts/feelings related to her current medical situation. She stated the main feeling is over whelmed with it. She described it like when she has taken one step forward and then she feels likes she takes 2 steps back when something happens.        Therapeutic Intervention:   Pt was assessed for present condition and areas of clinical concern. She was provided with empathy and support as she described her current situation.  Discussed connection between thought, mood and behavior and the ability to change behavior and mood by examining thoughts. Empathy and support were provided for the pt.'s expression of thoughts/feelings during the session.  Time was spent exploring sources of thoughts.       Treatment plan:  Target symptoms: Anxiety, worry, financial stress, and grief  Why chosen therapy is appropriate versus another modality: evidence based practice  Outcome monitoring methods: checklist/rating scale  Therapeutic intervention type: supportive psychotherapy    Patient's response to intervention:  The patient's response to intervention is motivated.     Progress toward goals and other mental status changes:  The patient's progress toward goals is fair .     -Goal: - coping skills for painful emotions     Diagnosis:   Major Depressive Disorder   Generalized Anxiety Disorder  Grief Reaction     Plan:  individual psychotherapy -  Clinician plans to provide supportive counseling through the medical crisis.     Return to clinic: 2023 at 2:00 pm    Length of Service (minutes): 45 -

## 2023-05-11 ENCOUNTER — PATIENT MESSAGE (OUTPATIENT)
Dept: PRIMARY CARE CLINIC | Facility: CLINIC | Age: 63
End: 2023-05-11
Payer: COMMERCIAL

## 2023-05-12 ENCOUNTER — TELEPHONE (OUTPATIENT)
Dept: INTERVENTIONAL RADIOLOGY/VASCULAR | Facility: HOSPITAL | Age: 63
End: 2023-05-12
Payer: COMMERCIAL

## 2023-05-12 ENCOUNTER — PATIENT MESSAGE (OUTPATIENT)
Dept: PRIMARY CARE CLINIC | Facility: CLINIC | Age: 63
End: 2023-05-12
Payer: COMMERCIAL

## 2023-05-16 ENCOUNTER — HOSPITAL ENCOUNTER (OUTPATIENT)
Dept: RADIOLOGY | Facility: HOSPITAL | Age: 63
Discharge: HOME | End: 2023-05-16
Attending: FAMILY MEDICINE
Payer: COMMERCIAL

## 2023-05-16 ENCOUNTER — TELEPHONE (OUTPATIENT)
Dept: TRANSPLANT | Facility: CLINIC | Age: 63
End: 2023-05-16

## 2023-05-16 ENCOUNTER — HOSPITAL ENCOUNTER (OUTPATIENT)
Dept: INTERVENTIONAL RADIOLOGY/VASCULAR | Facility: HOSPITAL | Age: 63
Discharge: HOME | End: 2023-05-16
Attending: FAMILY MEDICINE
Payer: COMMERCIAL

## 2023-05-16 DIAGNOSIS — C22.0 HCC (HEPATOCELLULAR CARCINOMA): ICD-10-CM

## 2023-05-16 PROCEDURE — 36247 INS CATH ABD/L-EXT ART 3RD: CPT | Mod: RT,TXP | Performed by: STUDENT IN AN ORGANIZED HEALTH CARE EDUCATION/TRAINING PROGRAM

## 2023-05-16 PROCEDURE — 78830 RP LOCLZJ TUM SPECT W/CT 1: CPT | Mod: TC,NTX

## 2023-05-16 PROCEDURE — 78201 LIVER IMAGING STATIC ONLY: CPT | Mod: TC,TXP

## 2023-05-16 PROCEDURE — 78201 LIVER IMAGING STATIC ONLY: CPT | Mod: 26,NTX,, | Performed by: RADIOLOGY

## 2023-05-16 PROCEDURE — C1887 CATHETER, GUIDING: HCPCS | Mod: NTX

## 2023-05-16 PROCEDURE — 75726 ARTERY X-RAYS ABDOMEN: CPT | Mod: TC,59,TXP | Performed by: STUDENT IN AN ORGANIZED HEALTH CARE EDUCATION/TRAINING PROGRAM

## 2023-05-16 PROCEDURE — 76937 US GUIDE VASCULAR ACCESS: CPT | Mod: TC,TXP | Performed by: STUDENT IN AN ORGANIZED HEALTH CARE EDUCATION/TRAINING PROGRAM

## 2023-05-16 PROCEDURE — 77290 IR EMBOLIZATION COMP FOR TUMOR_ORGAN ISCHEMIA_INFARC: ICD-10-PCS | Mod: 26,NTX,, | Performed by: STUDENT IN AN ORGANIZED HEALTH CARE EDUCATION/TRAINING PROGRAM

## 2023-05-16 PROCEDURE — 99153 MOD SED SAME PHYS/QHP EA: CPT | Mod: TXP | Performed by: STUDENT IN AN ORGANIZED HEALTH CARE EDUCATION/TRAINING PROGRAM

## 2023-05-16 PROCEDURE — 75726 CHG ANGIO VISCERAL SELECTV/SUBSELEC: ICD-10-PCS | Mod: 26,59,NTX, | Performed by: STUDENT IN AN ORGANIZED HEALTH CARE EDUCATION/TRAINING PROGRAM

## 2023-05-16 PROCEDURE — 75774 PR  ANGIO EA ADDNL SELECTV VESSEL: ICD-10-PCS | Mod: 26,NTX,, | Performed by: STUDENT IN AN ORGANIZED HEALTH CARE EDUCATION/TRAINING PROGRAM

## 2023-05-16 PROCEDURE — 76937 PR  US GUIDE, VASCULAR ACCESS: ICD-10-PCS | Mod: 26,NTX,, | Performed by: STUDENT IN AN ORGANIZED HEALTH CARE EDUCATION/TRAINING PROGRAM

## 2023-05-16 PROCEDURE — 75887 VEIN X-RAY LIVER W/O HEMODYN: CPT | Mod: 26,NTX,, | Performed by: STUDENT IN AN ORGANIZED HEALTH CARE EDUCATION/TRAINING PROGRAM

## 2023-05-16 PROCEDURE — 36247 PR PLACE CATH SUBSUBSELECT ART,ABD/PEL: ICD-10-PCS | Mod: RT,NTX,, | Performed by: STUDENT IN AN ORGANIZED HEALTH CARE EDUCATION/TRAINING PROGRAM

## 2023-05-16 PROCEDURE — 75887 PR  PERCUT XHEPATIC PORTOGRAM: ICD-10-PCS | Mod: 26,NTX,, | Performed by: STUDENT IN AN ORGANIZED HEALTH CARE EDUCATION/TRAINING PROGRAM

## 2023-05-16 PROCEDURE — 78201 NM LIVER IMAGING STATIC PRE Y-90 EMBOLIZATION: ICD-10-PCS | Mod: 26,NTX,, | Performed by: RADIOLOGY

## 2023-05-16 PROCEDURE — 78830 NM SPECT/CT SINGLE AREA: ICD-10-PCS | Mod: 26,NTX,, | Performed by: RADIOLOGY

## 2023-05-16 PROCEDURE — 75726 ARTERY X-RAYS ABDOMEN: CPT | Mod: 26,59,NTX, | Performed by: STUDENT IN AN ORGANIZED HEALTH CARE EDUCATION/TRAINING PROGRAM

## 2023-05-16 PROCEDURE — 78830 RP LOCLZJ TUM SPECT W/CT 1: CPT | Mod: 26,NTX,, | Performed by: RADIOLOGY

## 2023-05-16 PROCEDURE — 75887 VEIN X-RAY LIVER W/O HEMODYN: CPT | Mod: TC,TXP | Performed by: STUDENT IN AN ORGANIZED HEALTH CARE EDUCATION/TRAINING PROGRAM

## 2023-05-16 PROCEDURE — 77290 THER RAD SIMULAJ FIELD CPLX: CPT | Mod: TC,TXP | Performed by: STUDENT IN AN ORGANIZED HEALTH CARE EDUCATION/TRAINING PROGRAM

## 2023-05-16 PROCEDURE — 63600175 PHARM REV CODE 636 W HCPCS: Mod: NTX | Performed by: STUDENT IN AN ORGANIZED HEALTH CARE EDUCATION/TRAINING PROGRAM

## 2023-05-16 PROCEDURE — 75774 ARTERY X-RAY EACH VESSEL: CPT | Mod: 26,NTX,, | Performed by: STUDENT IN AN ORGANIZED HEALTH CARE EDUCATION/TRAINING PROGRAM

## 2023-05-16 PROCEDURE — 36247 INS CATH ABD/L-EXT ART 3RD: CPT | Mod: RT,NTX,, | Performed by: STUDENT IN AN ORGANIZED HEALTH CARE EDUCATION/TRAINING PROGRAM

## 2023-05-16 PROCEDURE — 75774 ARTERY X-RAY EACH VESSEL: CPT | Mod: TC,59,TXP | Performed by: STUDENT IN AN ORGANIZED HEALTH CARE EDUCATION/TRAINING PROGRAM

## 2023-05-16 PROCEDURE — 76937 US GUIDE VASCULAR ACCESS: CPT | Mod: 26,NTX,, | Performed by: STUDENT IN AN ORGANIZED HEALTH CARE EDUCATION/TRAINING PROGRAM

## 2023-05-16 PROCEDURE — 25500020 PHARM REV CODE 255: Mod: NTX | Performed by: FAMILY MEDICINE

## 2023-05-16 PROCEDURE — 99152 MOD SED SAME PHYS/QHP 5/>YRS: CPT | Mod: NTX | Performed by: STUDENT IN AN ORGANIZED HEALTH CARE EDUCATION/TRAINING PROGRAM

## 2023-05-16 PROCEDURE — G0269 OCCLUSIVE DEVICE IN VEIN ART: HCPCS | Mod: TXP | Performed by: STUDENT IN AN ORGANIZED HEALTH CARE EDUCATION/TRAINING PROGRAM

## 2023-05-16 RX ORDER — FENTANYL CITRATE 50 UG/ML
INJECTION, SOLUTION INTRAMUSCULAR; INTRAVENOUS
Status: COMPLETED | OUTPATIENT
Start: 2023-05-16 | End: 2023-05-16

## 2023-05-16 RX ORDER — LIDOCAINE HYDROCHLORIDE 10 MG/ML
1 INJECTION, SOLUTION EPIDURAL; INFILTRATION; INTRACAUDAL; PERINEURAL ONCE AS NEEDED
Status: DISCONTINUED | OUTPATIENT
Start: 2023-05-16 | End: 2023-05-17 | Stop reason: HOSPADM

## 2023-05-16 RX ORDER — MIDAZOLAM HYDROCHLORIDE 1 MG/ML
INJECTION INTRAMUSCULAR; INTRAVENOUS
Status: COMPLETED | OUTPATIENT
Start: 2023-05-16 | End: 2023-05-16

## 2023-05-16 RX ORDER — SODIUM CHLORIDE 9 MG/ML
75 INJECTION, SOLUTION INTRAVENOUS CONTINUOUS
Status: DISCONTINUED | OUTPATIENT
Start: 2023-05-16 | End: 2023-05-17 | Stop reason: HOSPADM

## 2023-05-16 RX ADMIN — IOHEXOL 120 ML: 300 INJECTION, SOLUTION INTRAVENOUS at 03:05

## 2023-05-16 RX ADMIN — MIDAZOLAM HYDROCHLORIDE 0.5 MG: 1 INJECTION INTRAMUSCULAR; INTRAVENOUS at 02:05

## 2023-05-16 RX ADMIN — MIDAZOLAM HYDROCHLORIDE 1 MG: 1 INJECTION INTRAMUSCULAR; INTRAVENOUS at 02:05

## 2023-05-16 RX ADMIN — FENTANYL CITRATE 50 MCG: 50 INJECTION, SOLUTION INTRAMUSCULAR; INTRAVENOUS at 02:05

## 2023-05-16 RX ADMIN — FENTANYL CITRATE 25 MCG: 50 INJECTION, SOLUTION INTRAMUSCULAR; INTRAVENOUS at 02:05

## 2023-05-16 NOTE — PLAN OF CARE
Pt arrived to 188 for Pre-Y. Pt oriented to unit and staff. Plan of care reviewed with patient, patient verbalizes understanding. Comfort measures utilized. Pt safely transferred from stretcher to procedural table. Fall risk reviewed with patient, fall risk interventions maintained. Safety strap applied, positioner pillows utilized to minimize pressure points. Blankets applied. Pt prepped and draped utilizing standard sterile technique. Patient placed on continuous monitoring, as required by sedation policy. Timeouts completed utilizing standard universal time-out, per department and facility policy. RN to remain at bedside, continuous monitoring maintained. Pt resting comfortably. Denies pain/discomfort. Will continue to monitor. See flow sheets for monitoring, medication administration, and updates.

## 2023-05-16 NOTE — DISCHARGE INSTRUCTIONS
For scheduling: Call Karmen at [phone]    For questions or concerns call: KAIDEN MON-FRI 8 AM- 5PM [phone]. Radiology resident on call [phone].    For immediate concerns that are not emergent, you may call our radiology clinic at: [phone]

## 2023-05-16 NOTE — PROCEDURES
Interventional Radiology postop note    Pre Op Diagnosis: HCC  Post Op Diagnosis: Same    Procedure: Pre Y90 mapping    Procedure performed by: Francesca    Written Informed Consent Obtained: Yes  Specimen Removed: NO  Estimated Blood Loss: Minimal    Findings:   Via right CFA, angiography demonstrated tumor enhancement within seg 6-7. MAA injected via posterior division of the right hepatic artery.     5 Fr RCFA access closed with Vascade, Hemostasis achieved.     Patient tolerated procedure well.    Right leg straight for 2 hours.  Patient to undergo nuclear medicine scan for lung shunt calculation.    Richie Henao,   Interventional Radiology

## 2023-05-16 NOTE — H&P
"Radiology History & Physical      SUBJECTIVE:     Chief Complaint: Liver mass    History of Present Illness:  Yumiko Gonzalez is a 62 y.o. female who presents for Pre Y90 mapping.    Clinic visit  Virtual visit with patient referred to Interventional Radiology by James Brambila MD to discuss treatment of hepatocellular carcinoma. Patient has a history of DURAN related cirrhosis. HCC was identified during transplant workup. A CT scan obtained on 4/13/2023 noted "1.0 cm right hepatic lobe segment VI enhancing lesion with washout concerning for HCC in this patient with hepatic cirrhosis.  Hemangioma also possible." Patient has complaints of abdominal discomfort associated with constipation. She also has complaints of fatigue. Her daughter is present via speaker phone for our visit. 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, postitive antismooth muscle antibody    Blood transfusion     Bronchitis     bronchospasm on occasion     Cancer 1987    carcinoma in situ    Cervical neck pain with evidence of disc disease 03/22/2012    can bend neck    Cirrhosis     non-alcoholic, compensated    Colon polyps 03/22/2012    Depression 03/22/2012    Hx panic attacks    Diverticular disease 03/22/2012    never diverticulitis    Fatty liver 03/22/2012    Fibrocystic breast     Fine tremor     hands,chronic    Hepatosplenomegaly     History of abdominal paracentesis 01/18/2023    8.7L of fluid drained    Hypertension 04/24/2013    Knee pain, bilateral     chronic, with hands,feet,fingers also painful    Lesion of right lung     Liver disease     "partially sclerosed liver" per pt    Migraines past Hx    " Nephrolithiasis     stone episode 2021    Pleural effusion     small, compensated, good bilateral breath sounds per Dr Suresh GUTIERRES (postoperative nausea and vomiting)     twice after childbirth    Postpartum depression     Splenomegaly     Thrombocytopenia     Tremors of nervous system      Past Surgical History:   Procedure Laterality Date    ADENOIDECTOMY      CERVICAL CONIZATION   W/ LASER       SECTION      x3    COLONOSCOPY N/A 2016    Procedure: COLONOSCOPY;  Surgeon: James Palomares MD;  Location: [facility] ENDO;  Service: Endoscopy;  Laterality: N/A;    COLONOSCOPY N/A 2/3/2022    Procedure: COLONOSCOPY;  Surgeon: James Palomares MD;  Location: [facility] ENDO;  Service: Endoscopy;  Laterality: N/A;    EMBOLIZATION N/A 2018    Procedure: EMBOLIZATION, BLOOD VESSEL;  Surgeon: Joselin Surgeon;  Location: [facility];  Service: Radiology;  Laterality: N/A;    ENDOMETRIAL ABLATION      ESOPHAGOGASTRODUODENOSCOPY N/A 2020    Procedure: ESOPHAGOGASTRODUODENOSCOPY (EGD);  Surgeon: James Palomares MD;  Location: [facility] ENDO;  Service: Endoscopy;  Laterality: N/A;    ESOPHAGOGASTRODUODENOSCOPY N/A 3/8/2022    Procedure: EGD (ESOPHAGOGASTRODUODENOSCOPY);  Surgeon: James Palomares MD;  Location: [facility];  Service: Endoscopy;  Laterality: N/A;    LIVER BIOPSY      PELVIC LAPAROSCOPY      TONSILLECTOMY      TUBAL LIGATION         Home Meds:   Prior to Admission medications    Medication Sig Start Date End Date Taking? Authorizing Provider   ALPRAZolam (XANAX) 0.25 MG tablet Take 1 tablet (0.25 mg total) by mouth nightly as needed. FOR INSOMNIA  Patient taking differently: Take 0.125-0.25 mg by mouth nightly as needed. FOR INSOMNIA 10/31/22  Yes Garrett Webb MD   buPROPion (WELLBUTRIN XL) 150 MG TB24 tablet Take 2 tablets (300 mg total) by mouth once daily. 3/10/23  Yes Garrett Webb MD   furosemide (LASIX) 40 MG tablet Take 1 tablet (40 mg total) by mouth once daily. 23   Yes Laila Will NP   lactulose (CONSTULOSE) 10 gram/15 mL solution Take 30 mLs (20 g total) by mouth 2 (two) times daily. 1/31/23  Yes Laila Will NP   multivitamin (THERAGRAN) per tablet Take 1 tablet by mouth once daily.   Yes Historical Provider   omeprazole (PRILOSEC) 20 MG capsule Take 1 capsule (20 mg total) by mouth once daily. 11/7/22  Yes Mercy Portillo PA-C   ondansetron (ZOFRAN-ODT) 4 MG TbDL Take 1 tablet (4 mg total) by mouth every 8 (eight) hours as needed (nausea). 5/1/23  Yes Laila Ashby MD   propranoloL (INDERAL LA) 80 MG 24 hr capsule Take 1 capsule (80 mg total) by mouth once daily. 3/23/23 3/22/24 Yes Ashlee Andrade NP   rifAXIMin (XIFAXAN) 550 mg Tab Take 1 tablet (550 mg total) by mouth 2 (two) times daily. 1/31/23  Yes Laila Will NP   spironolactone (ALDACTONE) 100 MG tablet Take 1 tablet (100 mg total) by mouth once daily.  Patient taking differently: Take by mouth once daily. 1/31/23  Yes Laila Will NP   vitamin E 400 UNIT capsule Take 400 Units by mouth once daily.   Yes Historical Provider   albuterol (PROVENTIL) 2.5 mg /3 mL (0.083 %) nebulizer solution Take 3 mLs (2.5 mg total) by nebulization every 6 (six) hours as needed for Wheezing. Rescue 11/15/22 11/15/23  Mercy Portillo PA-C   albuterol (PROVENTIL/VENTOLIN HFA) 90 mcg/actuation inhaler Inhale 2 puffs every 4 hours as needed for cough, wheeze, or shortness of breath 7/12/22   Reynaldo Stephenson MD   fluticasone-umeclidin-vilanter (TRELEGY ELLIPTA) 200-62.5-25 mcg inhaler Inhale 1 puff into the lungs once daily. 7/12/22   Reynaldo Stephenson MD   loratadine (CLARITIN) 10 mg tablet Take 10 mg by mouth daily as needed.    Historical Provider   sulfamethoxazole-trimethoprim 800-160mg (BACTRIM DS) 800-160 mg Tab Take 1 tablet by mouth 2 (two) times daily. 5/1/23   Garrett Webb MD     Anticoagulants/Antiplatelets: no anticoagulation    Allergies:   Review of patient's  allergies indicates:   Allergen Reactions    No known drug allergies      Sedation History:  have not been any systemic reactions    Review of Systems:   Hematological: negative  no known coagulopathies  Respiratory: no cough, shortness of breath, or wheezing  no shortness of breath  Cardiovascular: no chest pain or dyspnea on exertion  no chest pain  Gastrointestinal: no abdominal pain, change in bowel habits, or black or bloody stools  no abdominal pain  Genito-Urinary: no dysuria, trouble voiding, or hematuria  no dysuria  Musculoskeletal: negative  Neurological: no TIA or stroke symptoms         OBJECTIVE:     Vital Signs (Most Recent)  Temp: 97.2 °F (36.2 °C) (05/16/23 1228)  Pulse: 66 (05/16/23 1228)  Resp: 16 (05/16/23 1228)  BP: (!) 131/59 (05/16/23 1230)  SpO2: 98 % (05/16/23 1228)    Physical Exam:  ASA: 2  Mallampati: 2    General: no acute distress  Mental Status: alert and oriented to person, place and time  HEENT: normocephalic, atraumatic  Chest: unlabored breathing  Heart: regular heart rate  Abdomen: nondistended  Extremity: moves all extremities    Laboratory  Lab Results   Component Value Date    INR 1.3 (H) 05/16/2023       Lab Results   Component Value Date    WBC 4.17 05/16/2023    HGB 13.4 05/16/2023    HCT 40.5 05/16/2023    MCV 99 (H) 05/16/2023    PLT 81 (L) 05/16/2023      Lab Results   Component Value Date     (H) 05/16/2023     05/16/2023    K 4.2 05/16/2023     05/16/2023    CO2 26 05/16/2023    BUN 18 05/16/2023    CREATININE 0.7 05/16/2023    CALCIUM 9.2 05/16/2023    MG 2.0 01/26/2023    ALT 54 (H) 05/16/2023    AST 75 (H) 05/16/2023    ALBUMIN 2.8 (L) 05/16/2023    BILITOT 2.9 (H) 05/16/2023    BILIDIR 1.1 (H) 05/16/2023       ASSESSMENT/PLAN:     Sedation Plan: Moderate  Patient will undergo Pre Y90 mapping.      Richie eHnao DO

## 2023-05-16 NOTE — DISCHARGE SUMMARY
Radiology Discharge Summary      Hospital Course: No complications    Admit Date: 5/16/2023  Discharge Date: 05/16/2023     Instructions Given to Patient: Yes  Diet: Resume prior diet  Activity: no lifting, Driving, or Strenuous exercise for 3 days    Description of Condition on Discharge: Stable  Vital Signs (Most Recent): Temp: 97.2 °F (36.2 °C) (05/16/23 1228)  Pulse: 72 (05/16/23 1535)  Resp: 14 (05/16/23 1535)  BP: (!) 111/58 (05/16/23 1535)  SpO2: 99 % (05/16/23 1535)    Discharge Disposition: Home    Discharge Diagnosis: HCC    Richie Henao DO

## 2023-05-16 NOTE — PLAN OF CARE
Procedure complete. Pt tolerated well. Recovery for 2 hours. VSS. Site CDI. Pt transferred to [facility] then MPU and report to be given bedside.

## 2023-05-16 NOTE — PROGRESS NOTES
Daughter in room and assisting pt with dressing / HL removed and dressing applied / review D/C inst as well as S&S of infection / question answered and pt ready for D/c home

## 2023-05-16 NOTE — PROGRESS NOTES
N Med scan completed pt AAO w/ NAD site check done / pt moved to stretcher using slide board and staff NAD tolerated well / moved to ROCU #5 V/S to flow sheet / plan to C/C @ 17:45

## 2023-05-17 VITALS
HEART RATE: 68 BPM | OXYGEN SATURATION: 100 % | RESPIRATION RATE: 18 BRPM | SYSTOLIC BLOOD PRESSURE: 115 MMHG | DIASTOLIC BLOOD PRESSURE: 57 MMHG | WEIGHT: 235.69 LBS | HEIGHT: 67 IN | TEMPERATURE: 97 F | BODY MASS INDEX: 36.99 KG/M2

## 2023-05-18 ENCOUNTER — CLINICAL SUPPORT (OUTPATIENT)
Dept: PRIMARY CARE CLINIC | Facility: CLINIC | Age: 63
End: 2023-05-18
Payer: COMMERCIAL

## 2023-05-18 DIAGNOSIS — F41.1 GAD (GENERALIZED ANXIETY DISORDER): ICD-10-CM

## 2023-05-18 DIAGNOSIS — F32.A DEPRESSIVE DISORDER: Primary | ICD-10-CM

## 2023-05-18 PROCEDURE — 99499 UNLISTED E&M SERVICE: CPT | Mod: 95,NTX,, | Performed by: SOCIAL WORKER

## 2023-05-18 PROCEDURE — 99499 NO LOS: ICD-10-PCS | Mod: 95,NTX,, | Performed by: SOCIAL WORKER

## 2023-05-18 NOTE — PROGRESS NOTES
"Individual Psychotherapy (PhD/LCSW) - patient is being seen virtually     2023    Site:  [facility]      Chief complaint/reason for encounter: interpersonal, grief, anxiety.    Mood check: scale of:0 best, 10 worst. Patient rated:  Depression  - 3 - comes in waves   Anxiety - "I've been having a little bit that lately because of what is going on."    Risk parameters:  Patient reports no suicidal ideation  Patient reports no homicidal ideation  Patient reports no self-injurious behavior  Patient reports no violent behavior    Bridge:   She was able to recall  discussing her hx with her daughter in law.     Review of home assignment:    N/A    [place]:  [place] [facility]   Health px  Daughter in law    History of present condition/content of session:     Review of sx: does not get "a whole lot of sleep... but I take naps." She said she is able to relax. No reported changes in appetite. Pt is being seen virtually secondary to having a procedure a few days ago (mapping for chemo treatment). She described the px she encountered with getting the IV in. She said she is feeling well enough to have the session today.    She spent time discussing the [place] for Steven, her   ( 44 years).  She said they are expecting about 150 people. She  was assessed for associated thoughts/feelings. She said she will struggle with the emotions; doesn't like to cry in front of others. But also, with so much going on with her health, she admitted she has not had the time to grieve.    [Not an appropriate time to explore this further].      In previous sessions, she admitted to a hx low self esteem and acknowledged hx of weight px since the 4th grade. Hx of being bullied in school due to her weight.  She said that she has taken care of others at her own expense and has always been a people pleaser. Currently, she admitted that her  was supportive with her diet, but then would bring cookies " and sweets in the house. Which she would eat, she said. She noted a hx of starting and quitting smoking. She  admitted that he fed into her low self esteem. However, she said he has been emotionally supportive when her brother in law, and parents . But she said she does not have that type of support to help cope with his death.     Therapeutic Intervention:   Pt was assessed for present condition and areas of clinical concern. She was provided with empathy and support as she described her current situation.  Discussed connection between thought, mood and behavior and the ability to change behavior and mood by examining thoughts. Empathy and support were provided for the pt.'s expression of thoughts/feelings during the session.  Time was spent exploring sources of thoughts.       Treatment plan:  Target symptoms: Anxiety, worry, financial stress, and grief  Why chosen therapy is appropriate versus another modality: evidence based practice  Outcome monitoring methods: checklist/rating scale  Therapeutic intervention type: supportive psychotherapy    Patient's response to intervention:  The patient's response to intervention is motivated.     Progress toward goals and other mental status changes:  The patient's progress toward goals is fair .      -Goal: - coping skills for painful emotions     Diagnosis:   Major Depressive Disorder   Generalized Anxiety Disorder  Grief Reaction     Plan:  individual psychotherapy -  Clinician plans to provide supportive counseling through the medical crisis. Once through this, will focus on grief or other identified areas of clinical concern.     Return to clinic: 2023 at 2:00  - Pt is scheduled to follow up with Dr. Webb on 2023 at 11:00.     Length of Service (minutes): 45 -

## 2023-05-24 ENCOUNTER — OFFICE VISIT (OUTPATIENT)
Dept: PRIMARY CARE CLINIC | Facility: CLINIC | Age: 63
End: 2023-05-24
Payer: COMMERCIAL

## 2023-05-24 VITALS
TEMPERATURE: 98 F | HEART RATE: 67 BPM | WEIGHT: 246.56 LBS | OXYGEN SATURATION: 98 % | BODY MASS INDEX: 38.7 KG/M2 | HEIGHT: 67 IN | DIASTOLIC BLOOD PRESSURE: 82 MMHG | SYSTOLIC BLOOD PRESSURE: 120 MMHG | RESPIRATION RATE: 18 BRPM

## 2023-05-24 DIAGNOSIS — K75.81 NONALCOHOLIC STEATOHEPATITIS: Chronic | ICD-10-CM

## 2023-05-24 DIAGNOSIS — K70.31 ALCOHOLIC CIRRHOSIS OF LIVER WITH ASCITES: ICD-10-CM

## 2023-05-24 DIAGNOSIS — I10 PRIMARY HYPERTENSION: Primary | Chronic | ICD-10-CM

## 2023-05-24 PROCEDURE — 3044F HG A1C LEVEL LT 7.0%: CPT | Mod: CPTII,NTX,S$GLB, | Performed by: FAMILY MEDICINE

## 2023-05-24 PROCEDURE — 3074F SYST BP LT 130 MM HG: CPT | Mod: CPTII,NTX,S$GLB, | Performed by: FAMILY MEDICINE

## 2023-05-24 PROCEDURE — 1160F PR REVIEW ALL MEDS BY PRESCRIBER/CLIN PHARMACIST DOCUMENTED: ICD-10-PCS | Mod: CPTII,NTX,S$GLB, | Performed by: FAMILY MEDICINE

## 2023-05-24 PROCEDURE — 3008F BODY MASS INDEX DOCD: CPT | Mod: CPTII,NTX,S$GLB, | Performed by: FAMILY MEDICINE

## 2023-05-24 PROCEDURE — 3044F PR MOST RECENT HEMOGLOBIN A1C LEVEL <7.0%: ICD-10-PCS | Mod: CPTII,NTX,S$GLB, | Performed by: FAMILY MEDICINE

## 2023-05-24 PROCEDURE — 3008F PR BODY MASS INDEX (BMI) DOCUMENTED: ICD-10-PCS | Mod: CPTII,NTX,S$GLB, | Performed by: FAMILY MEDICINE

## 2023-05-24 PROCEDURE — 3079F DIAST BP 80-89 MM HG: CPT | Mod: CPTII,NTX,S$GLB, | Performed by: FAMILY MEDICINE

## 2023-05-24 PROCEDURE — 99999 PR PBB SHADOW E&M-EST. PATIENT-LVL V: CPT | Mod: PBBFAC,TXP,, | Performed by: FAMILY MEDICINE

## 2023-05-24 PROCEDURE — 99214 OFFICE O/P EST MOD 30 MIN: CPT | Mod: NTX,S$GLB,, | Performed by: FAMILY MEDICINE

## 2023-05-24 PROCEDURE — 3079F PR MOST RECENT DIASTOLIC BLOOD PRESSURE 80-89 MM HG: ICD-10-PCS | Mod: CPTII,NTX,S$GLB, | Performed by: FAMILY MEDICINE

## 2023-05-24 PROCEDURE — 1159F MED LIST DOCD IN RCRD: CPT | Mod: CPTII,NTX,S$GLB, | Performed by: FAMILY MEDICINE

## 2023-05-24 PROCEDURE — 99214 PR OFFICE/OUTPT VISIT, EST, LEVL IV, 30-39 MIN: ICD-10-PCS | Mod: NTX,S$GLB,, | Performed by: FAMILY MEDICINE

## 2023-05-24 PROCEDURE — 3074F PR MOST RECENT SYSTOLIC BLOOD PRESSURE < 130 MM HG: ICD-10-PCS | Mod: CPTII,NTX,S$GLB, | Performed by: FAMILY MEDICINE

## 2023-05-24 PROCEDURE — 1159F PR MEDICATION LIST DOCUMENTED IN MEDICAL RECORD: ICD-10-PCS | Mod: CPTII,NTX,S$GLB, | Performed by: FAMILY MEDICINE

## 2023-05-24 PROCEDURE — 1160F RVW MEDS BY RX/DR IN RCRD: CPT | Mod: CPTII,NTX,S$GLB, | Performed by: FAMILY MEDICINE

## 2023-05-24 PROCEDURE — 99999 PR PBB SHADOW E&M-EST. PATIENT-LVL V: ICD-10-PCS | Mod: PBBFAC,TXP,, | Performed by: FAMILY MEDICINE

## 2023-05-24 NOTE — PROGRESS NOTES
THIS DOCUMENT WAS MADE IN PART WITH VOICE RECOGNITION SOFTWARE.  OCCASIONALLY THIS SOFTWARE WILL MISINTERPRET WORDS OR PHRASES.      Primary Care Provider Appointment   [facility]       Patient ID: Yumiko Gonzalez is a 63 y.o. female.    ASSESSMENT/PLAN by Problem List:    1. Primary hypertension    2. Nonalcoholic steatohepatitis    3. Alcoholic cirrhosis of liver with ascites     There has been a mild increase in swelling and weight although over the last week or so she has not been limiting sodium as much.  She will start pain more tension.  Continue current medications for now.  She must continue to remain out on disability for now because of the swelling, cognitive changes from the cirrhosis.  Will continue to see her monthly as long as she is on disability.  She will continue to follow with her hepatology team.    Follow Up:  Three months    Subjective:     Chief Complaint   Patient presents with    Follow-up    Diarrhea     R/T diet following past '[place]     I have reviewed the information entered by the ancillary staff regarding the chief complaint as well as the related history.    Follow-up  Associated symptoms include fatigue. Pertinent negatives include no fever.   Diarrhea   Pertinent negatives include no fever.     Patient is a/an 63 y.o.  female     Increase in swelling, leg soreness  Weight gain  Has not been on low salt diet last week    Increase in diarrhea after diet changes, only using lactulose prn  Recommend watching her diet more closely.  May add fiber supplement slowly      For complete problem list, past medical history, surgical history, social history, etc., see appropriate section in the electronic medical record    Review of Systems   Constitutional:  Positive for fatigue. Negative for fever.   Respiratory: Negative.     Cardiovascular: Negative.    Gastrointestinal:  Positive for diarrhea.   Psychiatric/Behavioral:  Positive for  "decreased concentration.      Objective     Physical Exam  Vitals reviewed.   Constitutional:       General: She is not in acute distress.     Appearance: She is well-developed. She is not ill-appearing.   HENT:      Head: Normocephalic and atraumatic.   Eyes:      General: No scleral icterus.     Conjunctiva/sclera: Conjunctivae normal.   Cardiovascular:      Rate and Rhythm: Normal rate and regular rhythm.      Heart sounds: Normal heart sounds.      Comments: 2+ bilateral ankle and pretibial edema  Pulmonary:      Effort: Pulmonary effort is normal. No respiratory distress.      Breath sounds: Normal breath sounds. No wheezing or rales.   Skin:     General: Skin is dry.      Findings: No rash.   Neurological:      Mental Status: She is alert and oriented to person, place, and time.   Psychiatric:         Behavior: Behavior normal.     Vitals:    05/24/23 1109   BP: 120/82   BP Location: Right arm   Patient Position: Sitting   BP Method: Large (Manual)   Pulse: 67   Resp: 18   Temp: 98 °F (36.7 °C)   TempSrc: Oral   SpO2: 98%   Weight: 111.8 kg (246 lb 9.4 oz)   Height: 5' 7" (1.702 m)       "

## 2023-05-30 ENCOUNTER — PATIENT MESSAGE (OUTPATIENT)
Dept: TRANSPLANT | Facility: CLINIC | Age: 63
End: 2023-05-30
Payer: COMMERCIAL

## 2023-05-31 ENCOUNTER — RESEARCH ENCOUNTER (OUTPATIENT)
Dept: RESEARCH | Facility: HOSPITAL | Age: 63
End: 2023-05-31
Payer: COMMERCIAL

## 2023-05-31 ENCOUNTER — PATIENT MESSAGE (OUTPATIENT)
Dept: RESEARCH | Facility: HOSPITAL | Age: 63
End: 2023-05-31
Payer: COMMERCIAL

## 2023-05-31 NOTE — PROGRESS NOTES
RESEARCH STUDY CONSENT ENCOUNTER  ORGAN TRANSPLANT  [facility] COURTNEY ROBLERO    Study Title: Role of Tumor-Induced Immune Tolerance in the Patient Response to Locoregional Therapy: Implications in Assessment Risk of Hepatocellular Carcinoma Recurrence Following Liver Transplantation    IRB #: [id number]    IRB Approval Date: 6/8/2016    : Eugene Haney MD  Sub-investigator: Nino Belcher, PhD    Patient Number: To be assigned    Patient was scheduled for a Y90 on (date: 06/08/2023).     This study is an observational study to evaluate patients receiving transarterial chemoembolization (TACE) or transarterial radioembolization (TARE) therapy to define the link between tumor-elicited peripheral cell populations, and the risk of hepatocellular carcinoma (HCC) recurrence before orthotopic liver transplantation (OLT). [IRB 2016.131.B]      Present for discussion: Patient only  Is LAR Consenting for Subject: YES/NO: No    Prior to the Informed Consent (IC) being signed, or any protocol required testing, procedure, or intervention being performed, the following was done or discussed with Yumiko Gonzalez:    Purpose of the Study, Qualifications to Participate: YES/NO: Yes  Study Design, Schedule and Procedures: YES/NO: Yes  Risks, Benefits, Alternative Treatments, Compensation and Costs: YES/NO: Yes  Confidentiality and HIPAA Authorization for Release of Medical Records for the research trial/subject's right/study related injury: YES/NO: Yes  Study related contact information: YES/NO: Yes  Voluntary Participation and Withdrawal from the research trial at any time: YES/NO: Yes  Patient has been offered the opportunity to ask questions regarding the study and all questions were answered satisfactorily: YES/NO: Yes  Patient verbalizes understanding of the study/procedures and agrees to participate: YES/NO: Yes  CRC and PI contact information given to patient:YES/NO: Yes  Verification the ICF was appropriately  completed: YES/NO: Yes  Signed copy given to patient: YES/NO: Yes  Copy in patient's chart and original uploaded to Epic: YES/NO: Yes    Research staff present during consent: Consenter Mehran Hilliard, Witness Veronica Rodriguez    No study procedures (I.e. specimen collection) were performed before the informed consent was signed.     Specimens were collected in [facility] SSCU at the time of peripheral IV line placement: YES/NO: No  Specimens were collected in [facility] RAD IR: YES/NO: No      Mehran Hilliard  Admin Research- Liver Transplant

## 2023-06-01 ENCOUNTER — TELEPHONE (OUTPATIENT)
Dept: TRANSPLANT | Facility: CLINIC | Age: 63
End: 2023-06-01
Payer: COMMERCIAL

## 2023-06-01 ENCOUNTER — CLINICAL SUPPORT (OUTPATIENT)
Dept: PRIMARY CARE CLINIC | Facility: CLINIC | Age: 63
End: 2023-06-01
Payer: COMMERCIAL

## 2023-06-01 ENCOUNTER — PATIENT MESSAGE (OUTPATIENT)
Dept: PRIMARY CARE CLINIC | Facility: CLINIC | Age: 63
End: 2023-06-01
Payer: COMMERCIAL

## 2023-06-01 DIAGNOSIS — F32.A DEPRESSIVE DISORDER: Primary | ICD-10-CM

## 2023-06-01 DIAGNOSIS — Z00.00 PREVENTATIVE HEALTH CARE: Primary | ICD-10-CM

## 2023-06-01 DIAGNOSIS — F43.21 GRIEF REACTION: ICD-10-CM

## 2023-06-01 PROCEDURE — 99499 NO LOS: ICD-10-PCS | Mod: NTX,S$GLB,, | Performed by: SOCIAL WORKER

## 2023-06-01 PROCEDURE — 99499 UNLISTED E&M SERVICE: CPT | Mod: NTX,S$GLB,, | Performed by: SOCIAL WORKER

## 2023-06-01 PROCEDURE — 99999 PR PBB SHADOW E&M-EST. PATIENT-LVL I: ICD-10-PCS | Mod: PBBFAC,TXP,, | Performed by: SOCIAL WORKER

## 2023-06-01 PROCEDURE — 99999 PR PBB SHADOW E&M-EST. PATIENT-LVL I: CPT | Mod: PBBFAC,TXP,, | Performed by: SOCIAL WORKER

## 2023-06-01 NOTE — PROGRESS NOTES
"Individual Psychotherapy (PhD/LCSW) - patient is being seen virtually     4/20/2023    Site:  [place] [id number]      Chief complaint/reason for encounter: interpersonal, grief, anxiety.    Mood check: scale of:0 best, 10 worst. Patient rated:  Depression  - 3   Anxiety - 6 or 7     Risk parameters:  Patient reports no suicidal ideation  Patient reports no homicidal ideation  Patient reports no self-injurious behavior  Patient reports no violent behavior    Bridge:   Discussed the [place] [facility]     Review of home assignment:    N/A    [place]:  [place] [employer] [facility] 5/20  Health px - update       History of present condition/content of session:   She said the [place] was on 5/20, and went as expected. She spent much time talking about it and hx of some of the people who came.  Her birthday was on birthday [birth year], celebrated on 5/[birth year],  and  Steven's birthday was [birth year].     She also spent time explaining her financial situation. She said she is running out of money from Steven's life insurance. She reported being approved for short term disability until July 11. She said she started the process for filing for long term disability. Some of the things she is doing to cut expenses is to work with her  to decrease home owners' insurance premiums. And applied for Medicaid for secondary health insurance.     Her health status is unchanged; still not for certain if she has cancer. She expressed concern about upcoming treatment because of being so dehydrated (allowed 1500 mc of fluid a day) and "blowing out IV's" In other words, worried that an IV won't stay in. She has a scheduled procedure next Thursday, 6/8/2023. Afterward, she said she cannot be around animals, small children, or pregnant women. Her daughter will stay with her. Also,she said her daughter, T, is checking into giving pt part of her liver.     Pertinent history:  She has 4 children, Anay, Mesha, Casandra, and Gauri. Her son lives on " the same property. Once the succession is completed, her son and daughter in law will buy her house. She was  for a long period of time and her  Steven,  in 2022. He had been ill, but  suddenly. She is a candidate for liver transplant.      Therapeutic Intervention:   Pt was assessed for present condition and areas of clinical concern.  Empathy and support were provided for the pt.'s expression of thoughts/feelings during the session.  Active Listening was used in the session.     Treatment plan:  Target symptoms: Anxiety, worry, financial stress, and grief  Why chosen therapy is appropriate versus another modality: evidence based practice  Outcome monitoring methods: checklist/rating scale  Therapeutic intervention type: supportive psychotherapy    Patient's response to intervention:  The patient's response to intervention is motivated.     Progress toward goals and other mental status changes:  The patient's progress toward goals is fair .      -Goal: - coping skills for painful emotions     Diagnosis:   Major Depressive Disorder   Generalized Anxiety Disorder  Grief Reaction     Plan:  individual psychotherapy -  Clinician plans to provide supportive counseling through the medical crisis. Once through this, will focus on grief or other identified areas of clinical concern.     Return to clinic: 2023 at 1:00 virtually.     Length of Service (minutes): 45 -

## 2023-06-06 ENCOUNTER — TELEPHONE (OUTPATIENT)
Dept: INTERVENTIONAL RADIOLOGY/VASCULAR | Facility: HOSPITAL | Age: 63
End: 2023-06-06
Payer: COMMERCIAL

## 2023-06-06 DIAGNOSIS — C22.0 HCC (HEPATOCELLULAR CARCINOMA): ICD-10-CM

## 2023-06-06 DIAGNOSIS — Z00.6 RESEARCH STUDY PATIENT: Primary | ICD-10-CM

## 2023-06-08 ENCOUNTER — RESEARCH ENCOUNTER (OUTPATIENT)
Dept: RESEARCH | Facility: HOSPITAL | Age: 63
End: 2023-06-08
Payer: COMMERCIAL

## 2023-06-08 ENCOUNTER — HOSPITAL ENCOUNTER (OUTPATIENT)
Dept: RADIOLOGY | Facility: HOSPITAL | Age: 63
Discharge: HOME | End: 2023-06-08
Attending: FAMILY MEDICINE
Payer: COMMERCIAL

## 2023-06-08 ENCOUNTER — HOSPITAL ENCOUNTER (OUTPATIENT)
Dept: INTERVENTIONAL RADIOLOGY/VASCULAR | Facility: HOSPITAL | Age: 63
Discharge: HOME | End: 2023-06-08
Attending: FAMILY MEDICINE | Admitting: STUDENT IN AN ORGANIZED HEALTH CARE EDUCATION/TRAINING PROGRAM
Payer: COMMERCIAL

## 2023-06-08 VITALS
DIASTOLIC BLOOD PRESSURE: 62 MMHG | BODY MASS INDEX: 35.79 KG/M2 | SYSTOLIC BLOOD PRESSURE: 106 MMHG | OXYGEN SATURATION: 97 % | WEIGHT: 228 LBS | RESPIRATION RATE: 17 BRPM | TEMPERATURE: 97 F | HEIGHT: 67 IN | HEART RATE: 68 BPM

## 2023-06-08 DIAGNOSIS — C22.0 HCC (HEPATOCELLULAR CARCINOMA): ICD-10-CM

## 2023-06-08 DIAGNOSIS — C22.0 HCC (HEPATOCELLULAR CARCINOMA): Primary | ICD-10-CM

## 2023-06-08 PROCEDURE — 77263 THER RADIOLOGY TX PLNG CPLX: CPT | Mod: NTX,,, | Performed by: STUDENT IN AN ORGANIZED HEALTH CARE EDUCATION/TRAINING PROGRAM

## 2023-06-08 PROCEDURE — 76377 PR  3D RENDERING W/ IMAGE POSTPROCESS: ICD-10-PCS | Mod: 26,NTX,, | Performed by: STUDENT IN AN ORGANIZED HEALTH CARE EDUCATION/TRAINING PROGRAM

## 2023-06-08 PROCEDURE — 75726 ARTERY X-RAYS ABDOMEN: CPT | Mod: TC,59,TXP | Performed by: STUDENT IN AN ORGANIZED HEALTH CARE EDUCATION/TRAINING PROGRAM

## 2023-06-08 PROCEDURE — 76377 3D RENDER W/INTRP POSTPROCES: CPT | Mod: TC,TXP | Performed by: STUDENT IN AN ORGANIZED HEALTH CARE EDUCATION/TRAINING PROGRAM

## 2023-06-08 PROCEDURE — 78201 LIVER IMAGING STATIC ONLY: CPT | Mod: 26,NTX,, | Performed by: STUDENT IN AN ORGANIZED HEALTH CARE EDUCATION/TRAINING PROGRAM

## 2023-06-08 PROCEDURE — 79445 NUCLEAR RX INTRA-ARTERIAL: CPT | Mod: 26,NTX,, | Performed by: STUDENT IN AN ORGANIZED HEALTH CARE EDUCATION/TRAINING PROGRAM

## 2023-06-08 PROCEDURE — 78201 NM LIVER IMAGING STATIC POST Y-90 EMBOLIZATION: ICD-10-PCS | Mod: 26,NTX,, | Performed by: STUDENT IN AN ORGANIZED HEALTH CARE EDUCATION/TRAINING PROGRAM

## 2023-06-08 PROCEDURE — 77300 RADIATION THERAPY DOSE PLAN: CPT | Mod: 26,NTX,, | Performed by: STUDENT IN AN ORGANIZED HEALTH CARE EDUCATION/TRAINING PROGRAM

## 2023-06-08 PROCEDURE — 78830 NM SPECT/CT SINGLE AREA: ICD-10-PCS | Mod: 26,NTX,, | Performed by: STUDENT IN AN ORGANIZED HEALTH CARE EDUCATION/TRAINING PROGRAM

## 2023-06-08 PROCEDURE — 75774 ARTERY X-RAY EACH VESSEL: CPT | Mod: TC,59,NTX | Performed by: STUDENT IN AN ORGANIZED HEALTH CARE EDUCATION/TRAINING PROGRAM

## 2023-06-08 PROCEDURE — 75774 PR  ANGIO EA ADDNL SELECTV VESSEL: ICD-10-PCS | Mod: 26,59,NTX, | Performed by: STUDENT IN AN ORGANIZED HEALTH CARE EDUCATION/TRAINING PROGRAM

## 2023-06-08 PROCEDURE — 76937 PR  US GUIDE, VASCULAR ACCESS: ICD-10-PCS | Mod: 26,NTX,, | Performed by: STUDENT IN AN ORGANIZED HEALTH CARE EDUCATION/TRAINING PROGRAM

## 2023-06-08 PROCEDURE — 76380 CAT SCAN FOLLOW-UP STUDY: CPT | Mod: TC,59,TXP | Performed by: STUDENT IN AN ORGANIZED HEALTH CARE EDUCATION/TRAINING PROGRAM

## 2023-06-08 PROCEDURE — 79445 PR  NUCLEAR THERAPY, INTRA-ARTERIAL: ICD-10-PCS | Mod: 26,NTX,, | Performed by: STUDENT IN AN ORGANIZED HEALTH CARE EDUCATION/TRAINING PROGRAM

## 2023-06-08 PROCEDURE — 77300 RADIATION THERAPY DOSE PLAN: CPT | Mod: TC,TXP | Performed by: STUDENT IN AN ORGANIZED HEALTH CARE EDUCATION/TRAINING PROGRAM

## 2023-06-08 PROCEDURE — 37243 VASC EMBOLIZE/OCCLUDE ORGAN: CPT | Mod: NTX | Performed by: STUDENT IN AN ORGANIZED HEALTH CARE EDUCATION/TRAINING PROGRAM

## 2023-06-08 PROCEDURE — 78830 RP LOCLZJ TUM SPECT W/CT 1: CPT | Mod: 26,NTX,, | Performed by: STUDENT IN AN ORGANIZED HEALTH CARE EDUCATION/TRAINING PROGRAM

## 2023-06-08 PROCEDURE — 25500020 PHARM REV CODE 255: Mod: TXP | Performed by: STUDENT IN AN ORGANIZED HEALTH CARE EDUCATION/TRAINING PROGRAM

## 2023-06-08 PROCEDURE — 75726 CHG ANGIO VISCERAL SELECTV/SUBSELEC: ICD-10-PCS | Mod: 26,59,NTX, | Performed by: STUDENT IN AN ORGANIZED HEALTH CARE EDUCATION/TRAINING PROGRAM

## 2023-06-08 PROCEDURE — 79445 NUCLEAR RX INTRA-ARTERIAL: CPT | Mod: TC,NTX | Performed by: STUDENT IN AN ORGANIZED HEALTH CARE EDUCATION/TRAINING PROGRAM

## 2023-06-08 PROCEDURE — 76380 PR  CT SCAN,LIMITED/LOCALIZED F/U STUDY: ICD-10-PCS | Mod: 26,59,NTX, | Performed by: STUDENT IN AN ORGANIZED HEALTH CARE EDUCATION/TRAINING PROGRAM

## 2023-06-08 PROCEDURE — 99153 MOD SED SAME PHYS/QHP EA: CPT | Mod: NTX | Performed by: STUDENT IN AN ORGANIZED HEALTH CARE EDUCATION/TRAINING PROGRAM

## 2023-06-08 PROCEDURE — 36247 INS CATH ABD/L-EXT ART 3RD: CPT | Mod: 51,RT,NTX, | Performed by: STUDENT IN AN ORGANIZED HEALTH CARE EDUCATION/TRAINING PROGRAM

## 2023-06-08 PROCEDURE — 76380 CAT SCAN FOLLOW-UP STUDY: CPT | Mod: 26,59,NTX, | Performed by: STUDENT IN AN ORGANIZED HEALTH CARE EDUCATION/TRAINING PROGRAM

## 2023-06-08 PROCEDURE — 78830 RP LOCLZJ TUM SPECT W/CT 1: CPT | Mod: TC,NTX

## 2023-06-08 PROCEDURE — G0269 OCCLUSIVE DEVICE IN VEIN ART: HCPCS | Mod: NTX | Performed by: STUDENT IN AN ORGANIZED HEALTH CARE EDUCATION/TRAINING PROGRAM

## 2023-06-08 PROCEDURE — 76377 3D RENDER W/INTRP POSTPROCES: CPT | Mod: 26,NTX,, | Performed by: STUDENT IN AN ORGANIZED HEALTH CARE EDUCATION/TRAINING PROGRAM

## 2023-06-08 PROCEDURE — 37243 IR EMBOLIZATION COMP FOR TUMOR_ORGAN ISCHEMIA_INFARC: ICD-10-PCS | Mod: NTX,,, | Performed by: STUDENT IN AN ORGANIZED HEALTH CARE EDUCATION/TRAINING PROGRAM

## 2023-06-08 PROCEDURE — 25000003 PHARM REV CODE 250: Mod: TXP | Performed by: STUDENT IN AN ORGANIZED HEALTH CARE EDUCATION/TRAINING PROGRAM

## 2023-06-08 PROCEDURE — 77263 PR  RADIATION THERAPY PLAN COMPLEX: ICD-10-PCS | Mod: NTX,,, | Performed by: STUDENT IN AN ORGANIZED HEALTH CARE EDUCATION/TRAINING PROGRAM

## 2023-06-08 PROCEDURE — 76937 US GUIDE VASCULAR ACCESS: CPT | Mod: TC,NTX | Performed by: STUDENT IN AN ORGANIZED HEALTH CARE EDUCATION/TRAINING PROGRAM

## 2023-06-08 PROCEDURE — 36247 PR PLACE CATH SUBSUBSELECT ART,ABD/PEL: ICD-10-PCS | Mod: 51,RT,NTX, | Performed by: STUDENT IN AN ORGANIZED HEALTH CARE EDUCATION/TRAINING PROGRAM

## 2023-06-08 PROCEDURE — 76937 US GUIDE VASCULAR ACCESS: CPT | Mod: 26,NTX,, | Performed by: STUDENT IN AN ORGANIZED HEALTH CARE EDUCATION/TRAINING PROGRAM

## 2023-06-08 PROCEDURE — 78201 LIVER IMAGING STATIC ONLY: CPT | Mod: TC,TXP

## 2023-06-08 PROCEDURE — 75774 ARTERY X-RAY EACH VESSEL: CPT | Mod: 26,59,NTX, | Performed by: STUDENT IN AN ORGANIZED HEALTH CARE EDUCATION/TRAINING PROGRAM

## 2023-06-08 PROCEDURE — 36247 INS CATH ABD/L-EXT ART 3RD: CPT | Mod: RT,NTX | Performed by: STUDENT IN AN ORGANIZED HEALTH CARE EDUCATION/TRAINING PROGRAM

## 2023-06-08 PROCEDURE — 75726 ARTERY X-RAYS ABDOMEN: CPT | Mod: 26,59,NTX, | Performed by: STUDENT IN AN ORGANIZED HEALTH CARE EDUCATION/TRAINING PROGRAM

## 2023-06-08 PROCEDURE — 99152 MOD SED SAME PHYS/QHP 5/>YRS: CPT | Mod: TXP | Performed by: STUDENT IN AN ORGANIZED HEALTH CARE EDUCATION/TRAINING PROGRAM

## 2023-06-08 PROCEDURE — A4550 SURGICAL TRAYS: HCPCS | Mod: NTX

## 2023-06-08 PROCEDURE — 77300 PR RADIATION THERAPY,DOSIMETRY PLAN: ICD-10-PCS | Mod: 26,NTX,, | Performed by: STUDENT IN AN ORGANIZED HEALTH CARE EDUCATION/TRAINING PROGRAM

## 2023-06-08 PROCEDURE — 63600175 PHARM REV CODE 636 W HCPCS: Mod: TXP | Performed by: STUDENT IN AN ORGANIZED HEALTH CARE EDUCATION/TRAINING PROGRAM

## 2023-06-08 RX ORDER — ONDANSETRON 2 MG/ML
4 INJECTION INTRAMUSCULAR; INTRAVENOUS ONCE
Status: COMPLETED | OUTPATIENT
Start: 2023-06-08 | End: 2023-06-08

## 2023-06-08 RX ORDER — SODIUM CHLORIDE 9 MG/ML
INJECTION, SOLUTION INTRAVENOUS CONTINUOUS
Status: DISCONTINUED | OUTPATIENT
Start: 2023-06-08 | End: 2023-06-09 | Stop reason: HOSPADM

## 2023-06-08 RX ORDER — DEXAMETHASONE SODIUM PHOSPHATE 100 MG/10ML
INJECTION INTRAMUSCULAR; INTRAVENOUS
Status: COMPLETED | OUTPATIENT
Start: 2023-06-08 | End: 2023-06-08

## 2023-06-08 RX ORDER — MIDAZOLAM HYDROCHLORIDE 1 MG/ML
INJECTION INTRAMUSCULAR; INTRAVENOUS
Status: COMPLETED | OUTPATIENT
Start: 2023-06-08 | End: 2023-06-08

## 2023-06-08 RX ORDER — LIDOCAINE HYDROCHLORIDE 10 MG/ML
INJECTION INFILTRATION; PERINEURAL
Status: COMPLETED | OUTPATIENT
Start: 2023-06-08 | End: 2023-06-08

## 2023-06-08 RX ORDER — FENTANYL CITRATE 50 UG/ML
INJECTION, SOLUTION INTRAMUSCULAR; INTRAVENOUS
Status: COMPLETED | OUTPATIENT
Start: 2023-06-08 | End: 2023-06-08

## 2023-06-08 RX ORDER — LIDOCAINE HYDROCHLORIDE 10 MG/ML
1 INJECTION, SOLUTION EPIDURAL; INFILTRATION; INTRACAUDAL; PERINEURAL ONCE AS NEEDED
Status: DISCONTINUED | OUTPATIENT
Start: 2023-06-08 | End: 2023-06-09 | Stop reason: HOSPADM

## 2023-06-08 RX ADMIN — DEXAMETHASONE SODIUM PHOSPHATE 20 MG: 10 INJECTION INTRAMUSCULAR; INTRAVENOUS at 02:06

## 2023-06-08 RX ADMIN — MIDAZOLAM HYDROCHLORIDE 0.5 MG: 1 INJECTION INTRAMUSCULAR; INTRAVENOUS at 01:06

## 2023-06-08 RX ADMIN — LIDOCAINE HYDROCHLORIDE 5 ML: 10 INJECTION, SOLUTION INFILTRATION; PERINEURAL at 01:06

## 2023-06-08 RX ADMIN — FENTANYL CITRATE 50 MCG: 50 INJECTION, SOLUTION INTRAMUSCULAR; INTRAVENOUS at 01:06

## 2023-06-08 RX ADMIN — MIDAZOLAM HYDROCHLORIDE 1 MG: 1 INJECTION INTRAMUSCULAR; INTRAVENOUS at 01:06

## 2023-06-08 RX ADMIN — FENTANYL CITRATE 25 MCG: 50 INJECTION, SOLUTION INTRAMUSCULAR; INTRAVENOUS at 01:06

## 2023-06-08 RX ADMIN — IOHEXOL 70 ML: 300 INJECTION, SOLUTION INTRAVENOUS at 02:06

## 2023-06-08 RX ADMIN — ONDANSETRON 4 MG: 2 INJECTION INTRAMUSCULAR; INTRAVENOUS at 10:06

## 2023-06-08 NOTE — PLAN OF CARE
Pt dc'd to home via wheelchair w/ caregiver. AAO x4, denies pain, vss. Printed dc instructions in hand & reviewed w/ pt & daughter. Questions answered. Voiced understanding.

## 2023-06-08 NOTE — PLAN OF CARE
Patient admitted to room 9  accompanied by daughter. Patient is aao x4. Vss. Iv hep lock placed. Pre procedure admission completed. Questions answered. Call light placed within reach.

## 2023-06-08 NOTE — PLAN OF CARE
Pt arrived to MPU bed 4 for remainder of 2 hr recovery. Post procedure bedside report received from BUBBA Cantu

## 2023-06-08 NOTE — H&P
"Radiology History & Physical      SUBJECTIVE:     Chief Complaint: HCC    History of Present Illness:  Yumiko Gonzalez is a 63 y.o. female who presents for TARE for treatment of HCC.     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, postitive antismooth muscle antibody    Blood transfusion     Bronchitis     bronchospasm on occasion     Cancer 1987    carcinoma in situ    Cervical neck pain with evidence of disc disease 2012    can bend neck    Cirrhosis     non-alcoholic, compensated    Colon polyps 2012    Depression 2012    Hx panic attacks    Diverticular disease 2012    never diverticulitis    Fatty liver 2012    Fibrocystic breast     Fine tremor     hands,chronic    Hepatosplenomegaly     History of abdominal paracentesis 2023    8.7L of fluid drained    Hypertension 2013    Knee pain, bilateral     chronic, with hands,feet,fingers also painful    Lesion of right lung     Liver disease     "partially sclerosed liver" per pt    Migraines past Hx    Nephrolithiasis     stone episode 2021    Pleural effusion     small, compensated, good bilateral breath sounds per Dr Suresh GUTIERRES (postoperative nausea and vomiting)     twice after childbirth    Postpartum depression     Splenomegaly     Thrombocytopenia     Tremors of nervous system      Past Surgical History:   Procedure Laterality Date    ADENOIDECTOMY      CERVICAL CONIZATION   W/ LASER       SECTION      x3    COLONOSCOPY N/A 2016    Procedure: COLONOSCOPY;  Surgeon: James Palomares MD;  Location: [facility] ENDO;  Service: Endoscopy;  Laterality: N/A;    COLONOSCOPY N/A 2/3/2022    Procedure: COLONOSCOPY;  Surgeon: James Palomares MD;  Location: [facility] ENDO;  Service: Endoscopy;  Laterality: N/A;    EMBOLIZATION " N/A 7/24/2018    Procedure: EMBOLIZATION, BLOOD VESSEL;  Surgeon: Joselin Surgeon;  Location: [facility];  Service: Radiology;  Laterality: N/A;    ENDOMETRIAL ABLATION      ESOPHAGOGASTRODUODENOSCOPY N/A 1/28/2020    Procedure: ESOPHAGOGASTRODUODENOSCOPY (EGD);  Surgeon: James Palomares MD;  Location: [facility];  Service: Endoscopy;  Laterality: N/A;    ESOPHAGOGASTRODUODENOSCOPY N/A 3/8/2022    Procedure: EGD (ESOPHAGOGASTRODUODENOSCOPY);  Surgeon: James Palomares MD;  Location: [facility];  Service: Endoscopy;  Laterality: N/A;    LIVER BIOPSY      PELVIC LAPAROSCOPY      TONSILLECTOMY      TUBAL LIGATION         Home Meds:   Prior to Admission medications    Medication Sig Start Date End Date Taking? Authorizing Provider   ALPRAZolam (XANAX) 0.25 MG tablet Take 1 tablet (0.25 mg total) by mouth nightly as needed. FOR INSOMNIA  Patient taking differently: Take 0.125-0.25 mg by mouth nightly as needed. FOR INSOMNIA 10/31/22  Yes Garrett Webb MD   buPROPion (WELLBUTRIN XL) 150 MG TB24 tablet Take 2 tablets (300 mg total) by mouth once daily. 3/10/23  Yes Garrett Webb MD   furosemide (LASIX) 40 MG tablet Take 1 tablet (40 mg total) by mouth once daily. 1/31/23  Yes Laila Will NP   lactulose (CONSTULOSE) 10 gram/15 mL solution Take 30 mLs (20 g total) by mouth 2 (two) times daily.  Patient taking differently: Take 20 g by mouth as needed. 1/31/23  Yes Laila Will NP   multivitamin (THERAGRAN) per tablet Take 1 tablet by mouth once daily.   Yes Historical Provider   omeprazole (PRILOSEC) 20 MG capsule Take 1 capsule (20 mg total) by mouth once daily. 11/7/22  Yes Mercy Portillo PA-C   ondansetron (ZOFRAN-ODT) 4 MG TbDL Take 1 tablet (4 mg total) by mouth every 8 (eight) hours as needed (nausea). 5/1/23  Yes Laila Ashby MD   propranoloL (INDERAL LA) 80 MG 24 hr capsule Take 1 capsule (80 mg total) by mouth once daily. 3/23/23 3/22/24 Yes Ashlee Andrade NP    rifAXIMin (XIFAXAN) 550 mg Tab Take 1 tablet (550 mg total) by mouth 2 (two) times daily.  Patient taking differently: Take 550 mg by mouth once daily. 1/31/23  Yes Laila Will NP   spironolactone (ALDACTONE) 100 MG tablet Take 1 tablet (100 mg total) by mouth once daily.  Patient taking differently: Take by mouth once daily. 1/31/23  Yes Laila Will NP   vitamin E 400 UNIT capsule Take 400 Units by mouth once daily.   Yes Historical Provider   albuterol (PROVENTIL) 2.5 mg /3 mL (0.083 %) nebulizer solution Take 3 mLs (2.5 mg total) by nebulization every 6 (six) hours as needed for Wheezing. Rescue 11/15/22 11/15/23  Mercy Portillo PA-C   albuterol (PROVENTIL/VENTOLIN HFA) 90 mcg/actuation inhaler Inhale 2 puffs every 4 hours as needed for cough, wheeze, or shortness of breath 7/12/22   Reynaldo Stephenson MD   fluticasone-umeclidin-vilanter (TRELEGY ELLIPTA) 200-62.5-25 mcg inhaler Inhale 1 puff into the lungs once daily. 7/12/22   Reynaldo Stephenson MD   loratadine (CLARITIN) 10 mg tablet Take 10 mg by mouth daily as needed.    Historical Provider   sulfamethoxazole-trimethoprim 800-160mg (BACTRIM DS) 800-160 mg Tab Take 1 tablet by mouth 2 (two) times daily.  Patient not taking: Reported on 5/24/2023 5/1/23   Garrett Webb MD     Anticoagulants/Antiplatelets: no anticoagulation    Allergies:   Review of patient's allergies indicates:   Allergen Reactions    No known drug allergies      Sedation History:  have not been any systemic reactions    Review of Systems:   Hematological: negative  no known coagulopathies  Respiratory: no cough, shortness of breath, or wheezing  no shortness of breath  Cardiovascular: no chest pain or dyspnea on exertion  no chest pain  Gastrointestinal: no abdominal pain, change in bowel habits, or black or bloody stools  no abdominal pain  Genito-Urinary: no dysuria, trouble voiding, or hematuria  no dysuria  Musculoskeletal: negative  Neurological: no TIA or  stroke symptoms         OBJECTIVE:     Vital Signs (Most Recent)  Temp: 99 °F (37.2 °C) (06/08/23 0939)  Pulse: 61 (06/08/23 0939)  Resp: 16 (06/08/23 0939)  BP: 114/61 (06/08/23 0940)  SpO2: 96 % (06/08/23 0939)    Physical Exam:  ASA: 2  Mallampati: 2    General: no acute distress  Mental Status: alert and oriented to person, place and time  HEENT: normocephalic, atraumatic  Chest: unlabored breathing  Heart: regular heart rate  Abdomen: nondistended  Extremity: moves all extremities    Laboratory  Lab Results   Component Value Date    INR 1.3 (H) 06/08/2023       Lab Results   Component Value Date    WBC 4.46 06/08/2023    HGB 13.2 06/08/2023    HCT 39.3 06/08/2023    MCV 98 06/08/2023    PLT 72 (L) 06/08/2023      Lab Results   Component Value Date     (H) 05/16/2023     05/16/2023    K 4.2 05/16/2023     05/16/2023    CO2 26 05/16/2023    BUN 18 05/16/2023    CREATININE 0.7 05/16/2023    CALCIUM 9.2 05/16/2023    MG 2.0 01/26/2023    ALT 54 (H) 05/16/2023    AST 75 (H) 05/16/2023    ALBUMIN 2.8 (L) 05/16/2023    BILITOT 2.9 (H) 05/16/2023    BILIDIR 1.1 (H) 05/16/2023       ASSESSMENT/PLAN:     Sedation Plan: Moderate  Patient will undergo TARE.    Richie Henao DO

## 2023-06-08 NOTE — PLAN OF CARE
Patient arrived to Nuclear medicine for post procedure scan. RN to remain at the bedside for monitoring throughout scan.

## 2023-06-08 NOTE — DISCHARGE SUMMARY
Radiology Discharge Summary      Hospital Course: No complications    Admit Date: 6/8/2023  Discharge Date: 06/08/2023     Instructions Given to Patient: Yes  Diet: Resume prior diet  Activity: no lifting, Driving, or Strenuous exercise for 5 days    Description of Condition on Discharge: Stable  Vital Signs (Most Recent): Temp: 99 °F (37.2 °C) (06/08/23 0939)  Pulse: 69 (06/08/23 1420)  Resp: 14 (06/08/23 1420)  BP: (!) 121/52 (06/08/23 1420)  SpO2: 100 % (06/08/23 1420)    Discharge Disposition: Home    Discharge Diagnosis: HCC    Richie Henao DO

## 2023-06-08 NOTE — DISCHARGE INSTRUCTIONS
Please call with any questions or concerns.      Monday thru Friday 8:00 am - 4:30 pm    Interventional Radiology   [phone]    After Hours    Ask for the Radiology Intern on call  [phone]    Please call with any questions or concerns.      Monday thru Friday 8:00 am - 4:30 pm    Interventional Radiology   [phone]    After Hours    Ask for the Radiology Intern on call  [phone]

## 2023-06-08 NOTE — PROGRESS NOTES
RESEARCH STUDY ENCOUNTER  ORGAN TRANSPLANT  [facility] COURTNEY ROBLERO    Study Title: Role of Tumor-Induced Immune Tolerance in the Patient Response to Locoregional Therapy: Implications in Assessment Risk of Hepatocellular Carcinoma Recurrence Following Liver Transplantation    IRB #: [id number].B    IRB Approval Date: 6/8/2016    : Eugene Haney MD  Sub-investigator: Nino Belcher, PhD    Patient Number: [id number]    Patient was scheduled for a Y90 on (date: 06/08/2023). In accordance with the study protocol, specimens were collected in:  [facility] VNP: YES/NO: Yes  [facility]TX: YES/NO: No  [facility] IM: YES/NO: No  [facility] DOSC at the time of peripheral IV line placement: YES/NO: No  [facility] RAD IR: YES/NO: No  [facility] RAD IR: YES/NO: No    Veronica Rodriguez  Admin Research- Liver Transplant

## 2023-06-08 NOTE — PROCEDURES
Interventional Radiology postop note    Pre Op Diagnosis: HCC  Post Op Diagnosis: Same    Procedure: TARE    Procedure performed by: Carlos Arroyo and Earlene    Written Informed Consent Obtained: Yes  Specimen Removed: NO  Estimated Blood Loss: Minimal    Findings:   Via right CFA, angiography demonstrated a hypervascular mass in seg 5. Successful TARE delivery.    5 Fr RCFA access closed with Vascade, Hemostasis achieved.     Patient tolerated procedure well.    Right leg straight for 2 hours.  Patient to undergo nuclear medicine scan.    Follow up in IR clinic in 1 month with liver mass MRI, CMP, and AFP.    Richie Henao, DO  Interventional Radiology

## 2023-06-09 DIAGNOSIS — C22.0 HCC (HEPATOCELLULAR CARCINOMA): Primary | ICD-10-CM

## 2023-06-13 ENCOUNTER — PATIENT MESSAGE (OUTPATIENT)
Dept: PRIMARY CARE CLINIC | Facility: CLINIC | Age: 63
End: 2023-06-13
Payer: COMMERCIAL

## 2023-06-14 ENCOUNTER — TELEPHONE (OUTPATIENT)
Dept: INTERVENTIONAL RADIOLOGY/VASCULAR | Facility: HOSPITAL | Age: 63
End: 2023-06-14
Payer: COMMERCIAL

## 2023-06-14 NOTE — NURSING
Spoke to patients daughter Rachel.  Patient had Y-90 on 6/8/23.  Daughter states patients does not have much of an appetite, it's not nausea but more like feeling full, even if she hasn't eaten anything. She also has pain in her rib/chest area (under her breast area) that she said feels like it's bruised. The pain is achy, not constant, mainly on right side, but occasionally on left side. No trouble with breathing. Had it prior to the Y90, pain has not worsened since procedure.  Patient advised to go to Emergency Department for any worsening or changes of pain.  Also to follow up with PCP.  Daughter states her mother feels full and is afraid to eat in fear of felling nauseous.  Encouraged to take zofran for nausea to help with appetite .  Daughter verbalized understanding and agreed.

## 2023-06-16 ENCOUNTER — CLINICAL SUPPORT (OUTPATIENT)
Dept: PRIMARY CARE CLINIC | Facility: CLINIC | Age: 63
End: 2023-06-16
Payer: COMMERCIAL

## 2023-06-16 DIAGNOSIS — F41.1 GAD (GENERALIZED ANXIETY DISORDER): ICD-10-CM

## 2023-06-16 DIAGNOSIS — F32.A DEPRESSIVE DISORDER: Primary | ICD-10-CM

## 2023-06-16 PROCEDURE — 99499 NO LOS: ICD-10-PCS | Mod: 95,NTX,, | Performed by: SOCIAL WORKER

## 2023-06-16 PROCEDURE — 99499 UNLISTED E&M SERVICE: CPT | Mod: 95,NTX,, | Performed by: SOCIAL WORKER

## 2023-06-16 NOTE — PROGRESS NOTES
"Individual Psychotherapy (PhD/LCSW) - patient is being seen virtually     4/20/2023    Site:  [facility]      Chief complaint/reason for encounter: interpersonal, grief, anxiety.    Mood check: scale of:0 best, 10 worst. Patient rated:  Depression  -  4 or 5  Anxiety - "real anxiety"     Risk parameters:  Patient reports no suicidal ideation  Patient reports no homicidal ideation  Patient reports no self-injurious behavior  Patient reports no violent behavior    Bridge:   N/A    Review of home assignment:    N/A    [place]:  Health px - update   Financial px       History of present condition/content of session:   Review of sx: She said she is struggling since she had the procedure. She c/o of decreased energy, decreased appetite, and emotional numbness. No reported hopelessness, anhedonia, and "everything hit me at once last night." She denied having thoughts to harm herself or others.     She said that she she finally told her son that she is running out of money. She said that she has enough in savings in 4 months. She said that he was angry about it.  Ways to reduce her expense: daughter will add her to phone plan, and able to get her home owners policy premium.  She said she wakes up with sensation that her  in bed with her. She feels "guilty about the way I treated him." Steven was her rock and she needs him to help her deal with her medical px. Especially when her son yells at her.  She said she just down recently figured out that she is running out of money. She loses concentration/focus, has speech trouble ect towards the end of the day.  "It hurt when he is yelling at me." Lack of communication on her son's part: didn't tell her that he has looked into some of this already. Also, revealed that her son does not validate her feelings.  She verbalized being angry with her son over the situation the shed. He won't allow her or her daughter to get into the shed. But, admitted that she is not ready to " fight that kamara.     No new updates from last session: she reported being approved for short term disability until . She said she started the process for filing for long term disability. And applied for Medicaid for secondary health insurance.     Pertinent history:  She has 4 children, Anay, Mesha, Casandra, and Gauri. Her son lives on the same property. Once the succession is completed, her son and daughter in law will buy her house. She was  for a long period of time and her  Steven,  in 2022. He had been ill, but  suddenly. She is a candidate for liver transplant.      Therapeutic Intervention:   Pt was assessed for present condition and areas of clinical concern.  Empathy and support were provided for the pt.'s expression of thoughts/feelings during the session.  Active Listening was used in the session. She was given positive reinforcement for identified areas of clinical concern. Discussion held on sources of thoughts, such as family dynamics.     Treatment plan:  Target symptoms: Anxiety, worry, financial stress, and grief  Why chosen therapy is appropriate versus another modality: evidence based practice  Outcome monitoring methods: checklist/rating scale  Therapeutic intervention type: supportive psychotherapy    Patient's response to intervention:  The patient's response to intervention is motivated.     Progress toward goals and other mental status changes:  The patient's progress toward goals is fair .      -Goal: - coping skills for painful emotions     Diagnosis:   Major Depressive Disorder   Generalized Anxiety Disorder  Grief Reaction     Plan:  individual psychotherapy -  Clinician plans to provide supportive counseling through the medical crisis. Once through this, will focus on grief or other identified areas of clinical concern.     Return to clinic: 2023  at 1:00 in the clinic. Clinician will continue to provide supportive therapy.  Text her grand  daughter if she is feeling down, will text daughters, or her grand daughter.     Length of Service (minutes): 45 -                 Answers submitted by the patient for this visit:  Review of Systems Questionnaire (Submitted on 6/15/2023)  activity change: Yes  unexpected weight change: No  neck pain: Yes  hearing loss: Yes  rhinorrhea: Yes  trouble swallowing: No  eye discharge: No  visual disturbance: Yes  chest tightness: No  wheezing: No  chest pain: No  palpitations: No  blood in stool: No  constipation: Yes  vomiting: No  diarrhea: No  polydipsia: No  difficulty urinating: No  hematuria: No  menstrual problem: No  headaches: Yes  weakness: Yes  confusion: Yes  dysphoric mood: Yes

## 2023-06-24 ENCOUNTER — LAB VISIT (OUTPATIENT)
Dept: LAB | Facility: HOSPITAL | Age: 63
End: 2023-06-24
Attending: STUDENT IN AN ORGANIZED HEALTH CARE EDUCATION/TRAINING PROGRAM
Payer: COMMERCIAL

## 2023-06-24 DIAGNOSIS — Z76.82 ORGAN TRANSPLANT CANDIDATE: ICD-10-CM

## 2023-06-24 DIAGNOSIS — Z00.00 PREVENTATIVE HEALTH CARE: ICD-10-CM

## 2023-06-24 LAB
ALBUMIN SERPL BCP-MCNC: 2.7 G/DL (ref 3.5–5.2)
ALP SERPL-CCNC: 163 U/L (ref 55–135)
ALT SERPL W/O P-5'-P-CCNC: 38 U/L (ref 10–44)
ANION GAP SERPL CALC-SCNC: 10 MMOL/L (ref 8–16)
AST SERPL-CCNC: 61 U/L (ref 10–40)
BASOPHILS # BLD AUTO: 0.03 K/UL (ref 0–0.2)
BASOPHILS NFR BLD: 1.1 % (ref 0–1.9)
BILIRUB SERPL-MCNC: 3.1 MG/DL (ref 0.1–1)
BUN SERPL-MCNC: 17 MG/DL (ref 8–23)
CALCIUM SERPL-MCNC: 9 MG/DL (ref 8.7–10.5)
CHLORIDE SERPL-SCNC: 104 MMOL/L (ref 95–110)
CHOLEST SERPL-MCNC: 137 MG/DL (ref 120–199)
CHOLEST/HDLC SERPL: 4.2 {RATIO} (ref 2–5)
CO2 SERPL-SCNC: 23 MMOL/L (ref 23–29)
CREAT SERPL-MCNC: 0.7 MG/DL (ref 0.5–1.4)
DIFFERENTIAL METHOD: ABNORMAL
EOSINOPHIL # BLD AUTO: 0.3 K/UL (ref 0–0.5)
EOSINOPHIL NFR BLD: 10.2 % (ref 0–8)
ERYTHROCYTE [DISTWIDTH] IN BLOOD BY AUTOMATED COUNT: 14.8 % (ref 11.5–14.5)
EST. GFR  (NO RACE VARIABLE): >60 ML/MIN/1.73 M^2
GLUCOSE SERPL-MCNC: 125 MG/DL (ref 70–110)
HCT VFR BLD AUTO: 36.1 % (ref 37–48.5)
HDLC SERPL-MCNC: 33 MG/DL (ref 40–75)
HDLC SERPL: 24.1 % (ref 20–50)
HGB BLD-MCNC: 12.3 G/DL (ref 12–16)
IMM GRANULOCYTES # BLD AUTO: 0.06 K/UL (ref 0–0.04)
IMM GRANULOCYTES NFR BLD AUTO: 2.1 % (ref 0–0.5)
INR PPP: 1.3 (ref 0.8–1.2)
LDLC SERPL CALC-MCNC: 91.4 MG/DL (ref 63–159)
LYMPHOCYTES # BLD AUTO: 0.4 K/UL (ref 1–4.8)
LYMPHOCYTES NFR BLD: 13.7 % (ref 18–48)
MCH RBC QN AUTO: 33.7 PG (ref 27–31)
MCHC RBC AUTO-ENTMCNC: 34.1 G/DL (ref 32–36)
MCV RBC AUTO: 99 FL (ref 82–98)
MONOCYTES # BLD AUTO: 0.4 K/UL (ref 0.3–1)
MONOCYTES NFR BLD: 14.4 % (ref 4–15)
NEUTROPHILS # BLD AUTO: 1.7 K/UL (ref 1.8–7.7)
NEUTROPHILS NFR BLD: 58.5 % (ref 38–73)
NONHDLC SERPL-MCNC: 104 MG/DL
NRBC BLD-RTO: 0 /100 WBC
PLATELET # BLD AUTO: 61 K/UL (ref 150–450)
PMV BLD AUTO: 10.3 FL (ref 9.2–12.9)
POTASSIUM SERPL-SCNC: 3.8 MMOL/L (ref 3.5–5.1)
PROT SERPL-MCNC: 5.5 G/DL (ref 6–8.4)
PROTHROMBIN TIME: 13.5 SEC (ref 9–12.5)
RBC # BLD AUTO: 3.65 M/UL (ref 4–5.4)
SODIUM SERPL-SCNC: 137 MMOL/L (ref 136–145)
TRIGL SERPL-MCNC: 63 MG/DL (ref 30–150)
WBC # BLD AUTO: 2.84 K/UL (ref 3.9–12.7)

## 2023-06-24 PROCEDURE — 80053 COMPREHEN METABOLIC PANEL: CPT | Mod: PO,TXP | Performed by: STUDENT IN AN ORGANIZED HEALTH CARE EDUCATION/TRAINING PROGRAM

## 2023-06-24 PROCEDURE — 80061 LIPID PANEL: CPT | Mod: TXP | Performed by: FAMILY MEDICINE

## 2023-06-24 PROCEDURE — 85025 COMPLETE CBC W/AUTO DIFF WBC: CPT | Mod: PO,TXP | Performed by: STUDENT IN AN ORGANIZED HEALTH CARE EDUCATION/TRAINING PROGRAM

## 2023-06-24 PROCEDURE — 85610 PROTHROMBIN TIME: CPT | Mod: PO,TXP | Performed by: STUDENT IN AN ORGANIZED HEALTH CARE EDUCATION/TRAINING PROGRAM

## 2023-06-24 PROCEDURE — 80321 ALCOHOLS BIOMARKERS 1OR 2: CPT | Mod: TXP | Performed by: STUDENT IN AN ORGANIZED HEALTH CARE EDUCATION/TRAINING PROGRAM

## 2023-06-27 ENCOUNTER — RESEARCH ENCOUNTER (OUTPATIENT)
Dept: RESEARCH | Facility: HOSPITAL | Age: 63
End: 2023-06-27
Payer: COMMERCIAL

## 2023-06-27 ENCOUNTER — OFFICE VISIT (OUTPATIENT)
Dept: TRANSPLANT | Facility: CLINIC | Age: 63
End: 2023-06-27
Payer: COMMERCIAL

## 2023-06-27 VITALS
DIASTOLIC BLOOD PRESSURE: 68 MMHG | OXYGEN SATURATION: 96 % | WEIGHT: 227.5 LBS | RESPIRATION RATE: 18 BRPM | BODY MASS INDEX: 35.71 KG/M2 | HEART RATE: 69 BPM | TEMPERATURE: 97 F | HEIGHT: 67 IN | SYSTOLIC BLOOD PRESSURE: 141 MMHG

## 2023-06-27 DIAGNOSIS — K75.81 LIVER CIRRHOSIS SECONDARY TO NASH: Primary | ICD-10-CM

## 2023-06-27 DIAGNOSIS — C22.0 HCC (HEPATOCELLULAR CARCINOMA): ICD-10-CM

## 2023-06-27 DIAGNOSIS — Z00.6 RESEARCH STUDY PATIENT: Primary | ICD-10-CM

## 2023-06-27 DIAGNOSIS — R79.89 ELEVATED LFTS: ICD-10-CM

## 2023-06-27 DIAGNOSIS — I85.10 SECONDARY ESOPHAGEAL VARICES WITHOUT BLEEDING: ICD-10-CM

## 2023-06-27 DIAGNOSIS — K74.60 LIVER CIRRHOSIS SECONDARY TO NASH: Primary | ICD-10-CM

## 2023-06-27 DIAGNOSIS — K76.82 HEPATIC ENCEPHALOPATHY: ICD-10-CM

## 2023-06-27 DIAGNOSIS — R18.8 OTHER ASCITES: ICD-10-CM

## 2023-06-27 DIAGNOSIS — Z76.82 ORGAN TRANSPLANT CANDIDATE: ICD-10-CM

## 2023-06-27 LAB
AMPHET+METHAMPHET UR QL: NEGATIVE
BARBITURATES UR QL SCN>200 NG/ML: NEGATIVE
BENZODIAZ UR QL SCN>200 NG/ML: ABNORMAL
BZE UR QL SCN: NEGATIVE
CANNABINOIDS UR QL SCN: NEGATIVE
CREAT UR-MCNC: 164 MG/DL (ref 15–325)
ETHANOL UR-MCNC: <10 MG/DL
METHADONE UR QL SCN>300 NG/ML: NEGATIVE
OPIATES UR QL SCN: NEGATIVE
PCP UR QL SCN>25 NG/ML: NEGATIVE
TOXICOLOGY INFORMATION: ABNORMAL

## 2023-06-27 PROCEDURE — 80307 DRUG TEST PRSMV CHEM ANLYZR: CPT | Mod: TXP | Performed by: STUDENT IN AN ORGANIZED HEALTH CARE EDUCATION/TRAINING PROGRAM

## 2023-06-27 PROCEDURE — 99999 PR PBB SHADOW E&M-EST. PATIENT-LVL IV: CPT | Mod: PBBFAC,TXP,, | Performed by: STUDENT IN AN ORGANIZED HEALTH CARE EDUCATION/TRAINING PROGRAM

## 2023-06-27 PROCEDURE — 99215 OFFICE O/P EST HI 40 MIN: CPT | Mod: S$GLB,TXP,, | Performed by: STUDENT IN AN ORGANIZED HEALTH CARE EDUCATION/TRAINING PROGRAM

## 2023-06-27 PROCEDURE — 1159F PR MEDICATION LIST DOCUMENTED IN MEDICAL RECORD: ICD-10-PCS | Mod: CPTII,S$GLB,TXP, | Performed by: STUDENT IN AN ORGANIZED HEALTH CARE EDUCATION/TRAINING PROGRAM

## 2023-06-27 PROCEDURE — 1160F PR REVIEW ALL MEDS BY PRESCRIBER/CLIN PHARMACIST DOCUMENTED: ICD-10-PCS | Mod: CPTII,S$GLB,TXP, | Performed by: STUDENT IN AN ORGANIZED HEALTH CARE EDUCATION/TRAINING PROGRAM

## 2023-06-27 PROCEDURE — 3044F PR MOST RECENT HEMOGLOBIN A1C LEVEL <7.0%: ICD-10-PCS | Mod: CPTII,S$GLB,TXP, | Performed by: STUDENT IN AN ORGANIZED HEALTH CARE EDUCATION/TRAINING PROGRAM

## 2023-06-27 PROCEDURE — 3008F PR BODY MASS INDEX (BMI) DOCUMENTED: ICD-10-PCS | Mod: CPTII,S$GLB,TXP, | Performed by: STUDENT IN AN ORGANIZED HEALTH CARE EDUCATION/TRAINING PROGRAM

## 2023-06-27 PROCEDURE — 1159F MED LIST DOCD IN RCRD: CPT | Mod: CPTII,S$GLB,TXP, | Performed by: STUDENT IN AN ORGANIZED HEALTH CARE EDUCATION/TRAINING PROGRAM

## 2023-06-27 PROCEDURE — 1160F RVW MEDS BY RX/DR IN RCRD: CPT | Mod: CPTII,S$GLB,TXP, | Performed by: STUDENT IN AN ORGANIZED HEALTH CARE EDUCATION/TRAINING PROGRAM

## 2023-06-27 PROCEDURE — 3077F SYST BP >= 140 MM HG: CPT | Mod: CPTII,S$GLB,TXP, | Performed by: STUDENT IN AN ORGANIZED HEALTH CARE EDUCATION/TRAINING PROGRAM

## 2023-06-27 PROCEDURE — 3077F PR MOST RECENT SYSTOLIC BLOOD PRESSURE >= 140 MM HG: ICD-10-PCS | Mod: CPTII,S$GLB,TXP, | Performed by: STUDENT IN AN ORGANIZED HEALTH CARE EDUCATION/TRAINING PROGRAM

## 2023-06-27 PROCEDURE — 3078F DIAST BP <80 MM HG: CPT | Mod: CPTII,S$GLB,TXP, | Performed by: STUDENT IN AN ORGANIZED HEALTH CARE EDUCATION/TRAINING PROGRAM

## 2023-06-27 PROCEDURE — 99999 PR PBB SHADOW E&M-EST. PATIENT-LVL IV: ICD-10-PCS | Mod: PBBFAC,TXP,, | Performed by: STUDENT IN AN ORGANIZED HEALTH CARE EDUCATION/TRAINING PROGRAM

## 2023-06-27 PROCEDURE — 3078F PR MOST RECENT DIASTOLIC BLOOD PRESSURE < 80 MM HG: ICD-10-PCS | Mod: CPTII,S$GLB,TXP, | Performed by: STUDENT IN AN ORGANIZED HEALTH CARE EDUCATION/TRAINING PROGRAM

## 2023-06-27 PROCEDURE — 3008F BODY MASS INDEX DOCD: CPT | Mod: CPTII,S$GLB,TXP, | Performed by: STUDENT IN AN ORGANIZED HEALTH CARE EDUCATION/TRAINING PROGRAM

## 2023-06-27 PROCEDURE — 3044F HG A1C LEVEL LT 7.0%: CPT | Mod: CPTII,S$GLB,TXP, | Performed by: STUDENT IN AN ORGANIZED HEALTH CARE EDUCATION/TRAINING PROGRAM

## 2023-06-27 PROCEDURE — 99215 PR OFFICE/OUTPT VISIT, EST, LEVL V, 40-54 MIN: ICD-10-PCS | Mod: S$GLB,TXP,, | Performed by: STUDENT IN AN ORGANIZED HEALTH CARE EDUCATION/TRAINING PROGRAM

## 2023-06-27 NOTE — PROGRESS NOTES
Date Consent signed: 6/27/23    Sponsor: Lyndon [id number]    Study Title/IRB Number: Prospective, Observational Study of Donor and Recipient Factors Which May Influence Preservation Injury in Liver Transplant Recipients/[id number]    Principle Investigator: Eguene Haney MD    Present for Discussion: Yumiko Gonzalez Karla Segura     Is LAR Consenting for Subject: No    Prior to the Informed Consent (IC) being signed, or any study protocol required data collection, testing, procedure, or intervention being performed, the following was done and/or discussed:  Subject was given a copy of the IC for review   Purpose of the study and qualifications to participate   Study design, Follow up schedule, and tests or procedures done at each visit  Confidentiality and HIPAA Authorization for Release of Medical Records for the research trial/ subject's rights/research related injury  Risk, Benefits, Alternative Treatments, Compensation and Costs  Participation in the research trial is voluntary and subject may withdraw at anytime  Contact information for study related questions    Subject verbalizes understanding of the above: Yes  Contact information for CRC and PI given to subject: Yes  Subject able to adequately summarize: the purpose of the study, the risks associated with the study, and all procedures, testing, and follow-ups associated with the study: Yes    Yumiko Gonzalez signed the informed consent form for the Prospective, Observational Study of Donor and Recipient Factors Which May Influence Preservation Injury in Liver Transplant Recipients research study with an IRB approval date of 31/Jan/2023. Each page of the consent form was reviewed with Yumiko Gonzalez (and subject's family) and all questions answered satisfactorily. Yumiko Gonzalez signed the consent form and received a copy of same. The original consent was scanned into electronic medical records (EPIC) and filed into the subject's research study binder.

## 2023-06-27 NOTE — PROGRESS NOTES
Transplant Hepatology   Transplant Evaluation Follow Up Note    Referring provider: No ref. provider found  PCP: Garrett Webb MD    Chief complaint:  follow up, DURAN cirrhosis    HPI: Yumiko Gonzalez is a 63 y.o. female with history of DURAN related cirrhosis who presents for scheduled follow up. She is accompanied by her daughter.    6/27/23: No recent hospitalizations or ED visits. She reports generalized fatigue as well as poor short term memory. She is compliant with lactulose and rifaximin without nomi encephalopathy. Also compliant with diuretics without recent abdominal distension. She denies other signs of decompensated cirrhosis including no recent jaundice or GI bleeding.    4/13/23 Initial Visit: She was told approximately 30 years ago that she had a fatty liver, and approximately 10 years ago she was told that she had cirrhosis.  Last year she developed lower extremity edema and abdominal distention.  In January 2023 she was started on diuretics and underwent paracentesis.  She has not required repeat paracentesis and denies recent abdominal distention.  Her cirrhosis has also been complicated by hepatic encephalopathy currently controlled with lactulose and rifaximin.  She denies other signs of decompensated cirrhosis including no recent jaundice or GI bleeding.    She does not drink alcohol and has never been a heavy drinker.  Testing has been negative for chronic viral hepatitis.  No known family history of liver disease.    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, postitive antismooth muscle antibody    Blood transfusion     Bronchitis     bronchospasm on occasion     Cancer 1987    carcinoma in situ    Cervical neck pain with evidence of disc disease 03/22/2012    can bend neck    Cirrhosis      "non-alcoholic, compensated    Colon polyps 2012    Depression 2012    Hx panic attacks    Diverticular disease 2012    never diverticulitis    Fatty liver 2012    Fibrocystic breast     Fine tremor     hands,chronic    Hepatosplenomegaly     History of abdominal paracentesis 2023    8.7L of fluid drained    Hypertension 2013    Knee pain, bilateral     chronic, with hands,feet,fingers also painful    Lesion of right lung     Liver disease     "partially sclerosed liver" per pt    Migraines past Hx    Nephrolithiasis     stone episode 2021    Pleural effusion     small, compensated, good bilateral breath sounds per Dr Suresh GUTIERRES (postoperative nausea and vomiting)     twice after childbirth    Postpartum depression     Splenomegaly     Thrombocytopenia     Tremors of nervous system        Past Surgical History:   Procedure Laterality Date    ADENOIDECTOMY      CERVICAL CONIZATION   W/ LASER       SECTION      x3    COLONOSCOPY N/A 2016    Procedure: COLONOSCOPY;  Surgeon: James Palomares MD;  Location: [facility];  Service: Endoscopy;  Laterality: N/A;    COLONOSCOPY N/A 2/3/2022    Procedure: COLONOSCOPY;  Surgeon: James Palomares MD;  Location: [facility];  Service: Endoscopy;  Laterality: N/A;    EMBOLIZATION N/A 2018    Procedure: EMBOLIZATION, BLOOD VESSEL;  Surgeon: Joselin Surgeon;  Location: [facility];  Service: Radiology;  Laterality: N/A;    ENDOMETRIAL ABLATION      ESOPHAGOGASTRODUODENOSCOPY N/A 2020    Procedure: ESOPHAGOGASTRODUODENOSCOPY (EGD);  Surgeon: James Palomares MD;  Location: [facility];  Service: Endoscopy;  Laterality: N/A;    ESOPHAGOGASTRODUODENOSCOPY N/A 3/8/2022    Procedure: EGD (ESOPHAGOGASTRODUODENOSCOPY);  Surgeon: James Palomares MD;  Location: [facility];  Service: Endoscopy;  Laterality: N/A;    LIVER BIOPSY      PELVIC LAPAROSCOPY      TONSILLECTOMY      TUBAL LIGATION         Family History   Problem " Relation Age of Onset    Breast cancer Mother 70    Heart disease Mother     Hypertension Mother     Tremor Mother     Diabetes Father     Heart disease Father     Diabetes Sister     Cancer Sister     Breast cancer Sister     Diabetes Sister     Tremor Daughter     Breast cancer Maternal Aunt 70    Multiple sclerosis Maternal Aunt     Multiple sclerosis Maternal Aunt     Breast cancer Maternal Grandmother 70    Diabetes Brother     Emphysema Brother     Breast cancer Maternal Cousin 40    Ovarian cancer Neg Hx     Parkinsonism Neg Hx     Glaucoma Neg Hx     Macular degeneration Neg Hx        Social History     Tobacco Use    Smoking status: Former     Types: Cigarettes     Quit date: 2/3/2006     Years since quittin.4    Smokeless tobacco: Never   Substance Use Topics    Alcohol use: Not Currently    Drug use: No       Current Outpatient Medications   Medication Sig Dispense Refill    albuterol (PROVENTIL) 2.5 mg /3 mL (0.083 %) nebulizer solution Take 3 mLs (2.5 mg total) by nebulization every 6 (six) hours as needed for Wheezing. Rescue 75 mL 2    albuterol (PROVENTIL/VENTOLIN HFA) 90 mcg/actuation inhaler Inhale 2 puffs every 4 hours as needed for cough, wheeze, or shortness of breath 18 g 11    ALPRAZolam (XANAX) 0.25 MG tablet Take 1 tablet (0.25 mg total) by mouth nightly as needed. FOR INSOMNIA (Patient taking differently: Take 0.125-0.25 mg by mouth nightly as needed. FOR INSOMNIA) 30 tablet 2    buPROPion (WELLBUTRIN XL) 150 MG TB24 tablet Take 2 tablets (300 mg total) by mouth once daily. 180 tablet 3    furosemide (LASIX) 40 MG tablet Take 1 tablet (40 mg total) by mouth once daily. 30 tablet 6    lactulose (CONSTULOSE) 10 gram/15 mL solution Take 30 mLs (20 g total) by mouth 2 (two) times daily. (Patient taking differently: Take 20 g by mouth as needed.) 5000 mL 6    loratadine (CLARITIN) 10 mg tablet Take 10 mg by mouth daily as needed.      multivitamin (THERAGRAN) per tablet Take 1 tablet by  "mouth once daily.      omeprazole (PRILOSEC) 20 MG capsule Take 1 capsule (20 mg total) by mouth once daily. 90 capsule 3    propranoloL (INDERAL LA) 80 MG 24 hr capsule Take 1 capsule (80 mg total) by mouth once daily. 30 capsule 11    rifAXIMin (XIFAXAN) 550 mg Tab Take 1 tablet (550 mg total) by mouth 2 (two) times daily. (Patient taking differently: Take 550 mg by mouth once daily.) 60 tablet 11    spironolactone (ALDACTONE) 100 MG tablet Take 1 tablet (100 mg total) by mouth once daily. (Patient taking differently: Take by mouth once daily.) 30 tablet 6    vitamin E 400 UNIT capsule Take 400 Units by mouth once daily.      fluticasone-umeclidin-vilanter (TRELEGY ELLIPTA) 200-62.5-25 mcg inhaler Inhale 1 puff into the lungs once daily. (Patient not taking: Reported on 6/27/2023) 60 each 11    ondansetron (ZOFRAN-ODT) 4 MG TbDL Take 1 tablet (4 mg total) by mouth every 8 (eight) hours as needed (nausea). 10 tablet 1    sulfamethoxazole-trimethoprim 800-160mg (BACTRIM DS) 800-160 mg Tab Take 1 tablet by mouth 2 (two) times daily. (Patient not taking: Reported on 5/24/2023) 20 tablet 0     No current facility-administered medications for this visit.       Review of patient's allergies indicates:   Allergen Reactions    No known drug allergies        Review of Systems   Constitutional:  Negative for fever and weight loss.   Gastrointestinal:  Negative for abdominal pain, blood in stool, constipation, diarrhea, heartburn, melena, nausea and vomiting.     Vitals:    06/27/23 0940   BP: (!) 141/68   Pulse: 69   Resp: 18   Temp: 97.2 °F (36.2 °C)   TempSrc: Temporal   SpO2: 96%   Weight: 103.2 kg (227 lb 8.2 oz)   Height: 5' 7" (1.702 m)         Physical Exam  Constitutional:       General: She is not in acute distress.  Eyes:      General: Scleral icterus present.   Cardiovascular:      Rate and Rhythm: Normal rate and regular rhythm.   Pulmonary:      Effort: Pulmonary effort is normal. No respiratory distress. "   Abdominal:      General: Bowel sounds are normal. There is no distension.      Palpations: Abdomen is soft.      Tenderness: There is no abdominal tenderness. There is no guarding or rebound.   Skin:     Coloration: Skin is not jaundiced.       LABS: I personally reviewed pertinent laboratory findings.    Lab Results   Component Value Date    ALT 38 06/24/2023    AST 61 (H) 06/24/2023     (H) 04/13/2023    ALKPHOS 163 (H) 06/24/2023    BILITOT 3.1 (H) 06/24/2023       Lab Results   Component Value Date    WBC 2.84 (L) 06/24/2023    HGB 12.3 06/24/2023    HCT 36.1 (L) 06/24/2023    MCV 99 (H) 06/24/2023    PLT 61 (L) 06/24/2023       Lab Results   Component Value Date     06/24/2023    K 3.8 06/24/2023     06/24/2023    CO2 23 06/24/2023    BUN 17 06/24/2023    CREATININE 0.7 06/24/2023    CALCIUM 9.0 06/24/2023    ANIONGAP 10 06/24/2023    ESTGFRAFRICA >60 06/20/2022    EGFRNONAA >60 06/20/2022       Lab Results   Component Value Date    INR 1.3 (H) 06/24/2023    INR 1.3 (H) 06/08/2023    INR 1.3 (H) 05/16/2023       Imaging:  I personally reviewed recent imaging studies available on the chart and from outside medical records.      Assessment:  63 y.o. female with DURAN related cirrhosis complicated by HCC. She is decompensated with ascites, HE.    1. Liver cirrhosis secondary to DURAN    2. HCC (hepatocellular carcinoma)    3. Secondary esophageal varices without bleeding    4. Other ascites    5. Hepatic encephalopathy    6. Elevated LFTs    7. Organ transplant candidate        MELD-Na: 14 at 6/24/2023 11:13 AM  MELD: 14 at 6/24/2023 11:13 AM  Calculated from:  Serum Creatinine: 0.7 mg/dL (Using min of 1 mg/dL) at 6/24/2023 11:13 AM  Serum Sodium: 137 mmol/L at 6/24/2023 11:13 AM  Total Bilirubin: 3.1 mg/dL at 6/24/2023 11:13 AM  INR(ratio): 1.3 at 6/24/2023 11:13 AM      Transplant Candidacy: She is listed for liver transplantation.    Recommendations:  HCC: She is status post Y90 06/2023.  Surveillance MRI scheduled 07/2023.    Ascites/Edema: 2 gm Na diet. Continue Lasix 40 mg daily and Aldactone 100 mg daily.    Encephalopathy: Continue lactulose. Titrate to maintain 3-4 bowel movements per day. Continue rifaximin 550mg BID    Variceal screening: EGD in 03/2022 showed grade I varices.  She takes propranolol and does not warrant surveillance EGD as long as she remains on propranolol.    Immunizations: Recommend HAV and HBV vaccinations if not immune    UNOS Patient Status  Functional Status: 60% - Requires occasional assistance but is able to care for needs  Physical Capacity: No Limitations    Diabetes: No  Any previous malignancy: No  Neoadjuvant Therapy: no  Has patient ever had a dx of HCC: no  Previous Abdominal Surgery: no  Spontaneous Bacterial Peritonitis: no  History of Portal Vein Thrombosis: no  Transjugular Intrahepatic Portosystemic Shunt: no    Return to clinic in 3 months or sooner if needed.    I spent a total of 40 minutes on the day of the visit. This includes face to face time and non-face to face time preparing to see the patient (eg, review of tests), obtaining and/or reviewing separately obtained history, documenting clinical information in the electronic or other health record, independently interpreting results, and communicating results to the patient/family/caregiver, or care coordination.    This note includes dictation done using [employer] speech recognition program. Word recognition mistakes are occasionally missed on review.    James Brambila MD  Staff Physician  Hepatology and Liver Transplant  [facility]  [facility]

## 2023-06-27 NOTE — LETTER
June 28, 2023        Laila Will  [address]  [facility] [address]  Phone: [phone]  Fax: [phone]             [facility] [address]  [address]  [facility] [address]  Phone: [phone]   Patient: Yumiko Gonzalez   MR Number: [id number]   YOB: 1960   Date of Visit: 6/27/2023       Dear Dr. Laila Will    Thank you for referring Yumiko Gonzalez to me for evaluation. Attached you will find relevant portions of my assessment and plan of care.    If you have questions, please do not hesitate to call me. I look forward to following Yumiko Gonzalez along with you.    Sincerely,    James Brambila MD    Enclosure    If you would like to receive this communication electronically, please contact [email or web address] or [phone] to request [employer] access.    [employer] is a tool which provides read-only access to select patient information with whom you have a relationship. Its easy to use and provides real time access to review your patients record including encounter summaries, notes, results, and demographic information.    If you feel you have received this communication in error or would no longer like to receive these types of communications, please e-mail [email or web address]

## 2023-06-28 LAB
CLINICAL BIOCHEMIST REVIEW: NORMAL
PLPETH BLD-MCNC: <10 NG/ML
POPETH BLD-MCNC: <10 NG/ML

## 2023-06-29 ENCOUNTER — TELEPHONE (OUTPATIENT)
Dept: PRIMARY CARE CLINIC | Facility: CLINIC | Age: 63
End: 2023-06-29
Payer: COMMERCIAL

## 2023-06-29 NOTE — TELEPHONE ENCOUNTER
Spoke with pt. Explained that Delia would not be in tomorrow and she would call to reschedule after July 4th. Pt verbalized understanding.

## 2023-06-30 ENCOUNTER — OFFICE VISIT (OUTPATIENT)
Dept: PRIMARY CARE CLINIC | Facility: CLINIC | Age: 63
End: 2023-06-30
Payer: COMMERCIAL

## 2023-06-30 VITALS
HEIGHT: 68 IN | RESPIRATION RATE: 18 BRPM | WEIGHT: 226.94 LBS | HEART RATE: 64 BPM | DIASTOLIC BLOOD PRESSURE: 80 MMHG | TEMPERATURE: 98 F | BODY MASS INDEX: 34.39 KG/M2 | SYSTOLIC BLOOD PRESSURE: 133 MMHG | OXYGEN SATURATION: 97 %

## 2023-06-30 DIAGNOSIS — C22.0 HCC (HEPATOCELLULAR CARCINOMA): ICD-10-CM

## 2023-06-30 DIAGNOSIS — K75.81 NONALCOHOLIC STEATOHEPATITIS: Chronic | ICD-10-CM

## 2023-06-30 DIAGNOSIS — R41.3 OTHER AMNESIA: Primary | ICD-10-CM

## 2023-06-30 DIAGNOSIS — R51.9 NEW ONSET OF HEADACHES: ICD-10-CM

## 2023-06-30 DIAGNOSIS — E56.9 VITAMIN DEFICIENCY: ICD-10-CM

## 2023-06-30 PROCEDURE — 99999 PR PBB SHADOW E&M-EST. PATIENT-LVL V: ICD-10-PCS | Mod: PBBFAC,TXP,, | Performed by: FAMILY MEDICINE

## 2023-06-30 PROCEDURE — 3008F BODY MASS INDEX DOCD: CPT | Mod: CPTII,NTX,S$GLB, | Performed by: FAMILY MEDICINE

## 2023-06-30 PROCEDURE — 99999 PR PBB SHADOW E&M-EST. PATIENT-LVL V: CPT | Mod: PBBFAC,TXP,, | Performed by: FAMILY MEDICINE

## 2023-06-30 PROCEDURE — 99214 PR OFFICE/OUTPT VISIT, EST, LEVL IV, 30-39 MIN: ICD-10-PCS | Mod: NTX,S$GLB,, | Performed by: FAMILY MEDICINE

## 2023-06-30 PROCEDURE — 1159F PR MEDICATION LIST DOCUMENTED IN MEDICAL RECORD: ICD-10-PCS | Mod: CPTII,NTX,S$GLB, | Performed by: FAMILY MEDICINE

## 2023-06-30 PROCEDURE — 3079F DIAST BP 80-89 MM HG: CPT | Mod: CPTII,NTX,S$GLB, | Performed by: FAMILY MEDICINE

## 2023-06-30 PROCEDURE — 3044F PR MOST RECENT HEMOGLOBIN A1C LEVEL <7.0%: ICD-10-PCS | Mod: CPTII,NTX,S$GLB, | Performed by: FAMILY MEDICINE

## 2023-06-30 PROCEDURE — 3075F SYST BP GE 130 - 139MM HG: CPT | Mod: CPTII,NTX,S$GLB, | Performed by: FAMILY MEDICINE

## 2023-06-30 PROCEDURE — 3008F PR BODY MASS INDEX (BMI) DOCUMENTED: ICD-10-PCS | Mod: CPTII,NTX,S$GLB, | Performed by: FAMILY MEDICINE

## 2023-06-30 PROCEDURE — 3079F PR MOST RECENT DIASTOLIC BLOOD PRESSURE 80-89 MM HG: ICD-10-PCS | Mod: CPTII,NTX,S$GLB, | Performed by: FAMILY MEDICINE

## 2023-06-30 PROCEDURE — 3075F PR MOST RECENT SYSTOLIC BLOOD PRESS GE 130-139MM HG: ICD-10-PCS | Mod: CPTII,NTX,S$GLB, | Performed by: FAMILY MEDICINE

## 2023-06-30 PROCEDURE — 1160F PR REVIEW ALL MEDS BY PRESCRIBER/CLIN PHARMACIST DOCUMENTED: ICD-10-PCS | Mod: CPTII,NTX,S$GLB, | Performed by: FAMILY MEDICINE

## 2023-06-30 PROCEDURE — 1160F RVW MEDS BY RX/DR IN RCRD: CPT | Mod: CPTII,NTX,S$GLB, | Performed by: FAMILY MEDICINE

## 2023-06-30 PROCEDURE — 3044F HG A1C LEVEL LT 7.0%: CPT | Mod: CPTII,NTX,S$GLB, | Performed by: FAMILY MEDICINE

## 2023-06-30 PROCEDURE — 1159F MED LIST DOCD IN RCRD: CPT | Mod: CPTII,NTX,S$GLB, | Performed by: FAMILY MEDICINE

## 2023-06-30 PROCEDURE — 99214 OFFICE O/P EST MOD 30 MIN: CPT | Mod: NTX,S$GLB,, | Performed by: FAMILY MEDICINE

## 2023-06-30 NOTE — PROGRESS NOTES
THIS DOCUMENT WAS MADE IN PART WITH VOICE RECOGNITION SOFTWARE.  OCCASIONALLY THIS SOFTWARE WILL MISINTERPRET WORDS OR PHRASES.      [facility] Care Provider Appointment   [facility]       Patient ID: Yumiko Gonzalez is a 63 y.o. female.    ASSESSMENT/PLAN by Problem List:    1. Other amnesia  -     MRI Brain With Contrast; Future; Expected date: 06/30/2023    2. New onset of headaches    She is having some short-term memory loss that very well may be related to her liver disease and encephalopathy however she also does report some new onset headaches.  No recent brain imaging so I do recommend an MRI to rule out any other contributing pathology.    3. Nonalcoholic steatohepatitis  4. HCC (hepatocellular carcinoma)  She is continuing to follow with hepatology    5. Vitamin deficiency  Comments:  Discussed labs and vitamin replacement recommendations        Follow Up:  4 weeks    Subjective:     Chief Complaint   Patient presents with    Follow-up     I have reviewed the information entered by the ancillary staff regarding the chief complaint as well as the related history.    Follow-up  Associated symptoms include arthralgias, headaches and weakness. Pertinent negatives include no chest pain, neck pain or vomiting.     Patient is a/an 63 y.o.  female       Follow-up complications related to cirrhosis and hepatocellular carcinoma.    She continues to have poor short-term memory.  She continues to have some peripheral edema.  She does not have severe abdominal distention.  She has not had any obvious varicocele bleeding.  She still has significant weakness, deconditioning.  She does remain compliant with her lactulose and has not had at least signs of worsening hepatic encephalopathy but I suspect that her short-term memory and other symptoms are still related.  At this point it is unclear if and when she will be able to return to work.  She does remain out on disability.  She is  following closely with the transplant team.    30/30 MMSE today  But still foggy, losing job  Memory changes    discussed vitamin deficiencies      For complete problem list, past medical history, surgical history, social history, etc., see appropriate section in the electronic medical record    Review of Systems   Constitutional:  Positive for activity change. Negative for unexpected weight change.   HENT:  Positive for rhinorrhea. Negative for hearing loss and trouble swallowing.    Eyes:  Negative for discharge and visual disturbance.   Respiratory:  Negative for chest tightness and wheezing.    Cardiovascular:  Negative for chest pain and palpitations.   Gastrointestinal:  Negative for blood in stool, constipation, diarrhea and vomiting.   Endocrine: Negative for polydipsia and polyuria.   Genitourinary:  Negative for difficulty urinating, dysuria, hematuria and menstrual problem.   Musculoskeletal:  Positive for arthralgias. Negative for neck pain.   Neurological:  Positive for weakness and headaches.   Psychiatric/Behavioral:  Positive for dysphoric mood. Negative for confusion.      Objective     Physical Exam  Vitals reviewed.   Constitutional:       General: She is not in acute distress.     Appearance: She is well-developed. She is not ill-appearing.   HENT:      Head: Normocephalic and atraumatic.   Eyes:      General: No scleral icterus.     Conjunctiva/sclera: Conjunctivae normal.   Cardiovascular:      Rate and Rhythm: Normal rate and regular rhythm.      Heart sounds: Normal heart sounds.      Comments: 2+ bilateral ankle and pretibial edema  Pulmonary:      Effort: Pulmonary effort is normal. No respiratory distress.      Breath sounds: Normal breath sounds. No wheezing or rales.   Skin:     General: Skin is dry.      Findings: No rash.   Neurological:      Mental Status: She is alert and oriented to person, place, and time.      Comments: MMSE 30/30   Psychiatric:         Behavior: Behavior normal.  "    Vitals:    06/30/23 1127   BP: 133/80   BP Location: Right arm   Patient Position: Sitting   BP Method: Medium (Manual)   Pulse: 64   Resp: 18   Temp: 98.2 °F (36.8 °C)   TempSrc: Oral   SpO2: 97%   Weight: 103 kg (226 lb 15.4 oz)   Height: 5' 8" (1.727 m)       "

## 2023-07-06 DIAGNOSIS — K74.60 CIRRHOSIS OF LIVER WITH ASCITES, UNSPECIFIED HEPATIC CIRRHOSIS TYPE: ICD-10-CM

## 2023-07-06 DIAGNOSIS — R18.8 CIRRHOSIS OF LIVER WITH ASCITES, UNSPECIFIED HEPATIC CIRRHOSIS TYPE: ICD-10-CM

## 2023-07-06 DIAGNOSIS — R60.0 LOWER EXTREMITY EDEMA: ICD-10-CM

## 2023-07-06 RX ORDER — FUROSEMIDE 40 MG/1
40 TABLET ORAL DAILY
Qty: 30 TABLET | Refills: 6 | Status: SHIPPED | OUTPATIENT
Start: 2023-07-06 | End: 2023-11-13 | Stop reason: SDUPTHER

## 2023-07-07 ENCOUNTER — TELEPHONE (OUTPATIENT)
Dept: PRIMARY CARE CLINIC | Facility: CLINIC | Age: 63
End: 2023-07-07
Payer: COMMERCIAL

## 2023-07-07 NOTE — TELEPHONE ENCOUNTER
Clinician contacted pt to reschedule appointment from 6/30/2023 (canceled due to clinician was out). There was no answer and voice mail left asking her to return the call.

## 2023-07-09 PROBLEM — C22.0 HCC (HEPATOCELLULAR CARCINOMA): Status: ACTIVE | Noted: 2023-07-09

## 2023-07-09 PROBLEM — J42 CHRONIC BRONCHITIS, UNSPECIFIED CHRONIC BRONCHITIS TYPE: Status: ACTIVE | Noted: 2023-07-09

## 2023-07-09 PROBLEM — D69.6 THROMBOCYTOPENIA: Status: ACTIVE | Noted: 2023-07-09

## 2023-07-12 ENCOUNTER — RESEARCH ENCOUNTER (OUTPATIENT)
Dept: RESEARCH | Facility: HOSPITAL | Age: 63
End: 2023-07-12
Payer: COMMERCIAL

## 2023-07-12 ENCOUNTER — HOSPITAL ENCOUNTER (OUTPATIENT)
Dept: RADIOLOGY | Facility: HOSPITAL | Age: 63
Discharge: HOME | End: 2023-07-12
Payer: COMMERCIAL

## 2023-07-12 ENCOUNTER — TELEPHONE (OUTPATIENT)
Dept: TRANSPLANT | Facility: CLINIC | Age: 63
End: 2023-07-12
Payer: COMMERCIAL

## 2023-07-12 DIAGNOSIS — C22.0 HCC (HEPATOCELLULAR CARCINOMA): ICD-10-CM

## 2023-07-12 PROCEDURE — 74183 MRI ABD W/O CNTR FLWD CNTR: CPT | Mod: 26,TXP,, | Performed by: INTERNAL MEDICINE

## 2023-07-12 PROCEDURE — 74183 MRI ABD W/O CNTR FLWD CNTR: CPT | Mod: TC,TXP

## 2023-07-12 PROCEDURE — 74183 MRI ABDOMEN W WO CONTRAST: ICD-10-PCS | Mod: 26,TXP,, | Performed by: INTERNAL MEDICINE

## 2023-07-12 PROCEDURE — A9585 GADOBUTROL INJECTION: HCPCS | Mod: TXP

## 2023-07-12 PROCEDURE — 25500020 PHARM REV CODE 255: Mod: TXP

## 2023-07-12 RX ORDER — GADOBUTROL 604.72 MG/ML
10 INJECTION INTRAVENOUS
Status: COMPLETED | OUTPATIENT
Start: 2023-07-12 | End: 2023-07-12

## 2023-07-12 RX ADMIN — GADOBUTROL 10 ML: 604.72 INJECTION INTRAVENOUS at 01:07

## 2023-07-12 NOTE — TELEPHONE ENCOUNTER
Labs reviewed at 6/27 office visit    ----- Message from James Brambila MD sent at 7/12/2023  9:40 AM CDT -----  Reviewed. Liver tests stable.

## 2023-07-12 NOTE — PROGRESS NOTES
RESEARCH STUDY SPECIMEN COLLECTION ENCOUNTER  ORGAN TRANSPLANT  [facility] [address]    Study Title: Role of Tumor-Induced Immune Tolerance in the Patient Response to Locoregional Therapy: Implications in Assessment Risk of Hepatocellular Carcinoma Recurrence Following Liver Transplantation    IRB #: [id number]    IRB Approval Date: 6/8/2016    : Eugene Haney MD  Sub-investigator: Nino Belcher, PhD    Patient Number: [id number]    In accordance with the study protocol, Research Lab orders were placed and follow-up specimens were collected on (date: 07/12/2023) in:  [facility] VNP: YES/NO: No  [facility]TX: YES/NO: No  [facility] IM: YES/NO: Yes  Other [facility] location: YES/NO: No   Location: N/A    A  was used to transport blood specimens to [facility] for processing: YES/NO: No  Blood specimens were transported to [facility] for processing: YES/NO: Yes    Mehran Hilliard  Admin Research- Liver Transplant

## 2023-07-13 ENCOUNTER — DOCUMENTATION ONLY (OUTPATIENT)
Dept: TRANSPLANT | Facility: CLINIC | Age: 63
End: 2023-07-13
Payer: COMMERCIAL

## 2023-07-13 ENCOUNTER — TELEPHONE (OUTPATIENT)
Dept: PRIMARY CARE CLINIC | Facility: CLINIC | Age: 63
End: 2023-07-13
Payer: COMMERCIAL

## 2023-07-13 ENCOUNTER — CLINICAL SUPPORT (OUTPATIENT)
Dept: INTERVENTIONAL RADIOLOGY/VASCULAR | Facility: CLINIC | Age: 63
End: 2023-07-13
Payer: COMMERCIAL

## 2023-07-13 DIAGNOSIS — C22.0 HCC (HEPATOCELLULAR CARCINOMA): Primary | ICD-10-CM

## 2023-07-13 PROCEDURE — 99213 OFFICE O/P EST LOW 20 MIN: CPT | Mod: 95,TXP,, | Performed by: FAMILY MEDICINE

## 2023-07-13 PROCEDURE — 99213 PR OFFICE/OUTPT VISIT, EST, LEVL III, 20-29 MIN: ICD-10-PCS | Mod: 95,TXP,, | Performed by: FAMILY MEDICINE

## 2023-07-13 NOTE — TELEPHONE ENCOUNTER
Contacted pt to reschedule canceled appt. She is scheduled for 7/17/2023 at 2:00 for a virtual visit.

## 2023-07-13 NOTE — PROGRESS NOTES
Subjective     Patient ID: Yumiko Gonzalez is a 63 y.o. female.    Chief Complaint: Hepatocellular Carcinoma    Virtual visit with patient for follow up of hepatocellular carcinoma recently treated with radioembolization on 2023. Patient has a history of DURAN related cirrhosis. HCC was identified during transplant workup. She has complaints of fatigue that was present prior to her treatment. Her daughter is present via speaker phone for our visit. She had an MRI and labs yesterday.    Review of Systems   Constitutional:  Positive for fatigue. Negative for activity change, appetite change, chills and fever.   Respiratory:  Negative for cough, shortness of breath, wheezing and stridor.    Cardiovascular:  Negative for chest pain, palpitations and leg swelling.   Gastrointestinal:  Positive for abdominal distention. Negative for abdominal pain, constipation, diarrhea, nausea and vomiting.        Objective     Physical Exam  Constitutional:       General: She is not in acute distress.     Appearance: She is well-developed. She is not diaphoretic.   HENT:      Head: Normocephalic and atraumatic.   Pulmonary:      Effort: Pulmonary effort is normal. No respiratory distress.   Neurological:      Mental Status: She is alert and oriented to person, place, and time.   Psychiatric:         Behavior: Behavior normal.         Thought Content: Thought content normal.         Judgment: Judgment normal.     Reviewed hepatology progress note    ECO  MELD 3.0: 16 at 2023 11:13 AM  MELD-Na: 14 at 2023 11:13 AM  Calculated from:  Serum Creatinine: 0.7 mg/dL (Using min of 1 mg/dL) at 2023 11:13 AM  Serum Sodium: 137 mmol/L at 2023 11:13 AM  Total Bilirubin: 3.1 mg/dL at 2023 11:13 AM  Serum Albumin: 2.7 g/dL at 2023 11:13 AM  INR(ratio): 1.3 at 2023 11:13 AM  Age at listin years  Sex: Female at 2023 11:13 AM    Child Samaniego: Class C  Transplant Status: in evaluation    MRI  7/12/2023  Impression:     Cirrhosis with signs of portal hypertension including splenomegaly, venous collaterals, and small volume ascites.     Post embolization changes in hepatic segment 5.  No signs of residual tumor.     Other findings as described.    LABS 7/12/2023  Component Ref Range & Units 1 d ago  (7/12/23) 2 wk ago  (6/24/23) 1 mo ago  (6/8/23) 1 mo ago  (5/16/23) 3 mo ago  (4/14/23) 3 mo ago  (4/13/23) 4 mo ago  (3/2/23)   Sodium 136 - 145 mmol/L 135 Low   137  138  141   140  138    Potassium 3.5 - 5.1 mmol/L 4.3  3.8  4.0  4.2   3.6  3.7    Chloride 95 - 110 mmol/L 104  104  106  107   107  105    CO2 23 - 29 mmol/L 27  23  27  26   22 Low   29    Glucose 70 - 110 mg/dL 132 High   125 High   135 High   138 High    121 High   184 High     BUN 8 - 23 mg/dL 24 High   17  18  18   22  15    Creatinine 0.5 - 1.4 mg/dL 0.8  0.7  0.7  0.7  0.7  0.7  0.7    Calcium 8.7 - 10.5 mg/dL 9.5  9.0  9.8  9.2   9.9  9.7    Total Protein 6.0 - 8.4 g/dL 5.6 Low   5.5 Low   6.0  5.7 Low    6.0  6.0    Albumin 3.5 - 5.2 g/dL 2.7 Low   2.7 Low   3.0 Low   2.8 Low    2.9 Low   3.0 Low     Total Bilirubin 0.1 - 1.0 mg/dL 3.2 High   3.1 High  CM  1.9 High  CM  2.9 High  CM   3.3 High  CM  3.2 High  CM    Comment: For infants and newborns, interpretation of results should be based   on gestational age, weight and in agreement with clinical   observations.     Premature Infant recommended reference ranges:   Up to 24 hours.............<8.0 mg/dL   Up to 48 hours............<12.0 mg/dL   3-5 days..................<15.0 mg/dL   6-29 days.................<15.0 mg/dL    Alkaline Phosphatase 55 - 135 U/L 168 High   163 High   165 High   156 High    216 High   130    AST 10 - 40 U/L 55 High   61 High   62 High   75 High    76 High   70 High     ALT 10 - 44 U/L 31  38  40  54 High    50 High   41    eGFR >60 mL/min/1.73 m^2 >60.0  >60  >60.0  >60.0   >60.0  >60.0    Anion Gap 8 - 16 mmol/L 4 Low   10  5 Low   8         Component Ref  Range & Units 1 d ago  (7/12/23) 3 mo ago  (4/13/23) 5 mo ago  (1/31/23) 1 yr ago  (6/17/22) 1 yr ago  (12/16/21) 2 yr ago  (2/4/21) 2 yr ago  (11/6/20)   AFP 0.0 - 8.4 ng/mL 8.0  2.7 CM  2.4 CM  3.2 CM  3.2           Assessment and Plan     1. HCC (hepatocellular carcinoma)  -     CT Abdomen With Contrast; Future; Expected date: 08/12/2023        The patient location is: [place]  The chief complaint leading to consultation is: HCC    Visit type: audiovisual    Face to Face time with patient: 20 minutes  25 minutes of total time spent on the encounter, which includes face to face time and non-face to face time preparing to see the patient (eg, review of tests), Obtaining and/or reviewing separately obtained history, Documenting clinical information in the electronic or other health record, Independently interpreting results (not separately reported) and communicating results to the patient/family/caregiver, or Care coordination (not separately reported).         Each patient to whom he or she provides medical services by telemedicine is:  (1) informed of the relationship between the physician and patient and the respective role of any other health care provider with respect to management of the patient; and (2) notified that he or she may decline to receive medical services by telemedicine and may withdraw from such care at any time.    Notes:   Reviewed MRI and labs with Dr. Arroyo. Explained to patient MRI shows good response to treatment. However, an indeterminate liver lesion was noted. Recommendation is to obtain a triple phase CT scan in 1 month. Patient verbalized understanding and agreement. Message sent to schedulers.

## 2023-07-14 ENCOUNTER — TELEPHONE (OUTPATIENT)
Dept: TRANSPLANT | Facility: CLINIC | Age: 63
End: 2023-07-14
Payer: COMMERCIAL

## 2023-07-14 NOTE — TELEPHONE ENCOUNTER
Patient called about having labs done to update her MELD score.  Patient labs scheduled as requested.

## 2023-07-17 ENCOUNTER — TELEPHONE (OUTPATIENT)
Dept: PRIMARY CARE CLINIC | Facility: CLINIC | Age: 63
End: 2023-07-17
Payer: COMMERCIAL

## 2023-07-17 NOTE — TELEPHONE ENCOUNTER
Spoke to pt informed her that her 7/17/2023 appt with CHAPIN ChandlerW has been canceled and someone will reach out to her soon to reschedule appt pt verbalized understanding

## 2023-07-19 ENCOUNTER — HOSPITAL ENCOUNTER (OUTPATIENT)
Dept: RADIOLOGY | Facility: HOSPITAL | Age: 63
Discharge: HOME | End: 2023-07-19
Attending: FAMILY MEDICINE
Payer: COMMERCIAL

## 2023-07-19 ENCOUNTER — HOSPITAL ENCOUNTER (OUTPATIENT)
Dept: RADIOLOGY | Facility: HOSPITAL | Age: 63
Discharge: HOME | End: 2023-07-19
Attending: STUDENT IN AN ORGANIZED HEALTH CARE EDUCATION/TRAINING PROGRAM
Payer: COMMERCIAL

## 2023-07-19 DIAGNOSIS — K74.60 LIVER CIRRHOSIS SECONDARY TO NASH: ICD-10-CM

## 2023-07-19 DIAGNOSIS — R41.3 OTHER AMNESIA: ICD-10-CM

## 2023-07-19 DIAGNOSIS — Z76.82 ORGAN TRANSPLANT CANDIDATE: ICD-10-CM

## 2023-07-19 DIAGNOSIS — C22.0 HEPATOCELLULAR CARCINOMA: ICD-10-CM

## 2023-07-19 DIAGNOSIS — K75.81 LIVER CIRRHOSIS SECONDARY TO NASH: ICD-10-CM

## 2023-07-19 DIAGNOSIS — R91.1 SOLITARY PULMONARY NODULE: ICD-10-CM

## 2023-07-19 PROCEDURE — 71250 CT THORAX DX C-: CPT | Mod: TC,PO,TXP

## 2023-07-19 PROCEDURE — 70553 MRI BRAIN STEM W/O & W/DYE: CPT | Mod: 26,NTX,, | Performed by: RADIOLOGY

## 2023-07-19 PROCEDURE — 71250 CT THORAX DX C-: CPT | Mod: 26,TXP,, | Performed by: RADIOLOGY

## 2023-07-19 PROCEDURE — 71250 CT CHEST WITHOUT CONTRAST: ICD-10-PCS | Mod: 26,TXP,, | Performed by: RADIOLOGY

## 2023-07-19 PROCEDURE — A9585 GADOBUTROL INJECTION: HCPCS | Mod: PO,TXP | Performed by: FAMILY MEDICINE

## 2023-07-19 PROCEDURE — 70553 MRI BRAIN W WO CONTRAST: ICD-10-PCS | Mod: 26,NTX,, | Performed by: RADIOLOGY

## 2023-07-19 PROCEDURE — 25500020 PHARM REV CODE 255: Mod: PO,TXP | Performed by: FAMILY MEDICINE

## 2023-07-19 PROCEDURE — 70553 MRI BRAIN STEM W/O & W/DYE: CPT | Mod: TC,PO,TXP

## 2023-07-19 RX ORDER — GADOBUTROL 604.72 MG/ML
10 INJECTION INTRAVENOUS
Status: COMPLETED | OUTPATIENT
Start: 2023-07-19 | End: 2023-07-19

## 2023-07-19 RX ADMIN — GADOBUTROL 10 ML: 604.72 INJECTION INTRAVENOUS at 02:07

## 2023-07-20 ENCOUNTER — TELEPHONE (OUTPATIENT)
Dept: PRIMARY CARE CLINIC | Facility: CLINIC | Age: 63
End: 2023-07-20
Payer: COMMERCIAL

## 2023-07-20 NOTE — TELEPHONE ENCOUNTER
Please call regarding MRI results.  I did send an explanation through my chart.    There are no urgent or acute findings.  There are some age-related changes.  There may also be a small cyst in the area of the pituitary gland.  I do not believe that this is large enough to be causing any active problems.  Although it is recommended that we do follow this.  Will discuss in more detail at her upcoming appointment.  Nothing needs to be done immediately.

## 2023-07-21 ENCOUNTER — CLINICAL SUPPORT (OUTPATIENT)
Dept: PRIMARY CARE CLINIC | Facility: CLINIC | Age: 63
End: 2023-07-21
Payer: COMMERCIAL

## 2023-07-21 DIAGNOSIS — F32.A DEPRESSIVE DISORDER: Primary | ICD-10-CM

## 2023-07-21 DIAGNOSIS — F43.21 GRIEF REACTION: ICD-10-CM

## 2023-07-21 DIAGNOSIS — F41.1 GAD (GENERALIZED ANXIETY DISORDER): ICD-10-CM

## 2023-07-21 PROCEDURE — 99499 NO LOS: ICD-10-PCS | Mod: 95,NTX,, | Performed by: SOCIAL WORKER

## 2023-07-21 PROCEDURE — 99499 UNLISTED E&M SERVICE: CPT | Mod: 95,NTX,, | Performed by: SOCIAL WORKER

## 2023-07-21 NOTE — PROGRESS NOTES
Individual Psychotherapy (PhD/LCSW) - patient is being seen virtually     7/21/2023    Site:  [place] [address]      Chief complaint/reason for encounter: interpersonal, grief, anxiety, family strife     Mood check: scale of:0 best, 10 worst. Patient rated:  Depression  -   5  Anxiety -   8    Risk parameters:  Patient reports no suicidal ideation  Patient reports no homicidal ideation  Patient reports no self-injurious behavior  Patient reports no violent behavior    Bridge:   N/A    Review of home assignment:    N/A    [place]:  Health px - update   Conflict in the family      History of present condition/content of session:   Pt and clinician have not been able to meet secondary to issues with the clinic. Since last seen, she said her air conditioner at home went out, and would cost $4500 to fix it. She utilized good problem solving strategies and bought window units that amounted to $700. Since it was the compressor that went out, she voiced belief that the heat will still work.     She said that she was told by HR that if she could not come back to work, then she would need to terminate. She said that she was hoping to retire soon, but just  not expecting to do it so fast. Discussion was held on cost of her medical insurance and life insurance. She said that her son and daughter in law will pay the premium if they are named the beneficiary of the life insurance policy. She said that her son and daughter in law are having trouble getting the property re-financed. The succession has been concluded. She identified the contingency plan is that they would pay the mortgage.       She said that her children are fighting over her 's truck. She said Gauri put $3000 on the truck.  Gauri was making the payments for several months. And the daughter in law made some comments that did not sit well with pt. This was the basis for the family strife. She was asked to identify evidence for the thought that her daughter in  "law does not like her. She said "it's little things." And she provided examples of reasons she thinks that way. She said she will do her best to keep the peace with the daughter in law, and not go over there unless her son is home. It bothers her daughter's that brother lives next door, and never checks on pt. Pt was encouraged to pick her battles with the daughter in law, but not let her daughter in law treat her badly.       Pertinent history:  She has 3 children, Mesha Cueva (daughter in law), Casandra (lives in [place]), and Gauri (lives in [place], but close). Her son lives on the same property. Once the succession is completed, her son and daughter in law will buy her house. She was  for a long period of time and her  Steven,  in 2022. He had been ill, but  suddenly. She is a candidate for liver transplant.      Therapeutic Intervention:   Pt was assessed for present condition and areas of clinical concern.  Empathy and support were provided for the pt.'s expression of thoughts/feelings during the session.  Active Listening was used in the session. She was given positive reinforcement for identified areas of clinical concern.  Validation was consistently used to help the patient improve self esteem/self worth.     Treatment plan:  Target symptoms: Anxiety, worry, financial stress, and grief  Why chosen therapy is appropriate versus another modality: evidence based practice  Outcome monitoring methods: checklist/rating scale  Therapeutic intervention type: supportive psychotherapy    Patient's response to intervention:  The patient's response to intervention is motivated.     Progress toward goals and other mental status changes:  The patient's progress toward goals is fair .      -Goal: - coping skills for painful emotions     Diagnosis:   Major Depressive Disorder   Generalized Anxiety Disorder  Grief Reaction     Plan:  individual psychotherapy -  Clinician plans to " provide supportive counseling through the medical crisis. Once through this, will focus on grief or other identified areas of clinical concern.     Return to clinic:  Clinician will continue to provide supportive therapy.  Work on [employer] paperwork or call them.     Length of Service (minutes): 60

## 2023-07-26 ENCOUNTER — TELEPHONE (OUTPATIENT)
Dept: TRANSPLANT | Facility: CLINIC | Age: 63
End: 2023-07-26
Payer: COMMERCIAL

## 2023-07-26 NOTE — TELEPHONE ENCOUNTER
PATIENT NAME: Yumiko WILHELM [facility] #: [id number]    Lab Results   Component Value Date    CREATININE 0.8 07/19/2023     07/19/2023    BILITOT 3.7 (H) 07/19/2023    ALBUMIN 2.6 (L) 07/19/2023    INR 1.4 (H) 07/19/2023     MELD 18  Encephalopathy: 1 - 2  Ascites: moderate  Dialysis: no     Recertification requestor: Michela Landis

## 2023-07-27 ENCOUNTER — OFFICE VISIT (OUTPATIENT)
Dept: FAMILY MEDICINE | Facility: CLINIC | Age: 63
End: 2023-07-27
Payer: COMMERCIAL

## 2023-07-27 VITALS
BODY MASS INDEX: 35.59 KG/M2 | WEIGHT: 234.81 LBS | SYSTOLIC BLOOD PRESSURE: 130 MMHG | HEIGHT: 68 IN | OXYGEN SATURATION: 97 % | DIASTOLIC BLOOD PRESSURE: 68 MMHG | HEART RATE: 65 BPM

## 2023-07-27 DIAGNOSIS — I10 PRIMARY HYPERTENSION: Chronic | ICD-10-CM

## 2023-07-27 DIAGNOSIS — F33.1 MODERATE EPISODE OF RECURRENT MAJOR DEPRESSIVE DISORDER: ICD-10-CM

## 2023-07-27 DIAGNOSIS — Z00.00 ENCOUNTER FOR PREVENTIVE HEALTH EXAMINATION: Primary | ICD-10-CM

## 2023-07-27 DIAGNOSIS — D69.2 SENILE PURPURA: ICD-10-CM

## 2023-07-27 DIAGNOSIS — K76.82 HEPATIC ENCEPHALOPATHY: ICD-10-CM

## 2023-07-27 DIAGNOSIS — J47.9 BRONCHIECTASIS WITHOUT COMPLICATION: ICD-10-CM

## 2023-07-27 DIAGNOSIS — J42 CHRONIC BRONCHITIS, UNSPECIFIED CHRONIC BRONCHITIS TYPE: ICD-10-CM

## 2023-07-27 DIAGNOSIS — E66.01 SEVERE OBESITY (BMI 35.0-35.9 WITH COMORBIDITY): ICD-10-CM

## 2023-07-27 DIAGNOSIS — K74.60 CIRRHOSIS OF LIVER WITHOUT ASCITES, UNSPECIFIED HEPATIC CIRRHOSIS TYPE: ICD-10-CM

## 2023-07-27 DIAGNOSIS — K76.6 PORTAL HYPERTENSION: ICD-10-CM

## 2023-07-27 DIAGNOSIS — C22.0 HCC (HEPATOCELLULAR CARCINOMA): ICD-10-CM

## 2023-07-27 DIAGNOSIS — D69.6 THROMBOCYTOPENIA: ICD-10-CM

## 2023-07-27 DIAGNOSIS — I85.00 ESOPHAGEAL VARICES DETERMINED BY ENDOSCOPY: Chronic | ICD-10-CM

## 2023-07-27 PROBLEM — E66.9 OBESITY, CLASS II, BMI 35-39.9: Status: RESOLVED | Noted: 2017-04-11 | Resolved: 2023-07-27

## 2023-07-27 PROBLEM — E66.812 OBESITY, CLASS II, BMI 35-39.9: Status: RESOLVED | Noted: 2017-04-11 | Resolved: 2023-07-27

## 2023-07-27 PROCEDURE — 3078F DIAST BP <80 MM HG: CPT | Mod: CPTII,S$GLB,, | Performed by: NURSE PRACTITIONER

## 2023-07-27 PROCEDURE — G0439 PPPS, SUBSEQ VISIT: HCPCS | Mod: S$GLB,,, | Performed by: NURSE PRACTITIONER

## 2023-07-27 PROCEDURE — 99999 PR PBB SHADOW E&M-EST. PATIENT-LVL V: ICD-10-PCS | Mod: PBBFAC,,, | Performed by: NURSE PRACTITIONER

## 2023-07-27 PROCEDURE — 3008F BODY MASS INDEX DOCD: CPT | Mod: CPTII,S$GLB,, | Performed by: NURSE PRACTITIONER

## 2023-07-27 PROCEDURE — 3075F PR MOST RECENT SYSTOLIC BLOOD PRESS GE 130-139MM HG: ICD-10-PCS | Mod: CPTII,S$GLB,, | Performed by: NURSE PRACTITIONER

## 2023-07-27 PROCEDURE — 3008F PR BODY MASS INDEX (BMI) DOCUMENTED: ICD-10-PCS | Mod: CPTII,S$GLB,, | Performed by: NURSE PRACTITIONER

## 2023-07-27 PROCEDURE — 3078F PR MOST RECENT DIASTOLIC BLOOD PRESSURE < 80 MM HG: ICD-10-PCS | Mod: CPTII,S$GLB,, | Performed by: NURSE PRACTITIONER

## 2023-07-27 PROCEDURE — 1160F PR REVIEW ALL MEDS BY PRESCRIBER/CLIN PHARMACIST DOCUMENTED: ICD-10-PCS | Mod: CPTII,S$GLB,, | Performed by: NURSE PRACTITIONER

## 2023-07-27 PROCEDURE — 99999 PR PBB SHADOW E&M-EST. PATIENT-LVL V: CPT | Mod: PBBFAC,,, | Performed by: NURSE PRACTITIONER

## 2023-07-27 PROCEDURE — 3044F PR MOST RECENT HEMOGLOBIN A1C LEVEL <7.0%: ICD-10-PCS | Mod: CPTII,S$GLB,, | Performed by: NURSE PRACTITIONER

## 2023-07-27 PROCEDURE — 3075F SYST BP GE 130 - 139MM HG: CPT | Mod: CPTII,S$GLB,, | Performed by: NURSE PRACTITIONER

## 2023-07-27 PROCEDURE — 1160F RVW MEDS BY RX/DR IN RCRD: CPT | Mod: CPTII,S$GLB,, | Performed by: NURSE PRACTITIONER

## 2023-07-27 PROCEDURE — G0439 PR MEDICARE ANNUAL WELLNESS SUBSEQUENT VISIT: ICD-10-PCS | Mod: S$GLB,,, | Performed by: NURSE PRACTITIONER

## 2023-07-27 PROCEDURE — 1159F PR MEDICATION LIST DOCUMENTED IN MEDICAL RECORD: ICD-10-PCS | Mod: CPTII,S$GLB,, | Performed by: NURSE PRACTITIONER

## 2023-07-27 PROCEDURE — 1159F MED LIST DOCD IN RCRD: CPT | Mod: CPTII,S$GLB,, | Performed by: NURSE PRACTITIONER

## 2023-07-27 PROCEDURE — 3044F HG A1C LEVEL LT 7.0%: CPT | Mod: CPTII,S$GLB,, | Performed by: NURSE PRACTITIONER

## 2023-07-27 NOTE — PATIENT INSTRUCTIONS
Counseling and Referral of Other Preventative  (Italic type indicates deductible and co-insurance are waived)    Patient Name: Yumiko Gonzalez  Today's Date: 7/27/2023    Health Maintenance       Date Due Completion Date    COVID-19 Vaccine (4 - Pfizer series) 11/26/2021 10/1/2021    Influenza Vaccine (1) 09/01/2023 10/14/2022    Override on 10/11/2019: Done    Mammogram 03/20/2024 3/20/2023    Lipid Panel 06/24/2024 6/24/2023    Hemoglobin A1c (Diabetic Prevention Screening) 04/13/2026 4/13/2023    Cervical Cancer Screening 11/18/2026 3/20/2023    Colorectal Cancer Screening 02/03/2027 2/3/2022    TETANUS VACCINE 11/06/2030 11/6/2020        No orders of the defined types were placed in this encounter.    The following information is provided to all patients.  This information is to help you find resources for any of the problems found today that may be affecting your health:                Living healthy guide: www.[facility].[place].gov      Understanding Diabetes: www.diabetes.org      Eating healthy: www.cdc.gov/healthyweight      CDC home safety checklist: www.cdc.gov/steadi/patient.html      Agency on Aging: www.goea.[place].gov      Alcoholics anonymous (AA): www.aa.org      Physical Activity: www.meggan.nih.gov/le1svkn      Tobacco use: www.quitwithusla.org

## 2023-07-27 NOTE — PROGRESS NOTES
"Yumiko Gonzalez presented for a  Medicare AWV and comprehensive Health Risk Assessment today. The following components were reviewed and updated:    Medical history  Family History  Social history  Allergies and Current Medications  Health Risk Assessment  Health Maintenance  Care Team     ** See Completed Assessments for Annual Wellness Visit within the encounter summary.**    The following assessments were completed:  Living Situation  CAGE  Depression Screening  Timed Get Up and Go  Whisper Test  Cognitive Function Screening      Nutrition Screening  ADL Screening  PAQ Screening    Review for Opioid Screening: Pt does not have Rx for Opioids  Review for Substance Use Disorders: Patient does not use substance      Vitals:    07/27/23 1114   BP: 130/68   BP Location: Left arm   Patient Position: Sitting   BP Method: Medium (Manual)   Pulse: 65   SpO2: 97%   Weight: 106.5 kg (234 lb 12.6 oz)   Height: 5' 8" (1.727 m)     Body mass index is 35.7 kg/m².  Physical Exam  Vitals reviewed.   Constitutional:       General: She is not in acute distress.     Appearance: She is ill-appearing.   Cardiovascular:      Rate and Rhythm: Normal rate and regular rhythm.      Pulses: Normal pulses.      Heart sounds: Normal heart sounds.   Pulmonary:      Effort: Pulmonary effort is normal. No respiratory distress.      Breath sounds: Normal breath sounds.   Neurological:      Mental Status: She is alert and oriented to person, place, and time.   Psychiatric:         Mood and Affect: Mood normal.         Behavior: Behavior normal.         Thought Content: Thought content normal.         Judgment: Judgment normal.         Diagnoses and health risks identified today and associated recommendations/orders:    1. Encounter for preventive health examination  Reviewed and discussed health maintenance.      2. Thrombocytopenia  Stable- continue current treatment and follow up routinely with PCP     3. Chronic bronchitis, unspecified chronic " bronchitis type  Stable- continue current treatment and follow up routinely with PCP and pulmonary ()    4. Bronchiectasis without complication  Stable- continue current treatment and follow up routinely with PCP and pulmonary ()    5. Moderate episode of recurrent major depressive disorder  Stable- continue current treatment and follow up routinely with PCP and  (IHSAN Richards )    6. HCC (hepatocellular carcinoma)  Stable- continue current treatment and follow up routinely with PCP and transplant team ()    7. Esophageal varices determined by endoscopy  Stable- continue current treatment and follow up routinely with PCP and transplant team ()    8. Portal hypertension  Stable- continue current treatment and follow up routinely with PCP and transplant team ()    9. Hepatic encephalopathy  Stable- continue current treatment and follow up routinely with PCP and transplant team ()    10. Cirrhosis of liver without ascites, unspecified hepatic cirrhosis type  Stable- continue current treatment and follow up routinely with PCP and transplant team ()    11. Senile purpura  Stable- continue current treatment and follow up routinely with PCP     12. Severe obesity (BMI 35.0-35.9 with comorbidity)  Encouraged healthy eating, weight loss and routine exercise     13. Primary hypertension  Stable- continue current treatment and follow up routinely with PCP     Provided Yumiko with a 5-10 year written screening schedule and personal prevention plan. Recommendations were developed using the USPSTF age appropriate recommendations. Education, counseling, and referrals were provided as needed. After Visit Summary printed and given to patient which includes a list of additional screenings\tests needed.    Jenna Roth NP

## 2023-07-28 ENCOUNTER — OFFICE VISIT (OUTPATIENT)
Dept: PRIMARY CARE CLINIC | Facility: CLINIC | Age: 63
End: 2023-07-28
Payer: COMMERCIAL

## 2023-07-28 VITALS
DIASTOLIC BLOOD PRESSURE: 78 MMHG | WEIGHT: 234.88 LBS | HEIGHT: 68 IN | SYSTOLIC BLOOD PRESSURE: 100 MMHG | HEART RATE: 65 BPM | BODY MASS INDEX: 35.6 KG/M2 | OXYGEN SATURATION: 98 % | RESPIRATION RATE: 18 BRPM | TEMPERATURE: 98 F

## 2023-07-28 DIAGNOSIS — C22.0 HCC (HEPATOCELLULAR CARCINOMA): ICD-10-CM

## 2023-07-28 DIAGNOSIS — R07.89 CHEST WALL PAIN: ICD-10-CM

## 2023-07-28 DIAGNOSIS — I10 PRIMARY HYPERTENSION: Primary | Chronic | ICD-10-CM

## 2023-07-28 PROCEDURE — 3008F BODY MASS INDEX DOCD: CPT | Mod: CPTII,NTX,S$GLB, | Performed by: FAMILY MEDICINE

## 2023-07-28 PROCEDURE — 1160F RVW MEDS BY RX/DR IN RCRD: CPT | Mod: CPTII,NTX,S$GLB, | Performed by: FAMILY MEDICINE

## 2023-07-28 PROCEDURE — 1160F PR REVIEW ALL MEDS BY PRESCRIBER/CLIN PHARMACIST DOCUMENTED: ICD-10-PCS | Mod: CPTII,NTX,S$GLB, | Performed by: FAMILY MEDICINE

## 2023-07-28 PROCEDURE — 3044F PR MOST RECENT HEMOGLOBIN A1C LEVEL <7.0%: ICD-10-PCS | Mod: CPTII,NTX,S$GLB, | Performed by: FAMILY MEDICINE

## 2023-07-28 PROCEDURE — 3078F DIAST BP <80 MM HG: CPT | Mod: CPTII,NTX,S$GLB, | Performed by: FAMILY MEDICINE

## 2023-07-28 PROCEDURE — 3074F PR MOST RECENT SYSTOLIC BLOOD PRESSURE < 130 MM HG: ICD-10-PCS | Mod: CPTII,NTX,S$GLB, | Performed by: FAMILY MEDICINE

## 2023-07-28 PROCEDURE — 1159F MED LIST DOCD IN RCRD: CPT | Mod: CPTII,NTX,S$GLB, | Performed by: FAMILY MEDICINE

## 2023-07-28 PROCEDURE — 3008F PR BODY MASS INDEX (BMI) DOCUMENTED: ICD-10-PCS | Mod: CPTII,NTX,S$GLB, | Performed by: FAMILY MEDICINE

## 2023-07-28 PROCEDURE — 99214 PR OFFICE/OUTPT VISIT, EST, LEVL IV, 30-39 MIN: ICD-10-PCS | Mod: NTX,S$GLB,, | Performed by: FAMILY MEDICINE

## 2023-07-28 PROCEDURE — 99999 PR PBB SHADOW E&M-EST. PATIENT-LVL V: ICD-10-PCS | Mod: PBBFAC,TXP,, | Performed by: FAMILY MEDICINE

## 2023-07-28 PROCEDURE — 3078F PR MOST RECENT DIASTOLIC BLOOD PRESSURE < 80 MM HG: ICD-10-PCS | Mod: CPTII,NTX,S$GLB, | Performed by: FAMILY MEDICINE

## 2023-07-28 PROCEDURE — 99999 PR PBB SHADOW E&M-EST. PATIENT-LVL V: CPT | Mod: PBBFAC,TXP,, | Performed by: FAMILY MEDICINE

## 2023-07-28 PROCEDURE — 1159F PR MEDICATION LIST DOCUMENTED IN MEDICAL RECORD: ICD-10-PCS | Mod: CPTII,NTX,S$GLB, | Performed by: FAMILY MEDICINE

## 2023-07-28 PROCEDURE — 3044F HG A1C LEVEL LT 7.0%: CPT | Mod: CPTII,NTX,S$GLB, | Performed by: FAMILY MEDICINE

## 2023-07-28 PROCEDURE — 3074F SYST BP LT 130 MM HG: CPT | Mod: CPTII,NTX,S$GLB, | Performed by: FAMILY MEDICINE

## 2023-07-28 PROCEDURE — 99214 OFFICE O/P EST MOD 30 MIN: CPT | Mod: NTX,S$GLB,, | Performed by: FAMILY MEDICINE

## 2023-07-28 NOTE — PROGRESS NOTES
THIS DOCUMENT WAS MADE IN PART WITH VOICE RECOGNITION SOFTWARE.  OCCASIONALLY THIS SOFTWARE WILL MISINTERPRET WORDS OR PHRASES.      Primary Care Provider Appointment   [facility] [place], [place] Derick Tran       Patient ID: Yumiko Gonzalez is a 63 y.o. female.    ASSESSMENT/PLAN by Problem List:    1. Primary hypertension    2. HCC (hepatocellular carcinoma)    3. Chest wall pain       Follow-up for her DURAN, cirrhosis, and recent diagnosis of hepatocellular carcinoma.  I have been seeing her regularly to keep her disability paperwork up-to-date.  She informs me that she has now retired.  She is been doing reasonably well.  Although we did focused today on discussing nutrition, beginning regular light activity and further encouragement.  Her other conditions are stable.  I will see her back in about two months but sooner if needed..  She did report some right-sided upper chest wall pain.  Symptoms are not suggestive of cardiac or pulmonary cause.  I did review her recent CT of the chest as well as fairly recent stress test.  I recommend that she monitor, if there is any changing to let us know.    Follow Up:  Two months    Thirty-seven minutes of total time spent on the encounter, time includes face to face time, and some or all of the following: review of chart, lab, imaging, consultant notes, ER, hospital, documentation, care coordination, etc.    Health Maintenance         Date Due Completion Date    COVID-19 Vaccine (4 - Pfizer series) 11/26/2021 10/1/2021    Influenza Vaccine (1) 09/01/2023 10/14/2022    Override on 10/11/2019: Done    Mammogram 03/20/2024 3/20/2023    Lipid Panel 06/24/2024 6/24/2023    Hemoglobin A1c (Diabetic Prevention Screening) 04/13/2026 4/13/2023    Cervical Cancer Screening 11/18/2026 3/20/2023    Colorectal Cancer Screening 02/03/2027 2/3/2022    TETANUS VACCINE 11/06/2030 11/6/2020            Advance Care Planning     Subjective:     Chief Complaint   Patient presents with     Follow-up    Breast Pain     Pt states she has pain in left breast more behind the breast than in the breast. Pain occurs mostly at night.    Knee Pain     Pt states she has chronic knee pain in her left knee.     I have reviewed the information entered by the ancillary staff regarding the chief complaint as well as the related history.    HPI    Patient is a/an 63 y.o.  female     She returns with her daughter.  Right chest discomfort  Upper right sternal border  Worse in evening, sore, dull,   Not exertional, often when lying   No trouble swallowing  No painful breathing, no cough,   Recent CT chest  Stress test negative few months ago    For complete problem list, past medical history, surgical history, social history, etc., see appropriate section in the electronic medical record    Review of Systems   Constitutional:  Positive for fatigue. Negative for activity change.   HENT:  Negative for hearing loss and trouble swallowing.    Eyes:  Negative for discharge.   Respiratory:  Negative for chest tightness and wheezing.    Cardiovascular:  Negative for chest pain and palpitations.   Gastrointestinal:  Negative for constipation, diarrhea and vomiting.   Genitourinary:  Negative for difficulty urinating and hematuria.   Neurological:  Positive for headaches.   Psychiatric/Behavioral:  Positive for dysphoric mood.        Objective     Physical Exam  Vitals reviewed.   Constitutional:       General: She is not in acute distress.     Appearance: She is well-developed. She is not diaphoretic.   HENT:      Head: Normocephalic and atraumatic.   Eyes:      General: No scleral icterus.  Cardiovascular:      Comments: 1+ pretibial edema bilaterally  Pulmonary:      Effort: Pulmonary effort is normal. No respiratory distress.   Neurological:      Mental Status: She is alert and oriented to person, place, and time.   Psychiatric:         Mood and Affect: Mood normal.         Behavior: Behavior normal.       Vitals:     "07/28/23 1120   BP: 100/78   BP Location: Left leg   Patient Position: Sitting   BP Method: Large (Manual)   Pulse: 65   Resp: 18   Temp: 98 °F (36.7 °C)   TempSrc: Oral   SpO2: 98%   Weight: 106.5 kg (234 lb 14.4 oz)   Height: 5' 8" (1.727 m)       "

## 2023-07-29 PROBLEM — R07.89 CHEST WALL PAIN: Status: ACTIVE | Noted: 2023-07-29

## 2023-08-01 ENCOUNTER — TELEPHONE (OUTPATIENT)
Dept: TRANSPLANT | Facility: CLINIC | Age: 63
End: 2023-08-01
Payer: COMMERCIAL

## 2023-08-01 NOTE — TELEPHONE ENCOUNTER
Liver Transplant Committee Discussion     Patient Name: Yumiko Gonzalez   : 1960  MRN: [id number]    Requested by: Gurpreet Raza MD and Ruel Lagos MD    Day to be discussed: Liver discussion days: Tuesday    Transplant Coordinator: Liver Coordinators: Shabnam Navarrete    Patient Status: outpatient    Transplant Status: transplant status: Waitlist    Reason for Discussion: Team to review potential living donor recipient imaging    Plan: Recipient imaging reviewed by team. OK to proceed with potential LDLT.     Route to: Mian Carpenter

## 2023-08-03 ENCOUNTER — CLINICAL SUPPORT (OUTPATIENT)
Dept: PRIMARY CARE CLINIC | Facility: CLINIC | Age: 63
End: 2023-08-03
Payer: COMMERCIAL

## 2023-08-03 ENCOUNTER — PATIENT MESSAGE (OUTPATIENT)
Dept: TRANSPLANT | Facility: CLINIC | Age: 63
End: 2023-08-03
Payer: COMMERCIAL

## 2023-08-03 DIAGNOSIS — F32.A DEPRESSIVE DISORDER: Primary | ICD-10-CM

## 2023-08-03 DIAGNOSIS — F43.21 GRIEF REACTION: ICD-10-CM

## 2023-08-03 PROCEDURE — 99499 UNLISTED E&M SERVICE: CPT | Mod: 95,NTX,, | Performed by: SOCIAL WORKER

## 2023-08-03 PROCEDURE — 99499 NO LOS: ICD-10-PCS | Mod: 95,NTX,, | Performed by: SOCIAL WORKER

## 2023-08-03 NOTE — PROGRESS NOTES
"Individual Psychotherapy (PhD/LCSW) - patient is being seen virtually     2023    Site:  [facility]      Chief complaint/reason for encounter: interpersonal, grief, anxiety, family strife     Mood check: scale of:0 best, 10 worst. Patient rated:  Depression  -   "doing" 4-5  Anxiety -   "not too high, not right now"    Risk parameters:  Patient reports no suicidal ideation  Patient reports no homicidal ideation  Patient reports no self-injurious behavior  Patient reports no violent behavior    Bridge:   In the last session, she was able to recall talking about her daughter in law.     Review of home assignment:    N/A    [place]:  Health px - update   Conflict in the family      History of present condition/content of session:   She said she is approved for SSI Disability, but won't start until next month. Although payment won't start until next month, she said it gives her hope for the future. "Everything will come in at end,"  she said will have income from SS Disability, and from [facility] [employer]. She talked about still has insurance from [facility], and worried that she will have to back pay insurance premiums.     TI came in for testing to see if she is a match for a donor of kidney (daughter living in [facility]).  Results will come back in about 2 weeks. She informed clinician that rules are different for living donor as opposed to having a anonymous donor (such as a  person donating an organ). Discussion was held on liver px being hereditary. She said that only part of her daughter's liver will be needed for transplant. She said she, her son, and grandson went to visit pts  daughter and son in law at the hotel. She said before the visit, the 3 of them had lunch together. She said they were able to see pt's to meet with her sister and brother in law (pt's sister is identified caregiver for T1 after the surgery. Her other daughter is pt's identified caregiver after the surgery.     She " talked about issues with not wanting to cook. She admitted she does not have the motivation to cook. However, she identified that the px is having to adjust to cooking without salt.  She has to be on a special diet secondary to being on transplant list. She was encouraged to think about ways in which to think outside the box and get creative with food. For example, substituting chilli powder for salt.  She said she has found out that fresh Montarella cheese has a low dose of salt. So, she is able to eat it. Discussion was held on making her own salad dressing. By our next session, she verbalized agreement to at least once, make her own salad dressing.     She stated that she is not getting restful seep, and not getting up in the morning.  She got a new bed since her  , but said it has not helped.  She was explained about Attachment Theory in relation to greiving the loss of her  (10/2022).     Pertinent history:  She has 3 children, Mesha Cueva (daughter in law), Casandra (lives in [place]), and Gauri (lives in [place], but close). Her son lives on the same property. Once the succession is completed, her son and daughter in law will buy her house. She was  for a long period of time and her  Steven,  in 2022. He had been ill, but  suddenly. She is a candidate for liver transplant.      Therapeutic Intervention:   Pt was assessed for present condition and areas of clinical concern.  Empathy and support were provided for the pt.'s expression of thoughts/feelings during the session.  Active Listening was used in the session. She was given positive reinforcement for identified areas of clinical concern.  Validation was consistently used to help the patient improve self esteem/self worth. She was encouraged to focus on the foods she can have, not so much focusing on what she can't have.     Treatment plan:  Target symptoms: Anxiety, worry, financial stress, and  grief  Why chosen therapy is appropriate versus another modality: evidence based practice  Outcome monitoring methods: checklist/rating scale  Therapeutic intervention type: supportive psychotherapy    Patient's response to intervention:  The patient's response to intervention is motivated.     Progress toward goals and other mental status changes:  The patient's progress toward goals is fair .      -Goal: - coping skills for painful emotions     Diagnosis:   Major Depressive Disorder   Generalized Anxiety Disorder  Grief Reaction     Plan:  individual psychotherapy -  Clinician plans to provide supportive counseling through the medical crisis. Once through this, will focus on grief or other identified areas of clinical concern.     Return to clinic:  8/17/2023 at 1:00. Clinician will continue to provide supportive therapy.  Work on [employer] paperwork or call them.  By next session, she verbalized agreement to make her own salad dressing once.     Length of Service (minutes): 60

## 2023-08-04 DIAGNOSIS — R18.8 CIRRHOSIS OF LIVER WITH ASCITES, UNSPECIFIED HEPATIC CIRRHOSIS TYPE: ICD-10-CM

## 2023-08-04 DIAGNOSIS — K74.60 CIRRHOSIS OF LIVER WITH ASCITES, UNSPECIFIED HEPATIC CIRRHOSIS TYPE: ICD-10-CM

## 2023-08-04 DIAGNOSIS — R60.0 LOWER EXTREMITY EDEMA: ICD-10-CM

## 2023-08-06 RX ORDER — SPIRONOLACTONE 100 MG/1
100 TABLET, FILM COATED ORAL DAILY
Qty: 30 TABLET | Refills: 6 | Status: SHIPPED | OUTPATIENT
Start: 2023-08-06 | End: 2023-11-13 | Stop reason: SDUPTHER

## 2023-08-08 ENCOUNTER — HOSPITAL ENCOUNTER (OUTPATIENT)
Dept: RADIOLOGY | Facility: HOSPITAL | Age: 63
Discharge: HOME | End: 2023-08-08
Attending: FAMILY MEDICINE
Payer: COMMERCIAL

## 2023-08-08 DIAGNOSIS — C22.0 HCC (HEPATOCELLULAR CARCINOMA): ICD-10-CM

## 2023-08-08 PROCEDURE — 74160 CT ABDOMEN W/CONTRAST: CPT | Mod: 26,TXP,, | Performed by: RADIOLOGY

## 2023-08-08 PROCEDURE — 74160 CT ABDOMEN W/CONTRAST: CPT | Mod: TC,PO,TXP

## 2023-08-08 PROCEDURE — 74160 CT ABDOMEN WITH CONTRAST: ICD-10-PCS | Mod: 26,TXP,, | Performed by: RADIOLOGY

## 2023-08-08 PROCEDURE — 25500020 PHARM REV CODE 255: Mod: PO,TXP | Performed by: FAMILY MEDICINE

## 2023-08-08 RX ADMIN — IOHEXOL 100 ML: 350 INJECTION, SOLUTION INTRAVENOUS at 11:08

## 2023-08-16 ENCOUNTER — CLINICAL SUPPORT (OUTPATIENT)
Dept: INTERVENTIONAL RADIOLOGY/VASCULAR | Facility: CLINIC | Age: 63
End: 2023-08-16
Payer: COMMERCIAL

## 2023-08-16 DIAGNOSIS — C22.0 HCC (HEPATOCELLULAR CARCINOMA): Primary | ICD-10-CM

## 2023-08-16 PROCEDURE — 99214 OFFICE O/P EST MOD 30 MIN: CPT | Mod: 95,TXP,,

## 2023-08-16 PROCEDURE — 99214 PR OFFICE/OUTPT VISIT, EST, LEVL IV, 30-39 MIN: ICD-10-PCS | Mod: 95,TXP,,

## 2023-08-16 NOTE — PROGRESS NOTES
Subjective     Patient ID: Yumiko Gonzalez is a 63 y.o. female.    Chief Complaint: HCC    Virtual visit with patient for follow up of hepatocellular carcinoma recently treated with radioembolization on 6/8/2023. Patient has a history of DURAN related cirrhosis. HCC was identified during transplant workup. She has complaints of fatigue that was present prior to her treatment. CT scan completed on 8/8/2023.    Review of Systems   Constitutional:  Positive for appetite change, fatigue and unexpected weight change (90 lbs since Jan).   Respiratory:  Negative for cough and shortness of breath.    Cardiovascular:  Negative for chest pain.   Gastrointestinal:  Positive for abdominal distention and abdominal pain.   Neurological:  Negative for facial asymmetry and speech difficulty.   Psychiatric/Behavioral:  Positive for sleep disturbance (5 hours at night. 3 hour nap during the day.). Negative for behavioral problems and confusion.         Objective     Physical Exam  Constitutional:       General: She is not in acute distress.     Appearance: Normal appearance.      Comments: Virtual visit.   HENT:      Head: Normocephalic and atraumatic.      Nose: Nose normal.   Eyes:      Conjunctiva/sclera: Conjunctivae normal.   Pulmonary:      Effort: Pulmonary effort is normal.   Neurological:      General: No focal deficit present.      Mental Status: She is alert.   Psychiatric:         Mood and Affect: Mood normal.         Behavior: Behavior normal.     EXAMINATION:  CT ABDOMEN WITH CONTRAST     CLINICAL HISTORY:  HCC s/p Y90; Liver cell carcinoma     TECHNIQUE:  Water was used as an oral contrast agent.  Using helical CT technique, and 100 cc Omnipaque 350 IV contrast, thin section images were obtained through the liver during the arterial, portal venous and delayed phased postcontrast imaging.  Coronal and sagittal reformatted images are provided for review.     COMPARISON:  CT 04/13/2023.  MRI 07/12/2023.      FINDINGS:  ABDOMEN:     - Heart: Visualized portions of the heart are within normal limits noting mitral annular calcifications.  No evidence of cardiomegaly or pericardial effusion.     - Lung bases: Atelectasis/scarring within the right lung base.  No consolidation or pleural effusion.     - Liver: Redemonstrated cirrhotic liver morphology.  Redemonstrated treatment affects of previous right hepatic lobe segment VI radioembolization without evidence of any discrete residual or recurrent arterially enhancing mass in this region of wedge-shaped heterogeneous hypodensity.  No significant change from previous MRI 07/12/2023 allowing for differences in technique.  No additional arterial enhancing lesions.  Sequela of portal hypertension including recanalized umbilical vein which is enlarged, prominence of the portal vein, splenomegaly, and esophageal/left upper quadrant collaterals.  Stable mild to moderate volume ascites.     - Gallbladder: The gallbladder is somewhat contracted demonstrating mild wall thickening presumably reactive in the setting of underlying liver disease.  No radiopaque stones identified.     - Bile Ducts: No evidence of intra or extra hepatic biliary ductal dilation.     - Spleen: Splenomegaly and calcified granulomas.     - Kidneys: No mass or hydronephrosis.  Nonobstructing right-sided nephrolithiasis with a lower pole stone measuring 5 mm.     - Adrenals: Unremarkable.     - Pancreas: No mass or peripancreatic fat stranding.     - Retroperitoneum:  No significant adenopathy.     - Aorta: Atherosclerosis.  No aneurysmal dilatation.     - Abdominal wall:  Collateral venous vasculature within the abdominal wall consistent with portal hypertension.  No other focal abnormality.     BOWEL/MESENTERY:     No evidence of bowel obstruction or inflammation.  Diverticulosis coli.     BONES:  Chronic mild superior endplate compression fracture of L4.  Degenerative changes are grossly stable.  No acute  process.     Impression:     1. Cirrhotic liver morphology and portal hypertension.  Stable treatment affects within the right hepatic lobe status post radioembolization.  No evidence of a residual or recurrent arterial enhancing mass or adverse change compared to MRI 07/12/2023 allowing for differences in technique.  2. Additional stable/incidental findings as above.        Electronically signed by: Richi Jesus  Date:                                            08/08/2023  Time:                                           12:07       Assessment and Plan     1. HCC (hepatocellular carcinoma)  -     MRI Abdomen W WO Contrast; Future; Expected date: 08/16/2023  -     AFP TUMOR MARKER; Future; Expected date: 08/16/2023      Imaging reviewed by Dr. Arroyo. Agrees with the report. NO residual or recurrence.   Recommend a 3 month MRI and AFP from previous MRI. (October 2023)  The patient location is: [place]  The chief complaint leading to consultation is: HCC    Visit type: audiovisual    30 minutes of total time spent on the encounter, which includes face to face time and non-face to face time preparing to see the patient (eg, review of tests), Obtaining and/or reviewing separately obtained history, Documenting clinical information in the electronic or other health record, Independently interpreting results (not separately reported) and communicating results to the patient/family/caregiver, or Care coordination (not separately reported).     Each patient to whom he or she provides medical services by telemedicine is:  (1) informed of the relationship between the physician and patient and the respective role of any other health care provider with respect to management of the patient; and (2) notified that he or she may decline to receive medical services by telemedicine and may withdraw from such care at any time.    Loan Valdez PA-C  Interventional Radiology  [phone]

## 2023-08-17 ENCOUNTER — CLINICAL SUPPORT (OUTPATIENT)
Dept: PRIMARY CARE CLINIC | Facility: CLINIC | Age: 63
End: 2023-08-17
Payer: COMMERCIAL

## 2023-08-17 DIAGNOSIS — F43.21 GRIEF REACTION: Primary | ICD-10-CM

## 2023-08-17 DIAGNOSIS — F32.A DEPRESSIVE DISORDER: ICD-10-CM

## 2023-08-17 PROCEDURE — 99499 NO LOS: ICD-10-PCS | Mod: 95,NTX,, | Performed by: SOCIAL WORKER

## 2023-08-17 PROCEDURE — 99499 UNLISTED E&M SERVICE: CPT | Mod: 95,NTX,, | Performed by: SOCIAL WORKER

## 2023-08-17 NOTE — PROGRESS NOTES
"Individual Psychotherapy (PhD/LCSW) - patient is being seen virtually     7/21/2023    [facility]      Chief complaint/reason for encounter: interpersonal, grief, anxiety, family strife     Mood check: scale of:0 best, 10 worst. Patient rated:  Depression  -   "doing" 4-5  Anxiety -   "not too high, not right now"    Risk parameters:  Patient reports no suicidal ideation  Patient reports no homicidal ideation  Patient reports no self-injurious behavior  Patient reports no violent behavior    Bridge:   In the last session, she was able to recall talking about her daughter in law.     Review of home assignment:    She said she made the salad dressing, and it turned out well.     [place]:  Health px - update   Challenging thoughts    History of present condition/content of session:   See above about the salad dressing. 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 said that she is a "definite worry wart."  She said that Casandra was not a match for kideny transplant. She said that she was disappointed and relieved. She identified being disappointed because would have been done and over with. And relieved because Casandra is her daughter, and would not want her to suffer any possible consequences for doing this for her. Some of the sx of constant fatigue, would go away. She is past this now. But she also admitted that she is relieved because she would have worried about the worst possible outcome.     In effort to explore her depth of negative self talk.  She was challenged on thoughts that she is lazy. For example, she identified memory loss is a barrier to remembering to do things. And said she often has to set numerous alarms as reminder. Challenged if she is "lazy" or having health px a barrier? She answered by explaining that memory loss is associated with liver px - hepatic encephalopathy. However, " "she did not concede the point that she is not lazy. There is a medication that she can take, but the s/e of digestive distress.  If she is consistent with taking the meds, then she said that found some help with meds. She noted that she is struggling with the fact that she had a hx of yo-yo dieting. Challenged that thought, that she caused live px  because of yo-yo dieting.  She stated she had medical evidence to support that thought.     Time was spent exploring her hx of self blame and negative self talk. She identified getting the message as a child, "you would be so pretty if you would just lose some weight." Discussion held on the underlying message she received. She identified that she felt "not pretty" and inadequate. She verbalized agreement with clinician's suggestion that is also implies that looks are priority, and keeping things superficial. She described the efforts that are being done by her daughter and friend of her late 's. Once again, she verbalized evidence of low self worth by stating belief  that she is not worthy of the fund raising being done by her daughter and volunteer fire department.  Clinician plans to continue this topic at next session.     Pertinent history:  She has 3 children, Mesha Cueva (daughter in law), Casandra (lives in [place]), and Gauri (lives in [place], but close). Her son lives on the same property. Once the succession is completed, her son and daughter in law will buy her house. She was  for a long period of time and her  Steven,  in 2022. He had been ill, but  suddenly. She is a candidate for liver transplant.      Therapeutic Intervention:   Pt was assessed for present condition and areas of clinical concern.  Empathy and support were provided for the pt.'s expression of thoughts/feelings during the session.  Active Listening was used in the session. She was given positive reinforcement for identified areas of clinical " concern.  Validation was consistently used to help the patient improve self esteem/self worth. She was encouraged to focus on the foods she can have, not so much focusing on what she can't have.  Patient encouraged to replace negative and self-defeating self-talk with realistic and positive cognitive messages.        Treatment plan:  Target symptoms: Anxiety, worry, financial stress, and grief  Why chosen therapy is appropriate versus another modality: evidence based practice  Outcome monitoring methods: checklist/rating scale  Therapeutic intervention type: supportive psychotherapy    Patient's response to intervention:  The patient's response to intervention is motivated.     Progress toward goals and other mental status changes:  The patient's progress toward goals is fair .      -Goal: - coping skills for painful emotions     Diagnosis:   Major Depressive Disorder   Generalized Anxiety Disorder  Grief Reaction     Plan:  individual psychotherapy -  Clinician plans to provide supportive counseling through the medical crisis. Once through this, will focus on grief or other identified areas of clinical concern.     Return to clinic:      Length of Service (minutes): 60

## 2023-08-24 ENCOUNTER — TELEPHONE (OUTPATIENT)
Dept: PRIMARY CARE CLINIC | Facility: CLINIC | Age: 63
End: 2023-08-24
Payer: COMMERCIAL

## 2023-08-24 NOTE — TELEPHONE ENCOUNTER
Pt called clinic with belief she had an appointment scheduled for today at 1:00. Clinician was with another patient and called her back at 2:00.     Clinician apologized that there was not an appointment scheduled, and likely clinician's fault. She verbalized understanding and spoke with clinician on the phone.    She said she didn't have much to talk about today. She said she will be getting income from 3 different sources. She acknowledged this will be a huge relief for her.  She talked about getting quotes for new life insurance policy.     She said her son in law is not a good candidate for liver transplant. Unsure what they will  do about a donor.     She is scheduled to follow up with clinician in one week, virtually, at 2:00 on 8/31/2023.

## 2023-08-31 ENCOUNTER — PATIENT MESSAGE (OUTPATIENT)
Dept: PRIMARY CARE CLINIC | Facility: CLINIC | Age: 63
End: 2023-08-31

## 2023-08-31 ENCOUNTER — CLINICAL SUPPORT (OUTPATIENT)
Dept: PRIMARY CARE CLINIC | Facility: CLINIC | Age: 63
End: 2023-08-31
Payer: COMMERCIAL

## 2023-08-31 DIAGNOSIS — F43.21 GRIEF REACTION: ICD-10-CM

## 2023-08-31 DIAGNOSIS — F32.A DEPRESSIVE DISORDER: Primary | ICD-10-CM

## 2023-08-31 PROCEDURE — 99499 UNLISTED E&M SERVICE: CPT | Mod: 95,NTX,, | Performed by: SOCIAL WORKER

## 2023-08-31 PROCEDURE — 99499 NO LOS: ICD-10-PCS | Mod: 95,NTX,, | Performed by: SOCIAL WORKER

## 2023-08-31 NOTE — PROGRESS NOTES
Individual Psychotherapy (PhD/LCSW) - patient is being seen virtually     8/31/2023    Site:  [place]  [facility]      Chief complaint/reason for encounter: interpersonal, grief, anxiety, family strife     Mood check: scale of:0 best, 10 worst. Patient rated:  Depression  -  did not assess   Anxiety -   did not assess     Risk parameters:  Patient reports no suicidal ideation  Patient reports no homicidal ideation  Patient reports no self-injurious behavior  Patient reports no violent behavior    Bridge:   N/A -      Review of home assignment:    N/A -     [place]:  Health px - update   Update on financial situation     History of present condition/content of session:   Pt began the  session discussion her frustration with trying to find out who the [employer] Only has contact with servicing company. The issue is he cannot find a lower interest rate than 8 %. She said also, if he were to finance it, then a mortgage company would require all types of improvements. She said son is willing to make the payments, concerned that if something that happens to her, then won't be in son's name until he pays it off. She was encouraged to decide which is best method and more cost effective. She said the  went well, and she will get some funds from auction of the [employer] and blood [employer]. Long term disability is for a limited time, pension is life long. Right now, she said she is okay financially for about 3 months.     Review of sx: Hx of being a severe emotional eater. But now she said she struggles to eat and  gets full quickly. Forcing herself to eat. Sleep is still problematic. Discussion was held on her level of stress with health px and financial px. Stress is noted to be 50% of her overall stress. It's too hot to get outside and do anything. She has a loss of motivation, but has the energy, in spurts. Pt was asked if she believes that some of the these sx and/or stress is related to grief.     Pt acknowledged that  sx are related to grief. She stated her first anniversary without her  is Sept 15. She reported making plans to go out with her children to take her out of eat. She was encouraged to mourn, but said she has not had the luxury. She noted dealing with health px and finances that take priority. She also noted things like having the air conditioning going out would have been taken care of by her . Which she said just brings more grief.     Pertinent history:  She has 3 children, Mesha Cueva (daughter in law), Casandra (lives in [place]), and Gauri (lives in [place], but close). Her son lives on the same property. Once the succession is completed, her son and daughter in law will buy her house. She was  for a long period of time and her  Steven,  in 2022. He had been ill, but  suddenly. She is a candidate for liver transplant.      Therapeutic Intervention:   Pt was assessed for present condition and areas of clinical concern.  Empathy and support were provided for the pt.'s expression of thoughts/feelings during the session.  Active Listening was used in the session. She was given positive reinforcement for identified areas of clinical concern.  Validation was consistently used to help the patient improve self esteem/self worth.      Treatment plan:  Target symptoms: Anxiety, worry, financial stress, and grief  Why chosen therapy is appropriate versus another modality: evidence based practice  Outcome monitoring methods: checklist/rating scale  Therapeutic intervention type: supportive psychotherapy    Patient's response to intervention:  The patient's response to intervention is motivated.     Progress toward goals and other mental status changes:  The patient's progress toward goals is fair .      -Goal: - coping skills for painful emotions     Diagnosis:   Major Depressive Disorder   Generalized Anxiety Disorder  Grief Reaction     Plan:  individual psychotherapy -   Clinician plans to provide supportive counseling through the medical crisis. Once through this, will focus on grief or other identified areas of clinical concern.     Stages of Grief     Return to clinic:  9/14/2023 at 2:00    Length of Service (minutes): 55

## 2023-09-06 ENCOUNTER — OFFICE VISIT (OUTPATIENT)
Dept: PRIMARY CARE CLINIC | Facility: CLINIC | Age: 63
End: 2023-09-06
Payer: COMMERCIAL

## 2023-09-06 VITALS
RESPIRATION RATE: 18 BRPM | WEIGHT: 218.69 LBS | OXYGEN SATURATION: 95 % | HEIGHT: 68 IN | TEMPERATURE: 98 F | BODY MASS INDEX: 33.15 KG/M2 | HEART RATE: 63 BPM | SYSTOLIC BLOOD PRESSURE: 110 MMHG | DIASTOLIC BLOOD PRESSURE: 74 MMHG

## 2023-09-06 DIAGNOSIS — M25.562 ACUTE PAIN OF LEFT KNEE: ICD-10-CM

## 2023-09-06 DIAGNOSIS — I10 PRIMARY HYPERTENSION: Primary | ICD-10-CM

## 2023-09-06 DIAGNOSIS — K75.81 NONALCOHOLIC STEATOHEPATITIS: Chronic | ICD-10-CM

## 2023-09-06 DIAGNOSIS — L57.0 ACTINIC KERATOSIS: ICD-10-CM

## 2023-09-06 PROCEDURE — 99999 PR PBB SHADOW E&M-EST. PATIENT-LVL V: ICD-10-PCS | Mod: PBBFAC,TXP,, | Performed by: FAMILY MEDICINE

## 2023-09-06 PROCEDURE — 3044F HG A1C LEVEL LT 7.0%: CPT | Mod: CPTII,NTX,S$GLB, | Performed by: FAMILY MEDICINE

## 2023-09-06 PROCEDURE — 3008F BODY MASS INDEX DOCD: CPT | Mod: CPTII,NTX,S$GLB, | Performed by: FAMILY MEDICINE

## 2023-09-06 PROCEDURE — 1159F MED LIST DOCD IN RCRD: CPT | Mod: CPTII,NTX,S$GLB, | Performed by: FAMILY MEDICINE

## 2023-09-06 PROCEDURE — 3008F PR BODY MASS INDEX (BMI) DOCUMENTED: ICD-10-PCS | Mod: CPTII,NTX,S$GLB, | Performed by: FAMILY MEDICINE

## 2023-09-06 PROCEDURE — 99214 OFFICE O/P EST MOD 30 MIN: CPT | Mod: NTX,S$GLB,, | Performed by: FAMILY MEDICINE

## 2023-09-06 PROCEDURE — 3078F PR MOST RECENT DIASTOLIC BLOOD PRESSURE < 80 MM HG: ICD-10-PCS | Mod: CPTII,NTX,S$GLB, | Performed by: FAMILY MEDICINE

## 2023-09-06 PROCEDURE — 99999 PR PBB SHADOW E&M-EST. PATIENT-LVL V: CPT | Mod: PBBFAC,TXP,, | Performed by: FAMILY MEDICINE

## 2023-09-06 PROCEDURE — 3078F DIAST BP <80 MM HG: CPT | Mod: CPTII,NTX,S$GLB, | Performed by: FAMILY MEDICINE

## 2023-09-06 PROCEDURE — 1160F PR REVIEW ALL MEDS BY PRESCRIBER/CLIN PHARMACIST DOCUMENTED: ICD-10-PCS | Mod: CPTII,NTX,S$GLB, | Performed by: FAMILY MEDICINE

## 2023-09-06 PROCEDURE — 3044F PR MOST RECENT HEMOGLOBIN A1C LEVEL <7.0%: ICD-10-PCS | Mod: CPTII,NTX,S$GLB, | Performed by: FAMILY MEDICINE

## 2023-09-06 PROCEDURE — 99214 PR OFFICE/OUTPT VISIT, EST, LEVL IV, 30-39 MIN: ICD-10-PCS | Mod: NTX,S$GLB,, | Performed by: FAMILY MEDICINE

## 2023-09-06 PROCEDURE — 1160F RVW MEDS BY RX/DR IN RCRD: CPT | Mod: CPTII,NTX,S$GLB, | Performed by: FAMILY MEDICINE

## 2023-09-06 PROCEDURE — 1159F PR MEDICATION LIST DOCUMENTED IN MEDICAL RECORD: ICD-10-PCS | Mod: CPTII,NTX,S$GLB, | Performed by: FAMILY MEDICINE

## 2023-09-06 PROCEDURE — 3074F PR MOST RECENT SYSTOLIC BLOOD PRESSURE < 130 MM HG: ICD-10-PCS | Mod: CPTII,NTX,S$GLB, | Performed by: FAMILY MEDICINE

## 2023-09-06 PROCEDURE — 3074F SYST BP LT 130 MM HG: CPT | Mod: CPTII,NTX,S$GLB, | Performed by: FAMILY MEDICINE

## 2023-09-06 NOTE — ASSESSMENT & PLAN NOTE
Non pigmented scaly excoriated lesion left upper chest consistent with an AK.  Discussed treatment options.  She prefers cryotherapy.  After verbal consent, the lesion was treated with cryotherapy.  Post treatment instructions were given.  I do not anticipate any significant problems but she will let me know if anything develops concern

## 2023-09-06 NOTE — PROGRESS NOTES
THIS DOCUMENT WAS MADE IN PART WITH VOICE RECOGNITION SOFTWARE.  OCCASIONALLY THIS SOFTWARE WILL MISINTERPRET WORDS OR PHRASES.      Primary Care Provider Appointment   [facility]       Patient ID: Yumiko Gonzalez is a 63 y.o. female.    ASSESSMENT/PLAN by Problem List:    1. Primary hypertension  Assessment & Plan:  Stable and satisfactory.    Orders:  -     Comprehensive Metabolic Panel; Future; Expected date: 09/06/2023    2. Acute pain of left knee  -     Ambulatory referral/consult to Physical/Occupational Therapy; Future; Expected date: 09/13/2023    3. Nonalcoholic steatohepatitis  Assessment & Plan:  Reviewed hepatology notes.  Overall she is currently stable.      4. Actinic keratosis  Assessment & Plan:  Non pigmented scaly excoriated lesion left upper chest consistent with an AK.  Discussed treatment options.  She prefers cryotherapy.  After verbal consent, the lesion was treated with cryotherapy.  Post treatment instructions were given.  I do not anticipate any significant problems but she will let me know if anything develops concern         Note that she has lost some weight from healthy eating and sodium restriction.  Encouraged her to continue to work on healthy nutrition and light exercise.      Follow Up:  Two months      Subjective:     Chief Complaint   Patient presents with    Follow-up    Knee Pain     Pt states her left knee aches and has been going out on her lately.    Chest Pain     Pt states she's been having chest pains.     I have reviewed the information entered by the ancillary staff regarding the chief complaint as well as the related history.    HPI    Patient is a/an 63 y.o.  female     Scheduled follow-up.  She is doing fairly well.  She has lost some weight.  Although she does have intermittent left knee pain sometimes it feels like it is out of place or gives out on her.  She is had injections in the past.  This never really helped.  There has  not been any recent injury or trauma.  See above for details addressed today    She does report some occasional atypical pains in the chest.  Nothing that sounds consistent with angina.  Typically sharp, very brief, not brought on by exertion.  She did have a negative stress test earlier this year.  I did advise her to monitor symptoms, if anything changes or worsens then additional evaluation may be necessary      For complete problem list, past medical history, surgical history, social history, etc., see appropriate section in the electronic medical record    Review of Systems   Constitutional:  Positive for fatigue. Negative for activity change.   HENT:  Negative for hearing loss and trouble swallowing.    Eyes:  Negative for discharge.   Respiratory:  Negative for chest tightness and wheezing.    Cardiovascular:  Positive for chest pain. Negative for palpitations.   Gastrointestinal:  Negative for constipation, diarrhea and vomiting.   Genitourinary:  Negative for difficulty urinating and hematuria.   Musculoskeletal:  Positive for arthralgias.   Psychiatric/Behavioral:  Positive for dysphoric mood.        Objective     Physical Exam  Vitals reviewed.   Constitutional:       General: She is not in acute distress.     Appearance: She is well-developed. She is not ill-appearing.   HENT:      Head: Normocephalic and atraumatic.   Eyes:      General: No scleral icterus.     Conjunctiva/sclera: Conjunctivae normal.   Cardiovascular:      Rate and Rhythm: Normal rate and regular rhythm.      Heart sounds: Normal heart sounds. No murmur heard.     Comments: Trace peripheral edema ankles  Pulmonary:      Effort: Pulmonary effort is normal. No respiratory distress.      Breath sounds: Normal breath sounds. No wheezing or rales.   Skin:     General: Skin is dry.      Findings: No rash.          Neurological:      Mental Status: She is alert and oriented to person, place, and time.   Psychiatric:         Behavior: Behavior  "normal.       Vitals:    09/06/23 1315   BP: 110/74   BP Location: Right arm   Patient Position: Sitting   BP Method: Large (Manual)   Pulse: 63   Resp: 18   Temp: 97.9 °F (36.6 °C)   TempSrc: Oral   SpO2: 95%   Weight: 99.2 kg (218 lb 11.1 oz)   Height: 5' 8" (1.727 m)       "

## 2023-09-14 ENCOUNTER — CLINICAL SUPPORT (OUTPATIENT)
Dept: PRIMARY CARE CLINIC | Facility: CLINIC | Age: 63
End: 2023-09-14
Payer: COMMERCIAL

## 2023-09-14 DIAGNOSIS — F41.1 GAD (GENERALIZED ANXIETY DISORDER): ICD-10-CM

## 2023-09-14 DIAGNOSIS — F43.21 GRIEF REACTION: ICD-10-CM

## 2023-09-14 DIAGNOSIS — Z59.9 CHANGE IN FINANCIAL CIRCUMSTANCES: Primary | ICD-10-CM

## 2023-09-14 PROCEDURE — 99499 NO LOS: ICD-10-PCS | Mod: 95,NTX,, | Performed by: SOCIAL WORKER

## 2023-09-14 PROCEDURE — 99499 UNLISTED E&M SERVICE: CPT | Mod: 95,NTX,, | Performed by: SOCIAL WORKER

## 2023-09-14 SDOH — SOCIAL DETERMINANTS OF HEALTH (SDOH): PROBLEM RELATED TO HOUSING AND ECONOMIC CIRCUMSTANCES, UNSPECIFIED: Z59.9

## 2023-09-14 NOTE — PROGRESS NOTES
"Individual Psychotherapy (PhD/LCSW) - patient is being seen virtually     8/31/2023    Site:  [facility]      Chief complaint/reason for encounter: interpersonal, grief, anxiety, family strife     Mood check: scale of:0 best, 10 worst. Patient rated:  Depression  -  9/2/2023 PHQ 9 score is 9  Anxiety -   9/2/2023 COLLEEN 7 score is 13    Risk parameters:  Patient reports no suicidal ideation  Patient reports no homicidal ideation  Patient reports no self-injurious behavior  Patient reports no violent behavior    Bridge:   N/A -      Review of home assignment:    N/A -     [place]:  Health px - update   Concerns with     History of present condition/content of session:   Pt began the  session discussion her frustration with trying to find out who the mortgage co. Only has contact with servicing company and cannot find out who holds the mortgage since it has been sold. [place] from the . She said that she gets rid of one stressor, and "usually do get 2 more." For example, her car insurance went up $40/month. Being on fixed income, increased monthly Internet fees, as well,  create px with budget. And in turn add more stress     PCP is having her go to PT for her knee. She said she plans to come this clinic because it is more private and smaller than going to the bigger clinic.      She stated her first wedding anniversary without her  is Sept 15. She reported making plans to go out with her children to take her out of eat. She said that she continues to struggle with her appetite, and  "nothing appeals to me."      She said her daughter who is her liver support person. And now she said her daughter is pregnant. Pt voiced concern that her daughter may not be available to help after the transplant or be  in labor when pt gets the call that there is a liver for her. Pt reported having a Plan B with other daughter.  Pt voiced wanting to have a plans in place, with " "her daughters. Pt said they would be willing to talk to her about it, and denied they are invalidating these new concerns. She denied having obsessive worry over it. [She explained that she will need someone to stay with her for 24 for crisis or emergencies after the surgery, and caregiver is supposed be with her in the hospital ]  She was encourage to identify the other associated feelings about the baby beside having a new grandchild. She said that "we had all this worked out, and we didn't think it would be as rushed [that her daughter would get pregnant so soon]." She admitted that she is a little nervous about all this. Alternative plan is that her sister in law could bring her to hospital if they get the call for a liver, and Casandra come in to provide care for pt.     Used the worksheet - What Could Happen Vs. What Will Happen     Worried about - someone not being there to bring her to hospital ( has to have caregiver with her when she gets the call that for a liver transplant)    What are some clues that worry will not come true?  Answer - maybe it won't be close or near her due date, or after she has the baby, the baby is a few months old. Worse case scenario, if November, son will be home; daughter in law could get her to hospital and stay until Neva gets there:     If worry does not come true - then Gauri will be able bring her to the hospital and stay with her.     If worry does come true - she would drive herself to the hospital even though  she would  be nervous driving in [facility]. She could do it if she had to.  And then call her sister in law to come from [place] to help her until other daughter could get there.     Conclusion:She thought about this, but worksheet helped put it together.     Pertinent history:  She has 3 children, Mesha Cueva (daughter in law), Casandra (lives in [place]), and Gauri (lives in [place], but close). Her son lives on the same property. Once the succession is " completed, her son and daughter in law will buy her house. She was  for a long period of time and her  Steven,  in 2022. He had been ill, but  suddenly. She is a candidate for liver transplant.      Therapeutic Intervention:   Pt was assessed for present condition and areas of clinical concern.  Empathy and support were provided for the pt.'s expression of thoughts/feelings during the session.  Active Listening was used in the session. She was given positive reinforcement for identified areas of clinical concern. Cognitive Therapy techniques were used to explore sources of irrational thoughts that contribute to anxiety response. Patient encouraged to begin to restructure irrational beliefs by reviewing reality-based evidence and misinterpretation.       Treatment plan:  Target symptoms: Anxiety, worry, financial stress, and grief  Why chosen therapy is appropriate versus another modality: evidence based practice  Outcome monitoring methods: checklist/rating scale  Therapeutic intervention type: supportive psychotherapy    Patient's response to intervention:  The patient's response to intervention is motivated.     Progress toward goals and other mental status changes:  The patient's progress toward goals is fair .      -Goal: - coping skills for painful emotions     Diagnosis:   Major Depressive Disorder   Generalized Anxiety Disorder  Grief Reaction     Plan:  individual psychotherapy -  Clinician plans to provide supportive counseling through the medical crisis. Once through this, will focus on grief or other identified areas of clinical concern.     Stages of Grief     Return to clinic:  2023 at 1:00    Length of Service (minutes): 55

## 2023-09-19 ENCOUNTER — CLINICAL SUPPORT (OUTPATIENT)
Dept: REHABILITATION | Facility: HOSPITAL | Age: 63
End: 2023-09-19
Attending: FAMILY MEDICINE
Payer: COMMERCIAL

## 2023-09-19 DIAGNOSIS — R29.898 DECREASED STRENGTH OF LOWER EXTREMITY: ICD-10-CM

## 2023-09-19 DIAGNOSIS — M25.562 CHRONIC PAIN OF LEFT KNEE: ICD-10-CM

## 2023-09-19 DIAGNOSIS — G89.29 CHRONIC PAIN OF LEFT KNEE: ICD-10-CM

## 2023-09-19 DIAGNOSIS — M25.562 ACUTE PAIN OF LEFT KNEE: ICD-10-CM

## 2023-09-19 PROCEDURE — 97162 PT EVAL MOD COMPLEX 30 MIN: CPT | Mod: PN | Performed by: PHYSICAL THERAPIST

## 2023-09-19 PROCEDURE — 97112 NEUROMUSCULAR REEDUCATION: CPT | Mod: PN | Performed by: PHYSICAL THERAPIST

## 2023-09-19 NOTE — PLAN OF CARE
[facility]   Physical Therapy Initial Evaluation        Date: 9/19/2023   Name: Yumiko Gonzalez  Clinic Number: [id number]    Therapy Diagnosis:   Encounter Diagnosis   Name Primary?    Acute pain of left knee      Physician: Garrett Webb, *    Physician Orders: PT Eval and Treat   Medical Diagnosis from Referral: Acute pain of left knee  Evaluation Date: 9/19/2023  Authorization Period Expiration: 9/5/24  Plan of Care Expiration: 12/19/23  Progress Note Due: 10/19/23  Visit # / Visits authorized: 1/ 1   FOTO: 1/3    Precautions: Standard     Time In: 11:05AM  Time Out: 12:10PM  Total Appointment Time (timed & untimed codes): 65 minutes      SUBJECTIVE     Date of onset: August 2022    History of current condition - Tiffanie reports: she has had bilateral knee pain for a few years. She suffered a fall in August 2022, landing on both knees. Left knee pain has been worse since then. Prior to that injury, she had been seen by ortho and had injections to knees, but reports no significant relief. Current left knee pain is primarily in lateral compartment of knee. Pain increases with getting up/down from chair, bending and squatting. She states knee pops and feels unstable at times. She has been on disability since January 2023. She had diagnosis of liver cancer and is status post Y90 procedure 06/2023. She is currently on liver transplant list.     Current Activity Level: sedentary    Falls: 1x    Imaging, xrays 6/22/22: Interval progression of degenerative osteoarthritis in both knees, most prominently affecting the lateral tibiofemoral joint compartments, left greater than right.  Xrays 8/17/22: There osteoarthritic changes of both knees most severe in the lateral compartment left knee.        Prior Therapy: no  Social History:   lives alone  Occupation: disabled  Prior Level of Function: independent  Current Level of Function: limited gait and difficulty squatting, getting up/down from  "chair    Pain:  Current 0/10, worst 5/10, best 0/10   Location: left latera and inferior knee   Description: weak, sharp, aching  Aggravating Factors: Walking, Getting out of bed/chair, and squatting  Easing Factors: rest    Patients goals: to strengthen left lower extremity, to decrease pain left knee     Medical History:   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, postitive antismooth muscle antibody    Blood transfusion     Bronchitis     bronchospasm on occasion     Cancer 1987    carcinoma in situ    Cervical neck pain with evidence of disc disease 2012    can bend neck    Cirrhosis     non-alcoholic, compensated    Colon polyps 2012    Depression 2012    Hx panic attacks    Diverticular disease 2012    never diverticulitis    Fatty liver 2012    Fibrocystic breast     Fine tremor     hands,chronic    Hepatosplenomegaly     History of abdominal paracentesis 2023    8.7L of fluid drained    Hypertension 2013    Knee pain, bilateral     chronic, with hands,feet,fingers also painful    Lesion of right lung     Liver disease     "partially sclerosed liver" per pt    Migraines past Hx    Nephrolithiasis     stone episode 2021    Pleural effusion     small, compensated, good bilateral breath sounds per Dr Suresh GUTIERRES (postoperative nausea and vomiting)     twice after childbirth    Postpartum depression     Splenomegaly     Thrombocytopenia     Tremors of nervous system        Surgical History:   Yumiko Gonzalez  has a past surgical history that includes Cervical conization w/ laser; Tubal ligation; Endometrial ablation;  section; Pelvic laparoscopy; Tonsillectomy; Adenoidectomy; Liver biopsy; Colonoscopy (N/A, 2016); Embolization (N/A, 2018); " Esophagogastroduodenoscopy (N/A, 1/28/2020); Colonoscopy (N/A, 2/3/2022); and Esophagogastroduodenoscopy (N/A, 3/8/2022).    Medications:   Yumiko has a current medication list which includes the following prescription(s): albuterol, albuterol, alprazolam, bupropion, trelegy ellipta, furosemide, lactulose, loratadine, multivitamin, omeprazole, propranolol, rifaximin, spironolactone, and vitamin e.    Allergies:   Review of patient's allergies indicates:   Allergen Reactions    No known drug allergies           OBJECTIVE     Posture:  Pt presents with postural abnormalities which include:    [] Forward Head   [] Increased Lumbar Lordosis   [] Rounded Shoulder   [] Flat Back Posture   [] Increased Thoracic Kyphosis [] Pes Planus   [] Increased Trunk Sway  [x] Valgus knee position left knee- severe   [] Increased Trunk Rotation  [] Varus knee position   [] Increased cervical lordosis [x] Other: 20 degree Q- angle left knee    Sensation:    Sensation to light touch over UE's is  [] Intact [] Impaired [x] Defer  Sensation to light touch over LE's is  [x] Intact [] Impaired [] Defer      Gait Analysis: The patient ambulated with the following assistive device: none; the pt presents with the following gait abnormalities: increased OZZIE, decreased step length bilateral, decreased stance time on left, decreased knee flexion on left, and genu valgum on left          Functional Tests  Outcome Norms   Timed Up and Go 26 <13.5 sec   Five Time Sit to Stand 30+ 60s: <11.4 sec  70s: <12.6 sec  80s: 14.8 sec        Range of Motion:    Knee AROM/PROM Right Left Pain/Dysfunction with Movement   Knee Flexion (135º) 130 120 Pain left knee   Knee Extension (0º) 0 0        Strength:    L/E MMT Right  (spine) Left Pain/Dysfunction with Movement   Modified (90/90) Abdominal Strength  3/5 ---    Hip Flexion  3/5 3/5    Hip Extension  4/5 4/5    Hip Abduction  4/5 4/5    Knee Extension 4/5 3+/5    Knee Flexion 5/5 4/5    Hip IR 4/5 4/5    Hip  ER 4/5 4/5    Ankle DF 4/5 4/5    Ankle PF 4+/5 4+/5      Palpation:  Mild crepitus left knee    Intake Outcome Measure for FOTO knee Survey    Therapist reviewed FOTO scores for Yumiko Gonzalez on 9/19/2023.   FOTO documents entered into [employer] - see Media section or FOTO account episode details.    Intake Score: 52.5%         TREATMENT     Total Treatment time (time-based codes) separate from Evaluation: 38 minutes      Tiffanie received the treatments listed below:          neuromuscular re-education activities to improve: Balance, Kinesthetic, Sense, Proprioception, Posture, and stability of knee for 38 minutes. The following activities were included:  Heel slides with correct alignment to decrease compression on lateral knee x10  Quad sets with focus on alignment x20  SLR 4/5 ea with focus on quad control and alignment  LAQs 2/10 ea with focus on quad control and alignment        PATIENT EDUCATION AND HOME EXERCISES     Education provided:   - Patient  was instructed in home exercise program  to address the deficits listed above and to address overall condition and quality of life. Patient  was encouraged to participate in cardiovascular exercise and wellness exercise in fitness center with assistance of health  at [address].   - Patient was educated on all the above exercise prior/during/after for proper posture, positioning, and execution for safe performance with home exercise program.    Written Home Exercises Provided: yes. Exercises were reviewed and Tiffanie was able to demonstrate them prior to the end of the session.  Tiffanie demonstrated good  understanding of the education provided. See EMR under Patient Instructions for exercises provided during therapy sessions.    ASSESSMENT     Yumiko is a 63 y.o. female referred to outpatient Physical Therapy with a medical diagnosis of acute pain of left knee. The patient presents with signs and symptoms consistent with diagnosis along with impairments which  include weakness, impaired endurance, impaired functional mobility, gait instability, impaired balance, decreased lower extremity function, pain, and decreased ROM.    Patient  will require follow up with physical therapy to monitor and establish safe and effective exercise program    Patient prognosis is Good.   Patient will benefit from skilled outpatient Physical Therapy to address the deficits stated above and in the chart below, provide patient /family education, and to maximize patientt's level of independence.     Plan of care discussed with patient: Yes  Patient's spiritual, cultural and educational needs considered and patient is agreeable to the plan of care and goals as stated below:     Anticipated Barriers for therapy: co-morbidities    Medical Necessity is demonstrated by the following   History  Co-morbidities and personal factors that may impact the plan of care [] LOW: no personal factors / co-morbidities  [] MODERATE: 1-2 personal factors / co-morbidities  [x] HIGH: 3+ personal factors / co-morbidities    Moderate / High Support Documentation:   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, postitive antismooth muscle antibody    Blood transfusion     Bronchitis     bronchospasm on occasion     Cancer 1987    carcinoma in situ    Cervical neck pain with evidence of disc disease 03/22/2012    can bend neck    Cirrhosis     non-alcoholic, compensated    Colon polyps 03/22/2012    Depression 03/22/2012    Hx panic attacks    Diverticular disease 03/22/2012    never diverticulitis    Fatty liver 03/22/2012    Fibrocystic breast     Fine tremor     hands,chronic    Hepatosplenomegaly     History of abdominal paracentesis 01/18/2023    8.7L of fluid drained    Hypertension 04/24/2013    Knee pain, bilateral      "chronic, with hands,feet,fingers also painful    Lesion of right lung     Liver disease     "partially sclerosed liver" per pt    Migraines past Hx    Nephrolithiasis     stone episode 1/2021    Pleural effusion     small, compensated, good bilateral breath sounds per Dr Suresh GUTIERRES (postoperative nausea and vomiting)     twice after childbirth    Postpartum depression     Splenomegaly     Thrombocytopenia     Tremors of nervous system          Examination  Body Structures and Functions, activity limitations and participation restrictions that may impact the plan of care [] LOW: addressing 1-2 elements  [x] MODERATE: 2+ elements  [] HIGH: 3+ elements (please support below)    Moderate / High Support Documentation: see evaluation/objective measurements above.     Clinical Presentation [] LOW: stable  [x] MODERATE: Evolving  [] HIGH: Unstable     Decision Making/ Complexity Score: moderate         Goals:  Short Term Goals: In 4 weeks   1.Patient to be educated on HEP.  2.Patient to increase left knee range of motion to 0-125 degrees, in order to improve available range of motion for ADL's.   3.Patient to increase left LE strength by 1/2 grade, in order to improve endurance and increase ability to perform all functional activities for increased time.   4.Patient to have pain less than 3/10 at worst, to improve QOL.  5.Patient to improve score on the FOTO, to improve QOL.  6. Patient to improve score on 5 times sit to stand and TUG (Timed up and go) in order to decrease fall risk.  Long Term Goals: In 8 weeks  1.Patient to improve score on the FOTO predicted score or better, to improve QOL.  2. Patient to increase left lower extremity strength to 4/5 or greater, in order to improve endurance and increase ability to perform all functional activities for increased time.  3. Patient to have decreased pain to 2/10 at worst, to improve QOL.  4. Patient to normalize score on 5 times sit to stand, in order to improve " endurance and decrease fall risk.  5. Patient to perform daily activities including walking and getting out of chair without increased symptoms.  6. Pt will be independent with self management of his/her condition with home exercise program, posture, positioning and improved body mechanics.  7.   Pt will safely transition to and participate in wellness/fitness program.      PLAN   Plan of care Certification: 9/19/2023 to 12/19/23.    Outpatient Physical Therapy 2 times/  month x8 week(s) to include the following interventions: Neuromuscular Re-ed, Patient Education, and Therapeutic Activities to establish safe and effective exercise program that patient can perform independently. Once HEP established Health  will contact patient either by phone or in person to monitor exercise program, answer questions regarding exercises and encourage follow up in fitness center. Health  will communicate any concerns with treating therapist and will recommend follow up with physical therapist as needed.     Rylee Cameron, PT

## 2023-09-19 NOTE — PATIENT INSTRUCTIONS
heel slides     heel slides while supine 10x before getting out of bed        QUAD SETS - ISOMETRIC QUADS    Sit down and straighten your leg and knee. Tighten your top thigh muscle to press the back of your knee downward. Hold this and then relax and repeat. Hold 5 seconds. Repeat 20x. You can do both legs at same time.  STRAIGHT LEG RAISE - SLR    While lying on your back, raise up your leg with a straight knee.  Keep the opposite knee bent with the foot planted on the ground.2 sets of 10.        LONG ARC QUAD - LAQ - HIGH SEAT    While seated with your knee in a bent position, slowly straighten your knee as you raise your foot upwards as shown. 2 sets of 10.

## 2023-09-27 DIAGNOSIS — C22.0 HCC (HEPATOCELLULAR CARCINOMA): ICD-10-CM

## 2023-09-27 DIAGNOSIS — Z00.6 RESEARCH STUDY PATIENT: Primary | ICD-10-CM

## 2023-09-29 ENCOUNTER — PATIENT MESSAGE (OUTPATIENT)
Dept: PRIMARY CARE CLINIC | Facility: CLINIC | Age: 63
End: 2023-09-29
Payer: COMMERCIAL

## 2023-09-29 ENCOUNTER — CLINICAL SUPPORT (OUTPATIENT)
Dept: PRIMARY CARE CLINIC | Facility: CLINIC | Age: 63
End: 2023-09-29
Payer: COMMERCIAL

## 2023-09-29 ENCOUNTER — PATIENT MESSAGE (OUTPATIENT)
Dept: TRANSPLANT | Facility: CLINIC | Age: 63
End: 2023-09-29
Payer: COMMERCIAL

## 2023-09-29 DIAGNOSIS — F43.21 GRIEF REACTION: ICD-10-CM

## 2023-09-29 DIAGNOSIS — F41.1 GAD (GENERALIZED ANXIETY DISORDER): Primary | ICD-10-CM

## 2023-09-29 DIAGNOSIS — J32.9 SINUSITIS, UNSPECIFIED CHRONICITY, UNSPECIFIED LOCATION: Primary | ICD-10-CM

## 2023-09-29 PROCEDURE — 99499 UNLISTED E&M SERVICE: CPT | Mod: 95,NTX,, | Performed by: SOCIAL WORKER

## 2023-09-29 PROCEDURE — 99499 NO LOS: ICD-10-PCS | Mod: 95,NTX,, | Performed by: SOCIAL WORKER

## 2023-09-29 NOTE — PROGRESS NOTES
"Individual Psychotherapy (PhD/LCSW) - patient is being seen virtually     9/29/2023    Site:  [facility]      Chief complaint/reason for encounter: interpersonal, grief, anxiety, family strife     Mood check: scale of:0 best, 10 worst. Patient rated:  Depression  -  did not assess   Anxiety -   did not assess     Risk parameters:  Patient reports no suicidal ideation  Patient reports no homicidal ideation  Patient reports no self-injurious behavior  Patient reports no violent behavior    She identified protective factors of "I'm chicken. I want to be there for my grandchildren."     Bridge:   She was unable to recall the topic of conversation.     Review of home assignment:    Used the worksheet - What Could Happen Vs. What Will Happen  - last session - did not  have chance to review it.      She was promoted about last session. She  previously reported that has a servicing company and cannot find out who holds the mortgage since it has been sold. She was not able [place] go [place] from the .     [place]:  Health px - update   2.  Talking about her     History of present condition/content of session:   The session began with patient updating on her daughter's condition. She said her daughter is having twins. Pt was asked how she felt about it. She said that she is excited for her daughter, but worried about what her daughter will do. She said she went to the dentist, finally. She said she went out to dinner for her wedding anniversary with her daughter and daughter's family. This was the first anniversary since the death of her  in October 2022. She was encouraged to talk about her  if she want to do so. She is allowing herself to grieve. She spent some time relating her 's healthy hx. All of a sudden, she said she is doing things that she would do when he was a live, such as not turning on the bathroom light for fear of waking him.     She has been reading " "since her Internet is out.  She said her finances are more stable.     Review of sx" She noted she is getting cold easily. She said that the legion that was seen, radiation took care of it. She is susceptible to other cancers. She c/o being tired, no motivation, don't sleep good at night, and obsessive thoughts about "stupid stuff."  She noted that she can't seem to let it go. She reported using distracting  technique from obsessive thoughts by reading. She noted come confusion. But not reported px remembering how to do familiar tasks.     Pertinent history:  She has 3 children, Anay, Mesha (daughter in law), Casandra (lives in [place]), and Gauri (lives in [place], but close). Her son lives on the same property. Once the succession is completed, her son and daughter in law will buy her house. She was  for a long period of time and her  Steven,  in 2022. He had been ill, but  suddenly. She is a candidate for liver transplant.      Therapeutic Intervention:   Pt was assessed for present condition and areas of clinical concern.  Empathy and support were provided for the pt.'s expression of thoughts/feelings during the session.  Active Listening was used in the session. She was given positive reinforcement for identified areas of clinical concern.      Treatment plan:  Target symptoms: Anxiety, worry, financial stress, and grief  Why chosen therapy is appropriate versus another modality: evidence based practice  Outcome monitoring methods: checklist/rating scale  Therapeutic intervention type: supportive psychotherapy    Patient's response to intervention:  The patient's response to intervention is motivated.     Progress toward goals and other mental status changes:  The patient's progress toward goals is fair.      -Goal: - coping skills for painful emotions     Diagnosis:   Major Depressive Disorder   Generalized Anxiety Disorder  Grief Reaction     Plan:  individual " psychotherapy -  Clinician plans to provide supportive counseling through the medical crisis. Once through this, will focus on grief or other identified areas of clinical concern.     Return to clinic:  10/13/2023 at 2:00.     Length of Service (minutes): 50                    Answers submitted by the patient for this visit:  Review of Systems Questionnaire (Submitted on 9/29/2023)  activity change: No  hearing loss: No  rhinorrhea: Yes  trouble swallowing: Yes  eye discharge: No  chest tightness: No  wheezing: No  chest pain: No  palpitations: No  constipation: No  vomiting: Yes  diarrhea: Yes  difficulty urinating: No  hematuria: No  headaches: Yes  dysphoric mood: Yes

## 2023-10-02 RX ORDER — DOXYCYCLINE 100 MG/1
100 CAPSULE ORAL 2 TIMES DAILY
Qty: 20 CAPSULE | Refills: 0 | Status: SHIPPED | OUTPATIENT
Start: 2023-10-02 | End: 2023-11-13

## 2023-10-02 NOTE — TELEPHONE ENCOUNTER
Regarding my chart message.  If symptoms have been present for three weeks and or worsening I do recommend an antibiotic.  I will send in a prescription for doxycycline.  Over-the-counter she may use Mucinex DM or similar expectorant/cough medicine.

## 2023-10-03 DIAGNOSIS — J42 CHRONIC BRONCHITIS, UNSPECIFIED CHRONIC BRONCHITIS TYPE: ICD-10-CM

## 2023-10-03 RX ORDER — ALBUTEROL SULFATE 90 UG/1
AEROSOL, METERED RESPIRATORY (INHALATION)
Qty: 18 G | Refills: 11 | Status: SHIPPED | OUTPATIENT
Start: 2023-10-03

## 2023-10-07 ENCOUNTER — PATIENT MESSAGE (OUTPATIENT)
Dept: REHABILITATION | Facility: HOSPITAL | Age: 63
End: 2023-10-07
Payer: COMMERCIAL

## 2023-10-13 ENCOUNTER — CLINICAL SUPPORT (OUTPATIENT)
Dept: PRIMARY CARE CLINIC | Facility: CLINIC | Age: 63
End: 2023-10-13
Payer: COMMERCIAL

## 2023-10-13 ENCOUNTER — TELEPHONE (OUTPATIENT)
Dept: TRANSPLANT | Facility: CLINIC | Age: 63
End: 2023-10-13
Payer: COMMERCIAL

## 2023-10-13 DIAGNOSIS — F32.A DEPRESSIVE DISORDER: ICD-10-CM

## 2023-10-13 DIAGNOSIS — F43.21 GRIEF REACTION: ICD-10-CM

## 2023-10-13 DIAGNOSIS — F41.1 GAD (GENERALIZED ANXIETY DISORDER): Primary | ICD-10-CM

## 2023-10-13 NOTE — PROGRESS NOTES
"Individual Psychotherapy (PhD/LCSW) - patient is being seen virtually     10/13/2023    Site:  [facility]      Chief complaint/reason for encounter: interpersonal, grief, anxiety     Mood check: scale of:0 best, 10 worst. Patient rated:  Depression  -   4  Anxiety -  3    Risk parameters:  Patient reports no suicidal ideation  Patient reports no homicidal ideation  Patient reports no self-injurious behavior  Patient reports no violent behavior    She identified protective factors of "I'm chicken. I want to be there for my grandchildren."     Bridge:   When prompted she recalled having a px with intrusive thoughts.     Review of home assignment:    She previously reported that has a servicing company and cannot find out who holds the mortgage since it has been sold. She was not able to go to [place] to find out from the . Tiffanie said her daughter called the , and found out there is a lien on the house, but don't show any name on the deed.  Casandra wrote a letter to the  for further information.     [place]:  Fear of having liver transplant   2.   Validation     History of present condition/content of session:   Since last session, she reported some type of respiratory infection.  She noted since she has been sick, she is no longer having px with intrusive thoughts.      She identified confused, and irritated about the mortgage business. She said her son is not handling this situation very well.     She said that she received a check from [employer]. She said that she received a check from them previously when her  . She voiced plans to use the money to replace the anisa in the living room. Her dog had an accident on the carpet, and now it needs to be replaced. She said that her daughter is having twins, and not able to do the floor as pt hoped.     She talked about the visit from her other daughter, who lives in [place]. She talked about her grand " "daughter's boyfriend.     Review of sx: she noted continued weight loss, and total loss of 100 lbs. She continues to c/o px with low fatigue. 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  She reported taking Wellbutrin as rx'ed, but only taking Xanax as rx'ed. No report changes in sleep or appetite. She is continuing to read before going to bed and reading to pass the time.     Discussion held on her possibility of liver becoming available. Explored underlying thoughts about her fear of the surgery. Biggest identified fear is that she won't wake up. It was evident to that it is hard for her to accept the validation of her fears. She verbalized understanding that family doesn't often validate her feelings by telling her that "it's going to be okay." Which she said is reassuring for her.      Pertinent history:  She has 3 children, Anay, Mesha (daughter in law), Casandra (lives in [place]), and Gauri (lives in [place], but close). Her son lives on the same property. Once the succession is completed, her son and daughter in law will buy her house. She was  for a long period of time and her  Steven,  in 2022. He had been ill, but  suddenly. She is a candidate for liver transplant.      Therapeutic Intervention:   Pt was assessed for present condition and areas of clinical concern.  Empathy and support were provided for the pt.'s expression of thoughts/feelings during the session.  Active Listening was used in the session. She was given positive reinforcement for identified areas of clinical concern. And explored her hx of anxiety and worry.      Treatment plan:  Target symptoms: Anxiety, worry, financial stress, and grief  Why chosen therapy is appropriate versus another modality: evidence based practice  Outcome monitoring methods: checklist/rating scale  Therapeutic intervention type: supportive psychotherapy    Patient's response " to intervention:  The patient's response to intervention is motivated.     Progress toward goals and other mental status changes:  The patient's progress toward goals is fair.      -Goal: - coping skills for painful emotions     Diagnosis:   Major Depressive Disorder   Generalized Anxiety Disorder  Grief Reaction     Plan:  individual psychotherapy -  Clinician plans to provide supportive counseling through the medical crisis. Once through this, will focus on grief or other identified areas of clinical concern.     Return to clinic:  10/30/2023 at 2:00 - anniversary of Steven's death.     Length of Service (minutes): 55

## 2023-10-13 NOTE — TELEPHONE ENCOUNTER
----- Message from Moy Devries sent at 10/13/2023  1:28 PM CDT -----    Called [phone] (SYLLETA) and sp to pt to [facility] f/u appt; appt [facility]'ed for: 11/3.  .

## 2023-10-16 DIAGNOSIS — K74.60 LIVER CIRRHOSIS SECONDARY TO NASH: Primary | ICD-10-CM

## 2023-10-16 DIAGNOSIS — K75.81 LIVER CIRRHOSIS SECONDARY TO NASH: Primary | ICD-10-CM

## 2023-10-16 DIAGNOSIS — C22.0 HCC (HEPATOCELLULAR CARCINOMA): ICD-10-CM

## 2023-10-16 DIAGNOSIS — Z76.82 ORGAN TRANSPLANT CANDIDATE: ICD-10-CM

## 2023-10-17 ENCOUNTER — RESEARCH ENCOUNTER (OUTPATIENT)
Dept: RESEARCH | Facility: HOSPITAL | Age: 63
End: 2023-10-17
Payer: COMMERCIAL

## 2023-10-17 ENCOUNTER — HOSPITAL ENCOUNTER (OUTPATIENT)
Dept: RADIOLOGY | Facility: HOSPITAL | Age: 63
Discharge: HOME | End: 2023-10-17
Payer: COMMERCIAL

## 2023-10-17 ENCOUNTER — CLINICAL SUPPORT (OUTPATIENT)
Dept: REHABILITATION | Facility: HOSPITAL | Age: 63
End: 2023-10-17
Payer: COMMERCIAL

## 2023-10-17 ENCOUNTER — HOSPITAL ENCOUNTER (OUTPATIENT)
Dept: RADIOLOGY | Facility: HOSPITAL | Age: 63
Discharge: HOME | End: 2023-10-17
Attending: STUDENT IN AN ORGANIZED HEALTH CARE EDUCATION/TRAINING PROGRAM
Payer: COMMERCIAL

## 2023-10-17 DIAGNOSIS — M25.562 CHRONIC PAIN OF LEFT KNEE: Primary | ICD-10-CM

## 2023-10-17 DIAGNOSIS — R29.898 DECREASED STRENGTH OF LOWER EXTREMITY: ICD-10-CM

## 2023-10-17 DIAGNOSIS — G89.29 CHRONIC PAIN OF LEFT KNEE: Primary | ICD-10-CM

## 2023-10-17 DIAGNOSIS — C22.0 HCC (HEPATOCELLULAR CARCINOMA): ICD-10-CM

## 2023-10-17 DIAGNOSIS — K75.81 LIVER CIRRHOSIS SECONDARY TO NASH: ICD-10-CM

## 2023-10-17 DIAGNOSIS — K74.60 LIVER CIRRHOSIS SECONDARY TO NASH: ICD-10-CM

## 2023-10-17 DIAGNOSIS — Z76.82 ORGAN TRANSPLANT CANDIDATE: ICD-10-CM

## 2023-10-17 PROCEDURE — 71250 CT CHEST WITHOUT CONTRAST: ICD-10-PCS | Mod: 26,TXP,, | Performed by: RADIOLOGY

## 2023-10-17 PROCEDURE — 71250 CT THORAX DX C-: CPT | Mod: TC,PO,TXP

## 2023-10-17 PROCEDURE — 25500020 PHARM REV CODE 255: Mod: PO,TXP

## 2023-10-17 PROCEDURE — 74183 MRI ABD W/O CNTR FLWD CNTR: CPT | Mod: 26,TXP,, | Performed by: RADIOLOGY

## 2023-10-17 PROCEDURE — 74183 MRI ABDOMEN W WO CONTRAST: ICD-10-PCS | Mod: 26,TXP,, | Performed by: RADIOLOGY

## 2023-10-17 PROCEDURE — 97110 THERAPEUTIC EXERCISES: CPT | Mod: PN,NTX | Performed by: PHYSICAL THERAPIST

## 2023-10-17 PROCEDURE — A9585 GADOBUTROL INJECTION: HCPCS | Mod: PO,TXP

## 2023-10-17 PROCEDURE — 71250 CT THORAX DX C-: CPT | Mod: 26,TXP,, | Performed by: RADIOLOGY

## 2023-10-17 PROCEDURE — 74183 MRI ABD W/O CNTR FLWD CNTR: CPT | Mod: TC,PO,TXP

## 2023-10-17 PROCEDURE — 97112 NEUROMUSCULAR REEDUCATION: CPT | Mod: PN,NTX | Performed by: PHYSICAL THERAPIST

## 2023-10-17 RX ORDER — GADOBUTROL 604.72 MG/ML
10 INJECTION INTRAVENOUS
Status: COMPLETED | OUTPATIENT
Start: 2023-10-17 | End: 2023-10-17

## 2023-10-17 RX ADMIN — GADOBUTROL 10 ML: 604.72 INJECTION INTRAVENOUS at 12:10

## 2023-10-17 NOTE — PROGRESS NOTES
[facility]   Physical Therapy Treatment Note     Name: Yumiko WILHELM [facility]  Clinic Number: [id number]    Therapy Diagnosis:   Encounter Diagnoses   Name Primary?    Chronic pain of left knee Yes    Decreased strength of lower extremity      Physician: Garrett Webb, *    Visit Date: 10/17/2023    Physician Orders: PT Eval and Treat   Medical Diagnosis from Referral: Acute pain of left knee  Evaluation Date: 9/19/2023  Authorization Period Expiration: 12/31/23  Plan of Care Expiration: 12/19/23  Progress Note Due: 10/19/23  Visit # / Visits authorized: 1/ 20 (+1 prior auth)  FOTO: 1/3      Precautions: Standard     Time In: 1:00PM  Time Out: 2:00PM  Total Billable Time: 60 minutes      SUBJECTIVE     Pt reports: she did some of her exercises, but was ill for 2 weeks and was unable to do much. Her knee pain continues, but is slightly better today so far..  She was somewhat compliant with home exercise program.  Response to previous treatment: no adverse effect  Functional change: no significant change so far    Pain: 2-3/10  Location:  left lateral and inferior knee        OBJECTIVE     Objective Measures updated at progress report unless specified.     Treatment     Tiffanie received the treatments listed below:      neuromuscular re-education activities to improve: Balance, Kinesthetic, Sense, Proprioception, Posture, and stability of knee for 45 minutes. The following activities were included:  Heel slides with correct alignment to decrease compression on lateral knee x10  Quad sets with focus on alignment x20  SLR 2/10 ea with focus on quad control and alignment  LAQs 2/10 1# ea with focus on quad control and alignment  hip adductor ball squeeze x20  Bridging 2/10  Heel raises x20  Supine clamshells RTB x20      therapeutic exercises to develop strength, endurance, ROM, and flexibility for 10 minutes including:  Nustep level 2 10 min      Patient Education and Home Exercises     Home  Exercises Provided and Patient Education Provided     Education provided:   - instructed pt in HEP for improved LE strength, quad control and improved gait   - Patient was educated on all the above exercise prior/during/after for proper posture, positioning, and execution for safe performance with home exercise program  - Pt was encouraged to participate in cardiovascular and wellness exercise in fitness center with assistance of health  at [facility] location.    Written Home Exercises Provided: yes. Exercises were reviewed and Tiffanie was able to demonstrate them prior to the end of the session.  Tiffanie demonstrated good  understanding of the education provided. See EMR under Patient Instructions for exercises provided during therapy sessions    ASSESSMENT     Improved ROM left knee. Pt has significant edema left lower extremity. Pt has valgus positioning of left knee and lateral knee pain. Continue to encourage lower extremity strengthening to better support left knee with improved quad control and stability.    Tiffanie Is progressing well towards her goals.   Pt prognosis is Good.     Pt will continue to benefit from skilled outpatient physical therapy to address the deficits listed in the problem list box on initial evaluation, provide pt/family education and to maximize pt's level of independence in the home and community environment.     Pt's spiritual, cultural and educational needs considered and pt agreeable to plan of care and goals.     Anticipated barriers to physical therapy: overall health, pt on organ transplant list    Goals:   Short Term Goals: In 4 weeks   1.Patient to be educated on HEP.  2.Patient to increase left knee range of motion to 0-125 degrees, in order to improve available range of motion for ADL's.   3.Patient to increase left LE strength by 1/2 grade, in order to improve endurance and increase ability to perform all functional activities for increased time.   4.Patient to have pain  less than 3/10 at worst, to improve QOL.  5.Patient to improve score on the FOTO, to improve QOL.  6. Patient to improve score on 5 times sit to stand and TUG (Timed up and go) in order to decrease fall risk.  Long Term Goals: In 8 weeks  1.Patient to improve score on the FOTO predicted score or better, to improve QOL.  2. Patient to increase left lower extremity strength to 4/5 or greater, in order to improve endurance and increase ability to perform all functional activities for increased time.  3. Patient to have decreased pain to 2/10 at worst, to improve QOL.  4. Patient to normalize score on 5 times sit to stand, in order to improve endurance and decrease fall risk.  5. Patient to perform daily activities including walking and getting out of chair without increased symptoms.  6. Pt will be independent with self management of his/her condition with home exercise program, posture, positioning and improved body mechanics.  7.   Pt will safely transition to and participate in wellness/fitness program.     PLAN     Continue per POC progressing toward established goals. Pt scheduled follow up in 4 weeks.  Progress to wellness either independently or with assistance from health .  Rylee Cameron, PT

## 2023-10-17 NOTE — Clinical Note
-  Needs surveillance EGD every 2 yrs.  Pl make appt with Dr. Palomares Nottingham. -. HCC surveillance with AFP and ultrasound of the abdomen every six months, next due in June 2020.-. CBC, CMP, PT/INR every 3 months, starting June 2020. -. Continue low-carbohydrate diet. -. Low salt diet - 2 gm sodium -.  Return in 1 yr. -.  Concern for decompensation of liver with abd surgery, hence defer bariatric surgery
[place] PGY-4

## 2023-10-17 NOTE — PROGRESS NOTES
RESEARCH STUDY SPECIMEN COLLECTION ENCOUNTER  ORGAN TRANSPLANT  [facility] COURTNEY ROBLERO    Study Title: Role of Tumor-Induced Immune Tolerance in the Patient Response to Locoregional Therapy: Implications in Assessment Risk of Hepatocellular Carcinoma Recurrence Following Liver Transplantation    IRB #: [id number]    IRB Approval Date: 6/8/2016    : Eugene Haney MD  Sub-investigator: Nino Belcher, PhD    Patient Number: [id number]    In accordance with the study protocol, Research Lab orders were placed and follow-up specimens were collected on (date: 10/17/23) in:  [facility] VNP: YES/NO: No  [facility] LABTX: YES/NO: No  [facility] IM: YES/NO: No  Other [facility] location: YES/NO: Yes   Location: [facility]    A  was used to transport blood specimens to [facility] for processing: YES/NO: Yes  Blood specimens were transported to [facility] for processing: YES/NO: Yes    Mehran Hilliard  Admin Research- Liver Transplant

## 2023-10-17 NOTE — PATIENT INSTRUCTIONS
"  BRIDGE - BRIDGING    While lying on your back with knees bent, tighten your lower abdominal muscles, squeeze your buttocks and then raise your buttocks off the floor/bed as creating a "Bridge" with your body. Hold and then lower yourself and repeat.       STRAIGHT LEG RAISE - SLR    While lying on your back, raise up your leg with a straight knee.  Keep the opposite knee bent with the foot planted on the ground.    Seated Ankle Weight LAQ Exercise    While Seated, Kick your leg straight forward till you feel a contraction of your quadricep (front thigh) muscle. Try to hold the contraction at least 2-3 seconds before lowering your leg down slowly.       DOUBLE LEG HEEL RAISES WITH SUPPORT - CALF RAISES    While standing next to a chair or countertop for support, raise up on your toes as you lift your heels off the ground. Return your heels to the floor and repeat.     HIP ADDUCTION SQUEEZE - SUPINE - ISOMETRIC ADDUCTORS    Place ball, rolled up towel or pillow between your knees and press your knees together so that you squeeze the object firmly. Hold and then release and repeat.    Supine Clamshells/ Hip External Rotation    With TheraBand around BOTH knees, lie on your back with your feet together. Slowly turn knees AWAY from each other, stretching the TheraBand around knees. SLOWLY return knees back to start position.  "

## 2023-10-19 ENCOUNTER — TELEPHONE (OUTPATIENT)
Dept: PRIMARY CARE CLINIC | Facility: CLINIC | Age: 63
End: 2023-10-19
Payer: COMMERCIAL

## 2023-10-19 ENCOUNTER — OFFICE VISIT (OUTPATIENT)
Dept: INTERVENTIONAL RADIOLOGY/VASCULAR | Facility: CLINIC | Age: 63
End: 2023-10-19
Payer: COMMERCIAL

## 2023-10-19 DIAGNOSIS — Z76.82 LIVER TRANSPLANT CANDIDATE: ICD-10-CM

## 2023-10-19 DIAGNOSIS — C22.0 HCC (HEPATOCELLULAR CARCINOMA): Primary | ICD-10-CM

## 2023-10-19 DIAGNOSIS — K76.9 LIVER DISEASE, UNSPECIFIED: ICD-10-CM

## 2023-10-19 DIAGNOSIS — R73.09 ELEVATED GLUCOSE: Primary | ICD-10-CM

## 2023-10-19 PROCEDURE — 3044F HG A1C LEVEL LT 7.0%: CPT | Mod: CPTII,95,TXP, | Performed by: FAMILY MEDICINE

## 2023-10-19 PROCEDURE — 99213 OFFICE O/P EST LOW 20 MIN: CPT | Mod: 95,TXP,, | Performed by: FAMILY MEDICINE

## 2023-10-19 PROCEDURE — 1159F MED LIST DOCD IN RCRD: CPT | Mod: CPTII,95,TXP, | Performed by: FAMILY MEDICINE

## 2023-10-19 PROCEDURE — 1160F PR REVIEW ALL MEDS BY PRESCRIBER/CLIN PHARMACIST DOCUMENTED: ICD-10-PCS | Mod: CPTII,95,TXP, | Performed by: FAMILY MEDICINE

## 2023-10-19 PROCEDURE — 1160F RVW MEDS BY RX/DR IN RCRD: CPT | Mod: CPTII,95,TXP, | Performed by: FAMILY MEDICINE

## 2023-10-19 PROCEDURE — 1159F PR MEDICATION LIST DOCUMENTED IN MEDICAL RECORD: ICD-10-PCS | Mod: CPTII,95,TXP, | Performed by: FAMILY MEDICINE

## 2023-10-19 PROCEDURE — 99213 PR OFFICE/OUTPT VISIT, EST, LEVL III, 20-29 MIN: ICD-10-PCS | Mod: 95,TXP,, | Performed by: FAMILY MEDICINE

## 2023-10-19 PROCEDURE — 3044F PR MOST RECENT HEMOGLOBIN A1C LEVEL <7.0%: ICD-10-PCS | Mod: CPTII,95,TXP, | Performed by: FAMILY MEDICINE

## 2023-10-19 NOTE — TELEPHONE ENCOUNTER
----- Message from Hortencia Reyes MD sent at 10/17/2023  1:17 PM CDT -----  Regarding her lab results. Elevated proteins,   FBG high enough to classify diabetes (please make an appointment to come in to discuss with PCP)   Noted LFTs but also see that's being evaluated with CT and MRI of abdomen.   May come in to discuss with Dr. Webb but I'm guessing she already has done so.

## 2023-10-23 NOTE — TELEPHONE ENCOUNTER
Initial MELD exception request approved by [employer] on 5/13/2023. Patient notified and informed 6-month wait-time started.  Informed updated surveillance imaging and AFP will have to be submitted every 90 days.  Provided tumor burden remains within criteria, she will be eligible for MELD exception points November 2023.  All questions and concerns addressed. Understanding expressed.

## 2023-10-23 NOTE — PROGRESS NOTES
Subjective     Patient ID: Yumiko Gonzalez is a 63 y.o. female.    Chief Complaint: Hepatocellular Carcinoma    Virtual visit with patient for follow up of hepatocellular carcinoma last treated with radioembolization on 2023. Patient has a history of DURAN related cirrhosis. HCC was identified during transplant workup. Patient reports feeling well. She denies any abdominal pain or abdominal distention. She had a follow up MRI in July that found an indeterminate lesion. A CT was obtained in August, and noted no residual. Recommendation was to repeat MRI and AFP. Both were obtained on 10/17/2023.      Review of Systems   Constitutional:  Negative for activity change, appetite change, chills, fatigue and fever.   Respiratory:  Negative for cough, shortness of breath, wheezing and stridor.    Cardiovascular:  Negative for chest pain, palpitations and leg swelling.   Gastrointestinal:  Negative for abdominal distention, abdominal pain, constipation, diarrhea, nausea and vomiting.          Objective     Physical Exam  Constitutional:       General: She is not in acute distress.     Appearance: She is well-developed. She is not diaphoretic.   HENT:      Head: Normocephalic and atraumatic.   Pulmonary:      Effort: Pulmonary effort is normal. No respiratory distress.   Neurological:      Mental Status: She is alert and oriented to person, place, and time.   Psychiatric:         Behavior: Behavior normal.         Thought Content: Thought content normal.         Judgment: Judgment normal.     Reviewed hepatology progress note    ECO  MELD 3.0: 17 at 10/17/2023 11:31 AM  MELD-Na: 14 at 10/17/2023 11:31 AM  Calculated from:  Serum Creatinine: 0.8 mg/dL (Using min of 1 mg/dL) at 10/17/2023 11:31 AM  Serum Sodium: 137 mmol/L at 10/17/2023 11:31 AM  Total Bilirubin: 2.8 mg/dL at 10/17/2023 11:31 AM  Serum Albumin: 2.6 g/dL at 10/17/2023 11:31 AM  INR(ratio): 1.4 at 10/17/2023 11:31 AM  Age at listin years  Sex: Female at  10/17/2023 11:31 AM  Child Samaniego: Class C  Transplant Status: waitlist    MRI 10/17/2023  Impression:     1. Imaging stigmata of hepatic cirrhosis and portal venous hypertension with post chemoembolization treatment related changes observed in segment V of the liver.  No new abnormal nodular enhancement within the liver on today's study.  2. Moderate-large volume of ascites  3. Moderate left pleural effusion  4. Other unchanged incidental findings as detailed above.    Labs 10/17/2023  Component Ref Range & Units 6 d ago  (10/17/23) 3 mo ago  (7/19/23) 3 mo ago  (7/12/23) 4 mo ago  (6/24/23) 4 mo ago  (6/8/23) 5 mo ago  (5/16/23) 6 mo ago  (4/14/23)   Sodium 136 - 145 mmol/L 137  137  135 Low   137  138  141     Potassium 3.5 - 5.1 mmol/L 4.4  4.2  4.3  3.8  4.0  4.2     Chloride 95 - 110 mmol/L 107  105  104  104  106  107     CO2 23 - 29 mmol/L 24  25  27  23  27  26     Glucose 70 - 110 mg/dL 124 High   126 High   132 High   125 High   135 High   138 High      BUN 8 - 23 mg/dL 20  21  24 High   17  18  18     Creatinine 0.5 - 1.4 mg/dL 0.8  0.8  0.8  0.7  0.7  0.7  0.7    Calcium 8.7 - 10.5 mg/dL 9.5  9.3  9.5  9.0  9.8  9.2     Total Protein 6.0 - 8.4 g/dL 5.6 Low   5.4 Low   5.6 Low   5.5 Low   6.0  5.7 Low      Albumin 3.5 - 5.2 g/dL 2.6 Low   2.6 Low   2.7 Low   2.7 Low   3.0 Low   2.8 Low      Total Bilirubin 0.1 - 1.0 mg/dL 2.8 High   3.7 High  CM  3.2 High  CM  3.1 High  CM  1.9 High  CM  2.9 High  CM     Comment: For infants and newborns, interpretation of results should be based   on gestational age, weight and in agreement with clinical   observations.     Premature Infant recommended reference ranges:   Up to 24 hours.............<8.0 mg/dL   Up to 48 hours............<12.0 mg/dL   3-5 days..................<15.0 mg/dL   6-29 days.................<15.0 mg/dL    Alkaline Phosphatase 55 - 135 U/L 182 High   141 High   168 High   163 High   165 High   156 High      AST 10 - 40 U/L 63 High   53 High   55  High   61 High   62 High   75 High      ALT 10 - 44 U/L 37  31  31  38  40  54 High      eGFR >60 mL/min/1.73 m^2 >60  >60.0  >60.0  >60  >60.0  >60.0     Anion Gap 8 - 16 mmol/L 6 Low   7 Low   4 Low   10  5 Low   8       Component Ref Range & Units 6 d ago  (10/17/23) 3 mo ago  (7/12/23) 6 mo ago  (4/13/23) 8 mo ago  (1/31/23) 1 yr ago  (6/17/22) 1 yr ago  (12/16/21) 2 yr ago  (2/4/21)   AFP 0.0 - 8.4 ng/mL 5.2  8.0 CM  2.7 CM  2.4 CM  3.2 CM  3.2  2.8         Assessment and Plan     1. HCC (hepatocellular carcinoma)        The patient location is: [place]  The chief complaint leading to consultation is: HCC    Visit type: audiovisual    Face to Face time with patient: 20 minutes  25 minutes of total time spent on the encounter, which includes face to face time and non-face to face time preparing to see the patient (eg, review of tests), Obtaining and/or reviewing separately obtained history, Documenting clinical information in the electronic or other health record, Independently interpreting results (not separately reported) and communicating results to the patient/family/caregiver, or Care coordination (not separately reported).         Each patient to whom he or she provides medical services by telemedicine is:  (1) informed of the relationship between the physician and patient and the respective role of any other health care provider with respect to management of the patient; and (2) notified that he or she may decline to receive medical services by telemedicine and may withdraw from such care at any time.    Notes:   Reviewed MRI and labs with Dr. Arroyo. Explained to patient recommendation is to repeat MRI and AFP in 3 months with hepatology. Patient verbalized understanding and agreement. Clinic phone number provided. RTC PRN

## 2023-10-24 ENCOUNTER — TELEPHONE (OUTPATIENT)
Dept: TRANSPLANT | Facility: CLINIC | Age: 63
End: 2023-10-24
Payer: COMMERCIAL

## 2023-10-24 NOTE — TELEPHONE ENCOUNTER
PATIENT NAME: Yumiko WILHELM [facility] #: [id number]    Lab Results   Component Value Date    CREATININE 0.8 10/17/2023     10/17/2023    BILITOT 2.8 (H) 10/17/2023    ALBUMIN 2.6 (L) 10/17/2023    INR 1.4 (H) 10/17/2023       Encephalopathy: 1 - 2  Ascites: moderate  Dialysis: no     Recertification requestor: Shabnam Navarrete    MELD 3.0--17

## 2023-10-26 ENCOUNTER — TELEPHONE (OUTPATIENT)
Dept: TRANSPLANT | Facility: CLINIC | Age: 63
End: 2023-10-26
Payer: COMMERCIAL

## 2023-10-26 DIAGNOSIS — C22.0 HCC (HEPATOCELLULAR CARCINOMA): ICD-10-CM

## 2023-10-26 DIAGNOSIS — Z00.6 RESEARCH STUDY PATIENT: Primary | ICD-10-CM

## 2023-10-27 ENCOUNTER — TELEPHONE (OUTPATIENT)
Dept: TRANSPLANT | Facility: CLINIC | Age: 63
End: 2023-10-27
Payer: COMMERCIAL

## 2023-10-27 DIAGNOSIS — K75.81 LIVER CIRRHOSIS SECONDARY TO NASH: ICD-10-CM

## 2023-10-27 DIAGNOSIS — Z76.82 ORGAN TRANSPLANT CANDIDATE: ICD-10-CM

## 2023-10-27 DIAGNOSIS — K74.60 LIVER CIRRHOSIS SECONDARY TO NASH: ICD-10-CM

## 2023-10-27 DIAGNOSIS — C22.0 HCC (HEPATOCELLULAR CARCINOMA): Primary | ICD-10-CM

## 2023-10-27 NOTE — TELEPHONE ENCOUNTER
Patient notified. Order entered and  messaged to schedule.     ----- Message from James Brambila MD sent at 10/18/2023  8:04 PM CDT -----  Reviewed. CT shows new pleural effusion. Can undergo thoracentesis if she develops symptoms. Please increase lasix to 60mg daily, spironolactone to 150mg daily, and repeat BMP in 1-2 weeks.

## 2023-10-30 ENCOUNTER — CLINICAL SUPPORT (OUTPATIENT)
Dept: PRIMARY CARE CLINIC | Facility: CLINIC | Age: 63
End: 2023-10-30
Payer: COMMERCIAL

## 2023-10-30 DIAGNOSIS — F43.21 GRIEF REACTION: ICD-10-CM

## 2023-10-30 DIAGNOSIS — F32.A DEPRESSIVE DISORDER: Primary | ICD-10-CM

## 2023-10-30 PROCEDURE — 99499 UNLISTED E&M SERVICE: CPT | Mod: 95,NTX,, | Performed by: SOCIAL WORKER

## 2023-10-30 PROCEDURE — 99499 NO LOS: ICD-10-PCS | Mod: 95,NTX,, | Performed by: SOCIAL WORKER
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
## 2023-10-31 ENCOUNTER — LAB VISIT (OUTPATIENT)
Dept: LAB | Facility: HOSPITAL | Age: 63
End: 2023-10-31
Attending: STUDENT IN AN ORGANIZED HEALTH CARE EDUCATION/TRAINING PROGRAM
Payer: COMMERCIAL

## 2023-10-31 DIAGNOSIS — K75.81 LIVER CIRRHOSIS SECONDARY TO NASH: ICD-10-CM

## 2023-10-31 DIAGNOSIS — K74.60 LIVER CIRRHOSIS SECONDARY TO NASH: ICD-10-CM

## 2023-10-31 DIAGNOSIS — Z00.6 RESEARCH STUDY PATIENT: ICD-10-CM

## 2023-10-31 DIAGNOSIS — Z76.82 ORGAN TRANSPLANT CANDIDATE: ICD-10-CM

## 2023-10-31 DIAGNOSIS — Z76.82 LIVER TRANSPLANT CANDIDATE: ICD-10-CM

## 2023-10-31 DIAGNOSIS — R73.09 ELEVATED GLUCOSE: ICD-10-CM

## 2023-10-31 DIAGNOSIS — K76.9 LIVER DISEASE, UNSPECIFIED: ICD-10-CM

## 2023-10-31 DIAGNOSIS — C22.0 HCC (HEPATOCELLULAR CARCINOMA): ICD-10-CM

## 2023-10-31 LAB
AFP SERPL-MCNC: 5.5 NG/ML (ref 0–8.4)
ALBUMIN SERPL BCP-MCNC: 2.7 G/DL (ref 3.5–5.2)
ALP SERPL-CCNC: 175 U/L (ref 55–135)
ALT SERPL W/O P-5'-P-CCNC: 44 U/L (ref 10–44)
ANION GAP SERPL CALC-SCNC: 8 MMOL/L (ref 8–16)
ANISOCYTOSIS BLD QL SMEAR: SLIGHT
AST SERPL-CCNC: 68 U/L (ref 10–40)
BASOPHILS # BLD AUTO: ABNORMAL K/UL (ref 0–0.2)
BASOPHILS NFR BLD: 1 % (ref 0–1.9)
BILIRUB SERPL-MCNC: 3.9 MG/DL (ref 0.1–1)
BUN SERPL-MCNC: 22 MG/DL (ref 8–23)
CALCIUM SERPL-MCNC: 9.6 MG/DL (ref 8.7–10.5)
CHLORIDE SERPL-SCNC: 103 MMOL/L (ref 95–110)
CO2 SERPL-SCNC: 26 MMOL/L (ref 23–29)
CREAT SERPL-MCNC: 0.9 MG/DL (ref 0.5–1.4)
DIFFERENTIAL METHOD: ABNORMAL
EOSINOPHIL # BLD AUTO: ABNORMAL K/UL (ref 0–0.5)
EOSINOPHIL NFR BLD: 11 % (ref 0–8)
ERYTHROCYTE [DISTWIDTH] IN BLOOD BY AUTOMATED COUNT: 15.1 % (ref 11.5–14.5)
EST. GFR  (NO RACE VARIABLE): >60 ML/MIN/1.73 M^2
ESTIMATED AVG GLUCOSE: 85 MG/DL (ref 68–131)
GLUCOSE SERPL-MCNC: 140 MG/DL (ref 70–110)
HBA1C MFR BLD: 4.6 % (ref 4–5.6)
HCT VFR BLD AUTO: 41 % (ref 37–48.5)
HGB BLD-MCNC: 13.8 G/DL (ref 12–16)
IMM GRANULOCYTES # BLD AUTO: ABNORMAL K/UL (ref 0–0.04)
IMM GRANULOCYTES NFR BLD AUTO: ABNORMAL % (ref 0–0.5)
INR PPP: 1.3 (ref 0.8–1.2)
LYMPHOCYTES # BLD AUTO: ABNORMAL K/UL (ref 1–4.8)
LYMPHOCYTES NFR BLD: 18 % (ref 18–48)
MCH RBC QN AUTO: 34 PG (ref 27–31)
MCHC RBC AUTO-ENTMCNC: 33.7 G/DL (ref 32–36)
MCV RBC AUTO: 101 FL (ref 82–98)
METAMYELOCYTES NFR BLD MANUAL: 2 %
MONOCYTES # BLD AUTO: ABNORMAL K/UL (ref 0.3–1)
MONOCYTES NFR BLD: 8 % (ref 4–15)
NEUTROPHILS NFR BLD: 59 % (ref 38–73)
NEUTS BAND NFR BLD MANUAL: 1 %
NRBC BLD-RTO: 0 /100 WBC
OVALOCYTES BLD QL SMEAR: ABNORMAL
PLATELET # BLD AUTO: 101 K/UL (ref 150–450)
PLATELET BLD QL SMEAR: ABNORMAL
PMV BLD AUTO: 9.6 FL (ref 9.2–12.9)
POIKILOCYTOSIS BLD QL SMEAR: SLIGHT
POTASSIUM SERPL-SCNC: 4.3 MMOL/L (ref 3.5–5.1)
PROT SERPL-MCNC: 6 G/DL (ref 6–8.4)
PROTHROMBIN TIME: 14 SEC (ref 9–12.5)
RBC # BLD AUTO: 4.06 M/UL (ref 4–5.4)
RESEARCH LAB: NORMAL
SCHISTOCYTES BLD QL SMEAR: PRESENT
SODIUM SERPL-SCNC: 137 MMOL/L (ref 136–145)
TOXIC GRANULES BLD QL SMEAR: PRESENT
WBC # BLD AUTO: 5.08 K/UL (ref 3.9–12.7)

## 2023-10-31 PROCEDURE — 82105 ALPHA-FETOPROTEIN SERUM: CPT | Mod: TXP | Performed by: STUDENT IN AN ORGANIZED HEALTH CARE EDUCATION/TRAINING PROGRAM

## 2023-10-31 PROCEDURE — 83036 HEMOGLOBIN GLYCOSYLATED A1C: CPT | Mod: NTX | Performed by: FAMILY MEDICINE

## 2023-10-31 PROCEDURE — 85610 PROTHROMBIN TIME: CPT | Mod: PO,TXP | Performed by: STUDENT IN AN ORGANIZED HEALTH CARE EDUCATION/TRAINING PROGRAM

## 2023-10-31 PROCEDURE — 85007 BL SMEAR W/DIFF WBC COUNT: CPT | Mod: PO,TXP | Performed by: STUDENT IN AN ORGANIZED HEALTH CARE EDUCATION/TRAINING PROGRAM

## 2023-10-31 PROCEDURE — 85027 COMPLETE CBC AUTOMATED: CPT | Mod: PO,TXP | Performed by: STUDENT IN AN ORGANIZED HEALTH CARE EDUCATION/TRAINING PROGRAM

## 2023-10-31 PROCEDURE — 80053 COMPREHEN METABOLIC PANEL: CPT | Mod: PO,TXP | Performed by: STUDENT IN AN ORGANIZED HEALTH CARE EDUCATION/TRAINING PROGRAM

## 2023-10-31 PROCEDURE — 36415 COLL VENOUS BLD VENIPUNCTURE: CPT | Mod: PO,TXP | Performed by: SURGERY

## 2023-11-01 ENCOUNTER — TELEPHONE (OUTPATIENT)
Dept: TRANSPLANT | Facility: CLINIC | Age: 63
End: 2023-11-01
Payer: COMMERCIAL

## 2023-11-01 NOTE — TELEPHONE ENCOUNTER
PATIENT NAME: Yumiko WILHELM [facility] #: [id number]    Lab Results   Component Value Date    CREATININE 0.9 10/31/2023     10/31/2023    BILITOT 3.9 (H) 10/31/2023    ALBUMIN 2.7 (L) 10/31/2023    INR 1.3 (H) 10/31/2023       Encephalopathy: 1 - 2  Ascites: moderate  Dialysis: no     Recertification requestor: James Brambila     MELD 17

## 2023-11-02 ENCOUNTER — RESEARCH ENCOUNTER (OUTPATIENT)
Dept: RESEARCH | Facility: HOSPITAL | Age: 63
End: 2023-11-02
Payer: COMMERCIAL

## 2023-11-02 NOTE — PROGRESS NOTES
RESEARCH STUDY SPECIMEN COLLECTION ENCOUNTER  ORGAN TRANSPLANT  [facility] [address]    Study Title: Role of Tumor-Induced Immune Tolerance in the Patient Response to Locoregional Therapy: Implications in Assessment Risk of Hepatocellular Carcinoma Recurrence Following Liver Transplantation    IRB #: [id number]    IRB Approval Date: 6/8/2016    : Eugene Haney MD  Sub-investigator: Nino Belcher, PhD    Patient Number: [id number]    In accordance with the study protocol, [facility] Lab orders were placed and follow-up specimens were collected on (date: 10/31/2023) in:  [facility] VNP: YES/NO: No  [facility] LABTX: YES/NO: No  [facility] IM: YES/NO: No  Other [facility] location: YES/NO: Yes   Location: [facility]    A  was used to transport blood specimens to [facility] for processing: YES/NO: No  Blood specimens were transported to [facility] for processing: YES/NO: No    NOTE: Patient was initially scheduled for 11/6/2023 lab appointment, but had labs drawn unexpectedly on 10/31/2023 instead. Vacutainer blood collection tubes were filled, but not retrieved from [facility] lab location within acceptable time period to be processed.Sample was destroyed.    Mehran Hilliard  Admin Research- Liver Transplant

## 2023-11-03 ENCOUNTER — OFFICE VISIT (OUTPATIENT)
Dept: TRANSPLANT | Facility: CLINIC | Age: 63
End: 2023-11-03
Payer: COMMERCIAL

## 2023-11-03 DIAGNOSIS — Z76.82 ORGAN TRANSPLANT CANDIDATE: ICD-10-CM

## 2023-11-03 DIAGNOSIS — K76.82 HEPATIC ENCEPHALOPATHY: ICD-10-CM

## 2023-11-03 DIAGNOSIS — C22.0 HCC (HEPATOCELLULAR CARCINOMA): ICD-10-CM

## 2023-11-03 DIAGNOSIS — I85.10 SECONDARY ESOPHAGEAL VARICES WITHOUT BLEEDING: ICD-10-CM

## 2023-11-03 DIAGNOSIS — K74.60 LIVER CIRRHOSIS SECONDARY TO NASH: Primary | ICD-10-CM

## 2023-11-03 DIAGNOSIS — K75.81 LIVER CIRRHOSIS SECONDARY TO NASH: Primary | ICD-10-CM

## 2023-11-03 DIAGNOSIS — R18.8 OTHER ASCITES: ICD-10-CM

## 2023-11-03 PROCEDURE — 1160F PR REVIEW ALL MEDS BY PRESCRIBER/CLIN PHARMACIST DOCUMENTED: ICD-10-PCS | Mod: CPTII,95,TXP, | Performed by: STUDENT IN AN ORGANIZED HEALTH CARE EDUCATION/TRAINING PROGRAM

## 2023-11-03 PROCEDURE — 3044F PR MOST RECENT HEMOGLOBIN A1C LEVEL <7.0%: ICD-10-PCS | Mod: CPTII,95,TXP, | Performed by: STUDENT IN AN ORGANIZED HEALTH CARE EDUCATION/TRAINING PROGRAM

## 2023-11-03 PROCEDURE — 99214 OFFICE O/P EST MOD 30 MIN: CPT | Mod: 95,TXP,, | Performed by: STUDENT IN AN ORGANIZED HEALTH CARE EDUCATION/TRAINING PROGRAM

## 2023-11-03 PROCEDURE — 1159F PR MEDICATION LIST DOCUMENTED IN MEDICAL RECORD: ICD-10-PCS | Mod: CPTII,95,TXP, | Performed by: STUDENT IN AN ORGANIZED HEALTH CARE EDUCATION/TRAINING PROGRAM

## 2023-11-03 PROCEDURE — 99214 PR OFFICE/OUTPT VISIT, EST, LEVL IV, 30-39 MIN: ICD-10-PCS | Mod: 95,TXP,, | Performed by: STUDENT IN AN ORGANIZED HEALTH CARE EDUCATION/TRAINING PROGRAM

## 2023-11-03 PROCEDURE — 1159F MED LIST DOCD IN RCRD: CPT | Mod: CPTII,95,TXP, | Performed by: STUDENT IN AN ORGANIZED HEALTH CARE EDUCATION/TRAINING PROGRAM

## 2023-11-03 PROCEDURE — 3044F HG A1C LEVEL LT 7.0%: CPT | Mod: CPTII,95,TXP, | Performed by: STUDENT IN AN ORGANIZED HEALTH CARE EDUCATION/TRAINING PROGRAM

## 2023-11-03 PROCEDURE — 1160F RVW MEDS BY RX/DR IN RCRD: CPT | Mod: CPTII,95,TXP, | Performed by: STUDENT IN AN ORGANIZED HEALTH CARE EDUCATION/TRAINING PROGRAM

## 2023-11-03 NOTE — LETTER
November 8, 2023        Laila Will  [address]  [facility] [address]  Phone: [phone]  Fax: [phone]             [facility] [address]  [address]  [facility] [address]  Phone: [phone]   Patient: Yumiko Gonzalez   MR Number: [id number]   YOB: 1960   Date of Visit: 11/3/2023       Dear Dr. Laila Will    Thank you for referring Yumiko Gonzalez to me for evaluation. Attached you will find relevant portions of my assessment and plan of care.    If you have questions, please do not hesitate to call me. I look forward to following Yumiko Gonzalez along with you.    Sincerely,    James Brambila MD    Enclosure    If you would like to receive this communication electronically, please contact [email or web address] or [phone] to request [employer] Link access.    [employer] is a tool which provides read-only access to select patient information with whom you have a relationship. Its easy to use and provides real time access to review your patients record including encounter summaries, notes, results, and demographic information.    If you feel you have received this communication in error or would no longer like to receive these types of communications, please e-mail [email or web address]

## 2023-11-03 NOTE — PROGRESS NOTES
Transplant Hepatology   Transplant Evaluation Follow Up Note    The patient location is: [place]  The chief complaint leading to consultation is: follow up, DURAN cirrhosis, HCC    Visit type: audiovisual    Face to Face time with patient: 15  30 minutes of total time spent on the encounter, which includes face to face time and non-face to face time preparing to see the patient (eg, review of tests), Obtaining and/or reviewing separately obtained history, Documenting clinical information in the electronic or other health record, Independently interpreting results (not separately reported) and communicating results to the patient/family/caregiver, or Care coordination (not separately reported).     Each patient to whom he or she provides medical services by telemedicine is:  (1) informed of the relationship between the physician and patient and the respective role of any other health care provider with respect to management of the patient; and (2) notified that he or she may decline to receive medical services by telemedicine and may withdraw from such care at any time.    Referring provider: No ref. provider found  PCP: Garrett Webb MD    Chief complaint:  follow up, DUARN cirrhosis    HPI: Yumiko Gonzaelz is a 63 y.o. female with history of DURAN related cirrhosis who presents for scheduled follow up.    11/3/23: She reports nausea and vomiting yesterday that has since resolved. She is otherwise without complaints today. No recent hospitalizations or ED visits. Compliant with diuretics without recent abdominal distension and with lactulose and rifaximin without recent encephalopathy. She denies other signs of decompensated cirrhosis including no recent jaundice or GI bleeding.    6/27/23: No recent hospitalizations or ED visits. She reports generalized fatigue as well as poor short term memory. She is compliant with lactulose and rifaximin without nomi encephalopathy. Also compliant with diuretics without  "recent abdominal distension. She denies other signs of decompensated cirrhosis including no recent jaundice or GI bleeding.    4/13/23 Initial Visit: She was told approximately 30 years ago that she had a fatty liver, and approximately 10 years ago she was told that she had cirrhosis.  Last year she developed lower extremity edema and abdominal distention.  In January 2023 she was started on diuretics and underwent paracentesis.  She has not required repeat paracentesis and denies recent abdominal distention.  Her cirrhosis has also been complicated by hepatic encephalopathy currently controlled with lactulose and rifaximin.  She denies other signs of decompensated cirrhosis including no recent jaundice or GI bleeding.    She does not drink alcohol and has never been a heavy drinker.  Testing has been negative for chronic viral hepatitis.  No known family history of liver disease.    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, postitive antismooth muscle antibody    Blood transfusion     Bronchitis     bronchospasm on occasion     Cancer 1987    carcinoma in situ    Cervical neck pain with evidence of disc disease 03/22/2012    can bend neck    Cirrhosis     non-alcoholic, compensated    Colon polyps 03/22/2012    Depression 03/22/2012    Hx panic attacks    Diverticular disease 03/22/2012    never diverticulitis    Fatty liver 03/22/2012    Fibrocystic breast     Fine tremor     hands,chronic    Hepatosplenomegaly     History of abdominal paracentesis 01/18/2023    8.7L of fluid drained    Hypertension 04/24/2013    Knee pain, bilateral     chronic, with hands,feet,fingers also painful    Lesion of right lung     Liver disease     "partially sclerosed liver" per pt    Migraines past Hx    Nephrolithiasis     stone episode " 2021    Pleural effusion     small, compensated, good bilateral breath sounds per Dr Suresh GUTIERRES (postoperative nausea and vomiting)     twice after childbirth    Postpartum depression     Splenomegaly     Thrombocytopenia     Tremors of nervous system        Past Surgical History:   Procedure Laterality Date    ADENOIDECTOMY      CERVICAL CONIZATION   W/ LASER       SECTION      x3    COLONOSCOPY N/A 2016    Procedure: COLONOSCOPY;  Surgeon: James Palomares MD;  Location: [facility] ENDO;  Service: Endoscopy;  Laterality: N/A;    COLONOSCOPY N/A 2/3/2022    Procedure: COLONOSCOPY;  Surgeon: James Palomares MD;  Location: [facility] ENDO;  Service: Endoscopy;  Laterality: N/A;    EMBOLIZATION N/A 2018    Procedure: EMBOLIZATION, BLOOD VESSEL;  Surgeon: Joselin Surgeon;  Location: [facility];  Service: Radiology;  Laterality: N/A;    ENDOMETRIAL ABLATION      ESOPHAGOGASTRODUODENOSCOPY N/A 2020    Procedure: ESOPHAGOGASTRODUODENOSCOPY (EGD);  Surgeon: James Palomares MD;  Location: [facility];  Service: Endoscopy;  Laterality: N/A;    ESOPHAGOGASTRODUODENOSCOPY N/A 3/8/2022    Procedure: EGD (ESOPHAGOGASTRODUODENOSCOPY);  Surgeon: James Palomares MD;  Location: [facility];  Service: Endoscopy;  Laterality: N/A;    LIVER BIOPSY      PELVIC LAPAROSCOPY      TONSILLECTOMY      TUBAL LIGATION         Family History   Problem Relation Age of Onset    Breast cancer Mother 70    Heart disease Mother     Hypertension Mother     Tremor Mother     Diabetes Father     Heart disease Father     Diabetes Sister     Cancer Sister     Breast cancer Sister     Diabetes Sister     Tremor Daughter     Breast cancer Maternal Aunt 70    Multiple sclerosis Maternal Aunt     Multiple sclerosis Maternal Aunt     Breast cancer Maternal Grandmother 70    Diabetes Brother     Emphysema Brother     Breast cancer Maternal Cousin 40    Ovarian cancer Neg Hx     Parkinsonism Neg Hx     Glaucoma Neg Hx     Macular  degeneration Neg Hx        Social History     Tobacco Use    Smoking status: Former     Current packs/day: 0.00     Types: Cigarettes     Quit date: 2/3/2006     Years since quittin.7    Smokeless tobacco: Never   Substance Use Topics    Alcohol use: Not Currently    Drug use: No       Current Outpatient Medications   Medication Sig Dispense Refill    albuterol (PROVENTIL) 2.5 mg /3 mL (0.083 %) nebulizer solution Take 3 mLs (2.5 mg total) by nebulization every 6 (six) hours as needed for Wheezing. Rescue 75 mL 2    albuterol (PROVENTIL/VENTOLIN HFA) 90 mcg/actuation inhaler Inhale 2 puffs every 4 hours as needed for cough, wheeze, or shortness of breath 18 g 11    ALPRAZolam (XANAX) 0.25 MG tablet Take 1 tablet (0.25 mg total) by mouth nightly as needed. FOR INSOMNIA (Patient taking differently: Take 0.125-0.25 mg by mouth nightly as needed. FOR INSOMNIA) 30 tablet 2    buPROPion (WELLBUTRIN XL) 150 MG TB24 tablet Take 2 tablets (300 mg total) by mouth once daily. 180 tablet 3    doxycycline (MONODOX) 100 MG capsule Take 1 capsule (100 mg total) by mouth 2 (two) times daily. 20 capsule 0    fluticasone-umeclidin-vilanter (TRELEGY ELLIPTA) 200-62.5-25 mcg inhaler Inhale 1 puff into the lungs once daily. 60 each 11    furosemide (LASIX) 40 MG tablet Take 1 tablet (40 mg total) by mouth once daily. 30 tablet 6    lactulose (CONSTULOSE) 10 gram/15 mL solution Take 30 mLs (20 g total) by mouth 2 (two) times daily. (Patient taking differently: Take 20 g by mouth as needed.) 5000 mL 6    loratadine (CLARITIN) 10 mg tablet Take 10 mg by mouth daily as needed.      multivitamin (THERAGRAN) per tablet Take 1 tablet by mouth once daily.      omeprazole (PRILOSEC) 20 MG capsule Take 1 capsule (20 mg total) by mouth once daily. 90 capsule 3    propranoloL (INDERAL LA) 80 MG 24 hr capsule Take 1 capsule (80 mg total) by mouth once daily. 30 capsule 11    rifAXIMin (XIFAXAN) 550 mg Tab Take 1 tablet (550 mg total) by mouth 2  (two) times daily. (Patient taking differently: Take 550 mg by mouth once daily.) 60 tablet 11    spironolactone (ALDACTONE) 100 MG tablet Take 1 tablet (100 mg total) by mouth once daily. 30 tablet 6    vitamin E 400 UNIT capsule Take 400 Units by mouth once daily.       No current facility-administered medications for this visit.       Review of patient's allergies indicates:   Allergen Reactions    No known drug allergies        Review of Systems   Constitutional:  Negative for fever and weight loss.   Gastrointestinal:  Positive for nausea and vomiting. Negative for abdominal pain, blood in stool, constipation, diarrhea, heartburn and melena.       There were no vitals filed for this visit.        Physical Exam  Constitutional:       General: She is not in acute distress.     Appearance: She is not ill-appearing.   HENT:      Head: Normocephalic and atraumatic.   Eyes:      General: No scleral icterus.     Extraocular Movements: Extraocular movements intact.   Pulmonary:      Effort: No respiratory distress.   Neurological:      Mental Status: She is alert.         LABS: I personally reviewed pertinent laboratory findings.    Lab Results   Component Value Date    ALT 44 10/31/2023    AST 68 (H) 10/31/2023     (H) 04/13/2023    ALKPHOS 175 (H) 10/31/2023    BILITOT 3.9 (H) 10/31/2023       Lab Results   Component Value Date    WBC 5.08 10/31/2023    HGB 13.8 10/31/2023    HCT 41.0 10/31/2023     (H) 10/31/2023     (L) 10/31/2023       Lab Results   Component Value Date     10/31/2023    K 4.3 10/31/2023     10/31/2023    CO2 26 10/31/2023    BUN 22 10/31/2023    CREATININE 0.9 10/31/2023    CALCIUM 9.6 10/31/2023    ANIONGAP 8 10/31/2023    ESTGFRAFRICA >60 06/20/2022    EGFRNONAA >60 06/20/2022       Lab Results   Component Value Date    INR 1.3 (H) 10/31/2023    INR 1.4 (H) 10/17/2023    INR 1.4 (H) 07/19/2023       Imaging:  I personally reviewed recent imaging studies  available on the chart and from outside medical records.      Assessment:  63 y.o. female with DURAN related cirrhosis complicated by HCC. She is decompensated with ascites, HE.    1. Liver cirrhosis secondary to DURAN    2. HCC (hepatocellular carcinoma)    3. Secondary esophageal varices without bleeding    4. Other ascites    5. Hepatic encephalopathy    6. Organ transplant candidate          MELD 3.0: 17 at 10/31/2023  3:11 PM  MELD-Na: 15 at 10/31/2023  3:11 PM  Calculated from:  Serum Creatinine: 0.9 mg/dL (Using min of 1 mg/dL) at 10/31/2023  3:11 PM  Serum Sodium: 137 mmol/L at 10/31/2023  3:11 PM  Total Bilirubin: 3.9 mg/dL at 10/31/2023  3:11 PM  Serum Albumin: 2.7 g/dL at 10/31/2023  3:11 PM  INR(ratio): 1.3 at 10/31/2023  3:11 PM  Age at listin years  Sex: Female at 10/31/2023  3:11 PM      Transplant Candidacy: She is listed for liver transplantation.    Recommendations:  HCC: She is status post Y90 2023. Surveillance MRI 10/2023 without residual or recurrent HCC. AFP 5.5. Continue surveillance with MRI and AFP every 3 months.    Ascites/Edema: 2 gm Na diet. Continue Lasix 60 mg daily and Aldactone 150 mg daily.    Encephalopathy: Continue lactulose. Titrate to maintain 3-4 bowel movements per day. Continue rifaximin 550mg BID    Variceal screening: EGD in 2022 showed grade I varices.  She takes propranolol and does not warrant surveillance EGD as long as she remains on propranolol.    Immunizations: Recommend HAV and HBV vaccinations if not immune    UNOS Patient Status  Functional Status: 60% - Requires occasional assistance but is able to care for needs  Physical Capacity: No Limitations    Diabetes: No  Any previous malignancy: No  Neoadjuvant Therapy: no  Has patient ever had a dx of HCC: no  Previous Abdominal Surgery: no  Spontaneous Bacterial Peritonitis: no  History of Portal Vein Thrombosis: no  Transjugular Intrahepatic Portosystemic Shunt: no    Return to clinic in 3 months or  sooner if needed.    This note includes dictation done using M*Modal speech recognition program. Word recognition mistakes are occasionally missed on review.    James Brambila MD  Staff Physician  Hepatology and Liver Transplant  [facility]  [facility]

## 2023-11-13 ENCOUNTER — OFFICE VISIT (OUTPATIENT)
Dept: PRIMARY CARE CLINIC | Facility: CLINIC | Age: 63
End: 2023-11-13
Payer: COMMERCIAL

## 2023-11-13 ENCOUNTER — CLINICAL SUPPORT (OUTPATIENT)
Dept: PRIMARY CARE CLINIC | Facility: CLINIC | Age: 63
End: 2023-11-13
Payer: COMMERCIAL

## 2023-11-13 VITALS
RESPIRATION RATE: 18 BRPM | SYSTOLIC BLOOD PRESSURE: 104 MMHG | DIASTOLIC BLOOD PRESSURE: 74 MMHG | HEIGHT: 68 IN | TEMPERATURE: 98 F | OXYGEN SATURATION: 96 % | BODY MASS INDEX: 34.35 KG/M2 | HEART RATE: 79 BPM | WEIGHT: 226.63 LBS

## 2023-11-13 DIAGNOSIS — R18.8 CIRRHOSIS OF LIVER WITH ASCITES, UNSPECIFIED HEPATIC CIRRHOSIS TYPE: ICD-10-CM

## 2023-11-13 DIAGNOSIS — F41.1 GAD (GENERALIZED ANXIETY DISORDER): Primary | ICD-10-CM

## 2023-11-13 DIAGNOSIS — R06.02 SHORTNESS OF BREATH: Primary | ICD-10-CM

## 2023-11-13 DIAGNOSIS — R60.0 LOWER EXTREMITY EDEMA: ICD-10-CM

## 2023-11-13 DIAGNOSIS — K74.60 CIRRHOSIS OF LIVER WITH ASCITES, UNSPECIFIED HEPATIC CIRRHOSIS TYPE: ICD-10-CM

## 2023-11-13 PROCEDURE — 99999 PR PBB SHADOW E&M-EST. PATIENT-LVL I: CPT | Mod: PBBFAC,TXP,, | Performed by: SOCIAL WORKER

## 2023-11-13 PROCEDURE — 3078F PR MOST RECENT DIASTOLIC BLOOD PRESSURE < 80 MM HG: ICD-10-PCS | Mod: CPTII,NTX,S$GLB, | Performed by: FAMILY MEDICINE

## 2023-11-13 PROCEDURE — 99999 PR PBB SHADOW E&M-EST. PATIENT-LVL I: ICD-10-PCS | Mod: PBBFAC,TXP,, | Performed by: SOCIAL WORKER

## 2023-11-13 PROCEDURE — 99214 OFFICE O/P EST MOD 30 MIN: CPT | Mod: NTX,S$GLB,, | Performed by: FAMILY MEDICINE

## 2023-11-13 PROCEDURE — 1159F PR MEDICATION LIST DOCUMENTED IN MEDICAL RECORD: ICD-10-PCS | Mod: CPTII,NTX,S$GLB, | Performed by: FAMILY MEDICINE

## 2023-11-13 PROCEDURE — 3008F BODY MASS INDEX DOCD: CPT | Mod: CPTII,NTX,S$GLB, | Performed by: FAMILY MEDICINE

## 2023-11-13 PROCEDURE — 3044F HG A1C LEVEL LT 7.0%: CPT | Mod: CPTII,NTX,S$GLB, | Performed by: FAMILY MEDICINE

## 2023-11-13 PROCEDURE — 1160F PR REVIEW ALL MEDS BY PRESCRIBER/CLIN PHARMACIST DOCUMENTED: ICD-10-PCS | Mod: CPTII,NTX,S$GLB, | Performed by: FAMILY MEDICINE

## 2023-11-13 PROCEDURE — 1160F RVW MEDS BY RX/DR IN RCRD: CPT | Mod: CPTII,NTX,S$GLB, | Performed by: FAMILY MEDICINE

## 2023-11-13 PROCEDURE — 1159F MED LIST DOCD IN RCRD: CPT | Mod: CPTII,NTX,S$GLB, | Performed by: FAMILY MEDICINE

## 2023-11-13 PROCEDURE — 3078F DIAST BP <80 MM HG: CPT | Mod: CPTII,NTX,S$GLB, | Performed by: FAMILY MEDICINE

## 2023-11-13 PROCEDURE — 99214 PR OFFICE/OUTPT VISIT, EST, LEVL IV, 30-39 MIN: ICD-10-PCS | Mod: NTX,S$GLB,, | Performed by: FAMILY MEDICINE

## 2023-11-13 PROCEDURE — 3074F SYST BP LT 130 MM HG: CPT | Mod: CPTII,NTX,S$GLB, | Performed by: FAMILY MEDICINE

## 2023-11-13 PROCEDURE — 99999 PR PBB SHADOW E&M-EST. PATIENT-LVL V: ICD-10-PCS | Mod: PBBFAC,TXP,, | Performed by: FAMILY MEDICINE

## 2023-11-13 PROCEDURE — 3044F PR MOST RECENT HEMOGLOBIN A1C LEVEL <7.0%: ICD-10-PCS | Mod: CPTII,NTX,S$GLB, | Performed by: FAMILY MEDICINE

## 2023-11-13 PROCEDURE — 3074F PR MOST RECENT SYSTOLIC BLOOD PRESSURE < 130 MM HG: ICD-10-PCS | Mod: CPTII,NTX,S$GLB, | Performed by: FAMILY MEDICINE

## 2023-11-13 PROCEDURE — 3008F PR BODY MASS INDEX (BMI) DOCUMENTED: ICD-10-PCS | Mod: CPTII,NTX,S$GLB, | Performed by: FAMILY MEDICINE

## 2023-11-13 PROCEDURE — 99999 PR PBB SHADOW E&M-EST. PATIENT-LVL V: CPT | Mod: PBBFAC,TXP,, | Performed by: FAMILY MEDICINE

## 2023-11-13 RX ORDER — SPIRONOLACTONE 100 MG/1
150 TABLET, FILM COATED ORAL DAILY
Qty: 135 TABLET | Refills: 1 | Status: ON HOLD | OUTPATIENT
Start: 2023-11-13 | End: 2023-12-18 | Stop reason: HOSPADM

## 2023-11-13 RX ORDER — FUROSEMIDE 40 MG/1
60 TABLET ORAL DAILY
Qty: 135 TABLET | Refills: 1 | Status: ON HOLD | OUTPATIENT
Start: 2023-11-13 | End: 2023-12-18 | Stop reason: HOSPADM

## 2023-11-13 NOTE — ASSESSMENT & PLAN NOTE
She does report increasing shortness of breath.  She has gained some weight, has not been watching her salt and has not taking the diuretics at the dosage last instructed by hepatology.  On exam she does not appear in any distress, her lungs are relatively clear perhaps mildly diminished bases.  So will have her take the Lasix and spironolactone as recently adjusted by her hepatologist.  However if she is not starting to improve within a couple days then she will contact us and we will consider a chest x-ray.

## 2023-11-13 NOTE — PROGRESS NOTES
Primary Care Provider Appointment   [facility] [place] [facility]       Patient ID: Yumiko Gonzalez is a 63 y.o. female.    ASSESSMENT/PLAN by Problem List:    1. Shortness of breath  Assessment & Plan:  She does report increasing shortness of breath.  She has gained some weight, has not been watching her salt and has not taking the diuretics at the dosage last instructed by hepatology.  On exam she does not appear in any distress, her lungs are relatively clear perhaps mildly diminished bases.  So will have her take the Lasix and spironolactone as recently adjusted by her hepatologist.  However if she is not starting to improve within a couple days then she will contact us and we will consider a chest x-ray.      2. Cirrhosis of liver with ascites, unspecified hepatic cirrhosis type  -     furosemide (LASIX) 40 MG tablet; Take 1.5 tablets (60 mg total) by mouth once daily.  Dispense: 135 tablet; Refill: 1  -     spironolactone (ALDACTONE) 100 MG tablet; Take 1.5 tablets (150 mg total) by mouth once daily.  Dispense: 135 tablet; Refill: 1    3. Lower extremity edema  -     furosemide (LASIX) 40 MG tablet; Take 1.5 tablets (60 mg total) by mouth once daily.  Dispense: 135 tablet; Refill: 1  -     spironolactone (ALDACTONE) 100 MG tablet; Take 1.5 tablets (150 mg total) by mouth once daily.  Dispense: 135 tablet; Refill: 1         Follow Up:  2-3 months sooner if not improving  Advance Care Planning     Date: 11/13/2023    Living Will  Living will on file reviewed  Power of   I initiated the process of voluntary advance care planning today and explained the importance of this process to the patient.  I introduced the concept of advance directives to the patient, as well. Then the patient received detailed information about the importance of designating a Health Care Power of  (HCPOA). She was also instructed to communicate with this person about their wishes for future healthcare,  "should she become sick and lose decision-making capacity. The patient has previously appointed a HCPOA. After our discussion, the patient has decided to complete a HCPOA and has appointed her daughter, health care agent:  Rachel  & health care agent number:  info on file               Subjective:     Chief Complaint   Patient presents with    Follow-up    Shortness of Breath     Pt states yesterday she started being short of breath and feels like its worse now.    Nausea     Pt states she's been nauseous a lot. The catering company she gets her meals from the food has  been eating taste strange.    Foot Swelling     Pt states the swelling in her left foot seems more swollen than usual this morning.      I have reviewed the information entered by the ancillary staff regarding the chief complaint as well as the related history.    HPI    Patient is a/an 63 y.o.  female       Awoke with SOB, wt fluctuates 218 -222, today 222,   Has not been watching salt as closely  Lasix and aldactone both increased to "1 and 1/2", but has been nauseated and not using consistently      For complete problem list, past medical history, surgical history, social history, etc., see appropriate section in the electronic medical record    Review of Systems   Constitutional:  Positive for unexpected weight change. Negative for fever.   Respiratory:  Positive for shortness of breath.    Cardiovascular:  Positive for leg swelling.   Gastrointestinal:  Positive for nausea.       Objective     Physical Exam  Vitals reviewed.   Constitutional:       General: She is not in acute distress.     Appearance: She is well-developed. She is not ill-appearing.   HENT:      Head: Normocephalic and atraumatic.   Eyes:      General: No scleral icterus.     Conjunctiva/sclera: Conjunctivae normal.   Cardiovascular:      Rate and Rhythm: Normal rate and regular rhythm.      Heart sounds: Normal heart sounds. No murmur heard.     No friction rub.      Comments: " "1+ foot and ankle edema  Pulmonary:      Effort: Pulmonary effort is normal. No respiratory distress.      Breath sounds: Normal breath sounds.      Comments: Lungs are essentially clear no wheezing or crackles mildly diminished breath sounds at the bases  Skin:     General: Skin is dry.      Findings: No rash.   Neurological:      Mental Status: She is alert and oriented to person, place, and time.   Psychiatric:         Behavior: Behavior normal.       Vitals:    11/13/23 1048   BP: 104/74   BP Location: Left arm   Patient Position: Sitting   BP Method: Large (Manual)   Pulse: 79   Resp: 18   Temp: 98 °F (36.7 °C)   TempSrc: Oral   SpO2: 96%   Weight: 102.8 kg (226 lb 10.1 oz)   Height: 5' 8" (1.727 m)           THIS DOCUMENT WAS MADE IN PART WITH VOICE RECOGNITION SOFTWARE.  OCCASIONALLY THIS SOFTWARE WILL MISINTERPRET WORDS OR PHRASES.    "

## 2023-11-13 NOTE — PROGRESS NOTES
Pt was seen by PCP prior to appt with clinician. She stated she was not aware of appt with clinician, and appt with MD ran over. There was not sufficient time  for a session, pt stated she was not feeling well, and admitted she is not mentally prepared. Clinician will call her to reschedule appt.

## 2023-11-14 ENCOUNTER — PATIENT MESSAGE (OUTPATIENT)
Dept: REHABILITATION | Facility: HOSPITAL | Age: 63
End: 2023-11-14

## 2023-11-16 ENCOUNTER — PATIENT MESSAGE (OUTPATIENT)
Dept: PRIMARY CARE CLINIC | Facility: CLINIC | Age: 63
End: 2023-11-16
Payer: COMMERCIAL

## 2023-11-25 ENCOUNTER — TELEPHONE (OUTPATIENT)
Dept: TRANSPLANT | Facility: CLINIC | Age: 63
End: 2023-11-25
Payer: COMMERCIAL

## 2023-11-25 ENCOUNTER — HOSPITAL ENCOUNTER (INPATIENT)
Facility: HOSPITAL | Age: 63
LOS: 1 days | Discharge: HOME | DRG: 443 | End: 2023-11-26
Attending: SURGERY | Admitting: SURGERY
Payer: COMMERCIAL

## 2023-11-25 DIAGNOSIS — Z76.82 LIVER TRANSPLANT CANDIDATE: Primary | ICD-10-CM

## 2023-11-25 DIAGNOSIS — Z94.4 STATUS POST LIVER TRANSPLANT: ICD-10-CM

## 2023-11-25 DIAGNOSIS — K74.60 CIRRHOSIS OF LIVER WITHOUT ASCITES, UNSPECIFIED HEPATIC CIRRHOSIS TYPE: Primary | ICD-10-CM

## 2023-11-25 DIAGNOSIS — Z76.82 LIVER TRANSPLANT CANDIDATE: ICD-10-CM

## 2023-11-25 LAB
ABO + RH BLD: NORMAL
ALBUMIN SERPL BCP-MCNC: 2.6 G/DL (ref 3.5–5.2)
ALP SERPL-CCNC: 195 U/L (ref 55–135)
ALT SERPL W/O P-5'-P-CCNC: 39 U/L (ref 10–44)
ANION GAP SERPL CALC-SCNC: 7 MMOL/L (ref 8–16)
AST SERPL-CCNC: 68 U/L (ref 10–40)
BASOPHILS # BLD AUTO: 0.07 K/UL (ref 0–0.2)
BASOPHILS NFR BLD: 1.2 % (ref 0–1.9)
BILIRUB SERPL-MCNC: 5.9 MG/DL (ref 0.1–1)
BLD GP AB SCN CELLS X3 SERPL QL: NORMAL
BUN SERPL-MCNC: 24 MG/DL (ref 8–23)
CALCIUM SERPL-MCNC: 9.2 MG/DL (ref 8.7–10.5)
CHLORIDE SERPL-SCNC: 103 MMOL/L (ref 95–110)
CO2 SERPL-SCNC: 25 MMOL/L (ref 23–29)
CREAT SERPL-MCNC: 0.9 MG/DL (ref 0.5–1.4)
DIFFERENTIAL METHOD: ABNORMAL
EOSINOPHIL # BLD AUTO: 0.7 K/UL (ref 0–0.5)
EOSINOPHIL NFR BLD: 11.6 % (ref 0–8)
ERYTHROCYTE [DISTWIDTH] IN BLOOD BY AUTOMATED COUNT: 14.9 % (ref 11.5–14.5)
EST. GFR  (NO RACE VARIABLE): >60 ML/MIN/1.73 M^2
GLUCOSE SERPL-MCNC: 128 MG/DL (ref 70–110)
HBV CORE AB SERPL QL IA: NORMAL
HBV SURFACE AB SER-ACNC: 162.31 MIU/ML
HBV SURFACE AB SER-ACNC: REACTIVE M[IU]/ML
HBV SURFACE AG SERPL QL IA: NORMAL
HCT VFR BLD AUTO: 39.6 % (ref 37–48.5)
HCV AB SERPL QL IA: NORMAL
HGB BLD-MCNC: 13.3 G/DL (ref 12–16)
HIV 1+2 AB+HIV1 P24 AG SERPL QL IA: NORMAL
IMM GRANULOCYTES # BLD AUTO: 0.12 K/UL (ref 0–0.04)
IMM GRANULOCYTES NFR BLD AUTO: 2.1 % (ref 0–0.5)
INR PPP: 1.4 (ref 0.8–1.2)
LYMPHOCYTES # BLD AUTO: 0.6 K/UL (ref 1–4.8)
LYMPHOCYTES NFR BLD: 10.4 % (ref 18–48)
MCH RBC QN AUTO: 33.8 PG (ref 27–31)
MCHC RBC AUTO-ENTMCNC: 33.6 G/DL (ref 32–36)
MCV RBC AUTO: 101 FL (ref 82–98)
MONOCYTES # BLD AUTO: 0.7 K/UL (ref 0.3–1)
MONOCYTES NFR BLD: 11.9 % (ref 4–15)
NEUTROPHILS # BLD AUTO: 3.6 K/UL (ref 1.8–7.7)
NEUTROPHILS NFR BLD: 62.8 % (ref 38–73)
NRBC BLD-RTO: 0 /100 WBC
PLATELET # BLD AUTO: 116 K/UL (ref 150–450)
PMV BLD AUTO: 10.2 FL (ref 9.2–12.9)
POTASSIUM SERPL-SCNC: 3.8 MMOL/L (ref 3.5–5.1)
PROT SERPL-MCNC: 5.9 G/DL (ref 6–8.4)
PROTHROMBIN TIME: 14.6 SEC (ref 9–12.5)
RBC # BLD AUTO: 3.93 M/UL (ref 4–5.4)
SARS-COV-2 RDRP RESP QL NAA+PROBE: NEGATIVE
SODIUM SERPL-SCNC: 135 MMOL/L (ref 136–145)
SPECIMEN OUTDATE: NORMAL
WBC # BLD AUTO: 5.78 K/UL (ref 3.9–12.7)

## 2023-11-25 PROCEDURE — 99223 1ST HOSP IP/OBS HIGH 75: CPT | Mod: NTX,,, | Performed by: NURSE PRACTITIONER

## 2023-11-25 PROCEDURE — 93010 ELECTROCARDIOGRAM REPORT: CPT | Mod: NTX,,, | Performed by: INTERNAL MEDICINE

## 2023-11-25 PROCEDURE — 85025 COMPLETE CBC W/AUTO DIFF WBC: CPT | Mod: NTX | Performed by: NURSE PRACTITIONER

## 2023-11-25 PROCEDURE — 93005 ELECTROCARDIOGRAM TRACING: CPT | Mod: NTX

## 2023-11-25 PROCEDURE — 25000003 PHARM REV CODE 250: Mod: NTX | Performed by: NURSE PRACTITIONER

## 2023-11-25 PROCEDURE — 20600001 HC STEP DOWN PRIVATE ROOM: Mod: NTX

## 2023-11-25 PROCEDURE — 36415 COLL VENOUS BLD VENIPUNCTURE: CPT | Mod: NTX | Performed by: NURSE PRACTITIONER

## 2023-11-25 PROCEDURE — U0002 COVID-19 LAB TEST NON-CDC: HCPCS | Mod: NTX | Performed by: NURSE PRACTITIONER

## 2023-11-25 PROCEDURE — 80053 COMPREHEN METABOLIC PANEL: CPT | Mod: NTX | Performed by: NURSE PRACTITIONER

## 2023-11-25 PROCEDURE — 87389 HIV-1 AG W/HIV-1&-2 AB AG IA: CPT | Mod: NTX | Performed by: NURSE PRACTITIONER

## 2023-11-25 PROCEDURE — 87522 HEPATITIS C REVRS TRNSCRPJ: CPT | Mod: NTX | Performed by: NURSE PRACTITIONER

## 2023-11-25 PROCEDURE — 86803 HEPATITIS C AB TEST: CPT | Mod: NTX | Performed by: NURSE PRACTITIONER

## 2023-11-25 PROCEDURE — 86704 HEP B CORE ANTIBODY TOTAL: CPT | Mod: NTX | Performed by: NURSE PRACTITIONER

## 2023-11-25 PROCEDURE — 93010 EKG 12-LEAD: ICD-10-PCS | Mod: NTX,,, | Performed by: INTERNAL MEDICINE

## 2023-11-25 PROCEDURE — 86706 HEP B SURFACE ANTIBODY: CPT | Mod: NTX | Performed by: NURSE PRACTITIONER

## 2023-11-25 PROCEDURE — 99223 PR INITIAL HOSPITAL CARE,LEVL III: ICD-10-PCS | Mod: NTX,,, | Performed by: NURSE PRACTITIONER

## 2023-11-25 PROCEDURE — 85610 PROTHROMBIN TIME: CPT | Mod: NTX | Performed by: NURSE PRACTITIONER

## 2023-11-25 PROCEDURE — 86901 BLOOD TYPING SEROLOGIC RH(D): CPT | Mod: NTX | Performed by: NURSE PRACTITIONER

## 2023-11-25 PROCEDURE — 87340 HEPATITIS B SURFACE AG IA: CPT | Mod: NTX | Performed by: NURSE PRACTITIONER

## 2023-11-25 RX ORDER — ALPRAZOLAM 0.25 MG/1
0.25 TABLET ORAL ONCE AS NEEDED
Status: COMPLETED | OUTPATIENT
Start: 2023-11-25 | End: 2023-11-25

## 2023-11-25 RX ORDER — HYDROCODONE BITARTRATE AND ACETAMINOPHEN 500; 5 MG/1; MG/1
TABLET ORAL
Status: DISCONTINUED | OUTPATIENT
Start: 2023-11-25 | End: 2023-11-26 | Stop reason: HOSPADM

## 2023-11-25 RX ORDER — METHYLPREDNISOLONE SODIUM SUCCINATE 500 MG/8ML
500 INJECTION INTRAMUSCULAR; INTRAVENOUS
Status: CANCELLED | OUTPATIENT
Start: 2023-11-25

## 2023-11-25 RX ORDER — MUPIROCIN 20 MG/G
OINTMENT TOPICAL
Status: DISCONTINUED | OUTPATIENT
Start: 2023-11-25 | End: 2023-11-26 | Stop reason: HOSPADM

## 2023-11-25 RX ADMIN — ALPRAZOLAM 0.25 MG: 0.25 TABLET ORAL at 11:11

## 2023-11-25 NOTE — HPI
Ms. Gonzalez is a 62 y/o female who present for Liver Transplant for liver cirrhosis 2/2 to DURAN HX: HCC (status post Y90 06/2023) esophageal varices, ascites, hepatic encephalopathy. She reports in usual state of health- Patient denies any recent hospitalizations or ED visits. The primary surgeon will be Dr. Massey.  Patient has been NPO since **.  All pre-op labs and imaging have been ordered and will be reviewed prior to surgery. Consents to be signed prior to transplant.

## 2023-11-25 NOTE — TELEPHONE ENCOUNTER
"[facility] LIVER COORDINATOR ORGAN OFFER NOTE   UNOS# [id number]   Notified by Kody Perez, , that Yumiko Gonzalez is eligible for liver as a primary recipient offer.  Spoke with patient and identified no acute medical issues with telephone assessment. Protocol script read to patient regarding PHS risk criteria donor offer .  Patient was informed that if she turned down the offer A transplant doctor will call you after we hang up."  Patient was also informed that if she turned down this donor, she would not be punished or removed from the transplant list, that she  would remain on the list at her current status/MELD score. However, by waiting on the list longer rather than receiving this transplant, she will face a greater risk of death than any risk from the slight possibility of transmitted infection.  Patient was asked if they have had a positive COVID-19 test or if they have any signs or symptoms. Informed patient that they will be tested for COVID-19 upon arrival to the hospital, unless have a previous positive result. If tested and result is positive, the transplant will not be able to occur, they will be inactivated on the wait list for 21 days per protocol and required to quarantine.   Patient verbalized understanding, all questions answered, patient accepts organ offer.   Patient to leave home within one hour for admit.  ETA 7:00 pm.  Patient notified of plan and states understanding.      "

## 2023-11-26 ENCOUNTER — TELEPHONE (OUTPATIENT)
Dept: TRANSPLANT | Facility: CLINIC | Age: 63
End: 2023-11-26
Payer: COMMERCIAL

## 2023-11-26 ENCOUNTER — ANESTHESIA EVENT (OUTPATIENT)
Dept: SURGERY | Facility: HOSPITAL | Age: 63
End: 2023-11-26

## 2023-11-26 ENCOUNTER — ANESTHESIA (OUTPATIENT)
Dept: SURGERY | Facility: HOSPITAL | Age: 63
End: 2023-11-26

## 2023-11-26 VITALS
DIASTOLIC BLOOD PRESSURE: 54 MMHG | WEIGHT: 33.31 LBS | TEMPERATURE: 98 F | RESPIRATION RATE: 17 BRPM | SYSTOLIC BLOOD PRESSURE: 115 MMHG | HEIGHT: 68 IN | OXYGEN SATURATION: 96 % | BODY MASS INDEX: 5.05 KG/M2 | HEART RATE: 70 BPM

## 2023-11-26 PROBLEM — Z76.82 LIVER TRANSPLANT CANDIDATE: Status: ACTIVE | Noted: 2023-11-26

## 2023-11-26 LAB
BLD PROD TYP BPU: NORMAL
BLOOD UNIT EXPIRATION DATE: NORMAL
BLOOD UNIT TYPE CODE: 5100
BLOOD UNIT TYPE CODE: 6200
BLOOD UNIT TYPE CODE: 7300
BLOOD UNIT TYPE CODE: 9500
BLOOD UNIT TYPE: NORMAL
CODING SYSTEM: NORMAL
CROSSMATCH INTERPRETATION: NORMAL
DISPENSE STATUS: NORMAL
NUM UNITS TRANS FFP: NORMAL
TRANS ERYTHROCYTES VOL PATIENT: NORMAL ML

## 2023-11-26 PROCEDURE — C1751 CATH, INF, PER/CENT/MIDLINE: HCPCS | Mod: NTX

## 2023-11-26 PROCEDURE — 76937 US GUIDE VASCULAR ACCESS: CPT | Mod: NTX

## 2023-11-26 PROCEDURE — 99238 HOSP IP/OBS DSCHRG MGMT 30/<: CPT | Mod: NTX,,, | Performed by: NURSE PRACTITIONER

## 2023-11-26 PROCEDURE — 36410 VNPNXR 3YR/> PHY/QHP DX/THER: CPT | Mod: NTX

## 2023-11-26 PROCEDURE — 86920 COMPATIBILITY TEST SPIN: CPT | Mod: NTX | Performed by: NURSE PRACTITIONER

## 2023-11-26 PROCEDURE — 99238 PR HOSPITAL DISCHARGE DAY,<30 MIN: ICD-10-PCS | Mod: NTX,,, | Performed by: NURSE PRACTITIONER

## 2023-11-26 NOTE — CARE UPDATE
RAPID RESPONSE VASCULAR ACCESS NOTE       Single lumen 18G, 10CM midline placed in the right basilic vein. Needle advanced into the vessel under real time ultrasound guidance.    Max dwell date: 12/25/2023   Lot number: [id number]

## 2023-11-26 NOTE — ASSESSMENT & PLAN NOTE
-liver cirrhosis 2/2 to DURAN HX: HCC (status post Y90 06/2023) esophageal varices, ascites, hepatic encephalopathy  -surgery times 11/26/23 @ 11:30 am

## 2023-11-26 NOTE — SUBJECTIVE & OBJECTIVE
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, postitive antismooth muscle antibody    Blood transfusion     Bronchitis     bronchospasm on occasion     Cancer 1987    carcinoma in situ    Cervical neck pain with evidence of disc disease 2012    can bend neck    Cirrhosis     non-alcoholic, compensated    Colon polyps 2012    Depression 2012    Hx panic attacks    Diverticular disease 2012    never diverticulitis    Fatty liver 2012    Fibrocystic breast     Fine tremor     hands,chronic    Hepatosplenomegaly     History of abdominal paracentesis 2023    8.7L of fluid drained    Hypertension 2013    Knee pain, bilateral     chronic, with hands,feet,fingers also painful    Lesion of right lung     Liver disease     "partially sclerosed liver" per pt    Migraines past Hx    Nephrolithiasis     stone episode 2021    Pleural effusion     small, compensated, good bilateral breath sounds per Dr Suresh GUTIERRES (postoperative nausea and vomiting)     twice after childbirth    Postpartum depression     Splenomegaly     Thrombocytopenia     Tremors of nervous system        Past Surgical History:   Procedure Laterality Date    ADENOIDECTOMY      CERVICAL CONIZATION   W/ LASER       SECTION      x3    COLONOSCOPY N/A 2016    Procedure: COLONOSCOPY;  Surgeon: James Palomares MD;  Location: [facility] ENDO;  Service: Endoscopy;  Laterality: N/A;    COLONOSCOPY N/A 2/3/2022    Procedure: COLONOSCOPY;  Surgeon: James Palomares MD;  Location: [facility] ENDO;  Service: Endoscopy;  Laterality: N/A;    EMBOLIZATION N/A 2018    Procedure: EMBOLIZATION, BLOOD VESSEL;  Surgeon: Joselin Surgeon;  Location: [facility];  Service: Radiology;  Laterality: N/A;    ENDOMETRIAL ABLATION      " ESOPHAGOGASTRODUODENOSCOPY N/A 2020    Procedure: ESOPHAGOGASTRODUODENOSCOPY (EGD);  Surgeon: James Palomares MD;  Location: [facility] ENDO;  Service: Endoscopy;  Laterality: N/A;    ESOPHAGOGASTRODUODENOSCOPY N/A 3/8/2022    Procedure: EGD (ESOPHAGOGASTRODUODENOSCOPY);  Surgeon: James Palomares MD;  Location: [facility] ENDO;  Service: Endoscopy;  Laterality: N/A;    LIVER BIOPSY      PELVIC LAPAROSCOPY      TONSILLECTOMY      TUBAL LIGATION         Review of patient's allergies indicates:   Allergen Reactions    No known drug allergies        Family History       Problem Relation (Age of Onset)    Breast cancer Mother (70), Sister, Maternal Aunt (70), Maternal Grandmother (70), Maternal Cousin (40)    Cancer Sister    Diabetes Father, Sister, Sister, Brother    Emphysema Brother    Heart disease Mother, Father    Hypertension Mother    Multiple sclerosis Maternal Aunt, Maternal Aunt    Tremor Mother, Daughter          Tobacco Use    Smoking status: Former     Current packs/day: 0.00     Types: Cigarettes     Quit date: 2/3/2006     Years since quittin.8    Smokeless tobacco: Never   Substance and Sexual Activity    Alcohol use: Not Currently    Drug use: No    Sexual activity: Not Currently       PTA Medications   Medication Sig    albuterol (PROVENTIL/VENTOLIN HFA) 90 mcg/actuation inhaler Inhale 2 puffs every 4 hours as needed for cough, wheeze, or shortness of breath    ALPRAZolam (XANAX) 0.25 MG tablet Take 1 tablet (0.25 mg total) by mouth nightly as needed. FOR INSOMNIA (Patient taking differently: Take 0.125-0.25 mg by mouth nightly as needed. FOR INSOMNIA)    buPROPion (WELLBUTRIN XL) 150 MG TB24 tablet Take 2 tablets (300 mg total) by mouth once daily.    fluticasone-umeclidin-vilanter (TRELEGY ELLIPTA) 200-62.5-25 mcg inhaler Inhale 1 puff into the lungs once daily.    furosemide (LASIX) 40 MG tablet Take 1.5 tablets (60 mg total) by mouth once daily.    lactulose (CONSTULOSE) 10 gram/15 mL  solution Take 30 mLs (20 g total) by mouth 2 (two) times daily. (Patient taking differently: Take 20 g by mouth as needed.)    loratadine (CLARITIN) 10 mg tablet Take 10 mg by mouth daily as needed.    multivitamin (THERAGRAN) per tablet Take 1 tablet by mouth once daily.    omeprazole (PRILOSEC) 20 MG capsule Take 1 capsule (20 mg total) by mouth once daily.    propranoloL (INDERAL LA) 80 MG 24 hr capsule Take 1 capsule (80 mg total) by mouth once daily.    rifAXIMin (XIFAXAN) 550 mg Tab Take 1 tablet (550 mg total) by mouth 2 (two) times daily. (Patient taking differently: Take 550 mg by mouth once daily.)    spironolactone (ALDACTONE) 100 MG tablet Take 1.5 tablets (150 mg total) by mouth once daily.    vitamin E 400 UNIT capsule Take 400 Units by mouth once daily.       Review of Systems   Constitutional:  Negative for activity change and appetite change.   HENT: Negative.     Eyes:  Negative for visual disturbance.   Respiratory:  Negative for shortness of breath.    Cardiovascular:  Negative for chest pain, palpitations and leg swelling.   Gastrointestinal:  Positive for abdominal distention. Negative for abdominal pain, diarrhea, nausea and vomiting.   Genitourinary:  Negative for difficulty urinating.   Musculoskeletal:  Positive for back pain. Negative for arthralgias and joint swelling.   Skin:  Negative for wound.   Allergic/Immunologic: Negative for immunocompromised state.   Neurological:  Negative for dizziness and facial asymmetry.   Hematological:  Negative for adenopathy. Does not bruise/bleed easily.   Psychiatric/Behavioral:  Negative for agitation, behavioral problems and sleep disturbance.    All other systems reviewed and are negative.    Objective:     Vital Signs (Most Recent):    Vital Signs (24h Range):           There is no height or weight on file to calculate BMI.    No intake or output data in the 24 hours ending 11/25/23 1944     Physical Exam  Vitals and nursing note reviewed.    Constitutional:       Appearance: She is well-developed.   HENT:      Head: Normocephalic.   Eyes:      Conjunctiva/sclera: Conjunctivae normal.   Cardiovascular:      Rate and Rhythm: Normal rate and regular rhythm.      Heart sounds: No murmur heard.  Pulmonary:      Effort: Pulmonary effort is normal.      Breath sounds: Normal breath sounds.   Abdominal:      General: Bowel sounds are normal. There is distension.      Palpations: Abdomen is soft.      Tenderness: There is no abdominal tenderness.   Musculoskeletal:         General: Normal range of motion.      Cervical back: Normal range of motion.      Right lower leg: Edema present.      Left lower leg: Edema present.   Skin:     General: Skin is warm and dry.      Capillary Refill: Capillary refill takes less than 2 seconds.   Neurological:      Mental Status: She is alert and oriented to person, place, and time.   Psychiatric:         Behavior: Behavior normal.         Thought Content: Thought content normal.         Judgment: Judgment normal.          Laboratory:      Diagnostic Results:

## 2023-11-26 NOTE — TELEPHONE ENCOUNTER
Pt notified that liver transplant is cancelled due to organ size.  Answered questions (patient and daughter) regarding cancelled transplant surgery.  Notified that she will be discharged and can resume medical plan as previously ordered by medical team.  Pt and daughter verbalized understanding.

## 2023-11-26 NOTE — ANESTHESIA PREPROCEDURE EVALUATION
[facility]  Anesthesia Pre-Operative Evaluation         Patient Name: Yumiko Gonzalez  YOB: 1960  MRN: [id number]    SUBJECTIVE:     Pre-operative evaluation for Procedure(s) (LRB):  TRANSPLANT, LIVER (N/A)     11/26/2023    Yumiko Gonzalez is a 63 y.o. female w/ a significant PMHx of HTN, MDD, PTSD, hx cervical CA, cervical DDD, HCC s/p radioembolization 6/2023 and cirrhosis 2/2 DURAN.    Patient now presents for the above procedure(s).    Nuclear Stress 4/2023  Bubble study is positive for an extracardiac shunt. A large amount of bubbles enter the left atrium via the pulmonary veins.  The stress echo portion of this study is negative for myocardial ischemia. Target heart rate was achieved.  The ECG portion of this study is negative for myocardial ischemia.  There were no arrhythmias during stress.  The patient reached the end of the protocol.  The left ventricle is normal in size with normal systolic function.  The estimated ejection fraction is 65%.  Normal left ventricular diastolic function.  Normal right ventricular size with normal right ventricular systolic function.  The estimated PA systolic pressure is 33 mmHg.  Normal central venous pressure (3 mmHg).  Moderate left atrial enlargement.      LDA: None documented.       Midline Catheter Insertion/Assessment  - Single Lumen 11/26/23 0030 Right basilic vein (medial side of arm) 18g x 10cm (Active)   $ Midline Charges (Upon insertion) Bedside Insertion Performed Pt > 3 Years Old;Midline Catheter (Supply);Ultrasound Guide for Vascular Access 11/26/23 0030   Site Assessment Clean;Dry;Intact;No redness;No swelling 11/26/23 0030   IV Device Securement catheter securement device 11/26/23 0030   Line Status Blood return noted;Saline locked 11/26/23 0030   Dressing Type CHG impregnated dressing/sponge;Central line dressing 11/26/23 0030   Dressing Status Clean;Dry;Intact 11/26/23 0030   Dressing Intervention First dressing 11/26/23 0030    Dressing Change Due 12/03/23 11/26/23 0030   Site Change Due 12/25/23 11/26/23 0030   Number of days: 0       Patient Active Problem List   Diagnosis    Acute bronchospasm    Hypermetropia    Enthesopathy of ankle and tarsus, unspecified    Cervical neck pain with evidence of disc disease    History of cervical cancer    Moderate episode of recurrent major depressive disorder    Diverticular disease    Colon polyps    Hypertension    Hx of colonic polyps    Severe obesity (BMI 35.0-35.9 with comorbidity)    Splenomegaly    History of posttraumatic stress disorder (PTSD)    Nonalcoholic steatohepatitis    Cirrhosis of liver with ascites    Cirrhosis    Mass of right lung    Wears contact lenses    Esophageal varices determined by endoscopy    Portal hypertension    Hepatic encephalopathy    ACP (advance care planning)    Cellulitis of left lower extremity    Lower extremity edema    Tremor    HCC (hepatocellular carcinoma)    Thrombocytopenia    Chronic bronchitis, unspecified chronic bronchitis type    Bronchiectasis without complication    Senile purpura    Chest wall pain    Actinic keratosis    Chronic pain of left knee    Decreased strength of lower extremity    Shortness of breath       Review of patient's allergies indicates:   Allergen Reactions    No known drug allergies        Current Inpatient Medications:      No current facility-administered medications on file prior to encounter.     Current Outpatient Medications on File Prior to Encounter   Medication Sig Dispense Refill    albuterol (PROVENTIL/VENTOLIN HFA) 90 mcg/actuation inhaler Inhale 2 puffs every 4 hours as needed for cough, wheeze, or shortness of breath 18 g 11    ALPRAZolam (XANAX) 0.25 MG tablet Take 1 tablet (0.25 mg total) by mouth nightly as needed. FOR INSOMNIA (Patient taking differently: Take 0.125-0.25 mg by mouth nightly as needed. FOR INSOMNIA) 30 tablet 2    buPROPion (WELLBUTRIN XL) 150 MG TB24 tablet Take 2 tablets (300 mg  total) by mouth once daily. 180 tablet 3    fluticasone-umeclidin-vilanter (TRELEGY ELLIPTA) 200-62.5-25 mcg inhaler Inhale 1 puff into the lungs once daily. 60 each 11    furosemide (LASIX) 40 MG tablet Take 1.5 tablets (60 mg total) by mouth once daily. 135 tablet 1    lactulose (CONSTULOSE) 10 gram/15 mL solution Take 30 mLs (20 g total) by mouth 2 (two) times daily. (Patient taking differently: Take 20 g by mouth as needed.) 5000 mL 6    loratadine (CLARITIN) 10 mg tablet Take 10 mg by mouth daily as needed.      multivitamin (THERAGRAN) per tablet Take 1 tablet by mouth once daily.      omeprazole (PRILOSEC) 20 MG capsule Take 1 capsule (20 mg total) by mouth once daily. 90 capsule 3    propranoloL (INDERAL LA) 80 MG 24 hr capsule Take 1 capsule (80 mg total) by mouth once daily. 30 capsule 11    rifAXIMin (XIFAXAN) 550 mg Tab Take 1 tablet (550 mg total) by mouth 2 (two) times daily. (Patient taking differently: Take 550 mg by mouth once daily.) 60 tablet 11    spironolactone (ALDACTONE) 100 MG tablet Take 1.5 tablets (150 mg total) by mouth once daily. 135 tablet 1    vitamin E 400 UNIT capsule Take 400 Units by mouth once daily.         Past Surgical History:   Procedure Laterality Date    ADENOIDECTOMY      CERVICAL CONIZATION   W/ LASER       SECTION      x3    COLONOSCOPY N/A 2016    Procedure: COLONOSCOPY;  Surgeon: James Palomares MD;  Location: [facility];  Service: Endoscopy;  Laterality: N/A;    COLONOSCOPY N/A 2/3/2022    Procedure: COLONOSCOPY;  Surgeon: James Palomares MD;  Location: [facility];  Service: Endoscopy;  Laterality: N/A;    EMBOLIZATION N/A 2018    Procedure: EMBOLIZATION, BLOOD VESSEL;  Surgeon: Joselin Surgeon;  Location: [facility];  Service: Radiology;  Laterality: N/A;    ENDOMETRIAL ABLATION      ESOPHAGOGASTRODUODENOSCOPY N/A 2020    Procedure: ESOPHAGOGASTRODUODENOSCOPY (EGD);  Surgeon: James Palomares MD;  Location: [facility];  Service:  Endoscopy;  Laterality: N/A;    ESOPHAGOGASTRODUODENOSCOPY N/A 3/8/2022    Procedure: EGD (ESOPHAGOGASTRODUODENOSCOPY);  Surgeon: James Palomares MD;  Location: [facility];  Service: Endoscopy;  Laterality: N/A;    LIVER BIOPSY      PELVIC LAPAROSCOPY      TONSILLECTOMY      TUBAL LIGATION         Social History     Socioeconomic History    Marital status:    Occupational History    Occupation: [employer] employee x 30 yrs   Tobacco Use    Smoking status: Former     Current packs/day: 0.00     Types: Cigarettes     Quit date: 2/3/2006     Years since quittin.8    Smokeless tobacco: Never   Substance and Sexual Activity    Alcohol use: Not Currently    Drug use: No    Sexual activity: Not Currently     Social Determinants of Health     Financial Resource Strain: Low Risk  (2023)    Overall Financial Resource Strain (CARDIA)     Difficulty of Paying Living Expenses: Not very hard   Food Insecurity: No Food Insecurity (2023)    Hunger Vital Sign     Worried About Running Out of Food in the Last Year: Never true     Ran Out of Food in the Last Year: Never true   Transportation Needs: No Transportation Needs (2023)    PRAPARE - Transportation     Lack of Transportation (Medical): No     Lack of Transportation (Non-Medical): No   Physical Activity: Inactive (2023)    Exercise Vital Sign     Days of Exercise per Week: 0 days     Minutes of Exercise per Session: 0 min   Stress: Stress Concern Present (2023)    [employer] [employer] - Occupational Stress Questionnaire     Feeling of Stress : Very much   Social Connections: Unknown (2023)    Social Connection and Isolation Panel [NHANES]     Frequency of Communication with Friends and Family: More than three times a week     Frequency of Social Gatherings with Friends and Family: Twice a week     Active Member of Clubs or Organizations: No     Attends Club or Organization Meetings: Never     Marital Status:     Housing Stability: Low Risk  (11/25/2023)    Housing Stability Vital Sign     Unable to Pay for Housing in the Last Year: No     Number of Places Lived in the Last Year: 1     Unstable Housing in the Last Year: No       OBJECTIVE:     Vital Signs Range (Last 24H):  Temp:  [36.3 °C (97.4 °F)-36.9 °C (98.4 °F)]   Pulse:  [62-70]   Resp:  [16-18]   BP: ()/(49-57)   SpO2:  [95 %-97 %]       Significant Labs:  Lab Results   Component Value Date    WBC 5.78 11/25/2023    HGB 13.3 11/25/2023    HCT 39.6 11/25/2023     (L) 11/25/2023    CHOL 137 06/24/2023    TRIG 63 06/24/2023    HDL 33 (L) 06/24/2023    ALT 39 11/25/2023    AST 68 (H) 11/25/2023     (L) 11/25/2023    K 3.8 11/25/2023     11/25/2023    CREATININE 0.9 11/25/2023    BUN 24 (H) 11/25/2023    CO2 25 11/25/2023    TSH 2.728 04/13/2023    INR 1.4 (H) 11/25/2023    HGBA1C 4.6 10/31/2023       Diagnostic Studies: No relevant studies.    EKG:   Results for orders placed or performed during the hospital encounter of 01/17/23   EKG 12-lead    Collection Time: 01/17/23 12:12 PM    Narrative    Test Reason : R06.02,    Vent. Rate : 086 BPM     Atrial Rate : 086 BPM     P-R Int : 152 ms          QRS Dur : 120 ms      QT Int : 360 ms       P-R-T Axes : 030 038 -04 degrees     QTc Int : 430 ms    Normal sinus rhythm  Incomplete left bundle branch block  Minimal voltage criteria for LVH, may be normal variant ( R in aVL )  Nonspecific ST and T wave abnormality  Abnormal ECG  When compared with ECG of 01-NOV-2019 09:39,  Previous ECG has undetermined rhythm, needs review  T wave inversion now evident in Inferior leads  Nonspecific T wave abnormality, worse in Lateral leads  Confirmed by Jordy Schuler MD ([id number]) on 1/17/2023 3:31:50 PM    Referred By: GLENIS   SELF           Confirmed By:Jordy Schuler MD       2D ECHO:  TTE:  No results found. However, due to the size of the patient record, not all encounters were searched. Please  check Results Review for a complete set of results.    CHADD:  No results found. However, due to the size of the patient record, not all encounters were searched. Please check Results Review for a complete set of results.    ASSESSMENT/PLAN:           Pre-op Assessment    I have reviewed the Patient Summary Reports.     I have reviewed the Nursing Notes. I have reviewed the NPO Status.   I have reviewed the Medications.     Review of Systems  Anesthesia Hx:  No problems with previous Anesthesia   History of prior surgery of interest to airway management or planning:          Denies Family Hx of Anesthesia complications.    Denies Personal Hx of Anesthesia complications.                    Social:  Non-Smoker, No Alcohol Use       Hematology/Oncology:       -- Denies Anemia:                  Denies Current/Recent Cancer                Cardiovascular:     Hypertension    Denies CAD.     Denies Dysrhythmias.    Denies CHF.    no hyperlipidemia                             Pulmonary:    Denies COPD.  Denies Asthma.     Denies Sleep Apnea.                Renal/:   Denies Chronic Renal Disease.                Hepatic/GI:      Denies GERD. Liver Disease, Hepatitis           Musculoskeletal:  Denies Arthritis.               Neurological:    Denies CVA. Denies Neuromuscular Disease.   Denies Seizures.          Denies Chronic Pain Syndrome                         Endocrine:  Denies Diabetes.   Denies Hyperthyroidism.       Denies Obesity / BMI > 30  Psych:  Psychiatric History denies anxiety denies depression                Physical Exam  General: Well nourished, Cooperative, Alert and Oriented    Airway:  Mallampati: II   Mouth Opening: Normal  TM Distance: Normal  Tongue: Normal  Neck ROM: Normal ROM    Dental:  Intact        Anesthesia Plan  Type of Anesthesia, risks & benefits discussed:    Anesthesia Type: Gen ETT  Intra-op Monitoring Plan: Standard ASA Monitors, Art Line, Central Line, PA, CO and CHADD  Post Op Pain  Control Plan: multimodal analgesia and IV/PO Opioids PRN  Induction:  IV  Airway Plan: Direct and Video, Post-Induction  Informed Consent: Informed consent signed with the Patient and all parties understand the risks and agree with anesthesia plan.  All questions answered.   ASA Score: 4  Day of Surgery Review of History & Physical: H&P Update referred to the surgeon/provider.    Ready For Surgery From Anesthesia Perspective.     .

## 2023-11-26 NOTE — PLAN OF CARE
PLAN OF CARE NOTE     Fall bundle in place. Pt. Remained free from falls/rauma/injuries. No complaints of CP/ SOB/discomfort. VSS. A&Ox4. RA. No tele, pulse WNL. Pt. denies pain this shift. All pre-requirements for Ltx complete, except surgery & blood consents, CHG bath wipe down & RAMONA/ SCD placement. Pt. Was given Xanax for anxiety, though mildly alleviated; no new orders due to soft Bps. RACHEL Quinteros NP aware. The POC reviewed w/ pt. & pts' daughter @ bedside, questions were answered & encouraged. No further concerns at this time.

## 2023-11-26 NOTE — DISCHARGE SUMMARY
[facility]  Liver Transplant  Discharge Summary      Patient Name: Yumiko Gonzalez  MRN: [id number]  Admission Date: 11/25/2023  Hospital Length of Stay: 1 days  Discharge Date and Time:  11/26/2023 12:48 PM  Attending Physician: Eugene Haney MD   Discharging Provider: [facility]  Primary Care Provider: Garrett Webb MD  HPI:   Ms. Gonzalez is a 64 y/o female who present for Liver Transplant for liver cirrhosis 2/2 to DURAN HX: HCC (status post Y90 06/2023) esophageal varices, ascites, hepatic encephalopathy. She reports in usual state of health- Patient denies any recent hospitalizations or ED visits. The primary surgeon will be Dr. Massey.  Patient has been NPO since **.  All pre-op labs and imaging have been ordered and will be reviewed prior to surgery. Consents to be signed prior to transplant.       Procedure(s) (LRB):  TRANSPLANT, LIVER (N/A)     Hospital Course:    Patient admitted as primary candidate for liver transplant. However, case cancelled due to size and quality upon farther review. Transplant surgeon discussed cancellation with the patient. Pre op labs and imaging were reviewed and deemed stable. Patient ready and stable for discharge at this time and will resume previous home meds. F/u lab [facility] clinic appointments as previously scheduled.  Patient understands discharge instruction and importance of follow up.    Goals of Care Treatment Preferences:  Code Status: Full Code    Health care agent: Rachel  Health care agent number: info on file    Living Will: Yes  [place]: Yes  What is most important right now is to focus on remaining as independent as possible.  Accordingly, we have decided that the best plan to meet the patient's goals includes continuing with treatment.      Final Active Diagnoses:    Diagnosis Date Noted POA    PRINCIPAL PROBLEM:  Cirrhosis [K74.60] 07/13/2017 Yes    Liver transplant candidate [Z76.82] 11/26/2023 Not Applicable      Problems  Resolved During this Admission:           Pending Diagnostic Studies:       Procedure Component Value Units Date/Time    Freeze and Hold -BB HEP [[id number]] Collected: 11/25/23 2010    Order Status: Sent Lab Status: In process Updated: 11/25/23 2011    Specimen: Blood     Hepatitis C RNA, quantitative, PCR [[id number]] Collected: 11/25/23 2011    Order Status: Sent Lab Status: In process Updated: 11/25/23 2030    Specimen: Blood           Significant Diagnostic Studies: Labs: BMP:   Recent Labs   Lab 11/25/23 2011   *   *   K 3.8      CO2 25   BUN 24*   CREATININE 0.9   CALCIUM 9.2   , CMP   Recent Labs   Lab 11/25/23 2011   *   K 3.8      CO2 25   *   BUN 24*   CREATININE 0.9   CALCIUM 9.2   PROT 5.9*   ALBUMIN 2.6*   BILITOT 5.9*   ALKPHOS 195*   AST 68*   ALT 39   ANIONGAP 7*   , CBC   Recent Labs   Lab 11/25/23 2011   WBC 5.78   HGB 13.3   HCT 39.6   *   , INR   Lab Results   Component Value Date    INR 1.4 (H) 11/25/2023    INR 1.3 (H) 10/31/2023    INR 1.4 (H) 10/17/2023   , Lipid Panel   Lab Results   Component Value Date    CHOL 137 06/24/2023    HDL 33 (L) 06/24/2023    LDLCALC 91.4 06/24/2023    TRIG 63 06/24/2023    CHOLHDL 24.1 06/24/2023   , A1C:   Recent Labs   Lab 10/31/23  1511   HGBA1C 4.6   , and All labs within the past 24 hours have been reviewed    The patients clinical status was discussed at multidisplinary rounds, involving transplant surgery, transplant medicine, pharmacy, nursing, nutrition, and social work    Discharged Condition: stable    Disposition: Home or Self Care    Follow Up:    Patient Instructions:      Diet Adult Regular     Notify your health care provider if you experience any of the following:     Notify your health care provider if you experience any of the following:  increased confusion or weakness     Notify your health care provider if you experience any of the following:  persistent dizziness, light-headedness, or  visual disturbances     Notify your health care provider if you experience any of the following:  worsening rash     Notify your health care provider if you experience any of the following:  severe persistent headache     Notify your health care provider if you experience any of the following:  difficulty breathing or increased cough     Notify your health care provider if you experience any of the following:  redness, tenderness, or signs of infection (pain, swelling, redness, odor or green/yellow discharge around incision site)     Notify your health care provider if you experience any of the following:  severe uncontrolled pain     Notify your health care provider if you experience any of the following:  persistent nausea and vomiting or diarrhea     Notify your health care provider if you experience any of the following:  temperature >100.4     Activity as tolerated     Medications:  Reconciled Home Medications:      Medication List        CONTINUE taking these medications      albuterol 90 mcg/actuation inhaler  Commonly known as: PROVENTIL/VENTOLIN HFA  Inhale 2 puffs every 4 hours as needed for cough, wheeze, or shortness of breath     ALPRAZolam 0.25 MG tablet  Commonly known as: XANAX  Take 1 tablet (0.25 mg total) by mouth nightly as needed. FOR INSOMNIA     buPROPion 150 MG TB24 tablet  Commonly known as: WELLBUTRIN XL  Take 2 tablets (300 mg total) by mouth once daily.     furosemide 40 MG tablet  Commonly known as: LASIX  Take 1.5 tablets (60 mg total) by mouth once daily.     lactulose 10 gram/15 mL solution  Commonly known as: CONSTULOSE  Take 30 mLs (20 g total) by mouth 2 (two) times daily.     loratadine 10 mg tablet  Commonly known as: CLARITIN  Take 10 mg by mouth daily as needed.     multivitamin per tablet  Commonly known as: THERAGRAN  Take 1 tablet by mouth once daily.     omeprazole 20 MG capsule  Commonly known as: PRILOSEC  Take 1 capsule (20 mg total) by mouth once daily.     propranoloL  80 MG 24 hr capsule  Commonly known as: INDERAL LA  Take 1 capsule (80 mg total) by mouth once daily.     spironolactone 100 MG tablet  Commonly known as: ALDACTONE  Take 1.5 tablets (150 mg total) by mouth once daily.     TRELEGY ELLIPTA 200-62.5-25 mcg inhaler  Generic drug: fluticasone-umeclidin-vilanter  Inhale 1 puff into the lungs once daily.     vitamin E 400 UNIT capsule  Take 400 Units by mouth once daily.     XIFAXAN 550 mg Tab  Generic drug: rifAXIMin  Take 1 tablet (550 mg total) by mouth 2 (two) times daily.            Time spent caring for patient (Greater than 1/2 spent in direct face-to-face contact): > 30 minutes    [facility]  Liver Transplant  [facility] - [facility]

## 2023-11-26 NOTE — HOSPITAL COURSE
Patient admitted as primary candidate for liver transplant. However, case cancelled due to size and quality upon farther review. Transplant surgeon discussed cancellation with the patient. Pre op labs and imaging were reviewed and deemed stable. Patient ready and stable for discharge at this time and will resume previous home meds. F/u lab [facility] clinic appointments as previously scheduled.  Patient understands discharge instruction and importance of follow up.

## 2023-11-26 NOTE — PROGRESS NOTES
AVS reviewed with pt. She verbalized understanding. VSS. R midline removed. Pt transport pending.

## 2023-11-26 NOTE — PROGRESS NOTES
Per pt's daughter, pt received phone call that organ was too large for transplant, surgery cancelled. Awaiting discharge orders.
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
## 2023-11-27 ENCOUNTER — PATIENT OUTREACH (OUTPATIENT)
Dept: ADMINISTRATIVE | Facility: CLINIC | Age: 63
End: 2023-11-27
Payer: COMMERCIAL

## 2023-11-27 LAB
HCV RNA SERPL QL NAA+PROBE: NOT DETECTED
HCV RNA SPEC NAA+PROBE-ACNC: NOT DETECTED IU/ML

## 2023-11-27 NOTE — TELEPHONE ENCOUNTER
Spoke with pt, informed her that she does not need a HFU appt per Dr. Bolaños as pt was in for a liver transplant and she did not have it done due to organ size.  Pt scheduled for 3 mo f/u appt on 2/14 at 1100.

## 2023-11-27 NOTE — PROGRESS NOTES
C3 nurse spoke with Yumiko Gonzalez for a TCC post hospital discharge follow up call. The pt states she has no new symptoms but states she feels depressed due to the cancellation. She states she has an appt with her therapist on 12/4/23 but can call her or my children if she needs to speak with someone, the pt denies any suicidal thoughts.     The patient does not have a scheduled HOSFU appointment with her PCP, Garrett Webb MD within 5-7 days post hospital discharge date of 11/26/23. C3 nurse was unable to schedule HOSFU appointment in [facility] and the pt denies the need for a NPHV.    Message sent to PCP staff requesting they contact patient and schedule follow up appointment.

## 2023-11-30 ENCOUNTER — PATIENT MESSAGE (OUTPATIENT)
Dept: ADMINISTRATIVE | Facility: OTHER | Age: 63
End: 2023-11-30
Payer: COMMERCIAL

## 2023-11-30 ENCOUNTER — TELEPHONE (OUTPATIENT)
Dept: TRANSPLANT | Facility: CLINIC | Age: 63
End: 2023-11-30
Payer: COMMERCIAL

## 2023-11-30 NOTE — TELEPHONE ENCOUNTER
PATIENT NAME: Yumiko WILHELM [facility] #: [id number]    Lab Results   Component Value Date    CREATININE 0.9 11/25/2023     (L) 11/25/2023    BILITOT 5.9 (H) 11/25/2023    ALBUMIN 2.6 (L) 11/25/2023    INR 1.4 (H) 11/25/2023       Encephalopathy: 1 - 2  Ascites: moderate  Dialysis: no     Recertification requestor: Shabnam Navarrete    MELD 21

## 2023-12-04 ENCOUNTER — TELEPHONE (OUTPATIENT)
Dept: TRANSPLANT | Facility: CLINIC | Age: 63
End: 2023-12-04
Payer: COMMERCIAL

## 2023-12-04 ENCOUNTER — CLINICAL SUPPORT (OUTPATIENT)
Dept: PRIMARY CARE CLINIC | Facility: CLINIC | Age: 63
End: 2023-12-04
Payer: COMMERCIAL

## 2023-12-04 DIAGNOSIS — F43.21 GRIEF REACTION: ICD-10-CM

## 2023-12-04 DIAGNOSIS — F32.A DEPRESSIVE DISORDER: Primary | ICD-10-CM

## 2023-12-04 PROCEDURE — 99499 UNLISTED E&M SERVICE: CPT | Mod: 95,NTX,, | Performed by: SOCIAL WORKER

## 2023-12-04 PROCEDURE — 99499 NO LOS: ICD-10-PCS | Mod: 95,NTX,, | Performed by: SOCIAL WORKER

## 2023-12-04 NOTE — TELEPHONE ENCOUNTER
----- Message from Moy Devries sent at 12/4/2023 10:11 AM CST -----  Regarding: FW: JAN--MR/AFP    Called and sp to pt; appts audrey'ed for 1/8.  .  ----- Message -----  From: Shabnam Navarrete  Sent: 10/26/2023   2:54 PM CST  To: Moy Devries  Subject: JAN--MR/AFP                                      Please scheduled mri/afp in Jan 2024
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
## 2023-12-06 ENCOUNTER — TELEPHONE (OUTPATIENT)
Dept: TRANSPLANT | Facility: CLINIC | Age: 63
End: 2023-12-06
Payer: COMMERCIAL

## 2023-12-06 DIAGNOSIS — Z76.82 LIVER TRANSPLANT CANDIDATE: Primary | ICD-10-CM

## 2023-12-06 DIAGNOSIS — K74.60 CIRRHOSIS OF LIVER WITHOUT ASCITES, UNSPECIFIED HEPATIC CIRRHOSIS TYPE: ICD-10-CM

## 2023-12-06 DIAGNOSIS — K75.81 LIVER CIRRHOSIS SECONDARY TO NASH: ICD-10-CM

## 2023-12-06 DIAGNOSIS — C22.0 HCC (HEPATOCELLULAR CARCINOMA): ICD-10-CM

## 2023-12-06 DIAGNOSIS — K74.60 LIVER CIRRHOSIS SECONDARY TO NASH: ICD-10-CM

## 2023-12-06 NOTE — TELEPHONE ENCOUNTER
AFP entered. Registration coordinated notified    ----- Message from Moy Devries sent at 12/4/2023 10:11 AM CST -----  Regarding: RE: JAN--MR/AFP  I need an afp in  ----- Message -----  From: Shabnam Navarrete  Sent: 10/26/2023   2:54 PM CST  To: Moy Devries  Subject: JAN--MR/AFP                                      Please scheduled mri/afp in Jan 2024

## 2023-12-09 ENCOUNTER — HOSPITAL ENCOUNTER (INPATIENT)
Facility: HOSPITAL | Age: 63
LOS: 9 days | Discharge: HOME HEALTH | DRG: 006 | End: 2023-12-18
Attending: SURGERY | Admitting: SURGERY
Payer: COMMERCIAL

## 2023-12-09 ENCOUNTER — ANESTHESIA EVENT (OUTPATIENT)
Dept: SURGERY | Facility: HOSPITAL | Age: 63
DRG: 006 | End: 2023-12-09
Payer: COMMERCIAL

## 2023-12-09 ENCOUNTER — ANESTHESIA (OUTPATIENT)
Dept: SURGERY | Facility: HOSPITAL | Age: 63
DRG: 006 | End: 2023-12-09
Payer: COMMERCIAL

## 2023-12-09 ENCOUNTER — TELEPHONE (OUTPATIENT)
Dept: TRANSPLANT | Facility: CLINIC | Age: 63
End: 2023-12-09
Payer: COMMERCIAL

## 2023-12-09 DIAGNOSIS — Z91.89 AT RISK FOR OPPORTUNISTIC INFECTIONS: ICD-10-CM

## 2023-12-09 DIAGNOSIS — Z76.82 LIVER TRANSPLANT CANDIDATE: ICD-10-CM

## 2023-12-09 DIAGNOSIS — T38.0X5A STEROID-INDUCED HYPERGLYCEMIA: ICD-10-CM

## 2023-12-09 DIAGNOSIS — T38.0X5D ADVERSE EFFECT OF CORTICOSTEROIDS, SUBSEQUENT ENCOUNTER: ICD-10-CM

## 2023-12-09 DIAGNOSIS — D84.9 IMMUNOSUPPRESSED STATUS: ICD-10-CM

## 2023-12-09 DIAGNOSIS — R73.9 STEROID-INDUCED HYPERGLYCEMIA: ICD-10-CM

## 2023-12-09 DIAGNOSIS — D62 ACUTE POSTOPERATIVE ANEMIA DUE TO EXPECTED BLOOD LOSS: ICD-10-CM

## 2023-12-09 DIAGNOSIS — N17.9 AKI (ACUTE KIDNEY INJURY): ICD-10-CM

## 2023-12-09 DIAGNOSIS — Z99.11 ENCOUNTER FOR WEANING FROM VENTILATOR: ICD-10-CM

## 2023-12-09 DIAGNOSIS — T38.0X5A ADVERSE EFFECT OF CORTICOSTEROIDS, INITIAL ENCOUNTER: ICD-10-CM

## 2023-12-09 DIAGNOSIS — E66.01 CLASS 2 SEVERE OBESITY DUE TO EXCESS CALORIES WITH SERIOUS COMORBIDITY AND BODY MASS INDEX (BMI) OF 36.0 TO 36.9 IN ADULT: ICD-10-CM

## 2023-12-09 DIAGNOSIS — F33.1 MODERATE EPISODE OF RECURRENT MAJOR DEPRESSIVE DISORDER: ICD-10-CM

## 2023-12-09 DIAGNOSIS — Z94.4 S/P LIVER TRANSPLANT: Primary | ICD-10-CM

## 2023-12-09 DIAGNOSIS — R25.1 TREMOR: ICD-10-CM

## 2023-12-09 DIAGNOSIS — E87.70 HYPERVOLEMIA, UNSPECIFIED HYPERVOLEMIA TYPE: ICD-10-CM

## 2023-12-09 DIAGNOSIS — R94.31 QT PROLONGATION: ICD-10-CM

## 2023-12-09 DIAGNOSIS — Z76.82 LIVER TRANSPLANT CANDIDATE: Primary | ICD-10-CM

## 2023-12-09 DIAGNOSIS — Z29.89 PROPHYLACTIC IMMUNOTHERAPY: ICD-10-CM

## 2023-12-09 DIAGNOSIS — D69.6 THROMBOCYTOPENIA: ICD-10-CM

## 2023-12-09 LAB
ABO + RH BLD: NORMAL
ALBUMIN SERPL BCP-MCNC: 2.2 G/DL (ref 3.5–5.2)
ALBUMIN SERPL BCP-MCNC: 2.2 G/DL (ref 3.5–5.2)
ALBUMIN SERPL BCP-MCNC: 2.5 G/DL (ref 3.5–5.2)
ALP SERPL-CCNC: 198 U/L (ref 55–135)
ALT SERPL W/O P-5'-P-CCNC: 1479 U/L (ref 10–44)
ALT SERPL W/O P-5'-P-CCNC: 32 U/L (ref 10–44)
ANION GAP SERPL CALC-SCNC: 7 MMOL/L (ref 8–16)
APTT PPP: 41.8 SEC (ref 21–32)
APTT PPP: 57.9 SEC (ref 21–32)
AST SERPL-CCNC: 1784 U/L (ref 10–40)
AST SERPL-CCNC: 51 U/L (ref 10–40)
BASOPHILS # BLD AUTO: 0.05 K/UL (ref 0–0.2)
BASOPHILS NFR BLD: 0.6 % (ref 0–1.9)
BILIRUB SERPL-MCNC: 5.2 MG/DL (ref 0.1–1)
BLD GP AB SCN CELLS X3 SERPL QL: NORMAL
BLD PROD TYP BPU: NORMAL
BLOOD BANK HEPATITIS FREEZE AND HOLD: NORMAL
BLOOD UNIT EXPIRATION DATE: NORMAL
BLOOD UNIT TYPE CODE: 5100
BLOOD UNIT TYPE CODE: 5100
BLOOD UNIT TYPE CODE: 6200
BLOOD UNIT TYPE CODE: 6200
BLOOD UNIT TYPE: NORMAL
BUN SERPL-MCNC: 19 MG/DL (ref 8–23)
CA-I BLDV-SCNC: 1.12 MMOL/L (ref 1.06–1.42)
CA-I BLDV-SCNC: 1.22 MMOL/L (ref 1.06–1.42)
CALCIUM SERPL-MCNC: 9.7 MG/DL (ref 8.7–10.5)
CHLORIDE SERPL-SCNC: 102 MMOL/L (ref 95–110)
CO2 SERPL-SCNC: 23 MMOL/L (ref 23–29)
CODING SYSTEM: NORMAL
CREAT SERPL-MCNC: 1 MG/DL (ref 0.5–1.4)
CROSSMATCH INTERPRETATION: NORMAL
DIFFERENTIAL METHOD BLD: ABNORMAL
DISPENSE STATUS: NORMAL
EOSINOPHIL # BLD AUTO: 0.3 K/UL (ref 0–0.5)
EOSINOPHIL NFR BLD: 3.5 % (ref 0–8)
ERYTHROCYTE [DISTWIDTH] IN BLOOD BY AUTOMATED COUNT: 15 % (ref 11.5–14.5)
EST. GFR  (NO RACE VARIABLE): >60 ML/MIN/1.73 M^2
FIBRINOGEN PPP-MCNC: 176 MG/DL (ref 182–400)
FIBRINOGEN PPP-MCNC: 184 MG/DL (ref 182–400)
GLUCOSE SERPL-MCNC: 120 MG/DL (ref 70–110)
GLUCOSE SERPL-MCNC: 124 MG/DL (ref 70–110)
GLUCOSE SERPL-MCNC: 130 MG/DL (ref 70–110)
HBV CORE AB SERPL QL IA: NORMAL
HBV SURFACE AB SER-ACNC: 158.51 MIU/ML
HBV SURFACE AB SER-ACNC: REACTIVE M[IU]/ML
HBV SURFACE AG SERPL QL IA: NORMAL
HCT VFR BLD AUTO: 27.4 % (ref 37–48.5)
HCT VFR BLD AUTO: 34.1 % (ref 37–48.5)
HCT VFR BLD AUTO: 36.1 % (ref 37–48.5)
HCV AB SERPL QL IA: NORMAL
HGB BLD-MCNC: 11.9 G/DL (ref 12–16)
HGB BLD-MCNC: 12.9 G/DL (ref 12–16)
HGB BLD-MCNC: 9.6 G/DL (ref 12–16)
HIV 1+2 AB+HIV1 P24 AG SERPL QL IA: NORMAL
IMM GRANULOCYTES # BLD AUTO: 0.17 K/UL (ref 0–0.04)
IMM GRANULOCYTES NFR BLD AUTO: 1.9 % (ref 0–0.5)
INR PPP: 1.4 (ref 0.8–1.2)
INR PPP: 1.5 (ref 0.8–1.2)
INR PPP: 2 (ref 0.8–1.2)
LYMPHOCYTES # BLD AUTO: 0.5 K/UL (ref 1–4.8)
LYMPHOCYTES NFR BLD: 5.6 % (ref 18–48)
MAGNESIUM SERPL-MCNC: 1.8 MG/DL (ref 1.6–2.6)
MAGNESIUM SERPL-MCNC: 2.1 MG/DL (ref 1.6–2.6)
MCH RBC QN AUTO: 34 PG (ref 27–31)
MCHC RBC AUTO-ENTMCNC: 35.7 G/DL (ref 32–36)
MCV RBC AUTO: 95 FL (ref 82–98)
MONOCYTES # BLD AUTO: 1 K/UL (ref 0.3–1)
MONOCYTES NFR BLD: 10.8 % (ref 4–15)
NEUTROPHILS # BLD AUTO: 6.9 K/UL (ref 1.8–7.7)
NEUTROPHILS NFR BLD: 77.6 % (ref 38–73)
NRBC BLD-RTO: 0 /100 WBC
PLATELET # BLD AUTO: 113 K/UL (ref 150–450)
PLATELET # BLD AUTO: 74 K/UL (ref 150–450)
PLATELET # BLD AUTO: 77 K/UL (ref 150–450)
PMV BLD AUTO: 10.1 FL (ref 9.2–12.9)
PMV BLD AUTO: 10.8 FL (ref 9.2–12.9)
PMV BLD AUTO: 11 FL (ref 9.2–12.9)
POCT GLUCOSE: 119 MG/DL (ref 70–110)
POOLED CRYOPPT GVN BPU: NORMAL
POOLED CRYOPPT GVN BPU: NORMAL
POTASSIUM SERPL-SCNC: 4.1 MMOL/L (ref 3.5–5.1)
POTASSIUM SERPL-SCNC: 4.1 MMOL/L (ref 3.5–5.1)
POTASSIUM SERPL-SCNC: 4.4 MMOL/L (ref 3.5–5.1)
PROT SERPL-MCNC: 5.5 G/DL (ref 6–8.4)
PROTHROMBIN TIME: 14.9 SEC (ref 9–12.5)
PROTHROMBIN TIME: 15.8 SEC (ref 9–12.5)
PROTHROMBIN TIME: 20.6 SEC (ref 9–12.5)
RBC # BLD AUTO: 3.79 M/UL (ref 4–5.4)
SARS-COV-2 RDRP RESP QL NAA+PROBE: NEGATIVE
SODIUM SERPL-SCNC: 132 MMOL/L (ref 136–145)
SODIUM SERPL-SCNC: 133 MMOL/L (ref 136–145)
SODIUM SERPL-SCNC: 135 MMOL/L (ref 136–145)
SPECIMEN OUTDATE: NORMAL
UNIT NUMBER: NORMAL
UNIT NUMBER: NORMAL
WBC # BLD AUTO: 8.88 K/UL (ref 3.9–12.7)

## 2023-12-09 PROCEDURE — 85018 HEMOGLOBIN: CPT | Performed by: TRANSPLANT SURGERY

## 2023-12-09 PROCEDURE — C1729 CATH, DRAINAGE: HCPCS | Performed by: TRANSPLANT SURGERY

## 2023-12-09 PROCEDURE — P9021 RED BLOOD CELLS UNIT: HCPCS | Performed by: NURSE PRACTITIONER

## 2023-12-09 PROCEDURE — 85002 BLEEDING TIME TEST: CPT

## 2023-12-09 PROCEDURE — 82330 ASSAY OF CALCIUM: CPT | Mod: 91 | Performed by: SURGERY

## 2023-12-09 PROCEDURE — P9012 CRYOPRECIPITATE EACH UNIT: HCPCS | Performed by: ANESTHESIOLOGY

## 2023-12-09 PROCEDURE — 85018 HEMOGLOBIN: CPT | Performed by: SURGERY

## 2023-12-09 PROCEDURE — 25000003 PHARM REV CODE 250: Mod: NTX | Performed by: NURSE PRACTITIONER

## 2023-12-09 PROCEDURE — 85049 AUTOMATED PLATELET COUNT: CPT | Performed by: SURGERY

## 2023-12-09 PROCEDURE — 20600001 HC STEP DOWN PRIVATE ROOM

## 2023-12-09 PROCEDURE — 3E03317 INTRODUCTION OF OTHER THROMBOLYTIC INTO PERIPHERAL VEIN, PERCUTANEOUS APPROACH: ICD-10-PCS | Performed by: SURGERY

## 2023-12-09 PROCEDURE — 85384 FIBRINOGEN ACTIVITY: CPT | Mod: 91 | Performed by: SURGERY

## 2023-12-09 PROCEDURE — 81300005 HC LIVER TRANSPORT, GROUND 4-5 HOURS

## 2023-12-09 PROCEDURE — D9220A PRA ANESTHESIA: ICD-10-PCS | Mod: CRNA,,, | Performed by: NURSE ANESTHETIST, CERTIFIED REGISTERED

## 2023-12-09 PROCEDURE — 85014 HEMATOCRIT: CPT | Mod: 91 | Performed by: SURGERY

## 2023-12-09 PROCEDURE — 27200677 HC TRANSDUCER MONITOR KIT SINGLE: Mod: NTX | Performed by: ANESTHESIOLOGY

## 2023-12-09 PROCEDURE — P9045 ALBUMIN (HUMAN), 5%, 250 ML: HCPCS | Mod: JZ,JG | Performed by: NURSE ANESTHETIST, CERTIFIED REGISTERED

## 2023-12-09 PROCEDURE — 88313 SPECIAL STAINS GROUP 2: CPT | Mod: 59 | Performed by: PATHOLOGY

## 2023-12-09 PROCEDURE — 86704 HEP B CORE ANTIBODY TOTAL: CPT | Mod: NTX | Performed by: NURSE PRACTITIONER

## 2023-12-09 PROCEDURE — 86965 POOLING BLOOD PLATELETS: CPT | Performed by: ANESTHESIOLOGY

## 2023-12-09 PROCEDURE — 84460 ALANINE AMINO (ALT) (SGPT): CPT | Performed by: SURGERY

## 2023-12-09 PROCEDURE — 82947 ASSAY GLUCOSE BLOOD QUANT: CPT | Mod: 91 | Performed by: SURGERY

## 2023-12-09 PROCEDURE — C1751 CATH, INF, PER/CENT/MIDLINE: HCPCS | Mod: NTX | Performed by: ANESTHESIOLOGY

## 2023-12-09 PROCEDURE — 93010 ELECTROCARDIOGRAM REPORT: CPT | Mod: ,,, | Performed by: INTERNAL MEDICINE

## 2023-12-09 PROCEDURE — 25000003 PHARM REV CODE 250: Performed by: NURSE ANESTHETIST, CERTIFIED REGISTERED

## 2023-12-09 PROCEDURE — 85610 PROTHROMBIN TIME: CPT | Mod: NTX | Performed by: NURSE PRACTITIONER

## 2023-12-09 PROCEDURE — 84450 TRANSFERASE (AST) (SGOT): CPT | Performed by: SURGERY

## 2023-12-09 PROCEDURE — 88307 TISSUE EXAM BY PATHOLOGIST: CPT | Performed by: PATHOLOGY

## 2023-12-09 PROCEDURE — 85384 FIBRINOGEN ACTIVITY: CPT | Performed by: SURGERY

## 2023-12-09 PROCEDURE — 86706 HEP B SURFACE ANTIBODY: CPT | Mod: NTX | Performed by: NURSE PRACTITIONER

## 2023-12-09 PROCEDURE — 88309 TISSUE EXAM BY PATHOLOGIST: CPT | Performed by: PATHOLOGY

## 2023-12-09 PROCEDURE — 63600175 PHARM REV CODE 636 W HCPCS: Performed by: NURSE ANESTHETIST, CERTIFIED REGISTERED

## 2023-12-09 PROCEDURE — 84295 ASSAY OF SERUM SODIUM: CPT | Performed by: TRANSPLANT SURGERY

## 2023-12-09 PROCEDURE — D9220A PRA ANESTHESIA: ICD-10-PCS | Mod: ANES,,, | Performed by: ANESTHESIOLOGY

## 2023-12-09 PROCEDURE — 82330 ASSAY OF CALCIUM: CPT | Performed by: TRANSPLANT SURGERY

## 2023-12-09 PROCEDURE — 93005 ELECTROCARDIOGRAM TRACING: CPT

## 2023-12-09 PROCEDURE — 27201423 OPTIME MED/SURG SUP & DEVICES STERILE SUPPLY: Performed by: TRANSPLANT SURGERY

## 2023-12-09 PROCEDURE — 30233D1 TRANSFUSION OF NONAUTOLOGOUS PATHOGEN REDUCED CRYOPRECIPITATED FIBRINOGEN COMPLEX INTO PERIPHERAL VEIN, PERCUTANEOUS APPROACH: ICD-10-PCS | Performed by: TRANSPLANT SURGERY

## 2023-12-09 PROCEDURE — 85730 THROMBOPLASTIN TIME PARTIAL: CPT | Performed by: SURGERY

## 2023-12-09 PROCEDURE — 37000009 HC ANESTHESIA EA ADD 15 MINS: Performed by: TRANSPLANT SURGERY

## 2023-12-09 PROCEDURE — 36415 COLL VENOUS BLD VENIPUNCTURE: CPT | Performed by: TRANSPLANT SURGERY

## 2023-12-09 PROCEDURE — 93503 INSERT/PLACE HEART CATHETER: CPT | Mod: 59,,, | Performed by: ANESTHESIOLOGY

## 2023-12-09 PROCEDURE — 86901 BLOOD TYPING SEROLOGIC RH(D): CPT | Mod: NTX | Performed by: NURSE PRACTITIONER

## 2023-12-09 PROCEDURE — 85384 FIBRINOGEN ACTIVITY: CPT | Performed by: TRANSPLANT SURGERY

## 2023-12-09 PROCEDURE — 63600175 PHARM REV CODE 636 W HCPCS: Mod: NTX | Performed by: NURSE PRACTITIONER

## 2023-12-09 PROCEDURE — U0002 COVID-19 LAB TEST NON-CDC: HCPCS | Mod: NTX | Performed by: NURSE PRACTITIONER

## 2023-12-09 PROCEDURE — 93503 SWAN GANZ LINE: ICD-10-PCS | Mod: 59,,, | Performed by: ANESTHESIOLOGY

## 2023-12-09 PROCEDURE — 85049 AUTOMATED PLATELET COUNT: CPT | Mod: 91 | Performed by: SURGERY

## 2023-12-09 PROCEDURE — 27201041 HC RESERVOIR, CARDIOTOMY

## 2023-12-09 PROCEDURE — 84295 ASSAY OF SERUM SODIUM: CPT | Mod: 91 | Performed by: SURGERY

## 2023-12-09 PROCEDURE — 27800506 HC CATH, RAPID INFUSION (7.5&8.5): Mod: NTX | Performed by: ANESTHESIOLOGY

## 2023-12-09 PROCEDURE — 87102 FUNGUS ISOLATION CULTURE: CPT | Performed by: TRANSPLANT SURGERY

## 2023-12-09 PROCEDURE — 85610 PROTHROMBIN TIME: CPT | Mod: 91 | Performed by: SURGERY

## 2023-12-09 PROCEDURE — 87070 CULTURE OTHR SPECIMN AEROBIC: CPT | Performed by: TRANSPLANT SURGERY

## 2023-12-09 PROCEDURE — 88313 SPECIAL STAINS GROUP 2: CPT | Mod: 26,,, | Performed by: PATHOLOGY

## 2023-12-09 PROCEDURE — 85049 AUTOMATED PLATELET COUNT: CPT | Performed by: TRANSPLANT SURGERY

## 2023-12-09 PROCEDURE — 84132 ASSAY OF SERUM POTASSIUM: CPT | Performed by: TRANSPLANT SURGERY

## 2023-12-09 PROCEDURE — 85014 HEMATOCRIT: CPT | Performed by: TRANSPLANT SURGERY

## 2023-12-09 PROCEDURE — 0FY00Z0 TRANSPLANTATION OF LIVER, ALLOGENEIC, OPEN APPROACH: ICD-10-PCS | Performed by: TRANSPLANT SURGERY

## 2023-12-09 PROCEDURE — 85520 HEPARIN ASSAY: CPT

## 2023-12-09 PROCEDURE — 82040 ASSAY OF SERUM ALBUMIN: CPT | Performed by: SURGERY

## 2023-12-09 PROCEDURE — 25000242 PHARM REV CODE 250 ALT 637 W/ HCPCS: Performed by: NURSE ANESTHETIST, CERTIFIED REGISTERED

## 2023-12-09 PROCEDURE — 36000931 HC OR TIME LEV VII EA ADD 15 MIN: Performed by: TRANSPLANT SURGERY

## 2023-12-09 PROCEDURE — 86803 HEPATITIS C AB TEST: CPT | Mod: NTX | Performed by: NURSE PRACTITIONER

## 2023-12-09 PROCEDURE — 87116 MYCOBACTERIA CULTURE: CPT | Performed by: TRANSPLANT SURGERY

## 2023-12-09 PROCEDURE — 86920 COMPATIBILITY TEST SPIN: CPT | Mod: NTX | Performed by: NURSE PRACTITIONER

## 2023-12-09 PROCEDURE — 36415 COLL VENOUS BLD VENIPUNCTURE: CPT | Performed by: SURGERY

## 2023-12-09 PROCEDURE — C1894 INTRO/SHEATH, NON-LASER: HCPCS | Mod: NTX | Performed by: ANESTHESIOLOGY

## 2023-12-09 PROCEDURE — 84295 ASSAY OF SERUM SODIUM: CPT | Performed by: SURGERY

## 2023-12-09 PROCEDURE — 87205 SMEAR GRAM STAIN: CPT | Performed by: TRANSPLANT SURGERY

## 2023-12-09 PROCEDURE — D9220A PRA ANESTHESIA: Mod: CRNA,,, | Performed by: NURSE ANESTHETIST, CERTIFIED REGISTERED

## 2023-12-09 PROCEDURE — P9017 PLASMA 1 DONOR FRZ W/IN 8 HR: HCPCS | Performed by: NURSE PRACTITIONER

## 2023-12-09 PROCEDURE — 36620 INSERTION CATHETER ARTERY: CPT | Mod: 59,,, | Performed by: ANESTHESIOLOGY

## 2023-12-09 PROCEDURE — 87522 HEPATITIS C REVRS TRNSCRPJ: CPT | Mod: NTX | Performed by: NURSE PRACTITIONER

## 2023-12-09 PROCEDURE — 83735 ASSAY OF MAGNESIUM: CPT | Performed by: SURGERY

## 2023-12-09 PROCEDURE — D9220A PRA ANESTHESIA: Mod: ANES,,, | Performed by: ANESTHESIOLOGY

## 2023-12-09 PROCEDURE — P9035 PLATELET PHERES LEUKOREDUCED: HCPCS | Performed by: ANESTHESIOLOGY

## 2023-12-09 PROCEDURE — 87340 HEPATITIS B SURFACE AG IA: CPT | Mod: NTX | Performed by: NURSE PRACTITIONER

## 2023-12-09 PROCEDURE — 88307 TISSUE EXAM BY PATHOLOGIST: CPT | Mod: 26,,, | Performed by: PATHOLOGY

## 2023-12-09 PROCEDURE — 87389 HIV-1 AG W/HIV-1&-2 AB AG IA: CPT | Mod: NTX | Performed by: NURSE PRACTITIONER

## 2023-12-09 PROCEDURE — 37000008 HC ANESTHESIA 1ST 15 MINUTES: Performed by: TRANSPLANT SURGERY

## 2023-12-09 PROCEDURE — 81300000 HC LIVER ACQUISITION CHARGE

## 2023-12-09 PROCEDURE — 85018 HEMOGLOBIN: CPT | Mod: 91 | Performed by: SURGERY

## 2023-12-09 PROCEDURE — 36415 COLL VENOUS BLD VENIPUNCTURE: CPT | Mod: NTX | Performed by: NURSE PRACTITIONER

## 2023-12-09 PROCEDURE — 36620 ARTERIAL: ICD-10-PCS | Mod: 59,,, | Performed by: ANESTHESIOLOGY

## 2023-12-09 PROCEDURE — 82947 ASSAY GLUCOSE BLOOD QUANT: CPT | Performed by: SURGERY

## 2023-12-09 PROCEDURE — 47135 TRANSPLANTATION OF LIVER: CPT | Mod: 82,,, | Performed by: TRANSPLANT SURGERY

## 2023-12-09 PROCEDURE — 82040 ASSAY OF SERUM ALBUMIN: CPT | Mod: 91 | Performed by: SURGERY

## 2023-12-09 PROCEDURE — 99000 SPECIMEN HANDLING OFFICE-LAB: CPT | Mod: NTX | Performed by: NURSE PRACTITIONER

## 2023-12-09 PROCEDURE — 87075 CULTR BACTERIA EXCEPT BLOOD: CPT | Performed by: TRANSPLANT SURGERY

## 2023-12-09 PROCEDURE — 85730 THROMBOPLASTIN TIME PARTIAL: CPT | Mod: 91 | Performed by: SURGERY

## 2023-12-09 PROCEDURE — 83735 ASSAY OF MAGNESIUM: CPT | Performed by: TRANSPLANT SURGERY

## 2023-12-09 PROCEDURE — 30233N1 TRANSFUSION OF NONAUTOLOGOUS RED BLOOD CELLS INTO PERIPHERAL VEIN, PERCUTANEOUS APPROACH: ICD-10-PCS | Performed by: TRANSPLANT SURGERY

## 2023-12-09 PROCEDURE — 27100088 HC CELL SAVER

## 2023-12-09 PROCEDURE — 84132 ASSAY OF SERUM POTASSIUM: CPT | Mod: 91 | Performed by: SURGERY

## 2023-12-09 PROCEDURE — 84132 ASSAY OF SERUM POTASSIUM: CPT | Performed by: SURGERY

## 2023-12-09 PROCEDURE — 85730 THROMBOPLASTIN TIME PARTIAL: CPT | Performed by: TRANSPLANT SURGERY

## 2023-12-09 PROCEDURE — 36000930 HC OR TIME LEV VII 1ST 15 MIN: Performed by: TRANSPLANT SURGERY

## 2023-12-09 PROCEDURE — 82947 ASSAY GLUCOSE BLOOD QUANT: CPT | Performed by: TRANSPLANT SURGERY

## 2023-12-09 PROCEDURE — 47135 TRANSPLANTATION OF LIVER: CPT | Mod: ,,, | Performed by: TRANSPLANT SURGERY

## 2023-12-09 PROCEDURE — 27100021 HC MULTIPORT INFUSION MANIFOLD: Mod: NTX | Performed by: ANESTHESIOLOGY

## 2023-12-09 PROCEDURE — 30233K1 TRANSFUSION OF NONAUTOLOGOUS FROZEN PLASMA INTO PERIPHERAL VEIN, PERCUTANEOUS APPROACH: ICD-10-PCS | Performed by: TRANSPLANT SURGERY

## 2023-12-09 PROCEDURE — 80053 COMPREHEN METABOLIC PANEL: CPT | Mod: NTX | Performed by: NURSE PRACTITIONER

## 2023-12-09 PROCEDURE — 88309 TISSUE EXAM BY PATHOLOGIST: CPT | Mod: 26,,, | Performed by: PATHOLOGY

## 2023-12-09 PROCEDURE — 83735 ASSAY OF MAGNESIUM: CPT | Mod: 91 | Performed by: SURGERY

## 2023-12-09 PROCEDURE — 63600175 PHARM REV CODE 636 W HCPCS: Performed by: TRANSPLANT SURGERY

## 2023-12-09 PROCEDURE — 30233R1 TRANSFUSION OF NONAUTOLOGOUS PLATELETS INTO PERIPHERAL VEIN, PERCUTANEOUS APPROACH: ICD-10-PCS | Performed by: TRANSPLANT SURGERY

## 2023-12-09 PROCEDURE — 82040 ASSAY OF SERUM ALBUMIN: CPT | Performed by: TRANSPLANT SURGERY

## 2023-12-09 PROCEDURE — 27200656 HC CATH, FEMORAL ARTERY: Mod: NTX | Performed by: ANESTHESIOLOGY

## 2023-12-09 PROCEDURE — 27000191 HC C-V MONITORING

## 2023-12-09 PROCEDURE — 25000003 PHARM REV CODE 250: Performed by: TRANSPLANT SURGERY

## 2023-12-09 PROCEDURE — 85610 PROTHROMBIN TIME: CPT | Mod: 91 | Performed by: TRANSPLANT SURGERY

## 2023-12-09 PROCEDURE — 82330 ASSAY OF CALCIUM: CPT | Performed by: SURGERY

## 2023-12-09 PROCEDURE — 27100025 HC TUBING, SET FLUID WARMER: Mod: NTX | Performed by: ANESTHESIOLOGY

## 2023-12-09 PROCEDURE — 85025 COMPLETE CBC W/AUTO DIFF WBC: CPT | Mod: NTX | Performed by: NURSE PRACTITIONER

## 2023-12-09 PROCEDURE — 87206 SMEAR FLUORESCENT/ACID STAI: CPT | Performed by: TRANSPLANT SURGERY

## 2023-12-09 PROCEDURE — 85014 HEMATOCRIT: CPT | Performed by: SURGERY

## 2023-12-09 RX ORDER — CALCIUM CHLORIDE INJECTION 100 MG/ML
INJECTION, SOLUTION INTRAVENOUS
Status: DISCONTINUED | OUTPATIENT
Start: 2023-12-09 | End: 2023-12-10

## 2023-12-09 RX ORDER — FENTANYL CITRATE 50 UG/ML
INJECTION, SOLUTION INTRAMUSCULAR; INTRAVENOUS
Status: DISCONTINUED | OUTPATIENT
Start: 2023-12-09 | End: 2023-12-10

## 2023-12-09 RX ORDER — HEPARIN SODIUM 1000 [USP'U]/ML
INJECTION, SOLUTION INTRAVENOUS; SUBCUTANEOUS
Status: DISCONTINUED | OUTPATIENT
Start: 2023-12-09 | End: 2023-12-10

## 2023-12-09 RX ORDER — HYDROCODONE BITARTRATE AND ACETAMINOPHEN 500; 5 MG/1; MG/1
TABLET ORAL
Status: DISCONTINUED | OUTPATIENT
Start: 2023-12-09 | End: 2023-12-10

## 2023-12-09 RX ORDER — ROCURONIUM BROMIDE 10 MG/ML
INJECTION, SOLUTION INTRAVENOUS
Status: DISCONTINUED | OUTPATIENT
Start: 2023-12-09 | End: 2023-12-10

## 2023-12-09 RX ORDER — ONDANSETRON 2 MG/ML
4 INJECTION INTRAMUSCULAR; INTRAVENOUS ONCE
Status: COMPLETED | OUTPATIENT
Start: 2023-12-09 | End: 2023-12-09

## 2023-12-09 RX ORDER — FUROSEMIDE 10 MG/ML
INJECTION INTRAMUSCULAR; INTRAVENOUS
Status: DISCONTINUED | OUTPATIENT
Start: 2023-12-09 | End: 2023-12-10

## 2023-12-09 RX ORDER — DEXTROSE MONOHYDRATE 100 MG/ML
INJECTION, SOLUTION INTRAVENOUS CONTINUOUS PRN
Status: DISCONTINUED | OUTPATIENT
Start: 2023-12-09 | End: 2023-12-10

## 2023-12-09 RX ORDER — LIDOCAINE HYDROCHLORIDE 20 MG/ML
INJECTION, SOLUTION EPIDURAL; INFILTRATION; INTRACAUDAL; PERINEURAL
Status: DISCONTINUED | OUTPATIENT
Start: 2023-12-09 | End: 2023-12-10

## 2023-12-09 RX ORDER — ALBUMIN HUMAN 50 G/1000ML
SOLUTION INTRAVENOUS
Status: DISCONTINUED | OUTPATIENT
Start: 2023-12-09 | End: 2023-12-10

## 2023-12-09 RX ORDER — HALOPERIDOL 5 MG/ML
INJECTION INTRAMUSCULAR
Status: DISCONTINUED | OUTPATIENT
Start: 2023-12-09 | End: 2023-12-10

## 2023-12-09 RX ORDER — INDOMETHACIN 25 MG/1
CAPSULE ORAL
Status: DISCONTINUED | OUTPATIENT
Start: 2023-12-09 | End: 2023-12-10

## 2023-12-09 RX ORDER — VASOPRESSIN 20 [USP'U]/ML
INJECTION, SOLUTION INTRAMUSCULAR; SUBCUTANEOUS
Status: DISCONTINUED | OUTPATIENT
Start: 2023-12-09 | End: 2023-12-10

## 2023-12-09 RX ORDER — ALBUTEROL SULFATE 90 UG/1
AEROSOL, METERED RESPIRATORY (INHALATION)
Status: DISCONTINUED | OUTPATIENT
Start: 2023-12-09 | End: 2023-12-10

## 2023-12-09 RX ORDER — MANNITOL 20 G/100ML
INJECTION, SOLUTION INTRAVENOUS
Status: DISCONTINUED | OUTPATIENT
Start: 2023-12-09 | End: 2023-12-10

## 2023-12-09 RX ORDER — PROPOFOL 10 MG/ML
VIAL (ML) INTRAVENOUS
Status: DISCONTINUED | OUTPATIENT
Start: 2023-12-09 | End: 2023-12-10

## 2023-12-09 RX ORDER — HEPARIN SODIUM 1000 [USP'U]/ML
INJECTION, SOLUTION INTRAVENOUS; SUBCUTANEOUS
Status: DISCONTINUED | OUTPATIENT
Start: 2023-12-09 | End: 2023-12-10 | Stop reason: HOSPADM

## 2023-12-09 RX ORDER — METHYLPREDNISOLONE SODIUM SUCCINATE 500 MG/8ML
500 INJECTION INTRAMUSCULAR; INTRAVENOUS
Status: DISCONTINUED | OUTPATIENT
Start: 2023-12-09 | End: 2023-12-10

## 2023-12-09 RX ORDER — NICARDIPINE HYDROCHLORIDE 2.5 MG/ML
INJECTION INTRAVENOUS
Status: DISCONTINUED | OUTPATIENT
Start: 2023-12-09 | End: 2023-12-10 | Stop reason: HOSPADM

## 2023-12-09 RX ORDER — ONDANSETRON 2 MG/ML
INJECTION INTRAMUSCULAR; INTRAVENOUS
Status: DISCONTINUED | OUTPATIENT
Start: 2023-12-09 | End: 2023-12-10

## 2023-12-09 RX ORDER — KETAMINE HCL IN 0.9 % NACL 50 MG/5 ML
SYRINGE (ML) INTRAVENOUS
Status: DISCONTINUED | OUTPATIENT
Start: 2023-12-09 | End: 2023-12-10

## 2023-12-09 RX ORDER — MUPIROCIN 20 MG/G
OINTMENT TOPICAL
Status: DISCONTINUED | OUTPATIENT
Start: 2023-12-09 | End: 2023-12-10

## 2023-12-09 RX ORDER — MIDAZOLAM HYDROCHLORIDE 1 MG/ML
INJECTION, SOLUTION INTRAMUSCULAR; INTRAVENOUS
Status: DISCONTINUED | OUTPATIENT
Start: 2023-12-09 | End: 2023-12-10

## 2023-12-09 RX ORDER — NOREPINEPHRINE BITARTRATE 1 MG/ML
INJECTION, SOLUTION INTRAVENOUS
Status: DISCONTINUED | OUTPATIENT
Start: 2023-12-09 | End: 2023-12-10

## 2023-12-09 RX ADMIN — NOREPINEPHRINE BITARTRATE 0.16 MCG: 1 INJECTION, SOLUTION, CONCENTRATE INTRAVENOUS at 06:12

## 2023-12-09 RX ADMIN — FUROSEMIDE 100 MG: 10 INJECTION, SOLUTION INTRAMUSCULAR; INTRAVENOUS at 07:12

## 2023-12-09 RX ADMIN — SODIUM CHLORIDE, SODIUM GLUCONATE, SODIUM ACETATE, POTASSIUM CHLORIDE, MAGNESIUM CHLORIDE, SODIUM PHOSPHATE, DIBASIC, AND POTASSIUM PHOSPHATE: .53; .5; .37; .037; .03; .012; .00082 INJECTION, SOLUTION INTRAVENOUS at 08:12

## 2023-12-09 RX ADMIN — CALCIUM CHLORIDE 0.5 G: 100 INJECTION, SOLUTION INTRAVENOUS at 09:12

## 2023-12-09 RX ADMIN — AMPICILLIN SODIUM AND SULBACTAM SODIUM 3 G: 2; 1 INJECTION, POWDER, FOR SOLUTION INTRAMUSCULAR; INTRAVENOUS at 10:12

## 2023-12-09 RX ADMIN — MIDAZOLAM 2 MG: 1 INJECTION INTRAMUSCULAR; INTRAVENOUS at 06:12

## 2023-12-09 RX ADMIN — SODIUM CHLORIDE, SODIUM GLUCONATE, SODIUM ACETATE, POTASSIUM CHLORIDE, MAGNESIUM CHLORIDE, SODIUM PHOSPHATE, DIBASIC, AND POTASSIUM PHOSPHATE: .53; .5; .37; .037; .03; .012; .00082 INJECTION, SOLUTION INTRAVENOUS at 07:12

## 2023-12-09 RX ADMIN — MANNITOL 25 G: 20 INJECTION, SOLUTION INTRAVENOUS at 07:12

## 2023-12-09 RX ADMIN — NOREPINEPHRINE BITARTRATE 0.16 MCG: 1 INJECTION, SOLUTION, CONCENTRATE INTRAVENOUS at 07:12

## 2023-12-09 RX ADMIN — MANNITOL 25 G: 20 INJECTION, SOLUTION INTRAVENOUS at 10:12

## 2023-12-09 RX ADMIN — SODIUM CHLORIDE, SODIUM GLUCONATE, SODIUM ACETATE, POTASSIUM CHLORIDE, MAGNESIUM CHLORIDE, SODIUM PHOSPHATE, DIBASIC, AND POTASSIUM PHOSPHATE: .53; .5; .37; .037; .03; .012; .00082 INJECTION, SOLUTION INTRAVENOUS at 06:12

## 2023-12-09 RX ADMIN — VASOPRESSIN 1 UNITS: 20 INJECTION INTRAVENOUS at 07:12

## 2023-12-09 RX ADMIN — SODIUM BICARBONATE 50 MEQ: 84 INJECTION, SOLUTION INTRAVENOUS at 09:12

## 2023-12-09 RX ADMIN — FENTANYL CITRATE 100 MCG: 50 INJECTION INTRAMUSCULAR; INTRAVENOUS at 06:12

## 2023-12-09 RX ADMIN — SODIUM CHLORIDE, SODIUM GLUCONATE, SODIUM ACETATE, POTASSIUM CHLORIDE, MAGNESIUM CHLORIDE, SODIUM PHOSPHATE, DIBASIC, AND POTASSIUM PHOSPHATE: .53; .5; .37; .037; .03; .012; .00082 INJECTION, SOLUTION INTRAVENOUS at 09:12

## 2023-12-09 RX ADMIN — INSULIN HUMAN 10 UNITS: 100 INJECTION, SOLUTION PARENTERAL at 09:12

## 2023-12-09 RX ADMIN — NOREPINEPHRINE BITARTRATE 0.16 MCG: 1 INJECTION, SOLUTION, CONCENTRATE INTRAVENOUS at 09:12

## 2023-12-09 RX ADMIN — ALBUTEROL SULFATE 8 PUFF: 108 INHALANT RESPIRATORY (INHALATION) at 09:12

## 2023-12-09 RX ADMIN — Medication 15 MG: at 11:12

## 2023-12-09 RX ADMIN — HEPARIN SODIUM 2000 UNITS: 1000 INJECTION, SOLUTION INTRAVENOUS; SUBCUTANEOUS at 09:12

## 2023-12-09 RX ADMIN — ONDANSETRON 4 MG: 2 INJECTION INTRAMUSCULAR; INTRAVENOUS at 02:12

## 2023-12-09 RX ADMIN — METHYLPREDNISOLONE SODIUM SUCCINATE 500 MG: 500 INJECTION INTRAMUSCULAR; INTRAVENOUS at 08:12

## 2023-12-09 RX ADMIN — PROPOFOL 150 MG: 10 INJECTION, EMULSION INTRAVENOUS at 06:12

## 2023-12-09 RX ADMIN — HALOPERIDOL LACTATE 1 MG: 5 INJECTION, SOLUTION INTRAMUSCULAR at 11:12

## 2023-12-09 RX ADMIN — CALCIUM CHLORIDE 1 G: 100 INJECTION, SOLUTION INTRAVENOUS at 10:12

## 2023-12-09 RX ADMIN — Medication 10 MG: at 11:12

## 2023-12-09 RX ADMIN — NOREPINEPHRINE BITARTRATE 0.16 MCG: 1 INJECTION, SOLUTION, CONCENTRATE INTRAVENOUS at 10:12

## 2023-12-09 RX ADMIN — LIDOCAINE HYDROCHLORIDE 100 MG: 20 INJECTION, SOLUTION EPIDURAL; INFILTRATION; INTRACAUDAL; PERINEURAL at 06:12

## 2023-12-09 RX ADMIN — ROCURONIUM BROMIDE 30 MG: 10 INJECTION INTRAVENOUS at 09:12

## 2023-12-09 RX ADMIN — NOREPINEPHRINE BITARTRATE 0.04 MCG/KG/MIN: 1 INJECTION, SOLUTION, CONCENTRATE INTRAVENOUS at 08:12

## 2023-12-09 RX ADMIN — AMPICILLIN SODIUM AND SULBACTAM SODIUM 3 G: 2; 1 INJECTION, POWDER, FOR SOLUTION INTRAMUSCULAR; INTRAVENOUS at 08:12

## 2023-12-09 RX ADMIN — VASOPRESSIN 1 UNITS: 20 INJECTION INTRAVENOUS at 06:12

## 2023-12-09 RX ADMIN — VASOPRESSIN 0.04 UNITS/MIN: 20 INJECTION INTRAVENOUS at 08:12

## 2023-12-09 RX ADMIN — ROCURONIUM BROMIDE 100 MG: 10 INJECTION INTRAVENOUS at 06:12

## 2023-12-09 RX ADMIN — DEXTROSE MONOHYDRATE: 100 INJECTION, SOLUTION INTRAVENOUS at 09:12

## 2023-12-09 RX ADMIN — VASOPRESSIN 2 UNITS: 20 INJECTION INTRAVENOUS at 10:12

## 2023-12-09 RX ADMIN — FENTANYL CITRATE 100 MCG: 50 INJECTION INTRAMUSCULAR; INTRAVENOUS at 08:12

## 2023-12-09 RX ADMIN — ALBUMIN (HUMAN) 500 ML: 12.5 SOLUTION INTRAVENOUS at 08:12

## 2023-12-09 RX ADMIN — ALBUMIN (HUMAN) 500 ML: 12.5 SOLUTION INTRAVENOUS at 10:12

## 2023-12-09 RX ADMIN — ROCURONIUM BROMIDE 40 MG: 10 INJECTION INTRAVENOUS at 07:12

## 2023-12-09 RX ADMIN — FUROSEMIDE 100 MG: 10 INJECTION, SOLUTION INTRAMUSCULAR; INTRAVENOUS at 10:12

## 2023-12-09 RX ADMIN — SODIUM CHLORIDE, SODIUM GLUCONATE, SODIUM ACETATE, POTASSIUM CHLORIDE, MAGNESIUM CHLORIDE, SODIUM PHOSPHATE, DIBASIC, AND POTASSIUM PHOSPHATE: .53; .5; .37; .037; .03; .012; .00082 INJECTION, SOLUTION INTRAVENOUS at 11:12

## 2023-12-09 RX ADMIN — ONDANSETRON 4 MG: 2 INJECTION INTRAMUSCULAR; INTRAVENOUS at 11:12

## 2023-12-09 RX ADMIN — CALCIUM CHLORIDE 1 G: 100 INJECTION, SOLUTION INTRAVENOUS at 09:12

## 2023-12-09 RX ADMIN — SODIUM CHLORIDE, SODIUM GLUCONATE, SODIUM ACETATE, POTASSIUM CHLORIDE, MAGNESIUM CHLORIDE, SODIUM PHOSPHATE, DIBASIC, AND POTASSIUM PHOSPHATE: .53; .5; .37; .037; .03; .012; .00082 INJECTION, SOLUTION INTRAVENOUS at 10:12

## 2023-12-09 NOTE — HPI
Ms. Gonzalez is a 62 y/o female who present for Liver Transplant for liver cirrhosis 2/2 to DURAN HX: HCC (status post Y90 06/2023) esophageal varices, ascites, hepatic encephalopathy. She reports in usual state of health- Patient denies any recent hospitalizations or ED visits. The primary surgeon will be Dr. Raza.  Patient is NPO.  All pre-op labs and imaging have been ordered and will be reviewed prior to surgery. Consents to be signed prior to transplant.

## 2023-12-09 NOTE — NURSING
Pt aao x3. Vss. No acute distress. Pt steady on her feet. Pt going for a liver transplant shortly. Daughter at bedside. Preop checklist complete. Skin intact.

## 2023-12-09 NOTE — ANESTHESIA PREPROCEDURE EVALUATION
[facility]  Anesthesia Pre-Operative Evaluation         Patient Name: Yumiko Gonzalez  YOB: 1960  MRN: [id number]    SUBJECTIVE:     Pre-operative evaluation for Procedure(s) (LRB):  TRANSPLANT, LIVER (N/A)     12/09/2023    Yumiko Gonzalez is a 63 y.o. female w/ a significant PMHx of HTN, MDD, PTSD, hx cervical CA, cervical DDD, HCC s/p radioembolization 6/2023 and cirrhosis 2/2 DURAN with complications of prior hepatic encephalopathy, ascites, esophageal varices.    Patient recently admitted for liver tx but liver found to be too large.    Patient now presents for the above procedure(s).    Stress Echo 4/2023  Bubble study is positive for an extracardiac shunt. A large amount of bubbles enter the left atrium via the pulmonary veins.  The stress echo portion of this study is negative for myocardial ischemia. Target heart rate was achieved.  The ECG portion of this study is negative for myocardial ischemia.  There were no arrhythmias during stress.  The patient reached the end of the protocol.  The left ventricle is normal in size with normal systolic function.  The estimated ejection fraction is 65%.  Normal left ventricular diastolic function.  Normal right ventricular size with normal right ventricular systolic function.  The estimated PA systolic pressure is 33 mmHg.  Normal central venous pressure (3 mmHg).  Moderate left atrial enlargement.    LDA: None documented.     Prev airway: None documented.    Drips: None documented      Patient Active Problem List   Diagnosis    Acute bronchospasm    Hypermetropia    Enthesopathy of ankle and tarsus, unspecified    Cervical neck pain with evidence of disc disease    History of cervical cancer    Moderate episode of recurrent major depressive disorder    Diverticular disease    Colon polyps    Hypertension    Hx of colonic polyps    Severe obesity (BMI 35.0-35.9 with comorbidity)    Splenomegaly    History of posttraumatic stress disorder  (PTSD)    Nonalcoholic steatohepatitis    Cirrhosis of liver with ascites    Cirrhosis    Mass of right lung    Wears contact lenses    Esophageal varices determined by endoscopy    Portal hypertension    Hepatic encephalopathy    ACP (advance care planning)    Cellulitis of left lower extremity    Lower extremity edema    Tremor    HCC (hepatocellular carcinoma)    Thrombocytopenia    Chronic bronchitis, unspecified chronic bronchitis type    Bronchiectasis without complication    Senile purpura    Chest wall pain    Actinic keratosis    Chronic pain of left knee    Decreased strength of lower extremity    Shortness of breath    Liver transplant candidate       Review of patient's allergies indicates:   Allergen Reactions    No known drug allergies        Current Inpatient Medications:      No current facility-administered medications on file prior to encounter.     Current Outpatient Medications on File Prior to Encounter   Medication Sig Dispense Refill    albuterol (PROVENTIL/VENTOLIN HFA) 90 mcg/actuation inhaler Inhale 2 puffs every 4 hours as needed for cough, wheeze, or shortness of breath 18 g 11    ALPRAZolam (XANAX) 0.25 MG tablet Take 1 tablet (0.25 mg total) by mouth nightly as needed. FOR INSOMNIA (Patient taking differently: Take 0.125-0.25 mg by mouth nightly as needed. FOR INSOMNIA) 30 tablet 2    buPROPion (WELLBUTRIN XL) 150 MG TB24 tablet Take 2 tablets (300 mg total) by mouth once daily. 180 tablet 3    fluticasone-umeclidin-vilanter (TRELEGY ELLIPTA) 200-62.5-25 mcg inhaler Inhale 1 puff into the lungs once daily. 60 each 11    furosemide (LASIX) 40 MG tablet Take 1.5 tablets (60 mg total) by mouth once daily. 135 tablet 1    lactulose (CONSTULOSE) 10 gram/15 mL solution Take 30 mLs (20 g total) by mouth 2 (two) times daily. (Patient taking differently: Take 20 g by mouth as needed.) 5000 mL 6    loratadine (CLARITIN) 10 mg tablet Take 10 mg by mouth daily as needed.      multivitamin  (THERAGRAN) per tablet Take 1 tablet by mouth once daily.      omeprazole (PRILOSEC) 20 MG capsule Take 1 capsule (20 mg total) by mouth once daily. 90 capsule 3    propranoloL (INDERAL LA) 80 MG 24 hr capsule Take 1 capsule (80 mg total) by mouth once daily. 30 capsule 11    rifAXIMin (XIFAXAN) 550 mg Tab Take 1 tablet (550 mg total) by mouth 2 (two) times daily. (Patient taking differently: Take 550 mg by mouth once daily.) 60 tablet 11    spironolactone (ALDACTONE) 100 MG tablet Take 1.5 tablets (150 mg total) by mouth once daily. 135 tablet 1    vitamin E 400 UNIT capsule Take 400 Units by mouth once daily.         Past Surgical History:   Procedure Laterality Date    ADENOIDECTOMY      CERVICAL CONIZATION   W/ LASER       SECTION      x3    COLONOSCOPY N/A 2016    Procedure: COLONOSCOPY;  Surgeon: James Palomares MD;  Location: [facility];  Service: Endoscopy;  Laterality: N/A;    COLONOSCOPY N/A 2/3/2022    Procedure: COLONOSCOPY;  Surgeon: James Palomares MD;  Location: [facility];  Service: Endoscopy;  Laterality: N/A;    EMBOLIZATION N/A 2018    Procedure: EMBOLIZATION, BLOOD VESSEL;  Surgeon: Joselin Surgeon;  Location: [facility];  Service: Radiology;  Laterality: N/A;    ENDOMETRIAL ABLATION      ESOPHAGOGASTRODUODENOSCOPY N/A 2020    Procedure: ESOPHAGOGASTRODUODENOSCOPY (EGD);  Surgeon: James Palomares MD;  Location: [facility];  Service: Endoscopy;  Laterality: N/A;    ESOPHAGOGASTRODUODENOSCOPY N/A 3/8/2022    Procedure: EGD (ESOPHAGOGASTRODUODENOSCOPY);  Surgeon: James Palomares MD;  Location: [facility];  Service: Endoscopy;  Laterality: N/A;    LIVER BIOPSY      PELVIC LAPAROSCOPY      TONSILLECTOMY      TUBAL LIGATION         Social History     Socioeconomic History    Marital status:    Occupational History    Occupation: [employer] employee x 30 yrs   Tobacco Use    Smoking status: Former     Current packs/day: 0.00     Types: Cigarettes     Quit date:  2/3/2006     Years since quittin.8    Smokeless tobacco: Never   Substance and Sexual Activity    Alcohol use: Not Currently    Drug use: No    Sexual activity: Not Currently     Social Determinants of Health     Financial Resource Strain: Low Risk  (2023)    Overall Financial Resource Strain (CARDIA)     Difficulty of Paying Living Expenses: Not very hard   Food Insecurity: No Food Insecurity (2023)    Hunger Vital Sign     Worried About Running Out of Food in the Last Year: Never true     Ran Out of Food in the Last Year: Never true   Transportation Needs: No Transportation Needs (2023)    PRAPARE - Transportation     Lack of Transportation (Medical): No     Lack of Transportation (Non-Medical): No   Physical Activity: Inactive (2023)    Exercise Vital Sign     Days of Exercise per Week: 0 days     Minutes of Exercise per Session: 0 min   Stress: Stress Concern Present (2023)    [employer] - Occupational Stress Questionnaire     Feeling of Stress : Very much   Social Connections: Unknown (2023)    Social Connection and Isolation Panel [NHANES]     Frequency of Communication with Friends and Family: More than three times a week     Frequency of Social Gatherings with Friends and Family: Twice a week     Active Member of Clubs or Organizations: No     Attends Club or Organization Meetings: Never     Marital Status:    Housing Stability: Low Risk  (2023)    Housing Stability Vital Sign     Unable to Pay for Housing in the Last Year: No     Number of Places Lived in the Last Year: 1     Unstable Housing in the Last Year: No       OBJECTIVE:     Vital Signs Range (Last 24H):         Significant Labs:  Lab Results   Component Value Date    WBC 5.78 2023    HGB 13.3 2023    HCT 39.6 2023     (L) 2023    CHOL 137 2023    TRIG 63 2023    HDL 33 (L) 2023    ALT 39 2023    AST 68 (H)  11/25/2023     (L) 11/25/2023    K 3.8 11/25/2023     11/25/2023    CREATININE 0.9 11/25/2023    BUN 24 (H) 11/25/2023    CO2 25 11/25/2023    TSH 2.728 04/13/2023    INR 1.4 (H) 11/25/2023    HGBA1C 4.6 10/31/2023       Diagnostic Studies: No relevant studies.    EKG:   Results for orders placed or performed during the hospital encounter of 11/25/23   EKG 12-lead    Collection Time: 11/25/23  7:43 PM    Narrative    Test Reason : Z76.82,    Vent. Rate : 068 BPM     Atrial Rate : 068 BPM     P-R Int : 146 ms          QRS Dur : 116 ms      QT Int : 432 ms       P-R-T Axes : 032 -14 089 degrees     QTc Int : 459 ms    Normal sinus rhythm  LVH with QRS widening and repolarization abnormality ( R in aVL , Tariq  product )  Low septal forces and small septal Q V2  Abnormal ECG  When compared with ECG of 14-APR-2023 09:10,  Questionable change in QRS duration  Questionable change in initial forces of Septal leads  Confirmed by Ashwin JACOME MD ([id number]) on 11/26/2023 10:34:22 AM    Referred By: ENRICO CHINO           Confirmed By:Ashwin JACOME MD       2D ECHO:  TTE:  No results found. However, due to the size of the patient record, not all encounters were searched. Please check Results Review for a complete set of results.    CHADD:  No results found. However, due to the size of the patient record, not all encounters were searched. Please check Results Review for a complete set of results.    ASSESSMENT/PLAN:         Pre-op Assessment    I have reviewed the Patient Summary Reports.     I have reviewed the Nursing Notes. I have reviewed the NPO Status.   I have reviewed the Medications.     Review of Systems  Anesthesia Hx:  No problems with previous Anesthesia   Neg history of prior surgery.          Denies Family Hx of Anesthesia complications.    Denies Personal Hx of Anesthesia complications.                    Social:  Former Smoker       Hematology/Oncology:       -- Denies Anemia:               Hematology Comments:  thrombocytopenia      --  Cancer in past history:              radiation   Oncology Comments: Cervical, HCC     EENT/Dental:  EENT/Dental Normal           Cardiovascular:     Hypertension                     Shortness of Breath                    Hypertension         Pulmonary:    Asthma  Shortness of breath     Asthma:               Renal/:  Chronic Renal Disease        Kidney Function/Disease             Hepatic/GI:      Liver Disease, Hepatitis        Liver Disease, Hepatitis        Musculoskeletal:         Spine Disorders: cervical Degenerative disease           Neurological:      Headaches      Dx of Headaches                           Endocrine:  Endocrine Normal            Psych:  Psychiatric History                  Physical Exam  General: Well nourished, Cooperative, Alert and Oriented    Airway:  Mallampati: II / I  Mouth Opening: Normal  TM Distance: Normal  Tongue: Normal  Neck ROM: Normal ROM    Dental:  Caps / Implants, Periodontal disease        Anesthesia Plan  Type of Anesthesia, risks & benefits discussed:    Anesthesia Type: Gen ETT  Intra-op Monitoring Plan: Standard ASA Monitors, Art Line, Central Line and PA  Post Op Pain Control Plan: multimodal analgesia and IV/PO Opioids PRN  Induction:  IV  Airway Plan: Direct, Post-Induction  Informed Consent: Informed consent signed with the Patient and all parties understand the risks and agree with anesthesia plan.  All questions answered.   ASA Score: 3  Day of Surgery Review of History & Physical: H&P Update referred to the surgeon/provider.    Ready For Surgery From Anesthesia Perspective.     .

## 2023-12-09 NOTE — SUBJECTIVE & OBJECTIVE
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, postitive antismooth muscle antibody    Blood transfusion     Bronchitis     bronchospasm on occasion     Cancer 1987    carcinoma in situ    Cervical neck pain with evidence of disc disease 2012    can bend neck    Cirrhosis     non-alcoholic, compensated    Colon polyps 2012    Depression 2012    Hx panic attacks    Diverticular disease 2012    never diverticulitis    Fatty liver 2012    Fibrocystic breast     Fine tremor     hands,chronic    Hepatosplenomegaly     History of abdominal paracentesis 2023    8.7L of fluid drained    Hypertension 2013    Knee pain, bilateral     chronic, with hands,feet,fingers also painful    Lesion of right lung     Liver disease     "partially sclerosed liver" per pt    Migraines past Hx    Nephrolithiasis     stone episode 2021    Pleural effusion     small, compensated, good bilateral breath sounds per Dr Suresh GUTIERRES (postoperative nausea and vomiting)     twice after childbirth    Postpartum depression     Splenomegaly     Thrombocytopenia     Tremors of nervous system        Past Surgical History:   Procedure Laterality Date    ADENOIDECTOMY      CERVICAL CONIZATION   W/ LASER       SECTION      x3    COLONOSCOPY N/A 2016    Procedure: COLONOSCOPY;  Surgeon: aJmes Palomares MD;  Location: [facility] ENDO;  Service: Endoscopy;  Laterality: N/A;    COLONOSCOPY N/A 2/3/2022    Procedure: COLONOSCOPY;  Surgeon: James Palomares MD;  Location: [facility] ENDO;  Service: Endoscopy;  Laterality: N/A;    EMBOLIZATION N/A 2018    Procedure: EMBOLIZATION, BLOOD VESSEL;  Surgeon: Joselin Surgeon;  Location: [facility];  Service: Radiology;  Laterality: N/A;    ENDOMETRIAL ABLATION      " ESOPHAGOGASTRODUODENOSCOPY N/A 2020    Procedure: ESOPHAGOGASTRODUODENOSCOPY (EGD);  Surgeon: James Palomares MD;  Location: [facility] ENDO;  Service: Endoscopy;  Laterality: N/A;    ESOPHAGOGASTRODUODENOSCOPY N/A 3/8/2022    Procedure: EGD (ESOPHAGOGASTRODUODENOSCOPY);  Surgeon: James Palomares MD;  Location: [facility] ENDO;  Service: Endoscopy;  Laterality: N/A;    LIVER BIOPSY      PELVIC LAPAROSCOPY      TONSILLECTOMY      TUBAL LIGATION         Review of patient's allergies indicates:   Allergen Reactions    No known drug allergies        Family History       Problem Relation (Age of Onset)    Breast cancer Mother (70), Sister, Maternal Aunt (70), Maternal Grandmother (70), Maternal Cousin (40)    Cancer Sister    Diabetes Father, Sister, Sister, Brother    Emphysema Brother    Heart disease Mother, Father    Hypertension Mother    Multiple sclerosis Maternal Aunt, Maternal Aunt    Tremor Mother, Daughter          Tobacco Use    Smoking status: Former     Current packs/day: 0.00     Types: Cigarettes     Quit date: 2/3/2006     Years since quittin.8    Smokeless tobacco: Never   Substance and Sexual Activity    Alcohol use: Not Currently    Drug use: No    Sexual activity: Not Currently       PTA Medications   Medication Sig    buPROPion (WELLBUTRIN XL) 150 MG TB24 tablet Take 2 tablets (300 mg total) by mouth once daily.    fluticasone-umeclidin-vilanter (TRELEGY ELLIPTA) 200-62.5-25 mcg inhaler Inhale 1 puff into the lungs once daily.    omeprazole (PRILOSEC) 20 MG capsule Take 1 capsule (20 mg total) by mouth once daily.    propranoloL (INDERAL LA) 80 MG 24 hr capsule Take 1 capsule (80 mg total) by mouth once daily.    rifAXIMin (XIFAXAN) 550 mg Tab Take 1 tablet (550 mg total) by mouth 2 (two) times daily. (Patient taking differently: Take 550 mg by mouth once daily.)    spironolactone (ALDACTONE) 100 MG tablet Take 1.5 tablets (150 mg total) by mouth once daily.    vitamin E 400 UNIT capsule Take  400 Units by mouth once daily.    albuterol (PROVENTIL/VENTOLIN HFA) 90 mcg/actuation inhaler Inhale 2 puffs every 4 hours as needed for cough, wheeze, or shortness of breath    ALPRAZolam (XANAX) 0.25 MG tablet Take 1 tablet (0.25 mg total) by mouth nightly as needed. FOR INSOMNIA (Patient taking differently: Take 0.125-0.25 mg by mouth nightly as needed. FOR INSOMNIA)    furosemide (LASIX) 40 MG tablet Take 1.5 tablets (60 mg total) by mouth once daily.    lactulose (CONSTULOSE) 10 gram/15 mL solution Take 30 mLs (20 g total) by mouth 2 (two) times daily. (Patient taking differently: Take 20 g by mouth as needed.)    loratadine (CLARITIN) 10 mg tablet Take 10 mg by mouth daily as needed.    multivitamin (THERAGRAN) per tablet Take 1 tablet by mouth once daily.       Review of Systems   Constitutional:  Negative for activity change and appetite change.   HENT: Negative.     Eyes:  Negative for visual disturbance.   Respiratory:  Negative for shortness of breath.    Cardiovascular:  Negative for chest pain, palpitations and leg swelling.   Gastrointestinal:  Negative for abdominal distention, abdominal pain, diarrhea, nausea and vomiting.   Genitourinary:  Negative for difficulty urinating.   Musculoskeletal:  Negative for arthralgias, back pain and joint swelling.   Skin:  Positive for wound.   Allergic/Immunologic: Negative for immunocompromised state.   Neurological:  Negative for dizziness and facial asymmetry.   Hematological:  Negative for adenopathy. Does not bruise/bleed easily.   Psychiatric/Behavioral:  Negative for agitation and behavioral problems.    All other systems reviewed and are negative.    Objective:     Vital Signs (Most Recent):  Temp: 97.6 °F (36.4 °C) (12/09/23 1252)  Pulse: 69 (12/09/23 1252)  Resp: 16 (12/09/23 1252)  BP: (!) 116/56 (12/09/23 1252)  SpO2: 98 % (12/09/23 1252) Vital Signs (24h Range):  Temp:  [97.6 °F (36.4 °C)] 97.6 °F (36.4 °C)  Pulse:  [69] 69  Resp:  [16] 16  SpO2:  [98  "%] 98 %  BP: (116)/(56) 116/56     Weight: 99.6 kg (219 lb 7.5 oz)  Body mass index is 34.37 kg/m².    No intake or output data in the 24 hours ending 12/09/23 1339     Physical Exam  Vitals and nursing note reviewed.   Constitutional:       Appearance: She is well-developed.   HENT:      Head: Normocephalic.   Eyes:      Conjunctiva/sclera: Conjunctivae normal.   Cardiovascular:      Rate and Rhythm: Normal rate and regular rhythm.      Heart sounds: No murmur heard.  Pulmonary:      Effort: Pulmonary effort is normal.      Breath sounds: Normal breath sounds.   Abdominal:      General: Bowel sounds are normal. There is distension.      Palpations: Abdomen is soft.      Tenderness: There is no abdominal tenderness.   Musculoskeletal:         General: Normal range of motion.      Cervical back: Normal range of motion.   Skin:     General: Skin is warm and dry.      Capillary Refill: Capillary refill takes less than 2 seconds.   Neurological:      Mental Status: She is alert and oriented to person, place, and time.   Psychiatric:         Behavior: Behavior normal.         Thought Content: Thought content normal.         Judgment: Judgment normal.          Laboratory:  CBC: No results for input(s): "WBC", "RBC", "HGB", "HCT", "PLT", "MCV", "MCH", "MCHC" in the last 168 hours.  CMP: No results for input(s): "GLU", "CALCIUM", "ALBUMIN", "PROT", "NA", "K", "CO2", "CL", "BUN", "CREATININE", "ALKPHOS", "ALT", "AST", "BILITOT" in the last 168 hours.  Labs within the past 24 hours have been reviewed.    Diagnostic Results:  None  "

## 2023-12-09 NOTE — TELEPHONE ENCOUNTER
"[facility] LIVER COORDINATOR ORGAN OFFER NOTE   UNOS# [id number]   Notified by Jb Barriga, , that Yumiko Gonzalez is eligible for liver as a primary recipient offer.  Spoke with patient and identified no acute medical issues with telephone assessment. Protocol script read to patient regarding N/A, standard donor offer.  Patient was informed that if she turned down the offer A transplant doctor will call you after we hang up."  Patient was also informed that if she turned down this donor, she would not be punished or removed from the transplant list, that she  would remain on the list at her current status/MELD score. However, by waiting on the list longer rather than receiving this transplant, she will face a greater risk of death than any risk from the slight possibility of transmitted infection.  Patient was asked if they have had a positive COVID-19 test or if they have any signs or symptoms. Informed patient that they will be tested for COVID-19 upon arrival to the hospital, unless have a previous positive result. If tested and result is positive, the transplant will not be able to occur, they will be inactivated on the wait list for 21 days per protocol and required to quarantine.   Patient verbalized understanding, all questions answered, patient accepts organ offer.   Patient notified of plan NPO after eating light breakfast, come to be admitted now and states understanding.      "

## 2023-12-09 NOTE — H&P
"[facility] - Transplant Stepdown  Liver Transplant  History & Physical    Patient Name: Yumiko Gonzalez  MRN: [id number]  Admission Date: 12/9/2023  Code Status: Full Code  Primary Care Provider: Garrett Webb MD    Subjective:     History of Present Illness:  Ms. Gonzalez is a 64 y/o female who present for Liver Transplant for liver cirrhosis 2/2 to DURAN HX: HCC (status post Y90 06/2023) esophageal varices, ascites, hepatic encephalopathy. She reports in usual state of health- Patient denies any recent hospitalizations or ED visits. The primary surgeon will be Dr. Raza.  Patient is NPO.  All pre-op labs and imaging have been ordered and will be reviewed prior to surgery. Consents to be signed prior to transplant.      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, postitive antismooth muscle antibody    Blood transfusion     Bronchitis     bronchospasm on occasion     Cancer 1987    carcinoma in situ    Cervical neck pain with evidence of disc disease 03/22/2012    can bend neck    Cirrhosis     non-alcoholic, compensated    Colon polyps 03/22/2012    Depression 03/22/2012    Hx panic attacks    Diverticular disease 03/22/2012    never diverticulitis    Fatty liver 03/22/2012    Fibrocystic breast     Fine tremor     hands,chronic    Hepatosplenomegaly     History of abdominal paracentesis 01/18/2023    8.7L of fluid drained    Hypertension 04/24/2013    Knee pain, bilateral     chronic, with hands,feet,fingers also painful    Lesion of right lung     Liver disease     "partially sclerosed liver" per pt    Migraines past Hx    Nephrolithiasis     stone episode 1/2021    Pleural effusion     small, compensated, good bilateral breath sounds per Dr Suresh GUTIERRES (postoperative nausea and vomiting)     twice after childbirth    " Postpartum depression     Splenomegaly     Thrombocytopenia     Tremors of nervous system        Past Surgical History:   Procedure Laterality Date    ADENOIDECTOMY      CERVICAL CONIZATION   W/ LASER       SECTION      x3    COLONOSCOPY N/A 2016    Procedure: COLONOSCOPY;  Surgeon: James Palomares MD;  Location: [facility] ENDO;  Service: Endoscopy;  Laterality: N/A;    COLONOSCOPY N/A 2/3/2022    Procedure: COLONOSCOPY;  Surgeon: James Palomares MD;  Location: [facility] ENDO;  Service: Endoscopy;  Laterality: N/A;    EMBOLIZATION N/A 2018    Procedure: EMBOLIZATION, BLOOD VESSEL;  Surgeon: Joselin Surgeon;  Location: [facility];  Service: Radiology;  Laterality: N/A;    ENDOMETRIAL ABLATION      ESOPHAGOGASTRODUODENOSCOPY N/A 2020    Procedure: ESOPHAGOGASTRODUODENOSCOPY (EGD);  Surgeon: James Palomares MD;  Location: [facility] ENDO;  Service: Endoscopy;  Laterality: N/A;    ESOPHAGOGASTRODUODENOSCOPY N/A 3/8/2022    Procedure: EGD (ESOPHAGOGASTRODUODENOSCOPY);  Surgeon: James Palomares MD;  Location: [facility] ENDO;  Service: Endoscopy;  Laterality: N/A;    LIVER BIOPSY      PELVIC LAPAROSCOPY      TONSILLECTOMY      TUBAL LIGATION         Review of patient's allergies indicates:   Allergen Reactions    No known drug allergies        Family History       Problem Relation (Age of Onset)    Breast cancer Mother (70), Sister, Maternal Aunt (70), Maternal Grandmother (70), Maternal Cousin (40)    Cancer Sister    Diabetes Father, Sister, Sister, Brother    Emphysema Brother    Heart disease Mother, Father    Hypertension Mother    Multiple sclerosis Maternal Aunt, Maternal Aunt    Tremor Mother, Daughter          Tobacco Use    Smoking status: Former     Current packs/day: 0.00     Types: Cigarettes     Quit date: 2/3/2006     Years since quittin.8    Smokeless tobacco: Never   Substance and Sexual Activity    Alcohol use: Not Currently    Drug use: No    Sexual activity: Not Currently       PTA  Medications   Medication Sig    buPROPion (WELLBUTRIN XL) 150 MG TB24 tablet Take 2 tablets (300 mg total) by mouth once daily.    fluticasone-umeclidin-vilanter (TRELEGY ELLIPTA) 200-62.5-25 mcg inhaler Inhale 1 puff into the lungs once daily.    omeprazole (PRILOSEC) 20 MG capsule Take 1 capsule (20 mg total) by mouth once daily.    propranoloL (INDERAL LA) 80 MG 24 hr capsule Take 1 capsule (80 mg total) by mouth once daily.    rifAXIMin (XIFAXAN) 550 mg Tab Take 1 tablet (550 mg total) by mouth 2 (two) times daily. (Patient taking differently: Take 550 mg by mouth once daily.)    spironolactone (ALDACTONE) 100 MG tablet Take 1.5 tablets (150 mg total) by mouth once daily.    vitamin E 400 UNIT capsule Take 400 Units by mouth once daily.    albuterol (PROVENTIL/VENTOLIN HFA) 90 mcg/actuation inhaler Inhale 2 puffs every 4 hours as needed for cough, wheeze, or shortness of breath    ALPRAZolam (XANAX) 0.25 MG tablet Take 1 tablet (0.25 mg total) by mouth nightly as needed. FOR INSOMNIA (Patient taking differently: Take 0.125-0.25 mg by mouth nightly as needed. FOR INSOMNIA)    furosemide (LASIX) 40 MG tablet Take 1.5 tablets (60 mg total) by mouth once daily.    lactulose (CONSTULOSE) 10 gram/15 mL solution Take 30 mLs (20 g total) by mouth 2 (two) times daily. (Patient taking differently: Take 20 g by mouth as needed.)    loratadine (CLARITIN) 10 mg tablet Take 10 mg by mouth daily as needed.    multivitamin (THERAGRAN) per tablet Take 1 tablet by mouth once daily.       Review of Systems   Constitutional:  Negative for activity change and appetite change.   HENT: Negative.     Eyes:  Negative for visual disturbance.   Respiratory:  Negative for shortness of breath.    Cardiovascular:  Negative for chest pain, palpitations and leg swelling.   Gastrointestinal:  Negative for abdominal distention, abdominal pain, diarrhea, nausea and vomiting.   Genitourinary:  Negative for difficulty urinating.   Musculoskeletal:   "Negative for arthralgias, back pain and joint swelling.   Skin:  Positive for wound.   Allergic/Immunologic: Negative for immunocompromised state.   Neurological:  Negative for dizziness and facial asymmetry.   Hematological:  Negative for adenopathy. Does not bruise/bleed easily.   Psychiatric/Behavioral:  Negative for agitation and behavioral problems.    All other systems reviewed and are negative.    Objective:     Vital Signs (Most Recent):  Temp: 97.6 °F (36.4 °C) (12/09/23 1252)  Pulse: 69 (12/09/23 1252)  Resp: 16 (12/09/23 1252)  BP: (!) 116/56 (12/09/23 1252)  SpO2: 98 % (12/09/23 1252) Vital Signs (24h Range):  Temp:  [97.6 °F (36.4 °C)] 97.6 °F (36.4 °C)  Pulse:  [69] 69  Resp:  [16] 16  SpO2:  [98 %] 98 %  BP: (116)/(56) 116/56     Weight: 99.6 kg (219 lb 7.5 oz)  Body mass index is 34.37 kg/m².    No intake or output data in the 24 hours ending 12/09/23 1339     Physical Exam  Vitals and nursing note reviewed.   Constitutional:       Appearance: She is well-developed.   HENT:      Head: Normocephalic.   Eyes:      Conjunctiva/sclera: Conjunctivae normal.   Cardiovascular:      Rate and Rhythm: Normal rate and regular rhythm.      Heart sounds: No murmur heard.  Pulmonary:      Effort: Pulmonary effort is normal.      Breath sounds: Normal breath sounds.   Abdominal:      General: Bowel sounds are normal. There is distension.      Palpations: Abdomen is soft.      Tenderness: There is no abdominal tenderness.   Musculoskeletal:         General: Normal range of motion.      Cervical back: Normal range of motion.   Skin:     General: Skin is warm and dry.      Capillary Refill: Capillary refill takes less than 2 seconds.   Neurological:      Mental Status: She is alert and oriented to person, place, and time.   Psychiatric:         Behavior: Behavior normal.         Thought Content: Thought content normal.         Judgment: Judgment normal.          Laboratory:  CBC: No results for input(s): "WBC", "RBC", " ""HGB", "HCT", "PLT", "MCV", "MCH", "MCHC" in the last 168 hours.  CMP: No results for input(s): "GLU", "CALCIUM", "ALBUMIN", "PROT", "NA", "K", "CO2", "CL", "BUN", "CREATININE", "ALKPHOS", "ALT", "AST", "BILITOT" in the last 168 hours.  Labs within the past 24 hours have been reviewed.    Diagnostic Results:  None  Assessment/Plan:     * Liver transplant candidate            The patient presents for liver transplant.  There are no apparent contraindications to proceeding with the planned transplant.  The patient understands that the transplant could potentially be cancelled pending detailed assessment of the donor organ.    MELD 3.0: 21 at 2023  8:11 PM  MELD-Na: 19 at 2023  8:11 PM  Calculated from:  Serum Creatinine: 0.9 mg/dL (Using min of 1 mg/dL) at 2023  8:11 PM  Serum Sodium: 135 mmol/L at 2023  8:11 PM  Total Bilirubin: 5.9 mg/dL at 2023  8:11 PM  Serum Albumin: 2.6 g/dL at 2023  8:11 PM  INR(ratio): 1.4 at 2023  8:11 PM  Age at listin years  Sex: Female at 2023  8:11 PM      She will receive IV steroids pulse induction.    A complete discussion of the transplant procedure, including risks, complications, and alternatives, as well as any donor-specific risk factors requiring specific disclosure, will be carried out by the responsible staff surgeon prior to the procedure.     N/A  Family History   Problem Relation Age of Onset    Breast cancer Mother 70    Heart disease Mother     Hypertension Mother     Tremor Mother     Diabetes Father     Heart disease Father     Diabetes Sister     Cancer Sister     Breast cancer Sister     Diabetes Sister     Tremor Daughter     Breast cancer Maternal Aunt 70    Multiple sclerosis Maternal Aunt     Multiple sclerosis Maternal Aunt     Breast cancer Maternal Grandmother 70    Diabetes Brother     Emphysema Brother     Breast cancer Maternal Cousin 40    Ovarian cancer Neg Hx     Parkinsonism Neg Hx     Glaucoma Neg Hx  "    Macular degeneration Neg Hx        Discharge Planning:  Health Maintenance Due   Topic Date Due    Shingles Vaccine (1 of 2) 05/22/2009    TETANUS VACCINE  08/03/2017    Influenza Vaccine (1) 09/01/2019             Medical decision making for this encounter includes review of pertinent labs and diagnostic studies, assessment and planning, discussions with consulting providers, discussion with patient/family, and participation in multidisciplinary rounds. Time spent caring for patient: 60 minutes    [facility]  Liver Transplant  [facility] - Transplant Stepdown    N/A  Family History   Problem Relation Age of Onset    Breast cancer Mother 70    Heart disease Mother     Hypertension Mother     Tremor Mother     Diabetes Father     Heart disease Father     Diabetes Sister     Cancer Sister     Breast cancer Sister     Diabetes Sister     Tremor Daughter     Breast cancer Maternal Aunt 70    Multiple sclerosis Maternal Aunt     Multiple sclerosis Maternal Aunt     Breast cancer Maternal Grandmother 70    Diabetes Brother     Emphysema Brother     Breast cancer Maternal Cousin 40    Ovarian cancer Neg Hx     Parkinsonism Neg Hx     Glaucoma Neg Hx     Macular degeneration Neg Hx

## 2023-12-10 PROBLEM — R73.9 STEROID-INDUCED HYPERGLYCEMIA: Status: ACTIVE | Noted: 2023-12-10

## 2023-12-10 PROBLEM — Z29.89 PROPHYLACTIC IMMUNOTHERAPY: Status: ACTIVE | Noted: 2023-12-10

## 2023-12-10 PROBLEM — T38.0X5A STEROID-INDUCED HYPERGLYCEMIA: Status: ACTIVE | Noted: 2023-12-10

## 2023-12-10 PROBLEM — Z99.11 ENCOUNTER FOR WEANING FROM VENTILATOR: Status: ACTIVE | Noted: 2023-12-10

## 2023-12-10 PROBLEM — Z94.4 S/P LIVER TRANSPLANT: Status: ACTIVE | Noted: 2023-12-10

## 2023-12-10 PROBLEM — T38.0X5A ADVERSE EFFECT OF CORTICOSTEROIDS: Status: ACTIVE | Noted: 2023-12-10

## 2023-12-10 PROBLEM — D84.9 IMMUNOSUPPRESSED STATUS: Status: ACTIVE | Noted: 2023-12-10

## 2023-12-10 PROBLEM — E66.812 CLASS 2 SEVERE OBESITY DUE TO EXCESS CALORIES WITH SERIOUS COMORBIDITY AND BODY MASS INDEX (BMI) OF 36.0 TO 36.9 IN ADULT: Status: ACTIVE | Noted: 2017-04-11

## 2023-12-10 LAB
ABO + RH BLD: NORMAL
ALBUMIN SERPL BCP-MCNC: 2.4 G/DL (ref 3.5–5.2)
ALBUMIN SERPL BCP-MCNC: 2.6 G/DL (ref 3.5–5.2)
ALBUMIN SERPL BCP-MCNC: 2.9 G/DL (ref 3.5–5.2)
ALBUMIN SERPL BCP-MCNC: 3.1 G/DL (ref 3.5–5.2)
ALLENS TEST: ABNORMAL
ALLENS TEST: NORMAL
ALP SERPL-CCNC: 65 U/L (ref 55–135)
ALP SERPL-CCNC: 67 U/L (ref 55–135)
ALP SERPL-CCNC: 83 U/L (ref 55–135)
ALT SERPL W/O P-5'-P-CCNC: 1129 U/L (ref 10–44)
ALT SERPL W/O P-5'-P-CCNC: 1423 U/L (ref 10–44)
ALT SERPL W/O P-5'-P-CCNC: 926 U/L (ref 10–44)
AMYLASE SERPL-CCNC: 36 U/L (ref 20–110)
ANION GAP SERPL CALC-SCNC: 10 MMOL/L (ref 8–16)
ANION GAP SERPL CALC-SCNC: 12 MMOL/L (ref 8–16)
ANION GAP SERPL CALC-SCNC: 13 MMOL/L (ref 8–16)
ANION GAP SERPL CALC-SCNC: 14 MMOL/L (ref 8–16)
ANION GAP SERPL CALC-SCNC: 7 MMOL/L (ref 8–16)
APTT PPP: 41 SEC (ref 21–32)
APTT PPP: 44.8 SEC (ref 21–32)
APTT PPP: 45.8 SEC (ref 21–32)
AST SERPL-CCNC: 1171 U/L (ref 10–40)
AST SERPL-CCNC: 1510 U/L (ref 10–40)
AST SERPL-CCNC: 1510 U/L (ref 10–40)
AST SERPL-CCNC: 2129 U/L (ref 10–40)
AST SERPL-CCNC: 2640 U/L (ref 10–40)
AST SERPL-CCNC: 3055 U/L (ref 10–40)
AST SERPL-CCNC: 897 U/L (ref 10–40)
BASOPHILS # BLD AUTO: 0.01 K/UL (ref 0–0.2)
BASOPHILS # BLD AUTO: 0.01 K/UL (ref 0–0.2)
BASOPHILS # BLD AUTO: 0.02 K/UL (ref 0–0.2)
BASOPHILS NFR BLD: 0.1 % (ref 0–1.9)
BASOPHILS NFR BLD: 0.1 % (ref 0–1.9)
BASOPHILS NFR BLD: 0.2 % (ref 0–1.9)
BASOPHILS NFR BLD: 0.3 % (ref 0–1.9)
BILIRUB SERPL-MCNC: 7.3 MG/DL (ref 0.1–1)
BILIRUB SERPL-MCNC: 8.6 MG/DL (ref 0.1–1)
BILIRUB SERPL-MCNC: 8.7 MG/DL (ref 0.1–1)
BLD GP AB SCN CELLS X3 SERPL QL: NORMAL
BLD PROD TYP BPU: NORMAL
BLOOD UNIT EXPIRATION DATE: NORMAL
BLOOD UNIT TYPE CODE: 5100
BLOOD UNIT TYPE CODE: 600
BLOOD UNIT TYPE CODE: 6200
BLOOD UNIT TYPE CODE: 6200
BLOOD UNIT TYPE CODE: 9500
BLOOD UNIT TYPE: NORMAL
BUN SERPL-MCNC: 19 MG/DL (ref 8–23)
BUN SERPL-MCNC: 20 MG/DL (ref 8–23)
BUN SERPL-MCNC: 20 MG/DL (ref 8–23)
BUN SERPL-MCNC: 21 MG/DL (ref 8–23)
BUN SERPL-MCNC: 21 MG/DL (ref 8–23)
CA-I BLDV-SCNC: 1.06 MMOL/L (ref 1.06–1.42)
CALCIUM SERPL-MCNC: 8.8 MG/DL (ref 8.7–10.5)
CALCIUM SERPL-MCNC: 8.8 MG/DL (ref 8.7–10.5)
CALCIUM SERPL-MCNC: 8.9 MG/DL (ref 8.7–10.5)
CALCIUM SERPL-MCNC: 9.1 MG/DL (ref 8.7–10.5)
CALCIUM SERPL-MCNC: 9.4 MG/DL (ref 8.7–10.5)
CHLORIDE SERPL-SCNC: 102 MMOL/L (ref 95–110)
CHLORIDE SERPL-SCNC: 103 MMOL/L (ref 95–110)
CHLORIDE SERPL-SCNC: 104 MMOL/L (ref 95–110)
CHLORIDE SERPL-SCNC: 106 MMOL/L (ref 95–110)
CHLORIDE SERPL-SCNC: 108 MMOL/L (ref 95–110)
CO2 SERPL-SCNC: 21 MMOL/L (ref 23–29)
CO2 SERPL-SCNC: 22 MMOL/L (ref 23–29)
CO2 SERPL-SCNC: 22 MMOL/L (ref 23–29)
CO2 SERPL-SCNC: 23 MMOL/L (ref 23–29)
CO2 SERPL-SCNC: 24 MMOL/L (ref 23–29)
CODING SYSTEM: NORMAL
CREAT SERPL-MCNC: 0.8 MG/DL (ref 0.5–1.4)
CROSSMATCH INTERPRETATION: NORMAL
DELSYS: ABNORMAL
DELSYS: ABNORMAL
DELSYS: NORMAL
DIFFERENTIAL METHOD BLD: ABNORMAL
DISPENSE STATUS: NORMAL
EOSINOPHIL # BLD AUTO: 0 K/UL (ref 0–0.5)
EOSINOPHIL # BLD AUTO: 0.2 K/UL (ref 0–0.5)
EOSINOPHIL NFR BLD: 0 % (ref 0–8)
EOSINOPHIL NFR BLD: 0 % (ref 0–8)
EOSINOPHIL NFR BLD: 0.1 % (ref 0–8)
EOSINOPHIL NFR BLD: 0.1 % (ref 0–8)
EOSINOPHIL NFR BLD: 0.2 % (ref 0–8)
EOSINOPHIL NFR BLD: 3.2 % (ref 0–8)
ERYTHROCYTE [DISTWIDTH] IN BLOOD BY AUTOMATED COUNT: 15.8 % (ref 11.5–14.5)
ERYTHROCYTE [DISTWIDTH] IN BLOOD BY AUTOMATED COUNT: 16.5 % (ref 11.5–14.5)
ERYTHROCYTE [DISTWIDTH] IN BLOOD BY AUTOMATED COUNT: 16.6 % (ref 11.5–14.5)
ERYTHROCYTE [DISTWIDTH] IN BLOOD BY AUTOMATED COUNT: 16.7 % (ref 11.5–14.5)
ERYTHROCYTE [DISTWIDTH] IN BLOOD BY AUTOMATED COUNT: 16.8 % (ref 11.5–14.5)
ERYTHROCYTE [DISTWIDTH] IN BLOOD BY AUTOMATED COUNT: 16.8 % (ref 11.5–14.5)
ERYTHROCYTE [SEDIMENTATION RATE] IN BLOOD BY WESTERGREN METHOD: 16 MM/H
ERYTHROCYTE [SEDIMENTATION RATE] IN BLOOD BY WESTERGREN METHOD: 20 MM/H
ERYTHROCYTE [SEDIMENTATION RATE] IN BLOOD BY WESTERGREN METHOD: 20 MM/H
EST. GFR  (NO RACE VARIABLE): >60 ML/MIN/1.73 M^2
FIBRINOGEN PPP-MCNC: 240 MG/DL (ref 182–400)
FIO2: 40
GLUCOSE SERPL-MCNC: 133 MG/DL (ref 70–110)
GLUCOSE SERPL-MCNC: 153 MG/DL (ref 70–110)
GLUCOSE SERPL-MCNC: 180 MG/DL (ref 70–110)
GLUCOSE SERPL-MCNC: 223 MG/DL (ref 70–110)
GLUCOSE SERPL-MCNC: 232 MG/DL (ref 70–110)
GLUCOSE SERPL-MCNC: 239 MG/DL (ref 70–110)
GRAM STN SPEC: NORMAL
GRAM STN SPEC: NORMAL
HCO3 UR-SCNC: 23 MMOL/L (ref 24–28)
HCO3 UR-SCNC: 23.2 MMOL/L (ref 24–28)
HCO3 UR-SCNC: 23.6 MMOL/L (ref 24–28)
HCO3 UR-SCNC: 24.7 MMOL/L (ref 24–28)
HCO3 UR-SCNC: 24.8 MMOL/L (ref 24–28)
HCO3 UR-SCNC: 25.2 MMOL/L (ref 24–28)
HCT VFR BLD AUTO: 22.6 % (ref 37–48.5)
HCT VFR BLD AUTO: 22.7 % (ref 37–48.5)
HCT VFR BLD AUTO: 23.5 % (ref 37–48.5)
HCT VFR BLD AUTO: 23.5 % (ref 37–48.5)
HCT VFR BLD AUTO: 24.5 % (ref 37–48.5)
HCT VFR BLD AUTO: 26.4 % (ref 37–48.5)
HCT VFR BLD AUTO: 27 % (ref 37–48.5)
HCT VFR BLD CALC: 21 %PCV (ref 36–54)
HCT VFR BLD CALC: 26 %PCV (ref 36–54)
HCT VFR BLD CALC: 26 %PCV (ref 36–54)
HGB BLD-MCNC: 7.9 G/DL (ref 12–16)
HGB BLD-MCNC: 8.1 G/DL (ref 12–16)
HGB BLD-MCNC: 8.1 G/DL (ref 12–16)
HGB BLD-MCNC: 8.3 G/DL (ref 12–16)
HGB BLD-MCNC: 8.4 G/DL (ref 12–16)
HGB BLD-MCNC: 9.3 G/DL (ref 12–16)
HGB BLD-MCNC: 9.4 G/DL (ref 12–16)
IMM GRANULOCYTES # BLD AUTO: 0.03 K/UL (ref 0–0.04)
IMM GRANULOCYTES # BLD AUTO: 0.04 K/UL (ref 0–0.04)
IMM GRANULOCYTES # BLD AUTO: 0.04 K/UL (ref 0–0.04)
IMM GRANULOCYTES # BLD AUTO: 0.05 K/UL (ref 0–0.04)
IMM GRANULOCYTES # BLD AUTO: 0.07 K/UL (ref 0–0.04)
IMM GRANULOCYTES # BLD AUTO: 0.12 K/UL (ref 0–0.04)
IMM GRANULOCYTES NFR BLD AUTO: 0.4 % (ref 0–0.5)
IMM GRANULOCYTES NFR BLD AUTO: 0.5 % (ref 0–0.5)
IMM GRANULOCYTES NFR BLD AUTO: 0.7 % (ref 0–0.5)
IMM GRANULOCYTES NFR BLD AUTO: 0.7 % (ref 0–0.5)
IMM GRANULOCYTES NFR BLD AUTO: 0.8 % (ref 0–0.5)
IMM GRANULOCYTES NFR BLD AUTO: 1.5 % (ref 0–0.5)
INR PPP: 1.5 (ref 0.8–1.2)
INR PPP: 1.5 (ref 0.8–1.2)
INR PPP: 1.6 (ref 0.8–1.2)
INR PPP: 1.6 (ref 0.8–1.2)
INR PPP: 1.7 (ref 0.8–1.2)
INR PPP: 1.8 (ref 0.8–1.2)
INR PPP: 1.8 (ref 0.8–1.2)
LACTATE SERPL-SCNC: 0.7 MMOL/L (ref 0.5–2.2)
LDH SERPL L TO P-CCNC: 0.7 MMOL/L (ref 0.36–1.25)
LDH SERPL L TO P-CCNC: 0.99 MMOL/L (ref 0.5–2.2)
LDH SERPL L TO P-CCNC: 1.04 MMOL/L (ref 0.36–1.25)
LDH SERPL L TO P-CCNC: 1.91 MMOL/L (ref 0.36–1.25)
LDH SERPL L TO P-CCNC: 3764 U/L (ref 110–260)
LYMPHOCYTES # BLD AUTO: 0.2 K/UL (ref 1–4.8)
LYMPHOCYTES # BLD AUTO: 0.3 K/UL (ref 1–4.8)
LYMPHOCYTES NFR BLD: 2.4 % (ref 18–48)
LYMPHOCYTES NFR BLD: 2.5 % (ref 18–48)
LYMPHOCYTES NFR BLD: 2.6 % (ref 18–48)
LYMPHOCYTES NFR BLD: 2.9 % (ref 18–48)
MAGNESIUM SERPL-MCNC: 1.8 MG/DL (ref 1.6–2.6)
MAGNESIUM SERPL-MCNC: 1.8 MG/DL (ref 1.6–2.6)
MAGNESIUM SERPL-MCNC: 1.9 MG/DL (ref 1.6–2.6)
MAGNESIUM SERPL-MCNC: 2 MG/DL (ref 1.6–2.6)
MAGNESIUM SERPL-MCNC: 2.1 MG/DL (ref 1.6–2.6)
MCH RBC QN AUTO: 32.9 PG (ref 27–31)
MCH RBC QN AUTO: 33.1 PG (ref 27–31)
MCH RBC QN AUTO: 33.5 PG (ref 27–31)
MCH RBC QN AUTO: 33.6 PG (ref 27–31)
MCH RBC QN AUTO: 33.8 PG (ref 27–31)
MCH RBC QN AUTO: 34 PG (ref 27–31)
MCHC RBC AUTO-ENTMCNC: 34.3 G/DL (ref 32–36)
MCHC RBC AUTO-ENTMCNC: 34.5 G/DL (ref 32–36)
MCHC RBC AUTO-ENTMCNC: 34.8 G/DL (ref 32–36)
MCHC RBC AUTO-ENTMCNC: 34.8 G/DL (ref 32–36)
MCHC RBC AUTO-ENTMCNC: 35.3 G/DL (ref 32–36)
MCHC RBC AUTO-ENTMCNC: 35.8 G/DL (ref 32–36)
MCV RBC AUTO: 94 FL (ref 82–98)
MCV RBC AUTO: 95 FL (ref 82–98)
MCV RBC AUTO: 95 FL (ref 82–98)
MCV RBC AUTO: 96 FL (ref 82–98)
MCV RBC AUTO: 97 FL (ref 82–98)
MCV RBC AUTO: 98 FL (ref 82–98)
MIN VOL: 9.22
MIN VOL: 9.22
MODE: ABNORMAL
MODE: ABNORMAL
MODE: NORMAL
MONOCYTES # BLD AUTO: 0.2 K/UL (ref 0.3–1)
MONOCYTES # BLD AUTO: 0.2 K/UL (ref 0.3–1)
MONOCYTES # BLD AUTO: 0.3 K/UL (ref 0.3–1)
MONOCYTES # BLD AUTO: 0.4 K/UL (ref 0.3–1)
MONOCYTES # BLD AUTO: 0.4 K/UL (ref 0.3–1)
MONOCYTES # BLD AUTO: 0.5 K/UL (ref 0.3–1)
MONOCYTES NFR BLD: 3.4 % (ref 4–15)
MONOCYTES NFR BLD: 3.6 % (ref 4–15)
MONOCYTES NFR BLD: 4.4 % (ref 4–15)
MONOCYTES NFR BLD: 4.6 % (ref 4–15)
MONOCYTES NFR BLD: 6.1 % (ref 4–15)
MONOCYTES NFR BLD: 6.6 % (ref 4–15)
NEUTROPHILS # BLD AUTO: 5.2 K/UL (ref 1.8–7.7)
NEUTROPHILS # BLD AUTO: 6.1 K/UL (ref 1.8–7.7)
NEUTROPHILS # BLD AUTO: 6.3 K/UL (ref 1.8–7.7)
NEUTROPHILS # BLD AUTO: 7.2 K/UL (ref 1.8–7.7)
NEUTROPHILS # BLD AUTO: 7.4 K/UL (ref 1.8–7.7)
NEUTROPHILS # BLD AUTO: 7.9 K/UL (ref 1.8–7.7)
NEUTROPHILS NFR BLD: 89.2 % (ref 38–73)
NEUTROPHILS NFR BLD: 89.3 % (ref 38–73)
NEUTROPHILS NFR BLD: 90.9 % (ref 38–73)
NEUTROPHILS NFR BLD: 91.4 % (ref 38–73)
NEUTROPHILS NFR BLD: 92.2 % (ref 38–73)
NEUTROPHILS NFR BLD: 92.6 % (ref 38–73)
NRBC BLD-RTO: 0 /100 WBC
NUM UNITS TRANS FFP: NORMAL
PCO2 BLDA: 37 MMHG (ref 35–45)
PCO2 BLDA: 37.1 MMHG (ref 35–45)
PCO2 BLDA: 37.5 MMHG (ref 35–45)
PCO2 BLDA: 38 MMHG (ref 35–45)
PCO2 BLDA: 40.2 MMHG (ref 35–45)
PCO2 BLDA: 43.4 MMHG (ref 35–45)
PEEP: 5
PEEP: 5
PH SMN: 7.37 [PH] (ref 7.35–7.45)
PH SMN: 7.39 [PH] (ref 7.35–7.45)
PH SMN: 7.4 [PH] (ref 7.35–7.45)
PH SMN: 7.4 [PH] (ref 7.35–7.45)
PH SMN: 7.41 [PH] (ref 7.35–7.45)
PH SMN: 7.43 [PH] (ref 7.35–7.45)
PHOSPHATE SERPL-MCNC: 3.7 MG/DL (ref 2.7–4.5)
PHOSPHATE SERPL-MCNC: 3.7 MG/DL (ref 2.7–4.5)
PIP: 19
PIP: 19
PLATELET # BLD AUTO: 142 K/UL (ref 150–450)
PLATELET # BLD AUTO: 40 K/UL (ref 150–450)
PLATELET # BLD AUTO: 40 K/UL (ref 150–450)
PLATELET # BLD AUTO: 41 K/UL (ref 150–450)
PLATELET # BLD AUTO: 54 K/UL (ref 150–450)
PLATELET # BLD AUTO: 72 K/UL (ref 150–450)
PLATELET # BLD AUTO: 86 K/UL (ref 150–450)
PLATELET BLD QL SMEAR: ABNORMAL
PMV BLD AUTO: 10.3 FL (ref 9.2–12.9)
PMV BLD AUTO: 10.5 FL (ref 9.2–12.9)
PMV BLD AUTO: 10.5 FL (ref 9.2–12.9)
PMV BLD AUTO: 10.7 FL (ref 9.2–12.9)
PMV BLD AUTO: 10.9 FL (ref 9.2–12.9)
PMV BLD AUTO: 10.9 FL (ref 9.2–12.9)
PMV BLD AUTO: 11.3 FL (ref 9.2–12.9)
PO2 BLDA: 110 MMHG (ref 80–100)
PO2 BLDA: 130 MMHG (ref 80–100)
PO2 BLDA: 143 MMHG (ref 80–100)
PO2 BLDA: 155 MMHG (ref 80–100)
PO2 BLDA: 48 MMHG (ref 40–60)
PO2 BLDA: 80 MMHG (ref 80–100)
POC BE: -1 MMOL/L
POC BE: -2 MMOL/L
POC BE: -2 MMOL/L
POC BE: 0 MMOL/L
POC IONIZED CALCIUM: 1.32 MMOL/L (ref 1.06–1.42)
POC IONIZED CALCIUM: 1.33 MMOL/L (ref 1.06–1.42)
POC IONIZED CALCIUM: 1.34 MMOL/L (ref 1.06–1.42)
POC IONIZED CALCIUM: 1.35 MMOL/L (ref 1.06–1.42)
POC IONIZED CALCIUM: 1.36 MMOL/L (ref 1.06–1.42)
POC SATURATED O2: 82 % (ref 95–100)
POC SATURATED O2: 96 % (ref 95–100)
POC SATURATED O2: 98 % (ref 95–100)
POC SATURATED O2: 99 % (ref 95–100)
POC TCO2: 24 MMOL/L (ref 23–27)
POC TCO2: 24 MMOL/L (ref 23–27)
POC TCO2: 25 MMOL/L (ref 23–27)
POC TCO2: 26 MMOL/L (ref 23–27)
POC TCO2: 26 MMOL/L (ref 23–27)
POC TCO2: 26 MMOL/L (ref 24–29)
POCT GLUCOSE: 124 MG/DL (ref 70–110)
POCT GLUCOSE: 126 MG/DL (ref 70–110)
POCT GLUCOSE: 130 MG/DL (ref 70–110)
POCT GLUCOSE: 131 MG/DL (ref 70–110)
POCT GLUCOSE: 140 MG/DL (ref 70–110)
POCT GLUCOSE: 143 MG/DL (ref 70–110)
POCT GLUCOSE: 158 MG/DL (ref 70–110)
POCT GLUCOSE: 163 MG/DL (ref 70–110)
POCT GLUCOSE: 180 MG/DL (ref 70–110)
POCT GLUCOSE: 190 MG/DL (ref 70–110)
POCT GLUCOSE: 202 MG/DL (ref 70–110)
POCT GLUCOSE: 211 MG/DL (ref 70–110)
POCT GLUCOSE: 221 MG/DL (ref 70–110)
POCT GLUCOSE: 228 MG/DL (ref 70–110)
POCT GLUCOSE: 228 MG/DL (ref 70–110)
POCT GLUCOSE: 231 MG/DL (ref 70–110)
POCT GLUCOSE: 238 MG/DL (ref 70–110)
POCT GLUCOSE: 238 MG/DL (ref 70–110)
POCT GLUCOSE: 239 MG/DL (ref 70–110)
POCT GLUCOSE: 240 MG/DL (ref 70–110)
POCT GLUCOSE: 249 MG/DL (ref 70–110)
POCT GLUCOSE: 253 MG/DL (ref 70–110)
POTASSIUM BLD-SCNC: 3.4 MMOL/L (ref 3.5–5.1)
POTASSIUM BLD-SCNC: 3.5 MMOL/L (ref 3.5–5.1)
POTASSIUM BLD-SCNC: 3.6 MMOL/L (ref 3.5–5.1)
POTASSIUM BLD-SCNC: 3.6 MMOL/L (ref 3.5–5.1)
POTASSIUM BLD-SCNC: 3.9 MMOL/L (ref 3.5–5.1)
POTASSIUM SERPL-SCNC: 3.5 MMOL/L (ref 3.5–5.1)
POTASSIUM SERPL-SCNC: 3.6 MMOL/L (ref 3.5–5.1)
POTASSIUM SERPL-SCNC: 3.6 MMOL/L (ref 3.5–5.1)
POTASSIUM SERPL-SCNC: 3.7 MMOL/L (ref 3.5–5.1)
POTASSIUM SERPL-SCNC: 3.8 MMOL/L (ref 3.5–5.1)
POTASSIUM SERPL-SCNC: 4 MMOL/L (ref 3.5–5.1)
PROT SERPL-MCNC: 4 G/DL (ref 6–8.4)
PROT SERPL-MCNC: 4.1 G/DL (ref 6–8.4)
PROT SERPL-MCNC: 4.3 G/DL (ref 6–8.4)
PROTHROMBIN TIME: 15.5 SEC (ref 9–12.5)
PROTHROMBIN TIME: 16.1 SEC (ref 9–12.5)
PROTHROMBIN TIME: 17 SEC (ref 9–12.5)
PROTHROMBIN TIME: 17.1 SEC (ref 9–12.5)
PROTHROMBIN TIME: 17.8 SEC (ref 9–12.5)
PROTHROMBIN TIME: 18.2 SEC (ref 9–12.5)
PROTHROMBIN TIME: 18.2 SEC (ref 9–12.5)
RBC # BLD AUTO: 2.4 M/UL (ref 4–5.4)
RBC # BLD AUTO: 2.4 M/UL (ref 4–5.4)
RBC # BLD AUTO: 2.42 M/UL (ref 4–5.4)
RBC # BLD AUTO: 2.44 M/UL (ref 4–5.4)
RBC # BLD AUTO: 2.5 M/UL (ref 4–5.4)
RBC # BLD AUTO: 2.84 M/UL (ref 4–5.4)
SAMPLE: ABNORMAL
SAMPLE: NORMAL
SITE: ABNORMAL
SITE: NORMAL
SODIUM BLD-SCNC: 136 MMOL/L (ref 136–145)
SODIUM BLD-SCNC: 137 MMOL/L (ref 136–145)
SODIUM BLD-SCNC: 138 MMOL/L (ref 136–145)
SODIUM BLD-SCNC: 140 MMOL/L (ref 136–145)
SODIUM BLD-SCNC: 141 MMOL/L (ref 136–145)
SODIUM SERPL-SCNC: 135 MMOL/L (ref 136–145)
SODIUM SERPL-SCNC: 136 MMOL/L (ref 136–145)
SODIUM SERPL-SCNC: 138 MMOL/L (ref 136–145)
SODIUM SERPL-SCNC: 139 MMOL/L (ref 136–145)
SP02: 100
SPECIMEN OUTDATE: NORMAL
TACROLIMUS BLD-MCNC: <2 NG/ML (ref 5–15)
UNIT NUMBER: NORMAL
VT: 450
VT: 450
WBC # BLD AUTO: 5.87 K/UL (ref 3.9–12.7)
WBC # BLD AUTO: 6.71 K/UL (ref 3.9–12.7)
WBC # BLD AUTO: 6.83 K/UL (ref 3.9–12.7)
WBC # BLD AUTO: 7.77 K/UL (ref 3.9–12.7)
WBC # BLD AUTO: 8.24 K/UL (ref 3.9–12.7)
WBC # BLD AUTO: 8.64 K/UL (ref 3.9–12.7)

## 2023-12-10 PROCEDURE — 84100 ASSAY OF PHOSPHORUS: CPT | Mod: 91

## 2023-12-10 PROCEDURE — 99291 CRITICAL CARE FIRST HOUR: CPT | Mod: 24,,, | Performed by: STUDENT IN AN ORGANIZED HEALTH CARE EDUCATION/TRAINING PROGRAM

## 2023-12-10 PROCEDURE — 27100171 HC OXYGEN HIGH FLOW UP TO 24 HOURS

## 2023-12-10 PROCEDURE — 82150 ASSAY OF AMYLASE: CPT | Performed by: STUDENT IN AN ORGANIZED HEALTH CARE EDUCATION/TRAINING PROGRAM

## 2023-12-10 PROCEDURE — 85014 HEMATOCRIT: CPT

## 2023-12-10 PROCEDURE — 25000003 PHARM REV CODE 250: Performed by: NURSE ANESTHETIST, CERTIFIED REGISTERED

## 2023-12-10 PROCEDURE — 99900026 HC AIRWAY MAINTENANCE (STAT)

## 2023-12-10 PROCEDURE — 99900017 HC EXTUBATION W/PARAMETERS (STAT)

## 2023-12-10 PROCEDURE — 80053 COMPREHEN METABOLIC PANEL: CPT | Mod: 91

## 2023-12-10 PROCEDURE — 83615 LACTATE (LD) (LDH) ENZYME: CPT | Performed by: STUDENT IN AN ORGANIZED HEALTH CARE EDUCATION/TRAINING PROGRAM

## 2023-12-10 PROCEDURE — 84295 ASSAY OF SERUM SODIUM: CPT

## 2023-12-10 PROCEDURE — 82800 BLOOD PH: CPT

## 2023-12-10 PROCEDURE — 84132 ASSAY OF SERUM POTASSIUM: CPT

## 2023-12-10 PROCEDURE — 83605 ASSAY OF LACTIC ACID: CPT

## 2023-12-10 PROCEDURE — 25000003 PHARM REV CODE 250

## 2023-12-10 PROCEDURE — 83735 ASSAY OF MAGNESIUM: CPT | Mod: 91 | Performed by: STUDENT IN AN ORGANIZED HEALTH CARE EDUCATION/TRAINING PROGRAM

## 2023-12-10 PROCEDURE — 99900035 HC TECH TIME PER 15 MIN (STAT)

## 2023-12-10 PROCEDURE — 63600175 PHARM REV CODE 636 W HCPCS: Mod: JG

## 2023-12-10 PROCEDURE — 80048 BASIC METABOLIC PNL TOTAL CA: CPT | Mod: XB | Performed by: STUDENT IN AN ORGANIZED HEALTH CARE EDUCATION/TRAINING PROGRAM

## 2023-12-10 PROCEDURE — P9045 ALBUMIN (HUMAN), 5%, 250 ML: HCPCS | Mod: JG

## 2023-12-10 PROCEDURE — 25000003 PHARM REV CODE 250: Performed by: STUDENT IN AN ORGANIZED HEALTH CARE EDUCATION/TRAINING PROGRAM

## 2023-12-10 PROCEDURE — 82803 BLOOD GASES ANY COMBINATION: CPT

## 2023-12-10 PROCEDURE — 37799 UNLISTED PX VASCULAR SURGERY: CPT

## 2023-12-10 PROCEDURE — 85610 PROTHROMBIN TIME: CPT | Mod: 91 | Performed by: STUDENT IN AN ORGANIZED HEALTH CARE EDUCATION/TRAINING PROGRAM

## 2023-12-10 PROCEDURE — 99223 1ST HOSP IP/OBS HIGH 75: CPT | Mod: ,,, | Performed by: NURSE PRACTITIONER

## 2023-12-10 PROCEDURE — 83735 ASSAY OF MAGNESIUM: CPT | Mod: 91

## 2023-12-10 PROCEDURE — P9035 PLATELET PHERES LEUKOREDUCED: HCPCS

## 2023-12-10 PROCEDURE — 82565 ASSAY OF CREATININE: CPT

## 2023-12-10 PROCEDURE — 82330 ASSAY OF CALCIUM: CPT

## 2023-12-10 PROCEDURE — 63600175 PHARM REV CODE 636 W HCPCS

## 2023-12-10 PROCEDURE — 63600175 PHARM REV CODE 636 W HCPCS: Performed by: NURSE ANESTHETIST, CERTIFIED REGISTERED

## 2023-12-10 PROCEDURE — 94150 VITAL CAPACITY TEST: CPT

## 2023-12-10 PROCEDURE — 94003 VENT MGMT INPAT SUBQ DAY: CPT

## 2023-12-10 PROCEDURE — 85730 THROMBOPLASTIN TIME PARTIAL: CPT | Performed by: STUDENT IN AN ORGANIZED HEALTH CARE EDUCATION/TRAINING PROGRAM

## 2023-12-10 PROCEDURE — 80053 COMPREHEN METABOLIC PANEL: CPT | Performed by: STUDENT IN AN ORGANIZED HEALTH CARE EDUCATION/TRAINING PROGRAM

## 2023-12-10 PROCEDURE — 94010 BREATHING CAPACITY TEST: CPT

## 2023-12-10 PROCEDURE — 36415 COLL VENOUS BLD VENIPUNCTURE: CPT | Performed by: SURGERY

## 2023-12-10 PROCEDURE — 84450 TRANSFERASE (AST) (SGOT): CPT | Mod: 91 | Performed by: STUDENT IN AN ORGANIZED HEALTH CARE EDUCATION/TRAINING PROGRAM

## 2023-12-10 PROCEDURE — 85025 COMPLETE CBC W/AUTO DIFF WBC: CPT | Mod: 91

## 2023-12-10 PROCEDURE — 84100 ASSAY OF PHOSPHORUS: CPT | Performed by: SURGERY

## 2023-12-10 PROCEDURE — 63600175 PHARM REV CODE 636 W HCPCS: Performed by: STUDENT IN AN ORGANIZED HEALTH CARE EDUCATION/TRAINING PROGRAM

## 2023-12-10 PROCEDURE — P9045 ALBUMIN (HUMAN), 5%, 250 ML: HCPCS | Mod: JZ,JG | Performed by: STUDENT IN AN ORGANIZED HEALTH CARE EDUCATION/TRAINING PROGRAM

## 2023-12-10 PROCEDURE — 85025 COMPLETE CBC W/AUTO DIFF WBC: CPT | Mod: 91 | Performed by: STUDENT IN AN ORGANIZED HEALTH CARE EDUCATION/TRAINING PROGRAM

## 2023-12-10 PROCEDURE — 80053 COMPREHEN METABOLIC PANEL: CPT | Mod: 91 | Performed by: SURGERY

## 2023-12-10 PROCEDURE — 20600001 HC STEP DOWN PRIVATE ROOM

## 2023-12-10 PROCEDURE — 83735 ASSAY OF MAGNESIUM: CPT | Mod: 91 | Performed by: SURGERY

## 2023-12-10 PROCEDURE — 80197 ASSAY OF TACROLIMUS: CPT | Performed by: STUDENT IN AN ORGANIZED HEALTH CARE EDUCATION/TRAINING PROGRAM

## 2023-12-10 PROCEDURE — 94761 N-INVAS EAR/PLS OXIMETRY MLT: CPT | Mod: XB

## 2023-12-10 PROCEDURE — 86901 BLOOD TYPING SEROLOGIC RH(D): CPT | Performed by: STUDENT IN AN ORGANIZED HEALTH CARE EDUCATION/TRAINING PROGRAM

## 2023-12-10 RX ORDER — ALBUMIN HUMAN 50 G/1000ML
25 SOLUTION INTRAVENOUS ONCE
Status: COMPLETED | OUTPATIENT
Start: 2023-12-10 | End: 2023-12-10

## 2023-12-10 RX ORDER — VALGANCICLOVIR 450 MG/1
450 TABLET, FILM COATED ORAL DAILY
Status: DISCONTINUED | OUTPATIENT
Start: 2023-12-20 | End: 2023-12-18 | Stop reason: HOSPADM

## 2023-12-10 RX ORDER — BUPROPION HYDROCHLORIDE 150 MG/1
300 TABLET ORAL DAILY
Status: DISCONTINUED | OUTPATIENT
Start: 2023-12-11 | End: 2023-12-18 | Stop reason: HOSPADM

## 2023-12-10 RX ORDER — MYCOPHENOLATE MOFETIL 200 MG/ML
1000 POWDER, FOR SUSPENSION ORAL 2 TIMES DAILY
Status: DISCONTINUED | OUTPATIENT
Start: 2023-12-10 | End: 2023-12-11

## 2023-12-10 RX ORDER — DEXTROSE MONOHYDRATE AND SODIUM CHLORIDE 5; .9 G/100ML; G/100ML
INJECTION, SOLUTION INTRAVENOUS CONTINUOUS
Status: DISCONTINUED | OUTPATIENT
Start: 2023-12-10 | End: 2023-12-11

## 2023-12-10 RX ORDER — METHYLPREDNISOLONE SOD SUCC 125 MG
120 VIAL (EA) INJECTION DAILY
Status: DISPENSED | OUTPATIENT
Start: 2023-12-12 | End: 2023-12-13

## 2023-12-10 RX ORDER — NOREPINEPHRINE BITARTRATE/D5W 4MG/250ML
0-3 PLASTIC BAG, INJECTION (ML) INTRAVENOUS CONTINUOUS
Status: DISCONTINUED | OUTPATIENT
Start: 2023-12-10 | End: 2023-12-10

## 2023-12-10 RX ORDER — HYDROMORPHONE HYDROCHLORIDE 1 MG/ML
0.2 INJECTION, SOLUTION INTRAMUSCULAR; INTRAVENOUS; SUBCUTANEOUS EVERY 6 HOURS PRN
Status: DISCONTINUED | OUTPATIENT
Start: 2023-12-10 | End: 2023-12-11

## 2023-12-10 RX ORDER — POTASSIUM CHLORIDE 29.8 MG/ML
40 INJECTION INTRAVENOUS
Status: DISCONTINUED | OUTPATIENT
Start: 2023-12-10 | End: 2023-12-11

## 2023-12-10 RX ORDER — METHYLPREDNISOLONE SOD SUCC 125 MG
40 VIAL (EA) INJECTION DAILY
Status: COMPLETED | OUTPATIENT
Start: 2023-12-14 | End: 2023-12-14

## 2023-12-10 RX ORDER — HEPARIN SODIUM 5000 [USP'U]/ML
5000 INJECTION, SOLUTION INTRAVENOUS; SUBCUTANEOUS EVERY 8 HOURS
Status: DISCONTINUED | OUTPATIENT
Start: 2023-12-10 | End: 2023-12-18 | Stop reason: HOSPADM

## 2023-12-10 RX ORDER — NYSTATIN 100000 [USP'U]/ML
500000 SUSPENSION ORAL
Status: DISCONTINUED | OUTPATIENT
Start: 2023-12-10 | End: 2023-12-10

## 2023-12-10 RX ORDER — PREDNISONE 20 MG/1
20 TABLET ORAL DAILY
Status: DISCONTINUED | OUTPATIENT
Start: 2023-12-15 | End: 2023-12-18 | Stop reason: HOSPADM

## 2023-12-10 RX ORDER — PROPOFOL 10 MG/ML
VIAL (ML) INTRAVENOUS CONTINUOUS PRN
Status: DISCONTINUED | OUTPATIENT
Start: 2023-12-10 | End: 2023-12-10

## 2023-12-10 RX ORDER — SODIUM CHLORIDE 0.9 % (FLUSH) 0.9 %
10 SYRINGE (ML) INJECTION
Status: DISCONTINUED | OUTPATIENT
Start: 2023-12-10 | End: 2023-12-18 | Stop reason: HOSPADM

## 2023-12-10 RX ORDER — ALBUMIN HUMAN 50 G/1000ML
SOLUTION INTRAVENOUS
Status: COMPLETED
Start: 2023-12-10 | End: 2023-12-10

## 2023-12-10 RX ORDER — PROPOFOL 10 MG/ML
0-50 INJECTION, EMULSION INTRAVENOUS CONTINUOUS
Status: DISCONTINUED | OUTPATIENT
Start: 2023-12-10 | End: 2023-12-10

## 2023-12-10 RX ORDER — ALBUMIN HUMAN 50 G/1000ML
12.5 SOLUTION INTRAVENOUS ONCE
Status: COMPLETED | OUTPATIENT
Start: 2023-12-10 | End: 2023-12-10

## 2023-12-10 RX ORDER — OXYCODONE HYDROCHLORIDE 10 MG/1
10 TABLET ORAL EVERY 6 HOURS PRN
Status: DISCONTINUED | OUTPATIENT
Start: 2023-12-10 | End: 2023-12-11

## 2023-12-10 RX ORDER — POTASSIUM CHLORIDE 14.9 MG/ML
60 INJECTION INTRAVENOUS
Status: DISCONTINUED | OUTPATIENT
Start: 2023-12-10 | End: 2023-12-11

## 2023-12-10 RX ORDER — METHYLPREDNISOLONE SOD SUCC 125 MG
200 VIAL (EA) INJECTION DAILY
Status: COMPLETED | OUTPATIENT
Start: 2023-12-10 | End: 2023-12-10

## 2023-12-10 RX ORDER — ALBUMIN HUMAN 50 G/1000ML
SOLUTION INTRAVENOUS
Status: DISPENSED
Start: 2023-12-10 | End: 2023-12-10

## 2023-12-10 RX ORDER — MAGNESIUM SULFATE HEPTAHYDRATE 40 MG/ML
4 INJECTION, SOLUTION INTRAVENOUS
Status: DISCONTINUED | OUTPATIENT
Start: 2023-12-10 | End: 2023-12-11

## 2023-12-10 RX ORDER — CALCIUM GLUCONATE 20 MG/ML
1 INJECTION, SOLUTION INTRAVENOUS
Status: DISCONTINUED | OUTPATIENT
Start: 2023-12-10 | End: 2023-12-11

## 2023-12-10 RX ORDER — HYDROMORPHONE HYDROCHLORIDE 1 MG/ML
0.2 INJECTION, SOLUTION INTRAMUSCULAR; INTRAVENOUS; SUBCUTANEOUS
Status: DISCONTINUED | OUTPATIENT
Start: 2023-12-10 | End: 2023-12-10

## 2023-12-10 RX ORDER — HYDROCODONE BITARTRATE AND ACETAMINOPHEN 500; 5 MG/1; MG/1
TABLET ORAL
Status: DISCONTINUED | OUTPATIENT
Start: 2023-12-10 | End: 2023-12-11

## 2023-12-10 RX ORDER — METHYLPREDNISOLONE SOD SUCC 125 MG
80 VIAL (EA) INJECTION DAILY
Status: COMPLETED | OUTPATIENT
Start: 2023-12-13 | End: 2023-12-13

## 2023-12-10 RX ORDER — CALCIUM GLUCONATE 20 MG/ML
2 INJECTION, SOLUTION INTRAVENOUS
Status: DISCONTINUED | OUTPATIENT
Start: 2023-12-10 | End: 2023-12-11

## 2023-12-10 RX ORDER — SULFAMETHOXAZOLE AND TRIMETHOPRIM 400; 80 MG/1; MG/1
1 TABLET ORAL EVERY MORNING
Status: DISCONTINUED | OUTPATIENT
Start: 2023-12-17 | End: 2023-12-18 | Stop reason: HOSPADM

## 2023-12-10 RX ORDER — CALCIUM GLUCONATE 20 MG/ML
3 INJECTION, SOLUTION INTRAVENOUS
Status: DISCONTINUED | OUTPATIENT
Start: 2023-12-10 | End: 2023-12-11

## 2023-12-10 RX ORDER — METHYLPREDNISOLONE SOD SUCC 125 MG
160 VIAL (EA) INJECTION DAILY
Status: COMPLETED | OUTPATIENT
Start: 2023-12-11 | End: 2023-12-11

## 2023-12-10 RX ORDER — MAGNESIUM SULFATE HEPTAHYDRATE 40 MG/ML
2 INJECTION, SOLUTION INTRAVENOUS
Status: DISCONTINUED | OUTPATIENT
Start: 2023-12-10 | End: 2023-12-11

## 2023-12-10 RX ORDER — OXYCODONE HYDROCHLORIDE 5 MG/1
5 TABLET ORAL EVERY 6 HOURS PRN
Status: DISCONTINUED | OUTPATIENT
Start: 2023-12-10 | End: 2023-12-11

## 2023-12-10 RX ORDER — POTASSIUM CHLORIDE 29.8 MG/ML
80 INJECTION INTRAVENOUS
Status: DISCONTINUED | OUTPATIENT
Start: 2023-12-10 | End: 2023-12-11

## 2023-12-10 RX ORDER — FAMOTIDINE 10 MG/ML
20 INJECTION INTRAVENOUS EVERY 12 HOURS
Status: DISCONTINUED | OUTPATIENT
Start: 2023-12-10 | End: 2023-12-11

## 2023-12-10 RX ORDER — MUPIROCIN 20 MG/G
1 OINTMENT TOPICAL 2 TIMES DAILY
Status: COMPLETED | OUTPATIENT
Start: 2023-12-10 | End: 2023-12-14

## 2023-12-10 RX ADMIN — ALBUMIN (HUMAN) 12.5 G: 12.5 SOLUTION INTRAVENOUS at 05:12

## 2023-12-10 RX ADMIN — POTASSIUM CHLORIDE 40 MEQ: 29.8 INJECTION, SOLUTION INTRAVENOUS at 12:12

## 2023-12-10 RX ADMIN — FAMOTIDINE 20 MG: 10 INJECTION, SOLUTION INTRAVENOUS at 09:12

## 2023-12-10 RX ADMIN — PROPOFOL 20 MCG/KG/MIN: 10 INJECTION, EMULSION INTRAVENOUS at 08:12

## 2023-12-10 RX ADMIN — MUPIROCIN 1 G: 20 OINTMENT TOPICAL at 09:12

## 2023-12-10 RX ADMIN — PHYTONADIONE 10 MG: 10 INJECTION, EMULSION INTRAMUSCULAR; INTRAVENOUS; SUBCUTANEOUS at 10:12

## 2023-12-10 RX ADMIN — Medication 25 MG: at 12:12

## 2023-12-10 RX ADMIN — AMPICILLIN AND SULBACTAM 3 G: 2; 1 INJECTION, POWDER, FOR SOLUTION INTRAVENOUS at 12:12

## 2023-12-10 RX ADMIN — ALBUMIN HUMAN 12.5 G: 50 SOLUTION INTRAVENOUS at 05:12

## 2023-12-10 RX ADMIN — PHYTONADIONE 10 MG: 10 INJECTION, EMULSION INTRAMUSCULAR; INTRAVENOUS; SUBCUTANEOUS at 02:12

## 2023-12-10 RX ADMIN — DEXTROSE AND SODIUM CHLORIDE: 5; 900 INJECTION, SOLUTION INTRAVENOUS at 02:12

## 2023-12-10 RX ADMIN — HEPARIN SODIUM 5000 UNITS: 5000 INJECTION INTRAVENOUS; SUBCUTANEOUS at 05:12

## 2023-12-10 RX ADMIN — MYCOPHENOLATE MOFETIL 1000 MG: 200 POWDER, FOR SUSPENSION ORAL at 03:12

## 2023-12-10 RX ADMIN — ALBUMIN HUMAN 25 G: 50 SOLUTION INTRAVENOUS at 02:12

## 2023-12-10 RX ADMIN — HEPARIN SODIUM 5000 UNITS: 5000 INJECTION INTRAVENOUS; SUBCUTANEOUS at 09:12

## 2023-12-10 RX ADMIN — HEPARIN SODIUM 5000 UNITS: 5000 INJECTION INTRAVENOUS; SUBCUTANEOUS at 02:12

## 2023-12-10 RX ADMIN — ROCURONIUM BROMIDE 30 MG: 10 INJECTION INTRAVENOUS at 01:12

## 2023-12-10 RX ADMIN — ALBUMIN (HUMAN) 25 G: 12.5 SOLUTION INTRAVENOUS at 02:12

## 2023-12-10 RX ADMIN — OXYCODONE HYDROCHLORIDE 5 MG: 5 TABLET ORAL at 03:12

## 2023-12-10 RX ADMIN — AMPICILLIN SODIUM AND SULBACTAM SODIUM 3 G: 2; 1 INJECTION, POWDER, FOR SOLUTION INTRAMUSCULAR; INTRAVENOUS at 12:12

## 2023-12-10 RX ADMIN — TACROLIMUS 1 MG: 1 CAPSULE ORAL at 08:12

## 2023-12-10 RX ADMIN — POTASSIUM CHLORIDE 40 MEQ: 29.8 INJECTION, SOLUTION INTRAVENOUS at 09:12

## 2023-12-10 RX ADMIN — CALCIUM CHLORIDE 1 G: 100 INJECTION, SOLUTION INTRAVENOUS at 12:12

## 2023-12-10 RX ADMIN — PROPOFOL 30 MCG/KG/MIN: 10 INJECTION, EMULSION INTRAVENOUS at 04:12

## 2023-12-10 RX ADMIN — AMPICILLIN AND SULBACTAM 3 G: 2; 1 INJECTION, POWDER, FOR SOLUTION INTRAVENOUS at 05:12

## 2023-12-10 RX ADMIN — TACROLIMUS 1 MG: 1 CAPSULE ORAL at 06:12

## 2023-12-10 RX ADMIN — NOREPINEPHRINE BITARTRATE 0.1 MCG/KG/MIN: 4 INJECTION, SOLUTION INTRAVENOUS at 04:12

## 2023-12-10 RX ADMIN — MAGNESIUM SULFATE HEPTAHYDRATE 2 G: 40 INJECTION, SOLUTION INTRAVENOUS at 05:12

## 2023-12-10 RX ADMIN — INSULIN HUMAN 19.2 UNITS/HR: 1 INJECTION, SOLUTION INTRAVENOUS at 01:12

## 2023-12-10 RX ADMIN — AMPICILLIN AND SULBACTAM 3 G: 2; 1 INJECTION, POWDER, FOR SOLUTION INTRAVENOUS at 07:12

## 2023-12-10 RX ADMIN — PHYTONADIONE 10 MG: 10 INJECTION, EMULSION INTRAMUSCULAR; INTRAVENOUS; SUBCUTANEOUS at 06:12

## 2023-12-10 RX ADMIN — POTASSIUM CHLORIDE 40 MEQ: 29.8 INJECTION, SOLUTION INTRAVENOUS at 06:12

## 2023-12-10 RX ADMIN — INSULIN HUMAN 1 UNITS/HR: 1 INJECTION, SOLUTION INTRAVENOUS at 02:12

## 2023-12-10 RX ADMIN — PROPOFOL 50 MCG/KG/MIN: 10 INJECTION, EMULSION INTRAVENOUS at 01:12

## 2023-12-10 RX ADMIN — MYCOPHENOLATE MOFETIL 1000 MG: 200 POWDER, FOR SUSPENSION ORAL at 09:12

## 2023-12-10 RX ADMIN — MYCOPHENOLATE MOFETIL 1000 MG: 200 POWDER, FOR SUSPENSION ORAL at 10:12

## 2023-12-10 RX ADMIN — OXYCODONE HYDROCHLORIDE 5 MG: 5 TABLET ORAL at 09:12

## 2023-12-10 RX ADMIN — METHYLPREDNISOLONE SODIUM SUCCINATE 200 MG: 125 INJECTION, POWDER, FOR SOLUTION INTRAMUSCULAR; INTRAVENOUS at 09:12

## 2023-12-10 NOTE — PLAN OF CARE
Recommendations    1) When medically feasible ADAT to low sodium per MD    2) If tube feeding recs warranted initiate Impact Peptide @ 45ml/hr to provide 1620 kcals, 101 g PRO, 832 ml fluid per MD    3) RD following    Goals: Meet % een/epn by next RD f/u  Nutrition Goal Status: new  Communication of RD Recs: other (comment) (POC)

## 2023-12-10 NOTE — CONSULTS
[facility]  Endocrinology  Diabetes Consult Note    Consult Requested by: Nishant Massey MD   Reason for admit: S/P liver transplant    HISTORY OF PRESENT ILLNESS:  Reason for Consult: Management of Steroid Induced Hyperglycemia     Surgical Procedure and Date:  Liver Transplant on 12/10/23       Lab Results   Component Value Date    HGBA1C 4.6 10/31/2023       HPI:   Patient is a 63 y.o. 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 Patient now presents for the above procedure(s). Endocrinology consulted for management of hyperglycemia.       Interval HPI:   Overnight events: Remains in SICU s/p liver transplant. BG above goal ranges on current IV insulin infusion protocol with infusion rates ranging from 1-2.7 u./hr. Receiving Solu Medrol 200 mg, standard steroid taper. Diet NPO Except for: Sips with Medication    Eating:   NPO  Nausea: No  Hypoglycemia and intervention: No  Fever: No  TPN and/or TF: No  If yes, type of TF/TPN and rate: n/a    PMH, PSH, FH, SH reviewed     ROS:  Unable to obtain due to: Intubated     Review of Systems    Current Medications and/or Treatments Impacting Glycemic Control  Immunotherapy:    Immunosuppressants           Stop Route Frequency     tacrolimus (PROGRAF) 1 mg/mL oral syringe         -- Oral 2 times daily     mycophenolate mofetil 200 mg/mL suspension 1,000 mg         -- Oral 2 times daily          Steroids:   Hormones (From admission, onward)      Start     Stop Route Frequency Ordered    12/15/23 0900  predniSONE tablet 20 mg  (methylprednisolone taper panel)        See Hyperspace for full Linked Orders Report.    -- Oral Daily 12/10/23 0143    12/14/23 0900  methylPREDNISolone sodium succinate injection 40 mg  (methylprednisolone taper panel)        See Hyperspace for full Linked Orders  Report.    12/15/23 0859 IV Daily 12/10/23 0143    12/13/23 0900  methylPREDNISolone sodium succinate injection 80 mg  (methylprednisolone taper panel)        See Hyperspace for full Linked Orders Report.    12/14/23 0859 IV Daily 12/10/23 0143    12/12/23 0900  methylPREDNISolone sodium succinate injection 120 mg  (methylprednisolone taper panel)        See Hyperspace for full Linked Orders Report.    12/13/23 0859 IV Daily 12/10/23 0143    12/11/23 0900  methylPREDNISolone sodium succinate injection 160 mg  (methylprednisolone taper panel)        See Hyperspace for full Linked Orders Report.    12/12/23 0859 IV Daily 12/10/23 0143    12/10/23 0145  vasopressin (PITRESSIN) 0.2 Units/mL in dextrose 5 % (D5W) 100 mL infusion         -- IV Continuous 12/10/23 0143          Pressors:    Autonomic Drugs (From admission, onward)      Start     Stop Route Frequency Ordered    12/10/23 0145  NORepinephrine 4 mg in dextrose 5% 250 mL infusion (premix)        Question Answer Comment   Begin at (in mcg/kg/min): 0.04    Titrate by: (in mcg/kg/min) 0.01    Titrate interval: (in minutes) 10    Titrate to maintain: (MAP or SBP) SBP    Greater than: (in mmHg) 100    Maximum dose: (in mcg/kg/min) 0.3        -- IV Continuous 12/10/23 0143          Hyperglycemia/Diabetes Medications:   Antihyperglycemics (From admission, onward)      Start     Stop Route Frequency Ordered    12/10/23 0245  insulin regular in 0.9 % NaCl 100 unit/100 mL (1 unit/mL) infusion        Question:  Enter initial dose from Infusion Protocol Chart (Units/hr):  Answer:  1    -- IV Continuous 12/10/23 0143             PHYSICAL EXAMINATION:  Vitals:    12/10/23 0930   BP:    Pulse: 75   Resp: (!) 21   Temp:      Body mass index is 36.12 kg/m².     Physical Exam   Constitutional:  Well developed, well nourished, obese, NAD.  ENT: External ears no masses with nose patent  Neck:  Supple; trachea midline  Cardiovascular: Normal heart sounds, no LE edema.     Lungs:   "Normal effort; lungs anterior bilaterally clear to auscultation.  Intubated on a ventilator.  Abdomen:  Soft, no masses, no hernias. Hypoactive BS noted.  MS: No clubbing or cyanosis of nails noted; unable to assess gait.  Skin: No rashes, lesions, or ulcers; no nodules.  Chevron abdominal incision with dressing; LIUDMILA x 2 to RLQ.  Psychiatric: MICHAEL  Neurological: MICHAEL      Labs Reviewed and Include   Recent Labs   Lab 12/10/23  0509 12/10/23  0748   * 232*   CALCIUM 8.9 9.1   ALBUMIN 2.9*  --    PROT 4.1*  --     139   K 3.6 3.6   CO2 21* 22*    104   BUN 20 21   CREATININE 0.8 0.8   ALKPHOS 67  --    ALT 1,129*  --    AST 2,640* 2,129*   BILITOT 8.7*  --      Lab Results   Component Value Date    WBC 6.83 12/10/2023    HGB 8.1 (L) 12/10/2023    HCT 23.5 (L) 12/10/2023    MCV 97 12/10/2023    PLT 54 (L) 12/10/2023     No results for input(s): "TSH", "FREET4" in the last 168 hours.  Lab Results   Component Value Date    HGBA1C 4.6 10/31/2023       Nutritional status:   Body mass index is 36.12 kg/m².  Lab Results   Component Value Date    ALBUMIN 2.9 (L) 12/10/2023    ALBUMIN 2.4 (L) 12/10/2023    ALBUMIN 2.6 (L) 12/09/2023     No results found for: "PREALBUMIN"    Estimated Creatinine Clearance: 88.4 mL/min (based on SCr of 0.8 mg/dL).    Accu-Checks  Recent Labs     12/09/23  1735 12/10/23  0205 12/10/23  0310 12/10/23  0406 12/10/23  0504 12/10/23  0613 12/10/23  0715 12/10/23  0808 12/10/23  0912 12/10/23  1002   POCTGLUCOSE 119* 202* 211* 239* 253* 249* 231* 238* 238* 240*        ASSESSMENT and PLAN    Immunology/Multi System  Prophylactic immunotherapy  May increase insulin resistance.         Endocrine  Class 2 severe obesity due to excess calories with serious comorbidity and body mass index (BMI) of 36.0 to 36.9 in adult  Body mass index is 36.12 kg/m².  May increase insulin resistance.         Steroid-induced hyperglycemia  BG goal 140 - 180     Continue IV insulin infusion protocol  Requires " intensive BG monitoring while on protocol     ** Please call Endocrine for any BG related issues **    ** Please notify Endocrine for any change and/or advance in diet**    Discharge planning: TBD          GI  * S/P liver transplant  Managed per primary team  Optimize BG control        Other  Adverse effect of corticosteroids  Glucocorticoids markedly increase glucoses. Expect steroid taper to help with glucose control          Plan discussed with patient, family, and RN at bedside.     Ban Messer NP  Endocrinology  [facility]

## 2023-12-10 NOTE — PT/OT/SLP PROGRESS
Physical Therapy      Patient Name:  Yumiko Gonzalez   MRN:  [id number]    Patient not seen today secondary to: remains intubated. Will follow-up 12/11.    12/10/2023

## 2023-12-10 NOTE — ANESTHESIA POSTPROCEDURE EVALUATION
Anesthesia Post Evaluation    Patient: Yumiko Gonzalez    Procedure(s) Performed: Procedure(s) (LRB):  TRANSPLANT, LIVER (N/A)    Final Anesthesia Type: general      Patient location during evaluation: ICU  Patient participation: No - Unable to Participate, Intubation  Level of consciousness: sedated  Post-procedure vital signs: reviewed and stable  Pain management: adequate  Airway patency: patent    PONV status at discharge: No PONV  Anesthetic complications: no      Cardiovascular status: hemodynamically stable  Respiratory status: intubated and ventilator  Hydration status: euvolemic  Follow-up not needed.              Vitals Value Taken Time   /60 12/10/23 0218   Temp 37 °C (98.6 °F) 12/10/23 0200   Pulse 85 12/10/23 0218   Resp 20 12/10/23 0218   SpO2 99 % 12/10/23 0218   Vitals shown include unvalidated device data.      No case tracking events are documented in the log.      Pain/Niharika Score: No data recorded

## 2023-12-10 NOTE — ASSESSMENT & PLAN NOTE
BG goal 140 - 180     Continue IV insulin infusion protocol  Requires intensive BG monitoring while on protocol     ** Please call Endocrine for any BG related issues **    ** Please notify Endocrine for any change and/or advance in diet**    Discharge planning: MO

## 2023-12-10 NOTE — ASSESSMENT & PLAN NOTE
Neuro/Psych:   -- Sedation: propofol   -- Pain: dilaudid prn              Cards:   -- 0.08 vaso,0.1 levo, wean as able   -- MAP >65, SBP >100  -- CVP <9  -- D5W NaCl at 100      Pulm:   -- Goal O2 sat > 90%  -- Wean as able, plan for extubation after liver U/S tomorrow       Renal:  -- Keep cowan for strict I/O  -- BUN/Cr   Recent Labs   Lab 12/09/23  1430   BUN 19   CREATININE 1.0     -- Urine output: 2875   -- BMP q12 for 24 hrs then daily       FEN / GI:   -- Net +1721  -- Replace lytes as needed  -- Nutrition: NPO  -- Trend BMP q12hr, AST q4hr  -- AM Liver Transplant       ID:   -- Tm: afebrile; f/u post-op  WBC   Recent Labs   Lab 12/09/23  1430   WBC 8.88     -- Trend WBC with CBC q4hr  -- Abx/Antifungals/Antiviral: Unasyn, Bactrim, Fluc, Nystatin, Valganciclovir        Heme/Onc:  -- f/u postop H/H  -- Daily CBC  Recent Labs   Lab 12/09/23  1430   HGB 12.9   PLT 77*   INR 1.4*     -- Received a total of 2u pRBC, 3u FFP, 2u Plts, 2u Cryo, 1L 5% albumin and 829cc cell saver today  -- Q4 Hr CBC and PT for 1 day, then daily  -- Immuno: Methylprednisolone taper, Prograf, Mycophenolate      Endo:   -- Gluc goal 140-180  -- insulin gtt   -- endo consulted appreciate recommendations       PPx:   Feeding: NPO  Analgesia/Sedation:  PRNs / Propofol    Thromboembolic prevention: subq Heparin   HOB >30: yes  Stress Ulcer ppx: famotidone q12h   Glucose control: Critical care goal 140-180 g/dl, insulin gtt  Bowel reg:   Invasive Lines/Drains/Airway: RIJ trialysis, RIJ double lumen introducer, right femoral A line, right radial A line / LIUDMILA x2 / ETchapo   Deescalation:         Dispo/Code Status/Palliative:   -- SICU / Full Code

## 2023-12-10 NOTE — ANESTHESIA PROCEDURE NOTES
Arterial    Diagnosis: ESLD    Patient location during procedure: done in OR  Procedure start time: 12/9/2023 7:23 PM  Timeout: 12/9/2023 7:23 PM  Procedure end time: 12/9/2023 7:23 PM    Staffing  Authorizing Provider: Jordy Bolivar MD  Performing Provider: Molly Norton CRNA    Staffing  Performed by: Molly Norton CRNA  Authorized by: Jordy Bolivar MD    Anesthesiologist was present at the time of the procedure.    Preanesthetic Checklist  Completed: patient identified, IV checked, site marked, risks and benefits discussed, surgical consent, monitors and equipment checked, pre-op evaluation, timeout performed and anesthesia consent givenArterial  Skin Prep: chlorhexidine gluconate  Local Infiltration: none  Orientation: right  Location: radial    Catheter Size: 20 G Insertion Attempts: 1  Assessment  Dressing: secured with tape and tegaderm  Patient: Tolerated well

## 2023-12-10 NOTE — ASSESSMENT & PLAN NOTE
  Neuro/Psych:   -- Sedation: propofol   -- Pain: dilaudid prn              Cards:   -- 0.08 vaso,0.1 levo, wean as able   -- MAP >65, SBP >100  -- CVP <9  -- D5W NaCl at 100      Pulm:   -- Goal O2 sat > 90%  -- Wean as able, plan for extubation after liver U/S tomorrow       Renal:  -- Keep cowan for strict I/O  -- BUN/Cr   Recent Labs   Lab 12/09/23  1430 12/10/23  0159 12/10/23  0509   BUN 19 20 20   CREATININE 1.0 0.8 0.8       -- Urine output: 2875   -- BMP q12 for 24 hrs then daily       FEN / GI:   -- Net +7782  -- Replace lytes as needed  -- Nutrition: NPO  -- Trend BMP q12hr, AST q4hr  -- AM Liver Transplant US      ID:   -- Tm: afebrile; f/u post-op  WBC   Recent Labs   Lab 12/09/23  1430 12/10/23  0159 12/10/23  0509   WBC 8.88 8.24 8.64       -- Trend WBC with CBC q4hr  -- Abx/Antifungals/Antiviral: Unasyn, Bactrim, Fluc, Nystatin, Valganciclovir        Heme/Onc:  -- f/u postop H/H  -- Daily CBC  Recent Labs   Lab 12/09/23  2355 12/10/23  0159 12/10/23  0509   HGB 9.3* 9.4* 8.4*   * 86* 72*   APTT 44.8* 41.0* 45.8*   INR 1.6* 1.8* 1.8*       -- Received a total of 2u pRBC, 3u FFP, 2u Plts, 2u Cryo, 1L 5% albumin and 829cc cell saver today  -- Q4 Hr CBC and PT for 1 day, then daily  -- Immuno: Methylprednisolone taper, Prograf, Mycophenolate      Endo:   -- Gluc goal 140-180  -- insulin gtt   -- endo consulted appreciate recommendations       PPx:   Feeding: NPO  Analgesia/Sedation:  PRNs / Propofol    Thromboembolic prevention: subq Heparin   HOB >30: yes  Stress Ulcer ppx: famotidone q12h   Glucose control: Critical care goal 140-180 g/dl, insulin gtt  Bowel reg:   Invasive Lines/Drains/Airway: RIJ trialysis, RIJ double lumen introducer, right femoral A line, right radial A line / LIUDMILA x2 / ET, cowan   Deescalation:         Dispo/Code Status/Palliative:   -- SICU / Full Code

## 2023-12-10 NOTE — ANESTHESIA PROCEDURE NOTES
Jenners Prasanth Line    Diagnosis: esld  Patient location during procedure: done in OR    Staffing  Authorizing Provider: Jordy Bolivar MD  Performing Provider: Jordy Bolivar MD    Staffing  Performed by: Jordy Bolivar MD  Authorized by: Jordy Bolivar MD    Anesthesiologist was present at the time of the procedure.  Jenners Prasanth Line  Skin Prep: chlorhexidine gluconate and isopropyl alcohol  Local Infiltration: none  Location: right,  internal jugular vein  Vessel Caliber: medium, patent, compressibility normal  Vascular Doppler:  not done  Introducer: 9 Fr single lumen, manometry used.  Device: standard thermodilution catheter   Catheter Size: 7.5 Fr  Catheter placement by yes. Heme positive aspiration all ports. PAC floated with balloon up not wedgedSterile sheath used  Locked at: 50 cm.Insertion Attempts: 1  Indication: intravenous therapy, hemodynamic monitoring  Ultrasound Guidance  Needle advanced into vessel with real time Ultrasound guidance.  Guidewire confirmed in vessel.  Sterile sheath used.  Assessment  Central Line Bundle Protocol followed. Hand hygiene before procedure, surgical cap worn, surgical mask worn, sterile surgical gloves worn, large sterile drape used.  Verification: blood return  Dressing: secured with tape and tegaderm, sutured in place and taped and tegaderm  Patient: Tolerated Well

## 2023-12-10 NOTE — PLAN OF CARE
SICU PLAN OF CARE NOTE    Dx: Liver transplant candidate    Goals of Care: Extubate, SBP between 100-160    Vital Signs (last 12 hours):   Temp:  [97.6 °F (36.4 °C)-98.6 °F (37 °C)]   Pulse:  [81-88]   Resp:  [20-22]   BP: (107-111)/(55-57)   SpO2:  [98 %-100 %]   Arterial Line BP: ()/(41-67)      Neuro: Sedated, Arouses to Voice, and Follows Commands    Cardiac: NSR    Respiratory: Ventilator Ac/VC+ 40 FIO2 Peep 5    Gtts: Norepinephrine, Propofol, Insulin, and MIVF    Urine Output: Urinary Catheter 1385 cc/shift    Drains: LIUDMILA Drain, total output 381 cc / shift    Diet: NPO    Restraints Left and Right Wrist soft restraints     Labs/Accuchecks: hourly Accuchecks, Replace electrolytes    Skin: abdominal incision, dressing clean dry intact, left lower lip bruising, wound care consult in place, turn q2h, waffle inflated, heel boots, foams, and scds in place.    Shift Events: 1250 cc Albumin given overnight, Vaso titrated off. SBP greater than 100. Levophed weaned accordingly.

## 2023-12-10 NOTE — NURSING
Dr. Massey at bedside with transplant and SICU team to round.Dr. Massey updated on patient's assessments, vital signs, urine output, lab results, and abg results. Patient with daughter at bedside. Patient and daughter updated on plan of care for the day per Dr. Massey. Will continue to monitor patient.

## 2023-12-10 NOTE — SUBJECTIVE & OBJECTIVE
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, postitive antismooth muscle antibody    Blood transfusion     Bronchitis     bronchospasm on occasion     Cancer 1987    carcinoma in situ    Cervical neck pain with evidence of disc disease 2012    can bend neck    Cirrhosis     non-alcoholic, compensated    Colon polyps 2012    Depression 2012    Hx panic attacks    Diverticular disease 2012    never diverticulitis    Fatty liver 2012    Fibrocystic breast     Fine tremor     hands,chronic    Hepatosplenomegaly     History of abdominal paracentesis 2023    8.7L of fluid drained    Hypertension 2013    Knee pain, bilateral     chronic, with hands,feet,fingers also painful    Lesion of right lung     Liver disease     "partially sclerosed liver" per pt    Migraines past Hx    Nephrolithiasis     stone episode 2021    Pleural effusion     small, compensated, good bilateral breath sounds per Dr Suresh GUTIERRES (postoperative nausea and vomiting)     twice after childbirth    Postpartum depression     Splenomegaly     Thrombocytopenia     Tremors of nervous system        Past Surgical History:   Procedure Laterality Date    ADENOIDECTOMY      CERVICAL CONIZATION   W/ LASER       SECTION      x3    COLONOSCOPY N/A 2016    Procedure: COLONOSCOPY;  Surgeon: James Palomares MD;  Location: [facility] ENDO;  Service: Endoscopy;  Laterality: N/A;    COLONOSCOPY N/A 2/3/2022    Procedure: COLONOSCOPY;  Surgeon: James Palomares MD;  Location: [facility] ENDO;  Service: Endoscopy;  Laterality: N/A;    EMBOLIZATION N/A 2018    Procedure: EMBOLIZATION, BLOOD VESSEL;  Surgeon: Joselin Surgeon;  " Location: [facility];  Service: Radiology;  Laterality: N/A;    ENDOMETRIAL ABLATION      ESOPHAGOGASTRODUODENOSCOPY N/A 2020    Procedure: ESOPHAGOGASTRODUODENOSCOPY (EGD);  Surgeon: James Palomares MD;  Location: [facility] ENDO;  Service: Endoscopy;  Laterality: N/A;    ESOPHAGOGASTRODUODENOSCOPY N/A 3/8/2022    Procedure: EGD (ESOPHAGOGASTRODUODENOSCOPY);  Surgeon: James Palomares MD;  Location: [facility] ENDO;  Service: Endoscopy;  Laterality: N/A;    LIVER BIOPSY      PELVIC LAPAROSCOPY      TONSILLECTOMY      TUBAL LIGATION         Review of patient's allergies indicates:   Allergen Reactions    No known drug allergies        Family History       Problem Relation (Age of Onset)    Breast cancer Mother (70), Sister, Maternal Aunt (70), Maternal Grandmother (70), Maternal Cousin (40)    Cancer Sister    Diabetes Father, Sister, Sister, Brother    Emphysema Brother    Heart disease Mother, Father    Hypertension Mother    Multiple sclerosis Maternal Aunt, Maternal Aunt    Tremor Mother, Daughter          Tobacco Use    Smoking status: Former     Current packs/day: 0.00     Types: Cigarettes     Quit date: 2/3/2006     Years since quittin.8    Smokeless tobacco: Never   Substance and Sexual Activity    Alcohol use: Not Currently    Drug use: No    Sexual activity: Not Currently      Review of systems:   Unable to assess due to mental status changes (intubated)     Objective:     Vital Signs (Most Recent):  Temp: 98.6 °F (37 °C) (12/10/23 0200)  Pulse: 82 (12/10/23 0200)  Resp: 20 (12/10/23 0200)  BP: (!) 101/51 (23 1608)  SpO2: 99 % (12/10/23 0200) Vital Signs (24h Range):  Temp:  [97.6 °F (36.4 °C)-98.6 °F (37 °C)] 98.6 °F (37 °C)  Pulse:  [67-82] 82  Resp:  [16-20] 20  SpO2:  [98 %-99 %] 99 %  BP: (101-116)/(51-56) 101/51  Arterial Line BP: (87)/(41) 87/41     Weight: 99.6 kg (219 lb 7.5 oz)  Body mass index is 34.37 kg/m².      Intake/Output Summary (Last 24 hours) at 12/10/2023 0229  Last data  filed at 12/10/2023 0219  Gross per 24 hour   Intake 29043 ml   Output 5984 ml   Net 7782 ml          Physical Exam  Constitutional:       Appearance: Normal appearance.      Comments: ABHINAV   HENT:      Head: Normocephalic and atraumatic.      Nose: Nose normal.   Cardiovascular:      Rate and Rhythm: Regular rhythm.   Pulmonary:      Effort: Pulmonary effort is normal. No respiratory distress.      Comments: Intubated   Abdominal:      Palpations: Abdomen is soft.      Comments: 2 LIUDMILA drains right abdomen SS output. Chevron incision with island dressing CDI.     Genitourinary:     Comments: Henry   Musculoskeletal:      Comments: Right femoral a-line, right radial a-line    Skin:     General: Skin is warm.   Neurological:      General: No focal deficit present.   Psychiatric:         Behavior: Behavior normal.            Vents:  Oxygen Concentration (%): 60 (12/10/23 0200)    Lines/Drains/Airways       Central Venous Catheter Line  Duration             Percutaneous Central Line Insertion/Assessment - Triple Lumen  12/09/23 2020 Internal Jugular Right <1 day    Pulmonary Artery Catheter Assessment  12/09/23 2019 Internal Jugular Right <1 day              Drain  Duration                  Closed/Suction Drain 12/10/23 0100 Right RLQ Bulb 19 Fr. <1 day         Closed/Suction Drain 12/10/23 0100 Tube - 1 RLQ Bulb 19 Fr. <1 day         NG/OG Tube 12/10/23 0145 orogastric Center mouth <1 day         Urethral Catheter 12/09/23 1850 Silicone 16 Fr. <1 day              Airway  Duration                  Airway - Non-Surgical 12/09/23 1847 <1 day              Arterial Line  Duration             Arterial Line 12/09/23 1923 Right Radial <1 day    Arterial Line 12/09/23 2018 Right Femoral <1 day              Peripheral Intravenous Line  Duration                  Peripheral IV - Single Lumen 12/10/23 18 G Right Antecubital <1 day         Peripheral IV - Single Lumen 12/10/23 22 G Left Wrist <1 day         LAZARO 12/09/23 1916 Left  Antecubital <1 day                    Significant Labs:    CBC/Anemia Profile:  Recent Labs   Lab 12/09/23  1430 12/09/23  1945 12/09/23  2247 12/09/23  2355 12/10/23  0203   WBC 8.88  --   --   --   --    HGB 12.9 11.9* 9.6* 9.3*  --    HCT 36.1* 34.1* 27.4* 26.4* 26*   PLT 77* 74* 113* 142*  --    MCV 95  --   --   --   --    RDW 15.0*  --   --   --   --         Chemistries:  Recent Labs   Lab 12/09/23  1430 12/09/23 1945 12/09/23 2247 12/09/23 2355   * 133* 135* 135*   K 4.1 4.4 4.1 3.8     --   --   --    CO2 23  --   --   --    BUN 19  --   --   --    CREATININE 1.0  --   --   --    CALCIUM 9.7  --   --   --    ALBUMIN 2.5* 2.2* 2.2* 2.6*   PROT 5.5*  --   --   --    BILITOT 5.2*  --   --   --    ALKPHOS 198*  --   --   --    ALT 32  --  1,479*  --    AST 51*  --  1,784*  --    MG  --  1.8 2.1 2.1       All pertinent labs within the past 24 hours have been reviewed.    Significant Imaging: I have reviewed all pertinent imaging results/findings within the past 24 hours.

## 2023-12-10 NOTE — SUBJECTIVE & OBJECTIVE
Interval HPI:   Overnight events: Remains in SICU s/p liver transplant. BG above goal ranges on current IV insulin infusion protocol with infusion rates ranging from 1-2.7 u./hr. Receiving Solu Medrol 200 mg, standard steroid taper. Diet NPO Except for: Sips with Medication    Eating:   NPO  Nausea: No  Hypoglycemia and intervention: No  Fever: No  TPN and/or TF: No  If yes, type of TF/TPN and rate: n/a    PMH, PSH, FH, SH reviewed     ROS:  Unable to obtain due to: Intubated     Review of Systems    Current Medications and/or Treatments Impacting Glycemic Control  Immunotherapy:    Immunosuppressants           Stop Route Frequency     tacrolimus (PROGRAF) 1 mg/mL oral syringe         -- Oral 2 times daily     mycophenolate mofetil 200 mg/mL suspension 1,000 mg         -- Oral 2 times daily          Steroids:   Hormones (From admission, onward)      Start     Stop Route Frequency Ordered    12/15/23 0900  predniSONE tablet 20 mg  (methylprednisolone taper panel)        See Hyperspace for full Linked Orders Report.    -- Oral Daily 12/10/23 0143    12/14/23 0900  methylPREDNISolone sodium succinate injection 40 mg  (methylprednisolone taper panel)        See Hyperspace for full Linked Orders Report.    12/15/23 0859 IV Daily 12/10/23 0143    12/13/23 0900  methylPREDNISolone sodium succinate injection 80 mg  (methylprednisolone taper panel)        See Hyperspace for full Linked Orders Report.    12/14/23 0859 IV Daily 12/10/23 0143    12/12/23 0900  methylPREDNISolone sodium succinate injection 120 mg  (methylprednisolone taper panel)        See Hyperspace for full Linked Orders Report.    12/13/23 0859 IV Daily 12/10/23 0143    12/11/23 0900  methylPREDNISolone sodium succinate injection 160 mg  (methylprednisolone taper panel)        See Hyperspace for full Linked Orders Report.    12/12/23 0859 IV Daily 12/10/23 0143    12/10/23 0145  vasopressin (PITRESSIN) 0.2 Units/mL in dextrose 5 % (D5W) 100 mL infusion          -- IV Continuous 12/10/23 0143          Pressors:    Autonomic Drugs (From admission, onward)      Start     Stop Route Frequency Ordered    12/10/23 0145  NORepinephrine 4 mg in dextrose 5% 250 mL infusion (premix)        Question Answer Comment   Begin at (in mcg/kg/min): 0.04    Titrate by: (in mcg/kg/min) 0.01    Titrate interval: (in minutes) 10    Titrate to maintain: (MAP or SBP) SBP    Greater than: (in mmHg) 100    Maximum dose: (in mcg/kg/min) 0.3        -- IV Continuous 12/10/23 0143          Hyperglycemia/Diabetes Medications:   Antihyperglycemics (From admission, onward)      Start     Stop Route Frequency Ordered    12/10/23 0245  insulin regular in 0.9 % NaCl 100 unit/100 mL (1 unit/mL) infusion        Question:  Enter initial dose from Infusion Protocol Chart (Units/hr):  Answer:  1    -- IV Continuous 12/10/23 0143             PHYSICAL EXAMINATION:  Vitals:    12/10/23 0930   BP:    Pulse: 75   Resp: (!) 21   Temp:      Body mass index is 36.12 kg/m².     Physical Exam   Constitutional:  Well developed, well nourished, obese, NAD.  ENT: External ears no masses with nose patent  Neck:  Supple; trachea midline  Cardiovascular: Normal heart sounds, no LE edema.     Lungs:  Normal effort; lungs anterior bilaterally clear to auscultation.  Intubated on a ventilator.  Abdomen:  Soft, no masses, no hernias. Hypoactive BS noted.  MS: No clubbing or cyanosis of nails noted; unable to assess gait.  Skin: No rashes, lesions, or ulcers; no nodules.  Chevron abdominal incision with dressing; LIUDMILA x 2 to RLQ.  Psychiatric: MICHAEL  Neurological: MICHAEL

## 2023-12-10 NOTE — PROGRESS NOTES
[facility]  Critical Care - Surgery  Progress Note    Patient Name: Yumiko Gonzalez  MRN: [id number]  Admission Date: 12/9/2023  Hospital Length of Stay: 1 days  Code Status: Full Code  Attending Provider: Nishant Massey MD  Primary Care Provider: Garrett Webb MD   Principal Problem: Liver transplant candidate    Subjective:     Hospital/ICU Course:  No notes on file    Interval History/Significant Events: Patient remains intubated and sedated on minimal vent settings. Requiring 0.04 levo and intermittently on vaso. Insulin gtt started.  Received 1.25L of albumin postop.     Follow-up For: Procedure(s) (LRB):  TRANSPLANT, LIVER (N/A)    Post-Operative Day: 1 Day Post-Op    Objective:     Vital Signs (Most Recent):  Temp: 97.6 °F (36.4 °C) (12/10/23 0300)  Pulse: 83 (12/10/23 0615)  Resp: 20 (12/10/23 0615)  BP: (!) 111/57 (12/10/23 0600)  SpO2: 100 % (12/10/23 0615) Vital Signs (24h Range):  Temp:  [97.6 °F (36.4 °C)-98.6 °F (37 °C)] 97.6 °F (36.4 °C)  Pulse:  [67-88] 83  Resp:  [16-22] 20  SpO2:  [98 %-100 %] 100 %  BP: (101-116)/(51-57) 111/57  Arterial Line BP: ()/(41-67) 121/59     Weight: 104.6 kg (230 lb 9.6 oz)  Body mass index is 36.12 kg/m².      Intake/Output Summary (Last 24 hours) at 12/10/2023 0628  Last data filed at 12/10/2023 0600  Gross per 24 hour   Intake 81248.62 ml   Output 7341 ml   Net 8131.62 ml          Physical Exam  Constitutional:       Appearance: Normal appearance.      Comments: ABHINAV   HENT:      Head: Normocephalic and atraumatic.      Nose: Nose normal.   Cardiovascular:      Rate and Rhythm: Regular rhythm.   Pulmonary:      Effort: Pulmonary effort is normal. No respiratory distress.      Comments: Intubated   Abdominal:      Palpations: Abdomen is soft.      Comments: 2 LIUDMILA drains right abdomen SS output. Chevron incision with island dressing CDI.     Genitourinary:     Comments: Henry   Musculoskeletal:      Comments: Right femoral a-line, right  radial a-line    Skin:     General: Skin is warm.   Neurological:      General: No focal deficit present.   Psychiatric:         Behavior: Behavior normal.            Vents:  Vent Mode: A/C (12/10/23 0557)  Set Rate: 20 BPM (12/10/23 0557)  Vt Set: 450 mL (12/10/23 0557)  PEEP/CPAP: 5 cmH20 (12/10/23 0557)  Oxygen Concentration (%): 40 (12/10/23 0615)  Peak Airway Pressure: 19 cmH20 (12/10/23 0557)  Plateau Pressure: 0 cmH20 (12/10/23 0557)  Total Ve: 9.19 L/m (12/10/23 0557)  Negative Inspiratory Force (cm H2O): 0 (12/10/23 0557)  F/VT Ratio<105 (RSBI): (!) 43.57 (12/10/23 0557)    Lines/Drains/Airways       Central Venous Catheter Line  Duration              Introducer Single Lumen 12/09/23 Internal Jugular Right 1 day    Trialysis (Dialysis) Catheter 12/09/23 right internal jugular 1 day    Pulmonary Artery Catheter Assessment  12/09/23 2019 Internal Jugular Right <1 day              Drain  Duration                  Closed/Suction Drain 12/10/23 0100 Right RLQ Bulb 19 Fr. <1 day         Closed/Suction Drain 12/10/23 0100 Tube - 1 RLQ Bulb 19 Fr. <1 day         NG/OG Tube 12/10/23 0145 orogastric Center mouth <1 day         Urethral Catheter 12/09/23 1850 Silicone 16 Fr. <1 day              Airway  Duration                  Airway - Non-Surgical 12/09/23 1847 <1 day              Arterial Line  Duration             Arterial Line 12/09/23 1923 Right Radial <1 day    Arterial Line 12/09/23 2018 Right Femoral <1 day              Peripheral Intravenous Line  Duration                  Peripheral IV - Single Lumen 12/10/23 18 G Right Antecubital <1 day         Peripheral IV - Single Lumen 12/10/23 22 G Left Wrist <1 day         LAZARO 12/09/23 1916 Left Antecubital <1 day                    Significant Labs:    CBC/Anemia Profile:  Recent Labs   Lab 12/09/23  1430 12/09/23  1945 12/09/23  2355 12/10/23  0159 12/10/23  0203 12/10/23  0416 12/10/23  0509   WBC 8.88  --   --  8.24  --   --  8.64   HGB 12.9   < > 9.3* 9.4*  --    --  8.4*   HCT 36.1*   < > 26.4* 27.0* 26* 26* 24.5*   PLT 77*   < > 142* 86*  --   --  72*   MCV 95  --   --  95  --   --  98   RDW 15.0*  --   --  15.8*  --   --  16.5*    < > = values in this interval not displayed.        Chemistries:  Recent Labs   Lab 12/09/23  1430 12/09/23  1945 12/09/23  2247 12/09/23  2355 12/10/23  0159 12/10/23  0509   *   < > 135* 135* 138 136   K 4.1   < > 4.1 3.8 4.0 3.6     --   --   --  102 103   CO2 23  --   --   --  22* 21*   BUN 19  --   --   --  20 20   CREATININE 1.0  --   --   --  0.8 0.8   CALCIUM 9.7  --   --   --  9.4 8.9   ALBUMIN 2.5*   < > 2.2* 2.6* 2.4* 2.9*   PROT 5.5*  --   --   --  4.0* 4.1*   BILITOT 5.2*  --   --   --  7.3* 8.7*   ALKPHOS 198*  --   --   --  83 67   ALT 32  --  1,479*  --  1,423* 1,129*   AST 51*  --  1,784*  --  3,055* 2,640*   MG  --    < > 2.1 2.1 2.0 1.9   PHOS  --   --   --   --  3.7 3.7    < > = values in this interval not displayed.       All pertinent labs within the past 24 hours have been reviewed.    Significant Imaging:  I have reviewed all pertinent imaging results/findings within the past 24 hours.  Assessment/Plan:     GI  * Liver transplant candidate    Neuro/Psych:   -- Sedation: 30 propofol; follows commands during SAT    -- Pain: dilaudid prn              Cards:   -- 0.04 levo, vaso currently off    -- MAP >65, SBP >100  -- CVP <9  -- D5 NS at 100  -- received 1.25 albumin postop       Pulm:   -- Goal O2 sat > 90%  -- ACVC, RR 20, PEEP 5, FiO2 40%   -- Wean as able for extubation following US       Renal:  -- Keep cowan for strict I/O  -- BUN/Cr 20/0.08  Recent Labs   Lab 12/09/23  1430 12/10/23  0159 12/10/23  0509   BUN 19 20 20   CREATININE 1.0 0.8 0.8     -- Urine output: 3.6L   -- net + 8.2L this admission   -- BMP q12 for 24 hrs then daily       FEN / GI:   -- s/p DCD whole liver transplant on 12/10   - cold ischemia time of 234 min; warm ischemia time of 30 min     -- NGT in place to HARMEET   -- LIUDMILA #1 [id number]  SS  -- LIUDMILA #2 220cc SS   -- Replace lytes as needed  -- Nutrition: NPO while intubated   -- postop Lactate 0.7   -- Trend BMP q12hr, AST q4hr   - AST 7.3k, ALT 1.4k, Tbili 7.3   -- Liver Transplant US today       ID:   -- Tm: afebrile; WBC normal   Recent Labs   Lab 12/09/23  1430 12/10/23  0159 12/10/23  0509   WBC 8.88 8.24 8.64     -- Trend WBC with CBC q4hr  -- Abx/Antifungals/Antiviral: Unasyn, Bactrim, Fluc, Nystatin, Valganciclovir      Heme/Onc:  -- Received a total of 2u pRBC, 3u FFP, 2u Plts, 2u Cryo, 1L 5% albumin and 829cc cell saver  -- H/H stable at 8.4   -- PT/ INR - 18.2/1.8  -- Daily CBC  Recent Labs   Lab 12/09/23  2355 12/10/23  0159 12/10/23  0509   HGB 9.3* 9.4* 8.4*   * 86* 72*   APTT 44.8* 41.0* 45.8*   INR 1.6* 1.8* 1.8*     -- Q4 Hr CBC and PT for 1 day, then daily  -- Immuno: Methylprednisolone taper, Prograf, Mycophenolate      Endo:   -- Gluc goal 140-180  -- insulin gtt   -- endo consulted appreciate recommendations       PPx:   Feeding: NPO  Analgesia/Sedation:  PRNs / Propofol    Thromboembolic prevention: subq Heparin   HOB >30: yes  Stress Ulcer ppx: famotidone q12h   Glucose control: Critical care goal 140-180 g/dl, insulin gtt  Bowel reg:   Invasive Lines/Drains/Airway: RIJ trialysis, RIJ double lumen introducer, right femoral A line, right radial A line / LIUDMILA x2 / ET, chapo   Deescalation:         Dispo/Code Status/Palliative:   -- SICU / Full Code          Critical care was time spent personally by me on the following activities: development of treatment plan with patient or surrogate and bedside caregivers, discussions with consultants, evaluation of patient's response to treatment, examination of patient, ordering and performing treatments and interventions, ordering and review of laboratory studies, ordering and review of radiographic studies, pulse oximetry, re-evaluation of patient's condition.  This critical care time did not overlap with that of any other provider or  involve time for any procedures.     Gali Crandall MD  Critical Care - Surgery  [facility]

## 2023-12-10 NOTE — ANESTHESIA PROCEDURE NOTES
Arterial    Diagnosis: esld    Patient location during procedure: done in OR    Staffing  Authorizing Provider: Jordy Bolivar MD  Performing Provider: Jordy Bolivar MD    Staffing  Performed by: Jordy Bolivar MD  Authorized by: Jordy Bolivar MD    Anesthesiologist was present at the time of the procedure.  Arterial  Skin Prep: chlorhexidine gluconate and isopropyl alcohol  Local Infiltration: none  Orientation: right  Location: femoral    Catheter Size: 4 Fr Cook  Catheter placement by Ultrasound guidance. Heme positive aspiration all ports.   Vessel Caliber: medium, patent, compressibility normal  Vascular Doppler:  not done  Needle advanced into vessel with real time Ultrasound guidance.  Guidewire confirmed in vessel.  Sterile sheath used.Insertion Attempts: 1  Assessment  Dressing: sutured in place and taped and tegaderm  Patient: Tolerated well

## 2023-12-10 NOTE — TRANSFER OF CARE
"Anesthesia Transfer of Care Note    Patient: Yumiko Gonzalez    Procedure(s) Performed: Procedure(s) (LRB):  TRANSPLANT, LIVER (N/A)    Patient location: ICU    Anesthesia Type: general    Transport from OR: Upon arrival to PACU/ICU, patient attached to ventilator and auscultated to confirm bilateral breath sounds and adequate TV. Continuous ECG monitoring in transport. Continuous SpO2 monitoring in transport. Continuos invasive BP monitoring in transport. Transported from OR intubated on 100% O2 by AMBU with adequate controlled ventilation    Post pain: adequate analgesia    Post assessment: no apparent anesthetic complications    Post vital signs: stable    Level of consciousness: sedated    Nausea/Vomiting: no nausea/vomiting    Complications: none    Transfer of care protocol was followedComments: Report given to ICU team. Vitals stable on transport.       Last vitals: Visit Vitals  BP (!) 101/51   Pulse 67   Temp 36.5 °C (97.7 °F)   Resp 20   Ht 5' 7" (1.702 m)   Wt 99.6 kg (219 lb 7.5 oz)   SpO2 99%   Breastfeeding No   BMI 34.37 kg/m²     "

## 2023-12-10 NOTE — PROGRESS NOTES
TRANSPLANT NOTE:    Admit Date: 12/9/2023    ORGAN:   LIVER  Disease Etiology: Cirrhosis: Fatty Liver (DURAN)  Donor CMV Status: Negative  Donor HCV Status: Negative  Donor HBcAb: Negative  Donor HBV PEDRO:   Donor HCV PEDRO: Negative  Whole or Partial: Whole Liver  Biliary Anastomosis: End to End  Arterial Anatomy: Standard    Yumiko Gonzalez is a 63 y.o. female s/p    Donation after Circulatory Death  liver transplant on 12/9/2023 (Liver) for Cirrhosis: Fatty Liver (DURAN).  This patient will follow the Steroid Induction protocol.  This patients immunosuppression will include a steroid taper over 5 weeks, Cellcept for 3 and Prograf maintenance.  Opportunistic infection prophylaxis will include Valcyte for 3 months (CMV D- R+), Bactrim for 6 months.  Osteoporosis risk assessment identifies osteopenia, therapy will include calcium-vitamin d combo plus bisphosphonate.  I have reviewed the pre-op medications and those have been restarted those, as appropriate.   Xeldelfinoz Pregnancy And Lactation Text: This medication is Pregnancy Category D and is not considered safe during pregnancy.  The risk during breast feeding is also uncertain.

## 2023-12-10 NOTE — H&P
[facility]  Critical Care - Surgery  History & Physical    Patient Name: Yumiko Gonzalez  MRN: [id number]  Admission Date: 12/9/2023  Code Status: Full Code  Attending Physician: Nishant Massey MD   Primary Care Provider: Garrett Webb MD   Principal Problem: Liver transplant candidate    Subjective:     HPI:  Ms. Gonzalez is a 64 y/o female who present for Liver Transplant for liver cirrhosis 2/2 to DURAN HX: HCC (status post Y90 06/2023) esophageal varices, ascites, hepatic encephalopathy. She reports in usual state of health. Has hx of hepatic encephalopathy.     The patient presents to the SICU s/p DCD liver transplant with Dr. Raza on 12/09/2023. On admission, they are intubated, sedated with propofol, and in stable condition. Inotropic and vasopressor requirements upon admission are 0.1 mcg/kg/min of norepinephrine, and 0.08 units/min of vasopressin to maintain blood pressure at a MAP >65 and SBP < 160. Central access includes a RIJ CVC, arterial access includes a right femoral arterial line, right radial arterial line. Intraoperatively, they received 9L of crystalloid, 829cc of cell saver, 2 PRBCs, 3 FFP, 2 platelets, and 2 cryoprecipitate. Urine output intraoperatively was 2600cc.     Immediate post-operative plans include hemodynamic stabilization and weaning of cardiac and respiratory support. Plan to wean vasopressors and inotropes as tolerated, wean ventilator support with goal of early extubation, and closely monitor fluid status with strict Is/Os and continued fluid resuscitation as needed.    Of note, donor was of elderly age and quality of donor artery was not ideal. Patient was also noted to have large sized left sided rectus/subcutaneous varices. Bile duct was successfully anastomosed.      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, postitive antismooth muscle antibody    Blood transfusion     Bronchitis     bronchospasm on occasion     Cancer     carcinoma in situ    Cervical neck pain with evidence of disc disease 2012    can bend neck    Cirrhosis     non-alcoholic, compensated    Colon polyps 2012    Depression 2012    Hx panic attacks    Diverticular disease 2012    never diverticulitis    Fatty liver 2012    Fibrocystic breast     Fine tremor     hands,chronic    Hepatosplenomegaly     History of abdominal paracentesis 2023    8.7L of fluid drained    Hypertension 2013    Knee pain, bilateral     chronic, with hands,feet,fingers also painful    Lesion of right lung     Liver disease     "partially sclerosed liver" per pt    Migraines past Hx    Nephrolithiasis     stone episode 2021    Pleural effusion     small, compensated, good bilateral breath sounds per Dr Suresh GUTIERRES (postoperative nausea and vomiting)     twice after childbirth    Postpartum depression     Splenomegaly     Thrombocytopenia     Tremors of nervous system        Past Surgical History:   Procedure Laterality Date    ADENOIDECTOMY      CERVICAL CONIZATION   W/ LASER       SECTION      x3    COLONOSCOPY N/A 2016    Procedure: COLONOSCOPY;  Surgeon: James Palomares MD;  Location: [facility] ENDO;  Service: Endoscopy;  Laterality: N/A;    COLONOSCOPY N/A 2/3/2022    Procedure: COLONOSCOPY;  Surgeon: James Palomares MD;  Location: [facility] ENDO;  Service: Endoscopy;  Laterality: N/A;    EMBOLIZATION N/A 2018    Procedure: EMBOLIZATION, BLOOD VESSEL;  Surgeon: Joselin Surgeon;  Location: [facility];  Service: Radiology;  Laterality: N/A;    ENDOMETRIAL ABLATION      ESOPHAGOGASTRODUODENOSCOPY N/A 2020    Procedure: " ESOPHAGOGASTRODUODENOSCOPY (EGD);  Surgeon: James Palomares MD;  Location: [facility] ENDO;  Service: Endoscopy;  Laterality: N/A;    ESOPHAGOGASTRODUODENOSCOPY N/A 3/8/2022    Procedure: EGD (ESOPHAGOGASTRODUODENOSCOPY);  Surgeon: James Palomares MD;  Location: [facility] ENDO;  Service: Endoscopy;  Laterality: N/A;    LIVER BIOPSY      PELVIC LAPAROSCOPY      TONSILLECTOMY      TUBAL LIGATION         Review of patient's allergies indicates:   Allergen Reactions    No known drug allergies        Family History       Problem Relation (Age of Onset)    Breast cancer Mother (70), Sister, Maternal Aunt (70), Maternal Grandmother (70), Maternal Cousin (40)    Cancer Sister    Diabetes Father, Sister, Sister, Brother    Emphysema Brother    Heart disease Mother, Father    Hypertension Mother    Multiple sclerosis Maternal Aunt, Maternal Aunt    Tremor Mother, Daughter          Tobacco Use    Smoking status: Former     Current packs/day: 0.00     Types: Cigarettes     Quit date: 2/3/2006     Years since quittin.8    Smokeless tobacco: Never   Substance and Sexual Activity    Alcohol use: Not Currently    Drug use: No    Sexual activity: Not Currently      Review of systems:   Unable to assess due to mental status changes (intubated)     Objective:     Vital Signs (Most Recent):  Temp: 98.6 °F (37 °C) (12/10/23 020)  Pulse: 82 (12/10/23 0200)  Resp: 20 (12/10/23 020)  BP: (!) 101/51 (23 1608)  SpO2: 99 % (12/10/23 020) Vital Signs (24h Range):  Temp:  [97.6 °F (36.4 °C)-98.6 °F (37 °C)] 98.6 °F (37 °C)  Pulse:  [67-82] 82  Resp:  [16-20] 20  SpO2:  [98 %-99 %] 99 %  BP: (101-116)/(51-56) 101/51  Arterial Line BP: (87)/(41) 87/41     Weight: 99.6 kg (219 lb 7.5 oz)  Body mass index is 34.37 kg/m².      Intake/Output Summary (Last 24 hours) at 12/10/2023 022  Last data filed at 12/10/2023 0219  Gross per 24 hour   Intake 12337 ml   Output 5984 ml   Net 7782 ml          Physical Exam  Constitutional:        Appearance: Normal appearance.      Comments: [facility]   HENT:      Head: Normocephalic and atraumatic.      Nose: Nose normal.   Cardiovascular:      Rate and Rhythm: Regular rhythm.   Pulmonary:      Effort: Pulmonary effort is normal. No respiratory distress.      Comments: Intubated   Abdominal:      Palpations: Abdomen is soft.      Comments: 2 LIUDMILA drains right abdomen SS output. Chevron incision with island dressing CDI.     Genitourinary:     Comments: Henry   Musculoskeletal:      Comments: Right femoral a-line, right radial a-line    Skin:     General: Skin is warm.   Neurological:      General: No focal deficit present.   Psychiatric:         Behavior: Behavior normal.            Vents:  Oxygen Concentration (%): 60 (12/10/23 0200)    Lines/Drains/Airways       Central Venous Catheter Line  Duration             Percutaneous Central Line Insertion/Assessment - Triple Lumen  12/09/23 2020 Internal Jugular Right <1 day    Pulmonary Artery Catheter Assessment  12/09/23 2019 Internal Jugular Right <1 day              Drain  Duration                  Closed/Suction Drain 12/10/23 0100 Right RLQ Bulb 19 Fr. <1 day         Closed/Suction Drain 12/10/23 0100 Tube - 1 RLQ Bulb 19 Fr. <1 day         NG/OG Tube 12/10/23 0145 orogastric Center mouth <1 day         Urethral Catheter 12/09/23 1850 Silicone 16 Fr. <1 day              Airway  Duration                  Airway - Non-Surgical 12/09/23 1847 <1 day              Arterial Line  Duration             Arterial Line 12/09/23 1923 Right Radial <1 day    Arterial Line 12/09/23 2018 Right Femoral <1 day              Peripheral Intravenous Line  Duration                  Peripheral IV - Single Lumen 12/10/23 18 G Right Antecubital <1 day         Peripheral IV - Single Lumen 12/10/23 22 G Left Wrist <1 day         LAZARO 12/09/23 1916 Left Antecubital <1 day                    Significant Labs:    CBC/Anemia Profile:  Recent Labs   Lab 12/09/23  1430 12/09/23  1945 12/09/23  2247  12/09/23  2355 12/10/23  0203   WBC 8.88  --   --   --   --    HGB 12.9 11.9* 9.6* 9.3*  --    HCT 36.1* 34.1* 27.4* 26.4* 26*   PLT 77* 74* 113* 142*  --    MCV 95  --   --   --   --    RDW 15.0*  --   --   --   --         Chemistries:  Recent Labs   Lab 12/09/23  1430 12/09/23  1945 12/09/23  2247 12/09/23 2355   * 133* 135* 135*   K 4.1 4.4 4.1 3.8     --   --   --    CO2 23  --   --   --    BUN 19  --   --   --    CREATININE 1.0  --   --   --    CALCIUM 9.7  --   --   --    ALBUMIN 2.5* 2.2* 2.2* 2.6*   PROT 5.5*  --   --   --    BILITOT 5.2*  --   --   --    ALKPHOS 198*  --   --   --    ALT 32  --  1,479*  --    AST 51*  --  1,784*  --    MG  --  1.8 2.1 2.1       All pertinent labs within the past 24 hours have been reviewed.    Significant Imaging: I have reviewed all pertinent imaging results/findings within the past 24 hours.  Assessment/Plan:     GI  * Liver transplant candidate    Neuro/Psych:   -- Sedation: propofol   -- Pain: dilaudid prn              Cards:   -- 0.08 vaso,0.1 levo, wean as able   -- MAP >65, SBP >100  -- CVP <9  -- D5W NaCl at 100      Pulm:   -- Goal O2 sat > 90%  -- Wean as able, plan for extubation after liver U/S tomorrow       Renal:  -- Keep cowan for strict I/O  -- BUN/Cr   Recent Labs   Lab 12/09/23  1430   BUN 19   CREATININE 1.0     -- Urine output: 2875   -- BMP q12 for 24 hrs then daily       FEN / GI:   -- Net +5773  -- Replace lytes as needed  -- Nutrition: NPO  -- Trend BMP q12hr, AST q4hr  -- AM Liver Transplant US      ID:   -- Tm: afebrile; f/u post-op  WBC   Recent Labs   Lab 12/09/23  1430   WBC 8.88     -- Trend WBC with CBC q4hr  -- Abx/Antifungals/Antiviral: Unasyn, Bactrim, Fluc, Nystatin, Valganciclovir        Heme/Onc:  -- f/u postop H/H  -- Daily CBC  Recent Labs   Lab 12/09/23  1430   HGB 12.9   PLT 77*   INR 1.4*     -- Received a total of 2u pRBC, 3u FFP, 2u Plts, 2u Cryo, 1L 5% albumin and 829cc cell saver today  -- Q4 Hr CBC and PT for 1  day, then daily  -- Immuno: Methylprednisolone taper, Prograf, Mycophenolate      Endo:   -- Gluc goal 140-180  -- insulin gtt   -- endo consulted appreciate recommendations       PPx:   Feeding: NPO  Analgesia/Sedation:  PRNs / Propofol    Thromboembolic prevention: subq Heparin   HOB >30: yes  Stress Ulcer ppx: famotidone q12h   Glucose control: Critical care goal 140-180 g/dl, insulin gtt  Bowel reg:   Invasive Lines/Drains/Airway: RIJ trialysis, RIJ double lumen introducer, right femoral A line, right radial A line / LIUDMILA x2 / ET, chapo   Deescalation:         Dispo/Code Status/Palliative:   -- SICU / Full Code               Spike Snider MD  Critical Care - Surgery  [facility]

## 2023-12-10 NOTE — OP NOTE
Certification of Assistant at Surgery       Surgery Date: 12/9/2023     Participating Surgeons:  Surgeon(s) and Role:     * Gurpreet Raza MD - Primary     * Diogo Smith MD - Resident - Assisting     * Angi Jackson MD    Procedures:  Procedure(s) (LRB):  TRANSPLANT, LIVER (N/A)    Assistant Surgeon's Certification of Necessity:  I understand that section 1842 (b) (6) (d) of the Social Security Act generally prohibits Medicare Part B reasonable charge payment for the services of assistants at surgery in [facility] hospitals when qualified residents are available to furnish such services. I certify that the services for which payment is claimed were medically necessary, and that no qualified resident was available to perform the services. I further understand that these services are subject to post-payment review by the Medicare carrier.      Angi Jackson MD    12/09/2023  9:35 PM

## 2023-12-10 NOTE — HPI
Ms. Gonzalez is a 62 y/o female who present for Liver Transplant for liver cirrhosis 2/2 to DURAN HX: HCC (status post Y90 06/2023) esophageal varices, ascites, hepatic encephalopathy. She reports in usual state of health. Has hx of hepatic encephalopathy.     The patient presents to the SICU s/p DCD liver transplant with Dr. Raza on 12/09/2023. On admission, they are intubated, sedated with propofol, and in stable condition. Inotropic and vasopressor requirements upon admission are 0.1 mcg/kg/min of norepinephrine, and 0.08 units/min of vasopressin to maintain blood pressure at a MAP >65 and SBP < 160. Central access includes a RIJ CVC, arterial access includes a right femoral arterial line, right radial arterial line. Intraoperatively, they received 9L of crystalloid, 829cc of cell saver, 2 PRBCs, 3 FFP, 2 platelets, and 2 cryoprecipitate. Urine output intraoperatively was 2600cc.     Immediate post-operative plans include hemodynamic stabilization and weaning of cardiac and respiratory support. Plan to wean vasopressors and inotropes as tolerated, wean ventilator support with goal of early extubation, and closely monitor fluid status with strict Is/Os and continued fluid resuscitation as needed.    Of note, donor was of elderly age and quality of donor artery was not ideal. Patient was also noted to have large sized left sided rectus/subcutaneous varices. Bile duct was successfully anastomosed.

## 2023-12-10 NOTE — HPI
Reason for Consult: Management of Steroid Induced Hyperglycemia     Surgical Procedure and Date:  Liver Transplant on 12/10/23       Lab Results   Component Value Date    HGBA1C 4.6 10/31/2023       HPI:   Patient is a 63 y.o. 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 Patient now presents for the above procedure(s). Endocrinology consulted for management of hyperglycemia.

## 2023-12-10 NOTE — OP NOTE
Operative Report    Date of Procedure: 12/9/2023    Surgeon: Angi Jackson MD  First Assistant: Miguelito Galvan MD    Pre-operative Diagnosis: Allograft liver for transplantation  Post-operative Diagnosis: Same    Procedure(s) Performed:   Back Table Preparation of Liver with needle biopsy    .    Anesthesia: Not applicable  Estimated Blood Loss: Not applicable  Fluids Administered: Not applicable    Findings: Estimated steatosis 0-10%, normal vascular anatomy.  Drains: Not applicable  Specimens: Core biopsy of allograft liver      Preamble  Indications: This report describes only the backbench preparation of the liver prior to transplantation.  The transplant operation itself is described in a separate report.    ABO Confirmation: Immediately following arrival of the donor organ and prior to implantation, a formal ABO confirmation was done according to hospital and [facility] policies.  I confirmed the [facility] ID number of the donor organ and the donor and recipient ABO types, directly verifying these data by comparison with the [facility] [employer] [facility] report.  This confirmation was personally done by an attending surgeon and circulating nurse, and is officially documented elsewhere.    Time-Out: A complete time out was carried out prior to the procedure, with confirmation of patient identity, correct procedure, correct operative site, appropriate antibiotic prophylaxis, review of any known allergies, and presence of all needed equipment.    Procedure in Detail  The liver was recovered from the transport cooler and inspected for vascular anatomy and overall suitability for transplantation, with findings as noted above.  The remnant of the diaphragm was dissected away.  The phrenic veins and adrenal vein were identified and ligated or sutured as appropriate.  The portal vein and hepatic artery were mobilized toward the hilum of the liver, and extrahepatic branches were ligated.  No vascular reconstruction was required.  The vessels  were tested for leaks.  A needle biopsy was obtained for permanent section histology using a spring-loaded biopsy device. The liver was kept in ice temperature organ preservation solution until the time of implantation.

## 2023-12-10 NOTE — CONSULTS
[facility]  Adult Nutrition  Consult Note    SUMMARY     Recommendations    1) When medically feasible ADAT to low sodium per MD    2) If tube feeding recs warranted initiate Impact Peptide @ 45ml/hr to provide 1620 kcals, 101 g PRO, 832 ml fluid per MD    3) RD following    Goals: Meet % een/epn by next RD f/u  Nutrition Goal Status: new  Communication of RD Recs: other (comment) (POC)    Assessment and Plan    Nutrition Problem  Inadequate oral intake    Related to (etiology):   Inability to consume sufficient energy     Signs and Symptoms (as evidenced by):   NPO    Interventions/Recommendations (treatment strategy):  Collaboration with other providers    Nutrition Diagnosis Status:   New      Reason for Assessment    Reason For Assessment: consult (liver transplant)  Diagnosis: transplant/postoperative complications (liver transplant)  Relevant Medical History: liver cirrhosis 2/2 to DURAN, liver transplant 12/9, cancer, diverticular disease, htn  Interdisciplinary Rounds: did not attend    General Information Comments: RD consult. Pt s/p liver transplant 12/9. Sedated/ventilated at time of visit. Now extubated. Youngest daughter/caregiver at bedside. Endorses decreased appetite/intake PTA x 2 weeks d/t sadness. (1 meals, 1 snack, and sometimes premier protein). # Endorses some fluid retention. NFPE not appropriate at this time. Post transplant nutrition education provided on 12/10. Food safety/drug interactions emphasized. General healthy/low salt diet recommended. Appropriate education material with RD contact information provided. No other needs identified. RD following.     Nutrition Discharge Planning: Post transplant nutrition education provided on 12/10. Food safety/drug interactions emphasized. General healthy/low salt diet recommended. Appropriate education material with RD contact information provided. No other needs identified.    Nutrition Risk  "Screen    Nutrition Risk Screen: other (see comments) (decreased intake 2 weeks)    Nutrition/Diet History    Patient Reported Diet/Restrictions/Preferences: low salt, other (see comments) (keto meal prep)  Typical Food/Fluid Intake: 1 meals + 1 snacl & protein shakes x 2 weeks  Spiritual, Cultural Beliefs, Restorationist Practices, Values that Affect Care: no  Supplemental Drinks or Food Habits: Premier Protein  Food Allergies: NKFA    Anthropometrics    Temp: 97.9 °F (36.6 °C)  Height Method: Stated  Height: 5' 7" (170.2 cm)  Height (inches): 67 in  Weight Method: Standard Scale  Weight: 104.3 kg (230 lb)  Weight (lb): 230 lb  Ideal Body Weight (IBW), Female: 135 lb  % Ideal Body Weight, Female (lb): 170.37 %  BMI (Calculated): 36  BMI Grade: 35 - 39.9 - obesity - grade II  Usual Body Weight (UBW), k kg  % Usual Body Weight: 104.55  % Weight Change From Usual Weight: 4.33 %       Lab/Procedures/Meds    Pertinent Labs Reviewed: reviewed  Pertinent Labs Comments: H/h: 8.1/23.5, mch: 33.5, Glu: 232, AST: 2129  Pertinent Medications Reviewed: reviewed  Pertinent Medications Comments: Famotidine, heparin, tacrolimus, insulin, D5, norepinephrine, propofol, vasopressin      Estimated/Assessed Needs    Weight Used For Calorie Calculations: 104.3 kg (230 lb)  Energy Calorie Requirements (kcal): 1625 (mod [facility])  Energy Need Method: [place]-St Jeor  Protein Requirements: 125-146 (1.2-1.4 g/kg)  Weight Used For Protein Calculations: 104.3 kg (230 lb)     Estimated Fluid Requirement Method: RDA Method  RDA Method (mL): 1625         Nutrition Prescription Ordered    Current Diet Order: NPO    Evaluation of Received Nutrient/Fluid Intake    Lipid Calories (kcals): 96 kcals (87.8 propofol)  I/O: +8.3 L since admit  Comments: LBM   Tolerance: tolerating  % Intake of Estimated Energy Needs: 0 - 25 %  % Meal Intake: NPO    Nutrition Risk    Level of Risk/Frequency of Follow-up: moderate (f/u 2x/week)       Monitor and " Evaluation    Food and Nutrient Intake: energy intake, food and beverage intake  Food and Nutrient Adminstration: diet order  Knowledge/Beliefs/Attitudes: food and nutrition knowledge/skill, beliefs and attitudes  Physical Activity and Function: nutrition-related ADLs and IADLs  Anthropometric Measurements: height/length, weight, weight change, body mass index  Biochemical Data, Medical Tests and Procedures: electrolyte and renal panel, gastrointestinal profile, glucose/endocrine profile, inflammatory profile, lipid profile  Nutrition-Focused Physical Findings: overall appearance       Nutrition Follow-Up    RD Follow-up?: Yes    Rosalba Gamino RD, LDN

## 2023-12-10 NOTE — NURSING
"Pt transported to SICU [address] with portable telemetryAmbubag" in use. Pt connected to ICU monitor and Ventilator. MD, charge RN and RT called to bedside for patient arrival. Report received from Anesthesia. New orders received and implemented. Pt assessed, immediate needs met. Family brought to bedside, updated on the patient's current condition and PoC for remainder of shift. Family also given ICU Welcome packet and educated on visiting hours. All questions answered, emotional support provided.       "

## 2023-12-10 NOTE — PLAN OF CARE
Patient with stable vital signs. Patient extubated at 1245 and tolerated extubation well. Patient currently on 3 L nasal cannula with O2sats 100%. Patient with daughter at bedside and patient and daughter updated on plan of care for the day per Dr. Massey, and Dr. Crandall. Dr. Crandall updated throughout the day on patient's assessments, vital signs, lab results, abg, and urine output. Will continue to monitor patient.

## 2023-12-10 NOTE — SUBJECTIVE & OBJECTIVE
Interval History/Significant Events: Patient remains intubated and sedated on minimal vent settings. Requiring 0.04 levo and intermittently on vaso. Insulin gtt started.  Received 1.25L of albumin postop.     Follow-up For: Procedure(s) (LRB):  TRANSPLANT, LIVER (N/A)    Post-Operative Day: 1 Day Post-Op    Objective:     Vital Signs (Most Recent):  Temp: 97.6 °F (36.4 °C) (12/10/23 0300)  Pulse: 83 (12/10/23 0615)  Resp: 20 (12/10/23 0615)  BP: (!) 111/57 (12/10/23 0600)  SpO2: 100 % (12/10/23 0615) Vital Signs (24h Range):  Temp:  [97.6 °F (36.4 °C)-98.6 °F (37 °C)] 97.6 °F (36.4 °C)  Pulse:  [67-88] 83  Resp:  [16-22] 20  SpO2:  [98 %-100 %] 100 %  BP: (101-116)/(51-57) 111/57  Arterial Line BP: ()/(41-67) 121/59     Weight: 104.6 kg (230 lb 9.6 oz)  Body mass index is 36.12 kg/m².      Intake/Output Summary (Last 24 hours) at 12/10/2023 0628  Last data filed at 12/10/2023 0600  Gross per 24 hour   Intake 03765.62 ml   Output 7341 ml   Net 8131.62 ml          Physical Exam  Constitutional:       Appearance: Normal appearance.      Comments: ABHINAV   HENT:      Head: Normocephalic and atraumatic.      Nose: Nose normal.   Cardiovascular:      Rate and Rhythm: Regular rhythm.   Pulmonary:      Effort: Pulmonary effort is normal. No respiratory distress.      Comments: Intubated   Abdominal:      Palpations: Abdomen is soft.      Comments: 2 LIUDMILA drains right abdomen SS output. Chevron incision with island dressing CDI.     Genitourinary:     Comments: Elaine   Musculoskeletal:      Comments: Right femoral a-line, right radial a-line    Skin:     General: Skin is warm.   Neurological:      General: No focal deficit present.   Psychiatric:         Behavior: Behavior normal.            Vents:  Vent Mode: A/C (12/10/23 0557)  Set Rate: 20 BPM (12/10/23 0557)  Vt Set: 450 mL (12/10/23 0557)  PEEP/CPAP: 5 cmH20 (12/10/23 0557)  Oxygen Concentration (%): 40 (12/10/23 0615)  Peak Airway Pressure: 19 cmH20 (12/10/23  0557)  Plateau Pressure: 0 cmH20 (12/10/23 0557)  Total Ve: 9.19 L/m (12/10/23 0557)  Negative Inspiratory Force (cm H2O): 0 (12/10/23 0557)  F/VT Ratio<105 (RSBI): (!) 43.57 (12/10/23 0557)    Lines/Drains/Airways       Central Venous Catheter Line  Duration              Introducer Single Lumen 12/09/23 Internal Jugular Right 1 day    Trialysis (Dialysis) Catheter 12/09/23 right internal jugular 1 day    Pulmonary Artery Catheter Assessment  12/09/23 2019 Internal Jugular Right <1 day              Drain  Duration                  Closed/Suction Drain 12/10/23 0100 Right RLQ Bulb 19 Fr. <1 day         Closed/Suction Drain 12/10/23 0100 Tube - 1 RLQ Bulb 19 Fr. <1 day         NG/OG Tube 12/10/23 0145 orogastric Center mouth <1 day         Urethral Catheter 12/09/23 1850 Silicone 16 Fr. <1 day              Airway  Duration                  Airway - Non-Surgical 12/09/23 1847 <1 day              Arterial Line  Duration             Arterial Line 12/09/23 1923 Right Radial <1 day    Arterial Line 12/09/23 2018 Right Femoral <1 day              Peripheral Intravenous Line  Duration                  Peripheral IV - Single Lumen 12/10/23 18 G Right Antecubital <1 day         Peripheral IV - Single Lumen 12/10/23 22 G Left Wrist <1 day         LAZARO 12/09/23 1916 Left Antecubital <1 day                    Significant Labs:    CBC/Anemia Profile:  Recent Labs   Lab 12/09/23  1430 12/09/23  1945 12/09/23  2355 12/10/23  0159 12/10/23  0203 12/10/23  0416 12/10/23  0509   WBC 8.88  --   --  8.24  --   --  8.64   HGB 12.9   < > 9.3* 9.4*  --   --  8.4*   HCT 36.1*   < > 26.4* 27.0* 26* 26* 24.5*   PLT 77*   < > 142* 86*  --   --  72*   MCV 95  --   --  95  --   --  98   RDW 15.0*  --   --  15.8*  --   --  16.5*    < > = values in this interval not displayed.        Chemistries:  Recent Labs   Lab 12/09/23  1430 12/09/23  1945 12/09/23  2247 12/09/23  2355 12/10/23  0159 12/10/23  0509   *   < > 135* 135* 138 136   K 4.1   <  > 4.1 3.8 4.0 3.6     --   --   --  102 103   CO2 23  --   --   --  22* 21*   BUN 19  --   --   --  20 20   CREATININE 1.0  --   --   --  0.8 0.8   CALCIUM 9.7  --   --   --  9.4 8.9   ALBUMIN 2.5*   < > 2.2* 2.6* 2.4* 2.9*   PROT 5.5*  --   --   --  4.0* 4.1*   BILITOT 5.2*  --   --   --  7.3* 8.7*   ALKPHOS 198*  --   --   --  83 67   ALT 32  --  1,479*  --  1,423* 1,129*   AST 51*  --  1,784*  --  3,055* 2,640*   MG  --    < > 2.1 2.1 2.0 1.9   PHOS  --   --   --   --  3.7 3.7    < > = values in this interval not displayed.       All pertinent labs within the past 24 hours have been reviewed.    Significant Imaging:  I have reviewed all pertinent imaging results/findings within the past 24 hours.

## 2023-12-10 NOTE — PROGRESS NOTES
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, postitive antismooth muscle antibody    Blood transfusion     Bronchitis     bronchospasm on occasion     Cancer 1987    carcinoma in situ    Cervical neck pain with evidence of disc disease 03/22/2012    can bend neck    Cirrhosis     non-alcoholic, compensated    Colon polyps 03/22/2012    Depression 03/22/2012    Hx panic attacks    Diverticular disease 03/22/2012    never diverticulitis    Fatty liver 03/22/2012    Fibrocystic breast     Fine tremor     hands,chronic    Hepatosplenomegaly     History of abdominal paracentesis 01/18/2023    8.7L of fluid drained    Hypertension 04/24/2013    Knee pain, bilateral     chronic, with hands,feet,fingers also painful    Lesion of right lung     Liver disease     "partially sclerosed liver" per pt    Migraines past Hx    Nephrolithiasis     stone episode 1/2021    Pleural effusion     small, compensated, good bilateral breath sounds per Dr Suresh GUTIERRES (postoperative nausea and vomiting)     twice after childbirth    Postpartum depression     Splenomegaly     Thrombocytopenia     Tremors of nervous system        Procedure(s) (LRB):  TRANSPLANT, LIVER (N/A)     12:00 AM    Equipment:    Cell Saver     R.I.S.  : [employer] Model: CATSmart or CATSplus : Hinds   Model: [id number]     Serial number: [id number]   Serial number:    Disposable lot #: [id number]   Disposable lot #:      Were extra cardiotomies used for cell saver?  Yes   if yes, #:  " 1    Solutions:  Anticoagulant: ACD-A   Expiration date: 10/25 Volume used: 2000   Wash solution: 0.9% NaCl   Expiration date: 6/24 Volume used: 9000     Cell saver checklist  Time completed:           [x]   Circuit assembled correctly     [x]   Cell saver powered and operational     [x]   Vacuum connected, functional, adjust to max -150mmHg     [x]   Anticoagulant drip rate adjusted     [x]   Transfer bag properly labeled with patient name, expiration time, volume,       anticoagulant, OR number, and initials     [x]   Cell saver disinfected after use (completed at end of case)       Cell Saver volumes:    Total volume processed:     3488 mL     Total volume pRBCs recovered     740 mL     Volume pRBCs infused     740 mL         RIS checklist   Time completed:  []   RIS circuit assembled correctly     []   RIS power and operational     []   RIS disinfected after use (completed at end of case)       RIS volumes:    Total volume infused:    (see anesthesia record for blood       product information)    mL       Additional comments:

## 2023-12-10 NOTE — OP NOTE
Operative Report    Date of Procedure: 12/9/2023  Date of Transplant (UNOS): 12/9/23    Surgeons:  Surgeon(s) and Role:     * Gurpreet Raza MD - Primary     * Diogo Smith MD - Resident - Assisting     * Angi Jackson MD    First Assistant Attestation:  The presence of an additional attending surgeon functioning as first assistant was required due to the complexity of the procedure relative to any available residents. I certify that no resident was available who was qualified to serve as first assistant. Duties performed by the assistant included assisting the primary surgeon.    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 year-old female with Cirrhosis: Fatty Liver (DURAN),  (UNOS terminology) who has been evaluated for a liver transplant.  The procedure was thoroughly discussed with the patient, including potential risks, complications, and alternatives. Specific complications mentioned included death, graft non-function, bleeding, infection, rejection, renal failure, respiratory failure, and neurologic deficits, as well as the possibility of other complications not specifically mentioned.    Donor Risk Factors:  Prior to the operation, the patient was advised of any donor-specific risk factors requiring specific disclosure. Factors in this case included donation after cardiac death.      Specific [facility] Donor Risk criteria for the organ donor  include:  None    All questions were answered, the patient voiced appropriate understanding, and she agreed to proceed with the planned procedure.    ABO Confirmation: Immediately following arrival of the donor organ and prior to implantation, a formal ABO confirmation was done according to hospital and UNOS policies.  I confirmed the UNOS ID number of the donor organ ([id number]) and the donor and recipient ABO types, directly verifying these data by comparison with the UNOS Match Run report ([id number].  This confirmation was personally done by an attending surgeon and circulating nurse, and is officially documented elsewhere.    Time-Out: A complete time out was carried out prior to incision, with confirmation of patient identity, correct procedure, correct operative site, appropriate antibiotic prophylaxis, review of any known allergies, and presence of all needed equipment.    Procedure in Detail  Following the induction of general endotracheal anesthesia, appropriate arterial and venous lines were placed by the anesthesia team.  Care was taken to pad all pressure points and avoid any potential traction injuries from positioning. The urinary bladder was catheterized.  Sequential compression boots and Santana Huggers were used. The chest, abdomen, and upper thighs were sterilely prepped and draped.     The abdomen was entered via a wide bilateral subcostal incision. 500 mL of ascites was removed. The round ligament was ligated and divided. The falciform, left triangular, and gastrohepatic ligaments were divided using the electrocautery, with 2-0 silk ties as needed. The Banda self-retaining retractor was placed to provide exposure. Adhesions between the abdominal viscera and the abdominal wall and the undersurface of the liver were divided as needed using cautery dissection and silk ties or suture ligatures. Hilar dissection was then carried out, with ligation of the right and left hepatic arteries as well as the  cystic duct and the common hepatic duct. The recipient arterial anatomy was found to be: standard. The portal vein was exposed circumferentially from its bifurcation down to the superior border of the pancreas. The portal vein was found to be patent.  Attention was next directed to the right side of the liver where the right triangular ligament was taken down, exposing the bare area of the liver, and the IVC. The infrahepatic IVC was isolated, followed by mobilization of the retrohepatic cava, division of the right adrenal vein, and isolation of the suprahepatic IVC. The patient was given 2000 units of heparin prior to caval cross-clamping. . After assuring adequate stability after cross-clamping, the native liver was excised and the allograft liver was brought to the operative field.  The liver was perfused with cold 5% albumin during implantation to displace the organ preservation solution.  IVC reconstruction technique consisted of end to end ivc using 3-0 and 4-0 polypropylene as appropriate.  The donor portal vein was then anastomosed to the recipient portal inflow site (end portal vein) with 5-0 polypropylene. Once this was completed, anesthesia was notified and the liver was re-perfused. Copious irrigation with warm saline and temporary finger occlusion of portal inflow were used as needed to avoid any problems with hypothermia. Temporary packing, electrocautery, and polypropylene suture ligatures were used as appropriate to provide hemostasis. Once this was satisfactory, attention was directed to the arterial reconstruction. As per the protocol for livers from DCD donors, the allograft hepatic artery was infused with 2 mg of TPA and 5 mg of verapamil. Arterial inflow was provided to the graft by anastomosing the recipient proper hepatic artery to the donor  common hepatic artery using 6-0 polypropylene. The donor artery was diseased with separation of the intima. Care was taken to incorporate the intima  layer with each suture. The liver was assessed for adequacy of perfusion, which was satisfactory. The gallbladder was then removed from the allograft liver, and a temporary packing period was carried out to help assure hemostatis. Attention was next directed toward the bile duct. The donor bile duct was prepared and assessed for adequate vascular supply. Biliary reconstruction was then performed by anastomosing the recipient common hepatic duct to the donor duct using 6-0 PDS sutures.  The biliary stent used was: none.  A final check for hemostasis was made. 2 19f Lamine drains were placed through separate incisions below the transverse abdominal incision and placed in the usual positions. The cavity was irrigated with saline. The abdomen was closed in layers using running #1 PDS suture. The incision was irrigated and the skin was closed with staples. At the end of the case all instrument, needle, and sponge counts were correct. The patient was taken to the ICU in stable condition.     Fluids Administered:   Crystalloid (mL) 9,000   RBC (Units) 2   FFP (Units) 3   Cryoprecipitate (Units)  2   Platelets (Units) 2   Cell Saver (CCs) 829   Albumin (Units) 1,000      Specimens: Explant liver  Drains: 19f Lamine drains x 2    Additional Findings:    Estimated Blood Loss: 3,000 mL  Ascites Evacuated: 500 mL    Ischemic Times:  Time of portal reperfusion: 12/9/2023  9:51 PM  Time of arterial reperfusion: 12/9/2023 10:39 PM  Anastomosis (warm ischemia) time: 30 minutes  Cold ischemia time: 234 minutes    Surgical Data:  Graft type (whole vs. partial): whole liver  IVC reconstruction: end to end ivc  Portal vein status: patent  Portal graft performed? no  Donor arterial anatomy: standard  Donor arterial inflow: common hepatic artery  Recipient arterial anatomy: standard  Recipient arterial inflow: proper hepatic artery  Arterial graft performed? no  Biliary reconstruction: end to end (donor common hepatic duct to recipient  common hepatic duct)  Biliary stent: none  Disposition of Vessels from donor of transplanted organ: sent to blood bank  Damage during procurement: no  Procurement damage comments:     Donor Factors:  UNOS ID: )[id number]  UNOS Match Run: [id number]  Donor type: donation after circulatory death   Donor with reported PHS risk criteria: no  Donor CMV serologic status: Negative  Donor with known cancer: no  Donor HCV serologic status: Negative  Donor HBcAb: Negative  Donor HBV PEDRO:   Donor HCV PEDRO: Negative  Liver weight: 1244 grams

## 2023-12-10 NOTE — NURSING
Nurses Note -- 4 Eyes      12/10/2023   0300 AM      Skin assessed during: Admit      [] No Altered Skin Integrity Present    []Prevention Measures Documented      [x] Yes- Altered Skin Integrity Present or Discovered   [x] LDA Added if Not in Epic (Describe Wound)   [x] New Altered Skin Integrity was Present on Admit and Documented in LDA   [x] Wound Image Taken    Wound Care Consulted? Yes    Attending Nurse:  Panchito Cheung RN/Staff Member:  KURT Hernandez RN

## 2023-12-10 NOTE — NURSING
Dr. Crandall notified of patient's platelets of 41 with 1600 labs, post platelet transfusion.No new orders received. Will continue to monitor patient.

## 2023-12-10 NOTE — ANESTHESIA PROCEDURE NOTES
Central Line    Diagnosis: esld  Patient location during procedure: done in OR    Staffing  Authorizing Provider: Jordy Bolivar MD  Performing Provider: Jordy Bolivar MD    Staffing  Performed: anesthesiologist   Anesthesiologist: Jordy Bolivar MD  Performed by: Jordy Bolivar MD  Authorized by: Jordy Bolivar MD    Anesthesiologist was present at the time of the procedure.  Indication   Indication: hemodynamic monitoring     Anesthesia   general anesthesia    Central Line   Skin Prep: skin prepped with ChloraPrep, skin prep agent completely dried prior to procedure  Sterile Barriers Followed: Yes    All five maximal barriers used- gloves, gown, cap, mask, and large sterile sheet    hand hygiene performed prior to central venous catheter insertion  Location: right internal jugular.   Catheter type: triple lumen  Catheter Size: 14 Fr  Inserted Catheter Length: 14 cm  Ultrasound: vascular probe with ultrasound   Vessel Caliber: medium, patent  Vascular Doppler:  not done, compressibility normal  Needle advanced into vessel with real time Ultrasound guidance.  Guidewire confirmed in vessel.  sterile gel and probe cover used in ultrasound-guided central venous catheter insertion   Manometry: Venous cannualation confirmed by visual estimation of blood vessel pressure using manometry.  Insertion Attempts: 1   Securement:line sutured, chlorhexidine patch, sterile dressing applied and blood return through all ports    Post-Procedure    Adverse Events:none      Guidewire

## 2023-12-10 NOTE — OP NOTE
Pre-operative Discussion Note  Liver Transplant Surgery    Yumiko Gonzalez is a 63 y.o. female admitted for liver transplant.  I discussed the planned procedure in detail, including expected hospital course and outcomes, benefits, risks, and potential complications.  Complications discussed included death, graft failure, bleeding, infection, rejection, and neurologic problems.  I discussed the risks of anesthesia, as well as the potential need for re-operation.  The possibility of other complications not specifically mentioned was also discussed.  Also, I discussed the need for lifelong immunosuppression and the possibility of serious complications from immunosuppressive drugs.    The discussion included the risks that the patient will incur if she elects to not have the proposed procedure.    Relevant donor-specific risk factors were disclosed and discussed with the patient, including:   DCD: I discussed the use of organs recovered by donation after cardiac death (DCD), including slightly decreased graft survival and greater risk of arterial and biliary complications. The potential advantage to the recipient is possibly receiving a transplant sooner by accepting such an organ. The patient is willing to consider such grafts.    Specific PHS donor risk criteria for the organ donor include:  None    HCV Infection Not applicable.    Hepatocellular Carcinoma Recurrence: Not applicable.    The patient was SARS-CoV-2 /COVID-19 tested with negative results.    COVID-19: I discussed the possibility of COVID-19 transmission with the patient. Although PEDRO testing is available for the virus using a technique which in theory should be very accurate, there is no data yet regarding the likelihood of a  false-negative test leading to virus transmission. Based on accuracy of testing for other viruses, it is expected that this risk is extremely small.     I also discussed that transplant immunosuppression will increase susceptibility  to COVID-19 and other viruses, and that although we use stringent precautions to protect patients from infection, it is possible for a transplant recipient to contract this infection. If COVID-19 infection should occur, it would be a serious matter with a significant risk of death.    All questions were answered.  The patient and available family members voice understanding and agree to proceed with the transplant.    UNOS Patient Status  Note on scores:  ICU = 10 = total assistance  TSU = 20-30 = partial assistance  Outpatient admitted for transplant requiring medical care in last year = 40-50 = partial assistance  Scores 60 or higher indicate no assistance, meaning no need for medical care in last year. This would be very unusual for a transplant candidate.    Functional Status: 50% - Requires considerable assistance and frequent medical care  Physical Capacity: No Limitations

## 2023-12-10 NOTE — ANESTHESIA PROCEDURE NOTES
Intubation    Date/Time: 12/9/2023 6:47 PM    Performed by: Molly Norton CRNA  Authorized by: Jordy Bolivar MD    Intubation:     Induction:  Rapid sequence induction    Intubated:  N/a    Mask Ventilation:  N/a    Attempts:  1    Attempted By:  CRNA    Method of Intubation:  Video laryngoscopy    Blade:  Heck 3    Laryngeal View Grade: Grade I - full view of cords      Difficult Airway Encountered?: No      Complications:  None    Airway Device:  Oral endotracheal tube    Airway Device Size:  7.0    Style/Cuff Inflation:  Cuffed (inflated to minimal occlusive pressure)    Inflation Amount (mL):  6    Tube secured:  21    Secured at:  The lips    Placement Verified By:  Capnometry    Complicating Factors:  None    Findings Post-Intubation:  BS equal bilateral and atraumatic/condition of teeth unchanged

## 2023-12-11 PROBLEM — Z99.11 ENCOUNTER FOR WEANING FROM VENTILATOR: Status: RESOLVED | Noted: 2023-12-10 | Resolved: 2023-12-11

## 2023-12-11 PROBLEM — Z76.82 LIVER TRANSPLANT CANDIDATE: Status: RESOLVED | Noted: 2023-11-26 | Resolved: 2023-12-11

## 2023-12-11 PROBLEM — Z91.89 AT RISK FOR OPPORTUNISTIC INFECTIONS: Status: ACTIVE | Noted: 2023-12-11

## 2023-12-11 PROBLEM — D62 ACUTE POSTOPERATIVE ANEMIA DUE TO EXPECTED BLOOD LOSS: Status: ACTIVE | Noted: 2023-12-11

## 2023-12-11 LAB
ALBUMIN SERPL BCP-MCNC: 2.6 G/DL (ref 3.5–5.2)
ALP SERPL-CCNC: 62 U/L (ref 55–135)
ALT SERPL W/O P-5'-P-CCNC: 685 U/L (ref 10–44)
ANION GAP SERPL CALC-SCNC: 7 MMOL/L (ref 8–16)
APTT PPP: 38.3 SEC (ref 21–32)
AST SERPL-CCNC: 616 U/L (ref 10–40)
AST SERPL-CCNC: 724 U/L (ref 10–40)
BASOPHILS # BLD AUTO: 0 K/UL (ref 0–0.2)
BASOPHILS # BLD AUTO: ABNORMAL K/UL (ref 0–0.2)
BASOPHILS NFR BLD: 0 % (ref 0–1.9)
BASOPHILS NFR BLD: 0 % (ref 0–1.9)
BILIRUB SERPL-MCNC: 4.1 MG/DL (ref 0.1–1)
BLD PROD TYP BPU: NORMAL
BLOOD UNIT EXPIRATION DATE: NORMAL
BLOOD UNIT TYPE CODE: 7300
BLOOD UNIT TYPE: NORMAL
BUN SERPL-MCNC: 24 MG/DL (ref 8–23)
CALCIUM SERPL-MCNC: 8.5 MG/DL (ref 8.7–10.5)
CHLORIDE SERPL-SCNC: 109 MMOL/L (ref 95–110)
CO2 SERPL-SCNC: 23 MMOL/L (ref 23–29)
CODING SYSTEM: NORMAL
CREAT SERPL-MCNC: 0.8 MG/DL (ref 0.5–1.4)
CROSSMATCH INTERPRETATION: NORMAL
DIFFERENTIAL METHOD BLD: ABNORMAL
DIFFERENTIAL METHOD BLD: ABNORMAL
DISPENSE STATUS: NORMAL
EOSINOPHIL # BLD AUTO: 0 K/UL (ref 0–0.5)
EOSINOPHIL # BLD AUTO: ABNORMAL K/UL (ref 0–0.5)
EOSINOPHIL NFR BLD: 0 % (ref 0–8)
EOSINOPHIL NFR BLD: 1 % (ref 0–8)
ERYTHROCYTE [DISTWIDTH] IN BLOOD BY AUTOMATED COUNT: 16.4 % (ref 11.5–14.5)
ERYTHROCYTE [DISTWIDTH] IN BLOOD BY AUTOMATED COUNT: 17.1 % (ref 11.5–14.5)
EST. GFR  (NO RACE VARIABLE): >60 ML/MIN/1.73 M^2
GLUCOSE SERPL-MCNC: 212 MG/DL (ref 70–110)
HCT VFR BLD AUTO: 22.2 % (ref 37–48.5)
HCT VFR BLD AUTO: 22.9 % (ref 37–48.5)
HCV RNA SERPL QL NAA+PROBE: NOT DETECTED
HCV RNA SPEC NAA+PROBE-ACNC: NOT DETECTED IU/ML
HGB BLD-MCNC: 7.8 G/DL (ref 12–16)
HGB BLD-MCNC: 7.9 G/DL (ref 12–16)
HYPOCHROMIA BLD QL SMEAR: ABNORMAL
IMM GRANULOCYTES # BLD AUTO: 0.04 K/UL (ref 0–0.04)
IMM GRANULOCYTES # BLD AUTO: ABNORMAL K/UL (ref 0–0.04)
IMM GRANULOCYTES NFR BLD AUTO: 0.6 % (ref 0–0.5)
IMM GRANULOCYTES NFR BLD AUTO: ABNORMAL % (ref 0–0.5)
INR PPP: 1.4 (ref 0.8–1.2)
LYMPHOCYTES # BLD AUTO: 0.2 K/UL (ref 1–4.8)
LYMPHOCYTES # BLD AUTO: ABNORMAL K/UL (ref 1–4.8)
LYMPHOCYTES NFR BLD: 2 % (ref 18–48)
LYMPHOCYTES NFR BLD: 2.5 % (ref 18–48)
MAGNESIUM SERPL-MCNC: 1.8 MG/DL (ref 1.6–2.6)
MCH RBC QN AUTO: 33.5 PG (ref 27–31)
MCH RBC QN AUTO: 33.6 PG (ref 27–31)
MCHC RBC AUTO-ENTMCNC: 34.5 G/DL (ref 32–36)
MCHC RBC AUTO-ENTMCNC: 35.1 G/DL (ref 32–36)
MCV RBC AUTO: 96 FL (ref 82–98)
MCV RBC AUTO: 97 FL (ref 82–98)
METAMYELOCYTES NFR BLD MANUAL: 1 %
MONOCYTES # BLD AUTO: 0.5 K/UL (ref 0.3–1)
MONOCYTES # BLD AUTO: ABNORMAL K/UL (ref 0.3–1)
MONOCYTES NFR BLD: 2 % (ref 4–15)
MONOCYTES NFR BLD: 6.9 % (ref 4–15)
NEUTROPHILS # BLD AUTO: 6 K/UL (ref 1.8–7.7)
NEUTROPHILS NFR BLD: 83 % (ref 38–73)
NEUTROPHILS NFR BLD: 90 % (ref 38–73)
NEUTS BAND NFR BLD MANUAL: 11 %
NRBC BLD-RTO: 0 /100 WBC
NRBC BLD-RTO: 0 /100 WBC
PHOSPHATE SERPL-MCNC: 3.6 MG/DL (ref 2.7–4.5)
PLATELET # BLD AUTO: 40 K/UL (ref 150–450)
PLATELET # BLD AUTO: 42 K/UL (ref 150–450)
PLATELET BLD QL SMEAR: ABNORMAL
PMV BLD AUTO: 11 FL (ref 9.2–12.9)
PMV BLD AUTO: 11.1 FL (ref 9.2–12.9)
POCT GLUCOSE: 141 MG/DL (ref 70–110)
POCT GLUCOSE: 151 MG/DL (ref 70–110)
POCT GLUCOSE: 154 MG/DL (ref 70–110)
POCT GLUCOSE: 163 MG/DL (ref 70–110)
POCT GLUCOSE: 167 MG/DL (ref 70–110)
POCT GLUCOSE: 170 MG/DL (ref 70–110)
POCT GLUCOSE: 176 MG/DL (ref 70–110)
POCT GLUCOSE: 177 MG/DL (ref 70–110)
POCT GLUCOSE: 178 MG/DL (ref 70–110)
POCT GLUCOSE: 191 MG/DL (ref 70–110)
POCT GLUCOSE: 196 MG/DL (ref 70–110)
POCT GLUCOSE: 197 MG/DL (ref 70–110)
POCT GLUCOSE: 200 MG/DL (ref 70–110)
POCT GLUCOSE: 201 MG/DL (ref 70–110)
POCT GLUCOSE: 211 MG/DL (ref 70–110)
POCT GLUCOSE: 212 MG/DL (ref 70–110)
POCT GLUCOSE: 212 MG/DL (ref 70–110)
POTASSIUM SERPL-SCNC: 4.4 MMOL/L (ref 3.5–5.1)
PROT SERPL-MCNC: 4 G/DL (ref 6–8.4)
PROTHROMBIN TIME: 14.7 SEC (ref 9–12.5)
RBC # BLD AUTO: 2.32 M/UL (ref 4–5.4)
RBC # BLD AUTO: 2.36 M/UL (ref 4–5.4)
SMUDGE CELLS BLD QL SMEAR: PRESENT
SODIUM SERPL-SCNC: 139 MMOL/L (ref 136–145)
TACROLIMUS BLD-MCNC: 5.3 NG/ML (ref 5–15)
TARGETS BLD QL SMEAR: ABNORMAL
UNIT NUMBER: NORMAL
WBC # BLD AUTO: 6.4 K/UL (ref 3.9–12.7)
WBC # BLD AUTO: 6.68 K/UL (ref 3.9–12.7)

## 2023-12-11 PROCEDURE — P9035 PLATELET PHERES LEUKOREDUCED: HCPCS

## 2023-12-11 PROCEDURE — 25000003 PHARM REV CODE 250

## 2023-12-11 PROCEDURE — 80197 ASSAY OF TACROLIMUS: CPT | Performed by: STUDENT IN AN ORGANIZED HEALTH CARE EDUCATION/TRAINING PROGRAM

## 2023-12-11 PROCEDURE — 63600175 PHARM REV CODE 636 W HCPCS

## 2023-12-11 PROCEDURE — 85610 PROTHROMBIN TIME: CPT | Performed by: STUDENT IN AN ORGANIZED HEALTH CARE EDUCATION/TRAINING PROGRAM

## 2023-12-11 PROCEDURE — 20600001 HC STEP DOWN PRIVATE ROOM

## 2023-12-11 PROCEDURE — 83735 ASSAY OF MAGNESIUM: CPT | Performed by: STUDENT IN AN ORGANIZED HEALTH CARE EDUCATION/TRAINING PROGRAM

## 2023-12-11 PROCEDURE — 84100 ASSAY OF PHOSPHORUS: CPT | Performed by: STUDENT IN AN ORGANIZED HEALTH CARE EDUCATION/TRAINING PROGRAM

## 2023-12-11 PROCEDURE — 63600175 PHARM REV CODE 636 W HCPCS: Performed by: STUDENT IN AN ORGANIZED HEALTH CARE EDUCATION/TRAINING PROGRAM

## 2023-12-11 PROCEDURE — 94761 N-INVAS EAR/PLS OXIMETRY MLT: CPT

## 2023-12-11 PROCEDURE — 85025 COMPLETE CBC W/AUTO DIFF WBC: CPT | Performed by: STUDENT IN AN ORGANIZED HEALTH CARE EDUCATION/TRAINING PROGRAM

## 2023-12-11 PROCEDURE — 99233 SBSQ HOSP IP/OBS HIGH 50: CPT | Mod: ,,, | Performed by: NURSE PRACTITIONER

## 2023-12-11 PROCEDURE — 93005 ELECTROCARDIOGRAM TRACING: CPT

## 2023-12-11 PROCEDURE — 97161 PT EVAL LOW COMPLEX 20 MIN: CPT

## 2023-12-11 PROCEDURE — 85007 BL SMEAR W/DIFF WBC COUNT: CPT | Performed by: STUDENT IN AN ORGANIZED HEALTH CARE EDUCATION/TRAINING PROGRAM

## 2023-12-11 PROCEDURE — 63600175 PHARM REV CODE 636 W HCPCS: Performed by: NURSE PRACTITIONER

## 2023-12-11 PROCEDURE — 25000003 PHARM REV CODE 250: Performed by: NURSE PRACTITIONER

## 2023-12-11 PROCEDURE — 85730 THROMBOPLASTIN TIME PARTIAL: CPT | Performed by: STUDENT IN AN ORGANIZED HEALTH CARE EDUCATION/TRAINING PROGRAM

## 2023-12-11 PROCEDURE — 99900035 HC TECH TIME PER 15 MIN (STAT)

## 2023-12-11 PROCEDURE — 85027 COMPLETE CBC AUTOMATED: CPT | Performed by: STUDENT IN AN ORGANIZED HEALTH CARE EDUCATION/TRAINING PROGRAM

## 2023-12-11 PROCEDURE — 25000242 PHARM REV CODE 250 ALT 637 W/ HCPCS

## 2023-12-11 PROCEDURE — 80053 COMPREHEN METABOLIC PANEL: CPT | Performed by: STUDENT IN AN ORGANIZED HEALTH CARE EDUCATION/TRAINING PROGRAM

## 2023-12-11 PROCEDURE — P9045 ALBUMIN (HUMAN), 5%, 250 ML: HCPCS | Mod: JZ,JG

## 2023-12-11 PROCEDURE — 25000003 PHARM REV CODE 250: Performed by: STUDENT IN AN ORGANIZED HEALTH CARE EDUCATION/TRAINING PROGRAM

## 2023-12-11 PROCEDURE — 25000003 PHARM REV CODE 250: Performed by: SURGERY

## 2023-12-11 PROCEDURE — 97530 THERAPEUTIC ACTIVITIES: CPT

## 2023-12-11 PROCEDURE — 99291 CRITICAL CARE FIRST HOUR: CPT | Mod: 24,,, | Performed by: STUDENT IN AN ORGANIZED HEALTH CARE EDUCATION/TRAINING PROGRAM

## 2023-12-11 PROCEDURE — 84450 TRANSFERASE (AST) (SGOT): CPT | Performed by: STUDENT IN AN ORGANIZED HEALTH CARE EDUCATION/TRAINING PROGRAM

## 2023-12-11 PROCEDURE — 51798 US URINE CAPACITY MEASURE: CPT

## 2023-12-11 PROCEDURE — 93010 ELECTROCARDIOGRAM REPORT: CPT | Mod: ,,, | Performed by: INTERNAL MEDICINE

## 2023-12-11 RX ORDER — INSULIN ASPART 100 [IU]/ML
0-10 INJECTION, SOLUTION INTRAVENOUS; SUBCUTANEOUS
Status: DISCONTINUED | OUTPATIENT
Start: 2023-12-11 | End: 2023-12-15

## 2023-12-11 RX ORDER — IBUPROFEN 200 MG
24 TABLET ORAL
Status: DISCONTINUED | OUTPATIENT
Start: 2023-12-11 | End: 2023-12-15

## 2023-12-11 RX ORDER — SULFAMETHOXAZOLE AND TRIMETHOPRIM 400; 80 MG/1; MG/1
1 TABLET ORAL DAILY
Qty: 30 TABLET | Refills: 5 | Status: SHIPPED | OUTPATIENT
Start: 2023-12-11 | End: 2024-06-08

## 2023-12-11 RX ORDER — ALBUMIN HUMAN 50 G/1000ML
25 SOLUTION INTRAVENOUS ONCE
Status: COMPLETED | OUTPATIENT
Start: 2023-12-11 | End: 2023-12-11

## 2023-12-11 RX ORDER — GLUCAGON 1 MG
1 KIT INJECTION
Status: DISCONTINUED | OUTPATIENT
Start: 2023-12-11 | End: 2023-12-15

## 2023-12-11 RX ORDER — TACROLIMUS 1 MG/1
6 CAPSULE ORAL EVERY 12 HOURS
Qty: 360 CAPSULE | Refills: 11 | Status: SHIPPED | OUTPATIENT
Start: 2023-12-11 | End: 2023-12-18

## 2023-12-11 RX ORDER — FUROSEMIDE 10 MG/ML
40 INJECTION INTRAMUSCULAR; INTRAVENOUS ONCE
Status: COMPLETED | OUTPATIENT
Start: 2023-12-11 | End: 2023-12-11

## 2023-12-11 RX ORDER — PREDNISONE 5 MG/1
TABLET ORAL
Qty: 70 TABLET | Refills: 0 | Status: SHIPPED | OUTPATIENT
Start: 2023-12-15 | End: 2024-01-15

## 2023-12-11 RX ORDER — MYCOPHENOLATE MOFETIL 250 MG/1
1000 CAPSULE ORAL 2 TIMES DAILY
Qty: 240 CAPSULE | Refills: 11 | Status: SHIPPED | OUTPATIENT
Start: 2023-12-11 | End: 2024-12-10

## 2023-12-11 RX ORDER — OXYCODONE HYDROCHLORIDE 5 MG/1
5 TABLET ORAL EVERY 4 HOURS PRN
Status: DISCONTINUED | OUTPATIENT
Start: 2023-12-11 | End: 2023-12-18 | Stop reason: HOSPADM

## 2023-12-11 RX ORDER — OXYCODONE HYDROCHLORIDE 5 MG/1
5 TABLET ORAL EVERY 6 HOURS PRN
Status: DISCONTINUED | OUTPATIENT
Start: 2023-12-11 | End: 2023-12-11

## 2023-12-11 RX ORDER — HYDROCODONE BITARTRATE AND ACETAMINOPHEN 500; 5 MG/1; MG/1
TABLET ORAL
Status: DISCONTINUED | OUTPATIENT
Start: 2023-12-11 | End: 2023-12-11

## 2023-12-11 RX ORDER — OXYCODONE HCL 5 MG/5 ML
2.5 SOLUTION, ORAL ORAL EVERY 4 HOURS PRN
Status: DISCONTINUED | OUTPATIENT
Start: 2023-12-11 | End: 2023-12-18 | Stop reason: HOSPADM

## 2023-12-11 RX ORDER — MYCOPHENOLATE MOFETIL 250 MG/1
1000 CAPSULE ORAL 2 TIMES DAILY
Status: DISCONTINUED | OUTPATIENT
Start: 2023-12-11 | End: 2023-12-18 | Stop reason: HOSPADM

## 2023-12-11 RX ORDER — IBUPROFEN 200 MG
16 TABLET ORAL
Status: DISCONTINUED | OUTPATIENT
Start: 2023-12-11 | End: 2023-12-15

## 2023-12-11 RX ORDER — ACETAMINOPHEN 500 MG
1 TABLET ORAL DAILY
Qty: 30 TABLET | Refills: 11 | Status: SHIPPED | OUTPATIENT
Start: 2023-12-11

## 2023-12-11 RX ORDER — VALGANCICLOVIR 450 MG/1
450 TABLET, FILM COATED ORAL DAILY
Qty: 30 TABLET | Refills: 2 | Status: SHIPPED | OUTPATIENT
Start: 2023-12-11 | End: 2024-03-17

## 2023-12-11 RX ORDER — ALBUMIN HUMAN 50 G/1000ML
25 SOLUTION INTRAVENOUS ONCE
Status: DISCONTINUED | OUTPATIENT
Start: 2023-12-11 | End: 2023-12-11

## 2023-12-11 RX ORDER — FAMOTIDINE 20 MG/1
20 TABLET, FILM COATED ORAL NIGHTLY
Qty: 30 TABLET | Refills: 0 | Status: SHIPPED | OUTPATIENT
Start: 2023-12-11 | End: 2023-12-18 | Stop reason: HOSPADM

## 2023-12-11 RX ORDER — FAMOTIDINE 20 MG/1
20 TABLET, FILM COATED ORAL NIGHTLY
Status: DISCONTINUED | OUTPATIENT
Start: 2023-12-11 | End: 2023-12-18 | Stop reason: HOSPADM

## 2023-12-11 RX ORDER — TACROLIMUS 1 MG/1
1 CAPSULE ORAL 2 TIMES DAILY
Status: DISCONTINUED | OUTPATIENT
Start: 2023-12-11 | End: 2023-12-12

## 2023-12-11 RX ADMIN — MYCOPHENOLATE MOFETIL 1000 MG: 250 CAPSULE ORAL at 08:12

## 2023-12-11 RX ADMIN — MYCOPHENOLATE MOFETIL 1000 MG: 200 POWDER, FOR SUSPENSION ORAL at 10:12

## 2023-12-11 RX ADMIN — MUPIROCIN 1 G: 20 OINTMENT TOPICAL at 08:12

## 2023-12-11 RX ADMIN — OXYCODONE HYDROCHLORIDE 5 MG: 5 TABLET ORAL at 11:12

## 2023-12-11 RX ADMIN — FAMOTIDINE 20 MG: 20 TABLET, FILM COATED ORAL at 08:12

## 2023-12-11 RX ADMIN — DEXTROSE AND SODIUM CHLORIDE: 5; 900 INJECTION, SOLUTION INTRAVENOUS at 05:12

## 2023-12-11 RX ADMIN — OXYCODONE HYDROCHLORIDE 5 MG: 5 TABLET ORAL at 03:12

## 2023-12-11 RX ADMIN — HEPARIN SODIUM 5000 UNITS: 5000 INJECTION INTRAVENOUS; SUBCUTANEOUS at 05:12

## 2023-12-11 RX ADMIN — HYDROMORPHONE HYDROCHLORIDE 0.2 MG: 0.5 INJECTION, SOLUTION INTRAMUSCULAR; INTRAVENOUS; SUBCUTANEOUS at 07:12

## 2023-12-11 RX ADMIN — FUROSEMIDE 40 MG: 10 INJECTION, SOLUTION INTRAVENOUS at 06:12

## 2023-12-11 RX ADMIN — HEPARIN SODIUM 5000 UNITS: 5000 INJECTION INTRAVENOUS; SUBCUTANEOUS at 09:12

## 2023-12-11 RX ADMIN — OXYCODONE HYDROCHLORIDE 5 MG: 5 TABLET ORAL at 08:12

## 2023-12-11 RX ADMIN — HEPARIN SODIUM 5000 UNITS: 5000 INJECTION INTRAVENOUS; SUBCUTANEOUS at 02:12

## 2023-12-11 RX ADMIN — INSULIN ASPART 2 UNITS: 100 INJECTION, SOLUTION INTRAVENOUS; SUBCUTANEOUS at 09:12

## 2023-12-11 RX ADMIN — METHYLPREDNISOLONE SODIUM SUCCINATE 160 MG: 125 INJECTION, POWDER, FOR SOLUTION INTRAMUSCULAR; INTRAVENOUS at 08:12

## 2023-12-11 RX ADMIN — MAGNESIUM SULFATE HEPTAHYDRATE 2 G: 40 INJECTION, SOLUTION INTRAVENOUS at 08:12

## 2023-12-11 RX ADMIN — INSULIN ASPART 2 UNITS: 100 INJECTION, SOLUTION INTRAVENOUS; SUBCUTANEOUS at 04:12

## 2023-12-11 RX ADMIN — OXYCODONE HYDROCHLORIDE 2.5 MG: 5 SOLUTION ORAL at 07:12

## 2023-12-11 RX ADMIN — BUPROPION HYDROCHLORIDE 300 MG: 150 TABLET, FILM COATED, EXTENDED RELEASE ORAL at 08:12

## 2023-12-11 RX ADMIN — TACROLIMUS 1 MG: 1 CAPSULE ORAL at 07:12

## 2023-12-11 RX ADMIN — INSULIN HUMAN 3.5 UNITS/HR: 1 INJECTION, SOLUTION INTRAVENOUS at 02:12

## 2023-12-11 RX ADMIN — ALBUMIN (HUMAN) 25 G: 12.5 SOLUTION INTRAVENOUS at 07:12

## 2023-12-11 RX ADMIN — TACROLIMUS 1 MG: 1 CAPSULE ORAL at 06:12

## 2023-12-11 RX ADMIN — INSULIN HUMAN 4.2 UNITS/HR: 1 INJECTION, SOLUTION INTRAVENOUS at 06:12

## 2023-12-11 NOTE — PROGRESS NOTES
[facility]  Critical Care - Surgery  Progress Note    Patient Name: Yumiko Gonzalez  MRN: [id number]  Admission Date: 12/9/2023  Hospital Length of Stay: 2 days  Code Status: Full Code  Attending Provider: Nishant Massey MD  Primary Care Provider: Garrett Webb MD   Principal Problem: S/P liver transplant    Subjective:     Hospital/ICU Course:  No notes on file    Interval History/Significant Events:   NAEO  AF  VSS  Received oxy PRN for pain but states pain is controlled  500cc albumin this am   JP1: 547  JP2: 426  UOP 1405    Follow-up For: Procedure(s) (LRB):  TRANSPLANT, LIVER (N/A)    Post-Operative Day: 2 Days Post-Op    Objective:     Vital Signs (Most Recent):  Temp: 98.1 °F (36.7 °C) (12/11/23 0315)  Pulse: 79 (12/11/23 0645)  Resp: (!) 24 (12/11/23 0723)  BP: 124/72 (12/11/23 0600)  SpO2: 99 % (12/11/23 0645) Vital Signs (24h Range):  Temp:  [97.4 °F (36.3 °C)-98.1 °F (36.7 °C)] 98.1 °F (36.7 °C)  Pulse:  [63-82] 79  Resp:  [10-31] 24  SpO2:  [97 %-100 %] 99 %  BP: (100-151)/(55-72) 124/72  Arterial Line BP: (111-158)/(55-71) 135/66     Weight: 112.6 kg (248 lb 3.8 oz)  Body mass index is 38.88 kg/m².      Intake/Output Summary (Last 24 hours) at 12/11/2023 0728  Last data filed at 12/11/2023 0700  Gross per 24 hour   Intake 2757.41 ml   Output 2411 ml   Net 346.41 ml          Physical Exam  Constitutional:       Appearance: Normal appearance.      Comments: ABHINAV   HENT:      Head: Normocephalic and atraumatic.      Nose: Nose normal.   Cardiovascular:      Rate and Rhythm: Regular rhythm.   Pulmonary:      Effort: Pulmonary effort is normal. No respiratory distress.      Comments: On 2L  Abdominal:      Palpations: Abdomen is soft.      Comments: 2 LIUDMILA drains right abdomen SS output. Chevron incision with island dressing CDI.     Genitourinary:     Comments: Henry   Musculoskeletal:      Comments: right radial a-line    Skin:     General: Skin is warm.   Neurological:       General: No focal deficit present.   Psychiatric:         Behavior: Behavior normal.            Vents:  Vent Mode: Spont (12/10/23 1247)  Set Rate: 20 BPM (12/10/23 0744)  Vt Set: 450 mL (12/10/23 0744)  Pressure Support: 5 cmH20 (12/10/23 1247)  PEEP/CPAP: 5 cmH20 (12/10/23 1247)  Oxygen Concentration (%): 28 (12/10/23 2206)  Peak Airway Pressure: 2.6 cmH20 (12/10/23 1247)  Plateau Pressure: 0 cmH20 (12/10/23 1247)  Total Ve: 1.52 L/m (12/10/23 1247)  Negative Inspiratory Force (cm H2O): -16 (12/10/23 1247)  F/VT Ratio<105 (RSBI): 68413 (12/10/23 1247)    Lines/Drains/Airways       Central Venous Catheter Line  Duration             Trialysis (Dialysis) Catheter 12/09/23 right internal jugular 2 days              Drain  Duration                  Closed/Suction Drain 12/10/23 0100 Right RLQ Bulb 19 Fr. 1 day         Closed/Suction Drain 12/10/23 0100 Tube - 1 RLQ Bulb 19 Fr. 1 day         Urethral Catheter 12/09/23 1850 Silicone 16 Fr. 1 day              Arterial Line  Duration             Arterial Line 12/09/23 1923 Right Radial 1 day              Peripheral Intravenous Line  Duration                  Peripheral IV - Single Lumen 12/10/23 18 G Right Antecubital 1 day         Peripheral IV - Single Lumen 12/10/23 22 G Left Wrist 1 day                    Significant Labs:    CBC/Anemia Profile:  Recent Labs   Lab 12/10/23  1952 12/11/23  0006 12/11/23  0405   WBC 6.71 6.40 6.68   HGB 7.9* 7.8* 7.9*   HCT 22.7* 22.2* 22.9*   PLT 40* 40* 42*   MCV 95 96 97   RDW 16.8* 17.1* 16.4*        Chemistries:  Recent Labs   Lab 12/10/23  0159 12/10/23  0509 12/10/23  0748 12/10/23  1158 12/10/23  1606 12/10/23  1952 12/11/23  0006 12/11/23  0405    136 139 139  --  139  --  139   K 4.0 3.6 3.6 3.5  --  3.7  --  4.4    103 104 106  --  108  --  109   CO2 22* 21* 22* 23  --  24  --  23   BUN 20 20 21 19  --  21  --  24*   CREATININE 0.8 0.8 0.8 0.8  --  0.8  --  0.8   CALCIUM 9.4 8.9 9.1 8.8  --  8.8  --  8.5*   ALBUMIN  2.4* 2.9*  --  3.1*  --   --   --  2.6*   PROT 4.0* 4.1*  --  4.3*  --   --   --  4.0*   BILITOT 7.3* 8.7*  --  8.6*  --   --   --  4.1*   ALKPHOS 83 67  --  65  --   --   --  62   ALT 1,423* 1,129*  --  926*  --   --   --  685*   AST 3,055* 2,640* 2,129* 1,510*  1,510*   < > 897* 724* 616*   MG 2.0 1.9 1.8 1.8  --   --   --   --    PHOS 3.7 3.7  --   --   --   --   --   --     < > = values in this interval not displayed.     Assessment/Plan:     GI  * S/P liver transplant    Neuro/Psych:   -- Sedation: None  -- Pain: dilaudid and oxy prn              Cards:   -- Off pressors  -- MAP >65, SBP >100  -- CVP <9  -- D5W NaCl at 100      Pulm:   -- Goal O2 sat > 90%  -- On 2L NC      Renal:  -- Keep cowan for strict I/O  -- BUN/Cr       Recent Labs   Lab 12/09/23  1430   BUN 19   CREATININE 1.0      -- Urine output: 1405  -- BMP q12 for 24 hrs then daily       FEN / GI:   -- Replace lytes as needed  -- Nutrition: NPO  -- Trend CMP daily   -- AM Liver Transplant US 12/11    Liver US 12/10   Impression:     Elevated arterial resistive indices with normal waveforms preserved, presumably related to early postoperative edema.  Close follow-up recommended.     ID:   -- Tm: afebrile; f/u post-op  WBC       Recent Labs   Lab 12/09/23  1430   WBC 8.88      -- Trend WBC with CBC q4hr  -- Abx/Antifungals/Antiviral: Bactrim, Nystatin, Valganciclovir         Heme/Onc:  -- f/u postop H/H  -- Daily CBC      Recent Labs   Lab 12/09/23  1430   HGB 12.9   PLT 77*   INR 1.4*      -- Received a total of 2u pRBC, 3u FFP, 2u Plts, 2u Cryo, 1L 5% albumin and 829cc cell saver today  -- CBC and PT  daily  -- Immuno: Methylprednisolone taper, Prograf, Mycophenolate      Endo:   -- Gluc goal 140-180  -- insulin gtt   -- FU endo       PPx:   Feeding: NPO, likely PO challenge today   Analgesia/Sedation:  Oxy and dilaudid PRN    Thromboembolic prevention: subq Heparin   HOB >30: yes  Stress Ulcer ppx: famotidone q12h   Glucose control: Critical care  goal 140-180 g/dl, insulin gtt  Bowel reg:   Invasive Lines/Drains/Airway: RIJ trialysis, RIJ double lumen introducer, right radial A line / LIUDMILA x2,  cowan   Deescalation:          Dispo/Code Status/Palliative:   -- Likely stepdown today / Full Code                Justin Haile MD  Critical Care - Surgery  [facility] - Surgical Intensive Care

## 2023-12-11 NOTE — SUBJECTIVE & OBJECTIVE
Interval History/Significant Events:   NAEO  AF  VSS  Received oxy PRN for pain but states pain is controlled  500cc albumin this am     Follow-up For: Procedure(s) (LRB):  TRANSPLANT, LIVER (N/A)    Post-Operative Day: 2 Days Post-Op    Objective:     Vital Signs (Most Recent):  Temp: 98.1 °F (36.7 °C) (12/11/23 0315)  Pulse: 79 (12/11/23 0645)  Resp: (!) 24 (12/11/23 0723)  BP: 124/72 (12/11/23 0600)  SpO2: 99 % (12/11/23 0645) Vital Signs (24h Range):  Temp:  [97.4 °F (36.3 °C)-98.1 °F (36.7 °C)] 98.1 °F (36.7 °C)  Pulse:  [63-82] 79  Resp:  [10-31] 24  SpO2:  [97 %-100 %] 99 %  BP: (100-151)/(55-72) 124/72  Arterial Line BP: (111-158)/(55-71) 135/66     Weight: 112.6 kg (248 lb 3.8 oz)  Body mass index is 38.88 kg/m².      Intake/Output Summary (Last 24 hours) at 12/11/2023 0728  Last data filed at 12/11/2023 0700  Gross per 24 hour   Intake 2757.41 ml   Output 2411 ml   Net 346.41 ml          Physical Exam  Constitutional:       Appearance: Normal appearance.      Comments: ABHINAV   HENT:      Head: Normocephalic and atraumatic.      Nose: Nose normal.   Cardiovascular:      Rate and Rhythm: Regular rhythm.   Pulmonary:      Effort: Pulmonary effort is normal. No respiratory distress.      Comments: On 2L  Abdominal:      Palpations: Abdomen is soft.      Comments: 2 LIUDMILA drains right abdomen SS output. Chevron incision with island dressing CDI.     Genitourinary:     Comments: Elaine   Musculoskeletal:      Comments: right radial a-line    Skin:     General: Skin is warm.   Neurological:      General: No focal deficit present.   Psychiatric:         Behavior: Behavior normal.            Vents:  Vent Mode: Spont (12/10/23 1247)  Set Rate: 20 BPM (12/10/23 0744)  Vt Set: 450 mL (12/10/23 0744)  Pressure Support: 5 cmH20 (12/10/23 1247)  PEEP/CPAP: 5 cmH20 (12/10/23 1247)  Oxygen Concentration (%): 28 (12/10/23 2206)  Peak Airway Pressure: 2.6 cmH20 (12/10/23 1247)  Plateau Pressure: 0 cmH20 (12/10/23 1247)  Total Ve:  1.52 L/m (12/10/23 1247)  Negative Inspiratory Force (cm H2O): -16 (12/10/23 1247)  F/VT Ratio<105 (RSBI): 12788 (12/10/23 1247)    Lines/Drains/Airways       Central Venous Catheter Line  Duration             Trialysis (Dialysis) Catheter 12/09/23 right internal jugular 2 days              Drain  Duration                  Closed/Suction Drain 12/10/23 0100 Right RLQ Bulb 19 Fr. 1 day         Closed/Suction Drain 12/10/23 0100 Tube - 1 RLQ Bulb 19 Fr. 1 day         Urethral Catheter 12/09/23 1850 Silicone 16 Fr. 1 day              Arterial Line  Duration             Arterial Line 12/09/23 1923 Right Radial 1 day              Peripheral Intravenous Line  Duration                  Peripheral IV - Single Lumen 12/10/23 18 G Right Antecubital 1 day         Peripheral IV - Single Lumen 12/10/23 22 G Left Wrist 1 day                    Significant Labs:    CBC/Anemia Profile:  Recent Labs   Lab 12/10/23  1952 12/11/23  0006 12/11/23  0405   WBC 6.71 6.40 6.68   HGB 7.9* 7.8* 7.9*   HCT 22.7* 22.2* 22.9*   PLT 40* 40* 42*   MCV 95 96 97   RDW 16.8* 17.1* 16.4*        Chemistries:  Recent Labs   Lab 12/10/23  0159 12/10/23  0509 12/10/23  0748 12/10/23  1158 12/10/23  1606 12/10/23  1952 12/11/23  0006 12/11/23  0405    136 139 139  --  139  --  139   K 4.0 3.6 3.6 3.5  --  3.7  --  4.4    103 104 106  --  108  --  109   CO2 22* 21* 22* 23  --  24  --  23   BUN 20 20 21 19  --  21  --  24*   CREATININE 0.8 0.8 0.8 0.8  --  0.8  --  0.8   CALCIUM 9.4 8.9 9.1 8.8  --  8.8  --  8.5*   ALBUMIN 2.4* 2.9*  --  3.1*  --   --   --  2.6*   PROT 4.0* 4.1*  --  4.3*  --   --   --  4.0*   BILITOT 7.3* 8.7*  --  8.6*  --   --   --  4.1*   ALKPHOS 83 67  --  65  --   --   --  62   ALT 1,423* 1,129*  --  926*  --   --   --  685*   AST 3,055* 2,640* 2,129* 1,510*  1,510*   < > 897* 724* 616*   MG 2.0 1.9 1.8 1.8  --   --   --   --    PHOS 3.7 3.7  --   --   --   --   --   --     < > = values in this interval not displayed.

## 2023-12-11 NOTE — PROGRESS NOTES
Saw patient  in the ICU.  No caregiver available.  Patient was extubated and sitting up in the chair at bedside.   Gave patient  My New Journey: Living Smart After My Liver Transplant.  Asked her to read the book in preparation for education.  Allowed time for questions and answers.

## 2023-12-11 NOTE — ASSESSMENT & PLAN NOTE
Neuro/Psych:   -- Sedation: None  -- Pain: dilaudid and oxy prn              Cards:   -- Off pressors  -- MAP >65, SBP >100  -- CVP <9  -- D5W NaCl at 100      Pulm:   -- Goal O2 sat > 90%  -- On 2L NC      Renal:  -- Keep cowan for strict I/O  -- BUN/Cr       Recent Labs   Lab 12/09/23  1430   BUN 19   CREATININE 1.0      -- Urine output: 1405  -- BMP q12 for 24 hrs then daily       FEN / GI:   -- Replace lytes as needed  -- Nutrition: NPO  -- Trend CMP daily   -- AM Liver Transplant US 12/11    Liver US 12/10   Impression:     Elevated arterial resistive indices with normal waveforms preserved, presumably related to early postoperative edema.  Close follow-up recommended.     ID:   -- Tm: afebrile; f/u post-op  WBC       Recent Labs   Lab 12/09/23  1430   WBC 8.88      -- Trend WBC with CBC q4hr  -- Abx/Antifungals/Antiviral: Bactrim, Nystatin, Valganciclovir         Heme/Onc:  -- f/u postop H/H  -- Daily CBC      Recent Labs   Lab 12/09/23  1430   HGB 12.9   PLT 77*   INR 1.4*      -- Received a total of 2u pRBC, 3u FFP, 2u Plts, 2u Cryo, 1L 5% albumin and 829cc cell saver today  -- CBC and PT  daily  -- Immuno: Methylprednisolone taper, Prograf, Mycophenolate      Endo:   -- Gluc goal 140-180  -- insulin gtt   -- FU endo       PPx:   Feeding: NPO, likely PO challenge today   Analgesia/Sedation:  Oxy and dilaudid PRN    Thromboembolic prevention: subq Heparin   HOB >30: yes  Stress Ulcer ppx: famotidone q12h   Glucose control: Critical care goal 140-180 g/dl, insulin gtt  Bowel reg:   Invasive Lines/Drains/Airway: RIJ trialysis, RIJ double lumen introducer, right radial A line / LIUDMILA x2,  cowan   Deescalation:          Dispo/Code Status/Palliative:   -- Likely stepdown today / Full Code

## 2023-12-11 NOTE — SUBJECTIVE & OBJECTIVE
"Interval HPI:   Overnight events: Remains in SICU. POD 2. BG well controlled on IV intensive insulin protocol with infusion rates ranging from 2.2-7.7 u/hr. Receiving Solu Medrol 160 mg, standard steroid taper. Diet clear liquid    Eating:    clear liquids   Nausea: No  Hypoglycemia and intervention: No  Fever: No  TPN and/or TF: No  If yes, type of TF/TPN and rate: n/a    BP (!) 147/65 (BP Location: Right arm, Patient Position: Lying)   Pulse 79   Temp 97.8 °F (36.6 °C) (Oral)   Resp (!) 21   Ht 5' 7" (1.702 m)   Wt 112.6 kg (248 lb 3.8 oz)   SpO2 (!) 94%   Breastfeeding No   BMI 38.88 kg/m²     Labs Reviewed and Include    Recent Labs   Lab 12/11/23  0405   *   CALCIUM 8.5*   ALBUMIN 2.6*   PROT 4.0*      K 4.4   CO2 23      BUN 24*   CREATININE 0.8   ALKPHOS 62   *   *   BILITOT 4.1*     Lab Results   Component Value Date    WBC 6.68 12/11/2023    HGB 7.9 (L) 12/11/2023    HCT 22.9 (L) 12/11/2023    MCV 97 12/11/2023    PLT 42 (L) 12/11/2023     No results for input(s): "TSH", "FREET4" in the last 168 hours.  Lab Results   Component Value Date    HGBA1C 4.6 10/31/2023       Nutritional status:   Body mass index is 38.88 kg/m².  Lab Results   Component Value Date    ALBUMIN 2.6 (L) 12/11/2023    ALBUMIN 3.1 (L) 12/10/2023    ALBUMIN 2.9 (L) 12/10/2023     No results found for: "PREALBUMIN"    Estimated Creatinine Clearance: 92 mL/min (based on SCr of 0.8 mg/dL).    Accu-Checks  Recent Labs     12/11/23  0403 12/11/23  0458 12/11/23  0615 12/11/23  0708 12/11/23  0814 12/11/23  0903 12/11/23  1005 12/11/23  1102 12/11/23  1213 12/11/23  1303   POCTGLUCOSE 201* 197* 212* 211* 196* 191* 176* 178* 163* 154*       Current Medications and/or Treatments Impacting Glycemic Control  Immunotherapy:    Immunosuppressants           Stop Route Frequency     mycophenolate capsule 1,000 mg         -- Oral 2 times daily     tacrolimus capsule 1 mg         -- Oral 2 times daily      "     Steroids:   Hormones (From admission, onward)      Start     Stop Route Frequency Ordered    12/15/23 0900  predniSONE tablet 20 mg  (methylprednisolone taper panel)        See Hyperspace for full Linked Orders Report.    -- Oral Daily 12/10/23 0143    12/14/23 0900  methylPREDNISolone sodium succinate injection 40 mg  (methylprednisolone taper panel)        See Hyperspace for full Linked Orders Report.    12/15/23 0859 IV Daily 12/10/23 0143    12/13/23 0900  methylPREDNISolone sodium succinate injection 80 mg  (methylprednisolone taper panel)        See Hyperspace for full Linked Orders Report.    12/14/23 0859 IV Daily 12/10/23 0143    12/12/23 0900  methylPREDNISolone sodium succinate injection 120 mg  (methylprednisolone taper panel)        See Hyperspace for full Linked Orders Report.    12/13/23 0859 IV Daily 12/10/23 0143          Pressors:    Autonomic Drugs (From admission, onward)      None          Hyperglycemia/Diabetes Medications:   Antihyperglycemics (From admission, onward)      Start     Stop Route Frequency Ordered    12/11/23 1515  insulin regular in 0.9 % NaCl 100 unit/100 mL (1 unit/mL) infusion        Question:  Enter initial dose (Units/hr):  Answer:  3.5    -- IV Continuous 12/11/23 1411    12/11/23 1511  insulin aspart U-100 pen 0-10 Units         -- SubQ As needed (PRN) 12/11/23 1411

## 2023-12-11 NOTE — PROGRESS NOTES
"[facility]  Endocrinology  Progress Note    Admit Date: 12/9/2023     Reason for Consult: Management of Steroid Induced Hyperglycemia     Surgical Procedure and Date:  Liver Transplant on 12/10/23       Lab Results   Component Value Date    HGBA1C 4.6 10/31/2023       HPI:   Patient is a 63 y.o. 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 Patient now presents for the above procedure(s). Endocrinology consulted for management of hyperglycemia.       Interval HPI:   Overnight events: Remains in SICU. POD 2. BG well controlled on IV intensive insulin protocol with infusion rates ranging from 2.2-7.7 u/hr. Receiving Solu Medrol 160 mg, standard steroid taper. Diet clear liquid    Eating:    clear liquids   Nausea: No  Hypoglycemia and intervention: No  Fever: No  TPN and/or TF: No  If yes, type of TF/TPN and rate: n/a    BP (!) 147/65 (BP Location: Right arm, Patient Position: Lying)   Pulse 79   Temp 97.8 °F (36.6 °C) (Oral)   Resp (!) 21   Ht 5' 7" (1.702 m)   Wt 112.6 kg (248 lb 3.8 oz)   SpO2 (!) 94%   Breastfeeding No   BMI 38.88 kg/m²     Labs Reviewed and Include    Recent Labs   Lab 12/11/23  0405   *   CALCIUM 8.5*   ALBUMIN 2.6*   PROT 4.0*      K 4.4   CO2 23      BUN 24*   CREATININE 0.8   ALKPHOS 62   *   *   BILITOT 4.1*     Lab Results   Component Value Date    WBC 6.68 12/11/2023    HGB 7.9 (L) 12/11/2023    HCT 22.9 (L) 12/11/2023    MCV 97 12/11/2023    PLT 42 (L) 12/11/2023     No results for input(s): "TSH", "FREET4" in the last 168 hours.  Lab Results   Component Value Date    HGBA1C 4.6 10/31/2023       Nutritional status:   Body mass index is 38.88 kg/m².  Lab Results   Component Value Date    ALBUMIN 2.6 (L) 12/11/2023    ALBUMIN 3.1 (L) 12/10/2023    ALBUMIN 2.9 (L) " "12/10/2023     No results found for: "PREALBUMIN"    Estimated Creatinine Clearance: 92 mL/min (based on SCr of 0.8 mg/dL).    Accu-Checks  Recent Labs     12/11/23  0403 12/11/23  0458 12/11/23  0615 12/11/23  0708 12/11/23  0814 12/11/23  0903 12/11/23  1005 12/11/23  1102 12/11/23  1213 12/11/23  1303   POCTGLUCOSE 201* 197* 212* 211* 196* 191* 176* 178* 163* 154*       Current Medications and/or Treatments Impacting Glycemic Control  Immunotherapy:    Immunosuppressants           Stop Route Frequency     mycophenolate capsule 1,000 mg         -- Oral 2 times daily     tacrolimus capsule 1 mg         -- Oral 2 times daily          Steroids:   Hormones (From admission, onward)      Start     Stop Route Frequency Ordered    12/15/23 0900  predniSONE tablet 20 mg  (methylprednisolone taper panel)        See Hyperspace for full Linked Orders Report.    -- Oral Daily 12/10/23 0143    12/14/23 0900  methylPREDNISolone sodium succinate injection 40 mg  (methylprednisolone taper panel)        See Hyperspace for full Linked Orders Report.    12/15/23 0859 IV Daily 12/10/23 0143    12/13/23 0900  methylPREDNISolone sodium succinate injection 80 mg  (methylprednisolone taper panel)        See Hyperspace for full Linked Orders Report.    12/14/23 0859 IV Daily 12/10/23 0143    12/12/23 0900  methylPREDNISolone sodium succinate injection 120 mg  (methylprednisolone taper panel)        See Hyperspace for full Linked Orders Report.    12/13/23 0859 IV Daily 12/10/23 0143          Pressors:    Autonomic Drugs (From admission, onward)      None          Hyperglycemia/Diabetes Medications:   Antihyperglycemics (From admission, onward)      Start     Stop Route Frequency Ordered    12/11/23 1515  insulin regular in 0.9 % NaCl 100 unit/100 mL (1 unit/mL) infusion        Question:  Enter initial dose (Units/hr):  Answer:  3.5    -- IV Continuous 12/11/23 1411    12/11/23 1511  insulin aspart U-100 pen 0-10 Units         -- SubQ As " needed (PRN) 12/11/23 1411            ASSESSMENT and PLAN    Immunology/Multi System  Prophylactic immunotherapy  May increase insulin resistance.         Endocrine  Class 2 severe obesity due to excess calories with serious comorbidity and body mass index (BMI) of 36.0 to 36.9 in adult  Body mass index is 38.88 kg/m².  May increase insulin resistance.         Steroid-induced hyperglycemia  BG goal 140 - 180     Discontinue IV insulin infusion protocol (diet progressed and step down orders in place)  Start Transition IV insulin infusion at 3.5 u/hr with stepdown parameters (50% reduction in current IV insulin requirements)  Moderate Dose Correction Scale  BG monitoring /hs/0200    ** Please call Endocrine for any BG related issues **    ** Please notify Endocrine for any change and/or advance in diet**    Discharge planning: TBD          GI  * S/P liver transplant  Managed per primary team  Optimize BG control        Other  Adverse effect of corticosteroids  Glucocorticoids markedly increase glucoses. Expect steroid taper to help with glucose control          Ban Messer NP  Endocrinology  [facility]

## 2023-12-11 NOTE — PLAN OF CARE
"      SICU PLAN OF CARE NOTE    Dx: S/P liver transplant    Vital Signs: BP (!) 103/56 (BP Location: Right arm, Patient Position: Lying)   Pulse 73   Temp 97.8 °F (36.6 °C) (Oral)   Resp (!) 28   Ht 5' 7" (1.702 m)   Wt 104.3 kg (230 lb)   SpO2 100%   Breastfeeding No   BMI 36.02 kg/m²     SHIFT EVENTS:  VSS. No acute events this shift. Replaced K.    Neuro: AAO x4, Follows Commands, and Moves All Extremities    Respiratory: Nasal Cannula 2L    Cardiac: HR 60-70s    Diet: Sips with Meds    Gtts: Insulin and MIVF     Urine Output: Urinary Catheter 440 ml/shift    Drains:     LIUDMILA #1 Drain, total output 191 cc / shift    LIUDMILA #2 Drain, total output 105 cc / shift    Labs: daily    Accuchecks: Q1.    SKIN NOTE:  Skin: No new skin tears or skin breakdown noted at this time.    Skin precautions maintained including:  Sacrum and heels with foam dressing in place for pressure protection. Frequent weight shift assistance provided Q2 hr to prevent breakdown. Bed plugged in and mattress inflated; Adhesive use limited. Heels elevated off bed. Pressure points protected and positioning supports utilized.  Skin-to-device areas padded. Skin-to-skin areas padded    POC reviewed with patient and family; questions and concerns addressed. See flow sheets for full assessment details.     "

## 2023-12-11 NOTE — PROGRESS NOTES
Admit/Transplant Note     Met with patient and daughter to assess needs. Patient is a 63 y.o.  female, admitted for for liver transplant. (Transplant on 12/9/2023)    Patient admitted directly on 12/9/2023. At this time, patient presents as alert and oriented x 4, calm, and asking and answering questions appropriately.  At this time, patients caregiver presents as alert and oriented x 4, pleasant, calm, and asking and answering questions appropriately.    Household/Family Systems     Patient resides alone, at [address] [place] [address]. Patient's son, Remington, lives next door to patient. Support system includes patient's daughter, Rachel, patient's sister-in-law, Neva, and patient's other children Casandra and Remington. Patient does not have dependents that are need of being cared for.     Patients primary caregiver is kirk Ramos daughter, phone number [phone]. Patient's daughter is currently in the second trimester of her pregnancy with twins. Patient's daughter reports she has gotten clearance from her OBGYN and is able to be a caregiver for patient for the time being. Patient has a son, Remington, who lives next door to her who may be available at times. Patient's sister-in-law, Neva, may also be available when needed. Confirmed patients contact information is [phone] (home);   Telephone Information:   Mobile [phone]   .    During admission, patient's caregiver plans to stay  in patient's room and [facility] .  Confirmed patient and patients caregivers do have access to reliable transportation.    Cognitive Status/Learning     Patient reports reading ability as college and states patient does have difficulty with  vision (wears prescription glasses), hearing issues (issues with certain tones/background noise) . Patient reports patient learns best by written information.   Needed: No.   Highest education level: Attended College/Technical School    Vocation/Disability      Working for Income: No  If no, reason not working: Disability  Patient is  on disability through her employer [employer].    Adherence     Patient reports a high level of adherence to patients health care regimen.  Adherence counseling and education provided. Patient verbalizes understanding.    Substance Use    Patient reports the following substance usage.    Tobacco: none, patient denies any use.  Alcohol: none, patient denies any use.  Illicit Drugs/Non-prescribed Medications: none, patient denies any use.  Patient states clear understanding of the potential impact of substance use.  Substance abstinence/cessation counseling, education and resources provided and reviewed.     Services Utilizing/ADLS    Infusion Service: Prior to admission, patient utilizing? no  Home Health: Prior to admission, patient utilizing?  Not currently; patient had [facility]  in the past.  DME: Prior to admission, yes; Patient utilizes cane for ambulation. Patient also has a walker, raised toilet seat, nebulizer, and has a ramp in her home.  Pulmonary/Cardiac Rehab: Prior to admission, no  Dialysis:  Prior to admission, no  Transplant Specialty Pharmacy:  Prior to admission, no.    Prior to admission, patient reports patient was independent with ADLS and was driving (very limited distances).  Patient reports patient is not able to care for self at this time due to compromised medical condition (as documented in medical record) and physical weakness..  Patient indicates a willingness to care for self once medically cleared to do so.    Insurance/Medications    Insured by   Payer/Plan Subscr  Sex Relation Sub. Ins. ID Effective Group Num   1. BLUE CROSS BL* MAAME SPANGLER  1960 Female Self [id number]                                    [address] [address]   2. MAKENNA [employer]al[employer] [place]* MAAME SPANGLER  1960 Female Self [id number]                                   [address] [address]      Primary Insurance (for UNOS  reporting): Private Insurance ([place])  Secondary Insurance (for UNOS reporting): Private Insurance    Patient reports patient is able to obtain and afford medications at this time and at time of discharge. Patient reports she has conducted some fundraising to assist with transplant related costs.    Living Will/Healthcare Power of     Patient states patient has a LW and/or HCPA.   provided education regarding LW and HCPA and the completion of forms.    Coping/Mental Health    Patient reports coping well today, besides having some pain. Patient's daughter asked to speak with SW privately outside of patient's room and reports patient has been severely depressed over the past few months. Patient's daughter denies patient having any suicidal or homicidal ideation. Patient's daughter reports if you ask patient, she will not tell you this herself. However, family has been noticing patient has been having low energy, reduced appetite and sleep issues, also having some interpersonal relationship issues with her children. Patient has history of Depression, grief after losing her spouse, PTSD, and history of panic attacks (last panic attack was over 15 years ago). Patient currently takes Wellbutrin and Xanax. Patient's daughter reports patient has been needing to take Xanax more frequently lately to sleep. Patient's daughter is asking if patient's medication dosage and regimen may be reevaluated to see if anything additional may be prescribed or dosage adjusted. Patient does see a  for talk therapy through [facility] for anxiety, grief and financial stress. SW will speak with transplant team regarding inpatient Psych consult to see if patient's medications may need adjustment. SW also offered  and Transplant SW services while inpatient if patient would like to speak with anyone. Patient's daughter is thankful for assistance.    Discharge Planning    At time of discharge,  patient plans to return to  [facility] (or potentially [facility] with [facility] employee discount)  under the care of daughter. Patient lives about 1 hour away and team may discuss if patient would feel more comfortable returning home where she will have her son near by. Patient's daughter/primary caregiver is 4 months pregnant and 1 hour drive weekly to appointments may pose some level of difficulty.  Patients daughter will transport patient.  Per rounds today, expected discharge date has not been medically determined at this time. Patient and patients caregiver  verbalize understanding and are involved in treatment planning and discharge process.    Additional Concerns     providing ongoing psychosocial support, education, resources and d/c planning as needed.  SW remains available. Patient's caregiver verbalizes understanding and agreement with information reviewed,  availability and how to access available resources as needed. Patient denies additional needs and/or concerns at this time. Patient verbalizes understanding and agreement with information reviewed, social work availability, and how to access available resources as needed.

## 2023-12-11 NOTE — ASSESSMENT & PLAN NOTE
BG goal 140 - 180     Discontinue IV insulin infusion protocol (diet progressed and step down orders in place)  Start Transition IV insulin infusion at 3.5 u/hr with stepdown parameters (50% reduction in current IV insulin requirements)  Moderate Dose Correction Scale  BG monitoring /hs/0200    ** Please call Endocrine for any BG related issues **    ** Please notify Endocrine for any change and/or advance in diet**    Discharge planning: TBD

## 2023-12-11 NOTE — PT/OT/SLP EVAL
"Physical Therapy Evaluation    Patient Name:  Yumiko Gonzalez   MRN:  [id number]  Admit Date: 12/9/2023  Admitting Diagnosis:  S/P liver transplant  Length of Stay: 2 days  Recent Surgery: Procedure(s) (LRB):  TRANSPLANT, LIVER (N/A) 2 Days Post-Op    Recommendations:     Discharge Recommendations:  Low intensity therapy at discharge  Discharge Equipment Recommendations: none   Barriers to discharge: None    Highest Level of Mobility: steps to chair  Assistance Needed: minimal assistance    Assessment:     Yumiko Gonzalez is a 63 y.o. female admitted with a medical diagnosis of S/P liver transplant. Medical history includes hepatic encephalopathy. She is most limited today by weakness. Today, she was able to perform bed mobility, standing, transfers, and took a few steps to the chair. Based on clinical presentation, co-morbidities, and today's performance, patient would benefit from acute skilled physical therapy services to improve functional mobility and return to max capacity prior level of function. Patient currently demonstrates a need for low intensity therapy services after discharging from the hospital.  See detailed evaluation below:    Problem List: weakness, impaired endurance, impaired self care skills, impaired functional mobility, gait instability, impaired balance, decreased lower extremity function, decreased safety awareness, pain, edema, impaired cardiopulmonary response to activity  Rehab Prognosis: Good    Plan:     During this hospitalization, patient to be seen 5 x/week to address the identified impairments via gait training, therapeutic activities, therapeutic exercises, neuromuscular re-education and progress towards the established goals.    Plan of Care Expires:  01/10/24    Subjective   Communicated with RN prior to session.  Patient found HOB elevated upon PT entry to room, agreeable to evaluation. "I'm scared"    Chief Complaint: post op pain  Patient/Family Comments/goals: to get " "better  Pain/Comfort:  Pain Rating 1: 4/10  Location - Orientation 1: generalized  Location 1: abdomen  Pain Addressed 1: Reposition, Distraction  Pain Rating Post-Intervention 1: 4/10    Living Environment:  Patient lives alone in a single story home with a ramped entrance. Her family lives nearby, but is not always available to assist.      Prior Level of Function:   Patient reports being modified independent with mobility & with ADLs. Patient owns DME as follows: walker, standard, walker, rolling, cane, straight, raised toilet.     Patient reports they will have assistance from family upon discharge.    Objective:   Patient found with: telemetry, pulse ox (continuous), blood pressure cuff, central line, PureWick     General Precautions: Standard, fall   Orthopedic Precautions:N/A   Braces: N/A   Oxygen Device: Room Air  Vitals: BP (!) 144/65   Pulse 72   Temp 97.8 °F (36.6 °C) (Oral)   Resp 20   Ht 5' 7" (1.702 m)   Wt 112.6 kg (248 lb 3.8 oz)   SpO2 95%   Breastfeeding No   BMI 38.88 kg/m²     Exams:  Cognition:   Alert and Cooperative  AxOx4  Command following: Follows multistep  commands  Fluency: clear/fluent  Hearing: Intact  Vision:  Intact visual fields  Skin Integrity: surgical incision + LIUDMILA drain x2  Sensation: intact  Coordination: intact  LE Strength:  L Lower Extremity: grossly 3-/5  R Lower Extremity: grossly 3-/5  LE ROM:  L Lower Extremity: WFL  R Lower Extremity: WFL      Outcome Measures:  AM-PAC 6 CLICK MOBILITY  Turning over in bed (including adjusting bedclothes, sheets and blankets)?: 2  Sitting down on and standing up from a chair with arms (e.g., wheelchair, bedside commode, etc.): 3  Moving from lying on back to sitting on the side of the bed?: 3  Moving to and from a bed to a chair (including a wheelchair)?: 3  Need to walk in hospital room?: 3  Climbing 3-5 steps with a railing?: 3  Basic Mobility Total Score: 17     Functional Mobility:    Bed Mobility:  Rolling/Turning to " Right: moderate assistance  Supine to Sit: minimum assistance  Scooting anteriorly to EOB to have both feet planted on floor: minimum assistance    Sitting Balance at Edge of Bed:  Assistance Level Required: Stand-by Assistance  Postural deviations noted: rounded shoulders and forward head  Cueing provided for: upright and midline sitting posture    Transfers:   Sit <> Stand Transfer: minimum assistance with no assistive device   Standing Tolerance: minimum assistance with no assistive device   Deviations: multidirectional sway and knee buckling  Bed <> Chair Transfer: Step Transfer technique with minimum assistance with no assistive device    Gait:   Patient ambulated: 4 steps to chair   Patient required: minimal assist  Patient used: no assistive device  Gait Deviation(s): decreased speed, decreased step length, decreased L foot clearance, generalized instability, and narrow base of support  Cueing provided for: step sequencing, negotiating turns and obstacles, upright posture, and adequate step length    Therapeutic Exercises:   Patient educated on and demonstrated lower extremity strengthening exercises to perform throughout the day. Exercises included: ankle pumps, heel slides, quad sets, and glut sets.    Education:   Patient was educated on the following:  Role of PT, plan of care, and goals  In room safety and use of call button  Importance of continued upright mobility and exercise  Balancing rest and activity      Patient left up in chair with all lines intact, call button in reach, and RN notified.    GOALS:   Multidisciplinary Problems       Physical Therapy Goals          Problem: Physical Therapy    Goal Priority Disciplines Outcome Goal Variances Interventions   Physical Therapy Goal     PT, PT/OT Ongoing, Progressing     Description: PT goals to be met by: 1/11/24    Patient will perform rolling each way with supervision.   Patient will perform supine <> sitting with supervision.  Patient will  "perform sit <> stand transitions with supervision using RW.  Patient will perform transfers from bed <> chair or BSC with supervision using RW.  Patient will ambulate 200 feet with supervision using RW.                       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, postitive antismooth muscle antibody    Blood transfusion     Bronchitis     bronchospasm on occasion     Cancer     carcinoma in situ    Cervical neck pain with evidence of disc disease 2012    can bend neck    Cirrhosis     non-alcoholic, compensated    Colon polyps 2012    Depression 2012    Hx panic attacks    Diverticular disease 2012    never diverticulitis    Fatty liver 2012    Fibrocystic breast     Fine tremor     hands,chronic    Hepatosplenomegaly     History of abdominal paracentesis 2023    8.7L of fluid drained    Hypertension 2013    Knee pain, bilateral     chronic, with hands,feet,fingers also painful    Lesion of right lung     Liver disease     "partially sclerosed liver" per pt    Migraines past Hx    Nephrolithiasis     stone episode 2021    Pleural effusion     small, compensated, good bilateral breath sounds per Dr Suresh GUTIERRES (postoperative nausea and vomiting)     twice after childbirth    Postpartum depression     Splenomegaly     Thrombocytopenia     Tremors of nervous system        Past Surgical History:   Procedure Laterality Date    ADENOIDECTOMY      CERVICAL CONIZATION   W/ LASER       SECTION      x3    COLONOSCOPY N/A 2016    Procedure: COLONOSCOPY;  Surgeon: James Palomares MD;  Location: [facility];  Service: Endoscopy;  Laterality: N/A;    COLONOSCOPY N/A 2/3/2022    Procedure: COLONOSCOPY;  Surgeon: James Palomares MD;  Location: [facility];  " Service: Endoscopy;  Laterality: N/A;    EMBOLIZATION N/A 7/24/2018    Procedure: EMBOLIZATION, BLOOD VESSEL;  Surgeon: Joselin Surgeon;  Location: [facility];  Service: Radiology;  Laterality: N/A;    ENDOMETRIAL ABLATION      ESOPHAGOGASTRODUODENOSCOPY N/A 1/28/2020    Procedure: ESOPHAGOGASTRODUODENOSCOPY (EGD);  Surgeon: James Palomares MD;  Location: [facility];  Service: Endoscopy;  Laterality: N/A;    ESOPHAGOGASTRODUODENOSCOPY N/A 3/8/2022    Procedure: EGD (ESOPHAGOGASTRODUODENOSCOPY);  Surgeon: James Palomares MD;  Location: [facility];  Service: Endoscopy;  Laterality: N/A;    LIVER BIOPSY      LIVER TRANSPLANT N/A 12/9/2023    Procedure: TRANSPLANT, LIVER;  Surgeon: Gurpreet Raza MD;  Location: [address];  Service: Transplant;  Laterality: N/A;    PELVIC LAPAROSCOPY      TONSILLECTOMY      TUBAL LIGATION         Time Tracking:     PT Received On: 12/11/23  PT Start Time: 1302     PT Stop Time: 1329  PT Total Time (min): 27 min     Billable Minutes: Evaluation 10 and Therapeutic Activity 17    Marleni Garcia, PT, DPT  12/11/2023

## 2023-12-11 NOTE — PROGRESS NOTES
Spoke with patient's daughter, Rachel, on the telephone.  Informed her that My [employer] is at patient's bedside.  Asked her to read the book in preparation for education.  Rachel had already spoken to  about [place] and [employer] fund.  Rachel spoke to  about patient's anxiety and depression.  Other questions were regarding insurance when patient turns 64 in May.  Informed Rachel that she can speak to the  when patient is discharged to get more information regarding insurance to make sure she stays fully covered.  Allowed time for questions and answers.

## 2023-12-11 NOTE — PLAN OF CARE
I have reviewed the chart of Yumiko Gonzalez and collaborated with Veronika Huitron MD in the care of the patient who is hospitalized for the following:    Active Hospital Problems    Diagnosis    *S/P liver transplant    At risk for opportunistic infections    Acute postoperative anemia due to expected blood loss    Immunosuppressed status    Steroid-induced hyperglycemia    Adverse effect of corticosteroids    Prophylactic immunotherapy    Class 2 severe obesity due to excess calories with serious comorbidity and body mass index (BMI) of 36.0 to 36.9 in adult      I have reviewed Yumiko Gonzalez with the multidisciplinary team during rounds.    Pat Krishnamurthy, JOSE MIGUEL  Unit-Based [facility]

## 2023-12-12 PROBLEM — E87.70 HYPERVOLEMIA: Status: ACTIVE | Noted: 2023-12-12

## 2023-12-12 LAB
ALBUMIN SERPL BCP-MCNC: 2.9 G/DL (ref 3.5–5.2)
ALP SERPL-CCNC: 79 U/L (ref 55–135)
ALT SERPL W/O P-5'-P-CCNC: 529 U/L (ref 10–44)
ANION GAP SERPL CALC-SCNC: 7 MMOL/L (ref 8–16)
AST SERPL-CCNC: 286 U/L (ref 10–40)
BASOPHILS # BLD AUTO: 0.01 K/UL (ref 0–0.2)
BASOPHILS NFR BLD: 0.1 % (ref 0–1.9)
BILIRUB SERPL-MCNC: 4 MG/DL (ref 0.1–1)
BUN SERPL-MCNC: 33 MG/DL (ref 8–23)
CALCIUM SERPL-MCNC: 8.8 MG/DL (ref 8.7–10.5)
CHLORIDE SERPL-SCNC: 109 MMOL/L (ref 95–110)
CO2 SERPL-SCNC: 25 MMOL/L (ref 23–29)
CREAT SERPL-MCNC: 1 MG/DL (ref 0.5–1.4)
DIFFERENTIAL METHOD BLD: ABNORMAL
EOSINOPHIL # BLD AUTO: 0 K/UL (ref 0–0.5)
EOSINOPHIL NFR BLD: 0.1 % (ref 0–8)
ERYTHROCYTE [DISTWIDTH] IN BLOOD BY AUTOMATED COUNT: 16 % (ref 11.5–14.5)
EST. GFR  (NO RACE VARIABLE): >60 ML/MIN/1.73 M^2
GLUCOSE SERPL-MCNC: 121 MG/DL (ref 70–110)
GLUCOSE SERPL-MCNC: 123 MG/DL (ref 70–110)
GLUCOSE SERPL-MCNC: 125 MG/DL (ref 70–110)
GLUCOSE SERPL-MCNC: 127 MG/DL (ref 70–110)
GLUCOSE SERPL-MCNC: 132 MG/DL (ref 70–110)
GLUCOSE SERPL-MCNC: 164 MG/DL (ref 70–110)
GLUCOSE SERPL-MCNC: 170 MG/DL (ref 70–110)
GLUCOSE SERPL-MCNC: 172 MG/DL (ref 70–110)
GLUCOSE SERPL-MCNC: 98 MG/DL (ref 70–110)
HCO3 UR-SCNC: 20.2 MMOL/L (ref 24–28)
HCO3 UR-SCNC: 20.8 MMOL/L (ref 24–28)
HCO3 UR-SCNC: 20.9 MMOL/L (ref 24–28)
HCO3 UR-SCNC: 21.9 MMOL/L (ref 24–28)
HCO3 UR-SCNC: 22 MMOL/L (ref 24–28)
HCO3 UR-SCNC: 23.6 MMOL/L (ref 24–28)
HCO3 UR-SCNC: 24.3 MMOL/L (ref 24–28)
HCO3 UR-SCNC: 24.7 MMOL/L (ref 24–28)
HCT VFR BLD AUTO: 24.5 % (ref 37–48.5)
HCT VFR BLD CALC: 24 %PCV (ref 36–54)
HCT VFR BLD CALC: 25 %PCV (ref 36–54)
HCT VFR BLD CALC: 25 %PCV (ref 36–54)
HCT VFR BLD CALC: 26 %PCV (ref 36–54)
HCT VFR BLD CALC: 30 %PCV (ref 36–54)
HCT VFR BLD CALC: 33 %PCV (ref 36–54)
HGB BLD-MCNC: 8.3 G/DL (ref 12–16)
IMM GRANULOCYTES # BLD AUTO: 0.06 K/UL (ref 0–0.04)
IMM GRANULOCYTES NFR BLD AUTO: 0.8 % (ref 0–0.5)
INR PPP: 1.2 (ref 0.8–1.2)
LYMPHOCYTES # BLD AUTO: 0.3 K/UL (ref 1–4.8)
LYMPHOCYTES NFR BLD: 3.6 % (ref 18–48)
MAGNESIUM SERPL-MCNC: 2.2 MG/DL (ref 1.6–2.6)
MCH RBC QN AUTO: 33.3 PG (ref 27–31)
MCHC RBC AUTO-ENTMCNC: 33.9 G/DL (ref 32–36)
MCV RBC AUTO: 98 FL (ref 82–98)
MONOCYTES # BLD AUTO: 0.9 K/UL (ref 0.3–1)
MONOCYTES NFR BLD: 11.6 % (ref 4–15)
NEUTROPHILS # BLD AUTO: 6.4 K/UL (ref 1.8–7.7)
NEUTROPHILS NFR BLD: 83.8 % (ref 38–73)
NRBC BLD-RTO: 0 /100 WBC
PCO2 BLDA: 27.2 MMHG (ref 35–45)
PCO2 BLDA: 34.5 MMHG (ref 35–45)
PCO2 BLDA: 35 MMHG (ref 35–45)
PCO2 BLDA: 35.7 MMHG (ref 35–45)
PCO2 BLDA: 36.4 MMHG (ref 35–45)
PCO2 BLDA: 37 MMHG (ref 35–45)
PCO2 BLDA: 37.1 MMHG (ref 35–45)
PCO2 BLDA: 39 MMHG (ref 35–45)
PH SMN: 7.34 [PH] (ref 7.35–7.45)
PH SMN: 7.36 [PH] (ref 7.35–7.45)
PH SMN: 7.39 [PH] (ref 7.35–7.45)
PH SMN: 7.4 [PH] (ref 7.35–7.45)
PH SMN: 7.43 [PH] (ref 7.35–7.45)
PH SMN: 7.44 [PH] (ref 7.35–7.45)
PH SMN: 7.46 [PH] (ref 7.35–7.45)
PH SMN: 7.48 [PH] (ref 7.35–7.45)
PHOSPHATE SERPL-MCNC: 3.5 MG/DL (ref 2.7–4.5)
PLATELET # BLD AUTO: 67 K/UL (ref 150–450)
PMV BLD AUTO: 11 FL (ref 9.2–12.9)
PO2 BLDA: 111 MMHG (ref 80–100)
PO2 BLDA: 144 MMHG (ref 80–100)
PO2 BLDA: 207 MMHG (ref 80–100)
PO2 BLDA: 217 MMHG (ref 80–100)
PO2 BLDA: 227 MMHG (ref 80–100)
PO2 BLDA: 237 MMHG (ref 80–100)
PO2 BLDA: 266 MMHG (ref 80–100)
PO2 BLDA: 299 MMHG (ref 80–100)
POC BE: -1 MMOL/L
POC BE: -3 MMOL/L
POC BE: -5 MMOL/L
POC BE: -5 MMOL/L
POC BE: 0 MMOL/L
POC BE: 0 MMOL/L
POC IONIZED CALCIUM: 0.99 MMOL/L (ref 1.06–1.42)
POC IONIZED CALCIUM: 1.09 MMOL/L (ref 1.06–1.42)
POC IONIZED CALCIUM: 1.12 MMOL/L (ref 1.06–1.42)
POC IONIZED CALCIUM: 1.13 MMOL/L (ref 1.06–1.42)
POC IONIZED CALCIUM: 1.15 MMOL/L (ref 1.06–1.42)
POC IONIZED CALCIUM: 1.16 MMOL/L (ref 1.06–1.42)
POC IONIZED CALCIUM: 1.27 MMOL/L (ref 1.06–1.42)
POC IONIZED CALCIUM: 1.38 MMOL/L (ref 1.06–1.42)
POC SATURATED O2: 100 % (ref 95–100)
POC SATURATED O2: 99 % (ref 95–100)
POC SATURATED O2: 99 % (ref 95–100)
POC TCO2: 21 MMOL/L (ref 23–27)
POC TCO2: 22 MMOL/L (ref 23–27)
POC TCO2: 22 MMOL/L (ref 23–27)
POC TCO2: 23 MMOL/L (ref 23–27)
POC TCO2: 23 MMOL/L (ref 23–27)
POC TCO2: 25 MMOL/L (ref 23–27)
POC TCO2: 25 MMOL/L (ref 23–27)
POC TCO2: 26 MMOL/L (ref 23–27)
POCT GLUCOSE: 100 MG/DL (ref 70–110)
POCT GLUCOSE: 150 MG/DL (ref 70–110)
POCT GLUCOSE: 194 MG/DL (ref 70–110)
POCT GLUCOSE: 206 MG/DL (ref 70–110)
POCT GLUCOSE: 241 MG/DL (ref 70–110)
POCT GLUCOSE: 93 MG/DL (ref 70–110)
POTASSIUM BLD-SCNC: 3.7 MMOL/L (ref 3.5–5.1)
POTASSIUM BLD-SCNC: 3.8 MMOL/L (ref 3.5–5.1)
POTASSIUM BLD-SCNC: 3.9 MMOL/L (ref 3.5–5.1)
POTASSIUM BLD-SCNC: 4 MMOL/L (ref 3.5–5.1)
POTASSIUM BLD-SCNC: 4.2 MMOL/L (ref 3.5–5.1)
POTASSIUM BLD-SCNC: 4.3 MMOL/L (ref 3.5–5.1)
POTASSIUM BLD-SCNC: 4.6 MMOL/L (ref 3.5–5.1)
POTASSIUM BLD-SCNC: 4.7 MMOL/L (ref 3.5–5.1)
POTASSIUM SERPL-SCNC: 4.2 MMOL/L (ref 3.5–5.1)
PROT SERPL-MCNC: 4.5 G/DL (ref 6–8.4)
PROTHROMBIN TIME: 12.4 SEC (ref 9–12.5)
RBC # BLD AUTO: 2.49 M/UL (ref 4–5.4)
SAMPLE: ABNORMAL
SODIUM BLD-SCNC: 133 MMOL/L (ref 136–145)
SODIUM BLD-SCNC: 135 MMOL/L (ref 136–145)
SODIUM BLD-SCNC: 136 MMOL/L (ref 136–145)
SODIUM BLD-SCNC: 136 MMOL/L (ref 136–145)
SODIUM SERPL-SCNC: 141 MMOL/L (ref 136–145)
TACROLIMUS BLD-MCNC: 4 NG/ML (ref 5–15)
WBC # BLD AUTO: 7.68 K/UL (ref 3.9–12.7)

## 2023-12-12 PROCEDURE — 99900035 HC TECH TIME PER 15 MIN (STAT)

## 2023-12-12 PROCEDURE — 84100 ASSAY OF PHOSPHORUS: CPT | Performed by: STUDENT IN AN ORGANIZED HEALTH CARE EDUCATION/TRAINING PROGRAM

## 2023-12-12 PROCEDURE — 85610 PROTHROMBIN TIME: CPT | Performed by: STUDENT IN AN ORGANIZED HEALTH CARE EDUCATION/TRAINING PROGRAM

## 2023-12-12 PROCEDURE — 25000003 PHARM REV CODE 250: Performed by: SURGERY

## 2023-12-12 PROCEDURE — 63600175 PHARM REV CODE 636 W HCPCS: Performed by: STUDENT IN AN ORGANIZED HEALTH CARE EDUCATION/TRAINING PROGRAM

## 2023-12-12 PROCEDURE — 25000003 PHARM REV CODE 250

## 2023-12-12 PROCEDURE — 80197 ASSAY OF TACROLIMUS: CPT | Performed by: STUDENT IN AN ORGANIZED HEALTH CARE EDUCATION/TRAINING PROGRAM

## 2023-12-12 PROCEDURE — 20600001 HC STEP DOWN PRIVATE ROOM

## 2023-12-12 PROCEDURE — 94799 UNLISTED PULMONARY SVC/PX: CPT

## 2023-12-12 PROCEDURE — 97110 THERAPEUTIC EXERCISES: CPT | Mod: CQ

## 2023-12-12 PROCEDURE — 94761 N-INVAS EAR/PLS OXIMETRY MLT: CPT

## 2023-12-12 PROCEDURE — 85025 COMPLETE CBC W/AUTO DIFF WBC: CPT | Performed by: STUDENT IN AN ORGANIZED HEALTH CARE EDUCATION/TRAINING PROGRAM

## 2023-12-12 PROCEDURE — 83735 ASSAY OF MAGNESIUM: CPT | Performed by: STUDENT IN AN ORGANIZED HEALTH CARE EDUCATION/TRAINING PROGRAM

## 2023-12-12 PROCEDURE — 80053 COMPREHEN METABOLIC PANEL: CPT | Performed by: STUDENT IN AN ORGANIZED HEALTH CARE EDUCATION/TRAINING PROGRAM

## 2023-12-12 PROCEDURE — 99233 SBSQ HOSP IP/OBS HIGH 50: CPT | Mod: 24,,, | Performed by: PHYSICIAN ASSISTANT

## 2023-12-12 PROCEDURE — 99232 SBSQ HOSP IP/OBS MODERATE 35: CPT | Mod: ,,, | Performed by: NURSE PRACTITIONER

## 2023-12-12 PROCEDURE — 25000003 PHARM REV CODE 250: Performed by: NURSE PRACTITIONER

## 2023-12-12 PROCEDURE — 97116 GAIT TRAINING THERAPY: CPT | Mod: CQ

## 2023-12-12 PROCEDURE — 63600175 PHARM REV CODE 636 W HCPCS: Performed by: PHYSICIAN ASSISTANT

## 2023-12-12 RX ORDER — TACROLIMUS 1 MG/1
1 CAPSULE ORAL ONCE
Status: COMPLETED | OUTPATIENT
Start: 2023-12-12 | End: 2023-12-12

## 2023-12-12 RX ORDER — METHYLPREDNISOLONE SOD SUCC 125 MG
120 VIAL (EA) INJECTION ONCE
Status: COMPLETED | OUTPATIENT
Start: 2023-12-12 | End: 2023-12-12

## 2023-12-12 RX ORDER — TACROLIMUS 1 MG/1
2 CAPSULE ORAL 2 TIMES DAILY
Status: DISCONTINUED | OUTPATIENT
Start: 2023-12-12 | End: 2023-12-14

## 2023-12-12 RX ORDER — FUROSEMIDE 10 MG/ML
40 INJECTION INTRAMUSCULAR; INTRAVENOUS ONCE
Status: COMPLETED | OUTPATIENT
Start: 2023-12-12 | End: 2023-12-12

## 2023-12-12 RX ADMIN — INSULIN HUMAN 3.5 UNITS/HR: 1 INJECTION, SOLUTION INTRAVENOUS at 04:12

## 2023-12-12 RX ADMIN — INSULIN ASPART 4 UNITS: 100 INJECTION, SOLUTION INTRAVENOUS; SUBCUTANEOUS at 05:12

## 2023-12-12 RX ADMIN — OXYCODONE HYDROCHLORIDE 5 MG: 5 TABLET ORAL at 12:12

## 2023-12-12 RX ADMIN — FAMOTIDINE 20 MG: 20 TABLET, FILM COATED ORAL at 09:12

## 2023-12-12 RX ADMIN — INSULIN HUMAN 1.2 UNITS/HR: 1 INJECTION, SOLUTION INTRAVENOUS at 12:12

## 2023-12-12 RX ADMIN — MUPIROCIN 1 G: 20 OINTMENT TOPICAL at 09:12

## 2023-12-12 RX ADMIN — OXYCODONE HYDROCHLORIDE 5 MG: 5 TABLET ORAL at 04:12

## 2023-12-12 RX ADMIN — METHYLPREDNISOLONE SODIUM SUCCINATE 120 MG: 125 INJECTION, POWDER, FOR SOLUTION INTRAMUSCULAR; INTRAVENOUS at 08:12

## 2023-12-12 RX ADMIN — HEPARIN SODIUM 5000 UNITS: 5000 INJECTION INTRAVENOUS; SUBCUTANEOUS at 02:12

## 2023-12-12 RX ADMIN — MYCOPHENOLATE MOFETIL 1000 MG: 250 CAPSULE ORAL at 08:12

## 2023-12-12 RX ADMIN — OXYCODONE HYDROCHLORIDE 5 MG: 5 TABLET ORAL at 08:12

## 2023-12-12 RX ADMIN — OXYCODONE HYDROCHLORIDE 5 MG: 5 TABLET ORAL at 07:12

## 2023-12-12 RX ADMIN — FUROSEMIDE 40 MG: 10 INJECTION, SOLUTION INTRAVENOUS at 10:12

## 2023-12-12 RX ADMIN — TACROLIMUS 1 MG: 1 CAPSULE ORAL at 08:12

## 2023-12-12 RX ADMIN — TACROLIMUS 2 MG: 1 CAPSULE ORAL at 05:12

## 2023-12-12 RX ADMIN — MYCOPHENOLATE MOFETIL 1000 MG: 250 CAPSULE ORAL at 09:12

## 2023-12-12 RX ADMIN — INSULIN ASPART 4 UNITS: 100 INJECTION, SOLUTION INTRAVENOUS; SUBCUTANEOUS at 12:12

## 2023-12-12 RX ADMIN — BUPROPION HYDROCHLORIDE 300 MG: 150 TABLET, FILM COATED, EXTENDED RELEASE ORAL at 08:12

## 2023-12-12 RX ADMIN — HEPARIN SODIUM 5000 UNITS: 5000 INJECTION INTRAVENOUS; SUBCUTANEOUS at 09:12

## 2023-12-12 RX ADMIN — HEPARIN SODIUM 5000 UNITS: 5000 INJECTION INTRAVENOUS; SUBCUTANEOUS at 05:12

## 2023-12-12 RX ADMIN — TACROLIMUS 1 MG: 1 CAPSULE ORAL at 10:12

## 2023-12-12 NOTE — PROGRESS NOTES
Notified Dr. Lind of patient with no UOP since cowan removal and bladder scan with 100 cc noted. Patient with no complaints. Will place orders for Lasix 40 mg IVP x 1

## 2023-12-12 NOTE — PLAN OF CARE
AAOx4. VSS. Patient up OOB to chair/BSC w/ 1-2x assist. T-bili 4.0 (down from 4.1 prior), AST//529 (down from 616/685 prior) on AM labs. BS this AM 93 - continuous insulin gtt stopped per MAR - BS recheck 30min later 100. BS at lunch time 241 - Insulin gtt restarted @ 1.2Units/hr continuously per endocrinologist orders. Patient worked w/ PT/OT today in room. Purewick removed while patient OOB in chair - encouraged patient to get up and use BSC. 40mg lasix IVP given x1 this shift. Patient voiding light gisele urine - see flowsheet for exact UOP amount - purewick reapplied once patient back in bed per patient request. Chevron incision - CYNTHIA w/ staples intact - cleaned w/ betadine - patient and daughter taught how to clean incision this shift. Lateral LIUDMILA drain (drain #1) removed at bedside per KURT Ortega. 1 RLQ LIUDMILA drain (drain #2) remains in place w/ SS drainage noted - see flowsheet for output amount. Patient (+) for passing gas - no BM yet. PRN oxy 5mg given x2 so far this shift for c/o pain. Patient's daughter (Rachel - primary caregiver) shown and educated on blue-card and self-meds this shift - verbalized understanding. Non-skid socks on. Bed in low position. Call light within reach. Daughter at bedside.

## 2023-12-12 NOTE — NURSING TRANSFER
Nursing Transfer Note      12/11/2023   9:10 PM    Nurse giving handoff:BUBBA Johnson    Reason patient is being transferred: Stepdown orders    Transfer From: SICU to TSU    Transfer via bed    Transfer with cardiac monitoring    Transported by RN, PCT    Transfer Vital Signs:  Blood Pressure:156/73  Heart Rate:67  O2:95% on RA  Temperature:97.9  Respirations:12    Order for Tele Monitor? No    Additional Lines: LIUDMILA drains x2    Medicines sent: Mupirocin, insulin gtt, insulin pen,     Any special needs or follow-up needed: none    Patient belongings transferred with patient: Yes    Chart send with patient: Yes    Notified: daughter Casandra accompanied pt to TSU    Upon arrival to floor: patient oriented to room, call bell in reach, and bed in lowest position, TSU RN to bedside and bedside RN informed.

## 2023-12-12 NOTE — HOSPITAL COURSE
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 Progressed well, stepped down 12/10 PM. LFTs down trending, Cr stable. Post op US stable, 2 fluid collections likely hematomas. Pt with hypervolemia; lasix dependent prior to transplant. Lateral LIUDMILA drain removed 12/12. Platelets given prior to CVC removal on 12/14. PT/OT consulted with recs for home health. Persistent UE tremors, worsened post-txp, suspect from tacrolimus. Restarted on home propranolol. Encouraged patient to ambulate with assistance.     Interval Hx: NAEON. LFTs slowly trending down. Remains with LE edema, giving lasix 40 IV today. Plan for 1 week liver U/S on Monday.  Pt remains very emotional with adjustment disorder. Psych consulted for evaluation with recs to continue home medication dose. Appreciate assistance. Working well with therapy. Encouraged ambulation in the room and halls with assistance if needed. Pt needs to be out of bed daily. Tentative d/c Monday 12/18. Cont to monitor.

## 2023-12-12 NOTE — PLAN OF CARE
SICU PLAN OF CARE NOTE    Dx: S/P liver transplant    Goals of Care: -170, Pain control, promote mobility and independence    Vital Signs (last 12 hours):   Temp:  [97.6 °F (36.4 °C)-98.3 °F (36.8 °C)]   Pulse:  [67-84]   Resp:  [11-39]   BP: (127-159)/(63-86)   SpO2:  [91 %-100 %]   Arterial Line BP: (132-153)/(61-72)      Neuro: AAO x4, Follows Commands, and Moves All Extremities    Cardiac: Sinus Rhythm 70's    Respiratory: Room Air    Gtts: Insulin    Urine Output: Urinary Catheter 145 cc/shift  Henry removed with no UOP since removal; bladder scan performed and lasix IVP given per orders    Drains:   LIUDMILA Drain #1, total output 20 cc / shift  LIUDMILA Drain #2, total output 290 cc / shift    Diet: Diabetic Diet     Labs/Accuchecks: Daily Labs / Accuchecks ACHS 0200    Skin: No signs of skin breakdown or redness noted over bony prominences. Sacral and heel foam dressings in place. Surgical incision and skin discoloration on lip noted as LDAs in chart. Patient turned Q2H and as needed. Waffle mattress in place and inflated. Styker bed plugged in and working.     Shift Events: Patient up out of bed and in the chair for part of the shift with PT. Henry removed and purwick in place-- no UOP since removal; bladder scan performed and lasix IVP given per orders. Pain controlled with PRN pain medications. Insulin gtt adjusted per endocrine orders.    Plan of care reviewed with the patient and her family and all questions answered at this time.

## 2023-12-12 NOTE — SUBJECTIVE & OBJECTIVE
Scheduled Meds:   buPROPion  300 mg Oral Daily    famotidine  20 mg Oral QHS    heparin (porcine)  5,000 Units Subcutaneous Q8H    methylPREDNISolone sodium succinate injection  120 mg Intravenous Daily    Followed by    [START ON 12/13/2023] methylPREDNISolone sodium succinate injection  80 mg Intravenous Daily    Followed by    [START ON 12/14/2023] methylPREDNISolone sodium succinate injection  40 mg Intravenous Daily    Followed by    [START ON 12/15/2023] predniSONE  20 mg Oral Daily    mupirocin  1 g Nasal BID    mycophenolate  1,000 mg Oral BID    [START ON 12/17/2023] sulfamethoxazole-trimethoprim 400-80mg  1 tablet Oral Daily AM    tacrolimus  2 mg Oral BID    [START ON 12/20/2023] valGANciclovir  450 mg Oral Daily     Continuous Infusions:   insulin regular 1 units/mL infusion orderable (TRANSFER) Stopped (12/12/23 0804)     PRN Meds:dextrose 10%, dextrose 10%, glucagon (human recombinant), glucose, glucose, insulin aspart U-100, oxyCODONE, oxyCODONE, sodium chloride 0.9%    Review of Systems   Constitutional:  Negative for chills, fatigue and fever.   Respiratory:  Negative for cough and shortness of breath.    Cardiovascular:  Positive for leg swelling. Negative for chest pain.   Gastrointestinal:  Positive for abdominal pain. Negative for abdominal distention, diarrhea, nausea and vomiting.   Genitourinary:  Negative for decreased urine volume and difficulty urinating.   Musculoskeletal:  Positive for back pain (chronic) and gait problem.   Allergic/Immunologic: Positive for immunocompromised state.   Neurological:  Positive for weakness.   Psychiatric/Behavioral:  Negative for confusion and decreased concentration.      Objective:     Vital Signs (Most Recent):  Temp: 97.7 °F (36.5 °C) (12/12/23 1125)  Pulse: 76 (12/12/23 1125)  Resp: 20 (12/12/23 1125)  BP: 134/63 (12/12/23 1125)  SpO2: (!) 92 % (12/12/23 1125) Vital Signs (24h Range):  Temp:  [97.6 °F (36.4 °C)-98 °F (36.7 °C)] 97.7 °F (36.5  °C)  Pulse:  [67-87] 76  Resp:  [11-39] 20  SpO2:  [91 %-97 %] 92 %  BP: (134-164)/(63-74) 134/63     Weight: 111.6 kg (246 lb 0.5 oz)  Body mass index is 38.53 kg/m².    Intake/Output - Last 3 Shifts         12/10 0700  12/11 0659 12/11 0700  12/12 0659 12/12 0700  12/13 0659    P.O.  780 240    I.V. (mL/kg) 1706.3 (15.2) 1674.9 (15)     Blood  497     IV Piggyback 641      Total Intake(mL/kg) 2347.2 (20.8) 2951.9 (26.5) 240 (2.2)    Urine (mL/kg/hr) 1450 (0.5) 645 (0.2) 300 (0.5)    Drains 1098 630 85    Blood       Total Output 2548 1275 385    Net -200.8 +1676.9 -145                    Physical Exam  Vitals reviewed.   Constitutional:       Appearance: She is well-developed.   Eyes:      Pupils: Pupils are equal, round, and reactive to light.   Cardiovascular:      Rate and Rhythm: Normal rate and regular rhythm.      Heart sounds: No murmur heard.  Pulmonary:      Effort: Pulmonary effort is normal. No respiratory distress.      Breath sounds: Normal breath sounds. No wheezing.   Abdominal:      General: Bowel sounds are normal. There is no distension.      Palpations: Abdomen is soft.      Tenderness: There is abdominal tenderness. There is no guarding.      Comments: Chevron incision w/ staples- cdi  LIUDMILA x 2 with ss drainage    Musculoskeletal:         General: Normal range of motion.      Cervical back: Normal range of motion.   Skin:     General: Skin is warm and dry.   Neurological:      Mental Status: She is alert and oriented to person, place, and time.   Psychiatric:         Mood and Affect: Mood normal.         Behavior: Behavior normal.         Thought Content: Thought content normal.         Judgment: Judgment normal.          Laboratory:  Immunosuppressants           Stop Route Frequency     tacrolimus capsule 2 mg         -- Oral 2 times daily     mycophenolate capsule 1,000 mg         -- Oral 2 times daily          CBC:   Recent Labs   Lab 12/12/23  0555   WBC 7.68   RBC 2.49*   HGB 8.3*   HCT  24.5*   PLT 67*   MCV 98   MCH 33.3*   MCHC 33.9     CMP:   Recent Labs   Lab 12/12/23  0555   GLU 98   CALCIUM 8.8   ALBUMIN 2.9*   PROT 4.5*      K 4.2   CO2 25      BUN 33*   CREATININE 1.0   ALKPHOS 79   *   *   BILITOT 4.0*     Labs within the past 24 hours have been reviewed.    Diagnostic Results:  None    Debility/Functional status: Patient debilitated by evidence of Muscle wasting and atrophy, Weakness, Limitation of activities due to disability, and Other reduced mobility. Physical and occupational therapy ordered daily to evaluate and treat. Debility was: present on admission.

## 2023-12-12 NOTE — PROGRESS NOTES
"[facility] - Transplant Step[facility]  Endocrinology  Progress Note    Admit Date: 12/9/2023     Reason for Consult: Management of Steroid Induced Hyperglycemia     Surgical Procedure and Date:  Liver Transplant on 12/10/23       Lab Results   Component Value Date    HGBA1C 4.6 10/31/2023       HPI:   Patient is a 63 y.o. 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 Patient now presents for the above procedure(s). Endocrinology consulted for management of hyperglycemia.       Interval HPI:   Overnight events: Remains in SICU. POD 3. BG trending down and IV insulin infusion stopped this morning per protocol. BG now trending back up off insulin infusion. Receiving Solu Medrol 120 mg, standard steroid taper.   Eating:   Diet consistent carbohydrate Standard Tray    Nausea: No  Hypoglycemia and intervention: No  Fever: No  TPN and/or TF: No  If yes, type of TF/TPN and rate: n/a    /63   Pulse 76   Temp 97.7 °F (36.5 °C)   Resp 18   Ht 5' 7" (1.702 m)   Wt 111.6 kg (246 lb 0.5 oz)   SpO2 (!) 92%   Breastfeeding No   BMI 38.53 kg/m²     Labs Reviewed and Include    Recent Labs   Lab 12/12/23  0555   GLU 98   CALCIUM 8.8   ALBUMIN 2.9*   PROT 4.5*      K 4.2   CO2 25      BUN 33*   CREATININE 1.0   ALKPHOS 79   *   *   BILITOT 4.0*     Lab Results   Component Value Date    WBC 7.68 12/12/2023    HGB 8.3 (L) 12/12/2023    HCT 24.5 (L) 12/12/2023    MCV 98 12/12/2023    PLT 67 (L) 12/12/2023     No results for input(s): "TSH", "FREET4" in the last 168 hours.  Lab Results   Component Value Date    HGBA1C 4.6 10/31/2023       Nutritional status:   Body mass index is 38.53 kg/m².  Lab Results   Component Value Date    ALBUMIN 2.9 (L) 12/12/2023    ALBUMIN 2.6 (L) 12/11/2023    ALBUMIN 3.1 (L) 12/10/2023     No results found for: " ""PREALBUMIN"    Estimated Creatinine Clearance: 73.2 mL/min (based on SCr of 1 mg/dL).    Accu-Checks  Recent Labs     12/11/23  1005 12/11/23  1102 12/11/23  1213 12/11/23  1303 12/11/23  1420 12/11/23  1624 12/11/23  2140 12/12/23  0236 12/12/23  0804 12/12/23  0834   POCTGLUCOSE 176* 178* 163* 154* 141* 151* 170* 150* 93 100       Current Medications and/or Treatments Impacting Glycemic Control  Immunotherapy:    Immunosuppressants           Stop Route Frequency     tacrolimus capsule 2 mg         -- Oral 2 times daily     mycophenolate capsule 1,000 mg         -- Oral 2 times daily          Steroids:   Hormones (From admission, onward)      Start     Stop Route Frequency Ordered    12/15/23 0900  predniSONE tablet 20 mg  (methylprednisolone taper panel)        See Hyperspace for full Linked Orders Report.    -- Oral Daily 12/10/23 0143    12/14/23 0900  methylPREDNISolone sodium succinate injection 40 mg  (methylprednisolone taper panel)        See Hyperspace for full Linked Orders Report.    12/15/23 0859 IV Daily 12/10/23 0143    12/13/23 0900  methylPREDNISolone sodium succinate injection 80 mg  (methylprednisolone taper panel)        See Hyperspace for full Linked Orders Report.    12/14/23 0859 IV Daily 12/10/23 0143    12/12/23 0900  methylPREDNISolone sodium succinate injection 120 mg  (methylprednisolone taper panel)        See Hyperspace for full Linked Orders Report.    12/13/23 0859 IV Daily 12/10/23 0143          Pressors:    Autonomic Drugs (From admission, onward)      None          Hyperglycemia/Diabetes Medications:   Antihyperglycemics (From admission, onward)      Start     Stop Route Frequency Ordered    12/12/23 1245  insulin regular in 0.9 % NaCl 100 unit/100 mL (1 unit/mL) infusion        Question:  Enter initial dose (Units/hr):  Answer:  1.2    -- IV Continuous 12/12/23 1242    12/11/23 1511  insulin aspart U-100 pen 0-10 Units         -- SubQ As needed (PRN) 12/11/23 1411      "       ASSESSMENT and PLAN    Immunology/Multi System  Prophylactic immunotherapy  May increase insulin resistance.         Endocrine  Class 2 severe obesity due to excess calories with serious comorbidity and body mass index (BMI) of 36.0 to 36.9 in adult  Body mass index is 38.53 kg/m².  May increase insulin resistance.         Steroid-induced hyperglycemia  BG goal 140 - 180     Restart Transition IV insulin infusion at 1.2 u/hr with stepdown parameters (50% reduction in IV insulin requirements from overnight)   Moderate Dose Correction Scale  BG monitoring ac/hs/0200    ** Please call Endocrine for any BG related issues **    ** Please notify Endocrine for any change and/or advance in diet**    Discharge planning: TBD          GI  * S/P liver transplant  Managed per primary team  Optimize BG control        Other  Adverse effect of corticosteroids  Glucocorticoids markedly increase glucoses. Expect steroid taper to help with glucose control          Ban Messer NP  Endocrinology  [facility] - Trans[facility]

## 2023-12-12 NOTE — ASSESSMENT & PLAN NOTE
Continue Cellcept and Prograf. Will monitor for signs of toxic side effects, check daily tacrolimus troughs, and change meds accordingly.

## 2023-12-12 NOTE — PROGRESS NOTES
[facility]  Liver Transplant  Progress Note    Patient Name: Yumiko Gonzalez  MRN: [id number]  Admission Date: 12/9/2023  Hospital Length of Stay: 3 days  Code Status: Full Code  Primary Care Provider: Garrett Webb MD  Post-Operative Day: 3    ORGAN:   LIVER  Disease Etiology: Cirrhosis: Fatty Liver (DURAN)  Donor Type:   Donation after Circulatory Death   CDC High Risk:   No  Donor CMV Status:   Donor CMV Status: Negative  Donor HBcAB:   Negative  Donor HCV Status:   Negative  Donor HBV PEDRO:   Donor HCV PEDRO: Negative  Whole or Partial: Whole Liver  Biliary Anastomosis: End to End  Arterial Anatomy: Standard  Subjective:     History of Present Illness:  Ms. Gonzalez is a 62 y/o female who present for Liver Transplant for liver cirrhosis 2/2 to DURAN HX: HCC (status post Y90 06/2023) esophageal varices, ascites, hepatic encephalopathy. She reports in usual state of health- Patient denies any recent hospitalizations or ED visits. The primary surgeon will be Dr. Raza.  Patient is NPO.  All pre-op labs and imaging have been ordered and will be reviewed prior to surgery. Consents to be signed prior to transplant.      Hospital Course:  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 Progressed well, stepped down 12/10 PM. LFTs down trending, Cr stable. Post op US stable, 2 fluid collections likely hematomas. Pt with hypervolemia; lasix dependent prior to transplant. Plan for IV lasix 40mg today. Lateral LIUDMILA drain to be removed. PT/OT consulted with recs for home health. Encouraged patient to ambulate with assistance. VSS. Cont to monitor.     Scheduled Meds:   buPROPion  300 mg Oral Daily    famotidine  20 mg Oral QHS    heparin (porcine)  5,000 Units Subcutaneous Q8H    methylPREDNISolone sodium succinate injection  120 mg Intravenous Daily    Followed by    [START ON 12/13/2023] methylPREDNISolone sodium succinate injection  80 mg  Intravenous Daily    Followed by    [START ON 12/14/2023] methylPREDNISolone sodium succinate injection  40 mg Intravenous Daily    Followed by    [START ON 12/15/2023] predniSONE  20 mg Oral Daily    mupirocin  1 g Nasal BID    mycophenolate  1,000 mg Oral BID    [START ON 12/17/2023] sulfamethoxazole-trimethoprim 400-80mg  1 tablet Oral Daily AM    tacrolimus  2 mg Oral BID    [START ON 12/20/2023] valGANciclovir  450 mg Oral Daily     Continuous Infusions:   insulin regular 1 units/mL infusion orderable (TRANSFER) Stopped (12/12/23 0804)     PRN Meds:dextrose 10%, dextrose 10%, glucagon (human recombinant), glucose, glucose, insulin aspart U-100, oxyCODONE, oxyCODONE, sodium chloride 0.9%    Review of Systems   Constitutional:  Negative for chills, fatigue and fever.   Respiratory:  Negative for cough and shortness of breath.    Cardiovascular:  Positive for leg swelling. Negative for chest pain.   Gastrointestinal:  Positive for abdominal pain. Negative for abdominal distention, diarrhea, nausea and vomiting.   Genitourinary:  Negative for decreased urine volume and difficulty urinating.   Musculoskeletal:  Positive for back pain (chronic) and gait problem.   Allergic/Immunologic: Positive for immunocompromised state.   Neurological:  Positive for weakness.   Psychiatric/Behavioral:  Negative for confusion and decreased concentration.      Objective:     Vital Signs (Most Recent):  Temp: 97.7 °F (36.5 °C) (12/12/23 1125)  Pulse: 76 (12/12/23 1125)  Resp: 20 (12/12/23 1125)  BP: 134/63 (12/12/23 1125)  SpO2: (!) 92 % (12/12/23 1125) Vital Signs (24h Range):  Temp:  [97.6 °F (36.4 °C)-98 °F (36.7 °C)] 97.7 °F (36.5 °C)  Pulse:  [67-87] 76  Resp:  [11-39] 20  SpO2:  [91 %-97 %] 92 %  BP: (134-164)/(63-74) 134/63     Weight: 111.6 kg (246 lb 0.5 oz)  Body mass index is 38.53 kg/m².    Intake/Output - Last 3 Shifts         12/10 0700 12/11 0659 12/11 0700 12/12 0659 12/12 0700 12/13 0659    P.O.  780 240    I.V.  (mL/kg) 1706.3 (15.2) 1674.9 (15)     Blood  497     IV Piggyback 641      Total Intake(mL/kg) 2347.2 (20.8) 2951.9 (26.5) 240 (2.2)    Urine (mL/kg/hr) 1450 (0.5) 645 (0.2) 300 (0.5)    Drains 1098 630 85    Blood       Total Output 2548 1275 385    Net -200.8 +1676.9 -145                    Physical Exam  Vitals reviewed.   Constitutional:       Appearance: She is well-developed.   Eyes:      Pupils: Pupils are equal, round, and reactive to light.   Cardiovascular:      Rate and Rhythm: Normal rate and regular rhythm.      Heart sounds: No murmur heard.  Pulmonary:      Effort: Pulmonary effort is normal. No respiratory distress.      Breath sounds: Normal breath sounds. No wheezing.   Abdominal:      General: Bowel sounds are normal. There is no distension.      Palpations: Abdomen is soft.      Tenderness: There is abdominal tenderness. There is no guarding.      Comments: Chevron incision w/ staples- cdi  LIUDMILA x 2 with ss drainage    Musculoskeletal:         General: Normal range of motion.      Cervical back: Normal range of motion.   Skin:     General: Skin is warm and dry.   Neurological:      Mental Status: She is alert and oriented to person, place, and time.   Psychiatric:         Mood and Affect: Mood normal.         Behavior: Behavior normal.         Thought Content: Thought content normal.         Judgment: Judgment normal.          Laboratory:  Immunosuppressants           Stop Route Frequency     tacrolimus capsule 2 mg         -- Oral 2 times daily     mycophenolate capsule 1,000 mg         -- Oral 2 times daily          CBC:   Recent Labs   Lab 12/12/23  0555   WBC 7.68   RBC 2.49*   HGB 8.3*   HCT 24.5*   PLT 67*   MCV 98   MCH 33.3*   MCHC 33.9     CMP:   Recent Labs   Lab 12/12/23  0555   GLU 98   CALCIUM 8.8   ALBUMIN 2.9*   PROT 4.5*      K 4.2   CO2 25      BUN 33*   CREATININE 1.0   ALKPHOS 79   *   *   BILITOT 4.0*     Labs within the past 24 hours have been  reviewed.    Diagnostic Results:  None    Debility/Functional status: Patient debilitated by evidence of Muscle wasting and atrophy, Weakness, Limitation of activities due to disability, and Other reduced mobility. Physical and occupational therapy ordered daily to evaluate and treat. Debility was: present on admission.        Assessment/Plan:     * S/P liver transplant  - 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 Progressed well, stepped down 12/10 PM.   - LFTs down trending  - Post op US stable, 2 fluid collections likely hematomas.  - LIUDMILA x 2- plan to remove lateral drain 12/12.   - Lasix 40mg for hypervolemia       Hypervolemia  - on home lasix prior to txp  - Plan for IV lasix 40mg today.      At risk for opportunistic infections  - Continue OI prophylaxis per protocol       Prophylactic immunotherapy  Continue Cellcept and Prograf. Will monitor for signs of toxic side effects, check daily tacrolimus troughs, and change meds accordingly.       Immunosuppressed status        Thrombocytopenia  - 2/2 ESLD; expected to improve post-op.         Drain removed:  Site cleaned with alcohol, lidocaine 1% used to numb area to place prolene 3.0 suture to site.  Drain intact at time of removal.  Patient tolerated procedure well.  Will continue to monitor for any complications.     VTE Risk Mitigation (From admission, onward)           Ordered     heparin (porcine) injection 5,000 Units  Every 8 hours         12/10/23 0143     Place sequential compression device  Until discontinued         12/10/23 0143     IP VTE HIGH RISK PATIENT  Once         12/10/23 0143                    The patients clinical status was discussed at multidisplinary rounds, involving transplant surgery, transplant medicine, pharmacy, nursing, nutrition, and social work.    Discharge Planning:  Health Maintenance Due   Topic Date Due    Shingles Vaccine (1 of 2) 05/22/2009     TETANUS VACCINE  08/03/2017    Influenza Vaccine (1) 09/01/2019             Medical decision making for this encounter includes review of pertinent labs and diagnostic studies, assessment and planning, discussions with consulting providers, discussion with patient/family, and participation in multidisciplinary rounds. Time spent caring for patient: 60 minutes.    Sade Salgado PA-C  Liver Transplant  [facility]

## 2023-12-12 NOTE — ASSESSMENT & PLAN NOTE
BG goal 140 - 180     Restart Transition IV insulin infusion at 1.2 u/hr with stepdown parameters (50% reduction in IV insulin requirements from overnight)   Moderate Dose Correction Scale  BG monitoring ac/hs/0200    ** Please call Endocrine for any BG related issues **    ** Please notify Endocrine for any change and/or advance in diet**    Discharge planning: TBD

## 2023-12-12 NOTE — PLAN OF CARE
Plan of care reviewed with the patient upon transfer to [facility]. Pt s/p liver transplant on 12/09. Pt has a chevron incision with staples intact. Edges approximated. RLQ LIUDMILA drain x2. Serosanguinous output, #2>#1. Pain managed with oxy. Pt has generalized edema, worse in BLE. She denies n/v, last BM prior to transplant. On transitional insulin drip at 3.5 units per hour. Accu check ac hs and 2am. Fall precautions maintained. Pt remained free from falls and injury this shift. Bed locked in lowest position, side rails up x2, call light within reach. Instructed pt to call for assistance as needed. Pt verbalized understanding. Vitals stable. Pt afebrile overnight. No acute issues overnight. Will continue to monitor.

## 2023-12-12 NOTE — PT/OT/SLP PROGRESS
Physical Therapy Treatment    Patient Name:  Yumiko Gonazlez   MRN:  [id number]    Recommendations:     Discharge Recommendations: Low Intensity Therapy  Discharge Equipment Recommendations: none  Barriers to discharge: None    Assessment:     Yumiko Gonzalez is a 63 y.o. female admitted with a medical diagnosis of S/P liver transplant.  She presents with the following impairments/functional limitations: weakness, impaired endurance, impaired self care skills, impaired functional mobility, gait instability, decreased lower extremity function, pain Pt tolerated treatment session well. Pt performed LE exercises. Session was limited due to pain.     Rehab Prognosis: Good; patient would benefit from acute skilled PT services to address these deficits and reach maximum level of function.    Recent Surgery: Procedure(s) (LRB):  TRANSPLANT, LIVER (N/A) 3 Days Post-Op    Plan:     During this hospitalization, patient to be seen 5 x/week to address the identified rehab impairments via gait training, therapeutic activities, therapeutic exercises, neuromuscular re-education and progress toward the following goals:    Plan of Care Expires:  01/10/24    Subjective     Chief Complaint: Abdominal pain   Patient/Family Comments/goals: Pt agreeable to PT   Pain/Comfort:  Pain Rating 1: 8/10  Location 1: abdomen  Pain Addressed 1: Reposition      Objective:     Communicated with RN prior to session.  Patient found up in chair with central line, LIUDMILA drain upon PT entry to room.     General Precautions: Standard, fall  Orthopedic Precautions: N/A  Braces: N/A  Respiratory Status: Room air     Functional Mobility:  Bed Mobility:     Sit to Supine: moderate assistance  Transfers:     Sit to Stand:  x 2 trails minimum assistance with RW  Chair to bedside commode: minimum assistance with RW  using  Step Transfer  Bedside commode to bed: minimum assistance with  RW using  Step Transfer  Gait: 4 ft to bedside commode + 4 ft to bed from bed side  commode Jonathan with RW   Pain limiting distance ambulated  Pt performed 10 repetitions of seated B LE exercises consisting of: Marching, LAQ, ABD/ADD.   Required rest breaks due to fatigue and abdominal pain       AM-PAC 6 CLICK MOBILITY  Turning over in bed (including adjusting bedclothes, sheets and blankets)?: 2  Sitting down on and standing up from a chair with arms (e.g., wheelchair, bedside commode, etc.): 3  Moving from lying on back to sitting on the side of the bed?: 3  Moving to and from a bed to a chair (including a wheelchair)?: 3  Need to walk in hospital room?: 3  Climbing 3-5 steps with a railing?: 2  Basic Mobility Total Score: 16       Treatment & Education:  Therapist provided instruction and educated for safety during transfers and gait training. As well as proper body mechanics, energy conservation, and fall prevention strategies during tasks listed above, and the effects of prolonged immobility and the importance of performing EOB/OOB activity and exercises to promote healing and reduce recovery time.        Patient left supine with all lines intact, call button in reach, RN notified, and daughter present..    GOALS:   Multidisciplinary Problems       Physical Therapy Goals          Problem: Physical Therapy    Goal Priority Disciplines Outcome Goal Variances Interventions   Physical Therapy Goal     PT, PT/OT Ongoing, Progressing     Description: PT goals to be met by: 1/11/24    Patient will perform rolling each way with supervision.   Patient will perform supine <> sitting with supervision.  Patient will perform sit <> stand transitions with supervision using RW.  Patient will perform transfers from bed <> chair or BSC with supervision using RW.  Patient will ambulate 200 feet with supervision using RW.                       Time Tracking:     PT Received On: 12/12/23  PT Start Time: 1154     PT Stop Time: 1229  PT Total Time (min): 35 min     Billable Minutes: Gait Training 15 and Therapeutic  Exercise 20    Treatment Type: Treatment  PT/PTA: PTA     Number of PTA visits since last PT visit: 1     12/12/2023
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
## 2023-12-13 PROBLEM — N17.9 AKI (ACUTE KIDNEY INJURY): Status: ACTIVE | Noted: 2023-12-13

## 2023-12-13 LAB
ALBUMIN SERPL BCP-MCNC: 2.6 G/DL (ref 3.5–5.2)
ALP SERPL-CCNC: 106 U/L (ref 55–135)
ALT SERPL W/O P-5'-P-CCNC: 405 U/L (ref 10–44)
ANION GAP SERPL CALC-SCNC: 6 MMOL/L (ref 8–16)
AST SERPL-CCNC: 154 U/L (ref 10–40)
BACTERIA SPEC AEROBE CULT: NO GROWTH
BASOPHILS # BLD AUTO: 0.01 K/UL (ref 0–0.2)
BASOPHILS NFR BLD: 0.3 % (ref 0–1.9)
BILIRUB SERPL-MCNC: 3.5 MG/DL (ref 0.1–1)
BLD PROD TYP BPU: NORMAL
BLOOD UNIT EXPIRATION DATE: NORMAL
BLOOD UNIT TYPE CODE: 5100
BLOOD UNIT TYPE: NORMAL
BUN SERPL-MCNC: 44 MG/DL (ref 8–23)
CALCIUM SERPL-MCNC: 8.6 MG/DL (ref 8.7–10.5)
CHLORIDE SERPL-SCNC: 107 MMOL/L (ref 95–110)
CO2 SERPL-SCNC: 25 MMOL/L (ref 23–29)
CODING SYSTEM: NORMAL
CREAT SERPL-MCNC: 1.5 MG/DL (ref 0.5–1.4)
CROSSMATCH INTERPRETATION: NORMAL
DIFFERENTIAL METHOD BLD: ABNORMAL
DISPENSE STATUS: NORMAL
EOSINOPHIL # BLD AUTO: 0.1 K/UL (ref 0–0.5)
EOSINOPHIL NFR BLD: 1.3 % (ref 0–8)
ERYTHROCYTE [DISTWIDTH] IN BLOOD BY AUTOMATED COUNT: 15.9 % (ref 11.5–14.5)
EST. GFR  (NO RACE VARIABLE): 38.7 ML/MIN/1.73 M^2
GLUCOSE SERPL-MCNC: 120 MG/DL (ref 70–110)
HCT VFR BLD AUTO: 24.4 % (ref 37–48.5)
HGB BLD-MCNC: 8.4 G/DL (ref 12–16)
IMM GRANULOCYTES # BLD AUTO: 0.03 K/UL (ref 0–0.04)
IMM GRANULOCYTES NFR BLD AUTO: 0.8 % (ref 0–0.5)
INR PPP: 1.1 (ref 0.8–1.2)
LYMPHOCYTES # BLD AUTO: 0.3 K/UL (ref 1–4.8)
LYMPHOCYTES NFR BLD: 7.4 % (ref 18–48)
MAGNESIUM SERPL-MCNC: 2.1 MG/DL (ref 1.6–2.6)
MCH RBC QN AUTO: 34.3 PG (ref 27–31)
MCHC RBC AUTO-ENTMCNC: 34.4 G/DL (ref 32–36)
MCV RBC AUTO: 100 FL (ref 82–98)
MONOCYTES # BLD AUTO: 0.5 K/UL (ref 0.3–1)
MONOCYTES NFR BLD: 13.9 % (ref 4–15)
NEUTROPHILS # BLD AUTO: 2.9 K/UL (ref 1.8–7.7)
NEUTROPHILS NFR BLD: 76.3 % (ref 38–73)
NRBC BLD-RTO: 0 /100 WBC
NUM UNITS TRANS PACKED RBC: NORMAL
PHOSPHATE SERPL-MCNC: 3.9 MG/DL (ref 2.7–4.5)
PLATELET # BLD AUTO: 40 K/UL (ref 150–450)
PMV BLD AUTO: 10.5 FL (ref 9.2–12.9)
POCT GLUCOSE: 116 MG/DL (ref 70–110)
POCT GLUCOSE: 158 MG/DL (ref 70–110)
POCT GLUCOSE: 169 MG/DL (ref 70–110)
POCT GLUCOSE: 213 MG/DL (ref 70–110)
POCT GLUCOSE: 232 MG/DL (ref 70–110)
POTASSIUM SERPL-SCNC: 4.1 MMOL/L (ref 3.5–5.1)
PROT SERPL-MCNC: 4.2 G/DL (ref 6–8.4)
PROTHROMBIN TIME: 12 SEC (ref 9–12.5)
RBC # BLD AUTO: 2.45 M/UL (ref 4–5.4)
SODIUM SERPL-SCNC: 138 MMOL/L (ref 136–145)
TACROLIMUS BLD-MCNC: 5.6 NG/ML (ref 5–15)
TRANS ERYTHROCYTES VOL PATIENT: NORMAL ML
WBC # BLD AUTO: 3.8 K/UL (ref 3.9–12.7)

## 2023-12-13 PROCEDURE — 63600175 PHARM REV CODE 636 W HCPCS: Performed by: STUDENT IN AN ORGANIZED HEALTH CARE EDUCATION/TRAINING PROGRAM

## 2023-12-13 PROCEDURE — 25000003 PHARM REV CODE 250

## 2023-12-13 PROCEDURE — 83735 ASSAY OF MAGNESIUM: CPT | Performed by: STUDENT IN AN ORGANIZED HEALTH CARE EDUCATION/TRAINING PROGRAM

## 2023-12-13 PROCEDURE — 85025 COMPLETE CBC W/AUTO DIFF WBC: CPT | Performed by: STUDENT IN AN ORGANIZED HEALTH CARE EDUCATION/TRAINING PROGRAM

## 2023-12-13 PROCEDURE — P9047 ALBUMIN (HUMAN), 25%, 50ML: HCPCS | Mod: JZ,JG | Performed by: PHYSICIAN ASSISTANT

## 2023-12-13 PROCEDURE — 80197 ASSAY OF TACROLIMUS: CPT | Performed by: STUDENT IN AN ORGANIZED HEALTH CARE EDUCATION/TRAINING PROGRAM

## 2023-12-13 PROCEDURE — 97116 GAIT TRAINING THERAPY: CPT | Mod: CQ

## 2023-12-13 PROCEDURE — 25000003 PHARM REV CODE 250: Performed by: SURGERY

## 2023-12-13 PROCEDURE — 80053 COMPREHEN METABOLIC PANEL: CPT | Performed by: STUDENT IN AN ORGANIZED HEALTH CARE EDUCATION/TRAINING PROGRAM

## 2023-12-13 PROCEDURE — 99232 SBSQ HOSP IP/OBS MODERATE 35: CPT | Mod: ,,, | Performed by: NURSE PRACTITIONER

## 2023-12-13 PROCEDURE — 97530 THERAPEUTIC ACTIVITIES: CPT | Mod: CQ

## 2023-12-13 PROCEDURE — 99233 SBSQ HOSP IP/OBS HIGH 50: CPT | Mod: 24,,, | Performed by: PHYSICIAN ASSISTANT

## 2023-12-13 PROCEDURE — 20600001 HC STEP DOWN PRIVATE ROOM

## 2023-12-13 PROCEDURE — 25000003 PHARM REV CODE 250: Performed by: PHYSICIAN ASSISTANT

## 2023-12-13 PROCEDURE — 84100 ASSAY OF PHOSPHORUS: CPT | Performed by: STUDENT IN AN ORGANIZED HEALTH CARE EDUCATION/TRAINING PROGRAM

## 2023-12-13 PROCEDURE — 63600175 PHARM REV CODE 636 W HCPCS: Mod: JZ,JG | Performed by: PHYSICIAN ASSISTANT

## 2023-12-13 PROCEDURE — 97110 THERAPEUTIC EXERCISES: CPT | Mod: CQ

## 2023-12-13 PROCEDURE — 85610 PROTHROMBIN TIME: CPT | Performed by: STUDENT IN AN ORGANIZED HEALTH CARE EDUCATION/TRAINING PROGRAM

## 2023-12-13 RX ORDER — ALBUMIN HUMAN 250 G/1000ML
25 SOLUTION INTRAVENOUS EVERY 8 HOURS
Status: COMPLETED | OUTPATIENT
Start: 2023-12-13 | End: 2023-12-13

## 2023-12-13 RX ORDER — PROPRANOLOL HYDROCHLORIDE 10 MG/1
20 TABLET ORAL 2 TIMES DAILY
Status: DISCONTINUED | OUTPATIENT
Start: 2023-12-13 | End: 2023-12-18 | Stop reason: HOSPADM

## 2023-12-13 RX ADMIN — OXYCODONE HYDROCHLORIDE 5 MG: 5 TABLET ORAL at 09:12

## 2023-12-13 RX ADMIN — TACROLIMUS 2 MG: 1 CAPSULE ORAL at 08:12

## 2023-12-13 RX ADMIN — METHYLPREDNISOLONE SODIUM SUCCINATE 80 MG: 125 INJECTION, POWDER, FOR SOLUTION INTRAMUSCULAR; INTRAVENOUS at 08:12

## 2023-12-13 RX ADMIN — MUPIROCIN 1 G: 20 OINTMENT TOPICAL at 09:12

## 2023-12-13 RX ADMIN — PROPRANOLOL HYDROCHLORIDE 20 MG: 10 TABLET ORAL at 11:12

## 2023-12-13 RX ADMIN — OXYCODONE HYDROCHLORIDE 5 MG: 5 TABLET ORAL at 11:12

## 2023-12-13 RX ADMIN — MUPIROCIN 1 G: 20 OINTMENT TOPICAL at 08:12

## 2023-12-13 RX ADMIN — MYCOPHENOLATE MOFETIL 1000 MG: 250 CAPSULE ORAL at 08:12

## 2023-12-13 RX ADMIN — HEPARIN SODIUM 5000 UNITS: 5000 INJECTION INTRAVENOUS; SUBCUTANEOUS at 08:12

## 2023-12-13 RX ADMIN — BUPROPION HYDROCHLORIDE 300 MG: 150 TABLET, FILM COATED, EXTENDED RELEASE ORAL at 08:12

## 2023-12-13 RX ADMIN — ALBUMIN (HUMAN) 25 G: 12.5 SOLUTION INTRAVENOUS at 01:12

## 2023-12-13 RX ADMIN — HEPARIN SODIUM 5000 UNITS: 5000 INJECTION INTRAVENOUS; SUBCUTANEOUS at 05:12

## 2023-12-13 RX ADMIN — TACROLIMUS 2 MG: 1 CAPSULE ORAL at 05:12

## 2023-12-13 RX ADMIN — FAMOTIDINE 20 MG: 20 TABLET, FILM COATED ORAL at 08:12

## 2023-12-13 RX ADMIN — INSULIN ASPART 4 UNITS: 100 INJECTION, SOLUTION INTRAVENOUS; SUBCUTANEOUS at 01:12

## 2023-12-13 RX ADMIN — PROPRANOLOL HYDROCHLORIDE 20 MG: 10 TABLET ORAL at 08:12

## 2023-12-13 RX ADMIN — HEPARIN SODIUM 5000 UNITS: 5000 INJECTION INTRAVENOUS; SUBCUTANEOUS at 01:12

## 2023-12-13 RX ADMIN — ALBUMIN (HUMAN) 25 G: 12.5 SOLUTION INTRAVENOUS at 08:12

## 2023-12-13 RX ADMIN — OXYCODONE HYDROCHLORIDE 5 MG: 5 TABLET ORAL at 05:12

## 2023-12-13 RX ADMIN — INSULIN ASPART 4 UNITS: 100 INJECTION, SOLUTION INTRAVENOUS; SUBCUTANEOUS at 04:12

## 2023-12-13 RX ADMIN — OXYCODONE HYDROCHLORIDE 5 MG: 5 TABLET ORAL at 04:12

## 2023-12-13 NOTE — PLAN OF CARE
Pt has BSC and is a 2 person assist.   I have the pure wick in place and changing when soiled.  Pt has call bell in reach, non slip socks on, and bedrails up x2 with daughter at bedside.  She is receiving lasix iv during the day and on strict I&Os.  She is on an insulin gtt with accuchecks ac/hs and 2 am.  Pt incision painted with betadine.   no

## 2023-12-13 NOTE — PROGRESS NOTES
[facility] - [facility] Stepdown  Adult Nutrition  Progress Note    SUMMARY       Recommendations    Continue CHO consistent diet with texture per SLP    Add Boost glucose control TID (chocolate)    RD to monitor and follow    Goals: Meet % een/epn by next RD f/u  Nutrition Goal Status: progressing towards goal  Communication of RD Recs: other (comment) (POC)    Assessment and Plan    Nutrition Problem  Inadequate oral intake     Related to (etiology):   Inability to consume sufficient energy      Signs and Symptoms (as evidenced by):   NPO     Interventions/Recommendations (treatment strategy):  Collaboration with other providers     Nutrition Diagnosis Status:   Continue    Reason for Assessment    Reason For Assessment: RD follow-up  Diagnosis: transplant/postoperative complications (liver transplant)  Relevant Medical History: 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 S/p liver transplant 12/9. Pt reports appetite remains low, tolerating liquid/soft food better. PO intake ~25 % per chart. Complaint of swallowing difficulties with harder food. Agreeable to ONS, prefer chocolate flavor. Denies N/V. + flatus, - BM. Reiterated post tx diet education, all questions were answered. Per initial assessment, pt with decreased appetite PTA x 2 weeks,  lb. Pt is at risk of malnutrition if PO intake remains low.    Nutrition Discharge Planning: Post transplant nutrition education provided on 12/10. Food safety/drug interactions emphasized. General healthy/low salt diet recommended. Appropriate education material with RD contact information provided. No other needs identified.    Nutrition Risk Screen    Nutrition Risk Screen: no indicators present    Nutrition/Diet History    Patient Reported Diet/Restrictions/Preferences: low salt, other (see comments) (keto meal prep)  Typical  "Food/Fluid Intake: 1 meals + 1 snacl & protein shakes x 2 weeks  Spiritual, Cultural Beliefs, Episcopal Practices, Values that Affect Care: no  Supplemental Drinks or Food Habits: Premier Protein  Food Allergies: NKFA    Anthropometrics    Temp: 98 °F (36.7 °C)  Height Method: Stated  Height: 5' 7" (170.2 cm)  Height (inches): 67 in  Weight Method: Bed Scale  Weight: 108.5 kg (239 lb 3.2 oz)  Weight (lb): 239.2 lb  Ideal Body Weight (IBW), Female: 135 lb  % Ideal Body Weight, Female (lb): 170.37 %  BMI (Calculated): 37.5  BMI Grade: 35 - 39.9 - obesity - grade II  Usual Body Weight (UBW), k kg  % Usual Body Weight: 104.55  % Weight Change From Usual Weight: 4.33 %       Lab/Procedures/Meds    Pertinent Labs Reviewed: reviewed  Pertinent Labs Comments: H/h: 8.1/23.5, mch: 33.5, Glu: 232, AST: 2129  Pertinent Medications Reviewed: reviewed  Pertinent Medications Comments: prednisone, methylprednisolone, propranolol, tacrolimus, famotidine, mycophenolate, calcium carbonate- vit D3, heparin, insulin    Physical Findings/Assessment         Estimated/Assessed Needs    Weight Used For Calorie Calculations: 104.3 kg (230 lb)  Energy Calorie Requirements (kcal): 1625 (mod anai state)  Energy Need Method: [place]- Scottor  Protein Requirements: 125-146 (1.2-1.4 g/kg)  Weight Used For Protein Calculations: 104.3 kg (230 lb)     Estimated Fluid Requirement Method: RDA Method  RDA Method (mL): 1625         Nutrition Prescription Ordered    Current Diet Order: Diabetic    Evaluation of Received Nutrient/Fluid Intake    Lipid Calories (kcals):  (-)  I/O: + 9.3 L since admit  Energy Calories Required: not meeting needs  Protein Required: not meeting needs  Comments: LBM   Tolerance: tolerating  % Intake of Estimated Energy Needs: 25 - 50 %  % Meal Intake: 25 - 50 %    Nutrition Risk    Level of Risk/Frequency of Follow-up:  (1x/week)     Monitor and Evaluation    Food and Nutrient Intake: energy intake, food and beverage " intake  Food and Nutrient Adminstration: diet order  Knowledge/Beliefs/Attitudes: food and nutrition knowledge/skill, beliefs and attitudes  Physical Activity and Function: nutrition-related ADLs and IADLs  Anthropometric Measurements: height/length, weight, weight change, body mass index  Biochemical Data, Medical Tests and Procedures: electrolyte and renal panel, gastrointestinal profile, glucose/endocrine profile, inflammatory profile, lipid profile  Nutrition-Focused Physical Findings: overall appearance     Nutrition Follow-Up    RD Follow-up?: Yes

## 2023-12-13 NOTE — ASSESSMENT & PLAN NOTE
- on home propranolol prior to transplant to assist with symptoms  - symptoms worsened post transplant, suspect prograf  - resumed home propranolol 12/13

## 2023-12-13 NOTE — ASSESSMENT & PLAN NOTE
BG goal 140 - 180     Restart Transition IV insulin infusion at 0.8 u/hr with stepdown parameters (decreased per protocol)   Moderate Dose Correction Scale  BG monitoring ac/hs/0200    ** Please call Endocrine for any BG related issues **    ** Please notify Endocrine for any change and/or advance in diet**    Discharge planning: TBD

## 2023-12-13 NOTE — PROGRESS NOTES
"[facility] - [facility]plant Stepdown  Endocrinology  Progress Note    Admit Date: 2023     Reason for Consult: Management of Steroid Induced Hyperglycemia     Surgical Procedure and Date:  Liver Transplant on 12/10/23       Lab Results   Component Value Date    HGBA1C 4.6 10/31/2023       HPI:   Patient is a 63 y.o. female with a diagnosis of HTN, MDD, PTSD, hx cervical CA, cervical DDD, HCC s/p radioembolization 2023 and cirrhosis 2/2 DURAN with complications of prior hepatic encephalopathy, ascites, esophageal varices. Patient now presents for the above procedure(s). Endocrinology consulted for management of hyperglycemia.       Interval HPI:   Overnight events: Remains in SICU. POD 4. BG well controlled on current IV insulin infusion at 0.8 u/hr. Receiving Solu Medrol 80 mg,standard steroid taper. Creatinine 1.5. Diet consistent carbohydrate Standard Tray    Eatin%  Nausea: No  Hypoglycemia and intervention: No  Fever: No  TPN and/or TF: No  If yes, type of TF/TPN and rate: n/a    /61 (Patient Position: Sitting)   Pulse 85   Temp 98.1 °F (36.7 °C) (Oral)   Resp 17   Ht 5' 7" (1.702 m)   Wt 108.5 kg (239 lb 3.2 oz)   SpO2 95%   Breastfeeding No   BMI 37.46 kg/m²     Labs Reviewed and Include    Recent Labs   Lab 23  0532   *   CALCIUM 8.6*   ALBUMIN 2.6*   PROT 4.2*      K 4.1   CO2 25      BUN 44*   CREATININE 1.5*   ALKPHOS 106   *   *   BILITOT 3.5*     Lab Results   Component Value Date    WBC 3.80 (L) 2023    HGB 8.4 (L) 2023    HCT 24.4 (L) 2023     (H) 2023    PLT 40 (L) 2023     No results for input(s): "TSH", "FREET4" in the last 168 hours.  Lab Results   Component Value Date    HGBA1C 4.6 10/31/2023       Nutritional status:   Body mass index is 37.46 kg/m².  Lab Results   Component Value Date    ALBUMIN 2.6 (L) 2023    ALBUMIN 2.9 (L) 2023    ALBUMIN 2.6 (L) 2023     No results found for: " ""PREALBUMIN"    Estimated Creatinine Clearance: 48.1 mL/min (A) (based on SCr of 1.5 mg/dL (H)).    Accu-Checks  Recent Labs     12/11/23  1624 12/11/23  2140 12/12/23  0236 12/12/23  0804 12/12/23  0834 12/12/23  1236 12/12/23  1706 12/12/23  2119 12/13/23  0207 12/13/23  0820   POCTGLUCOSE 151* 170* 150* 93 100 241* 206* 194* 158* 116*       Current Medications and/or Treatments Impacting Glycemic Control  Immunotherapy:    Immunosuppressants           Stop Route Frequency     tacrolimus capsule 2 mg         -- Oral 2 times daily     mycophenolate capsule 1,000 mg         -- Oral 2 times daily          Steroids:   Hormones (From admission, onward)      Start     Stop Route Frequency Ordered    12/15/23 0900  predniSONE tablet 20 mg  (methylprednisolone taper panel)        See Hyperspace for full Linked Orders Report.    -- Oral Daily 12/10/23 0143    12/14/23 0900  methylPREDNISolone sodium succinate injection 40 mg  (methylprednisolone taper panel)        See Hyperspace for full Linked Orders Report.    12/15/23 0859 IV Daily 12/10/23 0143    12/12/23 0900  methylPREDNISolone sodium succinate injection 120 mg  (methylprednisolone taper panel)        See Hyperspace for full Linked Orders Report.    12/13/23 0859 IV Daily 12/10/23 0143          Pressors:    Autonomic Drugs (From admission, onward)      None          Hyperglycemia/Diabetes Medications:   Antihyperglycemics (From admission, onward)      Start     Stop Route Frequency Ordered    12/12/23 1245  insulin regular in 0.9 % NaCl 100 unit/100 mL (1 unit/mL) infusion        Question:  Enter initial dose (Units/hr):  Answer:  1.2    -- IV Continuous 12/12/23 1242    12/11/23 1511  insulin aspart U-100 pen 0-10 Units         -- SubQ As needed (PRN) 12/11/23 1411            ASSESSMENT and PLAN    Immunology/Multi System  Prophylactic immunotherapy  May increase insulin resistance.         Endocrine  Class 2 severe obesity due to excess calories with serious " comorbidity and body mass index (BMI) of 36.0 to 36.9 in adult  Body mass index is 37.46 kg/m².  May increase insulin resistance.         Steroid-induced hyperglycemia  BG goal 140 - 180     Restart Transition IV insulin infusion at 0.8 u/hr with stepdown parameters (decreased per protocol)   Moderate Dose Correction Scale  BG monitoring ac/hs/0200    ** Please call Endocrine for any BG related issues **    ** Please notify Endocrine for any change and/or advance in diet**    Discharge planning: TBD          GI  * S/P liver transplant  Managed per primary team  Optimize BG control        Other  Adverse effect of corticosteroids  Glucocorticoids markedly increase glucoses. Expect steroid taper to help with glucose control          Ban Messer NP  Endocrinology  [facility] - [facility]

## 2023-12-13 NOTE — SUBJECTIVE & OBJECTIVE
"Interval HPI:   Overnight events: Remains in SICU. POD 4. BG well controlled on current IV insulin infusion at 0.8 u/hr. Receiving Solu Medrol 80 mg,standard steroid taper. Creatinine 1.5. Diet consistent carbohydrate Standard Tray    Eatin%  Nausea: No  Hypoglycemia and intervention: No  Fever: No  TPN and/or TF: No  If yes, type of TF/TPN and rate: n/a    /61 (Patient Position: Sitting)   Pulse 85   Temp 98.1 °F (36.7 °C) (Oral)   Resp 17   Ht 5' 7" (1.702 m)   Wt 108.5 kg (239 lb 3.2 oz)   SpO2 95%   Breastfeeding No   BMI 37.46 kg/m²     Labs Reviewed and Include    Recent Labs   Lab 23  0532   *   CALCIUM 8.6*   ALBUMIN 2.6*   PROT 4.2*      K 4.1   CO2 25      BUN 44*   CREATININE 1.5*   ALKPHOS 106   *   *   BILITOT 3.5*     Lab Results   Component Value Date    WBC 3.80 (L) 2023    HGB 8.4 (L) 2023    HCT 24.4 (L) 2023     (H) 2023    PLT 40 (L) 2023     No results for input(s): "TSH", "FREET4" in the last 168 hours.  Lab Results   Component Value Date    HGBA1C 4.6 10/31/2023       Nutritional status:   Body mass index is 37.46 kg/m².  Lab Results   Component Value Date    ALBUMIN 2.6 (L) 2023    ALBUMIN 2.9 (L) 2023    ALBUMIN 2.6 (L) 2023     No results found for: "PREALBUMIN"    Estimated Creatinine Clearance: 48.1 mL/min (A) (based on SCr of 1.5 mg/dL (H)).    Accu-Checks  Recent Labs     23  1624 23  2140 23  0236 23  0804 23  0834 23  1236 23  1706 23  2119 23  0207 23  0820   POCTGLUCOSE 151* 170* 150* 93 100 241* 206* 194* 158* 116*       Current Medications and/or Treatments Impacting Glycemic Control  Immunotherapy:    Immunosuppressants           Stop Route Frequency     tacrolimus capsule 2 mg         -- Oral 2 times daily     mycophenolate capsule 1,000 mg         -- Oral 2 times daily          Steroids:   Hormones (From " admission, onward)      Start     Stop Route Frequency Ordered    12/15/23 0900  predniSONE tablet 20 mg  (methylprednisolone taper panel)        See Hyperspace for full Linked Orders Report.    -- Oral Daily 12/10/23 0143    12/14/23 0900  methylPREDNISolone sodium succinate injection 40 mg  (methylprednisolone taper panel)        See Hyperspace for full Linked Orders Report.    12/15/23 0859 IV Daily 12/10/23 0143    12/12/23 0900  methylPREDNISolone sodium succinate injection 120 mg  (methylprednisolone taper panel)        See Hyperspace for full Linked Orders Report.    12/13/23 0859 IV Daily 12/10/23 0143          Pressors:    Autonomic Drugs (From admission, onward)      None          Hyperglycemia/Diabetes Medications:   Antihyperglycemics (From admission, onward)      Start     Stop Route Frequency Ordered    12/12/23 1245  insulin regular in 0.9 % NaCl 100 unit/100 mL (1 unit/mL) infusion        Question:  Enter initial dose (Units/hr):  Answer:  1.2    -- IV Continuous 12/12/23 1242    12/11/23 1511  insulin aspart U-100 pen 0-10 Units         -- SubQ As needed (PRN) 12/11/23 1411

## 2023-12-13 NOTE — SUBJECTIVE & OBJECTIVE
Scheduled Meds:   albumin human 25%  25 g Intravenous Q8H    buPROPion  300 mg Oral Daily    famotidine  20 mg Oral QHS    heparin (porcine)  5,000 Units Subcutaneous Q8H    [START ON 12/14/2023] methylPREDNISolone sodium succinate injection  40 mg Intravenous Daily    Followed by    [START ON 12/15/2023] predniSONE  20 mg Oral Daily    mupirocin  1 g Nasal BID    mycophenolate  1,000 mg Oral BID    propranoloL  20 mg Oral BID    [START ON 12/17/2023] sulfamethoxazole-trimethoprim 400-80mg  1 tablet Oral Daily AM    tacrolimus  2 mg Oral BID    [START ON 12/20/2023] valGANciclovir  450 mg Oral Daily     Continuous Infusions:   insulin regular 1 units/mL infusion orderable (TRANSFER) 0.8 Units/hr (12/13/23 1030)     PRN Meds:dextrose 10%, dextrose 10%, glucagon (human recombinant), glucose, glucose, insulin aspart U-100, oxyCODONE, oxyCODONE, sodium chloride 0.9%    Review of Systems   Constitutional:  Negative for chills, fatigue and fever.   Respiratory:  Negative for cough and shortness of breath.    Cardiovascular:  Positive for leg swelling. Negative for chest pain.   Gastrointestinal:  Positive for abdominal pain (incisional). Negative for abdominal distention, diarrhea, nausea and vomiting.   Genitourinary:  Negative for decreased urine volume and difficulty urinating.   Musculoskeletal:  Positive for back pain (chronic) and gait problem.   Allergic/Immunologic: Positive for immunocompromised state.   Neurological:  Positive for tremors and weakness. Negative for dizziness and headaches.   Psychiatric/Behavioral:  Negative for confusion and decreased concentration. The patient is nervous/anxious.      Objective:     Vital Signs (Most Recent):  Temp: 97.6 °F (36.4 °C) (12/13/23 1132)  Pulse: 86 (12/13/23 1132)  Resp: 17 (12/13/23 1132)  BP: 131/67 (12/13/23 1132)  SpO2: 97 % (12/13/23 1132) Vital Signs (24h Range):  Temp:  [97.6 °F (36.4 °C)-98.8 °F (37.1 °C)] 97.6 °F (36.4 °C)  Pulse:  [76-87] 86  Resp:   [16-20] 17  SpO2:  [94 %-97 %] 97 %  BP: (127-168)/(61-75) 131/67     Weight: 108.5 kg (239 lb 3.2 oz)  Body mass index is 37.46 kg/m².    Intake/Output - Last 3 Shifts         12/11 0700  12/12 0659 12/12 0700  12/13 0659 12/13 0700  12/14 0659    P.O. 780 720 120    I.V. (mL/kg) 1674.9 (15) 52.6 (0.5)     Blood 497      IV Piggyback       Total Intake(mL/kg) 2951.9 (26.5) 772.6 (7.1) 120 (1.1)    Urine (mL/kg/hr) 645 (0.2) 850 (0.3)     Drains 630 395 40    Total Output 1275 1245 40    Net +1676.9 -472.4 +80           Urine Occurrence  2 x              Physical Exam  Vitals reviewed.   Constitutional:       Appearance: She is well-developed.   Eyes:      Pupils: Pupils are equal, round, and reactive to light.   Cardiovascular:      Rate and Rhythm: Normal rate and regular rhythm.      Heart sounds: No murmur heard.  Pulmonary:      Effort: Pulmonary effort is normal. No respiratory distress.      Breath sounds: Normal breath sounds. No wheezing.   Abdominal:      General: Bowel sounds are normal. There is no distension.      Palpations: Abdomen is soft.      Tenderness: There is abdominal tenderness. There is no guarding.      Comments: Chevron incision w/ staples- cdi  LIUDMILA x 1 with ss drainage    Musculoskeletal:         General: Normal range of motion.      Cervical back: Normal range of motion.      Right lower leg: Edema present.      Left lower leg: Edema present.   Skin:     General: Skin is warm and dry.   Neurological:      Mental Status: She is alert and oriented to person, place, and time.   Psychiatric:         Mood and Affect: Affect is tearful.         Behavior: Behavior normal.         Thought Content: Thought content normal.         Judgment: Judgment normal.          Laboratory:  Immunosuppressants           Stop Route Frequency     tacrolimus capsule 2 mg         -- Oral 2 times daily     mycophenolate capsule 1,000 mg         -- Oral 2 times daily          CBC:   Recent Labs   Lab 12/13/23  4891    WBC 3.80*   RBC 2.45*   HGB 8.4*   HCT 24.4*   PLT 40*   *   MCH 34.3*   MCHC 34.4     CMP:   Recent Labs   Lab 12/13/23  0532   *   CALCIUM 8.6*   ALBUMIN 2.6*   PROT 4.2*      K 4.1   CO2 25      BUN 44*   CREATININE 1.5*   ALKPHOS 106   *   *   BILITOT 3.5*     Labs within the past 24 hours have been reviewed.    Diagnostic Results:  None    Debility/Functional status: Patient debilitated by evidence of Muscle wasting and atrophy, Weakness, Limitation of activities due to disability, and Other reduced mobility. Physical and occupational therapy ordered daily to evaluate and treat. Debility was: present on admission.

## 2023-12-13 NOTE — ASSESSMENT & PLAN NOTE
- Continue OI prophylaxis per protocol      Tazorac Pregnancy And Lactation Text: This medication is not safe during pregnancy. It is unknown if this medication is excreted in breast milk. Use Enhanced Medication Counseling?: No Erythromycin Counseling:  I discussed with the patient the risks of erythromycin including but not limited to GI upset, allergic reaction, drug rash, diarrhea, increase in liver enzymes, and yeast infections. Erythromycin Pregnancy And Lactation Text: This medication is Pregnancy Category B and is considered safe during pregnancy. It is also excreted in breast milk. Topical Retinoid Pregnancy And Lactation Text: This medication is Pregnancy Category C. It is unknown if this medication is excreted in breast milk. Spironolactone Pregnancy And Lactation Text: This medication can cause feminization of the male fetus and should be avoided during pregnancy. The active metabolite is also found in breast milk. Isotretinoin Pregnancy And Lactation Text: This medication is Pregnancy Category X and is considered extremely dangerous during pregnancy. It is unknown if it is excreted in breast milk. Tetracycline Pregnancy And Lactation Text: This medication is Pregnancy Category D and not consider safe during pregnancy. It is also excreted in breast milk. Detail Level: Detailed Benzoyl Peroxide Counseling: Patient counseled that medicine may cause skin irritation and bleach clothing.  In the event of skin irritation, the patient was advised to reduce the amount of the drug applied or use it less frequently.   The patient verbalized understanding of the proper use and possible adverse effects of benzoyl peroxide.  All of the patient's questions and concerns were addressed. Isotretinoin Counseling: Patient should get monthly blood tests, not donate blood, not drive at night if vision affected, not share medication, and not undergo elective surgery for 6 months after tx completed. Side effects reviewed, pt to contact office should one occur. Dapsone Counseling: I discussed with the patient the risks of dapsone including but not limited to hemolytic anemia, agranulocytosis, rashes, methemoglobinemia, kidney failure, peripheral neuropathy, headaches, GI upset, and liver toxicity.  Patients who start dapsone require monitoring including baseline LFTs and weekly CBCs for the first month, then every month thereafter.  The patient verbalized understanding of the proper use and possible adverse effects of dapsone.  All of the patient's questions and concerns were addressed. Spironolactone Counseling: Patient advised regarding risks of diarrhea, abdominal pain, hyperkalemia, birth defects (for female patients), liver toxicity and renal toxicity. The patient may need blood work to monitor liver and kidney function and potassium levels while on therapy. The patient verbalized understanding of the proper use and possible adverse effects of spironolactone.  All of the patient's questions and concerns were addressed. Topical Retinoid counseling:  Patient advised to apply a pea-sized amount only at bedtime and wait 30 minutes after washing their face before applying.  If too drying, patient may add a non-comedogenic moisturizer. The patient verbalized understanding of the proper use and possible adverse effects of retinoids.  All of the patient's questions and concerns were addressed. Birth Control Pills Pregnancy And Lactation Text: This medication should be avoided if pregnant and for the first 30 days post-partum. High Dose Vitamin A Counseling: Side effects reviewed, pt to contact office should one occur. Bactrim Pregnancy And Lactation Text: This medication is Pregnancy Category D and is known to cause fetal risk.  It is also excreted in breast milk. Doxycycline Counseling:  Patient counseled regarding possible photosensitivity and increased risk for sunburn.  Patient instructed to avoid sunlight, if possible.  When exposed to sunlight, patients should wear protective clothing, sunglasses, and sunscreen.  The patient was instructed to call the office immediately if the following severe adverse effects occur:  hearing changes, easy bruising/bleeding, severe headache, or vision changes.  The patient verbalized understanding of the proper use and possible adverse effects of doxycycline.  All of the patient's questions and concerns were addressed. Tazorac Counseling:  Patient advised that medication is irritating and drying.  Patient may need to apply sparingly and wash off after an hour before eventually leaving it on overnight.  The patient verbalized understanding of the proper use and possible adverse effects of tazorac.  All of the patient's questions and concerns were addressed. Azithromycin Pregnancy And Lactation Text: This medication is considered safe during pregnancy and is also secreted in breast milk. Benzoyl Peroxide Pregnancy And Lactation Text: This medication is Pregnancy Category C. It is unknown if benzoyl peroxide is excreted in breast milk. Doxycycline Pregnancy And Lactation Text: This medication is Pregnancy Category D and not consider safe during pregnancy. It is also excreted in breast milk but is considered safe for shorter treatment courses. Bactrim Counseling:  I discussed with the patient the risks of sulfa antibiotics including but not limited to GI upset, allergic reaction, drug rash, diarrhea, dizziness, photosensitivity, and yeast infections.  Rarely, more serious reactions can occur including but not limited to aplastic anemia, agranulocytosis, methemoglobinemia, blood dyscrasias, liver or kidney failure, lung infiltrates or desquamative/blistering drug rashes. Topical Sulfur Applications Pregnancy And Lactation Text: This medication is Pregnancy Category C and has an unknown safety profile during pregnancy. It is unknown if this topical medication is excreted in breast milk. Detail Level: Zone Azithromycin Counseling:  I discussed with the patient the risks of azithromycin including but not limited to GI upset, allergic reaction, drug rash, diarrhea, and yeast infections. Minocycline Counseling: Patient advised regarding possible photosensitivity and discoloration of the teeth, skin, lips, tongue and gums.  Patient instructed to avoid sunlight, if possible.  When exposed to sunlight, patients should wear protective clothing, sunglasses, and sunscreen.  The patient was instructed to call the office immediately if the following severe adverse effects occur:  hearing changes, easy bruising/bleeding, severe headache, or vision changes.  The patient verbalized understanding of the proper use and possible adverse effects of minocycline.  All of the patient's questions and concerns were addressed. High Dose Vitamin A Pregnancy And Lactation Text: High dose vitamin A therapy is contraindicated during pregnancy and breast feeding. Topical Sulfur Applications Counseling: Topical Sulfur Counseling: Patient counseled that this medication may cause skin irritation or allergic reactions.  In the event of skin irritation, the patient was advised to reduce the amount of the drug applied or use it less frequently.   The patient verbalized understanding of the proper use and possible adverse effects of topical sulfur application.  All of the patient's questions and concerns were addressed. Topical Clindamycin Pregnancy And Lactation Text: This medication is Pregnancy Category B and is considered safe during pregnancy. It is unknown if it is excreted in breast milk. Birth Control Pills Counseling: Birth Control Pill Counseling: I discussed with the patient the potential side effects of OCPs including but not limited to increased risk of stroke, heart attack, thrombophlebitis, deep venous thrombosis, hepatic adenomas, breast changes, GI upset, headaches, and depression.  The patient verbalized understanding of the proper use and possible adverse effects of OCPs. All of the patient's questions and concerns were addressed. Topical Clindamycin Counseling: Patient counseled that this medication may cause skin irritation or allergic reactions.  In the event of skin irritation, the patient was advised to reduce the amount of the drug applied or use it less frequently.   The patient verbalized understanding of the proper use and possible adverse effects of clindamycin.  All of the patient's questions and concerns were addressed. Tetracycline Counseling: Patient counseled regarding possible photosensitivity and increased risk for sunburn.  Patient instructed to avoid sunlight, if possible.  When exposed to sunlight, patients should wear protective clothing, sunglasses, and sunscreen.  The patient was instructed to call the office immediately if the following severe adverse effects occur:  hearing changes, easy bruising/bleeding, severe headache, or vision changes.  The patient verbalized understanding of the proper use and possible adverse effects of tetracycline.  All of the patient's questions and concerns were addressed. Patient understands to avoid pregnancy while on therapy due to potential birth defects. Dapsone Pregnancy And Lactation Text: This medication is Pregnancy Category C and is not considered safe during pregnancy or breast feeding.

## 2023-12-13 NOTE — ASSESSMENT & PLAN NOTE
- 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 Progressed well, stepped down 12/10 PM.   - LFTs down trending  - Post op US stable, 2 fluid collections likely hematomas.  - Lateral drain removed 12/12. LIUDMILA x 1 remains in place  - Lasix 40mg for hypervolemia on 12/12.  - albumin 25% on 12/13

## 2023-12-13 NOTE — PLAN OF CARE
Recommendations    Continue CHO consistent diet with texture per SLP    Add Boost glucose control TID      RD to monitor and follow    Goals: Meet % een/epn by next RD f/u  Nutrition Goal Status: progressing towards goal  Communication of RD Recs: other (comment) (POC)

## 2023-12-13 NOTE — PLAN OF CARE
AAOx4. VSS. Patient very emotional throughout shift - support provided from RN and daughters - psychiatry consulted 12/13. T-bili 3.5 (down from 4.0 prior), AST//405 (down from 286/529 prior). Cr 1.5 (increased from 1.0 prior) - albumin given IV x1 this shift - patient to get another dose of albumin tonight. PRN oxy 5mg given x2 for c/o pain this shift. Patient worked w/ PT/OT today 12/13 - patient up in chair w/ 2x assist. Purewick in place - urine is gisele in color - see flowsheet for exact UOP amount. Chevron incision - CYNTHIA w/ staples intact - cleaned w/ betadine. RLQ LIUDMILA drain w/ SS drainage noted - see flowsheet for output amount. Insulin gtt infusing continuously @ 0.8Units/hr. Edema remains - ambulation encouraged. Non-skid socks on. Bed in low position. Call light within reach. Daughters at bedside.

## 2023-12-13 NOTE — PT/OT/SLP PROGRESS
"Physical Therapy Treatment    Patient Name:  Yumiko Gonzalez   MRN:  [id number]    Recommendations:     Discharge Recommendations: Low Intensity Therapy  Discharge Equipment Recommendations: none  Barriers to discharge: None    Assessment:     Yumiko Gonzalez is a 63 y.o. female admitted with a medical diagnosis of S/P liver transplant.  She presents with the following impairments/functional limitations: weakness, impaired endurance, impaired self care skills, impaired functional mobility, gait instability, decreased lower extremity function, pain, impaired cardiopulmonary response to activity Pt tolerated treatment session fairly well. Pt reported having abdomen soreness at incision site and back spasms limiting therapy. When ambulating pt reported feeling a little "dizzy" yet resolved with rest. Vitals as follows /90 HR 90 Sp02 92.     Rehab Prognosis: Good; patient would benefit from acute skilled PT services to address these deficits and reach maximum level of function.    Recent Surgery: Procedure(s) (LRB):  TRANSPLANT, LIVER (N/A) 4 Days Post-Op    Plan:     During this hospitalization, patient to be seen 5 x/week to address the identified rehab impairments via gait training, therapeutic activities, therapeutic exercises, neuromuscular re-education and progress toward the following goals:    Plan of Care Expires:  01/10/24    Subjective     Chief Complaint: Abdomen soreness at incision site and back spasms   Patient/Family Comments/goals: None stated   Pain/Comfort:  Pain Rating 1:  (not rated)  Location - Orientation 1: generalized  Location 1: abdomen  Pain Addressed 1: Reposition, Distraction  Pain Rating 2:  (not rated)  Location 2: back (back spasms)  Pain Addressed 2: Reposition, Distraction      Objective:     Communicated with RN prior to session.  Patient found supine with central line, LIUDMILA drain upon PT entry to room.     General Precautions: Standard, fall  Orthopedic Precautions: N/A  Braces: " N/A  Respiratory Status: Room air     Functional Mobility:  Bed Mobility:     Supine to Sit: minimum assistance  Transfers:     Sit to Stand:  contact guard assistance with rolling walker  Gait: 5 ft CGA with RW. Pt c/o of dizziness and requesting to sit down, symptom resolved while seated  Vitals as follows: Vitals as follows /90 HR 90 Sp02 92.  Pt performed 10 repetitions of seated B LE exercises consisting of: Marching, LAQ, ABD/ADD, heel raises, and toe raises.    Extra time required due to fatigue and back spasms       AM-PAC 6 CLICK MOBILITY  Turning over in bed (including adjusting bedclothes, sheets and blankets)?: 2  Sitting down on and standing up from a chair with arms (e.g., wheelchair, bedside commode, etc.): 3  Moving from lying on back to sitting on the side of the bed?: 3  Moving to and from a bed to a chair (including a wheelchair)?: 3  Need to walk in hospital room?: 3  Climbing 3-5 steps with a railing?: 2  Basic Mobility Total Score: 16       Treatment & Education:  Therapist provided instruction and educated for safety during transfers and gait training. As well as proper body mechanics, energy conservation, and fall prevention strategies during tasks listed above, and the effects of prolonged immobility and the importance of performing EOB/OOB activity and exercises to promote healing and reduce recovery time.      Patient left up in chair with all lines intact, call button in reach, Rn notified, and daughter present..    GOALS:   Multidisciplinary Problems       Physical Therapy Goals          Problem: Physical Therapy    Goal Priority Disciplines Outcome Goal Variances Interventions   Physical Therapy Goal     PT, PT/OT Ongoing, Progressing     Description: PT goals to be met by: 1/11/24    Patient will perform rolling each way with supervision.   Patient will perform supine <> sitting with supervision.  Patient will perform sit <> stand transitions with supervision using RW.  Patient  will perform transfers from bed <> chair or BSC with supervision using RW.  Patient will ambulate 200 feet with supervision using RW.                       Time Tracking:     PT Received On: 12/13/23  PT Start Time: 0928     PT Stop Time: 1007  PT Total Time (min): 39 min     Billable Minutes: Gait Training 8, Therapeutic Activity 16, and Therapeutic Exercise 15    Treatment Type: Treatment  PT/PTA: PTA     Number of PTA visits since last PT visit: 2     12/13/2023

## 2023-12-13 NOTE — ASSESSMENT & PLAN NOTE
- on home lasix prior to txp  - IV lasix 40mg 12/12  - hold lasix today for elevated Cr. Albumin 12/13

## 2023-12-13 NOTE — PROGRESS NOTES
[facility] Trans[facility] Stepdown  Liver Transplant  Progress Note    Patient Name: Yumiko Gonzalez  MRN: [id number]  Admission Date: 12/9/2023  Hospital Length of Stay: 4 days  Code Status: Full Code  Primary Care Provider: Garrett Webb MD  Post-Operative Day: 4    ORGAN:   LIVER  Disease Etiology: Cirrhosis: Fatty Liver (DURAN)  Donor Type:   Donation after Circulatory Death   CDC High Risk:   No  Donor CMV Status:   Donor CMV Status: Negative  Donor HBcAB:   Negative  Donor HCV Status:   Negative  Donor HBV PEDRO:   Donor HCV PEDRO: Negative  Whole or Partial: Whole Liver  Biliary Anastomosis: End to End  Arterial Anatomy: Standard  Subjective:     History of Present Illness:  Ms. Gonzalez is a 64 y/o female who present for Liver Transplant for liver cirrhosis 2/2 to DURAN HX: HCC (status post Y90 06/2023) esophageal varices, ascites, hepatic encephalopathy. She reports in usual state of health- Patient denies any recent hospitalizations or ED visits. The primary surgeon will be Dr. Raza.  Patient is NPO.  All pre-op labs and imaging have been ordered and will be reviewed prior to surgery. Consents to be signed prior to transplant.      Hospital Course:  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 Progressed well, stepped down 12/10 PM. LFTs down trending, Cr stable. Post op US stable, 2 fluid collections likely hematomas. Pt with hypervolemia; lasix dependent prior to transplant. Lateral LIUDMILA drain removed 12/12. PT/OT consulted with recs for home health. Encouraged patient to ambulate with assistance.     Interval Hx: Pt slowly improving post transplant. LFTs trending down well. Pt doing fine. Emotional this morning, suspect steroid induced. LIUDMILA x 1 in place- ss drainage. UE tremors, worsened post-txp. Suspect worse with tacrolimus. Restarted on home propranolol. Working well with therapy. Encouraged ambulation with assistance if needed. Tolerating  diet; passing gas; +BM. Monitor.     Scheduled Meds:   albumin human 25%  25 g Intravenous Q8H    buPROPion  300 mg Oral Daily    famotidine  20 mg Oral QHS    heparin (porcine)  5,000 Units Subcutaneous Q8H    [START ON 12/14/2023] methylPREDNISolone sodium succinate injection  40 mg Intravenous Daily    Followed by    [START ON 12/15/2023] predniSONE  20 mg Oral Daily    mupirocin  1 g Nasal BID    mycophenolate  1,000 mg Oral BID    propranoloL  20 mg Oral BID    [START ON 12/17/2023] sulfamethoxazole-trimethoprim 400-80mg  1 tablet Oral Daily AM    tacrolimus  2 mg Oral BID    [START ON 12/20/2023] valGANciclovir  450 mg Oral Daily     Continuous Infusions:   insulin regular 1 units/mL infusion orderable (TRANSFER) 0.8 Units/hr (12/13/23 1030)     PRN Meds:dextrose 10%, dextrose 10%, glucagon (human recombinant), glucose, glucose, insulin aspart U-100, oxyCODONE, oxyCODONE, sodium chloride 0.9%    Review of Systems   Constitutional:  Negative for chills, fatigue and fever.   Respiratory:  Negative for cough and shortness of breath.    Cardiovascular:  Positive for leg swelling. Negative for chest pain.   Gastrointestinal:  Positive for abdominal pain (incisional). Negative for abdominal distention, diarrhea, nausea and vomiting.   Genitourinary:  Negative for decreased urine volume and difficulty urinating.   Musculoskeletal:  Positive for back pain (chronic) and gait problem.   Allergic/Immunologic: Positive for immunocompromised state.   Neurological:  Positive for tremors and weakness. Negative for dizziness and headaches.   Psychiatric/Behavioral:  Negative for confusion and decreased concentration. The patient is nervous/anxious.      Objective:     Vital Signs (Most Recent):  Temp: 97.6 °F (36.4 °C) (12/13/23 1132)  Pulse: 86 (12/13/23 1132)  Resp: 17 (12/13/23 1132)  BP: 131/67 (12/13/23 1132)  SpO2: 97 % (12/13/23 1132) Vital Signs (24h Range):  Temp:  [97.6 °F (36.4 °C)-98.8 °F (37.1 °C)] 97.6 °F (36.4  °C)  Pulse:  [76-87] 86  Resp:  [16-20] 17  SpO2:  [94 %-97 %] 97 %  BP: (127-168)/(61-75) 131/67     Weight: 108.5 kg (239 lb 3.2 oz)  Body mass index is 37.46 kg/m².    Intake/Output - Last 3 Shifts         12/11 0700  12/12 0659 12/12 0700  12/13 0659 12/13 0700  12/14 0659    P.O. 780 720 120    I.V. (mL/kg) 1674.9 (15) 52.6 (0.5)     Blood 497      IV Piggyback       Total Intake(mL/kg) 2951.9 (26.5) 772.6 (7.1) 120 (1.1)    Urine (mL/kg/hr) 645 (0.2) 850 (0.3)     Drains 630 395 40    Total Output 1275 1245 40    Net +1676.9 -472.4 +80           Urine Occurrence  2 x              Physical Exam  Vitals reviewed.   Constitutional:       Appearance: She is well-developed.   Eyes:      Pupils: Pupils are equal, round, and reactive to light.   Cardiovascular:      Rate and Rhythm: Normal rate and regular rhythm.      Heart sounds: No murmur heard.  Pulmonary:      Effort: Pulmonary effort is normal. No respiratory distress.      Breath sounds: Normal breath sounds. No wheezing.   Abdominal:      General: Bowel sounds are normal. There is no distension.      Palpations: Abdomen is soft.      Tenderness: There is abdominal tenderness. There is no guarding.      Comments: Chevron incision w/ staples- cdi  LIUDMILA x 1 with ss drainage    Musculoskeletal:         General: Normal range of motion.      Cervical back: Normal range of motion.      Right lower leg: Edema present.      Left lower leg: Edema present.   Skin:     General: Skin is warm and dry.   Neurological:      Mental Status: She is alert and oriented to person, place, and time.   Psychiatric:         Mood and Affect: Affect is tearful.         Behavior: Behavior normal.         Thought Content: Thought content normal.         Judgment: Judgment normal.          Laboratory:  Immunosuppressants           Stop Route Frequency     tacrolimus capsule 2 mg         -- Oral 2 times daily     mycophenolate capsule 1,000 mg         -- Oral 2 times daily          CBC:    Recent Labs   Lab 12/13/23  0532   WBC 3.80*   RBC 2.45*   HGB 8.4*   HCT 24.4*   PLT 40*   *   MCH 34.3*   MCHC 34.4     CMP:   Recent Labs   Lab 12/13/23  0532   *   CALCIUM 8.6*   ALBUMIN 2.6*   PROT 4.2*      K 4.1   CO2 25      BUN 44*   CREATININE 1.5*   ALKPHOS 106   *   *   BILITOT 3.5*     Labs within the past 24 hours have been reviewed.    Diagnostic Results:  None    Debility/Functional status: Patient debilitated by evidence of Muscle wasting and atrophy, Weakness, Limitation of activities due to disability, and Other reduced mobility. Physical and occupational therapy ordered daily to evaluate and treat. Debility was: present on admission.        Assessment/Plan:     * S/P liver transplant  - 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 Progressed well, stepped down 12/10 PM.   - LFTs down trending  - Post op US stable, 2 fluid collections likely hematomas.  - Lateral drain removed 12/12. LIUDMILA x 1 remains in place  - Lasix 40mg for hypervolemia on 12/12.  - albumin 25% on 12/13     DANAY (acute kidney injury)  - albumin 25% 12/13      Hypervolemia  - on home lasix prior to txp  - IV lasix 40mg 12/12  - hold lasix today for elevated Cr. Albumin 12/13    At risk for opportunistic infections  - Continue OI prophylaxis per protocol       Prophylactic immunotherapy  Continue Cellcept and Prograf. Will monitor for signs of toxic side effects, check daily tacrolimus troughs, and change meds accordingly.       Immunosuppressed status        Thrombocytopenia  - 2/2 ESLD; expected to improve post-op.       Tremor  - on home propranolol prior to transplant to assist with symptoms  - symptoms worsened post transplant, suspect prograf  - resumed home propranolol 12/13       Moderate episode of recurrent major depressive disorder  - cont home meds. Follow-up outpatient if needed for medication adjustments          VTE Risk Mitigation (From admission, onward)           Ordered     heparin (porcine) injection 5,000 Units  Every 8 hours         12/10/23 0143     Place sequential compression device  Until discontinued         12/10/23 0143     IP VTE HIGH RISK PATIENT  Once         12/10/23 0143                    The patients clinical status was discussed at multidisplinary rounds, involving transplant surgery, transplant medicine, pharmacy, nursing, nutrition, and social work.    Discharge Planning:  Health Maintenance Due   Topic Date Due    Shingles Vaccine (1 of 2) 05/22/2009    TETANUS VACCINE  08/03/2017    Influenza Vaccine (1) 09/01/2019             Medical decision making for this encounter includes review of pertinent labs and diagnostic studies, assessment and planning, discussions with consulting providers, discussion with patient/family, and participation in multidisciplinary rounds. Time spent caring for patient: 60 minutes.    Sade Salgado PA-C  Liver Transplant  [facility]

## 2023-12-13 NOTE — PROGRESS NOTES
EDUCATION NOTE:    Met with Yumiko Gonzalez and her caregivers to provide teaching re: immunosuppressant medications.  Reviewed medication section of the Liver Transplant Education book that was provided.  Emphasized the importance of compliance, role of the blue medication card, concerns for drug interactions, and process of obtaining refills.  Counseled regarding Prograf, Cellcept , prednisone, including directions for use, monitoring, how to handle missed doses, and side effects.  Ms. Gonzalez and her daughter verbalized understanding and had the opportunity to ask questions.

## 2023-12-14 ENCOUNTER — TELEPHONE (OUTPATIENT)
Dept: PRIMARY CARE CLINIC | Facility: CLINIC | Age: 63
End: 2023-12-14
Payer: COMMERCIAL

## 2023-12-14 LAB
ALBUMIN SERPL BCP-MCNC: 2.5 G/DL (ref 3.5–5.2)
ALP SERPL-CCNC: 119 U/L (ref 55–135)
ALT SERPL W/O P-5'-P-CCNC: 214 U/L (ref 10–44)
ANION GAP SERPL CALC-SCNC: 5 MMOL/L (ref 8–16)
ANISOCYTOSIS BLD QL SMEAR: SLIGHT
AST SERPL-CCNC: 68 U/L (ref 10–40)
BASOPHILS # BLD AUTO: 0.01 K/UL (ref 0–0.2)
BASOPHILS NFR BLD: 0.4 % (ref 0–1.9)
BILIRUB SERPL-MCNC: 2.8 MG/DL (ref 0.1–1)
BLD PROD TYP BPU: NORMAL
BLOOD UNIT EXPIRATION DATE: NORMAL
BLOOD UNIT TYPE CODE: 5100
BLOOD UNIT TYPE: NORMAL
BUN SERPL-MCNC: 40 MG/DL (ref 8–23)
CALCIUM SERPL-MCNC: 7.5 MG/DL (ref 8.7–10.5)
CHLORIDE SERPL-SCNC: 113 MMOL/L (ref 95–110)
CO2 SERPL-SCNC: 21 MMOL/L (ref 23–29)
CODING SYSTEM: NORMAL
CREAT SERPL-MCNC: 1.1 MG/DL (ref 0.5–1.4)
CROSSMATCH INTERPRETATION: NORMAL
DIFFERENTIAL METHOD BLD: ABNORMAL
DISPENSE STATUS: NORMAL
EOSINOPHIL # BLD AUTO: 0.2 K/UL (ref 0–0.5)
EOSINOPHIL NFR BLD: 7.1 % (ref 0–8)
ERYTHROCYTE [DISTWIDTH] IN BLOOD BY AUTOMATED COUNT: 15.1 % (ref 11.5–14.5)
EST. GFR  (NO RACE VARIABLE): 56.1 ML/MIN/1.73 M^2
GLUCOSE SERPL-MCNC: 88 MG/DL (ref 70–110)
HCT VFR BLD AUTO: 21.2 % (ref 37–48.5)
HGB BLD-MCNC: 7.3 G/DL (ref 12–16)
IMM GRANULOCYTES # BLD AUTO: 0.07 K/UL (ref 0–0.04)
IMM GRANULOCYTES NFR BLD AUTO: 2.6 % (ref 0–0.5)
INR PPP: 1.2 (ref 0.8–1.2)
LYMPHOCYTES # BLD AUTO: 0.3 K/UL (ref 1–4.8)
LYMPHOCYTES NFR BLD: 10.5 % (ref 18–48)
MAGNESIUM SERPL-MCNC: 1.7 MG/DL (ref 1.6–2.6)
MCH RBC QN AUTO: 33.5 PG (ref 27–31)
MCHC RBC AUTO-ENTMCNC: 34.4 G/DL (ref 32–36)
MCV RBC AUTO: 97 FL (ref 82–98)
MONOCYTES # BLD AUTO: 0.4 K/UL (ref 0.3–1)
MONOCYTES NFR BLD: 14.2 % (ref 4–15)
NEUTROPHILS # BLD AUTO: 1.7 K/UL (ref 1.8–7.7)
NEUTROPHILS NFR BLD: 65.2 % (ref 38–73)
NRBC BLD-RTO: 1 /100 WBC
PHOSPHATE SERPL-MCNC: 2.5 MG/DL (ref 2.7–4.5)
PLATELET # BLD AUTO: 36 K/UL (ref 150–450)
PLATELET BLD QL SMEAR: ABNORMAL
PMV BLD AUTO: 10.8 FL (ref 9.2–12.9)
POCT GLUCOSE: 103 MG/DL (ref 70–110)
POCT GLUCOSE: 127 MG/DL (ref 70–110)
POCT GLUCOSE: 183 MG/DL (ref 70–110)
POCT GLUCOSE: 228 MG/DL (ref 70–110)
POCT GLUCOSE: 98 MG/DL (ref 70–110)
POTASSIUM SERPL-SCNC: 3.6 MMOL/L (ref 3.5–5.1)
PROT SERPL-MCNC: 3.6 G/DL (ref 6–8.4)
PROTHROMBIN TIME: 12.6 SEC (ref 9–12.5)
RBC # BLD AUTO: 2.18 M/UL (ref 4–5.4)
SODIUM SERPL-SCNC: 139 MMOL/L (ref 136–145)
TACROLIMUS BLD-MCNC: 4.8 NG/ML (ref 5–15)
UNIT NUMBER: NORMAL
WBC # BLD AUTO: 2.67 K/UL (ref 3.9–12.7)

## 2023-12-14 PROCEDURE — P9035 PLATELET PHERES LEUKOREDUCED: HCPCS | Performed by: PHYSICIAN ASSISTANT

## 2023-12-14 PROCEDURE — 99233 SBSQ HOSP IP/OBS HIGH 50: CPT | Mod: 24,,, | Performed by: PHYSICIAN ASSISTANT

## 2023-12-14 PROCEDURE — 84100 ASSAY OF PHOSPHORUS: CPT | Performed by: STUDENT IN AN ORGANIZED HEALTH CARE EDUCATION/TRAINING PROGRAM

## 2023-12-14 PROCEDURE — 20600001 HC STEP DOWN PRIVATE ROOM

## 2023-12-14 PROCEDURE — 97110 THERAPEUTIC EXERCISES: CPT | Mod: CQ

## 2023-12-14 PROCEDURE — 63600175 PHARM REV CODE 636 W HCPCS: Performed by: STUDENT IN AN ORGANIZED HEALTH CARE EDUCATION/TRAINING PROGRAM

## 2023-12-14 PROCEDURE — 80197 ASSAY OF TACROLIMUS: CPT | Performed by: STUDENT IN AN ORGANIZED HEALTH CARE EDUCATION/TRAINING PROGRAM

## 2023-12-14 PROCEDURE — 97530 THERAPEUTIC ACTIVITIES: CPT | Mod: CQ

## 2023-12-14 PROCEDURE — 94761 N-INVAS EAR/PLS OXIMETRY MLT: CPT

## 2023-12-14 PROCEDURE — 25000003 PHARM REV CODE 250

## 2023-12-14 PROCEDURE — 63600175 PHARM REV CODE 636 W HCPCS: Performed by: PHYSICIAN ASSISTANT

## 2023-12-14 PROCEDURE — 90792 PSYCH DIAG EVAL W/MED SRVCS: CPT | Mod: ,,, | Performed by: PSYCHIATRY & NEUROLOGY

## 2023-12-14 PROCEDURE — 99232 SBSQ HOSP IP/OBS MODERATE 35: CPT | Mod: ,,, | Performed by: PHYSICIAN ASSISTANT

## 2023-12-14 PROCEDURE — 83735 ASSAY OF MAGNESIUM: CPT | Performed by: STUDENT IN AN ORGANIZED HEALTH CARE EDUCATION/TRAINING PROGRAM

## 2023-12-14 PROCEDURE — 25000003 PHARM REV CODE 250: Performed by: SURGERY

## 2023-12-14 PROCEDURE — 80053 COMPREHEN METABOLIC PANEL: CPT | Performed by: STUDENT IN AN ORGANIZED HEALTH CARE EDUCATION/TRAINING PROGRAM

## 2023-12-14 PROCEDURE — 85610 PROTHROMBIN TIME: CPT | Performed by: STUDENT IN AN ORGANIZED HEALTH CARE EDUCATION/TRAINING PROGRAM

## 2023-12-14 PROCEDURE — 85025 COMPLETE CBC W/AUTO DIFF WBC: CPT | Performed by: STUDENT IN AN ORGANIZED HEALTH CARE EDUCATION/TRAINING PROGRAM

## 2023-12-14 PROCEDURE — 25000003 PHARM REV CODE 250: Performed by: PHYSICIAN ASSISTANT

## 2023-12-14 RX ORDER — HYDROCODONE BITARTRATE AND ACETAMINOPHEN 500; 5 MG/1; MG/1
TABLET ORAL
Status: DISCONTINUED | OUTPATIENT
Start: 2023-12-14 | End: 2023-12-18 | Stop reason: HOSPADM

## 2023-12-14 RX ORDER — POLYETHYLENE GLYCOL 3350 17 G/17G
17 POWDER, FOR SOLUTION ORAL DAILY
Status: DISCONTINUED | OUTPATIENT
Start: 2023-12-14 | End: 2023-12-18 | Stop reason: HOSPADM

## 2023-12-14 RX ORDER — TACROLIMUS 1 MG/1
3 CAPSULE ORAL 2 TIMES DAILY
Status: DISCONTINUED | OUTPATIENT
Start: 2023-12-14 | End: 2023-12-15

## 2023-12-14 RX ORDER — FUROSEMIDE 10 MG/ML
40 INJECTION INTRAMUSCULAR; INTRAVENOUS ONCE
Status: COMPLETED | OUTPATIENT
Start: 2023-12-14 | End: 2023-12-14

## 2023-12-14 RX ORDER — SODIUM,POTASSIUM PHOSPHATES 280-250MG
2 POWDER IN PACKET (EA) ORAL 2 TIMES DAILY
Status: DISCONTINUED | OUTPATIENT
Start: 2023-12-14 | End: 2023-12-17

## 2023-12-14 RX ORDER — LIDOCAINE HYDROCHLORIDE 10 MG/ML
10 INJECTION INFILTRATION; PERINEURAL ONCE
Status: COMPLETED | OUTPATIENT
Start: 2023-12-14 | End: 2023-12-14

## 2023-12-14 RX ORDER — DOCUSATE SODIUM 100 MG/1
100 CAPSULE, LIQUID FILLED ORAL 2 TIMES DAILY
Status: DISCONTINUED | OUTPATIENT
Start: 2023-12-14 | End: 2023-12-18 | Stop reason: HOSPADM

## 2023-12-14 RX ORDER — TACROLIMUS 1 MG/1
1 CAPSULE ORAL ONCE
Status: COMPLETED | OUTPATIENT
Start: 2023-12-14 | End: 2023-12-14

## 2023-12-14 RX ADMIN — FUROSEMIDE 40 MG: 10 INJECTION, SOLUTION INTRAVENOUS at 01:12

## 2023-12-14 RX ADMIN — PROPRANOLOL HYDROCHLORIDE 20 MG: 10 TABLET ORAL at 09:12

## 2023-12-14 RX ADMIN — HEPARIN SODIUM 5000 UNITS: 5000 INJECTION INTRAVENOUS; SUBCUTANEOUS at 05:12

## 2023-12-14 RX ADMIN — TACROLIMUS 3 MG: 1 CAPSULE ORAL at 05:12

## 2023-12-14 RX ADMIN — TACROLIMUS 2 MG: 1 CAPSULE ORAL at 09:12

## 2023-12-14 RX ADMIN — POLYETHYLENE GLYCOL 3350 17 G: 17 POWDER, FOR SOLUTION ORAL at 09:12

## 2023-12-14 RX ADMIN — MYCOPHENOLATE MOFETIL 1000 MG: 250 CAPSULE ORAL at 09:12

## 2023-12-14 RX ADMIN — FAMOTIDINE 20 MG: 20 TABLET, FILM COATED ORAL at 09:12

## 2023-12-14 RX ADMIN — DOCUSATE SODIUM 100 MG: 100 CAPSULE, LIQUID FILLED ORAL at 09:12

## 2023-12-14 RX ADMIN — OXYCODONE HYDROCHLORIDE 5 MG: 5 TABLET ORAL at 08:12

## 2023-12-14 RX ADMIN — MUPIROCIN 1 G: 20 OINTMENT TOPICAL at 09:12

## 2023-12-14 RX ADMIN — POTASSIUM & SODIUM PHOSPHATES POWDER PACK 280-160-250 MG 2 PACKET: 280-160-250 PACK at 09:12

## 2023-12-14 RX ADMIN — Medication 2 TABLET: at 10:12

## 2023-12-14 RX ADMIN — INSULIN ASPART 2 UNITS: 100 INJECTION, SOLUTION INTRAVENOUS; SUBCUTANEOUS at 09:12

## 2023-12-14 RX ADMIN — OXYCODONE HYDROCHLORIDE 5 MG: 5 TABLET ORAL at 01:12

## 2023-12-14 RX ADMIN — METHYLPREDNISOLONE SODIUM SUCCINATE 40 MG: 125 INJECTION, POWDER, FOR SOLUTION INTRAMUSCULAR; INTRAVENOUS at 09:12

## 2023-12-14 RX ADMIN — TACROLIMUS 1 MG: 1 CAPSULE ORAL at 10:12

## 2023-12-14 RX ADMIN — LIDOCAINE HYDROCHLORIDE 10 ML: 10 INJECTION, SOLUTION INFILTRATION; PERINEURAL at 11:12

## 2023-12-14 RX ADMIN — OXYCODONE HYDROCHLORIDE 5 MG: 5 TABLET ORAL at 02:12

## 2023-12-14 RX ADMIN — OXYCODONE HYDROCHLORIDE 5 MG: 5 TABLET ORAL at 09:12

## 2023-12-14 RX ADMIN — HEPARIN SODIUM 5000 UNITS: 5000 INJECTION INTRAVENOUS; SUBCUTANEOUS at 09:12

## 2023-12-14 RX ADMIN — INSULIN ASPART 4 UNITS: 100 INJECTION, SOLUTION INTRAVENOUS; SUBCUTANEOUS at 05:12

## 2023-12-14 RX ADMIN — BUPROPION HYDROCHLORIDE 300 MG: 150 TABLET, FILM COATED, EXTENDED RELEASE ORAL at 09:12

## 2023-12-14 NOTE — ASSESSMENT & PLAN NOTE
- 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 Progressed well, stepped down 12/10 PM.   - LFTs down trending  - Post op US stable, 2 fluid collections likely hematomas.  - Lateral drain removed 12/12.   - Plan to removed medial drain 12/14.  - Lasix 40mg for hypervolemia on 12/14

## 2023-12-14 NOTE — PROCEDURES
Drain removed:  Site cleaned with alcohol, lidocaine 1% used to numb area to place prolene 3.0 suture to site.  Drain intact at time of removal.  Patient tolerated procedure well.  Will continue to monitor for any complications.

## 2023-12-14 NOTE — PHYSICIAN QUERY
PT Name: Yumiko Gonzalez  MR #: [id number]    DOCUMENTATION CLARIFICATION      CDS/: HAMILTON PastorN, RN, CCDS               Contact information:  [email or web address]    This form is a permanent document in the medical record.      Query Date: December 14, 2023    By submitting this query, we are merely seeking further clarification of documentation. Please utilize your independent clinical judgment when addressing the question(s) below.       Indicators Supporting Clinical Findings Location in Medical Record   X Anemia, Thrombocytopenia, Neutropenia, Pancytopenia documented Acute postoperative anemia due to expected blood loss     Thrombocytopenia  - 2/2 ESLD; expected to improve post-op.   - Plt ordered 12/14 prior to central line removal    Trans liver: Dr. Raza 12/14   X H&H 9.3/ 26.4  8.1/ 23.5  7.9/ 22.9  7.3/ 21.2   Lab: 12/9          12/10          12/11          12/14   X WBC 3.8, 2.67   Lab: 12/13, 12/14     X Neutrophils/ Granulocytes/ ANC 1.7 Lab: 12/14   X Platelets 86, 40, 36   Lab: 12/10, 12/11, 12/14   X Transfusion(s) Platelets ordered prior to removal due to low plt count.    Trans liver: Dr. Raza 12/14    Treatments:     X Acute/ Chronic illness present for Liver Transplant for liver cirrhosis 2/2 to DURAN HX: HCC (status post Y90 06/2023) esophageal varices, ascites, hepatic encephalopathy     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    Trans liver: Dr. Raza 12/14    Other:     Pancytopenia is defined by:  Hb < 12g/dL (non-pregnant women) or <13g/dL (men) + ANC < 1800/microL + Platelets < 150,000/microL    The clinical guidelines noted are only a system guideline. It does not replace the providers clinical judgment.      Provider, please specify diagnosis associated with above clinical findings.    [ X  ] Pancytopenia due to liver disease   [   ] Pancytopenia, unspecified   [   ] Other Hematological  Diagnosis (please specify): ________       Please document in your progress notes daily for the duration of treatment, until resolved, and include in your discharge summary.    Reference:    HARMONY Hankins ([email or web address].). Approach to the adult with pancytopenia. [employer]. Retrieved September 7, 2022, from https://[email or web address]/contents/approach-to-the-adult-with-pancytopenia?search=pancytopenia&source=search_result&selectedTitle=1~150&usage_type=default&display_rank=1    Form No. [id number]
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
## 2023-12-14 NOTE — NURSING
Glucose on AM labs noted to be 88.  AM glucose checked early for safety.  CBG 98.  Insulin infusion turned off per transition insulin instructions (BG of < 100 mg/dL, DISCONTINUE INSULIN INFUSION, recheck in 30 minutes).

## 2023-12-14 NOTE — PLAN OF CARE
Pt has BSC and is a 2 person assist.   I have the pure wick in place and changing when soiled.  Pt has call bell in reach, non slip socks on, and bedrails up x2 with daughter at bedside.  She is receiving lasix iv during the day and on strict I&Os.  She is on an insulin gtt with accuchecks ac/hs and 2 am.  Pt incision painted with betadine  She is doing her incentive spirometer throughout the shift.

## 2023-12-14 NOTE — PT/OT/SLP PROGRESS
Physical Therapy Treatment    Patient Name:  Yumiko Gonzalez   MRN:  [id number]    Recommendations:     Discharge Recommendations: Low Intensity Therapy  Discharge Equipment Recommendations: none  Barriers to discharge: None    Assessment:     Yumiko Gonzalez is a 63 y.o. female admitted with a medical diagnosis of S/P liver transplant.  She presents with the following impairments/functional limitations: weakness, impaired endurance, impaired self care skills, impaired functional mobility, gait instability, pain Pt tolerated treatment session well today. Pt sat up EOB for approx 20 mins performing B LE exercises. Pt requesting to use bedside commode.     Rehab Prognosis: Good; patient would benefit from acute skilled PT services to address these deficits and reach maximum level of function.    Recent Surgery: Procedure(s) (LRB):  TRANSPLANT, LIVER (N/A) 5 Days Post-Op    Plan:     During this hospitalization, patient to be seen 5 x/week to address the identified rehab impairments via gait training, therapeutic activities, therapeutic exercises, neuromuscular re-education and progress toward the following goals:    Plan of Care Expires:  01/10/24    Subjective     Chief Complaint: Abdomen pain and back spasms   Patient/Family Comments/goals: Pt agreeable to PT   Pain/Comfort:  Pain Rating 1: 8/10  Location - Orientation 1: generalized  Location 1: abdomen  Pain Addressed 1: Reposition, Distraction, Pre-medicate for activity  Pain Rating Post-Intervention 1:  (not rated)  Pain Rating 2:  (not rated)  Location - Orientation 2: lower  Location 2: back  Pain Addressed 2: Reposition, Distraction  Pain Rating Post-Intervention 2:  (not rated)      Objective:     Communicated with Rn prior to session.  Patient found HOB elevated with LIUDMILA drain, peripheral IV upon PT entry to room.     General Precautions: Standard, fall  Orthopedic Precautions: N/A  Braces: N/A  Respiratory Status: Room air     Functional Mobility:  Bed Mobility:      Supine to Sit: contact guard assistance extra time required   Sit to stand: x 2 trails CGA with RW   Sit to Supine: minimum assistance to clear LE   Transfers:     Bed to bedside commode: contact guard assistance with  rolling walker  using  Step Transfer  Pt performed 10 repetitions of seated B LE exercises consisting of: Marching, LAQ, ABD/ADD, heel raises, and toe raises.      AM-PAC 6 CLICK MOBILITY  Turning over in bed (including adjusting bedclothes, sheets and blankets)?: 3  Sitting down on and standing up from a chair with arms (e.g., wheelchair, bedside commode, etc.): 3  Moving from lying on back to sitting on the side of the bed?: 3  Moving to and from a bed to a chair (including a wheelchair)?: 3  Need to walk in hospital room?: 3  Climbing 3-5 steps with a railing?: 2  Basic Mobility Total Score: 17       Treatment & Education:  Therapist provided instruction and educated for safety during transfers and gait training. As well as proper body mechanics, energy conservation, and fall prevention strategies during tasks listed above, and the effects of prolonged immobility and the importance of performing EOB/OOB activity and exercises to promote healing and reduce recovery time.      Patient left HOB elevated with all lines intact, call button in reach, Rn notified, and PCT present..    GOALS:   Multidisciplinary Problems       Physical Therapy Goals          Problem: Physical Therapy    Goal Priority Disciplines Outcome Goal Variances Interventions   Physical Therapy Goal     PT, PT/OT Ongoing, Progressing     Description: PT goals to be met by: 1/11/24    Patient will perform rolling each way with supervision.   Patient will perform supine <> sitting with supervision.  Patient will perform sit <> stand transitions with supervision using RW.  Patient will perform transfers from bed <> chair or BSC with supervision using RW.  Patient will ambulate 200 feet with supervision using RW.                        Time Tracking:     PT Received On: 12/14/23  PT Start Time: 1329     PT Stop Time: 1402  PT Total Time (min): 33 min     Billable Minutes: Therapeutic Activity 11 and Therapeutic Exercise 22    Treatment Type: Treatment  PT/PTA: PTA     Number of PTA visits since last PT visit: 3     12/14/2023

## 2023-12-14 NOTE — ASSESSMENT & PLAN NOTE
- cont home meds.  - psych consulted to assess for any medication dose adjustments. Appreciate assistance.

## 2023-12-14 NOTE — PROGRESS NOTES
"[facility]  Endocrinology  Progress Note    Admit Date: 2023     Reason for Consult: Management of Steroid Induced Hyperglycemia     Surgical Procedure and Date:  Liver Transplant on 12/10/23       Lab Results   Component Value Date    HGBA1C 4.6 10/31/2023       HPI:   Patient is a 63 y.o. female with a diagnosis of HTN, MDD, PTSD, hx cervical CA, cervical DDD, HCC s/p radioembolization 2023 and cirrhosis 2/2 DURAN with complications of prior hepatic encephalopathy, ascites, esophageal varices. Patient now presents for the above procedure(s). Endocrinology consulted for management of hyperglycemia.       Interval HPI:   No acute events overnight. Patient in room 69265/99323 A. Blood glucose stable. BG at and above goal on current insulin regimen (SSI ). Steroid use- Methylprednisolone  40 mg .   5 Days Post-Op  Renal function- Normal   Vasopressors-  None     Diet consistent carbohydrate Standard Tray     Eatin%  Nausea: No  Hypoglycemia and intervention: No  Fever: No  TPN and/or TF: No    /61 (Patient Position: Lying)   Pulse 68   Temp 98.1 °F (36.7 °C) (Oral)   Resp 16   Ht 5' 7" (1.702 m)   Wt 109 kg (240 lb 4.8 oz)   SpO2 95%   Breastfeeding No   BMI 37.64 kg/m²     Labs Reviewed and Include    Recent Labs   Lab 23  0529   GLU 88   CALCIUM 7.5*   ALBUMIN 2.5*   PROT 3.6*      K 3.6   CO2 21*   *   BUN 40*   CREATININE 1.1   ALKPHOS 119   *   AST 68*   BILITOT 2.8*     Lab Results   Component Value Date    WBC 2.67 (L) 2023    HGB 7.3 (L) 2023    HCT 21.2 (L) 2023    MCV 97 2023    PLT 36 (LL) 2023     No results for input(s): "TSH", "FREET4" in the last 168 hours.  Lab Results   Component Value Date    HGBA1C 4.6 10/31/2023       Nutritional status:   Body mass index is 37.64 kg/m².  Lab Results   Component Value Date    ALBUMIN 2.5 (L) 2023    ALBUMIN 2.6 (L) 2023    ALBUMIN 2.9 (L) 2023 " "    No results found for: "PREALBUMIN"    Estimated Creatinine Clearance: 65.7 mL/min (based on SCr of 1.1 mg/dL).    Accu-Checks  Recent Labs     12/12/23  1706 12/12/23  2119 12/13/23  0207 12/13/23  0820 12/13/23  1308 12/13/23  1658 12/13/23 2014 12/14/23  0214 12/14/23  0717 12/14/23  0808   POCTGLUCOSE 206* 194* 158* 116* 232* 213* 169* 127* 98 103       Current Medications and/or Treatments Impacting Glycemic Control  Immunotherapy:    Immunosuppressants           Stop Route Frequency     tacrolimus capsule 3 mg         -- Oral 2 times daily     tacrolimus capsule 1 mg         -- Oral Once     mycophenolate capsule 1,000 mg         -- Oral 2 times daily          Steroids:   Hormones (From admission, onward)      Start     Stop Route Frequency Ordered    12/15/23 0900  predniSONE tablet 20 mg  (methylprednisolone taper panel)        See Hyperspace for full Linked Orders Report.    -- Oral Daily 12/10/23 0143    12/12/23 0900  methylPREDNISolone sodium succinate injection 120 mg  (methylprednisolone taper panel)        See Hyperspace for full Linked Orders Report.    12/13/23 0859 IV Daily 12/10/23 0143          Pressors:    Autonomic Drugs (From admission, onward)      None          Hyperglycemia/Diabetes Medications:   Antihyperglycemics (From admission, onward)      Start     Stop Route Frequency Ordered    12/11/23 1511  insulin aspart U-100 pen 0-10 Units         -- SubQ As needed (PRN) 12/11/23 1411            ASSESSMENT and PLAN    Endocrine  Steroid-induced hyperglycemia  BG goal 140 - 180   No hx of DM.  S/p liver txp.  Titrated off transition drip.  Will monitor on  SSI on decreased doses on steroids. If remains elevated, will start on mealtime insulin,    Moderate Dose Correction Scale  BG monitoring ac/hs/0200    ** Please call Endocrine for any BG related issues **    ** Please notify Endocrine for any change and/or advance in diet**    Discharge planning: TBD          GI  * S/P liver " transplant  Managed per primary team  Optimize BG control        Other  Adverse effect of corticosteroids  Glucocorticoids markedly increase glucoses. Expect steroid taper to help with glucose control          Semaj Palacios PA-C  Endocrinology  [facility] - Trans[facility]

## 2023-12-14 NOTE — SUBJECTIVE & OBJECTIVE
"Interval HPI:   No acute events overnight. Patient in room 03860/22073 A. Blood glucose stable. BG at and above goal on current insulin regimen (SSI ). Steroid use- Methylprednisolone  40 mg .   5 Days Post-Op  Renal function- Normal   Vasopressors-  None     Diet consistent carbohydrate Standard Tray     Eatin%  Nausea: No  Hypoglycemia and intervention: No  Fever: No  TPN and/or TF: No    /61 (Patient Position: Lying)   Pulse 68   Temp 98.1 °F (36.7 °C) (Oral)   Resp 16   Ht 5' 7" (1.702 m)   Wt 109 kg (240 lb 4.8 oz)   SpO2 95%   Breastfeeding No   BMI 37.64 kg/m²     Labs Reviewed and Include    Recent Labs   Lab 23  0529   GLU 88   CALCIUM 7.5*   ALBUMIN 2.5*   PROT 3.6*      K 3.6   CO2 21*   *   BUN 40*   CREATININE 1.1   ALKPHOS 119   *   AST 68*   BILITOT 2.8*     Lab Results   Component Value Date    WBC 2.67 (L) 2023    HGB 7.3 (L) 2023    HCT 21.2 (L) 2023    MCV 97 2023    PLT 36 (LL) 2023     No results for input(s): "TSH", "FREET4" in the last 168 hours.  Lab Results   Component Value Date    HGBA1C 4.6 10/31/2023       Nutritional status:   Body mass index is 37.64 kg/m².  Lab Results   Component Value Date    ALBUMIN 2.5 (L) 2023    ALBUMIN 2.6 (L) 2023    ALBUMIN 2.9 (L) 2023     No results found for: "PREALBUMIN"    Estimated Creatinine Clearance: 65.7 mL/min (based on SCr of 1.1 mg/dL).    Accu-Checks  Recent Labs     23  1706 23  2119 23  0207 23  0820 23  1308 23  1658 23  2014 23  0214 23  0717 23  0808   POCTGLUCOSE 206* 194* 158* 116* 232* 213* 169* 127* 98 103       Current Medications and/or Treatments Impacting Glycemic Control  Immunotherapy:    Immunosuppressants           Stop Route Frequency     tacrolimus capsule 3 mg         -- Oral 2 times daily     tacrolimus capsule 1 mg         -- Oral Once     mycophenolate capsule 1,000 mg    "      -- Oral 2 times daily          Steroids:   Hormones (From admission, onward)      Start     Stop Route Frequency Ordered    12/15/23 0900  predniSONE tablet 20 mg  (methylprednisolone taper panel)        See Hyperspace for full Linked Orders Report.    -- Oral Daily 12/10/23 0143    12/12/23 0900  methylPREDNISolone sodium succinate injection 120 mg  (methylprednisolone taper panel)        See Hyperspace for full Linked Orders Report.    12/13/23 0859 IV Daily 12/10/23 0143          Pressors:    Autonomic Drugs (From admission, onward)      None          Hyperglycemia/Diabetes Medications:   Antihyperglycemics (From admission, onward)      Start     Stop Route Frequency Ordered    12/11/23 1511  insulin aspart U-100 pen 0-10 Units         -- SubQ As needed (PRN) 12/11/23 1411

## 2023-12-14 NOTE — PROGRESS NOTES
Please see previous social work notes for continuity.  SW spoke to patient's daughter, Rachel over phone and she thanked medical team for consulting psychiatry as patient has been requesting meeting with the psych team.  Rachel will check into [facility] tomorrow at 11 am.  TIAGO will assist as needed.

## 2023-12-14 NOTE — TELEPHONE ENCOUNTER
----- Message from Teodora Zamora sent at 12/13/2023  2:42 PM CST -----  Regarding: pt advice - surgery update  [phone] - Rachel Villa - daughter; wish to inform her mom's condition after the liver transplant and also changes on her medication.      Teodora

## 2023-12-14 NOTE — PROGRESS NOTES
[facility] - Transplant Stepdown  Liver Transplant  Progress Note    Patient Name: Yumiko Gonzalez  MRN: [id number]  Admission Date: 12/9/2023  Hospital Length of Stay: 5 days  Code Status: Full Code  Primary Care Provider: Garrett Webb MD  Post-Operative Day: 5    ORGAN:   LIVER  Disease Etiology: Cirrhosis: Fatty Liver (DURAN)  Donor Type:   Donation after Circulatory Death   CDC High Risk:   No  Donor CMV Status:   Donor CMV Status: Negative  Donor HBcAB:   Negative  Donor HCV Status:   Negative  Donor HBV PEDRO:   Donor HCV PEDRO: Negative  Whole or Partial: Whole Liver  Biliary Anastomosis: End to End  Arterial Anatomy: Standard  Subjective:     History of Present Illness:  Ms. Gonzalez is a 62 y/o female who present for Liver Transplant for liver cirrhosis 2/2 to DURAN HX: HCC (status post Y90 06/2023) esophageal varices, ascites, hepatic encephalopathy. She reports in usual state of health- Patient denies any recent hospitalizations or ED visits. The primary surgeon will be Dr. Raza.  Patient is NPO.  All pre-op labs and imaging have been ordered and will be reviewed prior to surgery. Consents to be signed prior to transplant.      Hospital Course:  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 Progressed well, stepped down 12/10 PM. LFTs down trending, Cr stable. Post op US stable, 2 fluid collections likely hematomas. Pt with hypervolemia; lasix dependent prior to transplant. Lateral LIUDMILA drain removed 12/12. PT/OT consulted with recs for home health. Encouraged patient to ambulate with assistance.     Interval Hx: Pt slowly improving post transplant. LFTs and TB trending down well. Pt doing fine. Remains hypervolemic; plan for Lasix 40mg today. CVC to be removed. Platelets ordered prior to removal due to low plt count. LIUDMILA x 1 in place- ss drainage; plan to remove today. Pt remains very emotional with adjustment disorder. Psych consulted for  evaluation and any medication dosing changes. Appreciate assistance. Persistent UE tremors, worsened post-txp, suspect from tacrolimus. Restarted on home propranolol. Working well with therapy. Encouraged ambulation with assistance if needed. Tentative d/c Monday 12/18. Cont to monitor.      Scheduled Meds:   buPROPion  300 mg Oral Daily    docusate sodium  100 mg Oral BID    famotidine  20 mg Oral QHS    furosemide (LASIX) injection  40 mg Intravenous Once    heparin (porcine)  5,000 Units Subcutaneous Q8H    mupirocin  1 g Nasal BID    mycophenolate  1,000 mg Oral BID    polyethylene glycol  17 g Oral Daily    potassium, sodium phosphates  2 packet Oral BID    [START ON 12/15/2023] predniSONE  20 mg Oral Daily    propranoloL  20 mg Oral BID    [START ON 12/17/2023] sulfamethoxazole-trimethoprim 400-80mg  1 tablet Oral Daily AM    tacrolimus  3 mg Oral BID    [START ON 12/20/2023] valGANciclovir  450 mg Oral Daily     Continuous Infusions:  PRN Meds:0.9%  NaCl infusion (for blood administration), dextrose 10%, dextrose 10%, glucagon (human recombinant), glucose, glucose, insulin aspart U-100, oxyCODONE, oxyCODONE, sodium chloride 0.9%    Review of Systems   Constitutional:  Negative for chills, fatigue and fever.   Respiratory:  Negative for cough and shortness of breath.    Cardiovascular:  Positive for leg swelling. Negative for chest pain.   Gastrointestinal:  Positive for abdominal pain (incisional). Negative for abdominal distention, diarrhea, nausea and vomiting.   Genitourinary:  Negative for decreased urine volume and difficulty urinating.   Musculoskeletal:  Positive for back pain (chronic) and gait problem.   Allergic/Immunologic: Positive for immunocompromised state.   Neurological:  Positive for tremors and weakness. Negative for dizziness and headaches.   Psychiatric/Behavioral:  Negative for confusion and decreased concentration. The patient is nervous/anxious.      Objective:     Vital Signs (Most  Recent):  Temp: 97.6 °F (36.4 °C) (12/14/23 1127)  Pulse: 83 (12/14/23 1127)  Resp: 17 (12/14/23 1127)  BP: 139/65 (12/14/23 1127)  SpO2: (!) 93 % (12/14/23 1127) Vital Signs (24h Range):  Temp:  [97.6 °F (36.4 °C)-98.1 °F (36.7 °C)] 97.6 °F (36.4 °C)  Pulse:  [67-83] 83  Resp:  [16-18] 17  SpO2:  [93 %-97 %] 93 %  BP: (129-150)/(60-78) 139/65     Weight: 109 kg (240 lb 4.8 oz)  Body mass index is 37.64 kg/m².    Intake/Output - Last 3 Shifts         12/12 0700  12/13 0659 12/13 0700  12/14 0659 12/14 0700  12/15 0659    P.O. 720 840     I.V. (mL/kg) 52.6 (0.5) 215.1 (2) 0.6 (0)    Blood       Total Intake(mL/kg) 772.6 (7.1) 1055.1 (9.7) 0.6 (0)    Urine (mL/kg/hr) 850 (0.3) 325 (0.1)     Drains 395 520     Total Output 1245 845     Net -472.4 +210.1 +0.6           Urine Occurrence 2 x 4 x     Stool Occurrence   0 x             Physical Exam  Vitals reviewed.   Constitutional:       Appearance: She is well-developed.   Eyes:      Pupils: Pupils are equal, round, and reactive to light.   Cardiovascular:      Rate and Rhythm: Normal rate and regular rhythm.      Heart sounds: No murmur heard.  Pulmonary:      Effort: Pulmonary effort is normal. No respiratory distress.      Breath sounds: Normal breath sounds. No wheezing.   Abdominal:      General: Bowel sounds are normal. There is no distension.      Palpations: Abdomen is soft.      Tenderness: There is abdominal tenderness. There is no guarding.      Comments: Chevron incision w/ staples- cdi  LIUDMILA x 1 with ss drainage    Musculoskeletal:         General: Normal range of motion.      Cervical back: Normal range of motion.      Right lower leg: Edema present.      Left lower leg: Edema present.   Skin:     General: Skin is warm and dry.   Neurological:      Mental Status: She is alert and oriented to person, place, and time.   Psychiatric:         Mood and Affect: Affect is tearful.         Behavior: Behavior normal.         Thought Content: Thought content normal.          Judgment: Judgment normal.          Laboratory:  Immunosuppressants           Stop Route Frequency     tacrolimus capsule 3 mg         -- Oral 2 times daily     mycophenolate capsule 1,000 mg         -- Oral 2 times daily          CBC:   Recent Labs   Lab 12/14/23  0529   WBC 2.67*   RBC 2.18*   HGB 7.3*   HCT 21.2*   PLT 36*   MCV 97   MCH 33.5*   MCHC 34.4     CMP:   Recent Labs   Lab 12/14/23  0529   GLU 88   CALCIUM 7.5*   ALBUMIN 2.5*   PROT 3.6*      K 3.6   CO2 21*   *   BUN 40*   CREATININE 1.1   ALKPHOS 119   *   AST 68*   BILITOT 2.8*     Labs within the past 24 hours have been reviewed.    Diagnostic Results:  None    Debility/Functional status: Patient debilitated by evidence of Muscle wasting and atrophy, Weakness, Limitation of activities due to disability, and Other reduced mobility. Physical and occupational therapy ordered daily to evaluate and treat. Debility was: present on admission.        Assessment/Plan:     * S/P liver transplant  - 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 Progressed well, stepped down 12/10 PM.   - LFTs down trending  - Post op US stable, 2 fluid collections likely hematomas.  - Lateral drain removed 12/12.   - Plan to removed medial drain 12/14.  - Lasix 40mg for hypervolemia on 12/14    DANAY (acute kidney injury)  - albumin 25% 12/13  - improved 12/14      Hypervolemia  - on home lasix prior to txp  - IV lasix 40mg 12/12; 12/14      Acute postoperative anemia due to expected blood loss  - H/H slowly trending down but stable. No overt signs of bleeding.       At risk for opportunistic infections  - Continue OI prophylaxis per protocol       Prophylactic immunotherapy  Continue Cellcept and Prograf. Will monitor for signs of toxic side effects, check daily tacrolimus troughs, and change meds accordingly.       Immunosuppressed status        Thrombocytopenia  - 2/2 ESLD;  expected to improve post-op.   - Plt ordered 12/14 prior to central line removal       Tremor  - on home propranolol prior to transplant to assist with symptoms  - symptoms worsened post transplant, suspect prograf  - resumed home propranolol 12/13       Moderate episode of recurrent major depressive disorder  - cont home meds.  - psych consulted to assess for any medication dose adjustments. Appreciate assistance.        VTE Risk Mitigation (From admission, onward)           Ordered     heparin (porcine) injection 5,000 Units  Every 8 hours         12/10/23 0143     Place sequential compression device  Until discontinued         12/10/23 0143     IP VTE HIGH RISK PATIENT  Once         12/10/23 0143                    The patients clinical status was discussed at multidisplinary rounds, involving transplant surgery, transplant medicine, pharmacy, nursing, nutrition, and social work.    Discharge Planning:  Health Maintenance Due   Topic Date Due    Shingles Vaccine (1 of 2) 05/22/2009    TETANUS VACCINE  08/03/2017    Influenza Vaccine (1) 09/01/2019             Medical decision making for this encounter includes review of pertinent labs and diagnostic studies, assessment and planning, discussions with consulting providers, discussion with patient/family, and participation in multidisciplinary rounds. Time spent caring for patient: 60 minutes.    Sade Salgado PA-C  Liver Transplant  [facility] - Transplant Stepdown

## 2023-12-14 NOTE — SUBJECTIVE & OBJECTIVE
Scheduled Meds:   buPROPion  300 mg Oral Daily    docusate sodium  100 mg Oral BID    famotidine  20 mg Oral QHS    furosemide (LASIX) injection  40 mg Intravenous Once    heparin (porcine)  5,000 Units Subcutaneous Q8H    mupirocin  1 g Nasal BID    mycophenolate  1,000 mg Oral BID    polyethylene glycol  17 g Oral Daily    potassium, sodium phosphates  2 packet Oral BID    [START ON 12/15/2023] predniSONE  20 mg Oral Daily    propranoloL  20 mg Oral BID    [START ON 12/17/2023] sulfamethoxazole-trimethoprim 400-80mg  1 tablet Oral Daily AM    tacrolimus  3 mg Oral BID    [START ON 12/20/2023] valGANciclovir  450 mg Oral Daily     Continuous Infusions:  PRN Meds:0.9%  NaCl infusion (for blood administration), dextrose 10%, dextrose 10%, glucagon (human recombinant), glucose, glucose, insulin aspart U-100, oxyCODONE, oxyCODONE, sodium chloride 0.9%    Review of Systems   Constitutional:  Negative for chills, fatigue and fever.   Respiratory:  Negative for cough and shortness of breath.    Cardiovascular:  Positive for leg swelling. Negative for chest pain.   Gastrointestinal:  Positive for abdominal pain (incisional). Negative for abdominal distention, diarrhea, nausea and vomiting.   Genitourinary:  Negative for decreased urine volume and difficulty urinating.   Musculoskeletal:  Positive for back pain (chronic) and gait problem.   Allergic/Immunologic: Positive for immunocompromised state.   Neurological:  Positive for tremors and weakness. Negative for dizziness and headaches.   Psychiatric/Behavioral:  Negative for confusion and decreased concentration. The patient is nervous/anxious.      Objective:     Vital Signs (Most Recent):  Temp: 97.6 °F (36.4 °C) (12/14/23 1127)  Pulse: 83 (12/14/23 1127)  Resp: 17 (12/14/23 1127)  BP: 139/65 (12/14/23 1127)  SpO2: (!) 93 % (12/14/23 1127) Vital Signs (24h Range):  Temp:  [97.6 °F (36.4 °C)-98.1 °F (36.7 °C)] 97.6 °F (36.4 °C)  Pulse:  [67-83] 83  Resp:  [16-18]  17  SpO2:  [93 %-97 %] 93 %  BP: (129-150)/(60-78) 139/65     Weight: 109 kg (240 lb 4.8 oz)  Body mass index is 37.64 kg/m².    Intake/Output - Last 3 Shifts         12/12 0700  12/13 0659 12/13 0700  12/14 0659 12/14 0700  12/15 0659    P.O. 720 840     I.V. (mL/kg) 52.6 (0.5) 215.1 (2) 0.6 (0)    Blood       Total Intake(mL/kg) 772.6 (7.1) 1055.1 (9.7) 0.6 (0)    Urine (mL/kg/hr) 850 (0.3) 325 (0.1)     Drains 395 520     Total Output 1245 845     Net -472.4 +210.1 +0.6           Urine Occurrence 2 x 4 x     Stool Occurrence   0 x             Physical Exam  Vitals reviewed.   Constitutional:       Appearance: She is well-developed.   Eyes:      Pupils: Pupils are equal, round, and reactive to light.   Cardiovascular:      Rate and Rhythm: Normal rate and regular rhythm.      Heart sounds: No murmur heard.  Pulmonary:      Effort: Pulmonary effort is normal. No respiratory distress.      Breath sounds: Normal breath sounds. No wheezing.   Abdominal:      General: Bowel sounds are normal. There is no distension.      Palpations: Abdomen is soft.      Tenderness: There is abdominal tenderness. There is no guarding.      Comments: Chevron incision w/ staples- cdi  LIUDMILA x 1 with ss drainage    Musculoskeletal:         General: Normal range of motion.      Cervical back: Normal range of motion.      Right lower leg: Edema present.      Left lower leg: Edema present.   Skin:     General: Skin is warm and dry.   Neurological:      Mental Status: She is alert and oriented to person, place, and time.   Psychiatric:         Mood and Affect: Affect is tearful.         Behavior: Behavior normal.         Thought Content: Thought content normal.         Judgment: Judgment normal.          Laboratory:  Immunosuppressants           Stop Route Frequency     tacrolimus capsule 3 mg         -- Oral 2 times daily     mycophenolate capsule 1,000 mg         -- Oral 2 times daily          CBC:   Recent Labs   Lab 12/14/23  0529   WBC  2.67*   RBC 2.18*   HGB 7.3*   HCT 21.2*   PLT 36*   MCV 97   MCH 33.5*   MCHC 34.4     CMP:   Recent Labs   Lab 12/14/23  0529   GLU 88   CALCIUM 7.5*   ALBUMIN 2.5*   PROT 3.6*      K 3.6   CO2 21*   *   BUN 40*   CREATININE 1.1   ALKPHOS 119   *   AST 68*   BILITOT 2.8*     Labs within the past 24 hours have been reviewed.    Diagnostic Results:  None    Debility/Functional status: Patient debilitated by evidence of Muscle wasting and atrophy, Weakness, Limitation of activities due to disability, and Other reduced mobility. Physical and occupational therapy ordered daily to evaluate and treat. Debility was: present on admission.

## 2023-12-14 NOTE — ASSESSMENT & PLAN NOTE
BG goal 140 - 180   No hx of DM.  S/p liver txp.  Titrated off transition drip.  Will monitor on  SSI on decreased doses on steroids. If remains elevated, will start on mealtime insulin,    Moderate Dose Correction Scale  BG monitoring ac/hs/0200    ** Please call Endocrine for any BG related issues **    ** Please notify Endocrine for any change and/or advance in diet**    Discharge planning: TBD

## 2023-12-14 NOTE — TELEPHONE ENCOUNTER
Spoke with pt's daughter, Rachel, and informed her to reach out to us once pt is discharged and we will schedule a HFU appt, virtual if need be, she verbalized understanding.

## 2023-12-15 ENCOUNTER — CLINICAL SUPPORT (OUTPATIENT)
Dept: PRIMARY CARE CLINIC | Facility: CLINIC | Age: 63
End: 2023-12-15
Payer: COMMERCIAL

## 2023-12-15 DIAGNOSIS — R45.89 NEED FOR EMOTIONAL SUPPORT: Primary | ICD-10-CM

## 2023-12-15 LAB
ALBUMIN SERPL BCP-MCNC: 2.8 G/DL (ref 3.5–5.2)
ALP SERPL-CCNC: 179 U/L (ref 55–135)
ALT SERPL W/O P-5'-P-CCNC: 191 U/L (ref 10–44)
ANION GAP SERPL CALC-SCNC: 5 MMOL/L (ref 8–16)
AST SERPL-CCNC: 58 U/L (ref 10–40)
BASOPHILS # BLD AUTO: 0.03 K/UL (ref 0–0.2)
BASOPHILS NFR BLD: 0.7 % (ref 0–1.9)
BILIRUB SERPL-MCNC: 3.9 MG/DL (ref 0.1–1)
BUN SERPL-MCNC: 41 MG/DL (ref 8–23)
CALCIUM SERPL-MCNC: 8.6 MG/DL (ref 8.7–10.5)
CHLORIDE SERPL-SCNC: 104 MMOL/L (ref 95–110)
CO2 SERPL-SCNC: 27 MMOL/L (ref 23–29)
CREAT SERPL-MCNC: 1.2 MG/DL (ref 0.5–1.4)
DIFFERENTIAL METHOD BLD: ABNORMAL
EOSINOPHIL # BLD AUTO: 0.4 K/UL (ref 0–0.5)
EOSINOPHIL NFR BLD: 9.2 % (ref 0–8)
ERYTHROCYTE [DISTWIDTH] IN BLOOD BY AUTOMATED COUNT: 16.1 % (ref 11.5–14.5)
EST. GFR  (NO RACE VARIABLE): 50.5 ML/MIN/1.73 M^2
FINAL PATHOLOGIC DIAGNOSIS: NORMAL
GLUCOSE SERPL-MCNC: 136 MG/DL (ref 70–110)
GROSS: NORMAL
HCT VFR BLD AUTO: 23.7 % (ref 37–48.5)
HGB BLD-MCNC: 8.2 G/DL (ref 12–16)
IMM GRANULOCYTES # BLD AUTO: 0.23 K/UL (ref 0–0.04)
IMM GRANULOCYTES NFR BLD AUTO: 5 % (ref 0–0.5)
INR PPP: 1.1 (ref 0.8–1.2)
LYMPHOCYTES # BLD AUTO: 0.2 K/UL (ref 1–4.8)
LYMPHOCYTES NFR BLD: 4.8 % (ref 18–48)
Lab: NORMAL
MAGNESIUM SERPL-MCNC: 1.8 MG/DL (ref 1.6–2.6)
MCH RBC QN AUTO: 32.8 PG (ref 27–31)
MCHC RBC AUTO-ENTMCNC: 34.6 G/DL (ref 32–36)
MCV RBC AUTO: 95 FL (ref 82–98)
MICROSCOPIC EXAM: NORMAL
MONOCYTES # BLD AUTO: 0.6 K/UL (ref 0.3–1)
MONOCYTES NFR BLD: 12.4 % (ref 4–15)
NEUTROPHILS # BLD AUTO: 3.1 K/UL (ref 1.8–7.7)
NEUTROPHILS NFR BLD: 67.9 % (ref 38–73)
NRBC BLD-RTO: 0 /100 WBC
PHOSPHATE SERPL-MCNC: 2.7 MG/DL (ref 2.7–4.5)
PLATELET # BLD AUTO: 54 K/UL (ref 150–450)
PMV BLD AUTO: 10.9 FL (ref 9.2–12.9)
POCT GLUCOSE: 135 MG/DL (ref 70–110)
POCT GLUCOSE: 152 MG/DL (ref 70–110)
POCT GLUCOSE: 172 MG/DL (ref 70–110)
POCT GLUCOSE: 242 MG/DL (ref 70–110)
POTASSIUM SERPL-SCNC: 4 MMOL/L (ref 3.5–5.1)
PROT SERPL-MCNC: 4.5 G/DL (ref 6–8.4)
PROTHROMBIN TIME: 11.4 SEC (ref 9–12.5)
RBC # BLD AUTO: 2.5 M/UL (ref 4–5.4)
SODIUM SERPL-SCNC: 136 MMOL/L (ref 136–145)
TACROLIMUS BLD-MCNC: 4.7 NG/ML (ref 5–15)
WBC # BLD AUTO: 4.58 K/UL (ref 3.9–12.7)

## 2023-12-15 PROCEDURE — 63600175 PHARM REV CODE 636 W HCPCS: Performed by: TRANSPLANT SURGERY

## 2023-12-15 PROCEDURE — 20600001 HC STEP DOWN PRIVATE ROOM

## 2023-12-15 PROCEDURE — 97530 THERAPEUTIC ACTIVITIES: CPT | Mod: CQ

## 2023-12-15 PROCEDURE — 99233 SBSQ HOSP IP/OBS HIGH 50: CPT | Mod: 24,,, | Performed by: PHYSICIAN ASSISTANT

## 2023-12-15 PROCEDURE — 25000003 PHARM REV CODE 250: Performed by: PHYSICIAN ASSISTANT

## 2023-12-15 PROCEDURE — 25000003 PHARM REV CODE 250: Performed by: SURGERY

## 2023-12-15 PROCEDURE — 36415 COLL VENOUS BLD VENIPUNCTURE: CPT | Performed by: STUDENT IN AN ORGANIZED HEALTH CARE EDUCATION/TRAINING PROGRAM

## 2023-12-15 PROCEDURE — 83735 ASSAY OF MAGNESIUM: CPT | Performed by: STUDENT IN AN ORGANIZED HEALTH CARE EDUCATION/TRAINING PROGRAM

## 2023-12-15 PROCEDURE — 63600175 PHARM REV CODE 636 W HCPCS: Mod: JZ,JG | Performed by: PHYSICIAN ASSISTANT

## 2023-12-15 PROCEDURE — 99499 UNLISTED E&M SERVICE: CPT | Mod: 95,,, | Performed by: SOCIAL WORKER

## 2023-12-15 PROCEDURE — 99499 NO LOS: ICD-10-PCS | Mod: 95,,, | Performed by: SOCIAL WORKER

## 2023-12-15 PROCEDURE — 85610 PROTHROMBIN TIME: CPT | Performed by: STUDENT IN AN ORGANIZED HEALTH CARE EDUCATION/TRAINING PROGRAM

## 2023-12-15 PROCEDURE — P9047 ALBUMIN (HUMAN), 25%, 50ML: HCPCS | Mod: JZ,JG | Performed by: PHYSICIAN ASSISTANT

## 2023-12-15 PROCEDURE — 84100 ASSAY OF PHOSPHORUS: CPT | Performed by: STUDENT IN AN ORGANIZED HEALTH CARE EDUCATION/TRAINING PROGRAM

## 2023-12-15 PROCEDURE — 80197 ASSAY OF TACROLIMUS: CPT | Performed by: STUDENT IN AN ORGANIZED HEALTH CARE EDUCATION/TRAINING PROGRAM

## 2023-12-15 PROCEDURE — 97116 GAIT TRAINING THERAPY: CPT | Mod: CQ

## 2023-12-15 PROCEDURE — 63600175 PHARM REV CODE 636 W HCPCS: Performed by: PHYSICIAN ASSISTANT

## 2023-12-15 PROCEDURE — 85007 BL SMEAR W/DIFF WBC COUNT: CPT | Performed by: STUDENT IN AN ORGANIZED HEALTH CARE EDUCATION/TRAINING PROGRAM

## 2023-12-15 PROCEDURE — 25000003 PHARM REV CODE 250

## 2023-12-15 PROCEDURE — 99232 SBSQ HOSP IP/OBS MODERATE 35: CPT | Mod: ,,, | Performed by: PHYSICIAN ASSISTANT

## 2023-12-15 PROCEDURE — 63600175 PHARM REV CODE 636 W HCPCS: Performed by: STUDENT IN AN ORGANIZED HEALTH CARE EDUCATION/TRAINING PROGRAM

## 2023-12-15 PROCEDURE — 85027 COMPLETE CBC AUTOMATED: CPT | Performed by: STUDENT IN AN ORGANIZED HEALTH CARE EDUCATION/TRAINING PROGRAM

## 2023-12-15 PROCEDURE — 80053 COMPREHEN METABOLIC PANEL: CPT | Performed by: STUDENT IN AN ORGANIZED HEALTH CARE EDUCATION/TRAINING PROGRAM

## 2023-12-15 RX ORDER — ALBUMIN HUMAN 250 G/1000ML
25 SOLUTION INTRAVENOUS EVERY 8 HOURS
Status: COMPLETED | OUTPATIENT
Start: 2023-12-15 | End: 2023-12-15

## 2023-12-15 RX ORDER — TACROLIMUS 1 MG/1
1 CAPSULE ORAL ONCE
Status: COMPLETED | OUTPATIENT
Start: 2023-12-15 | End: 2023-12-15

## 2023-12-15 RX ORDER — INSULIN ASPART 100 [IU]/ML
0-10 INJECTION, SOLUTION INTRAVENOUS; SUBCUTANEOUS
Status: DISCONTINUED | OUTPATIENT
Start: 2023-12-15 | End: 2023-12-18 | Stop reason: HOSPADM

## 2023-12-15 RX ORDER — FACIAL-BODY WIPES
10 EACH TOPICAL ONCE
Status: COMPLETED | OUTPATIENT
Start: 2023-12-15 | End: 2023-12-15

## 2023-12-15 RX ORDER — IBUPROFEN 200 MG
16 TABLET ORAL
Status: DISCONTINUED | OUTPATIENT
Start: 2023-12-15 | End: 2023-12-18 | Stop reason: HOSPADM

## 2023-12-15 RX ORDER — TACROLIMUS 1 MG/1
4 CAPSULE ORAL 2 TIMES DAILY
Status: DISCONTINUED | OUTPATIENT
Start: 2023-12-15 | End: 2023-12-16

## 2023-12-15 RX ORDER — IBUPROFEN 200 MG
24 TABLET ORAL
Status: DISCONTINUED | OUTPATIENT
Start: 2023-12-15 | End: 2023-12-18 | Stop reason: HOSPADM

## 2023-12-15 RX ORDER — GLUCAGON 1 MG
1 KIT INJECTION
Status: DISCONTINUED | OUTPATIENT
Start: 2023-12-15 | End: 2023-12-18 | Stop reason: HOSPADM

## 2023-12-15 RX ORDER — INSULIN ASPART 100 [IU]/ML
3 INJECTION, SOLUTION INTRAVENOUS; SUBCUTANEOUS
Status: DISCONTINUED | OUTPATIENT
Start: 2023-12-15 | End: 2023-12-16

## 2023-12-15 RX ADMIN — HEPARIN SODIUM 5000 UNITS: 5000 INJECTION INTRAVENOUS; SUBCUTANEOUS at 02:12

## 2023-12-15 RX ADMIN — INSULIN ASPART 4 UNITS: 100 INJECTION, SOLUTION INTRAVENOUS; SUBCUTANEOUS at 05:12

## 2023-12-15 RX ADMIN — ALBUMIN (HUMAN) 25 G: 12.5 SOLUTION INTRAVENOUS at 02:12

## 2023-12-15 RX ADMIN — BUPROPION HYDROCHLORIDE 300 MG: 150 TABLET, FILM COATED, EXTENDED RELEASE ORAL at 08:12

## 2023-12-15 RX ADMIN — TRAZODONE HYDROCHLORIDE 25 MG: 50 TABLET ORAL at 09:12

## 2023-12-15 RX ADMIN — TACROLIMUS 1 MG: 1 CAPSULE ORAL at 12:12

## 2023-12-15 RX ADMIN — MYCOPHENOLATE MOFETIL 1000 MG: 250 CAPSULE ORAL at 09:12

## 2023-12-15 RX ADMIN — TACROLIMUS 3 MG: 1 CAPSULE ORAL at 08:12

## 2023-12-15 RX ADMIN — HEPARIN SODIUM 5000 UNITS: 5000 INJECTION INTRAVENOUS; SUBCUTANEOUS at 06:12

## 2023-12-15 RX ADMIN — POLYETHYLENE GLYCOL 3350 17 G: 17 POWDER, FOR SOLUTION ORAL at 08:12

## 2023-12-15 RX ADMIN — OXYCODONE HYDROCHLORIDE 5 MG: 5 TABLET ORAL at 12:12

## 2023-12-15 RX ADMIN — INSULIN ASPART 2 UNITS: 100 INJECTION, SOLUTION INTRAVENOUS; SUBCUTANEOUS at 08:12

## 2023-12-15 RX ADMIN — OXYCODONE HYDROCHLORIDE 5 MG: 5 TABLET ORAL at 09:12

## 2023-12-15 RX ADMIN — POTASSIUM & SODIUM PHOSPHATES POWDER PACK 280-160-250 MG 2 PACKET: 280-160-250 PACK at 08:12

## 2023-12-15 RX ADMIN — INSULIN ASPART 3 UNITS: 100 INJECTION, SOLUTION INTRAVENOUS; SUBCUTANEOUS at 05:12

## 2023-12-15 RX ADMIN — FAMOTIDINE 20 MG: 20 TABLET, FILM COATED ORAL at 09:12

## 2023-12-15 RX ADMIN — PROPRANOLOL HYDROCHLORIDE 20 MG: 10 TABLET ORAL at 09:12

## 2023-12-15 RX ADMIN — INSULIN ASPART 4 UNITS: 100 INJECTION, SOLUTION INTRAVENOUS; SUBCUTANEOUS at 12:12

## 2023-12-15 RX ADMIN — PREDNISONE 20 MG: 20 TABLET ORAL at 08:12

## 2023-12-15 RX ADMIN — INSULIN ASPART 3 UNITS: 100 INJECTION, SOLUTION INTRAVENOUS; SUBCUTANEOUS at 12:12

## 2023-12-15 RX ADMIN — PROPRANOLOL HYDROCHLORIDE 20 MG: 10 TABLET ORAL at 08:12

## 2023-12-15 RX ADMIN — TACROLIMUS 4 MG: 1 CAPSULE ORAL at 05:12

## 2023-12-15 RX ADMIN — OXYCODONE HYDROCHLORIDE 5 MG: 5 TABLET ORAL at 06:12

## 2023-12-15 RX ADMIN — MYCOPHENOLATE MOFETIL 1000 MG: 250 CAPSULE ORAL at 08:12

## 2023-12-15 RX ADMIN — HEPARIN SODIUM 5000 UNITS: 5000 INJECTION INTRAVENOUS; SUBCUTANEOUS at 09:12

## 2023-12-15 RX ADMIN — DOCUSATE SODIUM 100 MG: 100 CAPSULE, LIQUID FILLED ORAL at 08:12

## 2023-12-15 RX ADMIN — ALBUMIN (HUMAN) 25 G: 12.5 SOLUTION INTRAVENOUS at 09:12

## 2023-12-15 RX ADMIN — POTASSIUM & SODIUM PHOSPHATES POWDER PACK 280-160-250 MG 2 PACKET: 280-160-250 PACK at 09:12

## 2023-12-15 RX ADMIN — OXYCODONE HYDROCHLORIDE 5 MG: 5 TABLET ORAL at 05:12

## 2023-12-15 RX ADMIN — BISACODYL 10 MG: 10 SUPPOSITORY RECTAL at 06:12

## 2023-12-15 NOTE — SUBJECTIVE & OBJECTIVE
Scheduled Meds:   albumin human 25%  25 g Intravenous Q8H    buPROPion  300 mg Oral Daily    docusate sodium  100 mg Oral BID    famotidine  20 mg Oral QHS    heparin (porcine)  5,000 Units Subcutaneous Q8H    insulin aspart U-100  3 Units Subcutaneous TIDWM    mycophenolate  1,000 mg Oral BID    polyethylene glycol  17 g Oral Daily    potassium, sodium phosphates  2 packet Oral BID    predniSONE  20 mg Oral Daily    propranoloL  20 mg Oral BID    [START ON 12/17/2023] sulfamethoxazole-trimethoprim 400-80mg  1 tablet Oral Daily AM    tacrolimus  4 mg Oral BID    traZODone  25 mg Oral QHS    [START ON 12/20/2023] valGANciclovir  450 mg Oral Daily     Continuous Infusions:  PRN Meds:0.9%  NaCl infusion (for blood administration), dextrose 10%, dextrose 10%, glucagon (human recombinant), glucose, glucose, insulin aspart U-100, oxyCODONE, oxyCODONE, sodium chloride 0.9%    Review of Systems   Constitutional:  Negative for chills, fatigue and fever.   Respiratory:  Negative for cough and shortness of breath.    Cardiovascular:  Positive for leg swelling. Negative for chest pain.   Gastrointestinal:  Positive for abdominal pain (incisional). Negative for abdominal distention, diarrhea, nausea and vomiting.   Genitourinary:  Negative for decreased urine volume and difficulty urinating.   Musculoskeletal:  Positive for back pain (chronic) and gait problem.   Allergic/Immunologic: Positive for immunocompromised state.   Neurological:  Positive for tremors and weakness. Negative for dizziness and headaches.   Psychiatric/Behavioral:  Negative for confusion and decreased concentration. The patient is nervous/anxious.      Objective:     Vital Signs (Most Recent):  Temp: 98.2 °F (36.8 °C) (12/15/23 1156)  Pulse: 70 (12/15/23 1156)  Resp: 18 (12/15/23 1201)  BP: (!) 125/59 (12/15/23 1156)  SpO2: (!) 94 % (12/15/23 1156) Vital Signs (24h Range):  Temp:  [98 °F (36.7 °C)-98.8 °F (37.1 °C)] 98.2 °F (36.8 °C)  Pulse:  [70-77]  70  Resp:  [18-20] 18  SpO2:  [94 %-97 %] 94 %  BP: (120-144)/(56-71) 125/59     Weight: 109.7 kg (241 lb 13.5 oz)  Body mass index is 37.88 kg/m².    Intake/Output - Last 3 Shifts         12/13 0700  12/14 0659 12/14 0700  12/15 0659 12/15 0700  12/16 0659    P.O. 840 220     I.V. (mL/kg) 215.1 (2) 0.6 (0)     Blood  177.5     Total Intake(mL/kg) 1055.1 (9.7) 398.1 (3.6)     Urine (mL/kg/hr) 325 (0.1) 1250 (0.5)     Drains 520      Stool  0     Total Output 845 1250     Net +210.1 -851.9            Urine Occurrence 4 x 6 x     Stool Occurrence  0 x              Physical Exam  Vitals reviewed.   Constitutional:       Appearance: She is well-developed.   Eyes:      Pupils: Pupils are equal, round, and reactive to light.   Cardiovascular:      Rate and Rhythm: Normal rate and regular rhythm.      Heart sounds: No murmur heard.  Pulmonary:      Effort: Pulmonary effort is normal. No respiratory distress.      Breath sounds: Normal breath sounds. No wheezing.   Abdominal:      General: Bowel sounds are normal. There is no distension.      Palpations: Abdomen is soft.      Tenderness: There is abdominal tenderness. There is no guarding.      Comments: Chevron incision w/ staples- cdi  LIUDMILA x 1 with ss drainage    Musculoskeletal:         General: Normal range of motion.      Cervical back: Normal range of motion.      Right lower leg: Edema present.      Left lower leg: Edema present.   Skin:     General: Skin is warm and dry.   Neurological:      Mental Status: She is alert and oriented to person, place, and time.   Psychiatric:         Mood and Affect: Affect is tearful.         Behavior: Behavior normal.         Thought Content: Thought content normal.         Judgment: Judgment normal.          Laboratory:  Immunosuppressants           Stop Route Frequency     tacrolimus capsule 4 mg         -- Oral 2 times daily     mycophenolate capsule 1,000 mg         -- Oral 2 times daily          CBC:   Recent Labs   Lab  12/15/23  0623   WBC 4.58   RBC 2.50*   HGB 8.2*   HCT 23.7*   PLT 54*   MCV 95   MCH 32.8*   MCHC 34.6     CMP:   Recent Labs   Lab 12/15/23  0623   *   CALCIUM 8.6*   ALBUMIN 2.8*   PROT 4.5*      K 4.0   CO2 27      BUN 41*   CREATININE 1.2   ALKPHOS 179*   *   AST 58*   BILITOT 3.9*     Labs within the past 24 hours have been reviewed.    Diagnostic Results:  None    Debility/Functional status: Patient debilitated by evidence of Muscle wasting and atrophy, Weakness, Limitation of activities due to disability, and Other reduced mobility. Physical and occupational therapy ordered daily to evaluate and treat. Debility was: present on admission.

## 2023-12-15 NOTE — ASSESSMENT & PLAN NOTE
BG goal 140 - 180   No hx of DM.  S/p liver txp. On steroids. Continues to have prandial elevations.  Will start on mealtime insulin based on current needs    Start on Novolog 3 units AC.  Moderate Dose Correction Scale  BG monitoring ac/hs    ** Please call Endocrine for any BG related issues **    ** Please notify Endocrine for any change and/or advance in diet**    Discharge planning: TBD

## 2023-12-15 NOTE — PLAN OF CARE
- Pt AAOx4, afebrile, VSS, RA   - R IJ trialysis pulled yesterday. Dressing remains C/D/I with scant dried drainage.   - Chevron CYNTHIA with staples intact. Cleaned with betadine.   - R LIUDMILA sites covered with gauze. Last drain pulled yesterday.   - Purewick in place O/N, gisele urine. Refer to flowsheets for output totals. Up to BSC with 1-2 x assist. Smear BM, pt requesting suppository - given this AM  - Self meds reviewed with pt.   - ACHS/2A accuchecks 183/135  - PRN oxycodone for pain control   - Bed in lowest locked position, call light and personal items in reach, verbalized understanding to call for assistance.

## 2023-12-15 NOTE — PROGRESS NOTES
[facility]  Liver Transplant  Progress Note    Patient Name: Yumiko Gonzalez  MRN: [id number]  Admission Date: 12/9/2023  Hospital Length of Stay: 6 days  Code Status: Full Code  Primary Care Provider: Garrett Webb MD  Post-Operative Day: 6    ORGAN:   LIVER  Disease Etiology: Cirrhosis: Fatty Liver (DURAN)  Donor Type:   Donation after Circulatory Death   CDC High Risk:   No  Donor CMV Status:   Donor CMV Status: Negative  Donor HBcAB:   Negative  Donor HCV Status:   Negative  Donor HBV PEDRO:   Donor HCV PEDRO: Negative  Whole or Partial: Whole Liver  Biliary Anastomosis: End to End  Arterial Anatomy: Standard  Subjective:     History of Present Illness:  Ms. Gonzalez is a 64 y/o female who present for Liver Transplant for liver cirrhosis 2/2 to DURAN HX: HCC (status post Y90 06/2023) esophageal varices, ascites, hepatic encephalopathy. She reports in usual state of health- Patient denies any recent hospitalizations or ED visits. The primary surgeon will be Dr. Raza.  Patient is NPO.  All pre-op labs and imaging have been ordered and will be reviewed prior to surgery. Consents to be signed prior to transplant.      Hospital Course:  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 Progressed well, stepped down 12/10 PM. LFTs down trending, Cr stable. Post op US stable, 2 fluid collections likely hematomas. Pt with hypervolemia; lasix dependent prior to transplant. Lateral LIUDMILA drain removed 12/12. Platelets given prior to CVC removal on 12/14. PT/OT consulted with recs for home health. Persistent UE tremors, worsened post-txp, suspect from tacrolimus. Restarted on home propranolol. Encouraged patient to ambulate with assistance.     Interval Hx: Pt slowly improving post transplant. LFTs trending down. TB with small increase today. Will adjust tac dose and monitor with routine labs. Pt doing fine. All drains out. Remains with LE  edema. Lasix given yesterday. Albumin x 2 ordered for today. Pt remains very emotional with adjustment disorder. Psych consulted for evaluation with recs to continue home medication dose. Appreciate assistance. Working well with therapy. Encouraged ambulation in the room and halls with assistance if needed. Pt needs to be out of bed daily. Tentative d/c Monday 12/18. Cont to monitor.      Scheduled Meds:   albumin human 25%  25 g Intravenous Q8H    buPROPion  300 mg Oral Daily    docusate sodium  100 mg Oral BID    famotidine  20 mg Oral QHS    heparin (porcine)  5,000 Units Subcutaneous Q8H    insulin aspart U-100  3 Units Subcutaneous TIDWM    mycophenolate  1,000 mg Oral BID    polyethylene glycol  17 g Oral Daily    potassium, sodium phosphates  2 packet Oral BID    predniSONE  20 mg Oral Daily    propranoloL  20 mg Oral BID    [START ON 12/17/2023] sulfamethoxazole-trimethoprim 400-80mg  1 tablet Oral Daily AM    tacrolimus  4 mg Oral BID    traZODone  25 mg Oral QHS    [START ON 12/20/2023] valGANciclovir  450 mg Oral Daily     Continuous Infusions:  PRN Meds:0.9%  NaCl infusion (for blood administration), dextrose 10%, dextrose 10%, glucagon (human recombinant), glucose, glucose, insulin aspart U-100, oxyCODONE, oxyCODONE, sodium chloride 0.9%    Review of Systems   Constitutional:  Negative for chills, fatigue and fever.   Respiratory:  Negative for cough and shortness of breath.    Cardiovascular:  Positive for leg swelling. Negative for chest pain.   Gastrointestinal:  Positive for abdominal pain (incisional). Negative for abdominal distention, diarrhea, nausea and vomiting.   Genitourinary:  Negative for decreased urine volume and difficulty urinating.   Musculoskeletal:  Positive for back pain (chronic) and gait problem.   Allergic/Immunologic: Positive for immunocompromised state.   Neurological:  Positive for tremors and weakness. Negative for dizziness and headaches.   Psychiatric/Behavioral:   Negative for confusion and decreased concentration. The patient is nervous/anxious.      Objective:     Vital Signs (Most Recent):  Temp: 98.2 °F (36.8 °C) (12/15/23 1156)  Pulse: 70 (12/15/23 1156)  Resp: 18 (12/15/23 1201)  BP: (!) 125/59 (12/15/23 1156)  SpO2: (!) 94 % (12/15/23 1156) Vital Signs (24h Range):  Temp:  [98 °F (36.7 °C)-98.8 °F (37.1 °C)] 98.2 °F (36.8 °C)  Pulse:  [70-77] 70  Resp:  [18-20] 18  SpO2:  [94 %-97 %] 94 %  BP: (120-144)/(56-71) 125/59     Weight: 109.7 kg (241 lb 13.5 oz)  Body mass index is 37.88 kg/m².    Intake/Output - Last 3 Shifts         12/13 0700  12/14 0659 12/14 0700  12/15 0659 12/15 0700  12/16 0659    P.O. 840 220     I.V. (mL/kg) 215.1 (2) 0.6 (0)     Blood  177.5     Total Intake(mL/kg) 1055.1 (9.7) 398.1 (3.6)     Urine (mL/kg/hr) 325 (0.1) 1250 (0.5)     Drains 520      Stool  0     Total Output 845 1250     Net +210.1 -851.9            Urine Occurrence 4 x 6 x     Stool Occurrence  0 x              Physical Exam  Vitals reviewed.   Constitutional:       Appearance: She is well-developed.   Eyes:      Pupils: Pupils are equal, round, and reactive to light.   Cardiovascular:      Rate and Rhythm: Normal rate and regular rhythm.      Heart sounds: No murmur heard.  Pulmonary:      Effort: Pulmonary effort is normal. No respiratory distress.      Breath sounds: Normal breath sounds. No wheezing.   Abdominal:      General: Bowel sounds are normal. There is no distension.      Palpations: Abdomen is soft.      Tenderness: There is abdominal tenderness. There is no guarding.      Comments: Chevron incision w/ staples- cdi  LIUDMILA x 1 with ss drainage    Musculoskeletal:         General: Normal range of motion.      Cervical back: Normal range of motion.      Right lower leg: Edema present.      Left lower leg: Edema present.   Skin:     General: Skin is warm and dry.   Neurological:      Mental Status: She is alert and oriented to person, place, and time.   Psychiatric:          Mood and Affect: Affect is tearful.         Behavior: Behavior normal.         Thought Content: Thought content normal.         Judgment: Judgment normal.          Laboratory:  Immunosuppressants           Stop Route Frequency     tacrolimus capsule 4 mg         -- Oral 2 times daily     mycophenolate capsule 1,000 mg         -- Oral 2 times daily          CBC:   Recent Labs   Lab 12/15/23  0623   WBC 4.58   RBC 2.50*   HGB 8.2*   HCT 23.7*   PLT 54*   MCV 95   MCH 32.8*   MCHC 34.6     CMP:   Recent Labs   Lab 12/15/23  0623   *   CALCIUM 8.6*   ALBUMIN 2.8*   PROT 4.5*      K 4.0   CO2 27      BUN 41*   CREATININE 1.2   ALKPHOS 179*   *   AST 58*   BILITOT 3.9*     Labs within the past 24 hours have been reviewed.    Diagnostic Results:  None    Debility/Functional status: Patient debilitated by evidence of Muscle wasting and atrophy, Weakness, Limitation of activities due to disability, and Other reduced mobility. Physical and occupational therapy ordered daily to evaluate and treat. Debility was: present on admission.        Assessment/Plan:     * S/P liver transplant  - 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 Progressed well, stepped down 12/10 PM.   - LFTs down trending  - Post op US stable, 2 fluid collections likely hematomas.  - Drains all removed.   - TB with small increase; Tac dose adjusted. Will monitor with routine labs. Encouraged hydration.     DANAY (acute kidney injury)  - albumin 25% 12/13  - improved 12/14      Hypervolemia  - on home lasix prior to txp  - IV lasix 40mg 12/12; 12/14  - plan for albumin 25% x 2.       Acute postoperative anemia due to expected blood loss  - H/H slowly trending down but stable. No overt signs of bleeding.       At risk for opportunistic infections  - Continue OI prophylaxis per protocol       Prophylactic immunotherapy  Continue Cellcept and Prograf. Will monitor  for signs of toxic side effects, check daily tacrolimus troughs, and change meds accordingly.       Immunosuppressed status        Thrombocytopenia  - 2/2 ESLD; expected to improve post-op.   - Plt ordered 12/14 prior to central line removal       Tremor  - on home propranolol prior to transplant to assist with symptoms  - symptoms worsened post transplant, suspect prograf  - resumed home propranolol 12/13       Moderate episode of recurrent major depressive disorder  - cont home meds.  - psych consulted to assess for any medication dose adjustments. Appreciate assistance.        VTE Risk Mitigation (From admission, onward)           Ordered     heparin (porcine) injection 5,000 Units  Every 8 hours         12/10/23 0143     Place sequential compression device  Until discontinued         12/10/23 0143     IP VTE HIGH RISK PATIENT  Once         12/10/23 0143                    The patients clinical status was discussed at multidisplinary rounds, involving transplant surgery, transplant medicine, pharmacy, nursing, nutrition, and social work.    Discharge Planning:  Health Maintenance Due   Topic Date Due    Shingles Vaccine (1 of 2) 05/22/2009    TETANUS VACCINE  08/03/2017    Influenza Vaccine (1) 09/01/2019             Medical decision making for this encounter includes review of pertinent labs and diagnostic studies, assessment and planning, discussions with consulting providers, discussion with patient/family, and participation in multidisciplinary rounds. Time spent caring for patient: 60 minutes.    Sade Salgado PA-C  Liver Transplant  [facility]

## 2023-12-15 NOTE — ASSESSMENT & PLAN NOTE
- 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 Progressed well, stepped down 12/10 PM.   - LFTs down trending  - Post op US stable, 2 fluid collections likely hematomas.  - Drains all removed.   - TB with small increase; Tac dose adjusted. Will monitor with routine labs. Encouraged hydration.

## 2023-12-15 NOTE — PROGRESS NOTES
Met with patient and daughter in [facility].  Informed Gauri that I will meet with them on discharge day to complete the education.  Asked them to complete the questions in [facility] for Monday.  Allowed time for questions and answers.
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
## 2023-12-15 NOTE — PROGRESS NOTES
"[facility]  Endocrinology  Progress Note    Admit Date: 2023     Reason for Consult: Management of Steroid Induced Hyperglycemia     Surgical Procedure and Date:  Liver Transplant on 12/10/23       Lab Results   Component Value Date    HGBA1C 4.6 10/31/2023       HPI:   Patient is a 63 y.o. female with a diagnosis of HTN, MDD, PTSD, hx cervical CA, cervical DDD, HCC s/p radioembolization 2023 and cirrhosis 2/2 DURAN with complications of prior hepatic encephalopathy, ascites, esophageal varices. Patient now presents for the above procedure(s). Endocrinology consulted for management of hyperglycemia.       Interval HPI:   No acute events overnight. Patient in room 84754/78869 A. Blood glucose stable. BG at and above goal on current insulin regimen (SSI ). Steroid use- Pred 20 mg .   5 Days Post-Op  Renal function- Normal   Vasopressors-  None      Diet consistent carbohydrate Standard Tray      Eatin%  Nausea: No  Hypoglycemia and intervention: No  Fever: No  TPN and/or TF: No    /71 (Patient Position: Sitting)   Pulse 77   Temp 98 °F (36.7 °C) (Oral)   Resp 19   Ht 5' 7" (1.702 m)   Wt 109.7 kg (241 lb 13.5 oz)   SpO2 (!) 94%   Breastfeeding No   BMI 37.88 kg/m²     Labs Reviewed and Include    Recent Labs   Lab 12/15/23  0623   *   CALCIUM 8.6*   ALBUMIN 2.8*   PROT 4.5*      K 4.0   CO2 27      BUN 41*   CREATININE 1.2   ALKPHOS 179*   *   AST 58*   BILITOT 3.9*     Lab Results   Component Value Date    WBC 4.58 12/15/2023    HGB 8.2 (L) 12/15/2023    HCT 23.7 (L) 12/15/2023    MCV 95 12/15/2023    PLT 54 (L) 12/15/2023     No results for input(s): "TSH", "FREET4" in the last 168 hours.  Lab Results   Component Value Date    HGBA1C 4.6 10/31/2023       Nutritional status:   Body mass index is 37.88 kg/m².  Lab Results   Component Value Date    ALBUMIN 2.8 (L) 12/15/2023    ALBUMIN 2.5 (L) 2023    ALBUMIN 2.6 (L) 2023     No " "results found for: "PREALBUMIN"    Estimated Creatinine Clearance: 60.4 mL/min (based on SCr of 1.2 mg/dL).    Accu-Checks  Recent Labs     12/13/23  1308 12/13/23  1658 12/13/23 2014 12/14/23  0214 12/14/23  0717 12/14/23  0808 12/14/23  1749 12/14/23  2150 12/15/23  0304 12/15/23  0817   POCTGLUCOSE 232* 213* 169* 127* 98 103 228* 183* 135* 152*       Current Medications and/or Treatments Impacting Glycemic Control  Immunotherapy:    Immunosuppressants           Stop Route Frequency     tacrolimus capsule 3 mg         -- Oral 2 times daily     mycophenolate capsule 1,000 mg         -- Oral 2 times daily          Steroids:   Hormones (From admission, onward)      Start     Stop Route Frequency Ordered    12/15/23 0900  predniSONE tablet 20 mg  (methylprednisolone taper panel)        See Hyperspace for full Linked Orders Report.    -- Oral Daily 12/10/23 0143    12/12/23 0900  methylPREDNISolone sodium succinate injection 120 mg  (methylprednisolone taper panel)        See Hyperspace for full Linked Orders Report.    12/13/23 0859 IV Daily 12/10/23 0143          Pressors:    Autonomic Drugs (From admission, onward)      None          Hyperglycemia/Diabetes Medications:   Antihyperglycemics (From admission, onward)      Start     Stop Route Frequency Ordered    12/15/23 1130  insulin aspart U-100 pen 3 Units         -- SubQ 3 times daily with meals 12/15/23 0857    12/15/23 0955  insulin aspart U-100 pen 0-10 Units         -- SubQ Before meals & nightly PRN 12/15/23 0857            ASSESSMENT and PLAN    Endocrine  Steroid-induced hyperglycemia  BG goal 140 - 180   No hx of DM.  S/p liver txp. On steroids. Continues to have prandial elevations.  Will start on mealtime insulin based on current needs    Start on Novolog 3 units AC.  Moderate Dose Correction Scale  BG monitoring ac/hs    ** Please call Endocrine for any BG related issues **    ** Please notify Endocrine for any change and/or advance in " diet**    Discharge planning: TBD          GI  * S/P liver transplant  Managed per primary team  Optimize BG control        Other  Adverse effect of corticosteroids  Glucocorticoids markedly increase glucoses. Expect steroid taper to help with glucose control          Semaj Palacios PA-C  Endocrinology  [facility]

## 2023-12-15 NOTE — PT/OT/SLP PROGRESS
Physical Therapy Treatment    Patient Name:  Yumiko Gonzalez   MRN:  [id number]    Recommendations:     Discharge Recommendations: Low Intensity Therapy  Discharge Equipment Recommendations: none  Barriers to discharge: None    Assessment:     Yumiko Gonzalez is a 63 y.o. female admitted with a medical diagnosis of S/P liver transplant.  She presents with the following impairments/functional limitations: weakness, impaired endurance, impaired self care skills, impaired functional mobility, gait instability, pain Pt tolerated treatment session well today. Pt was able to tolerate an increased distance to ambulation, rest breaks were required due to fatigue. Pt tolerated increase repetitions to B LE exercises. Patient remains appropriate for continued skilled services within the acute environment and goals remain appropriate.  .    Rehab Prognosis: Good; patient would benefit from acute skilled PT services to address these deficits and reach maximum level of function.    Recent Surgery: Procedure(s) (LRB):  TRANSPLANT, LIVER (N/A) 6 Days Post-Op    Plan:     During this hospitalization, patient to be seen 5 x/week to address the identified rehab impairments via gait training, therapeutic activities, therapeutic exercises, neuromuscular re-education and progress toward the following goals:    Plan of Care Expires:  01/10/24    Subjective     Chief Complaint: Abdominal pain and back spasms   Patient/Family Comments/goals: Pt agreeable to PT  Pain/Comfort:  Pain Rating 1: 7/10  Location - Orientation 1: generalized  Location 1: abdomen  Pain Addressed 1: Reposition, Distraction, Pre-medicate for activity  Pain Rating Post-Intervention 1:  (not rated)  Pain Rating 2:  (not rated)  Location - Orientation 2: lower  Location 2:  (Back spasms)  Pain Addressed 2: Reposition, Distraction  Pain Rating Post-Intervention 2:  (not rated)      Objective:     Communicated with RN prior to session.  Patient found up in chair with Joceline  upon PT entry to room.     General Precautions: Standard, fall  Orthopedic Precautions: N/A  Braces: N/A  Respiratory Status: Room air     Functional Mobility:  Transfers:     Sit to Stand:  contact guard assistance with rolling walker  Chair to bedside commode: contact guard assistance with  rolling walker  using  Step Transfer  Gait: 10 ft + 12 ft CGA with RW. Followed with chair.   Pt ambulated with a slow joby  Seated rest breaks required due to fatigue   Cues for safety within RW and to look up during ambulation.   Pt performed 15 repetitions of seated B LE exercises consisting of: Marching, LAQ, ABD/ADD, heel raises, and toe raises.    Rest breaks required due to fatigue.       AM-PAC 6 CLICK MOBILITY  Turning over in bed (including adjusting bedclothes, sheets and blankets)?: 3  Sitting down on and standing up from a chair with arms (e.g., wheelchair, bedside commode, etc.): 3  Moving from lying on back to sitting on the side of the bed?: 3  Moving to and from a bed to a chair (including a wheelchair)?: 3  Need to walk in hospital room?: 3  Climbing 3-5 steps with a railing?: 2  Basic Mobility Total Score: 17       Treatment & Education:  Therapist provided instruction and educated for safety during transfers and gait training. As well as proper body mechanics, energy conservation, and fall prevention strategies during tasks listed above, and the effects of prolonged immobility and the importance of performing EOB/OOB activity and exercises to promote healing and reduce recovery time.      Patient left up in chair with all lines intact, call button in reach, and RN notified..    GOALS:   Multidisciplinary Problems       Physical Therapy Goals          Problem: Physical Therapy    Goal Priority Disciplines Outcome Goal Variances Interventions   Physical Therapy Goal     PT, PT/OT Ongoing, Progressing     Description: PT goals to be met by: 1/11/24    Patient will perform rolling each way with supervision.    Patient will perform supine <> sitting with supervision.  Patient will perform sit <> stand transitions with supervision using RW.  Patient will perform transfers from bed <> chair or BSC with supervision using RW.  Patient will ambulate 200 feet with supervision using RW.                       Time Tracking:     PT Received On: 12/15/23  PT Start Time: 1200     PT Stop Time: 1235  PT Total Time (min): 35 min     Billable Minutes: Gait Training 20 and Therapeutic Activity 15    Treatment Type: Treatment  PT/PTA: PTA     Number of PTA visits since last PT visit: 4     12/15/2023

## 2023-12-15 NOTE — SUBJECTIVE & OBJECTIVE
"Interval HPI:   No acute events overnight. Patient in room 24539/89003 A. Blood glucose stable. BG at and above goal on current insulin regimen (SSI ). Steroid use- Pred 20 mg .   5 Days Post-Op  Renal function- Normal   Vasopressors-  None      Diet consistent carbohydrate Standard Tray      Eatin%  Nausea: No  Hypoglycemia and intervention: No  Fever: No  TPN and/or TF: No    /71 (Patient Position: Sitting)   Pulse 77   Temp 98 °F (36.7 °C) (Oral)   Resp 19   Ht 5' 7" (1.702 m)   Wt 109.7 kg (241 lb 13.5 oz)   SpO2 (!) 94%   Breastfeeding No   BMI 37.88 kg/m²     Labs Reviewed and Include    Recent Labs   Lab 12/15/23  0623   *   CALCIUM 8.6*   ALBUMIN 2.8*   PROT 4.5*      K 4.0   CO2 27      BUN 41*   CREATININE 1.2   ALKPHOS 179*   *   AST 58*   BILITOT 3.9*     Lab Results   Component Value Date    WBC 4.58 12/15/2023    HGB 8.2 (L) 12/15/2023    HCT 23.7 (L) 12/15/2023    MCV 95 12/15/2023    PLT 54 (L) 12/15/2023     No results for input(s): "TSH", "FREET4" in the last 168 hours.  Lab Results   Component Value Date    HGBA1C 4.6 10/31/2023       Nutritional status:   Body mass index is 37.88 kg/m².  Lab Results   Component Value Date    ALBUMIN 2.8 (L) 12/15/2023    ALBUMIN 2.5 (L) 2023    ALBUMIN 2.6 (L) 2023     No results found for: "PREALBUMIN"    Estimated Creatinine Clearance: 60.4 mL/min (based on SCr of 1.2 mg/dL).    Accu-Checks  Recent Labs     23  1308 23  1658 23  0214 23  0717 23  0808 23  1749 23  2150 12/15/23  0304 12/15/23  0817   POCTGLUCOSE 232* 213* 169* 127* 98 103 228* 183* 135* 152*       Current Medications and/or Treatments Impacting Glycemic Control  Immunotherapy:    Immunosuppressants           Stop Route Frequency     tacrolimus capsule 3 mg         -- Oral 2 times daily     mycophenolate capsule 1,000 mg         -- Oral 2 times daily          Steroids:   Hormones " (From admission, onward)      Start     Stop Route Frequency Ordered    12/15/23 0900  predniSONE tablet 20 mg  (methylprednisolone taper panel)        See Hyperspace for full Linked Orders Report.    -- Oral Daily 12/10/23 0143    12/12/23 0900  methylPREDNISolone sodium succinate injection 120 mg  (methylprednisolone taper panel)        See Hyperspace for full Linked Orders Report.    12/13/23 0859 IV Daily 12/10/23 0143          Pressors:    Autonomic Drugs (From admission, onward)      None          Hyperglycemia/Diabetes Medications:   Antihyperglycemics (From admission, onward)      Start     Stop Route Frequency Ordered    12/15/23 1130  insulin aspart U-100 pen 3 Units         -- SubQ 3 times daily with meals 12/15/23 0857    12/15/23 0955  insulin aspart U-100 pen 0-10 Units         -- SubQ Before meals & nightly PRN 12/15/23 0857

## 2023-12-15 NOTE — PROGRESS NOTES
63 year-old female admitted 12/9/23 for liver txp. Pt has hx of DURAN cirrhosis, HCC, esophageal varices, ascites, hepatic encephalopathy.   -AAOx4, ambulates independently  -22 G Lt wrist  -18 G Rt AC  -Chevron CYNTHIA with staples  -Accuchecks AC/HS, prandial insulin  -Tbili 3.9  -PT/OT  -PRN Oxy 5mg Q4  -pt ambulated in room  -Purwick in place, pt up to BSC w/1 person assist  -self meds 100%  -daughter at bedside, pt sitting up in chair, wheels locked, non-skid socks in place, call light within reach

## 2023-12-15 NOTE — PLAN OF CARE
- LIUDMILA removed by PA  - 1 dose platelet transfused, central line removed tip intact.  Dressing in place with small amount of serosanguinous drainage to left border  - Lasix 40 mg IVP.  Purewick not consistently working well for this patient, see number of urination   - CBG ACHS & 2 am.  SSI with dinner  - Last BM prior to transplant.  Started Colace & Miralax  - Daughter pulled self meds 100% accurately this AM.  Blue card updated & Self med box refilled    Problem: Adult Inpatient Plan of Care  Goal: Plan of Care Review  Outcome: Ongoing, Progressing  Goal: Patient-Specific Goal (Individualized)  Outcome: Ongoing, Progressing  Goal: Absence of Hospital-Acquired Illness or Injury  Outcome: Ongoing, Progressing  Goal: Optimal Comfort and Wellbeing  Outcome: Ongoing, Progressing  Goal: Readiness for Transition of Care  Outcome: Ongoing, Progressing     Problem: Fall Injury Risk  Goal: Absence of Fall and Fall-Related Injury  Outcome: Ongoing, Progressing     Problem: Infection  Goal: Absence of Infection Signs and Symptoms  Outcome: Ongoing, Progressing     Problem: Adjustment to Transplant (Liver Transplant)  Goal: Optimal Coping with Organ Transplant  Outcome: Ongoing, Progressing     Problem: Bowel Motility Impaired (Liver Transplant)  Goal: Effective Bowel Elimination  Outcome: Ongoing, Progressing     Problem: Donor Organ Function (Liver Transplant)  Goal: Effective Organ Function  Outcome: Ongoing, Progressing

## 2023-12-16 LAB
ALBUMIN SERPL BCP-MCNC: 2.9 G/DL (ref 3.5–5.2)
ALP SERPL-CCNC: 149 U/L (ref 55–135)
ALT SERPL W/O P-5'-P-CCNC: 128 U/L (ref 10–44)
ANION GAP SERPL CALC-SCNC: 7 MMOL/L (ref 8–16)
ANISOCYTOSIS BLD QL SMEAR: SLIGHT
AST SERPL-CCNC: 42 U/L (ref 10–40)
BASOPHILS # BLD AUTO: ABNORMAL K/UL (ref 0–0.2)
BASOPHILS NFR BLD: 0 % (ref 0–1.9)
BILIRUB SERPL-MCNC: 3.7 MG/DL (ref 0.1–1)
BUN SERPL-MCNC: 33 MG/DL (ref 8–23)
CALCIUM SERPL-MCNC: 8.7 MG/DL (ref 8.7–10.5)
CHLORIDE SERPL-SCNC: 104 MMOL/L (ref 95–110)
CO2 SERPL-SCNC: 25 MMOL/L (ref 23–29)
CREAT SERPL-MCNC: 1 MG/DL (ref 0.5–1.4)
DIFFERENTIAL METHOD BLD: ABNORMAL
EOSINOPHIL # BLD AUTO: ABNORMAL K/UL (ref 0–0.5)
EOSINOPHIL NFR BLD: 3 % (ref 0–8)
ERYTHROCYTE [DISTWIDTH] IN BLOOD BY AUTOMATED COUNT: 16.6 % (ref 11.5–14.5)
EST. GFR  (NO RACE VARIABLE): >60 ML/MIN/1.73 M^2
GLUCOSE SERPL-MCNC: 125 MG/DL (ref 70–110)
HCT VFR BLD AUTO: 23.4 % (ref 37–48.5)
HGB BLD-MCNC: 8.4 G/DL (ref 12–16)
HYPOCHROMIA BLD QL SMEAR: ABNORMAL
IMM GRANULOCYTES # BLD AUTO: ABNORMAL K/UL (ref 0–0.04)
IMM GRANULOCYTES NFR BLD AUTO: ABNORMAL % (ref 0–0.5)
INR PPP: 1.1 (ref 0.8–1.2)
LYMPHOCYTES # BLD AUTO: ABNORMAL K/UL (ref 1–4.8)
LYMPHOCYTES NFR BLD: 6 % (ref 18–48)
MAGNESIUM SERPL-MCNC: 1.6 MG/DL (ref 1.6–2.6)
MCH RBC QN AUTO: 36.1 PG (ref 27–31)
MCHC RBC AUTO-ENTMCNC: 35.9 G/DL (ref 32–36)
MCV RBC AUTO: 100 FL (ref 82–98)
MONOCYTES # BLD AUTO: ABNORMAL K/UL (ref 0.3–1)
MONOCYTES NFR BLD: 9 % (ref 4–15)
MYELOCYTES NFR BLD MANUAL: 1 %
NEUTROPHILS NFR BLD: 81 % (ref 38–73)
NRBC BLD-RTO: 0 /100 WBC
OVALOCYTES BLD QL SMEAR: ABNORMAL
PHOSPHATE SERPL-MCNC: 2.5 MG/DL (ref 2.7–4.5)
PLATELET # BLD AUTO: 68 K/UL (ref 150–450)
PLATELET BLD QL SMEAR: ABNORMAL
PMV BLD AUTO: 11.6 FL (ref 9.2–12.9)
POCT GLUCOSE: 137 MG/DL (ref 70–110)
POCT GLUCOSE: 180 MG/DL (ref 70–110)
POCT GLUCOSE: 199 MG/DL (ref 70–110)
POCT GLUCOSE: 215 MG/DL (ref 70–110)
POIKILOCYTOSIS BLD QL SMEAR: SLIGHT
POLYCHROMASIA BLD QL SMEAR: ABNORMAL
POTASSIUM SERPL-SCNC: 4.1 MMOL/L (ref 3.5–5.1)
PROT SERPL-MCNC: 4.5 G/DL (ref 6–8.4)
PROTHROMBIN TIME: 11.5 SEC (ref 9–12.5)
RBC # BLD AUTO: 2.33 M/UL (ref 4–5.4)
SODIUM SERPL-SCNC: 136 MMOL/L (ref 136–145)
TACROLIMUS BLD-MCNC: 4.2 NG/ML (ref 5–15)
WBC # BLD AUTO: 3.44 K/UL (ref 3.9–12.7)

## 2023-12-16 PROCEDURE — 20600001 HC STEP DOWN PRIVATE ROOM

## 2023-12-16 PROCEDURE — 80053 COMPREHEN METABOLIC PANEL: CPT | Performed by: STUDENT IN AN ORGANIZED HEALTH CARE EDUCATION/TRAINING PROGRAM

## 2023-12-16 PROCEDURE — 84100 ASSAY OF PHOSPHORUS: CPT | Performed by: STUDENT IN AN ORGANIZED HEALTH CARE EDUCATION/TRAINING PROGRAM

## 2023-12-16 PROCEDURE — 25000003 PHARM REV CODE 250

## 2023-12-16 PROCEDURE — 99232 SBSQ HOSP IP/OBS MODERATE 35: CPT | Mod: ,,, | Performed by: PHYSICIAN ASSISTANT

## 2023-12-16 PROCEDURE — 63600175 PHARM REV CODE 636 W HCPCS: Performed by: STUDENT IN AN ORGANIZED HEALTH CARE EDUCATION/TRAINING PROGRAM

## 2023-12-16 PROCEDURE — 25000003 PHARM REV CODE 250: Performed by: PHYSICIAN ASSISTANT

## 2023-12-16 PROCEDURE — 85610 PROTHROMBIN TIME: CPT | Performed by: STUDENT IN AN ORGANIZED HEALTH CARE EDUCATION/TRAINING PROGRAM

## 2023-12-16 PROCEDURE — 85007 BL SMEAR W/DIFF WBC COUNT: CPT | Performed by: STUDENT IN AN ORGANIZED HEALTH CARE EDUCATION/TRAINING PROGRAM

## 2023-12-16 PROCEDURE — 25000003 PHARM REV CODE 250: Performed by: SURGERY

## 2023-12-16 PROCEDURE — 83735 ASSAY OF MAGNESIUM: CPT | Performed by: STUDENT IN AN ORGANIZED HEALTH CARE EDUCATION/TRAINING PROGRAM

## 2023-12-16 PROCEDURE — 63600175 PHARM REV CODE 636 W HCPCS: Performed by: TRANSPLANT SURGERY

## 2023-12-16 PROCEDURE — 99233 SBSQ HOSP IP/OBS HIGH 50: CPT | Mod: ,,, | Performed by: NURSE PRACTITIONER

## 2023-12-16 PROCEDURE — 25000003 PHARM REV CODE 250: Performed by: NURSE PRACTITIONER

## 2023-12-16 PROCEDURE — 36415 COLL VENOUS BLD VENIPUNCTURE: CPT | Performed by: STUDENT IN AN ORGANIZED HEALTH CARE EDUCATION/TRAINING PROGRAM

## 2023-12-16 PROCEDURE — 80197 ASSAY OF TACROLIMUS: CPT | Performed by: STUDENT IN AN ORGANIZED HEALTH CARE EDUCATION/TRAINING PROGRAM

## 2023-12-16 PROCEDURE — 85027 COMPLETE CBC AUTOMATED: CPT | Performed by: STUDENT IN AN ORGANIZED HEALTH CARE EDUCATION/TRAINING PROGRAM

## 2023-12-16 RX ORDER — TACROLIMUS 1 MG/1
2 CAPSULE ORAL ONCE
Status: COMPLETED | OUTPATIENT
Start: 2023-12-16 | End: 2023-12-16

## 2023-12-16 RX ORDER — TACROLIMUS 1 MG/1
6 CAPSULE ORAL 2 TIMES DAILY
Status: DISCONTINUED | OUTPATIENT
Start: 2023-12-16 | End: 2023-12-18 | Stop reason: HOSPADM

## 2023-12-16 RX ORDER — INSULIN ASPART 100 [IU]/ML
5 INJECTION, SOLUTION INTRAVENOUS; SUBCUTANEOUS
Status: DISCONTINUED | OUTPATIENT
Start: 2023-12-16 | End: 2023-12-17

## 2023-12-16 RX ORDER — ALUMINUM HYDROXIDE, MAGNESIUM HYDROXIDE, AND SIMETHICONE 2400; 240; 2400 MG/30ML; MG/30ML; MG/30ML
30 SUSPENSION ORAL EVERY 6 HOURS PRN
Status: DISCONTINUED | OUTPATIENT
Start: 2023-12-16 | End: 2023-12-18 | Stop reason: HOSPADM

## 2023-12-16 RX ADMIN — HEPARIN SODIUM 5000 UNITS: 5000 INJECTION INTRAVENOUS; SUBCUTANEOUS at 09:12

## 2023-12-16 RX ADMIN — OXYCODONE HYDROCHLORIDE 5 MG: 5 TABLET ORAL at 06:12

## 2023-12-16 RX ADMIN — POTASSIUM & SODIUM PHOSPHATES POWDER PACK 280-160-250 MG 2 PACKET: 280-160-250 PACK at 09:12

## 2023-12-16 RX ADMIN — HEPARIN SODIUM 5000 UNITS: 5000 INJECTION INTRAVENOUS; SUBCUTANEOUS at 06:12

## 2023-12-16 RX ADMIN — OXYCODONE HYDROCHLORIDE 5 MG: 5 TABLET ORAL at 10:12

## 2023-12-16 RX ADMIN — INSULIN ASPART 4 UNITS: 100 INJECTION, SOLUTION INTRAVENOUS; SUBCUTANEOUS at 06:12

## 2023-12-16 RX ADMIN — INSULIN ASPART 1 UNITS: 100 INJECTION, SOLUTION INTRAVENOUS; SUBCUTANEOUS at 10:12

## 2023-12-16 RX ADMIN — TACROLIMUS 4 MG: 1 CAPSULE ORAL at 09:12

## 2023-12-16 RX ADMIN — MYCOPHENOLATE MOFETIL 1000 MG: 250 CAPSULE ORAL at 09:12

## 2023-12-16 RX ADMIN — INSULIN ASPART 2 UNITS: 100 INJECTION, SOLUTION INTRAVENOUS; SUBCUTANEOUS at 03:12

## 2023-12-16 RX ADMIN — ALUMINUM HYDROXIDE, MAGNESIUM HYDROXIDE, AND DIMETHICONE 30 ML: 400; 400; 40 SUSPENSION ORAL at 09:12

## 2023-12-16 RX ADMIN — FAMOTIDINE 20 MG: 20 TABLET, FILM COATED ORAL at 09:12

## 2023-12-16 RX ADMIN — TACROLIMUS 2 MG: 1 CAPSULE ORAL at 02:12

## 2023-12-16 RX ADMIN — HEPARIN SODIUM 5000 UNITS: 5000 INJECTION INTRAVENOUS; SUBCUTANEOUS at 02:12

## 2023-12-16 RX ADMIN — PREDNISONE 20 MG: 20 TABLET ORAL at 09:12

## 2023-12-16 RX ADMIN — PROPRANOLOL HYDROCHLORIDE 20 MG: 10 TABLET ORAL at 09:12

## 2023-12-16 RX ADMIN — TACROLIMUS 6 MG: 1 CAPSULE ORAL at 06:12

## 2023-12-16 RX ADMIN — BUPROPION HYDROCHLORIDE 300 MG: 150 TABLET, FILM COATED, EXTENDED RELEASE ORAL at 09:12

## 2023-12-16 RX ADMIN — DOCUSATE SODIUM 100 MG: 100 CAPSULE, LIQUID FILLED ORAL at 09:12

## 2023-12-16 RX ADMIN — TRAZODONE HYDROCHLORIDE 25 MG: 50 TABLET ORAL at 09:12

## 2023-12-16 NOTE — PLAN OF CARE
Pt aaox4 vswnl and no c/o pain at present. Bed in low position and callbell within reach. Accu ck at [phone]. Daughter gave the insulin. Daughter also pulled the self meds correctly. Chevron incision gail with staples intact. +3 BLE edema noted. Albumin given. Daughter at bedside. Pt ambulates with 1 assist.

## 2023-12-16 NOTE — ASSESSMENT & PLAN NOTE
- cont home meds.  - psych consulted to assess for any medication dose adjustments. Appreciate assistance.   Detail Level: Simple Additional Notes: Patient consent was obtained to proceed with the visit and recommended plan of care after discussion of all risks and benefits, including the risks of COVID-19 exposure.

## 2023-12-16 NOTE — PROGRESS NOTES
"[facility] - [facility]  Endocrinology  Progress Note    Admit Date: 2023     Reason for Consult: Management of Steroid Induced Hyperglycemia     Surgical Procedure and Date:  Liver Transplant on 12/10/23       Lab Results   Component Value Date    HGBA1C 4.6 10/31/2023       HPI:   Patient is a 63 y.o. female with a diagnosis of HTN, MDD, PTSD, hx cervical CA, cervical DDD, HCC s/p radioembolization 2023 and cirrhosis 2/2 DURAN with complications of prior hepatic encephalopathy, ascites, esophageal varices. Patient now presents for the above procedure(s). Endocrinology consulted for management of hyperglycemia.       Interval HPI:   No acute events overnight. Patient in room 19713/65116 A. Blood glucose stable. BG at and above goal on current insulin regimen (SSI ). Steroid use- Pred 20 mg .   5 Days Post-Op  Renal function- Normal   Vasopressors-  None      Diet consistent carbohydrate Standard Tray      Eatin%  Nausea: No  Hypoglycemia and intervention: No  Fever: No  TPN and/or TF: No    /63 (BP Location: Left arm, Patient Position: Sitting)   Pulse 76   Temp 98.9 °F (37.2 °C) (Oral)   Resp 16   Ht 5' 7" (1.702 m)   Wt 109.7 kg (241 lb 13.5 oz)   SpO2 (!) 91%   Breastfeeding No   BMI 37.88 kg/m²     Labs Reviewed and Include    Recent Labs   Lab 23  0605   *   CALCIUM 8.7   ALBUMIN 2.9*   PROT 4.5*      K 4.1   CO2 25      BUN 33*   CREATININE 1.0   ALKPHOS 149*   *   AST 42*   BILITOT 3.7*     Lab Results   Component Value Date    WBC 3.44 (L) 2023    HGB 8.4 (L) 2023    HCT 23.4 (L) 2023     (H) 2023    PLT 68 (L) 2023     No results for input(s): "TSH", "FREET4" in the last 168 hours.  Lab Results   Component Value Date    HGBA1C 4.6 10/31/2023       Nutritional status:   Body mass index is 37.88 kg/m².  Lab Results   Component Value Date    ALBUMIN 2.9 (L) 2023    ALBUMIN 2.8 (L) 12/15/2023    ALBUMIN " "2.5 (L) 12/14/2023     No results found for: "PREALBUMIN"    Estimated Creatinine Clearance: 72.5 mL/min (based on SCr of 1 mg/dL).    Accu-Checks  Recent Labs     12/13/23 2014 12/14/23  0214 12/14/23  0717 12/14/23  0808 12/14/23  1749 12/14/23  2150 12/15/23  0304 12/15/23  0817 12/15/23  1711 12/15/23  2133   POCTGLUCOSE 169* 127* 98 103 228* 183* 135* 152* 242* 172*       Current Medications and/or Treatments Impacting Glycemic Control  Immunotherapy:    Immunosuppressants           Stop Route Frequency     tacrolimus capsule 4 mg         -- Oral 2 times daily     mycophenolate capsule 1,000 mg         -- Oral 2 times daily          Steroids:   Hormones (From admission, onward)      Start     Stop Route Frequency Ordered    12/15/23 0900  predniSONE tablet 20 mg  (methylprednisolone taper panel)        See Hyperspace for full Linked Orders Report.    -- Oral Daily 12/10/23 0143    12/12/23 0900  methylPREDNISolone sodium succinate injection 120 mg  (methylprednisolone taper panel)        See Hyperspace for full Linked Orders Report.    12/13/23 0859 IV Daily 12/10/23 0143          Pressors:    Autonomic Drugs (From admission, onward)      None          Hyperglycemia/Diabetes Medications:   Antihyperglycemics (From admission, onward)      Start     Stop Route Frequency Ordered    12/15/23 1130  insulin aspart U-100 pen 3 Units         -- SubQ 3 times daily with meals 12/15/23 0857    12/15/23 0955  insulin aspart U-100 pen 0-10 Units         -- SubQ Before meals & nightly PRN 12/15/23 0857            ASSESSMENT and PLAN    Endocrine  Steroid-induced hyperglycemia  BG goal 140 - 180   No hx of DM.  S/p liver txp. On steroids. Started on mealtime insulin, will likely need further increase in regimen.    Increase Novolog 5 units AC.  Moderate Dose Correction Scale  BG monitoring ac/hs    ** Please call Endocrine for any BG related issues **    ** Please notify Endocrine for any change and/or advance in " diet**    Discharge planning: TBD          GI  * S/P liver transplant  Managed per primary team  Optimize BG control        Other  Adverse effect of corticosteroids  Glucocorticoids markedly increase glucoses. Expect steroid taper to help with glucose control          Semaj Palacios PA-C  Endocrinology  [facility]

## 2023-12-16 NOTE — SUBJECTIVE & OBJECTIVE
Scheduled Meds:   buPROPion  300 mg Oral Daily    docusate sodium  100 mg Oral BID    famotidine  20 mg Oral QHS    heparin (porcine)  5,000 Units Subcutaneous Q8H    insulin aspart U-100  5 Units Subcutaneous TIDWM    mycophenolate  1,000 mg Oral BID    polyethylene glycol  17 g Oral Daily    potassium, sodium phosphates  2 packet Oral BID    predniSONE  20 mg Oral Daily    propranoloL  20 mg Oral BID    [START ON 12/17/2023] sulfamethoxazole-trimethoprim 400-80mg  1 tablet Oral Daily AM    tacrolimus  2 mg Oral Once    tacrolimus  6 mg Oral BID    traZODone  25 mg Oral QHS    [START ON 12/20/2023] valGANciclovir  450 mg Oral Daily     Continuous Infusions:  PRN Meds:0.9%  NaCl infusion (for blood administration), dextrose 10%, dextrose 10%, glucagon (human recombinant), glucose, glucose, insulin aspart U-100, oxyCODONE, oxyCODONE, sodium chloride 0.9%    Review of Systems   Constitutional:  Negative for chills, fatigue and fever.   Respiratory:  Negative for cough and shortness of breath.    Cardiovascular:  Positive for leg swelling. Negative for chest pain.   Gastrointestinal:  Positive for abdominal pain (incisional). Negative for abdominal distention, diarrhea, nausea and vomiting.   Genitourinary:  Negative for decreased urine volume and difficulty urinating.   Musculoskeletal:  Positive for back pain (chronic) and gait problem.   Allergic/Immunologic: Positive for immunocompromised state.   Neurological:  Positive for tremors and weakness. Negative for dizziness and headaches.   Psychiatric/Behavioral:  Negative for confusion and decreased concentration. The patient is nervous/anxious.      Objective:     Vital Signs (Most Recent):  Temp: 98.9 °F (37.2 °C) (12/16/23 0734)  Pulse: 76 (12/16/23 0734)  Resp: 16 (12/16/23 0734)  BP: 132/63 (12/16/23 0734)  SpO2: (!) 91 % (12/16/23 0734) Vital Signs (24h Range):  Temp:  [98.1 °F (36.7 °C)-98.9 °F (37.2 °C)] 98.9 °F (37.2 °C)  Pulse:  [71-80] 76  Resp:  [12-18]  16  SpO2:  [90 %-95 %] 91 %  BP: (127-140)/(58-65) 132/63     Weight: 109.7 kg (241 lb 13.5 oz)  Body mass index is 37.88 kg/m².    Intake/Output - Last 3 Shifts         12/14 0700  12/15 0659 12/15 0700  12/16 0659 12/16 0700  12/17 0659    P.O. 220 600 240    I.V. (mL/kg) 0.6 (0)      Blood 177.5      Total Intake(mL/kg) 398.1 (3.6) 600 (5.5) 240 (2.2)    Urine (mL/kg/hr) 1250 (0.5)      Drains       Stool 0      Total Output 1250      Net -851.9 +600 +240           Urine Occurrence 6 x 2 x     Stool Occurrence 0 x 2 x              Physical Exam  Vitals reviewed.   Constitutional:       Appearance: She is well-developed.   Eyes:      Pupils: Pupils are equal, round, and reactive to light.   Cardiovascular:      Rate and Rhythm: Normal rate and regular rhythm.      Heart sounds: No murmur heard.  Pulmonary:      Effort: Pulmonary effort is normal. No respiratory distress.      Breath sounds: Normal breath sounds. No wheezing.   Abdominal:      General: Bowel sounds are normal. There is no distension.      Palpations: Abdomen is soft.      Tenderness: There is abdominal tenderness. There is no guarding.      Comments: Chevron incision w/ staples- cdi  LIUDMILA x 1 with ss drainage    Musculoskeletal:         General: Normal range of motion.      Cervical back: Normal range of motion.      Right lower leg: Edema present.      Left lower leg: Edema present.   Skin:     General: Skin is warm and dry.   Neurological:      Mental Status: She is alert and oriented to person, place, and time.   Psychiatric:         Mood and Affect: Affect is tearful.         Behavior: Behavior normal.         Thought Content: Thought content normal.         Judgment: Judgment normal.          Laboratory:  Immunosuppressants           Stop Route Frequency     tacrolimus capsule 6 mg         -- Oral 2 times daily     tacrolimus capsule 2 mg         -- Oral Once     mycophenolate capsule 1,000 mg         -- Oral 2 times daily          CBC:   Recent  Labs   Lab 12/16/23  0605   WBC 3.44*   RBC 2.33*   HGB 8.4*   HCT 23.4*   PLT 68*   *   MCH 36.1*   MCHC 35.9     CMP:   Recent Labs   Lab 12/16/23  0605   *   CALCIUM 8.7   ALBUMIN 2.9*   PROT 4.5*      K 4.1   CO2 25      BUN 33*   CREATININE 1.0   ALKPHOS 149*   *   AST 42*   BILITOT 3.7*     Labs within the past 24 hours have been reviewed.    Diagnostic Results:  None    Debility/Functional status: Patient debilitated by evidence of Muscle wasting and atrophy, Weakness, Limitation of activities due to disability, and Other reduced mobility. Physical and occupational therapy ordered daily to evaluate and treat. Debility was: present on admission.

## 2023-12-16 NOTE — ASSESSMENT & PLAN NOTE
BG goal 140 - 180   No hx of DM.  S/p liver txp. On steroids. Started on mealtime insulin, will likely need further increase in regimen.    Increase Novolog 5 units AC.  Moderate Dose Correction Scale  BG monitoring ac/hs    ** Please call Endocrine for any BG related issues **    ** Please notify Endocrine for any change and/or advance in diet**    Discharge planning: TBD

## 2023-12-16 NOTE — SUBJECTIVE & OBJECTIVE
"Interval HPI:   No acute events overnight. Patient in room 03894/89626 A. Blood glucose stable. BG at and above goal on current insulin regimen (SSI ). Steroid use- Pred 20 mg .   5 Days Post-Op  Renal function- Normal   Vasopressors-  None      Diet consistent carbohydrate Standard Tray      Eatin%  Nausea: No  Hypoglycemia and intervention: No  Fever: No  TPN and/or TF: No    /63 (BP Location: Left arm, Patient Position: Sitting)   Pulse 76   Temp 98.9 °F (37.2 °C) (Oral)   Resp 16   Ht 5' 7" (1.702 m)   Wt 109.7 kg (241 lb 13.5 oz)   SpO2 (!) 91%   Breastfeeding No   BMI 37.88 kg/m²     Labs Reviewed and Include    Recent Labs   Lab 23  0605   *   CALCIUM 8.7   ALBUMIN 2.9*   PROT 4.5*      K 4.1   CO2 25      BUN 33*   CREATININE 1.0   ALKPHOS 149*   *   AST 42*   BILITOT 3.7*     Lab Results   Component Value Date    WBC 3.44 (L) 2023    HGB 8.4 (L) 2023    HCT 23.4 (L) 2023     (H) 2023    PLT 68 (L) 2023     No results for input(s): "TSH", "FREET4" in the last 168 hours.  Lab Results   Component Value Date    HGBA1C 4.6 10/31/2023       Nutritional status:   Body mass index is 37.88 kg/m².  Lab Results   Component Value Date    ALBUMIN 2.9 (L) 2023    ALBUMIN 2.8 (L) 12/15/2023    ALBUMIN 2.5 (L) 2023     No results found for: "PREALBUMIN"    Estimated Creatinine Clearance: 72.5 mL/min (based on SCr of 1 mg/dL).    Accu-Checks  Recent Labs     23  0214 23  0717 23  0808 23  1749 23  2150 12/15/23  0304 12/15/23  0817 12/15/23  1711 12/15/23  2133   POCTGLUCOSE 169* 127* 98 103 228* 183* 135* 152* 242* 172*       Current Medications and/or Treatments Impacting Glycemic Control  Immunotherapy:    Immunosuppressants           Stop Route Frequency     tacrolimus capsule 4 mg         -- Oral 2 times daily     mycophenolate capsule 1,000 mg         -- Oral 2 times daily "          Steroids:   Hormones (From admission, onward)      Start     Stop Route Frequency Ordered    12/15/23 0900  predniSONE tablet 20 mg  (methylprednisolone taper panel)        See Hyperspace for full Linked Orders Report.    -- Oral Daily 12/10/23 0143    12/12/23 0900  methylPREDNISolone sodium succinate injection 120 mg  (methylprednisolone taper panel)        See Hyperspace for full Linked Orders Report.    12/13/23 0859 IV Daily 12/10/23 0143          Pressors:    Autonomic Drugs (From admission, onward)      None          Hyperglycemia/Diabetes Medications:   Antihyperglycemics (From admission, onward)      Start     Stop Route Frequency Ordered    12/15/23 1130  insulin aspart U-100 pen 3 Units         -- SubQ 3 times daily with meals 12/15/23 0857    12/15/23 0955  insulin aspart U-100 pen 0-10 Units         -- SubQ Before meals & nightly PRN 12/15/23 0857

## 2023-12-16 NOTE — PROGRESS NOTES
[facility]  Liver Transplant  Progress Note    Patient Name: Yumiko Gonzalez  MRN: [id number]  Admission Date: 12/9/2023  Hospital Length of Stay: 7 days  Code Status: Full Code  Primary Care Provider: Garrett Webb MD  Post-Operative Day: 7    ORGAN:   LIVER  Disease Etiology: Cirrhosis: Fatty Liver (DURAN)  Donor Type:   Donation after Circulatory Death   CDC High Risk:   No  Donor CMV Status:   Donor CMV Status: Negative  Donor HBcAB:   Negative  Donor HCV Status:   Negative  Donor HBV PEDRO:   Donor HCV PEDRO: Negative  Whole or Partial: Whole Liver  Biliary Anastomosis: End to End  Arterial Anatomy: Standard  Subjective:     History of Present Illness:  Ms. Gonzalez is a 62 y/o female who present for Liver Transplant for liver cirrhosis 2/2 to DURAN HX: HCC (status post Y90 06/2023) esophageal varices, ascites, hepatic encephalopathy. She reports in usual state of health- Patient denies any recent hospitalizations or ED visits. The primary surgeon will be Dr. Raza.  Patient is NPO.  All pre-op labs and imaging have been ordered and will be reviewed prior to surgery. Consents to be signed prior to transplant.      Hospital Course:  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 Progressed well, stepped down 12/10 PM. LFTs down trending, Cr stable. Post op US stable, 2 fluid collections likely hematomas. Pt with hypervolemia; lasix dependent prior to transplant. Lateral LIUDMILA drain removed 12/12. Platelets given prior to CVC removal on 12/14. PT/OT consulted with recs for home health. Persistent UE tremors, worsened post-txp, suspect from tacrolimus. Restarted on home propranolol. Encouraged patient to ambulate with assistance.     Interval Hx: NAEON. LFTs trending down. Remains with LE edema. Pt remains very emotional with adjustment disorder. Psych consulted for evaluation with recs to continue home medication dose. Appreciate  assistance. Working well with therapy. Encouraged ambulation in the room and halls with assistance if needed. Pt needs to be out of bed daily. Tentative d/c Monday 12/18. Cont to monitor.      Scheduled Meds:   buPROPion  300 mg Oral Daily    docusate sodium  100 mg Oral BID    famotidine  20 mg Oral QHS    heparin (porcine)  5,000 Units Subcutaneous Q8H    insulin aspart U-100  5 Units Subcutaneous TIDWM    mycophenolate  1,000 mg Oral BID    polyethylene glycol  17 g Oral Daily    potassium, sodium phosphates  2 packet Oral BID    predniSONE  20 mg Oral Daily    propranoloL  20 mg Oral BID    [START ON 12/17/2023] sulfamethoxazole-trimethoprim 400-80mg  1 tablet Oral Daily AM    tacrolimus  2 mg Oral Once    tacrolimus  6 mg Oral BID    traZODone  25 mg Oral QHS    [START ON 12/20/2023] valGANciclovir  450 mg Oral Daily     Continuous Infusions:  PRN Meds:0.9%  NaCl infusion (for blood administration), dextrose 10%, dextrose 10%, glucagon (human recombinant), glucose, glucose, insulin aspart U-100, oxyCODONE, oxyCODONE, sodium chloride 0.9%    Review of Systems   Constitutional:  Negative for chills, fatigue and fever.   Respiratory:  Negative for cough and shortness of breath.    Cardiovascular:  Positive for leg swelling. Negative for chest pain.   Gastrointestinal:  Positive for abdominal pain (incisional). Negative for abdominal distention, diarrhea, nausea and vomiting.   Genitourinary:  Negative for decreased urine volume and difficulty urinating.   Musculoskeletal:  Positive for back pain (chronic) and gait problem.   Allergic/Immunologic: Positive for immunocompromised state.   Neurological:  Positive for tremors and weakness. Negative for dizziness and headaches.   Psychiatric/Behavioral:  Negative for confusion and decreased concentration. The patient is nervous/anxious.      Objective:     Vital Signs (Most Recent):  Temp: 98.9 °F (37.2 °C) (12/16/23 0734)  Pulse: 76 (12/16/23 0734)  Resp: 16 (12/16/23  0734)  BP: 132/63 (12/16/23 0734)  SpO2: (!) 91 % (12/16/23 0734) Vital Signs (24h Range):  Temp:  [98.1 °F (36.7 °C)-98.9 °F (37.2 °C)] 98.9 °F (37.2 °C)  Pulse:  [71-80] 76  Resp:  [12-18] 16  SpO2:  [90 %-95 %] 91 %  BP: (127-140)/(58-65) 132/63     Weight: 109.7 kg (241 lb 13.5 oz)  Body mass index is 37.88 kg/m².    Intake/Output - Last 3 Shifts         12/14 0700  12/15 0659 12/15 0700 12/16 0659 12/16 0700 12/17 0659    P.O. 220 600 240    I.V. (mL/kg) 0.6 (0)      Blood 177.5      Total Intake(mL/kg) 398.1 (3.6) 600 (5.5) 240 (2.2)    Urine (mL/kg/hr) 1250 (0.5)      Drains       Stool 0      Total Output 1250      Net -851.9 +600 +240           Urine Occurrence 6 x 2 x     Stool Occurrence 0 x 2 x              Physical Exam  Vitals reviewed.   Constitutional:       Appearance: She is well-developed.   Eyes:      Pupils: Pupils are equal, round, and reactive to light.   Cardiovascular:      Rate and Rhythm: Normal rate and regular rhythm.      Heart sounds: No murmur heard.  Pulmonary:      Effort: Pulmonary effort is normal. No respiratory distress.      Breath sounds: Normal breath sounds. No wheezing.   Abdominal:      General: Bowel sounds are normal. There is no distension.      Palpations: Abdomen is soft.      Tenderness: There is abdominal tenderness. There is no guarding.      Comments: Chevron incision w/ staples- cdi  LIUDMILA x 1 with ss drainage    Musculoskeletal:         General: Normal range of motion.      Cervical back: Normal range of motion.      Right lower leg: Edema present.      Left lower leg: Edema present.   Skin:     General: Skin is warm and dry.   Neurological:      Mental Status: She is alert and oriented to person, place, and time.   Psychiatric:         Mood and Affect: Affect is tearful.         Behavior: Behavior normal.         Thought Content: Thought content normal.         Judgment: Judgment normal.          Laboratory:  Immunosuppressants           Stop Route Frequency      tacrolimus capsule 6 mg         -- Oral 2 times daily     tacrolimus capsule 2 mg         -- Oral Once     mycophenolate capsule 1,000 mg         -- Oral 2 times daily          CBC:   Recent Labs   Lab 12/16/23  0605   WBC 3.44*   RBC 2.33*   HGB 8.4*   HCT 23.4*   PLT 68*   *   MCH 36.1*   MCHC 35.9     CMP:   Recent Labs   Lab 12/16/23  0605   *   CALCIUM 8.7   ALBUMIN 2.9*   PROT 4.5*      K 4.1   CO2 25      BUN 33*   CREATININE 1.0   ALKPHOS 149*   *   AST 42*   BILITOT 3.7*     Labs within the past 24 hours have been reviewed.    Diagnostic Results:  None    Debility/Functional status: Patient debilitated by evidence of Muscle wasting and atrophy, Weakness, Limitation of activities due to disability, and Other reduced mobility. Physical and occupational therapy ordered daily to evaluate and treat. Debility was: present on admission.        Assessment/Plan:     * S/P liver transplant  - 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 Progressed well, stepped down 12/10 PM.   - LFTs down trending  - Post op US stable, 2 fluid collections likely hematomas.  - Drains all removed.   - TB with small increase; Tac dose adjusted. Will monitor with routine labs. Encouraged hydration.     DANAY (acute kidney injury)  - albumin 25% 12/13  - improved 12/14      Hypervolemia  - on home lasix prior to txp  - IV lasix 40mg 12/12; 12/14        Acute postoperative anemia due to expected blood loss  - H/H slowly trending down but stable. No overt signs of bleeding.       At risk for opportunistic infections  - Continue OI prophylaxis per protocol       Prophylactic immunotherapy  Continue Cellcept and Prograf. Will monitor for signs of toxic side effects, check daily tacrolimus troughs, and change meds accordingly.       Adverse effect of corticosteroids        Steroid-induced hyperglycemia        Immunosuppressed  status        Thrombocytopenia  - 2/2 ESLD; expected to improve post-op.         Tremor  - on home propranolol prior to transplant to assist with symptoms  - symptoms worsened post transplant, suspect prograf  - resumed home propranolol 12/13       Class 2 severe obesity due to excess calories with serious comorbidity and body mass index (BMI) of 36.0 to 36.9 in adult        Moderate episode of recurrent major depressive disorder  - cont home meds.  - psych consulted to assess for any medication dose adjustments. Appreciate assistance.        VTE Risk Mitigation (From admission, onward)           Ordered     heparin (porcine) injection 5,000 Units  Every 8 hours         12/10/23 0143     Place sequential compression device  Until discontinued         12/10/23 0143     IP VTE HIGH RISK PATIENT  Once         12/10/23 0143                    The patients clinical status was discussed at multidisplinary rounds, involving transplant surgery, transplant medicine, pharmacy, nursing, nutrition, and social work.    Discharge Planning:  Health Maintenance Due   Topic Date Due    Shingles Vaccine (1 of 2) 05/22/2009    TETANUS VACCINE  08/03/2017    Influenza Vaccine (1) 09/01/2019             Medical decision making for this encounter includes review of pertinent labs and diagnostic studies, assessment and planning, discussions with consulting providers, discussion with patient/family, and participation in multidisciplinary rounds. Time spent caring for patient: 30 minutes.    Lulu Quinteros, UMM  Liver Transplant  [facility]

## 2023-12-17 LAB
ALBUMIN SERPL BCP-MCNC: 2.6 G/DL (ref 3.5–5.2)
ALP SERPL-CCNC: 170 U/L (ref 55–135)
ALT SERPL W/O P-5'-P-CCNC: 101 U/L (ref 10–44)
ANION GAP SERPL CALC-SCNC: 6 MMOL/L (ref 8–16)
ANISOCYTOSIS BLD QL SMEAR: SLIGHT
AST SERPL-CCNC: 44 U/L (ref 10–40)
BACTERIA SPEC ANAEROBE CULT: NORMAL
BASOPHILS # BLD AUTO: ABNORMAL K/UL (ref 0–0.2)
BASOPHILS NFR BLD: 0 % (ref 0–1.9)
BILIRUB SERPL-MCNC: 3.6 MG/DL (ref 0.1–1)
BUN SERPL-MCNC: 26 MG/DL (ref 8–23)
CALCIUM SERPL-MCNC: 8.5 MG/DL (ref 8.7–10.5)
CHLORIDE SERPL-SCNC: 105 MMOL/L (ref 95–110)
CO2 SERPL-SCNC: 23 MMOL/L (ref 23–29)
CREAT SERPL-MCNC: 0.8 MG/DL (ref 0.5–1.4)
DIFFERENTIAL METHOD BLD: ABNORMAL
EOSINOPHIL # BLD AUTO: ABNORMAL K/UL (ref 0–0.5)
EOSINOPHIL NFR BLD: 11 % (ref 0–8)
ERYTHROCYTE [DISTWIDTH] IN BLOOD BY AUTOMATED COUNT: 16.5 % (ref 11.5–14.5)
EST. GFR  (NO RACE VARIABLE): >60 ML/MIN/1.73 M^2
GLUCOSE SERPL-MCNC: 134 MG/DL (ref 70–110)
HCT VFR BLD AUTO: 23.7 % (ref 37–48.5)
HGB BLD-MCNC: 8 G/DL (ref 12–16)
HYPOCHROMIA BLD QL SMEAR: ABNORMAL
IMM GRANULOCYTES # BLD AUTO: ABNORMAL K/UL (ref 0–0.04)
IMM GRANULOCYTES NFR BLD AUTO: ABNORMAL % (ref 0–0.5)
INR PPP: 1.1 (ref 0.8–1.2)
LYMPHOCYTES # BLD AUTO: ABNORMAL K/UL (ref 1–4.8)
LYMPHOCYTES NFR BLD: 7 % (ref 18–48)
MAGNESIUM SERPL-MCNC: 1.6 MG/DL (ref 1.6–2.6)
MCH RBC QN AUTO: 33.2 PG (ref 27–31)
MCHC RBC AUTO-ENTMCNC: 33.8 G/DL (ref 32–36)
MCV RBC AUTO: 98 FL (ref 82–98)
MONOCYTES # BLD AUTO: ABNORMAL K/UL (ref 0.3–1)
MONOCYTES NFR BLD: 12 % (ref 4–15)
NEUTROPHILS NFR BLD: 70 % (ref 38–73)
NRBC BLD-RTO: 0 /100 WBC
OVALOCYTES BLD QL SMEAR: ABNORMAL
PHOSPHATE SERPL-MCNC: 2.2 MG/DL (ref 2.7–4.5)
PLATELET # BLD AUTO: 71 K/UL (ref 150–450)
PLATELET BLD QL SMEAR: ABNORMAL
PMV BLD AUTO: 11.7 FL (ref 9.2–12.9)
POCT GLUCOSE: 133 MG/DL (ref 70–110)
POCT GLUCOSE: 202 MG/DL (ref 70–110)
POCT GLUCOSE: 204 MG/DL (ref 70–110)
POCT GLUCOSE: 231 MG/DL (ref 70–110)
POIKILOCYTOSIS BLD QL SMEAR: SLIGHT
POLYCHROMASIA BLD QL SMEAR: ABNORMAL
POTASSIUM SERPL-SCNC: 4.3 MMOL/L (ref 3.5–5.1)
PROT SERPL-MCNC: 4.3 G/DL (ref 6–8.4)
PROTHROMBIN TIME: 11.3 SEC (ref 9–12.5)
RBC # BLD AUTO: 2.41 M/UL (ref 4–5.4)
SODIUM SERPL-SCNC: 134 MMOL/L (ref 136–145)
TACROLIMUS BLD-MCNC: 7.2 NG/ML (ref 5–15)
WBC # BLD AUTO: 4.4 K/UL (ref 3.9–12.7)

## 2023-12-17 PROCEDURE — 25000003 PHARM REV CODE 250: Performed by: NURSE PRACTITIONER

## 2023-12-17 PROCEDURE — 36415 COLL VENOUS BLD VENIPUNCTURE: CPT | Performed by: STUDENT IN AN ORGANIZED HEALTH CARE EDUCATION/TRAINING PROGRAM

## 2023-12-17 PROCEDURE — 83735 ASSAY OF MAGNESIUM: CPT | Performed by: STUDENT IN AN ORGANIZED HEALTH CARE EDUCATION/TRAINING PROGRAM

## 2023-12-17 PROCEDURE — 99233 SBSQ HOSP IP/OBS HIGH 50: CPT | Mod: ,,, | Performed by: NURSE PRACTITIONER

## 2023-12-17 PROCEDURE — 84100 ASSAY OF PHOSPHORUS: CPT | Performed by: STUDENT IN AN ORGANIZED HEALTH CARE EDUCATION/TRAINING PROGRAM

## 2023-12-17 PROCEDURE — 25000003 PHARM REV CODE 250: Performed by: SURGERY

## 2023-12-17 PROCEDURE — 25000003 PHARM REV CODE 250

## 2023-12-17 PROCEDURE — 25000003 PHARM REV CODE 250: Performed by: STUDENT IN AN ORGANIZED HEALTH CARE EDUCATION/TRAINING PROGRAM

## 2023-12-17 PROCEDURE — 85610 PROTHROMBIN TIME: CPT | Performed by: STUDENT IN AN ORGANIZED HEALTH CARE EDUCATION/TRAINING PROGRAM

## 2023-12-17 PROCEDURE — 80197 ASSAY OF TACROLIMUS: CPT | Performed by: STUDENT IN AN ORGANIZED HEALTH CARE EDUCATION/TRAINING PROGRAM

## 2023-12-17 PROCEDURE — 63600175 PHARM REV CODE 636 W HCPCS: Performed by: STUDENT IN AN ORGANIZED HEALTH CARE EDUCATION/TRAINING PROGRAM

## 2023-12-17 PROCEDURE — 25000003 PHARM REV CODE 250: Performed by: PHYSICIAN ASSISTANT

## 2023-12-17 PROCEDURE — 85007 BL SMEAR W/DIFF WBC COUNT: CPT | Performed by: STUDENT IN AN ORGANIZED HEALTH CARE EDUCATION/TRAINING PROGRAM

## 2023-12-17 PROCEDURE — 20600001 HC STEP DOWN PRIVATE ROOM

## 2023-12-17 PROCEDURE — 80053 COMPREHEN METABOLIC PANEL: CPT | Performed by: STUDENT IN AN ORGANIZED HEALTH CARE EDUCATION/TRAINING PROGRAM

## 2023-12-17 PROCEDURE — 85027 COMPLETE CBC AUTOMATED: CPT | Performed by: STUDENT IN AN ORGANIZED HEALTH CARE EDUCATION/TRAINING PROGRAM

## 2023-12-17 PROCEDURE — 63600175 PHARM REV CODE 636 W HCPCS: Performed by: NURSE PRACTITIONER

## 2023-12-17 PROCEDURE — 63600175 PHARM REV CODE 636 W HCPCS: Performed by: TRANSPLANT SURGERY

## 2023-12-17 RX ORDER — INSULIN ASPART 100 [IU]/ML
2-5 INJECTION, SOLUTION INTRAVENOUS; SUBCUTANEOUS
Status: DISCONTINUED | OUTPATIENT
Start: 2023-12-17 | End: 2023-12-18

## 2023-12-17 RX ORDER — BUTALBITAL, ACETAMINOPHEN AND CAFFEINE 50; 325; 40 MG/1; MG/1; MG/1
1 TABLET ORAL EVERY 4 HOURS PRN
Status: DISCONTINUED | OUTPATIENT
Start: 2023-12-17 | End: 2023-12-18 | Stop reason: HOSPADM

## 2023-12-17 RX ORDER — FUROSEMIDE 10 MG/ML
40 INJECTION INTRAMUSCULAR; INTRAVENOUS ONCE
Status: COMPLETED | OUTPATIENT
Start: 2023-12-17 | End: 2023-12-17

## 2023-12-17 RX ORDER — ASPIRIN 81 MG/1
81 TABLET ORAL DAILY
Status: DISCONTINUED | OUTPATIENT
Start: 2023-12-17 | End: 2023-12-18 | Stop reason: HOSPADM

## 2023-12-17 RX ADMIN — MYCOPHENOLATE MOFETIL 1000 MG: 250 CAPSULE ORAL at 08:12

## 2023-12-17 RX ADMIN — TRAZODONE HYDROCHLORIDE 25 MG: 50 TABLET ORAL at 08:12

## 2023-12-17 RX ADMIN — FAMOTIDINE 20 MG: 20 TABLET, FILM COATED ORAL at 08:12

## 2023-12-17 RX ADMIN — MYCOPHENOLATE MOFETIL 1000 MG: 250 CAPSULE ORAL at 09:12

## 2023-12-17 RX ADMIN — TACROLIMUS 6 MG: 1 CAPSULE ORAL at 09:12

## 2023-12-17 RX ADMIN — INSULIN ASPART 2 UNITS: 100 INJECTION, SOLUTION INTRAVENOUS; SUBCUTANEOUS at 05:12

## 2023-12-17 RX ADMIN — OXYCODONE HYDROCHLORIDE 5 MG: 5 TABLET ORAL at 04:12

## 2023-12-17 RX ADMIN — Medication 2 TABLET: at 09:12

## 2023-12-17 RX ADMIN — TACROLIMUS 6 MG: 1 CAPSULE ORAL at 05:12

## 2023-12-17 RX ADMIN — Medication 2 TABLET: at 08:12

## 2023-12-17 RX ADMIN — ASPIRIN 81 MG: 81 TABLET, COATED ORAL at 11:12

## 2023-12-17 RX ADMIN — PROPRANOLOL HYDROCHLORIDE 20 MG: 10 TABLET ORAL at 09:12

## 2023-12-17 RX ADMIN — INSULIN ASPART 2 UNITS: 100 INJECTION, SOLUTION INTRAVENOUS; SUBCUTANEOUS at 10:12

## 2023-12-17 RX ADMIN — ALUMINUM HYDROXIDE, MAGNESIUM HYDROXIDE, AND DIMETHICONE 30 ML: 400; 400; 40 SUSPENSION ORAL at 08:12

## 2023-12-17 RX ADMIN — HEPARIN SODIUM 5000 UNITS: 5000 INJECTION INTRAVENOUS; SUBCUTANEOUS at 04:12

## 2023-12-17 RX ADMIN — HEPARIN SODIUM 5000 UNITS: 5000 INJECTION INTRAVENOUS; SUBCUTANEOUS at 02:12

## 2023-12-17 RX ADMIN — INSULIN ASPART 4 UNITS: 100 INJECTION, SOLUTION INTRAVENOUS; SUBCUTANEOUS at 05:12

## 2023-12-17 RX ADMIN — INSULIN ASPART 4 UNITS: 100 INJECTION, SOLUTION INTRAVENOUS; SUBCUTANEOUS at 01:12

## 2023-12-17 RX ADMIN — SULFAMETHOXAZOLE AND TRIMETHOPRIM 1 TABLET: 400; 80 TABLET ORAL at 09:12

## 2023-12-17 RX ADMIN — INSULIN ASPART 2 UNITS: 100 INJECTION, SOLUTION INTRAVENOUS; SUBCUTANEOUS at 01:12

## 2023-12-17 RX ADMIN — FUROSEMIDE 40 MG: 10 INJECTION, SOLUTION INTRAVENOUS at 11:12

## 2023-12-17 RX ADMIN — BUPROPION HYDROCHLORIDE 300 MG: 150 TABLET, FILM COATED, EXTENDED RELEASE ORAL at 09:12

## 2023-12-17 RX ADMIN — OXYCODONE HYDROCHLORIDE 5 MG: 5 TABLET ORAL at 05:12

## 2023-12-17 RX ADMIN — OXYCODONE HYDROCHLORIDE 5 MG: 5 TABLET ORAL at 09:12

## 2023-12-17 RX ADMIN — HEPARIN SODIUM 5000 UNITS: 5000 INJECTION INTRAVENOUS; SUBCUTANEOUS at 08:12

## 2023-12-17 RX ADMIN — PREDNISONE 20 MG: 20 TABLET ORAL at 09:12

## 2023-12-17 NOTE — CARE UPDATE
-Glucose Goal 140-180    -A1C:   Hemoglobin A1C   Date Value Ref Range Status   10/31/2023 4.6 4.0 - 5.6 % Final     Comment:     ADA Screening Guidelines:  5.7-6.4%  Consistent with prediabetes  >or=6.5%  Consistent with diabetes    High levels of fetal hemoglobin interfere with the HbA1C  assay. Heterozygous hemoglobin variants (HbS, HgC, etc)do  not significantly interfere with this assay.   However, presence of multiple variants may affect accuracy.           -HOME REGIMEN:     -GLUCOSE TREND FOR THE PAST 24HRS:   Recent Labs   Lab 12/15/23  1711 12/15/23  2133 12/16/23  0913 12/16/23  1244 12/16/23  1741 12/16/23  2232   POCTGLUCOSE 242* 172* 137* 199* 215* 180*         -NO HYPOGYCEMIAS NOTED     - Diet  Diet consistent carbohydrate Standard Tray    No hx of DM.  S/p liver txp. On steroids.  Will add range for mealtime insulin based on intake.       Novolog 2 units if patient eats 25% -  50% and 5 units if patient eats 50% or more.  Moderate Dose Correction Scale  BG monitoring ac/hs     ** Please call Endocrine for any BG related issues **     ** Please notify Endocrine for any change and/or advance in diet**     Discharge planning: TBD

## 2023-12-17 NOTE — ASSESSMENT & PLAN NOTE
- 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 Progressed well, stepped down 12/10 PM.   - LFTs down trending  - Post op US stable, 2 fluid collections likely hematomas.  - Drains all removed.   - T-renee slowly downward trend

## 2023-12-17 NOTE — PROGRESS NOTES
[facility]  Liver Transplant  Progress Note    Patient Name: Yumiko Gonzalez  MRN: [id number]  Admission Date: 12/9/2023  Hospital Length of Stay: 8 days  Code Status: Full Code  Primary Care Provider: Garrett Webb MD  Post-Operative Day: 8    ORGAN:   LIVER  Disease Etiology: Cirrhosis: Fatty Liver (DURAN)  Donor Type:   Donation after Circulatory Death   CDC High Risk:   No  Donor CMV Status:   Donor CMV Status: Negative  Donor HBcAB:   Negative  Donor HCV Status:   Negative  Donor HBV PEDRO:   Donor HCV PEDRO: Negative  Whole or Partial: Whole Liver  Biliary Anastomosis: End to End  Arterial Anatomy: Standard  Subjective:     History of Present Illness:  Ms. Gonzalez is a 62 y/o female who present for Liver Transplant for liver cirrhosis 2/2 to DURAN HX: HCC (status post Y90 06/2023) esophageal varices, ascites, hepatic encephalopathy. She reports in usual state of health- Patient denies any recent hospitalizations or ED visits. The primary surgeon will be Dr. Raza.  Patient is NPO.  All pre-op labs and imaging have been ordered and will be reviewed prior to surgery. Consents to be signed prior to transplant.      Hospital Course:  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 Progressed well, stepped down 12/10 PM. LFTs down trending, Cr stable. Post op US stable, 2 fluid collections likely hematomas. Pt with hypervolemia; lasix dependent prior to transplant. Lateral LIUDMILA drain removed 12/12. Platelets given prior to CVC removal on 12/14. PT/OT consulted with recs for home health. Persistent UE tremors, worsened post-txp, suspect from tacrolimus. Restarted on home propranolol. Encouraged patient to ambulate with assistance.     Interval Hx: NAEON. LFTs slowly trending down. Remains with LE edema, giving lasix 40 IV today. Plan for 1 week liver U/S on Monday.  Pt remains very emotional with adjustment disorder. Psych consulted  for evaluation with recs to continue home medication dose. Appreciate assistance. Working well with therapy. Encouraged ambulation in the room and halls with assistance if needed. Pt needs to be out of bed daily. Tentative d/c Monday 12/18. Cont to monitor.      Scheduled Meds:   aspirin  81 mg Oral Daily    buPROPion  300 mg Oral Daily    docusate sodium  100 mg Oral BID    famotidine  20 mg Oral QHS    heparin (porcine)  5,000 Units Subcutaneous Q8H    insulin aspart U-100  2-5 Units Subcutaneous TIDWM    k phos di & mono-sod phos mono  500 mg Oral BID    mycophenolate  1,000 mg Oral BID    polyethylene glycol  17 g Oral Daily    predniSONE  20 mg Oral Daily    propranoloL  20 mg Oral BID    sulfamethoxazole-trimethoprim 400-80mg  1 tablet Oral Daily AM    tacrolimus  6 mg Oral BID    traZODone  25 mg Oral QHS    [START ON 12/20/2023] valGANciclovir  450 mg Oral Daily     Continuous Infusions:  PRN Meds:0.9%  NaCl infusion (for blood administration), aluminum & magnesium hydroxide-simethicone, butalbital-acetaminophen-caffeine -40 mg, dextrose 10%, dextrose 10%, glucagon (human recombinant), glucose, glucose, insulin aspart U-100, oxyCODONE, oxyCODONE, sodium chloride 0.9%    Review of Systems   Constitutional:  Negative for chills, fatigue and fever.   Respiratory:  Negative for cough and shortness of breath.    Cardiovascular:  Positive for leg swelling. Negative for chest pain.   Gastrointestinal:  Positive for abdominal pain (incisional). Negative for abdominal distention, diarrhea, nausea and vomiting.   Genitourinary:  Negative for decreased urine volume and difficulty urinating.   Musculoskeletal:  Positive for back pain (chronic) and gait problem.   Allergic/Immunologic: Positive for immunocompromised state.   Neurological:  Positive for tremors and weakness. Negative for dizziness and headaches.   Psychiatric/Behavioral:  Negative for confusion and decreased concentration. The patient is  nervous/anxious.      Objective:     Vital Signs (Most Recent):  Temp: 98.1 °F (36.7 °C) (12/17/23 1139)  Pulse: 74 (12/17/23 1139)  Resp: 14 (12/17/23 1139)  BP: (!) 122/58 (12/17/23 1139)  SpO2: 95 % (12/17/23 1139) Vital Signs (24h Range):  Temp:  [98.1 °F (36.7 °C)-98.5 °F (36.9 °C)] 98.1 °F (36.7 °C)  Pulse:  [69-77] 74  Resp:  [12-18] 14  SpO2:  [93 %-96 %] 95 %  BP: (122-130)/(58-60) 122/58     Weight: 109.7 kg (241 lb 13.5 oz)  Body mass index is 37.88 kg/m².    Intake/Output - Last 3 Shifts         12/15 0700  12/16 0659 12/16 0700  12/17 0659 12/17 0700  12/18 0659    P.O. 600 360     I.V. (mL/kg)       Blood       Total Intake(mL/kg) 600 (5.5) 360 (3.3)     Urine (mL/kg/hr)  500 (0.2)     Stool  0     Total Output  500     Net +600 -140            Urine Occurrence 2 x 3 x     Stool Occurrence 2 x 4 x              Physical Exam  Vitals reviewed.   Constitutional:       Appearance: She is well-developed.   Eyes:      Pupils: Pupils are equal, round, and reactive to light.   Cardiovascular:      Rate and Rhythm: Normal rate and regular rhythm.      Heart sounds: No murmur heard.  Pulmonary:      Effort: Pulmonary effort is normal. No respiratory distress.      Breath sounds: Normal breath sounds. No wheezing.   Abdominal:      General: Bowel sounds are normal. There is no distension.      Palpations: Abdomen is soft.      Tenderness: There is abdominal tenderness. There is no guarding.      Comments: Chevron incision w/ staples- cdi  LIUDMILA x 1 with ss drainage    Musculoskeletal:         General: Normal range of motion.      Cervical back: Normal range of motion.      Right lower leg: Edema present.      Left lower leg: Edema present.   Skin:     General: Skin is warm and dry.   Neurological:      Mental Status: She is alert and oriented to person, place, and time.   Psychiatric:         Mood and Affect: Affect is tearful.         Behavior: Behavior normal.         Thought Content: Thought content normal.          Judgment: Judgment normal.          Laboratory:  Immunosuppressants           Stop Route Frequency     tacrolimus capsule 6 mg         -- Oral 2 times daily     mycophenolate capsule 1,000 mg         -- Oral 2 times daily          CBC:   Recent Labs   Lab 12/17/23  0602   WBC 4.40   RBC 2.41*   HGB 8.0*   HCT 23.7*   PLT 71*   MCV 98   MCH 33.2*   MCHC 33.8     CMP:   Recent Labs   Lab 12/17/23  0602   *   CALCIUM 8.5*   ALBUMIN 2.6*   PROT 4.3*   *   K 4.3   CO2 23      BUN 26*   CREATININE 0.8   ALKPHOS 170*   *   AST 44*   BILITOT 3.6*     Labs within the past 24 hours have been reviewed.    Diagnostic Results:  None    Debility/Functional status: Patient debilitated by evidence of Muscle wasting and atrophy, Weakness, Limitation of activities due to disability, and Other reduced mobility. Physical and occupational therapy ordered daily to evaluate and treat. Debility was: present on admission.        Assessment/Plan:     * S/P liver transplant  - 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 Progressed well, stepped down 12/10 PM.   - LFTs down trending  - Post op US stable, 2 fluid collections likely hematomas.  - Drains all removed.   - T-renee slowly downward trend    DANAY (acute kidney injury)  - albumin 25% 12/13  - improved 12/14      Hypervolemia  - on home lasix prior to txp  - IV lasix 40mg 12/12; 12/14  - IV lasix 40 mg 12/17        Acute postoperative anemia due to expected blood loss  - H/H slowly trending down but stable. No overt signs of bleeding.       At risk for opportunistic infections  - Continue OI prophylaxis per protocol       Prophylactic immunotherapy  Continue Cellcept and Prograf. Will monitor for signs of toxic side effects, check daily tacrolimus troughs, and change meds accordingly.       Adverse effect of corticosteroids        Steroid-induced hyperglycemia        Immunosuppressed  status        Thrombocytopenia  - 2/2 ESLD; expected to improve post-op.         Tremor  - on home propranolol prior to transplant to assist with symptoms  - symptoms worsened post transplant, suspect prograf  - resumed home propranolol 12/13       Class 2 severe obesity due to excess calories with serious comorbidity and body mass index (BMI) of 36.0 to 36.9 in adult        Moderate episode of recurrent major depressive disorder  - cont home meds.  - psych consulted to assess for any medication dose adjustments. Appreciate assistance.        VTE Risk Mitigation (From admission, onward)           Ordered     heparin (porcine) injection 5,000 Units  Every 8 hours         12/10/23 0143     Place sequential compression device  Until discontinued         12/10/23 0143     IP VTE HIGH RISK PATIENT  Once         12/10/23 0143                    The patients clinical status was discussed at multidisplinary rounds, involving transplant surgery, transplant medicine, pharmacy, nursing, nutrition, and social work.    Discharge Planning:  Health Maintenance Due   Topic Date Due    Shingles Vaccine (1 of 2) 05/22/2009    TETANUS VACCINE  08/03/2017    Influenza Vaccine (1) 09/01/2019             Medical decision making for this encounter includes review of pertinent labs and diagnostic studies, assessment and planning, discussions with consulting providers, discussion with patient/family, and participation in multidisciplinary rounds. Time spent caring for patient: 30 minutes.    Lulu Quinteros, UMM  Liver Transplant  [facility]

## 2023-12-17 NOTE — SUBJECTIVE & OBJECTIVE
Scheduled Meds:   aspirin  81 mg Oral Daily    buPROPion  300 mg Oral Daily    docusate sodium  100 mg Oral BID    famotidine  20 mg Oral QHS    heparin (porcine)  5,000 Units Subcutaneous Q8H    insulin aspart U-100  2-5 Units Subcutaneous TIDWM    k phos di & mono-sod phos mono  500 mg Oral BID    mycophenolate  1,000 mg Oral BID    polyethylene glycol  17 g Oral Daily    predniSONE  20 mg Oral Daily    propranoloL  20 mg Oral BID    sulfamethoxazole-trimethoprim 400-80mg  1 tablet Oral Daily AM    tacrolimus  6 mg Oral BID    traZODone  25 mg Oral QHS    [START ON 12/20/2023] valGANciclovir  450 mg Oral Daily     Continuous Infusions:  PRN Meds:0.9%  NaCl infusion (for blood administration), aluminum & magnesium hydroxide-simethicone, butalbital-acetaminophen-caffeine -40 mg, dextrose 10%, dextrose 10%, glucagon (human recombinant), glucose, glucose, insulin aspart U-100, oxyCODONE, oxyCODONE, sodium chloride 0.9%    Review of Systems   Constitutional:  Negative for chills, fatigue and fever.   Respiratory:  Negative for cough and shortness of breath.    Cardiovascular:  Positive for leg swelling. Negative for chest pain.   Gastrointestinal:  Positive for abdominal pain (incisional). Negative for abdominal distention, diarrhea, nausea and vomiting.   Genitourinary:  Negative for decreased urine volume and difficulty urinating.   Musculoskeletal:  Positive for back pain (chronic) and gait problem.   Allergic/Immunologic: Positive for immunocompromised state.   Neurological:  Positive for tremors and weakness. Negative for dizziness and headaches.   Psychiatric/Behavioral:  Negative for confusion and decreased concentration. The patient is nervous/anxious.      Objective:     Vital Signs (Most Recent):  Temp: 98.1 °F (36.7 °C) (12/17/23 1139)  Pulse: 74 (12/17/23 1139)  Resp: 14 (12/17/23 1139)  BP: (!) 122/58 (12/17/23 1139)  SpO2: 95 % (12/17/23 1139) Vital Signs (24h Range):  Temp:  [98.1 °F (36.7 °C)-98.5  °F (36.9 °C)] 98.1 °F (36.7 °C)  Pulse:  [69-77] 74  Resp:  [12-18] 14  SpO2:  [93 %-96 %] 95 %  BP: (122-130)/(58-60) 122/58     Weight: 109.7 kg (241 lb 13.5 oz)  Body mass index is 37.88 kg/m².    Intake/Output - Last 3 Shifts         12/15 0700 12/16 0659 12/16 0700 12/17 0659 12/17 0700  12/18 0659    P.O. 600 360     I.V. (mL/kg)       Blood       Total Intake(mL/kg) 600 (5.5) 360 (3.3)     Urine (mL/kg/hr)  500 (0.2)     Stool  0     Total Output  500     Net +600 -140            Urine Occurrence 2 x 3 x     Stool Occurrence 2 x 4 x              Physical Exam  Vitals reviewed.   Constitutional:       Appearance: She is well-developed.   Eyes:      Pupils: Pupils are equal, round, and reactive to light.   Cardiovascular:      Rate and Rhythm: Normal rate and regular rhythm.      Heart sounds: No murmur heard.  Pulmonary:      Effort: Pulmonary effort is normal. No respiratory distress.      Breath sounds: Normal breath sounds. No wheezing.   Abdominal:      General: Bowel sounds are normal. There is no distension.      Palpations: Abdomen is soft.      Tenderness: There is abdominal tenderness. There is no guarding.      Comments: Chevron incision w/ staples- cdi  LIUDMILA x 1 with ss drainage    Musculoskeletal:         General: Normal range of motion.      Cervical back: Normal range of motion.      Right lower leg: Edema present.      Left lower leg: Edema present.   Skin:     General: Skin is warm and dry.   Neurological:      Mental Status: She is alert and oriented to person, place, and time.   Psychiatric:         Mood and Affect: Affect is tearful.         Behavior: Behavior normal.         Thought Content: Thought content normal.         Judgment: Judgment normal.          Laboratory:  Immunosuppressants           Stop Route Frequency     tacrolimus capsule 6 mg         -- Oral 2 times daily     mycophenolate capsule 1,000 mg         -- Oral 2 times daily          CBC:   Recent Labs   Lab 12/17/23  0602    WBC 4.40   RBC 2.41*   HGB 8.0*   HCT 23.7*   PLT 71*   MCV 98   MCH 33.2*   MCHC 33.8     CMP:   Recent Labs   Lab 12/17/23  0602   *   CALCIUM 8.5*   ALBUMIN 2.6*   PROT 4.3*   *   K 4.3   CO2 23      BUN 26*   CREATININE 0.8   ALKPHOS 170*   *   AST 44*   BILITOT 3.6*     Labs within the past 24 hours have been reviewed.    Diagnostic Results:  None    Debility/Functional status: Patient debilitated by evidence of Muscle wasting and atrophy, Weakness, Limitation of activities due to disability, and Other reduced mobility. Physical and occupational therapy ordered daily to evaluate and treat. Debility was: present on admission.

## 2023-12-17 NOTE — PLAN OF CARE
Pt aao x4, vss, on room air, afebrile. Chevron OPA w/ staples; painted w/ betadine. Completed teaching on insulin pen use and painting incision with patient and daughter. Stool softener held for loose stool. X1 BM. Pt up w/ 1 assist and walker. PRN oxy given for pain.Bed locked and in lowest setting. Call bell in reach. Daughter at bedside.

## 2023-12-17 NOTE — PLAN OF CARE
63 year-old female admitted 12/9/23 for liver txp. Pt has hx of DURAN cirrhosis, HCC, esophageal varices, ascites, hepatic encephalopathy.   -AAOx4, ambulates w/walker standby assist  -20 G Lt AC  -Cecilia MARIE with staples  -Accuchecks AC/HS, prandial insulin  -PT/OT  -PRN Oxy 5mg Q4  -pt ambulated in [place] today  -BLE edema +3  -Lasix 40mg IVP  -self meds 100%  -daughter at bedside, pt sitting up in chair, wheels locked, non-skid socks in place, call light within reach  -pending discharge 12/18

## 2023-12-17 NOTE — PLAN OF CARE
TB, AST, & ALT decreased today from yesterday. K+=4.1. Cr=1.0. Mg+2=1.6. Phos=2.5.Has decreased appetite. Encouraged to drink Boost. Ambulating to BR with assist wearing non-skid socks. UOP adequate. Had BM X3 today all loose and brown. No Miralax given this am. Afebrile. Pain decreased with Oxycodone. Sat in chair most of day. Daughter prepared self-meds correctly this am. Glucoses monitored. Pt not eating enough to give meal Novolog ordered. Correction Novolog given as indicated. Pt teary-eyed this evening speaking about death of  last year. Listened intently. Emotional support provided.

## 2023-12-18 ENCOUNTER — PATIENT MESSAGE (OUTPATIENT)
Dept: ENDOCRINOLOGY | Facility: HOSPITAL | Age: 63
End: 2023-12-18
Payer: COMMERCIAL

## 2023-12-18 VITALS
HEIGHT: 67 IN | TEMPERATURE: 98 F | OXYGEN SATURATION: 94 % | HEART RATE: 72 BPM | RESPIRATION RATE: 16 BRPM | BODY MASS INDEX: 38.27 KG/M2 | WEIGHT: 243.81 LBS | DIASTOLIC BLOOD PRESSURE: 52 MMHG | SYSTOLIC BLOOD PRESSURE: 108 MMHG

## 2023-12-18 DIAGNOSIS — Z94.4 LIVER REPLACED BY TRANSPLANT: Primary | ICD-10-CM

## 2023-12-18 LAB
ALBUMIN SERPL BCP-MCNC: 2.5 G/DL (ref 3.5–5.2)
ALP SERPL-CCNC: 165 U/L (ref 55–135)
ALT SERPL W/O P-5'-P-CCNC: 91 U/L (ref 10–44)
ANION GAP SERPL CALC-SCNC: 6 MMOL/L (ref 8–16)
AST SERPL-CCNC: 38 U/L (ref 10–40)
BASOPHILS # BLD AUTO: ABNORMAL K/UL (ref 0–0.2)
BASOPHILS NFR BLD: 1 % (ref 0–1.9)
BILIRUB SERPL-MCNC: 2.6 MG/DL (ref 0.1–1)
BUN SERPL-MCNC: 24 MG/DL (ref 8–23)
CALCIUM SERPL-MCNC: 8.5 MG/DL (ref 8.7–10.5)
CHLORIDE SERPL-SCNC: 102 MMOL/L (ref 95–110)
CO2 SERPL-SCNC: 26 MMOL/L (ref 23–29)
CREAT SERPL-MCNC: 0.9 MG/DL (ref 0.5–1.4)
DIFFERENTIAL METHOD BLD: ABNORMAL
EOSINOPHIL # BLD AUTO: ABNORMAL K/UL (ref 0–0.5)
EOSINOPHIL NFR BLD: 5 % (ref 0–8)
ERYTHROCYTE [DISTWIDTH] IN BLOOD BY AUTOMATED COUNT: 16.7 % (ref 11.5–14.5)
EST. GFR  (NO RACE VARIABLE): >60 ML/MIN/1.73 M^2
GIANT PLATELETS BLD QL SMEAR: PRESENT
GLUCOSE SERPL-MCNC: 109 MG/DL (ref 70–110)
HCT VFR BLD AUTO: 24.3 % (ref 37–48.5)
HGB BLD-MCNC: 8 G/DL (ref 12–16)
IMM GRANULOCYTES # BLD AUTO: ABNORMAL K/UL (ref 0–0.04)
IMM GRANULOCYTES NFR BLD AUTO: ABNORMAL % (ref 0–0.5)
INR PPP: 1 (ref 0.8–1.2)
LYMPHOCYTES # BLD AUTO: ABNORMAL K/UL (ref 1–4.8)
LYMPHOCYTES NFR BLD: 13 % (ref 18–48)
MAGNESIUM SERPL-MCNC: 1.6 MG/DL (ref 1.6–2.6)
MCH RBC QN AUTO: 33.5 PG (ref 27–31)
MCHC RBC AUTO-ENTMCNC: 32.9 G/DL (ref 32–36)
MCV RBC AUTO: 102 FL (ref 82–98)
MONOCYTES # BLD AUTO: ABNORMAL K/UL (ref 0.3–1)
MONOCYTES NFR BLD: 12 % (ref 4–15)
MYELOCYTES NFR BLD MANUAL: 2 %
NEUTROPHILS NFR BLD: 65 % (ref 38–73)
NEUTS BAND NFR BLD MANUAL: 2 %
NRBC BLD-RTO: 0 /100 WBC
PHOSPHATE SERPL-MCNC: 2.7 MG/DL (ref 2.7–4.5)
PLATELET # BLD AUTO: 90 K/UL (ref 150–450)
PLATELET BLD QL SMEAR: ABNORMAL
PMV BLD AUTO: 11.8 FL (ref 9.2–12.9)
POCT GLUCOSE: 135 MG/DL (ref 70–110)
POCT GLUCOSE: 236 MG/DL (ref 70–110)
POTASSIUM SERPL-SCNC: 4.1 MMOL/L (ref 3.5–5.1)
PROT SERPL-MCNC: 4.3 G/DL (ref 6–8.4)
PROTHROMBIN TIME: 10.7 SEC (ref 9–12.5)
RBC # BLD AUTO: 2.39 M/UL (ref 4–5.4)
SODIUM SERPL-SCNC: 134 MMOL/L (ref 136–145)
TACROLIMUS BLD-MCNC: 7.2 NG/ML (ref 5–15)
WBC # BLD AUTO: 4.79 K/UL (ref 3.9–12.7)

## 2023-12-18 PROCEDURE — 25000003 PHARM REV CODE 250: Performed by: STUDENT IN AN ORGANIZED HEALTH CARE EDUCATION/TRAINING PROGRAM

## 2023-12-18 PROCEDURE — 25000003 PHARM REV CODE 250: Performed by: PHYSICIAN ASSISTANT

## 2023-12-18 PROCEDURE — 85610 PROTHROMBIN TIME: CPT | Performed by: STUDENT IN AN ORGANIZED HEALTH CARE EDUCATION/TRAINING PROGRAM

## 2023-12-18 PROCEDURE — 80053 COMPREHEN METABOLIC PANEL: CPT | Performed by: STUDENT IN AN ORGANIZED HEALTH CARE EDUCATION/TRAINING PROGRAM

## 2023-12-18 PROCEDURE — 36415 COLL VENOUS BLD VENIPUNCTURE: CPT | Performed by: STUDENT IN AN ORGANIZED HEALTH CARE EDUCATION/TRAINING PROGRAM

## 2023-12-18 PROCEDURE — 84100 ASSAY OF PHOSPHORUS: CPT | Performed by: STUDENT IN AN ORGANIZED HEALTH CARE EDUCATION/TRAINING PROGRAM

## 2023-12-18 PROCEDURE — 63600175 PHARM REV CODE 636 W HCPCS: Performed by: STUDENT IN AN ORGANIZED HEALTH CARE EDUCATION/TRAINING PROGRAM

## 2023-12-18 PROCEDURE — 25000003 PHARM REV CODE 250: Performed by: NURSE PRACTITIONER

## 2023-12-18 PROCEDURE — 85007 BL SMEAR W/DIFF WBC COUNT: CPT | Performed by: STUDENT IN AN ORGANIZED HEALTH CARE EDUCATION/TRAINING PROGRAM

## 2023-12-18 PROCEDURE — 85027 COMPLETE CBC AUTOMATED: CPT | Performed by: STUDENT IN AN ORGANIZED HEALTH CARE EDUCATION/TRAINING PROGRAM

## 2023-12-18 PROCEDURE — 25000003 PHARM REV CODE 250: Performed by: SURGERY

## 2023-12-18 PROCEDURE — 99239 HOSP IP/OBS DSCHRG MGMT >30: CPT | Mod: 24,,, | Performed by: PHYSICIAN ASSISTANT

## 2023-12-18 PROCEDURE — 25000003 PHARM REV CODE 250

## 2023-12-18 PROCEDURE — 83735 ASSAY OF MAGNESIUM: CPT | Performed by: STUDENT IN AN ORGANIZED HEALTH CARE EDUCATION/TRAINING PROGRAM

## 2023-12-18 PROCEDURE — 80197 ASSAY OF TACROLIMUS: CPT | Performed by: STUDENT IN AN ORGANIZED HEALTH CARE EDUCATION/TRAINING PROGRAM

## 2023-12-18 PROCEDURE — 99232 SBSQ HOSP IP/OBS MODERATE 35: CPT | Mod: ,,, | Performed by: PHYSICIAN ASSISTANT

## 2023-12-18 PROCEDURE — 63600175 PHARM REV CODE 636 W HCPCS: Performed by: TRANSPLANT SURGERY

## 2023-12-18 RX ORDER — ASPIRIN 81 MG/1
81 TABLET ORAL DAILY
Qty: 30 TABLET | Refills: 11 | Status: SHIPPED | OUTPATIENT
Start: 2023-12-19 | End: 2024-12-18

## 2023-12-18 RX ORDER — FUROSEMIDE 20 MG/1
40 TABLET ORAL DAILY
Qty: 10 TABLET | Refills: 0 | Status: SHIPPED | OUTPATIENT
Start: 2023-12-18 | End: 2023-12-22

## 2023-12-18 RX ORDER — INSULIN ASPART 100 [IU]/ML
3-5 INJECTION, SOLUTION INTRAVENOUS; SUBCUTANEOUS
Status: DISCONTINUED | OUTPATIENT
Start: 2023-12-18 | End: 2023-12-18 | Stop reason: HOSPADM

## 2023-12-18 RX ORDER — PANTOPRAZOLE SODIUM 40 MG/1
40 TABLET, DELAYED RELEASE ORAL DAILY
Qty: 30 TABLET | Refills: 1 | Status: SHIPPED | OUTPATIENT
Start: 2023-12-18 | End: 2024-12-17

## 2023-12-18 RX ORDER — LANCETS 28 GAUGE
EACH MISCELLANEOUS 3 TIMES DAILY
Qty: 100 EACH | Refills: 1 | Status: SHIPPED | OUTPATIENT
Start: 2023-12-18

## 2023-12-18 RX ORDER — PEN NEEDLE, DIABETIC 30 GX3/16"
1 NEEDLE, DISPOSABLE MISCELLANEOUS 3 TIMES DAILY
Qty: 90 EACH | Refills: 1 | Status: SHIPPED | OUTPATIENT
Start: 2023-12-18 | End: 2024-01-24 | Stop reason: SDUPTHER

## 2023-12-18 RX ORDER — INSULIN ASPART 100 [IU]/ML
4 INJECTION, SOLUTION INTRAVENOUS; SUBCUTANEOUS 3 TIMES DAILY
Qty: 6 ML | Refills: 1 | Status: SHIPPED | OUTPATIENT
Start: 2023-12-18 | End: 2024-12-17

## 2023-12-18 RX ORDER — PROPRANOLOL HYDROCHLORIDE 20 MG/1
20 TABLET ORAL 2 TIMES DAILY
Qty: 60 TABLET | Refills: 11 | Status: SHIPPED | OUTPATIENT
Start: 2023-12-18 | End: 2024-12-17

## 2023-12-18 RX ORDER — TACROLIMUS 1 MG/1
7 CAPSULE ORAL EVERY 12 HOURS
Qty: 420 CAPSULE | Refills: 11 | Status: SHIPPED | OUTPATIENT
Start: 2023-12-18 | End: 2024-01-04 | Stop reason: DRUGHIGH

## 2023-12-18 RX ORDER — PANTOPRAZOLE SODIUM 40 MG/1
40 TABLET, DELAYED RELEASE ORAL DAILY
Status: DISCONTINUED | OUTPATIENT
Start: 2023-12-18 | End: 2023-12-18 | Stop reason: HOSPADM

## 2023-12-18 RX ORDER — INSULIN PUMP SYRINGE, 3 ML
EACH MISCELLANEOUS
Qty: 1 EACH | Refills: 0 | Status: SHIPPED | OUTPATIENT
Start: 2023-12-18 | End: 2024-12-17

## 2023-12-18 RX ORDER — TRAZODONE HYDROCHLORIDE 50 MG/1
25 TABLET ORAL NIGHTLY
Qty: 15 TABLET | Refills: 11 | Status: SHIPPED | OUTPATIENT
Start: 2023-12-18 | End: 2024-12-17

## 2023-12-18 RX ORDER — OXYCODONE HYDROCHLORIDE 5 MG/1
5 TABLET ORAL EVERY 4 HOURS PRN
Qty: 28 TABLET | Refills: 0 | Status: SHIPPED | OUTPATIENT
Start: 2023-12-18 | End: 2023-12-28 | Stop reason: SDUPTHER

## 2023-12-18 RX ADMIN — TACROLIMUS 6 MG: 1 CAPSULE ORAL at 09:12

## 2023-12-18 RX ADMIN — INSULIN ASPART 3 UNITS: 100 INJECTION, SOLUTION INTRAVENOUS; SUBCUTANEOUS at 09:12

## 2023-12-18 RX ADMIN — OXYCODONE HYDROCHLORIDE 5 MG: 5 TABLET ORAL at 01:12

## 2023-12-18 RX ADMIN — INSULIN ASPART 4 UNITS: 100 INJECTION, SOLUTION INTRAVENOUS; SUBCUTANEOUS at 01:12

## 2023-12-18 RX ADMIN — BUPROPION HYDROCHLORIDE 300 MG: 150 TABLET, FILM COATED, EXTENDED RELEASE ORAL at 09:12

## 2023-12-18 RX ADMIN — SULFAMETHOXAZOLE AND TRIMETHOPRIM 1 TABLET: 400; 80 TABLET ORAL at 09:12

## 2023-12-18 RX ADMIN — PREDNISONE 20 MG: 20 TABLET ORAL at 09:12

## 2023-12-18 RX ADMIN — HEPARIN SODIUM 5000 UNITS: 5000 INJECTION INTRAVENOUS; SUBCUTANEOUS at 05:12

## 2023-12-18 RX ADMIN — PANTOPRAZOLE SODIUM 40 MG: 40 TABLET, DELAYED RELEASE ORAL at 10:12

## 2023-12-18 RX ADMIN — INSULIN ASPART 5 UNITS: 100 INJECTION, SOLUTION INTRAVENOUS; SUBCUTANEOUS at 01:12

## 2023-12-18 RX ADMIN — ASPIRIN 81 MG: 81 TABLET, COATED ORAL at 09:12

## 2023-12-18 RX ADMIN — BUTALBITAL, ACETAMINOPHEN, AND CAFFEINE 1 TABLET: 50; 325; 40 TABLET ORAL at 09:12

## 2023-12-18 RX ADMIN — OXYCODONE HYDROCHLORIDE 5 MG: 5 TABLET ORAL at 05:12

## 2023-12-18 RX ADMIN — Medication 2 TABLET: at 09:12

## 2023-12-18 RX ADMIN — OXYCODONE HYDROCHLORIDE 5 MG: 5 TABLET ORAL at 12:12

## 2023-12-18 RX ADMIN — DOCUSATE SODIUM 100 MG: 100 CAPSULE, LIQUID FILLED ORAL at 09:12

## 2023-12-18 RX ADMIN — MYCOPHENOLATE MOFETIL 1000 MG: 250 CAPSULE ORAL at 09:12

## 2023-12-18 RX ADMIN — PROPRANOLOL HYDROCHLORIDE 20 MG: 10 TABLET ORAL at 09:12

## 2023-12-18 NOTE — DISCHARGE SUMMARY
[facility] - Transplant Stepdown  Liver Transplant  Discharge Summary      Patient Name: Yumiko Gonzalez  MRN: [id number]  Admission Date: 12/9/2023  Hospital Length of Stay: 9 days  Discharge Date and Time:  12/18/2023 10:16 AM  Attending Physician: Eugene Haney MD   Discharging Provider: Angi Young PA-C  Primary Care Provider: Garrett Webb MD  Post-Operative Day: 9     ORGAN:   LIVER  Disease Etiology: Cirrhosis: Fatty Liver (DURAN)  Donor Type:   Donation after Circulatory Death   CDC High Risk:   No  Donor CMV Status:   Donor CMV Status: Negative  Donor HBcAB:   Negative  Donor HCV Status:   Negative  Whole or Partial: Whole Liver  Biliary Anastomosis: End to End  Arterial Anatomy: Standard    HPI:   Ms. Gonzalez is a 62 y/o female who present for Liver Transplant for liver cirrhosis 2/2 to DURAN HX: HCC (status post Y90 06/2023) esophageal varices, ascites, hepatic encephalopathy. She reports in usual state of health- Patient denies any recent hospitalizations or ED visits. The primary surgeon will be Dr. Raza.  Patient is NPO.  All pre-op labs and imaging have been ordered and will be reviewed prior to surgery. Consents to be signed prior to transplant.      Procedure(s) (LRB):  TRANSPLANT, LIVER (N/A)     Hospital Course:    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 LIUDMILA x 2 placed intra op, removed without issue. POD1 US stable. US on POD 9 stable, improving RI's. LFTs/tbili down trending. Cr stable. Pt with hypervolemia, lasix dependent prior to transplant. Will dc on 40 mg PO lasix daily x 5 days and reassess outpatient. Persistent UE tremors, worsened post-txp, suspect from tacrolimus. Restarted on home propranolol. PT/OT consulted with recs for home health. Pt very emotional with adjustment disorder. Psych consulted for evaluation with recs to continue home medication dose. Pt is tolerating PO without n/v. Pain controlled.  Ambulating in halls and room with assistance.     Pt is now stable and ready for discharge.She has met with PharmD and received education. She will follow up with labs and clinic appointment 12/19. Pt expressed understanding of discharge instructions and importance of follow up.       Goals of Care Treatment Preferences:  Code Status: Full Code    Health care agent: Rachel  [email or web address] agent number: [email or web address] file    Living Will: Yes  LaPOST: Yes  What is most important right now is to focus on remaining as independent as possible.  Accordingly, we have decided that the best plan to meet the patient's goals includes continuing with treatment.      Final Active Diagnoses:    Diagnosis Date Noted POA    PRINCIPAL PROBLEM:  S/P liver transplant [Z94.4] 12/10/2023 Not Applicable    DANAY (acute kidney injury) [N17.9] 12/13/2023 No    Hypervolemia [E87.70] 12/12/2023 Yes    At risk for opportunistic infections [Z91.89] 12/11/2023 No    Acute postoperative anemia due to expected blood loss [D62] 12/11/2023 No    Immunosuppressed status [D84.9] 12/10/2023 No    Steroid-induced hyperglycemia [R73.9, T38.0X5A] 12/10/2023 No    Adverse effect of corticosteroids [T38.0X5A] 12/10/2023 No    Prophylactic immunotherapy [Z29.89] 12/10/2023 Not Applicable    Thrombocytopenia [D69.6] 07/09/2023 Yes    Tremor [R25.1] 03/23/2023 Yes    Class 2 severe obesity due to excess calories with serious comorbidity and body mass index (BMI) of 36.0 to 36.9 in adult [E66.01, Z68.36] 04/11/2017 Not Applicable    Moderate episode of recurrent major depressive disorder [F33.1] 03/22/2012 Yes      Problems Resolved During this Admission:    Diagnosis Date Noted Date Resolved POA    Encounter for weaning from ventilator [Z99.11] 12/10/2023 12/11/2023 Not Applicable    Liver transplant candidate [Z76.82] 11/26/2023 12/11/2023 Not Applicable       Consults (From admission, onward)          Status Ordering Provider     Inpatient consult to Midline team   Once        Provider:  (Not yet assigned)    Completed OCTAVIA GALLARDO     Inpatient consult to Psychiatry  Once        Provider:  (Not yet assigned)    Completed HEATH ZEPEDA     Inpatient consult to Endocrinology  Once        Provider:  (Not yet assigned)    Completed LEONEL WARREN     Inpatient consult to Registered Dietitian/Nutritionist  Once        Provider:  (Not yet assigned)    Completed LEONEL WARREN            Pending Diagnostic Studies:       Procedure Component Value Units Date/Time    AST (SGOT) [[id number]] Collected: 12/10/23 0509    Order Status: Sent Lab Status: In process Updated: 12/10/23 0509    Specimen: Blood     Comprehensive metabolic panel [[id number]] Collected: 12/10/23 0509    Order Status: Sent Lab Status: In process Updated: 12/10/23 0509    Specimen: Blood     Freeze and Hold -BB HEP [[id number]] Collected: 12/10/23 0159    Order Status: Sent Lab Status: In process Updated: 12/10/23 0200    Specimen: Blood           Significant Diagnostic Studies: Labs: CMP   Recent Labs   Lab 12/17/23  0602 12/18/23  0554   * 134*   K 4.3 4.1    102   CO2 23 26   * 109   BUN 26* 24*   CREATININE 0.8 0.9   CALCIUM 8.5* 8.5*   PROT 4.3* 4.3*   ALBUMIN 2.6* 2.5*   BILITOT 3.6* 2.6*   ALKPHOS 170* 165*   AST 44* 38   * 91*   ANIONGAP 6* 6*   , CBC   Recent Labs   Lab 12/17/23  0602 12/18/23  0554   WBC 4.40 4.79   HGB 8.0* 8.0*   HCT 23.7* 24.3*   PLT 71* 90*   , and All labs within the past 24 hours have been reviewed    The patients clinical status was discussed at multidisplinary rounds, involving transplant surgery, transplant medicine, pharmacy, nursing, nutrition, and social work    Discharged Condition: stable    Disposition: Home or Self Care    Patient Instructions:      Ambulatory referral/consult to Home Health   Standing Status: Future   Referral Priority: Routine Referral Type: Home Health Care   Referral Reason: Specialty Services Required   Requested  Specialty: Home Health Services   Number of Visits Requested: 1     Diet Adult Regular     No dressing needed     Notify your health care provider if you experience any of the following:  increased confusion or weakness     Notify your health care provider if you experience any of the following:  persistent dizziness, light-headedness, or visual disturbances     Notify your health care provider if you experience any of the following:  worsening rash     Notify your health care provider if you experience any of the following:  severe persistent headache     Notify your health care provider if you experience any of the following:  difficulty breathing or increased cough     Notify your health care provider if you experience any of the following:  redness, tenderness, or signs of infection (pain, swelling, redness, odor or green/yellow discharge around incision site)     Notify your health care provider if you experience any of the following:  severe uncontrolled pain     Notify your health care provider if you experience any of the following:  persistent nausea and vomiting or diarrhea     Notify your health care provider if you experience any of the following:  temperature >100.4     Activity as tolerated     Medications:  Reconciled Home Medications:      Medication List        START taking these medications      aspirin 81 MG EC tablet  Commonly known as: ECOTRIN  Take 1 tablet (81 mg total) by mouth once daily.  Start taking on: December 19, 2023     blood sugar diagnostic Strp  1 strip by Misc.(Non-Drug; Combo Route) route 3 (three) times daily.     blood-glucose meter kit  Use as instructed     calcium carbonate-vitamin D3 600 mg-20 mcg (800 unit) Tab  Take 1 tablet by mouth once daily.     insulin aspart U-100 100 unit/mL (3 mL) Inpn pen  Commonly known as: NovoLOG  Inject 4 Units into the skin 3 (three) times daily. Plus sliding scale insulin; MAX: 42 units/day     k phos di & mono-sod phos mono 250 mg  "Tab  Commonly known as: K-PHOS-NEUTRAL  Take 2 tablets by mouth once daily.     lancets Misc  Commonly known as: LANCETS,THIN  1 Lancet by Misc.(Non-Drug; Combo Route) route 3 (three) times daily.     mycophenolate 250 mg Cap  Commonly known as: CELLCEPT  Take 4 capsules (1,000 mg total) by mouth 2 (two) times daily.     oxyCODONE 5 MG immediate release tablet  Commonly known as: ROXICODONE  Take 1 tablet (5 mg total) by mouth every 4 (four) hours as needed for Pain.     pantoprazole 40 MG tablet  Commonly known as: PROTONIX  Take 1 tablet (40 mg total) by mouth once daily.     pen needle, diabetic 32 gauge x 5/32" Ndle  1 Needle by Misc.(Non-Drug; Combo Route) route 3 (three) times daily.     predniSONE 5 MG tablet  Commonly known as: DELTASONE  From 12/15-12/21 take 20mg by mouth once daily;  From 12/22-12/28 take 15mg daily;  From 12/29-1/4/24 take 10mg daily;  From 1/5-1/11 take 5mg daily; STOP on 1/12/24     propranoloL 20 MG tablet  Commonly known as: INDERAL  Take 1 tablet (20 mg total) by mouth 2 (two) times daily.  Replaces: propranoloL 80 MG 24 hr capsule     sulfamethoxazole-trimethoprim 400-80mg 400-80 mg per tablet  Commonly known as: BACTRIM,SEPTRA  Take 1 tablet by mouth once daily. Stop 6/8/24     tacrolimus 1 MG Cap  Commonly known as: PROGRAF  Take 7 capsules (7 mg total) by mouth every 12 (twelve) hours.     traZODone 50 MG tablet  Commonly known as: DESYREL  Take 0.5 tablets (25 mg total) by mouth every evening.     valGANciclovir 450 mg Tab  Commonly known as: VALCYTE  Take 1 tablet (450 mg total) by mouth once daily. Stop 3/10/24            CHANGE how you take these medications      ALPRAZolam 0.25 MG tablet  Commonly known as: XANAX  Take 1 tablet (0.25 mg total) by mouth nightly as needed. FOR INSOMNIA  What changed: how much to take     furosemide 20 MG tablet  Commonly known as: LASIX  Take 2 tablets (40 mg total) by mouth once daily. for 5 days  What changed:   medication strength  how " much to take            CONTINUE taking these medications      albuterol 90 mcg/actuation inhaler  Commonly known as: PROVENTIL/VENTOLIN HFA  Inhale 2 puffs every 4 hours as needed for cough, wheeze, or shortness of breath     buPROPion 150 MG TB24 tablet  Commonly known as: WELLBUTRIN XL  Take 2 tablets (300 mg total) by mouth once daily.     TRELEGY ELLIPTA 200-62.5-25 mcg inhaler  Generic drug: fluticasone-umeclidin-vilanter  Inhale 1 puff into the lungs once daily.            STOP taking these medications      lactulose 10 gram/15 mL solution  Commonly known as: CONSTULOSE     loratadine 10 mg tablet  Commonly known as: CLARITIN     multivitamin per tablet  Commonly known as: THERAGRAN     omeprazole 20 MG capsule  Commonly known as: PRILOSEC     propranoloL 80 MG 24 hr capsule  Commonly known as: INDERAL LA  Replaced by: propranoloL 20 MG tablet     spironolactone 100 MG tablet  Commonly known as: ALDACTONE     vitamin E 400 UNIT capsule     XIFAXAN 550 mg Tab  Generic drug: rifAXIMin            Time spent caring for patient (Greater than 1/2 spent in direct face-to-face contact): > 30 minutes    Angi Young PA-C  Liver Transplant  [facility] - Transplant Stepdown

## 2023-12-18 NOTE — PLAN OF CARE
"Orientedx4. VSS. Afebrile. ACHS, SSI administered. PRN Mylanta given per pt request for indigestion and "stomach pain;" moderate relief obtained. Incisional pain managed with PRN Oxy 5mg. Chevron incision CYNTHIA with staples, painted with betadine per daughter. Daughter pulled self meds with one mistake, education provided. Pt OOB with 1 assist and walker. Encouraged to call for mobility assistance/other needs. Call light within reach. Bed in lowest, locked position. No concerns voiced at this time.  "

## 2023-12-18 NOTE — PROGRESS NOTES
"DISCHARGE NOTE:    Yumiko Gonzalez is a 63 y.o. 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, postitive antismooth muscle antibody    Blood transfusion     Bronchitis     bronchospasm on occasion     Cancer 1987    carcinoma in situ    Cervical neck pain with evidence of disc disease 03/22/2012    can bend neck    Cirrhosis     non-alcoholic, compensated    Colon polyps 03/22/2012    Depression 03/22/2012    Hx panic attacks    Diverticular disease 03/22/2012    never diverticulitis    Fatty liver 03/22/2012    Fibrocystic breast     Fine tremor     hands,chronic    Hepatosplenomegaly     History of abdominal paracentesis 01/18/2023    8.7L of fluid drained    Hypertension 04/24/2013    Knee pain, bilateral     chronic, with hands,feet,fingers also painful    Lesion of right lung     Liver disease     "partially sclerosed liver" per pt    Migraines past Hx    Nephrolithiasis     stone episode 1/2021    Pleural effusion     small, compensated, good bilateral breath sounds per Dr Suresh GUTIERRES (postoperative nausea and vomiting)     twice after childbirth    Postpartum depression     Splenomegaly     Thrombocytopenia     Tremors of nervous system        Hospital Course:     64 yo F DCD 12/9/23 [facility])  CMV -/+      Progressing well. Required some lasix and insulin at discharge. Patient anxious but continued home wellbutrin       Allergies:   Review of patient's allergies indicates:   Allergen Reactions    No known drug allergies        Patient Pharmacy: [facility]    Discharge Medications:     Medication List        START taking these medications      aspirin 81 MG EC tablet  Commonly known as: " "ECOTRIN  Take 1 tablet (81 mg total) by mouth once daily.  Start taking on: December 19, 2023     blood sugar diagnostic Strp  Test blood glucose 3 (three) times daily.     blood-glucose meter kit  Use as instructed     calcium carbonate-vitamin D3 600 mg-20 mcg (800 unit) Tab  Take 1 tablet by mouth once daily.     insulin aspart U-100 100 unit/mL (3 mL) Inpn pen  Commonly known as: NovoLOG  Inject 4 Units into the skin 3 (three) times daily. Plus sliding scale insulin; MAX: 42 units/day     k phos di & mono-sod phos mono 250 mg Tab  Commonly known as: K-PHOS-NEUTRAL  Take 2 tablets by mouth once daily.     lancets 28 gauge Misc  Test blood glucose 3 (three) times daily.     mycophenolate 250 mg Cap  Commonly known as: CELLCEPT  Take 4 capsules (1,000 mg total) by mouth 2 (two) times daily.     oxyCODONE 5 MG immediate release tablet  Commonly known as: ROXICODONE  Take 1 tablet (5 mg total) by mouth every 4 (four) hours as needed for Pain.     pantoprazole 40 MG tablet  Commonly known as: PROTONIX  Take 1 tablet (40 mg total) by mouth once daily.     pen needle, diabetic 32 gauge x 5/32" Ndle  Use to inject insulin into the skin 3 (three) times daily.     predniSONE 5 MG tablet  Commonly known as: DELTASONE  From 12/15-12/21 take 20mg by mouth once daily;  From 12/22-12/28 take 15mg daily;  From 12/29-1/4/24 take 10mg daily;  From 1/5-1/11 take 5mg daily; STOP on 1/12/24     propranoloL 20 MG tablet  Commonly known as: INDERAL  Take 1 tablet (20 mg total) by mouth 2 (two) times daily.  Replaces: propranoloL 80 MG 24 hr capsule     sulfamethoxazole-trimethoprim 400-80mg 400-80 mg per tablet  Commonly known as: BACTRIM,SEPTRA  Take 1 tablet by mouth once daily. Stop 6/8/24     tacrolimus 1 MG Cap  Commonly known as: PROGRAF  Take 7 capsules (7 mg total) by mouth every 12 (twelve) hours.     traZODone 50 MG tablet  Commonly known as: DESYREL  Take HALF tablet (25 mg total) by mouth every evening.     valGANciclovir " 450 mg Tab  Commonly known as: VALCYTE  Take 1 tablet (450 mg total) by mouth once daily. Stop 3/10/24            CHANGE how you take these medications      ALPRAZolam 0.25 MG tablet  Commonly known as: XANAX  Take 1 tablet (0.25 mg total) by mouth nightly as needed. FOR INSOMNIA  What changed: how much to take     furosemide 20 MG tablet  Commonly known as: LASIX  Take 2 tablets (40 mg total) by mouth once daily. for 5 days  What changed:   medication strength  how much to take            CONTINUE taking these medications      albuterol 90 mcg/actuation inhaler  Commonly known as: PROVENTIL/VENTOLIN HFA  Inhale 2 puffs every 4 hours as needed for cough, wheeze, or shortness of breath     buPROPion 150 MG TB24 tablet  Commonly known as: WELLBUTRIN XL  Take 2 tablets (300 mg total) by mouth once daily.     TRELEGY ELLIPTA 200-62.5-25 mcg inhaler  Generic drug: fluticasone-umeclidin-vilanter  Inhale 1 puff into the lungs once daily.            STOP taking these medications      lactulose 10 gram/15 mL solution  Commonly known as: CONSTULOSE     loratadine 10 mg tablet  Commonly known as: CLARITIN     multivitamin per tablet  Commonly known as: THERAGRAN     omeprazole 20 MG capsule  Commonly known as: PRILOSEC     propranoloL 80 MG 24 hr capsule  Commonly known as: INDERAL LA  Replaced by: propranoloL 20 MG tablet     spironolactone 100 MG tablet  Commonly known as: ALDACTONE     vitamin E 400 UNIT capsule     XIFAXAN 550 mg Tab  Generic drug: rifAXIMin               Where to Get Your Medications        These medications were sent to [facility] [address] [address]      Hours: Mon-Fri 7a-7p, Sat-Sun 10a-4p Phone: [phone]   aspirin 81 MG EC tablet  blood sugar diagnostic Strp  blood-glucose meter kit  calcium carbonate-vitamin D3 600 mg-20 mcg (800 unit) Tab  furosemide 20 MG tablet  insulin aspart U-100 100 unit/mL (3 mL) Inpn pen  k phos di & mono-sod phos mono 250 mg  "Tab  lancets 28 gauge Misc  mycophenolate 250 mg Cap  oxyCODONE 5 MG immediate release tablet  pantoprazole 40 MG tablet  pen needle, diabetic 32 gauge x 5/32" Ndle  predniSONE 5 MG tablet  propranoloL 20 MG tablet  sulfamethoxazole-trimethoprim 400-80mg 400-80 mg per tablet  tacrolimus 1 MG Cap  traZODone 50 MG tablet  valGANciclovir 450 mg Tab          Pharmacy Interventions/Recommendations:  1) Transplant Immunosuppression: Induction steroids, and maintenance FK, MMF, pred   # Immunosuppression   Tacrolimus 7 BID   Last FK 7.2 on 6 mg BID so increased to 7 mg BID on day of discharge   Of note, patient has significant tremors, even prior to txp. On propranolol 20 mg BID for tremors but prior to transplant was on 80 mg daily; may need to increase dose if tremors persist.   Cellcept (Mycophenolate) 1000 mg BID    Prednisone per taper      2) Opportunistic Infection prophylaxis:    PJP prophylaxis   Sulfamethoxazole/Trimethoprim SS Daily for 6 months   CMV prophylaxis   CMV -/+ (Moderate Risk) valganciclovir  450 mg for 3 months   3) Osteoporosis Prevention measures (liver txp):   Elijah/Vit D supplementation daily    Zoledronic acid therapy plan placed     4) Patient Counseling/Education: Demonstrated the use of the BP cuff, thermometer.    5) Follow-Up/Discharge Needs:    # Maintenance   Aspirin 81 mg daily    Protonix for significant acid reflux   Kphos     # insomnia    1. Trazodone 25 mg nightly prn     # Anxiety/Depression    1. Wellbutrin 300 mg daily     # Hyperglycemia    1. Discharged on novolog 4 units TID with meals plus slide (2 units for every 50 over 180). First time on insulin. Patient complained of finger pain from pricking her fingers. May need to try to get a dexcom if insulin requirement continued.     # Fluid overload    1. Lasix 40 mg daily for 5 days     6) Patient Assistance Information: none     7) The following medications have been placed on HOLD and should be restarted in the outpatient " setting (when appropriate): none     Yumiko and her caregiver verbalized their understanding and had the opportunity to ask questions.

## 2023-12-18 NOTE — PROGRESS NOTES
Discharge Note       presented to patient's bedside to discuss discharge plans, as well as assess any concerns or needs.     Patient was alert and oriented x 4 and communicative. Patient will discharge to [facility] today with home health care. [facility] will start PT/OT tomorrow and will reach out to family to set up services. Patient's daughter Rachel who is at bedside will transport patient upon discharge. Patient is coping well with support from family.      Patient reports agreeing with the discharge plan and has no other psychosocial concerns. Patient and caregiver verbalize understanding and are involved in treatment planning and discharge process. Patient nor caregiver had any further concerns or questions at this time.     SW remains available and will continue to follow, providing psychosocial support, education and discharge planning as indicated. Transplant team remains available and patient states understanding of how to access team and resources as needed.

## 2023-12-18 NOTE — PT/OT/SLP PROGRESS
Physical Therapy      Patient Name:  Yumiko Gonzalez   MRN:  [id number]    Patient not seen today secondary to patient on ultrasound and then being discharged . Will not follow up as patient has been discharged.

## 2023-12-18 NOTE — SUBJECTIVE & OBJECTIVE
"Interval HPI:   No acute events overnight. Patient in room 29578/14463 A. Blood glucose stable. BG at and above goal on current insulin regimen (SSI and prandial insulin). Steroid use- Prednisone 20 mg .   9 Days Post-Op  Renal function- Normal   Vasopressors-  None     Diet consistent carbohydrate Standard Tray     Eating:   <25%  Nausea: No  Hypoglycemia and intervention: No  Fever: No  TPN and/or TF: No    /60 (BP Location: Right arm, Patient Position: Lying)   Pulse 75   Temp 98 °F (36.7 °C) (Oral)   Resp 18   Ht 5' 7" (1.702 m)   Wt 110.6 kg (243 lb 13.3 oz)   SpO2 95%   Breastfeeding No   BMI 38.19 kg/m²     Labs Reviewed and Include    Recent Labs   Lab 12/18/23  0554      CALCIUM 8.5*   ALBUMIN 2.5*   PROT 4.3*   *   K 4.1   CO2 26      BUN 24*   CREATININE 0.9   ALKPHOS 165*   ALT 91*   AST 38   BILITOT 2.6*     Lab Results   Component Value Date    WBC 4.79 12/18/2023    HGB 8.0 (L) 12/18/2023    HCT 24.3 (L) 12/18/2023     (H) 12/18/2023    PLT 90 (L) 12/18/2023     No results for input(s): "TSH", "FREET4" in the last 168 hours.  Lab Results   Component Value Date    HGBA1C 4.6 10/31/2023       Nutritional status:   Body mass index is 38.19 kg/m².  Lab Results   Component Value Date    ALBUMIN 2.5 (L) 12/18/2023    ALBUMIN 2.6 (L) 12/17/2023    ALBUMIN 2.9 (L) 12/16/2023     No results found for: "PREALBUMIN"    Estimated Creatinine Clearance: 80.9 mL/min (based on SCr of 0.9 mg/dL).    Accu-Checks  Recent Labs     12/15/23  2133 12/16/23  0913 12/16/23  1244 12/16/23  1741 12/16/23  2232 12/17/23  0931 12/17/23  1323 12/17/23  1737 12/17/23  2201 12/18/23  0934   POCTGLUCOSE 172* 137* 199* 215* 180* 133* 204* 231* 202* 135*       Current Medications and/or Treatments Impacting Glycemic Control  Immunotherapy:    Immunosuppressants           Stop Route Frequency     tacrolimus capsule 6 mg         -- Oral 2 times daily     mycophenolate capsule 1,000 mg         -- " Oral 2 times daily          Steroids:   Hormones (From admission, onward)      Start     Stop Route Frequency Ordered    12/15/23 0900  predniSONE tablet 20 mg  (methylprednisolone taper panel)        See Hyperspace for full Linked Orders Report.    -- Oral Daily 12/10/23 0143    12/12/23 0900  methylPREDNISolone sodium succinate injection 120 mg  (methylprednisolone taper panel)        See Hyperspace for full Linked Orders Report.    12/13/23 0859 IV Daily 12/10/23 0143          Pressors:    Autonomic Drugs (From admission, onward)      None          Hyperglycemia/Diabetes Medications:   Antihyperglycemics (From admission, onward)      Start     Stop Route Frequency Ordered    12/18/23 0715  insulin aspart U-100 pen 3-5 Units         -- SubQ 3 times daily with meals 12/18/23 0701    12/15/23 0955  insulin aspart U-100 pen 0-10 Units         -- SubQ Before meals & nightly PRN 12/15/23 0857

## 2023-12-18 NOTE — PROGRESS NOTES
Met with patient and her daughter, Gauri,  in preparation for discharge today.  Reviewed their answers to the questions in My [facility] Journey.  All questions were answered correctly.  These questions cover: post op care, medication management, infection prevention and emergency contacts.  Outpatient coordinator assigned.  Outpatient appointments reviewed.  Allowed time for questions and answers.

## 2023-12-18 NOTE — ASSESSMENT & PLAN NOTE
BG goal 140 - 180   No hx of DM.  S/p liver txp. On steroids. Continue on mealtime insulin,       Increase Novolog 3-5 units AC based on intake  Moderate Dose Correction Scale  BG monitoring ac/hs    ** Please call Endocrine for any BG related issues **    ** Please notify Endocrine for any change and/or advance in diet**    Discharge planning:  Notified by primary team of d/c. Pt will need insulin given bg elevations with steroids.  Pt daughter has been trained on insulin use.  Regimen discussed with patient and daughter.       -Novolog 4 units with meals  -Add correction scale if needed.  Blood sugar 180 to 230 add 2 units  Blood sugar 231 to 280 add 4 units  Blood sugar 281 to 330 add 6 units  Blood sugar 331 to 380 add 8 units  Blood sugar greater than 380 add 10 units  - Insurance approved diabetes testing supplies to check blood sugar 4 times a day (Before meals and at bedtime) and as needed.  - BD pen needles

## 2023-12-18 NOTE — PROGRESS NOTES
"[facility] - Transplant Stepdown  Endocrinology  Progress Note    Admit Date: 12/9/2023     Reason for Consult: Management of Steroid Induced Hyperglycemia     Surgical Procedure and Date:  Liver Transplant on 12/10/23       Lab Results   Component Value Date    HGBA1C 4.6 10/31/2023       HPI:   Patient is a 63 y.o. 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 Patient now presents for the above procedure(s). Endocrinology consulted for management of hyperglycemia.       Interval HPI:   No acute events overnight. Patient in room 22758/77983 A. Blood glucose stable. BG at and above goal on current insulin regimen (SSI and prandial insulin). Steroid use- Prednisone 20 mg .   9 Days Post-Op  Renal function- Normal   Vasopressors-  None     Diet consistent carbohydrate Standard Tray     Eating:   <25%  Nausea: No  Hypoglycemia and intervention: No  Fever: No  TPN and/or TF: No    /60 (BP Location: Right arm, Patient Position: Lying)   Pulse 75   Temp 98 °F (36.7 °C) (Oral)   Resp 18   Ht 5' 7" (1.702 m)   Wt 110.6 kg (243 lb 13.3 oz)   SpO2 95%   Breastfeeding No   BMI 38.19 kg/m²     Labs Reviewed and Include    Recent Labs   Lab 12/18/23  0554      CALCIUM 8.5*   ALBUMIN 2.5*   PROT 4.3*   *   K 4.1   CO2 26      BUN 24*   CREATININE 0.9   ALKPHOS 165*   ALT 91*   AST 38   BILITOT 2.6*     Lab Results   Component Value Date    WBC 4.79 12/18/2023    HGB 8.0 (L) 12/18/2023    HCT 24.3 (L) 12/18/2023     (H) 12/18/2023    PLT 90 (L) 12/18/2023     No results for input(s): "TSH", "FREET4" in the last 168 hours.  Lab Results   Component Value Date    HGBA1C 4.6 10/31/2023       Nutritional status:   Body mass index is 38.19 kg/m².  Lab Results   Component Value Date    ALBUMIN 2.5 (L) 12/18/2023    ALBUMIN 2.6 (L) " "12/17/2023    ALBUMIN 2.9 (L) 12/16/2023     No results found for: "PREALBUMIN"    Estimated Creatinine Clearance: 80.9 mL/min (based on SCr of 0.9 mg/dL).    Accu-Checks  Recent Labs     12/15/23  2133 12/16/23  0913 12/16/23  1244 12/16/23  1741 12/16/23  2232 12/17/23  0931 12/17/23  1323 12/17/23  1737 12/17/23  2201 12/18/23  0934   POCTGLUCOSE 172* 137* 199* 215* 180* 133* 204* 231* 202* 135*       Current Medications and/or Treatments Impacting Glycemic Control  Immunotherapy:    Immunosuppressants           Stop Route Frequency     tacrolimus capsule 6 mg         -- Oral 2 times daily     mycophenolate capsule 1,000 mg         -- Oral 2 times daily          Steroids:   Hormones (From admission, onward)      Start     Stop Route Frequency Ordered    12/15/23 0900  predniSONE tablet 20 mg  (methylprednisolone taper panel)        See Hyperspace for full Linked Orders Report.    -- Oral Daily 12/10/23 0143    12/12/23 0900  methylPREDNISolone sodium succinate injection 120 mg  (methylprednisolone taper panel)        See Hyperspace for full Linked Orders Report.    12/13/23 0859 IV Daily 12/10/23 0143          Pressors:    Autonomic Drugs (From admission, onward)      None          Hyperglycemia/Diabetes Medications:   Antihyperglycemics (From admission, onward)      Start     Stop Route Frequency Ordered    12/18/23 0715  insulin aspart U-100 pen 3-5 Units         -- SubQ 3 times daily with meals 12/18/23 0701    12/15/23 0955  insulin aspart U-100 pen 0-10 Units         -- SubQ Before meals & nightly PRN 12/15/23 0857            ASSESSMENT and PLAN    Endocrine  Steroid-induced hyperglycemia  BG goal 140 - 180   No hx of DM.  S/p liver txp. On steroids. Continue on mealtime insulin,       Increase Novolog 3-5 units AC based on intake  Moderate Dose Correction Scale  BG monitoring ac/hs    ** Please call Endocrine for any BG related issues **    ** Please notify Endocrine for any change and/or advance in " diet**    Discharge planning:  Notified by primary team of d/c. Pt will need insulin given bg elevations with steroids.  Pt daughter has been trained on insulin use.  Regimen discussed with patient and daughter.       -Novolog 4 units with meals  -Add correction scale if needed.  Blood sugar 180 to 230 add 2 units  Blood sugar 231 to 280 add 4 units  Blood sugar 281 to 330 add 6 units  Blood sugar 331 to 380 add 8 units  Blood sugar greater than 380 add 10 units  - Insurance approved diabetes testing supplies to check blood sugar 4 times a day (Before meals and at bedtime) and as needed.  - BD pen needles             GI  * S/P liver transplant  Managed per primary team  Optimize BG control        Other  Adverse effect of corticosteroids  Glucocorticoids markedly increase glucoses. Expect steroid taper to help with glucose control          Semaj Palacios PA-C  Endocrinology  [facility] - Transplant Stepdown

## 2023-12-18 NOTE — PLAN OF CARE
Discharge education given to pt and daughter regarding medications, future appointments, and when to call a healthcare provider. Both verbalized understanding. PIV removed. She was brought downstairs via wheelchair by myself, transport, and daughter. All personal belongings removed from room. Daughter will be driving her to [facility].

## 2023-12-19 ENCOUNTER — TELEPHONE (OUTPATIENT)
Dept: TRANSPLANT | Facility: CLINIC | Age: 63
End: 2023-12-19

## 2023-12-19 ENCOUNTER — CLINICAL SUPPORT (OUTPATIENT)
Dept: TRANSPLANT | Facility: CLINIC | Age: 63
End: 2023-12-19
Payer: COMMERCIAL

## 2023-12-19 ENCOUNTER — LAB VISIT (OUTPATIENT)
Dept: LAB | Facility: HOSPITAL | Age: 63
End: 2023-12-19
Attending: SURGERY
Payer: COMMERCIAL

## 2023-12-19 VITALS
SYSTOLIC BLOOD PRESSURE: 121 MMHG | DIASTOLIC BLOOD PRESSURE: 58 MMHG | TEMPERATURE: 98 F | RESPIRATION RATE: 18 BRPM | BODY MASS INDEX: 35.64 KG/M2 | WEIGHT: 227.06 LBS | RESPIRATION RATE: 18 BRPM | HEIGHT: 67 IN | BODY MASS INDEX: 35.64 KG/M2 | WEIGHT: 227.06 LBS | HEIGHT: 67 IN | HEART RATE: 76 BPM | TEMPERATURE: 98 F | SYSTOLIC BLOOD PRESSURE: 121 MMHG | OXYGEN SATURATION: 98 % | DIASTOLIC BLOOD PRESSURE: 58 MMHG | OXYGEN SATURATION: 98 % | HEART RATE: 76 BPM

## 2023-12-19 DIAGNOSIS — Z94.4 LIVER REPLACED BY TRANSPLANT: ICD-10-CM

## 2023-12-19 LAB
ALBUMIN SERPL BCP-MCNC: 2.7 G/DL (ref 3.5–5.2)
ALP SERPL-CCNC: 201 U/L (ref 55–135)
ALT SERPL W/O P-5'-P-CCNC: 99 U/L (ref 10–44)
ANION GAP SERPL CALC-SCNC: 6 MMOL/L (ref 8–16)
AST SERPL-CCNC: 47 U/L (ref 10–40)
BASOPHILS NFR BLD: 1 % (ref 0–1.9)
BILIRUB DIRECT SERPL-MCNC: 1.6 MG/DL (ref 0.1–0.3)
BILIRUB SERPL-MCNC: 2.8 MG/DL (ref 0.1–1)
BUN SERPL-MCNC: 25 MG/DL (ref 8–23)
CALCIUM SERPL-MCNC: 8.8 MG/DL (ref 8.7–10.5)
CHLORIDE SERPL-SCNC: 104 MMOL/L (ref 95–110)
CO2 SERPL-SCNC: 26 MMOL/L (ref 23–29)
CREAT SERPL-MCNC: 0.9 MG/DL (ref 0.5–1.4)
DIFFERENTIAL METHOD: ABNORMAL
EOSINOPHIL NFR BLD: 10 % (ref 0–8)
ERYTHROCYTE [DISTWIDTH] IN BLOOD BY AUTOMATED COUNT: 17.2 % (ref 11.5–14.5)
EST. GFR  (NO RACE VARIABLE): >60 ML/MIN/1.73 M^2
GLUCOSE SERPL-MCNC: 134 MG/DL (ref 70–110)
HCT VFR BLD AUTO: 26.5 % (ref 37–48.5)
HGB BLD-MCNC: 9.2 G/DL (ref 12–16)
IMM GRANULOCYTES # BLD AUTO: ABNORMAL K/UL (ref 0–0.04)
IMM GRANULOCYTES NFR BLD AUTO: ABNORMAL % (ref 0–0.5)
LYMPHOCYTES NFR BLD: 7 % (ref 18–48)
MCH RBC QN AUTO: 33.6 PG (ref 27–31)
MCHC RBC AUTO-ENTMCNC: 34.7 G/DL (ref 32–36)
MCV RBC AUTO: 97 FL (ref 82–98)
METAMYELOCYTES NFR BLD MANUAL: 3 %
MONOCYTES NFR BLD: 4 % (ref 4–15)
NEUTROPHILS NFR BLD: 70 % (ref 38–73)
NEUTS BAND NFR BLD MANUAL: 5 %
NRBC BLD-RTO: 0 /100 WBC
PLATELET # BLD AUTO: 134 K/UL (ref 150–450)
PLATELET BLD QL SMEAR: ABNORMAL
PMV BLD AUTO: 10.9 FL (ref 9.2–12.9)
POTASSIUM SERPL-SCNC: 4.1 MMOL/L (ref 3.5–5.1)
PROT SERPL-MCNC: 4.9 G/DL (ref 6–8.4)
RBC # BLD AUTO: 2.74 M/UL (ref 4–5.4)
SODIUM SERPL-SCNC: 136 MMOL/L (ref 136–145)
TACROLIMUS BLD-MCNC: 7.6 NG/ML (ref 5–15)
WBC # BLD AUTO: 6.91 K/UL (ref 3.9–12.7)

## 2023-12-19 PROCEDURE — 99999 PR PBB SHADOW E&M-EST. PATIENT-LVL III: ICD-10-PCS | Mod: PBBFAC,,,

## 2023-12-19 PROCEDURE — 99999 PR PBB SHADOW E&M-EST. PATIENT-LVL III: CPT | Mod: PBBFAC,,,

## 2023-12-19 PROCEDURE — 85027 COMPLETE CBC AUTOMATED: CPT | Performed by: SURGERY

## 2023-12-19 PROCEDURE — 85007 BL SMEAR W/DIFF WBC COUNT: CPT | Performed by: SURGERY

## 2023-12-19 PROCEDURE — 36415 COLL VENOUS BLD VENIPUNCTURE: CPT | Performed by: SURGERY

## 2023-12-19 PROCEDURE — 82248 BILIRUBIN DIRECT: CPT | Performed by: SURGERY

## 2023-12-19 PROCEDURE — 80053 COMPREHEN METABOLIC PANEL: CPT | Performed by: SURGERY

## 2023-12-19 PROCEDURE — 80197 ASSAY OF TACROLIMUS: CPT | Performed by: SURGERY

## 2023-12-19 PROCEDURE — G0180 MD CERTIFICATION HHA PATIENT: HCPCS | Mod: ,,, | Performed by: PHYSICIAN ASSISTANT

## 2023-12-19 RX ORDER — BUTALBITAL, ACETAMINOPHEN AND CAFFEINE 50; 325; 40 MG/1; MG/1; MG/1
1 TABLET ORAL EVERY 4 HOURS PRN
Qty: 30 TABLET | Refills: 1 | Status: SHIPPED | OUTPATIENT
Start: 2023-12-19 | End: 2024-01-18

## 2023-12-19 NOTE — PROGRESS NOTES
Clinic Note: First Return to Clinic Post-  Liver Transplant    Yumiko Gonzalez  is a 63 y.o. female  S/p   LIVER transplant on 12/9/2023 (Liver) for Cirrhosis: Fatty Liver (DURAN).      Discharge Course (Issues/Concerns): Patient presents with multiple complaints: pain due to uncomfortable sleeping arrangements, constipation, headache, and trouble sleeping. Sent prescriptions for fiorcet and miralax. Waiting to hear back if they are covered by patient's insurance. PT is coming to see patient today and should help with the other issues.    Objective:   Vitals:    12/19/23 0958   BP: (!) 121/58   Pulse: 76   Resp: 18   Temp: 97.7 °F (36.5 °C)       Met with patient and her caregiver in the clinic to review current medication list.     Current Outpatient Medications   Medication Sig Dispense Refill    albuterol (PROVENTIL/VENTOLIN HFA) 90 mcg/actuation inhaler Inhale 2 puffs every 4 hours as needed for cough, wheeze, or shortness of breath 18 g 11    ALPRAZolam (XANAX) 0.25 MG tablet Take 1 tablet (0.25 mg total) by mouth nightly as needed. FOR INSOMNIA 30 tablet 2    aspirin (ECOTRIN) 81 MG EC tablet Take 1 tablet (81 mg total) by mouth once daily. 30 tablet 11    blood sugar diagnostic Strp Test blood glucose 3 (three) times daily. 100 each 1    blood-glucose meter kit Use as instructed 1 each 0    buPROPion (WELLBUTRIN XL) 150 MG TB24 tablet Take 2 tablets (300 mg total) by mouth once daily. 180 tablet 3    butalbital-acetaminophen-caffeine -40 mg (FIORICET, ESGIC) -40 mg per tablet Take 1 tablet by mouth every 4 (four) hours as needed for Headaches. 30 tablet 1    calcium carbonate-vitamin D3 600 mg-20 mcg (800 unit) Tab Take 1 tablet by mouth once daily. 30 tablet 11    fluticasone-umeclidin-vilanter (TRELEGY ELLIPTA) 200-62.5-25 mcg inhaler Inhale 1 puff into the lungs once daily. 60 each 11    furosemide (LASIX) 20 MG tablet Take 2 tablets (40 mg total) by mouth once daily. for 5 days 10 tablet 0     "insulin aspart U-100 (NOVOLOG) 100 unit/mL (3 mL) InPn pen Inject 4 Units into the skin 3 (three) times daily. Plus sliding scale insulin; MAX: 42 units/day 6 mL 1    k phos di & mono-sod phos mono (K-PHOS-NEUTRAL) 250 mg Tab Take 2 tablets by mouth once daily. 60 tablet 11    lancets 28 gauge Misc Test blood glucose 3 (three) times daily. 100 each 1    mycophenolate (CELLCEPT) 250 mg Cap Take 4 capsules (1,000 mg total) by mouth 2 (two) times daily. 240 capsule 11    oxyCODONE (ROXICODONE) 5 MG immediate release tablet Take 1 tablet (5 mg total) by mouth every 4 (four) hours as needed for Pain. 28 tablet 0    pantoprazole (PROTONIX) 40 MG tablet Take 1 tablet (40 mg total) by mouth once daily. 30 tablet 1    pen needle, diabetic 32 gauge x 5/32" Ndle Use to inject insulin into the skin 3 (three) times daily. 90 each 1    predniSONE (DELTASONE) 5 MG tablet From 12/15-12/21 take 20mg by mouth once daily;  From 12/22-12/28 take 15mg daily;  From 12/29-1/4/24 take 10mg daily;  From 1/5-1/11 take 5mg daily; STOP on 1/12/24 70 tablet 0    propranoloL (INDERAL) 20 MG tablet Take 1 tablet (20 mg total) by mouth 2 (two) times daily. 60 tablet 11    sulfamethoxazole-trimethoprim 400-80mg (BACTRIM,SEPTRA) 400-80 mg per tablet Take 1 tablet by mouth once daily. Stop 6/8/24 30 tablet 5    tacrolimus (PROGRAF) 1 MG Cap Take 7 capsules (7 mg total) by mouth every 12 (twelve) hours. 420 capsule 11    traZODone (DESYREL) 50 MG tablet Take HALF tablet (25 mg total) by mouth every evening. 15 tablet 11    valGANciclovir (VALCYTE) 450 mg Tab Take 1 tablet (450 mg total) by mouth once daily. Stop 3/10/24 30 tablet 2     No current facility-administered medications for this visit.       Pharmacy Interventions/Recommendations:     1) Graft Function & Immunosuppression Issues: Prograf 7/7, MMF, and pred taper.    2) Opportunistic Infection prophylaxis:   PCP ppx: Bactrim  CMV ppx: Valcyte  Fungal ppx: None    3) Donor Serologies & " Monitoring:     Donor CMV Status: Negative  Donor HCV Status: Negative  Donor HBcAb: Negative  Donor HBV PEDRO: Negative  Donor HCV PEDRO: Negative      4) Pain Management & Bowel Regimen: Oxycodone, dosucate, bisacodyl, and miralax.    5) Blood Pressure Management: Propranlol (more for tremor)    6) Blood Sugar Management & Follow-up: Patient on insulin and will follow up with endo early next week.    7) Electrolyte Management: Kphos    8) Osteoporosis Prevention Strategy (Liver Transplant): Ca/Vit D    9) OTHER medication follow-up (patient assistance, held medications, etc): Miralax and fiorcet insurance coverage.    10) Reinforced medication education conducted in the hospital, including medication indications, dosing, administration, side effects, monitoring-- including timing of immunosuppressant levels.     Patient [facility]  Discussed the process for obtaining refills of medications, including verifying the dose and mailing address to have medications delivered.     Yumiko and her caregivers verbalized understanding and had the opportunity to ask questions.

## 2023-12-19 NOTE — PROGRESS NOTES
1ST NURSING VISIT POST DISCHARGE NOTE    1st RN appointment with Yumiko Gonzalez post discharge 12/18/23 s/p liver transplant 12/9/23.  Patient's daughter accompanied her.  Upon assessment, patient complains of back pain.  Incision intact with staples.  Patient is able to explain daily incision care and showering instructions.  Medication list and rationale were reviewed. Changed Bactrim, Valcyte, Oscal and k phos once a day dosing to noon to try to reduce AM pill burden. Patient did bring blue medication card and medication bottles for review.  Self-assessment reviewed with patient and daughter; time allowed for questions.  Patient expressed understanding of daily care including BID VS and medications.  Patient aware that nurse will review today's labs with a transplant physician and call with any dose changes indicated.  Next labs TBD and first post-operative transplant team appointment scheduled for Friday 12/22/23.

## 2023-12-19 NOTE — TELEPHONE ENCOUNTER
Spoke with pt's daughter, Rachel. Reviewed lab results and that no medication changes needed. Due to transportation issues, pt will repeat labs Friday morning prior to clinic.  ----- Message from Eugene Haney MD sent at 12/19/2023  3:51 PM CST -----  Labs Thursday

## 2023-12-21 ENCOUNTER — TELEPHONE (OUTPATIENT)
Dept: PRIMARY CARE CLINIC | Facility: CLINIC | Age: 63
End: 2023-12-21
Payer: COMMERCIAL

## 2023-12-21 ENCOUNTER — PATIENT MESSAGE (OUTPATIENT)
Dept: TRANSPLANT | Facility: CLINIC | Age: 63
End: 2023-12-21
Payer: COMMERCIAL

## 2023-12-21 ENCOUNTER — TELEPHONE (OUTPATIENT)
Dept: TRANSPLANT | Facility: CLINIC | Age: 63
End: 2023-12-21
Payer: COMMERCIAL

## 2023-12-21 NOTE — TELEPHONE ENCOUNTER
Dr. Raza requested pt arrive to clinic tomorrow at 1pm.    Called pt's daughter, Rachel. Advised of above. She agreed with plan.

## 2023-12-21 NOTE — TELEPHONE ENCOUNTER
Spoke with pt, she reports that she is having pain, mostly at her incision site and that the pain will sometimes travel to her back.  Her daughter and granddaughter having been cleaning the site 3 times daily with iodine sticks.  Denies any infection at the surgical site.  Reports that her tremors are horrible right now because they won't give her the BP med that helps her tremors if her BP is too low.  Pt is currently staying at the [facility].  Pt refused to schedule a HFU visit at this time and reports that she will call to schedule a HFU [place]

## 2023-12-22 ENCOUNTER — OFFICE VISIT (OUTPATIENT)
Dept: TRANSPLANT | Facility: CLINIC | Age: 63
End: 2023-12-22
Payer: COMMERCIAL

## 2023-12-22 ENCOUNTER — TELEPHONE (OUTPATIENT)
Dept: TRANSPLANT | Facility: CLINIC | Age: 63
End: 2023-12-22
Payer: COMMERCIAL

## 2023-12-22 ENCOUNTER — LAB VISIT (OUTPATIENT)
Dept: LAB | Facility: HOSPITAL | Age: 63
End: 2023-12-22
Attending: SURGERY
Payer: COMMERCIAL

## 2023-12-22 VITALS
HEART RATE: 77 BPM | SYSTOLIC BLOOD PRESSURE: 131 MMHG | WEIGHT: 225.75 LBS | TEMPERATURE: 98 F | DIASTOLIC BLOOD PRESSURE: 62 MMHG | OXYGEN SATURATION: 98 % | BODY MASS INDEX: 35.43 KG/M2 | RESPIRATION RATE: 18 BRPM | HEIGHT: 67 IN

## 2023-12-22 DIAGNOSIS — Z94.4 LIVER REPLACED BY TRANSPLANT: ICD-10-CM

## 2023-12-22 DIAGNOSIS — Z94.4 S/P LIVER TRANSPLANT: ICD-10-CM

## 2023-12-22 DIAGNOSIS — Z79.899 ENCOUNTER FOR LONG-TERM (CURRENT) USE OF OTHER MEDICATIONS: ICD-10-CM

## 2023-12-22 DIAGNOSIS — Z94.4 STATUS POST LIVER TRANSPLANT: ICD-10-CM

## 2023-12-22 DIAGNOSIS — Z51.81 ENCOUNTER FOR THERAPEUTIC DRUG MONITORING: Primary | ICD-10-CM

## 2023-12-22 LAB
ALBUMIN SERPL BCP-MCNC: 2.9 G/DL (ref 3.5–5.2)
ALP SERPL-CCNC: 270 U/L (ref 55–135)
ALT SERPL W/O P-5'-P-CCNC: 86 U/L (ref 10–44)
ANION GAP SERPL CALC-SCNC: 7 MMOL/L (ref 8–16)
ANISOCYTOSIS BLD QL SMEAR: SLIGHT
AST SERPL-CCNC: 53 U/L (ref 10–40)
BASOPHILS NFR BLD: 0 % (ref 0–1.9)
BILIRUB DIRECT SERPL-MCNC: 1.1 MG/DL (ref 0.1–0.3)
BILIRUB SERPL-MCNC: 2 MG/DL (ref 0.1–1)
BUN SERPL-MCNC: 30 MG/DL (ref 8–23)
CALCIUM SERPL-MCNC: 9.1 MG/DL (ref 8.7–10.5)
CHLORIDE SERPL-SCNC: 103 MMOL/L (ref 95–110)
CO2 SERPL-SCNC: 27 MMOL/L (ref 23–29)
CREAT SERPL-MCNC: 1 MG/DL (ref 0.5–1.4)
DIFFERENTIAL METHOD: ABNORMAL
EOSINOPHIL NFR BLD: 1 % (ref 0–8)
ERYTHROCYTE [DISTWIDTH] IN BLOOD BY AUTOMATED COUNT: 17.2 % (ref 11.5–14.5)
EST. GFR  (NO RACE VARIABLE): >60 ML/MIN/1.73 M^2
GLUCOSE SERPL-MCNC: 138 MG/DL (ref 70–110)
HCT VFR BLD AUTO: 27 % (ref 37–48.5)
HGB BLD-MCNC: 8.9 G/DL (ref 12–16)
IMM GRANULOCYTES # BLD AUTO: ABNORMAL K/UL (ref 0–0.04)
IMM GRANULOCYTES NFR BLD AUTO: ABNORMAL % (ref 0–0.5)
LYMPHOCYTES NFR BLD: 5 % (ref 18–48)
MAGNESIUM SERPL-MCNC: 1.4 MG/DL (ref 1.6–2.6)
MCH RBC QN AUTO: 33.6 PG (ref 27–31)
MCHC RBC AUTO-ENTMCNC: 33 G/DL (ref 32–36)
MCV RBC AUTO: 102 FL (ref 82–98)
MONOCYTES NFR BLD: 7 % (ref 4–15)
MYELOCYTES NFR BLD MANUAL: 1 %
NEUTROPHILS NFR BLD: 86 % (ref 38–73)
NRBC BLD-RTO: 0 /100 WBC
OVALOCYTES BLD QL SMEAR: ABNORMAL
PLATELET # BLD AUTO: 147 K/UL (ref 150–450)
PLATELET BLD QL SMEAR: ABNORMAL
PMV BLD AUTO: 11 FL (ref 9.2–12.9)
POIKILOCYTOSIS BLD QL SMEAR: SLIGHT
POLYCHROMASIA BLD QL SMEAR: ABNORMAL
POTASSIUM SERPL-SCNC: 4.1 MMOL/L (ref 3.5–5.1)
PROT SERPL-MCNC: 5.2 G/DL (ref 6–8.4)
RBC # BLD AUTO: 2.65 M/UL (ref 4–5.4)
SODIUM SERPL-SCNC: 137 MMOL/L (ref 136–145)
TACROLIMUS BLD-MCNC: 9.1 NG/ML (ref 5–15)
WBC # BLD AUTO: 6.26 K/UL (ref 3.9–12.7)

## 2023-12-22 PROCEDURE — 3078F PR MOST RECENT DIASTOLIC BLOOD PRESSURE < 80 MM HG: ICD-10-PCS | Mod: CPTII,S$GLB,, | Performed by: TRANSPLANT SURGERY

## 2023-12-22 PROCEDURE — 99215 OFFICE O/P EST HI 40 MIN: CPT | Mod: 24,S$GLB,, | Performed by: TRANSPLANT SURGERY

## 2023-12-22 PROCEDURE — 3044F HG A1C LEVEL LT 7.0%: CPT | Mod: CPTII,S$GLB,, | Performed by: TRANSPLANT SURGERY

## 2023-12-22 PROCEDURE — 1159F MED LIST DOCD IN RCRD: CPT | Mod: CPTII,S$GLB,, | Performed by: TRANSPLANT SURGERY

## 2023-12-22 PROCEDURE — 85007 BL SMEAR W/DIFF WBC COUNT: CPT | Performed by: SURGERY

## 2023-12-22 PROCEDURE — 85027 COMPLETE CBC AUTOMATED: CPT | Performed by: SURGERY

## 2023-12-22 PROCEDURE — 3044F PR MOST RECENT HEMOGLOBIN A1C LEVEL <7.0%: ICD-10-PCS | Mod: CPTII,S$GLB,, | Performed by: TRANSPLANT SURGERY

## 2023-12-22 PROCEDURE — 99999 PR PBB SHADOW E&M-EST. PATIENT-LVL IV: CPT | Mod: PBBFAC,,,

## 2023-12-22 PROCEDURE — 1111F DSCHRG MED/CURRENT MED MERGE: CPT | Mod: CPTII,S$GLB,, | Performed by: TRANSPLANT SURGERY

## 2023-12-22 PROCEDURE — 3008F PR BODY MASS INDEX (BMI) DOCUMENTED: ICD-10-PCS | Mod: CPTII,S$GLB,, | Performed by: TRANSPLANT SURGERY

## 2023-12-22 PROCEDURE — 3075F PR MOST RECENT SYSTOLIC BLOOD PRESS GE 130-139MM HG: ICD-10-PCS | Mod: CPTII,S$GLB,, | Performed by: TRANSPLANT SURGERY

## 2023-12-22 PROCEDURE — 3008F BODY MASS INDEX DOCD: CPT | Mod: CPTII,S$GLB,, | Performed by: TRANSPLANT SURGERY

## 2023-12-22 PROCEDURE — 83735 ASSAY OF MAGNESIUM: CPT | Performed by: SURGERY

## 2023-12-22 PROCEDURE — 80197 ASSAY OF TACROLIMUS: CPT | Performed by: SURGERY

## 2023-12-22 PROCEDURE — 99999 PR PBB SHADOW E&M-EST. PATIENT-LVL IV: ICD-10-PCS | Mod: PBBFAC,,,

## 2023-12-22 PROCEDURE — 36415 COLL VENOUS BLD VENIPUNCTURE: CPT | Performed by: SURGERY

## 2023-12-22 PROCEDURE — 82248 BILIRUBIN DIRECT: CPT | Performed by: SURGERY

## 2023-12-22 PROCEDURE — 3078F DIAST BP <80 MM HG: CPT | Mod: CPTII,S$GLB,, | Performed by: TRANSPLANT SURGERY

## 2023-12-22 PROCEDURE — 3075F SYST BP GE 130 - 139MM HG: CPT | Mod: CPTII,S$GLB,, | Performed by: TRANSPLANT SURGERY

## 2023-12-22 PROCEDURE — 1159F PR MEDICATION LIST DOCUMENTED IN MEDICAL RECORD: ICD-10-PCS | Mod: CPTII,S$GLB,, | Performed by: TRANSPLANT SURGERY

## 2023-12-22 PROCEDURE — 80053 COMPREHEN METABOLIC PANEL: CPT | Performed by: SURGERY

## 2023-12-22 PROCEDURE — 99215 PR OFFICE/OUTPT VISIT, EST, LEVL V, 40-54 MIN: ICD-10-PCS | Mod: 24,S$GLB,, | Performed by: TRANSPLANT SURGERY

## 2023-12-22 PROCEDURE — 1111F PR DISCHARGE MEDS RECONCILED W/ CURRENT OUTPATIENT MED LIST: ICD-10-PCS | Mod: CPTII,S$GLB,, | Performed by: TRANSPLANT SURGERY

## 2023-12-22 RX ORDER — FUROSEMIDE 20 MG/1
40 TABLET ORAL DAILY
Qty: 60 TABLET | Refills: 0 | Status: SHIPPED | OUTPATIENT
Start: 2023-12-22 | End: 2024-02-20

## 2023-12-22 NOTE — PROGRESS NOTES
Transplant Surgery  Liver Transplant Recipient Follow-up    Original Referring Physician: Laila Will  Current Corresponding Physician: Laila Will    Chief Complaint: Yumiko is here for follow up of her liver transplant performed 12/9/2023 for the primary diagnosis (UNOS) of Cirrhosis: Fatty Liver (DURAN)    ORGAN: LIVER  Whole or Partial: whole liver  Donor Type: donation after circulatory death   PHS Increased Risk: no  Donor CMV Status: Negative  Donor HCV Status: Negative  Donor HBcAb: Negative  Donor HBV PEDRO:   Donor HCV PEDRO: Negative    Biliary Anastomosis: end to end  Arterial Anatomy: standard  IVC reconstruction: end to end ivc  Portal vein status: patent    Subjective:     History of Present Illness: She has had the following complications since transplant: none.  The noted complications are well controlled.    Interval History: Currently, she is doing adequately.  Current complaints include edema.  Yumiko is here for management of her immunosuppression medication.    External provider notes reviewed: Yes    Review of Systems   Constitutional:  Negative for activity change and appetite change.   HENT:  Negative for sinus pressure and sore throat.    Eyes:  Negative for redness and visual disturbance.   Respiratory:  Negative for cough and shortness of breath.    Cardiovascular:  Negative for chest pain and palpitations.   Gastrointestinal:  Negative for abdominal distention and abdominal pain.   Endocrine: Negative for polydipsia and polyuria.   Genitourinary:  Negative for dysuria and frequency.   Musculoskeletal:  Negative for arthralgias and back pain.   Skin:  Positive for wound. Negative for rash.   Allergic/Immunologic: Positive for immunocompromised state. Negative for environmental allergies.   Neurological:  Negative for tremors and seizures.   Hematological:  Negative for adenopathy. Does not bruise/bleed easily.   Psychiatric/Behavioral:  Negative for behavioral  problems and confusion.      Objective:     Physical Exam  Constitutional:       General: She is not in acute distress.     Appearance: She is not diaphoretic.   Neck:      Vascular: No JVD.   Cardiovascular:      Rate and Rhythm: Normal rate and regular rhythm.   Pulmonary:      Effort: Pulmonary effort is normal. No respiratory distress.      Breath sounds: No wheezing.   Abdominal:      Palpations: Abdomen is soft.          Comments: Mild incisional tenderness   Skin:     General: Skin is warm and dry.   Neurological:      Mental Status: She is alert and oriented to person, place, and time.       Lab Results   Component Value Date    BILITOT 2.0 (H) 12/22/2023    AST 53 (H) 12/22/2023    ALT 86 (H) 12/22/2023    ALKPHOS 270 (H) 12/22/2023    CREATININE 1.0 12/22/2023    ALBUMIN 2.9 (L) 12/22/2023     Lab Results   Component Value Date    WBC 6.26 12/22/2023    HGB 8.9 (L) 12/22/2023    HCT 27.0 (L) 12/22/2023    HCT 21 (L) 12/10/2023     (L) 12/22/2023     Lab Results   Component Value Date    TACROLIMUS 9.1 12/22/2023       Diagnostics:  The following labs have been reviewed: CBC  CMP  TACROLIMUS LEVEL  The following radiology images have been independently reviewed and interpreted:     Assessment/Plan:          S/P liver transplant.  Chronic immunosuppressive medications for rejection prophylaxis at target.  Plan: no adjustment needed.  Continue monitoring symptoms, labs and drug levels for drug-related toxicity and side effects.  Incision: staples in place; wound clean, dry, and intact  Femoral arterial line site: no complications evident  Edema: Continue lasix    Additional testing to be completed according to Written Order Guidelines for Post-Liver and Combined Liver/Kidney Transplant Follow-up (LI-09)    Interpretation of tests and discussion of patient management involves all members of the multidisciplinary transplant team  Patient advised that it is recommended that all transplanted patients, and  their close contacts and household members receive Covid vaccination.  Gurpreet Raza MD       [facility] Patient Status  Functional Status: 50% - Requires considerable assistance and frequent medical care  Physical Capacity: Limited Mobility

## 2023-12-22 NOTE — TELEPHONE ENCOUNTER
Informed pt and daughter labs reviewed, no med changes. Will repeat labs Tuesday 12/26. Both verbalize understanding.       ----- Message from Eugene Haney MD sent at 12/22/2023  1:10 PM CST -----  Results ok. No action needed   none

## 2023-12-26 ENCOUNTER — HOSPITAL ENCOUNTER (OUTPATIENT)
Dept: RADIOLOGY | Facility: HOSPITAL | Age: 63
Discharge: HOME | End: 2023-12-26
Attending: SURGERY
Payer: COMMERCIAL

## 2023-12-26 ENCOUNTER — OFFICE VISIT (OUTPATIENT)
Dept: TRANSPLANT | Facility: CLINIC | Age: 63
End: 2023-12-26
Payer: COMMERCIAL

## 2023-12-26 VITALS
HEART RATE: 83 BPM | BODY MASS INDEX: 33.71 KG/M2 | HEIGHT: 67 IN | WEIGHT: 214.75 LBS | TEMPERATURE: 98 F | OXYGEN SATURATION: 96 % | DIASTOLIC BLOOD PRESSURE: 81 MMHG | SYSTOLIC BLOOD PRESSURE: 160 MMHG | RESPIRATION RATE: 18 BRPM

## 2023-12-26 DIAGNOSIS — Z51.81 ENCOUNTER FOR THERAPEUTIC DRUG MONITORING: ICD-10-CM

## 2023-12-26 DIAGNOSIS — D84.9 IMMUNOSUPPRESSION: ICD-10-CM

## 2023-12-26 DIAGNOSIS — Z91.89 AT RISK FOR OPPORTUNISTIC INFECTIONS: ICD-10-CM

## 2023-12-26 DIAGNOSIS — Z94.4 LIVER REPLACED BY TRANSPLANT: ICD-10-CM

## 2023-12-26 DIAGNOSIS — Z94.4 S/P LIVER TRANSPLANT: Primary | ICD-10-CM

## 2023-12-26 PROCEDURE — 3079F DIAST BP 80-89 MM HG: CPT | Mod: CPTII,S$GLB,, | Performed by: SURGERY

## 2023-12-26 PROCEDURE — 3044F HG A1C LEVEL LT 7.0%: CPT | Mod: CPTII,S$GLB,, | Performed by: SURGERY

## 2023-12-26 PROCEDURE — 1111F DSCHRG MED/CURRENT MED MERGE: CPT | Mod: CPTII,S$GLB,, | Performed by: SURGERY

## 2023-12-26 PROCEDURE — 1160F RVW MEDS BY RX/DR IN RCRD: CPT | Mod: CPTII,S$GLB,, | Performed by: SURGERY

## 2023-12-26 PROCEDURE — 99214 OFFICE O/P EST MOD 30 MIN: CPT | Mod: 24,S$GLB,, | Performed by: SURGERY

## 2023-12-26 PROCEDURE — 3077F PR MOST RECENT SYSTOLIC BLOOD PRESSURE >= 140 MM HG: ICD-10-PCS | Mod: CPTII,S$GLB,, | Performed by: SURGERY

## 2023-12-26 PROCEDURE — 76705 ECHO EXAM OF ABDOMEN: CPT | Mod: TC

## 2023-12-26 PROCEDURE — 1159F PR MEDICATION LIST DOCUMENTED IN MEDICAL RECORD: ICD-10-PCS | Mod: CPTII,S$GLB,, | Performed by: SURGERY

## 2023-12-26 PROCEDURE — 3044F PR MOST RECENT HEMOGLOBIN A1C LEVEL <7.0%: ICD-10-PCS | Mod: CPTII,S$GLB,, | Performed by: SURGERY

## 2023-12-26 PROCEDURE — 76705 ECHO EXAM OF ABDOMEN: CPT | Mod: 26,59,, | Performed by: RADIOLOGY

## 2023-12-26 PROCEDURE — 1160F PR REVIEW ALL MEDS BY PRESCRIBER/CLIN PHARMACIST DOCUMENTED: ICD-10-PCS | Mod: CPTII,S$GLB,, | Performed by: SURGERY

## 2023-12-26 PROCEDURE — 3079F PR MOST RECENT DIASTOLIC BLOOD PRESSURE 80-89 MM HG: ICD-10-PCS | Mod: CPTII,S$GLB,, | Performed by: SURGERY

## 2023-12-26 PROCEDURE — 99214 PR OFFICE/OUTPT VISIT, EST, LEVL IV, 30-39 MIN: ICD-10-PCS | Mod: 24,S$GLB,, | Performed by: SURGERY

## 2023-12-26 PROCEDURE — 1159F MED LIST DOCD IN RCRD: CPT | Mod: CPTII,S$GLB,, | Performed by: SURGERY

## 2023-12-26 PROCEDURE — 93976 US DOPPLER LIVER TRANSPLANT POST (XPD): ICD-10-PCS | Mod: 26,,, | Performed by: RADIOLOGY

## 2023-12-26 PROCEDURE — 99999 PR PBB SHADOW E&M-EST. PATIENT-LVL V: CPT | Mod: PBBFAC,,,

## 2023-12-26 PROCEDURE — 3008F PR BODY MASS INDEX (BMI) DOCUMENTED: ICD-10-PCS | Mod: CPTII,S$GLB,, | Performed by: SURGERY

## 2023-12-26 PROCEDURE — 3077F SYST BP >= 140 MM HG: CPT | Mod: CPTII,S$GLB,, | Performed by: SURGERY

## 2023-12-26 PROCEDURE — 3008F BODY MASS INDEX DOCD: CPT | Mod: CPTII,S$GLB,, | Performed by: SURGERY

## 2023-12-26 PROCEDURE — 1111F PR DISCHARGE MEDS RECONCILED W/ CURRENT OUTPATIENT MED LIST: ICD-10-PCS | Mod: CPTII,S$GLB,, | Performed by: SURGERY

## 2023-12-26 PROCEDURE — 76705 US DOPPLER LIVER TRANSPLANT POST (XPD): ICD-10-PCS | Mod: 26,59,, | Performed by: RADIOLOGY

## 2023-12-26 PROCEDURE — 93976 VASCULAR STUDY: CPT | Mod: 26,,, | Performed by: RADIOLOGY

## 2023-12-26 PROCEDURE — 99999 PR PBB SHADOW E&M-EST. PATIENT-LVL V: ICD-10-PCS | Mod: PBBFAC,,,

## 2023-12-26 NOTE — PROGRESS NOTES
Transplant Surgery  Liver Transplant Recipient Follow-up    Original Referring Physician: Laila Will  Current Corresponding Physician: Laila Will    Chief Complaint: Yumiko is here for follow up of her liver transplant performed 12/9/2023 for the primary diagnosis (UNOS) of Cirrhosis: Fatty Liver (DURAN)    ORGAN: LIVER  Whole or Partial: whole liver  Donor Type: donation after circulatory death   PHS Increased Risk: no  Donor CMV Status: Negative  Donor HCV Status: Negative  Donor HBcAb: Negative  Donor HBV PEDRO:   Donor HCV PEDRO: Negative    Biliary Anastomosis: end to end  Arterial Anatomy: standard  IVC reconstruction: end to end ivc  Portal vein status: patent    Subjective:     History of Present Illness: She has had the following complications since transplant: none.  The noted complications need to be addressed more thoroughly today.    Interval History: Currently, she is doing adequately.  Current complaints include tremor.  Yumiko is here for management of her immunosuppression medication.    External provider notes reviewed: Yes    Review of Systems   Constitutional:  Positive for fatigue.   HENT: Negative.     Eyes: Negative.    Respiratory:  Negative for cough and shortness of breath.    Cardiovascular:  Positive for leg swelling. Negative for chest pain and palpitations.   Gastrointestinal:  Negative for abdominal distention, abdominal pain, diarrhea and nausea.   Endocrine: Negative.    Genitourinary:  Positive for frequency. Negative for difficulty urinating and dysuria.   Musculoskeletal: Negative.    Skin: Negative.    Allergic/Immunologic: Positive for immunocompromised state.   Neurological:  Positive for tremors.   Hematological: Negative.    Psychiatric/Behavioral: Negative.       Objective:     Physical Exam  Constitutional:       Appearance: Normal appearance.   HENT:      Head: Normocephalic and atraumatic.      Mouth/Throat:      Mouth: Mucous membranes are  moist.      Pharynx: Oropharynx is clear.   Eyes:      Extraocular Movements: Extraocular movements intact.      Pupils: Pupils are equal, round, and reactive to light.   Cardiovascular:      Pulses: Normal pulses.   Pulmonary:      Effort: No respiratory distress.      Breath sounds: No wheezing.   Abdominal:      General: There is no distension.      Palpations: Abdomen is soft. There is no mass.      Tenderness: There is abdominal tenderness. There is no guarding.   Musculoskeletal:         General: Swelling present.      Cervical back: Normal range of motion.      Right lower leg: Edema present.      Left lower leg: Edema present.   Skin:     General: Skin is warm and dry.   Neurological:      Mental Status: She is alert and oriented to person, place, and time.      Comments: BUE tremors present   Psychiatric:         Mood and Affect: Mood normal.         Behavior: Behavior normal.       Lab Results   Component Value Date    BILITOT 1.6 (H) 12/26/2023    AST 39 12/26/2023    ALT 64 (H) 12/26/2023    ALKPHOS 257 (H) 12/26/2023    CREATININE 0.9 12/26/2023    ALBUMIN 3.0 (L) 12/26/2023     Lab Results   Component Value Date    WBC 3.47 (L) 12/26/2023    HGB 9.2 (L) 12/26/2023    HCT 28.0 (L) 12/26/2023    HCT 21 (L) 12/10/2023     (L) 12/26/2023     Lab Results   Component Value Date    TACROLIMUS 10.6 12/26/2023       Diagnostics:  The following labs have been reviewed: CBC  CMP  The following radiology images have been independently reviewed and interpreted: Liver US    Assessment/Plan:          S/P liver transplant.  Chronic immunosuppressive medications for rejection prophylaxis supratherapeutic.  Plan: decrease tacro to 5 mg BID .  Continue monitoring symptoms, labs and drug levels for drug-related toxicity and side effects.  Incision: staples in place; wound clean, dry, and intact  Femoral arterial line site: no complications evident  Increase PPI to BID    Additional testing to be completed according  to Written Order Guidelines for Post-Liver and Combined Liver/Kidney Transplant Follow-up (LI-09)    Interpretation of tests and discussion of patient management involves all members of the multidisciplinary transplant team  Patient advised that it is recommended that all transplanted patients, and their close contacts and household members receive Covid vaccination.  Valerie Norris MD    I agree  Eugene Haney md        UNOS Patient Status  Functional Status: 50% - Requires considerable assistance and frequent medical care  Physical Capacity: Limited Mobility

## 2023-12-27 ENCOUNTER — TELEPHONE (OUTPATIENT)
Dept: TRANSPLANT | Facility: CLINIC | Age: 63
End: 2023-12-27
Payer: COMMERCIAL

## 2023-12-27 ENCOUNTER — PATIENT MESSAGE (OUTPATIENT)
Dept: TRANSPLANT | Facility: CLINIC | Age: 63
End: 2023-12-27

## 2023-12-27 NOTE — TELEPHONE ENCOUNTER
Pt advised via portal  ----- Message from Eugene Haney MD sent at 12/27/2023 12:52 PM CST -----  Results ok. No action needed

## 2023-12-28 ENCOUNTER — PATIENT MESSAGE (OUTPATIENT)
Dept: TRANSPLANT | Facility: CLINIC | Age: 63
End: 2023-12-28

## 2023-12-28 ENCOUNTER — TELEPHONE (OUTPATIENT)
Dept: TRANSPLANT | Facility: CLINIC | Age: 63
End: 2023-12-28
Payer: COMMERCIAL

## 2023-12-28 ENCOUNTER — LAB VISIT (OUTPATIENT)
Dept: LAB | Facility: HOSPITAL | Age: 63
End: 2023-12-28
Attending: SURGERY
Payer: COMMERCIAL

## 2023-12-28 ENCOUNTER — PATIENT MESSAGE (OUTPATIENT)
Dept: ADMINISTRATIVE | Facility: OTHER | Age: 63
End: 2023-12-28

## 2023-12-28 DIAGNOSIS — Z94.4 S/P LIVER TRANSPLANT: ICD-10-CM

## 2023-12-28 DIAGNOSIS — Z94.4 STATUS POST LIVER TRANSPLANT: ICD-10-CM

## 2023-12-28 DIAGNOSIS — Z94.4 S/P LIVER TRANSPLANT: Primary | ICD-10-CM

## 2023-12-28 LAB
ALBUMIN SERPL BCP-MCNC: 3.2 G/DL (ref 3.5–5.2)
ALP SERPL-CCNC: 248 U/L (ref 55–135)
ALT SERPL W/O P-5'-P-CCNC: 50 U/L (ref 10–44)
ANION GAP SERPL CALC-SCNC: 12 MMOL/L (ref 8–16)
AST SERPL-CCNC: 34 U/L (ref 10–40)
BASOPHILS # BLD AUTO: 0.07 K/UL (ref 0–0.2)
BASOPHILS NFR BLD: 2.1 % (ref 0–1.9)
BILIRUB DIRECT SERPL-MCNC: 0.8 MG/DL (ref 0.1–0.3)
BILIRUB SERPL-MCNC: 1.3 MG/DL (ref 0.1–1)
BUN SERPL-MCNC: 24 MG/DL (ref 8–23)
CALCIUM SERPL-MCNC: 8.7 MG/DL (ref 8.7–10.5)
CHLORIDE SERPL-SCNC: 106 MMOL/L (ref 95–110)
CO2 SERPL-SCNC: 23 MMOL/L (ref 23–29)
CREAT SERPL-MCNC: 1 MG/DL (ref 0.5–1.4)
DIFFERENTIAL METHOD BLD: ABNORMAL
EOSINOPHIL # BLD AUTO: 0.1 K/UL (ref 0–0.5)
EOSINOPHIL NFR BLD: 4.3 % (ref 0–8)
ERYTHROCYTE [DISTWIDTH] IN BLOOD BY AUTOMATED COUNT: 17.2 % (ref 11.5–14.5)
EST. GFR  (NO RACE VARIABLE): >60 ML/MIN/1.73 M^2
GLUCOSE SERPL-MCNC: 138 MG/DL (ref 70–110)
HCT VFR BLD AUTO: 28.7 % (ref 37–48.5)
HGB BLD-MCNC: 9.2 G/DL (ref 12–16)
IMM GRANULOCYTES # BLD AUTO: 0.09 K/UL (ref 0–0.04)
IMM GRANULOCYTES NFR BLD AUTO: 2.8 % (ref 0–0.5)
LYMPHOCYTES # BLD AUTO: 0.3 K/UL (ref 1–4.8)
LYMPHOCYTES NFR BLD: 10.1 % (ref 18–48)
MAGNESIUM SERPL-MCNC: 1.3 MG/DL (ref 1.6–2.6)
MCH RBC QN AUTO: 33.5 PG (ref 27–31)
MCHC RBC AUTO-ENTMCNC: 32.1 G/DL (ref 32–36)
MCV RBC AUTO: 104 FL (ref 82–98)
MONOCYTES # BLD AUTO: 0.3 K/UL (ref 0.3–1)
MONOCYTES NFR BLD: 8.6 % (ref 4–15)
NEUTROPHILS # BLD AUTO: 2.4 K/UL (ref 1.8–7.7)
NEUTROPHILS NFR BLD: 72.1 % (ref 38–73)
NRBC BLD-RTO: 0 /100 WBC
PLATELET # BLD AUTO: 126 K/UL (ref 150–450)
PMV BLD AUTO: 10.5 FL (ref 9.2–12.9)
POTASSIUM SERPL-SCNC: 4 MMOL/L (ref 3.5–5.1)
PROT SERPL-MCNC: 5.6 G/DL (ref 6–8.4)
RBC # BLD AUTO: 2.75 M/UL (ref 4–5.4)
SODIUM SERPL-SCNC: 141 MMOL/L (ref 136–145)
TACROLIMUS BLD-MCNC: 7.7 NG/ML (ref 5–15)
WBC # BLD AUTO: 3.27 K/UL (ref 3.9–12.7)

## 2023-12-28 PROCEDURE — 83735 ASSAY OF MAGNESIUM: CPT | Performed by: SURGERY

## 2023-12-28 PROCEDURE — 80053 COMPREHEN METABOLIC PANEL: CPT | Performed by: SURGERY

## 2023-12-28 PROCEDURE — 80197 ASSAY OF TACROLIMUS: CPT | Performed by: SURGERY

## 2023-12-28 PROCEDURE — 82248 BILIRUBIN DIRECT: CPT | Performed by: SURGERY

## 2023-12-28 PROCEDURE — 36415 COLL VENOUS BLD VENIPUNCTURE: CPT | Performed by: SURGERY

## 2023-12-28 PROCEDURE — 85025 COMPLETE CBC W/AUTO DIFF WBC: CPT | Performed by: SURGERY

## 2023-12-28 RX ORDER — OXYCODONE HYDROCHLORIDE 10 MG/1
5 TABLET ORAL EVERY 8 HOURS PRN
Qty: 20 TABLET | Refills: 0 | Status: SHIPPED | OUTPATIENT
Start: 2023-12-28 | End: 2024-01-04 | Stop reason: SDUPTHER

## 2023-12-28 RX ORDER — OXYCODONE HYDROCHLORIDE 5 MG/1
5 TABLET ORAL EVERY 8 HOURS PRN
Qty: 28 TABLET | Refills: 0 | Status: SHIPPED | OUTPATIENT
Start: 2023-12-28 | End: 2023-12-28 | Stop reason: SDUPTHER

## 2023-12-28 NOTE — TELEPHONE ENCOUNTER
Pt notified via portal of stable labs and that no medication changes are needed. Repeat labs due 1/2/24 per protocol.   ----- Message from Eugene Haney MD sent at 12/28/2023  1:43 PM CST -----  Results ok. No action needed

## 2024-01-02 ENCOUNTER — OFFICE VISIT (OUTPATIENT)
Dept: TRANSPLANT | Facility: CLINIC | Age: 64
End: 2024-01-02
Payer: COMMERCIAL

## 2024-01-02 ENCOUNTER — TELEPHONE (OUTPATIENT)
Dept: TRANSPLANT | Facility: HOSPITAL | Age: 64
End: 2024-01-02
Payer: COMMERCIAL

## 2024-01-02 ENCOUNTER — HOSPITAL ENCOUNTER (OUTPATIENT)
Dept: RADIOLOGY | Facility: HOSPITAL | Age: 64
Discharge: HOME | End: 2024-01-02
Attending: SURGERY
Payer: COMMERCIAL

## 2024-01-02 VITALS
BODY MASS INDEX: 31.8 KG/M2 | HEART RATE: 80 BPM | HEIGHT: 67 IN | RESPIRATION RATE: 16 BRPM | OXYGEN SATURATION: 97 % | WEIGHT: 202.63 LBS | DIASTOLIC BLOOD PRESSURE: 74 MMHG | SYSTOLIC BLOOD PRESSURE: 167 MMHG | TEMPERATURE: 98 F

## 2024-01-02 DIAGNOSIS — Z94.4 LIVER REPLACED BY TRANSPLANT: Primary | ICD-10-CM

## 2024-01-02 DIAGNOSIS — Z79.60 LONG-TERM USE OF IMMUNOSUPPRESSANT MEDICATION: ICD-10-CM

## 2024-01-02 DIAGNOSIS — R53.1 WEAKNESS GENERALIZED: ICD-10-CM

## 2024-01-02 DIAGNOSIS — Z51.81 ENCOUNTER FOR THERAPEUTIC DRUG MONITORING: ICD-10-CM

## 2024-01-02 DIAGNOSIS — Z94.4 LIVER REPLACED BY TRANSPLANT: ICD-10-CM

## 2024-01-02 PROCEDURE — 3077F SYST BP >= 140 MM HG: CPT | Mod: CPTII,S$GLB,, | Performed by: TRANSPLANT SURGERY

## 2024-01-02 PROCEDURE — 93976 VASCULAR STUDY: CPT | Mod: 26,,, | Performed by: RADIOLOGY

## 2024-01-02 PROCEDURE — 3008F BODY MASS INDEX DOCD: CPT | Mod: CPTII,S$GLB,, | Performed by: TRANSPLANT SURGERY

## 2024-01-02 PROCEDURE — 76705 ECHO EXAM OF ABDOMEN: CPT | Mod: TC

## 2024-01-02 PROCEDURE — 76705 ECHO EXAM OF ABDOMEN: CPT | Mod: 26,59,, | Performed by: RADIOLOGY

## 2024-01-02 PROCEDURE — 3078F DIAST BP <80 MM HG: CPT | Mod: CPTII,S$GLB,, | Performed by: TRANSPLANT SURGERY

## 2024-01-02 PROCEDURE — 99215 OFFICE O/P EST HI 40 MIN: CPT | Mod: 24,S$GLB,, | Performed by: TRANSPLANT SURGERY

## 2024-01-02 PROCEDURE — 99999 PR PBB SHADOW E&M-EST. PATIENT-LVL III: CPT | Mod: PBBFAC,,,

## 2024-01-02 PROCEDURE — 1111F DSCHRG MED/CURRENT MED MERGE: CPT | Mod: CPTII,S$GLB,, | Performed by: TRANSPLANT SURGERY

## 2024-01-02 NOTE — TELEPHONE ENCOUNTER
Please see previous social work notes for continuity.  TIAGO received a request to assist with home health for patient when she gets discharged to daughters home in a few days.  TIAGO spoke to patient's daughter Rachel over phone and she shared her address, [address], [place] [address].  Unfortunately patient's current , [place] does not service this area. TIAGO reached out to [facility], [phone] and was told that they are not in network with patient's insurance.  TIAGO has updated coordinator who will make referral for outpatient PT/OT at [place].  TIAGO will assist as needed.

## 2024-01-02 NOTE — PROGRESS NOTES
Transplant Surgery  Liver Transplant Recipient Follow-up    Original Referring Physician: Laila Will  Current Corresponding Physician: Laila Will    Chief Complaint: Yumiko is here for follow up of her liver transplant performed 12/9/2023 for the primary diagnosis (UNOS) of Cirrhosis: Fatty Liver (DURAN)    ORGAN: LIVER  Whole or Partial: whole liver  Donor Type: donation after circulatory death   PHS Increased Risk: no  Donor CMV Status: Negative  Donor HCV Status: Negative  Donor HBcAb: Negative  Donor HBV PEDRO:   Donor HCV PEDRO: Negative    Biliary Anastomosis: end to end  Arterial Anatomy: standard  IVC reconstruction: end to end ivc  Portal vein status: patent    Subjective:     History of Present Illness: She has had the following complications since transplant: none.  The noted complications are well controlled.    Interval History: Currently, she is doing well.  Current complaints include edema and fatigue.  Yumiko is here for management of her immunosuppression medication.    External provider notes reviewed: Yes    Review of Systems  Objective:     Physical Exam  Constitutional:       Appearance: Normal appearance. She is well-developed. She is not ill-appearing.   HENT:      Head: Normocephalic and atraumatic.   Eyes:      Pupils: Pupils are equal, round, and reactive to light.   Cardiovascular:      Rate and Rhythm: Normal rate and regular rhythm.   Pulmonary:      Effort: Pulmonary effort is normal.   Abdominal:      General: There is no distension.      Palpations: Abdomen is soft.      Tenderness: There is no abdominal tenderness. There is no guarding.      Comments: Staples in place; wound c/d/I;      Musculoskeletal:         General: Normal range of motion.      Right lower leg: Edema present.      Left lower leg: Edema present.   Skin:     General: Skin is warm and dry.   Neurological:      Mental Status: She is alert and oriented to person, place, and time.    Psychiatric:         Behavior: Behavior normal.       Lab Results   Component Value Date    BILITOT 1.3 (H) 01/02/2024    AST 23 01/02/2024    ALT 31 01/02/2024    ALKPHOS 207 (H) 01/02/2024    CREATININE 0.9 01/02/2024    ALBUMIN 3.7 01/02/2024     Lab Results   Component Value Date    WBC 3.32 (L) 01/02/2024    HGB 10.8 (L) 01/02/2024    HCT 35.1 (L) 01/02/2024    HCT 21 (L) 12/10/2023     (L) 01/02/2024     Lab Results   Component Value Date    TACROLIMUS 6.7 01/02/2024       Diagnostics:  The following labs have been reviewed: CBC  CMP  INR  TACROLIMUS LEVEL  The following radiology images have been independently reviewed and interpreted: Liver US    Assessment/Plan:          S/P liver transplant.  Chronic immunosuppressive medications for rejection prophylaxis at target.  Plan: no adjustment needed.  Continue monitoring symptoms, labs and drug levels for drug-related toxicity and side effects.  Incision: staples in place; wound clean, dry, and intact  Femoral arterial line site: no complications evident    Additional testing to be completed according to Written Order Guidelines for Post-Liver and Combined Liver/Kidney Transplant Follow-up (LI-09)    Interpretation of tests and discussion of patient management involves all members of the multidisciplinary transplant team  Patient advised that it is recommended that all transplanted patients, and their close contacts and household members receive Covid vaccination.  MD GRACE Ayon Jr Patient Status  Functional Status: 60% - Requires occasional assistance but is able to care for needs  Physical Capacity: No Limitations

## 2024-01-02 NOTE — PROGRESS NOTES
STAPLE REMOVAL NOTE    Staples removed from liver transplant incision, steri-strips applied with Benzoin per Dr. Hodges's order.  Patient tolerated procedure well.  Skin dry and intact.  Patient instructed to shower with back to water spray and to pat dry incision and to let the steri-strips wear off on their own.  Patient instructed to report any redness, warmth, or drainage from incision to transplant coordinators.  All questions answered.

## 2024-01-04 ENCOUNTER — PATIENT MESSAGE (OUTPATIENT)
Dept: ADMINISTRATIVE | Facility: OTHER | Age: 64
End: 2024-01-04
Payer: COMMERCIAL

## 2024-01-04 DIAGNOSIS — Z00.6 RESEARCH STUDY PATIENT: Primary | ICD-10-CM

## 2024-01-04 DIAGNOSIS — Z94.4 S/P LIVER TRANSPLANT: ICD-10-CM

## 2024-01-04 RX ORDER — OXYCODONE HYDROCHLORIDE 10 MG/1
5 TABLET ORAL EVERY 12 HOURS PRN
Qty: 20 TABLET | Refills: 0 | Status: SHIPPED | OUTPATIENT
Start: 2024-01-04 | End: 2024-02-20

## 2024-01-04 RX ORDER — TACROLIMUS 1 MG/1
5 CAPSULE ORAL EVERY 12 HOURS
Qty: 300 CAPSULE | Refills: 11 | Status: SHIPPED | OUTPATIENT
Start: 2024-01-04 | End: 2024-01-17 | Stop reason: DRUGHIGH

## 2024-01-04 NOTE — TELEPHONE ENCOUNTER
Returned call. Spoke with pt's daughter, Rachel. Answered all medication questions.    ----- Message from Barron Raphael sent at 1/4/2024 12:00 PM CST -----  Regarding: call back  Pt's daughter call to speak with Afshan in regards to some question she has before leaving requesting call back     Call

## 2024-01-05 ENCOUNTER — NURSE TRIAGE (OUTPATIENT)
Dept: ADMINISTRATIVE | Facility: CLINIC | Age: 64
End: 2024-01-05
Payer: COMMERCIAL

## 2024-01-06 ENCOUNTER — TELEPHONE (OUTPATIENT)
Dept: TRANSPLANT | Facility: CLINIC | Age: 64
End: 2024-01-06
Payer: COMMERCIAL

## 2024-01-06 NOTE — TELEPHONE ENCOUNTER
Phone call to check on patient per Dr. Haney's request for follow up for an ED visit wher patient tested + for Covid.  Message left on phone.

## 2024-01-06 NOTE — TELEPHONE ENCOUNTER
Ms Carlos called back to inform this coordinator that she had been sleeping and that the Covid felt like a really bad cold.  She was concerned she may have given it to her pregnant daughter. Encouraged to call if temp sybil to 100.4 or became SOB.  Verbalized understanding.

## 2024-01-06 NOTE — TELEPHONE ENCOUNTER
Daughter calling on behalf of patient who is present. Reports temperature of 100.4 w/ runny nose prior to triage call. While on the line temperature was 100.3. Advised, per BPA 4 to be evaluated in the ED now. Verbalizes understanding.    Reason for Disposition   Fever > 100.4 F (38.0 C)    Additional Information   Negative: Sounds like a life-threatening emergency to the triager   Negative: [1] Widespread rash AND [2] bright red, sunburn-like   Negative: [1] SEVERE headache AND [2] after spinal (epidural) anesthesia   Negative: [1] Vomiting AND [2] persists > 4 hours   Negative: [1] Vomiting AND [2] abdomen looks much more swollen than usual   Negative: [1] Drinking very little AND [2] dehydration suspected (e.g., no urine > 12 hours, very dry mouth, very lightheaded)   Negative: Patient sounds very sick or weak to the triager   Negative: Sounds like a serious complication to the triager    Protocols used: Post-Op Symptoms and [id number]

## 2024-01-08 ENCOUNTER — TELEPHONE (OUTPATIENT)
Dept: TRANSPLANT | Facility: CLINIC | Age: 64
End: 2024-01-08
Payer: COMMERCIAL

## 2024-01-08 ENCOUNTER — LAB VISIT (OUTPATIENT)
Dept: LAB | Facility: HOSPITAL | Age: 64
End: 2024-01-08
Attending: SURGERY
Payer: COMMERCIAL

## 2024-01-08 ENCOUNTER — PATIENT MESSAGE (OUTPATIENT)
Dept: TRANSPLANT | Facility: CLINIC | Age: 64
End: 2024-01-08
Payer: COMMERCIAL

## 2024-01-08 ENCOUNTER — APPOINTMENT (OUTPATIENT)
Dept: LAB | Facility: HOSPITAL | Age: 64
End: 2024-01-08
Payer: COMMERCIAL

## 2024-01-08 DIAGNOSIS — Z94.4 STATUS POST LIVER TRANSPLANT: ICD-10-CM

## 2024-01-08 LAB
ALBUMIN SERPL BCP-MCNC: 3.5 G/DL (ref 3.5–5.2)
ALP SERPL-CCNC: 117 U/L (ref 55–135)
ALT SERPL W/O P-5'-P-CCNC: 20 U/L (ref 10–44)
ANION GAP SERPL CALC-SCNC: 10 MMOL/L (ref 8–16)
AST SERPL-CCNC: 18 U/L (ref 10–40)
BASOPHILS # BLD AUTO: 0.03 K/UL (ref 0–0.2)
BASOPHILS NFR BLD: 1.3 % (ref 0–1.9)
BILIRUB DIRECT SERPL-MCNC: 0.5 MG/DL (ref 0.1–0.3)
BILIRUB SERPL-MCNC: 0.9 MG/DL (ref 0.1–1)
BUN SERPL-MCNC: 27 MG/DL (ref 8–23)
CALCIUM SERPL-MCNC: 8.8 MG/DL (ref 8.7–10.5)
CHLORIDE SERPL-SCNC: 103 MMOL/L (ref 95–110)
CO2 SERPL-SCNC: 25 MMOL/L (ref 23–29)
CREAT SERPL-MCNC: 1 MG/DL (ref 0.5–1.4)
DIFFERENTIAL METHOD BLD: ABNORMAL
EOSINOPHIL # BLD AUTO: 0.1 K/UL (ref 0–0.5)
EOSINOPHIL NFR BLD: 5.1 % (ref 0–8)
ERYTHROCYTE [DISTWIDTH] IN BLOOD BY AUTOMATED COUNT: 14.7 % (ref 11.5–14.5)
EST. GFR  (NO RACE VARIABLE): >60 ML/MIN/1.73 M^2
GLUCOSE SERPL-MCNC: 153 MG/DL (ref 70–110)
HCT VFR BLD AUTO: 31.6 % (ref 37–48.5)
HGB BLD-MCNC: 10.1 G/DL (ref 12–16)
IMM GRANULOCYTES # BLD AUTO: 0.1 K/UL (ref 0–0.04)
IMM GRANULOCYTES NFR BLD AUTO: 4.3 % (ref 0–0.5)
LYMPHOCYTES # BLD AUTO: 0.4 K/UL (ref 1–4.8)
LYMPHOCYTES NFR BLD: 15.4 % (ref 18–48)
MAGNESIUM SERPL-MCNC: 1.4 MG/DL (ref 1.6–2.6)
MCH RBC QN AUTO: 32.1 PG (ref 27–31)
MCHC RBC AUTO-ENTMCNC: 32 G/DL (ref 32–36)
MCV RBC AUTO: 100 FL (ref 82–98)
MONOCYTES # BLD AUTO: 0.3 K/UL (ref 0.3–1)
MONOCYTES NFR BLD: 13.2 % (ref 4–15)
NEUTROPHILS # BLD AUTO: 1.4 K/UL (ref 1.8–7.7)
NEUTROPHILS NFR BLD: 60.7 % (ref 38–73)
NRBC BLD-RTO: 0 /100 WBC
PLATELET # BLD AUTO: 136 K/UL (ref 150–450)
PMV BLD AUTO: 10.3 FL (ref 9.2–12.9)
POTASSIUM SERPL-SCNC: 3.8 MMOL/L (ref 3.5–5.1)
PROT SERPL-MCNC: 6.2 G/DL (ref 6–8.4)
RBC # BLD AUTO: 3.15 M/UL (ref 4–5.4)
SODIUM SERPL-SCNC: 138 MMOL/L (ref 136–145)
WBC # BLD AUTO: 2.34 K/UL (ref 3.9–12.7)

## 2024-01-08 PROCEDURE — 83735 ASSAY OF MAGNESIUM: CPT | Performed by: SURGERY

## 2024-01-08 PROCEDURE — 82248 BILIRUBIN DIRECT: CPT | Performed by: SURGERY

## 2024-01-08 PROCEDURE — 85025 COMPLETE CBC W/AUTO DIFF WBC: CPT | Performed by: SURGERY

## 2024-01-08 PROCEDURE — 80053 COMPREHEN METABOLIC PANEL: CPT | Performed by: SURGERY

## 2024-01-08 PROCEDURE — 80197 ASSAY OF TACROLIMUS: CPT | Performed by: SURGERY

## 2024-01-08 PROCEDURE — 36415 COLL VENOUS BLD VENIPUNCTURE: CPT | Performed by: SURGERY

## 2024-01-08 NOTE — TELEPHONE ENCOUNTER
Called pt to check on her status. Spoke with pt's daughter,Rachel. She states pt is doing well. She has a runny nose but no fever or other symptoms at this time. Reviewed quarantine protocol and plan for pt to rest, stay well hydrated and maintain nutrition. Rachel will contact me if pt's symptoms change or worsen.

## 2024-01-09 ENCOUNTER — PATIENT MESSAGE (OUTPATIENT)
Dept: TRANSPLANT | Facility: CLINIC | Age: 64
End: 2024-01-09
Payer: COMMERCIAL

## 2024-01-09 ENCOUNTER — TELEPHONE (OUTPATIENT)
Dept: TRANSPLANT | Facility: CLINIC | Age: 64
End: 2024-01-09
Payer: COMMERCIAL

## 2024-01-09 ENCOUNTER — RESEARCH ENCOUNTER (OUTPATIENT)
Dept: RESEARCH | Facility: HOSPITAL | Age: 64
End: 2024-01-09
Payer: COMMERCIAL

## 2024-01-09 LAB
FUNGUS SPEC CULT: NORMAL
TACROLIMUS BLD-MCNC: 8.5 NG/ML (ref 5–15)

## 2024-01-09 NOTE — TELEPHONE ENCOUNTER
Pt notified via portal of stable labs and that no medication changes are needed. Repeat labs due 1/16/24 per protocol.   ----- Message from Eugene Haney MD sent at 1/9/2024  2:19 PM CST -----  Results ok. No action needed

## 2024-01-09 NOTE — PROGRESS NOTES
RESEARCH STUDY SPECIMEN COLLECTION ENCOUNTER  ORGAN TRANSPLANT  [facility] COURTNEY ROBLERO    Study Title: Role of Tumor-Induced Immune Tolerance in the Patient Response to Locoregional Therapy: Implications in Assessment Risk of Hepatocellular Carcinoma Recurrence Following Liver Transplantation    IRB #: [id number]    IRB Approval Date: 6/8/2016    : Eugene Haney MD  Sub-investigator: Nino Belcher, PhD    Patient Number: [id number]    In accordance with the study protocol, Research Lab orders were placed and follow-up specimens were collected on (date: 01/08/2023) in:  [facility] VNP: YES/NO: No  [facility] LABTX: YES/NO: No  [facility] IM: YES/NO: No  Other [facility] location: YES/NO: Yes   Location: [facility]    A  was used to transport blood specimens to [facility]Transplant for processing: YES/NO: No  Blood specimens were transported to [facility] for processing: YES/NO: No    Note: Lab orders were cancelled, but patient was drawn regardless of research lab orders. Sample was destroyed onsite at [facility] due to inclement weather making retrieval/processing difficult     Mehran Hilliard  Admin Research- Liver Transplant

## 2024-01-10 ENCOUNTER — HOSPITAL ENCOUNTER (OUTPATIENT)
Dept: RADIOLOGY | Facility: HOSPITAL | Age: 64
Discharge: HOME | End: 2024-01-10
Attending: SURGERY
Payer: COMMERCIAL

## 2024-01-10 DIAGNOSIS — Z94.4 LIVER REPLACED BY TRANSPLANT: ICD-10-CM

## 2024-01-10 PROCEDURE — 76705 ECHO EXAM OF ABDOMEN: CPT | Mod: 26,59,, | Performed by: RADIOLOGY

## 2024-01-10 PROCEDURE — 76705 ECHO EXAM OF ABDOMEN: CPT | Mod: TC,59

## 2024-01-10 PROCEDURE — 93976 VASCULAR STUDY: CPT | Mod: 26,,, | Performed by: RADIOLOGY

## 2024-01-11 ENCOUNTER — PATIENT MESSAGE (OUTPATIENT)
Dept: ADMINISTRATIVE | Facility: OTHER | Age: 64
End: 2024-01-11
Payer: COMMERCIAL

## 2024-01-14 NOTE — SUBJECTIVE & OBJECTIVE
"Interval HPI:   Overnight events: Remains in SICU. POD 3. BG trending down and IV insulin infusion stopped this morning per protocol. BG now trending back up off insulin infusion. Receiving Solu Medrol 120 mg, standard steroid taper.   Eating:   Diet consistent carbohydrate Standard Tray    Nausea: No  Hypoglycemia and intervention: No  Fever: No  TPN and/or TF: No  If yes, type of TF/TPN and rate: n/a    /63   Pulse 76   Temp 97.7 °F (36.5 °C)   Resp 18   Ht 5' 7" (1.702 m)   Wt 111.6 kg (246 lb 0.5 oz)   SpO2 (!) 92%   Breastfeeding No   BMI 38.53 kg/m²     Labs Reviewed and Include    Recent Labs   Lab 12/12/23  0555   GLU 98   CALCIUM 8.8   ALBUMIN 2.9*   PROT 4.5*      K 4.2   CO2 25      BUN 33*   CREATININE 1.0   ALKPHOS 79   *   *   BILITOT 4.0*     Lab Results   Component Value Date    WBC 7.68 12/12/2023    HGB 8.3 (L) 12/12/2023    HCT 24.5 (L) 12/12/2023    MCV 98 12/12/2023    PLT 67 (L) 12/12/2023     No results for input(s): "TSH", "FREET4" in the last 168 hours.  Lab Results   Component Value Date    HGBA1C 4.6 10/31/2023       Nutritional status:   Body mass index is 38.53 kg/m².  Lab Results   Component Value Date    ALBUMIN 2.9 (L) 12/12/2023    ALBUMIN 2.6 (L) 12/11/2023    ALBUMIN 3.1 (L) 12/10/2023     No results found for: "PREALBUMIN"    Estimated Creatinine Clearance: 73.2 mL/min (based on SCr of 1 mg/dL).    Accu-Checks  Recent Labs     12/11/23  1005 12/11/23  1102 12/11/23  1213 12/11/23  1303 12/11/23  1420 12/11/23  1624 12/11/23  2140 12/12/23  0236 12/12/23  0804 12/12/23  0834   POCTGLUCOSE 176* 178* 163* 154* 141* 151* 170* 150* 93 100       Current Medications and/or Treatments Impacting Glycemic Control  Immunotherapy:    Immunosuppressants           Stop Route Frequency     tacrolimus capsule 2 mg         -- Oral 2 times daily     mycophenolate capsule 1,000 mg         -- Oral 2 times daily          Steroids:   Hormones (From admission, " onward)      Start     Stop Route Frequency Ordered    12/15/23 0900  predniSONE tablet 20 mg  (methylprednisolone taper panel)        See Hyperspace for full Linked Orders Report.    -- Oral Daily 12/10/23 0143    12/14/23 0900  methylPREDNISolone sodium succinate injection 40 mg  (methylprednisolone taper panel)        See Hyperspace for full Linked Orders Report.    12/15/23 0859 IV Daily 12/10/23 0143    12/13/23 0900  methylPREDNISolone sodium succinate injection 80 mg  (methylprednisolone taper panel)        See Hyperspace for full Linked Orders Report.    12/14/23 0859 IV Daily 12/10/23 0143    12/12/23 0900  methylPREDNISolone sodium succinate injection 120 mg  (methylprednisolone taper panel)        See Hyperspace for full Linked Orders Report.    12/13/23 0859 IV Daily 12/10/23 0143          Pressors:    Autonomic Drugs (From admission, onward)      None          Hyperglycemia/Diabetes Medications:   Antihyperglycemics (From admission, onward)      Start     Stop Route Frequency Ordered    12/12/23 1245  insulin regular in 0.9 % NaCl 100 unit/100 mL (1 unit/mL) infusion        Question:  Enter initial dose (Units/hr):  Answer:  1.2    -- IV Continuous 12/12/23 1242    12/11/23 1511  insulin aspart U-100 pen 0-10 Units         -- SubQ As needed (PRN) 12/11/23 1411           Patient

## 2024-01-16 ENCOUNTER — LAB VISIT (OUTPATIENT)
Dept: LAB | Facility: HOSPITAL | Age: 64
End: 2024-01-16
Attending: SURGERY
Payer: COMMERCIAL

## 2024-01-16 DIAGNOSIS — Z94.4 STATUS POST LIVER TRANSPLANT: ICD-10-CM

## 2024-01-16 LAB
ALBUMIN SERPL BCP-MCNC: 3.9 G/DL (ref 3.5–5.2)
ALP SERPL-CCNC: 109 U/L (ref 55–135)
ALT SERPL W/O P-5'-P-CCNC: 13 U/L (ref 10–44)
ANION GAP SERPL CALC-SCNC: 9 MMOL/L (ref 8–16)
AST SERPL-CCNC: 15 U/L (ref 10–40)
BASOPHILS # BLD AUTO: 0.03 K/UL (ref 0–0.2)
BASOPHILS NFR BLD: 0.9 % (ref 0–1.9)
BILIRUB DIRECT SERPL-MCNC: 0.4 MG/DL (ref 0.1–0.3)
BILIRUB SERPL-MCNC: 0.8 MG/DL (ref 0.1–1)
BUN SERPL-MCNC: 35 MG/DL (ref 8–23)
CALCIUM SERPL-MCNC: 10.3 MG/DL (ref 8.7–10.5)
CHLORIDE SERPL-SCNC: 101 MMOL/L (ref 95–110)
CO2 SERPL-SCNC: 26 MMOL/L (ref 23–29)
CREAT SERPL-MCNC: 1.1 MG/DL (ref 0.5–1.4)
DIFFERENTIAL METHOD BLD: ABNORMAL
EOSINOPHIL # BLD AUTO: 0.1 K/UL (ref 0–0.5)
EOSINOPHIL NFR BLD: 2.1 % (ref 0–8)
ERYTHROCYTE [DISTWIDTH] IN BLOOD BY AUTOMATED COUNT: 13.9 % (ref 11.5–14.5)
EST. GFR  (NO RACE VARIABLE): 56.1 ML/MIN/1.73 M^2
GLUCOSE SERPL-MCNC: 136 MG/DL (ref 70–110)
HCT VFR BLD AUTO: 36.8 % (ref 37–48.5)
HGB BLD-MCNC: 11.8 G/DL (ref 12–16)
IMM GRANULOCYTES # BLD AUTO: 0.15 K/UL (ref 0–0.04)
IMM GRANULOCYTES NFR BLD AUTO: 4.5 % (ref 0–0.5)
LYMPHOCYTES # BLD AUTO: 0.5 K/UL (ref 1–4.8)
LYMPHOCYTES NFR BLD: 14.5 % (ref 18–48)
MAGNESIUM SERPL-MCNC: 1.3 MG/DL (ref 1.6–2.6)
MCH RBC QN AUTO: 30.8 PG (ref 27–31)
MCHC RBC AUTO-ENTMCNC: 32.1 G/DL (ref 32–36)
MCV RBC AUTO: 96 FL (ref 82–98)
MONOCYTES # BLD AUTO: 0.3 K/UL (ref 0.3–1)
MONOCYTES NFR BLD: 8.8 % (ref 4–15)
NEUTROPHILS # BLD AUTO: 2.3 K/UL (ref 1.8–7.7)
NEUTROPHILS NFR BLD: 69.2 % (ref 38–73)
NRBC BLD-RTO: 0 /100 WBC
PLATELET # BLD AUTO: 172 K/UL (ref 150–450)
PMV BLD AUTO: 10.8 FL (ref 9.2–12.9)
POTASSIUM SERPL-SCNC: 4.4 MMOL/L (ref 3.5–5.1)
PROT SERPL-MCNC: 6.7 G/DL (ref 6–8.4)
RBC # BLD AUTO: 3.83 M/UL (ref 4–5.4)
SODIUM SERPL-SCNC: 136 MMOL/L (ref 136–145)
WBC # BLD AUTO: 3.3 K/UL (ref 3.9–12.7)

## 2024-01-16 PROCEDURE — 36415 COLL VENOUS BLD VENIPUNCTURE: CPT | Mod: PO | Performed by: SURGERY

## 2024-01-16 PROCEDURE — 82248 BILIRUBIN DIRECT: CPT | Performed by: SURGERY

## 2024-01-16 PROCEDURE — 87517 HEPATITIS B DNA QUANT: CPT | Performed by: SURGERY

## 2024-01-16 PROCEDURE — 87536 HIV-1 QUANT&REVRSE TRNSCRPJ: CPT | Performed by: SURGERY

## 2024-01-16 PROCEDURE — 85025 COMPLETE CBC W/AUTO DIFF WBC: CPT | Performed by: SURGERY

## 2024-01-16 PROCEDURE — 83735 ASSAY OF MAGNESIUM: CPT | Performed by: SURGERY

## 2024-01-16 PROCEDURE — 80053 COMPREHEN METABOLIC PANEL: CPT | Performed by: SURGERY

## 2024-01-16 PROCEDURE — 80197 ASSAY OF TACROLIMUS: CPT | Performed by: SURGERY

## 2024-01-16 PROCEDURE — 87522 HEPATITIS C REVRS TRNSCRPJ: CPT | Performed by: SURGERY

## 2024-01-17 ENCOUNTER — PATIENT MESSAGE (OUTPATIENT)
Dept: TRANSPLANT | Facility: CLINIC | Age: 64
End: 2024-01-17
Payer: COMMERCIAL

## 2024-01-17 DIAGNOSIS — Z94.4 S/P LIVER TRANSPLANT: ICD-10-CM

## 2024-01-17 LAB — TACROLIMUS BLD-MCNC: 10.5 NG/ML (ref 5–15)

## 2024-01-17 NOTE — TELEPHONE ENCOUNTER
Returned call. Spoke with with Rachel. Reviewed med changes and that repeat labs due 1/22/24. Discussed that it is ok for pt to have labs later in the morning as long as she takes medications later in the evening.   ----- Message from Maritza Banda sent at 1/17/2024  2:00 PM CST -----  Regarding: Speak to coordinator  Contact: Rachel  Regarding:speak to coordinator          Name Of Caller: Rachel        Contact Preference: [phone]     Nature of call:  pt daughter Rachel is calling to speak to coordinator regarding some things that was discussed with pt. No other info given. Please advise. Requesting a call back.

## 2024-01-17 NOTE — TELEPHONE ENCOUNTER
Spoke with pt. She states she is feeling well overall. She states she is still somewhat congested post COVID diagnosis but all mucus is clear. Denies fever, SOB or any other symptoms of illness. She reports her LE edema has resolved. She will drop lasix to 20mg daily and then stop starting Saturday. She will let me know if swelling returns.    Reviewed lab results. She verbalized understanding to reduce tacrolimus to 4/4 starting with tonight's dose. Discussed foods high in magnesium that she can incorporate in her diet. She will repeat labs Monday 1/22.    ----- Message from Eugene Haney MD sent at 1/17/2024  1:13 PM CST -----  Fk 4 bid

## 2024-01-18 ENCOUNTER — EXTERNAL HOME HEALTH (OUTPATIENT)
Dept: HOME HEALTH SERVICES | Facility: HOSPITAL | Age: 64
End: 2024-01-18
Payer: COMMERCIAL

## 2024-01-18 ENCOUNTER — PATIENT MESSAGE (OUTPATIENT)
Dept: ADMINISTRATIVE | Facility: OTHER | Age: 64
End: 2024-01-18
Payer: COMMERCIAL

## 2024-01-18 ENCOUNTER — TELEPHONE (OUTPATIENT)
Dept: TRANSPLANT | Facility: CLINIC | Age: 64
End: 2024-01-18
Payer: COMMERCIAL

## 2024-01-18 LAB
HCV RNA SERPL QL NAA+PROBE: NOT DETECTED
HCV RNA SPEC NAA+PROBE-ACNC: NOT DETECTED IU/ML
HEPATITIS B VIRUS DNA: NORMAL
HEPATITIS B VIRUS PCR, QUANT: NOT DETECTED IU/ML
HIV1 RNA # SERPL NAA+PROBE: NOT DETECTED COPIES/ML
HIV1 RNA SERPL QL NAA+PROBE: NOT DETECTED

## 2024-01-18 RX ORDER — TACROLIMUS 1 MG/1
4 CAPSULE ORAL EVERY 12 HOURS
Qty: 240 CAPSULE | Refills: 11 | Status: SHIPPED | OUTPATIENT
Start: 2024-01-18 | End: 2024-01-24 | Stop reason: DRUGHIGH

## 2024-01-18 NOTE — TELEPHONE ENCOUNTER
----- Message from Eugene Haney MD sent at 1/18/2024  2:10 PM CST -----  Results ok. No action needed

## 2024-01-22 ENCOUNTER — LAB VISIT (OUTPATIENT)
Dept: LAB | Facility: HOSPITAL | Age: 64
End: 2024-01-22
Attending: SURGERY
Payer: COMMERCIAL

## 2024-01-22 DIAGNOSIS — Z94.4 STATUS POST LIVER TRANSPLANT: ICD-10-CM

## 2024-01-22 LAB
ALBUMIN SERPL BCP-MCNC: 3.8 G/DL (ref 3.5–5.2)
ALP SERPL-CCNC: 98 U/L (ref 55–135)
ALT SERPL W/O P-5'-P-CCNC: 13 U/L (ref 10–44)
ANION GAP SERPL CALC-SCNC: 8 MMOL/L (ref 8–16)
AST SERPL-CCNC: 14 U/L (ref 10–40)
BASOPHILS # BLD AUTO: 0.02 K/UL (ref 0–0.2)
BASOPHILS NFR BLD: 0.7 % (ref 0–1.9)
BILIRUB DIRECT SERPL-MCNC: 0.3 MG/DL (ref 0.1–0.3)
BILIRUB SERPL-MCNC: 0.7 MG/DL (ref 0.1–1)
BUN SERPL-MCNC: 41 MG/DL (ref 8–23)
CALCIUM SERPL-MCNC: 9.3 MG/DL (ref 8.7–10.5)
CHLORIDE SERPL-SCNC: 107 MMOL/L (ref 95–110)
CO2 SERPL-SCNC: 23 MMOL/L (ref 23–29)
CREAT SERPL-MCNC: 1.2 MG/DL (ref 0.5–1.4)
DIFFERENTIAL METHOD BLD: ABNORMAL
EOSINOPHIL # BLD AUTO: 0.1 K/UL (ref 0–0.5)
EOSINOPHIL NFR BLD: 2 % (ref 0–8)
ERYTHROCYTE [DISTWIDTH] IN BLOOD BY AUTOMATED COUNT: 13.7 % (ref 11.5–14.5)
EST. GFR  (NO RACE VARIABLE): 51 ML/MIN/1.73 M^2
GLUCOSE SERPL-MCNC: 142 MG/DL (ref 70–110)
HCT VFR BLD AUTO: 35.3 % (ref 37–48.5)
HGB BLD-MCNC: 11.3 G/DL (ref 12–16)
IMM GRANULOCYTES # BLD AUTO: 0.05 K/UL (ref 0–0.04)
IMM GRANULOCYTES NFR BLD AUTO: 1.7 % (ref 0–0.5)
LYMPHOCYTES # BLD AUTO: 0.4 K/UL (ref 1–4.8)
LYMPHOCYTES NFR BLD: 13.3 % (ref 18–48)
MCH RBC QN AUTO: 31 PG (ref 27–31)
MCHC RBC AUTO-ENTMCNC: 32 G/DL (ref 32–36)
MCV RBC AUTO: 97 FL (ref 82–98)
MONOCYTES # BLD AUTO: 0.2 K/UL (ref 0.3–1)
MONOCYTES NFR BLD: 7.2 % (ref 4–15)
NEUTROPHILS # BLD AUTO: 2.2 K/UL (ref 1.8–7.7)
NEUTROPHILS NFR BLD: 75.1 % (ref 38–73)
NRBC BLD-RTO: 0 /100 WBC
PLATELET # BLD AUTO: 125 K/UL (ref 150–450)
PMV BLD AUTO: 10.6 FL (ref 9.2–12.9)
POTASSIUM SERPL-SCNC: 4.3 MMOL/L (ref 3.5–5.1)
PROT SERPL-MCNC: 6.3 G/DL (ref 6–8.4)
RBC # BLD AUTO: 3.65 M/UL (ref 4–5.4)
SODIUM SERPL-SCNC: 138 MMOL/L (ref 136–145)
WBC # BLD AUTO: 2.93 K/UL (ref 3.9–12.7)

## 2024-01-22 PROCEDURE — 85025 COMPLETE CBC W/AUTO DIFF WBC: CPT | Performed by: SURGERY

## 2024-01-22 PROCEDURE — 80053 COMPREHEN METABOLIC PANEL: CPT | Performed by: SURGERY

## 2024-01-22 PROCEDURE — 82248 BILIRUBIN DIRECT: CPT | Performed by: SURGERY

## 2024-01-22 PROCEDURE — 36415 COLL VENOUS BLD VENIPUNCTURE: CPT | Performed by: SURGERY

## 2024-01-22 PROCEDURE — 80197 ASSAY OF TACROLIMUS: CPT | Performed by: SURGERY

## 2024-01-23 ENCOUNTER — PATIENT MESSAGE (OUTPATIENT)
Dept: TRANSPLANT | Facility: CLINIC | Age: 64
End: 2024-01-23
Payer: COMMERCIAL

## 2024-01-24 ENCOUNTER — PATIENT MESSAGE (OUTPATIENT)
Dept: TRANSPLANT | Facility: CLINIC | Age: 64
End: 2024-01-24
Payer: COMMERCIAL

## 2024-01-24 DIAGNOSIS — T38.0X5D ADVERSE EFFECT OF CORTICOSTEROIDS, SUBSEQUENT ENCOUNTER: ICD-10-CM

## 2024-01-24 DIAGNOSIS — Z94.4 S/P LIVER TRANSPLANT: ICD-10-CM

## 2024-01-24 LAB — TACROLIMUS BLD-MCNC: 9.9 NG/ML (ref 5–15)

## 2024-01-24 RX ORDER — PEN NEEDLE, DIABETIC 30 GX3/16"
1 NEEDLE, DISPOSABLE MISCELLANEOUS 3 TIMES DAILY
Qty: 90 EACH | Refills: 1 | Status: SHIPPED | OUTPATIENT
Start: 2024-01-24

## 2024-01-24 NOTE — TELEPHONE ENCOUNTER
Reviewed labs with Dr. Hodges. Per MD, pt to decrease tac to 3/3 d/t tremors. Repeat labs 1/29/24.    Pt advised of dose change and need for repeat labs Monday.

## 2024-01-25 ENCOUNTER — PATIENT MESSAGE (OUTPATIENT)
Dept: ADMINISTRATIVE | Facility: OTHER | Age: 64
End: 2024-01-25
Payer: COMMERCIAL

## 2024-01-25 RX ORDER — TACROLIMUS 1 MG/1
3 CAPSULE ORAL EVERY 12 HOURS
Qty: 180 CAPSULE | Refills: 11 | Status: SHIPPED | OUTPATIENT
Start: 2024-01-25 | End: 2025-01-24

## 2024-01-26 ENCOUNTER — TELEPHONE (OUTPATIENT)
Dept: TRANSPLANT | Facility: CLINIC | Age: 64
End: 2024-01-26
Payer: COMMERCIAL

## 2024-01-26 ENCOUNTER — PATIENT MESSAGE (OUTPATIENT)
Dept: TRANSPLANT | Facility: CLINIC | Age: 64
End: 2024-01-26
Payer: COMMERCIAL

## 2024-01-26 DIAGNOSIS — R19.7 DIARRHEA, UNSPECIFIED TYPE: ICD-10-CM

## 2024-01-26 DIAGNOSIS — Z94.4 S/P LIVER TRANSPLANT: Primary | ICD-10-CM

## 2024-01-26 NOTE — TELEPHONE ENCOUNTER
Spoke to patient who is reporting some loose stools with also some semi-formed stools. She denies fever , some mild cramping. Will enter stool specimen orders, informed pt to not take Imodium until results received. She is staying well hydrated and will call office with any change.

## 2024-01-27 LAB
ACID FAST MOD KINY STN SPEC: NORMAL
MYCOBACTERIUM SPEC QL CULT: NORMAL

## 2024-01-29 ENCOUNTER — DOCUMENT SCAN (OUTPATIENT)
Dept: HOME HEALTH SERVICES | Facility: HOSPITAL | Age: 64
End: 2024-01-29
Payer: COMMERCIAL

## 2024-01-29 ENCOUNTER — LAB VISIT (OUTPATIENT)
Dept: LAB | Facility: HOSPITAL | Age: 64
End: 2024-01-29
Attending: SURGERY
Payer: COMMERCIAL

## 2024-01-29 ENCOUNTER — OFFICE VISIT (OUTPATIENT)
Dept: ENDOCRINOLOGY | Facility: CLINIC | Age: 64
End: 2024-01-29
Payer: COMMERCIAL

## 2024-01-29 ENCOUNTER — HOSPITAL ENCOUNTER (OUTPATIENT)
Dept: RADIOLOGY | Facility: HOSPITAL | Age: 64
Discharge: HOME | End: 2024-01-29
Attending: SURGERY
Payer: COMMERCIAL

## 2024-01-29 VITALS
HEIGHT: 67 IN | WEIGHT: 186.5 LBS | DIASTOLIC BLOOD PRESSURE: 88 MMHG | OXYGEN SATURATION: 99 % | HEART RATE: 84 BPM | SYSTOLIC BLOOD PRESSURE: 142 MMHG | BODY MASS INDEX: 29.27 KG/M2

## 2024-01-29 DIAGNOSIS — Z94.4 S/P LIVER TRANSPLANT: ICD-10-CM

## 2024-01-29 DIAGNOSIS — I10 PRIMARY HYPERTENSION: Chronic | ICD-10-CM

## 2024-01-29 DIAGNOSIS — T38.0X5A STEROID-INDUCED HYPERGLYCEMIA: Primary | ICD-10-CM

## 2024-01-29 DIAGNOSIS — R73.9 STEROID-INDUCED HYPERGLYCEMIA: Primary | ICD-10-CM

## 2024-01-29 DIAGNOSIS — Z94.4 STATUS POST LIVER TRANSPLANT: ICD-10-CM

## 2024-01-29 LAB
ALBUMIN SERPL BCP-MCNC: 3.8 G/DL (ref 3.5–5.2)
ALP SERPL-CCNC: 79 U/L (ref 55–135)
ALT SERPL W/O P-5'-P-CCNC: 15 U/L (ref 10–44)
ANION GAP SERPL CALC-SCNC: 8 MMOL/L (ref 8–16)
AST SERPL-CCNC: 16 U/L (ref 10–40)
BASOPHILS # BLD AUTO: 0.03 K/UL (ref 0–0.2)
BASOPHILS NFR BLD: 1.1 % (ref 0–1.9)
BILIRUB DIRECT SERPL-MCNC: 0.3 MG/DL (ref 0.1–0.3)
BILIRUB SERPL-MCNC: 0.6 MG/DL (ref 0.1–1)
BUN SERPL-MCNC: 23 MG/DL (ref 8–23)
CALCIUM SERPL-MCNC: 9.1 MG/DL (ref 8.7–10.5)
CHLORIDE SERPL-SCNC: 107 MMOL/L (ref 95–110)
CO2 SERPL-SCNC: 24 MMOL/L (ref 23–29)
CREAT SERPL-MCNC: 1.2 MG/DL (ref 0.5–1.4)
DIFFERENTIAL METHOD BLD: ABNORMAL
EOSINOPHIL # BLD AUTO: 0.1 K/UL (ref 0–0.5)
EOSINOPHIL NFR BLD: 3 % (ref 0–8)
ERYTHROCYTE [DISTWIDTH] IN BLOOD BY AUTOMATED COUNT: 13.5 % (ref 11.5–14.5)
EST. GFR  (NO RACE VARIABLE): 51 ML/MIN/1.73 M^2
GLUCOSE SERPL-MCNC: 132 MG/DL (ref 70–110)
HCT VFR BLD AUTO: 34.4 % (ref 37–48.5)
HGB BLD-MCNC: 11.2 G/DL (ref 12–16)
IMM GRANULOCYTES # BLD AUTO: 0.04 K/UL (ref 0–0.04)
IMM GRANULOCYTES NFR BLD AUTO: 1.5 % (ref 0–0.5)
LYMPHOCYTES # BLD AUTO: 0.4 K/UL (ref 1–4.8)
LYMPHOCYTES NFR BLD: 13.1 % (ref 18–48)
MAGNESIUM SERPL-MCNC: 1.4 MG/DL (ref 1.6–2.6)
MCH RBC QN AUTO: 30.5 PG (ref 27–31)
MCHC RBC AUTO-ENTMCNC: 32.6 G/DL (ref 32–36)
MCV RBC AUTO: 94 FL (ref 82–98)
MONOCYTES # BLD AUTO: 0.3 K/UL (ref 0.3–1)
MONOCYTES NFR BLD: 9.4 % (ref 4–15)
NEUTROPHILS # BLD AUTO: 1.9 K/UL (ref 1.8–7.7)
NEUTROPHILS NFR BLD: 71.9 % (ref 38–73)
NRBC BLD-RTO: 0 /100 WBC
PLATELET # BLD AUTO: 111 K/UL (ref 150–450)
PMV BLD AUTO: 9.9 FL (ref 9.2–12.9)
POTASSIUM SERPL-SCNC: 4.2 MMOL/L (ref 3.5–5.1)
PROT SERPL-MCNC: 6.3 G/DL (ref 6–8.4)
RBC # BLD AUTO: 3.67 M/UL (ref 4–5.4)
SODIUM SERPL-SCNC: 139 MMOL/L (ref 136–145)
WBC # BLD AUTO: 2.67 K/UL (ref 3.9–12.7)

## 2024-01-29 PROCEDURE — 93976 VASCULAR STUDY: CPT | Mod: 26,,, | Performed by: STUDENT IN AN ORGANIZED HEALTH CARE EDUCATION/TRAINING PROGRAM

## 2024-01-29 PROCEDURE — 99999 PR PBB SHADOW E&M-EST. PATIENT-LVL V: CPT | Mod: PBBFAC,,,

## 2024-01-29 PROCEDURE — 3008F BODY MASS INDEX DOCD: CPT | Mod: CPTII,S$GLB,, | Performed by: INTERNAL MEDICINE

## 2024-01-29 PROCEDURE — 99213 OFFICE O/P EST LOW 20 MIN: CPT | Mod: S$GLB,,, | Performed by: INTERNAL MEDICINE

## 2024-01-29 PROCEDURE — 80197 ASSAY OF TACROLIMUS: CPT | Performed by: SURGERY

## 2024-01-29 PROCEDURE — 36415 COLL VENOUS BLD VENIPUNCTURE: CPT | Performed by: SURGERY

## 2024-01-29 PROCEDURE — 85025 COMPLETE CBC W/AUTO DIFF WBC: CPT | Performed by: SURGERY

## 2024-01-29 PROCEDURE — 80053 COMPREHEN METABOLIC PANEL: CPT | Performed by: SURGERY

## 2024-01-29 PROCEDURE — 83735 ASSAY OF MAGNESIUM: CPT | Performed by: SURGERY

## 2024-01-29 PROCEDURE — 76705 ECHO EXAM OF ABDOMEN: CPT | Mod: TC

## 2024-01-29 PROCEDURE — 3077F SYST BP >= 140 MM HG: CPT | Mod: CPTII,S$GLB,, | Performed by: INTERNAL MEDICINE

## 2024-01-29 PROCEDURE — 1159F MED LIST DOCD IN RCRD: CPT | Mod: CPTII,S$GLB,, | Performed by: INTERNAL MEDICINE

## 2024-01-29 PROCEDURE — 76705 ECHO EXAM OF ABDOMEN: CPT | Mod: 26,59,, | Performed by: STUDENT IN AN ORGANIZED HEALTH CARE EDUCATION/TRAINING PROGRAM

## 2024-01-29 PROCEDURE — 3079F DIAST BP 80-89 MM HG: CPT | Mod: CPTII,S$GLB,, | Performed by: INTERNAL MEDICINE

## 2024-01-29 PROCEDURE — 82248 BILIRUBIN DIRECT: CPT | Performed by: SURGERY

## 2024-01-29 NOTE — PROGRESS NOTES
Subjective:      Patient ID: Yumiko Gonzalez is a 63 y.o. female.    Chief Complaint:    No chief complaint on file.    History of Present Illness  This is a follow up visit after recent hospitalization  Endocrinology was consulted for management of hyperglycemia. Consult was documented as follow:    Admit Date: 12/9/2023      Reason for Consult: Management of Steroid Induced Hyperglycemia      Surgical Procedure and Date:  Liver Transplant on 12/10/23      Lab Results   Component Value Date    HGBA1C 4.6 10/31/2023       HPI:   Patient is a 63 y.o. 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 Patient now presents for the above procedure(s). Endocrinology consulted for management of hyperglycemia.     Endocrine  Steroid-induced hyperglycemia  BG goal 140 - 180   No hx of DM.  S/p liver txp. On steroids. Continue on mealtime insulin,         Increase Novolog 3-5 units AC based on intake  Moderate Dose Correction Scale  BG monitoring ac/hs     ** Please call Endocrine for any BG related issues **     ** Please notify Endocrine for any change and/or advance in diet**     Discharge planning:  Notified by primary team of d/c. Pt will need insulin given bg elevations with steroids.  Pt daughter has been trained on insulin use.  Regimen discussed with patient and daughter.        -Novolog 4 units with meals  -Add correction scale if needed.  Blood sugar 180 to 230 add 2 units  Blood sugar 231 to 280 add 4 units  Blood sugar 281 to 330 add 6 units  Blood sugar 331 to 380 add 8 units  Blood sugar greater than 380 add 10 units  - Insurance approved diabetes testing supplies to check blood sugar 4 times a day (Before meals and at bedtime) and as needed.  - BD pen needles     Etiology of the hyperglycemia: steroid induced hyperglycemia  Discharge Date:  12/18/2023  Home Glucocorticoid Regimen: Prednisone stopped  Discharge DM Regimen:  -Novolog 4 units with meals  -Add correction scale if needed.  Blood sugar 180 to 230 add 2 units  Blood sugar 231 to 280 add 4 units  Blood sugar 281 to 330 add 6 units  Blood sugar 331 to 380 add 8 units  Blood sugar greater than 380 add 10 units    Was able to fill prescription for medications and supplies. YES     Missed doses?  NO      Current Home DM management after discharge:    # times a day testing 2  Log reviewed: yes - see picture       Hypoglycemic event at home? None     Typical meals at home:   Breakfast- protein shake and bowl of oatmeal or cereal.    Lunch- does not eat lunch   Dinner- Protein, non-starchy vegetables, and starch   Snacks-  nuts (cashews), yogurt, pudding cup      With regards to reason for admit:  Doing better     Review of Systems   Constitutional:  Negative for unexpected weight change.   Eyes:  Negative for visual disturbance.   Respiratory:  Negative for cough.    Cardiovascular:  Negative for chest pain.   Gastrointestinal:  Negative for nausea and vomiting.   Endocrine: Negative for polydipsia and polyuria.   Musculoskeletal:  Negative for back pain.   Skin:  Negative for rash.   Neurological:  Negative for syncope.   Psychiatric/Behavioral:  Negative for agitation and dysphoric mood.        Objective:   Physical Exam  Constitutional:       Appearance: She is well-developed.   HENT:      Head: Normocephalic.   Eyes:      Conjunctiva/sclera: Conjunctivae normal.   Pulmonary:      Effort: Pulmonary effort is normal.   Musculoskeletal:         General: Normal range of motion.   Skin:     General: Skin is warm.      Findings: No rash.   Neurological:      Mental Status: She is alert and oriented to person, place, and time.         Body mass index is 29.21 kg/m².    Lab Review:   Lab Results   Component Value Date    HGBA1C 4.6 10/31/2023     Lab Results   Component Value Date    CHOL 137 06/24/2023     HDL 33 (L) 06/24/2023    LDLCALC 91.4 06/24/2023    TRIG 63 06/24/2023    CHOLHDL 24.1 06/24/2023     Lab Results   Component Value Date     01/29/2024    K 4.2 01/29/2024     01/29/2024    CO2 24 01/29/2024     (H) 01/29/2024    BUN 23 01/29/2024    CREATININE 1.2 01/29/2024    CALCIUM 9.1 01/29/2024    PROT 6.3 01/29/2024    ALBUMIN 3.8 01/29/2024    BILITOT 0.6 01/29/2024    ALKPHOS 79 01/29/2024    AST 16 01/29/2024    ALT 15 01/29/2024    ANIONGAP 8 01/29/2024    ESTGFRAFRICA >60 06/20/2022    EGFRNONAA >60 06/20/2022    TSH 2.728 04/13/2023       Assessment and Plan   Reviewed goals of therapy are to get the best control we can without hypoglycemia       Reviewed patient's current insulin regimen. Clarified proper insulin dose and timing in relation to meals, etc. Insulin injection sites and proper rotation instructed.       -Advised frequent self blood glucose monitoring.  Patient encouraged to document glucose results and bring them to every clinic visit      -Hypoglycemia precautions discussed. Instructed on precautions before driving.      -Close adherence to lifestyle changes recommended.      -Periodic follow ups for eye evaluations, foot care and dental care suggested.    Return to Clinic in 3 months           Problem List Items Addressed This Visit          Cardiac/Vascular    Hypertension (Chronic)    Current Assessment & Plan     Uncontrolled HTN can worsen insulin resistance.  On beta blocker.              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   - Checking HbA1C and fructosamine as BG has been elevated in the absence of steroids.   - BG checks  before breakfast and before dinner           Relevant Medications    SITagliptin phosphate (JANUVIA) 100 MG Tab    Other Relevant Orders    Hemoglobin A1C    FRUCTOSAMINE       GI    S/P liver transplant    Current Assessment & Plan     Optimize BG control to improve wound healing              Shorty Lynch DNP, FNP-C  Department of Endocrinology  Inpatient Glycemic Management

## 2024-01-29 NOTE — ASSESSMENT & PLAN NOTE
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   - Checking HbA1C and fructosamine as BG has been elevated in the absence of steroids.   - BG checks before breakfast and before dinner

## 2024-01-30 ENCOUNTER — PATIENT MESSAGE (OUTPATIENT)
Dept: TRANSPLANT | Facility: CLINIC | Age: 64
End: 2024-01-30
Payer: COMMERCIAL

## 2024-01-30 ENCOUNTER — TELEPHONE (OUTPATIENT)
Dept: TRANSPLANT | Facility: CLINIC | Age: 64
End: 2024-01-30
Payer: COMMERCIAL

## 2024-01-30 NOTE — TELEPHONE ENCOUNTER
Fk pending  ----- Message from Eugene Haney MD sent at 1/29/2024  1:39 PM CST -----  Results ok. No action needed

## 2024-01-31 ENCOUNTER — CLINICAL SUPPORT (OUTPATIENT)
Dept: REHABILITATION | Facility: HOSPITAL | Age: 64
End: 2024-01-31
Payer: COMMERCIAL

## 2024-01-31 ENCOUNTER — PATIENT MESSAGE (OUTPATIENT)
Dept: TRANSPLANT | Facility: CLINIC | Age: 64
End: 2024-01-31
Payer: COMMERCIAL

## 2024-01-31 ENCOUNTER — TELEPHONE (OUTPATIENT)
Dept: TRANSPLANT | Facility: CLINIC | Age: 64
End: 2024-01-31
Payer: COMMERCIAL

## 2024-01-31 DIAGNOSIS — R53.1 WEAKNESS GENERALIZED: ICD-10-CM

## 2024-01-31 DIAGNOSIS — Z94.4 LIVER REPLACED BY TRANSPLANT: ICD-10-CM

## 2024-01-31 DIAGNOSIS — Z74.09 IMPAIRED FUNCTIONAL MOBILITY, BALANCE, GAIT, AND ENDURANCE: Primary | ICD-10-CM

## 2024-01-31 DIAGNOSIS — Z94.4 S/P LIVER TRANSPLANT: Primary | ICD-10-CM

## 2024-01-31 LAB — TACROLIMUS BLD-MCNC: 7.2 NG/ML (ref 5–15)

## 2024-01-31 PROCEDURE — 97162 PT EVAL MOD COMPLEX 30 MIN: CPT | Mod: PO

## 2024-01-31 NOTE — TELEPHONE ENCOUNTER
----- Message from Eugene Haney MD sent at 1/31/2024  1:16 PM CST -----  Results ok. No action needed

## 2024-01-31 NOTE — PLAN OF CARE
"[facility]  Physical Therapy Neurological Rehabilitation Initial Evaluation       Name: Yumiko Gonzalez  Clinic Number: [id number]    Therapy Diagnosis:   Encounter Diagnoses   Name Primary?    Liver replaced by transplant     Weakness generalized     Impaired functional mobility, balance, gait, and endurance Yes     Physician: Reynaldo Hodges Jr.,*    Physician Orders: PT Eval and Treat " post surgical"  Medical Diagnosis from Referral:   Diagnosis   Z94.4 (ICD-10-CM) - Liver replaced by transplant   R53.1 (ICD-10-CM) - Weakness generalized     Evaluation Date: 1/31/2024  Authorization Period Expiration: 12/31/24  Plan of Care Expiration: 4/24/24  Progress Note Due: 2/31/24  Visit # / Visits authorized: 1/ 1  FOTO: 1/5    Precautions: Standard, Diabetes, Fall, Immunosuppression, and organ transplant no pulling, pushing of lifting over 10 pounds.     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, postitive antismooth muscle antibody    Blood transfusion     Bronchitis     bronchospasm on occasion     Cancer 1987    carcinoma in situ    Cervical neck pain with evidence of disc disease 03/22/2012    can bend neck    Cirrhosis     non-alcoholic, compensated    Colon polyps 03/22/2012    Depression 03/22/2012    Hx panic attacks    Diverticular disease 03/22/2012    never diverticulitis    Fatty liver 03/22/2012    Fibrocystic breast     Fine tremor     hands,chronic    Hepatosplenomegaly     History of abdominal paracentesis 01/18/2023    8.7L of fluid drained    Hypertension 04/24/2013    Knee pain, bilateral     chronic, with hands,feet,fingers also painful    Lesion of right lung     Liver " "disease     "partially sclerosed liver" per pt    Migraines past Hx    Nephrolithiasis     stone episode 2021    Pleural effusion     small, compensated, good bilateral breath sounds per Dr Suresh GUTIERRES (postoperative nausea and vomiting)     twice after childbirth    Postpartum depression     Splenomegaly     Thrombocytopenia     Tremors of nervous system        Surgical History:   Yumiko Gonzalez  has a past surgical history that includes Cervical conization w/ laser; Tubal ligation; Endometrial ablation;  section; Pelvic laparoscopy; Tonsillectomy; Adenoidectomy; Liver biopsy; Colonoscopy (N/A, 2016); Embolization (N/A, 2018); Esophagogastroduodenoscopy (N/A, 2020); Colonoscopy (N/A, 2/3/2022); Esophagogastroduodenoscopy (N/A, 3/8/2022); and Liver transplant (N/A, 2023).    Medications:   Yumiko has a current medication list which includes the following prescription(s): albuterol, alprazolam, aspirin, blood sugar diagnostic, blood-glucose meter, bupropion, calcium carbonate-vitamin d3, furosemide, insulin aspart u-100, k phos di & mono-sod phos mono, lancets, mycophenolate, oxycodone, pantoprazole, pen needle, diabetic, propranolol, sitagliptin phosphate, sulfamethoxazole-trimethoprim 400-80mg, tacrolimus, trazodone, and valganciclovir.    Allergies:   Review of patient's allergies indicates:   Allergen Reactions    No known drug allergies         Imaging: no recent pertinent imaging    Date of onset: liver transplant 23    History of current condition:      Pt arrives to therapy ambulating with rollator. Pt reports she got a liver transplant on 23 and spent 2 weeks in hospital. She has been living at her daughter's house in [facility] in a "shed type of apartment in the yard". Pt with a history of nack and back pain as well as left knee pain.       HPI  from ED note 24.  Yumiko Gonzalez is a 63 y.o. female who presents to the ED by POV complaining of fever at home " "tonight and some general malaise and sinus congestion. Patient just got home from recent liver transplant. Was discharged from the hospital on December 18 but stayed nearby due to frequent hospital visits needed. Patient awake and aware no distress only complains of some nasal congestion. Was sent to ED by transplant nurse for evaluation.     Prior Therapy: yes, neck and low back pain.  Social History: lives with daughter currently. Living by herself prior.   Falls: no falls. No near falls.   DME:  rollator. Have a cane but hasn't used.   Home Environment:  lives in a tiny- home at her daughter house. A few steps to enter handrails on both sides.    Exercise Routine / History: none   Family Present at time of Eval: none  Occupation: retired recently.   Prior Level of Function:  no assistive device . Independent.   Current Level of Function:  uses rollator. Needs help bending over to  things.   Driving: no    Pain:  Current 0/10, worst 7/10, best 0/10   By her surgical incision       Pts goals: "getting muscle strength back".    Objective     Observation: pleasant and cooperative   Speech: normal    Mental status: alert, oriented to person, place, and time, normal mood, behavior, speech, dress, motor activity, and thought processes  Appearance: Casually dressed  Behavior:  calm and cooperative  Attention Span and Concentration:  Normal    Posture Alignment :slouched posture    Dominant hand:  right     Skin integrity:  Intact. Surgical scar pretty much healed.     Visual/Auditory: denies changes     ROM:   GROSS AROM/PROM  UPPER EXTREMITY  (R) UE: WFLs  (L) UE: WFLs  LOWER EXTREMITY  (R) LE: WFLs  (L) LE: WFLs    BP: 125/74 mmhg 76 bpm.      Lower Extremity Strength  Right LE  Left LE    Hip flexion:  3+/5 Hip flexion: 3+/5   Knee extension: 4/5 Knee extension: 4/5   Knee flexion: 4/5 Knee flexion: 4/5   Ankle dorsiflexion:  4+/5 Ankle dorsiflexion: 4+/5   Ankle plantarflexion:  4+/5 Ankle plantarflexion: " 4+/5       Upper extremity strength: within functional limits     Sensation: numb in middle of stomach from surgery.       Functional Mobility   Evaluation    Bed mobility:  Standby assist    Supine to sit: Standby assist    Sit to supine:  Standby assist    Rolling:  Mod I   Transfers to bed: Standby assist rollator   Sit to stand Standby assist rollator   Stand pivot:   Standby assist rollator   Car transfers: Standby assist rollator   Wheelchair mobility:  Not at this time           Evaluation   5 times sit to stand 27.1 seconds  no upper extremity support   TUG 18.6 seconds rollator   Self selected walking speed (10MWT) 0.58 m/sec (6m/10.3s)  rollator   6 minute walk test To be assessed. Fatigued.      Timed up and go score >14 seconds indicates risk for falls in older stroke patients (Rob et al, 2006)    Independent Community Ambulator > 1.4 m/s   Mod I Community Ambulator 1.2-1.4 m/s   SPV/SBA Community Ambulator 0.8-1.2 m/s   Limited Community Ambulator 0.4-0.8 m/s   Household Ambulator < 0.4 m/s       5 x sit<>stand  >12 sec= fall risk for general elderly  >16 sec= fall risk for Parkinson's disease  >10 sec= balance/vestibular dysfunction (<61 y/o)  >14.2 sec= balance/vestibular dysfunction (>61 y/o)  >12 sec= fall risk for CVA    Gait     Evaluation   AD used:   rollator   Assistance  Standby assist    Distance  120 ft        GAIT DEVIATIONS:   Yumiko displays the following deviations with ambulation: slow pace, small step length, wide base of support. r   Impairments contributing to deviations: impaired motor control, impaired strength, impaired endurance, gait instability, balance deficits.       - Stairs: pt ascended/descended 6 four inch stairs bilateral  handrails. Reciprocal pattern ascending and step to pattern descending.     Endurance Deficit: yes    PT Evaluation Completed? Yes      CMS Impairment/Limitation/Restriction for FOTO Intake Survey    Therapist reviewed FOTO scores for Yumiko WILHELM  Carlos on 1/31/2024.   FOTO documents entered into [employer] - see Media section.    Limitation Score: 47         TREATMENT   Treatment not performed today secondary to time constraints     Total Treatment time separate from Evaluation: 0 minutes      Patient Education and Home Exercises     Education provided:   - educated about role of PT in outpatient rehab. Educated about plan of care. Educated about attendance policy. Reports understanding and has no questions at this time.       Assessment   Yumiko is a 63 y.o. female referred to outpatient Physical Therapy with a medical diagnosis of generalized weakness from liver transplant. Pt reports she had a liver transplant in December 2023 and has lost a lot of her muscle strength since. Pt presents to PT with the following impairments leading to his/her functional decline: impaired strength, impaired endurance, gait instability, balance deficits. She scored within a fall risk category on the TUG , SSWS, and 5x sit to stand test. She is currently using a rollator and was previously ambulating with no assistive device. She has good family support and is motivated to improve. She is a good candidate for PT at this time.     Pt prognosis is Good.   Pt will benefit from skilled outpatient Physical Therapy to address the deficits stated above and in the chart below, provide pt/family education, and to maximize pt's level of independence.     Plan of care discussed with patient: Yes  Pt's spiritual, cultural and educational needs considered and patient is agreeable to the plan of care and goals as stated below:     Anticipated Barriers for therapy: none noted at this time.     Medical Necessity is demonstrated by the following  History  Co-morbidities and personal factors that may impact the plan of care [] LOW: no personal factors / comorbidiies  [x] MODERATE: 1-2 personal factors / comorbidiies  [] HIGH: 3+ personal factors / comorbidiies    Moderate / High Support  Documentation: s/p liver transplant, neck pain, back pain      Examination  Body Structures and Functions, activity limitations and participation restrictions that may impact the plan of care [] LOW: addressing 1-2 elements  [] MODERATE: 3+ elements  [x] HIGH: 3+ elements (please support below)    Moderate / High Support Documentation: impaired strength, impaired endurance, gait instability, balance deficits.      Clinical Presentation [] LOW: stable  [x] MODERATE: Evolving  [] HIGH: Unstable     Decision Making/ Complexity Score: moderate         Goals:      Short Term Goals: 6 weeks Date last assessed:  Status:    1. Patient will be provided with an HEP for strength, endurance and balance.  1/31/2024   New   2.  Patient will demonstrate improve endurance and activity tolerance as evidenced by ability to perform Nu-step x 15 minutes without rests breaks. 1/31/2024  New         Long Term Goals: 12weeks  Date last assessed: Status:    1. Patient will be independent and compliant with updated HEP. 1/31/2024   New   2.  Patient will increase her   gait speed as assessed by the timed 10MWT from 0.58 m/s to 0.72 m/s to increase her safety and (I) with gait at a community level. (MDC for CVA= 0.14 m/s)    1/31/2024   New     3. The patient will demonstrate improved efficiency with functional mobility and decreased risk for falls as evidenced by an increase in her score on the Timed up and Go from 18.6 sec to 15.7 sec. (Minimal detectable change in chronic stroke = 2.9 seconds)  1/31/2024 New   4.Patient will improve her FOTO score from 47  to at least 61 for improved self perception of functional mobility.(score 0-100, high score indicates greater level of function) 1/31/2024 New   5. Pt will improve 5x sit to stand score from 27.1 seconds to less than 20 sec to decrease fall risk.  1/31/2024   New         Plan   Plan of care Certification: 1/31/2024 to 4/24/24.    Outpatient Physical Therapy 1 times weekly for 12 weeks  to include the following interventions: Gait Training, Manual Therapy, Moist Heat/ Ice, Neuromuscular Re-ed, Orthotic Management and Training, Patient Education, Therapeutic Activities, and Therapeutic Exercise.     Joann Turner, PT

## 2024-01-31 NOTE — TELEPHONE ENCOUNTER
Pt notified via portal of stable labs and that no medication changes are needed. Pt also advised of stable ultrasound. Repeat labs due 2/5/24 per protocol.   ----- Message from Eugene Haney MD sent at 1/31/2024  1:16 PM CST -----  Results ok. No action needed

## 2024-02-01 ENCOUNTER — PATIENT MESSAGE (OUTPATIENT)
Dept: ADMINISTRATIVE | Facility: OTHER | Age: 64
End: 2024-02-01
Payer: COMMERCIAL

## 2024-02-01 ENCOUNTER — EXTERNAL HOME HEALTH (OUTPATIENT)
Dept: HOME HEALTH SERVICES | Facility: HOSPITAL | Age: 64
End: 2024-02-01
Payer: COMMERCIAL

## 2024-02-02 DIAGNOSIS — Z00.6 RESEARCH STUDY PATIENT: Primary | ICD-10-CM

## 2024-02-05 ENCOUNTER — LAB VISIT (OUTPATIENT)
Dept: LAB | Facility: HOSPITAL | Age: 64
End: 2024-02-05
Attending: SURGERY
Payer: COMMERCIAL

## 2024-02-05 ENCOUNTER — CLINICAL SUPPORT (OUTPATIENT)
Dept: REHABILITATION | Facility: HOSPITAL | Age: 64
End: 2024-02-05
Payer: COMMERCIAL

## 2024-02-05 ENCOUNTER — RESEARCH ENCOUNTER (OUTPATIENT)
Dept: RESEARCH | Facility: HOSPITAL | Age: 64
End: 2024-02-05
Payer: COMMERCIAL

## 2024-02-05 DIAGNOSIS — Z74.09 IMPAIRED FUNCTIONAL MOBILITY, BALANCE, GAIT, AND ENDURANCE: Primary | ICD-10-CM

## 2024-02-05 DIAGNOSIS — Z94.4 STATUS POST LIVER TRANSPLANT: ICD-10-CM

## 2024-02-05 DIAGNOSIS — Z00.6 RESEARCH STUDY PATIENT: ICD-10-CM

## 2024-02-05 LAB
ALBUMIN SERPL BCP-MCNC: 4 G/DL (ref 3.5–5.2)
ALP SERPL-CCNC: 80 U/L (ref 55–135)
ALT SERPL W/O P-5'-P-CCNC: 14 U/L (ref 10–44)
ANION GAP SERPL CALC-SCNC: 9 MMOL/L (ref 8–16)
AST SERPL-CCNC: 16 U/L (ref 10–40)
BASOPHILS # BLD AUTO: 0.02 K/UL (ref 0–0.2)
BASOPHILS NFR BLD: 0.6 % (ref 0–1.9)
BILIRUB DIRECT SERPL-MCNC: 0.3 MG/DL (ref 0.1–0.3)
BILIRUB SERPL-MCNC: 0.5 MG/DL (ref 0.1–1)
BUN SERPL-MCNC: 22 MG/DL (ref 8–23)
CALCIUM SERPL-MCNC: 9.7 MG/DL (ref 8.7–10.5)
CHLORIDE SERPL-SCNC: 108 MMOL/L (ref 95–110)
CO2 SERPL-SCNC: 22 MMOL/L (ref 23–29)
CREAT SERPL-MCNC: 1 MG/DL (ref 0.5–1.4)
DIFFERENTIAL METHOD BLD: ABNORMAL
EOSINOPHIL # BLD AUTO: 0.1 K/UL (ref 0–0.5)
EOSINOPHIL NFR BLD: 2.8 % (ref 0–8)
ERYTHROCYTE [DISTWIDTH] IN BLOOD BY AUTOMATED COUNT: 13.4 % (ref 11.5–14.5)
EST. GFR  (NO RACE VARIABLE): >60 ML/MIN/1.73 M^2
GLUCOSE SERPL-MCNC: 116 MG/DL (ref 70–110)
HCT VFR BLD AUTO: 38.1 % (ref 37–48.5)
HGB BLD-MCNC: 12.3 G/DL (ref 12–16)
IMM GRANULOCYTES # BLD AUTO: 0.06 K/UL (ref 0–0.04)
IMM GRANULOCYTES NFR BLD AUTO: 1.7 % (ref 0–0.5)
LYMPHOCYTES # BLD AUTO: 0.4 K/UL (ref 1–4.8)
LYMPHOCYTES NFR BLD: 10.8 % (ref 18–48)
MAGNESIUM SERPL-MCNC: 2.3 MG/DL (ref 1.6–2.6)
MCH RBC QN AUTO: 30.4 PG (ref 27–31)
MCHC RBC AUTO-ENTMCNC: 32.3 G/DL (ref 32–36)
MCV RBC AUTO: 94 FL (ref 82–98)
MONOCYTES # BLD AUTO: 0.3 K/UL (ref 0.3–1)
MONOCYTES NFR BLD: 7.2 % (ref 4–15)
NEUTROPHILS # BLD AUTO: 2.8 K/UL (ref 1.8–7.7)
NEUTROPHILS NFR BLD: 76.9 % (ref 38–73)
NRBC BLD-RTO: 0 /100 WBC
PLATELET # BLD AUTO: 143 K/UL (ref 150–450)
PMV BLD AUTO: 10.3 FL (ref 9.2–12.9)
POTASSIUM SERPL-SCNC: 4.4 MMOL/L (ref 3.5–5.1)
PROT SERPL-MCNC: 6.6 G/DL (ref 6–8.4)
RBC # BLD AUTO: 4.04 M/UL (ref 4–5.4)
SODIUM SERPL-SCNC: 139 MMOL/L (ref 136–145)
WBC # BLD AUTO: 3.6 K/UL (ref 3.9–12.7)

## 2024-02-05 PROCEDURE — 85025 COMPLETE CBC W/AUTO DIFF WBC: CPT | Performed by: SURGERY

## 2024-02-05 PROCEDURE — 80197 ASSAY OF TACROLIMUS: CPT | Performed by: SURGERY

## 2024-02-05 PROCEDURE — 36415 COLL VENOUS BLD VENIPUNCTURE: CPT | Performed by: SURGERY

## 2024-02-05 PROCEDURE — 97110 THERAPEUTIC EXERCISES: CPT | Mod: PO,CQ

## 2024-02-05 PROCEDURE — 80053 COMPREHEN METABOLIC PANEL: CPT | Performed by: SURGERY

## 2024-02-05 PROCEDURE — 83735 ASSAY OF MAGNESIUM: CPT | Performed by: SURGERY

## 2024-02-05 PROCEDURE — 82248 BILIRUBIN DIRECT: CPT | Performed by: SURGERY

## 2024-02-05 PROCEDURE — 97530 THERAPEUTIC ACTIVITIES: CPT | Mod: PO,CQ

## 2024-02-05 NOTE — PROGRESS NOTES
RESEARCH STUDY SPECIMEN COLLECTION ENCOUNTER  ORGAN TRANSPLANT  [facility] COURTNEY ROBLERO    Study Title: Role of Tumor-Induced Immune Tolerance in the Patient Response to Locoregional Therapy: Implications in Assessment Risk of Hepatocellular Carcinoma Recurrence Following Liver Transplantation    IRB #: [id number]    IRB Approval Date: 6/8/2016    : Eugene Haney MD  Sub-investigator: Nino Belcher, PhD    Patient Number: [id number]    In accordance with the study protocol, Research Lab orders were placed and follow-up specimens were collected on (date: 02/05/2024) in:  [facility] VNP: YES/NO: No  [facility]TX: YES/NO: No  [facility] IM: YES/NO: No  Other [facility] location: YES/NO: Yes   Location: [facility]    A  was used to transport blood specimens to [facility] for processing: YES/NO: Yes  Blood specimens were transported to [facility] for processing: YES/NO: Yes    Mehran Hilliard  Admin Research- Liver Transplant

## 2024-02-05 NOTE — PROGRESS NOTES
"[facility]   Physical Therapy Treatment Note      Name: Yumiko WILHELM [facility]  Clinic Number: [id number]    Therapy Diagnosis:   Encounter Diagnosis   Name Primary?    Impaired functional mobility, balance, gait, and endurance Yes     Physician: Reynaldo Hodges Jr.,*    Visit Date: 2/5/2024    Physician Orders: PT Eval and Treat " post surgical"  Medical Diagnosis from Referral:   Diagnosis   Z94.4 (ICD-10-CM) - Liver replaced by transplant   R53.1 (ICD-10-CM) - Weakness generalized      Evaluation Date: 1/31/2024  Authorization Period Expiration: 12/31/24  Plan of Care Expiration: 4/24/24  Progress Note Due: 2/31/24  Visit # / Visits authorized: 1/20(2)  FOTO: 1/5     Precautions: Standard, Diabetes, Fall, Immunosuppression, and organ transplant no pulling, pushing of lifting over 10 pounds.      Time In: 0930  Time Out: 1012  Total Billable Time: 42 minutes       PTA Visit #: 1/5       Subjective     Patient reports: Having difficulty with breathing through mask and reports not using walker today. Reports having increased incisional pain with supine to sit.  She was compliant with home exercise program.  Response to previous treatment: no problems stated  Functional change: ongoing    Pain: not rated/10  Location:       Objective      Objective Measures updated at progress report unless specified.     Treatment     Tiffanie received the treatments listed below:      therapeutic exercises to develop strength, endurance, range of motion, flexibility, and core stabilization for 32 minutes including:    Seated:  Ankle pumps 20 times  Long arc quads 20 times Right Left alternating  March 20 times    Supine:  Lateral trunk rotation 2 minutes Right Left alternating  March Right Left alternating 1 minute  Posterior pelvic tilt 20 times  Straight leg raise 2 sets of 5 Right Left     (Activity time includes skilled set-up and positioning as well as therapeutic rest)    therapeutic activities to improve " functional performance for 10  minutes, including:    Sit <> supine with instruction in log roll    Sit<>stand standby assistance     Gait without assistive device standby assistance with wide base of support and short step length Bilateral     Patient Education and Home Exercises       Education provided:   - Patient provided with verbal and demonstrative instruction for all activities performed in today's session.    Written Home Exercises Provided: yes. Exercises were reviewed and Tiffanie was able to demonstrate them prior to the end of the session.  Tiffanie demonstrated good  understanding of the education provided. See Electronic Medical Record under Patient Instructions for exercises provided during therapy sessions    Assessment     Tiffanie provided good participation and effort during today's session with treatment focused on lower extremity strengthening and core stabilization. Tiffanie tolerated activities with rest breaks and cues for proper form for supine<>Sit  with fair follow through. Did well with supine exercises with rest breaks.    Tiffanie Is progressing towards her goals.   Patient prognosis is Fair.     Patient will continue to benefit from skilled outpatient physical therapy to address the deficits listed in the problem list box on initial evaluation, provide pt/family education and to maximize pt's level of independence in the home and community environment.     Patient's spiritual, cultural and educational needs considered and pt agreeable to plan of care and goals.     Anticipated barriers to physical therapy: none noted at this time.    Goals:     Short Term Goals: 6 weeks Date last assessed:  Status:    1. Patient will be provided with an HEP for strength, endurance and balance.  1/31/2024    New   2.  Patient will demonstrate improve endurance and activity tolerance as evidenced by ability to perform Nu-step x 15 minutes without rests breaks. 1/31/2024  New            Long Term Goals: 12weeks  Date  last assessed: Status:    1. Patient will be independent and compliant with updated HEP. 1/31/2024    New   2.  Patient will increase her   gait speed as assessed by the timed 10MWT from 0.58 m/s to 0.72 m/s to increase her safety and (I) with gait at a community level. (MDC for CVA= 0.14 m/s)     1/31/2024    New      3. The patient will demonstrate improved efficiency with functional mobility and decreased risk for falls as evidenced by an increase in her score on the Timed up and Go from 18.6 sec to 15.7 sec. (Minimal detectable change in chronic stroke = 2.9 seconds)  1/31/2024 New   4.Patient will improve her FOTO score from 47  to at least 61 for improved self perception of functional mobility.(score 0-100, high score indicates greater level of function) 1/31/2024 New   5. Pt will improve 5x sit to stand score from 27.1 seconds to less than 20 sec to decrease fall risk.  1/31/2024    New           Plan     Plan of care Certification: 1/31/2024 to 4/24/24.     Outpatient Physical Therapy 1 times weekly for 12 weeks to include the following interventions: Gait Training, Manual Therapy, Moist Heat/ Ice, Neuromuscular Re-ed, Orthotic Management and Training, Patient Education, Therapeutic Activities, and Therapeutic Exercise.     Lin Ortiz, PTA

## 2024-02-06 ENCOUNTER — TELEPHONE (OUTPATIENT)
Dept: TRANSPLANT | Facility: CLINIC | Age: 64
End: 2024-02-06
Payer: COMMERCIAL

## 2024-02-06 ENCOUNTER — PATIENT MESSAGE (OUTPATIENT)
Dept: TRANSPLANT | Facility: CLINIC | Age: 64
End: 2024-02-06
Payer: COMMERCIAL

## 2024-02-06 LAB — TACROLIMUS BLD-MCNC: 7.3 NG/ML (ref 5–15)

## 2024-02-06 NOTE — TELEPHONE ENCOUNTER
Pt notified via portal of stable labs and that no medication changes are needed. Repeat labs due 2/12/24 per protocol.   ----- Message from Eugene Haney MD sent at 2/6/2024  1:42 PM CST -----  Results ok. No action needed

## 2024-02-08 ENCOUNTER — LAB VISIT (OUTPATIENT)
Dept: LAB | Facility: HOSPITAL | Age: 64
End: 2024-02-08
Attending: SURGERY
Payer: COMMERCIAL

## 2024-02-08 ENCOUNTER — TELEPHONE (OUTPATIENT)
Dept: TRANSPLANT | Facility: CLINIC | Age: 64
End: 2024-02-08
Payer: COMMERCIAL

## 2024-02-08 DIAGNOSIS — R19.7 DIARRHEA, UNSPECIFIED TYPE: ICD-10-CM

## 2024-02-08 DIAGNOSIS — Z94.4 S/P LIVER TRANSPLANT: Primary | ICD-10-CM

## 2024-02-08 DIAGNOSIS — Z94.4 S/P LIVER TRANSPLANT: ICD-10-CM

## 2024-02-08 DIAGNOSIS — R11.2 NAUSEA AND VOMITING, UNSPECIFIED VOMITING TYPE: ICD-10-CM

## 2024-02-08 LAB
C DIFF GDH STL QL: NEGATIVE
C DIFF TOX A+B STL QL IA: NEGATIVE
WBC #/AREA STL HPF: NORMAL /[HPF]

## 2024-02-08 PROCEDURE — 36415 COLL VENOUS BLD VENIPUNCTURE: CPT | Performed by: SURGERY

## 2024-02-08 PROCEDURE — 87046 STOOL CULTR AEROBIC BACT EA: CPT | Mod: 59 | Performed by: SURGERY

## 2024-02-08 PROCEDURE — 87449 NOS EACH ORGANISM AG IA: CPT | Mod: 91 | Performed by: SURGERY

## 2024-02-08 PROCEDURE — 87449 NOS EACH ORGANISM AG IA: CPT | Performed by: SURGERY

## 2024-02-08 PROCEDURE — 89055 LEUKOCYTE ASSESSMENT FECAL: CPT | Performed by: SURGERY

## 2024-02-08 PROCEDURE — 87045 FECES CULTURE AEROBIC BACT: CPT | Performed by: SURGERY

## 2024-02-08 PROCEDURE — 87209 SMEAR COMPLEX STAIN: CPT | Performed by: SURGERY

## 2024-02-09 ENCOUNTER — PATIENT MESSAGE (OUTPATIENT)
Dept: TRANSPLANT | Facility: CLINIC | Age: 64
End: 2024-02-09
Payer: COMMERCIAL

## 2024-02-09 ENCOUNTER — TELEPHONE (OUTPATIENT)
Dept: TRANSPLANT | Facility: CLINIC | Age: 64
End: 2024-02-09
Payer: COMMERCIAL

## 2024-02-09 NOTE — TELEPHONE ENCOUNTER
----- Message from Eugene Haney MD sent at 2/9/2024  1:06 PM CST -----  Results ok. No action needed

## 2024-02-10 LAB
CMV DNA SERPL NAA+PROBE-ACNC: NORMAL IU/ML
O+P STL MICRO: NORMAL

## 2024-02-11 LAB — BACTERIA STL CULT: NORMAL

## 2024-02-12 ENCOUNTER — PATIENT MESSAGE (OUTPATIENT)
Dept: TRANSPLANT | Facility: CLINIC | Age: 64
End: 2024-02-12
Payer: COMMERCIAL

## 2024-02-12 ENCOUNTER — LAB VISIT (OUTPATIENT)
Dept: LAB | Facility: HOSPITAL | Age: 64
End: 2024-02-12
Attending: SURGERY
Payer: COMMERCIAL

## 2024-02-12 DIAGNOSIS — T38.0X5A STEROID-INDUCED HYPERGLYCEMIA: ICD-10-CM

## 2024-02-12 DIAGNOSIS — Z94.4 STATUS POST LIVER TRANSPLANT: ICD-10-CM

## 2024-02-12 DIAGNOSIS — R73.9 STEROID-INDUCED HYPERGLYCEMIA: ICD-10-CM

## 2024-02-12 LAB
ALBUMIN SERPL BCP-MCNC: 3.8 G/DL (ref 3.5–5.2)
ALP SERPL-CCNC: 74 U/L (ref 55–135)
ALT SERPL W/O P-5'-P-CCNC: 13 U/L (ref 10–44)
ANION GAP SERPL CALC-SCNC: 7 MMOL/L (ref 8–16)
AST SERPL-CCNC: 14 U/L (ref 10–40)
BASOPHILS # BLD AUTO: 0.02 K/UL (ref 0–0.2)
BASOPHILS NFR BLD: 0.6 % (ref 0–1.9)
BILIRUB DIRECT SERPL-MCNC: 0.2 MG/DL (ref 0.1–0.3)
BILIRUB SERPL-MCNC: 0.6 MG/DL (ref 0.1–1)
BUN SERPL-MCNC: 23 MG/DL (ref 8–23)
CALCIUM SERPL-MCNC: 9.5 MG/DL (ref 8.7–10.5)
CHLORIDE SERPL-SCNC: 106 MMOL/L (ref 95–110)
CO2 SERPL-SCNC: 26 MMOL/L (ref 23–29)
CREAT SERPL-MCNC: 1.1 MG/DL (ref 0.5–1.4)
DIFFERENTIAL METHOD BLD: ABNORMAL
EOSINOPHIL # BLD AUTO: 0.1 K/UL (ref 0–0.5)
EOSINOPHIL NFR BLD: 2.2 % (ref 0–8)
ERYTHROCYTE [DISTWIDTH] IN BLOOD BY AUTOMATED COUNT: 13.3 % (ref 11.5–14.5)
EST. GFR  (NO RACE VARIABLE): 56 ML/MIN/1.73 M^2
ESTIMATED AVG GLUCOSE: 94 MG/DL (ref 68–131)
GLUCOSE SERPL-MCNC: 118 MG/DL (ref 70–110)
HBA1C MFR BLD: 4.9 % (ref 4.5–6.2)
HCT VFR BLD AUTO: 38.4 % (ref 37–48.5)
HGB BLD-MCNC: 12.2 G/DL (ref 12–16)
IMM GRANULOCYTES # BLD AUTO: 0.08 K/UL (ref 0–0.04)
IMM GRANULOCYTES NFR BLD AUTO: 2.2 % (ref 0–0.5)
LYMPHOCYTES # BLD AUTO: 0.4 K/UL (ref 1–4.8)
LYMPHOCYTES NFR BLD: 10.6 % (ref 18–48)
MAGNESIUM SERPL-MCNC: 1.7 MG/DL (ref 1.6–2.6)
MCH RBC QN AUTO: 29.3 PG (ref 27–31)
MCHC RBC AUTO-ENTMCNC: 31.8 G/DL (ref 32–36)
MCV RBC AUTO: 92 FL (ref 82–98)
MONOCYTES # BLD AUTO: 0.3 K/UL (ref 0.3–1)
MONOCYTES NFR BLD: 8.4 % (ref 4–15)
NEUTROPHILS # BLD AUTO: 2.7 K/UL (ref 1.8–7.7)
NEUTROPHILS NFR BLD: 76 % (ref 38–73)
NRBC BLD-RTO: 0 /100 WBC
PLATELET # BLD AUTO: 136 K/UL (ref 150–450)
PMV BLD AUTO: 10.4 FL (ref 9.2–12.9)
POTASSIUM SERPL-SCNC: 4.3 MMOL/L (ref 3.5–5.1)
PROT SERPL-MCNC: 6.6 G/DL (ref 6–8.4)
RBC # BLD AUTO: 4.16 M/UL (ref 4–5.4)
SODIUM SERPL-SCNC: 139 MMOL/L (ref 136–145)
WBC # BLD AUTO: 3.57 K/UL (ref 3.9–12.7)

## 2024-02-12 PROCEDURE — 80053 COMPREHEN METABOLIC PANEL: CPT | Performed by: SURGERY

## 2024-02-12 PROCEDURE — 82248 BILIRUBIN DIRECT: CPT | Performed by: SURGERY

## 2024-02-12 PROCEDURE — 80197 ASSAY OF TACROLIMUS: CPT | Performed by: SURGERY

## 2024-02-12 PROCEDURE — 82985 ASSAY OF GLYCATED PROTEIN: CPT | Performed by: NURSE PRACTITIONER

## 2024-02-12 PROCEDURE — 83735 ASSAY OF MAGNESIUM: CPT | Performed by: SURGERY

## 2024-02-12 PROCEDURE — 83036 HEMOGLOBIN GLYCOSYLATED A1C: CPT | Performed by: NURSE PRACTITIONER

## 2024-02-12 PROCEDURE — 85025 COMPLETE CBC W/AUTO DIFF WBC: CPT | Performed by: SURGERY

## 2024-02-13 LAB — TACROLIMUS BLD-MCNC: 6 NG/ML (ref 5–15)

## 2024-02-14 ENCOUNTER — PATIENT MESSAGE (OUTPATIENT)
Dept: TRANSPLANT | Facility: CLINIC | Age: 64
End: 2024-02-14
Payer: COMMERCIAL

## 2024-02-14 ENCOUNTER — OFFICE VISIT (OUTPATIENT)
Dept: PRIMARY CARE CLINIC | Facility: CLINIC | Age: 64
End: 2024-02-14
Payer: COMMERCIAL

## 2024-02-14 ENCOUNTER — TELEPHONE (OUTPATIENT)
Dept: TRANSPLANT | Facility: CLINIC | Age: 64
End: 2024-02-14
Payer: COMMERCIAL

## 2024-02-14 VITALS
BODY MASS INDEX: 28.66 KG/M2 | OXYGEN SATURATION: 98 % | HEART RATE: 75 BPM | HEIGHT: 67 IN | WEIGHT: 182.63 LBS | TEMPERATURE: 98 F | DIASTOLIC BLOOD PRESSURE: 78 MMHG | RESPIRATION RATE: 18 BRPM | SYSTOLIC BLOOD PRESSURE: 134 MMHG

## 2024-02-14 DIAGNOSIS — J47.9 BRONCHIECTASIS WITHOUT COMPLICATION: ICD-10-CM

## 2024-02-14 DIAGNOSIS — K72.10 CHRONIC HEPATIC FAILURE WITHOUT COMA: ICD-10-CM

## 2024-02-14 DIAGNOSIS — D84.9 IMMUNOSUPPRESSED STATUS: Chronic | ICD-10-CM

## 2024-02-14 DIAGNOSIS — C22.0 HCC (HEPATOCELLULAR CARCINOMA): Chronic | ICD-10-CM

## 2024-02-14 DIAGNOSIS — D69.6 THROMBOCYTOPENIA, UNSPECIFIED: ICD-10-CM

## 2024-02-14 DIAGNOSIS — Z94.4 S/P LIVER TRANSPLANT: Primary | ICD-10-CM

## 2024-02-14 DIAGNOSIS — T38.0X5A STEROID-INDUCED HYPERGLYCEMIA: ICD-10-CM

## 2024-02-14 DIAGNOSIS — R05.9 COUGH, UNSPECIFIED TYPE: ICD-10-CM

## 2024-02-14 DIAGNOSIS — R73.9 STEROID-INDUCED HYPERGLYCEMIA: ICD-10-CM

## 2024-02-14 DIAGNOSIS — F33.1 MAJOR DEPRESSIVE DISORDER, RECURRENT, MODERATE: ICD-10-CM

## 2024-02-14 DIAGNOSIS — K75.81 NONALCOHOLIC STEATOHEPATITIS: Chronic | ICD-10-CM

## 2024-02-14 DIAGNOSIS — I10 PRIMARY HYPERTENSION: Chronic | ICD-10-CM

## 2024-02-14 PROBLEM — E66.01 CLASS 2 SEVERE OBESITY DUE TO EXCESS CALORIES WITH SERIOUS COMORBIDITY AND BODY MASS INDEX (BMI) OF 36.0 TO 36.9 IN ADULT: Status: RESOLVED | Noted: 2017-04-11 | Resolved: 2024-02-14

## 2024-02-14 PROBLEM — E66.812 CLASS 2 SEVERE OBESITY DUE TO EXCESS CALORIES WITH SERIOUS COMORBIDITY AND BODY MASS INDEX (BMI) OF 36.0 TO 36.9 IN ADULT: Status: RESOLVED | Noted: 2017-04-11 | Resolved: 2024-02-14

## 2024-02-14 PROCEDURE — 1160F RVW MEDS BY RX/DR IN RCRD: CPT | Mod: CPTII,S$GLB,, | Performed by: FAMILY MEDICINE

## 2024-02-14 PROCEDURE — 1159F MED LIST DOCD IN RCRD: CPT | Mod: CPTII,S$GLB,, | Performed by: FAMILY MEDICINE

## 2024-02-14 PROCEDURE — 3075F SYST BP GE 130 - 139MM HG: CPT | Mod: CPTII,S$GLB,, | Performed by: FAMILY MEDICINE

## 2024-02-14 PROCEDURE — 3008F BODY MASS INDEX DOCD: CPT | Mod: CPTII,S$GLB,, | Performed by: FAMILY MEDICINE

## 2024-02-14 PROCEDURE — 3078F DIAST BP <80 MM HG: CPT | Mod: CPTII,S$GLB,, | Performed by: FAMILY MEDICINE

## 2024-02-14 PROCEDURE — 99999 PR PBB SHADOW E&M-EST. PATIENT-LVL V: CPT | Mod: PBBFAC,,, | Performed by: FAMILY MEDICINE

## 2024-02-14 PROCEDURE — 99214 OFFICE O/P EST MOD 30 MIN: CPT | Mod: S$GLB,,, | Performed by: FAMILY MEDICINE

## 2024-02-14 PROCEDURE — 3044F HG A1C LEVEL LT 7.0%: CPT | Mod: CPTII,S$GLB,, | Performed by: FAMILY MEDICINE

## 2024-02-14 RX ORDER — SODIUM CHLORIDE FOR INHALATION 0.9 %
3 VIAL, NEBULIZER (ML) INHALATION 4 TIMES DAILY PRN
Qty: 150 ML | Refills: 2 | Status: SHIPPED | OUTPATIENT
Start: 2024-02-14 | End: 2025-02-13

## 2024-02-14 NOTE — TELEPHONE ENCOUNTER
Pt notified via portal of stable labs and that no medication changes are needed. Repeat labs due 2/19/24 per protocol.   ----- Message from Eugene Haney MD sent at 2/14/2024  1:12 PM CST -----  Results ok. No action needed

## 2024-02-14 NOTE — PROGRESS NOTES
Primary Care Provider Appointment   [facility]       Patient ID: Yumiko Gonzaelz is a 63 y.o. female.    ASSESSMENT/PLAN by Problem List:    1. S/P liver transplant  Assessment & Plan:  We discussed and reviewed her course posttransplant.  Overall she is doing well.  I will help to continue to monitor her other ongoing health issues and she will continue to follow with the transplant team closely.      2. Steroid-induced hyperglycemia  Assessment & Plan:  She was placed on Januvia as well as insulin initially went on high doses of steroids.  Insulin usage is minimizing now.  We will continue to monitor.  Of course she should continue to follow a healthy nutrition plan.      3. HCC (hepatocellular carcinoma)  Overview:  HCC s/p radioembolization 6/2023 , prior to transplant      4. Nonalcoholic steatohepatitis  Overview:  Now s/p transplant 12/2023      5. Immunosuppressed status  Assessment & Plan:  Did review this status.  Recent COVID infection.  But she is recovering.  She still has a dry postviral cough but I do not see signs of bacterial infection so will monitor however she has been instructed to let me know if symptoms do not continue to improve and certainly if they worsen.      6. Primary hypertension  Assessment & Plan:  Currently stable continue current medications      7. Major depressive disorder, recurrent, moderate    8. Cough, unspecified type  Assessment & Plan:  I believe she has a postviral cough after recent COVID.  There maybe a component of allergies.  I do not suspect secondary infection or bacterial component at this time however given her current immunosuppressed status advised her to monitor closely and let me know if symptoms worsen or fail to continue to improve.      9. Chronic hepatic failure without coma    10. Thrombocytopenia, unspecified    11. Bronchiectasis without complication  Assessment & Plan:  Noted on previous imaging.  As stated she  does have a persistent dry cough but no signs of bacterial component at this time.  Will monitor      Other orders  -     sodium chloride for inhalation (SODIUM CHLORIDE 0.9%) 0.9 % nebulizer solution; Take 3 mLs by nebulization 4 (four) times daily as needed (cough).  Dispense: 150 mL; Refill: 2         Follow Up:  Four weeks    Thirty-six minutes of total time spent on the encounter, time includes face to face time, and some or all of the following: review of chart, lab, imaging, consultant notes, ER, hospital, documentation, care coordination, etc.    Subjective:     Chief Complaint   Patient presents with    Follow-up    Cough     Pt states she has had cough and congestion since January 4th  and diagnosed with Covid on the 5th.     Chest Congestion     I have reviewed the information entered by the ancillary staff regarding the chief complaint as well as the related history.    HPI    Patient is a/an 63 y.o.  female     Hospital follow-up  S/p liver transplant  Covid early January, still some cough and chest congestion  Has weaned off prednisone, has not needed insulin in few weeks, was given sliding scale for steroid induced hyperglycemia, on Januvia,   See above for all conditions that were addressed today in more detail    For complete problem list, past medical history, surgical history, social history, etc., see appropriate section in the electronic medical record    Review of Systems   Constitutional:  Positive for activity change and unexpected weight change.   HENT:  Positive for rhinorrhea. Negative for hearing loss and trouble swallowing.    Eyes:  Negative for discharge and visual disturbance.   Respiratory:  Negative for chest tightness and wheezing.    Cardiovascular:  Negative for chest pain and palpitations.   Gastrointestinal:  Positive for diarrhea and vomiting. Negative for blood in stool and constipation.   Endocrine: Negative for polydipsia and polyuria.   Genitourinary:  Negative for  "difficulty urinating, dysuria, hematuria and menstrual problem.   Musculoskeletal:  Positive for back pain (improved today). Negative for arthralgias and joint swelling.   Neurological:  Positive for tremors, weakness and headaches.   Psychiatric/Behavioral:  Negative for confusion and dysphoric mood.        Objective     Physical Exam  Vitals reviewed.   Constitutional:       General: She is not in acute distress.     Appearance: She is well-developed. She is not ill-appearing.   HENT:      Head: Normocephalic and atraumatic.      Mouth/Throat:      Pharynx: Oropharynx is clear.   Eyes:      General: No scleral icterus.     Conjunctiva/sclera: Conjunctivae normal.   Cardiovascular:      Rate and Rhythm: Normal rate and regular rhythm.      Heart sounds: Normal heart sounds. No murmur heard.     No friction rub.      Comments: Trace ankle edema at most bilaterally  Pulmonary:      Effort: Pulmonary effort is normal. No respiratory distress.      Breath sounds: Normal breath sounds.      Comments: Mildly diminished breath sounds, few rhonchi but mostly clear no wheezing  Abdominal:      Comments: Surgical scar has healed well.  Abdomen is soft, there is still some mild tenderness across the upper abdomen, no guarding or rebound.   Skin:     General: Skin is dry.      Findings: No rash.   Neurological:      Mental Status: She is alert and oriented to person, place, and time.   Psychiatric:         Behavior: Behavior normal.       Vitals:    02/14/24 1102   BP: 134/78   BP Location: Left arm   Patient Position: Sitting   BP Method: Medium (Manual)   Pulse: 75   Resp: 18   Temp: 98.3 °F (36.8 °C)   TempSrc: Oral   SpO2: 98%   Weight: 82.9 kg (182 lb 10.4 oz)   Height: 5' 7" (1.702 m)           THIS DOCUMENT WAS MADE IN PART WITH VOICE RECOGNITION SOFTWARE.  OCCASIONALLY THIS SOFTWARE WILL MISINTERPRET WORDS OR PHRASES.  "

## 2024-02-15 ENCOUNTER — CLINICAL SUPPORT (OUTPATIENT)
Dept: REHABILITATION | Facility: HOSPITAL | Age: 64
End: 2024-02-15
Payer: COMMERCIAL

## 2024-02-15 DIAGNOSIS — Z74.09 IMPAIRED FUNCTIONAL MOBILITY, BALANCE, GAIT, AND ENDURANCE: Primary | ICD-10-CM

## 2024-02-15 LAB — FRUCTOSAMINE SERPL-SCNC: 231 UMOL /L (ref 151–300)

## 2024-02-15 PROCEDURE — 97530 THERAPEUTIC ACTIVITIES: CPT | Mod: PO,CQ

## 2024-02-15 PROCEDURE — 97110 THERAPEUTIC EXERCISES: CPT | Mod: PO,CQ

## 2024-02-15 NOTE — PROGRESS NOTES
"[facility]   Physical Therapy Treatment Note      Name: Yumiko WILHELM [facility]  Clinic Number: [id number]    Therapy Diagnosis:   Encounter Diagnosis   Name Primary?    Impaired functional mobility, balance, gait, and endurance Yes     Physician: Reynaldo Hodges Jr.,*    Visit Date: 2/15/2024    Physician Orders: PT Eval and Treat " post surgical"  Medical Diagnosis from Referral:   Diagnosis   Z94.4 (ICD-10-CM) - Liver replaced by transplant   R53.1 (ICD-10-CM) - Weakness generalized      Evaluation Date: 1/31/2024  Authorization Period Expiration: 12/31/24  Plan of Care Expiration: 4/24/24  Progress Note Due: 2/31/24  Visit # / Visits authorized: 1/20(2)  FOTO: 1/5     Precautions: Standard, Diabetes, Fall, Immunosuppression, and organ transplant no pulling, pushing of lifting over 10 pounds.      Time In: 1100  Time Out: 1140  Total Billable Time: 40 minutes       PTA Visit #: 2/5       Subjective     Patient reports: Doing well overall but "dealing with stomach upset", reports getting fatigued by being short of breath first.  She reports was compliant with home exercise program.  Response to previous treatment: no problems stated  Functional change: ongoing, increased mobility in and outside of home as well as driving    Pain: not rated/10  Location:   Not Applicable     Objective      Objective Measures updated at progress report unless specified.     Treatment     Tiffanie received the treatments listed below:      therapeutic exercises to develop strength, endurance, range of motion, flexibility, and core stabilization for  17 minutes including:    SciFit Level 1 7 minutes with Bilateral Upper Extremities     Seated:  Hamstring stretch with foot on stool performing dorsiflexion/plantarflexion 30 times Right Left   Bilateral shoulder flexion with cane 10 times, verbal cues to breath in going up and blow out going down with prolonged rest break between repetitions    (Activity time includes " skilled set-up and positioning as well as therapeutic rest)    therapeutic activities to improve functional performance for 23 minutes, including:    Parallel bars:  Bilateral Heel Raises 10 times  Bilateral Mini Squats 10 times  March in place 10 times Right Left alternating  Hip abduction 10 times Right Left alternating  Hip extension 10 times Right Left alternating  Hamstring curls 10 times Right Left alternating    Gait without assistive device standby assistance with wide base of support and short step length Bilateral     (Activity time includes  therapeutic rest)    Patient Education and Home Exercises       Education provided:   - Patient provided with verbal and demonstrative instruction for all activities performed in today's session.    Written Home Exercises Provided: yes and instructed to continue prior home exercise program. Exercises were reviewed and Tiffanie was able to demonstrate them prior to the end of the session.  Tiffanie demonstrated good  understanding of the education provided.     See Electronic Medical Record under Patient Instructions for exercises provided during therapy sessions    Assessment     Tiffanie provided good participation and effort during today's session with treatment focused on lower extremity strengthening and therapeutic activities to increase safety with stand activities. Tiffanie tolerated activities with rest breaks as needed.    Tiffanie Is progressing towards her goals.   Patient prognosis is Fair.     Patient will continue to benefit from skilled outpatient physical therapy to address the deficits listed in the problem list box on initial evaluation, provide pt/family education and to maximize pt's level of independence in the home and community environment.     Patient's spiritual, cultural and educational needs considered and pt agreeable to plan of care and goals.     Anticipated barriers to physical therapy: none noted at this time.    Goals:     Short Term Goals: 6 weeks  Date last assessed:  Status:    1. Patient will be provided with an HEP for strength, endurance and balance.  1/31/2024    progressing   2.  Patient will demonstrate improve endurance and activity tolerance as evidenced by ability to perform Nu-step x 15 minutes without rests breaks. 1/31/2024  progressing            Long Term Goals: 12weeks  Date last assessed: Status:    1. Patient will be independent and compliant with updated HEP. 1/31/2024    progressing   2.  Patient will increase her   gait speed as assessed by the timed 10MWT from 0.58 m/s to 0.72 m/s to increase her safety and (I) with gait at a community level. (MDC for CVA= 0.14 m/s)     1/31/2024    ongoing      3. The patient will demonstrate improved efficiency with functional mobility and decreased risk for falls as evidenced by an increase in her score on the Timed up and Go from 18.6 sec to 15.7 sec. (Minimal detectable change in chronic stroke = 2.9 seconds)  1/31/2024 ongoing   4.Patient will improve her FOTO score from 47  to at least 61 for improved self perception of functional mobility.(score 0-100, high score indicates greater level of function) 1/31/2024 ongoing   5. Pt will improve 5x sit to stand score from 27.1 seconds to less than 20 sec to decrease fall risk.  1/31/2024    ongoing           Plan     Plan of care Certification: 1/31/2024 to 4/24/24.     Outpatient Physical Therapy 1 times weekly for 12 weeks to include the following interventions: Gait Training, Manual Therapy, Moist Heat/ Ice, Neuromuscular Re-ed, Orthotic Management and Training, Patient Education, Therapeutic Activities, and Therapeutic Exercise.     Lin Ortiz, PTA

## 2024-02-20 ENCOUNTER — LAB VISIT (OUTPATIENT)
Dept: LAB | Facility: HOSPITAL | Age: 64
End: 2024-02-20
Attending: SURGERY
Payer: COMMERCIAL

## 2024-02-20 ENCOUNTER — CLINICAL SUPPORT (OUTPATIENT)
Dept: REHABILITATION | Facility: HOSPITAL | Age: 64
End: 2024-02-20
Payer: COMMERCIAL

## 2024-02-20 DIAGNOSIS — R53.1 WEAKNESS GENERALIZED: ICD-10-CM

## 2024-02-20 DIAGNOSIS — Z94.4 STATUS POST LIVER TRANSPLANT: ICD-10-CM

## 2024-02-20 DIAGNOSIS — Z74.09 IMPAIRED FUNCTIONAL MOBILITY, BALANCE, GAIT, AND ENDURANCE: Primary | ICD-10-CM

## 2024-02-20 PROBLEM — R05.9 COUGH: Status: ACTIVE | Noted: 2024-02-20

## 2024-02-20 LAB
ALBUMIN SERPL BCP-MCNC: 4 G/DL (ref 3.5–5.2)
ALP SERPL-CCNC: 68 U/L (ref 55–135)
ALT SERPL W/O P-5'-P-CCNC: 10 U/L (ref 10–44)
ANION GAP SERPL CALC-SCNC: 10 MMOL/L (ref 8–16)
AST SERPL-CCNC: 15 U/L (ref 10–40)
BASOPHILS NFR BLD: 0 % (ref 0–1.9)
BILIRUB DIRECT SERPL-MCNC: 0.2 MG/DL (ref 0.1–0.3)
BILIRUB SERPL-MCNC: 0.6 MG/DL (ref 0.1–1)
BUN SERPL-MCNC: 22 MG/DL (ref 8–23)
CALCIUM SERPL-MCNC: 9.5 MG/DL (ref 8.7–10.5)
CHLORIDE SERPL-SCNC: 106 MMOL/L (ref 95–110)
CO2 SERPL-SCNC: 22 MMOL/L (ref 23–29)
CREAT SERPL-MCNC: 1.1 MG/DL (ref 0.5–1.4)
DIFFERENTIAL METHOD BLD: ABNORMAL
EOSINOPHIL NFR BLD: 2 % (ref 0–8)
ERYTHROCYTE [DISTWIDTH] IN BLOOD BY AUTOMATED COUNT: 13.1 % (ref 11.5–14.5)
EST. GFR  (NO RACE VARIABLE): 56 ML/MIN/1.73 M^2
GLUCOSE SERPL-MCNC: 111 MG/DL (ref 70–110)
HCT VFR BLD AUTO: 38.5 % (ref 37–48.5)
HGB BLD-MCNC: 12.2 G/DL (ref 12–16)
IMM GRANULOCYTES # BLD AUTO: ABNORMAL K/UL (ref 0–0.04)
IMM GRANULOCYTES NFR BLD AUTO: ABNORMAL % (ref 0–0.5)
LYMPHOCYTES NFR BLD: 17 % (ref 18–48)
MAGNESIUM SERPL-MCNC: 1.7 MG/DL (ref 1.6–2.6)
MCH RBC QN AUTO: 29.2 PG (ref 27–31)
MCHC RBC AUTO-ENTMCNC: 31.7 G/DL (ref 32–36)
MCV RBC AUTO: 92 FL (ref 82–98)
MONOCYTES NFR BLD: 8 % (ref 4–15)
NEUTROPHILS NFR BLD: 68 % (ref 38–73)
NEUTS BAND NFR BLD MANUAL: 5 %
NRBC BLD-RTO: 0 /100 WBC
PLATELET # BLD AUTO: 142 K/UL (ref 150–450)
PLATELET BLD QL SMEAR: ABNORMAL
PMV BLD AUTO: 10.7 FL (ref 9.2–12.9)
POTASSIUM SERPL-SCNC: 4.3 MMOL/L (ref 3.5–5.1)
PROT SERPL-MCNC: 6.6 G/DL (ref 6–8.4)
RBC # BLD AUTO: 4.18 M/UL (ref 4–5.4)
SODIUM SERPL-SCNC: 138 MMOL/L (ref 136–145)
WBC # BLD AUTO: 3.28 K/UL (ref 3.9–12.7)

## 2024-02-20 PROCEDURE — 85027 COMPLETE CBC AUTOMATED: CPT | Performed by: SURGERY

## 2024-02-20 PROCEDURE — 97110 THERAPEUTIC EXERCISES: CPT | Mod: PO

## 2024-02-20 PROCEDURE — 80197 ASSAY OF TACROLIMUS: CPT | Performed by: SURGERY

## 2024-02-20 PROCEDURE — 85007 BL SMEAR W/DIFF WBC COUNT: CPT | Performed by: SURGERY

## 2024-02-20 PROCEDURE — 82248 BILIRUBIN DIRECT: CPT | Performed by: SURGERY

## 2024-02-20 PROCEDURE — 83735 ASSAY OF MAGNESIUM: CPT | Performed by: SURGERY

## 2024-02-20 PROCEDURE — 80053 COMPREHEN METABOLIC PANEL: CPT | Performed by: SURGERY

## 2024-02-20 PROCEDURE — 36415 COLL VENOUS BLD VENIPUNCTURE: CPT | Performed by: SURGERY

## 2024-02-20 NOTE — ASSESSMENT & PLAN NOTE
I believe she has a postviral cough after recent COVID.  There maybe a component of allergies.  I do not suspect secondary infection or bacterial component at this time however given her current immunosuppressed status advised her to monitor closely and let me know if symptoms worsen or fail to continue to improve.  She does not have any wheezing at this time so will hold off on bronchodilators but will see if some saline solution by nebulizer may help some with her cough and chest congestion

## 2024-02-20 NOTE — ASSESSMENT & PLAN NOTE
Did review this status.  Recent COVID infection.  But she is recovering.  She still has a dry postviral cough but I do not see signs of bacterial infection so will monitor however she has been instructed to let me know if symptoms do not continue to improve and certainly if they worsen.

## 2024-02-20 NOTE — PROGRESS NOTES
"[facility]   Physical Therapy Treatment Note      Name: Yumiko WILHELM [facility]  Clinic Number: [id number]    Therapy Diagnosis:   Encounter Diagnoses   Name Primary?    Impaired functional mobility, balance, gait, and endurance Yes    Weakness generalized      Physician: Reynaldo Hodges Jr., MD    Visit Date: 2/20/2024    Physician Orders: PT Eval and Treat " post surgical"  Medical Diagnosis from Referral:   Diagnosis   Z94.4 (ICD-10-CM) - Liver replaced by transplant   R53.1 (ICD-10-CM) - Weakness generalized      Evaluation Date: 1/31/2024  Authorization Period Expiration: 12/31/24  Plan of Care Expiration: 4/24/24  Progress Note Due: 2/31/24  Visit # / Visits authorized: 3/20 (4)     Precautions: Standard, Diabetes, Fall, Immunosuppression, and organ transplant no pulling, pushing of lifting over 10 pounds.      Time In: 1130  Time Out: 1215  Total Billable Time: 45 minutes     Subjective     Patient reports: saying " I just have to get my bearing" when she stagers initiating walk after standing up.  She reports was compliant with home exercise program.  Response to previous treatment: tolerated Rx  Functional change: ongoing, more active.    Pain: 1/10  Location:  left knee    Objective      /80 mmHg; HR 74bpm; O2 sat 100%  R shoulder lower  scoliotic posture    Treatment     Tiffanie received the treatments listed below:      therapeutic exercises to develop strength, endurance,  flexibility, and core stabilization for  1745 minutes including:  Nustep L4 10' all 4's  Gastroc standing stretches on half roll 2'  Heel raises 20  Mini squats 20  Seated hamstring curls manual resist 20/20  Supine ankle dorsiflexion manual resist 20/20  Short arc quads 4#s 20/20  Bridging  crooklying 20; knee straight ankle on bolster 10  Straight leg raising 4#s knee weights.10/10    Patient Education and Home Exercises       Education provided:   - Activation of pelvic floor muscles during leg " exercises.    Written Home Exercises Provided: yes and instructed to continue prior home exercise program. Exercises were reviewed and Tiffanie was able to demonstrate them prior to the end of the session.  Tiffanie demonstrated good  understanding of the education provided.     See Electronic Medical Record under Patient Instructions for exercises provided during therapy sessions    Assessment     Tiffanie is very cooperative and participated well with exercises. Crepitation to knees on movements. No aggravated pain.  Tolerated RX.    Tiffanie Is progressing towards her goals.   Patient prognosis is Fair.     Patient will continue to benefit from skilled outpatient physical therapy to address the deficits listed in the problem list box on initial evaluation, provide pt/family education and to maximize pt's level of independence in the home and community environment.     Patient's spiritual, cultural and educational needs considered and pt agreeable to plan of care and goals.     Anticipated barriers to physical therapy: none noted at this time.    Goals:     Short Term Goals: 6 weeks Date last assessed:  Status:    1. Patient will be provided with an HEP for strength, endurance and balance.  1/31/2024    progressing   2.  Patient will demonstrate improve endurance and activity tolerance as evidenced by ability to perform Nu-step x 15 minutes without rests breaks. 1/31/2024  progressing            Long Term Goals: 12weeks  Date last assessed: Status:    1. Patient will be independent and compliant with updated HEP. 1/31/2024    progressing   2.  Patient will increase her   gait speed as assessed by the timed 10MWT from 0.58 m/s to 0.72 m/s to increase her safety and (I) with gait at a community level. (MDC for CVA= 0.14 m/s)     1/31/2024    ongoing      3. The patient will demonstrate improved efficiency with functional mobility and decreased risk for falls as evidenced by an increase in her score on the Timed up and Go from  18.6 sec to 15.7 sec. (Minimal detectable change in chronic stroke = 2.9 seconds)  1/31/2024 ongoing   4.Patient will improve her FOTO score from 47  to at least 61 for improved self perception of functional mobility.(score 0-100, high score indicates greater level of function) 1/31/2024 ongoing   5. Pt will improve 5x sit to stand score from 27.1 seconds to less than 20 sec to decrease fall risk.  1/31/2024    ongoing           Plan     FOTO Update  Strength and endurance training.  Continue Plan of care    Ameya Sinclair, PT

## 2024-02-20 NOTE — ASSESSMENT & PLAN NOTE
She was placed on Januvia as well as insulin initially went on high doses of steroids.  Insulin usage is minimizing now.  We will continue to monitor.  Of course she should continue to follow a healthy nutrition plan.

## 2024-02-20 NOTE — ASSESSMENT & PLAN NOTE
Noted on previous imaging.  As stated she does have a persistent dry cough but no signs of bacterial component at this time.  Will monitor

## 2024-02-20 NOTE — ASSESSMENT & PLAN NOTE
We discussed and reviewed her course posttransplant.  Overall she is doing well.  I will help to continue to monitor her other ongoing health issues and she will continue to follow with the transplant team closely.

## 2024-02-21 LAB — TACROLIMUS BLD-MCNC: 7.4 NG/ML (ref 5–15)

## 2024-02-22 ENCOUNTER — TELEPHONE (OUTPATIENT)
Dept: TRANSPLANT | Facility: CLINIC | Age: 64
End: 2024-02-22
Payer: COMMERCIAL

## 2024-02-22 ENCOUNTER — PATIENT MESSAGE (OUTPATIENT)
Dept: TRANSPLANT | Facility: CLINIC | Age: 64
End: 2024-02-22
Payer: COMMERCIAL

## 2024-02-22 DIAGNOSIS — Z94.4 S/P LIVER TRANSPLANT: Primary | ICD-10-CM

## 2024-02-22 NOTE — TELEPHONE ENCOUNTER
Labs reviewed with Dr. Haney. Per MD, labs are stable and no medication changes are needed. Repeat labs due 2/26/24 per protocol.    Pt notified via portal of stable labs and that no medication changes are needed. Repeat labs due 2/26/24 per protocol.

## 2024-02-26 ENCOUNTER — OFFICE VISIT (OUTPATIENT)
Dept: TRANSPLANT | Facility: CLINIC | Age: 64
End: 2024-02-26
Payer: COMMERCIAL

## 2024-02-26 ENCOUNTER — HOSPITAL ENCOUNTER (OUTPATIENT)
Dept: RADIOLOGY | Facility: HOSPITAL | Age: 64
Discharge: HOME | End: 2024-02-26
Attending: STUDENT IN AN ORGANIZED HEALTH CARE EDUCATION/TRAINING PROGRAM
Payer: COMMERCIAL

## 2024-02-26 VITALS
RESPIRATION RATE: 18 BRPM | WEIGHT: 183.88 LBS | TEMPERATURE: 97 F | SYSTOLIC BLOOD PRESSURE: 150 MMHG | HEART RATE: 75 BPM | DIASTOLIC BLOOD PRESSURE: 74 MMHG | OXYGEN SATURATION: 97 % | HEIGHT: 67 IN | BODY MASS INDEX: 28.86 KG/M2

## 2024-02-26 DIAGNOSIS — Z94.4 LIVER TRANSPLANTED: Primary | ICD-10-CM

## 2024-02-26 DIAGNOSIS — Z85.05 HISTORY OF HEPATOCELLULAR CARCINOMA: ICD-10-CM

## 2024-02-26 DIAGNOSIS — Z79.60 LONG-TERM USE OF IMMUNOSUPPRESSANT MEDICATION: ICD-10-CM

## 2024-02-26 DIAGNOSIS — Z94.4 S/P LIVER TRANSPLANT: ICD-10-CM

## 2024-02-26 PROCEDURE — 76705 ECHO EXAM OF ABDOMEN: CPT | Mod: 26,59,, | Performed by: INTERNAL MEDICINE

## 2024-02-26 PROCEDURE — 3078F DIAST BP <80 MM HG: CPT | Mod: CPTII,S$GLB,, | Performed by: STUDENT IN AN ORGANIZED HEALTH CARE EDUCATION/TRAINING PROGRAM

## 2024-02-26 PROCEDURE — 93976 VASCULAR STUDY: CPT | Mod: 26,,, | Performed by: INTERNAL MEDICINE

## 2024-02-26 PROCEDURE — 3008F BODY MASS INDEX DOCD: CPT | Mod: CPTII,S$GLB,, | Performed by: STUDENT IN AN ORGANIZED HEALTH CARE EDUCATION/TRAINING PROGRAM

## 2024-02-26 PROCEDURE — 3044F HG A1C LEVEL LT 7.0%: CPT | Mod: CPTII,S$GLB,, | Performed by: STUDENT IN AN ORGANIZED HEALTH CARE EDUCATION/TRAINING PROGRAM

## 2024-02-26 PROCEDURE — 3077F SYST BP >= 140 MM HG: CPT | Mod: CPTII,S$GLB,, | Performed by: STUDENT IN AN ORGANIZED HEALTH CARE EDUCATION/TRAINING PROGRAM

## 2024-02-26 PROCEDURE — 1159F MED LIST DOCD IN RCRD: CPT | Mod: CPTII,S$GLB,, | Performed by: STUDENT IN AN ORGANIZED HEALTH CARE EDUCATION/TRAINING PROGRAM

## 2024-02-26 PROCEDURE — 76705 ECHO EXAM OF ABDOMEN: CPT | Mod: TC

## 2024-02-26 PROCEDURE — 99215 OFFICE O/P EST HI 40 MIN: CPT | Mod: S$GLB,,, | Performed by: STUDENT IN AN ORGANIZED HEALTH CARE EDUCATION/TRAINING PROGRAM

## 2024-02-26 PROCEDURE — 1160F RVW MEDS BY RX/DR IN RCRD: CPT | Mod: CPTII,S$GLB,, | Performed by: STUDENT IN AN ORGANIZED HEALTH CARE EDUCATION/TRAINING PROGRAM

## 2024-02-26 PROCEDURE — 99999 PR PBB SHADOW E&M-EST. PATIENT-LVL V: CPT | Mod: PBBFAC,,, | Performed by: STUDENT IN AN ORGANIZED HEALTH CARE EDUCATION/TRAINING PROGRAM

## 2024-02-26 NOTE — PROGRESS NOTES
Transplant Hepatology  Liver Transplant Recipient Follow Up    PCP: Garrett Webb MD    Transplant History  Transplant Date: 12/9/2023  UNOS Native Liver Dx: Cirrhosis: Fatty Liver (DURAN)    ORGAN: LIVER  Whole or Partial: whole liver  Donor Type: donation after circulatory death   [facility] [place] Risk: no  Donor CMV Status: Negative  Donor HCV Status: Negative  Donor HBcAb: Negative  Donor HBV PEDRO:   Donor HCV PEDRO: Negative  Biliary Anastomosis: end to end  Arterial Anatomy: standard  IVC reconstruction: end to end ivc  Portal vein status: patent    She has had the following complications since transplant: none    Chief complaint: follow up, history of OLT    HPI:  Yumiko Gonzalez is a 63 y.o. 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     This is her first hepatology visit since transplant.  She presented to local ED last month and was diagnosed with COVID.  No other recent hospitalizations or ED visits.  She reports heartburn as well as alternating diarrhea and constipation.  She is otherwise without complaints today.  Compliant with Prograf and CellCept.  She does report intermittent tremors which he had prior to transplant as well as intermittent headaches.  She denies recent fever, chills, nausea, vomiting.    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, postitive antismooth muscle antibody    Blood transfusion     Bronchitis     bronchospasm on occasion     Cancer 1987    carcinoma in situ    Cervical neck pain with evidence of disc disease 03/22/2012    can bend neck    Cirrhosis     non-alcoholic, compensated    Colon polyps 03/22/2012    Depression 03/22/2012    Hx panic attacks    Diverticular disease 03/22/2012    never diverticulitis    Fatty liver 03/22/2012  "   Fibrocystic breast     Fine tremor     hands,chronic    Hepatosplenomegaly     History of abdominal paracentesis 2023    8.7L of fluid drained    Hypertension 2013    Knee pain, bilateral     chronic, with hands,feet,fingers also painful    Lesion of right lung     Liver disease     "partially sclerosed liver" per pt    Migraines past Hx    Nephrolithiasis     stone episode 2021    Pleural effusion     small, compensated, good bilateral breath sounds per Dr Suresh GUTIERRES (postoperative nausea and vomiting)     twice after childbirth    Postpartum depression     Splenomegaly     Thrombocytopenia     Tremors of nervous system        Past Surgical History:   Procedure Laterality Date    ADENOIDECTOMY      CERVICAL CONIZATION   W/ LASER       SECTION      x3    COLONOSCOPY N/A 2016    Procedure: COLONOSCOPY;  Surgeon: James Palomares MD;  Location: [facility];  Service: Endoscopy;  Laterality: N/A;    COLONOSCOPY N/A 2/3/2022    Procedure: COLONOSCOPY;  Surgeon: James Palomares MD;  Location: [facility];  Service: Endoscopy;  Laterality: N/A;    EMBOLIZATION N/A 2018    Procedure: EMBOLIZATION, BLOOD VESSEL;  Surgeon: Joselin Surgeon;  Location: [facility];  Service: Radiology;  Laterality: N/A;    ENDOMETRIAL ABLATION      ESOPHAGOGASTRODUODENOSCOPY N/A 2020    Procedure: ESOPHAGOGASTRODUODENOSCOPY (EGD);  Surgeon: James Palomares MD;  Location: [facility];  Service: Endoscopy;  Laterality: N/A;    ESOPHAGOGASTRODUODENOSCOPY N/A 3/8/2022    Procedure: EGD (ESOPHAGOGASTRODUODENOSCOPY);  Surgeon: James Palomares MD;  Location: [facility];  Service: Endoscopy;  Laterality: N/A;    LIVER BIOPSY      LIVER TRANSPLANT N/A 2023    Procedure: TRANSPLANT, LIVER;  Surgeon: Gurpreet aRza MD;  Location: [address];  Service: Transplant;  Laterality: N/A;    PELVIC LAPAROSCOPY      TONSILLECTOMY      TUBAL LIGATION         Family History   Problem Relation Age of Onset    " Breast cancer Mother 70    Heart disease Mother     Hypertension Mother     Tremor Mother     Diabetes Father     Heart disease Father     Diabetes Sister     Cancer Sister     Breast cancer Sister     Diabetes Sister     Tremor Daughter     Breast cancer Maternal Aunt 70    Multiple sclerosis Maternal Aunt     Multiple sclerosis Maternal Aunt     Breast cancer Maternal Grandmother 70    Diabetes Brother     Emphysema Brother     Breast cancer Maternal Cousin 40    Ovarian cancer Neg Hx     Parkinsonism Neg Hx     Glaucoma Neg Hx     Macular degeneration Neg Hx        Social History     Tobacco Use    Smoking status: Former     Current packs/day: 0.00     Types: Cigarettes     Quit date: 2/3/2006     Years since quittin.0    Smokeless tobacco: Never   Substance Use Topics    Alcohol use: Not Currently    Drug use: No       Current Outpatient Medications   Medication Sig Dispense Refill    albuterol (PROVENTIL/VENTOLIN HFA) 90 mcg/actuation inhaler Inhale 2 puffs every 4 hours as needed for cough, wheeze, or shortness of breath 18 g 11    ALPRAZolam (XANAX) 0.25 MG tablet Take 1 tablet (0.25 mg total) by mouth nightly as needed. FOR INSOMNIA 30 tablet 2    aspirin (ECOTRIN) 81 MG EC tablet Take 1 tablet (81 mg total) by mouth once daily. 30 tablet 11    blood sugar diagnostic Strp Test blood glucose 3 (three) times daily. 100 each 1    blood-glucose meter kit Use as instructed 1 each 0    buPROPion (WELLBUTRIN XL) 150 MG TB24 tablet Take 2 tablets (300 mg total) by mouth once daily. 180 tablet 3    calcium carbonate-vitamin D3 600 mg-20 mcg (800 unit) Tab Take 1 tablet by mouth once daily. 30 tablet 11    insulin aspart U-100 (NOVOLOG) 100 unit/mL (3 mL) InPn pen Inject 4 Units into the skin 3 (three) times daily. Plus sliding scale insulin; MAX: 42 units/day 6 mL 1    k phos di & mono-sod phos mono (K-PHOS-NEUTRAL) 250 mg Tab Take 2 tablets by mouth once daily. 60 tablet 11    lancets 28 gauge Misc Test blood  "glucose 3 (three) times daily. 100 each 1    mycophenolate (CELLCEPT) 250 mg Cap Take 4 capsules (1,000 mg total) by mouth 2 (two) times daily. 240 capsule 11    pantoprazole (PROTONIX) 40 MG tablet Take 1 tablet (40 mg total) by mouth once daily. 30 tablet 1    pen needle, diabetic 32 gauge x 5/32" Ndle Use to inject insulin into the skin 3 (three) times daily. 90 each 1    propranoloL (INDERAL) 20 MG tablet Take 1 tablet (20 mg total) by mouth 2 (two) times daily. 60 tablet 11    SITagliptin phosphate (JANUVIA) 100 MG Tab Take 1 tablet (100 mg total) by mouth once daily. 30 tablet 11    sodium chloride for inhalation (SODIUM CHLORIDE 0.9%) 0.9 % nebulizer solution Take 3 mLs by nebulization 4 (four) times daily as needed (cough). 150 mL 2    sulfamethoxazole-trimethoprim 400-80mg (BACTRIM,SEPTRA) 400-80 mg per tablet Take 1 tablet by mouth once daily. Stop 6/8/24 30 tablet 5    tacrolimus (PROGRAF) 1 MG Cap Take 3 capsules (3 mg total) by mouth every 12 (twelve) hours. 180 capsule 11    traZODone (DESYREL) 50 MG tablet Take HALF tablet (25 mg total) by mouth every evening. 15 tablet 11    valGANciclovir (VALCYTE) 450 mg Tab Take 1 tablet (450 mg total) by mouth once daily. Stop 3/10/24 30 tablet 2     No current facility-administered medications for this visit.       Review of patient's allergies indicates:   Allergen Reactions    No known drug allergies        Review of Systems   Constitutional:  Negative for fever and weight loss.   Gastrointestinal:  Positive for constipation, diarrhea and heartburn. Negative for abdominal pain, blood in stool, melena, nausea and vomiting.   Neurological:  Positive for tremors and headaches.       Vitals:    02/26/24 1026   BP: (!) 150/74   Pulse: 75   Resp: 18   Temp: 97.2 °F (36.2 °C)   TempSrc: Tympanic   SpO2: 97%   Weight: 83.4 kg (183 lb 13.8 oz)   Height: 5' 7" (1.702 m)       Physical Exam  Constitutional:       General: She is not in acute distress.  Eyes:      " General: No scleral icterus.  Cardiovascular:      Rate and Rhythm: Normal rate and regular rhythm.   Pulmonary:      Effort: Pulmonary effort is normal. No respiratory distress.   Abdominal:      General: Bowel sounds are normal. There is no distension.      Palpations: Abdomen is soft.      Tenderness: There is no abdominal tenderness. There is no guarding or rebound.   Musculoskeletal:      Right lower leg: No edema.      Left lower leg: No edema.   Skin:     Coloration: Skin is not jaundiced.         LABS:  Lab Results   Component Value Date    WBC 3.28 (L) 02/20/2024    HGB 12.2 02/20/2024    HCT 38.5 02/20/2024    MCV 92 02/20/2024     (L) 02/20/2024       Lab Results   Component Value Date     02/20/2024    K 4.3 02/20/2024     02/20/2024    CO2 22 (L) 02/20/2024    BUN 22 02/20/2024    CREATININE 1.1 02/20/2024    CALCIUM 9.5 02/20/2024    ANIONGAP 10 02/20/2024    ESTGFRAFRICA >60 06/20/2022    EGFRNONAA >60 06/20/2022       Lab Results   Component Value Date    ALT 10 02/20/2024    AST 15 02/20/2024     (H) 04/13/2023    ALKPHOS 68 02/20/2024    BILITOT 0.6 02/20/2024       Lab Results   Component Value Date    TACROLIMUS 7.4 02/20/2024         Assessment:  63 y.o. 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    1. Liver transplanted    2. Long-term use of immunosuppressant medication    3. History of hepatocellular carcinoma        Recommendations:  Allograft Function  - Excellent graft function with normal LFTs    Immunosuppression   - Continue Prograf 3mg twice daily  - Continue CellCept 1000mg twice daily    History of HCC: Imaging surveillance per protocol.    Kidney function   - Creatinine 1.1  - Avoid NSAIDs as able and maintain adequate hydration    Health Maintenance/Screening:  - Recommend age appropriate cancer screening  - Skin cancer: Recommend use of sunscreen SPF 30 or higher, hat and sunglasses while outside,  dermatologist visit annually or sooner if any concerning lesions.  - Osteoporosis: Recommend bone density testing every 2-3 years if previously normal or annually if previously abnormal.    Return to clinic in 1 month.    UNOS Patient Status  Functional Status: 70% - Cares for self: unable to carry on normal activity or active work  Physical Capacity: No Limitations    I spent a total of 40 minutes on the day of the visit. This includes face to face time and non-face to face time preparing to see the patient (eg, review of tests), obtaining and/or reviewing separately obtained history, documenting clinical information in the electronic or other health record, independently interpreting results, and communicating results to the patient/family/caregiver, or care coordination.    This note includes dictation done using [employer] speech recognition program. Word recognition mistakes are occasionally missed on review.    James Brambila MD  Staff Physician  Hepatology and Liver Transplant  [facility]  [facility]

## 2024-02-26 NOTE — LETTER
February 26, 2024        Laila Will  [address]  [facility] [address]  Phone: [phone]  Fax: [phone]             [facility]  [address]  [facility] [address]  Phone: [phone]   Patient: Yumiko Gonzalez   MR Number: [id number]   YOB: 1960   Date of Visit: 2/26/2024       Dear Dr. Laila Will    Thank you for referring Yumiko Gonzalez to me for evaluation. Attached you will find relevant portions of my assessment and plan of care.    If you have questions, please do not hesitate to call me. I look forward to following Yumiko Gonzalez along with you.    Sincerely,    James Brambila MD    Enclosure    If you would like to receive this communication electronically, please contact [email or web address] or [phone] to request [employer] Link access.    [employer] Link is a tool which provides read-only access to select patient information with whom you have a relationship. Its easy to use and provides real time access to review your patients record including encounter summaries, notes, results, and demographic information.    If you feel you have received this communication in error or would no longer like to receive these types of communications, please e-mail [email or web address]

## 2024-02-27 ENCOUNTER — PATIENT MESSAGE (OUTPATIENT)
Dept: TRANSPLANT | Facility: CLINIC | Age: 64
End: 2024-02-27
Payer: COMMERCIAL

## 2024-02-27 ENCOUNTER — LAB VISIT (OUTPATIENT)
Dept: LAB | Facility: HOSPITAL | Age: 64
End: 2024-02-27
Attending: STUDENT IN AN ORGANIZED HEALTH CARE EDUCATION/TRAINING PROGRAM
Payer: COMMERCIAL

## 2024-02-27 ENCOUNTER — TELEPHONE (OUTPATIENT)
Dept: TRANSPLANT | Facility: CLINIC | Age: 64
End: 2024-02-27
Payer: COMMERCIAL

## 2024-02-27 DIAGNOSIS — Z94.4 S/P LIVER TRANSPLANT: ICD-10-CM

## 2024-02-27 LAB
ALBUMIN SERPL BCP-MCNC: 3.6 G/DL (ref 3.5–5.2)
ALP SERPL-CCNC: 61 U/L (ref 55–135)
ALT SERPL W/O P-5'-P-CCNC: 9 U/L (ref 10–44)
ANION GAP SERPL CALC-SCNC: 7 MMOL/L (ref 8–16)
AST SERPL-CCNC: 12 U/L (ref 10–40)
BASOPHILS NFR BLD: 0 % (ref 0–1.9)
BILIRUB SERPL-MCNC: 0.4 MG/DL (ref 0.1–1)
BUN SERPL-MCNC: 19 MG/DL (ref 8–23)
CALCIUM SERPL-MCNC: 9.2 MG/DL (ref 8.7–10.5)
CHLORIDE SERPL-SCNC: 108 MMOL/L (ref 95–110)
CO2 SERPL-SCNC: 22 MMOL/L (ref 23–29)
CREAT SERPL-MCNC: 1 MG/DL (ref 0.5–1.4)
DIFFERENTIAL METHOD BLD: ABNORMAL
EOSINOPHIL NFR BLD: 4 % (ref 0–8)
ERYTHROCYTE [DISTWIDTH] IN BLOOD BY AUTOMATED COUNT: 13.2 % (ref 11.5–14.5)
EST. GFR  (NO RACE VARIABLE): >60 ML/MIN/1.73 M^2
GLUCOSE SERPL-MCNC: 104 MG/DL (ref 70–110)
HCT VFR BLD AUTO: 37.4 % (ref 37–48.5)
HGB BLD-MCNC: 11.8 G/DL (ref 12–16)
IMM GRANULOCYTES # BLD AUTO: ABNORMAL K/UL
IMM GRANULOCYTES NFR BLD AUTO: ABNORMAL %
LYMPHOCYTES NFR BLD: 22 % (ref 18–48)
MCH RBC QN AUTO: 29.7 PG (ref 27–31)
MCHC RBC AUTO-ENTMCNC: 31.6 G/DL (ref 32–36)
MCV RBC AUTO: 94 FL (ref 82–98)
MONOCYTES NFR BLD: 4 % (ref 4–15)
NEUTROPHILS NFR BLD: 70 % (ref 38–73)
NRBC BLD-RTO: 0 /100 WBC
PLATELET # BLD AUTO: 120 K/UL (ref 150–450)
PLATELET BLD QL SMEAR: ABNORMAL
PMV BLD AUTO: 10 FL (ref 9.2–12.9)
POTASSIUM SERPL-SCNC: 4.3 MMOL/L (ref 3.5–5.1)
PROT SERPL-MCNC: 6 G/DL (ref 6–8.4)
RBC # BLD AUTO: 3.97 M/UL (ref 4–5.4)
SODIUM SERPL-SCNC: 137 MMOL/L (ref 136–145)
WBC # BLD AUTO: 3.38 K/UL (ref 3.9–12.7)

## 2024-02-27 PROCEDURE — 80197 ASSAY OF TACROLIMUS: CPT | Performed by: STUDENT IN AN ORGANIZED HEALTH CARE EDUCATION/TRAINING PROGRAM

## 2024-02-27 PROCEDURE — 36415 COLL VENOUS BLD VENIPUNCTURE: CPT | Performed by: STUDENT IN AN ORGANIZED HEALTH CARE EDUCATION/TRAINING PROGRAM

## 2024-02-27 PROCEDURE — 85027 COMPLETE CBC AUTOMATED: CPT | Performed by: STUDENT IN AN ORGANIZED HEALTH CARE EDUCATION/TRAINING PROGRAM

## 2024-02-27 PROCEDURE — 80053 COMPREHEN METABOLIC PANEL: CPT | Performed by: STUDENT IN AN ORGANIZED HEALTH CARE EDUCATION/TRAINING PROGRAM

## 2024-02-27 PROCEDURE — 85007 BL SMEAR W/DIFF WBC COUNT: CPT | Performed by: STUDENT IN AN ORGANIZED HEALTH CARE EDUCATION/TRAINING PROGRAM

## 2024-02-28 LAB — TACROLIMUS BLD-MCNC: 5.6 NG/ML (ref 5–15)

## 2024-03-04 ENCOUNTER — TELEPHONE (OUTPATIENT)
Dept: TRANSPLANT | Facility: CLINIC | Age: 64
End: 2024-03-04
Payer: COMMERCIAL

## 2024-03-04 ENCOUNTER — PATIENT MESSAGE (OUTPATIENT)
Dept: TRANSPLANT | Facility: CLINIC | Age: 64
End: 2024-03-04
Payer: COMMERCIAL

## 2024-03-04 NOTE — TELEPHONE ENCOUNTER
Pt advised via portal of stable ultrasound.  ----- Message from James Brambila MD sent at 2/27/2024 12:03 PM CST -----  Reviewed. Liver and blood vessels appear normal.

## 2024-03-05 ENCOUNTER — PATIENT MESSAGE (OUTPATIENT)
Dept: PRIMARY CARE CLINIC | Facility: CLINIC | Age: 64
End: 2024-03-05
Payer: COMMERCIAL

## 2024-03-05 ENCOUNTER — PATIENT MESSAGE (OUTPATIENT)
Dept: OBSTETRICS AND GYNECOLOGY | Facility: CLINIC | Age: 64
End: 2024-03-05
Payer: COMMERCIAL

## 2024-03-05 DIAGNOSIS — Z12.31 VISIT FOR SCREENING MAMMOGRAM: Primary | ICD-10-CM

## 2024-03-05 RX ORDER — OMEPRAZOLE 20 MG/1
20 CAPSULE, DELAYED RELEASE ORAL DAILY
Qty: 90 CAPSULE | Refills: 0 | Status: SHIPPED | OUTPATIENT
Start: 2024-03-05 | End: 2024-06-05

## 2024-03-06 ENCOUNTER — TELEPHONE (OUTPATIENT)
Dept: TRANSPLANT | Facility: CLINIC | Age: 64
End: 2024-03-06
Payer: COMMERCIAL

## 2024-03-06 ENCOUNTER — TELEPHONE (OUTPATIENT)
Dept: PRIMARY CARE CLINIC | Facility: CLINIC | Age: 64
End: 2024-03-06
Payer: COMMERCIAL

## 2024-03-06 ENCOUNTER — PATIENT MESSAGE (OUTPATIENT)
Dept: TRANSPLANT | Facility: CLINIC | Age: 64
End: 2024-03-06
Payer: COMMERCIAL

## 2024-03-06 DIAGNOSIS — Z94.4 S/P LIVER TRANSPLANT: Primary | ICD-10-CM

## 2024-03-06 DIAGNOSIS — Z85.05 HISTORY OF HEPATOCELLULAR CARCINOMA: ICD-10-CM

## 2024-03-06 NOTE — TELEPHONE ENCOUNTER
Pt notified via portal of stable labs and that no medication changes are needed. Repeat labs due 3/11/24 per protocol.   ----- Message from James Brambila MD sent at 3/6/2024 11:22 AM CST -----  Liver tests are stable. No changes in her immunosuppression. Please continue to monitor labs per transplant protocol.

## 2024-03-06 NOTE — TELEPHONE ENCOUNTER
Pt has recently had a liver transplant and clinician. Clinician is calling to check on pt. She is living with her daughter in [place] temporarily and therefore unable to conduct a session.     Pt stated health wise, she is feeling better. She said mental health wise she stated she is doing okay. She said she is able to attend ADL's, and some improvement with energy. She said she continues to have PT once a week. She said she is doing her home.     She is scheduled in April 16 at 2:30

## 2024-03-07 ENCOUNTER — DOCUMENT SCAN (OUTPATIENT)
Dept: HOME HEALTH SERVICES | Facility: HOSPITAL | Age: 64
End: 2024-03-07
Payer: COMMERCIAL

## 2024-03-07 DIAGNOSIS — T38.0X5D ADVERSE EFFECT OF CORTICOSTEROIDS, SUBSEQUENT ENCOUNTER: ICD-10-CM

## 2024-03-11 ENCOUNTER — HOSPITAL ENCOUNTER (OUTPATIENT)
Dept: RADIOLOGY | Facility: HOSPITAL | Age: 64
Discharge: HOME | End: 2024-03-11
Attending: STUDENT IN AN ORGANIZED HEALTH CARE EDUCATION/TRAINING PROGRAM
Payer: COMMERCIAL

## 2024-03-11 ENCOUNTER — LAB VISIT (OUTPATIENT)
Dept: LAB | Facility: HOSPITAL | Age: 64
End: 2024-03-11
Attending: STUDENT IN AN ORGANIZED HEALTH CARE EDUCATION/TRAINING PROGRAM
Payer: COMMERCIAL

## 2024-03-11 DIAGNOSIS — Z94.4 S/P LIVER TRANSPLANT: ICD-10-CM

## 2024-03-11 DIAGNOSIS — Z85.05 HISTORY OF HEPATOCELLULAR CARCINOMA: ICD-10-CM

## 2024-03-11 LAB
ALBUMIN SERPL BCP-MCNC: 3.5 G/DL (ref 3.5–5.2)
ALP SERPL-CCNC: 71 U/L (ref 55–135)
ALT SERPL W/O P-5'-P-CCNC: 9 U/L (ref 10–44)
ANION GAP SERPL CALC-SCNC: 9 MMOL/L (ref 8–16)
AST SERPL-CCNC: 15 U/L (ref 10–40)
BASOPHILS NFR BLD: 0 % (ref 0–1.9)
BILIRUB SERPL-MCNC: 0.4 MG/DL (ref 0.1–1)
BUN SERPL-MCNC: 27 MG/DL (ref 8–23)
CALCIUM SERPL-MCNC: 9.1 MG/DL (ref 8.7–10.5)
CHLORIDE SERPL-SCNC: 107 MMOL/L (ref 95–110)
CO2 SERPL-SCNC: 23 MMOL/L (ref 23–29)
CREAT SERPL-MCNC: 0.9 MG/DL (ref 0.5–1.4)
DIFFERENTIAL METHOD BLD: ABNORMAL
EOSINOPHIL NFR BLD: 6 % (ref 0–8)
ERYTHROCYTE [DISTWIDTH] IN BLOOD BY AUTOMATED COUNT: 13.1 % (ref 11.5–14.5)
EST. GFR  (NO RACE VARIABLE): >60 ML/MIN/1.73 M^2
GLUCOSE SERPL-MCNC: 102 MG/DL (ref 70–110)
HCT VFR BLD AUTO: 37.5 % (ref 37–48.5)
HGB BLD-MCNC: 11.6 G/DL (ref 12–16)
IMM GRANULOCYTES # BLD AUTO: ABNORMAL K/UL
IMM GRANULOCYTES NFR BLD AUTO: ABNORMAL %
LYMPHOCYTES NFR BLD: 10 % (ref 18–48)
MCH RBC QN AUTO: 28.9 PG (ref 27–31)
MCHC RBC AUTO-ENTMCNC: 30.9 G/DL (ref 32–36)
MCV RBC AUTO: 93 FL (ref 82–98)
MONOCYTES NFR BLD: 6 % (ref 4–15)
NEUTROPHILS NFR BLD: 68 % (ref 38–73)
NEUTS BAND NFR BLD MANUAL: 10 %
NRBC BLD-RTO: 0 /100 WBC
PLATELET # BLD AUTO: 129 K/UL (ref 150–450)
PLATELET BLD QL SMEAR: ABNORMAL
PMV BLD AUTO: 11.1 FL (ref 9.2–12.9)
POTASSIUM SERPL-SCNC: 3.9 MMOL/L (ref 3.5–5.1)
PROT SERPL-MCNC: 6.2 G/DL (ref 6–8.4)
RBC # BLD AUTO: 4.02 M/UL (ref 4–5.4)
SODIUM SERPL-SCNC: 139 MMOL/L (ref 136–145)
WBC # BLD AUTO: 3.22 K/UL (ref 3.9–12.7)

## 2024-03-11 PROCEDURE — 36415 COLL VENOUS BLD VENIPUNCTURE: CPT | Performed by: STUDENT IN AN ORGANIZED HEALTH CARE EDUCATION/TRAINING PROGRAM

## 2024-03-11 PROCEDURE — 80053 COMPREHEN METABOLIC PANEL: CPT | Performed by: STUDENT IN AN ORGANIZED HEALTH CARE EDUCATION/TRAINING PROGRAM

## 2024-03-11 PROCEDURE — 80197 ASSAY OF TACROLIMUS: CPT | Performed by: STUDENT IN AN ORGANIZED HEALTH CARE EDUCATION/TRAINING PROGRAM

## 2024-03-11 PROCEDURE — 76705 ECHO EXAM OF ABDOMEN: CPT | Mod: TC,59,PO

## 2024-03-11 PROCEDURE — 85027 COMPLETE CBC AUTOMATED: CPT | Performed by: STUDENT IN AN ORGANIZED HEALTH CARE EDUCATION/TRAINING PROGRAM

## 2024-03-11 PROCEDURE — 82105 ALPHA-FETOPROTEIN SERUM: CPT | Performed by: STUDENT IN AN ORGANIZED HEALTH CARE EDUCATION/TRAINING PROGRAM

## 2024-03-11 PROCEDURE — 85007 BL SMEAR W/DIFF WBC COUNT: CPT | Performed by: STUDENT IN AN ORGANIZED HEALTH CARE EDUCATION/TRAINING PROGRAM

## 2024-03-12 LAB — TACROLIMUS BLD-MCNC: 7.2 NG/ML (ref 5–15)

## 2024-03-13 ENCOUNTER — PATIENT MESSAGE (OUTPATIENT)
Dept: TRANSPLANT | Facility: CLINIC | Age: 64
End: 2024-03-13
Payer: COMMERCIAL

## 2024-03-13 ENCOUNTER — TELEPHONE (OUTPATIENT)
Dept: TRANSPLANT | Facility: CLINIC | Age: 64
End: 2024-03-13
Payer: COMMERCIAL

## 2024-03-13 LAB — AFP-TM SERPL-MCNC: 1.9 NG/ML

## 2024-03-13 NOTE — TELEPHONE ENCOUNTER
Pt notified via portal of stable labs and that no medication changes are needed. Repeat labs due 3/25/24 per protocol.   ----- Message from James Brambila MD sent at 3/13/2024  2:14 PM CDT -----  Liver tests are stable. No changes in her immunosuppression. Please continue to monitor labs per transplant protocol.

## 2024-03-13 NOTE — TELEPHONE ENCOUNTER
Pt advised  ----- Message from James Brambila MD sent at 3/13/2024  1:03 PM CDT -----  Reviewed. Liver and blood vessels appear normal.

## 2024-03-17 ENCOUNTER — PATIENT MESSAGE (OUTPATIENT)
Dept: TRANSPLANT | Facility: CLINIC | Age: 64
End: 2024-03-17
Payer: COMMERCIAL

## 2024-03-18 PROBLEM — N17.9 AKI (ACUTE KIDNEY INJURY): Status: RESOLVED | Noted: 2023-12-13 | Resolved: 2024-03-18

## 2024-03-19 ENCOUNTER — OFFICE VISIT (OUTPATIENT)
Dept: PRIMARY CARE CLINIC | Facility: CLINIC | Age: 64
End: 2024-03-19
Payer: COMMERCIAL

## 2024-03-19 VITALS
SYSTOLIC BLOOD PRESSURE: 130 MMHG | OXYGEN SATURATION: 99 % | DIASTOLIC BLOOD PRESSURE: 88 MMHG | HEIGHT: 67 IN | WEIGHT: 192.13 LBS | BODY MASS INDEX: 30.16 KG/M2 | HEART RATE: 71 BPM

## 2024-03-19 DIAGNOSIS — Z12.83 SKIN CANCER SCREENING: ICD-10-CM

## 2024-03-19 DIAGNOSIS — I10 PRIMARY HYPERTENSION: Primary | Chronic | ICD-10-CM

## 2024-03-19 DIAGNOSIS — D84.9 IMMUNOSUPPRESSED STATUS: ICD-10-CM

## 2024-03-19 DIAGNOSIS — R73.9 STEROID-INDUCED HYPERGLYCEMIA: Chronic | ICD-10-CM

## 2024-03-19 DIAGNOSIS — T38.0X5A STEROID-INDUCED HYPERGLYCEMIA: Chronic | ICD-10-CM

## 2024-03-19 PROCEDURE — 99214 OFFICE O/P EST MOD 30 MIN: CPT | Mod: S$GLB,,, | Performed by: FAMILY MEDICINE

## 2024-03-19 PROCEDURE — 99999 PR PBB SHADOW E&M-EST. PATIENT-LVL IV: CPT | Mod: PBBFAC,,, | Performed by: FAMILY MEDICINE

## 2024-03-19 NOTE — PROGRESS NOTES
Primary Care Provider Appointment   [facility]       Patient ID: Yumiko Gonzalez is a 63 y.o. female.    ASSESSMENT/PLAN by Problem List:    1. Primary hypertension  Assessment & Plan:    Diastolics have been borderline.  Will continue to monitor and continue same medications for now, follow back in 4-6 weeks.      2. Steroid-induced hyperglycemia  Assessment & Plan:  Remains on Januvia, has not needed insulin sliding scale recently,   Lab Results   Component Value Date    HGBA1C 4.9 02/12/2024           3. Skin cancer screening  -     Ambulatory referral/consult to Dermatology; Future; Expected date: 03/26/2024    4. Immunosuppressed status  -     Ambulatory referral/consult to Dermatology; Future; Expected date: 03/26/2024         Follow Up:    4-6 weeks    Subjective:     Chief Complaint   Patient presents with    Follow-up     5 week     I have reviewed the information entered by the ancillary staff regarding the chief complaint as well as the related history.    Follow-up  Associated symptoms include arthralgias, headaches, neck pain and weakness. Pertinent negatives include no chest pain, joint swelling or vomiting.       Patient is a/an 63 y.o.  female      Follow-up several topics, continues to do very well after liver transplant.  She is back driving now.  Doing more for herself but still living with her daughter but planning on moving home soon.  But since she has been away she has not been able to do physical therapy but will resume once she moves back in town     Most glucose readings have been in the upper 90s or low 100s.  Occasionally upper 120s.  She has not needed to use sliding scale insulin.  She does remain on Januvia as started by Endocrinology for steroid induced hyperglycemia related to recent liver transplant.       See above for all topics addressed today    For complete problem list, past medical history, surgical history, social history, etc., see  Patient Name: Tomi Rosas  Procedure Date: 5/15/2019 1:56 PM  MRN: [id number]  Account Number: [id number]  YOB: 67 year old male.  Providers:            Viraj Bernard MD  Referring MD:         Reinaldo Ybarra appropriate section in the electronic medical record    Review of Systems   Constitutional:  Positive for activity change. Negative for unexpected weight change.   HENT:  Positive for hearing loss and rhinorrhea. Negative for trouble swallowing.    Eyes:  Positive for visual disturbance. Negative for discharge.   Respiratory:  Negative for chest tightness and wheezing.    Cardiovascular:  Positive for palpitations. Negative for chest pain.   Gastrointestinal:  Negative for blood in stool, constipation, diarrhea and vomiting.   Endocrine: Negative for polydipsia and polyuria.   Genitourinary:  Negative for difficulty urinating, dysuria, hematuria and menstrual problem.   Musculoskeletal:  Positive for arthralgias and neck pain. Negative for joint swelling.   Neurological:  Positive for weakness and headaches.   Psychiatric/Behavioral:  Negative for confusion and dysphoric mood.        Objective     Physical Exam  Vitals reviewed.   Constitutional:       General: She is not in acute distress.     Appearance: She is well-developed. She is not ill-appearing.   HENT:      Head: Normocephalic and atraumatic.      Mouth/Throat:      Pharynx: Oropharynx is clear.   Eyes:      General: No scleral icterus.     Conjunctiva/sclera: Conjunctivae normal.   Cardiovascular:      Rate and Rhythm: Normal rate and regular rhythm.      Heart sounds: Normal heart sounds. No murmur heard.     No friction rub.      Comments: Trace ankle edema at most bilaterally  Pulmonary:      Effort: Pulmonary effort is normal. No respiratory distress.      Breath sounds: Normal breath sounds.      Comments: Mildly diminished breath sounds, few rhonchi but mostly clear no wheezing  Abdominal:      Comments:  Abdomen is soft, bowel sounds are normal.   Skin:     General: Skin is dry.      Findings: No rash.   Neurological:      Mental Status: She is alert and oriented to person, place, and time.   Psychiatric:         Behavior: Behavior normal.  "      Vitals:    03/19/24 1054   BP: 130/88   BP Location: Left arm   Patient Position: Sitting   BP Method: Medium (Manual)   Pulse: 71   SpO2: 99%   Weight: 87.1 kg (192 lb 2.1 oz)   Height: 5' 7" (1.702 m)           THIS DOCUMENT WAS MADE IN PART WITH VOICE RECOGNITION SOFTWARE.  OCCASIONALLY THIS SOFTWARE WILL MISINTERPRET WORDS OR PHRASES.    "

## 2024-03-19 NOTE — ASSESSMENT & PLAN NOTE
Remains on Januvia, has not needed insulin sliding scale recently,   Lab Results   Component Value Date    HGBA1C 4.9 02/12/2024

## 2024-03-19 NOTE — ASSESSMENT & PLAN NOTE
Diastolics have been borderline.  Will continue to monitor and continue same medications for now, follow back in 4-6 weeks.

## 2024-03-25 ENCOUNTER — LAB VISIT (OUTPATIENT)
Dept: LAB | Facility: HOSPITAL | Age: 64
End: 2024-03-25
Attending: STUDENT IN AN ORGANIZED HEALTH CARE EDUCATION/TRAINING PROGRAM
Payer: COMMERCIAL

## 2024-03-25 DIAGNOSIS — Z94.4 S/P LIVER TRANSPLANT: ICD-10-CM

## 2024-03-25 LAB
ALBUMIN SERPL BCP-MCNC: 3.6 G/DL (ref 3.5–5.2)
ALP SERPL-CCNC: 62 U/L (ref 55–135)
ALT SERPL W/O P-5'-P-CCNC: 12 U/L (ref 10–44)
ANION GAP SERPL CALC-SCNC: 9 MMOL/L (ref 8–16)
AST SERPL-CCNC: 17 U/L (ref 10–40)
BASOPHILS NFR BLD: 0 % (ref 0–1.9)
BILIRUB SERPL-MCNC: 0.3 MG/DL (ref 0.1–1)
BUN SERPL-MCNC: 24 MG/DL (ref 8–23)
CALCIUM SERPL-MCNC: 9.5 MG/DL (ref 8.7–10.5)
CHLORIDE SERPL-SCNC: 108 MMOL/L (ref 95–110)
CO2 SERPL-SCNC: 23 MMOL/L (ref 23–29)
CREAT SERPL-MCNC: 1 MG/DL (ref 0.5–1.4)
DIFFERENTIAL METHOD BLD: ABNORMAL
EOSINOPHIL NFR BLD: 3 % (ref 0–8)
ERYTHROCYTE [DISTWIDTH] IN BLOOD BY AUTOMATED COUNT: 13.5 % (ref 11.5–14.5)
EST. GFR  (NO RACE VARIABLE): >60 ML/MIN/1.73 M^2
GLUCOSE SERPL-MCNC: 102 MG/DL (ref 70–110)
HCT VFR BLD AUTO: 37.5 % (ref 37–48.5)
HGB BLD-MCNC: 11.8 G/DL (ref 12–16)
IMM GRANULOCYTES # BLD AUTO: ABNORMAL K/UL (ref 0–0.04)
IMM GRANULOCYTES NFR BLD AUTO: ABNORMAL % (ref 0–0.5)
LYMPHOCYTES NFR BLD: 8 % (ref 18–48)
MCH RBC QN AUTO: 29.3 PG (ref 27–31)
MCHC RBC AUTO-ENTMCNC: 31.5 G/DL (ref 32–36)
MCV RBC AUTO: 93 FL (ref 82–98)
METAMYELOCYTES NFR BLD MANUAL: 6 %
MONOCYTES NFR BLD: 13 % (ref 4–15)
NEUTROPHILS NFR BLD: 54 % (ref 38–73)
NEUTS BAND NFR BLD MANUAL: 16 %
NRBC BLD-RTO: 0 /100 WBC
PLATELET # BLD AUTO: 119 K/UL (ref 150–450)
PLATELET BLD QL SMEAR: ABNORMAL
PMV BLD AUTO: 9.2 FL (ref 9.2–12.9)
POTASSIUM SERPL-SCNC: 4.5 MMOL/L (ref 3.5–5.1)
PROT SERPL-MCNC: 5.9 G/DL (ref 6–8.4)
RBC # BLD AUTO: 4.03 M/UL (ref 4–5.4)
SODIUM SERPL-SCNC: 140 MMOL/L (ref 136–145)
WBC # BLD AUTO: 3.75 K/UL (ref 3.9–12.7)

## 2024-03-25 PROCEDURE — 85027 COMPLETE CBC AUTOMATED: CPT | Performed by: STUDENT IN AN ORGANIZED HEALTH CARE EDUCATION/TRAINING PROGRAM

## 2024-03-25 PROCEDURE — 36415 COLL VENOUS BLD VENIPUNCTURE: CPT | Performed by: STUDENT IN AN ORGANIZED HEALTH CARE EDUCATION/TRAINING PROGRAM

## 2024-03-25 PROCEDURE — 80053 COMPREHEN METABOLIC PANEL: CPT | Performed by: STUDENT IN AN ORGANIZED HEALTH CARE EDUCATION/TRAINING PROGRAM

## 2024-03-25 PROCEDURE — 80197 ASSAY OF TACROLIMUS: CPT | Performed by: STUDENT IN AN ORGANIZED HEALTH CARE EDUCATION/TRAINING PROGRAM

## 2024-03-25 PROCEDURE — 85007 BL SMEAR W/DIFF WBC COUNT: CPT | Performed by: STUDENT IN AN ORGANIZED HEALTH CARE EDUCATION/TRAINING PROGRAM

## 2024-03-26 NOTE — TELEPHONE ENCOUNTER
No care due was identified.  [facility] Embedded Care Due Messages. Reference number: [id number].   3/26/2024 12:55:23 PM CDT

## 2024-03-27 LAB — TACROLIMUS BLD-MCNC: 6.6 NG/ML (ref 5–15)

## 2024-03-27 RX ORDER — BUPROPION HYDROCHLORIDE 150 MG/1
300 TABLET ORAL DAILY
Qty: 180 TABLET | Refills: 3 | Status: SHIPPED | OUTPATIENT
Start: 2024-03-27

## 2024-03-27 NOTE — TELEPHONE ENCOUNTER
Refill Routing Note   Medication(s) are not appropriate for processing by [facility] for the following reason(s):      Drug-disease interaction    [facility] action(s):  Defer Care Due:  None identified     Medication Therapy Plan: buPROPion and Hepatic encephalopathy; Portal hypertension; Cirrhosis; HCC (hepatocellular carcinoma); Nonalcoholic steatohepatitis; Cirrhosis of liver with ascites    Pharmacist review requested: Yes     Appointments  past 12m or future 3m with PCP    Date Provider   Last Visit   3/19/2024 Garrett Webb MD   Next Visit   4/22/2024 Garrett Webb MD   ED visits in past 90 days: 0        Note composed:7:31 PM 03/26/2024

## 2024-03-27 NOTE — TELEPHONE ENCOUNTER
Refill Decision Note   Yumiko Carlos  is requesting a refill authorization.  Brief Assessment and Rationale for Refill:  Approve     Medication Therapy Plan:         Pharmacist review requested: Yes   Extended chart review required: Yes   Comments:     Note composed:11:18 AM 03/27/2024

## 2024-04-03 ENCOUNTER — TELEPHONE (OUTPATIENT)
Dept: TRANSPLANT | Facility: CLINIC | Age: 64
End: 2024-04-03
Payer: COMMERCIAL

## 2024-04-03 ENCOUNTER — HOSPITAL ENCOUNTER (OUTPATIENT)
Dept: RADIOLOGY | Facility: HOSPITAL | Age: 64
Discharge: HOME | End: 2024-04-03
Attending: OBSTETRICS & GYNECOLOGY
Payer: COMMERCIAL

## 2024-04-03 ENCOUNTER — PATIENT MESSAGE (OUTPATIENT)
Dept: TRANSPLANT | Facility: CLINIC | Age: 64
End: 2024-04-03
Payer: COMMERCIAL

## 2024-04-03 ENCOUNTER — OFFICE VISIT (OUTPATIENT)
Dept: OBSTETRICS AND GYNECOLOGY | Facility: CLINIC | Age: 64
End: 2024-04-03
Payer: COMMERCIAL

## 2024-04-03 ENCOUNTER — PATIENT MESSAGE (OUTPATIENT)
Dept: OBSTETRICS AND GYNECOLOGY | Facility: CLINIC | Age: 64
End: 2024-04-03

## 2024-04-03 VITALS
WEIGHT: 196.19 LBS | DIASTOLIC BLOOD PRESSURE: 76 MMHG | SYSTOLIC BLOOD PRESSURE: 122 MMHG | BODY MASS INDEX: 30.79 KG/M2 | HEIGHT: 67 IN

## 2024-04-03 DIAGNOSIS — Z01.419 GYNECOLOGIC EXAM NORMAL: Primary | ICD-10-CM

## 2024-04-03 DIAGNOSIS — Z12.31 VISIT FOR SCREENING MAMMOGRAM: ICD-10-CM

## 2024-04-03 PROCEDURE — 99396 PREV VISIT EST AGE 40-64: CPT | Mod: S$GLB,,, | Performed by: OBSTETRICS & GYNECOLOGY

## 2024-04-03 PROCEDURE — 77063 BREAST TOMOSYNTHESIS BI: CPT | Mod: 26,,, | Performed by: RADIOLOGY

## 2024-04-03 PROCEDURE — 77067 SCR MAMMO BI INCL CAD: CPT | Mod: 26,,, | Performed by: RADIOLOGY

## 2024-04-03 PROCEDURE — 77067 SCR MAMMO BI INCL CAD: CPT | Mod: TC,PN

## 2024-04-03 PROCEDURE — 99999 PR PBB SHADOW E&M-EST. PATIENT-LVL IV: CPT | Mod: PBBFAC,,, | Performed by: OBSTETRICS & GYNECOLOGY

## 2024-04-03 PROCEDURE — 88175 CYTOPATH C/V AUTO FLUID REDO: CPT | Performed by: OBSTETRICS & GYNECOLOGY

## 2024-04-03 RX ORDER — BUTALBITAL, ACETAMINOPHEN AND CAFFEINE 50; 325; 40 MG/1; MG/1; MG/1
1 TABLET ORAL EVERY 4 HOURS PRN
COMMUNITY
End: 2024-04-10 | Stop reason: SDUPTHER

## 2024-04-03 NOTE — TELEPHONE ENCOUNTER
Pt notified via portal of stable labs and that no medication changes are needed. Repeat labs due 4/8/24 per protocol.   ----- Message from James Brambila MD sent at 4/3/2024 10:16 AM CDT -----  Liver tests are stable. No changes in her immunosuppression. Please continue to monitor labs per transplant protocol.

## 2024-04-03 NOTE — PROGRESS NOTES
"Chief Complaint   Patient presents with    Well Woman       History and Physical:  No LMP recorded. Patient is postmenopausal.       Yumiko Gonzalez is a 63 y.o.   female who presents today for her routine annual GYN exam. The patient has no Gynecology complaints today. 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, postitive antismooth muscle antibody    Blood transfusion     Bronchitis     bronchospasm on occasion     Cancer     carcinoma in situ    Cervical neck pain with evidence of disc disease 2012    can bend neck    Cirrhosis     non-alcoholic, compensated    Colon polyps 2012    Depression 2012    Hx panic attacks    Diverticular disease 2012    never diverticulitis    Fatty liver 2012    Fibrocystic breast     Fine tremor     hands,chronic    Hepatosplenomegaly     History of abdominal paracentesis 2023    8.7L of fluid drained    Hypertension 2013    Knee pain, bilateral     chronic, with hands,feet,fingers also painful    Lesion of right lung     Liver disease     "partially sclerosed liver" per pt    Migraines past Hx    Nephrolithiasis     stone episode 2021    Pleural effusion     small, compensated, good bilateral breath sounds per Dr Suresh GUTIERRES (postoperative nausea and vomiting)     twice after childbirth    Postpartum depression     Splenomegaly     Thrombocytopenia     Tremors of nervous system        Past Surgical History:   Procedure Laterality Date    ADENOIDECTOMY      CERVICAL CONIZATION   W/ LASER       SECTION      x3    COLONOSCOPY N/A 2016    Procedure: COLONOSCOPY;  Surgeon: " James Palomares MD;  Location: [facility] ENDO;  Service: Endoscopy;  Laterality: N/A;    COLONOSCOPY N/A 2/3/2022    Procedure: COLONOSCOPY;  Surgeon: James Palomares MD;  Location: [facility] ENDO;  Service: Endoscopy;  Laterality: N/A;    EMBOLIZATION N/A 7/24/2018    Procedure: EMBOLIZATION, BLOOD VESSEL;  Surgeon: Joselin Surgeon;  Location: [facility];  Service: Radiology;  Laterality: N/A;    ENDOMETRIAL ABLATION      ESOPHAGOGASTRODUODENOSCOPY N/A 1/28/2020    Procedure: ESOPHAGOGASTRODUODENOSCOPY (EGD);  Surgeon: James Palomares MD;  Location: [facility] ENDO;  Service: Endoscopy;  Laterality: N/A;    ESOPHAGOGASTRODUODENOSCOPY N/A 3/8/2022    Procedure: EGD (ESOPHAGOGASTRODUODENOSCOPY);  Surgeon: James Palomares MD;  Location: [facility] ENDO;  Service: Endoscopy;  Laterality: N/A;    LIVER BIOPSY      LIVER TRANSPLANT N/A 12/9/2023    Procedure: TRANSPLANT, LIVER;  Surgeon: Gurpreet Raza MD;  Location: [address];  Service: Transplant;  Laterality: N/A;    PELVIC LAPAROSCOPY      TONSILLECTOMY      TUBAL LIGATION         MEDS:   Current Outpatient Medications on File Prior to Visit   Medication Sig Dispense Refill    albuterol (PROVENTIL/VENTOLIN HFA) 90 mcg/actuation inhaler Inhale 2 puffs every 4 hours as needed for cough, wheeze, or shortness of breath 18 g 11    ALPRAZolam (XANAX) 0.25 MG tablet Take 1 tablet (0.25 mg total) by mouth nightly as needed. FOR INSOMNIA 30 tablet 2    aspirin (ECOTRIN) 81 MG EC tablet Take 1 tablet (81 mg total) by mouth once daily. 30 tablet 11    blood sugar diagnostic (FREESTYLE LITE STRIPS) Strp Test blood glucose 3 (three) times daily. 100 each 1    blood-glucose meter kit Use as instructed 1 each 0    buPROPion (WELLBUTRIN XL) 150 MG TB24 tablet Take 2 tablets (300 mg total) by mouth once daily. 180 tablet 3    butalbital-acetaminophen-caffeine -40 mg (FIORICET, ESGIC) -40 mg per tablet Take 1 tablet by mouth every 4 (four) hours as needed for Pain.       "calcium carbonate-vitamin D3 600 mg-20 mcg (800 unit) Tab Take 1 tablet by mouth once daily. 30 tablet 11    insulin aspart U-100 (NOVOLOG) 100 unit/mL (3 mL) InPn pen Inject 4 Units into the skin 3 (three) times daily. Plus sliding scale insulin; MAX: 42 units/day 6 mL 1    k phos di & mono-sod phos mono (K-PHOS-NEUTRAL) 250 mg Tab Take 2 tablets by mouth once daily. 60 tablet 11    lancets 28 gauge Misc Test blood glucose 3 (three) times daily. 100 each 1    mycophenolate (CELLCEPT) 250 mg Cap Take 4 capsules (1,000 mg total) by mouth 2 (two) times daily. 240 capsule 11    omeprazole (PRILOSEC) 20 MG capsule Take 1 capsule (20 mg total) by mouth once daily. 90 capsule 0    pen needle, diabetic 32 gauge x 5/32" Ndle Use to inject insulin into the skin 3 (three) times daily. 90 each 1    propranoloL (INDERAL) 20 MG tablet Take 1 tablet (20 mg total) by mouth 2 (two) times daily. 60 tablet 11    SITagliptin phosphate (JANUVIA) 100 MG Tab Take 1 tablet (100 mg total) by mouth once daily. 30 tablet 11    sulfamethoxazole-trimethoprim 400-80mg (BACTRIM,SEPTRA) 400-80 mg per tablet Take 1 tablet by mouth once daily. Stop 24 30 tablet 5    tacrolimus (PROGRAF) 1 MG Cap Take 3 capsules (3 mg total) by mouth every 12 (twelve) hours. 180 capsule 11    traZODone (DESYREL) 50 MG tablet Take HALF tablet (25 mg total) by mouth every evening. (Patient taking differently: Take 25 mg by mouth nightly as needed.) 15 tablet 11    sodium chloride for inhalation (SODIUM CHLORIDE 0.9%) 0.9 % nebulizer solution Take 3 mLs by nebulization 4 (four) times daily as needed (cough). (Patient not taking: Reported on 4/3/2024) 150 mL 2     No current facility-administered medications on file prior to visit.       OB History          7    Para   6    Term   3            AB   1    Living             SAB   1    IAB        Ectopic        Multiple        Live Births                     Social History     Socioeconomic History    " Marital status:    Occupational History    Occupation: [employer] employee x 30 yrs   Tobacco Use    Smoking status: Former     Current packs/day: 0.00     Types: Cigarettes     Quit date: 2/3/2006     Years since quittin.1    Smokeless tobacco: Never   Substance and Sexual Activity    Alcohol use: Not Currently    Drug use: No    Sexual activity: Not Currently     Social Determinants of Health     Financial Resource Strain: Low Risk  (2023)    Overall Financial Resource Strain (CARDIA)     Difficulty of Paying Living Expenses: Not very hard   Food Insecurity: No Food Insecurity (2023)    Hunger Vital Sign     Worried About Running Out of Food in the Last Year: Never true     Ran Out of Food in the Last Year: Never true   Transportation Needs: No Transportation Needs (2023)    PRAPARE - Transportation     Lack of Transportation (Medical): No     Lack of Transportation (Non-Medical): No   Physical Activity: Inactive (2023)    Exercise Vital Sign     Days of Exercise per Week: 0 days     Minutes of Exercise per Session: 0 min   Stress: Stress Concern Present (2023)    [place] [place] [place] [employer] - Occupational Stress Questionnaire     Feeling of Stress : Very much   Social Connections: Unknown (2023)    Social Connection and Isolation Panel [NHANES]     Frequency of Communication with Friends and Family: More than three times a week     Frequency of Social Gatherings with Friends and Family: Twice a week     Active Member of Clubs or Organizations: No     Attends Club or Organization Meetings: Never     Marital Status:    Housing Stability: Low Risk  (2023)    Housing Stability Vital Sign     Unable to Pay for Housing in the Last Year: No     Number of Places Lived in the Last Year: 1     Unstable Housing in the Last Year: No       Family History   Problem Relation Age of Onset    Breast cancer Mother 70    Heart disease Mother     Hypertension  "Mother     Tremor Mother     Diabetes Father     Heart disease Father     Diabetes Sister     Cancer Sister     Breast cancer Sister     Diabetes Sister     Tremor Daughter     Breast cancer Maternal Aunt 70    Multiple sclerosis Maternal Aunt     Multiple sclerosis Maternal Aunt     Breast cancer Maternal Grandmother 70    Diabetes Brother     Emphysema Brother     Breast cancer Maternal Cousin 40    Ovarian cancer Neg Hx     Parkinsonism Neg Hx     Glaucoma Neg Hx     Macular degeneration Neg Hx          Past medical and surgical history reviewed.   I have reviewed the patient's medical history in detail and updated the computerized patient record.    Review of System:   General: no chills, fever, night sweats, weight gain or weight loss  Psychological: no depression or suicidal ideation  Breasts: no new or changing breast lumps, nipple discharge or masses.  Respiratory: no cough, shortness of breath, or wheezing  Cardiovascular: no chest pain or dyspnea on exertion  Gastrointestinal: no abdominal pain, change in bowel habits, or black or bloody stools  Genito-Urinary: no incontinence, urinary frequency/urgency or vulvar/vaginal symptoms, pelvic pain or abnormal vaginal bleeding.  Musculoskeletal: no gait disturbance or muscular weakness      Physical Exam:   /76   Ht 5' 7" (1.702 m)   Wt 89 kg (196 lb 3.4 oz)   BMI 30.73 kg/m²   Constitutional: She appears alert and responsive. She appears well-developed, well-groomed, and well-nourished. No distress. OverWeight   HENT:   Head: Normocephalic and atraumatic.   Eyes: Conjunctivae and EOM are normal. No scleral icterus.   Neck: Symmetrical. Normal range of motion. Neck supple. No tracheal deviation present.   Cardiovascular: Normal rate, no rhythm abnormality noted. Extremities without swelling or edema, warm.    Pulmonary/Chest: Normal respiratory Effort. No distress or retractions. She exhibits no tenderness.  Breasts: are symmetrical.   Right breast " exhibits no inverted nipple, no mass, no nipple discharge, no skin change and no tenderness.   Left breast exhibits no inverted nipple, no mass, no nipple discharge, no skin change and no tenderness.  Abdominal: Soft. She exhibits no distension, hernias or masses. There is no tenderness. No enlargement of liver edge or spleen.  There is no rebound and no guarding.   Genitourinary:    External rectal exam shows no thrombosed external hemorrhoids, no lesions.     Pelvic exam was performed with patient supine.   No labial fusion, and symmetrical.    There is no rash, lesion or injury on the right labia.   There is no rash, lesion or injury on the left labia.   No bleeding and no signs of injury around the vaginal introitus, urethral meatus is normal size and without prolapse or lesions, urethra well supported. The cervix is visualized with no discharge, lesions or friability.   No vaginal discharge found.   No significant Cystocele, Enterocele or rectocele, and cervix and uterus well supported.   Bimanual exam:   The urethra is normal to palpation and there are no palpable vaginal wall masses.   Uterus is not deviated, not enlarged, not fixed, normal shape and not tender.   Cervix exhibits no motion tenderness.    Right adnexum displays no mass or nodularity and no tenderness.   Left adnexum displays no mass or nodularity and no tenderness.  Musculoskeletal: Normal range of motion.   Neurological: She is alert and oriented to person, place, and time. Coordination normal.   Skin: Skin is warm and dry. She is not diaphoretic. No rashes, lesions or ulcers.   Psychiatric: She has a normal mood and affect, oriented to person, place, and time.      Assessment:   Normal annual GYN exam  1. Gynecologic exam normal  Liquid-Based Pap Smear, Screening          Plan:   PAP  Mammogram  Follow up in 1 year.  Patient informed will be contacted with results within 2 weeks. Encouraged to please call back or email if she has not heard  from us by then.

## 2024-04-05 ENCOUNTER — CLINICAL SUPPORT (OUTPATIENT)
Dept: REHABILITATION | Facility: HOSPITAL | Age: 64
End: 2024-04-05
Payer: COMMERCIAL

## 2024-04-05 ENCOUNTER — PATIENT MESSAGE (OUTPATIENT)
Dept: TRANSPLANT | Facility: CLINIC | Age: 64
End: 2024-04-05
Payer: COMMERCIAL

## 2024-04-05 DIAGNOSIS — R53.1 WEAKNESS GENERALIZED: ICD-10-CM

## 2024-04-05 DIAGNOSIS — Z94.4 S/P LIVER TRANSPLANT: ICD-10-CM

## 2024-04-05 DIAGNOSIS — R29.898 IMPAIRED STRENGTH OF LOWER EXTREMITY: ICD-10-CM

## 2024-04-05 DIAGNOSIS — Z74.09 IMPAIRED FUNCTIONAL MOBILITY, BALANCE, GAIT, AND ENDURANCE: Primary | ICD-10-CM

## 2024-04-05 PROCEDURE — 97530 THERAPEUTIC ACTIVITIES: CPT | Mod: PO

## 2024-04-05 PROCEDURE — 97110 THERAPEUTIC EXERCISES: CPT | Mod: PO

## 2024-04-05 PROCEDURE — 97140 MANUAL THERAPY 1/> REGIONS: CPT | Mod: PO

## 2024-04-05 NOTE — PLAN OF CARE
"[facility]   Physical Therapy Treatment Note     Name: Yumiko Gonzalez  Clinic Number: [id number]    Therapy Diagnosis:   Encounter Diagnoses   Name Primary?    Impaired functional mobility, balance, gait, and endurance Yes    Weakness generalized     Impaired strength of lower extremity     S/P liver transplant      Physician: Reynaldo Hodges Jr.,*    Visit Date: 4/5/2024    Physician Orders: PT Eval and Treat " post surgical"  Medical Diagnosis from Referral:   Diagnosis   Z94.4 (ICD-10-CM) - Liver replaced by transplant   R53.1 (ICD-10-CM) - Weakness generalized      Evaluation Date: 1/31/2024  Authorization Period Expiration: 12/31/24  Plan of Care Expiration: 5/31/24  Progress Note Due: 5/3/24  Visit # / Visits authorized: 4/20 (5)  FOTO: 2/5    Time In: 1014  Time Out: 1115  Total Billable Time: 61 minutes    SUBJECTIVE     Pt reports: Pt reports that she is no longer walking with a walker. She states that she keeps her SPC in her car in case of feeling off balance. She states that she feels that her balance, strength, and endurance have improved since beginning physical therapy.  She was compliant with home exercise program.    Subjective on IE:  Pt arrives to therapy ambulating with rollator. Pt reports she got a liver transplant on 12/9/23 and spent 2 weeks in hospital. She has been living at her daughter's house in [facility] in a "shed type of apartment in the yard". Pt with a history of nack and back pain as well as left knee pain.    Response to previous treatment: Unable to be determined  Functional change: Improved strength and endurance    Pain: 2/10  Location: left knee      OBJECTIVE     Lower Extremity Strength  Right LE   Left LE     Hip flexion:  4-/5 Hip flexion: 4-/5   Knee extension: 4/5 Knee extension: 4/5   Knee flexion: 4/5 Knee flexion: 4/5   Ankle dorsiflexion:  5/5 Ankle dorsiflexion: 5/5   Ankle plantarflexion:  4+/5 Ankle plantarflexion: 4+/5       "   Upper extremity strength: within functional limits      Sensation: numb in middle of stomach from surgery.         Functional Mobility    Evaluation    Bed mobility:  Independent    Supine to sit: Independent   Sit to supine: Independent   Rolling:  Mod I   Transfers to bed: Independent   Sit to stand Standby assist rollator            Evaluation   5 times sit to stand 31.1 seconds  no upper extremity support   TUG 13.9 seconds no AD   Self selected walking speed (10MWT) 1 m/sec (6m/6 seconds)    6 minute walk test 868' with no AD      Timed up and go score >14 seconds indicates risk for falls in older stroke patients (Rob et al, 2006)     Independent Community Ambulator > 1.4 m/s   Mod I Community Ambulator 1.2-1.4 m/s   SPV/SBA Community Ambulator 0.8-1.2 m/s   Limited Community Ambulator 0.4-0.8 m/s   Household Ambulator < 0.4 m/s         5 x sit<>stand  >12 sec= fall risk for general elderly  >16 sec= fall risk for Parkinson's disease  >10 sec= balance/vestibular dysfunction (<59 y/o)  >14.2 sec= balance/vestibular dysfunction (>59 y/o)  >12 sec= fall risk for CVA     Gait       Evaluation   AD used:  None   Assistance  Standby assist   Distance  868'         GAIT DEVIATIONS:              Yumiko displays the following deviations with ambulation: slow pace, small step length, wide base of support.               Impairments contributing to deviations: impaired motor control, impaired strength, impaired endurance, gait instability, balance deficits.         Endurance Deficit: yes        CMS Impairment/Limitation/Restriction for FOTO Intake Survey     Therapist reviewed FOTO scores for Yumiko WILHELM Carlos on 1/31/2024.   FOTO documents entered into EPIC - see Media section.     Limitation Score: 55           Treatment     Tiffanie received the treatments listed below:      therapeutic exercises to develop strength, endurance, ROM, and flexibility for 15 minutes including:  Nustep L4 10' all 4's  Gastroc standing  stretches on half roll 2'  Bridging 3x10    NT d/t re-evaluation:  Heel raises 20  Mini squats 20  Seated hamstring curls manual resist 20/20  Supine ankle dorsiflexion manual resist 20/20  Short arc quads 4#s 20/20  Straight leg raising 4#s knee weights.10/10    manual therapy to L knee including PROM and joint mobilizations for 10 minutes including:  PROM to L knee within tolerable ROM  TF AP and PA joint mobilizations grade III  Patellar mobilizations grade III  TF distraction in short sitting    Therapeutic activity to improve functional performance for 36 minutes includinxSTS, 6MWT, and TUG testing  LE strength assessment    Patient Education and Home Exercises     Home Exercises Provided and Patient Education Provided     Education provided:   - Pt was educated on updated POC, progress made with physical therapy, and importance of improving HEP compliance.    Written Home Exercises Provided: Patient instructed to cont prior HEP. Exercises were reviewed and Tiffanie was able to demonstrate them prior to the end of the session.  Tiffanie demonstrated good  understanding of the education provided. See EMR under Patient Instructions for exercises provided during therapy sessions    ASSESSMENT     Michi has been seen in physical therapy for 5 visits including her initial evaluation. She has made significant progress with physical therapy since beginning at the end of January despite non-compliance with her HEP. Her LE strength demonstrates improvements, as well as her aerobic endurance and dynamic balance. She is still at an increased risk for falls based on her 5xSTS time, although this may be limited 2/2 L knee pain.     Tiffanie Is progressing well towards her goals.   Pt prognosis is Good.     Pt will continue to benefit from skilled outpatient physical therapy to address the deficits listed in the problem list box on initial evaluation, provide pt/family education and to maximize pt's level of independence in the  home and community environment.     Pt's spiritual, cultural and educational needs considered and pt agreeable to plan of care and goals.     Anticipated barriers to physical therapy: None    Goals: Goals:      Short Term Goals: 6 weeks Date last assessed:  Status:    1. Patient will be provided with an HEP for strength, endurance and balance.  4/5/2024    50% met   2.  Patient will demonstrate improve endurance and activity tolerance as evidenced by ability to perform Nu-step x 15 minutes without rests breaks. 4/5/2024 MET            Long Term Goals: 12weeks  Date last assessed: Status:    1. Patient will be independent and compliant with updated HEP. 4/5/2024    50% met   2.  Patient will increase her   gait speed as assessed by the timed 10MWT from 0.58 m/s to 0.72 m/s to increase her safety and (I) with gait at a community level. (MDC for CVA= 0.14 m/s)     4/5/2024 MET      3. The patient will demonstrate improved efficiency with functional mobility and decreased risk for falls as evidenced by an increase in her score on the Timed up and Go from 18.6 sec to 15.7 sec. (Minimal detectable change in chronic stroke = 2.9 seconds)  4/5/2024 MET   4.Patient will improve her FOTO score from 47  to at least 61 for improved self perception of functional mobility.(score 0-100, high score indicates greater level of function) 4/5/2024 Progressing, 55   5. Pt will improve 5x sit to stand score from 27.1 seconds to less than 20 sec to decrease fall risk.  4/5/2024    ongoing   6. Pt will improve 6MWT to >1000' to demonstrate improvements in aerobic endurance and community ambulation status 4/5/2024         PLAN     Plan of care Certification: 1/31/2024 to 5/31/2024.     Outpatient Physical Therapy 2 times weekly for 8 weeks to include the following interventions: Gait Training, Manual Therapy, Moist Heat/ Ice, Neuromuscular Re-ed, Orthotic Management and Training, Patient Education, Therapeutic Activities, and Therapeutic  Exercise.     Semaj Carrillo, PT, DPT

## 2024-04-08 ENCOUNTER — LAB VISIT (OUTPATIENT)
Dept: LAB | Facility: HOSPITAL | Age: 64
End: 2024-04-08
Attending: STUDENT IN AN ORGANIZED HEALTH CARE EDUCATION/TRAINING PROGRAM
Payer: COMMERCIAL

## 2024-04-08 ENCOUNTER — CLINICAL SUPPORT (OUTPATIENT)
Dept: REHABILITATION | Facility: HOSPITAL | Age: 64
End: 2024-04-08
Payer: COMMERCIAL

## 2024-04-08 DIAGNOSIS — Z94.4 S/P LIVER TRANSPLANT: ICD-10-CM

## 2024-04-08 DIAGNOSIS — R53.1 WEAKNESS GENERALIZED: ICD-10-CM

## 2024-04-08 DIAGNOSIS — R29.898 IMPAIRED STRENGTH OF LOWER EXTREMITY: ICD-10-CM

## 2024-04-08 DIAGNOSIS — Z74.09 IMPAIRED FUNCTIONAL MOBILITY, BALANCE, GAIT, AND ENDURANCE: Primary | ICD-10-CM

## 2024-04-08 LAB
ALBUMIN SERPL BCP-MCNC: 3.7 G/DL (ref 3.5–5.2)
ALP SERPL-CCNC: 56 U/L (ref 55–135)
ALT SERPL W/O P-5'-P-CCNC: 12 U/L (ref 10–44)
ANION GAP SERPL CALC-SCNC: 8 MMOL/L (ref 8–16)
AST SERPL-CCNC: 18 U/L (ref 10–40)
BILIRUB SERPL-MCNC: 0.6 MG/DL (ref 0.1–1)
BUN SERPL-MCNC: 27 MG/DL (ref 8–23)
CALCIUM SERPL-MCNC: 9.7 MG/DL (ref 8.7–10.5)
CHLORIDE SERPL-SCNC: 108 MMOL/L (ref 95–110)
CO2 SERPL-SCNC: 24 MMOL/L (ref 23–29)
CREAT SERPL-MCNC: 1.1 MG/DL (ref 0.5–1.4)
EST. GFR  (NO RACE VARIABLE): 56.1 ML/MIN/1.73 M^2
GLUCOSE SERPL-MCNC: 111 MG/DL (ref 70–110)
POTASSIUM SERPL-SCNC: 4.6 MMOL/L (ref 3.5–5.1)
PROT SERPL-MCNC: 6.1 G/DL (ref 6–8.4)
SODIUM SERPL-SCNC: 140 MMOL/L (ref 136–145)

## 2024-04-08 PROCEDURE — 80197 ASSAY OF TACROLIMUS: CPT | Performed by: STUDENT IN AN ORGANIZED HEALTH CARE EDUCATION/TRAINING PROGRAM

## 2024-04-08 PROCEDURE — 97530 THERAPEUTIC ACTIVITIES: CPT | Mod: PO

## 2024-04-08 PROCEDURE — 85027 COMPLETE CBC AUTOMATED: CPT | Performed by: STUDENT IN AN ORGANIZED HEALTH CARE EDUCATION/TRAINING PROGRAM

## 2024-04-08 PROCEDURE — 97110 THERAPEUTIC EXERCISES: CPT | Mod: PO

## 2024-04-08 PROCEDURE — 85007 BL SMEAR W/DIFF WBC COUNT: CPT | Performed by: STUDENT IN AN ORGANIZED HEALTH CARE EDUCATION/TRAINING PROGRAM

## 2024-04-08 PROCEDURE — 80053 COMPREHEN METABOLIC PANEL: CPT | Performed by: STUDENT IN AN ORGANIZED HEALTH CARE EDUCATION/TRAINING PROGRAM

## 2024-04-08 PROCEDURE — 97140 MANUAL THERAPY 1/> REGIONS: CPT | Mod: PO

## 2024-04-08 PROCEDURE — 97112 NEUROMUSCULAR REEDUCATION: CPT | Mod: PO

## 2024-04-08 PROCEDURE — 36415 COLL VENOUS BLD VENIPUNCTURE: CPT | Mod: PO | Performed by: STUDENT IN AN ORGANIZED HEALTH CARE EDUCATION/TRAINING PROGRAM

## 2024-04-08 NOTE — PROGRESS NOTES
"[facility]   Physical Therapy Treatment Note     Name: Yumiko WILHELM [facility]  Clinic Number: [id number]    Therapy Diagnosis:   Encounter Diagnoses   Name Primary?    Impaired functional mobility, balance, gait, and endurance Yes    S/P liver transplant     Impaired strength of lower extremity     Weakness generalized      Physician: Reynaldo Hodges Jr.,*    Visit Date: 4/8/2024    Physician Orders: PT Eval and Treat " post surgical"  Medical Diagnosis from Referral:   Diagnosis   Z94.4 (ICD-10-CM) - Liver replaced by transplant   R53.1 (ICD-10-CM) - Weakness generalized      Evaluation Date: 1/31/2024  Authorization Period Expiration: 12/31/24  Plan of Care Expiration: 5/31/24  Progress Note Due: 5/3/24  Visit # / Visits authorized: 5/20 (6)  FOTO: 2/5    Time In: 1357  Time Out: 1459  Total Billable Time: 62 minutes    SUBJECTIVE     Pt reports: that she's feeling tired today. States that she's been running errands. .  She was compliant with home exercise program.  Response to previous treatment: Minor soreness in L knee.  Functional change: Unable to be determined    Pain: 3/10  Location: left knee      OBJECTIVE     Objective Measures updated at progress report unless specified.     Treatment     Tiffanie received the treatments listed below:       therapeutic exercises to develop strength, endurance, ROM, and flexibility for 18 minutes including:  Recumbent bike x6' resistance 2  Gastroc standing stretches on wedge 3x30" renee  Bridging 3x10  LAQ's 4# 3x10 renee  Heel raises 2x20     manual therapy to L knee including PROM and joint mobilizations for 14 minutes including:  PROM to L knee within tolerable ROM  TF AP and PA joint mobilizations grade III  Patellar mobilizations grade III  TF distraction in short sitting    neuromuscular re-education activities to improve: Balance, Posture, and Core Stability for 18 minutes. The following activities were included:  Lumbar iso step away 20x each way " "with RTB  Tandem stance balance 3x30" each side    therapeutic activities to improve functional performance for 12  minutes, including:  Sit to stands 3x5 with foam pad on chair  Leg press 3x10 10#        Patient Education and Home Exercises     Home Exercises Provided and Patient Education Provided     Education provided:   - Pt was educated on progressions made with therex and purposes for progressions with increasing repetitions.     Written Home Exercises Provided: Patient instructed to cont prior HEP. Exercises were reviewed and Tiffanie was able to demonstrate them prior to the end of the session.  Tiffanie demonstrated good  understanding of the education provided. See EMR under Patient Instructions for exercises provided during therapy sessions    ASSESSMENT     Pt with good tolerance to progressions and modifications made to her program this visit. L knee pain continues to limit volume and intensity of therex pt is able to be given for LE strengthening. She would benefit from strengthening of her LE s in nonweightbearing positions to reduce L knee pain contribution.     Tiffanie Is progressing well towards her goals.   Pt prognosis is Good.     Pt will continue to benefit from skilled outpatient physical therapy to address the deficits listed in the problem list box on initial evaluation, provide pt/family education and to maximize pt's level of independence in the home and community environment.     Pt's spiritual, cultural and educational needs considered and pt agreeable to plan of care and goals.     Anticipated barriers to physical therapy: General deconditioning, L knee pain    Goals:   Short Term Goals: 6 weeks Date last assessed:  Status:    1. Patient will be provided with an HEP for strength, endurance and balance.  4/5/2024    50% met   2.  Patient will demonstrate improve endurance and activity tolerance as evidenced by ability to perform Nu-step x 15 minutes without rests breaks. 4/5/2024 MET          "   Long Term Goals: 12weeks  Date last assessed: Status:    1. Patient will be independent and compliant with updated HEP. 4/5/2024    50% met   2.  Patient will increase her   gait speed as assessed by the timed 10MWT from 0.58 m/s to 0.72 m/s to increase her safety and (I) with gait at a community level. (MDC for CVA= 0.14 m/s)     4/5/2024 MET      3. The patient will demonstrate improved efficiency with functional mobility and decreased risk for falls as evidenced by an increase in her score on the Timed up and Go from 18.6 sec to 15.7 sec. (Minimal detectable change in chronic stroke = 2.9 seconds)  4/5/2024 MET   4.Patient will improve her FOTO score from 47  to at least 61 for improved self perception of functional mobility.(score 0-100, high score indicates greater level of function) 4/5/2024 Progressing, 55   5. Pt will improve 5x sit to stand score from 27.1 seconds to less than 20 sec to decrease fall risk.  4/5/2024    ongoing   6. Pt will improve 6MWT to >1000' to demonstrate improvements in aerobic endurance and community ambulation status 4/5/2024         PLAN     Continue with POC    Semaj Carrillo, PT, DPT

## 2024-04-09 LAB
ANISOCYTOSIS BLD QL SMEAR: SLIGHT
BASOPHILS # BLD AUTO: ABNORMAL K/UL (ref 0–0.2)
BASOPHILS NFR BLD: 1 % (ref 0–1.9)
DIFFERENTIAL METHOD BLD: ABNORMAL
EOSINOPHIL # BLD AUTO: ABNORMAL K/UL (ref 0–0.5)
EOSINOPHIL NFR BLD: 7 % (ref 0–8)
ERYTHROCYTE [DISTWIDTH] IN BLOOD BY AUTOMATED COUNT: 13.3 % (ref 11.5–14.5)
HCT VFR BLD AUTO: 38.3 % (ref 37–48.5)
HGB BLD-MCNC: 12.1 G/DL (ref 12–16)
HYPOCHROMIA BLD QL SMEAR: ABNORMAL
IMM GRANULOCYTES # BLD AUTO: ABNORMAL K/UL (ref 0–0.04)
IMM GRANULOCYTES NFR BLD AUTO: ABNORMAL % (ref 0–0.5)
LYMPHOCYTES # BLD AUTO: ABNORMAL K/UL (ref 1–4.8)
LYMPHOCYTES NFR BLD: 18 % (ref 18–48)
MCH RBC QN AUTO: 28.8 PG (ref 27–31)
MCHC RBC AUTO-ENTMCNC: 31.6 G/DL (ref 32–36)
MCV RBC AUTO: 91 FL (ref 82–98)
MONOCYTES # BLD AUTO: ABNORMAL K/UL (ref 0.3–1)
MONOCYTES NFR BLD: 9 % (ref 4–15)
NEUTROPHILS NFR BLD: 56 % (ref 38–73)
NEUTS BAND NFR BLD MANUAL: 9 %
NRBC BLD-RTO: 0 /100 WBC
PLATELET # BLD AUTO: 117 K/UL (ref 150–450)
PLATELET BLD QL SMEAR: ABNORMAL
PMV BLD AUTO: 11.5 FL (ref 9.2–12.9)
RBC # BLD AUTO: 4.2 M/UL (ref 4–5.4)
TACROLIMUS BLD-MCNC: 5.7 NG/ML (ref 5–15)
WBC # BLD AUTO: 3.89 K/UL (ref 3.9–12.7)

## 2024-04-10 ENCOUNTER — TELEPHONE (OUTPATIENT)
Dept: TRANSPLANT | Facility: CLINIC | Age: 64
End: 2024-04-10
Payer: COMMERCIAL

## 2024-04-10 ENCOUNTER — PATIENT MESSAGE (OUTPATIENT)
Dept: PRIMARY CARE CLINIC | Facility: CLINIC | Age: 64
End: 2024-04-10
Payer: COMMERCIAL

## 2024-04-10 ENCOUNTER — PATIENT MESSAGE (OUTPATIENT)
Dept: OBSTETRICS AND GYNECOLOGY | Facility: CLINIC | Age: 64
End: 2024-04-10
Payer: COMMERCIAL

## 2024-04-10 ENCOUNTER — PATIENT MESSAGE (OUTPATIENT)
Dept: TRANSPLANT | Facility: CLINIC | Age: 64
End: 2024-04-10
Payer: COMMERCIAL

## 2024-04-10 LAB
FINAL PATHOLOGIC DIAGNOSIS: NORMAL
Lab: NORMAL

## 2024-04-10 RX ORDER — BUTALBITAL, ACETAMINOPHEN AND CAFFEINE 50; 325; 40 MG/1; MG/1; MG/1
1 TABLET ORAL EVERY 4 HOURS PRN
Qty: 20 TABLET | Refills: 0 | Status: SHIPPED | OUTPATIENT
Start: 2024-04-10

## 2024-04-10 NOTE — TELEPHONE ENCOUNTER
Pt notified via portal of stable labs and that no medication changes are needed. Repeat labs due 4/29/24 per protocol.   ----- Message from James Brambila MD sent at 4/10/2024  8:56 AM CDT -----  Liver tests are stable. No changes in her immunosuppression. Please continue to monitor labs per transplant protocol.

## 2024-04-12 ENCOUNTER — PATIENT MESSAGE (OUTPATIENT)
Dept: TRANSPLANT | Facility: CLINIC | Age: 64
End: 2024-04-12
Payer: COMMERCIAL

## 2024-04-12 DIAGNOSIS — E83.39 LOW PHOSPHATE LEVELS: Primary | ICD-10-CM

## 2024-04-12 DIAGNOSIS — Z94.4 S/P LIVER TRANSPLANT: ICD-10-CM

## 2024-04-12 RX ORDER — ACETAMINOPHEN 500 MG
1 TABLET ORAL DAILY
Qty: 90 TABLET | Refills: 3 | Status: SHIPPED | OUTPATIENT
Start: 2024-04-12 | End: 2025-04-12

## 2024-04-15 ENCOUNTER — PATIENT MESSAGE (OUTPATIENT)
Dept: PRIMARY CARE CLINIC | Facility: CLINIC | Age: 64
End: 2024-04-15
Payer: COMMERCIAL

## 2024-04-15 DIAGNOSIS — R39.15 URINARY URGENCY: Primary | ICD-10-CM

## 2024-04-16 ENCOUNTER — OFFICE VISIT (OUTPATIENT)
Dept: OPTOMETRY | Facility: CLINIC | Age: 64
End: 2024-04-16
Payer: COMMERCIAL

## 2024-04-16 ENCOUNTER — CLINICAL SUPPORT (OUTPATIENT)
Dept: PRIMARY CARE CLINIC | Facility: CLINIC | Age: 64
End: 2024-04-16
Payer: COMMERCIAL

## 2024-04-16 ENCOUNTER — HOSPITAL ENCOUNTER (OUTPATIENT)
Dept: RADIOLOGY | Facility: HOSPITAL | Age: 64
Discharge: HOME | End: 2024-04-16
Attending: STUDENT IN AN ORGANIZED HEALTH CARE EDUCATION/TRAINING PROGRAM
Payer: COMMERCIAL

## 2024-04-16 ENCOUNTER — TELEPHONE (OUTPATIENT)
Dept: PRIMARY CARE CLINIC | Facility: CLINIC | Age: 64
End: 2024-04-16
Payer: COMMERCIAL

## 2024-04-16 DIAGNOSIS — H52.203 HYPEROPIA WITH ASTIGMATISM AND PRESBYOPIA, BILATERAL: ICD-10-CM

## 2024-04-16 DIAGNOSIS — H53.143 ASTHENOPIA, BILATERAL: ICD-10-CM

## 2024-04-16 DIAGNOSIS — Z94.4 S/P LIVER TRANSPLANT: ICD-10-CM

## 2024-04-16 DIAGNOSIS — H52.4 HYPEROPIA WITH ASTIGMATISM AND PRESBYOPIA, BILATERAL: ICD-10-CM

## 2024-04-16 DIAGNOSIS — H52.03 HYPEROPIA WITH ASTIGMATISM AND PRESBYOPIA, BILATERAL: ICD-10-CM

## 2024-04-16 DIAGNOSIS — H43.393 VITREOUS FLOATERS, BILATERAL: ICD-10-CM

## 2024-04-16 DIAGNOSIS — R45.89 NEED FOR EMOTIONAL SUPPORT: Primary | ICD-10-CM

## 2024-04-16 DIAGNOSIS — Z01.00 EXAMINATION OF EYES AND VISION: Primary | ICD-10-CM

## 2024-04-16 PROCEDURE — 99999 PR PBB SHADOW E&M-EST. PATIENT-LVL I: CPT | Mod: PBBFAC,,, | Performed by: SOCIAL WORKER

## 2024-04-16 PROCEDURE — 76705 ECHO EXAM OF ABDOMEN: CPT | Mod: 26,59,, | Performed by: RADIOLOGY

## 2024-04-16 PROCEDURE — 92015 DETERMINE REFRACTIVE STATE: CPT | Mod: S$GLB,,, | Performed by: OPTOMETRIST

## 2024-04-16 PROCEDURE — 99499 UNLISTED E&M SERVICE: CPT | Mod: S$GLB,,, | Performed by: SOCIAL WORKER

## 2024-04-16 PROCEDURE — 76705 ECHO EXAM OF ABDOMEN: CPT | Mod: TC,PO

## 2024-04-16 PROCEDURE — 92014 COMPRE OPH EXAM EST PT 1/>: CPT | Mod: S$GLB,,, | Performed by: OPTOMETRIST

## 2024-04-16 PROCEDURE — 99999 PR PBB SHADOW E&M-EST. PATIENT-LVL III: CPT | Mod: PBBFAC,,, | Performed by: OPTOMETRIST

## 2024-04-16 PROCEDURE — 93976 VASCULAR STUDY: CPT | Mod: 26,,, | Performed by: RADIOLOGY

## 2024-04-16 NOTE — TELEPHONE ENCOUNTER
Spoke with pt, she reports that symptoms worsen at night.  Pt reports that there was some urgency involved during the day, but not it is only happening at night.  There was not any pain with urination; it was more of a stabbing pain in her urethra.  Pt reports that she had a pap smear on 4/3/24 and it also indicated that inflammation and bacteria were present.  She reports that she wants to wait until the culture comes back.

## 2024-04-16 NOTE — TELEPHONE ENCOUNTER
Urinalysis does suggest infection.  She appears to be on Bactrim already although not full dose but I still must assume that the organism is resistant to this.  If she is having symptoms that are progressing I will recommend starting an antibiotic now.  If she is feeling a little better I would rather wait until the culture.

## 2024-04-16 NOTE — PROGRESS NOTES
HPI    Routine eye exam-dle-1/23    Pt complains of blurred va at distance and near. Had liver transplant in   December. Denies any flashes or floaters. Pt having trouble with depth   perception-feels its worse than last visit.   Last edited by Nory Cowan on 4/16/2024 10:53 AM.            Assessment /Plan     For exam results, see Encounter Report.    Examination of eyes and vision    Hyperopia with astigmatism and presbyopia, bilateral    Vitreous floaters, bilateral    Asthenopia, bilateral      Ocular health exam OU   Updated specs rx, gave copy fill prn   RD precautions given and reviewed. Patient knows to call/ message if any further changes in symptoms occur.  Complaint of reduced / issues with depth perception ---noted only when parking car   No gross strab  No diplopia   Distance phoria   OD 2-3 MELBA   OS 0 vert     Randot stereo post dilation 6/9     Reassured --message if s/s worsen or affect other activities  Possible VT eval pending symptoms     Discussed and educated patient on current findings /plan.  RTC 1 year, prn if any changes / issues
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
## 2024-04-16 NOTE — PATIENT INSTRUCTIONS
"DRY EYES -- BURNING OR LESA SYMPTOMS:  Use Over The Counter artificial tears as needed for dry eye symptoms.   Some common brands include:  Systane, Optive, Refresh, and Thera-Tears.  These drops can be used as frequently as desired, but may be most helpful use during long periods of concentrated work.  For example, reading / working at the computer. Start with 3-4x per day.     Nighttime Ophthalmic gel or ointments are available: Refresh PM, Genteal, and Lacrilube.    Avoid drops that "get redness out" (Visine, Murine, Clear Eyes), as these may contain medication that could further irritate the eyes, especially with chronic use.    ALLERGY EYES -- ITCHING SYMPTOMS:  Over the counter medications include--Pataday, Zaditor, and Alaway.  Use as directed 1-2 drops daily for symptoms of itching / watering eyes.  These drops will not help for dry eye or exposure symptoms.    REDNESS RELIEF:  Lumify---is a good redness reliever that will not cause irritation if used chronically.       FLASHES / FLOATERS / POSTERIOR VITREOUS DETACHMENT    Call the clinic if you have any further changes in symptoms.  Including:  Increased numbers of floaters or flashing lights, dimness or darkness that moves through or stays constant in your vision, or any pain in the eye (s).    You may sometimes see small specks or clouds moving in your field of vision.  They are called FLOATERS.  You can often see them when looking at a plain background, like a blank wall or blue ronny.  Floaters are actually tiny clumps of gel or cells inside the VITREOUS, the clear jelly-like fluid that fills the inside of your eye.    While these objects look like they are in front of your eye, they are actually floating inside.  What you see are the shadows they cast on the RETINA, the nerve layer at the back of the eye that senses light and allows you to see.      POSTERIOR VITREOUS DETACHMENT    The appearance of new floaters may be alarming.  If you suddenly develop " new floaters, you should contact your eye care professional  right away.    The retina can tear if the shrinking vitreous pulls away from the wall of the eye.  This sometimes causes a small amount of bleeding in the eye that may appear as new floaters.    A torn retina is always a serious problem, since it can lead to a retinal detachment.  You should see your eye care professional as soon as possible if:    even one new floater appears suddenly;  you see sudden flashes of light;  you notice other symptoms, like the loss of side vision, or a curtain closes down in your vision        POSTERIOR VITREOUS DETACHMENT is more common for people who:    are nearsighted;  have had cataract surgery;  have had YAG laser surgery of the eye;  have had inflammation inside the eye;  are over age 60.      While some floaters may remain visible, many of them will fade over time and become less noticeable.  Even if you've had some floaters for years, you should have your eyes checked as soon as possible if you notice new ones.    FLASHING LIGHTS    When the vitreous gel rubs or pulls on the retina, you may see what look like flashing lights or lightning streaks.  These flashes can appear off and on for several weeks or months.      Some people experience flashes of light that appear as jagged lines or heat waves in both eyes, lasting 10-20 minutes.  These flashes are caused by a spasm of blood vessels in the brain, which is called a migraine.    If a headache follows these flashes, it's called a migraine headache.  If   no headache occurs, these flashes are called Ophthalmic or Ocular Migraine.

## 2024-04-17 ENCOUNTER — CLINICAL SUPPORT (OUTPATIENT)
Dept: REHABILITATION | Facility: HOSPITAL | Age: 64
End: 2024-04-17
Payer: COMMERCIAL

## 2024-04-17 DIAGNOSIS — Z74.09 IMPAIRED FUNCTIONAL MOBILITY, BALANCE, GAIT, AND ENDURANCE: Primary | ICD-10-CM

## 2024-04-17 PROCEDURE — 97112 NEUROMUSCULAR REEDUCATION: CPT | Mod: PO

## 2024-04-17 PROCEDURE — 97140 MANUAL THERAPY 1/> REGIONS: CPT | Mod: PO

## 2024-04-17 PROCEDURE — 97110 THERAPEUTIC EXERCISES: CPT | Mod: PO

## 2024-04-17 NOTE — PROGRESS NOTES
"[facility]   Physical Therapy Treatment Note     Name: Yumiko WILHELM [facility]  Clinic Number: [id number]    Therapy Diagnosis:   Encounter Diagnosis   Name Primary?    Impaired functional mobility, balance, gait, and endurance Yes     Physician: Reynaldo Hodges Jr.,*    Visit Date: 4/17/2024    Physician Orders: PT Eval and Treat " post surgical"  Medical Diagnosis from Referral:   Diagnosis   Z94.4 (ICD-10-CM) - Liver replaced by transplant   R53.1 (ICD-10-CM) - Weakness generalized      Evaluation Date: 1/31/2024  Authorization Period Expiration: 12/31/24  Plan of Care Expiration: 5/31/24  Progress Note Due: 5/3/24  Visit # / Visits authorized: 6/20 (7)  FOTO: 2/5    Time In: 1015  Time Out: 1102  Total Billable Time: 47minutes    SUBJECTIVE     Pt reports: that she's feeling okay today.  She was compliant with home exercise program.  Response to previous treatment: Minor soreness in L knee.  Functional change: Unable to be determined    Pain: 2/10  Location: left knee      OBJECTIVE     Objective Measures updated at progress report unless specified.     Treatment     Tiffanie received the treatments listed below:       therapeutic exercises to develop strength, endurance, ROM, and flexibility for 18 minutes including:  Recumbent bike x6' resistance 2 - NP due to tardiness  Bridging 3x10  LAQ's 4# 3x10 renee  Gastroc standing stretches on wedge 3x30" renee - NP  Heel raises 2x20 - NP     manual therapy to L knee including PROM and joint mobilizations for 12 minutes including:  PROM to L knee within tolerable ROM  TF AP and PA joint mobilizations grade III  Patellar mobilizations grade III  TF distraction in short sitting    neuromuscular re-education activities to improve: Balance, Posture, Sense, and Core Stability for 17 minutes. The following activities were included:  Straight leg raises left leg 2x12  Lumbar iso step away 20x each way with RTB  Tandem stance balance 3x30" each side    therapeutic " activities to improve functional performance for 0 minutes, including:  Sit to stands 3x5 with foam pad on chair - NP  Leg press 3x10 10# - NP        Patient Education and Home Exercises     Home Exercises Provided and Patient Education Provided     Education provided:   - Pt was educated on progressions made with therex and purposes for progressions with increasing repetitions.     Written Home Exercises Provided: Patient instructed to cont prior HEP. Exercises were reviewed and Tiffanie was able to demonstrate them prior to the end of the session.  Tiffanie demonstrated good  understanding of the education provided. See EMR under Patient Instructions for exercises provided during therapy sessions    ASSESSMENT     Not all therex performed this visit 2/2 patient tardiness. Added in SLRs to influence VMO activity in an effort to impact her continued left knee pain. She has difficulty producing enough force during bridge to raise her pelvis off of the treatment table. She would benefit from therex continuing to target her leg strength and endurance.     Tiffanie Is progressing well towards her goals.   Pt prognosis is Good.     Pt will continue to benefit from skilled outpatient physical therapy to address the deficits listed in the problem list box on initial evaluation, provide pt/family education and to maximize pt's level of independence in the home and community environment.     Pt's spiritual, cultural and educational needs considered and pt agreeable to plan of care and goals.     Anticipated barriers to physical therapy: General deconditioning, L knee pain    Goals:   Short Term Goals: 6 weeks Date last assessed:  Status:    1. Patient will be provided with an HEP for strength, endurance and balance.  4/5/2024    50% met   2.  Patient will demonstrate improve endurance and activity tolerance as evidenced by ability to perform Nu-step x 15 minutes without rests breaks. 4/5/2024 MET            Long Term Goals: 12weeks   Date last assessed: Status:    1. Patient will be independent and compliant with updated HEP. 4/5/2024    50% met   2.  Patient will increase her   gait speed as assessed by the timed 10MWT from 0.58 m/s to 0.72 m/s to increase her safety and (I) with gait at a community level. (MDC for CVA= 0.14 m/s)     4/5/2024 MET      3. The patient will demonstrate improved efficiency with functional mobility and decreased risk for falls as evidenced by an increase in her score on the Timed up and Go from 18.6 sec to 15.7 sec. (Minimal detectable change in chronic stroke = 2.9 seconds)  4/5/2024 MET   4.Patient will improve her FOTO score from 47  to at least 61 for improved self perception of functional mobility.(score 0-100, high score indicates greater level of function) 4/5/2024 Progressing, 55   5. Pt will improve 5x sit to stand score from 27.1 seconds to less than 20 sec to decrease fall risk.  4/5/2024    ongoing   6. Pt will improve 6MWT to >1000' to demonstrate improvements in aerobic endurance and community ambulation status 4/5/2024         PLAN     Continue with POC    Semaj Carrillo, PT, DPT

## 2024-04-19 ENCOUNTER — PATIENT MESSAGE (OUTPATIENT)
Dept: TRANSPLANT | Facility: CLINIC | Age: 64
End: 2024-04-19

## 2024-04-19 ENCOUNTER — CLINICAL SUPPORT (OUTPATIENT)
Dept: REHABILITATION | Facility: HOSPITAL | Age: 64
End: 2024-04-19
Payer: COMMERCIAL

## 2024-04-19 ENCOUNTER — OFFICE VISIT (OUTPATIENT)
Dept: TRANSPLANT | Facility: CLINIC | Age: 64
End: 2024-04-19
Payer: COMMERCIAL

## 2024-04-19 ENCOUNTER — TELEPHONE (OUTPATIENT)
Dept: PRIMARY CARE CLINIC | Facility: CLINIC | Age: 64
End: 2024-04-19
Payer: COMMERCIAL

## 2024-04-19 DIAGNOSIS — Z79.60 LONG-TERM USE OF IMMUNOSUPPRESSANT MEDICATION: ICD-10-CM

## 2024-04-19 DIAGNOSIS — Z85.05 HISTORY OF HEPATOCELLULAR CARCINOMA: ICD-10-CM

## 2024-04-19 DIAGNOSIS — Z74.09 IMPAIRED FUNCTIONAL MOBILITY, BALANCE, GAIT, AND ENDURANCE: Primary | ICD-10-CM

## 2024-04-19 DIAGNOSIS — Z94.4 LIVER TRANSPLANTED: Primary | ICD-10-CM

## 2024-04-19 DIAGNOSIS — N39.0 URINARY TRACT INFECTION WITHOUT HEMATURIA, SITE UNSPECIFIED: Primary | ICD-10-CM

## 2024-04-19 PROCEDURE — 99213 OFFICE O/P EST LOW 20 MIN: CPT | Mod: 95,,, | Performed by: STUDENT IN AN ORGANIZED HEALTH CARE EDUCATION/TRAINING PROGRAM

## 2024-04-19 PROCEDURE — 4010F ACE/ARB THERAPY RXD/TAKEN: CPT | Mod: 95,,, | Performed by: STUDENT IN AN ORGANIZED HEALTH CARE EDUCATION/TRAINING PROGRAM

## 2024-04-19 PROCEDURE — 97530 THERAPEUTIC ACTIVITIES: CPT | Mod: PO | Performed by: PHYSICAL THERAPIST

## 2024-04-19 PROCEDURE — 97112 NEUROMUSCULAR REEDUCATION: CPT | Mod: PO | Performed by: PHYSICAL THERAPIST

## 2024-04-19 PROCEDURE — 97110 THERAPEUTIC EXERCISES: CPT | Mod: PO | Performed by: PHYSICAL THERAPIST

## 2024-04-19 RX ORDER — CEPHALEXIN 500 MG/1
500 CAPSULE ORAL 3 TIMES DAILY
Qty: 21 CAPSULE | Refills: 0 | Status: SHIPPED | OUTPATIENT
Start: 2024-04-19 | End: 2024-04-26

## 2024-04-19 NOTE — LETTER
April 22, 2024        Laila Will  [address]  [facility] [address]  Phone: [phone]  Fax: [phone]             [facility] [address]  [address]  [facility] [address]  Phone: [phone]   Patient: Yumiko Gonzalez   MR Number: [id number]   YOB: 1960   Date of Visit: 4/19/2024       Dear Dr. Laila Will    Thank you for referring Yumiko Gonzalez to me for evaluation. Attached you will find relevant portions of my assessment and plan of care.    If you have questions, please do not hesitate to call me. I look forward to following Yumiko Gonzalez along with you.    Sincerely,    James Brambila MD    Enclosure    If you would like to receive this communication electronically, please contact [email or web address] or [phone] to request [employer] access.    [employer] is a tool which provides read-only access to select patient information with whom you have a relationship. Its easy to use and provides real time access to review your patients record including encounter summaries, notes, results, and demographic information.    If you feel you have received this communication in error or would no longer like to receive these types of communications, please e-mail [email or web address]

## 2024-04-19 NOTE — PROGRESS NOTES
"[facility]   Physical Therapy Treatment Note     Name: Yumiko WILHELM [facility]  Clinic Number: [id number]    Therapy Diagnosis:   No diagnosis found.    Physician: Reynaldo Hodges Jr.,*    Visit Date: 4/19/2024    Physician Orders: PT Eval and Treat " post surgical"  Medical Diagnosis from Referral:   Diagnosis   Z94.4 (ICD-10-CM) - Liver replaced by transplant   R53.1 (ICD-10-CM) - Weakness generalized      Evaluation Date: 1/31/2024  Authorization Period Expiration: 12/31/24  Plan of Care Expiration: 5/31/24  Progress Note Due: 5/3/24  Visit # / Visits authorized: 7/20 (8)  FOTO: 2/5    Time In: 8:00AM  Time Out: 9:03AM  Total Billable Time: 38 minutes    SUBJECTIVE     Pt reports: she is dealing with UTI currently. She is fatigued, but tries to push herself. Right lateral hip/pelvis is sore today  She was compliant with home exercise program.  Response to previous treatment: Minor soreness in L knee.  Functional change: Unable to be determined    Pain: 2/10  Location: left knee      OBJECTIVE     Objective Measures updated at progress report unless specified.     Treatment     Tiffanie received the treatments listed below:       therapeutic exercises to develop strength, endurance, ROM, and flexibility for 15 minutes including:  Recumbent bike x7' resistance 2   Bridging 3x10  LAQ's 4# 3x10 renee  Gastroc standing stretches on wedge 3x30" renee   Heel raises 2x20      manual therapy to L knee including PROM and joint mobilizations for 00 minutes including:  PROM to L knee within tolerable ROM  TF AP and PA joint mobilizations grade III  Patellar mobilizations grade III  TF distraction in short sitting    neuromuscular re-education activities to improve: Balance, Posture, Sense, and Core Stability for 15 minutes. The following activities were included:  Straight leg raises left leg 2x12  Lumbar iso step away 20x each way with RTB  Tandem stance balance 3x30" each side    therapeutic activities to " improve functional performance for 08 minutes, including:  Sit to stands 3x5 with foam pad on chair -   Leg press 3x10 10# - seat 9        Patient Education and Home Exercises     Home Exercises Provided and Patient Education Provided     Education provided:   - Pt was educated on progressions made with therex and purposes for progressions with increasing repetitions.     Written Home Exercises Provided: Patient instructed to cont prior HEP. Exercises were reviewed and Tiffanie was able to demonstrate them prior to the end of the session.  Tiffanie demonstrated good  understanding of the education provided. See EMR under Patient Instructions for exercises provided during therapy sessions    ASSESSMENT     Pt able to have buttock clearance with bridging today. She tolerated increased time on recumbent bike and took fewer rest breaks with exercises and activities today. She does fatigue with exercise. She is consistent with HEP. Knee pain can limit her activity, but endurance seems to be primary factor limiting her function. Good stabilization with step out activity, but has UE fatigue.     Tiffanie Is progressing well towards her goals.   Pt prognosis is Good.     Pt will continue to benefit from skilled outpatient physical therapy to address the deficits listed in the problem list box on initial evaluation, provide pt/family education and to maximize pt's level of independence in the home and community environment.     Pt's spiritual, cultural and educational needs considered and pt agreeable to plan of care and goals.     Anticipated barriers to physical therapy: General deconditioning, L knee pain    Goals:   Short Term Goals: 6 weeks Date last assessed:  Status:    1. Patient will be provided with an HEP for strength, endurance and balance.  4/5/2024    50% met   2.  Patient will demonstrate improve endurance and activity tolerance as evidenced by ability to perform Nu-step x 15 minutes without rests breaks. 4/5/2024 MET             Long Term Goals: 12weeks  Date last assessed: Status:    1. Patient will be independent and compliant with updated HEP. 4/5/2024    50% met   2.  Patient will increase her   gait speed as assessed by the timed 10MWT from 0.58 m/s to 0.72 m/s to increase her safety and (I) with gait at a community level. (MDC for CVA= 0.14 m/s)     4/5/2024 MET      3. The patient will demonstrate improved efficiency with functional mobility and decreased risk for falls as evidenced by an increase in her score on the Timed up and Go from 18.6 sec to 15.7 sec. (Minimal detectable change in chronic stroke = 2.9 seconds)  4/5/2024 MET   4.Patient will improve her FOTO score from 47  to at least 61 for improved self perception of functional mobility.(score 0-100, high score indicates greater level of function) 4/5/2024 Progressing, 55   5. Pt will improve 5x sit to stand score from 27.1 seconds to less than 20 sec to decrease fall risk.  4/5/2024    ongoing   6. Pt will improve 6MWT to >1000' to demonstrate improvements in aerobic endurance and community ambulation status 4/5/2024         PLAN     Continue with POC    Rylee Cameron, PT, DPT

## 2024-04-19 NOTE — PROGRESS NOTES
Transplant Hepatology  Liver Transplant Recipient Follow Up    The patient location is: [place]  The chief complaint leading to consultation is: OLT    Visit type: audiovisual    Face to Face time with patient: 10  20 minutes of total time spent on the encounter, which includes face to face time and non-face to face time preparing to see the patient (eg, review of tests), Obtaining and/or reviewing separately obtained history, Documenting clinical information in the electronic or other health record, Independently interpreting results (not separately reported) and communicating results to the patient/family/caregiver, or Care coordination (not separately reported).     Each patient to whom he or she provides medical services by telemedicine is:  (1) informed of the relationship between the physician and patient and the respective role of any other health care provider with respect to management of the patient; and (2) notified that he or she may decline to receive medical services by telemedicine and may withdraw from such care at any time.    PCP: Garrett Webb MD    Transplant History  Transplant Date: 12/9/2023  UNOS Native Liver Dx: Cirrhosis: Fatty Liver (DURAN)    ORGAN: LIVER  Whole or Partial: whole liver  Donor Type: donation after circulatory death   CDC High Risk: no  Donor CMV Status: Negative  Donor HCV Status: Negative  Donor HBcAb: Negative  Donor HBV PEDRO:   Donor HCV PEDRO: Negative  Biliary Anastomosis: end to end  Arterial Anatomy: standard  IVC reconstruction: end to end ivc  Portal vein status: patent    She has had the following complications since transplant: none    Chief complaint: follow up, history of OLT    HPI:  Yumiko Gonzalez is a 63 y.o. 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     She is without complaints today.  No recent hospitalizations or ED visits.  Compliant with Prograf and CellCept.  She reports intermittent  "tremors and headaches which she had prior to transplant.  She denies recent fever, chills, nausea, vomiting, diarrhea.      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, postitive antismooth muscle antibody    Blood transfusion     Bronchitis     bronchospasm on occasion     Cancer 1987    carcinoma in situ    Cervical neck pain with evidence of disc disease 2012    can bend neck    Cirrhosis     non-alcoholic, compensated    Colon polyps 2012    Depression 2012    Hx panic attacks    Diverticular disease 2012    never diverticulitis    Fatty liver 2012    Fibrocystic breast     Fine tremor     hands,chronic    Hepatosplenomegaly     History of abdominal paracentesis 2023    8.7L of fluid drained    Hypertension 2013    Knee pain, bilateral     chronic, with hands,feet,fingers also painful    Lesion of right lung     Liver disease     "partially sclerosed liver" per pt    Migraines past Hx    Nephrolithiasis     stone episode 2021    Pleural effusion     small, compensated, good bilateral breath sounds per Dr Suresh GUTIERRES (postoperative nausea and vomiting)     twice after childbirth    Postpartum depression     Splenomegaly     Thrombocytopenia     Tremors of nervous system        Past Surgical History:   Procedure Laterality Date    ADENOIDECTOMY      CERVICAL CONIZATION   W/ LASER       SECTION      x3    COLONOSCOPY N/A 2016    Procedure: COLONOSCOPY;  Surgeon: James Palomares MD;  Location: [facility] ENDO;  Service: Endoscopy;  Laterality: N/A;    COLONOSCOPY N/A 2/3/2022    Procedure: COLONOSCOPY;  Surgeon: James Palomares MD;  Location: [facility] ENDO;  Service: Endoscopy;  Laterality: N/A;    EMBOLIZATION N/A 2018    Procedure: EMBOLIZATION, BLOOD VESSEL;  " Surgeon: Joselin Surgeon;  Location: [facility];  Service: Radiology;  Laterality: N/A;    ENDOMETRIAL ABLATION      ESOPHAGOGASTRODUODENOSCOPY N/A 2020    Procedure: ESOPHAGOGASTRODUODENOSCOPY (EGD);  Surgeon: James Palomares MD;  Location: [facility] ENDO;  Service: Endoscopy;  Laterality: N/A;    ESOPHAGOGASTRODUODENOSCOPY N/A 3/8/2022    Procedure: EGD (ESOPHAGOGASTRODUODENOSCOPY);  Surgeon: James Palomares MD;  Location: [facility] ENDO;  Service: Endoscopy;  Laterality: N/A;    LIVER BIOPSY      LIVER TRANSPLANT N/A 2023    Procedure: TRANSPLANT, LIVER;  Surgeon: Gurpreet Raza MD;  Location: [address];  Service: Transplant;  Laterality: N/A;    PELVIC LAPAROSCOPY      TONSILLECTOMY      TUBAL LIGATION         Family History   Problem Relation Name Age of Onset    Breast cancer Mother  70    Heart disease Mother      Hypertension Mother      Tremor Mother      Diabetes Father      Heart disease Father      Diabetes Sister      Cancer Sister      Lung cancer Sister          70's    Diabetes Sister      Tremor Daughter Casandra     Breast cancer Maternal Aunt  70    Multiple sclerosis Maternal Aunt      Multiple sclerosis Maternal Aunt      Breast cancer Maternal Grandmother  70    Diabetes Brother      Emphysema Brother      Breast cancer Maternal Cousin 1st 40    Ovarian cancer Neg Hx      Parkinsonism Neg Hx      Glaucoma Neg Hx      Macular degeneration Neg Hx         Social History     Tobacco Use    Smoking status: Former     Current packs/day: 0.00     Types: Cigarettes     Quit date: 2/3/2006     Years since quittin.2    Smokeless tobacco: Never   Substance Use Topics    Alcohol use: Not Currently    Drug use: No       Current Outpatient Medications   Medication Sig Dispense Refill    albuterol (PROVENTIL/VENTOLIN HFA) 90 mcg/actuation inhaler Inhale 2 puffs every 4 hours as needed for cough, wheeze, or shortness of breath 18 g 11    ALPRAZolam (XANAX) 0.25 MG tablet Take 1 tablet (0.25 mg  "total) by mouth nightly as needed. FOR INSOMNIA 30 tablet 2    aspirin (ECOTRIN) 81 MG EC tablet Take 1 tablet (81 mg total) by mouth once daily. 30 tablet 11    blood sugar diagnostic (FREESTYLE LITE STRIPS) Strp Test blood glucose 3 (three) times daily. 100 each 1    blood-glucose meter kit Use as instructed 1 each 0    buPROPion (WELLBUTRIN XL) 150 MG TB24 tablet Take 2 tablets (300 mg total) by mouth once daily. 180 tablet 3    butalbital-acetaminophen-caffeine -40 mg (FIORICET, ESGIC) -40 mg per tablet Take 1 tablet by mouth every 4 (four) hours as needed for Pain. 20 tablet 0    calcium carbonate-vitamin D3 600 mg-20 mcg (800 unit) Tab Take 1 tablet by mouth once daily. 90 tablet 3    cephALEXin (KEFLEX) 500 MG capsule Take 1 capsule (500 mg total) by mouth 3 (three) times daily. for 7 days 21 capsule 0    insulin aspart U-100 (NOVOLOG) 100 unit/mL (3 mL) InPn pen Inject 4 Units into the skin 3 (three) times daily. Plus sliding scale insulin; MAX: 42 units/day 6 mL 1    k phos di & mono-sod phos mono (K-PHOS-NEUTRAL) 250 mg Tab Take 2 tablets by mouth once daily. 60 tablet 11    lancets 28 gauge Misc Test blood glucose 3 (three) times daily. 100 each 1    mycophenolate (CELLCEPT) 250 mg Cap Take 3 capsules (750 mg total) by mouth 2 (two) times daily. 180 capsule 11    olmesartan (BENICAR) 20 MG tablet Take 1 tablet (20 mg total) by mouth once daily. 90 tablet 1    omeprazole (PRILOSEC) 20 MG capsule Take 1 capsule (20 mg total) by mouth once daily. 90 capsule 0    pen needle, diabetic 32 gauge x 5/32" Ndle Use to inject insulin into the skin 3 (three) times daily. 90 each 1    propranoloL (INDERAL) 20 MG tablet Take 1 tablet (20 mg total) by mouth 2 (two) times daily. 60 tablet 11    SITagliptin phosphate (JANUVIA) 100 MG Tab Take 1 tablet (100 mg total) by mouth once daily. 30 tablet 11    sodium chloride for inhalation (SODIUM CHLORIDE 0.9%) 0.9 % nebulizer solution Take 3 mLs by nebulization 4 " (four) times daily as needed (cough). 150 mL 2    sulfamethoxazole-trimethoprim 400-80mg (BACTRIM,SEPTRA) 400-80 mg per tablet Take 1 tablet by mouth once daily. Stop 6/8/24 30 tablet 5    tacrolimus (PROGRAF) 1 MG Cap Take 3 capsules (3 mg total) by mouth every 12 (twelve) hours. 180 capsule 11    traZODone (DESYREL) 50 MG tablet Take HALF tablet (25 mg total) by mouth every evening. (Patient taking differently: Take 25 mg by mouth nightly as needed.) 15 tablet 11     No current facility-administered medications for this visit.       Review of patient's allergies indicates:   Allergen Reactions    No known drug allergies        Review of Systems   Constitutional:  Negative for fever and weight loss.   Gastrointestinal:  Negative for abdominal pain, blood in stool, constipation, diarrhea, heartburn, melena, nausea and vomiting.   Neurological:  Positive for tremors and headaches.       There were no vitals filed for this visit.      Physical Exam  Constitutional:       General: She is not in acute distress.     Appearance: She is not ill-appearing.   HENT:      Head: Normocephalic and atraumatic.   Eyes:      General: No scleral icterus.     Extraocular Movements: Extraocular movements intact.   Pulmonary:      Effort: No respiratory distress.   Skin:     Coloration: Skin is not jaundiced.   Neurological:      Mental Status: She is alert.         LABS:  Lab Results   Component Value Date    WBC 3.89 (L) 04/08/2024    HGB 12.1 04/08/2024    HCT 38.3 04/08/2024    MCV 91 04/08/2024     (L) 04/08/2024       Lab Results   Component Value Date     04/08/2024    K 4.6 04/08/2024     04/08/2024    CO2 24 04/08/2024    BUN 27 (H) 04/08/2024    CREATININE 1.1 04/08/2024    CALCIUM 9.7 04/08/2024    ANIONGAP 8 04/08/2024    ESTGFRAFRICA >60 06/20/2022    EGFRNONAA >60 06/20/2022       Lab Results   Component Value Date    ALT 12 04/08/2024    AST 18 04/08/2024     (H) 04/13/2023    ALKPHOS 56  04/08/2024    BILITOT 0.6 04/08/2024       Lab Results   Component Value Date    TACROLIMUS 5.7 04/08/2024         Assessment:  63 y.o. 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    1. Liver transplanted    2. Long-term use of immunosuppressant medication    3. History of hepatocellular carcinoma        Recommendations:  Allograft Function  - Excellent graft function with normal LFTs    Immunosuppression   - Continue Prograf 3mg twice daily  - Decrease CellCept to 750 mg twice daily    History of HCC: Imaging surveillance per protocol.    Kidney function   - Creatinine 1.1  - Avoid NSAIDs as able and maintain adequate hydration    Health Maintenance/Screening:  - Recommend age appropriate cancer screening  - Skin cancer: Recommend use of sunscreen SPF 30 or higher, hat and sunglasses while outside, dermatologist visit annually or sooner if any concerning lesions.  - Osteoporosis: Recommend bone density testing every 2-3 years if previously normal or annually if previously abnormal.    Return to clinic in 3 months.    UNOS Patient Status  Functional Status: 80% - Normal activity with effort: some symptoms of disease  Physical Capacity: No Limitations    This note includes dictation done using M*Modal speech recognition program. Word recognition mistakes are occasionally missed on review.    James Brambila MD  Staff Physician  Hepatology and Liver Transplant  [facility]  [facility]

## 2024-04-19 NOTE — TELEPHONE ENCOUNTER
Let her know that the urine culture did return positive.  I sent in a prescription for cephalexin.

## 2024-04-22 ENCOUNTER — PATIENT MESSAGE (OUTPATIENT)
Dept: TRANSPLANT | Facility: CLINIC | Age: 64
End: 2024-04-22
Payer: COMMERCIAL

## 2024-04-22 ENCOUNTER — OFFICE VISIT (OUTPATIENT)
Dept: PRIMARY CARE CLINIC | Facility: CLINIC | Age: 64
End: 2024-04-22
Payer: COMMERCIAL

## 2024-04-22 VITALS
DIASTOLIC BLOOD PRESSURE: 88 MMHG | HEART RATE: 73 BPM | WEIGHT: 199.94 LBS | SYSTOLIC BLOOD PRESSURE: 136 MMHG | OXYGEN SATURATION: 98 % | TEMPERATURE: 98 F | RESPIRATION RATE: 18 BRPM | BODY MASS INDEX: 31.32 KG/M2

## 2024-04-22 DIAGNOSIS — N39.0 URINARY TRACT INFECTION WITHOUT HEMATURIA, SITE UNSPECIFIED: ICD-10-CM

## 2024-04-22 DIAGNOSIS — I10 PRIMARY HYPERTENSION: Primary | ICD-10-CM

## 2024-04-22 PROCEDURE — 99214 OFFICE O/P EST MOD 30 MIN: CPT | Mod: S$GLB,,, | Performed by: FAMILY MEDICINE

## 2024-04-22 PROCEDURE — 99999 PR PBB SHADOW E&M-EST. PATIENT-LVL V: CPT | Mod: PBBFAC,,, | Performed by: FAMILY MEDICINE

## 2024-04-22 RX ORDER — OLMESARTAN MEDOXOMIL 20 MG/1
20 TABLET ORAL DAILY
Qty: 90 TABLET | Refills: 1 | Status: SHIPPED | OUTPATIENT
Start: 2024-04-22 | End: 2025-04-22

## 2024-04-22 RX ORDER — MYCOPHENOLATE MOFETIL 250 MG/1
750 CAPSULE ORAL 2 TIMES DAILY
Qty: 180 CAPSULE | Refills: 11 | Status: SHIPPED | OUTPATIENT
Start: 2024-04-22 | End: 2025-04-22

## 2024-04-22 NOTE — PROGRESS NOTES
Primary Care Provider Appointment   [facility]       Patient ID: Yumiko Gonzalez is a 63 y.o. female.    ASSESSMENT/PLAN by Problem List:    1. Primary hypertension  Assessment & Plan:  BP not ideal, home 130-140/90, her machine results similar to ours today  Will add olmesartan  Has cmp ordered for next Monday by transplant, so will check K and Cr.    Orders:  -     olmesartan (BENICAR) 20 MG tablet; Take 1 tablet (20 mg total) by mouth once daily.  Dispense: 90 tablet; Refill: 1    2. Urinary tract infection without hematuria, site unspecified  Comments:  Symptoms improve.  Discussed prevention/adequate fluid intake, etc.             Follow Up:  Two months with Kenia      Subjective:     Chief Complaint   Patient presents with    Follow-up     I have reviewed the information entered by the ancillary staff regarding the chief complaint as well as the related history.    HPI    Patient is a/an 63 y.o.  female      routine follow-up.  Also patient recently developed symptoms of UTI, confirmed by urine culture despite being on low-dose Bactrim after transplant.  Culture was sensitive to 1st generation cephalosporin so she was placed on cephalexin.    Blood pressure has also been running high 130 to 140s/90.  Currently only on propranolol which is used for essential tremor.    For complete problem list, past medical history, surgical history, social history, etc., see appropriate section in the electronic medical record    Review of Systems   Constitutional:  Negative for activity change and unexpected weight change.   HENT:  Positive for rhinorrhea. Negative for hearing loss and trouble swallowing.    Eyes:  Negative for discharge and visual disturbance.   Respiratory:  Negative for chest tightness and wheezing.    Cardiovascular:  Positive for leg swelling. Negative for chest pain and palpitations.   Gastrointestinal:  Negative for blood in stool, constipation, diarrhea and  vomiting.   Endocrine: Negative for polydipsia and polyuria.   Genitourinary:  Negative for difficulty urinating, dysuria, hematuria and menstrual problem.   Musculoskeletal:  Negative for arthralgias, joint swelling and neck pain.   Neurological:  Positive for headaches. Negative for weakness.   Psychiatric/Behavioral:  Positive for dysphoric mood. Negative for confusion.        Objective     Physical Exam  Vitals reviewed.   Constitutional:       General: She is not in acute distress.     Appearance: She is well-developed. She is not ill-appearing.   HENT:      Head: Normocephalic and atraumatic.      Mouth/Throat:      Pharynx: Oropharynx is clear.   Eyes:      General: No scleral icterus.     Conjunctiva/sclera: Conjunctivae normal.   Cardiovascular:      Rate and Rhythm: Normal rate and regular rhythm.      Heart sounds: Normal heart sounds. No murmur heard.     No friction rub.      Comments: Mild ankle edema  Pulmonary:      Effort: Pulmonary effort is normal. No respiratory distress.      Breath sounds: Normal breath sounds. No wheezing or rales.      Comments: Mildly diminished breath sounds, few rhonchi but mostly clear no wheezing  Abdominal:      Comments:  Abdomen is soft, bowel sounds are normal.   Skin:     General: Skin is dry.      Findings: No rash.   Neurological:      Mental Status: She is alert and oriented to person, place, and time.   Psychiatric:         Behavior: Behavior normal.       Vitals:    04/22/24 1109 04/22/24 1125   BP: (!) 146/92 136/88   BP Location: Left arm    Patient Position: Sitting    BP Method: Large (Manual)    Pulse: 73    Resp: 18    Temp: 98.4 °F (36.9 °C)    TempSrc: Oral    SpO2: 98%    Weight: 90.7 kg (199 lb 15.3 oz)            THIS DOCUMENT WAS MADE IN PART WITH VOICE RECOGNITION SOFTWARE.  OCCASIONALLY THIS SOFTWARE WILL MISINTERPRET WORDS OR PHRASES.

## 2024-04-22 NOTE — ASSESSMENT & PLAN NOTE
BP not ideal, home 130-140/90, her machine results similar to ours today  Will add olmesartan  Has cmp ordered for next Monday by transplant, so will check K and Cr.

## 2024-04-23 ENCOUNTER — TELEPHONE (OUTPATIENT)
Dept: TRANSPLANT | Facility: CLINIC | Age: 64
End: 2024-04-23
Payer: COMMERCIAL

## 2024-04-23 ENCOUNTER — PATIENT MESSAGE (OUTPATIENT)
Dept: PRIMARY CARE CLINIC | Facility: CLINIC | Age: 64
End: 2024-04-23
Payer: COMMERCIAL

## 2024-04-23 ENCOUNTER — PATIENT MESSAGE (OUTPATIENT)
Dept: TRANSPLANT | Facility: CLINIC | Age: 64
End: 2024-04-23
Payer: COMMERCIAL

## 2024-04-23 DIAGNOSIS — Z00.6 RESEARCH STUDY PATIENT: Primary | ICD-10-CM

## 2024-04-23 NOTE — TELEPHONE ENCOUNTER
Pt advised of stable ultrasound  ----- Message from James Brambila MD sent at 4/22/2024  8:01 PM CDT -----  Reviewed. Liver and blood vessels appear normal.

## 2024-04-24 ENCOUNTER — CLINICAL SUPPORT (OUTPATIENT)
Dept: REHABILITATION | Facility: HOSPITAL | Age: 64
End: 2024-04-24
Payer: COMMERCIAL

## 2024-04-24 ENCOUNTER — PATIENT MESSAGE (OUTPATIENT)
Dept: TRANSPLANT | Facility: CLINIC | Age: 64
End: 2024-04-24
Payer: COMMERCIAL

## 2024-04-24 DIAGNOSIS — Z74.09 IMPAIRED FUNCTIONAL MOBILITY, BALANCE, GAIT, AND ENDURANCE: Primary | ICD-10-CM

## 2024-04-24 PROCEDURE — 97110 THERAPEUTIC EXERCISES: CPT | Mod: PO,CQ

## 2024-04-24 PROCEDURE — 97112 NEUROMUSCULAR REEDUCATION: CPT | Mod: PO,CQ

## 2024-04-24 PROCEDURE — 97530 THERAPEUTIC ACTIVITIES: CPT | Mod: PO,CQ

## 2024-04-24 NOTE — PROGRESS NOTES
"[facility]   Physical Therapy Treatment Note     Name: Yumiko WILHELM [facility]  Clinic Number: [id number]    Therapy Diagnosis:   Encounter Diagnosis   Name Primary?    Impaired functional mobility, balance, gait, and endurance Yes       Physician: Reynaldo Hodges Jr.,*    Visit Date: 4/24/2024    Physician Orders: PT Eval and Treat " post surgical"  Medical Diagnosis from Referral:   Diagnosis   Z94.4 (ICD-10-CM) - Liver replaced by transplant   R53.1 (ICD-10-CM) - Weakness generalized      Evaluation Date: 1/31/2024  Authorization Period Expiration: 12/31/24  Plan of Care Expiration: 5/31/24  Progress Note Due: 5/3/24  Visit # / Visits authorized: 8/20 (9)  FOTO: 2/5    Time In: 1006 AM  Time Out: 1100 AM  Total Billable Time: 54 minutes    SUBJECTIVE     Pt reports: her left knee continues to bother her. Patient states is also have a toothache and she has been in touch with her transplant team in regards to dental work. She was compliant with home exercise program.  Response to previous treatment: Minor soreness in L knee.  Functional change: Unable to be determined    Pain: 2/10  Location: left knee      OBJECTIVE     Objective Measures updated at progress report unless specified.     Treatment     Tiffanie received the treatments listed below:       therapeutic exercises to develop strength, endurance, ROM, and flexibility for 25 minutes including:  Recumbent bike x 7 minutes, resistance 2   Bridging 3x10  LAQ's 4# 3x10 renee  Gastroc standing stretches on wedge 3x30" renee   Heel raises 2x20      manual therapy to L knee including PROM and joint mobilizations for 00 minutes including:  PROM to L knee within tolerable ROM  TF AP and PA joint mobilizations grade III  Patellar mobilizations grade III  TF distraction in short sitting    neuromuscular re-education activities to improve: Balance, Posture, Sense, and Core Stability for 15 minutes. The following activities were included:  Straight leg " "raises left leg 2x12  Lumbar iso step away 20x each way with Red TB  Tandem stance balance 3x30" each side    therapeutic activities to improve functional performance for 08 minutes, including:  Sit to stands 3x5 with foam pad on chair   Leg press 3x10 10# - seat 9    Patient Education and Home Exercises     Home Exercises Provided and Patient Education Provided     Education provided:   - Pt was educated on progressions made with therex and purposes for progressions with increasing repetitions.     Written Home Exercises Provided: Patient instructed to cont prior HEP. Exercises were reviewed and Tiffanie was able to demonstrate them prior to the end of the session.  Tiffanie demonstrated good  understanding of the education provided. See EMR under Patient Instructions for exercises provided during therapy sessions    ASSESSMENT     Tiffanie is fatigued by prescribed interventions especially sit to stands. Fatigue and decreased endurance note able as repetitions progress. Patient is able to perform from standard chair + air ex pad without UE assist. Patient is challenged by tandem stance but she does remove hands to allow for postural sway and self correction. (L) knee pain intermittent during today's treatment.     Tiffanie Is progressing well towards her goals.   Pt prognosis is Good.     Pt will continue to benefit from skilled outpatient physical therapy to address the deficits listed in the problem list box on initial evaluation, provide pt/family education and to maximize pt's level of independence in the home and community environment.     Pt's spiritual, cultural and educational needs considered and pt agreeable to plan of care and goals.     Anticipated barriers to physical therapy: General deconditioning, L knee pain    Goals:   Short Term Goals: 6 weeks Date last assessed:  Status:    1. Patient will be provided with an HEP for strength, endurance and balance.  4/5/2024    50% met   2.  Patient will demonstrate " improve endurance and activity tolerance as evidenced by ability to perform Nu-step x 15 minutes without rests breaks. 4/5/2024 MET            Long Term Goals: 12weeks  Date last assessed: Status:    1. Patient will be independent and compliant with updated HEP. 4/5/2024    50% met   2.  Patient will increase her   gait speed as assessed by the timed 10MWT from 0.58 m/s to 0.72 m/s to increase her safety and (I) with gait at a community level. (MDC for CVA= 0.14 m/s)     4/5/2024 MET      3. The patient will demonstrate improved efficiency with functional mobility and decreased risk for falls as evidenced by an increase in her score on the Timed up and Go from 18.6 sec to 15.7 sec. (Minimal detectable change in chronic stroke = 2.9 seconds)  4/5/2024 MET   4.Patient will improve her FOTO score from 47  to at least 61 for improved self perception of functional mobility.(score 0-100, high score indicates greater level of function) 4/5/2024 Progressing, 55   5. Pt will improve 5x sit to stand score from 27.1 seconds to less than 20 sec to decrease fall risk.  4/5/2024    ongoing   6. Pt will improve 6MWT to >1000' to demonstrate improvements in aerobic endurance and community ambulation status 4/5/2024         PLAN     Continue with BEVERLY Dias, PTA

## 2024-04-29 ENCOUNTER — RESEARCH ENCOUNTER (OUTPATIENT)
Dept: RESEARCH | Facility: HOSPITAL | Age: 64
End: 2024-04-29
Payer: COMMERCIAL

## 2024-04-29 ENCOUNTER — PATIENT MESSAGE (OUTPATIENT)
Dept: PRIMARY CARE CLINIC | Facility: CLINIC | Age: 64
End: 2024-04-29
Payer: COMMERCIAL

## 2024-04-29 ENCOUNTER — LAB VISIT (OUTPATIENT)
Dept: LAB | Facility: HOSPITAL | Age: 64
End: 2024-04-29
Attending: STUDENT IN AN ORGANIZED HEALTH CARE EDUCATION/TRAINING PROGRAM
Payer: COMMERCIAL

## 2024-04-29 ENCOUNTER — CLINICAL SUPPORT (OUTPATIENT)
Dept: REHABILITATION | Facility: HOSPITAL | Age: 64
End: 2024-04-29
Payer: COMMERCIAL

## 2024-04-29 DIAGNOSIS — Z94.4 S/P LIVER TRANSPLANT: ICD-10-CM

## 2024-04-29 DIAGNOSIS — E83.39 LOW PHOSPHATE LEVELS: ICD-10-CM

## 2024-04-29 DIAGNOSIS — Z74.09 IMPAIRED FUNCTIONAL MOBILITY, BALANCE, GAIT, AND ENDURANCE: Primary | ICD-10-CM

## 2024-04-29 DIAGNOSIS — Z00.6 RESEARCH STUDY PATIENT: ICD-10-CM

## 2024-04-29 LAB
ALBUMIN SERPL BCP-MCNC: 3.6 G/DL (ref 3.5–5.2)
ALP SERPL-CCNC: 66 U/L (ref 55–135)
ALT SERPL W/O P-5'-P-CCNC: 11 U/L (ref 10–44)
ANION GAP SERPL CALC-SCNC: 7 MMOL/L (ref 8–16)
ANISOCYTOSIS BLD QL SMEAR: SLIGHT
AST SERPL-CCNC: 17 U/L (ref 10–40)
BASOPHILS NFR BLD: 0 % (ref 0–1.9)
BILIRUB SERPL-MCNC: 0.4 MG/DL (ref 0.1–1)
BUN SERPL-MCNC: 28 MG/DL (ref 8–23)
CALCIUM SERPL-MCNC: 9.2 MG/DL (ref 8.7–10.5)
CHLORIDE SERPL-SCNC: 108 MMOL/L (ref 95–110)
CO2 SERPL-SCNC: 25 MMOL/L (ref 23–29)
CREAT SERPL-MCNC: 1 MG/DL (ref 0.5–1.4)
DIFFERENTIAL METHOD BLD: ABNORMAL
EOSINOPHIL NFR BLD: 1 % (ref 0–8)
ERYTHROCYTE [DISTWIDTH] IN BLOOD BY AUTOMATED COUNT: 13.7 % (ref 11.5–14.5)
EST. GFR  (NO RACE VARIABLE): >60 ML/MIN/1.73 M^2
GLUCOSE SERPL-MCNC: 103 MG/DL (ref 70–110)
HCT VFR BLD AUTO: 38.7 % (ref 37–48.5)
HGB BLD-MCNC: 11.9 G/DL (ref 12–16)
IMM GRANULOCYTES # BLD AUTO: ABNORMAL K/UL (ref 0–0.04)
IMM GRANULOCYTES NFR BLD AUTO: ABNORMAL % (ref 0–0.5)
LYMPHOCYTES NFR BLD: 5 % (ref 18–48)
MCH RBC QN AUTO: 28.4 PG (ref 27–31)
MCHC RBC AUTO-ENTMCNC: 30.7 G/DL (ref 32–36)
MCV RBC AUTO: 92 FL (ref 82–98)
MONOCYTES NFR BLD: 2 % (ref 4–15)
NEUTROPHILS NFR BLD: 89 % (ref 38–73)
NEUTS BAND NFR BLD MANUAL: 3 %
NRBC BLD-RTO: 0 /100 WBC
OVALOCYTES BLD QL SMEAR: ABNORMAL
PHOSPHATE SERPL-MCNC: 3.6 MG/DL (ref 2.7–4.5)
PLATELET # BLD AUTO: 123 K/UL (ref 150–450)
PLATELET BLD QL SMEAR: ABNORMAL
PMV BLD AUTO: 10.9 FL (ref 9.2–12.9)
POIKILOCYTOSIS BLD QL SMEAR: SLIGHT
POTASSIUM SERPL-SCNC: 4.2 MMOL/L (ref 3.5–5.1)
PROT SERPL-MCNC: 6.1 G/DL (ref 6–8.4)
RBC # BLD AUTO: 4.19 M/UL (ref 4–5.4)
RESEARCH LAB: NORMAL
SODIUM SERPL-SCNC: 140 MMOL/L (ref 136–145)
WBC # BLD AUTO: 4.62 K/UL (ref 3.9–12.7)

## 2024-04-29 PROCEDURE — 85007 BL SMEAR W/DIFF WBC COUNT: CPT | Performed by: STUDENT IN AN ORGANIZED HEALTH CARE EDUCATION/TRAINING PROGRAM

## 2024-04-29 PROCEDURE — 97110 THERAPEUTIC EXERCISES: CPT | Mod: PO,CQ

## 2024-04-29 PROCEDURE — 84100 ASSAY OF PHOSPHORUS: CPT | Performed by: STUDENT IN AN ORGANIZED HEALTH CARE EDUCATION/TRAINING PROGRAM

## 2024-04-29 PROCEDURE — 36415 COLL VENOUS BLD VENIPUNCTURE: CPT | Mod: PO | Performed by: SURGERY

## 2024-04-29 PROCEDURE — 80197 ASSAY OF TACROLIMUS: CPT | Performed by: STUDENT IN AN ORGANIZED HEALTH CARE EDUCATION/TRAINING PROGRAM

## 2024-04-29 PROCEDURE — 97112 NEUROMUSCULAR REEDUCATION: CPT | Mod: PO,CQ

## 2024-04-29 PROCEDURE — 80053 COMPREHEN METABOLIC PANEL: CPT | Performed by: STUDENT IN AN ORGANIZED HEALTH CARE EDUCATION/TRAINING PROGRAM

## 2024-04-29 PROCEDURE — 85027 COMPLETE CBC AUTOMATED: CPT | Performed by: STUDENT IN AN ORGANIZED HEALTH CARE EDUCATION/TRAINING PROGRAM

## 2024-04-29 PROCEDURE — 97140 MANUAL THERAPY 1/> REGIONS: CPT | Mod: PO,CQ

## 2024-04-29 NOTE — PROGRESS NOTES
"[facility]   Physical Therapy Treatment Note     Name: Yumiko WILHELM [facility]  Clinic Number: [id number]    Therapy Diagnosis:   Encounter Diagnosis   Name Primary?    Impaired functional mobility, balance, gait, and endurance Yes       Physician: Reynaldo Hodges Jr.,*    Visit Date: 2024    Physician Orders: PT Eval and Treat " post surgical"  Medical Diagnosis from Referral:   Diagnosis   Z94.4 (ICD-10-CM) - Liver replaced by transplant   R53.1 (ICD-10-CM) - Weakness generalized      Evaluation Date: 2024  Authorization Period Expiration: 24  Plan of Care Expiration: 24  Progress Note Due: 5/3/24  Visit # / Visits authorized:  (10)  FOTO:     Time In: 11:00 AM  Time Out: 11:53 AM  Total Billable Time: 53 minutes    SUBJECTIVE     Pt reports: increased general fatigue upon arrival as she has been helping her family out with her 2  grandchildren. Continued L knee discomfort     She was compliant with home exercise program.  Response to previous treatment: Minor soreness in L knee.  Functional change: Unable to be determined    Pain: 2/10  Location: left knee      OBJECTIVE     Objective Measures updated at progress report unless specified.     Treatment     Tiffanie received the treatments listed below:       therapeutic exercises to develop strength, endurance, ROM, and flexibility for 25 minutes including:  Recumbent bike x 8 minutes, resistance 2   Bridging 3x10  LAQ's 4# 3x10 renee  Gastroc standing stretches on wedge 3x30" renee   Heel raises 2x20      manual therapy to L knee including PROM and joint mobilizations for 10 minutes including:  PROM to L knee within tolerable ROM  TF AP and PA joint mobilizations grade III  Patellar mobilizations grade III  TF distraction in short sitting-NP    neuromuscular re-education activities to improve: Balance, Posture, Sense, and Core Stability for 18 minutes. The following activities were included:  Straight leg raises left " "leg 2x12  Lumbar iso step away 20x each way with Red TB  Tandem stance balance 3x30" each side    therapeutic activities to improve functional performance for 08 minutes, including:  Sit to stands 3x5 with foam pad on chair   Leg press 3x10 10# - seat 9    Patient Education and Home Exercises     Home Exercises Provided and Patient Education Provided     Education provided:   - Pt was educated on progressions made with therex and purposes for progressions with increasing repetitions.     Written Home Exercises Provided: Patient instructed to cont prior HEP. Exercises were reviewed and Tiffanie was able to demonstrate them prior to the end of the session.  Tiffanie demonstrated good  understanding of the education provided. See EMR under Patient Instructions for exercises provided during therapy sessions    ASSESSMENT   Tiffanie returned reporting increased general fatigue and continued L knee discomfort upon arrival for treatment. She continues to be easily fatigued with current interventions, especially sit to stands. Manual therapy techniques were reincorporated into treatment in effort to decrease pt reported stiffness. Will continue to progress per pt's tolerance.    Tiffanie Is progressing well towards her goals.   Pt prognosis is Good.     Pt will continue to benefit from skilled outpatient physical therapy to address the deficits listed in the problem list box on initial evaluation, provide pt/family education and to maximize pt's level of independence in the home and community environment.     Pt's spiritual, cultural and educational needs considered and pt agreeable to plan of care and goals.     Anticipated barriers to physical therapy: General deconditioning, L knee pain    Goals:   Short Term Goals: 6 weeks Date last assessed:  Status:    1. Patient will be provided with an HEP for strength, endurance and balance.  4/5/2024    50% met   2.  Patient will demonstrate improve endurance and activity tolerance as " evidenced by ability to perform Nu-step x 15 minutes without rests breaks. 4/5/2024 MET            Long Term Goals: 12weeks  Date last assessed: Status:    1. Patient will be independent and compliant with updated HEP. 4/5/2024    50% met   2.  Patient will increase her   gait speed as assessed by the timed 10MWT from 0.58 m/s to 0.72 m/s to increase her safety and (I) with gait at a community level. (MDC for CVA= 0.14 m/s)     4/5/2024 MET      3. The patient will demonstrate improved efficiency with functional mobility and decreased risk for falls as evidenced by an increase in her score on the Timed up and Go from 18.6 sec to 15.7 sec. (Minimal detectable change in chronic stroke = 2.9 seconds)  4/5/2024 MET   4.Patient will improve her FOTO score from 47  to at least 61 for improved self perception of functional mobility.(score 0-100, high score indicates greater level of function) 4/5/2024 Progressing, 55   5. Pt will improve 5x sit to stand score from 27.1 seconds to less than 20 sec to decrease fall risk.  4/5/2024    ongoing   6. Pt will improve 6MWT to >1000' to demonstrate improvements in aerobic endurance and community ambulation status 4/5/2024         PLAN     Continue with BEVERLY Ruiz, PTA

## 2024-04-29 NOTE — PROGRESS NOTES
RESEARCH STUDY SPECIMEN COLLECTION ENCOUNTER  ORGAN TRANSPLANT  [facility] COURTNEY ROBLERO    Study Title: Role of Tumor-Induced Immune Tolerance in the Patient Response to Locoregional Therapy: Implications in Assessment Risk of Hepatocellular Carcinoma Recurrence Following Liver Transplantation    IRB #: [id number]    IRB Approval Date: 6/8/2016    : Eguene Haney MD  Sub-investigator: Nino Belcher, PhD    Patient Number: [id number]    In accordance with the study protocol, Research Lab orders were placed and follow-up specimens were collected on (date: 04/29/2024) in:  [facility] VNP: YES/NO: No  [facility]: YES/NO: No  [facility] IM: YES/NO: No  Other [facility] location: YES/NO: Yes   Location: [facility]    A  was used to transport blood specimens to [facility] for processing: YES/NO: Yes  Blood specimens were transported to [facility] for processing: YES/NO: Yes    Mehran Hilliard  Admin Research- Liver Transplant

## 2024-04-30 ENCOUNTER — PATIENT MESSAGE (OUTPATIENT)
Dept: TRANSPLANT | Facility: CLINIC | Age: 64
End: 2024-04-30
Payer: COMMERCIAL

## 2024-04-30 ENCOUNTER — TELEPHONE (OUTPATIENT)
Dept: TRANSPLANT | Facility: CLINIC | Age: 64
End: 2024-04-30
Payer: COMMERCIAL

## 2024-04-30 LAB — TACROLIMUS BLD-MCNC: 6 NG/ML (ref 5–15)

## 2024-04-30 NOTE — TELEPHONE ENCOUNTER
Pt notified via portal of stable labs and that no medication changes are needed. Repeat labs due 5/20/24 per protocol.   ----- Message from James Brambila MD sent at 4/30/2024  1:15 PM CDT -----  Liver tests are stable. No changes in her immunosuppression. Please continue to monitor labs per transplant protocol.

## 2024-05-01 ENCOUNTER — TELEPHONE (OUTPATIENT)
Dept: PRIMARY CARE CLINIC | Facility: CLINIC | Age: 64
End: 2024-05-01
Payer: MEDICARE

## 2024-05-01 ENCOUNTER — PATIENT MESSAGE (OUTPATIENT)
Dept: PRIMARY CARE CLINIC | Facility: CLINIC | Age: 64
End: 2024-05-01
Payer: MEDICARE

## 2024-05-01 NOTE — TELEPHONE ENCOUNTER
Pt had a scheduled appointment with clinician this morning. She called to cancel and clinician returned the call.     Pt provided an update on her current situation. She said that her twin grand daughter/son were born last week. Pt also said she is busy attending her grand daughter soft ball game. She c/o of mounting expenses and other financial stresses.  Pt described an argument she had with her daughter. Clinician used this as ane example of CBT, and automatic thoughts/assumptions.     She is rescheduled for Friday, May 3, 2024.

## 2024-05-01 NOTE — Clinical Note
Patient scheduled appointment with Dr cowan on  5/9   Please contact patient to arrange labs and US in 3 months (can be done at [facility]), with RTC 1 week later to review results (Virtual visit okay). Orders in Epic. Thanks!

## 2024-05-02 ENCOUNTER — CLINICAL SUPPORT (OUTPATIENT)
Dept: REHABILITATION | Facility: HOSPITAL | Age: 64
End: 2024-05-02
Payer: MEDICARE

## 2024-05-02 DIAGNOSIS — Z74.09 IMPAIRED FUNCTIONAL MOBILITY, BALANCE, GAIT, AND ENDURANCE: Primary | ICD-10-CM

## 2024-05-02 PROCEDURE — 97110 THERAPEUTIC EXERCISES: CPT | Mod: PO,CQ

## 2024-05-02 PROCEDURE — 97112 NEUROMUSCULAR REEDUCATION: CPT | Mod: PO,CQ

## 2024-05-02 PROCEDURE — 97530 THERAPEUTIC ACTIVITIES: CPT | Mod: PO,CQ

## 2024-05-02 NOTE — PROGRESS NOTES
"[facility]   Physical Therapy Treatment Note     Name: Yumiko WILHELM [facility]  Clinic Number: [id number]    Therapy Diagnosis:   Encounter Diagnosis   Name Primary?    Impaired functional mobility, balance, gait, and endurance Yes     Physician: Reynaldo Hodges Jr.,*    Visit Date: 5/2/2024    Physician Orders: PT Eval and Treat " post surgical"  Medical Diagnosis from Referral:   Diagnosis   Z94.4 (ICD-10-CM) - Liver replaced by transplant   R53.1 (ICD-10-CM) - Weakness generalized      Evaluation Date: 1/31/2024  Authorization Period Expiration: 12/31/24  Plan of Care Expiration: 5/31/24  Progress Note Due: 5/3/24  Visit # / Visits authorized: 9/20 (10)  FOTO: 2/5    Time In: 10:00 AM   Time Out: 10:58 AM  Total Billable Time: 58 minutes    SUBJECTIVE     Pt reports: Pt reports she is dealing with a dental infection. Pt continues to have pain in L knee. Pt has been making 2 hour road trips the past several days and reports feeling soreness in L hip flexors from sitting.     She was compliant with home exercise program.  Response to previous treatment: Minor soreness in B LE  Functional change: Unable to be determined    Pain: 4/10  Location: left knee      OBJECTIVE     Objective Measures updated at progress report unless specified.     Treatment     Tiffanie received the treatments listed below:       therapeutic exercises to develop strength, endurance, ROM, and flexibility for 35 minutes including:  Recumbent bike x 8 minutes, resistance 2   Bridging 3x10  LAQ's 4# 3x10 renee  Gastroc standing stretches on wedge 3x30" renee   Heel raises 2x20   Supine hip flexor stretch L side 3x30"     manual therapy to L knee including PROM and joint mobilizations for 00 minutes including:  PROM to L knee within tolerable ROM  TF AP and PA joint mobilizations grade III  Patellar mobilizations grade III  TF distraction in short sitting-NP    neuromuscular re-education activities to improve: Balance, Posture, " "Sense, and Core Stability for 15 minutes. The following activities were included:  Straight leg raises renee 3x10  Lumbar iso step away 30x each way with Red TB  Tandem stance balance 3x30" each side    therapeutic activities to improve functional performance for 08 minutes, including:  Sit to stands x10 with foam pad on chair   Leg press 3x10 10# - seat 9    Patient Education and Home Exercises     Home Exercises Provided and Patient Education Provided     Education provided:   - Pt was educated on progressions made with therex and purposes for progressions with increasing repetitions.     Written Home Exercises Provided: Patient instructed to cont prior HEP. Exercises were reviewed and Tiffanie was able to demonstrate them prior to the end of the session.  Tiffanie demonstrated good  understanding of the education provided. See EMR under Patient Instructions for exercises provided during therapy sessions    ASSESSMENT   Tiffaine presents with increased endurance as she requires less frequent rest breaks in between exercises. Pt tolerated increase in repetitions well with some fatigue but no adverse reactions. Pt demonstrated with a positive response to hip flexor stretch with improvements in muscle tightness. Static balance consistently improving. Patient remains most fatigued with repetitive sit to stands.     Tiffanie Is progressing well towards her goals.   Pt prognosis is Good.     Pt will continue to benefit from skilled outpatient physical therapy to address the deficits listed in the problem list box on initial evaluation, provide pt/family education and to maximize pt's level of independence in the home and community environment.     Pt's spiritual, cultural and educational needs considered and pt agreeable to plan of care and goals.     Anticipated barriers to physical therapy: General deconditioning, L knee pain    Goals:   Short Term Goals: 6 weeks Date last assessed:  Status:    1. Patient will be provided with an " HEP for strength, endurance and balance.  4/5/2024    50% met   2.  Patient will demonstrate improve endurance and activity tolerance as evidenced by ability to perform Nu-step x 15 minutes without rests breaks. 4/5/2024 MET            Long Term Goals: 12weeks  Date last assessed: Status:    1. Patient will be independent and compliant with updated HEP. 4/5/2024    50% met   2.  Patient will increase her   gait speed as assessed by the timed 10MWT from 0.58 m/s to 0.72 m/s to increase her safety and (I) with gait at a community level. (MDC for CVA= 0.14 m/s)     4/5/2024 MET      3. The patient will demonstrate improved efficiency with functional mobility and decreased risk for falls as evidenced by an increase in her score on the Timed up and Go from 18.6 sec to 15.7 sec. (Minimal detectable change in chronic stroke = 2.9 seconds)  4/5/2024 MET   4.Patient will improve her FOTO score from 47  to at least 61 for improved self perception of functional mobility.(score 0-100, high score indicates greater level of function) 4/5/2024 Progressing, 55   5. Pt will improve 5x sit to stand score from 27.1 seconds to less than 20 sec to decrease fall risk.  4/5/2024    ongoing   6. Pt will improve 6MWT to >1000' to demonstrate improvements in aerobic endurance and community ambulation status 4/5/2024         PLAN     Continue with LUCI Hess

## 2024-05-03 ENCOUNTER — CLINICAL SUPPORT (OUTPATIENT)
Dept: PRIMARY CARE CLINIC | Facility: CLINIC | Age: 64
End: 2024-05-03
Payer: MEDICARE

## 2024-05-03 DIAGNOSIS — F33.1 MAJOR DEPRESSIVE DISORDER, RECURRENT, MODERATE: ICD-10-CM

## 2024-05-03 DIAGNOSIS — R45.89 NEED FOR EMOTIONAL SUPPORT: Primary | ICD-10-CM

## 2024-05-03 DIAGNOSIS — F43.21 GRIEF REACTION: ICD-10-CM

## 2024-05-03 PROCEDURE — 99499 UNLISTED E&M SERVICE: CPT | Mod: 95,,, | Performed by: SOCIAL WORKER
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
## 2024-05-06 ENCOUNTER — PATIENT MESSAGE (OUTPATIENT)
Dept: TRANSPLANT | Facility: CLINIC | Age: 64
End: 2024-05-06
Payer: MEDICARE

## 2024-05-09 ENCOUNTER — CLINICAL SUPPORT (OUTPATIENT)
Dept: REHABILITATION | Facility: HOSPITAL | Age: 64
End: 2024-05-09
Payer: MEDICARE

## 2024-05-09 ENCOUNTER — TELEPHONE (OUTPATIENT)
Dept: TRANSPLANT | Facility: CLINIC | Age: 64
End: 2024-05-09
Payer: MEDICARE

## 2024-05-09 DIAGNOSIS — Z74.09 IMPAIRED FUNCTIONAL MOBILITY, BALANCE, GAIT, AND ENDURANCE: Primary | ICD-10-CM

## 2024-05-09 PROCEDURE — 97110 THERAPEUTIC EXERCISES: CPT | Mod: PO,CQ

## 2024-05-09 PROCEDURE — 97530 THERAPEUTIC ACTIVITIES: CPT | Mod: PO,CQ

## 2024-05-09 PROCEDURE — 97112 NEUROMUSCULAR REEDUCATION: CPT | Mod: PO,CQ

## 2024-05-09 NOTE — LETTER
May 9, 2024        [facility] [place] [facility] [address] [facility] [address], [place] [address]  Fax Number: [phone]  Phone Number: [phone]  Order ID: [id number]      Patient: Yumiko Gonzalez   MR Number: [id number]   YOB: 1960       Dr. Elijah Fisher,     Thank you for taking care of my patient Yumiko Gonzalez.  We approve all dental procedures for Ms. Gonzalez.    Antibiotic prophylaxis: None needed from a transplant perspective    Interruption of anticoagulants: Yes - Medication ASA 81mg can be held 3-5 days prior and should resume 3-5 days post procedure    Anesthetic restrictions: No     Is Epinephrine ok:  Yes     For full medical clearance, please contact patient's Primary Care Physician. If you have any questions or concerns related to transplant, please contact our office.      Sincerely,       Dr. James Brambila MD   NPI #[id number]   5/9/2024    [facility]  [facility] [place] [address]  [phone]

## 2024-05-09 NOTE — PROGRESS NOTES
"[facility]   Physical Therapy Treatment Note     Name: Yumiko WILHELM [facility]  Clinic Number: [id number]    Therapy Diagnosis:   No diagnosis found.    Physician: Reynaldo Hodges Jr.,*    Visit Date: 5/9/2024    Physician Orders: PT Eval and Treat " post surgical"  Medical Diagnosis from Referral:   Diagnosis   Z94.4 (ICD-10-CM) - Liver replaced by transplant   R53.1 (ICD-10-CM) - Weakness generalized      Evaluation Date: 1/31/2024  Authorization Period Expiration: 12/31/24  Plan of Care Expiration: 5/31/24  Progress Note Due: 5/3/24  Visit # / Visits authorized: 10/20 (11)  FOTO: 2/5    Time In: 10:00 AM   Time Out: 10:58 AM  Total Billable Time: 58 minutes    SUBJECTIVE     Pt reports: Pt reports she is dealing with a dental infection. Pt continues to have pain in L knee. Pt has been making 2 hour road trips the past several days and reports feeling soreness in L hip flexors from sitting.     She was compliant with home exercise program.  Response to previous treatment: Minor soreness in B LE  Functional change: Unable to be determined    Pain: 4/10  Location: left knee      OBJECTIVE     Objective Measures updated at progress report unless specified.     Treatment     Tiffanie received the treatments listed below:       therapeutic exercises to develop strength, endurance, ROM, and flexibility for 35 minutes including:  Recumbent bike x 8 minutes, resistance 2   Bridging 3x10  LAQ's 4# 3x10 renee  Gastroc standing stretches on wedge 3x30" renee   Heel raises 2x20   Supine hip flexor stretch L side 3x30"     manual therapy to L knee including PROM and joint mobilizations for 00 minutes including:  PROM to L knee within tolerable ROM  TF AP and PA joint mobilizations grade III  Patellar mobilizations grade III  TF distraction in short sitting-NP    neuromuscular re-education activities to improve: Balance, Posture, Sense, and Core Stability for 15 minutes. The following activities were " "included:  Straight leg raises renee 3x10  Lumbar iso step away 30x each way with Red TB  Tandem stance balance 3x30" each side    therapeutic activities to improve functional performance for 08 minutes, including:  Sit to stands x10 with foam pad on chair   Leg press 3x10 10# - seat 9    Patient Education and Home Exercises     Home Exercises Provided and Patient Education Provided     Education provided:   - Pt was educated on progressions made with therex and purposes for progressions with increasing repetitions.     Written Home Exercises Provided: Patient instructed to cont prior HEP. Exercises were reviewed and Tiffanie was able to demonstrate them prior to the end of the session.  Tiffanie demonstrated good  understanding of the education provided. See EMR under Patient Instructions for exercises provided during therapy sessions    ASSESSMENT   Tiffanie presents with increased endurance as she requires less frequent rest breaks in between exercises. Pt tolerated increase in repetitions well with some fatigue but no adverse reactions. Pt demonstrated with a positive response to hip flexor stretch with improvements in muscle tightness. Static balance consistently improving. Patient remains most fatigued with repetitive sit to stands.     Tiffanie Is progressing well towards her goals.   Pt prognosis is Good.     Pt will continue to benefit from skilled outpatient physical therapy to address the deficits listed in the problem list box on initial evaluation, provide pt/family education and to maximize pt's level of independence in the home and community environment.     Pt's spiritual, cultural and educational needs considered and pt agreeable to plan of care and goals.     Anticipated barriers to physical therapy: General deconditioning, L knee pain    Goals:   Short Term Goals: 6 weeks Date last assessed:  Status:    1. Patient will be provided with an HEP for strength, endurance and balance.  4/5/2024    50% met   2.  " Patient will demonstrate improve endurance and activity tolerance as evidenced by ability to perform Nu-step x 15 minutes without rests breaks. 4/5/2024 MET            Long Term Goals: 12weeks  Date last assessed: Status:    1. Patient will be independent and compliant with updated HEP. 4/5/2024    50% met   2.  Patient will increase her   gait speed as assessed by the timed 10MWT from 0.58 m/s to 0.72 m/s to increase her safety and (I) with gait at a community level. (MDC for CVA= 0.14 m/s)     4/5/2024 MET      3. The patient will demonstrate improved efficiency with functional mobility and decreased risk for falls as evidenced by an increase in her score on the Timed up and Go from 18.6 sec to 15.7 sec. (Minimal detectable change in chronic stroke = 2.9 seconds)  4/5/2024 MET   4.Patient will improve her FOTO score from 47  to at least 61 for improved self perception of functional mobility.(score 0-100, high score indicates greater level of function) 4/5/2024 Progressing, 55   5. Pt will improve 5x sit to stand score from 27.1 seconds to less than 20 sec to decrease fall risk.  4/5/2024    ongoing   6. Pt will improve 6MWT to >1000' to demonstrate improvements in aerobic endurance and community ambulation status 4/5/2024         PLAN     Continue with BEVERLY Dias, PTA

## 2024-05-09 NOTE — PROGRESS NOTES
"[facility]   Physical Therapy Treatment Note     Name: Yumiko WILHELM [facility]  Clinic Number: [id number]    Therapy Diagnosis:   Encounter Diagnosis   Name Primary?    Impaired functional mobility, balance, gait, and endurance Yes     Physician: Reynaldo Hodges Jr.,*    Visit Date: 5/9/2024    Physician Orders: PT Eval and Treat " post surgical"  Medical Diagnosis from Referral:   Diagnosis   Z94.4 (ICD-10-CM) - Liver replaced by transplant   R53.1 (ICD-10-CM) - Weakness generalized      Evaluation Date: 1/31/2024  Authorization Period Expiration: 12/31/24  Plan of Care Expiration: 5/31/24  Progress Note Due: 5/3/24  Visit # / Visits authorized: 11/20 (10)  FOTO: 2/5    Time In: 11:00 AM   Time Out: 11:55 AM  Total Billable Time: 55 minutes    SUBJECTIVE     Pt reports: feeling about the same as last visit. Just slight pain in the left knee. She was compliant with home exercise program.  Response to previous treatment: Minor soreness in B LE  Functional change: Unable to be determined    Pain: 2/10  Location: left knee      OBJECTIVE     Objective Measures updated at progress report unless specified.     Treatment     Tiffanie received the treatments listed below:       therapeutic exercises to develop strength, endurance, ROM, and flexibility for 35 minutes including:  Recumbent bike x 8 minutes, resistance 2   Bridging 2x10 (decreased reps due to fatigue)   LAQ's 3# 3x10 renee (previously doing 4#, modified due to fatigue)   Gastroc standing stretches on wedge 3x30" renee   Heel raises 2x20   Supine hip flexor stretch L side 3x30"     manual therapy to L knee including PROM and joint mobilizations for 00 minutes including:  PROM to L knee within tolerable ROM  TF AP and PA joint mobilizations grade III  Patellar mobilizations grade III  TF distraction in short sitting-NP    neuromuscular re-education activities to improve: Balance, Posture, Sense, and Core Stability for 8 minutes. The following " "activities were included:  Straight leg raises renee 2x10 (stopped at 2 sets of 10 with fatigue)   Lumbar iso step away 30x each way with Red TB - NP   Tandem stance balance 3x30" each side    therapeutic activities to improve functional performance for 08 minutes, including:  Sit to stands 2x8 with foam pad on chair   Leg press 2x10 10# - seat 9 (decreased reps due to fatigue)     Patient Education and Home Exercises     Home Exercises Provided and Patient Education Provided     Education provided:   - Pt was educated on progressions made with therex and purposes for progressions with increasing repetitions.     Written Home Exercises Provided: Patient instructed to cont prior HEP. Exercises were reviewed and Tiffanie was able to demonstrate them prior to the end of the session.  Tiffanie demonstrated good  understanding of the education provided. See EMR under Patient Instructions for exercises provided during therapy sessions    ASSESSMENT   Tiffanie presents with decreased endurance with exercise as she fatigued with less repetition than usual. Pt attributes fatigue with doing a lot of sitting and having a gap in between treatment sessions. Pt however, was able to maintain minimal rest breaks in between exercises. Provided modifications by decreasing repetitions due to muscle fatigue. Pt required verbal cueing to correct knee valgus with sit to stands. Pt will continue to progress towards functional mobility and increased endurance to exercise as tolerated.     Tiffanie Is progressing well towards her goals.   Pt prognosis is Good.     Pt will continue to benefit from skilled outpatient physical therapy to address the deficits listed in the problem list box on initial evaluation, provide pt/family education and to maximize pt's level of independence in the home and community environment.     Pt's spiritual, cultural and educational needs considered and pt agreeable to plan of care and goals.     Anticipated barriers to " physical therapy: General deconditioning, L knee pain    Goals:   Short Term Goals: 6 weeks Date last assessed:  Status:    1. Patient will be provided with an HEP for strength, endurance and balance.  4/5/2024    50% met   2.  Patient will demonstrate improve endurance and activity tolerance as evidenced by ability to perform Nu-step x 15 minutes without rests breaks. 4/5/2024 MET            Long Term Goals: 12weeks  Date last assessed: Status:    1. Patient will be independent and compliant with updated HEP. 4/5/2024    50% met   2.  Patient will increase her   gait speed as assessed by the timed 10MWT from 0.58 m/s to 0.72 m/s to increase her safety and (I) with gait at a community level. (MDC for CVA= 0.14 m/s)     4/5/2024 MET      3. The patient will demonstrate improved efficiency with functional mobility and decreased risk for falls as evidenced by an increase in her score on the Timed up and Go from 18.6 sec to 15.7 sec. (Minimal detectable change in chronic stroke = 2.9 seconds)  4/5/2024 MET   4.Patient will improve her FOTO score from 47  to at least 61 for improved self perception of functional mobility.(score 0-100, high score indicates greater level of function) 4/5/2024 Progressing, 55   5. Pt will improve 5x sit to stand score from 27.1 seconds to less than 20 sec to decrease fall risk.  4/5/2024    ongoing   6. Pt will improve 6MWT to >1000' to demonstrate improvements in aerobic endurance and community ambulation status 4/5/2024         PLAN     Continue with BEVERLY Dias, PTA

## 2024-05-13 ENCOUNTER — PATIENT MESSAGE (OUTPATIENT)
Dept: TRANSPLANT | Facility: CLINIC | Age: 64
End: 2024-05-13
Payer: MEDICARE

## 2024-05-14 ENCOUNTER — PATIENT MESSAGE (OUTPATIENT)
Dept: PRIMARY CARE CLINIC | Facility: CLINIC | Age: 64
End: 2024-05-14
Payer: MEDICARE

## 2024-05-14 ENCOUNTER — CLINICAL SUPPORT (OUTPATIENT)
Dept: PRIMARY CARE CLINIC | Facility: CLINIC | Age: 64
End: 2024-05-14
Payer: MEDICARE

## 2024-05-14 DIAGNOSIS — Z94.4 S/P LIVER TRANSPLANT: ICD-10-CM

## 2024-05-14 DIAGNOSIS — Z91.89 LACK OF MOTIVATION: Primary | ICD-10-CM

## 2024-05-14 DIAGNOSIS — F43.21 GRIEF REACTION: ICD-10-CM

## 2024-05-14 PROCEDURE — 99499 UNLISTED E&M SERVICE: CPT | Mod: 95,,, | Performed by: SOCIAL WORKER
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
## 2024-05-15 ENCOUNTER — HOSPITAL ENCOUNTER (OUTPATIENT)
Dept: RADIOLOGY | Facility: HOSPITAL | Age: 64
Discharge: HOME | End: 2024-05-15
Attending: STUDENT IN AN ORGANIZED HEALTH CARE EDUCATION/TRAINING PROGRAM
Payer: MEDICARE

## 2024-05-15 DIAGNOSIS — Z94.4 S/P LIVER TRANSPLANT: ICD-10-CM

## 2024-05-15 PROCEDURE — 93976 VASCULAR STUDY: CPT | Mod: 26,,, | Performed by: RADIOLOGY

## 2024-05-15 PROCEDURE — 76705 ECHO EXAM OF ABDOMEN: CPT | Mod: TC,PO

## 2024-05-15 PROCEDURE — 76705 ECHO EXAM OF ABDOMEN: CPT | Mod: 26,59,, | Performed by: RADIOLOGY

## 2024-05-21 ENCOUNTER — LAB VISIT (OUTPATIENT)
Dept: LAB | Facility: HOSPITAL | Age: 64
End: 2024-05-21
Attending: STUDENT IN AN ORGANIZED HEALTH CARE EDUCATION/TRAINING PROGRAM
Payer: MEDICARE

## 2024-05-21 DIAGNOSIS — Z94.4 S/P LIVER TRANSPLANT: ICD-10-CM

## 2024-05-21 LAB
ALBUMIN SERPL BCP-MCNC: 3.7 G/DL (ref 3.5–5.2)
ALP SERPL-CCNC: 72 U/L (ref 55–135)
ALT SERPL W/O P-5'-P-CCNC: 16 U/L (ref 10–44)
ANION GAP SERPL CALC-SCNC: 7 MMOL/L (ref 8–16)
AST SERPL-CCNC: 20 U/L (ref 10–40)
BASOPHILS NFR BLD: 0 % (ref 0–1.9)
BILIRUB SERPL-MCNC: 0.3 MG/DL (ref 0.1–1)
BUN SERPL-MCNC: 25 MG/DL (ref 8–23)
CALCIUM SERPL-MCNC: 10 MG/DL (ref 8.7–10.5)
CHLORIDE SERPL-SCNC: 110 MMOL/L (ref 95–110)
CO2 SERPL-SCNC: 24 MMOL/L (ref 23–29)
CREAT SERPL-MCNC: 1.2 MG/DL (ref 0.5–1.4)
DIFFERENTIAL METHOD BLD: ABNORMAL
EOSINOPHIL NFR BLD: 4 % (ref 0–8)
ERYTHROCYTE [DISTWIDTH] IN BLOOD BY AUTOMATED COUNT: 13.7 % (ref 11.5–14.5)
EST. GFR  (NO RACE VARIABLE): 50.5 ML/MIN/1.73 M^2
GLUCOSE SERPL-MCNC: 98 MG/DL (ref 70–110)
HCT VFR BLD AUTO: 39.1 % (ref 37–48.5)
HGB BLD-MCNC: 12.4 G/DL (ref 12–16)
IMM GRANULOCYTES # BLD AUTO: ABNORMAL K/UL (ref 0–0.04)
IMM GRANULOCYTES NFR BLD AUTO: ABNORMAL % (ref 0–0.5)
LYMPHOCYTES NFR BLD: 12 % (ref 18–48)
MCH RBC QN AUTO: 28.7 PG (ref 27–31)
MCHC RBC AUTO-ENTMCNC: 31.7 G/DL (ref 32–36)
MCV RBC AUTO: 91 FL (ref 82–98)
METAMYELOCYTES NFR BLD MANUAL: 1 %
MONOCYTES NFR BLD: 17 % (ref 4–15)
NEUTROPHILS NFR BLD: 64 % (ref 38–73)
NEUTS BAND NFR BLD MANUAL: 2 %
NRBC BLD-RTO: 0 /100 WBC
PLATELET # BLD AUTO: 109 K/UL (ref 150–450)
PLATELET BLD QL SMEAR: ABNORMAL
PMV BLD AUTO: 10.4 FL (ref 9.2–12.9)
POTASSIUM SERPL-SCNC: 4.7 MMOL/L (ref 3.5–5.1)
PROT SERPL-MCNC: 6.2 G/DL (ref 6–8.4)
RBC # BLD AUTO: 4.32 M/UL (ref 4–5.4)
SODIUM SERPL-SCNC: 141 MMOL/L (ref 136–145)
WBC # BLD AUTO: 2.98 K/UL (ref 3.9–12.7)

## 2024-05-21 PROCEDURE — 80197 ASSAY OF TACROLIMUS: CPT | Performed by: STUDENT IN AN ORGANIZED HEALTH CARE EDUCATION/TRAINING PROGRAM

## 2024-05-21 PROCEDURE — 80053 COMPREHEN METABOLIC PANEL: CPT | Performed by: STUDENT IN AN ORGANIZED HEALTH CARE EDUCATION/TRAINING PROGRAM

## 2024-05-21 PROCEDURE — 36415 COLL VENOUS BLD VENIPUNCTURE: CPT | Mod: PO | Performed by: STUDENT IN AN ORGANIZED HEALTH CARE EDUCATION/TRAINING PROGRAM

## 2024-05-21 PROCEDURE — 85007 BL SMEAR W/DIFF WBC COUNT: CPT | Performed by: STUDENT IN AN ORGANIZED HEALTH CARE EDUCATION/TRAINING PROGRAM

## 2024-05-21 PROCEDURE — 85027 COMPLETE CBC AUTOMATED: CPT | Performed by: STUDENT IN AN ORGANIZED HEALTH CARE EDUCATION/TRAINING PROGRAM

## 2024-05-22 LAB — TACROLIMUS BLD-MCNC: 6.2 NG/ML (ref 5–15)

## 2024-05-24 ENCOUNTER — TELEPHONE (OUTPATIENT)
Dept: TRANSPLANT | Facility: CLINIC | Age: 64
End: 2024-05-24
Payer: MEDICARE

## 2024-05-24 DIAGNOSIS — Z94.4 LIVER TRANSPLANTED: Primary | ICD-10-CM

## 2024-05-24 DIAGNOSIS — Z85.05 HISTORY OF HEPATOCELLULAR CARCINOMA: ICD-10-CM

## 2024-05-24 NOTE — TELEPHONE ENCOUNTER
Pt informed  ----- Message from James Brambila MD sent at 5/20/2024  8:53 PM CDT -----  Reviewed. Liver and blood vessels appear normal.

## 2024-05-24 NOTE — TELEPHONE ENCOUNTER
Pt notified via portal of stable labs and that no medication changes are needed. Repeat labs due 6/10/24 per protocol.   ----- Message from James Brambila MD sent at 5/23/2024  5:57 PM CDT -----  Liver tests are stable. No changes in her immunosuppression. Please continue to monitor labs per transplant protocol.

## 2024-05-28 ENCOUNTER — PATIENT MESSAGE (OUTPATIENT)
Dept: TRANSPLANT | Facility: CLINIC | Age: 64
End: 2024-05-28
Payer: MEDICARE

## 2024-05-28 ENCOUNTER — CLINICAL SUPPORT (OUTPATIENT)
Dept: PRIMARY CARE CLINIC | Facility: CLINIC | Age: 64
End: 2024-05-28
Payer: MEDICARE

## 2024-05-28 DIAGNOSIS — Z94.4 S/P LIVER TRANSPLANT: Primary | ICD-10-CM

## 2024-05-28 DIAGNOSIS — Z91.89 LACK OF MOTIVATION: ICD-10-CM

## 2024-05-28 DIAGNOSIS — F43.21 GRIEF REACTION: ICD-10-CM

## 2024-05-28 PROCEDURE — 99499 UNLISTED E&M SERVICE: CPT | Mod: 95,,, | Performed by: SOCIAL WORKER
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
## 2024-05-29 ENCOUNTER — CLINICAL SUPPORT (OUTPATIENT)
Dept: REHABILITATION | Facility: HOSPITAL | Age: 64
End: 2024-05-29
Payer: MEDICARE

## 2024-05-29 DIAGNOSIS — G89.29 CHRONIC PAIN OF LEFT KNEE: ICD-10-CM

## 2024-05-29 DIAGNOSIS — Z74.09 IMPAIRED FUNCTIONAL MOBILITY, BALANCE, GAIT, AND ENDURANCE: Primary | ICD-10-CM

## 2024-05-29 DIAGNOSIS — R53.1 WEAKNESS GENERALIZED: ICD-10-CM

## 2024-05-29 DIAGNOSIS — M25.562 CHRONIC PAIN OF LEFT KNEE: ICD-10-CM

## 2024-05-29 PROCEDURE — 97110 THERAPEUTIC EXERCISES: CPT | Mod: PO

## 2024-05-29 PROCEDURE — 97112 NEUROMUSCULAR REEDUCATION: CPT | Mod: PO

## 2024-05-29 PROCEDURE — 97530 THERAPEUTIC ACTIVITIES: CPT | Mod: PO

## 2024-05-29 NOTE — PLAN OF CARE
"[facility]  Physical Therapy Plan of Care Note     Name: Yumiko Gonzalez  Clinic Number: [id number]    Therapy Diagnosis:   Encounter Diagnoses   Name Primary?    Impaired functional mobility, balance, gait, and endurance Yes    Weakness generalized     Chronic pain of left knee      Physician: Reynaldo Hodges Jr.,*    Visit Date: 5/29/2024    Physician Orders: Eval and Treat "post-surgical"  Medical Diagnosis from Referral:   Diagnosis   Z94.4 (ICD-10-CM) - Liver replaced by transplant   R53.1 (ICD-10-CM) - Weakness generalized     Evaluation Date: 1/31/2024  Authorization Period Expiration: 12/31/2024   Plan of Care Expiration: 6/26/2024  Progress Note Due: 6/26/2024   Visit # / Visits authorized: 12/ 20  FOTO: 2/5    Precautions: Standard  Functional Level Prior to Evaluation:  Independent    SUBJECTIVE     Update: Pt reports: that her left knee continues to act up, but it is because she's doing more on it. She states that she's doing better from a fatigue perspective, but her knee is her primary limiter at this time. She states that any type of squatting is difficult because of her knee. She states that walking around has been getting easy and she has been able to walk longer durations.     OBJECTIVE     Update: Lower Extremity Strength  Right LE   Left LE     Hip flexion:  4-/5 Hip flexion: 4-/5   Knee extension: 4/5 Knee extension: 4/5   Knee flexion: 4/5 Knee flexion: 4/5   Ankle dorsiflexion:  5/5 Ankle dorsiflexion: 5/5   Ankle plantarflexion:  4+/5 Ankle plantarflexion: 4+/5         Upper extremity strength: within functional limits      Sensation: numb in middle of stomach from surgery.         Functional Mobility    Evaluation    Bed mobility:  Independent    Supine to sit: Independent   Sit to supine: Independent   Rolling:  Mod I   Transfers to bed: Independent   Sit to stand Standby assist rollator            Evaluation   5 times sit to stand 18.6 seconds  no upper extremity " support   TUG 13.9 seconds no AD   Self selected walking speed (10MWT) 1 m/sec (6m/6 seconds)    6 minute walk test 868' with no AD      Timed up and go score >14 seconds indicates risk for falls in older stroke patients (Rob et al, 2006)     Independent Community Ambulator > 1.4 m/s   Mod I Community Ambulator 1.2-1.4 m/s   SPV/SBA Community Ambulator 0.8-1.2 m/s   Limited Community Ambulator 0.4-0.8 m/s   Household Ambulator < 0.4 m/s         5 x sit<>stand  >12 sec= fall risk for general elderly  >16 sec= fall risk for Parkinson's disease  >10 sec= balance/vestibular dysfunction (<61 y/o)  >14.2 sec= balance/vestibular dysfunction (>61 y/o)  >12 sec= fall risk for CVA     Gait       Evaluation   AD used:  None   Assistance  Standby assist   Distance  1034'         GAIT DEVIATIONS:              Yumiko displays the following deviations with ambulation: wide base of support.               Impairments contributing to deviations: impaired motor control, impaired strength, balance deficits.      Endurance Deficit: no        CMS Impairment/Limitation/Restriction for FOTO Intake Survey     Therapist reviewed FOTO scores for Yumiko Gonzalez on 5/29/2024.   FOTO documents entered into [employer] - see Media section.     Limitation Score: 64          ASSESSMENT     Update: Patient was reassess today due to 30 days since her previous evaluation. Since her previous progress note, she has made good progress in her walking endurance and speed per her 6MWT. She has also improved her 5xSTS time significantly from ~30 seconds to 18 seconds, indicating improvements in functional LE strength and transfer ability. She is still at an increased risk for falls per her 5xSTS time. She continues to be limited in her endurance and task performance by left knee pain. She is still having difficulties with ascending stairs, with balance, and with her left knee strength and endurance. She would benefit from continued physical therapy services to  address her left knee pain and reduced functional strength and endurance.     Previous Short Term Goals Status:   1/2 met  New Short Term Goals Status:   1/2 met  Long Term Goal Status: modified:  added in 3 additional LTGs. She has met 5/6 of previously set LTGs  Reasons for Recertification of Therapy:   She continues to have decreased lower extremity strength and endurance that places her at risk for falls. She also continues to be limited by left knee pain, and reduced strength and endurance.    GOALS  Short Term Goals: 6 weeks Date last assessed:  Status:    1. Patient will be provided with an HEP for strength, endurance and balance.  5/29/2024    50% met   2.  Patient will demonstrate improve endurance and activity tolerance as evidenced by ability to perform Nu-step x 15 minutes without rests breaks. 5/29/2024 MET            Long Term Goals: 12weeks  Date last assessed: Status:    1. Patient will be independent and compliant with updated HEP. 5/29/2024    50% met   2.  Patient will increase her   gait speed as assessed by the timed 10MWT from 0.58 m/s to 0.72 m/s to increase her safety and (I) with gait at a community level. (MDC for CVA= 0.14 m/s)     5/29/2024 MET      3. The patient will demonstrate improved efficiency with functional mobility and decreased risk for falls as evidenced by an increase in her score on the Timed up and Go from 18.6 sec to 15.7 sec. (Minimal detectable change in chronic stroke = 2.9 seconds)  5/29/2024 MET   4.Patient will improve her FOTO score from 47  to at least 61 for improved self perception of functional mobility.(score 0-100, high score indicates greater level of function) 5/29/2024 MET, 64   5. Pt will improve 5x sit to stand score from 27.1 seconds to less than 20 sec to decrease fall risk.  5/29/2024    MET, 18.6   6. Pt will improve 6MWT to >1000' to demonstrate improvements in aerobic endurance and community ambulation status 5/29/2024 MET, 1034'   Patient will be  able to ascend a flight or stairs with reciprocal gait to impact community mobility New goal on 5/29/2024     Patient will improve left knee extension manual muscle test to >/= 4+/5 to impact her ability to perform sit to stand transfers New goal on 5/29/2024     Patient will report pain in left knee with standing as </= 3/10 to impact her tolerance to performing chores around her home New        PLAN     Updated Certification Period: 5/30/2024 to 6/26/2024   Recommended Treatment Plan: 2 times per week for 4 weeks:  Manual Therapy, Moist Heat/ Ice, Neuromuscular Re-ed, Patient Education, Therapeutic Activities, and Therapeutic Exercise  Other Recommendations: none    Semaj Carrillo, PT

## 2024-05-29 NOTE — PROGRESS NOTES
"[facility]   Physical Therapy Treatment Note     Name: Yumiko Gonzalez  Clinic Number: [id number]    Therapy Diagnosis:   Encounter Diagnoses   Name Primary?    Impaired functional mobility, balance, gait, and endurance Yes    Weakness generalized     Chronic pain of left knee      Physician: Reynaldo Hodges Jr.,*    Visit Date: 5/29/2024    Physician Orders: PT Eval and Treat " post surgical"  Medical Diagnosis from Referral:   Diagnosis   Z94.4 (ICD-10-CM) - Liver replaced by transplant   R53.1 (ICD-10-CM) - Weakness generalized      Evaluation Date: 1/31/2024  Authorization Period Expiration: 12/31/24  Plan of Care Expiration: 6/26/2024  Progress Note Due: 6/26/2024  Visit # / Visits authorized: 12/20 (11)  FOTO: 2/5    Time In: 1:00 am   Time Out: 1:55 am  Total Billable Time: 55minutes    SUBJECTIVE     Pt reports: that her left knee continues to act up, but it is because she's doing more on it. She states that she's doing better from a fatigue perspective, but her knee is her primary limiter at this time. She states that any type of squatting is difficult because of her knee. She states that walking around has been getting easy and she has been able to walk longer durations.    She was compliant with home exercise program.  Response to previous treatment: Minor soreness in B LE  Functional change: Improved endurance    Pain: 2/10, worst 6/10  Location: left knee      OBJECTIVE     Lower Extremity Strength  Right LE   Left LE     Hip flexion:  4-/5 Hip flexion: 4-/5   Knee extension: 4/5 Knee extension: 4/5   Knee flexion: 4/5 Knee flexion: 4/5   Ankle dorsiflexion:  5/5 Ankle dorsiflexion: 5/5   Ankle plantarflexion:  4+/5 Ankle plantarflexion: 4+/5         Upper extremity strength: within functional limits      Sensation: numb in middle of stomach from surgery.         Functional Mobility    Evaluation    Bed mobility:  Independent    Supine to sit: Independent   Sit to supine: " "Independent   Rolling:  Mod I   Transfers to bed: Independent   Sit to stand Standby assist rollator            Evaluation   5 times sit to stand 18.6 seconds  no upper extremity support   TUG 13.9 seconds no AD   Self selected walking speed (10MWT) 1 m/sec (6m/6 seconds)    6 minute walk test 868' with no AD      Timed up and go score >14 seconds indicates risk for falls in older stroke patients (Rob et al, 2006)     Independent Community Ambulator > 1.4 m/s   Mod I Community Ambulator 1.2-1.4 m/s   SPV/SBA Community Ambulator 0.8-1.2 m/s   Limited Community Ambulator 0.4-0.8 m/s   Household Ambulator < 0.4 m/s         5 x sit<>stand  >12 sec= fall risk for general elderly  >16 sec= fall risk for Parkinson's disease  >10 sec= balance/vestibular dysfunction (<59 y/o)  >14.2 sec= balance/vestibular dysfunction (>59 y/o)  >12 sec= fall risk for CVA     Gait       Evaluation   AD used:  None   Assistance  Standby assist   Distance  1034'         GAIT DEVIATIONS:              Yumiko displays the following deviations with ambulation: wide base of support.               Impairments contributing to deviations: impaired motor control, impaired strength, balance deficits.      Endurance Deficit: no        CMS Impairment/Limitation/Restriction for FOTO Intake Survey     Therapist reviewed FOTO scores for Yumiko Gonzalez on 5/29/2024.   FOTO documents entered into [employer] - see Media section.     Limitation Score: 64           Treatment     Tiffanie received the treatments listed below:       therapeutic exercises to develop strength, endurance, ROM, and flexibility for 13 minutes including:  Recumbent bike x 8 minutes, resistance 2 - NP  Bridging 2x15   LAQ's 3# 3x12 renee  Gastroc standing stretches on wedge 3x30" renee -NP  Heel raises 2x20 - NP     manual therapy to L knee including PROM and joint mobilizations for 00 minutes including:    NP 5/29:  PROM to L knee within tolerable ROM  TF AP and PA joint mobilizations grade " "III  Patellar mobilizations grade III  TF distraction in short sitting    neuromuscular re-education activities to improve: Balance, Posture, Sense, and Core Stability for 12 minutes. The following activities were included:  Straight leg raises renee 2x12   Lumbar iso step away 30x each way with Red TB - NP  Tandem stance balance 3x30" each side    therapeutic activities to improve functional performance for 30 minutes, including:  Sit to stands 2x8 with foam pad on chair holding 6# medicine ball   Leg press 2x10 10# - seat 9 - NP    Time was taken during today's visit to reassess patient and take objective measures to update goals made her initial visit.    Patient Education and Home Exercises     Home Exercises Provided and Patient Education Provided     Education provided:   - Pt was educated on progress made towards goals and updated POC.    Written Home Exercises Provided: Patient instructed to cont prior HEP. Exercises were reviewed and Tiffanie was able to demonstrate them prior to the end of the session.  Tiffanie demonstrated good  understanding of the education provided. See EMR under Patient Instructions for exercises provided during therapy sessions    ASSESSMENT     Patient was reassess today due to 30 days since her previous evaluation. Since her previous progress note, she has made good progress in her walking endurance and speed per her 6MWT. She has also improved her 5xSTS time significantly from ~30 seconds to 18 seconds, indicating improvements in functional LE strength and transfer ability. She is still at an increased risk for falls per her 5xSTS time. She continues to be limited in her endurance and task performance by left knee pain. She is still having difficulties with ascending stairs, with balance, and with her left knee strength and endurance. She would benefit from continued physical therapy services to address her left knee pain and reduced functional strength and endurance.    Tiffanie Is " progressing well towards her goals.   Pt prognosis is Good.     Pt will continue to benefit from skilled outpatient physical therapy to address the deficits listed in the problem list box on initial evaluation, provide pt/family education and to maximize pt's level of independence in the home and community environment.     Pt's spiritual, cultural and educational needs considered and pt agreeable to plan of care and goals.     Anticipated barriers to physical therapy: General deconditioning, L knee pain    Goals:   Short Term Goals: 6 weeks Date last assessed:  Status:    1. Patient will be provided with an HEP for strength, endurance and balance.  5/29/2024    50% met   2.  Patient will demonstrate improve endurance and activity tolerance as evidenced by ability to perform Nu-step x 15 minutes without rests breaks. 5/29/2024 MET            Long Term Goals: 12weeks  Date last assessed: Status:    1. Patient will be independent and compliant with updated HEP. 5/29/2024    50% met   2.  Patient will increase her   gait speed as assessed by the timed 10MWT from 0.58 m/s to 0.72 m/s to increase her safety and (I) with gait at a community level. (MDC for CVA= 0.14 m/s)     5/29/2024 MET      3. The patient will demonstrate improved efficiency with functional mobility and decreased risk for falls as evidenced by an increase in her score on the Timed up and Go from 18.6 sec to 15.7 sec. (Minimal detectable change in chronic stroke = 2.9 seconds)  5/29/2024 MET   4.Patient will improve her FOTO score from 47  to at least 61 for improved self perception of functional mobility.(score 0-100, high score indicates greater level of function) 5/29/2024 MET, 64   5. Pt will improve 5x sit to stand score from 27.1 seconds to less than 20 sec to decrease fall risk.  5/29/2024    MET, 18.6   6. Pt will improve 6MWT to >1000' to demonstrate improvements in aerobic endurance and community ambulation status 5/29/2024 MET, 1034'   Patient  will be able to ascend a flight or stairs with reciprocal gait to impact community mobility New goal on 5/29/2024    Patient will improve left knee extension manual muscle test to >/= 4+/5 to impact her ability to perform sit to stand transfers New goal on 5/29/2024    Patient will report pain in left knee with standing as </= 3/10 to impact her tolerance to performing chores around her home New goal on 5/29/2024        PLAN     Continue with POC at 2x per week for 4 weeks.    Semaj Carrillo, PT

## 2024-05-31 ENCOUNTER — CLINICAL SUPPORT (OUTPATIENT)
Dept: REHABILITATION | Facility: HOSPITAL | Age: 64
End: 2024-05-31
Payer: MEDICARE

## 2024-05-31 DIAGNOSIS — M25.562 CHRONIC PAIN OF LEFT KNEE: ICD-10-CM

## 2024-05-31 DIAGNOSIS — Z74.09 IMPAIRED FUNCTIONAL MOBILITY, BALANCE, GAIT, AND ENDURANCE: Primary | ICD-10-CM

## 2024-05-31 DIAGNOSIS — R29.898 IMPAIRED STRENGTH OF LOWER EXTREMITY: ICD-10-CM

## 2024-05-31 DIAGNOSIS — G89.29 CHRONIC PAIN OF LEFT KNEE: ICD-10-CM

## 2024-05-31 DIAGNOSIS — R06.02 SHORTNESS OF BREATH: ICD-10-CM

## 2024-05-31 DIAGNOSIS — Z94.4 S/P LIVER TRANSPLANT: ICD-10-CM

## 2024-05-31 PROCEDURE — 97110 THERAPEUTIC EXERCISES: CPT | Mod: KX,PO

## 2024-05-31 PROCEDURE — 97112 NEUROMUSCULAR REEDUCATION: CPT | Mod: KX,PO

## 2024-05-31 PROCEDURE — 97530 THERAPEUTIC ACTIVITIES: CPT | Mod: KX,PO

## 2024-05-31 NOTE — PROGRESS NOTES
"[facility]   Physical Therapy Treatment Note     Name: Yumiko [facility]ILHELM [facility]  Clinic Number: [id number]    Therapy Diagnosis:   Encounter Diagnoses   Name Primary?    Impaired functional mobility, balance, gait, and endurance Yes    Shortness of breath     S/P liver transplant     Chronic pain of left knee     Impaired strength of lower extremity      Physician: Reynaldo Hodges Jr.,*    Visit Date: 5/31/2024    Physician Orders: PT Eval and Treat " post surgical"  Medical Diagnosis from Referral:   Diagnosis   Z94.4 (ICD-10-CM) - Liver replaced by transplant   R53.1 (ICD-10-CM) - Weakness generalized      Evaluation Date: 1/31/2024  Authorization Period Expiration: 12/31/24  Plan of Care Expiration: 6/26/2024  Progress Note Due: 6/26/2024  Visit # / Visits authorized: 14/20 (13)  FOTO: 2/5    Time In: 10:00 am  Time Out: 10:54 am  Total Billable Time: 54 minutes    SUBJECTIVE     Pt reports: that her knee was sore after her following visit where she was re-evaluated.     She was compliant with home exercise program.  Response to previous treatment: Minor soreness in B LE  Functional change: Improved endurance    Pain: 2/10  Location: left knee      OBJECTIVE     Objective Measures updated at progress report unless specified.     Treatment     Tiffanie received the treatments listed below:       therapeutic exercises to develop strength, endurance, ROM, and flexibility for 34 minutes including:  Recumbent bike x 8 minutes, resistance 2   Bridging 2x15   LAQ's 3# 3x12 renee  Gastroc standing stretches on wedge 3x30" renee   Heel raises 2x20     PROM to L knee within tolerable ROM  TF AP and PA joint mobilizations grade III  Patellar mobilizations grade III  TF distraction in short sitting    neuromuscular re-education activities to improve: Balance, Posture, Sense, and Core Stability for 12 minutes. The following activities were included:  Straight leg raises renee 2x12   Lumbar iso step away 30x each way " "with Red TB - NP  Tandem stance balance 3x30" each side    therapeutic activities to improve functional performance for 8 minutes, including:  Sit to stands 2x8 with foam pad on chair holding 6# medicine ball - NP  Shuttle squats 2x10 one black cord and one red cord    Patient Education and Home Exercises     Home Exercises Provided and Patient Education Provided     Education provided:   - Pt was educated on progress made towards goals and updated POC.    Written Home Exercises Provided: Patient instructed to cont prior HEP. Exercises were reviewed and Tiffanie was able to demonstrate them prior to the end of the session.  Tiffanie demonstrated good  understanding of the education provided. See EMR under Patient Instructions for exercises provided during therapy sessions    ASSESSMENT     Patient with good tolerance to all therex performed this visit. Trialed shuttle squats with good tolerance. No increases in left knee soreness following completion of all exercises from today's session. Will determine if changes in patient's program this visit will result in positive changes in pain by her next visit.     Tiffanie Is progressing well towards her goals.   Pt prognosis is Good.     Pt will continue to benefit from skilled outpatient physical therapy to address the deficits listed in the problem list box on initial evaluation, provide pt/family education and to maximize pt's level of independence in the home and community environment.     Pt's spiritual, cultural and educational needs considered and pt agreeable to plan of care and goals.     Anticipated barriers to physical therapy: General deconditioning, L knee pain    Goals:   Short Term Goals: 6 weeks Date last assessed:  Status:    1. Patient will be provided with an HEP for strength, endurance and balance.  5/29/2024    50% met   2.  Patient will demonstrate improve endurance and activity tolerance as evidenced by ability to perform Nu-step x 15 minutes without rests " breaks. 5/29/2024 MET            Long Term Goals: 12weeks  Date last assessed: Status:    1. Patient will be independent and compliant with updated HEP. 5/29/2024    50% met   2.  Patient will increase her   gait speed as assessed by the timed 10MWT from 0.58 m/s to 0.72 m/s to increase her safety and (I) with gait at a community level. (MDC for CVA= 0.14 m/s)     5/29/2024 MET      3. The patient will demonstrate improved efficiency with functional mobility and decreased risk for falls as evidenced by an increase in her score on the Timed up and Go from 18.6 sec to 15.7 sec. (Minimal detectable change in chronic stroke = 2.9 seconds)  5/29/2024 MET   4.Patient will improve her FOTO score from 47  to at least 61 for improved self perception of functional mobility.(score 0-100, high score indicates greater level of function) 5/29/2024 MET, 64   5. Pt will improve 5x sit to stand score from 27.1 seconds to less than 20 sec to decrease fall risk.  5/29/2024    MET, 18.6   6. Pt will improve 6MWT to >1000' to demonstrate improvements in aerobic endurance and community ambulation status 5/29/2024 MET, 1034'   Patient will be able to ascend a flight or stairs with reciprocal gait to impact community mobility New goal on 5/29/2024    Patient will improve left knee extension manual muscle test to >/= 4+/5 to impact her ability to perform sit to stand transfers New goal on 5/29/2024    Patient will report pain in left knee with standing as </= 3/10 to impact her tolerance to performing chores around her home New goal on 5/29/2024        PLAN     Continue with POC at 2x per week for 4 weeks.    Semaj Carrillo, PT

## 2024-06-03 ENCOUNTER — CLINICAL SUPPORT (OUTPATIENT)
Dept: REHABILITATION | Facility: HOSPITAL | Age: 64
End: 2024-06-03
Payer: MEDICARE

## 2024-06-03 DIAGNOSIS — R29.898 IMPAIRED STRENGTH OF LOWER EXTREMITY: ICD-10-CM

## 2024-06-03 DIAGNOSIS — Z74.09 IMPAIRED FUNCTIONAL MOBILITY, BALANCE, GAIT, AND ENDURANCE: Primary | ICD-10-CM

## 2024-06-03 DIAGNOSIS — G89.29 CHRONIC PAIN OF LEFT KNEE: ICD-10-CM

## 2024-06-03 DIAGNOSIS — M25.562 CHRONIC PAIN OF LEFT KNEE: ICD-10-CM

## 2024-06-03 PROCEDURE — 97110 THERAPEUTIC EXERCISES: CPT | Mod: KX,PO

## 2024-06-03 PROCEDURE — 97112 NEUROMUSCULAR REEDUCATION: CPT | Mod: KX,PO

## 2024-06-03 PROCEDURE — 97530 THERAPEUTIC ACTIVITIES: CPT | Mod: KX,PO

## 2024-06-03 NOTE — PROGRESS NOTES
"[facility]   Physical Therapy Treatment Note     Name: Yumiko WILHELM [facility]  Clinic Number: [id number]    Therapy Diagnosis:   Encounter Diagnoses   Name Primary?    Impaired functional mobility, balance, gait, and endurance Yes    Impaired strength of lower extremity     Chronic pain of left knee      Physician: Reynaldo Hodges Jr.,*    Visit Date: 6/3/2024    Physician Orders: PT Eval and Treat " post surgical"  Medical Diagnosis from Referral:   Diagnosis   Z94.4 (ICD-10-CM) - Liver replaced by transplant   R53.1 (ICD-10-CM) - Weakness generalized      Evaluation Date: 1/31/2024  Authorization Period Expiration: 12/31/24  Plan of Care Expiration: 6/26/2024  Progress Note Due: 6/26/2024  Visit # / Visits authorized: 14/20 (15)  FOTO: 2/5    Time In: 11:00 am  Time Out: 11:54 am  Total Billable Time: 54 minutes    SUBJECTIVE     Pt reports: that her knee is doing good today. Has a bruise on her left ankle.     She was compliant with home exercise program.  Response to previous treatment: No adverse effects  Functional change: Improved endurance    Pain: 1/10  Location: left knee      OBJECTIVE     Objective Measures updated at progress report unless specified.     Treatment     Tiffanie received the treatments listed below:       therapeutic exercises to develop strength, endurance, ROM, and flexibility for 32 minutes including:  Recumbent bike x 8 minutes, resistance 2   Bridging 2x15   LAQ's 3# 3x12 renee  Gastroc standing stretches on wedge 3x30" renee   Heel raises 2x20     PROM to L knee within tolerable ROM  TF AP and PA joint mobilizations grade III  Patellar mobilizations grade III  TF distraction in short sitting    neuromuscular re-education activities to improve: Balance, Posture, Sense, and Core Stability for 14 minutes. The following activities were included:  Straight leg raises renee 2x12   Tandem stance balance 3x30" each side    therapeutic activities to improve functional performance " for 8 minutes, including:  Sit to stands 2x8 with foam pad on chair holding 6# medicine ball - NP  Shuttle squats 2x12 one black cord and one red cord    Patient Education and Home Exercises     Home Exercises Provided and Patient Education Provided     Education provided:   - Pt was educated on progress made towards goals and updated POC.    Written Home Exercises Provided: Patient instructed to cont prior HEP. Exercises were reviewed and Tiffanie was able to demonstrate them prior to the end of the session.  Tiffanie demonstrated good  understanding of the education provided. See EMR under Patient Instructions for exercises provided during therapy sessions    ASSESSMENT     Patient with good tolerance to therex performed this visit. Increased tenderness to ankle where bruising is present, so avoided pressure to area at this time. She seems to tolerate shuttle squats much better than sit to stand exercises at this time. Pain in her left knee is reduced this visit compared to her previous visit. Continue to perform exercises with intention of reducing left knee pain while improving strength and endurance.    Tiffanie Is progressing well towards her goals.   Pt prognosis is Good.     Pt will continue to benefit from skilled outpatient physical therapy to address the deficits listed in the problem list box on initial evaluation, provide pt/family education and to maximize pt's level of independence in the home and community environment.     Pt's spiritual, cultural and educational needs considered and pt agreeable to plan of care and goals.     Anticipated barriers to physical therapy: General deconditioning, L knee pain    Goals:   Short Term Goals: 6 weeks Date last assessed:  Status:    1. Patient will be provided with an HEP for strength, endurance and balance.  5/29/2024    50% met   2.  Patient will demonstrate improve endurance and activity tolerance as evidenced by ability to perform Nu-step x 15 minutes without rests  breaks. 5/29/2024 MET            Long Term Goals: 12weeks  Date last assessed: Status:    1. Patient will be independent and compliant with updated HEP. 5/29/2024    50% met   2.  Patient will increase her   gait speed as assessed by the timed 10MWT from 0.58 m/s to 0.72 m/s to increase her safety and (I) with gait at a community level. (MDC for CVA= 0.14 m/s)     5/29/2024 MET      3. The patient will demonstrate improved efficiency with functional mobility and decreased risk for falls as evidenced by an increase in her score on the Timed up and Go from 18.6 sec to 15.7 sec. (Minimal detectable change in chronic stroke = 2.9 seconds)  5/29/2024 MET   4.Patient will improve her FOTO score from 47  to at least 61 for improved self perception of functional mobility.(score 0-100, high score indicates greater level of function) 5/29/2024 MET, 64   5. Pt will improve 5x sit to stand score from 27.1 seconds to less than 20 sec to decrease fall risk.  5/29/2024    MET, 18.6   6. Pt will improve 6MWT to >1000' to demonstrate improvements in aerobic endurance and community ambulation status 5/29/2024 MET, 1034'   Patient will be able to ascend a flight or stairs with reciprocal gait to impact community mobility New goal on 5/29/2024    Patient will improve left knee extension manual muscle test to >/= 4+/5 to impact her ability to perform sit to stand transfers New goal on 5/29/2024    Patient will report pain in left knee with standing as </= 3/10 to impact her tolerance to performing chores around her home New goal on 5/29/2024        PLAN     Continue with POC at 2x per week for 4 weeks.    Semaj Carrillo, PT

## 2024-06-05 ENCOUNTER — OFFICE VISIT (OUTPATIENT)
Dept: PRIMARY CARE CLINIC | Facility: CLINIC | Age: 64
End: 2024-06-05
Payer: MEDICARE

## 2024-06-05 VITALS
RESPIRATION RATE: 17 BRPM | OXYGEN SATURATION: 98 % | WEIGHT: 211.19 LBS | BODY MASS INDEX: 33.15 KG/M2 | DIASTOLIC BLOOD PRESSURE: 80 MMHG | TEMPERATURE: 98 F | HEART RATE: 70 BPM | HEIGHT: 67 IN | SYSTOLIC BLOOD PRESSURE: 122 MMHG

## 2024-06-05 DIAGNOSIS — D84.9 IMMUNOSUPPRESSED STATUS: Chronic | ICD-10-CM

## 2024-06-05 DIAGNOSIS — I10 PRIMARY HYPERTENSION: Primary | Chronic | ICD-10-CM

## 2024-06-05 PROCEDURE — 4010F ACE/ARB THERAPY RXD/TAKEN: CPT | Mod: CPTII,S$GLB,, | Performed by: PHYSICIAN ASSISTANT

## 2024-06-05 PROCEDURE — 99999 PR PBB SHADOW E&M-EST. PATIENT-LVL V: CPT | Mod: PBBFAC,,, | Performed by: PHYSICIAN ASSISTANT

## 2024-06-05 PROCEDURE — 3288F FALL RISK ASSESSMENT DOCD: CPT | Mod: CPTII,S$GLB,, | Performed by: PHYSICIAN ASSISTANT

## 2024-06-05 PROCEDURE — 3044F HG A1C LEVEL LT 7.0%: CPT | Mod: CPTII,S$GLB,, | Performed by: PHYSICIAN ASSISTANT

## 2024-06-05 PROCEDURE — 1157F ADVNC CARE PLAN IN RCRD: CPT | Mod: CPTII,S$GLB,, | Performed by: PHYSICIAN ASSISTANT

## 2024-06-05 PROCEDURE — 3074F SYST BP LT 130 MM HG: CPT | Mod: CPTII,S$GLB,, | Performed by: PHYSICIAN ASSISTANT

## 2024-06-05 PROCEDURE — 3079F DIAST BP 80-89 MM HG: CPT | Mod: CPTII,S$GLB,, | Performed by: PHYSICIAN ASSISTANT

## 2024-06-05 PROCEDURE — 3008F BODY MASS INDEX DOCD: CPT | Mod: CPTII,S$GLB,, | Performed by: PHYSICIAN ASSISTANT

## 2024-06-05 PROCEDURE — 1159F MED LIST DOCD IN RCRD: CPT | Mod: CPTII,S$GLB,, | Performed by: PHYSICIAN ASSISTANT

## 2024-06-05 PROCEDURE — 99215 OFFICE O/P EST HI 40 MIN: CPT | Mod: S$GLB,,, | Performed by: PHYSICIAN ASSISTANT

## 2024-06-05 PROCEDURE — 1101F PT FALLS ASSESS-DOCD LE1/YR: CPT | Mod: CPTII,S$GLB,, | Performed by: PHYSICIAN ASSISTANT

## 2024-06-05 NOTE — PROGRESS NOTES
Primary Care Provider Appointment   [facility]       Patient ID: Yumiko Gonzalez is a 64 y.o. female.    ASSESSMENT/PLAN by Problem List:    1. Primary hypertension  Assessment & Plan:  Blood pressures at home as well as here in office have improved with olmesartan.  Continue current medications.  Will continue to monitor.    Orders:  -     LIPID PANEL; Future; Expected date: 06/05/2024    2. Immunosuppressed status  Assessment & Plan:  Recent dental extraction went well with no complications.  Patient has not had any recent fever.  Will continue to monitor           Follow Up:  3 months    My total time spent on this encounter was 52 minutes which included  the following activities: preparing to see the patient, performing a medically appropriate and/or evaluation, counseling and educating the patient and family/caregiver, ordering medications, tests, or procedures, referring and communicating with other healthcare providers, documenting clinical information in the electronic or other health record, and independently interpreting results. This time is independent and non-overlapping.    Health Maintenance         Date Due Completion Date    RSV Vaccine (Age 60+ and Pregnant patients) (1 - 1-dose 60+ series) Never done ---    COVID-19 Vaccine (4 - 2023-24 season) 09/01/2023 10/1/2021    Lipid Panel 06/24/2024 6/24/2023    Influenza Vaccine (Season Ended) 09/01/2024 10/14/2022    Override on 10/11/2019: Done    Mammogram 04/03/2025 4/3/2024    DEXA Scan 04/04/2026 4/4/2023    Colorectal Cancer Screening 02/03/2027 2/3/2022    Hemoglobin A1c (Diabetic Prevention Screening) 02/12/2027 2/12/2024    TETANUS VACCINE 11/06/2030 11/6/2020              Subjective:     Chief Complaint   Patient presents with    Follow-up     Follow for BP since starting Benicar at last visit with PCP     I have reviewed the information entered by the ancillary staff regarding the chief complaint as well  as the related history.    64-year-old female presents for follow-up.  Patient is new to me but is established with the clinic.  Patient has been doing well overall.  She had a dental extraction since her last visit.  This has healed nicely.  She had a regular cleaning yesterday where the dentist view the area and said it looked good.  Patient denies any recent fever or chills.  We also started patient on olmesartan for blood pressure at last visit.  She has been taking this as prescribed.  She has brought a log with her blood pressures from home which all looked very well controlled.    Hypertension  This is a new problem. The current episode started more than 1 month ago. The problem has been gradually improving since onset. The problem is controlled. Associated symptoms include neck pain and peripheral edema. Pertinent negatives include no anxiety, blurred vision, chest pain, headaches, malaise/fatigue, orthopnea, palpitations, PND, shortness of breath or sweats. Agents associated with hypertension include decongestants and NSAIDs. Risk factors for coronary artery disease include diabetes mellitus, family history, obesity and stress. Past treatments include beta blockers and central alpha agonists. The current treatment provides significant improvement. Compliance problems include diet, exercise and psychosocial issues.        Patient is a/an 64 y.o.  female       For complete problem list, past medical history, surgical history, social history, etc., see appropriate section in the electronic medical record    Review of Systems   Constitutional:  Negative for malaise/fatigue.   Eyes:  Negative for blurred vision.   Respiratory:  Negative for shortness of breath.    Cardiovascular:  Negative for chest pain, palpitations, orthopnea and PND.   Musculoskeletal:  Positive for neck pain.   Neurological:  Negative for headaches.   All other systems reviewed and are negative.      Objective     Physical Exam  Vitals and  "nursing note reviewed.   Constitutional:       General: She is not in acute distress.     Appearance: Normal appearance. She is not ill-appearing.   HENT:      Head: Normocephalic and atraumatic.      Right Ear: External ear normal.      Left Ear: External ear normal.   Eyes:      General:         Right eye: No discharge.         Left eye: No discharge.      Extraocular Movements: Extraocular movements intact.      Conjunctiva/sclera: Conjunctivae normal.   Cardiovascular:      Rate and Rhythm: Normal rate and regular rhythm.   Pulmonary:      Effort: Pulmonary effort is normal. No respiratory distress.   Skin:     General: Skin is warm and dry.   Neurological:      General: No focal deficit present.      Mental Status: She is alert.   Psychiatric:         Mood and Affect: Mood normal.         Behavior: Behavior normal.         Thought Content: Thought content normal.         Judgment: Judgment normal.       Vitals:    06/05/24 1112   BP: 122/80   BP Location: Left arm   Patient Position: Sitting   BP Method: Large (Manual)   Pulse: 70   Resp: 17   Temp: 97.8 °F (36.6 °C)   TempSrc: Oral   SpO2: 98%   Weight: 95.8 kg (211 lb 3.2 oz)   Height: 5' 7" (1.702 m)           THIS DOCUMENT WAS MADE IN PART WITH VOICE RECOGNITION SOFTWARE.  OCCASIONALLY THIS SOFTWARE WILL MISINTERPRET WORDS OR PHRASES.  "

## 2024-06-05 NOTE — ASSESSMENT & PLAN NOTE
Blood pressures at home as well as here in office have improved with olmesartan.  Continue current medications.  Will continue to monitor.

## 2024-06-05 NOTE — ASSESSMENT & PLAN NOTE
Recent dental extraction went well with no complications.  Patient has not had any recent fever.  Will continue to monitor

## 2024-06-10 ENCOUNTER — HOSPITAL ENCOUNTER (OUTPATIENT)
Dept: RADIOLOGY | Facility: HOSPITAL | Age: 64
Discharge: HOME | End: 2024-06-10
Attending: STUDENT IN AN ORGANIZED HEALTH CARE EDUCATION/TRAINING PROGRAM
Payer: MEDICARE

## 2024-06-10 DIAGNOSIS — Z94.4 S/P LIVER TRANSPLANT: ICD-10-CM

## 2024-06-10 PROCEDURE — 93976 VASCULAR STUDY: CPT | Mod: 26,,, | Performed by: RADIOLOGY

## 2024-06-10 PROCEDURE — 76705 ECHO EXAM OF ABDOMEN: CPT | Mod: 26,59,, | Performed by: RADIOLOGY

## 2024-06-10 PROCEDURE — 76705 ECHO EXAM OF ABDOMEN: CPT | Mod: TC,59,PO

## 2024-06-11 ENCOUNTER — CLINICAL SUPPORT (OUTPATIENT)
Dept: PRIMARY CARE CLINIC | Facility: CLINIC | Age: 64
End: 2024-06-11
Payer: MEDICARE

## 2024-06-11 DIAGNOSIS — Z65.8 PSYCHOSOCIAL STRESSORS: ICD-10-CM

## 2024-06-11 DIAGNOSIS — R45.89 NEED FOR EMOTIONAL SUPPORT: Primary | ICD-10-CM

## 2024-06-11 DIAGNOSIS — Z59.9 FINANCIAL DIFFICULTIES: ICD-10-CM

## 2024-06-11 PROCEDURE — 99499 UNLISTED E&M SERVICE: CPT | Mod: 95,,, | Performed by: SOCIAL WORKER

## 2024-06-11 SDOH — SOCIAL DETERMINANTS OF HEALTH (SDOH): PROBLEM RELATED TO HOUSING AND ECONOMIC CIRCUMSTANCES, UNSPECIFIED: Z59.9
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
## 2024-06-14 ENCOUNTER — TELEPHONE (OUTPATIENT)
Dept: TRANSPLANT | Facility: CLINIC | Age: 64
End: 2024-06-14
Payer: MEDICARE

## 2024-06-14 NOTE — TELEPHONE ENCOUNTER
Pt informed of stable ultrasound.  ----- Message from James Brambila MD sent at 6/14/2024 10:26 AM CDT -----  Reviewed. Liver and blood vessels appear normal.

## 2024-06-19 ENCOUNTER — CLINICAL SUPPORT (OUTPATIENT)
Dept: REHABILITATION | Facility: HOSPITAL | Age: 64
End: 2024-06-19
Payer: MEDICARE

## 2024-06-19 DIAGNOSIS — R53.1 WEAKNESS GENERALIZED: ICD-10-CM

## 2024-06-19 DIAGNOSIS — M25.562 CHRONIC PAIN OF LEFT KNEE: ICD-10-CM

## 2024-06-19 DIAGNOSIS — G89.29 CHRONIC PAIN OF LEFT KNEE: ICD-10-CM

## 2024-06-19 DIAGNOSIS — Z94.4 S/P LIVER TRANSPLANT: ICD-10-CM

## 2024-06-19 DIAGNOSIS — Z74.09 IMPAIRED FUNCTIONAL MOBILITY, BALANCE, GAIT, AND ENDURANCE: Primary | ICD-10-CM

## 2024-06-19 DIAGNOSIS — R29.898 IMPAIRED STRENGTH OF LOWER EXTREMITY: ICD-10-CM

## 2024-06-19 PROCEDURE — 97112 NEUROMUSCULAR REEDUCATION: CPT | Mod: KX,PO

## 2024-06-19 PROCEDURE — 97110 THERAPEUTIC EXERCISES: CPT | Mod: KX,PO

## 2024-06-19 PROCEDURE — 97530 THERAPEUTIC ACTIVITIES: CPT | Mod: KX,PO

## 2024-06-19 NOTE — PROGRESS NOTES
"[facility]   Physical Therapy Treatment Note     Name: Yumiko WILHELM [facility]  Clinic Number: [id number]    Therapy Diagnosis:   Encounter Diagnoses   Name Primary?    Impaired functional mobility, balance, gait, and endurance Yes    S/P liver transplant     Impaired strength of lower extremity     Chronic pain of left knee     Weakness generalized      Physician: Reynaldo Hodges Jr.,*    Visit Date: 6/19/2024    Physician Orders: PT Eval and Treat " post surgical"  Medical Diagnosis from Referral:   Diagnosis   Z94.4 (ICD-10-CM) - Liver replaced by transplant   R53.1 (ICD-10-CM) - Weakness generalized      Evaluation Date: 1/31/2024  Authorization Period Expiration: 12/31/24  Plan of Care Expiration: 6/26/2024  Progress Note Due: 6/26/2024  Visit # / Visits authorized: 15/20 (16)  FOTO: 2/5    Time In: 11:00 am  Time Out: 12:00 pm  Total Billable Time: 60 minutes    SUBJECTIVE     Pt reports: that she had some nerve issues which prevented her from attending previously scheduled visits. States that her knee has been feeling better. States that her family member noticed her walking better.    She was compliant with home exercise program.  Response to previous treatment: No adverse effects  Functional change: Improved endurance    Pain: 1/10  Location: left knee      OBJECTIVE     Objective Measures updated at progress report unless specified.     Treatment     Tiffanie received the treatments listed below:       therapeutic exercises to develop strength, endurance, ROM, and flexibility for 32 minutes including:  Recumbent bike x 6 minutes, resistance 4   Bridging 2x15 with 6# medicine ball  LAQ's 3# 3x12 renee  Gastroc standing stretches on wedge 3x30" renee   Heel raises 2x20     PROM to L knee within tolerable ROM  TF AP and PA joint mobilizations grade III  Patellar mobilizations grade III  TF distraction in short sitting    neuromuscular re-education activities to improve: Balance, Posture, Sense, " "and Core Stability for 14 minutes. The following activities were included:  Straight leg raises renee 2x12 with 1# AW  Tandem stance balance 3x30" each side    therapeutic activities to improve functional performance for 14 minutes, including:  Sit to stands 2x8 with foam pad on chair holding 6# medicine ball   Shuttle squats 2x12 one black cord and one red cord    Patient Education and Home Exercises     Home Exercises Provided and Patient Education Provided     Education provided:   - Pt was educated on progress made towards goals and updated POC.    Written Home Exercises Provided: Patient instructed to cont prior HEP. Exercises were reviewed and Tiffanie was able to demonstrate them prior to the end of the session.  Tiffanie demonstrated good  understanding of the education provided. See EMR under Patient Instructions for exercises provided during therapy sessions    ASSESSMENT     Patient continues to report gradual improvements in endurance and reduced pain into her left knee. Starting to become noticeable to family members. Made slight progressions to patient's therex today to promote further improvements in lower extremity strength and endurance. She tolerated all exercises and progressions well without any increases in knee pain.    Tiffanie Is progressing well towards her goals.   Pt prognosis is Good.     Pt will continue to benefit from skilled outpatient physical therapy to address the deficits listed in the problem list box on initial evaluation, provide pt/family education and to maximize pt's level of independence in the home and community environment.     Pt's spiritual, cultural and educational needs considered and pt agreeable to plan of care and goals.     Anticipated barriers to physical therapy: General deconditioning, L knee pain    Goals:   Short Term Goals: 6 weeks Date last assessed:  Status:    1. Patient will be provided with an HEP for strength, endurance and balance.  5/29/2024    50% met   2.  " Patient will demonstrate improve endurance and activity tolerance as evidenced by ability to perform Nu-step x 15 minutes without rests breaks. 5/29/2024 MET            Long Term Goals: 12weeks  Date last assessed: Status:    1. Patient will be independent and compliant with updated HEP. 5/29/2024    50% met   2.  Patient will increase her   gait speed as assessed by the timed 10MWT from 0.58 m/s to 0.72 m/s to increase her safety and (I) with gait at a community level. (MDC for CVA= 0.14 m/s)     5/29/2024 MET      3. The patient will demonstrate improved efficiency with functional mobility and decreased risk for falls as evidenced by an increase in her score on the Timed up and Go from 18.6 sec to 15.7 sec. (Minimal detectable change in chronic stroke = 2.9 seconds)  5/29/2024 MET   4.Patient will improve her FOTO score from 47  to at least 61 for improved self perception of functional mobility.(score 0-100, high score indicates greater level of function) 5/29/2024 MET, 64   5. Pt will improve 5x sit to stand score from 27.1 seconds to less than 20 sec to decrease fall risk.  5/29/2024    MET, 18.6   6. Pt will improve 6MWT to >1000' to demonstrate improvements in aerobic endurance and community ambulation status 5/29/2024 MET, 1034'   Patient will be able to ascend a flight or stairs with reciprocal gait to impact community mobility New goal on 5/29/2024    Patient will improve left knee extension manual muscle test to >/= 4+/5 to impact her ability to perform sit to stand transfers New goal on 5/29/2024    Patient will report pain in left knee with standing as </= 3/10 to impact her tolerance to performing chores around her home New goal on 5/29/2024        PLAN     Continue with POC at 2x per week for 4 weeks.    Semaj Carrillo, PT

## 2024-06-20 ENCOUNTER — CLINICAL SUPPORT (OUTPATIENT)
Dept: PRIMARY CARE CLINIC | Facility: CLINIC | Age: 64
End: 2024-06-20
Payer: MEDICARE

## 2024-06-20 DIAGNOSIS — R45.89 NEED FOR EMOTIONAL SUPPORT: Primary | ICD-10-CM

## 2024-06-20 DIAGNOSIS — Z59.9 FINANCIAL DIFFICULTIES: ICD-10-CM

## 2024-06-20 DIAGNOSIS — Z65.8 PSYCHOSOCIAL STRESSORS: ICD-10-CM

## 2024-06-20 PROCEDURE — 99499 UNLISTED E&M SERVICE: CPT | Mod: 95,,, | Performed by: SOCIAL WORKER

## 2024-06-20 SDOH — SOCIAL DETERMINANTS OF HEALTH (SDOH): PROBLEM RELATED TO HOUSING AND ECONOMIC CIRCUMSTANCES, UNSPECIFIED: Z59.9
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
## 2024-06-21 ENCOUNTER — TELEPHONE (OUTPATIENT)
Dept: TRANSPLANT | Facility: CLINIC | Age: 64
End: 2024-06-21
Payer: MEDICARE

## 2024-06-21 DIAGNOSIS — Z94.4 LIVER TRANSPLANTED: Primary | ICD-10-CM

## 2024-06-21 NOTE — TELEPHONE ENCOUNTER
Pt notified via portal of stable labs and that no medication changes are needed. Repeat labs due 7/1/24 per protocol.   ----- Message from James Brambila MD sent at 6/20/2024 10:41 PM CDT -----  Liver tests are stable. No changes in her immunosuppression. Please continue to monitor labs per transplant protocol.    Potassium elevated. Please follow hyperkalemia protocol.

## 2024-06-23 ENCOUNTER — PATIENT MESSAGE (OUTPATIENT)
Dept: PRIMARY CARE CLINIC | Facility: CLINIC | Age: 64
End: 2024-06-23
Payer: MEDICARE

## 2024-06-23 DIAGNOSIS — R73.09 ELEVATED GLUCOSE: Primary | ICD-10-CM

## 2024-06-24 ENCOUNTER — CLINICAL SUPPORT (OUTPATIENT)
Dept: REHABILITATION | Facility: HOSPITAL | Age: 64
End: 2024-06-24
Payer: MEDICARE

## 2024-06-24 DIAGNOSIS — M25.562 CHRONIC PAIN OF LEFT KNEE: ICD-10-CM

## 2024-06-24 DIAGNOSIS — R53.1 WEAKNESS GENERALIZED: ICD-10-CM

## 2024-06-24 DIAGNOSIS — G89.29 CHRONIC PAIN OF LEFT KNEE: ICD-10-CM

## 2024-06-24 DIAGNOSIS — R29.898 IMPAIRED STRENGTH OF LOWER EXTREMITY: ICD-10-CM

## 2024-06-24 DIAGNOSIS — Z74.09 IMPAIRED FUNCTIONAL MOBILITY, BALANCE, GAIT, AND ENDURANCE: Primary | ICD-10-CM

## 2024-06-24 PROCEDURE — 97110 THERAPEUTIC EXERCISES: CPT | Mod: KX,PO

## 2024-06-24 PROCEDURE — 97112 NEUROMUSCULAR REEDUCATION: CPT | Mod: KX,PO

## 2024-06-24 PROCEDURE — 97530 THERAPEUTIC ACTIVITIES: CPT | Mod: KX,PO

## 2024-06-24 NOTE — TELEPHONE ENCOUNTER
I did signed the order for the A1c.  Regarding Januvia, I did not start this.  I think she had some mild elevations in glucose in the hospital but her A1c has never been elevated.  I can not really find a compelling reason to continue the Januvia but if the physician/specialist who started it wishes her to continue then I would defer that to them.

## 2024-06-24 NOTE — PROGRESS NOTES
"[facility]   Physical Therapy Treatment Note     Name: Yumiko WILHELM [facility]  Clinic Number: [id number]    Therapy Diagnosis:   Encounter Diagnoses   Name Primary?    Impaired functional mobility, balance, gait, and endurance Yes    Weakness generalized     Chronic pain of left knee     Impaired strength of lower extremity      Physician: Reynaldo Hodges Jr.,*    Visit Date: 6/24/2024    Physician Orders: PT Eval and Treat " post surgical"  Medical Diagnosis from Referral:   Diagnosis   Z94.4 (ICD-10-CM) - Liver replaced by transplant   R53.1 (ICD-10-CM) - Weakness generalized      Evaluation Date: 1/31/2024  Authorization Period Expiration: 12/31/24  Plan of Care Expiration: 6/26/2024  Progress Note Due: 6/26/2024  Visit # / Visits authorized: 16/20 (17)  FOTO: 2/5    Time In: 2:00 pm   Time Out: 2:57 pm  Total Billable Time: 57 minutes    SUBJECTIVE     Pt reports: that she's doing well today. States that her left knee is a little sore from doing yard work over the weekend, but not bad.    She was compliant with home exercise program.  Response to previous treatment: No adverse effects  Functional change: Improved endurance    Pain: 2/10  Location: left knee      OBJECTIVE     Objective Measures updated at progress report unless specified.     Treatment     Tiffanie received the treatments listed below:       therapeutic exercises to develop strength, endurance, ROM, and flexibility for 30 minutes including:  Recumbent bike x 8 minutes, resistance 4   Bridging 2x15 with 6# medicine ball  LAQ's 3# 3x12 renee  Gastroc standing stretches on wedge 3x30" renee   Heel raises 2x20     PROM to L knee within tolerable ROM  TF AP and PA joint mobilizations grade III  Patellar mobilizations grade III  TF distraction in short sitting    neuromuscular re-education activities to improve: Balance, Posture, Sense, and Core Stability for 12 minutes. The following activities were included:  Straight leg raises renee " "2x12 with 1# AW  Tandem stance balance 3x30" each side    therapeutic activities to improve functional performance for 15 minutes, including:  Sit to stands 2x8 with foam pad on chair holding 6# medicine ball   Shuttle squats 2x12 one black cord and one red cord    Patient Education and Home Exercises     Home Exercises Provided and Patient Education Provided     Education provided:   - Pt was educated on progress made towards goals and updated POC.    Written Home Exercises Provided: Patient instructed to cont prior HEP. Exercises were reviewed and Tiffanie was able to demonstrate them prior to the end of the session.  Tiffanie demonstrated good  understanding of the education provided. See EMR under Patient Instructions for exercises provided during therapy sessions    ASSESSMENT     Patient with good tolerance to all therex performed this visit. Continues to have slight increases in transient left knee pain with CKC movements or transfers from sitting to standing. Pain overall is improving and gait is becoming increasingly noticeably better than when she started physical therapy. Will continue to progress patient's exercises as she is able to tolerate to improve endurance and left knee pain.     Tiffanie Is progressing well towards her goals.   Pt prognosis is Good.     Pt will continue to benefit from skilled outpatient physical therapy to address the deficits listed in the problem list box on initial evaluation, provide pt/family education and to maximize pt's level of independence in the home and community environment.     Pt's spiritual, cultural and educational needs considered and pt agreeable to plan of care and goals.     Anticipated barriers to physical therapy: General deconditioning, L knee pain    Goals:   Short Term Goals: 6 weeks Date last assessed:  Status:    1. Patient will be provided with an HEP for strength, endurance and balance.  5/29/2024    50% met   2.  Patient will demonstrate improve endurance " and activity tolerance as evidenced by ability to perform Nu-step x 15 minutes without rests breaks. 5/29/2024 MET            Long Term Goals: 12weeks  Date last assessed: Status:    1. Patient will be independent and compliant with updated HEP. 5/29/2024    50% met   2.  Patient will increase her   gait speed as assessed by the timed 10MWT from 0.58 m/s to 0.72 m/s to increase her safety and (I) with gait at a community level. (MDC for CVA= 0.14 m/s)     5/29/2024 MET      3. The patient will demonstrate improved efficiency with functional mobility and decreased risk for falls as evidenced by an increase in her score on the Timed up and Go from 18.6 sec to 15.7 sec. (Minimal detectable change in chronic stroke = 2.9 seconds)  5/29/2024 MET   4.Patient will improve her FOTO score from 47  to at least 61 for improved self perception of functional mobility.(score 0-100, high score indicates greater level of function) 5/29/2024 MET, 64   5. Pt will improve 5x sit to stand score from 27.1 seconds to less than 20 sec to decrease fall risk.  5/29/2024    MET, 18.6   6. Pt will improve 6MWT to >1000' to demonstrate improvements in aerobic endurance and community ambulation status 5/29/2024 MET, 1034'   Patient will be able to ascend a flight or stairs with reciprocal gait to impact community mobility New goal on 5/29/2024    Patient will improve left knee extension manual muscle test to >/= 4+/5 to impact her ability to perform sit to stand transfers New goal on 5/29/2024    Patient will report pain in left knee with standing as </= 3/10 to impact her tolerance to performing chores around her home New goal on 5/29/2024        PLAN     Continue with POC at 2x per week for 4 weeks.    Semaj Carrillo, PT

## 2024-06-25 ENCOUNTER — PATIENT MESSAGE (OUTPATIENT)
Dept: TRANSPLANT | Facility: CLINIC | Age: 64
End: 2024-06-25
Payer: MEDICARE

## 2024-06-26 ENCOUNTER — CLINICAL SUPPORT (OUTPATIENT)
Dept: REHABILITATION | Facility: HOSPITAL | Age: 64
End: 2024-06-26
Payer: MEDICARE

## 2024-06-26 DIAGNOSIS — M25.562 CHRONIC PAIN OF LEFT KNEE: ICD-10-CM

## 2024-06-26 DIAGNOSIS — Z74.09 IMPAIRED FUNCTIONAL MOBILITY, BALANCE, GAIT, AND ENDURANCE: Primary | ICD-10-CM

## 2024-06-26 DIAGNOSIS — G89.29 CHRONIC PAIN OF LEFT KNEE: ICD-10-CM

## 2024-06-26 DIAGNOSIS — Z94.4 S/P LIVER TRANSPLANT: ICD-10-CM

## 2024-06-26 DIAGNOSIS — R53.1 WEAKNESS GENERALIZED: ICD-10-CM

## 2024-06-26 PROCEDURE — 97110 THERAPEUTIC EXERCISES: CPT | Mod: KX,PO

## 2024-06-26 PROCEDURE — 97530 THERAPEUTIC ACTIVITIES: CPT | Mod: KX,PO

## 2024-06-26 NOTE — PROGRESS NOTES
"[facility]   Physical Therapy Treatment Note     Name: Yumiko Gonzalez  Clinic Number: [id number]    Therapy Diagnosis:   Encounter Diagnoses   Name Primary?    Impaired functional mobility, balance, gait, and endurance Yes    S/P liver transplant     Chronic pain of left knee     Weakness generalized      Physician: Reynaldo Hodges Jr.,*    Visit Date: 6/26/2024    Physician Orders: PT Eval and Treat " post surgical"  Medical Diagnosis from Referral:   Diagnosis   Z94.4 (ICD-10-CM) - Liver replaced by transplant   R53.1 (ICD-10-CM) - Weakness generalized      Evaluation Date: 1/31/2024  Authorization Period Expiration: 12/31/24  Plan of Care Expiration: 6/26/2024  Progress Note Due: 6/26/2024  Visit # / Visits authorized: 17/20 (18)  FOTO: 2/5    Time In: 11:02 am  Time Out: 11:42 am  Total Billable Time: 40 minutes    SUBJECTIVE     Pt reports: that her knee is feeling pretty good today. States that her back is just feeling tight. She states that her left knee feels like it's gotten much better since physical therapy has began focusing on improving her left knee strength. She states that she doesn't feel like her knee is holding her back from walking as far as she wants to. She states that she feels ready for discharge from physical therapy.    She was compliant with home exercise program.  Response to previous treatment: No adverse effects  Functional change: Improved endurance    Pain: 1/10  Location: left knee      OBJECTIVE     Lower Extremity Strength  Right LE   Left LE     Hip flexion:  4-/5 Hip flexion: 4-/5   Knee extension: 5/5 Knee extension: 5/5   Knee flexion: 5/5 Knee flexion: 5/5   Ankle dorsiflexion:  5/5 Ankle dorsiflexion: 5/5   Ankle plantarflexion:  4+/5 Ankle plantarflexion: 4+/5         Upper extremity strength: within functional limits      Sensation: numb in middle of stomach from surgery.         Functional Mobility    Evaluation    Bed mobility:  Independent  " "  Supine to sit: Independent   Sit to supine: Independent   Rolling:  Mod I   Transfers to bed: Independent   Sit to stand Standby assist rollator            Evaluation   5 times sit to stand 18.6 seconds  no upper extremity support   TUG 13.9 seconds no AD   Self selected walking speed (10MWT) 1 m/sec (6m/6 seconds)    6 minute walk test 868' with no AD      Timed up and go score >14 seconds indicates risk for falls in older stroke patients (Rob et al, 2006)     Independent Community Ambulator > 1.4 m/s   Mod I Community Ambulator 1.2-1.4 m/s   SPV/SBA Community Ambulator 0.8-1.2 m/s   Limited Community Ambulator 0.4-0.8 m/s   Household Ambulator < 0.4 m/s         5 x sit<>stand  >12 sec= fall risk for general elderly  >16 sec= fall risk for Parkinson's disease  >10 sec= balance/vestibular dysfunction (<61 y/o)  >14.2 sec= balance/vestibular dysfunction (>61 y/o)  >12 sec= fall risk for CVA     Gait       Evaluation   AD used:  None   Assistance  Standby assist   Distance  1034'         GAIT DEVIATIONS:              Yumiko displays the following deviations with ambulation: wide base of support.               Impairments contributing to deviations: impaired motor control, impaired strength, balance deficits.      Endurance Deficit: no        CMS Impairment/Limitation/Restriction for FOTO Intake Survey     Therapist reviewed FOTO scores for Yumiko Gonzalez on 6/26/2024.   FOTO documents entered into [employer] - see Media section.     Limitation Score: 66%          Treatment     Tiffanie received the treatments listed below:       therapeutic exercises to develop strength, endurance, ROM, and flexibility for 17 minutes including:  Recumbent bike x 8 minutes, resistance 4   Gastroc standing stretches on wedge 3x30" renee   Heel raises 2x20    therapeutic activities to improve functional performance for 23 minutes, including:  Sit to stands 2x8 with foam pad on chair holding 6# medicine ball   Shuttle squats 2x12 one black " cord and one red cord    Time was taken during today's visit to perform reassessment on patient, and update her goals and objective measures.    Patient Education and Home Exercises     Home Exercises Provided and Patient Education Provided     Education provided:   - patient was educated on results of today's discharge reassessment.    Written Home Exercises Provided: Patient instructed to cont prior HEP. Exercises were reviewed and Tiffanie was able to demonstrate them prior to the end of the session.  Tiffanie demonstrated good  understanding of the education provided. See EMR under Patient Instructions for exercises provided during therapy sessions    ASSESSMENT     Patient was reassessed today to determine appropriateness for discharge from physical therapy services. Since her previous reassessment, she has had significant reductions in her left knee pain and limitations in mobility due to left knee pain. She reports marginal left knee pain upon arrival to her visit today, along with reports of improved gait quality and tolerance to prolonged walking. She demonstrates improvements in her left knee extension and flexion manual muscle test. Despite improvements in pain, she continues to have difficulty ascending and descending stairs on her left knee. She adopts a step to approach to ascending and descending stairs, but is able to navigate stairs with reciprocal gait pattern when using hand rails for assistance and offloading her left knee. She has made significant progress since beginning physical therapy with regards to her endurance and strength. She has nearly met all goals for physical therapy and is appropriate for discharge at this time.    Tiffanie Is progressing well towards her goals.   Pt prognosis is Good.     Pt's spiritual, cultural and educational needs considered and pt agreeable to plan of care and goals.     Anticipated barriers to physical therapy: General deconditioning, L knee pain    Goals:   Short  Term Goals: 6 weeks Date last assessed:  Status:    1. Patient will be provided with an HEP for strength, endurance and balance.  5/29/2024    50% met   2.  Patient will demonstrate improve endurance and activity tolerance as evidenced by ability to perform Nu-step x 15 minutes without rests breaks. 5/29/2024 MET            Long Term Goals: 12weeks  Date last assessed: Status:    1. Patient will be independent and compliant with updated HEP. 5/29/2024    50% met   2.  Patient will increase her   gait speed as assessed by the timed 10MWT from 0.58 m/s to 0.72 m/s to increase her safety and (I) with gait at a community level. (MDC for CVA= 0.14 m/s)     5/29/2024 MET      3. The patient will demonstrate improved efficiency with functional mobility and decreased risk for falls as evidenced by an increase in her score on the Timed up and Go from 18.6 sec to 15.7 sec. (Minimal detectable change in chronic stroke = 2.9 seconds)  5/29/2024 MET   4.Patient will improve her FOTO score from 47  to at least 61 for improved self perception of functional mobility.(score 0-100, high score indicates greater level of function) 5/29/2024 MET, 64   5. Pt will improve 5x sit to stand score from 27.1 seconds to less than 20 sec to decrease fall risk.  5/29/2024    MET, 18.6   6. Pt will improve 6MWT to >1000' to demonstrate improvements in aerobic endurance and community ambulation status 5/29/2024 MET, 1034'   Patient will be able to ascend a flight or stairs with reciprocal gait to impact community mobility New goal on 5/29/2024 50% met, able to perform, but requires use of railing   Patient will improve left knee extension manual muscle test to >/= 4+/5 to impact her ability to perform sit to stand transfers New goal on 5/29/2024 MET   Patient will report pain in left knee with standing as </= 3/10 to impact her tolerance to performing chores around her home New goal on 5/29/2024 MET       PLAN     Discharge from physical  therapy.    Semaj Carrillo, PT

## 2024-06-26 NOTE — PLAN OF CARE
"[facility]  Physical Therapy Discharge Note    Name: Yumiko Gonzalez  Clinic Number: [id number]    Therapy Diagnosis:   Encounter Diagnoses   Name Primary?    Impaired functional mobility, balance, gait, and endurance Yes    S/P liver transplant     Chronic pain of left knee     Weakness generalized      Physician: Reynaldo Hodges Jr.,*    Physician Orders: PT Eval and Treat " post surgical"  Medical Diagnosis from Referral:   Diagnosis   Z94.4 (ICD-10-CM) - Liver replaced by transplant   R53.1 (ICD-10-CM) - Weakness generalized      Evaluation Date: 1/31/2024    Date of Last visit: 6/26/2024  Total Visits Received: 18    ASSESSMENT      Patient was reassessed today to determine appropriateness for discharge from physical therapy services. Since her previous reassessment, she has had significant reductions in her left knee pain and limitations in mobility due to left knee pain. She reports marginal left knee pain upon arrival to her visit today, along with reports of improved gait quality and tolerance to prolonged walking. She demonstrates improvements in her left knee extension and flexion manual muscle test. Despite improvements in pain, she continues to have difficulty ascending and descending stairs on her left knee. She adopts a step to approach to ascending and descending stairs, but is able to navigate stairs with reciprocal gait pattern when using hand rails for assistance and offloading her left knee. She has made significant progress since beginning physical therapy with regards to her endurance and strength. She has nearly met all goals for physical therapy and is appropriate for discharge at this time.     Discharge reason: Patient is now asymptomatic and Patient has met all of his/her goals    Discharge FOTO Score: 66    Goals:   Short Term Goals: 6 weeks Date last assessed:  Status:    1. Patient will be provided with an HEP for strength, endurance and balance.  5/29/2024    " 50% met   2.  Patient will demonstrate improve endurance and activity tolerance as evidenced by ability to perform Nu-step x 15 minutes without rests breaks. 5/29/2024 MET            Long Term Goals: 12weeks  Date last assessed: Status:    1. Patient will be independent and compliant with updated HEP. 5/29/2024    50% met   2.  Patient will increase her   gait speed as assessed by the timed 10MWT from 0.58 m/s to 0.72 m/s to increase her safety and (I) with gait at a community level. (MDC for CVA= 0.14 m/s)     5/29/2024 MET      3. The patient will demonstrate improved efficiency with functional mobility and decreased risk for falls as evidenced by an increase in her score on the Timed up and Go from 18.6 sec to 15.7 sec. (Minimal detectable change in chronic stroke = 2.9 seconds)  5/29/2024 MET   4.Patient will improve her FOTO score from 47  to at least 61 for improved self perception of functional mobility.(score 0-100, high score indicates greater level of function) 5/29/2024 MET, 64   5. Pt will improve 5x sit to stand score from 27.1 seconds to less than 20 sec to decrease fall risk.  5/29/2024    MET, 18.6   6. Pt will improve 6MWT to >1000' to demonstrate improvements in aerobic endurance and community ambulation status 5/29/2024 MET, 1034'   Patient will be able to ascend a flight or stairs with reciprocal gait to impact community mobility New goal on 5/29/2024 50% met, able to perform, but requires use of railing   Patient will improve left knee extension manual muscle test to >/= 4+/5 to impact her ability to perform sit to stand transfers New goal on 5/29/2024 MET   Patient will report pain in left knee with standing as </= 3/10 to impact her tolerance to performing chores around her home New goal on 5/29/2024 MET        PLAN     This patient is discharged from Physical Therapy      Semaj Carrillo, PT

## 2024-06-28 ENCOUNTER — HOSPITAL ENCOUNTER (OUTPATIENT)
Dept: RADIOLOGY | Facility: HOSPITAL | Age: 64
Discharge: HOME | End: 2024-06-28
Attending: STUDENT IN AN ORGANIZED HEALTH CARE EDUCATION/TRAINING PROGRAM
Payer: MEDICARE

## 2024-06-28 DIAGNOSIS — Z94.4 LIVER TRANSPLANTED: ICD-10-CM

## 2024-06-28 DIAGNOSIS — Z85.05 HISTORY OF HEPATOCELLULAR CARCINOMA: ICD-10-CM

## 2024-06-28 PROCEDURE — 25500020 PHARM REV CODE 255: Mod: PO | Performed by: STUDENT IN AN ORGANIZED HEALTH CARE EDUCATION/TRAINING PROGRAM

## 2024-06-28 PROCEDURE — 71250 CT THORAX DX C-: CPT | Mod: 26,,, | Performed by: RADIOLOGY

## 2024-06-28 PROCEDURE — 74170 CT ABD WO CNTRST FLWD CNTRST: CPT | Mod: TC,PO

## 2024-06-28 PROCEDURE — 71250 CT THORAX DX C-: CPT | Mod: TC,PO

## 2024-06-28 PROCEDURE — 74170 CT ABD WO CNTRST FLWD CNTRST: CPT | Mod: 26,,, | Performed by: RADIOLOGY

## 2024-06-28 RX ADMIN — IOHEXOL 100 ML: 350 INJECTION, SOLUTION INTRAVENOUS at 10:06

## 2024-07-01 ENCOUNTER — TELEPHONE (OUTPATIENT)
Dept: TRANSPLANT | Facility: CLINIC | Age: 64
End: 2024-07-01
Payer: MEDICARE

## 2024-07-01 ENCOUNTER — PATIENT MESSAGE (OUTPATIENT)
Dept: PRIMARY CARE CLINIC | Facility: CLINIC | Age: 64
End: 2024-07-01
Payer: MEDICARE

## 2024-07-01 NOTE — TELEPHONE ENCOUNTER
Pt notified via portal of stable labs and that no medication changes are needed. Repeat labs due 7/29/24 per protocol.   ----- Message from James Brambila MD sent at 6/30/2024  9:32 PM CDT -----  Liver tests are stable. No changes in her immunosuppression. Please continue to monitor labs per transplant protocol.

## 2024-07-01 NOTE — TELEPHONE ENCOUNTER
Pt informed  ----- Message from James Brambila MD sent at 6/30/2024  9:31 PM CDT -----  Reviewed. No evidence of recurrent liver cancer.

## 2024-07-08 ENCOUNTER — OFFICE VISIT (OUTPATIENT)
Dept: TRANSPLANT | Facility: CLINIC | Age: 64
End: 2024-07-08
Payer: MEDICARE

## 2024-07-08 VITALS
HEART RATE: 81 BPM | HEIGHT: 67 IN | RESPIRATION RATE: 16 BRPM | BODY MASS INDEX: 33.71 KG/M2 | TEMPERATURE: 97 F | OXYGEN SATURATION: 100 % | DIASTOLIC BLOOD PRESSURE: 69 MMHG | SYSTOLIC BLOOD PRESSURE: 132 MMHG | WEIGHT: 214.75 LBS

## 2024-07-08 DIAGNOSIS — Z79.60 LONG-TERM USE OF IMMUNOSUPPRESSANT MEDICATION: ICD-10-CM

## 2024-07-08 DIAGNOSIS — Z85.05 HISTORY OF HEPATOCELLULAR CARCINOMA: ICD-10-CM

## 2024-07-08 DIAGNOSIS — Z94.4 LIVER TRANSPLANTED: Primary | ICD-10-CM

## 2024-07-08 PROCEDURE — 1101F PT FALLS ASSESS-DOCD LE1/YR: CPT | Mod: CPTII,S$GLB,, | Performed by: STUDENT IN AN ORGANIZED HEALTH CARE EDUCATION/TRAINING PROGRAM

## 2024-07-08 PROCEDURE — 3078F DIAST BP <80 MM HG: CPT | Mod: CPTII,S$GLB,, | Performed by: STUDENT IN AN ORGANIZED HEALTH CARE EDUCATION/TRAINING PROGRAM

## 2024-07-08 PROCEDURE — 99999 PR PBB SHADOW E&M-EST. PATIENT-LVL V: CPT | Mod: PBBFAC,,, | Performed by: STUDENT IN AN ORGANIZED HEALTH CARE EDUCATION/TRAINING PROGRAM

## 2024-07-08 PROCEDURE — 3008F BODY MASS INDEX DOCD: CPT | Mod: CPTII,S$GLB,, | Performed by: STUDENT IN AN ORGANIZED HEALTH CARE EDUCATION/TRAINING PROGRAM

## 2024-07-08 PROCEDURE — 3075F SYST BP GE 130 - 139MM HG: CPT | Mod: CPTII,S$GLB,, | Performed by: STUDENT IN AN ORGANIZED HEALTH CARE EDUCATION/TRAINING PROGRAM

## 2024-07-08 PROCEDURE — 4010F ACE/ARB THERAPY RXD/TAKEN: CPT | Mod: CPTII,S$GLB,, | Performed by: STUDENT IN AN ORGANIZED HEALTH CARE EDUCATION/TRAINING PROGRAM

## 2024-07-08 PROCEDURE — 1160F RVW MEDS BY RX/DR IN RCRD: CPT | Mod: CPTII,S$GLB,, | Performed by: STUDENT IN AN ORGANIZED HEALTH CARE EDUCATION/TRAINING PROGRAM

## 2024-07-08 PROCEDURE — 3288F FALL RISK ASSESSMENT DOCD: CPT | Mod: CPTII,S$GLB,, | Performed by: STUDENT IN AN ORGANIZED HEALTH CARE EDUCATION/TRAINING PROGRAM

## 2024-07-08 PROCEDURE — 1159F MED LIST DOCD IN RCRD: CPT | Mod: CPTII,S$GLB,, | Performed by: STUDENT IN AN ORGANIZED HEALTH CARE EDUCATION/TRAINING PROGRAM

## 2024-07-08 PROCEDURE — 99214 OFFICE O/P EST MOD 30 MIN: CPT | Mod: S$GLB,,, | Performed by: STUDENT IN AN ORGANIZED HEALTH CARE EDUCATION/TRAINING PROGRAM

## 2024-07-08 PROCEDURE — 1157F ADVNC CARE PLAN IN RCRD: CPT | Mod: CPTII,S$GLB,, | Performed by: STUDENT IN AN ORGANIZED HEALTH CARE EDUCATION/TRAINING PROGRAM

## 2024-07-08 PROCEDURE — 3044F HG A1C LEVEL LT 7.0%: CPT | Mod: CPTII,S$GLB,, | Performed by: STUDENT IN AN ORGANIZED HEALTH CARE EDUCATION/TRAINING PROGRAM

## 2024-07-08 RX ORDER — MYCOPHENOLATE MOFETIL 250 MG/1
500 CAPSULE ORAL 2 TIMES DAILY
Qty: 120 CAPSULE | Refills: 11 | Status: SHIPPED | OUTPATIENT
Start: 2024-07-08 | End: 2025-07-08

## 2024-07-08 NOTE — PROGRESS NOTES
Transplant Hepatology  Liver Transplant Recipient Follow Up    PCP: Garrett Webb MD    Transplant History  Transplant Date: 12/9/2023  UNOS Native Liver Dx: Cirrhosis: Fatty Liver (DURAN)    ORGAN: LIVER  Whole or Partial: whole liver  Donor Type: donation after circulatory death   [facility] High Risk: no  Donor CMV Status: Negative  Donor HCV Status: Negative  Donor HBcAb: Negative  Donor HBV PEDRO:   Donor HCV PEDRO: Negative  Biliary Anastomosis: end to end  Arterial Anatomy: standard  IVC reconstruction: end to end ivc  Portal vein status: patent    She has had the following complications since transplant: none    Chief complaint: follow up, history of OLT    HPI:  Yumiko Gonzalez is a 64 y.o. 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     She is without complaints today.  No recent hospitalizations or ED visits.  Compliant with Prograf and CellCept.  She does report intermittent tremors and headaches which she had prior to transplant.  Takes propranolol for essential tremor. She denies recent fever, chills, nausea, vomiting, diarrhea.      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, postitive antismooth muscle antibody    Blood transfusion     Bronchitis     bronchospasm on occasion     Cancer 1987    carcinoma in situ    Cervical neck pain with evidence of disc disease 03/22/2012    can bend neck    Cirrhosis     non-alcoholic, compensated    Colon polyps 03/22/2012    Depression 03/22/2012    Hx panic attacks    Diverticular disease 03/22/2012    never diverticulitis    Fatty liver 03/22/2012    Fibrocystic breast     Fine tremor     hands,chronic    Hepatosplenomegaly     History of abdominal paracentesis 01/18/2023    8.7L of fluid drained    Hypertension  "2013    Knee pain, bilateral     chronic, with hands,feet,fingers also painful    Lesion of right lung     Liver disease     "partially sclerosed liver" per pt    Migraines past Hx    Nephrolithiasis     stone episode 2021    Pleural effusion     small, compensated, good bilateral breath sounds per Dr Suresh GUTIERRES (postoperative nausea and vomiting)     twice after childbirth    Postpartum depression     Splenomegaly     Thrombocytopenia     Tremors of nervous system        Past Surgical History:   Procedure Laterality Date    ADENOIDECTOMY      CERVICAL CONIZATION   W/ LASER       SECTION      x3    COLONOSCOPY N/A 2016    Procedure: COLONOSCOPY;  Surgeon: James Palomares MD;  Location: [facility] ENDO;  Service: Endoscopy;  Laterality: N/A;    COLONOSCOPY N/A 2/3/2022    Procedure: COLONOSCOPY;  Surgeon: James Palomares MD;  Location: [facility] ENDO;  Service: Endoscopy;  Laterality: N/A;    EMBOLIZATION N/A 2018    Procedure: EMBOLIZATION, BLOOD VESSEL;  Surgeon: Joselin Surgeon;  Location: [facility];  Service: Radiology;  Laterality: N/A;    ENDOMETRIAL ABLATION      ESOPHAGOGASTRODUODENOSCOPY N/A 2020    Procedure: ESOPHAGOGASTRODUODENOSCOPY (EGD);  Surgeon: James Palomares MD;  Location: [facility];  Service: Endoscopy;  Laterality: N/A;    ESOPHAGOGASTRODUODENOSCOPY N/A 3/8/2022    Procedure: EGD (ESOPHAGOGASTRODUODENOSCOPY);  Surgeon: James Palomares MD;  Location: [facility];  Service: Endoscopy;  Laterality: N/A;    LIVER BIOPSY      LIVER TRANSPLANT N/A 2023    Procedure: TRANSPLANT, LIVER;  Surgeon: Gurpreet Raza MD;  Location: [address];  Service: Transplant;  Laterality: N/A;    PELVIC LAPAROSCOPY      TONSILLECTOMY      TUBAL LIGATION         Family History   Problem Relation Name Age of Onset    Breast cancer Mother  70    Heart disease Mother      Hypertension Mother      Tremor Mother      Diabetes Father      Heart disease Father      Diabetes Sister   "    Cancer Sister      Lung cancer Sister          70's    Diabetes Sister      Tremor Daughter Casandra     Breast cancer Maternal Aunt  70    Multiple sclerosis Maternal Aunt      Multiple sclerosis Maternal Aunt      Breast cancer Maternal Grandmother  70    Diabetes Brother      Emphysema Brother      Breast cancer Maternal Cousin 1st 40    Ovarian cancer Neg Hx      Parkinsonism Neg Hx      Glaucoma Neg Hx      Macular degeneration Neg Hx         Social History     Tobacco Use    Smoking status: Former     Current packs/day: 0.00     Types: Cigarettes     Quit date: 2/3/2006     Years since quittin.4    Smokeless tobacco: Never   Substance Use Topics    Alcohol use: Not Currently    Drug use: No       Current Outpatient Medications   Medication Sig Dispense Refill    albuterol (PROVENTIL/VENTOLIN HFA) 90 mcg/actuation inhaler Inhale 2 puffs every 4 hours as needed for cough, wheeze, or shortness of breath 18 g 11    ALPRAZolam (XANAX) 0.25 MG tablet Take 1 tablet (0.25 mg total) by mouth nightly as needed. FOR INSOMNIA 30 tablet 2    aspirin (ECOTRIN) 81 MG EC tablet Take 1 tablet (81 mg total) by mouth once daily. 30 tablet 11    blood sugar diagnostic (FREESTYLE LITE STRIPS) Strp Test blood glucose 3 (three) times daily. 100 each 1    blood-glucose meter kit Use as instructed 1 each 0    buPROPion (WELLBUTRIN XL) 150 MG TB24 tablet Take 2 tablets (300 mg total) by mouth once daily. 180 tablet 3    butalbital-acetaminophen-caffeine -40 mg (FIORICET, ESGIC) -40 mg per tablet Take 1 tablet by mouth every 4 (four) hours as needed for Pain. 20 tablet 0    calcium carbonate-vitamin D3 600 mg-20 mcg (800 unit) Tab Take 1 tablet by mouth once daily. 90 tablet 3    chlorhexidine (PERIDEX) 0.12 % solution Use 15 mL in the mouth or throat if needed for wound care for up to 14 days. RINSE WITH 15ML FOR 30 SECONDS AND SPIT TWICE DAILY FOR 14 DAYS 473 mL 0    insulin aspart U-100 (NOVOLOG) 100 unit/mL (3 mL)  "InPn pen Inject 4 Units into the skin 3 (three) times daily. Plus sliding scale insulin; MAX: 42 units/day 6 mL 1    k phos di & mono-sod phos mono (K-PHOS-NEUTRAL) 250 mg Tab Take 2 tablets by mouth once daily. 60 tablet 11    lancets 28 gauge Misc Test blood glucose 3 (three) times daily. 100 each 1    mycophenolate (CELLCEPT) 250 mg Cap Take 3 capsules (750 mg total) by mouth 2 (two) times daily. 180 capsule 11    olmesartan (BENICAR) 20 MG tablet Take 1 tablet (20 mg total) by mouth once daily. 90 tablet 1    omeprazole (PRILOSEC) 20 MG capsule Take 1 capsule (20 mg total) by mouth once daily. 90 capsule 0    oxyCODONE (ROXICODONE) 5 MG immediate release tablet Take 1 tablet (5 mg total) by mouth every 6 (six) hours if needed for severe pain for up to 5 days. (Patient not taking: Reported on 6/5/2024) 8 tablet 0    pen needle, diabetic 32 gauge x 5/32" Ndle Use to inject insulin into the skin 3 (three) times daily. 90 each 1    propranoloL (INDERAL) 20 MG tablet Take 1 tablet (20 mg total) by mouth 2 (two) times daily. 60 tablet 11    SITagliptin phosphate (JANUVIA) 100 MG Tab Take 1 tablet (100 mg total) by mouth once daily. 30 tablet 11    sodium chloride for inhalation (SODIUM CHLORIDE 0.9%) 0.9 % nebulizer solution Take 3 mLs by nebulization 4 (four) times daily as needed (cough). 150 mL 2    tacrolimus (PROGRAF) 1 MG Cap Take 3 capsules (3 mg total) by mouth every 12 (twelve) hours. 180 capsule 11    traZODone (DESYREL) 50 MG tablet Take HALF tablet (25 mg total) by mouth every evening. (Patient taking differently: Take 25 mg by mouth nightly as needed.) 15 tablet 11     No current facility-administered medications for this visit.       Review of patient's allergies indicates:   Allergen Reactions    No known drug allergies        Review of Systems   Constitutional:  Negative for fever and weight loss.   Gastrointestinal:  Negative for abdominal pain, blood in stool, constipation, diarrhea, heartburn, " "melena, nausea and vomiting.   Neurological:  Positive for tremors and headaches.       Vitals:    07/08/24 1415   BP: 132/69   Pulse: 81   Resp: 16   Temp: 97.2 °F (36.2 °C)   TempSrc: Temporal   SpO2: 100%   Weight: 97.4 kg (214 lb 11.7 oz)   Height: 5' 7.01" (1.702 m)         Physical Exam  Constitutional:       General: She is not in acute distress.     Appearance: She is not ill-appearing.   HENT:      Head: Normocephalic and atraumatic.   Eyes:      General: No scleral icterus.     Extraocular Movements: Extraocular movements intact.   Pulmonary:      Effort: No respiratory distress.   Skin:     Coloration: Skin is not jaundiced.   Neurological:      Mental Status: She is alert.         LABS:  Lab Results   Component Value Date    WBC 3.71 (L) 06/28/2024    HGB 11.8 (L) 06/28/2024    HCT 36.8 (L) 06/28/2024    MCV 90 06/28/2024     (L) 06/28/2024       Lab Results   Component Value Date     06/28/2024    K 4.9 06/28/2024     06/28/2024    CO2 22 (L) 06/28/2024    BUN 33 (H) 06/28/2024    CREATININE 1.0 06/28/2024    CALCIUM 10.4 06/28/2024    ANIONGAP 8 06/28/2024    ESTGFRAFRICA >60 06/20/2022    EGFRNONAA >60 06/20/2022       Lab Results   Component Value Date    ALT 11 06/28/2024    AST 14 06/28/2024     (H) 04/13/2023    ALKPHOS 70 06/28/2024    BILITOT 0.5 06/28/2024       Lab Results   Component Value Date    TACROLIMUS 5.6 06/28/2024         Assessment:  64 y.o. 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    1. Liver transplanted    2. Long-term use of immunosuppressant medication    3. History of hepatocellular carcinoma        Recommendations:  Allograft Function  - Excellent graft function with normal LFTs    Immunosuppression   - Continue Prograf 3mg twice daily  - Decrease CellCept to 500 mg twice daily    History of HCC: Cross-sectional imaging in 06/2024 without evidence of recurrence. Continue imaging surveillance " per protocol.    Kidney function   - Creatinine 1.0  - Avoid NSAIDs as able and maintain adequate hydration    Health Maintenance/Screening:  - Recommend age appropriate cancer screening  - Skin cancer: Recommend use of sunscreen SPF 30 or higher, hat and sunglasses while outside, dermatologist visit annually or sooner if any concerning lesions.  - Osteoporosis: Recommend bone density testing every 2-3 years if previously normal or annually if previously abnormal.    Return to clinic in 3 months.    UNOS Patient Status  Functional Status: 80% - Normal activity with effort: some symptoms of disease  Physical Capacity: No Limitations    I spent a total of 30 minutes on the day of the visit. This includes face to face time and non-face to face time preparing to see the patient (eg, review of tests), obtaining and/or reviewing separately obtained history, documenting clinical information in the electronic or other health record, independently interpreting results, and communicating results to the patient/family/caregiver, or care coordination.    This note includes dictation done using M*Modal speech recognition program. Word recognition mistakes are occasionally missed on review.    James Brambila MD  Staff Physician  Hepatology and Liver Transplant  [facility]  [facility]

## 2024-07-08 NOTE — LETTER
July 10, 2024        Laila Will  [address]  [facility] [address]  Phone: [phone]  Fax: [phone]             [facility]  [address]  [facility] [address]  Phone: [phone]   Patient: Yumiko Gonzalez   MR Number: [id number]   YOB: 1960   Date of Visit: 7/8/2024       Dear Dr. Laila Will    Thank you for referring Yumiko Gonzalez to me for evaluation. Attached you will find relevant portions of my assessment and plan of care.    If you have questions, please do not hesitate to call me. I look forward to following Yumiko Gonzalez along with you.    Sincerely,    James Brambila MD    Enclosure    If you would like to receive this communication electronically, please contact [email or web address] or [phone] to request [employer] access.    [employer] is a tool which provides read-only access to select patient information with whom you have a relationship. Its easy to use and provides real time access to review your patients record including encounter summaries, notes, results, and demographic information.    If you feel you have received this communication in error or would no longer like to receive these types of communications, please e-mail [email or web address]

## 2024-07-10 ENCOUNTER — TELEPHONE (OUTPATIENT)
Dept: TRANSPLANT | Facility: CLINIC | Age: 64
End: 2024-07-10
Payer: MEDICARE

## 2024-07-10 NOTE — TELEPHONE ENCOUNTER
Update received   ----- Message from James Brambila MD sent at 7/10/2024  8:48 AM CDT -----  Decreased mmf to 500 bid. Repeat labs in 2 weeks. Return 3 months.

## 2024-07-12 ENCOUNTER — PATIENT MESSAGE (OUTPATIENT)
Dept: PRIMARY CARE CLINIC | Facility: CLINIC | Age: 64
End: 2024-07-12
Payer: MEDICARE

## 2024-07-12 ENCOUNTER — PATIENT MESSAGE (OUTPATIENT)
Dept: TRANSPLANT | Facility: CLINIC | Age: 64
End: 2024-07-12
Payer: MEDICARE

## 2024-07-12 DIAGNOSIS — I10 PRIMARY HYPERTENSION: ICD-10-CM

## 2024-07-12 RX ORDER — OLMESARTAN MEDOXOMIL 20 MG/1
20 TABLET ORAL DAILY
Qty: 90 TABLET | Refills: 1 | Status: SHIPPED | OUTPATIENT
Start: 2024-07-12

## 2024-07-18 ENCOUNTER — PATIENT MESSAGE (OUTPATIENT)
Dept: PRIMARY CARE CLINIC | Facility: CLINIC | Age: 64
End: 2024-07-18
Payer: MEDICARE

## 2024-07-22 PROBLEM — N39.0 URINARY TRACT INFECTION WITHOUT HEMATURIA: Status: RESOLVED | Noted: 2024-04-22 | Resolved: 2024-07-22

## 2024-07-25 ENCOUNTER — TELEPHONE (OUTPATIENT)
Dept: PRIMARY CARE CLINIC | Facility: CLINIC | Age: 64
End: 2024-07-25
Payer: MEDICARE

## 2024-07-25 NOTE — TELEPHONE ENCOUNTER
----- Message from Teodora Zamora sent at 7/25/2024  9:54 AM CDT -----  Regarding: pt advice  [phone] ext [phone] (rip Reyes  [employer]) calling about the document fax over  last July 18,2024 trying to follow up if  receive  the Attending Physician Statement.      Teodora

## 2024-07-25 NOTE — TELEPHONE ENCOUNTER
LM with Ester Reyes informing her that we received the paperwork and that we would send it over once completed.

## 2024-07-29 ENCOUNTER — LAB VISIT (OUTPATIENT)
Dept: LAB | Facility: HOSPITAL | Age: 64
End: 2024-07-29
Attending: STUDENT IN AN ORGANIZED HEALTH CARE EDUCATION/TRAINING PROGRAM
Payer: MEDICARE

## 2024-07-29 DIAGNOSIS — Z94.4 S/P LIVER TRANSPLANT: ICD-10-CM

## 2024-07-29 LAB
ALBUMIN SERPL BCP-MCNC: 3.7 G/DL (ref 3.5–5.2)
ALP SERPL-CCNC: 77 U/L (ref 55–135)
ALT SERPL W/O P-5'-P-CCNC: 16 U/L (ref 10–44)
ANION GAP SERPL CALC-SCNC: 6 MMOL/L (ref 8–16)
AST SERPL-CCNC: 16 U/L (ref 10–40)
BASOPHILS # BLD AUTO: 0.04 K/UL (ref 0–0.2)
BASOPHILS NFR BLD: 0.9 % (ref 0–1.9)
BILIRUB SERPL-MCNC: 0.5 MG/DL (ref 0.1–1)
BUN SERPL-MCNC: 30 MG/DL (ref 8–23)
CALCIUM SERPL-MCNC: 10.4 MG/DL (ref 8.7–10.5)
CHLORIDE SERPL-SCNC: 109 MMOL/L (ref 95–110)
CO2 SERPL-SCNC: 27 MMOL/L (ref 23–29)
CREAT SERPL-MCNC: 1.3 MG/DL (ref 0.5–1.4)
DIFFERENTIAL METHOD BLD: ABNORMAL
EOSINOPHIL # BLD AUTO: 0.3 K/UL (ref 0–0.5)
EOSINOPHIL NFR BLD: 5.5 % (ref 0–8)
ERYTHROCYTE [DISTWIDTH] IN BLOOD BY AUTOMATED COUNT: 13.7 % (ref 11.5–14.5)
EST. GFR  (NO RACE VARIABLE): 45.6 ML/MIN/1.73 M^2
GLUCOSE SERPL-MCNC: 131 MG/DL (ref 70–110)
HCT VFR BLD AUTO: 39.7 % (ref 37–48.5)
HGB BLD-MCNC: 12.5 G/DL (ref 12–16)
IMM GRANULOCYTES # BLD AUTO: 0.06 K/UL (ref 0–0.04)
IMM GRANULOCYTES NFR BLD AUTO: 1.3 % (ref 0–0.5)
LYMPHOCYTES # BLD AUTO: 0.7 K/UL (ref 1–4.8)
LYMPHOCYTES NFR BLD: 14 % (ref 18–48)
MCH RBC QN AUTO: 28 PG (ref 27–31)
MCHC RBC AUTO-ENTMCNC: 31.5 G/DL (ref 32–36)
MCV RBC AUTO: 89 FL (ref 82–98)
MONOCYTES # BLD AUTO: 0.5 K/UL (ref 0.3–1)
MONOCYTES NFR BLD: 10.6 % (ref 4–15)
NEUTROPHILS # BLD AUTO: 3.2 K/UL (ref 1.8–7.7)
NEUTROPHILS NFR BLD: 67.7 % (ref 38–73)
NRBC BLD-RTO: 0 /100 WBC
PLATELET # BLD AUTO: 135 K/UL (ref 150–450)
PMV BLD AUTO: 10.7 FL (ref 9.2–12.9)
POTASSIUM SERPL-SCNC: 5.9 MMOL/L (ref 3.5–5.1)
PROT SERPL-MCNC: 6.3 G/DL (ref 6–8.4)
RBC # BLD AUTO: 4.46 M/UL (ref 4–5.4)
SODIUM SERPL-SCNC: 142 MMOL/L (ref 136–145)
WBC # BLD AUTO: 4.7 K/UL (ref 3.9–12.7)

## 2024-07-29 PROCEDURE — 36415 COLL VENOUS BLD VENIPUNCTURE: CPT | Mod: PO | Performed by: STUDENT IN AN ORGANIZED HEALTH CARE EDUCATION/TRAINING PROGRAM

## 2024-07-29 PROCEDURE — 80053 COMPREHEN METABOLIC PANEL: CPT | Performed by: STUDENT IN AN ORGANIZED HEALTH CARE EDUCATION/TRAINING PROGRAM

## 2024-07-29 PROCEDURE — 80197 ASSAY OF TACROLIMUS: CPT | Performed by: STUDENT IN AN ORGANIZED HEALTH CARE EDUCATION/TRAINING PROGRAM

## 2024-07-29 PROCEDURE — 85025 COMPLETE CBC W/AUTO DIFF WBC: CPT | Performed by: STUDENT IN AN ORGANIZED HEALTH CARE EDUCATION/TRAINING PROGRAM

## 2024-07-30 ENCOUNTER — CLINICAL SUPPORT (OUTPATIENT)
Dept: PRIMARY CARE CLINIC | Facility: CLINIC | Age: 64
End: 2024-07-30
Payer: MEDICARE

## 2024-07-30 DIAGNOSIS — F43.21 GRIEF REACTION: Primary | ICD-10-CM

## 2024-07-30 DIAGNOSIS — Z65.8 PSYCHOSOCIAL STRESSORS: ICD-10-CM

## 2024-07-30 DIAGNOSIS — R45.89 NEED FOR EMOTIONAL SUPPORT: ICD-10-CM

## 2024-07-30 LAB — TACROLIMUS BLD-MCNC: 7.7 NG/ML (ref 5–15)

## 2024-07-30 PROCEDURE — 99499 UNLISTED E&M SERVICE: CPT | Mod: 95,,, | Performed by: SOCIAL WORKER

## 2024-08-06 DIAGNOSIS — Z94.4 S/P LIVER TRANSPLANT: ICD-10-CM

## 2024-08-06 DIAGNOSIS — E87.5 HYPERKALEMIA: Primary | ICD-10-CM

## 2024-08-06 RX ORDER — TACROLIMUS 1 MG/1
CAPSULE ORAL
Qty: 150 CAPSULE | Refills: 11 | Status: SHIPPED | OUTPATIENT
Start: 2024-08-06 | End: 2025-08-06

## 2024-08-07 ENCOUNTER — PATIENT MESSAGE (OUTPATIENT)
Dept: TRANSPLANT | Facility: CLINIC | Age: 64
End: 2024-08-07
Payer: MEDICARE

## 2024-08-07 ENCOUNTER — TELEPHONE (OUTPATIENT)
Dept: TRANSPLANT | Facility: CLINIC | Age: 64
End: 2024-08-07
Payer: MEDICARE

## 2024-08-07 ENCOUNTER — LAB VISIT (OUTPATIENT)
Dept: LAB | Facility: HOSPITAL | Age: 64
End: 2024-08-07
Attending: STUDENT IN AN ORGANIZED HEALTH CARE EDUCATION/TRAINING PROGRAM
Payer: MEDICARE

## 2024-08-07 DIAGNOSIS — E87.5 HYPERKALEMIA: ICD-10-CM

## 2024-08-07 DIAGNOSIS — Z94.4 S/P LIVER TRANSPLANT: ICD-10-CM

## 2024-08-07 LAB — POTASSIUM SERPL-SCNC: 5.2 MMOL/L (ref 3.5–5.1)

## 2024-08-07 PROCEDURE — 36415 COLL VENOUS BLD VENIPUNCTURE: CPT | Mod: PO | Performed by: STUDENT IN AN ORGANIZED HEALTH CARE EDUCATION/TRAINING PROGRAM

## 2024-08-07 PROCEDURE — 84132 ASSAY OF SERUM POTASSIUM: CPT | Mod: PO | Performed by: STUDENT IN AN ORGANIZED HEALTH CARE EDUCATION/TRAINING PROGRAM

## 2024-08-13 ENCOUNTER — CLINICAL SUPPORT (OUTPATIENT)
Dept: PRIMARY CARE CLINIC | Facility: CLINIC | Age: 64
End: 2024-08-13
Payer: MEDICARE

## 2024-08-13 DIAGNOSIS — F43.21 GRIEF REACTION: Primary | ICD-10-CM

## 2024-08-13 DIAGNOSIS — Z59.9 FINANCIAL DIFFICULTIES: ICD-10-CM

## 2024-08-13 DIAGNOSIS — Z65.8 PSYCHOSOCIAL STRESSORS: ICD-10-CM

## 2024-08-13 PROCEDURE — 99499 UNLISTED E&M SERVICE: CPT | Mod: 95,,, | Performed by: SOCIAL WORKER

## 2024-08-13 SDOH — SOCIAL DETERMINANTS OF HEALTH (SDOH): PROBLEM RELATED TO HOUSING AND ECONOMIC CIRCUMSTANCES, UNSPECIFIED: Z59.9
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
## 2024-08-16 DIAGNOSIS — Z94.4 S/P LIVER TRANSPLANT: Primary | ICD-10-CM

## 2024-08-23 ENCOUNTER — OFFICE VISIT (OUTPATIENT)
Dept: DERMATOLOGY | Facility: CLINIC | Age: 64
End: 2024-08-23
Payer: MEDICARE

## 2024-08-23 VITALS — BODY MASS INDEX: 33.71 KG/M2 | HEIGHT: 67 IN | WEIGHT: 214.75 LBS

## 2024-08-23 DIAGNOSIS — D22.9 MULTIPLE BENIGN NEVI: Primary | ICD-10-CM

## 2024-08-23 DIAGNOSIS — L82.1 SEBORRHEIC KERATOSES: ICD-10-CM

## 2024-08-23 DIAGNOSIS — Z12.83 SKIN CANCER SCREENING: ICD-10-CM

## 2024-08-23 DIAGNOSIS — Z94.4 LIVER TRANSPLANT RECIPIENT: ICD-10-CM

## 2024-08-23 DIAGNOSIS — D69.2 SOLAR PURPURA: ICD-10-CM

## 2024-08-23 DIAGNOSIS — L81.4 SOLAR LENTIGO: ICD-10-CM

## 2024-08-23 DIAGNOSIS — D84.9 IMMUNOSUPPRESSED STATUS: ICD-10-CM

## 2024-08-23 DIAGNOSIS — D23.9 DERMATOFIBROMA: ICD-10-CM

## 2024-08-23 NOTE — PROGRESS NOTES
"  Subjective:      Patient ID:  Yumiko Gonzalez is a 64 y.o. female who presents for   Chief Complaint   Patient presents with    Skin Check     TBSC      New Patient    Patient here today for TBSC  C/O bruises to both lower legs x a while. No symptoms.    Liver transplant patient- 12/9/2023 on cellcept and prograf    Post op weight loss (100lbs +)    Derm Hx:  Denies Phx of NMSC  Denies Fhx of MM    Current Outpatient Medications:   ·  albuterol (PROVENTIL/VENTOLIN HFA) 90 mcg/actuation inhaler, Inhale 2 puffs every 4 hours as needed for cough, wheeze, or shortness of breath, Disp: 18 g, Rfl: 11  ·  ALPRAZolam (XANAX) 0.25 MG tablet, Take 1 tablet (0.25 mg total) by mouth nightly as needed. FOR INSOMNIA, Disp: 30 tablet, Rfl: 2  ·  aspirin (ECOTRIN) 81 MG EC tablet, Take 1 tablet (81 mg total) by mouth once daily., Disp: 30 tablet, Rfl: 11  ·  blood sugar diagnostic (FREESTYLE LITE STRIPS) Strp, Test blood glucose 3 (three) times daily., Disp: 100 each, Rfl: 1  ·  blood-glucose meter kit, Use as instructed, Disp: 1 each, Rfl: 0  ·  buPROPion (WELLBUTRIN XL) 150 MG TB24 tablet, Take 2 tablets (300 mg total) by mouth once daily., Disp: 180 tablet, Rfl: 3  ·  butalbital-acetaminophen-caffeine -40 mg (FIORICET, ESGIC) -40 mg per tablet, Take 1 tablet by mouth every 4 (four) hours as needed for Pain., Disp: 20 tablet, Rfl: 0  ·  calcium carbonate-vitamin D3 600 mg-20 mcg (800 unit) Tab, Take 1 tablet by mouth once daily., Disp: 90 tablet, Rfl: 3  ·  lancets 28 gauge Misc, Test blood glucose 3 (three) times daily., Disp: 100 each, Rfl: 1  ·  mycophenolate (CELLCEPT) 250 mg Cap, Take 2 capsules (500 mg total) by mouth 2 (two) times daily., Disp: 120 capsule, Rfl: 11  ·  olmesartan (BENICAR) 20 MG tablet, Take 1 tablet (20 mg total) by mouth once daily., Disp: 90 tablet, Rfl: 1  ·  pen needle, diabetic 32 gauge x 5/32" Ndle, Use to inject insulin into the skin 3 (three) times daily., Disp: 90 each, Rfl: 1  ·  " propranoloL (INDERAL) 20 MG tablet, Take 1 tablet (20 mg total) by mouth 2 (two) times daily., Disp: 60 tablet, Rfl: 11  ·  sodium chloride for inhalation (SODIUM CHLORIDE 0.9%) 0.9 % nebulizer solution, Take 3 mLs by nebulization 4 (four) times daily as needed (cough)., Disp: 150 mL, Rfl: 2  ·  tacrolimus (PROGRAF) 1 MG Cap, Take 3 capsules (3 mg total) by mouth every morning AND 2 capsules (2 mg total) every evening., Disp: 150 capsule, Rfl: 11  ·  traZODone (DESYREL) 50 MG tablet, Take HALF tablet (25 mg total) by mouth every evening. (Patient taking differently: Take 25 mg by mouth nightly as needed.), Disp: 15 tablet, Rfl: 11  ·  omeprazole (PRILOSEC) 20 MG capsule, Take 1 capsule (20 mg total) by mouth once daily., Disp: 90 capsule, Rfl: 0  ·  sodium polystyrene (KAYEXALATE) powder, TAKE 30 GRAMS ( 2 bottles)  BY MOUTH FOR 1 DOSE, Disp: 30 g, Rfl: 1          Review of Systems   Constitutional:  Negative for fever, chills and fatigue.   Skin:  Negative for daily sunscreen use and activity-related sunscreen use (sun avoidance).   Hematologic/Lymphatic: Bruises/bleeds easily.       Objective:   Physical Exam   Constitutional: She appears well-developed and well-nourished. No distress.   Neurological: She is alert and oriented to person, place, and time. She is not disoriented.   Psychiatric: She has a normal mood and affect.   Skin:   Areas Examined (abnormalities noted in diagram):   Scalp / Hair Palpated and Inspected  Head / Face Inspection Performed  Neck Inspection Performed  Chest / Axilla Inspection Performed  Abdomen Inspection Performed  Genitals / Buttocks / Groin Inspection Performed  Back Inspection Performed  RUE Inspected  LUE Inspection Performed  RLE Inspected  LLE Inspection Performed  Nails and Digits Inspection Performed                     Diagram Legend     Erythematous scaling macule/papule c/w actinic keratosis       Vascular papule c/w angioma      Pigmented verrucoid papule/plaque c/w  seborrheic keratosis      Yellow umbilicated papule c/w sebaceous hyperplasia      Irregularly shaped tan macule c/w lentigo     1-2 mm smooth white papules consistent with Milia      Movable subcutaneous cyst with punctum c/w epidermal inclusion cyst      Subcutaneous movable cyst c/w pilar cyst      Firm pink to brown papule c/w dermatofibroma      Pedunculated fleshy papule(s) c/w skin tag(s)      Evenly pigmented macule c/w junctional nevus     Mildly variegated pigmented, slightly irregular-bordered macule c/w mildly atypical nevus      Flesh colored to evenly pigmented papule c/w intradermal nevus       Pink pearly papule/plaque c/w basal cell carcinoma      Erythematous hyperkeratotic cursted plaque c/w SCC      Surgical scar with no sign of skin cancer recurrence      Open and closed comedones      Inflammatory papules and pustules      Verrucoid papule consistent consistent with wart     Erythematous eczematous patches and plaques     Dystrophic onycholytic nail with subungual debris c/w onychomycosis     Umbilicated papule    Erythematous-base heme-crusted tan verrucoid plaque consistent with inflamed seborrheic keratosis     Erythematous Silvery Scaling Plaque c/w Psoriasis     See annotation      Assessment / Plan:        Multiple benign nevi  Careful dermoscopy evaluation of nevi performed with none identified as needing biopsy today  Monitor for new mole or moles that are becoming bigger, darker, irritated, or developing irregular borders.     Skin cancer screening  -     Ambulatory referral/consult to Dermatology  Total body skin examination performed today including at least 12 points as noted in physical examination. No lesions suspicious for malignancy noted.    Liver transplant recipient  Immunosuppressed status  -     Ambulatory referral/consult to Dermatology  - strict sun protection  - self checks  - annual skin checks    Seborrheic keratoses  These are benign inherited growths without a  malignant potential. Reassurance given to patient. No treatment is necessary.     Dermatofibroma  This is a benign scar-like lesion secondary to minor trauma. No treatment required.     Solar lentigo  This is a benign hyperpigmented sun induced lesion. Daily sun protection will reduce the number of new lesions. Treatment of these benign lesions are considered cosmetic.    Solar purpura  Common finding in aging sun damaged skin; ASA contributes  Dermend may help resolve and protect (retinol)  Patient instructed in importance in daily broad spectrum sun protection of at least spf 30. Mineral sunscreen ingredients preferred (Zinc +/- Titanium) and can be found OTC.   Patient encouraged to wear hat for all outdoor exposure.   Also discussed sun avoidance and use of protective clothing.             No follow-ups on file.

## 2024-08-26 ENCOUNTER — LAB VISIT (OUTPATIENT)
Dept: LAB | Facility: HOSPITAL | Age: 64
End: 2024-08-26
Attending: STUDENT IN AN ORGANIZED HEALTH CARE EDUCATION/TRAINING PROGRAM
Payer: MEDICARE

## 2024-08-26 DIAGNOSIS — Z94.4 S/P LIVER TRANSPLANT: ICD-10-CM

## 2024-08-26 LAB
ALBUMIN SERPL BCP-MCNC: 3.5 G/DL (ref 3.5–5.2)
ALP SERPL-CCNC: 80 U/L (ref 55–135)
ALT SERPL W/O P-5'-P-CCNC: 11 U/L (ref 10–44)
ANION GAP SERPL CALC-SCNC: 9 MMOL/L (ref 8–16)
AST SERPL-CCNC: 13 U/L (ref 10–40)
BASOPHILS # BLD AUTO: 0.02 K/UL (ref 0–0.2)
BASOPHILS NFR BLD: 0.5 % (ref 0–1.9)
BILIRUB SERPL-MCNC: 0.4 MG/DL (ref 0.1–1)
BUN SERPL-MCNC: 27 MG/DL (ref 8–23)
CALCIUM SERPL-MCNC: 9.8 MG/DL (ref 8.7–10.5)
CHLORIDE SERPL-SCNC: 111 MMOL/L (ref 95–110)
CO2 SERPL-SCNC: 23 MMOL/L (ref 23–29)
CREAT SERPL-MCNC: 1 MG/DL (ref 0.5–1.4)
DIFFERENTIAL METHOD BLD: ABNORMAL
EOSINOPHIL # BLD AUTO: 0.2 K/UL (ref 0–0.5)
EOSINOPHIL NFR BLD: 5.3 % (ref 0–8)
ERYTHROCYTE [DISTWIDTH] IN BLOOD BY AUTOMATED COUNT: 13.6 % (ref 11.5–14.5)
EST. GFR  (NO RACE VARIABLE): >60 ML/MIN/1.73 M^2
GLUCOSE SERPL-MCNC: 127 MG/DL (ref 70–110)
HCT VFR BLD AUTO: 37.6 % (ref 37–48.5)
HGB BLD-MCNC: 12.2 G/DL (ref 12–16)
IMM GRANULOCYTES # BLD AUTO: 0.07 K/UL (ref 0–0.04)
IMM GRANULOCYTES NFR BLD AUTO: 1.8 % (ref 0–0.5)
LYMPHOCYTES # BLD AUTO: 0.5 K/UL (ref 1–4.8)
LYMPHOCYTES NFR BLD: 11.9 % (ref 18–48)
MCH RBC QN AUTO: 28.9 PG (ref 27–31)
MCHC RBC AUTO-ENTMCNC: 32.4 G/DL (ref 32–36)
MCV RBC AUTO: 89 FL (ref 82–98)
MONOCYTES # BLD AUTO: 0.5 K/UL (ref 0.3–1)
MONOCYTES NFR BLD: 11.9 % (ref 4–15)
NEUTROPHILS # BLD AUTO: 2.7 K/UL (ref 1.8–7.7)
NEUTROPHILS NFR BLD: 68.6 % (ref 38–73)
NRBC BLD-RTO: 0 /100 WBC
PLATELET # BLD AUTO: 142 K/UL (ref 150–450)
PMV BLD AUTO: 11.1 FL (ref 9.2–12.9)
POTASSIUM SERPL-SCNC: 4 MMOL/L (ref 3.5–5.1)
PROT SERPL-MCNC: 6.2 G/DL (ref 6–8.4)
RBC # BLD AUTO: 4.22 M/UL (ref 4–5.4)
SODIUM SERPL-SCNC: 143 MMOL/L (ref 136–145)
WBC # BLD AUTO: 3.95 K/UL (ref 3.9–12.7)

## 2024-08-26 PROCEDURE — 82610 CYSTATIN C: CPT | Performed by: STUDENT IN AN ORGANIZED HEALTH CARE EDUCATION/TRAINING PROGRAM

## 2024-08-26 PROCEDURE — 80197 ASSAY OF TACROLIMUS: CPT | Performed by: STUDENT IN AN ORGANIZED HEALTH CARE EDUCATION/TRAINING PROGRAM

## 2024-08-26 PROCEDURE — 85025 COMPLETE CBC W/AUTO DIFF WBC: CPT | Performed by: STUDENT IN AN ORGANIZED HEALTH CARE EDUCATION/TRAINING PROGRAM

## 2024-08-26 PROCEDURE — 80053 COMPREHEN METABOLIC PANEL: CPT | Performed by: STUDENT IN AN ORGANIZED HEALTH CARE EDUCATION/TRAINING PROGRAM

## 2024-08-27 LAB — TACROLIMUS BLD-MCNC: 5.8 NG/ML (ref 5–15)

## 2024-08-28 ENCOUNTER — TELEPHONE (OUTPATIENT)
Dept: TRANSPLANT | Facility: CLINIC | Age: 64
End: 2024-08-28
Payer: MEDICARE

## 2024-08-28 DIAGNOSIS — Z94.4 S/P LIVER TRANSPLANT: Primary | ICD-10-CM

## 2024-08-28 DIAGNOSIS — Z85.05 HISTORY OF HEPATOCELLULAR CARCINOMA: ICD-10-CM

## 2024-08-28 LAB
CYSTATIN C SERPL-MCNC: 1.33 MG/L (ref 0.67–1.21)
GFR/BSA.PRED SERPLBLD CYS-BASED-ARV: 49 ML/MIN/BSA

## 2024-08-28 NOTE — TELEPHONE ENCOUNTER
Pt notified via portal of stable labs and that no medication changes are needed. Repeat labs due 9/23/24 per protocol.   ----- Message from James Brambila MD sent at 8/27/2024  7:52 PM CDT -----  Liver tests are stable. No changes in her immunosuppression. Please continue to monitor labs per transplant protocol.

## 2024-08-29 ENCOUNTER — CLINICAL SUPPORT (OUTPATIENT)
Dept: PRIMARY CARE CLINIC | Facility: CLINIC | Age: 64
End: 2024-08-29
Payer: MEDICARE

## 2024-08-29 DIAGNOSIS — F43.21 GRIEF REACTION: ICD-10-CM

## 2024-08-29 DIAGNOSIS — Z65.8 PSYCHOSOCIAL STRESSORS: Primary | ICD-10-CM

## 2024-08-29 DIAGNOSIS — Z91.89 LACK OF MOTIVATION: ICD-10-CM
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
## 2024-09-05 ENCOUNTER — OFFICE VISIT (OUTPATIENT)
Dept: PRIMARY CARE CLINIC | Facility: CLINIC | Age: 64
End: 2024-09-05
Payer: MEDICARE

## 2024-09-05 VITALS — HEIGHT: 67 IN | WEIGHT: 223 LBS | OXYGEN SATURATION: 99 % | HEART RATE: 83 BPM | BODY MASS INDEX: 35 KG/M2

## 2024-09-05 DIAGNOSIS — Z94.4 S/P LIVER TRANSPLANT: ICD-10-CM

## 2024-09-05 DIAGNOSIS — R73.9 STEROID-INDUCED HYPERGLYCEMIA: Chronic | ICD-10-CM

## 2024-09-05 DIAGNOSIS — E66.09 CLASS 1 OBESITY DUE TO EXCESS CALORIES WITH SERIOUS COMORBIDITY IN ADULT, UNSPECIFIED BMI: ICD-10-CM

## 2024-09-05 DIAGNOSIS — E66.01 SEVERE OBESITY: ICD-10-CM

## 2024-09-05 DIAGNOSIS — T38.0X5A STEROID-INDUCED HYPERGLYCEMIA: Chronic | ICD-10-CM

## 2024-09-05 DIAGNOSIS — I10 PRIMARY HYPERTENSION: Primary | Chronic | ICD-10-CM

## 2024-09-05 PROCEDURE — 3044F HG A1C LEVEL LT 7.0%: CPT | Mod: CPTII,S$GLB,, | Performed by: FAMILY MEDICINE

## 2024-09-05 PROCEDURE — 99999 PR PBB SHADOW E&M-EST. PATIENT-LVL V: CPT | Mod: PBBFAC,,, | Performed by: FAMILY MEDICINE

## 2024-09-05 PROCEDURE — 1123F ACP DISCUSS/DSCN MKR DOCD: CPT | Mod: CPTII,S$GLB,, | Performed by: FAMILY MEDICINE

## 2024-09-05 PROCEDURE — 1160F RVW MEDS BY RX/DR IN RCRD: CPT | Mod: CPTII,S$GLB,, | Performed by: FAMILY MEDICINE

## 2024-09-05 PROCEDURE — 4010F ACE/ARB THERAPY RXD/TAKEN: CPT | Mod: CPTII,S$GLB,, | Performed by: FAMILY MEDICINE

## 2024-09-05 PROCEDURE — 1101F PT FALLS ASSESS-DOCD LE1/YR: CPT | Mod: CPTII,S$GLB,, | Performed by: FAMILY MEDICINE

## 2024-09-05 PROCEDURE — 1159F MED LIST DOCD IN RCRD: CPT | Mod: CPTII,S$GLB,, | Performed by: FAMILY MEDICINE

## 2024-09-05 PROCEDURE — 99214 OFFICE O/P EST MOD 30 MIN: CPT | Mod: S$GLB,,, | Performed by: FAMILY MEDICINE

## 2024-09-05 PROCEDURE — 3288F FALL RISK ASSESSMENT DOCD: CPT | Mod: CPTII,S$GLB,, | Performed by: FAMILY MEDICINE

## 2024-09-05 PROCEDURE — 3008F BODY MASS INDEX DOCD: CPT | Mod: CPTII,S$GLB,, | Performed by: FAMILY MEDICINE

## 2024-09-05 RX ORDER — PANTOPRAZOLE SODIUM 40 MG/1
40 TABLET, DELAYED RELEASE ORAL
COMMUNITY
Start: 2023-12-18 | End: 2024-12-17

## 2024-09-05 RX ORDER — OXYCODONE HYDROCHLORIDE 10 MG/1
5 TABLET ORAL
COMMUNITY
Start: 2024-01-04

## 2024-09-05 RX ORDER — FUROSEMIDE 20 MG/1
40 TABLET ORAL
COMMUNITY
Start: 2023-12-22

## 2024-09-05 NOTE — PROGRESS NOTES
Primary Care Provider Appointment   [facility] 64 [facility]       Patient ID: Yumiko Gonzalez is a 64 y.o. female.    ASSESSMENT/PLAN by Problem List:    1. Primary hypertension  Assessment & Plan:  Blood pressure is satisfactory and under control.  Continue current medications and continue to monitor.      2. Steroid-induced hyperglycemia  Assessment & Plan:  Off Januvia, am glucose 130-140s  A1c 4.9%  Will check labs in 1-2 months, focus on diet    Orders:  -     Hemoglobin A1C; Future; Expected date: 09/05/2024    3. S/P liver transplant  Assessment & Plan:  She continues to do very well posttransplant.  She of course continues to follow closely with the transplant team.  She will keep me informed and let me know if anything changes.      4. Severe obesity    5. Class 1 obesity due to excess calories with serious comorbidity in adult, unspecified BMI  Assessment & Plan:  Patient has been gradually increasing weight.  I am glad that she is feeling better after transplant but we did discuss her other comorbidities and risk.  Discussed focusing on healthy nutrition, light exercise and weight loss.           Follow Up:  2-3 months        Advance Care Planning     Date: 09/05/2024    ACP Reviewed/No Changes  Voluntary advance care planning discussion had today with patient. Previously completed HCPOA and LW in electronic medical record is current, no changes made.                 Subjective:     Chief Complaint   Patient presents with    Chest Pain     Left side lower rib pain    Knee Pain     Left knee still bothering her     I have reviewed the information entered by the ancillary staff regarding the chief complaint as well as the related history.    Chest Pain   Associated symptoms include headaches. Pertinent negatives include no palpitations, vomiting or weakness.   Knee Pain         Patient is a/an 64 y.o.  female     Patient does report some left-sided lower rib pain.  No coughing or  fever.  No hemoptysis.  No shortness a breath or change beyond baseline.  Does not have exertional symptoms.  She will monitor and report any change, suspect musculoskeletal  Has been eating well but unfortunately gaining weight   Does have some additional arthralgias.      Blood pressure and other chronic conditions appear stable.  See above for all details addressed today    For complete problem list, past medical history, surgical history, social history, etc., see appropriate section in the electronic medical record    Review of Systems   Constitutional:  Negative for activity change and unexpected weight change.   HENT:  Positive for rhinorrhea. Negative for hearing loss and trouble swallowing.    Eyes:  Negative for discharge and visual disturbance.   Respiratory:  Negative for chest tightness and wheezing.    Cardiovascular:  Positive for chest pain (left-sided lower ribcage, exertional). Negative for palpitations.   Gastrointestinal:  Positive for constipation and diarrhea. Negative for blood in stool and vomiting.   Endocrine: Negative for polydipsia and polyuria.   Genitourinary:  Negative for difficulty urinating, dysuria, hematuria and menstrual problem.   Musculoskeletal:  Positive for arthralgias. Negative for joint swelling and neck pain.   Neurological:  Positive for headaches. Negative for weakness.   Psychiatric/Behavioral:  Positive for dysphoric mood. Negative for confusion.        Objective     Physical Exam  Vitals reviewed.   Constitutional:       General: She is not in acute distress.     Appearance: She is well-developed. She is not ill-appearing.   HENT:      Head: Normocephalic and atraumatic.      Mouth/Throat:      Pharynx: Oropharynx is clear.   Eyes:      General: No scleral icterus.     Conjunctiva/sclera: Conjunctivae normal.   Cardiovascular:      Rate and Rhythm: Normal rate and regular rhythm.      Heart sounds: Normal heart sounds. No murmur heard.     No friction rub.       "Comments: Mild ankle edema  Pulmonary:      Effort: Pulmonary effort is normal. No respiratory distress.      Breath sounds: Normal breath sounds. No wheezing or rales.      Comments: Mildly diminished breath sounds, few rhonchi but mostly clear no wheezing  Abdominal:      Comments:  Abdomen is soft, bowel sounds are normal.   Skin:     General: Skin is dry.      Findings: No rash.   Neurological:      Mental Status: She is alert and oriented to person, place, and time.   Psychiatric:         Behavior: Behavior normal.       Vitals:    09/05/24 1131   Pulse: 83   SpO2: 99%   Weight: 101.1 kg (222 lb 15.9 oz)   Height: 5' 7" (1.702 m)           THIS DOCUMENT WAS MADE IN PART WITH VOICE RECOGNITION SOFTWARE.  OCCASIONALLY THIS SOFTWARE WILL MISINTERPRET WORDS OR PHRASES.    "

## 2024-09-11 ENCOUNTER — TELEPHONE (OUTPATIENT)
Dept: PRIMARY CARE CLINIC | Facility: CLINIC | Age: 64
End: 2024-09-11
Payer: MEDICARE

## 2024-09-11 ENCOUNTER — PATIENT MESSAGE (OUTPATIENT)
Dept: PRIMARY CARE CLINIC | Facility: CLINIC | Age: 64
End: 2024-09-11
Payer: MEDICARE

## 2024-09-11 NOTE — TELEPHONE ENCOUNTER
Called pt, no answer. Left message that she has an appt at 1pm tomorrow with TIAGO Lin. Explained to check with office to make sure Delia is at work, that we have electricity and no damage to building. Left number for call back

## 2024-09-12 ENCOUNTER — HOSPITAL ENCOUNTER (OUTPATIENT)
Dept: RADIOLOGY | Facility: HOSPITAL | Age: 64
Discharge: HOME | End: 2024-09-12
Attending: STUDENT IN AN ORGANIZED HEALTH CARE EDUCATION/TRAINING PROGRAM
Payer: MEDICARE

## 2024-09-12 ENCOUNTER — TELEPHONE (OUTPATIENT)
Dept: PRIMARY CARE CLINIC | Facility: CLINIC | Age: 64
End: 2024-09-12
Payer: MEDICARE

## 2024-09-12 DIAGNOSIS — Z94.4 S/P LIVER TRANSPLANT: ICD-10-CM

## 2024-09-12 PROBLEM — E66.811 CLASS 1 OBESITY DUE TO EXCESS CALORIES WITH SERIOUS COMORBIDITY IN ADULT: Status: ACTIVE | Noted: 2024-09-12

## 2024-09-12 PROBLEM — E66.09 CLASS 1 OBESITY DUE TO EXCESS CALORIES WITH SERIOUS COMORBIDITY IN ADULT: Status: ACTIVE | Noted: 2024-09-12

## 2024-09-12 PROCEDURE — 93976 VASCULAR STUDY: CPT | Mod: TC,PO

## 2024-09-12 NOTE — ASSESSMENT & PLAN NOTE
Patient has been gradually increasing weight.  I am glad that she is feeling better after transplant but we did discuss her other comorbidities and risk.  Discussed focusing on healthy nutrition, light exercise and weight loss.
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
## 2024-09-12 NOTE — ASSESSMENT & PLAN NOTE
Blood pressure is satisfactory and under control.  Continue current medications and continue to monitor.

## 2024-09-12 NOTE — ASSESSMENT & PLAN NOTE
She continues to do very well posttransplant.  She of course continues to follow closely with the transplant team.  She will keep me informed and let me know if anything changes.

## 2024-09-13 ENCOUNTER — TELEPHONE (OUTPATIENT)
Dept: TRANSPLANT | Facility: CLINIC | Age: 64
End: 2024-09-13
Payer: MEDICARE

## 2024-09-13 NOTE — TELEPHONE ENCOUNTER
Pt informed  ----- Message from James Brambila MD sent at 9/13/2024 12:07 PM CDT -----  Reviewed. US ok.

## 2024-09-23 ENCOUNTER — LAB VISIT (OUTPATIENT)
Dept: LAB | Facility: HOSPITAL | Age: 64
End: 2024-09-23
Attending: STUDENT IN AN ORGANIZED HEALTH CARE EDUCATION/TRAINING PROGRAM
Payer: MEDICARE

## 2024-09-23 ENCOUNTER — CLINICAL SUPPORT (OUTPATIENT)
Dept: PRIMARY CARE CLINIC | Facility: CLINIC | Age: 64
End: 2024-09-23
Payer: MEDICARE

## 2024-09-23 DIAGNOSIS — Z65.8 PSYCHOSOCIAL STRESSORS: Primary | ICD-10-CM

## 2024-09-23 DIAGNOSIS — Z85.05 HISTORY OF HEPATOCELLULAR CARCINOMA: ICD-10-CM

## 2024-09-23 DIAGNOSIS — F43.21 GRIEF REACTION: ICD-10-CM

## 2024-09-23 DIAGNOSIS — Z94.4 S/P LIVER TRANSPLANT: ICD-10-CM

## 2024-09-23 LAB
AFP SERPL-MCNC: <2 NG/ML (ref 0–8.4)
ALBUMIN SERPL BCP-MCNC: 3.6 G/DL (ref 3.5–5.2)
ALP SERPL-CCNC: 78 U/L (ref 55–135)
ALT SERPL W/O P-5'-P-CCNC: 12 U/L (ref 10–44)
ANION GAP SERPL CALC-SCNC: 5 MMOL/L (ref 8–16)
AST SERPL-CCNC: 15 U/L (ref 10–40)
BASOPHILS # BLD AUTO: 0.02 K/UL (ref 0–0.2)
BASOPHILS NFR BLD: 0.4 % (ref 0–1.9)
BILIRUB SERPL-MCNC: 0.5 MG/DL (ref 0.1–1)
BUN SERPL-MCNC: 33 MG/DL (ref 8–23)
CALCIUM SERPL-MCNC: 10.1 MG/DL (ref 8.7–10.5)
CHLORIDE SERPL-SCNC: 109 MMOL/L (ref 95–110)
CO2 SERPL-SCNC: 24 MMOL/L (ref 23–29)
CREAT SERPL-MCNC: 1.1 MG/DL (ref 0.5–1.4)
DIFFERENTIAL METHOD BLD: ABNORMAL
EOSINOPHIL # BLD AUTO: 0.3 K/UL (ref 0–0.5)
EOSINOPHIL NFR BLD: 7.6 % (ref 0–8)
ERYTHROCYTE [DISTWIDTH] IN BLOOD BY AUTOMATED COUNT: 13.9 % (ref 11.5–14.5)
EST. GFR  (NO RACE VARIABLE): 55.8 ML/MIN/1.73 M^2
GLUCOSE SERPL-MCNC: 133 MG/DL (ref 70–110)
HCT VFR BLD AUTO: 36.7 % (ref 37–48.5)
HGB BLD-MCNC: 11.7 G/DL (ref 12–16)
IMM GRANULOCYTES # BLD AUTO: 0.08 K/UL (ref 0–0.04)
IMM GRANULOCYTES NFR BLD AUTO: 1.8 % (ref 0–0.5)
LYMPHOCYTES # BLD AUTO: 0.6 K/UL (ref 1–4.8)
LYMPHOCYTES NFR BLD: 13.2 % (ref 18–48)
MCH RBC QN AUTO: 28.3 PG (ref 27–31)
MCHC RBC AUTO-ENTMCNC: 31.9 G/DL (ref 32–36)
MCV RBC AUTO: 89 FL (ref 82–98)
MONOCYTES # BLD AUTO: 0.5 K/UL (ref 0.3–1)
MONOCYTES NFR BLD: 11 % (ref 4–15)
NEUTROPHILS # BLD AUTO: 3 K/UL (ref 1.8–7.7)
NEUTROPHILS NFR BLD: 66 % (ref 38–73)
NRBC BLD-RTO: 0 /100 WBC
PLATELET # BLD AUTO: 138 K/UL (ref 150–450)
PMV BLD AUTO: 10.7 FL (ref 9.2–12.9)
POTASSIUM SERPL-SCNC: 4.8 MMOL/L (ref 3.5–5.1)
PROT SERPL-MCNC: 6.4 G/DL (ref 6–8.4)
RBC # BLD AUTO: 4.13 M/UL (ref 4–5.4)
SODIUM SERPL-SCNC: 138 MMOL/L (ref 136–145)
WBC # BLD AUTO: 4.47 K/UL (ref 3.9–12.7)

## 2024-09-23 PROCEDURE — 80197 ASSAY OF TACROLIMUS: CPT | Performed by: STUDENT IN AN ORGANIZED HEALTH CARE EDUCATION/TRAINING PROGRAM

## 2024-09-23 PROCEDURE — 82105 ALPHA-FETOPROTEIN SERUM: CPT | Performed by: STUDENT IN AN ORGANIZED HEALTH CARE EDUCATION/TRAINING PROGRAM

## 2024-09-23 PROCEDURE — 85025 COMPLETE CBC W/AUTO DIFF WBC: CPT | Performed by: STUDENT IN AN ORGANIZED HEALTH CARE EDUCATION/TRAINING PROGRAM

## 2024-09-23 PROCEDURE — 36415 COLL VENOUS BLD VENIPUNCTURE: CPT | Mod: PO | Performed by: STUDENT IN AN ORGANIZED HEALTH CARE EDUCATION/TRAINING PROGRAM

## 2024-09-23 PROCEDURE — 80053 COMPREHEN METABOLIC PANEL: CPT | Performed by: STUDENT IN AN ORGANIZED HEALTH CARE EDUCATION/TRAINING PROGRAM
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
## 2024-09-24 LAB — TACROLIMUS BLD-MCNC: 5.7 NG/ML (ref 5–15)

## 2024-09-25 ENCOUNTER — PATIENT MESSAGE (OUTPATIENT)
Dept: PRIMARY CARE CLINIC | Facility: CLINIC | Age: 64
End: 2024-09-25
Payer: MEDICARE

## 2024-09-25 DIAGNOSIS — R19.7 DIARRHEA, UNSPECIFIED TYPE: ICD-10-CM

## 2024-09-25 DIAGNOSIS — R10.9 ABDOMINAL PAIN, UNSPECIFIED ABDOMINAL LOCATION: Primary | ICD-10-CM

## 2024-09-26 NOTE — TELEPHONE ENCOUNTER
Called patient and she hadnt had diarrhea today at all, but will still  stool cup in the morning. Patient will then drop it off to the lab herself after collection.   Dora Mauro MD   General Surgery  New Patient Evaluation in Office  Pt Name: Seth Garrett  Date of Birth 1961   Today's Date: 8/2/2021  Medical Record Number: [id number]  Referring Provider: No ref. provider found  Primary Care Provider: Prince Fenton DO  Chief Complaint:  Chief Complaint   Patient presents with    Follow-Up from Hospital     small bowel obstruction/cholelithiasis       ASSESSMENT      1. SBO (small bowel obstruction) (HCC)    2. Endometrial cancer (HCC)    3. Calculus of gallbladder without cholecystitis without obstruction         PLANS      1. Patient status post completion of pelvic radiation and boost to her vaginal cuff and treatment for her stage II endometrial cancer. No current symptoms of any small bowel obstruction. 2. History of positive fit test may be related to pelvic radiation. To complete her evaluation she is scheduled to undergo colonoscopy. 3. No obstructive symptoms at this time or symptoms of biliary tract disease. 4. Follow-up surgical clinic as needed        rBitney Lyon is a 60y.o. year old female who is presenting today in the office for follow-up from recent hospitalization. Teresa Linton was seen at [facility] in June. She has a history of stage II endometrial cancer she underwent a hysterectomy and was undergoing radiation therapy. She has had alternating constipation and diarrhea throughout. She had a positive fit test. She was admitted with some abdominal pain CT imaging showed some fecalization of the terminal ileum. There was no evidence of mechanical obstruction. Follow-up films showed contrast in the colon. She had been taking some Imodium. She had cholelithiasis without evidence of cholecystitis. She states she is seeing GI and is going to have a colonoscopy. She has completed radiation therapy. She denies any fever or chills. She denies any biliary colic symptoms. She denies any nausea or vomiting and states diarrhea has resolved. She has had no gross blood in her stools. Past Medical History  Past Medical History:   Diagnosis Date    Anemia     Edema     Endometrial cancer ([address]     cancer free 7/21/21    Infertility female     Obesity     SBO (small bowel obstruction) (HCC)        Past Surgical History  Past Surgical History:   Procedure Laterality Date    DILATION AND CURETTAGE OF UTERUS N/A 12/30/2020    DILATATION AND CURETTAGE HYSTEROSCOPY performed by Yordy Bautista MD at [id number] [facility] ENDOMETRIAL BIOPSY  02/2021    HYSTERECTOMY, TOTAL ABDOMINAL  03/13/2021    OVARY REMOVAL  2009 1    ME TOTAL ABDOM HYSTERECTOMY  2021       Medications  Current Outpatient Medications   Medication Sig Dispense Refill    cyclobenzaprine (FLEXERIL) 10 MG tablet Take 1 tablet by mouth 3 times daily as needed for Muscle spasms 30 tablet 1    oxybutynin (DITROPAN) 5 MG tablet Take 1 tablet by mouth 2 times daily 60 tablet 5    PEG-KCl-NaCl-NaSulf-Na Asc-C (PLENVU) 140 g SOLR Take 1 kit by mouth See Admin Instructions For the pharmacy: If the patient has coverage, submit the balance due to [facility] as a secondary payer using [facility] 45 [id number] and valid Other Coverage Code ([id number] [facility]) 08. (Patient not taking: Reported on 8/2/2021) 1 each 0     No current facility-administered medications for this visit.      Allergies   No Known Allergies    Family History  Family History   Problem Relation Age of Onset    Heart Disease Mother     Pancreatic Cancer Father     Stomach Cancer Brother     Cancer Brother         Stomach Cancer    Glaucoma Neg Hx     Colon Cancer Neg Hx     Colon Polyps Neg Hx     Esophageal Cancer Neg Hx        SocialHistory  Social History     Socioeconomic History    Marital status: Single     Spouse name: Not on file    Number of children: Not on file    Years of education: Not on file    Highest education level: Not on file   Occupational History    Not on file Tobacco Use    Smoking status: Former Smoker     Packs/day: 0.00     Years: 5.00     Pack years: 0.00     Types: Cigars     Quit date: 5/7/2011     Years since quitting: 10.2    Smokeless tobacco: Never Used   Vaping Use    Vaping Use: Never used   Substance and Sexual Activity    Alcohol use: Yes     Alcohol/week: 2.0 standard drinks     Types: 1 Glasses of wine, 1 Cans of beer per week     Comment: occasional    Drug use: No    Sexual activity: Yes     Partners: Male   Other Topics Concern    Not on file   Social History Narrative    Not on file     Social Determinants of Health     Financial Resource Strain: High Risk    Difficulty of Paying Living Expenses: Hard   Food Insecurity: No Food Insecurity    Worried About Running Out of Food in the Last Year: Never true    Ana of Food in the Last Year: Never true   Transportation Needs:     Lack of Transportation (Medical):  Lack of Transportation (Non-Medical):    Physical Activity:     Days of Exercise per Week:     Minutes of Exercise per Session:    Stress:     Feeling of Stress :    Social Connections:     Frequency of Communication with Friends and Family:     Frequency of Social Gatherings with Friends and Family:     Attends [employer] Services:     Active Member of Clubs or Organizations:     Attends Club or Organization Meetings:     Marital Status:    Intimate Partner Violence:     Fear of Current or Ex-Partner:     Emotionally Abused:     Physically Abused:     Sexually Abused:            Review of Systems  Review of Systems   Constitutional: Positive for fatigue. Negative for chills, fever and unexpected weight change. HENT: Negative for sore throat, trouble swallowing and voice change. Eyes: Negative for visual disturbance. Respiratory: Negative for cough, shortness of breath and wheezing. Cardiovascular: Negative for chest pain and palpitations.    Gastrointestinal: Negative for abdominal pain, blood in stool, constipation, nausea and vomiting. Endocrine: Negative for cold intolerance, heat intolerance and polydipsia. Genitourinary: Negative for dysuria, flank pain and hematuria. Musculoskeletal: Negative for gait problem, joint swelling and myalgias. Skin: Negative for color change and rash. Allergic/Immunologic: Negative for immunocompromised state. Neurological: Negative for dizziness, tremors, seizures and speech difficulty. Hematological: Does not bruise/bleed easily. Psychiatric/Behavioral: Negative for behavioral problems, confusion and suicidal ideas. OBJECTIVE     /72 (Site: Left Upper Arm, Position: Sitting, Cuff Size: Medium Adult)   Pulse 72   Temp 97 °F (36.1 °C) (Tympanic)   Resp 15   Ht 4' 11\" (1.499 m)   Wt 201 lb (91.2 kg)   LMP 10/14/2019   SpO2 94%   BMI 40.60 kg/m²      Physical Exam  Vitals reviewed. Constitutional:       General: She is not in acute distress. Appearance: She is well-developed. She is not ill-appearing, toxic-appearing or diaphoretic. HENT:      Head: Normocephalic and atraumatic. Nose: No congestion. Eyes:      General: No scleral icterus. Pupils: Pupils are equal, round, and reactive to light. Neck:      Thyroid: No thyromegaly. Vascular: No JVD. Trachea: No tracheal deviation. Cardiovascular:      Rate and Rhythm: Normal rate and regular rhythm. Heart sounds: Normal heart sounds. Pulmonary:      Effort: No respiratory distress. Breath sounds: Normal breath sounds. No wheezing. Abdominal:      General: There is no distension. Palpations: Abdomen is soft. There is no mass. Tenderness: There is no abdominal tenderness. There is no guarding or rebound. Hernia: No hernia is present. Musculoskeletal:         General: Normal range of motion. Cervical back: Normal range of motion and neck supple. Lymphadenopathy:      Cervical: No cervical adenopathy.    Skin:     General: Skin is warm and dry. Coloration: Skin is not jaundiced or pale. Findings: No rash. Neurological:      General: No focal deficit present. Mental Status: She is alert and oriented to person, place, and time. Cranial Nerves: No cranial nerve deficit. Psychiatric:         Mood and Affect: Mood normal.         Thought Content: Thought content normal.         Lab Results   Component Value Date    WBC 3.1 (L) 06/18/2021    HGB 12.2 06/18/2021    HCT 39.9 06/18/2021     06/18/2021    CHOL 221 (H) 10/24/2019    TRIG 68 10/24/2019    HDL 70 10/24/2019    ALT <5 (L) 06/18/2021    AST 10 06/18/2021     06/18/2021    K 3.6 06/18/2021     (H) 06/18/2021    CREATININE 0.5 06/18/2021    BUN 8 06/18/2021    CO2 23 06/18/2021    TSH 0.486 10/24/2019        PROCEDURE: CT ABDOMEN PELVIS W IV CONTRAST       CLINICAL INFORMATION: Endometrial cancer ([address] Acute left lower quadrant pain       COMPARISON: CT abdomen pelvis without contrast on August 22, 2020       TECHNIQUE:    5 mm axial imaging through the abdomen and pelvis with IV contrast.  Coronal and sagittal reconstructions were performed.        All CT scans at this facility use dose modulation, iterative reconstruction, and/or weight based dosing when appropriate to reduce the radiation dose to as low as reasonably achievable.       CONTRAST: 85 mL of Isovue-370.       Oral contrast material was given to the patient however the patient throughout prior to the exam done only less than half of the contrast material was retained.           FINDINGS:    LUNG BASES: Unremarkable.       LIVER/GALLBLADDER/BILIARY TREE: Unremarkable.       PANCREAS/SPLEEN: Unremarkable.       ADRENAL GLANDS/KIDNEYS: Unremarkable.       BOWEL:    The jejunum and proximal ileum have a normal diameter.       Within the distal ileum proximal to the terminal ileum there is equalization of the bowel contents in the bowel. Dilated to 2.8 cm.  The most distal ileum has a normal diameter.       The colon has a normal diameter.       FREE AIR/FREE FLUID/INFLAMMATION: None.       LYMPHADENOPATHY:    1. No pathologically enlarged lymph nodes.       ABDOMINAL AORTA: Unremarkable.       PELVIS:   1. Hysterectomy. There is a trace amount of free fluid in the pelvis. ABDOMINAL WALL: Unremarkable.       MUSCULOSKELETAL: Unremarkable.       OTHER: None.               Impression           There is equalization of the distal ileum which is dilated to 2.8 cm. The distal terminal ileum has normal diameter findings are secondary to either partial small bowel obstruction or early complete small bowel obstruction.       Cholelithiasis without signs of colitis.             **This report has been created using voice recognition software.  It may contain minor errors which are inherent in voice recognition technology. **       Final report electronically signed by Dr Baron Rodriguez on 6/17/2021 1:03 PM              US abdomen limited       Comparison: None       Findings:   The visualized pancreas is normal.   The aorta and inferior vena cava are normal caliber.       The liver is normal in size and echotexture, measuring 14.8 cm in length. There is no intrahepatic bile duct dilatation. The common duct is 4 mm in diameter. The main portal vein is antegrade.       Shadowing stones in the gallbladder without wall thickening or    pericholecystic fluid. No positive sonographic Jernigan sign.       Visualized right kidney is unremarkable without hydronephrosis.    No ascites.           Impression   Cholelithiasis without sonographic finding for acute cholecystitis.       This document has been electronically signed by: Dorothy Dover MD on    06/17/2021 10:06 PM       Order History

## 2024-09-26 NOTE — TELEPHONE ENCOUNTER
See my chart message.  Patient reports stomach pains and diarrhea.  She reports symptoms have been present for several weeks.  Going to go ahead and enter stool studies, culture and C diff as well as referral to Gastroenterology, especially given her transplant status.    If she is having blood in his stool or fever please let me know right away.

## 2024-09-27 ENCOUNTER — PATIENT MESSAGE (OUTPATIENT)
Dept: PRIMARY CARE CLINIC | Facility: CLINIC | Age: 64
End: 2024-09-27
Payer: MEDICARE

## 2024-09-27 DIAGNOSIS — I10 PRIMARY HYPERTENSION: ICD-10-CM

## 2024-09-27 DIAGNOSIS — Z94.4 S/P LIVER TRANSPLANT: ICD-10-CM

## 2024-09-27 RX ORDER — OLMESARTAN MEDOXOMIL 20 MG/1
20 TABLET ORAL DAILY
Qty: 90 TABLET | Refills: 1 | Status: SHIPPED | OUTPATIENT
Start: 2024-09-27

## 2024-09-27 RX ORDER — PROPRANOLOL HYDROCHLORIDE 20 MG/1
20 TABLET ORAL 2 TIMES DAILY
Qty: 180 TABLET | Refills: 1 | Status: SHIPPED | OUTPATIENT
Start: 2024-09-27 | End: 2024-09-27 | Stop reason: SDUPTHER

## 2024-09-27 RX ORDER — OMEPRAZOLE 20 MG/1
20 CAPSULE, DELAYED RELEASE ORAL DAILY
Qty: 90 CAPSULE | Refills: 1 | Status: SHIPPED | OUTPATIENT
Start: 2024-09-27 | End: 2025-03-26

## 2024-09-27 RX ORDER — OLMESARTAN MEDOXOMIL 20 MG/1
20 TABLET ORAL DAILY
Qty: 90 TABLET | Refills: 1 | Status: SHIPPED | OUTPATIENT
Start: 2024-09-27 | End: 2024-09-27 | Stop reason: SDUPTHER

## 2024-09-27 RX ORDER — PROPRANOLOL HYDROCHLORIDE 20 MG/1
20 TABLET ORAL 2 TIMES DAILY
Qty: 180 TABLET | Refills: 1 | Status: SHIPPED | OUTPATIENT
Start: 2024-09-27 | End: 2025-03-26

## 2024-09-27 RX ORDER — BUPROPION HYDROCHLORIDE 150 MG/1
300 TABLET ORAL DAILY
Qty: 180 TABLET | Refills: 1 | Status: SHIPPED | OUTPATIENT
Start: 2024-09-27

## 2024-09-27 RX ORDER — OMEPRAZOLE 20 MG/1
20 CAPSULE, DELAYED RELEASE ORAL DAILY
Qty: 90 CAPSULE | Refills: 0 | Status: SHIPPED | OUTPATIENT
Start: 2024-09-27 | End: 2024-09-27 | Stop reason: SDUPTHER

## 2024-09-27 RX ORDER — BUPROPION HYDROCHLORIDE 150 MG/1
300 TABLET ORAL DAILY
Qty: 180 TABLET | Refills: 3 | Status: SHIPPED | OUTPATIENT
Start: 2024-09-27 | End: 2024-09-27 | Stop reason: SDUPTHER

## 2024-09-27 NOTE — TELEPHONE ENCOUNTER
Refill signed except for omeprazole as patient also has pantoprazole listed on med card.  Need to see which when she is taking.

## 2024-10-01 ENCOUNTER — PATIENT MESSAGE (OUTPATIENT)
Dept: PRIMARY CARE CLINIC | Facility: CLINIC | Age: 64
End: 2024-10-01
Payer: MEDICARE

## 2024-10-02 ENCOUNTER — LAB VISIT (OUTPATIENT)
Dept: LAB | Facility: HOSPITAL | Age: 64
End: 2024-10-02
Attending: FAMILY MEDICINE
Payer: MEDICARE

## 2024-10-02 DIAGNOSIS — Z94.4 LIVER TRANSPLANTED: ICD-10-CM

## 2024-10-02 DIAGNOSIS — Z79.60 LONG-TERM USE OF IMMUNOSUPPRESSANT MEDICATION: ICD-10-CM

## 2024-10-02 DIAGNOSIS — R19.7 DIARRHEA, UNSPECIFIED TYPE: ICD-10-CM

## 2024-10-02 LAB
C DIFF GDH STL QL: NEGATIVE
C DIFF TOX A+B STL QL IA: NEGATIVE

## 2024-10-02 PROCEDURE — 87449 NOS EACH ORGANISM AG IA: CPT | Performed by: FAMILY MEDICINE

## 2024-10-02 PROCEDURE — 87449 NOS EACH ORGANISM AG IA: CPT | Mod: 91 | Performed by: FAMILY MEDICINE

## 2024-10-02 PROCEDURE — 87046 STOOL CULTR AEROBIC BACT EA: CPT | Performed by: FAMILY MEDICINE

## 2024-10-02 PROCEDURE — 87324 CLOSTRIDIUM AG IA: CPT | Performed by: FAMILY MEDICINE

## 2024-10-02 PROCEDURE — 87045 FECES CULTURE AEROBIC BACT: CPT | Performed by: FAMILY MEDICINE

## 2024-10-02 PROCEDURE — 87427 SHIGA-LIKE TOXIN AG IA: CPT | Performed by: FAMILY MEDICINE

## 2024-10-02 RX ORDER — MYCOPHENOLATE MOFETIL 250 MG/1
250 CAPSULE ORAL 2 TIMES DAILY
Qty: 60 CAPSULE | Refills: 11 | Status: SHIPPED | OUTPATIENT
Start: 2024-10-02 | End: 2025-10-02

## 2024-10-02 NOTE — TELEPHONE ENCOUNTER
Pt informed of MMF dose change and need to repeat labs 10/14/24.  ----- Message from James Brambila MD sent at 10/2/2024 12:41 PM CDT -----  Liver tests are stable. Please decrease MMF to 250mg BID and repeat labs in 2 weeks.

## 2024-10-03 ENCOUNTER — OFFICE VISIT (OUTPATIENT)
Dept: GASTROENTEROLOGY | Facility: CLINIC | Age: 64
End: 2024-10-03
Payer: MEDICARE

## 2024-10-03 VITALS — WEIGHT: 220.25 LBS | HEIGHT: 66 IN | BODY MASS INDEX: 35.4 KG/M2

## 2024-10-03 DIAGNOSIS — R10.13 EPIGASTRIC PAIN: Primary | ICD-10-CM

## 2024-10-03 DIAGNOSIS — Z87.898 HISTORY OF DIARRHEA: ICD-10-CM

## 2024-10-03 DIAGNOSIS — Z12.11 SCREENING FOR COLON CANCER: ICD-10-CM

## 2024-10-03 DIAGNOSIS — R14.2 BELCHING: ICD-10-CM

## 2024-10-03 DIAGNOSIS — Z90.49 S/P CHOLECYSTECTOMY: ICD-10-CM

## 2024-10-03 DIAGNOSIS — Z85.05 HISTORY OF HEPATOCELLULAR CARCINOMA: ICD-10-CM

## 2024-10-03 DIAGNOSIS — Z94.4 S/P LIVER TRANSPLANT: ICD-10-CM

## 2024-10-03 DIAGNOSIS — K21.9 GASTROESOPHAGEAL REFLUX DISEASE, UNSPECIFIED WHETHER ESOPHAGITIS PRESENT: ICD-10-CM

## 2024-10-03 DIAGNOSIS — I85.00 ESOPHAGEAL VARICES DETERMINED BY ENDOSCOPY: Chronic | ICD-10-CM

## 2024-10-03 PROCEDURE — 99214 OFFICE O/P EST MOD 30 MIN: CPT | Mod: S$GLB,,,

## 2024-10-03 PROCEDURE — 3008F BODY MASS INDEX DOCD: CPT | Mod: CPTII,S$GLB,,

## 2024-10-03 PROCEDURE — 1159F MED LIST DOCD IN RCRD: CPT | Mod: CPTII,S$GLB,,

## 2024-10-03 PROCEDURE — 4010F ACE/ARB THERAPY RXD/TAKEN: CPT | Mod: CPTII,S$GLB,,

## 2024-10-03 PROCEDURE — 1160F RVW MEDS BY RX/DR IN RCRD: CPT | Mod: CPTII,S$GLB,,

## 2024-10-03 PROCEDURE — 3044F HG A1C LEVEL LT 7.0%: CPT | Mod: CPTII,S$GLB,,

## 2024-10-03 PROCEDURE — 99999 PR PBB SHADOW E&M-EST. PATIENT-LVL IV: CPT | Mod: PBBFAC,,,

## 2024-10-03 PROCEDURE — 1157F ADVNC CARE PLAN IN RCRD: CPT | Mod: CPTII,S$GLB,,

## 2024-10-03 RX ORDER — SUCRALFATE 1 G/1
1 TABLET ORAL 4 TIMES DAILY
Qty: 40 TABLET | Refills: 0 | Status: SHIPPED | OUTPATIENT
Start: 2024-10-03 | End: 2024-10-13

## 2024-10-03 NOTE — PROGRESS NOTES
Subjective:       Patient ID: Yumiko Gonzalez is a 64 y.o. female Body mass index is 35.55 kg/m².    Chief Complaint: Abdominal Pain    This patient is new to me.  Referring Provider: Dr. Garrett Webb for abdominal pain and diarrhea.  Established patient of Dr. Palomares.     Abdominal Pain  This is a chronic problem. The current episode started more than 1 month ago. The onset quality is sudden. The problem occurs intermittently (occurs 1-2 times a month). Duration: hours. The problem has been unchanged. The pain is located in the epigastric region. The pain is at a severity of 0/10 (denies pain currently). The patient is experiencing no pain. Quality: digesting razor blades. The abdominal pain radiates to the LUQ. Associated symptoms include belching (improved since starting PPI), constipation (last BM was Tuesday; currently rated stool 4-5 on Bartow scale), diarrhea (resolved after taking one time dose of Imodium and Pepto Bismol; stool was rated 7 when diarrhea occurred 09/24 and 09/25; denies recent antibiotics, but does report her 3 yo grandson had diarrhea recently) and a fever (intermittent fevers at night; Tmax: 101; patient reports transplant/hepatology is aware). Pertinent negatives include no dysuria, flatus, frequency, headaches, hematochezia, hematuria, melena, nausea, vomiting or weight loss. The pain is aggravated by eating. The pain is relieved by Nothing. She has tried proton pump inhibitors for the symptoms. The treatment provided no relief. Prior diagnostic workup includes GI consult. Her past medical history is significant for abdominal surgery (s/p liver transplant (hx of HCC) and cholecytectomy) and GERD (well controlled taking Omprazole 20 mg once daily). There is no history of colon cancer, Crohn's disease, irritable bowel syndrome, pancreatitis, PUD or ulcerative colitis. Patient's medical history includes kidney stones. Patient's medical history does not include UTI.     Review of  Systems   Constitutional:  Positive for fever (intermittent fevers at night; Tmax: 101; patient reports transplant/hepatology is aware). Negative for activity change, appetite change, chills, diaphoresis, fatigue, unexpected weight change and weight loss.   HENT:  Negative for sore throat and trouble swallowing.    Respiratory:  Negative for cough, choking and shortness of breath.    Cardiovascular:  Negative for chest pain.   Gastrointestinal:  Positive for abdominal pain, constipation (last BM was Tuesday; currently rated stool 4-5 on Parks scale) and diarrhea (resolved after taking one time dose of Imodium and Pepto Bismol; stool was rated 7 when diarrhea occurred 09/24 and 09/25; denies recent antibiotics, but does report her 3 yo grandson had diarrhea recently). Negative for abdominal distention, anal bleeding, blood in stool, flatus, hematochezia, melena, nausea, rectal pain and vomiting.   Genitourinary:  Negative for dysuria, frequency and hematuria.   Neurological:  Negative for headaches.       No LMP recorded. Patient is postmenopausal.  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, postitive antismooth muscle antibody    Blood transfusion     Bronchitis     bronchospasm on occasion     Cancer 1987    carcinoma in situ    Cervical neck pain with evidence of disc disease 03/22/2012    can bend neck    Cirrhosis     non-alcoholic, compensated    Colon polyps 03/22/2012    Depression 03/22/2012    Hx panic attacks    Diverticular disease 03/22/2012    never diverticulitis    Fatty liver 03/22/2012    Fibrocystic breast     Fine tremor     hands,chronic    Hepatosplenomegaly     History of abdominal paracentesis 01/18/2023    8.7L of fluid drained    Hypertension 04/24/2013    Knee pain, bilateral     chronic, with  "hands,feet,fingers also painful    Lesion of right lung     Liver disease     "partially sclerosed liver" per pt    Migraines past Hx    Nephrolithiasis     stone episode 2021    Pleural effusion     small, compensated, good bilateral breath sounds per Dr Suresh GUTIERRES (postoperative nausea and vomiting)     twice after childbirth    Postpartum depression     Splenomegaly     Thrombocytopenia     Tremors of nervous system      Past Surgical History:   Procedure Laterality Date    ADENOIDECTOMY      CERVICAL CONIZATION   W/ LASER       SECTION      x3    CHOLECYSTECTOMY      COLONOSCOPY N/A 2016    Procedure: COLONOSCOPY;  Surgeon: James Palomares MD;  Location: [facility] ENDO;  Service: Endoscopy;  Laterality: N/A;    COLONOSCOPY N/A 2022    Procedure: COLONOSCOPY;  Surgeon: James Palomares MD;  Location: [facility] ENDO;  Service: Endoscopy;  Laterality: N/A;    EMBOLIZATION N/A 2018    Procedure: EMBOLIZATION, BLOOD VESSEL;  Surgeon: Joselin Surgeon;  Location: [facility];  Service: Radiology;  Laterality: N/A;    ENDOMETRIAL ABLATION      ESOPHAGOGASTRODUODENOSCOPY N/A 2020    Procedure: ESOPHAGOGASTRODUODENOSCOPY (EGD);  Surgeon: James Palomares MD;  Location: [facility];  Service: Endoscopy;  Laterality: N/A;    ESOPHAGOGASTRODUODENOSCOPY N/A 2022    Procedure: EGD (ESOPHAGOGASTRODUODENOSCOPY);  Surgeon: James Palomares MD;  Location: [facility];  Service: Endoscopy;  Laterality: N/A;    LIVER BIOPSY      LIVER TRANSPLANT N/A 2023    Procedure: TRANSPLANT, LIVER;  Surgeon: Gurpreet Raza MD;  Location: [address];  Service: Transplant;  Laterality: N/A;    PELVIC LAPAROSCOPY      TONSILLECTOMY      TUBAL LIGATION      UPPER GASTROINTESTINAL ENDOSCOPY       Family History   Problem Relation Name Age of Onset    Breast cancer Mother  70    Heart disease Mother      Hypertension Mother      Tremor Mother      Diabetes Father      Heart disease Father      Ulcers " Father      Diabetes Sister      Cancer Sister      Lung cancer Sister          70's    Diabetes Sister      Diabetes Brother      Emphysema Brother      Breast cancer Maternal Aunt  70    Multiple sclerosis Maternal Aunt      Multiple sclerosis Maternal Aunt      Breast cancer Maternal Grandmother  70    Tremor Daughter Casandra     Breast cancer Maternal Cousin 1st 40    Ovarian cancer Neg Hx      Parkinsonism Neg Hx      Glaucoma Neg Hx      Macular degeneration Neg Hx      Colon cancer Neg Hx      Crohn's disease Neg Hx      Stomach cancer Neg Hx      Esophageal cancer Neg Hx      Ulcerative colitis Neg Hx       Social History     Tobacco Use    Smoking status: Former     Current packs/day: 0.00     Types: Cigarettes     Quit date: 2/3/2006     Years since quittin.6    Smokeless tobacco: Never   Substance Use Topics    Alcohol use: Not Currently    Drug use: No     Wt Readings from Last 10 Encounters:   10/03/24 99.9 kg (220 lb 3.8 oz)   24 101.1 kg (222 lb 15.9 oz)   24 97.4 kg (214 lb 11.7 oz)   24 97.4 kg (214 lb 11.7 oz)   24 95.8 kg (211 lb 3.2 oz)   24 90.7 kg (199 lb 15.3 oz)   24 89 kg (196 lb 3.4 oz)   24 87.1 kg (192 lb 2.1 oz)   24 83.4 kg (183 lb 13.8 oz)   24 82.9 kg (182 lb 10.4 oz)     Lab Results   Component Value Date    WBC 4.47 2024    HGB 11.7 (L) 2024    HCT 36.7 (L) 2024    MCV 89 2024     (L) 2024     CMP  Sodium   Date Value Ref Range Status   2024 138 136 - 145 mmol/L Final     Potassium   Date Value Ref Range Status   2024 4.8 3.5 - 5.1 mmol/L Final     Chloride   Date Value Ref Range Status   2024 109 95 - 110 mmol/L Final     CO2   Date Value Ref Range Status   2024 24 23 - 29 mmol/L Final     Glucose   Date Value Ref Range Status   2024 133 (H) 70 - 110 mg/dL Final     BUN   Date Value Ref Range Status   2024 33 (H) 8 - 23 mg/dL Final     Creatinine    Date Value Ref Range Status   09/23/2024 1.1 0.5 - 1.4 mg/dL Final     Calcium   Date Value Ref Range Status   09/23/2024 10.1 8.7 - 10.5 mg/dL Final     Total Protein   Date Value Ref Range Status   09/23/2024 6.4 6.0 - 8.4 g/dL Final     Albumin   Date Value Ref Range Status   09/23/2024 3.6 3.5 - 5.2 g/dL Final     Total Bilirubin   Date Value Ref Range Status   09/23/2024 0.5 0.1 - 1.0 mg/dL Final     Comment:     For infants and newborns, interpretation of results should be based  on gestational age, weight and in agreement with clinical  observations.    Premature Infant recommended reference ranges:  Up to 24 hours.............<8.0 mg/dL  Up to 48 hours............<12.0 mg/dL  3-5 days..................<15.0 mg/dL  6-29 days.................<15.0 mg/dL       Alkaline Phosphatase   Date Value Ref Range Status   09/23/2024 78 55 - 135 U/L Final     AST   Date Value Ref Range Status   09/23/2024 15 10 - 40 U/L Final     ALT   Date Value Ref Range Status   09/23/2024 12 10 - 44 U/L Final     Anion Gap   Date Value Ref Range Status   09/23/2024 5 (L) 8 - 16 mmol/L Final     eGFR if    Date Value Ref Range Status   06/20/2022 >60 >60 mL/min/1.73 m^2 Final     eGFR if non    Date Value Ref Range Status   06/20/2022 >60 >60 mL/min/1.73 m^2 Final     Comment:     Calculation used to obtain the estimated glomerular filtration  rate (eGFR) is the CKD-EPI equation.        Lab Results   Component Value Date    AMYLASE 36 12/10/2023     Lab Results   Component Value Date    LIPASERES 84 01/17/2023     Lab Results   Component Value Date    TSH 2.728 04/13/2023     Reviewed prior medical records including radiology report of CT abdomen 06/28/24, CT chest 06/28/24 06/28/24, MRI abdomen 10/17/23, CT abdomen 08/08/23 & endoscopy history (see surgical history).    Objective:      Physical Exam  Vitals and nursing note reviewed.   Constitutional:       General: She is not in acute distress.      Appearance: Normal appearance. She is not ill-appearing.   HENT:      Mouth/Throat:      Lips: Pink. No lesions.   Cardiovascular:      Rate and Rhythm: Normal rate.      Heart sounds: Normal heart sounds.   Pulmonary:      Effort: Pulmonary effort is normal. No respiratory distress.      Breath sounds: Normal breath sounds.   Abdominal:      General: Bowel sounds are normal. There is no distension or abdominal bruit. There are no signs of injury.      Palpations: Abdomen is soft. There is no shifting dullness, fluid wave, hepatomegaly, splenomegaly or mass.      Tenderness: There is no abdominal tenderness. There is no guarding or rebound. Negative signs include Acevedo's sign, Rovsing's sign and McBurney's sign.   Skin:     General: Skin is warm and dry.      Coloration: Skin is not jaundiced or pale.   Neurological:      Mental Status: She is alert and oriented to person, place, and time.   Psychiatric:         Attention and Perception: Attention normal.         Mood and Affect: Mood normal.         Speech: Speech normal.         Behavior: Behavior normal.         Assessment:       1. Epigastric pain    2. Belching    3. Gastroesophageal reflux disease, unspecified whether esophagitis present    4. History of diarrhea    5. Esophageal varices determined by endoscopy    6. History of hepatocellular carcinoma    7. S/P liver transplant    8. S/P cholecystectomy    9. Screening for colon cancer        Plan:       Epigastric pain  - schedule EGD, discussed procedure with patient, including risks and benefits, patient verbalized understanding  -Avoid large meals, avoid eating within 2-3 hours of bedtime (avoid late night eating & lying down soon after eating), elevate head of bed if nocturnal symptoms are present, smoking cessation (if current smoker), & weight loss (if overweight).   -Avoid known foods which trigger reflux symptoms & to minimize/avoid high-fat foods, chocolate, caffeine, citrus, alcohol, & tomato  products.  -Avoid/limit use of NSAID's, since they can cause GI upset, bleeding, and/or ulcers. If needed, take with food.  - START: sucralfate (CARAFATE) 1 gram tablet; Take 1 tablet (1 g total) by mouth 4 (four) times daily. for 10 days  Dispense: 40 tablet; Refill: 0  -     Lipase; Future; Expected date: 10/03/2024  - continue omeprazole 20 mg once daily 30 minutes to an hour before breakfast    Belching & Gastroesophageal reflux disease, unspecified whether esophagitis present  - schedule EGD, discussed procedure with patient, including risks and benefits, patient verbalized understanding  -Avoid large meals, avoid eating within 2-3 hours of bedtime (avoid late night eating & lying down soon after eating), elevate head of bed if nocturnal symptoms are present, smoking cessation (if current smoker), & weight loss (if overweight).   -Avoid known foods which trigger reflux symptoms & to minimize/avoid high-fat foods, chocolate, caffeine, citrus, alcohol, & tomato products.  -Avoid/limit use of NSAID's, since they can cause GI upset, bleeding, and/or ulcers. If needed, take with food.  - START: sucralfate (CARAFATE) 1 gram tablet; Take 1 tablet (1 g total) by mouth 4 (four) times daily. for 10 days  Dispense: 40 tablet; Refill: 0  - continue omeprazole 20 mg once daily 30 minutes to an hour before breakfast    History of diarrhea  - Recommended increase fiber in diet, especially soluble fiber since this can help bulk up the stool consistency and may help to slow down how fast the stool goes through the colon and can prevent diarrhea  - avoid lactose, alcohol, & caffeine  - avoid known triggers  -     Giardia / Cryptosporidum, EIA; Future; Expected date: 10/03/2024  -     Stool Exam-Ova,Cysts,Parasites; Future; Expected date: 10/03/2024  -     Clostridium difficile EIA; Future; Expected date: 10/03/2024  -     Stool culture; Future; Expected date: 10/03/2024  -     Ambulatory referral/consult to Gastroenterology  -  consider colonoscopy     Esophageal varices determined by endoscopy  - schedule EGD, discussed procedure with patient, including risks and benefits, patient verbalized understanding    History of hepatocellular carcinoma & S/P liver transplant  - follow-up with liver/transplant and/or oncology for continued evaluation and management     S/P cholecystectomy    Screening for colon cancer   - follow-up for surveillance colonoscopy 02/2027    Follow up in about 4 weeks (around 10/31/2024), or if symptoms worsen or fail to improve.      If no improvement in symptoms or symptoms worsen, call/follow-up at clinic or go to ER.        Total time spent on the encounter includes face to face time and non-face to face time preparing to see the patient (eg, review of tests), Obtaining and/or reviewing separately obtained history, Documenting clinical information in the electronic or other health record, Independently interpreting results (not separately reported) and communicating results to the patient/family/caregiver, or Care coordination (not separately reported).     A dictation software program was used for this note. Please expect some simple typographical  errors in this note.

## 2024-10-04 LAB
E COLI SXT1 STL QL IA: NEGATIVE
E COLI SXT2 STL QL IA: NEGATIVE

## 2024-10-05 LAB — BACTERIA STL CULT: NORMAL

## 2024-10-07 ENCOUNTER — PATIENT MESSAGE (OUTPATIENT)
Dept: TRANSPLANT | Facility: CLINIC | Age: 64
End: 2024-10-07
Payer: MEDICARE

## 2024-10-07 ENCOUNTER — HOSPITAL ENCOUNTER (OUTPATIENT)
Dept: RADIOLOGY | Facility: HOSPITAL | Age: 64
Discharge: HOME | End: 2024-10-07
Attending: PHYSICIAN ASSISTANT
Payer: MEDICARE

## 2024-10-07 ENCOUNTER — OFFICE VISIT (OUTPATIENT)
Dept: FAMILY MEDICINE | Facility: CLINIC | Age: 64
End: 2024-10-07
Payer: MEDICARE

## 2024-10-07 VITALS
BODY MASS INDEX: 35.19 KG/M2 | HEART RATE: 80 BPM | OXYGEN SATURATION: 98 % | SYSTOLIC BLOOD PRESSURE: 120 MMHG | DIASTOLIC BLOOD PRESSURE: 66 MMHG | HEIGHT: 66 IN | WEIGHT: 218.94 LBS

## 2024-10-07 DIAGNOSIS — R73.09 ELEVATED GLUCOSE: ICD-10-CM

## 2024-10-07 DIAGNOSIS — M79.605 LEFT LEG PAIN: ICD-10-CM

## 2024-10-07 DIAGNOSIS — L03.116 CELLULITIS OF LEFT LOWER EXTREMITY: Primary | ICD-10-CM

## 2024-10-07 DIAGNOSIS — L03.011 CELLULITIS OF THUMB, RIGHT: ICD-10-CM

## 2024-10-07 PROCEDURE — 3074F SYST BP LT 130 MM HG: CPT | Mod: CPTII,S$GLB,, | Performed by: PHYSICIAN ASSISTANT

## 2024-10-07 PROCEDURE — 3008F BODY MASS INDEX DOCD: CPT | Mod: CPTII,S$GLB,, | Performed by: PHYSICIAN ASSISTANT

## 2024-10-07 PROCEDURE — 1159F MED LIST DOCD IN RCRD: CPT | Mod: CPTII,S$GLB,, | Performed by: PHYSICIAN ASSISTANT

## 2024-10-07 PROCEDURE — 3288F FALL RISK ASSESSMENT DOCD: CPT | Mod: CPTII,S$GLB,, | Performed by: PHYSICIAN ASSISTANT

## 2024-10-07 PROCEDURE — 3044F HG A1C LEVEL LT 7.0%: CPT | Mod: CPTII,S$GLB,, | Performed by: PHYSICIAN ASSISTANT

## 2024-10-07 PROCEDURE — 93971 EXTREMITY STUDY: CPT | Mod: TC,PO,LT

## 2024-10-07 PROCEDURE — 93971 EXTREMITY STUDY: CPT | Mod: 26,LT,, | Performed by: RADIOLOGY

## 2024-10-07 PROCEDURE — 99214 OFFICE O/P EST MOD 30 MIN: CPT | Mod: S$GLB,,, | Performed by: PHYSICIAN ASSISTANT

## 2024-10-07 PROCEDURE — 1101F PT FALLS ASSESS-DOCD LE1/YR: CPT | Mod: CPTII,S$GLB,, | Performed by: PHYSICIAN ASSISTANT

## 2024-10-07 PROCEDURE — 1157F ADVNC CARE PLAN IN RCRD: CPT | Mod: CPTII,S$GLB,, | Performed by: PHYSICIAN ASSISTANT

## 2024-10-07 PROCEDURE — 3078F DIAST BP <80 MM HG: CPT | Mod: CPTII,S$GLB,, | Performed by: PHYSICIAN ASSISTANT

## 2024-10-07 PROCEDURE — 1160F RVW MEDS BY RX/DR IN RCRD: CPT | Mod: CPTII,S$GLB,, | Performed by: PHYSICIAN ASSISTANT

## 2024-10-07 PROCEDURE — 4010F ACE/ARB THERAPY RXD/TAKEN: CPT | Mod: CPTII,S$GLB,, | Performed by: PHYSICIAN ASSISTANT

## 2024-10-07 PROCEDURE — 99999 PR PBB SHADOW E&M-EST. PATIENT-LVL IV: CPT | Mod: PBBFAC,,, | Performed by: PHYSICIAN ASSISTANT

## 2024-10-07 RX ORDER — DOXYCYCLINE 100 MG/1
100 CAPSULE ORAL 2 TIMES DAILY
Qty: 20 CAPSULE | Refills: 0 | Status: SHIPPED | OUTPATIENT
Start: 2024-10-07 | End: 2024-10-17

## 2024-10-07 NOTE — PROGRESS NOTES
"Subjective:      Patient ID: Yumiko Gonzalez is a 64 y.o. female.    Chief Complaint: Leg Pain    HPI  Patient has PMH of liver transplant s/t hepatocellular carcinoma, cervical cancer, depression/PTSD, mass of right lung, HTN, and colon polyps.    Patient reports left lower extremity light erythema and severe TTP to light touch since Thursday.  Right thumb swelling and erythema since Thursday.  Having nightly fevers 100-101 nightly since 9/29/2024.  Taken 2 tylenols for this sporadically.  Not eating because she does not feel like it-depressed.  Drinking 3 glasses of tea daily.    Review of Systems   Constitutional:  Positive for activity change, appetite change, chills, fatigue and fever. Negative for unexpected weight change.   HENT:  Positive for rhinorrhea. Negative for hearing loss and trouble swallowing.    Eyes:  Negative for discharge and visual disturbance.   Respiratory:  Negative for chest tightness, shortness of breath and wheezing.    Cardiovascular:  Negative for chest pain and palpitations.   Gastrointestinal:  Negative for blood in stool, constipation, diarrhea and vomiting.   Endocrine: Negative for polydipsia and polyuria.   Genitourinary:  Negative for difficulty urinating, dysuria, hematuria and menstrual problem.   Musculoskeletal:  Positive for joint swelling. Negative for neck pain.   Neurological:  Positive for headaches. Negative for weakness.   Psychiatric/Behavioral:  Positive for dysphoric mood. Negative for confusion.        Objective:   /66 (Patient Position: Sitting)   Pulse 80   Ht 5' 6" (1.676 m)   Wt 99.3 kg (218 lb 14.7 oz)   SpO2 98%   BMI 35.33 kg/m²     Physical Exam  Vitals reviewed.   Constitutional:       Appearance: Normal appearance. She is well-developed.   HENT:      Head: Normocephalic and atraumatic.      Right Ear: External ear normal.      Left Ear: External ear normal.   Eyes:      Conjunctiva/sclera: Conjunctivae normal.   Cardiovascular:      Rate and " Rhythm: Normal rate and regular rhythm.      Heart sounds: Normal heart sounds. No murmur heard.     No friction rub. No gallop.   Pulmonary:      Effort: Pulmonary effort is normal. No respiratory distress.      Breath sounds: Normal breath sounds. No wheezing, rhonchi or rales.   Musculoskeletal:         General: Normal range of motion.   Skin:     General: Skin is warm and dry.      Findings: Erythema (LLE with severe TTP to light touch, right thumb swelling and erythema with some TTP) present. No rash.   Neurological:      General: No focal deficit present.      Mental Status: She is alert and oriented to person, place, and time.   Psychiatric:         Mood and Affect: Mood normal.         Behavior: Behavior normal.         Judgment: Judgment normal.       Assessment:      1. Cellulitis of left lower extremity    2. Cellulitis of thumb, right    3. Left leg pain    4. Elevated glucose       Plan:   1. Cellulitis of left lower extremity (Primary)  Advised patient to check with transplant team about taking doxycycline.  - doxycycline (MONODOX) 100 MG capsule; Take 1 capsule (100 mg total) by mouth 2 (two) times daily. for 10 days  Dispense: 20 capsule; Refill: 0  - CBC Auto Differential; Future  - Comprehensive Metabolic Panel; Future    2. Cellulitis of thumb, right    3. Left leg pain  - US Lower Extremity Veins Left; Future    4. Elevated glucose  - Hemoglobin A1C; Future    Follow up with me in one week.  Patient agreed with plan and expressed understanding.    Thank you for allowing me to serve you,

## 2024-10-08 ENCOUNTER — CLINICAL SUPPORT (OUTPATIENT)
Dept: PRIMARY CARE CLINIC | Facility: CLINIC | Age: 64
End: 2024-10-08
Payer: MEDICARE

## 2024-10-08 DIAGNOSIS — Z65.8 PSYCHOSOCIAL STRESSORS: Primary | ICD-10-CM

## 2024-10-08 DIAGNOSIS — R45.89 NEED FOR EMOTIONAL SUPPORT: ICD-10-CM

## 2024-10-08 DIAGNOSIS — Z59.9 FINANCIAL DIFFICULTIES: ICD-10-CM

## 2024-10-08 DIAGNOSIS — F43.21 GRIEF REACTION: ICD-10-CM

## 2024-10-08 DIAGNOSIS — Z91.89 LACK OF MOTIVATION: ICD-10-CM

## 2024-10-08 PROCEDURE — 99499 UNLISTED E&M SERVICE: CPT | Mod: 95,,, | Performed by: SOCIAL WORKER

## 2024-10-08 SDOH — SOCIAL DETERMINANTS OF HEALTH (SDOH): PROBLEM RELATED TO HOUSING AND ECONOMIC CIRCUMSTANCES, UNSPECIFIED: Z59.9
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
## 2024-10-11 ENCOUNTER — OFFICE VISIT (OUTPATIENT)
Dept: TRANSPLANT | Facility: CLINIC | Age: 64
End: 2024-10-11
Payer: MEDICARE

## 2024-10-11 ENCOUNTER — PATIENT MESSAGE (OUTPATIENT)
Dept: TRANSPLANT | Facility: CLINIC | Age: 64
End: 2024-10-11

## 2024-10-11 DIAGNOSIS — Z00.6 RESEARCH STUDY PATIENT: Primary | ICD-10-CM

## 2024-10-11 DIAGNOSIS — Z94.4 LIVER TRANSPLANTED: Primary | ICD-10-CM

## 2024-10-11 DIAGNOSIS — Z79.60 LONG-TERM USE OF IMMUNOSUPPRESSANT MEDICATION: ICD-10-CM

## 2024-10-11 DIAGNOSIS — Z85.05 HISTORY OF HEPATOCELLULAR CARCINOMA: ICD-10-CM

## 2024-10-11 PROCEDURE — 99213 OFFICE O/P EST LOW 20 MIN: CPT | Mod: 95,,, | Performed by: STUDENT IN AN ORGANIZED HEALTH CARE EDUCATION/TRAINING PROGRAM

## 2024-10-11 PROCEDURE — 1159F MED LIST DOCD IN RCRD: CPT | Mod: CPTII,95,, | Performed by: STUDENT IN AN ORGANIZED HEALTH CARE EDUCATION/TRAINING PROGRAM

## 2024-10-11 PROCEDURE — 1160F RVW MEDS BY RX/DR IN RCRD: CPT | Mod: CPTII,95,, | Performed by: STUDENT IN AN ORGANIZED HEALTH CARE EDUCATION/TRAINING PROGRAM

## 2024-10-11 PROCEDURE — 1157F ADVNC CARE PLAN IN RCRD: CPT | Mod: CPTII,95,, | Performed by: STUDENT IN AN ORGANIZED HEALTH CARE EDUCATION/TRAINING PROGRAM

## 2024-10-11 PROCEDURE — 3044F HG A1C LEVEL LT 7.0%: CPT | Mod: CPTII,95,, | Performed by: STUDENT IN AN ORGANIZED HEALTH CARE EDUCATION/TRAINING PROGRAM

## 2024-10-11 PROCEDURE — 4010F ACE/ARB THERAPY RXD/TAKEN: CPT | Mod: CPTII,95,, | Performed by: STUDENT IN AN ORGANIZED HEALTH CARE EDUCATION/TRAINING PROGRAM

## 2024-10-11 NOTE — LETTER
October 14, 2024        Laila Will  [address]  [facility] [address]  Phone: [phone]  Fax: [phone]             [facility]  [address]  [facility] [address]  Phone: [phone]   Patient: Yumiko Gonzalez   MR Number: [id number]   YOB: 1960   Date of Visit: 10/11/2024       Dear Dr. Laila Will    Thank you for referring Yumiko Gonzalez to me for evaluation. Attached you will find relevant portions of my assessment and plan of care.    If you have questions, please do not hesitate to call me. I look forward to following Yumiko Gonzalez along with you.    Sincerely,    James Brambila MD    Enclosure    If you would like to receive this communication electronically, please contact [email or web address] or [phone] to request [employer] Link access.    [employer] Link is a tool which provides read-only access to select patient information with whom you have a relationship. Its easy to use and provides real time access to review your patients record including encounter summaries, notes, results, and demographic information.    If you feel you have received this communication in error or would no longer like to receive these types of communications, please e-mail [email or web address]

## 2024-10-11 NOTE — PROGRESS NOTES
Transplant Hepatology  Liver Transplant Recipient Follow Up    The patient location is: [place]  The chief complaint leading to consultation is: OLT    Visit type: audiovisual    Face to Face time with patient: 10  20 minutes of total time spent on the encounter, which includes face to face time and non-face to face time preparing to see the patient (eg, review of tests), Obtaining and/or reviewing separately obtained history, Documenting clinical information in the electronic or other health record, Independently interpreting results (not separately reported) and communicating results to the patient/family/caregiver, or Care coordination (not separately reported).     Each patient to whom he or she provides medical services by telemedicine is:  (1) informed of the relationship between the physician and patient and the respective role of any other health care provider with respect to management of the patient; and (2) notified that he or she may decline to receive medical services by telemedicine and may withdraw from such care at any time.    PCP: Garrett Webb MD    Transplant History  Transplant Date: 12/9/2023  UNOS Native Liver Dx: Cirrhosis: Fatty Liver (DURAN)    ORGAN: LIVER  Whole or Partial: whole liver  Donor Type: donation after circulatory death   CDC High Risk: no  Donor CMV Status: Negative  Donor HCV Status: Negative  Donor HBcAb: Negative  Donor HBV PEDRO:   Donor HCV PEDRO: Negative  Biliary Anastomosis: end to end  Arterial Anatomy: standard  IVC reconstruction: end to end ivc  Portal vein status: patent    She has had the following complications since transplant: none    Chief complaint: follow up, history of OLT    HPI:  Yumiko Gonzalez is a 64 y.o. 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     Since her last visit, she reports alternating diarrhea and constipation associated with abdominal pain. She is seeing GI for further  "evaluation.  She also reports 2 weeks of fever, mainly at night. Highest temperature has been 101F. No recent hospitalizations or ED visits.  Compliant with Prograf and CellCept.  She does report intermittent tremors and headaches which she had prior to transplant.  Takes propranolol for essential tremor.      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, postitive antismooth muscle antibody    Blood transfusion     Bronchitis     bronchospasm on occasion     Cancer     carcinoma in situ    Cervical neck pain with evidence of disc disease 2012    can bend neck    Cirrhosis     non-alcoholic, compensated    Colon polyps 2012    Depression 2012    Hx panic attacks    Diverticular disease 2012    never diverticulitis    Fatty liver 2012    Fibrocystic breast     Fine tremor     hands,chronic    Hepatosplenomegaly     History of abdominal paracentesis 2023    8.7L of fluid drained    Hypertension 2013    Knee pain, bilateral     chronic, with hands,feet,fingers also painful    Lesion of right lung     Liver disease     "partially sclerosed liver" per pt    Migraines past Hx    Nephrolithiasis     stone episode 2021    Pleural effusion     small, compensated, good bilateral breath sounds per Dr Suresh GUTIERRES (postoperative nausea and vomiting)     twice after childbirth    Postpartum depression     Splenomegaly     Thrombocytopenia     Tremors of nervous system        Past Surgical History:   Procedure Laterality Date    ADENOIDECTOMY      CERVICAL CONIZATION   W/ LASER       SECTION      x3    CHOLECYSTECTOMY      COLONOSCOPY N/A 2016    Procedure: COLONOSCOPY;  Surgeon: James Palomares MD;  Location: [facility];  Service: Endoscopy;  Laterality: N/A;    COLONOSCOPY N/A " 02/03/2022    Procedure: COLONOSCOPY;  Surgeon: James Palomares MD;  Location: [facility] ENDO;  Service: Endoscopy;  Laterality: N/A;    EMBOLIZATION N/A 07/24/2018    Procedure: EMBOLIZATION, BLOOD VESSEL;  Surgeon: Joselin Surgeon;  Location: [facility];  Service: Radiology;  Laterality: N/A;    ENDOMETRIAL ABLATION      ESOPHAGOGASTRODUODENOSCOPY N/A 01/28/2020    Procedure: ESOPHAGOGASTRODUODENOSCOPY (EGD);  Surgeon: James Palomares MD;  Location: [facility] END[facility];  Service: Endoscopy;  Laterality: N/A;    ESOPHAGOGASTRODUODENOSCOPY N/A 03/08/2022    Procedure: EGD (ESOPHAGOGASTRODUODENOSCOPY);  Surgeon: James Palomares MD;  Location: [facility] ENDO;  Service: Endoscopy;  Laterality: N/A;    LIVER BIOPSY      LIVER TRANSPLANT N/A 12/09/2023    Procedure: TRANSPLANT, LIVER;  Surgeon: Gurpreet Raza MD;  Location: [address];  Service: Transplant;  Laterality: N/A;    PELVIC LAPAROSCOPY      TONSILLECTOMY      TUBAL LIGATION      UPPER GASTROINTESTINAL ENDOSCOPY         Family History   Problem Relation Name Age of Onset    Breast cancer Mother  70    Heart disease Mother      Hypertension Mother      Tremor Mother      Diabetes Father      Heart disease Father      Ulcers Father      Diabetes Sister      Cancer Sister      Lung cancer Sister          70's    Diabetes Sister      Diabetes Brother      Emphysema Brother      Breast cancer Maternal Aunt  70    Multiple sclerosis Maternal Aunt      Multiple sclerosis Maternal Aunt      Breast cancer Maternal Grandmother  70    Tremor Daughter Casandra     Breast cancer Maternal Cousin 1st 40    Ovarian cancer Neg Hx      Parkinsonism Neg Hx      Glaucoma Neg Hx      Macular degeneration Neg Hx      Colon cancer Neg Hx      Crohn's disease Neg Hx      Stomach cancer Neg Hx      Esophageal cancer Neg Hx      Ulcerative colitis Neg Hx         Social History     Tobacco Use    Smoking status: Former     Current packs/day: 0.00     Types: Cigarettes     Quit date: 2/3/2006      "Years since quittin.6    Smokeless tobacco: Never   Substance Use Topics    Alcohol use: Not Currently    Drug use: No       Current Outpatient Medications   Medication Sig Dispense Refill    albuterol (PROVENTIL/VENTOLIN HFA) 90 mcg/actuation inhaler Inhale 2 puffs every 4 hours as needed for cough, wheeze, or shortness of breath 18 g 11    aspirin (ECOTRIN) 81 MG EC tablet Take 1 tablet (81 mg total) by mouth once daily. 30 tablet 11    blood sugar diagnostic (FREESTYLE LITE STRIPS) Strp Test blood glucose 3 (three) times daily. 100 each 1    blood-glucose meter kit Use as instructed 1 each 0    buPROPion (WELLBUTRIN XL) 150 MG TB24 tablet Take 2 tablets (300 mg total) by mouth once daily. 180 tablet 1    butalbital-acetaminophen-caffeine -40 mg (FIORICET, ESGIC) -40 mg per tablet Take 1 tablet by mouth every 4 (four) hours as needed for Pain. 20 tablet 0    calcium carbonate-vitamin D3 600 mg-20 mcg (800 unit) Tab Take 1 tablet by mouth once daily. (Patient not taking: Reported on 10/7/2024) 90 tablet 3    doxycycline (MONODOX) 100 MG capsule Take 1 capsule (100 mg total) by mouth 2 (two) times daily. for 10 days 20 capsule 0    k phos di & mono-sod phos mono (K-PHOS-NEUTRAL) 250 mg Tab Take 2 tablets by mouth. (Patient not taking: Reported on 10/7/2024)      lancets 28 gauge Misc Test blood glucose 3 (three) times daily. 100 each 1    mycophenolate (CELLCEPT) 250 mg Cap Take 1 capsule (250 mg total) by mouth 2 (two) times daily. 60 capsule 11    olmesartan (BENICAR) 20 MG tablet Take 1 tablet (20 mg total) by mouth once daily. 90 tablet 1    omeprazole (PRILOSEC) 20 MG capsule Take 1 capsule (20 mg total) by mouth once daily. 90 capsule 1    pen needle, diabetic 32 gauge x /32" Ndle Use to inject insulin into the skin 3 (three) times daily. 90 each 1    propranoloL (INDERAL) 20 MG tablet Take 1 tablet (20 mg total) by mouth 2 (two) times daily. 180 tablet 1    sodium chloride for inhalation " (SODIUM CHLORIDE 0.9%) 0.9 % nebulizer solution Take 3 mLs by nebulization 4 (four) times daily as needed (cough). (Patient not taking: Reported on 10/7/2024) 150 mL 2    sucralfate (CARAFATE) 1 gram tablet Take 1 tablet (1 g total) by mouth 4 (four) times daily. for 10 days 40 tablet 0    tacrolimus (PROGRAF) 1 MG Cap Take 3 capsules (3 mg total) by mouth every morning AND 2 capsules (2 mg total) every evening. 150 capsule 11     No current facility-administered medications for this visit.       Review of patient's allergies indicates:   Allergen Reactions    No known drug allergies        Review of Systems   Constitutional:  Positive for fever. Negative for weight loss.   Gastrointestinal:  Positive for abdominal pain, constipation and diarrhea. Negative for blood in stool, heartburn, melena, nausea and vomiting.   Neurological:  Positive for tremors and headaches.       There were no vitals filed for this visit.      Physical Exam  Constitutional:       General: She is not in acute distress.     Appearance: She is not ill-appearing.   HENT:      Head: Normocephalic and atraumatic.   Eyes:      General: No scleral icterus.     Extraocular Movements: Extraocular movements intact.   Pulmonary:      Effort: No respiratory distress.   Skin:     Coloration: Skin is not jaundiced.   Neurological:      Mental Status: She is alert.       LABS:  Lab Results   Component Value Date    WBC 3.09 (L) 10/07/2024    HGB 10.8 (L) 10/07/2024    HCT 31.9 (L) 10/07/2024    MCV 85 10/07/2024     (L) 10/07/2024       Lab Results   Component Value Date     10/07/2024    K 4.7 10/07/2024     10/07/2024    CO2 21 (L) 10/07/2024    BUN 29 (H) 10/07/2024    CREATININE 1.2 10/07/2024    CALCIUM 9.2 10/07/2024    ANIONGAP 9 10/07/2024    ESTGFRAFRICA >60 06/20/2022    EGFRNONAA >60 06/20/2022       Lab Results   Component Value Date    ALT 14 10/07/2024    AST 16 10/07/2024     (H) 04/13/2023    ALKPHOS 64 10/07/2024     BILITOT 0.5 10/07/2024       Lab Results   Component Value Date    TACROLIMUS 5.7 09/23/2024         Assessment:  64 y.o. 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    1. Liver transplanted    2. Long-term use of immunosuppressant medication    3. History of hepatocellular carcinoma        Recommendations:  Allograft Function  - Excellent graft function with normal LFTs    Immunosuppression   - Continue Prograf 3mg in AM and 2mg in PM  - Continue CellCept 250 mg twice daily    Fever: Check CMV. Recommended she get tested for flu and COVID as well as follow up with PCP.     History of HCC: Cross-sectional imaging in 06/2024 without evidence of recurrence. Continue imaging surveillance per protocol.    Kidney function   - Creatinine 1.2  - Avoid NSAIDs as able and maintain adequate hydration    Health Maintenance/Screening:  - Recommend age appropriate cancer screening  - Skin cancer: Recommend use of sunscreen SPF 30 or higher, hat and sunglasses while outside, dermatologist visit annually or sooner if any concerning lesions.  - Osteoporosis: Recommend bone density testing every 2-3 years if previously normal or annually if previously abnormal.    Return to clinic in 6 months.    UNOS Patient Status  Functional Status: 100% - Normal, no complaints, no evidence of disease  Physical Capacity: No Limitations    This note includes dictation done using M*Modal speech recognition program. Word recognition mistakes are occasionally missed on review.    James Brambila MD  Staff Physician  Hepatology and Liver Transplant  [facility]  [facility]

## 2024-10-11 NOTE — Clinical Note
She reports 2 weeks of fever. Please check CMV this week. She will also get tested for flu and covid. Return 6 months.

## 2024-10-14 ENCOUNTER — PATIENT MESSAGE (OUTPATIENT)
Dept: PRIMARY CARE CLINIC | Facility: CLINIC | Age: 64
End: 2024-10-14
Payer: MEDICARE

## 2024-10-14 ENCOUNTER — OFFICE VISIT (OUTPATIENT)
Dept: FAMILY MEDICINE | Facility: CLINIC | Age: 64
End: 2024-10-14
Payer: MEDICARE

## 2024-10-14 ENCOUNTER — RESEARCH ENCOUNTER (OUTPATIENT)
Dept: RESEARCH | Facility: HOSPITAL | Age: 64
End: 2024-10-14
Payer: MEDICARE

## 2024-10-14 ENCOUNTER — LAB VISIT (OUTPATIENT)
Dept: LAB | Facility: HOSPITAL | Age: 64
End: 2024-10-14
Attending: STUDENT IN AN ORGANIZED HEALTH CARE EDUCATION/TRAINING PROGRAM
Payer: MEDICARE

## 2024-10-14 VITALS
SYSTOLIC BLOOD PRESSURE: 120 MMHG | HEIGHT: 66 IN | WEIGHT: 225.75 LBS | HEART RATE: 75 BPM | OXYGEN SATURATION: 99 % | BODY MASS INDEX: 36.28 KG/M2 | TEMPERATURE: 98 F | DIASTOLIC BLOOD PRESSURE: 80 MMHG

## 2024-10-14 DIAGNOSIS — L03.011 CELLULITIS OF THUMB, RIGHT: ICD-10-CM

## 2024-10-14 DIAGNOSIS — L03.116 CELLULITIS OF LEFT LOWER EXTREMITY: Primary | ICD-10-CM

## 2024-10-14 DIAGNOSIS — Z94.4 S/P LIVER TRANSPLANT: ICD-10-CM

## 2024-10-14 DIAGNOSIS — Z00.6 RESEARCH STUDY PATIENT: ICD-10-CM

## 2024-10-14 LAB
ALBUMIN SERPL BCP-MCNC: 3.5 G/DL (ref 3.5–5.2)
ALP SERPL-CCNC: 87 U/L (ref 55–135)
ALT SERPL W/O P-5'-P-CCNC: 18 U/L (ref 10–44)
ANION GAP SERPL CALC-SCNC: 6 MMOL/L (ref 8–16)
AST SERPL-CCNC: 17 U/L (ref 10–40)
BASOPHILS # BLD AUTO: 0.03 K/UL (ref 0–0.2)
BASOPHILS NFR BLD: 0.7 % (ref 0–1.9)
BILIRUB SERPL-MCNC: 0.4 MG/DL (ref 0.1–1)
BUN SERPL-MCNC: 32 MG/DL (ref 8–23)
CALCIUM SERPL-MCNC: 9.6 MG/DL (ref 8.7–10.5)
CHLORIDE SERPL-SCNC: 112 MMOL/L (ref 95–110)
CO2 SERPL-SCNC: 24 MMOL/L (ref 23–29)
CREAT SERPL-MCNC: 0.9 MG/DL (ref 0.5–1.4)
DIFFERENTIAL METHOD BLD: ABNORMAL
EOSINOPHIL # BLD AUTO: 0.2 K/UL (ref 0–0.5)
EOSINOPHIL NFR BLD: 5 % (ref 0–8)
ERYTHROCYTE [DISTWIDTH] IN BLOOD BY AUTOMATED COUNT: 14.3 % (ref 11.5–14.5)
EST. GFR  (NO RACE VARIABLE): >60 ML/MIN/1.73 M^2
GLUCOSE SERPL-MCNC: 156 MG/DL (ref 70–110)
HCT VFR BLD AUTO: 33.8 % (ref 37–48.5)
HGB BLD-MCNC: 10.7 G/DL (ref 12–16)
IMM GRANULOCYTES # BLD AUTO: 0.17 K/UL (ref 0–0.04)
IMM GRANULOCYTES NFR BLD AUTO: 3.8 % (ref 0–0.5)
LYMPHOCYTES # BLD AUTO: 0.7 K/UL (ref 1–4.8)
LYMPHOCYTES NFR BLD: 16.5 % (ref 18–48)
MCH RBC QN AUTO: 28.6 PG (ref 27–31)
MCHC RBC AUTO-ENTMCNC: 31.7 G/DL (ref 32–36)
MCV RBC AUTO: 90 FL (ref 82–98)
MONOCYTES # BLD AUTO: 0.4 K/UL (ref 0.3–1)
MONOCYTES NFR BLD: 9.9 % (ref 4–15)
NEUTROPHILS # BLD AUTO: 2.8 K/UL (ref 1.8–7.7)
NEUTROPHILS NFR BLD: 64.1 % (ref 38–73)
NRBC BLD-RTO: 0 /100 WBC
PLATELET # BLD AUTO: 174 K/UL (ref 150–450)
PMV BLD AUTO: 10.3 FL (ref 9.2–12.9)
POTASSIUM SERPL-SCNC: 4 MMOL/L (ref 3.5–5.1)
PROT SERPL-MCNC: 6.4 G/DL (ref 6–8.4)
RBC # BLD AUTO: 3.74 M/UL (ref 4–5.4)
RESEARCH LAB: NORMAL
SODIUM SERPL-SCNC: 142 MMOL/L (ref 136–145)
WBC # BLD AUTO: 4.43 K/UL (ref 3.9–12.7)

## 2024-10-14 PROCEDURE — 3008F BODY MASS INDEX DOCD: CPT | Mod: CPTII,S$GLB,, | Performed by: PHYSICIAN ASSISTANT

## 2024-10-14 PROCEDURE — 3044F HG A1C LEVEL LT 7.0%: CPT | Mod: CPTII,S$GLB,, | Performed by: PHYSICIAN ASSISTANT

## 2024-10-14 PROCEDURE — 1157F ADVNC CARE PLAN IN RCRD: CPT | Mod: CPTII,S$GLB,, | Performed by: PHYSICIAN ASSISTANT

## 2024-10-14 PROCEDURE — 99213 OFFICE O/P EST LOW 20 MIN: CPT | Mod: S$GLB,,, | Performed by: PHYSICIAN ASSISTANT

## 2024-10-14 PROCEDURE — 4010F ACE/ARB THERAPY RXD/TAKEN: CPT | Mod: CPTII,S$GLB,, | Performed by: PHYSICIAN ASSISTANT

## 2024-10-14 PROCEDURE — 1159F MED LIST DOCD IN RCRD: CPT | Mod: CPTII,S$GLB,, | Performed by: PHYSICIAN ASSISTANT

## 2024-10-14 PROCEDURE — 80053 COMPREHEN METABOLIC PANEL: CPT | Performed by: STUDENT IN AN ORGANIZED HEALTH CARE EDUCATION/TRAINING PROGRAM

## 2024-10-14 PROCEDURE — 80197 ASSAY OF TACROLIMUS: CPT | Performed by: STUDENT IN AN ORGANIZED HEALTH CARE EDUCATION/TRAINING PROGRAM

## 2024-10-14 PROCEDURE — 85025 COMPLETE CBC W/AUTO DIFF WBC: CPT | Performed by: STUDENT IN AN ORGANIZED HEALTH CARE EDUCATION/TRAINING PROGRAM

## 2024-10-14 PROCEDURE — 99999 PR PBB SHADOW E&M-EST. PATIENT-LVL IV: CPT | Mod: PBBFAC,,, | Performed by: PHYSICIAN ASSISTANT

## 2024-10-14 PROCEDURE — 1160F RVW MEDS BY RX/DR IN RCRD: CPT | Mod: CPTII,S$GLB,, | Performed by: PHYSICIAN ASSISTANT

## 2024-10-14 PROCEDURE — 3079F DIAST BP 80-89 MM HG: CPT | Mod: CPTII,S$GLB,, | Performed by: PHYSICIAN ASSISTANT

## 2024-10-14 PROCEDURE — 3074F SYST BP LT 130 MM HG: CPT | Mod: CPTII,S$GLB,, | Performed by: PHYSICIAN ASSISTANT

## 2024-10-14 NOTE — TELEPHONE ENCOUNTER
Spoke with pt, will try to move pt to 10/31 if a pt is willing to switch their appt to 10/30 @ 1020am.

## 2024-10-14 NOTE — PROGRESS NOTES
RESEARCH STUDY SPECIMEN COLLECTION ENCOUNTER  ORGAN TRANSPLANT  [facility] [address]    Study Title: Role of Tumor-Induced Immune Tolerance in the Patient Response to Locoregional Therapy: Implications in Assessment Risk of Hepatocellular Carcinoma Recurrence Following Liver Transplantation    IRB #: [id number]    IRB Approval Date: 6/8/2016    : Eugene Haney MD  Sub-investigator: Nino Belcher, PhD    Patient Number: [id number]    In accordance with the study protocol, [facility] orders were placed and follow-up specimens were collected on (date: 10/14/2024) in:  [facility] VNP: YES/NO: No  [facility] LABTX: YES/NO: No  [facility] IM: YES/NO: No  Other [facility] location: YES/NO: Yes   Location: [facility]    A  was used to transport blood specimens to [facility] for processing: YES/NO: Yes  Blood specimens were transported to [facility] for processing: YES/NO: Yes    Mehran Hilliard  Admin [facility] Transplant

## 2024-10-14 NOTE — PROGRESS NOTES
"Subjective:      Patient ID: Yumiko Gonzalez is a 64 y.o. female.    Chief Complaint: Fever    HPI  Patient has PMH of liver transplant s/t hepatocellular carcinoma, cervical cancer, depression/PTSD, mass of right lung, HTN, and colon polyps.     Patient saw me 10/7/2024 with LLE cellulitis and right thumb cellulitis and treated with doxycycline 100mg BID x 10 days.    Today patient's right thumb and left lower extremity cellulitis has improved.  Fever has resolved as of now.  Eating more and drinking more.  Continuing to take doxycycline as prescribed.      Review of Systems   Constitutional:  Positive for activity change. Negative for unexpected weight change.   HENT:  Positive for rhinorrhea. Negative for hearing loss and trouble swallowing.    Eyes:  Negative for discharge and visual disturbance.   Respiratory:  Negative for chest tightness and wheezing.    Cardiovascular:  Negative for chest pain and palpitations.   Gastrointestinal:  Negative for blood in stool, constipation, diarrhea and vomiting.   Endocrine: Negative for polydipsia and polyuria.   Genitourinary:  Negative for difficulty urinating, dysuria, hematuria and menstrual problem.   Musculoskeletal:  Positive for arthralgias and joint swelling. Negative for neck pain.   Neurological:  Positive for headaches. Negative for weakness.   Psychiatric/Behavioral:  Positive for dysphoric mood. Negative for confusion.        Objective:   /80   Pulse 75   Temp 97.8 °F (36.6 °C)   Ht 5' 6" (1.676 m)   Wt 102.4 kg (225 lb 12 oz)   SpO2 99%   BMI 36.44 kg/m²     Physical Exam  Vitals reviewed.   Constitutional:       Appearance: Normal appearance. She is well-developed.   HENT:      Head: Normocephalic and atraumatic.      Right Ear: External ear normal.      Left Ear: External ear normal.   Eyes:      Conjunctiva/sclera: Conjunctivae normal.   Cardiovascular:      Rate and Rhythm: Normal rate and regular rhythm.      Heart sounds: Normal heart sounds. " No murmur heard.     No friction rub. No gallop.   Pulmonary:      Effort: Pulmonary effort is normal. No respiratory distress.      Breath sounds: Normal breath sounds. No wheezing, rhonchi or rales.   Musculoskeletal:         General: Normal range of motion.   Skin:     General: Skin is warm and dry.      Findings: No rash.      Comments: LLE and right thumb- FROM, no TTP and erythema and minimal swelling   Neurological:      General: No focal deficit present.      Mental Status: She is alert and oriented to person, place, and time.   Psychiatric:         Mood and Affect: Mood normal.         Behavior: Behavior normal.         Judgment: Judgment normal.       Assessment:      1. Cellulitis of left lower extremity    2. Cellulitis of thumb, right       Plan:   1. Cellulitis of left lower extremity (Primary)  Resolving.  Continue doxycycline as prescribed.    2. Cellulitis of thumb, right  Resolving.    Follow up as scheduled.  Patient agreed with plan and expressed understanding.    Thank you for allowing me to serve you,

## 2024-10-15 ENCOUNTER — PATIENT MESSAGE (OUTPATIENT)
Dept: TRANSPLANT | Facility: CLINIC | Age: 64
End: 2024-10-15
Payer: MEDICARE

## 2024-10-15 LAB — TACROLIMUS BLD-MCNC: 9 NG/ML (ref 5–15)

## 2024-10-21 DIAGNOSIS — Z94.4 S/P LIVER TRANSPLANT: ICD-10-CM

## 2024-10-21 RX ORDER — TACROLIMUS 1 MG/1
2 CAPSULE ORAL EVERY 12 HOURS
Qty: 120 CAPSULE | Refills: 11 | Status: SHIPPED | OUTPATIENT
Start: 2024-10-21 | End: 2025-10-21

## 2024-10-21 NOTE — TELEPHONE ENCOUNTER
Pt informed of tac dose decrease to 2mg BID and repeat labs needed 11/4/24.  ----- Message from James Brambila MD sent at 10/19/2024  9:31 AM CDT -----  Liver tests are stable. Please decrease tac to 2/2 and repeat labs in 2 weeks.

## 2024-10-22 ENCOUNTER — CLINICAL SUPPORT (OUTPATIENT)
Dept: PRIMARY CARE CLINIC | Facility: CLINIC | Age: 64
End: 2024-10-22
Payer: MEDICARE

## 2024-10-22 DIAGNOSIS — F43.21 GRIEF REACTION: ICD-10-CM

## 2024-10-22 DIAGNOSIS — F32.A DEPRESSIVE DISORDER: ICD-10-CM

## 2024-10-22 DIAGNOSIS — Z65.8 PSYCHOSOCIAL STRESSORS: Primary | ICD-10-CM

## 2024-10-22 PROCEDURE — 99499 UNLISTED E&M SERVICE: CPT | Mod: 95,,, | Performed by: SOCIAL WORKER
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
## 2024-10-23 ENCOUNTER — PATIENT MESSAGE (OUTPATIENT)
Dept: GASTROENTEROLOGY | Facility: CLINIC | Age: 64
End: 2024-10-23
Payer: MEDICARE

## 2024-10-23 ENCOUNTER — PATIENT MESSAGE (OUTPATIENT)
Dept: TRANSPLANT | Facility: CLINIC | Age: 64
End: 2024-10-23
Payer: MEDICARE

## 2024-10-31 ENCOUNTER — OFFICE VISIT (OUTPATIENT)
Dept: PRIMARY CARE CLINIC | Facility: CLINIC | Age: 64
End: 2024-10-31
Payer: MEDICARE

## 2024-10-31 VITALS
HEART RATE: 81 BPM | OXYGEN SATURATION: 99 % | DIASTOLIC BLOOD PRESSURE: 82 MMHG | SYSTOLIC BLOOD PRESSURE: 132 MMHG | HEIGHT: 66 IN | WEIGHT: 226.75 LBS | BODY MASS INDEX: 36.44 KG/M2

## 2024-10-31 DIAGNOSIS — Z94.4 S/P LIVER TRANSPLANT: Primary | ICD-10-CM

## 2024-10-31 DIAGNOSIS — F41.9 ANXIETY: ICD-10-CM

## 2024-10-31 DIAGNOSIS — Z23 FLU VACCINE NEED: ICD-10-CM

## 2024-10-31 DIAGNOSIS — I10 PRIMARY HYPERTENSION: Chronic | ICD-10-CM

## 2024-10-31 PROBLEM — J42 CHRONIC BRONCHITIS, UNSPECIFIED CHRONIC BRONCHITIS TYPE: Status: RESOLVED | Noted: 2023-07-09 | Resolved: 2024-10-31

## 2024-10-31 PROCEDURE — 99999 PR PBB SHADOW E&M-EST. PATIENT-LVL IV: CPT | Mod: PBBFAC,,, | Performed by: FAMILY MEDICINE

## 2024-10-31 RX ORDER — IPRATROPIUM BROMIDE 42 UG/1
2 SPRAY, METERED NASAL EVERY 6 HOURS PRN
Qty: 15 ML | Refills: 5 | Status: SHIPPED | OUTPATIENT
Start: 2024-10-31

## 2024-10-31 RX ORDER — ALPRAZOLAM 0.25 MG/1
0.25 TABLET ORAL NIGHTLY PRN
Qty: 15 TABLET | Refills: 1 | Status: SHIPPED | OUTPATIENT
Start: 2024-10-31

## 2024-11-05 ENCOUNTER — CLINICAL SUPPORT (OUTPATIENT)
Dept: PRIMARY CARE CLINIC | Facility: CLINIC | Age: 64
End: 2024-11-05
Payer: MEDICARE

## 2024-11-05 DIAGNOSIS — Z65.8 PSYCHOSOCIAL STRESSORS: ICD-10-CM

## 2024-11-05 DIAGNOSIS — F41.9 ANXIETY: Primary | ICD-10-CM

## 2024-11-05 DIAGNOSIS — Z59.9 FINANCIAL DIFFICULTIES: ICD-10-CM

## 2024-11-05 DIAGNOSIS — F43.21 GRIEF REACTION: ICD-10-CM

## 2024-11-05 PROCEDURE — 99499 UNLISTED E&M SERVICE: CPT | Mod: S$GLB,,, | Performed by: SOCIAL WORKER

## 2024-11-05 SDOH — SOCIAL DETERMINANTS OF HEALTH (SDOH): PROBLEM RELATED TO HOUSING AND ECONOMIC CIRCUMSTANCES, UNSPECIFIED: Z59.9
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
## 2024-11-06 ENCOUNTER — LAB VISIT (OUTPATIENT)
Dept: LAB | Facility: HOSPITAL | Age: 64
End: 2024-11-06
Attending: STUDENT IN AN ORGANIZED HEALTH CARE EDUCATION/TRAINING PROGRAM
Payer: MEDICARE

## 2024-11-06 ENCOUNTER — PATIENT MESSAGE (OUTPATIENT)
Dept: PRIMARY CARE CLINIC | Facility: CLINIC | Age: 64
End: 2024-11-06
Payer: MEDICARE

## 2024-11-06 DIAGNOSIS — R05.9 COUGH, UNSPECIFIED TYPE: Primary | ICD-10-CM

## 2024-11-06 DIAGNOSIS — Z94.4 S/P LIVER TRANSPLANT: ICD-10-CM

## 2024-11-06 LAB
ALBUMIN SERPL BCP-MCNC: 3.9 G/DL (ref 3.5–5.2)
ALP SERPL-CCNC: 84 U/L (ref 40–150)
ALT SERPL W/O P-5'-P-CCNC: 15 U/L (ref 10–44)
ANION GAP SERPL CALC-SCNC: 7 MMOL/L (ref 8–16)
AST SERPL-CCNC: 16 U/L (ref 10–40)
BASOPHILS # BLD AUTO: 0.04 K/UL (ref 0–0.2)
BASOPHILS NFR BLD: 0.8 % (ref 0–1.9)
BILIRUB SERPL-MCNC: 0.5 MG/DL (ref 0.1–1)
BUN SERPL-MCNC: 24 MG/DL (ref 8–23)
CALCIUM SERPL-MCNC: 10.2 MG/DL (ref 8.7–10.5)
CHLORIDE SERPL-SCNC: 106 MMOL/L (ref 95–110)
CO2 SERPL-SCNC: 27 MMOL/L (ref 23–29)
CREAT SERPL-MCNC: 1 MG/DL (ref 0.5–1.4)
DIFFERENTIAL METHOD BLD: ABNORMAL
EOSINOPHIL # BLD AUTO: 0.3 K/UL (ref 0–0.5)
EOSINOPHIL NFR BLD: 6.1 % (ref 0–8)
ERYTHROCYTE [DISTWIDTH] IN BLOOD BY AUTOMATED COUNT: 14.7 % (ref 11.5–14.5)
EST. GFR  (NO RACE VARIABLE): >60 ML/MIN/1.73 M^2
GLUCOSE SERPL-MCNC: 142 MG/DL (ref 70–110)
HCT VFR BLD AUTO: 39.6 % (ref 37–48.5)
HGB BLD-MCNC: 12.4 G/DL (ref 12–16)
IMM GRANULOCYTES # BLD AUTO: 0.11 K/UL (ref 0–0.04)
IMM GRANULOCYTES NFR BLD AUTO: 2.2 % (ref 0–0.5)
LYMPHOCYTES # BLD AUTO: 0.8 K/UL (ref 1–4.8)
LYMPHOCYTES NFR BLD: 15.4 % (ref 18–48)
MCH RBC QN AUTO: 29.2 PG (ref 27–31)
MCHC RBC AUTO-ENTMCNC: 31.3 G/DL (ref 32–36)
MCV RBC AUTO: 93 FL (ref 82–98)
MONOCYTES # BLD AUTO: 0.5 K/UL (ref 0.3–1)
MONOCYTES NFR BLD: 10.1 % (ref 4–15)
NEUTROPHILS # BLD AUTO: 3.2 K/UL (ref 1.8–7.7)
NEUTROPHILS NFR BLD: 65.4 % (ref 38–73)
NRBC BLD-RTO: 0 /100 WBC
PLATELET # BLD AUTO: 164 K/UL (ref 150–450)
PMV BLD AUTO: 10.5 FL (ref 9.2–12.9)
POTASSIUM SERPL-SCNC: 5.1 MMOL/L (ref 3.5–5.1)
PROT SERPL-MCNC: 6.8 G/DL (ref 6–8.4)
RBC # BLD AUTO: 4.24 M/UL (ref 4–5.4)
SODIUM SERPL-SCNC: 140 MMOL/L (ref 136–145)
WBC # BLD AUTO: 4.95 K/UL (ref 3.9–12.7)

## 2024-11-06 PROCEDURE — 80053 COMPREHEN METABOLIC PANEL: CPT | Performed by: STUDENT IN AN ORGANIZED HEALTH CARE EDUCATION/TRAINING PROGRAM

## 2024-11-06 PROCEDURE — 36415 COLL VENOUS BLD VENIPUNCTURE: CPT | Mod: PO | Performed by: STUDENT IN AN ORGANIZED HEALTH CARE EDUCATION/TRAINING PROGRAM

## 2024-11-06 PROCEDURE — 80197 ASSAY OF TACROLIMUS: CPT | Performed by: STUDENT IN AN ORGANIZED HEALTH CARE EDUCATION/TRAINING PROGRAM

## 2024-11-06 PROCEDURE — 85025 COMPLETE CBC W/AUTO DIFF WBC: CPT | Performed by: STUDENT IN AN ORGANIZED HEALTH CARE EDUCATION/TRAINING PROGRAM

## 2024-11-06 NOTE — TELEPHONE ENCOUNTER
We can check a sputum culture however if she is showing signs of infection it may be best to just treat empirically.  It will take a few days for the sputum culture to return and unless it is a truly induced sputum, accuracy maybe limited.    I would recommend starting doxycycline 100 mg b.i.d. for 10 days.  Let me know if she is agreeable or whether she wants to run this by her transplant team 1st.

## 2024-11-07 LAB — TACROLIMUS BLD-MCNC: 4.2 NG/ML (ref 5–15)

## 2024-11-08 RX ORDER — DOXYCYCLINE 100 MG/1
100 CAPSULE ORAL EVERY 12 HOURS
Qty: 20 CAPSULE | Refills: 0 | Status: SHIPPED | OUTPATIENT
Start: 2024-11-08

## 2024-11-08 NOTE — TELEPHONE ENCOUNTER
Spoke with pt, she will bring her sputum to [place] [facility] on [address].     Confirmed pharmacy, she would like med to be sent to [facility].     Orders for sputum signed and med pended for [facility].

## 2024-11-11 ENCOUNTER — LAB VISIT (OUTPATIENT)
Dept: LAB | Facility: HOSPITAL | Age: 64
End: 2024-11-11
Attending: FAMILY MEDICINE
Payer: MEDICARE

## 2024-11-11 DIAGNOSIS — R05.9 COUGH, UNSPECIFIED TYPE: ICD-10-CM

## 2024-11-11 PROCEDURE — 87070 CULTURE OTHR SPECIMN AEROBIC: CPT | Performed by: FAMILY MEDICINE

## 2024-11-11 PROCEDURE — 87205 SMEAR GRAM STAIN: CPT | Performed by: FAMILY MEDICINE

## 2024-11-13 ENCOUNTER — TELEPHONE (OUTPATIENT)
Dept: TRANSPLANT | Facility: CLINIC | Age: 64
End: 2024-11-13
Payer: MEDICARE

## 2024-11-13 DIAGNOSIS — Z85.05 HISTORY OF HEPATOCELLULAR CARCINOMA: ICD-10-CM

## 2024-11-13 DIAGNOSIS — Z94.4 S/P LIVER TRANSPLANT: Primary | ICD-10-CM

## 2024-11-13 NOTE — TELEPHONE ENCOUNTER
Pt notified via portal of stable labs and that no medication changes are needed. Repeat labs due 12/9/24 per protocol.   ----- Message from James Brambila MD sent at 11/12/2024 12:37 PM CST -----  Liver tests are stable. No changes in her immunosuppression. Please continue to monitor labs per transplant protocol.

## 2024-11-14 ENCOUNTER — TELEPHONE (OUTPATIENT)
Dept: PRIMARY CARE CLINIC | Facility: CLINIC | Age: 64
End: 2024-11-14

## 2024-11-14 LAB
BACTERIA SPEC AEROBE CULT: ABNORMAL
BACTERIA SPEC AEROBE CULT: ABNORMAL
GRAM STN SPEC: ABNORMAL

## 2024-11-14 NOTE — TELEPHONE ENCOUNTER
Preliminary sputum culture positive for Haemophilus influenzae (bacterial).  However this is a common respiratory colonizer, and the specimen had greater than 10 epithelial cells, so not the best specimen and may represent oral contamination and not true lung specimen.  But given the fact that she is immunocompromised after transplant, check and see if she is having symptoms, cough with purulent sputum, fever, etc..  If so I would consider treating if not would recommend monitoring

## 2024-11-14 NOTE — TELEPHONE ENCOUNTER
Spoke with pt, discussed your message in its entirety, pt verbalized understanding.  Pt reports that she is taking Doxycycline and Atrovent.  Pt reports that she does not have any purulent sputum and that she is not running any fever.  Pt reports that she is feeling better and that she will complete the entire course of ABX and continue the nasal spray.

## 2024-11-19 ENCOUNTER — CLINICAL SUPPORT (OUTPATIENT)
Dept: PRIMARY CARE CLINIC | Facility: CLINIC | Age: 64
End: 2024-11-19
Payer: MEDICARE

## 2024-11-19 DIAGNOSIS — Z59.9 FINANCIAL DIFFICULTIES: ICD-10-CM

## 2024-11-19 DIAGNOSIS — F41.9 ANXIETY: ICD-10-CM

## 2024-11-19 DIAGNOSIS — Z65.8 PSYCHOSOCIAL STRESSORS: Primary | ICD-10-CM

## 2024-11-19 SDOH — SOCIAL DETERMINANTS OF HEALTH (SDOH): PROBLEM RELATED TO HOUSING AND ECONOMIC CIRCUMSTANCES, UNSPECIFIED: Z59.9
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
## 2024-12-02 DIAGNOSIS — Z94.4 S/P LIVER TRANSPLANT: Primary | ICD-10-CM

## 2024-12-04 ENCOUNTER — HOSPITAL ENCOUNTER (OUTPATIENT)
Dept: RADIOLOGY | Facility: HOSPITAL | Age: 64
Discharge: HOME | End: 2024-12-04
Attending: STUDENT IN AN ORGANIZED HEALTH CARE EDUCATION/TRAINING PROGRAM
Payer: MEDICARE

## 2024-12-04 DIAGNOSIS — Z85.05 HISTORY OF HEPATOCELLULAR CARCINOMA: ICD-10-CM

## 2024-12-04 DIAGNOSIS — Z94.4 S/P LIVER TRANSPLANT: ICD-10-CM

## 2024-12-04 DIAGNOSIS — Z94.4 LIVER TRANSPLANTED: ICD-10-CM

## 2024-12-04 PROCEDURE — 74170 CT ABD WO CNTRST FLWD CNTRST: CPT | Mod: TC

## 2024-12-04 PROCEDURE — 71250 CT THORAX DX C-: CPT | Mod: TC

## 2024-12-04 PROCEDURE — 93976 VASCULAR STUDY: CPT | Mod: TC

## 2024-12-04 PROCEDURE — 71250 CT THORAX DX C-: CPT | Mod: 26,,, | Performed by: RADIOLOGY

## 2024-12-04 PROCEDURE — 74170 CT ABD WO CNTRST FLWD CNTRST: CPT | Mod: 26,,, | Performed by: RADIOLOGY

## 2024-12-04 PROCEDURE — 25500020 PHARM REV CODE 255: Performed by: STUDENT IN AN ORGANIZED HEALTH CARE EDUCATION/TRAINING PROGRAM

## 2024-12-04 RX ADMIN — IOHEXOL 100 ML: 350 INJECTION, SOLUTION INTRAVENOUS at 11:12

## 2024-12-06 ENCOUNTER — TELEPHONE (OUTPATIENT)
Dept: TRANSPLANT | Facility: CLINIC | Age: 64
End: 2024-12-06
Payer: MEDICARE

## 2024-12-06 DIAGNOSIS — Z85.05 HISTORY OF HEPATOCELLULAR CARCINOMA: Primary | ICD-10-CM

## 2024-12-06 DIAGNOSIS — Z94.4 S/P LIVER TRANSPLANT: ICD-10-CM

## 2024-12-06 NOTE — TELEPHONE ENCOUNTER
Imaging results stable. No evidence of recurrent liver cancer.  Patient informed via patient portal.  Will repeat imaging per protocol in 6 months.  Card given to .             ----- Message from James Brambila MD sent at 12/6/2024  9:10 AM CST -----  Reviewed. No evidence of recurrent liver cancer.

## 2024-12-11 ENCOUNTER — LAB VISIT (OUTPATIENT)
Dept: LAB | Facility: HOSPITAL | Age: 64
End: 2024-12-11
Attending: STUDENT IN AN ORGANIZED HEALTH CARE EDUCATION/TRAINING PROGRAM
Payer: MEDICARE

## 2024-12-11 DIAGNOSIS — Z85.05 HISTORY OF HEPATOCELLULAR CARCINOMA: ICD-10-CM

## 2024-12-11 DIAGNOSIS — Z94.4 S/P LIVER TRANSPLANT: ICD-10-CM

## 2024-12-11 LAB
AFP SERPL-MCNC: <2 NG/ML (ref 0–8.4)
ALBUMIN SERPL BCP-MCNC: 3.6 G/DL (ref 3.5–5.2)
ALP SERPL-CCNC: 82 U/L (ref 40–150)
ALT SERPL W/O P-5'-P-CCNC: 16 U/L (ref 10–44)
ANION GAP SERPL CALC-SCNC: 7 MMOL/L (ref 8–16)
AST SERPL-CCNC: 16 U/L (ref 10–40)
BASOPHILS # BLD AUTO: 0.03 K/UL (ref 0–0.2)
BASOPHILS NFR BLD: 0.7 % (ref 0–1.9)
BILIRUB SERPL-MCNC: 0.6 MG/DL (ref 0.1–1)
BUN SERPL-MCNC: 25 MG/DL (ref 8–23)
CALCIUM SERPL-MCNC: 9.8 MG/DL (ref 8.7–10.5)
CHLORIDE SERPL-SCNC: 109 MMOL/L (ref 95–110)
CO2 SERPL-SCNC: 24 MMOL/L (ref 23–29)
CREAT SERPL-MCNC: 1.1 MG/DL (ref 0.5–1.4)
DIFFERENTIAL METHOD BLD: ABNORMAL
EOSINOPHIL # BLD AUTO: 0.3 K/UL (ref 0–0.5)
EOSINOPHIL NFR BLD: 6.8 % (ref 0–8)
ERYTHROCYTE [DISTWIDTH] IN BLOOD BY AUTOMATED COUNT: 13.7 % (ref 11.5–14.5)
EST. GFR  (NO RACE VARIABLE): 55.8 ML/MIN/1.73 M^2
GLUCOSE SERPL-MCNC: 143 MG/DL (ref 70–110)
HCT VFR BLD AUTO: 38.6 % (ref 37–48.5)
HGB BLD-MCNC: 12.6 G/DL (ref 12–16)
IMM GRANULOCYTES # BLD AUTO: 0.04 K/UL (ref 0–0.04)
IMM GRANULOCYTES NFR BLD AUTO: 0.9 % (ref 0–0.5)
LYMPHOCYTES # BLD AUTO: 0.7 K/UL (ref 1–4.8)
LYMPHOCYTES NFR BLD: 15.4 % (ref 18–48)
MCH RBC QN AUTO: 29 PG (ref 27–31)
MCHC RBC AUTO-ENTMCNC: 32.6 G/DL (ref 32–36)
MCV RBC AUTO: 89 FL (ref 82–98)
MONOCYTES # BLD AUTO: 0.5 K/UL (ref 0.3–1)
MONOCYTES NFR BLD: 11.7 % (ref 4–15)
NEUTROPHILS # BLD AUTO: 2.8 K/UL (ref 1.8–7.7)
NEUTROPHILS NFR BLD: 64.5 % (ref 38–73)
NRBC BLD-RTO: 0 /100 WBC
PLATELET # BLD AUTO: 146 K/UL (ref 150–450)
PMV BLD AUTO: 10.8 FL (ref 9.2–12.9)
POTASSIUM SERPL-SCNC: 4.6 MMOL/L (ref 3.5–5.1)
PROT SERPL-MCNC: 6.7 G/DL (ref 6–8.4)
RBC # BLD AUTO: 4.35 M/UL (ref 4–5.4)
SODIUM SERPL-SCNC: 140 MMOL/L (ref 136–145)
WBC # BLD AUTO: 4.29 K/UL (ref 3.9–12.7)

## 2024-12-11 PROCEDURE — 85025 COMPLETE CBC W/AUTO DIFF WBC: CPT | Performed by: STUDENT IN AN ORGANIZED HEALTH CARE EDUCATION/TRAINING PROGRAM

## 2024-12-11 PROCEDURE — 87517 HEPATITIS B DNA QUANT: CPT | Performed by: STUDENT IN AN ORGANIZED HEALTH CARE EDUCATION/TRAINING PROGRAM

## 2024-12-11 PROCEDURE — 80197 ASSAY OF TACROLIMUS: CPT | Performed by: STUDENT IN AN ORGANIZED HEALTH CARE EDUCATION/TRAINING PROGRAM

## 2024-12-11 PROCEDURE — 80053 COMPREHEN METABOLIC PANEL: CPT | Performed by: STUDENT IN AN ORGANIZED HEALTH CARE EDUCATION/TRAINING PROGRAM

## 2024-12-11 PROCEDURE — 82105 ALPHA-FETOPROTEIN SERUM: CPT | Performed by: STUDENT IN AN ORGANIZED HEALTH CARE EDUCATION/TRAINING PROGRAM

## 2024-12-11 PROCEDURE — 36415 COLL VENOUS BLD VENIPUNCTURE: CPT | Mod: PO | Performed by: STUDENT IN AN ORGANIZED HEALTH CARE EDUCATION/TRAINING PROGRAM

## 2024-12-12 ENCOUNTER — CLINICAL SUPPORT (OUTPATIENT)
Dept: PRIMARY CARE CLINIC | Facility: CLINIC | Age: 64
End: 2024-12-12
Payer: MEDICARE

## 2024-12-12 DIAGNOSIS — Z65.8 PSYCHOSOCIAL STRESSORS: ICD-10-CM

## 2024-12-12 DIAGNOSIS — Z63.8 FAMILY DISCORD: Primary | ICD-10-CM

## 2024-12-12 DIAGNOSIS — F43.21 GRIEF REACTION: ICD-10-CM

## 2024-12-12 DIAGNOSIS — F41.9 ANXIETY: ICD-10-CM

## 2024-12-12 LAB
HBV DNA SERPL NAA+PROBE-ACNC: NOT DETECTED IU/ML
HEPATITIS B VIRUS DNA: NORMAL
TACROLIMUS BLD-MCNC: 4.2 NG/ML (ref 5–15)

## 2024-12-12 PROCEDURE — 99499 UNLISTED E&M SERVICE: CPT | Mod: 95,,, | Performed by: SOCIAL WORKER

## 2024-12-12 SDOH — SOCIAL DETERMINANTS OF HEALTH (SDOH): OTHER SPECIFIED PROBLEMS RELATED TO PRIMARY SUPPORT GROUP: Z63.8
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
## 2024-12-18 ENCOUNTER — TELEPHONE (OUTPATIENT)
Dept: TRANSPLANT | Facility: CLINIC | Age: 64
End: 2024-12-18
Payer: MEDICARE

## 2024-12-18 NOTE — TELEPHONE ENCOUNTER
Pt notified via portal of stable labs and that no medication changes are needed. Repeat labs due 1/13/25 per protocol.   ----- Message from James Brambila MD sent at 12/17/2024 12:25 PM CST -----  Liver tests are stable. No changes in her immunosuppression. Please continue to monitor labs per transplant protocol.

## 2024-12-30 ENCOUNTER — CLINICAL SUPPORT (OUTPATIENT)
Dept: PRIMARY CARE CLINIC | Facility: CLINIC | Age: 64
End: 2024-12-30
Payer: MEDICARE

## 2024-12-30 ENCOUNTER — OFFICE VISIT (OUTPATIENT)
Dept: PRIMARY CARE CLINIC | Facility: CLINIC | Age: 64
End: 2024-12-30
Payer: MEDICARE

## 2024-12-30 VITALS
BODY MASS INDEX: 36.55 KG/M2 | WEIGHT: 233.38 LBS | HEART RATE: 73 BPM | OXYGEN SATURATION: 98 % | DIASTOLIC BLOOD PRESSURE: 70 MMHG | TEMPERATURE: 98 F | SYSTOLIC BLOOD PRESSURE: 120 MMHG

## 2024-12-30 DIAGNOSIS — Z63.8 FAMILY DISCORD: ICD-10-CM

## 2024-12-30 DIAGNOSIS — F41.9 ANXIETY: Primary | ICD-10-CM

## 2024-12-30 DIAGNOSIS — J32.9 BACTERIAL SINUSITIS: Primary | ICD-10-CM

## 2024-12-30 DIAGNOSIS — Z65.8 PSYCHOSOCIAL STRESSORS: ICD-10-CM

## 2024-12-30 DIAGNOSIS — B96.89 BACTERIAL SINUSITIS: Primary | ICD-10-CM

## 2024-12-30 PROCEDURE — 99999 PR PBB SHADOW E&M-EST. PATIENT-LVL III: CPT | Mod: PBBFAC,,, | Performed by: PHYSICIAN ASSISTANT

## 2024-12-30 PROCEDURE — 4010F ACE/ARB THERAPY RXD/TAKEN: CPT | Mod: CPTII,S$GLB,, | Performed by: PHYSICIAN ASSISTANT

## 2024-12-30 PROCEDURE — 3074F SYST BP LT 130 MM HG: CPT | Mod: CPTII,S$GLB,, | Performed by: PHYSICIAN ASSISTANT

## 2024-12-30 PROCEDURE — 3288F FALL RISK ASSESSMENT DOCD: CPT | Mod: CPTII,S$GLB,, | Performed by: PHYSICIAN ASSISTANT

## 2024-12-30 PROCEDURE — 3078F DIAST BP <80 MM HG: CPT | Mod: CPTII,S$GLB,, | Performed by: PHYSICIAN ASSISTANT

## 2024-12-30 PROCEDURE — 1157F ADVNC CARE PLAN IN RCRD: CPT | Mod: CPTII,S$GLB,, | Performed by: PHYSICIAN ASSISTANT

## 2024-12-30 PROCEDURE — 1159F MED LIST DOCD IN RCRD: CPT | Mod: CPTII,S$GLB,, | Performed by: PHYSICIAN ASSISTANT

## 2024-12-30 PROCEDURE — 1101F PT FALLS ASSESS-DOCD LE1/YR: CPT | Mod: CPTII,S$GLB,, | Performed by: PHYSICIAN ASSISTANT

## 2024-12-30 PROCEDURE — 3008F BODY MASS INDEX DOCD: CPT | Mod: CPTII,S$GLB,, | Performed by: PHYSICIAN ASSISTANT

## 2024-12-30 PROCEDURE — 99213 OFFICE O/P EST LOW 20 MIN: CPT | Mod: S$GLB,,, | Performed by: PHYSICIAN ASSISTANT

## 2024-12-30 PROCEDURE — 1160F RVW MEDS BY RX/DR IN RCRD: CPT | Mod: CPTII,S$GLB,, | Performed by: PHYSICIAN ASSISTANT

## 2024-12-30 PROCEDURE — 3044F HG A1C LEVEL LT 7.0%: CPT | Mod: CPTII,S$GLB,, | Performed by: PHYSICIAN ASSISTANT

## 2024-12-30 PROCEDURE — 99499 UNLISTED E&M SERVICE: CPT | Mod: 95,,, | Performed by: SOCIAL WORKER

## 2024-12-30 RX ORDER — DOXYCYCLINE 100 MG/1
100 CAPSULE ORAL EVERY 12 HOURS
Qty: 20 CAPSULE | Refills: 0 | Status: SHIPPED | OUTPATIENT
Start: 2024-12-30 | End: 2025-01-09

## 2024-12-30 SDOH — SOCIAL DETERMINANTS OF HEALTH (SDOH): OTHER SPECIFIED PROBLEMS RELATED TO PRIMARY SUPPORT GROUP: Z63.8

## 2024-12-30 NOTE — PROGRESS NOTES
Subjective:      Patient ID: Yumiko Gonzalez is a 64 y.o. female.    Vitals:  weight is 105.8 kg (233 lb 5.7 oz). Her oral temperature is 97.7 °F (36.5 °C). Her blood pressure is 120/70 and her pulse is 73. Her oxygen saturation is 98%.     Chief Complaint: Sinus Problem    64-year-old female presents for evaluation sinus congestion and cough.  Symptoms started about 2 weeks ago.  At the beginning T-max was 100.4°.  She was around her grandchildren who were sick prior to onset of symptoms.  Symptoms of congestion and cough have been constant since onset but have worsened over the last 3 or 4 days.  She is now coughing up green phlegm.  Patient is transplant patient who was on CellCept and Prograf.      Cough  This is a new problem. The current episode started 1 to 4 weeks ago. The problem has been gradually worsening. The problem occurs every few minutes. The cough is Productive of sputum. Associated symptoms include chills, ear congestion, headaches, nasal congestion, postnasal drip, rhinorrhea, a sore throat, shortness of breath, sweats and wheezing. Pertinent negatives include no chest pain, ear pain, fever, heartburn, hemoptysis, myalgias, rash or weight loss. The symptoms are aggravated by cold air and lying down. Risk factors for lung disease include animal exposure. She has tried OTC cough suppressant and body position changes for the symptoms. The treatment provided mild relief. Her past medical history is significant for asthma, bronchiectasis, bronchitis and pneumonia.       Constitution: Positive for chills. Negative for fever.   HENT:  Positive for postnasal drip and sore throat. Negative for ear pain.    Cardiovascular:  Negative for chest pain.   Respiratory:  Positive for cough, shortness of breath, wheezing and asthma (cold and exercise). Negative for bloody sputum.    Gastrointestinal:  Negative for heartburn.   Musculoskeletal:  Negative for muscle ache.   Skin:  Negative for rash.    Allergic/Immunologic: Positive for asthma (cold and exercise).   Neurological:  Positive for headaches.      Objective:     Physical Exam   Constitutional: She does not appear ill. No distress.   HENT:   Head: Normocephalic and atraumatic.   Ears:   Right Ear: Tympanic membrane, external ear and ear canal normal.   Left Ear: Tympanic membrane, external ear and ear canal normal.   Nose: Right sinus exhibits maxillary sinus tenderness. Right sinus exhibits no frontal sinus tenderness. Left sinus exhibits maxillary sinus tenderness. Left sinus exhibits no frontal sinus tenderness.   Mouth/Throat: Mucous membranes are moist. No oropharyngeal exudate or posterior oropharyngeal erythema. Oropharynx is clear.   Eyes: Conjunctivae are normal. Right eye exhibits no discharge. Left eye exhibits no discharge. Extraocular movement intact   Cardiovascular: Normal rate and regular rhythm.   Pulmonary/Chest: Effort normal and breath sounds normal. She has no wheezes. She has no rhonchi. She has no rales.   Abdominal: Normal appearance.   Musculoskeletal: Normal range of motion.         General: Normal range of motion.   Neurological: no focal deficit. She is alert.   Skin: Skin is warm, dry and not pale. jaundice  Psychiatric: Her behavior is normal. Mood, judgment and thought content normal.   Nursing note and vitals reviewed.      Assessment:     1. Bacterial sinusitis        Plan:       Bacterial sinusitis  -     doxycycline (VIBRAMYCIN) 100 MG Cap; Take 1 capsule (100 mg total) by mouth every 12 (twelve) hours. for 10 days  Dispense: 20 capsule; Refill: 0    Contact office if symptoms do not improve.
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
## 2025-01-13 ENCOUNTER — CLINICAL SUPPORT (OUTPATIENT)
Dept: PRIMARY CARE CLINIC | Facility: CLINIC | Age: 65
End: 2025-01-13
Payer: MEDICARE

## 2025-01-13 DIAGNOSIS — Z65.8 PSYCHOSOCIAL STRESSORS: ICD-10-CM

## 2025-01-13 DIAGNOSIS — Z91.89 LACK OF MOTIVATION: ICD-10-CM

## 2025-01-13 DIAGNOSIS — F41.9 ANXIETY: Primary | ICD-10-CM

## 2025-01-13 DIAGNOSIS — F32.A DEPRESSIVE DISORDER: ICD-10-CM

## 2025-01-13 PROCEDURE — 99499 UNLISTED E&M SERVICE: CPT | Mod: S$GLB,,, | Performed by: SOCIAL WORKER
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
## 2025-01-14 ENCOUNTER — LAB VISIT (OUTPATIENT)
Dept: LAB | Facility: HOSPITAL | Age: 65
End: 2025-01-14
Attending: STUDENT IN AN ORGANIZED HEALTH CARE EDUCATION/TRAINING PROGRAM
Payer: MEDICARE

## 2025-01-14 DIAGNOSIS — Z94.4 S/P LIVER TRANSPLANT: ICD-10-CM

## 2025-01-14 LAB
ALBUMIN SERPL BCP-MCNC: 3.7 G/DL (ref 3.5–5.2)
ALP SERPL-CCNC: 104 U/L (ref 40–150)
ALT SERPL W/O P-5'-P-CCNC: 93 U/L (ref 10–44)
ANION GAP SERPL CALC-SCNC: 7 MMOL/L (ref 8–16)
AST SERPL-CCNC: 58 U/L (ref 10–40)
BASOPHILS # BLD AUTO: 0.03 K/UL (ref 0–0.2)
BASOPHILS NFR BLD: 0.7 % (ref 0–1.9)
BILIRUB SERPL-MCNC: 0.6 MG/DL (ref 0.1–1)
BUN SERPL-MCNC: 24 MG/DL (ref 8–23)
CALCIUM SERPL-MCNC: 9.8 MG/DL (ref 8.7–10.5)
CHLORIDE SERPL-SCNC: 108 MMOL/L (ref 95–110)
CO2 SERPL-SCNC: 25 MMOL/L (ref 23–29)
CREAT SERPL-MCNC: 0.9 MG/DL (ref 0.5–1.4)
DIFFERENTIAL METHOD BLD: ABNORMAL
EOSINOPHIL # BLD AUTO: 0.4 K/UL (ref 0–0.5)
EOSINOPHIL NFR BLD: 8.1 % (ref 0–8)
ERYTHROCYTE [DISTWIDTH] IN BLOOD BY AUTOMATED COUNT: 13.8 % (ref 11.5–14.5)
EST. GFR  (NO RACE VARIABLE): >60 ML/MIN/1.73 M^2
GLUCOSE SERPL-MCNC: 130 MG/DL (ref 70–110)
HCT VFR BLD AUTO: 41.2 % (ref 37–48.5)
HGB BLD-MCNC: 12.9 G/DL (ref 12–16)
IMM GRANULOCYTES # BLD AUTO: 0.04 K/UL (ref 0–0.04)
IMM GRANULOCYTES NFR BLD AUTO: 0.9 % (ref 0–0.5)
LYMPHOCYTES # BLD AUTO: 0.7 K/UL (ref 1–4.8)
LYMPHOCYTES NFR BLD: 15.5 % (ref 18–48)
MCH RBC QN AUTO: 28.2 PG (ref 27–31)
MCHC RBC AUTO-ENTMCNC: 31.3 G/DL (ref 32–36)
MCV RBC AUTO: 90 FL (ref 82–98)
MONOCYTES # BLD AUTO: 0.5 K/UL (ref 0.3–1)
MONOCYTES NFR BLD: 11.8 % (ref 4–15)
NEUTROPHILS # BLD AUTO: 2.7 K/UL (ref 1.8–7.7)
NEUTROPHILS NFR BLD: 63 % (ref 38–73)
NRBC BLD-RTO: 0 /100 WBC
PLATELET # BLD AUTO: 130 K/UL (ref 150–450)
PMV BLD AUTO: 10.9 FL (ref 9.2–12.9)
POTASSIUM SERPL-SCNC: 5.1 MMOL/L (ref 3.5–5.1)
PROT SERPL-MCNC: 6.8 G/DL (ref 6–8.4)
RBC # BLD AUTO: 4.58 M/UL (ref 4–5.4)
SODIUM SERPL-SCNC: 140 MMOL/L (ref 136–145)
WBC # BLD AUTO: 4.32 K/UL (ref 3.9–12.7)

## 2025-01-14 PROCEDURE — 85025 COMPLETE CBC W/AUTO DIFF WBC: CPT | Performed by: STUDENT IN AN ORGANIZED HEALTH CARE EDUCATION/TRAINING PROGRAM

## 2025-01-14 PROCEDURE — 80197 ASSAY OF TACROLIMUS: CPT | Performed by: STUDENT IN AN ORGANIZED HEALTH CARE EDUCATION/TRAINING PROGRAM

## 2025-01-14 PROCEDURE — 36415 COLL VENOUS BLD VENIPUNCTURE: CPT | Mod: PO | Performed by: STUDENT IN AN ORGANIZED HEALTH CARE EDUCATION/TRAINING PROGRAM

## 2025-01-14 PROCEDURE — 80053 COMPREHEN METABOLIC PANEL: CPT | Performed by: STUDENT IN AN ORGANIZED HEALTH CARE EDUCATION/TRAINING PROGRAM

## 2025-01-15 DIAGNOSIS — Z94.4 S/P LIVER TRANSPLANT: ICD-10-CM

## 2025-01-15 LAB — TACROLIMUS BLD-MCNC: 4.8 NG/ML (ref 5–15)

## 2025-01-15 RX ORDER — TACROLIMUS 1 MG/1
CAPSULE ORAL
Qty: 150 CAPSULE | Refills: 11 | Status: SHIPPED | OUTPATIENT
Start: 2025-01-15 | End: 2026-01-15

## 2025-01-15 NOTE — TELEPHONE ENCOUNTER
Pt informed of dose increase and repeat labs needed. Lab closed Monday so requested repeat labs Tuesday 1/21/25.  ----- Message from James Brambila MD sent at 1/15/2025 11:54 AM CST -----  Liver tests slightly elevated. Please increase tac to 3/2 and repeat labs on Monday.

## 2025-01-17 ENCOUNTER — PATIENT MESSAGE (OUTPATIENT)
Dept: TRANSPLANT | Facility: CLINIC | Age: 65
End: 2025-01-17
Payer: MEDICARE

## 2025-01-22 ENCOUNTER — PATIENT MESSAGE (OUTPATIENT)
Dept: TRANSPLANT | Facility: CLINIC | Age: 65
End: 2025-01-22
Payer: MEDICARE

## 2025-01-24 ENCOUNTER — LAB VISIT (OUTPATIENT)
Dept: LAB | Facility: HOSPITAL | Age: 65
End: 2025-01-24
Attending: STUDENT IN AN ORGANIZED HEALTH CARE EDUCATION/TRAINING PROGRAM
Payer: MEDICARE

## 2025-01-24 ENCOUNTER — PATIENT MESSAGE (OUTPATIENT)
Dept: TRANSPLANT | Facility: CLINIC | Age: 65
End: 2025-01-24
Payer: MEDICARE

## 2025-01-24 DIAGNOSIS — R73.9 STEROID-INDUCED HYPERGLYCEMIA: Chronic | ICD-10-CM

## 2025-01-24 DIAGNOSIS — Z94.4 S/P LIVER TRANSPLANT: ICD-10-CM

## 2025-01-24 DIAGNOSIS — T38.0X5A STEROID-INDUCED HYPERGLYCEMIA: Chronic | ICD-10-CM

## 2025-01-24 LAB
ALBUMIN SERPL BCP-MCNC: 3.7 G/DL (ref 3.5–5.2)
ALP SERPL-CCNC: 92 U/L (ref 40–150)
ALT SERPL W/O P-5'-P-CCNC: 93 U/L (ref 10–44)
ANION GAP SERPL CALC-SCNC: 7 MMOL/L (ref 8–16)
AST SERPL-CCNC: 56 U/L (ref 10–40)
BASOPHILS # BLD AUTO: 0.03 K/UL (ref 0–0.2)
BASOPHILS NFR BLD: 0.7 % (ref 0–1.9)
BILIRUB SERPL-MCNC: 0.5 MG/DL (ref 0.1–1)
BUN SERPL-MCNC: 27 MG/DL (ref 8–23)
CALCIUM SERPL-MCNC: 9.5 MG/DL (ref 8.7–10.5)
CHLORIDE SERPL-SCNC: 108 MMOL/L (ref 95–110)
CO2 SERPL-SCNC: 25 MMOL/L (ref 23–29)
CREAT SERPL-MCNC: 1.1 MG/DL (ref 0.5–1.4)
DIFFERENTIAL METHOD BLD: ABNORMAL
EOSINOPHIL # BLD AUTO: 0.3 K/UL (ref 0–0.5)
EOSINOPHIL NFR BLD: 7.1 % (ref 0–8)
ERYTHROCYTE [DISTWIDTH] IN BLOOD BY AUTOMATED COUNT: 14.1 % (ref 11.5–14.5)
EST. GFR  (NO RACE VARIABLE): 55.8 ML/MIN/1.73 M^2
ESTIMATED AVG GLUCOSE: 114 MG/DL (ref 68–131)
GLUCOSE SERPL-MCNC: 121 MG/DL (ref 70–110)
HBA1C MFR BLD: 5.6 % (ref 4–5.6)
HCT VFR BLD AUTO: 38.7 % (ref 37–48.5)
HGB BLD-MCNC: 12.5 G/DL (ref 12–16)
IMM GRANULOCYTES # BLD AUTO: 0.08 K/UL (ref 0–0.04)
IMM GRANULOCYTES NFR BLD AUTO: 1.8 % (ref 0–0.5)
LYMPHOCYTES # BLD AUTO: 0.7 K/UL (ref 1–4.8)
LYMPHOCYTES NFR BLD: 16.4 % (ref 18–48)
MCH RBC QN AUTO: 28.6 PG (ref 27–31)
MCHC RBC AUTO-ENTMCNC: 32.3 G/DL (ref 32–36)
MCV RBC AUTO: 89 FL (ref 82–98)
MONOCYTES # BLD AUTO: 0.6 K/UL (ref 0.3–1)
MONOCYTES NFR BLD: 12.2 % (ref 4–15)
NEUTROPHILS # BLD AUTO: 2.8 K/UL (ref 1.8–7.7)
NEUTROPHILS NFR BLD: 61.8 % (ref 38–73)
NRBC BLD-RTO: 0 /100 WBC
PLATELET # BLD AUTO: 140 K/UL (ref 150–450)
PMV BLD AUTO: 10.9 FL (ref 9.2–12.9)
POTASSIUM SERPL-SCNC: 5 MMOL/L (ref 3.5–5.1)
PROT SERPL-MCNC: 6.6 G/DL (ref 6–8.4)
RBC # BLD AUTO: 4.37 M/UL (ref 4–5.4)
SODIUM SERPL-SCNC: 140 MMOL/L (ref 136–145)
WBC # BLD AUTO: 4.51 K/UL (ref 3.9–12.7)

## 2025-01-24 PROCEDURE — 36415 COLL VENOUS BLD VENIPUNCTURE: CPT | Mod: PO | Performed by: FAMILY MEDICINE

## 2025-01-24 PROCEDURE — 85025 COMPLETE CBC W/AUTO DIFF WBC: CPT | Performed by: STUDENT IN AN ORGANIZED HEALTH CARE EDUCATION/TRAINING PROGRAM

## 2025-01-24 PROCEDURE — 80197 ASSAY OF TACROLIMUS: CPT | Performed by: STUDENT IN AN ORGANIZED HEALTH CARE EDUCATION/TRAINING PROGRAM

## 2025-01-24 PROCEDURE — 80053 COMPREHEN METABOLIC PANEL: CPT | Performed by: STUDENT IN AN ORGANIZED HEALTH CARE EDUCATION/TRAINING PROGRAM

## 2025-01-24 PROCEDURE — 83036 HEMOGLOBIN GLYCOSYLATED A1C: CPT | Performed by: FAMILY MEDICINE

## 2025-01-25 ENCOUNTER — PATIENT MESSAGE (OUTPATIENT)
Dept: TRANSPLANT | Facility: CLINIC | Age: 65
End: 2025-01-25
Payer: MEDICARE

## 2025-01-25 LAB — TACROLIMUS BLD-MCNC: 6.1 NG/ML (ref 5–15)

## 2025-01-27 ENCOUNTER — CLINICAL SUPPORT (OUTPATIENT)
Dept: PRIMARY CARE CLINIC | Facility: CLINIC | Age: 65
End: 2025-01-27
Payer: MEDICARE

## 2025-01-27 ENCOUNTER — TELEPHONE (OUTPATIENT)
Dept: TRANSPLANT | Facility: CLINIC | Age: 65
End: 2025-01-27
Payer: MEDICARE

## 2025-01-27 DIAGNOSIS — Z65.8 PSYCHOSOCIAL STRESSORS: Primary | ICD-10-CM

## 2025-01-27 DIAGNOSIS — F43.21 GRIEF REACTION: ICD-10-CM

## 2025-01-27 DIAGNOSIS — F41.9 ANXIETY: ICD-10-CM

## 2025-01-27 PROCEDURE — 99499 UNLISTED E&M SERVICE: CPT | Mod: 95,,, | Performed by: SOCIAL WORKER
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
## 2025-01-27 NOTE — TELEPHONE ENCOUNTER
Returned call. Discussed with agent that all disability forms need to go through the disability office for completion. Number provided.   ----- Message from Nancy Garcia sent at 1/24/2025  4:55 PM CST -----  Regarding: FW: Consult/Advisory  Contact: [phone]    ----- Message -----  From: Ivon Valero MA  Sent: 1/24/2025   4:53 PM CST  To: [facility]  Subject: FW: Consult/Advisory                               ----- Message -----  From: Semaj Mendoza  Sent: 1/24/2025   2:39 PM CST  To: [facility] Staff  Subject: Consult/Advisory                                 Consult/Advisory     Name Of Caller: Release point         Contact Preference: [phone]     Nature of call: Calling to see if a records request they sent has been received

## 2025-01-29 ENCOUNTER — TELEPHONE (OUTPATIENT)
Dept: TRANSPLANT | Facility: CLINIC | Age: 65
End: 2025-01-29
Payer: MEDICARE

## 2025-01-29 NOTE — TELEPHONE ENCOUNTER
Repeat labs scheduled 1/30/25.  ----- Message from James Brambila MD sent at 1/29/2025 12:34 PM CST -----  Liver tests elevated. Noted that she was ill recently and unable to hold down all IS. Agree with repeat labs this week.

## 2025-01-30 ENCOUNTER — LAB VISIT (OUTPATIENT)
Dept: LAB | Facility: HOSPITAL | Age: 65
End: 2025-01-30
Attending: STUDENT IN AN ORGANIZED HEALTH CARE EDUCATION/TRAINING PROGRAM
Payer: MEDICARE

## 2025-01-30 ENCOUNTER — OFFICE VISIT (OUTPATIENT)
Dept: PRIMARY CARE CLINIC | Facility: CLINIC | Age: 65
End: 2025-01-30
Payer: MEDICARE

## 2025-01-30 VITALS
BODY MASS INDEX: 37.83 KG/M2 | DIASTOLIC BLOOD PRESSURE: 76 MMHG | HEIGHT: 67 IN | SYSTOLIC BLOOD PRESSURE: 128 MMHG | HEART RATE: 84 BPM | WEIGHT: 241.06 LBS | OXYGEN SATURATION: 98 %

## 2025-01-30 DIAGNOSIS — C22.0 HCC (HEPATOCELLULAR CARCINOMA): Chronic | ICD-10-CM

## 2025-01-30 DIAGNOSIS — E66.01 SEVERE OBESITY: ICD-10-CM

## 2025-01-30 DIAGNOSIS — K52.9 GASTROENTERITIS: ICD-10-CM

## 2025-01-30 DIAGNOSIS — F33.1 MAJOR DEPRESSIVE DISORDER, RECURRENT, MODERATE: ICD-10-CM

## 2025-01-30 DIAGNOSIS — Z94.4 S/P LIVER TRANSPLANT: ICD-10-CM

## 2025-01-30 DIAGNOSIS — R73.9 STEROID-INDUCED HYPERGLYCEMIA: Chronic | ICD-10-CM

## 2025-01-30 DIAGNOSIS — D84.9 IMMUNOSUPPRESSED STATUS: Chronic | ICD-10-CM

## 2025-01-30 DIAGNOSIS — I10 PRIMARY HYPERTENSION: Primary | Chronic | ICD-10-CM

## 2025-01-30 DIAGNOSIS — J47.9 BRONCHIECTASIS WITHOUT COMPLICATION: ICD-10-CM

## 2025-01-30 DIAGNOSIS — T38.0X5A STEROID-INDUCED HYPERGLYCEMIA: Chronic | ICD-10-CM

## 2025-01-30 LAB
ALBUMIN SERPL BCP-MCNC: 3.6 G/DL (ref 3.5–5.2)
ALP SERPL-CCNC: 98 U/L (ref 40–150)
ALT SERPL W/O P-5'-P-CCNC: 59 U/L (ref 10–44)
ANION GAP SERPL CALC-SCNC: 7 MMOL/L (ref 8–16)
AST SERPL-CCNC: 30 U/L (ref 10–40)
BASOPHILS # BLD AUTO: 0.04 K/UL (ref 0–0.2)
BASOPHILS NFR BLD: 0.7 % (ref 0–1.9)
BILIRUB SERPL-MCNC: 0.7 MG/DL (ref 0.1–1)
BUN SERPL-MCNC: 29 MG/DL (ref 8–23)
CALCIUM SERPL-MCNC: 10 MG/DL (ref 8.7–10.5)
CHLORIDE SERPL-SCNC: 110 MMOL/L (ref 95–110)
CO2 SERPL-SCNC: 25 MMOL/L (ref 23–29)
CREAT SERPL-MCNC: 1 MG/DL (ref 0.5–1.4)
DIFFERENTIAL METHOD BLD: ABNORMAL
EOSINOPHIL # BLD AUTO: 0.2 K/UL (ref 0–0.5)
EOSINOPHIL NFR BLD: 3.9 % (ref 0–8)
ERYTHROCYTE [DISTWIDTH] IN BLOOD BY AUTOMATED COUNT: 14.2 % (ref 11.5–14.5)
EST. GFR  (NO RACE VARIABLE): >60 ML/MIN/1.73 M^2
GLUCOSE SERPL-MCNC: 139 MG/DL (ref 70–110)
HCT VFR BLD AUTO: 38.8 % (ref 37–48.5)
HGB BLD-MCNC: 12 G/DL (ref 12–16)
IMM GRANULOCYTES # BLD AUTO: 0.12 K/UL (ref 0–0.04)
IMM GRANULOCYTES NFR BLD AUTO: 2 % (ref 0–0.5)
LYMPHOCYTES # BLD AUTO: 0.8 K/UL (ref 1–4.8)
LYMPHOCYTES NFR BLD: 13.3 % (ref 18–48)
MCH RBC QN AUTO: 28.4 PG (ref 27–31)
MCHC RBC AUTO-ENTMCNC: 30.9 G/DL (ref 32–36)
MCV RBC AUTO: 92 FL (ref 82–98)
MONOCYTES # BLD AUTO: 0.7 K/UL (ref 0.3–1)
MONOCYTES NFR BLD: 11.1 % (ref 4–15)
NEUTROPHILS # BLD AUTO: 4.1 K/UL (ref 1.8–7.7)
NEUTROPHILS NFR BLD: 69 % (ref 38–73)
NRBC BLD-RTO: 0 /100 WBC
PLATELET # BLD AUTO: 134 K/UL (ref 150–450)
PMV BLD AUTO: 10.7 FL (ref 9.2–12.9)
POTASSIUM SERPL-SCNC: 5.2 MMOL/L (ref 3.5–5.1)
PROT SERPL-MCNC: 6.8 G/DL (ref 6–8.4)
RBC # BLD AUTO: 4.23 M/UL (ref 4–5.4)
SODIUM SERPL-SCNC: 142 MMOL/L (ref 136–145)
WBC # BLD AUTO: 5.94 K/UL (ref 3.9–12.7)

## 2025-01-30 PROCEDURE — 3074F SYST BP LT 130 MM HG: CPT | Mod: CPTII,S$GLB,, | Performed by: FAMILY MEDICINE

## 2025-01-30 PROCEDURE — 85025 COMPLETE CBC W/AUTO DIFF WBC: CPT | Performed by: STUDENT IN AN ORGANIZED HEALTH CARE EDUCATION/TRAINING PROGRAM

## 2025-01-30 PROCEDURE — 4010F ACE/ARB THERAPY RXD/TAKEN: CPT | Mod: CPTII,S$GLB,, | Performed by: FAMILY MEDICINE

## 2025-01-30 PROCEDURE — 80053 COMPREHEN METABOLIC PANEL: CPT | Performed by: STUDENT IN AN ORGANIZED HEALTH CARE EDUCATION/TRAINING PROGRAM

## 2025-01-30 PROCEDURE — 99999 PR PBB SHADOW E&M-EST. PATIENT-LVL III: CPT | Mod: PBBFAC,,, | Performed by: FAMILY MEDICINE

## 2025-01-30 PROCEDURE — 1125F AMNT PAIN NOTED PAIN PRSNT: CPT | Mod: CPTII,S$GLB,, | Performed by: FAMILY MEDICINE

## 2025-01-30 PROCEDURE — 80197 ASSAY OF TACROLIMUS: CPT | Performed by: STUDENT IN AN ORGANIZED HEALTH CARE EDUCATION/TRAINING PROGRAM

## 2025-01-30 PROCEDURE — 3044F HG A1C LEVEL LT 7.0%: CPT | Mod: CPTII,S$GLB,, | Performed by: FAMILY MEDICINE

## 2025-01-30 PROCEDURE — 1159F MED LIST DOCD IN RCRD: CPT | Mod: CPTII,S$GLB,, | Performed by: FAMILY MEDICINE

## 2025-01-30 PROCEDURE — 99214 OFFICE O/P EST MOD 30 MIN: CPT | Mod: S$GLB,,, | Performed by: FAMILY MEDICINE

## 2025-01-30 PROCEDURE — 1160F RVW MEDS BY RX/DR IN RCRD: CPT | Mod: CPTII,S$GLB,, | Performed by: FAMILY MEDICINE

## 2025-01-30 PROCEDURE — 3008F BODY MASS INDEX DOCD: CPT | Mod: CPTII,S$GLB,, | Performed by: FAMILY MEDICINE

## 2025-01-30 PROCEDURE — 36415 COLL VENOUS BLD VENIPUNCTURE: CPT | Mod: PO | Performed by: STUDENT IN AN ORGANIZED HEALTH CARE EDUCATION/TRAINING PROGRAM

## 2025-01-30 PROCEDURE — 1123F ACP DISCUSS/DSCN MKR DOCD: CPT | Mod: CPTII,S$GLB,, | Performed by: FAMILY MEDICINE

## 2025-01-30 PROCEDURE — 3078F DIAST BP <80 MM HG: CPT | Mod: CPTII,S$GLB,, | Performed by: FAMILY MEDICINE

## 2025-01-30 NOTE — PROGRESS NOTES
Primary Care Provider Appointment   [facility]       Patient ID: Yumiko Gonzalez is a 64 y.o. female.    ASSESSMENT/PLAN by Problem List:    Assessment & Plan    IMPRESSION:  - Continued monitoring of hypertension, which is currently stable and well-controlled on current medications  - Noted improvement in steroid-induced hypoglycemia since discontinuation of steroids post-transplant  - Assessed liver transplant status as stable; patient to continue follow-up with transplant team  - Evaluated drug-induced immunosuppressant status as clinically stable without signs of opportunistic infection  - Considered recent elevation in liver enzymes in context of recent GI illness; awaiting results of follow-up labs, her transplant physician is following.  - Assessed fasting glucose levels (140s-170 range) as indicative of diabetic range despite A1C of 5.6%  - Determined to monitor A1C closely, with goal to keep below 6 to manage pre-diabetes and protect transplanted liver    SEVERE OBESITY:  - Monitor patient's history of severe obesity associated with liver disease.  - Ms. Gonzalez has lost weight but needs to continue focusing on healthy nutrition.  - Evaluate fasting blood sugar, which are in the diabetic range (usually 140s, one instance of 170), despite recent A1C of 5.6% indicating pre-diabetes.  - Emphasize immediate reduction in sugar, white starch, and carbohydrate intake to manage weight, blood sugar, and prevent progression to full-blown diabetes, protecting the new liver.  - Recheck A1C in 3 months to ensure it's not increasing.    S/P LIVER TRANSPLANT:  - Continue Prograf and Cellcept per transplant team recommendations.  - Monitor post-liver transplant status; condition is stable and patient is doing well.  - Recent tacrolimus levels have normalized after initial low readings.  - Advise patient to avoid areas with high concentrations of ill individuals due to immunosuppressed  status.  - Instruct patient to contact the office for any acute symptoms or signs of infection.  - Continue regular follow-ups with the transplant team.    HCC (HEPATOCELLULAR CARCINOMA):  - Monitor patient's history of hepatocellular carcinoma, treated by liver removal and transplant.  - Condition is stable, but continuing surveillance is necessary.  - Closely monitor liver enzymes due to recent increase from normal to 90-something.    MAJOR DEPRESSIVE DISORDER, RECURRENT, MODERATE:  - Monitor patient's history of recurrent moderate depression, which has improved on current medications but requires ongoing surveillance.  - Continue current medications for depression.    BRONCHIECTASIS WITHOUT COMPLICATION:  - Monitor patient's chronic bronchiectasis periodically for changes.  - Condition is clinically stable.  - Continue management under the care of a pulmonologist.    LIVER TRANSPLANT FOLLOW-UP:  - Monitor the patient's history of non-alcoholic steatohepatitis, which led to liver transplant.    DIABETES MANAGEMENT:  - Note that the patient checks blood sugar once or twice weekly, with fasting levels usually in the 140s (diabetic range) and one instance of 170.  - Educate the patient on the importance of dietary changes, explaining that sugar cravings may take 3 to 7 days to subside.  - Discuss potential long-term risks of full-blown diabetes on the transplanted liver.  - Monitor the patient's history of steroid-induced hypoglycemia, which has significantly improved since discontinuing higher dose steroids after transplant.    HYPERTENSION:  - Evaluate hypertension as stable and well-controlled.  - Continue current medications for hypertension.    GASTROINTESTINAL SYMPTOMS:  - Note the patient's recent episode of severe nausea, vomiting, and diarrhea lasting 3 days., now resolved    BACK PAIN:  - Evaluate the patient's reported back pain and spasms in the lower back.  - Note that the patient can only sit for a  short time before experiencing discomfort.    TREMOR:  - Evaluate the patient's reported difficulty with fine manipulation due to tremor.    LONG-TERM DISABILITY EVALUATION  Patient remains out on disability since her liver transplant.  She does have multiple continuing factors that contribute to this.  She has healed from the standpoint of her surgery and there is no evidence of return of her hepatocellular carcinoma.  She does remain on immunosuppressant drugs.  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  Given the fact that she is still on to immunosuppressant drugs I would advise against working in situation where she is continuously in contact with patient's/customers increasing her risk of infection, especially in healthcare facility.  She also has chronic back pain which affects her mobility.  She can not bend, twist or reach.  She can not stand for extended periods of time.  But she also can not sit still for extended periods of time.  She frequently requires elevation of her legs and repositioning of her back.  I have completed more specific information to the best of my knowledge on her disability forms.  If there are additional questions or concerns she may need consultation with an occupational medications specialist    FOLLOW UP:  - Schedule follow-up visit in 3 months.           CODING, ORDERS, AND ADDITIONAL DOCUMENTATION RELATED TO THIS ENCOUNTER:  (note that the diagnoses and clinical summaries above were generated with the assistance of [employer] software and represents my assessment and plan.  This software does not always generate the precise nor most specific diagnosis codes.  Therefore the specific diagnosis codes and orders for billing purposes are detailed below along with any additional documentation if needed)      1. Primary hypertension  -     Lipid Panel; Future; Expected date: 01/30/2025  -     Comprehensive Metabolic Panel; Future; Expected date:  01/30/2025    2. Steroid-induced hyperglycemia  -     Hemoglobin A1C; Future; Expected date: 01/30/2025    3. S/P liver transplant    4. Immunosuppressed status    5. HCC (hepatocellular carcinoma)  Overview:  HCC s/p radioembolization 6/2023 , prior to transplant      6. Major depressive disorder, recurrent, moderate    7. Bronchiectasis without complication    8. Severe obesity    9. Gastroenteritis           Follow Up:  3 Months    Advance Care Planning     Date: 01/30/2025    ACP Reviewed/No Changes  Voluntary advance care planning discussion had today with patient. Previously completed HCPOA and LW in electronic medical record is current, no changes made.      A total of n/a min was spent on advance care planning, goals of care discussion, emotional support, formulating and communicating prognosis and exploring burden/benefit of various approaches of treatment. This discussion occurred on a fully voluntary basis with the verbal consent of the patient and/or family.               Subjective:       HPI    Patient is a/an 64 y.o.  female     For complete problem list, past medical history, surgical history, social history, etc., see appropriate section in the electronic medical record    History of Present Illness    CHIEF COMPLAINT:  Ms. Gonzalez presents today for follow up of multiple chronic conditions including hypertension.    LIVER HISTORY:  She has a history of non-alcoholic steatohepatitis and hepatocellular carcinoma, which were treated with liver removal and transplant. She reports recent elevation in liver enzymes from normal to approximately 90, though she is doing well overall.    RECENT GI ILLNESS:  She experienced severe GI illness one week ago with sudden onset of severe nausea and vomiting lasting 24 hours, followed by diarrhea that continued for three days.    BLOOD SUGAR:  She has a history of steroid-induced hypoglycemia, which has improved since discontinuation of higher dose steroids  post-transplant. Her A1C is stable at 5.6%. She reports fasting blood sugar typically in the 140s, with one isolated reading of 170.    MUSCULOSKELETAL AND NEUROLOGICAL:  She experiences lower back pain with occasional spasms when sitting for extended periods. She reports difficulty with fine manipulation and significant trouble removing jar lids and bottle caps due to tremor.    OTHER MEDICAL HISTORY:  She has a history of bronchiectasis and severe obesity associated with liver disease. She has recurrent moderate depression which has improved on current medications. She recently had a sinus infection treated with doxycycline on December 30th.      ROS:  Gastrointestinal: +nausea, +vomiting, +diarrhea  Musculoskeletal: +back pain  Neurological: +tremors       Review of Systems   Constitutional:  Negative for activity change and unexpected weight change.   HENT:  Positive for rhinorrhea. Negative for hearing loss and trouble swallowing.    Eyes:  Negative for discharge and visual disturbance.   Respiratory:  Negative for chest tightness and wheezing.    Cardiovascular:  Negative for chest pain and palpitations.   Gastrointestinal:  Positive for diarrhea. Negative for blood in stool, constipation and vomiting.   Endocrine: Negative for polydipsia and polyuria.   Genitourinary:  Negative for difficulty urinating, dysuria, hematuria and menstrual problem.   Musculoskeletal:  Positive for arthralgias, joint swelling and neck pain.   Neurological:  Positive for headaches. Negative for weakness.   Psychiatric/Behavioral:  Positive for dysphoric mood. Negative for confusion.        Objective     Physical Exam  Vitals reviewed.   Constitutional:       General: She is not in acute distress.     Appearance: She is well-developed. She is obese. She is not ill-appearing, toxic-appearing or diaphoretic.   HENT:      Head: Normocephalic and atraumatic.      Mouth/Throat:      Pharynx: Oropharynx is clear.   Eyes:      General: No  "scleral icterus.     Conjunctiva/sclera: Conjunctivae normal.   Cardiovascular:      Rate and Rhythm: Normal rate and regular rhythm.      Heart sounds: Normal heart sounds. No murmur heard.     No friction rub.      Comments: Trace - 1+ ankle edema at most bilaterally  Pulmonary:      Effort: Pulmonary effort is normal. No respiratory distress.      Breath sounds: Normal breath sounds.   Abdominal:      Comments:  Abdomen is soft, bowel sounds are normal.   Skin:     General: Skin is dry.   Neurological:      Mental Status: She is alert and oriented to person, place, and time.      Comments: Antalgic gait   Psychiatric:         Mood and Affect: Mood normal.         Behavior: Behavior normal.       Vitals:    01/30/25 1054   BP: 128/76   BP Location: Right arm   Patient Position: Sitting   Pulse: 84   SpO2: 98%   Weight: 109.4 kg (241 lb 1.2 oz)   Height: 5' 7" (1.702 m)     Physical Exam                This note was generated with the assistance of ambient listening technology. Verbal consent was obtained by the patient and accompanying visitor(s) for the recording of patient appointment to facilitate this note. I attest to having reviewed and edited the generated note for accuracy, though some syntax or spelling errors may persist. Please contact the author of this note for any clarification.  Parts of the note were also generated with voice recognition software.  Occasionally this software will misinterpreted words or phrases    "

## 2025-01-31 LAB — TACROLIMUS BLD-MCNC: 4.1 NG/ML (ref 5–15)

## 2025-02-06 DIAGNOSIS — K21.9 GASTROESOPHAGEAL REFLUX DISEASE, UNSPECIFIED WHETHER ESOPHAGITIS PRESENT: ICD-10-CM

## 2025-02-06 DIAGNOSIS — F33.1 MODERATE EPISODE OF RECURRENT MAJOR DEPRESSIVE DISORDER: ICD-10-CM

## 2025-02-06 DIAGNOSIS — Z94.4 S/P LIVER TRANSPLANT: ICD-10-CM

## 2025-02-06 DIAGNOSIS — I10 PRIMARY HYPERTENSION: Primary | Chronic | ICD-10-CM

## 2025-02-06 DIAGNOSIS — I10 PRIMARY HYPERTENSION: ICD-10-CM

## 2025-02-06 RX ORDER — OLMESARTAN MEDOXOMIL 20 MG/1
20 TABLET ORAL
Qty: 90 TABLET | Refills: 3 | Status: SHIPPED | OUTPATIENT
Start: 2025-02-06

## 2025-02-06 RX ORDER — TACROLIMUS 1 MG/1
3 CAPSULE ORAL EVERY 12 HOURS
Qty: 180 CAPSULE | Refills: 11 | Status: SHIPPED | OUTPATIENT
Start: 2025-02-06 | End: 2025-02-06 | Stop reason: SDUPTHER

## 2025-02-06 RX ORDER — OMEPRAZOLE 20 MG/1
20 CAPSULE, DELAYED RELEASE ORAL
Qty: 100 CAPSULE | Refills: 2 | Status: SHIPPED | OUTPATIENT
Start: 2025-02-06

## 2025-02-06 RX ORDER — TACROLIMUS 1 MG/1
3 CAPSULE ORAL EVERY 12 HOURS
Qty: 30 CAPSULE | Refills: 0 | Status: SHIPPED | OUTPATIENT
Start: 2025-02-06 | End: 2025-03-03 | Stop reason: SDUPTHER

## 2025-02-06 RX ORDER — PROPRANOLOL HYDROCHLORIDE 20 MG/1
20 TABLET ORAL 2 TIMES DAILY
Qty: 200 TABLET | Refills: 2 | Status: SHIPPED | OUTPATIENT
Start: 2025-02-06

## 2025-02-06 RX ORDER — BUPROPION HYDROCHLORIDE 150 MG/1
300 TABLET ORAL
Qty: 200 TABLET | Refills: 2 | Status: SHIPPED | OUTPATIENT
Start: 2025-02-06

## 2025-02-06 NOTE — TELEPHONE ENCOUNTER
Pt informed of medication change and repeat labs needed.  ----- Message from James Brambila MD sent at 2/5/2025  8:25 PM CST -----  Liver tests elevated but improved. Please increase tac to 3/3 and repeat labs in 1-2 weeks.

## 2025-02-06 NOTE — TELEPHONE ENCOUNTER
LOV: 1/30/25  NOV: 4/29/25  LAST REFILLS: BUPROPION 12/16/2024;  PROPRANOLOL 1/17/2025;  OMEPRAZOLE 12/26/2024

## 2025-02-10 ENCOUNTER — PATIENT MESSAGE (OUTPATIENT)
Dept: TRANSPLANT | Facility: CLINIC | Age: 65
End: 2025-02-10
Payer: MEDICARE

## 2025-02-10 ENCOUNTER — PATIENT MESSAGE (OUTPATIENT)
Dept: PRIMARY CARE CLINIC | Facility: CLINIC | Age: 65
End: 2025-02-10
Payer: MEDICARE

## 2025-02-13 ENCOUNTER — LAB VISIT (OUTPATIENT)
Dept: LAB | Facility: HOSPITAL | Age: 65
End: 2025-02-13
Attending: STUDENT IN AN ORGANIZED HEALTH CARE EDUCATION/TRAINING PROGRAM
Payer: MEDICARE

## 2025-02-13 ENCOUNTER — PATIENT MESSAGE (OUTPATIENT)
Dept: PRIMARY CARE CLINIC | Facility: CLINIC | Age: 65
End: 2025-02-13
Payer: MEDICARE

## 2025-02-13 ENCOUNTER — PATIENT MESSAGE (OUTPATIENT)
Dept: TRANSPLANT | Facility: CLINIC | Age: 65
End: 2025-02-13
Payer: MEDICARE

## 2025-02-13 DIAGNOSIS — Z94.4 S/P LIVER TRANSPLANT: ICD-10-CM

## 2025-02-13 LAB
ALBUMIN SERPL BCP-MCNC: 3.9 G/DL (ref 3.5–5.2)
ALP SERPL-CCNC: 169 U/L (ref 40–150)
ALT SERPL W/O P-5'-P-CCNC: 248 U/L (ref 10–44)
ANION GAP SERPL CALC-SCNC: 9 MMOL/L (ref 8–16)
AST SERPL-CCNC: 210 U/L (ref 10–40)
BILIRUB SERPL-MCNC: 0.5 MG/DL (ref 0.1–1)
BUN SERPL-MCNC: 30 MG/DL (ref 8–23)
CALCIUM SERPL-MCNC: 10.1 MG/DL (ref 8.7–10.5)
CHLORIDE SERPL-SCNC: 107 MMOL/L (ref 95–110)
CO2 SERPL-SCNC: 24 MMOL/L (ref 23–29)
CREAT SERPL-MCNC: 1 MG/DL (ref 0.5–1.4)
EST. GFR  (NO RACE VARIABLE): >60 ML/MIN/1.73 M^2
GLUCOSE SERPL-MCNC: 167 MG/DL (ref 70–110)
POTASSIUM SERPL-SCNC: 4.4 MMOL/L (ref 3.5–5.1)
PROT SERPL-MCNC: 7.1 G/DL (ref 6–8.4)
SODIUM SERPL-SCNC: 140 MMOL/L (ref 136–145)

## 2025-02-13 PROCEDURE — 36415 COLL VENOUS BLD VENIPUNCTURE: CPT | Mod: PO | Performed by: STUDENT IN AN ORGANIZED HEALTH CARE EDUCATION/TRAINING PROGRAM

## 2025-02-13 PROCEDURE — 80053 COMPREHEN METABOLIC PANEL: CPT | Performed by: STUDENT IN AN ORGANIZED HEALTH CARE EDUCATION/TRAINING PROGRAM

## 2025-02-13 RX ORDER — PREDNISONE 20 MG/1
40 TABLET ORAL DAILY
Qty: 10 TABLET | Refills: 0 | Status: SHIPPED | OUTPATIENT
Start: 2025-02-13 | End: 2025-02-18

## 2025-02-13 NOTE — TELEPHONE ENCOUNTER
Spoke with pt regarding her i[employer] message, she reports that she hasn't changed soaps or laundry detergents.  Pt reports that she had a temp of 99.3 last night.  Pt reports that she took Claritin at 0600 and she is still itching like crazy.  Pt reports that she does not have a rash.  Pt's Prograf was recently increased, but other than that she has not had any changes in her medications.  Pt denies any food allergies and denies being allergic to pollen.  Pt reports that she has not taken any Benadryl, but she is not opposed to taking it.      Consulted with Dr. Bolaños and he advised that he is going to send in a Rx of Prednisone and that pt can also take a dose of Benadryl.  Additionally, Dr. Bolaños advised that pt should reach out to Dr. Brambila's office to let them know that she is experiencing this uncontrollable itching especially since her Prograf dosage was recently increased.  Advised pt of this and that she should wait to take the Benadryl until after she has picked up her Rx of prednisone, pt verbalized understanding to all.

## 2025-02-14 ENCOUNTER — LAB VISIT (OUTPATIENT)
Dept: LAB | Facility: HOSPITAL | Age: 65
End: 2025-02-14
Attending: STUDENT IN AN ORGANIZED HEALTH CARE EDUCATION/TRAINING PROGRAM
Payer: MEDICARE

## 2025-02-14 ENCOUNTER — PATIENT MESSAGE (OUTPATIENT)
Dept: INTERVENTIONAL RADIOLOGY/VASCULAR | Facility: CLINIC | Age: 65
End: 2025-02-14
Payer: MEDICARE

## 2025-02-14 ENCOUNTER — CLINICAL SUPPORT (OUTPATIENT)
Dept: PRIMARY CARE CLINIC | Facility: CLINIC | Age: 65
End: 2025-02-14
Payer: MEDICARE

## 2025-02-14 DIAGNOSIS — F41.9 ANXIETY: ICD-10-CM

## 2025-02-14 DIAGNOSIS — Z79.60 LONG-TERM USE OF IMMUNOSUPPRESSANT MEDICATION: ICD-10-CM

## 2025-02-14 DIAGNOSIS — Z94.4 S/P LIVER TRANSPLANT: ICD-10-CM

## 2025-02-14 DIAGNOSIS — Z94.4 LIVER TRANSPLANTED: ICD-10-CM

## 2025-02-14 DIAGNOSIS — Z65.8 PSYCHOSOCIAL STRESSORS: ICD-10-CM

## 2025-02-14 DIAGNOSIS — F33.1 MODERATE EPISODE OF RECURRENT MAJOR DEPRESSIVE DISORDER: Primary | ICD-10-CM

## 2025-02-14 PROCEDURE — 36415 COLL VENOUS BLD VENIPUNCTURE: CPT | Mod: PO | Performed by: STUDENT IN AN ORGANIZED HEALTH CARE EDUCATION/TRAINING PROGRAM

## 2025-02-14 PROCEDURE — 80197 ASSAY OF TACROLIMUS: CPT | Performed by: STUDENT IN AN ORGANIZED HEALTH CARE EDUCATION/TRAINING PROGRAM

## 2025-02-14 RX ORDER — COLESTIPOL HYDROCHLORIDE 1 G/1
2 TABLET ORAL 2 TIMES DAILY
Qty: 120 TABLET | Refills: 1 | Status: SHIPPED | OUTPATIENT
Start: 2025-02-14 | End: 2026-02-14

## 2025-02-14 RX ORDER — MYCOPHENOLATE MOFETIL 250 MG/1
1000 CAPSULE ORAL 2 TIMES DAILY
Qty: 240 CAPSULE | Refills: 11 | Status: SHIPPED | OUTPATIENT
Start: 2025-02-14 | End: 2026-02-14

## 2025-02-14 NOTE — TELEPHONE ENCOUNTER
"Discussed lab results with Dr. Brambila.  Per MD "please increase MMF to 1000mg BID, arrange US and biopsy, and repeat labs on Monday".  Called pt to discuss.  Instructed to start taking Cellcept 1000 mg twice daily and repeat labs on Monday.  Discussed need to arrange for liver ultrasound and biopsy.  Pt verbalized understanding and reports itching since Thursday morning.  Notified Dr. Brambila, per MD okay to sent RX for colestid.  Pt updated via phone.  No further action at this time.  Message routed to SARBJIT Avery pt's coordinator.  ----- Message from [employer] sent at 2/14/2025  2:14 PM CST -----  Regarding: Consult/Advisory  Contact: [phone]  Consult/Advisory     Name Of Caller: pt         Contact Preference:   [phone]     Nature of call: Pt would like to speak with Afshan, she advise that her liver enzyme levels are over 200 and would like to know how to procuress before the weekend. Please call to advise thank you  "
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
## 2025-02-15 LAB — TACROLIMUS BLD-MCNC: 5.6 NG/ML (ref 5–15)

## 2025-02-17 ENCOUNTER — LAB VISIT (OUTPATIENT)
Dept: LAB | Facility: HOSPITAL | Age: 65
End: 2025-02-17
Attending: STUDENT IN AN ORGANIZED HEALTH CARE EDUCATION/TRAINING PROGRAM
Payer: MEDICARE

## 2025-02-17 DIAGNOSIS — Z94.4 S/P LIVER TRANSPLANT: ICD-10-CM

## 2025-02-17 DIAGNOSIS — K76.9 LIVER DISEASE, UNSPECIFIED: Primary | ICD-10-CM

## 2025-02-17 LAB
BASOPHILS # BLD AUTO: 0.07 K/UL (ref 0–0.2)
BASOPHILS NFR BLD: 1.1 % (ref 0–1.9)
DIFFERENTIAL METHOD BLD: ABNORMAL
EOSINOPHIL # BLD AUTO: 0.2 K/UL (ref 0–0.5)
EOSINOPHIL NFR BLD: 2.9 % (ref 0–8)
ERYTHROCYTE [DISTWIDTH] IN BLOOD BY AUTOMATED COUNT: 14.4 % (ref 11.5–14.5)
HCT VFR BLD AUTO: 42.3 % (ref 37–48.5)
HGB BLD-MCNC: 13.1 G/DL (ref 12–16)
IMM GRANULOCYTES # BLD AUTO: 0.16 K/UL (ref 0–0.04)
IMM GRANULOCYTES NFR BLD AUTO: 2.5 % (ref 0–0.5)
LYMPHOCYTES # BLD AUTO: 0.9 K/UL (ref 1–4.8)
LYMPHOCYTES NFR BLD: 13.8 % (ref 18–48)
MCH RBC QN AUTO: 28.4 PG (ref 27–31)
MCHC RBC AUTO-ENTMCNC: 31 G/DL (ref 32–36)
MCV RBC AUTO: 92 FL (ref 82–98)
MONOCYTES # BLD AUTO: 0.7 K/UL (ref 0.3–1)
MONOCYTES NFR BLD: 10.5 % (ref 4–15)
NEUTROPHILS # BLD AUTO: 4.4 K/UL (ref 1.8–7.7)
NEUTROPHILS NFR BLD: 69.2 % (ref 38–73)
NRBC BLD-RTO: 0 /100 WBC
PLATELET # BLD AUTO: 156 K/UL (ref 150–450)
PMV BLD AUTO: 10 FL (ref 9.2–12.9)
RBC # BLD AUTO: 4.62 M/UL (ref 4–5.4)
WBC # BLD AUTO: 6.31 K/UL (ref 3.9–12.7)

## 2025-02-17 PROCEDURE — 36415 COLL VENOUS BLD VENIPUNCTURE: CPT | Mod: PO | Performed by: STUDENT IN AN ORGANIZED HEALTH CARE EDUCATION/TRAINING PROGRAM

## 2025-02-17 PROCEDURE — 80053 COMPREHEN METABOLIC PANEL: CPT | Performed by: STUDENT IN AN ORGANIZED HEALTH CARE EDUCATION/TRAINING PROGRAM

## 2025-02-17 PROCEDURE — 85025 COMPLETE CBC W/AUTO DIFF WBC: CPT | Performed by: STUDENT IN AN ORGANIZED HEALTH CARE EDUCATION/TRAINING PROGRAM

## 2025-02-17 PROCEDURE — 80197 ASSAY OF TACROLIMUS: CPT | Performed by: STUDENT IN AN ORGANIZED HEALTH CARE EDUCATION/TRAINING PROGRAM

## 2025-02-17 RX ORDER — LIDOCAINE HYDROCHLORIDE 10 MG/ML
1 INJECTION, SOLUTION EPIDURAL; INFILTRATION; INTRACAUDAL; PERINEURAL ONCE
OUTPATIENT
Start: 2025-02-17 | End: 2025-02-17

## 2025-02-18 ENCOUNTER — TELEPHONE (OUTPATIENT)
Dept: TRANSPLANT | Facility: CLINIC | Age: 65
End: 2025-02-18
Payer: MEDICARE

## 2025-02-18 DIAGNOSIS — Z94.4 LIVER TRANSPLANTED: Primary | ICD-10-CM

## 2025-02-18 DIAGNOSIS — Z94.4 S/P LIVER TRANSPLANT: ICD-10-CM

## 2025-02-18 DIAGNOSIS — L29.9 ITCHING: ICD-10-CM

## 2025-02-18 DIAGNOSIS — R79.89 ELEVATED LFTS: ICD-10-CM

## 2025-02-18 LAB
ALBUMIN SERPL BCP-MCNC: 3.6 G/DL (ref 3.5–5.2)
ALP SERPL-CCNC: 109 U/L (ref 40–150)
ALT SERPL W/O P-5'-P-CCNC: 68 U/L (ref 10–44)
ANION GAP SERPL CALC-SCNC: 11 MMOL/L (ref 8–16)
AST SERPL-CCNC: 24 U/L (ref 10–40)
BILIRUB SERPL-MCNC: 0.6 MG/DL (ref 0.1–1)
BUN SERPL-MCNC: 25 MG/DL (ref 8–23)
CALCIUM SERPL-MCNC: 9.4 MG/DL (ref 8.7–10.5)
CHLORIDE SERPL-SCNC: 106 MMOL/L (ref 95–110)
CO2 SERPL-SCNC: 22 MMOL/L (ref 23–29)
CREAT SERPL-MCNC: 1 MG/DL (ref 0.5–1.4)
EST. GFR  (NO RACE VARIABLE): >60 ML/MIN/1.73 M^2
GLUCOSE SERPL-MCNC: 142 MG/DL (ref 70–110)
POTASSIUM SERPL-SCNC: 4.3 MMOL/L (ref 3.5–5.1)
PROT SERPL-MCNC: 6.4 G/DL (ref 6–8.4)
SODIUM SERPL-SCNC: 139 MMOL/L (ref 136–145)
TACROLIMUS BLD-MCNC: 5.4 NG/ML (ref 5–15)

## 2025-02-18 NOTE — TELEPHONE ENCOUNTER
Received secure chat from Dr. Brambila -  Her liver tests are better. Can cancel biopsy. Also can you please arrange MRCP? sometimes a biliary stricture can cause intermittently elevated LFTs like she's been having.    Pt informed of above. Repeat labs requested 2/24/25. MRCP order placed and pt aware that MA will call to schedule.

## 2025-02-19 ENCOUNTER — RESULTS FOLLOW-UP (OUTPATIENT)
Dept: TRANSPLANT | Facility: CLINIC | Age: 65
End: 2025-02-19
Payer: MEDICARE

## 2025-02-19 NOTE — TELEPHONE ENCOUNTER
----- Message from James Brambila MD sent at 2/19/2025 12:34 PM CST -----  Liver tests significantly improved. Cancelling liver biopsy and arranging MRCP. No changes in IS. Please repeat labs next week.  ----- Message -----  From: Tim, [employer] Lab Interface  Sent: 2/17/2025   6:38 PM CST  To: James Brambila MD

## 2025-02-21 ENCOUNTER — HOSPITAL ENCOUNTER (OUTPATIENT)
Dept: RADIOLOGY | Facility: HOSPITAL | Age: 65
Discharge: HOME | End: 2025-02-21
Attending: STUDENT IN AN ORGANIZED HEALTH CARE EDUCATION/TRAINING PROGRAM
Payer: MEDICARE

## 2025-02-21 ENCOUNTER — TELEPHONE (OUTPATIENT)
Dept: TRANSPLANT | Facility: CLINIC | Age: 65
End: 2025-02-21
Payer: MEDICARE

## 2025-02-21 DIAGNOSIS — L29.9 ITCHING: ICD-10-CM

## 2025-02-21 DIAGNOSIS — Z94.4 S/P LIVER TRANSPLANT: ICD-10-CM

## 2025-02-21 DIAGNOSIS — R79.89 ELEVATED LFTS: ICD-10-CM

## 2025-02-21 DIAGNOSIS — Z94.4 LIVER TRANSPLANTED: ICD-10-CM

## 2025-02-21 PROCEDURE — 74181 MRI ABDOMEN W/O CONTRAST: CPT | Mod: TC

## 2025-02-24 ENCOUNTER — LAB VISIT (OUTPATIENT)
Dept: LAB | Facility: HOSPITAL | Age: 65
End: 2025-02-24
Attending: STUDENT IN AN ORGANIZED HEALTH CARE EDUCATION/TRAINING PROGRAM
Payer: MEDICARE

## 2025-02-24 DIAGNOSIS — Z94.4 S/P LIVER TRANSPLANT: ICD-10-CM

## 2025-02-24 LAB
ALBUMIN SERPL BCP-MCNC: 3.7 G/DL (ref 3.5–5.2)
ALP SERPL-CCNC: 110 U/L (ref 40–150)
ALT SERPL W/O P-5'-P-CCNC: 59 U/L (ref 10–44)
ANION GAP SERPL CALC-SCNC: 9 MMOL/L (ref 8–16)
AST SERPL-CCNC: 32 U/L (ref 10–40)
BASOPHILS # BLD AUTO: 0.05 K/UL (ref 0–0.2)
BASOPHILS NFR BLD: 1 % (ref 0–1.9)
BILIRUB SERPL-MCNC: 0.6 MG/DL (ref 0.1–1)
BUN SERPL-MCNC: 25 MG/DL (ref 8–23)
CALCIUM SERPL-MCNC: 9.7 MG/DL (ref 8.7–10.5)
CHLORIDE SERPL-SCNC: 107 MMOL/L (ref 95–110)
CO2 SERPL-SCNC: 22 MMOL/L (ref 23–29)
CREAT SERPL-MCNC: 1 MG/DL (ref 0.5–1.4)
DIFFERENTIAL METHOD BLD: ABNORMAL
EOSINOPHIL # BLD AUTO: 0.3 K/UL (ref 0–0.5)
EOSINOPHIL NFR BLD: 5.5 % (ref 0–8)
ERYTHROCYTE [DISTWIDTH] IN BLOOD BY AUTOMATED COUNT: 14.2 % (ref 11.5–14.5)
EST. GFR  (NO RACE VARIABLE): >60 ML/MIN/1.73 M^2
GLUCOSE SERPL-MCNC: 137 MG/DL (ref 70–110)
HCT VFR BLD AUTO: 40.2 % (ref 37–48.5)
HGB BLD-MCNC: 13 G/DL (ref 12–16)
IMM GRANULOCYTES # BLD AUTO: 0.11 K/UL (ref 0–0.04)
IMM GRANULOCYTES NFR BLD AUTO: 2.2 % (ref 0–0.5)
LYMPHOCYTES # BLD AUTO: 0.8 K/UL (ref 1–4.8)
LYMPHOCYTES NFR BLD: 15.3 % (ref 18–48)
MCH RBC QN AUTO: 29.3 PG (ref 27–31)
MCHC RBC AUTO-ENTMCNC: 32.3 G/DL (ref 32–36)
MCV RBC AUTO: 91 FL (ref 82–98)
MONOCYTES # BLD AUTO: 0.6 K/UL (ref 0.3–1)
MONOCYTES NFR BLD: 11.2 % (ref 4–15)
NEUTROPHILS # BLD AUTO: 3.3 K/UL (ref 1.8–7.7)
NEUTROPHILS NFR BLD: 64.8 % (ref 38–73)
NRBC BLD-RTO: 0 /100 WBC
PLATELET # BLD AUTO: 140 K/UL (ref 150–450)
PMV BLD AUTO: 11 FL (ref 9.2–12.9)
POTASSIUM SERPL-SCNC: 4.6 MMOL/L (ref 3.5–5.1)
PROT SERPL-MCNC: 6.7 G/DL (ref 6–8.4)
RBC # BLD AUTO: 4.44 M/UL (ref 4–5.4)
SODIUM SERPL-SCNC: 138 MMOL/L (ref 136–145)
WBC # BLD AUTO: 5.1 K/UL (ref 3.9–12.7)

## 2025-02-24 PROCEDURE — 85025 COMPLETE CBC W/AUTO DIFF WBC: CPT | Performed by: STUDENT IN AN ORGANIZED HEALTH CARE EDUCATION/TRAINING PROGRAM

## 2025-02-24 PROCEDURE — 80053 COMPREHEN METABOLIC PANEL: CPT | Performed by: STUDENT IN AN ORGANIZED HEALTH CARE EDUCATION/TRAINING PROGRAM

## 2025-02-24 PROCEDURE — 36415 COLL VENOUS BLD VENIPUNCTURE: CPT | Mod: PO | Performed by: STUDENT IN AN ORGANIZED HEALTH CARE EDUCATION/TRAINING PROGRAM

## 2025-02-24 PROCEDURE — 80197 ASSAY OF TACROLIMUS: CPT | Performed by: STUDENT IN AN ORGANIZED HEALTH CARE EDUCATION/TRAINING PROGRAM

## 2025-02-25 LAB — TACROLIMUS BLD-MCNC: 6.6 NG/ML (ref 5–15)

## 2025-02-28 ENCOUNTER — TELEPHONE (OUTPATIENT)
Dept: HEPATOLOGY | Facility: CLINIC | Age: 65
End: 2025-02-28
Payer: MEDICARE

## 2025-02-28 ENCOUNTER — CLINICAL SUPPORT (OUTPATIENT)
Dept: PRIMARY CARE CLINIC | Facility: CLINIC | Age: 65
End: 2025-02-28
Payer: MEDICARE

## 2025-02-28 DIAGNOSIS — Z59.9 FINANCIAL DIFFICULTIES: ICD-10-CM

## 2025-02-28 DIAGNOSIS — F41.9 ANXIETY: Primary | ICD-10-CM

## 2025-02-28 DIAGNOSIS — Z63.8 FAMILY DISCORD: ICD-10-CM

## 2025-02-28 DIAGNOSIS — Z65.8 PSYCHOSOCIAL STRESSORS: ICD-10-CM

## 2025-02-28 DIAGNOSIS — F33.1 MODERATE EPISODE OF RECURRENT MAJOR DEPRESSIVE DISORDER: ICD-10-CM

## 2025-02-28 SDOH — SOCIAL DETERMINANTS OF HEALTH (SDOH): PROBLEM RELATED TO HOUSING AND ECONOMIC CIRCUMSTANCES, UNSPECIFIED: Z59.9

## 2025-02-28 SDOH — SOCIAL DETERMINANTS OF HEALTH (SDOH): OTHER SPECIFIED PROBLEMS RELATED TO PRIMARY SUPPORT GROUP: Z63.8

## 2025-02-28 NOTE — PROGRESS NOTES
Individual Psychotherapy (PhD/LCSW)    2/28/2025    Site:  [place] [id number]      Chief complaint/reason for encounter: depression     MENTAL HEALTH STATUS EXAM  General Appearance:  casually dressed, sad   Speech: normal tone, normal pitch, normal volume, slowed      Level of Cooperation: cooperative      Thought Processes: Goal directed    Mood: Emotional  - tearful, sad       Thought Content: normal, no suicidality, no homicidality, delusions, or paranoia,    Affect: congruent and appropriate, restricted   Orientation: Oriented x3   Memory: Did not formally assess    Attention Span & Concentration: {Did not formally assess -  appears WNL   Fund of General Knowledge: Did not formally assess - appears WNL   Abstract Reasoning: Did not formally assess - appears WNL   Judgment & Insight: Did not formally assess - appears WNL      Language  Did not formally assess - appears WNL      Risk parameters:  Patient reports no suicidal ideation  Patient reports no homicidal ideation  Patient reports no self-injurious behavior  Patient reports no violent behavior    Fife:  Termination   2.  Areas of growth   3. Recent  psychosocial stressors    History of present condition/content of session:   Clinician began the session explaining that clinician is leaving the clinic. She verbalized understanding and was very supportive.  She said it is a difficult time for that clinician to be leaving as she is dealing with psychosocial stressors.  Pt was given contact information for state clinic.     She shared her concerns about the loss of her family secondary to lack of communication and jumping to conclusions. She described the circumstances that occurred recently with one daughter that brought all this up again. She said she can't keep up with the rules of her 2 daughters and her daughter in law about information sharing. She said she told them that she is staying out of it and stop putting her [pt] in the middle. She acknowledged  "that while she had reduced her stress, it has her feeling very sad that she can never have any more family Keyla' or other family events. Because they can't get along.  And without prompting, she identified "they won't put it all aside to please me." Meaning that they don't see her wants/needs are not important.     Shifting focus to positive things she is doing (which is improvement with seeing herself as having value and worth, and therefore, doing things for herself, and improvement with motivation).  She voiced plans of some things she would like to do, and was encouraged to focus on what she is doing. And not beat herself up for what she is not doing.     Therapeutic Intervention:   Pt was assessed for present condition and areas of clinical concern. Termination of therapy secondary to clinician leaving [facility]. Included in the session was a review of pt's achievement in meeting goals. Pt was given positive reinforcement for identified areas of growth: setting healthy boundaries, and improved communication. Pt. was able to identify example of how positive changes in identified factors can alleviate depression and improve anxious sx.    Patient's response to intervention:  The patient's response to intervention is accepting.    Progress toward goals and other mental status changes:  The patient's progress toward goals is good.    Diagnosis:   Major Depressive Disorder  Anxiety  Family Discord  Financial Px     Plan:  N/A this is the last session with clinician.  Pt was given contact information for [facility] for follow up.     Return to clinic:  N/A    Length of Service (minutes): 60      "

## 2025-02-28 NOTE — TELEPHONE ENCOUNTER
Patient: Yumiko Gonzalez       MRN: [id number]      : 1960     Age: 64 y.o.  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  She has had intermittently elevated LFTs for the last 6 weeks.  MRCP shows biliary changes likely relating to artifact, but stenosis not excluded.    Imaging to be reviewed:  MRCP 2025    HCC Treatment History:  Y90, OLT    Platelets:   Lab Results   Component Value Date/Time     (L) 2025 11:07 AM     Creatinine:   Lab Results   Component Value Date/Time    CREATININE 1.0 2025 11:07 AM     Bilirubin:   Lab Results   Component Value Date/Time    BILITOT 0.6 2025 11:07 AM     AFP Last 3 each if available:   Lab Results   Component Value Date/Time    AFP <2.0 2024 10:49 AM    AFP <2.0 2024 12:22 PM    AFP <2.0 06/10/2024 11:27 AM       MELD: Computed MELD 3.0 unavailable. One or more values for this score either were not found within the given timeframe or did not fit some other criterion.  Computed MELD-Na unavailable. One or more values for this score either were not found within the given timeframe or did not fit some other criterion.      Plan:     Follow-up Provider:

## 2025-03-03 ENCOUNTER — PATIENT MESSAGE (OUTPATIENT)
Dept: TRANSPLANT | Facility: CLINIC | Age: 65
End: 2025-03-03
Payer: MEDICARE

## 2025-03-03 ENCOUNTER — CONFERENCE (OUTPATIENT)
Dept: TRANSPLANT | Facility: CLINIC | Age: 65
End: 2025-03-03
Payer: MEDICARE

## 2025-03-03 DIAGNOSIS — Z94.4 S/P LIVER TRANSPLANT: ICD-10-CM

## 2025-03-03 RX ORDER — TACROLIMUS 1 MG/1
3 CAPSULE ORAL EVERY 12 HOURS
Qty: 180 CAPSULE | Refills: 11 | Status: SHIPPED | OUTPATIENT
Start: 2025-03-03 | End: 2026-03-03

## 2025-03-03 NOTE — TELEPHONE ENCOUNTER
Patient: Yumiko Gonzalez       MRN: [id number]      : 1960     Age: 64 y.o.  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  She has had intermittently elevated LFTs for the last 6 weeks.  MRCP shows biliary changes likely relating to artifact, but stenosis not excluded.    Imaging to be reviewed:  MRCP 2025    HCC Treatment History:  Y90, OLT    Platelets:   Lab Results   Component Value Date/Time     (L) 2025 11:07 AM     Creatinine:   Lab Results   Component Value Date/Time    CREATININE 1.0 2025 11:07 AM     Bilirubin:   Lab Results   Component Value Date/Time    BILITOT 0.6 2025 11:07 AM     AFP Last 3 each if available:   Lab Results   Component Value Date/Time    AFP <2.0 2024 10:49 AM    AFP <2.0 2024 12:22 PM    AFP <2.0 06/10/2024 11:27 AM       MELD: Computed MELD 3.0 unavailable. One or more values for this score either were not found within the given timeframe or did not fit some other criterion.  Computed MELD-Na unavailable. One or more values for this score either were not found within the given timeframe or did not fit some other criterion.      Plan: MRCP 25: No suspicious lesion on DWI. Agree with diagnostic read. Mild intrahepatic biliary ductal dilation centrally without peripheral dilation. Focus of apparent narrowing just central to cystic duct remnant which corresponds to suture better visualized on recent CT. Findings could reflect true stricture versus artifact from adjacent suture/post-operative changes.      Committee Discussion: A little prominence of the intrahepatic bile duct centrally, but definitely no peripheral dilation. Difficult to tell if  it's artifact related to the adjacent suture line or true stricture. Liver numbers are normalizing.     Plan: Repeat LFTs. If evidence of rising alk phos, consider getting ercp with biopsy    Note forwarded to Afshan Avery RN to coordinate scheduling.     Follow-up Provider: James Brambila MD

## 2025-03-10 ENCOUNTER — LAB VISIT (OUTPATIENT)
Dept: LAB | Facility: HOSPITAL | Age: 65
End: 2025-03-10
Attending: STUDENT IN AN ORGANIZED HEALTH CARE EDUCATION/TRAINING PROGRAM
Payer: MEDICARE

## 2025-03-10 DIAGNOSIS — Z94.4 S/P LIVER TRANSPLANT: ICD-10-CM

## 2025-03-10 LAB
ALBUMIN SERPL BCP-MCNC: 3.8 G/DL (ref 3.5–5.2)
ALP SERPL-CCNC: 98 U/L (ref 40–150)
ALT SERPL W/O P-5'-P-CCNC: 52 U/L (ref 10–44)
ANION GAP SERPL CALC-SCNC: 11 MMOL/L (ref 8–16)
AST SERPL-CCNC: 40 U/L (ref 10–40)
BILIRUB SERPL-MCNC: 0.4 MG/DL (ref 0.1–1)
BUN SERPL-MCNC: 33 MG/DL (ref 8–23)
CALCIUM SERPL-MCNC: 10 MG/DL (ref 8.7–10.5)
CHLORIDE SERPL-SCNC: 109 MMOL/L (ref 95–110)
CO2 SERPL-SCNC: 21 MMOL/L (ref 23–29)
CREAT SERPL-MCNC: 1.1 MG/DL (ref 0.5–1.4)
EST. GFR  (NO RACE VARIABLE): 55.8 ML/MIN/1.73 M^2
GLUCOSE SERPL-MCNC: 158 MG/DL (ref 70–110)
POTASSIUM SERPL-SCNC: 4.8 MMOL/L (ref 3.5–5.1)
PROT SERPL-MCNC: 6.6 G/DL (ref 6–8.4)
SODIUM SERPL-SCNC: 141 MMOL/L (ref 136–145)

## 2025-03-10 PROCEDURE — 80197 ASSAY OF TACROLIMUS: CPT | Performed by: STUDENT IN AN ORGANIZED HEALTH CARE EDUCATION/TRAINING PROGRAM

## 2025-03-10 PROCEDURE — 85025 COMPLETE CBC W/AUTO DIFF WBC: CPT | Performed by: STUDENT IN AN ORGANIZED HEALTH CARE EDUCATION/TRAINING PROGRAM

## 2025-03-10 PROCEDURE — 36415 COLL VENOUS BLD VENIPUNCTURE: CPT | Mod: PO | Performed by: STUDENT IN AN ORGANIZED HEALTH CARE EDUCATION/TRAINING PROGRAM

## 2025-03-10 PROCEDURE — 80053 COMPREHEN METABOLIC PANEL: CPT | Performed by: STUDENT IN AN ORGANIZED HEALTH CARE EDUCATION/TRAINING PROGRAM

## 2025-03-11 ENCOUNTER — PATIENT MESSAGE (OUTPATIENT)
Dept: TRANSPLANT | Facility: CLINIC | Age: 65
End: 2025-03-11
Payer: MEDICARE

## 2025-03-11 LAB
BASOPHILS # BLD AUTO: 0.03 K/UL (ref 0–0.2)
BASOPHILS NFR BLD: 0.6 % (ref 0–1.9)
DIFFERENTIAL METHOD BLD: ABNORMAL
EOSINOPHIL # BLD AUTO: 0.3 K/UL (ref 0–0.5)
EOSINOPHIL NFR BLD: 5.6 % (ref 0–8)
ERYTHROCYTE [DISTWIDTH] IN BLOOD BY AUTOMATED COUNT: 14.2 % (ref 11.5–14.5)
HCT VFR BLD AUTO: 39 % (ref 37–48.5)
HGB BLD-MCNC: 12.3 G/DL (ref 12–16)
IMM GRANULOCYTES # BLD AUTO: 0.06 K/UL (ref 0–0.04)
IMM GRANULOCYTES NFR BLD AUTO: 1.2 % (ref 0–0.5)
LYMPHOCYTES # BLD AUTO: 0.7 K/UL (ref 1–4.8)
LYMPHOCYTES NFR BLD: 13.9 % (ref 18–48)
MCH RBC QN AUTO: 29.3 PG (ref 27–31)
MCHC RBC AUTO-ENTMCNC: 31.5 G/DL (ref 32–36)
MCV RBC AUTO: 93 FL (ref 82–98)
MONOCYTES # BLD AUTO: 0.5 K/UL (ref 0.3–1)
MONOCYTES NFR BLD: 10.1 % (ref 4–15)
NEUTROPHILS # BLD AUTO: 3.5 K/UL (ref 1.8–7.7)
NEUTROPHILS NFR BLD: 68.6 % (ref 38–73)
NRBC BLD-RTO: 0 /100 WBC
PLATELET # BLD AUTO: 157 K/UL (ref 150–450)
PMV BLD AUTO: 11 FL (ref 9.2–12.9)
RBC # BLD AUTO: 4.2 M/UL (ref 4–5.4)
TACROLIMUS BLD-MCNC: 7.2 NG/ML (ref 5–15)
WBC # BLD AUTO: 5.03 K/UL (ref 3.9–12.7)

## 2025-03-24 DIAGNOSIS — Z00.00 ENCOUNTER FOR MEDICARE ANNUAL WELLNESS EXAM: ICD-10-CM

## 2025-03-31 ENCOUNTER — LAB VISIT (OUTPATIENT)
Dept: LAB | Facility: HOSPITAL | Age: 65
End: 2025-03-31
Attending: STUDENT IN AN ORGANIZED HEALTH CARE EDUCATION/TRAINING PROGRAM
Payer: MEDICARE

## 2025-03-31 ENCOUNTER — PATIENT MESSAGE (OUTPATIENT)
Dept: OPTOMETRY | Facility: CLINIC | Age: 65
End: 2025-03-31
Payer: MEDICARE

## 2025-03-31 DIAGNOSIS — Z94.4 S/P LIVER TRANSPLANT: ICD-10-CM

## 2025-03-31 LAB
ABSOLUTE EOSINOPHIL (OHS): 0.24 K/UL
ABSOLUTE MONOCYTE (OHS): 0.55 K/UL (ref 0.3–1)
ABSOLUTE NEUTROPHIL COUNT (OHS): 3.21 K/UL (ref 1.8–7.7)
ALBUMIN SERPL BCP-MCNC: 3.7 G/DL (ref 3.5–5.2)
ALP SERPL-CCNC: 80 UNIT/L (ref 40–150)
ALT SERPL W/O P-5'-P-CCNC: 17 UNIT/L (ref 10–44)
ANION GAP (OHS): 8 MMOL/L (ref 8–16)
AST SERPL-CCNC: 16 UNIT/L (ref 11–45)
BASOPHILS # BLD AUTO: 0.03 K/UL
BASOPHILS NFR BLD AUTO: 0.6 %
BILIRUB SERPL-MCNC: 0.7 MG/DL (ref 0.1–1)
BUN SERPL-MCNC: 33 MG/DL (ref 8–23)
CALCIUM SERPL-MCNC: 9.7 MG/DL (ref 8.7–10.5)
CHLORIDE SERPL-SCNC: 106 MMOL/L (ref 95–110)
CO2 SERPL-SCNC: 21 MMOL/L (ref 23–29)
CREAT SERPL-MCNC: 1.2 MG/DL (ref 0.5–1.4)
ERYTHROCYTE [DISTWIDTH] IN BLOOD BY AUTOMATED COUNT: 13.7 % (ref 11.5–14.5)
GFR SERPLBLD CREATININE-BSD FMLA CKD-EPI: 50 ML/MIN/1.73/M2
GLUCOSE SERPL-MCNC: 160 MG/DL (ref 70–110)
HCT VFR BLD AUTO: 36.5 % (ref 37–48.5)
HGB BLD-MCNC: 11.6 GM/DL (ref 12–16)
IMM GRANULOCYTES # BLD AUTO: 0.04 K/UL (ref 0–0.04)
IMM GRANULOCYTES NFR BLD AUTO: 0.8 % (ref 0–0.5)
LYMPHOCYTES # BLD AUTO: 0.68 K/UL (ref 1–4.8)
MCH RBC QN AUTO: 28.6 PG (ref 27–31)
MCHC RBC AUTO-ENTMCNC: 31.8 G/DL (ref 32–36)
MCV RBC AUTO: 90 FL (ref 82–98)
NUCLEATED RBC (/100WBC) (OHS): 0 /100 WBC
PLATELET # BLD AUTO: 149 K/UL (ref 150–450)
PMV BLD AUTO: 10.8 FL (ref 9.2–12.9)
POTASSIUM SERPL-SCNC: 4.5 MMOL/L (ref 3.5–5.1)
PROT SERPL-MCNC: 6.4 GM/DL (ref 6–8.4)
RBC # BLD AUTO: 4.05 M/UL (ref 4–5.4)
RELATIVE EOSINOPHIL (OHS): 5.1 %
RELATIVE LYMPHOCYTE (OHS): 14.3 % (ref 18–48)
RELATIVE MONOCYTE (OHS): 11.6 % (ref 4–15)
RELATIVE NEUTROPHIL (OHS): 67.6 % (ref 38–73)
SODIUM SERPL-SCNC: 135 MMOL/L (ref 136–145)
WBC # BLD AUTO: 4.75 K/UL (ref 3.9–12.7)

## 2025-03-31 PROCEDURE — 80197 ASSAY OF TACROLIMUS: CPT

## 2025-03-31 PROCEDURE — 82310 ASSAY OF CALCIUM: CPT

## 2025-03-31 PROCEDURE — 85025 COMPLETE CBC W/AUTO DIFF WBC: CPT

## 2025-03-31 PROCEDURE — 36415 COLL VENOUS BLD VENIPUNCTURE: CPT | Mod: PO

## 2025-04-01 LAB — TACROLIMUS BLD-MCNC: 6.1 NG/ML (ref 5–15)

## 2025-04-15 ENCOUNTER — OFFICE VISIT (OUTPATIENT)
Dept: OBSTETRICS AND GYNECOLOGY | Facility: CLINIC | Age: 65
End: 2025-04-15
Payer: MEDICARE

## 2025-04-15 VITALS — WEIGHT: 247.81 LBS | BODY MASS INDEX: 38.81 KG/M2

## 2025-04-15 DIAGNOSIS — Z01.419 WOMEN'S ANNUAL ROUTINE GYNECOLOGICAL EXAMINATION: Primary | ICD-10-CM

## 2025-04-15 DIAGNOSIS — Z12.31 BREAST CANCER SCREENING BY MAMMOGRAM: ICD-10-CM

## 2025-04-15 PROCEDURE — 1126F AMNT PAIN NOTED NONE PRSNT: CPT | Mod: CPTII,S$GLB,, | Performed by: OBSTETRICS & GYNECOLOGY

## 2025-04-15 PROCEDURE — 99999 PR PBB SHADOW E&M-EST. PATIENT-LVL III: CPT | Mod: PBBFAC,,, | Performed by: OBSTETRICS & GYNECOLOGY

## 2025-04-15 PROCEDURE — 4010F ACE/ARB THERAPY RXD/TAKEN: CPT | Mod: CPTII,S$GLB,, | Performed by: OBSTETRICS & GYNECOLOGY

## 2025-04-15 PROCEDURE — 1157F ADVNC CARE PLAN IN RCRD: CPT | Mod: CPTII,S$GLB,, | Performed by: OBSTETRICS & GYNECOLOGY

## 2025-04-15 PROCEDURE — 3044F HG A1C LEVEL LT 7.0%: CPT | Mod: CPTII,S$GLB,, | Performed by: OBSTETRICS & GYNECOLOGY

## 2025-04-15 PROCEDURE — 1159F MED LIST DOCD IN RCRD: CPT | Mod: CPTII,S$GLB,, | Performed by: OBSTETRICS & GYNECOLOGY

## 2025-04-15 PROCEDURE — G0101 CA SCREEN;PELVIC/BREAST EXAM: HCPCS | Mod: S$GLB,,, | Performed by: OBSTETRICS & GYNECOLOGY

## 2025-04-15 NOTE — PROGRESS NOTES
"Chief Complaint   Patient presents with    Annual Exam       History and Physical:  No LMP recorded. Patient is postmenopausal.       Yumiko Gonzalez is a 64 y.o.   female who presents today for her routine annual GYN exam. The patient has no Gynecology complaints today. S/p liver transplant since last visit, no bowel or bladder complaints. 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, postitive antismooth muscle antibody    Blood transfusion     Bronchitis     bronchospasm on occasion     Cancer 1987    carcinoma in situ    Cervical neck pain with evidence of disc disease 2012    can bend neck    Cirrhosis     non-alcoholic, compensated    Colon polyps 2012    Depression 2012    Hx panic attacks    Diverticular disease 2012    never diverticulitis    Fatty liver 2012    Fibrocystic breast     Fine tremor     hands,chronic    Hepatosplenomegaly     History of abdominal paracentesis 2023    8.7L of fluid drained    Hypertension 2013    Knee pain, bilateral     chronic, with hands,feet,fingers also painful    Lesion of right lung     Liver disease     "partially sclerosed liver" per pt    Migraines past Hx    Nephrolithiasis     stone episode 2021    Pleural effusion     small, compensated, good bilateral breath sounds per Dr Suresh GUTIERRES (postoperative nausea and vomiting)     twice after childbirth    Postpartum depression     Splenomegaly     Thrombocytopenia     Tremors of nervous system        Past Surgical History:   Procedure Laterality Date    ADENOIDECTOMY      CERVICAL CONIZATION   W/ LASER       SECTION      x3    " CHOLECYSTECTOMY      COLONOSCOPY N/A 12/12/2016    Procedure: COLONOSCOPY;  Surgeon: James Palomares MD;  Location: [facility] ENDO;  Service: Endoscopy;  Laterality: N/A;    COLONOSCOPY N/A 02/03/2022    Procedure: COLONOSCOPY;  Surgeon: James Palomares MD;  Location: [facility] ENDO;  Service: Endoscopy;  Laterality: N/A;    EMBOLIZATION N/A 07/24/2018    Procedure: EMBOLIZATION, BLOOD VESSEL;  Surgeon: Joselin Surgeon;  Location: [facility];  Service: Radiology;  Laterality: N/A;    ENDOMETRIAL ABLATION      ESOPHAGOGASTRODUODENOSCOPY N/A 01/28/2020    Procedure: ESOPHAGOGASTRODUODENOSCOPY (EGD);  Surgeon: James Palomares MD;  Location: [facility] ENDO;  Service: Endoscopy;  Laterality: N/A;    ESOPHAGOGASTRODUODENOSCOPY N/A 03/08/2022    Procedure: EGD (ESOPHAGOGASTRODUODENOSCOPY);  Surgeon: James Palomares MD;  Location: [facility];  Service: Endoscopy;  Laterality: N/A;    ESOPHAGOGASTRODUODENOSCOPY N/A 11/25/2024    Procedure: EGD (ESOPHAGOGASTRODUODENOSCOPY);  Surgeon: James Palomares MD;  Location: [facility];  Service: Gastroenterology;  Laterality: N/A;    LIVER BIOPSY      LIVER TRANSPLANT N/A 12/09/2023    Procedure: TRANSPLANT, LIVER;  Surgeon: Gurpreet Raza MD;  Location: [address];  Service: Transplant;  Laterality: N/A;    PELVIC LAPAROSCOPY      TONSILLECTOMY      TUBAL LIGATION      UPPER GASTROINTESTINAL ENDOSCOPY         MEDS:   Current Outpatient Medications on File Prior to Visit   Medication Sig Dispense Refill    albuterol (PROVENTIL/VENTOLIN HFA) 90 mcg/actuation inhaler Inhale 2 puffs every 4 hours as needed for cough, wheeze, or shortness of breath 18 g 11    ALPRAZolam (XANAX) 0.25 MG tablet Take 1 tablet (0.25 mg total) by mouth nightly as needed for Anxiety. 15 tablet 1    blood sugar diagnostic (FREESTYLE LITE STRIPS) Strp Test blood glucose 3 (three) times daily. 100 each 1    buPROPion (WELLBUTRIN XL) 150 MG TB24 tablet TAKE 2 TABLETS BY MOUTH ONCE  DAILY 200 tablet 2     butalbital-acetaminophen-caffeine -40 mg (FIORICET, ESGIC) -40 mg per tablet Take 1 tablet by mouth every 4 (four) hours as needed for Pain. 20 tablet 0    colestipoL (COLESTID) 1 gram Tab Take 2 tablets (2 g total) by mouth 2 (two) times daily. 120 tablet 1    ipratropium (ATROVENT) 42 mcg (0.06 %) nasal spray Use 2 sprays by Each Nostril route every 6 (six) hours as needed for Rhinitis. 15 mL 5    lancets 28 gauge Misc Test blood glucose 3 (three) times daily. 100 each 1    mycophenolate (CELLCEPT) 250 mg Cap Take 4 capsules (1,000 mg total) by mouth 2 (two) times daily. 240 capsule 11    olmesartan (BENICAR) 20 MG tablet TAKE 1 TABLET BY MOUTH ONCE  DAILY 90 tablet 3    omeprazole (PRILOSEC) 20 MG capsule TAKE 1 CAPSULE BY MOUTH ONCE  DAILY 100 capsule 2    propranoloL (INDERAL) 20 MG tablet TAKE 1 TABLET BY MOUTH TWICE  DAILY 200 tablet 2    tacrolimus (PROGRAF) 1 MG Cap Take 3 capsules (3 mg total) by mouth every 12 (twelve) hours. 180 capsule 11    aspirin (ECOTRIN) 81 MG EC tablet Take 1 tablet (81 mg total) by mouth once daily. 30 tablet 11    blood-glucose meter kit Use as instructed 1 each 0     No current facility-administered medications on file prior to visit.       OB History          7    Para   6    Term   3            AB   1    Living             SAB   1    IAB        Ectopic        Multiple        Live Births                     Social History     Socioeconomic History    Marital status:    Occupational History    Occupation: [employer] employee x 30 yrs   Tobacco Use    Smoking status: Former     Current packs/day: 0.00     Types: Cigarettes     Quit date: 2/3/2006     Years since quittin.2    Smokeless tobacco: Never   Substance and Sexual Activity    Alcohol use: Not Currently    Drug use: No    Sexual activity: Not Currently     Social Drivers of Health     Financial Resource Strain: Low Risk  (2023)    Overall Financial Resource Strain ([facility])      Difficulty of Paying Living Expenses: Not very hard   Food Insecurity: No Food Insecurity (11/25/2023)    Hunger Vital Sign     Worried About Running Out of Food in the Last Year: Never true     Ran Out of Food in the Last Year: Never true   Transportation Needs: No Transportation Needs (11/25/2023)    PRAPARE - Transportation     Lack of Transportation (Medical): No     Lack of Transportation (Non-Medical): No   Physical Activity: Inactive (11/25/2023)    Exercise Vital Sign     Days of Exercise per Week: 0 days     Minutes of Exercise per Session: 0 min   Stress: Stress Concern Present (11/25/2023)    [employer] [place] [employer] - Occupational Stress Questionnaire     Feeling of Stress : Very much   Housing Stability: Low Risk  (11/25/2023)    Housing Stability Vital Sign     Unable to Pay for Housing in the Last Year: No     Number of Places Lived in the Last Year: 1     Unstable Housing in the Last Year: No       Family History   Problem Relation Name Age of Onset    Breast cancer Mother  70    Heart disease Mother      Hypertension Mother      Tremor Mother      Diabetes Father      Heart disease Father      Ulcers Father      Diabetes Sister      Cancer Sister      Lung cancer Sister          70's    Diabetes Sister      Diabetes Brother      Emphysema Brother      Breast cancer Maternal Aunt  70    Multiple sclerosis Maternal Aunt      Multiple sclerosis Maternal Aunt      Breast cancer Maternal Grandmother  70    Tremor Daughter Casandra     Breast cancer Maternal Cousin 1st 40    Ovarian cancer Neg Hx      Parkinsonism Neg Hx      Glaucoma Neg Hx      Macular degeneration Neg Hx      Colon cancer Neg Hx      Crohn's disease Neg Hx      Stomach cancer Neg Hx      Esophageal cancer Neg Hx      Ulcerative colitis Neg Hx           Past medical and surgical history reviewed.   I have reviewed the patient's medical history in detail and updated the computerized patient record.    Review of System:    General: no chills, fever, night sweats, weight gain or weight loss  Psychological: no depression or suicidal ideation  Breasts: no new or changing breast lumps, nipple discharge or masses.  Respiratory: no cough, shortness of breath, or wheezing  Cardiovascular: no chest pain or dyspnea on exertion  Gastrointestinal: no abdominal pain, change in bowel habits, or black or bloody stools  Genito-Urinary: no incontinence, urinary frequency/urgency or vulvar/vaginal symptoms, pelvic pain or abnormal vaginal bleeding.  Musculoskeletal: no gait disturbance or muscular weakness      Physical Exam:   Wt 112.4 kg (247 lb 12.8 oz)   BMI 38.81 kg/m²   Constitutional: She appears alert and responsive. She appears well-developed, well-groomed, and well-nourished. No distress. OverWeight   HENT:   Head: Normocephalic and atraumatic.   Eyes: Conjunctivae and EOM are normal. No scleral icterus.   Neck: Symmetrical. Normal range of motion. Neck supple. No tracheal deviation present.   Cardiovascular: Normal rate, no rhythm abnormality noted. Extremities without swelling or edema, warm.    Pulmonary/Chest: Normal respiratory Effort. No distress or retractions. She exhibits no tenderness.  Breasts: are symmetrical.   Right breast exhibits no inverted nipple, no mass, no nipple discharge, no skin change and no tenderness.   Left breast exhibits no inverted nipple, no mass, no nipple discharge, no skin change and no tenderness.  Abdominal: Soft. She exhibits no distension, hernias or masses. There is no tenderness. No enlargement of liver edge or spleen.  There is no rebound and no guarding.   Genitourinary:    External rectal exam shows no thrombosed external hemorrhoids, no lesions.     Pelvic exam was performed with patient supine.   No labial fusion, and symmetrical.    There is no rash, lesion or injury on the right labia.   There is no rash, lesion or injury on the left labia.   No bleeding and no signs of injury around the  vaginal introitus, urethral meatus is normal size and without prolapse or lesions, urethra well supported. The cervix is visualized with no discharge, lesions or friability.   No vaginal discharge found.   No significant Cystocele, Enterocele or rectocele, and cervix and uterus well supported.   Bimanual exam:   The urethra is normal to palpation and there are no palpable vaginal wall masses.   Uterus is not deviated, not enlarged, not fixed, normal shape and not tender.   Cervix exhibits no motion tenderness.    Right adnexum displays no mass or nodularity and no tenderness.   Left adnexum displays no mass or nodularity and no tenderness.  Musculoskeletal: Normal range of motion.   Neurological: She is alert and oriented to person, place, and time. Coordination normal.   Skin: Skin is warm and dry. She is not diaphoretic. No rashes, lesions or ulcers.   Psychiatric: She has a normal mood and affect, oriented to person, place, and time.      Assessment:   Normal annual GYN exam  1. Women's annual routine gynecological examination  Liquid-Based Pap Smear, Screening      2. Breast cancer screening by mammogram  Mammo Digital Screening Bilat w/ [facility] (X[facility])          Plan:   PAP  Mammogram  Follow up in 1 year.  Patient informed will be contacted with results within 2 weeks. Encouraged to please call back or email if she has not heard from us by then.

## 2025-04-17 ENCOUNTER — HOSPITAL ENCOUNTER (OUTPATIENT)
Dept: RADIOLOGY | Facility: HOSPITAL | Age: 65
Discharge: HOME | End: 2025-04-17
Attending: OBSTETRICS & GYNECOLOGY
Payer: MEDICARE

## 2025-04-17 DIAGNOSIS — Z12.31 BREAST CANCER SCREENING BY MAMMOGRAM: ICD-10-CM

## 2025-04-17 PROCEDURE — 77063 BREAST TOMOSYNTHESIS BI: CPT | Mod: 26,,, | Performed by: RADIOLOGY

## 2025-04-17 PROCEDURE — 77067 SCR MAMMO BI INCL CAD: CPT | Mod: 26,,, | Performed by: RADIOLOGY

## 2025-04-17 PROCEDURE — 77067 SCR MAMMO BI INCL CAD: CPT | Mod: TC,PN

## 2025-04-18 ENCOUNTER — PATIENT MESSAGE (OUTPATIENT)
Dept: OBSTETRICS AND GYNECOLOGY | Facility: CLINIC | Age: 65
End: 2025-04-18
Payer: MEDICARE

## 2025-04-21 ENCOUNTER — PATIENT MESSAGE (OUTPATIENT)
Dept: TRANSPLANT | Facility: CLINIC | Age: 65
End: 2025-04-21
Payer: MEDICARE

## 2025-04-21 ENCOUNTER — TELEPHONE (OUTPATIENT)
Dept: TRANSPLANT | Facility: CLINIC | Age: 65
End: 2025-04-21
Payer: MEDICARE

## 2025-04-21 ENCOUNTER — RESULTS FOLLOW-UP (OUTPATIENT)
Dept: PRIMARY CARE CLINIC | Facility: CLINIC | Age: 65
End: 2025-04-21

## 2025-04-21 NOTE — TELEPHONE ENCOUNTER
Spoke with pt. Discussed with pt that promethazine is ok to take. Discussed that if she continues with symptoms, she should report to the ER for eval. She agreed with plan.  ----- Message from Karen sent at 4/21/2025  4:30 PM CDT -----  Regarding: Patient advice  Contact: Pt  [phone]  Name of Caller: Yumiko  Contact Preference: [phone] of Call:   Pt requesting a call back pt having issues with vomiting and diarrhea  since 10 am this morning she want to know if she can take  Promethazine pt would like advice

## 2025-04-28 NOTE — Clinical Note
Dexcom transmitter Script was eprescribed to pharmacy per Dr. Parmar    Follow up with me in [place] in 4 months.

## 2025-04-29 ENCOUNTER — OFFICE VISIT (OUTPATIENT)
Dept: PRIMARY CARE CLINIC | Facility: CLINIC | Age: 65
End: 2025-04-29
Payer: MEDICARE

## 2025-04-29 VITALS
SYSTOLIC BLOOD PRESSURE: 118 MMHG | HEIGHT: 67 IN | HEART RATE: 88 BPM | WEIGHT: 244.06 LBS | OXYGEN SATURATION: 100 % | BODY MASS INDEX: 38.3 KG/M2 | DIASTOLIC BLOOD PRESSURE: 82 MMHG

## 2025-04-29 DIAGNOSIS — K52.9 GASTROENTERITIS: ICD-10-CM

## 2025-04-29 DIAGNOSIS — T38.0X5A STEROID-INDUCED HYPERGLYCEMIA: Chronic | ICD-10-CM

## 2025-04-29 DIAGNOSIS — R73.02 IMPAIRED GLUCOSE TOLERANCE: ICD-10-CM

## 2025-04-29 DIAGNOSIS — R73.9 STEROID-INDUCED HYPERGLYCEMIA: Chronic | ICD-10-CM

## 2025-04-29 DIAGNOSIS — G25.0 ESSENTIAL TREMOR: ICD-10-CM

## 2025-04-29 DIAGNOSIS — F33.1 MODERATE EPISODE OF RECURRENT MAJOR DEPRESSIVE DISORDER: ICD-10-CM

## 2025-04-29 DIAGNOSIS — Z94.4 S/P LIVER TRANSPLANT: ICD-10-CM

## 2025-04-29 DIAGNOSIS — K21.9 GASTROESOPHAGEAL REFLUX DISEASE, UNSPECIFIED WHETHER ESOPHAGITIS PRESENT: ICD-10-CM

## 2025-04-29 DIAGNOSIS — I10 PRIMARY HYPERTENSION: Primary | Chronic | ICD-10-CM

## 2025-04-29 PROCEDURE — 1157F ADVNC CARE PLAN IN RCRD: CPT | Mod: CPTII,S$GLB,, | Performed by: FAMILY MEDICINE

## 2025-04-29 PROCEDURE — 3288F FALL RISK ASSESSMENT DOCD: CPT | Mod: CPTII,S$GLB,, | Performed by: FAMILY MEDICINE

## 2025-04-29 PROCEDURE — 99214 OFFICE O/P EST MOD 30 MIN: CPT | Mod: S$GLB,,, | Performed by: FAMILY MEDICINE

## 2025-04-29 PROCEDURE — 1126F AMNT PAIN NOTED NONE PRSNT: CPT | Mod: CPTII,S$GLB,, | Performed by: FAMILY MEDICINE

## 2025-04-29 PROCEDURE — 1160F RVW MEDS BY RX/DR IN RCRD: CPT | Mod: CPTII,S$GLB,, | Performed by: FAMILY MEDICINE

## 2025-04-29 PROCEDURE — 3044F HG A1C LEVEL LT 7.0%: CPT | Mod: CPTII,S$GLB,, | Performed by: FAMILY MEDICINE

## 2025-04-29 PROCEDURE — 3074F SYST BP LT 130 MM HG: CPT | Mod: CPTII,S$GLB,, | Performed by: FAMILY MEDICINE

## 2025-04-29 PROCEDURE — 3008F BODY MASS INDEX DOCD: CPT | Mod: CPTII,S$GLB,, | Performed by: FAMILY MEDICINE

## 2025-04-29 PROCEDURE — 4010F ACE/ARB THERAPY RXD/TAKEN: CPT | Mod: CPTII,S$GLB,, | Performed by: FAMILY MEDICINE

## 2025-04-29 PROCEDURE — 3079F DIAST BP 80-89 MM HG: CPT | Mod: CPTII,S$GLB,, | Performed by: FAMILY MEDICINE

## 2025-04-29 PROCEDURE — 1159F MED LIST DOCD IN RCRD: CPT | Mod: CPTII,S$GLB,, | Performed by: FAMILY MEDICINE

## 2025-04-29 PROCEDURE — 1101F PT FALLS ASSESS-DOCD LE1/YR: CPT | Mod: CPTII,S$GLB,, | Performed by: FAMILY MEDICINE

## 2025-04-29 PROCEDURE — 99999 PR PBB SHADOW E&M-EST. PATIENT-LVL IV: CPT | Mod: PBBFAC,,, | Performed by: FAMILY MEDICINE

## 2025-04-29 RX ORDER — PROPRANOLOL HYDROCHLORIDE 40 MG/1
40 TABLET ORAL 2 TIMES DAILY
Qty: 200 TABLET | Refills: 1 | Status: SHIPPED | OUTPATIENT
Start: 2025-04-29

## 2025-04-29 RX ORDER — OMEPRAZOLE 40 MG/1
40 CAPSULE, DELAYED RELEASE ORAL DAILY
Qty: 100 CAPSULE | Refills: 1 | Status: SHIPPED | OUTPATIENT
Start: 2025-04-29

## 2025-05-06 ENCOUNTER — PATIENT MESSAGE (OUTPATIENT)
Dept: PRIMARY CARE CLINIC | Facility: CLINIC | Age: 65
End: 2025-05-06
Payer: MEDICARE

## 2025-05-06 DIAGNOSIS — R19.7 DIARRHEA, UNSPECIFIED TYPE: Primary | ICD-10-CM

## 2025-05-07 NOTE — TELEPHONE ENCOUNTER
Spoke with pt, informed her that stool studies were ordered.  Educated pt that she would need to go to the lab to get a sterile specimen container and return it in a timely manner.  Pt states that she sterilized a container at home to bring to the lab and I advised her against this.  Also advised pt that she may need to schedule a f/u appt with GI if she continues to have GI concerns.  Offered to schedule pt an appt with GI and she declined at this time.  Informed pt that our office would be in touch with her once finalized results were available, pt verbalized understanding.

## 2025-05-09 ENCOUNTER — RESULTS FOLLOW-UP (OUTPATIENT)
Dept: PRIMARY CARE CLINIC | Facility: CLINIC | Age: 65
End: 2025-05-09

## 2025-05-09 ENCOUNTER — TELEPHONE (OUTPATIENT)
Dept: PRIMARY CARE CLINIC | Facility: CLINIC | Age: 65
End: 2025-05-09
Payer: MEDICARE

## 2025-05-09 NOTE — TELEPHONE ENCOUNTER
Received report from stool tests positive for enteropathogenic E coli and norovirus.    For enteropathogenic E coli, treatment is typically supportive, rest, fluid, etc..  If someone is having severe or worsening symptoms or immunocompromised as she is status post liver transplant then sometimes treatment maybe needed.  If she is improving I would recommend that she monitor.  She should avoid antidiarrhea medications which can prolong infection.  If she is having trouble maintaining adequate fluids/ hydration certainly if she has blood in the stool or just generally worsening I would want her to go to the emergency room.    Let me know how she is doing.

## 2025-05-09 NOTE — TELEPHONE ENCOUNTER
Patient states she is doing better. She says she has not had any diarrhea today and has not had any vomiting since the first couple of days. She was encouraged to continue to monitor her symptoms and to hydrate. She says that she is still on a bland diet. Patient denies any blood in her stool. She was instructed to go to the ER for any blood in her stool or if her symptoms get any worse. She verbalized understanding.

## 2025-05-09 NOTE — TELEPHONE ENCOUNTER
Tabatha with [facility] microbiology lab wanted to alert Dr Webb that a GI panel was run and was positive for Enteropathogenic E-coli, and Norovirus. Tabatha is releasing results now, but wanted to let MD know. Please advise.    #[phone]

## 2025-05-12 ENCOUNTER — LAB VISIT (OUTPATIENT)
Dept: LAB | Facility: HOSPITAL | Age: 65
End: 2025-05-12
Attending: STUDENT IN AN ORGANIZED HEALTH CARE EDUCATION/TRAINING PROGRAM
Payer: MEDICARE

## 2025-05-12 ENCOUNTER — PATIENT MESSAGE (OUTPATIENT)
Dept: OBSTETRICS AND GYNECOLOGY | Facility: CLINIC | Age: 65
End: 2025-05-12
Payer: MEDICARE

## 2025-05-12 ENCOUNTER — PATIENT MESSAGE (OUTPATIENT)
Dept: TRANSPLANT | Facility: CLINIC | Age: 65
End: 2025-05-12
Payer: MEDICARE

## 2025-05-12 DIAGNOSIS — Z94.4 S/P LIVER TRANSPLANT: ICD-10-CM

## 2025-05-12 LAB
ABSOLUTE EOSINOPHIL (OHS): 0.12 K/UL
ABSOLUTE MONOCYTE (OHS): 0.37 K/UL (ref 0.3–1)
ABSOLUTE NEUTROPHIL COUNT (OHS): 3.15 K/UL (ref 1.8–7.7)
ALBUMIN SERPL BCP-MCNC: 3.7 G/DL (ref 3.5–5.2)
ALP SERPL-CCNC: 86 UNIT/L (ref 40–150)
ALT SERPL W/O P-5'-P-CCNC: 18 UNIT/L (ref 10–44)
ANION GAP (OHS): 8 MMOL/L (ref 8–16)
AST SERPL-CCNC: 17 UNIT/L (ref 11–45)
BASOPHILS # BLD AUTO: 0.03 K/UL
BASOPHILS NFR BLD AUTO: 0.7 %
BILIRUB SERPL-MCNC: 0.5 MG/DL (ref 0.1–1)
BUN SERPL-MCNC: 31 MG/DL (ref 8–23)
CALCIUM SERPL-MCNC: 9.4 MG/DL (ref 8.7–10.5)
CHLORIDE SERPL-SCNC: 111 MMOL/L (ref 95–110)
CO2 SERPL-SCNC: 20 MMOL/L (ref 23–29)
CREAT SERPL-MCNC: 1.2 MG/DL (ref 0.5–1.4)
ERYTHROCYTE [DISTWIDTH] IN BLOOD BY AUTOMATED COUNT: 13 % (ref 11.5–14.5)
GFR SERPLBLD CREATININE-BSD FMLA CKD-EPI: 50 ML/MIN/1.73/M2
GLUCOSE SERPL-MCNC: 153 MG/DL (ref 70–110)
HCT VFR BLD AUTO: 36.6 % (ref 37–48.5)
HGB BLD-MCNC: 11.2 GM/DL (ref 12–16)
IMM GRANULOCYTES # BLD AUTO: 0.04 K/UL (ref 0–0.04)
IMM GRANULOCYTES NFR BLD AUTO: 0.9 % (ref 0–0.5)
LYMPHOCYTES # BLD AUTO: 0.64 K/UL (ref 1–4.8)
MCH RBC QN AUTO: 27.9 PG (ref 27–31)
MCHC RBC AUTO-ENTMCNC: 30.6 G/DL (ref 32–36)
MCV RBC AUTO: 91 FL (ref 82–98)
NUCLEATED RBC (/100WBC) (OHS): 0 /100 WBC
PLATELET # BLD AUTO: 137 K/UL (ref 150–450)
PMV BLD AUTO: 10.5 FL (ref 9.2–12.9)
POTASSIUM SERPL-SCNC: 4.9 MMOL/L (ref 3.5–5.1)
PROT SERPL-MCNC: 6.3 GM/DL (ref 6–8.4)
RBC # BLD AUTO: 4.01 M/UL (ref 4–5.4)
RELATIVE EOSINOPHIL (OHS): 2.8 %
RELATIVE LYMPHOCYTE (OHS): 14.7 % (ref 18–48)
RELATIVE MONOCYTE (OHS): 8.5 % (ref 4–15)
RELATIVE NEUTROPHIL (OHS): 72.4 % (ref 38–73)
SODIUM SERPL-SCNC: 139 MMOL/L (ref 136–145)
WBC # BLD AUTO: 4.35 K/UL (ref 3.9–12.7)

## 2025-05-12 PROCEDURE — 80197 ASSAY OF TACROLIMUS: CPT

## 2025-05-12 PROCEDURE — 80053 COMPREHEN METABOLIC PANEL: CPT

## 2025-05-12 PROCEDURE — 36415 COLL VENOUS BLD VENIPUNCTURE: CPT | Mod: PO

## 2025-05-12 PROCEDURE — 85025 COMPLETE CBC W/AUTO DIFF WBC: CPT

## 2025-05-13 ENCOUNTER — TELEPHONE (OUTPATIENT)
Dept: OBSTETRICS AND GYNECOLOGY | Facility: CLINIC | Age: 65
End: 2025-05-13
Payer: MEDICARE

## 2025-05-13 LAB — TACROLIMUS BLD-MCNC: 19.7 NG/ML (ref 5–15)

## 2025-05-13 NOTE — TELEPHONE ENCOUNTER
----- Message from Matthieu Cervantes MD sent at 5/13/2025  3:52 PM CDT -----  Regarding: pap  HPV returned positive,  Please inform mild abnormal PAP with positive high risk HPV testing. Need to schedule colposcopy first available to rule out precancerous changes.

## 2025-05-14 ENCOUNTER — OFFICE VISIT (OUTPATIENT)
Dept: OBSTETRICS AND GYNECOLOGY | Facility: CLINIC | Age: 65
End: 2025-05-14
Payer: MEDICARE

## 2025-05-14 VITALS
BODY MASS INDEX: 37.57 KG/M2 | WEIGHT: 239.88 LBS | SYSTOLIC BLOOD PRESSURE: 124 MMHG | DIASTOLIC BLOOD PRESSURE: 78 MMHG

## 2025-05-14 DIAGNOSIS — R87.610 ASCUS WITH POSITIVE HIGH RISK HPV CERVICAL: Primary | ICD-10-CM

## 2025-05-14 DIAGNOSIS — R87.810 ASCUS WITH POSITIVE HIGH RISK HPV CERVICAL: Primary | ICD-10-CM

## 2025-05-14 PROCEDURE — 99499 UNLISTED E&M SERVICE: CPT | Mod: S$GLB,,, | Performed by: OBSTETRICS & GYNECOLOGY

## 2025-05-14 PROCEDURE — 99999 PR PBB SHADOW E&M-EST. PATIENT-LVL III: CPT | Mod: PBBFAC,,, | Performed by: OBSTETRICS & GYNECOLOGY

## 2025-05-14 PROCEDURE — 57452 EXAM OF CERVIX W/SCOPE: CPT | Mod: S$GLB,,, | Performed by: OBSTETRICS & GYNECOLOGY

## 2025-05-14 NOTE — PROGRESS NOTES
COLPOSCOPY:  5/14/2025    PAP Result: atypical squamous cells of undetermined significance / HR-HPV    PRE-COLPOSCOPY PROCEDURE COUNSELING:  Discussed the abnormal pap test findings, HPV, need for colposcopy and possible biopsies to determine a diagnosis and plan of care, treatments available, the minimal risks of bleeding and infection with a colposcopy, alternatives to colposcopy and she agrees to proceed.    Procedure:   Speculum was placed. Cervix completely visualized.  transformation zone clearly visualized. Green filter activated: vascular abnormalities: not seen  Green filter disengaged and acetic acid applied in the usual fashion:  AcetoWhite epithelium not seen.  Mosaic pattern not seen.  Biopsy not performed.  ECC not done.    Monsel's solution applied to biopsy site for hemostasis and tolerated well.   The speculum was removed.  The patient tolerated the procedure well.    POST COLPOSCOPY COUNSELING:   Manage post colposcopy cramping with NSAIDs, Tylenol or Rx per [employer].  Avoid anything in vagina (intercourse, douching, tampons) one week after the procedure.  Importance of follow-up stressed.    Counseling lasted approximately 15 minutes and all her questions were answered.    Assessment:  Ascus w HR-HPV  Normal colpo findings today    Plan:      Patient informed will be contacted within 2 weeks of results, either normal or abnormal. Please call if she has not heard from us by then.

## 2025-05-19 ENCOUNTER — RESULTS FOLLOW-UP (OUTPATIENT)
Dept: HEPATOLOGY | Facility: CLINIC | Age: 65
End: 2025-05-19
Payer: MEDICARE

## 2025-05-19 DIAGNOSIS — Z85.05 PERSONAL HISTORY OF MALIGNANT NEOPLASM OF LIVER: ICD-10-CM

## 2025-05-19 DIAGNOSIS — R19.7 DIARRHEA, UNSPECIFIED TYPE: ICD-10-CM

## 2025-05-19 DIAGNOSIS — Z94.4 S/P LIVER TRANSPLANT: Primary | ICD-10-CM

## 2025-05-20 NOTE — TELEPHONE ENCOUNTER
Pt will repeat labs Thursday. She reports persistent diarrhea. She will repeat stool studies with labs.  ----- Message from James Brambila MD sent at 5/19/2025  3:33 PM CDT -----  Liver tests are stable. Tac high. Did she take prior to labs?  ----- Message -----  From: Lab, Background User  Sent: 5/12/2025   6:32 PM CDT  To: James Brambila MD

## 2025-05-22 ENCOUNTER — OFFICE VISIT (OUTPATIENT)
Dept: OPTOMETRY | Facility: CLINIC | Age: 65
End: 2025-05-22
Payer: MEDICARE

## 2025-05-22 ENCOUNTER — LAB VISIT (OUTPATIENT)
Dept: LAB | Facility: HOSPITAL | Age: 65
End: 2025-05-22
Attending: STUDENT IN AN ORGANIZED HEALTH CARE EDUCATION/TRAINING PROGRAM
Payer: MEDICARE

## 2025-05-22 DIAGNOSIS — Z01.00 EXAMINATION OF EYES AND VISION: Primary | ICD-10-CM

## 2025-05-22 DIAGNOSIS — H52.203 HYPEROPIA WITH ASTIGMATISM AND PRESBYOPIA, BILATERAL: ICD-10-CM

## 2025-05-22 DIAGNOSIS — H52.4 HYPEROPIA WITH ASTIGMATISM AND PRESBYOPIA, BILATERAL: ICD-10-CM

## 2025-05-22 DIAGNOSIS — Z85.05 PERSONAL HISTORY OF MALIGNANT NEOPLASM OF LIVER: ICD-10-CM

## 2025-05-22 DIAGNOSIS — H52.03 HYPEROPIA WITH ASTIGMATISM AND PRESBYOPIA, BILATERAL: ICD-10-CM

## 2025-05-22 DIAGNOSIS — Z13.5 GLAUCOMA SCREENING: ICD-10-CM

## 2025-05-22 DIAGNOSIS — H43.393 VITREOUS FLOATERS, BILATERAL: ICD-10-CM

## 2025-05-22 DIAGNOSIS — H10.503 BLEPHAROCONJUNCTIVITIS OF BOTH EYES, UNSPECIFIED BLEPHAROCONJUNCTIVITIS TYPE: ICD-10-CM

## 2025-05-22 DIAGNOSIS — Z94.4 S/P LIVER TRANSPLANT: ICD-10-CM

## 2025-05-22 LAB
ABSOLUTE EOSINOPHIL (OHS): 0.13 K/UL
ABSOLUTE MONOCYTE (OHS): 0.73 K/UL (ref 0.3–1)
ABSOLUTE NEUTROPHIL COUNT (OHS): 6.36 K/UL (ref 1.8–7.7)
AFP SERPL-MCNC: 4.2 NG/ML
ALBUMIN SERPL BCP-MCNC: 3.9 G/DL (ref 3.5–5.2)
ALP SERPL-CCNC: 85 UNIT/L (ref 40–150)
ALT SERPL W/O P-5'-P-CCNC: 9 UNIT/L (ref 10–44)
ANION GAP (OHS): 10 MMOL/L (ref 8–16)
AST SERPL-CCNC: 12 UNIT/L (ref 11–45)
BASOPHILS # BLD AUTO: 0.02 K/UL
BASOPHILS NFR BLD AUTO: 0.2 %
BILIRUB SERPL-MCNC: 0.5 MG/DL (ref 0.1–1)
BUN SERPL-MCNC: 58 MG/DL (ref 8–23)
CALCIUM SERPL-MCNC: 10 MG/DL (ref 8.7–10.5)
CHLORIDE SERPL-SCNC: 111 MMOL/L (ref 95–110)
CO2 SERPL-SCNC: 17 MMOL/L (ref 23–29)
CREAT SERPL-MCNC: 1.7 MG/DL (ref 0.5–1.4)
ERYTHROCYTE [DISTWIDTH] IN BLOOD BY AUTOMATED COUNT: 13.1 % (ref 11.5–14.5)
GFR SERPLBLD CREATININE-BSD FMLA CKD-EPI: 33 ML/MIN/1.73/M2
GLUCOSE SERPL-MCNC: 165 MG/DL (ref 70–110)
HCT VFR BLD AUTO: 35.6 % (ref 37–48.5)
HGB BLD-MCNC: 11.3 GM/DL (ref 12–16)
IMM GRANULOCYTES # BLD AUTO: 0.07 K/UL (ref 0–0.04)
IMM GRANULOCYTES NFR BLD AUTO: 0.9 % (ref 0–0.5)
LYMPHOCYTES # BLD AUTO: 0.73 K/UL (ref 1–4.8)
MCH RBC QN AUTO: 28 PG (ref 27–31)
MCHC RBC AUTO-ENTMCNC: 31.7 G/DL (ref 32–36)
MCV RBC AUTO: 88 FL (ref 82–98)
NUCLEATED RBC (/100WBC) (OHS): 0 /100 WBC
PLATELET # BLD AUTO: 191 K/UL (ref 150–450)
PMV BLD AUTO: 11.4 FL (ref 9.2–12.9)
POTASSIUM SERPL-SCNC: 4.9 MMOL/L (ref 3.5–5.1)
PROT SERPL-MCNC: 6.7 GM/DL (ref 6–8.4)
RBC # BLD AUTO: 4.03 M/UL (ref 4–5.4)
RELATIVE EOSINOPHIL (OHS): 1.6 %
RELATIVE LYMPHOCYTE (OHS): 9.1 % (ref 18–48)
RELATIVE MONOCYTE (OHS): 9.1 % (ref 4–15)
RELATIVE NEUTROPHIL (OHS): 79.1 % (ref 38–73)
SODIUM SERPL-SCNC: 138 MMOL/L (ref 136–145)
WBC # BLD AUTO: 8.04 K/UL (ref 3.9–12.7)

## 2025-05-22 PROCEDURE — 80197 ASSAY OF TACROLIMUS: CPT

## 2025-05-22 PROCEDURE — 4010F ACE/ARB THERAPY RXD/TAKEN: CPT | Mod: CPTII,S$GLB,, | Performed by: OPTOMETRIST

## 2025-05-22 PROCEDURE — 92014 COMPRE OPH EXAM EST PT 1/>: CPT | Mod: S$GLB,,, | Performed by: OPTOMETRIST

## 2025-05-22 PROCEDURE — 36415 COLL VENOUS BLD VENIPUNCTURE: CPT | Mod: PO

## 2025-05-22 PROCEDURE — 3044F HG A1C LEVEL LT 7.0%: CPT | Mod: CPTII,S$GLB,, | Performed by: OPTOMETRIST

## 2025-05-22 PROCEDURE — 1159F MED LIST DOCD IN RCRD: CPT | Mod: CPTII,S$GLB,, | Performed by: OPTOMETRIST

## 2025-05-22 PROCEDURE — 1126F AMNT PAIN NOTED NONE PRSNT: CPT | Mod: CPTII,S$GLB,, | Performed by: OPTOMETRIST

## 2025-05-22 PROCEDURE — 85025 COMPLETE CBC W/AUTO DIFF WBC: CPT

## 2025-05-22 PROCEDURE — 99999 PR PBB SHADOW E&M-EST. PATIENT-LVL III: CPT | Mod: PBBFAC,,, | Performed by: OPTOMETRIST

## 2025-05-22 PROCEDURE — 82040 ASSAY OF SERUM ALBUMIN: CPT

## 2025-05-22 PROCEDURE — 3288F FALL RISK ASSESSMENT DOCD: CPT | Mod: CPTII,S$GLB,, | Performed by: OPTOMETRIST

## 2025-05-22 PROCEDURE — 82105 ALPHA-FETOPROTEIN SERUM: CPT

## 2025-05-22 PROCEDURE — 1157F ADVNC CARE PLAN IN RCRD: CPT | Mod: CPTII,S$GLB,, | Performed by: OPTOMETRIST

## 2025-05-22 PROCEDURE — 1101F PT FALLS ASSESS-DOCD LE1/YR: CPT | Mod: CPTII,S$GLB,, | Performed by: OPTOMETRIST

## 2025-05-22 RX ORDER — NEOMYCIN SULFATE, POLYMYXIN B SULFATE AND DEXAMETHASONE 3.5; 10000; 1 MG/ML; [USP'U]/ML; MG/ML
1 SUSPENSION/ DROPS OPHTHALMIC NIGHTLY PRN
Qty: 5 ML | Refills: 1 | Status: SHIPPED | OUTPATIENT
Start: 2025-05-22 | End: 2026-05-22

## 2025-05-22 NOTE — PROGRESS NOTES
HPI    Pt presents today for a dfe.  Pt states no changes in VA.  Pt wears PAL's.  Pt c/o watery and lids sticking together when she first wakes up.  Does not instill gtts.  Denies ocular pain/disc.  Denies F/F.    HTN controlled w/ meds.    Agree above  Am matting / crusting OS>OD x 6+ months     Last edited by CHARLES Basilio, OD on 5/22/2025 11:38 AM.            Assessment /Plan     For exam results, see Encounter Report.    Examination of eyes and vision    Vitreous floaters, bilateral    Blepharoconjunctivitis of both eyes, unspecified blepharoconjunctivitis type  -     neomycin-polymyxin-dexamethasone (MAXITROL) 3.5mg/mL-10,000 unit/mL-0.1 % DrpS; Place 1 drop into both eyes nightly as needed (to control blepharitis symptoms).  Dispense: 5 mL; Refill: 1    Glaucoma screening    Hyperopia with astigmatism and presbyopia, bilateral      Ocular health exam OU   Mild OU, RD precautions given and reviewed. Patient knows to call/ message if any further changes in symptoms occur.  Mild bleph w/ am s/s ---lid hygiene, scrubs qhs or qod  ATs [place]ma with extended near work   Maxitrol qhs prn --message if not effective   4.   Not suspect  5.   Not new refraction // gave copy of last specs --fill prn     Discussed and educated patient on current findings /plan.  RTC 1 year, prn if any changes / issues

## 2025-05-23 ENCOUNTER — LAB VISIT (OUTPATIENT)
Dept: LAB | Facility: HOSPITAL | Age: 65
End: 2025-05-23
Attending: STUDENT IN AN ORGANIZED HEALTH CARE EDUCATION/TRAINING PROGRAM
Payer: MEDICARE

## 2025-05-23 DIAGNOSIS — Z87.898 HISTORY OF DIARRHEA: ICD-10-CM

## 2025-05-23 DIAGNOSIS — Z94.4 S/P LIVER TRANSPLANT: ICD-10-CM

## 2025-05-23 DIAGNOSIS — Z94.4 LIVER TRANSPLANTED: ICD-10-CM

## 2025-05-23 DIAGNOSIS — Z79.60 LONG-TERM USE OF IMMUNOSUPPRESSANT MEDICATION: ICD-10-CM

## 2025-05-23 DIAGNOSIS — R19.7 DIARRHEA, UNSPECIFIED TYPE: ICD-10-CM

## 2025-05-23 LAB
TACROLIMUS BLD-MCNC: 21.8 NG/ML (ref 5–15)
WBC #/AREA STL HPF: NORMAL /[HPF]

## 2025-05-23 PROCEDURE — 89055 LEUKOCYTE ASSESSMENT FECAL: CPT

## 2025-05-23 PROCEDURE — 87209 SMEAR COMPLEX STAIN: CPT

## 2025-05-23 PROCEDURE — 87046 STOOL CULTR AEROBIC BACT EA: CPT | Mod: 91

## 2025-05-23 PROCEDURE — 87427 SHIGA-LIKE TOXIN AG IA: CPT

## 2025-05-23 PROCEDURE — 87046 STOOL CULTR AEROBIC BACT EA: CPT

## 2025-05-23 PROCEDURE — 87493 C DIFF AMPLIFIED PROBE: CPT

## 2025-05-23 RX ORDER — TACROLIMUS 1 MG/1
2 CAPSULE ORAL EVERY 12 HOURS
Qty: 120 CAPSULE | Refills: 11 | Status: SHIPPED | OUTPATIENT
Start: 2025-05-23 | End: 2026-05-23

## 2025-05-23 NOTE — TELEPHONE ENCOUNTER
Reviewed labs with Dr. Brambila and pt's persistent issue with diarrhea. Per MD  pt to decrease tac to 2/2, hold MMF through the weekend while we await stool studies and pt should repeat labs early next week.    Called pt to discuss. She verbalized understanding to decrease tac to 2/2 starting with tonight's dose, hold MMF, and repeat labs 5/26/25.

## 2025-05-24 LAB
C COLI+JEJ+UPSA DNA STL QL NAA+NON-PROBE: NEGATIVE
C DIFF TOX GENS STL QL NAA+PROBE: NEGATIVE
E COLI SXT1 STL QL IA: NEGATIVE
E COLI SXT2 STL QL IA: NEGATIVE

## 2025-05-26 ENCOUNTER — RESULTS FOLLOW-UP (OUTPATIENT)
Dept: GASTROENTEROLOGY | Facility: CLINIC | Age: 65
End: 2025-05-26

## 2025-05-27 ENCOUNTER — TELEPHONE (OUTPATIENT)
Dept: TRANSPLANT | Facility: CLINIC | Age: 65
End: 2025-05-27
Payer: MEDICARE

## 2025-05-27 ENCOUNTER — RESULTS FOLLOW-UP (OUTPATIENT)
Dept: HEPATOLOGY | Facility: CLINIC | Age: 65
End: 2025-05-27
Payer: MEDICARE

## 2025-05-27 DIAGNOSIS — Z94.4 LIVER TRANSPLANTED: Primary | ICD-10-CM

## 2025-05-27 DIAGNOSIS — A04.72 C. DIFFICILE DIARRHEA: ICD-10-CM

## 2025-05-27 NOTE — TELEPHONE ENCOUNTER
Pt missed labs again today. Called pt to discuss. She is aware that her stool tests are negative and she started Imodium yesterday. She said she feels a little better overall but is still tired and weaker than normal. She does not want to go to the ED but discussed with her the reasons she should - persistent or worsening weakness, dehydration, electrolyte imbalances, etc. She states she will think about it. She will repeat labs 5/29 when she has a ride to lab.

## 2025-05-27 NOTE — TELEPHONE ENCOUNTER
Pt advised  ----- Message from James Brambila MD sent at 5/27/2025 12:40 PM CDT -----  Stool studies negative for infection  ----- Message -----  From: Lab, Background User  Sent: 5/23/2025  11:34 PM CDT  To: James Brambila MD

## 2025-05-29 ENCOUNTER — PATIENT MESSAGE (OUTPATIENT)
Dept: TRANSPLANT | Facility: CLINIC | Age: 65
End: 2025-05-29
Payer: MEDICARE

## 2025-05-29 ENCOUNTER — LAB VISIT (OUTPATIENT)
Dept: LAB | Facility: HOSPITAL | Age: 65
End: 2025-05-29
Attending: STUDENT IN AN ORGANIZED HEALTH CARE EDUCATION/TRAINING PROGRAM
Payer: MEDICARE

## 2025-05-29 DIAGNOSIS — Z94.4 S/P LIVER TRANSPLANT: ICD-10-CM

## 2025-05-29 LAB
ABSOLUTE EOSINOPHIL (OHS): 0.19 K/UL
ABSOLUTE MONOCYTE (OHS): 0.68 K/UL (ref 0.3–1)
ABSOLUTE NEUTROPHIL COUNT (OHS): 5.07 K/UL (ref 1.8–7.7)
ALBUMIN SERPL BCP-MCNC: 3.9 G/DL (ref 3.5–5.2)
ALP SERPL-CCNC: 93 UNIT/L (ref 40–150)
ALT SERPL W/O P-5'-P-CCNC: 18 UNIT/L (ref 10–44)
ANION GAP (OHS): 9 MMOL/L (ref 8–16)
AST SERPL-CCNC: 19 UNIT/L (ref 11–45)
BASOPHILS # BLD AUTO: 0.04 K/UL
BASOPHILS NFR BLD AUTO: 0.6 %
BILIRUB SERPL-MCNC: 0.3 MG/DL (ref 0.1–1)
BUN SERPL-MCNC: 87 MG/DL (ref 8–23)
CALCIUM SERPL-MCNC: 9.3 MG/DL (ref 8.7–10.5)
CHLORIDE SERPL-SCNC: 115 MMOL/L (ref 95–110)
CO2 SERPL-SCNC: 14 MMOL/L (ref 23–29)
CREAT SERPL-MCNC: 2.5 MG/DL (ref 0.5–1.4)
ERYTHROCYTE [DISTWIDTH] IN BLOOD BY AUTOMATED COUNT: 13.3 % (ref 11.5–14.5)
GFR SERPLBLD CREATININE-BSD FMLA CKD-EPI: 21 ML/MIN/1.73/M2
GLUCOSE SERPL-MCNC: 151 MG/DL (ref 70–110)
HCT VFR BLD AUTO: 36.3 % (ref 37–48.5)
HGB BLD-MCNC: 11.4 GM/DL (ref 12–16)
IMM GRANULOCYTES # BLD AUTO: 0.28 K/UL (ref 0–0.04)
IMM GRANULOCYTES NFR BLD AUTO: 3.9 % (ref 0–0.5)
LYMPHOCYTES # BLD AUTO: 0.91 K/UL (ref 1–4.8)
MCH RBC QN AUTO: 27.9 PG (ref 27–31)
MCHC RBC AUTO-ENTMCNC: 31.4 G/DL (ref 32–36)
MCV RBC AUTO: 89 FL (ref 82–98)
NUCLEATED RBC (/100WBC) (OHS): 0 /100 WBC
PLATELET # BLD AUTO: 206 K/UL (ref 150–450)
PMV BLD AUTO: 11.4 FL (ref 9.2–12.9)
POTASSIUM SERPL-SCNC: 5.2 MMOL/L (ref 3.5–5.1)
PROT SERPL-MCNC: 6.8 GM/DL (ref 6–8.4)
RBC # BLD AUTO: 4.08 M/UL (ref 4–5.4)
RELATIVE EOSINOPHIL (OHS): 2.6 %
RELATIVE LYMPHOCYTE (OHS): 12.7 % (ref 18–48)
RELATIVE MONOCYTE (OHS): 9.5 % (ref 4–15)
RELATIVE NEUTROPHIL (OHS): 70.7 % (ref 38–73)
SODIUM SERPL-SCNC: 138 MMOL/L (ref 136–145)
WBC # BLD AUTO: 7.17 K/UL (ref 3.9–12.7)

## 2025-05-29 PROCEDURE — 80197 ASSAY OF TACROLIMUS: CPT

## 2025-05-29 PROCEDURE — 80053 COMPREHEN METABOLIC PANEL: CPT

## 2025-05-29 PROCEDURE — 85025 COMPLETE CBC W/AUTO DIFF WBC: CPT

## 2025-05-29 PROCEDURE — 36415 COLL VENOUS BLD VENIPUNCTURE: CPT | Mod: PO

## 2025-05-30 ENCOUNTER — PATIENT MESSAGE (OUTPATIENT)
Dept: TRANSPLANT | Facility: CLINIC | Age: 65
End: 2025-05-30
Payer: MEDICARE

## 2025-05-30 LAB — TACROLIMUS BLD-MCNC: 12.9 NG/ML (ref 5–15)

## 2025-06-03 ENCOUNTER — PATIENT MESSAGE (OUTPATIENT)
Dept: TRANSPLANT | Facility: CLINIC | Age: 65
End: 2025-06-03
Payer: MEDICARE

## 2025-06-03 ENCOUNTER — RESULTS FOLLOW-UP (OUTPATIENT)
Dept: HEPATOLOGY | Facility: CLINIC | Age: 65
End: 2025-06-03
Payer: MEDICARE

## 2025-06-03 ENCOUNTER — TELEPHONE (OUTPATIENT)
Dept: TRANSPLANT | Facility: CLINIC | Age: 65
End: 2025-06-03
Payer: MEDICARE

## 2025-06-03 DIAGNOSIS — Z94.4 LIVER REPLACED BY TRANSPLANT: Primary | ICD-10-CM

## 2025-06-05 ENCOUNTER — LAB VISIT (OUTPATIENT)
Dept: LAB | Facility: HOSPITAL | Age: 65
End: 2025-06-05
Attending: STUDENT IN AN ORGANIZED HEALTH CARE EDUCATION/TRAINING PROGRAM
Payer: MEDICARE

## 2025-06-05 DIAGNOSIS — Z94.4 LIVER REPLACED BY TRANSPLANT: ICD-10-CM

## 2025-06-05 LAB
ABSOLUTE EOSINOPHIL (OHS): 0.18 K/UL
ABSOLUTE MONOCYTE (OHS): 0.5 K/UL (ref 0.3–1)
ABSOLUTE NEUTROPHIL COUNT (OHS): 3.19 K/UL (ref 1.8–7.7)
ALBUMIN SERPL BCP-MCNC: 3.6 G/DL (ref 3.5–5.2)
ALP SERPL-CCNC: 89 UNIT/L (ref 40–150)
ALT SERPL W/O P-5'-P-CCNC: 14 UNIT/L (ref 10–44)
ANION GAP (OHS): 7 MMOL/L (ref 8–16)
AST SERPL-CCNC: 14 UNIT/L (ref 11–45)
BASOPHILS # BLD AUTO: 0.03 K/UL
BASOPHILS NFR BLD AUTO: 0.6 %
BILIRUB SERPL-MCNC: 0.2 MG/DL (ref 0.1–1)
BUN SERPL-MCNC: 43 MG/DL (ref 8–23)
CALCIUM SERPL-MCNC: 9.4 MG/DL (ref 8.7–10.5)
CHLORIDE SERPL-SCNC: 115 MMOL/L (ref 95–110)
CO2 SERPL-SCNC: 22 MMOL/L (ref 23–29)
CREAT SERPL-MCNC: 1.5 MG/DL (ref 0.5–1.4)
ERYTHROCYTE [DISTWIDTH] IN BLOOD BY AUTOMATED COUNT: 13.7 % (ref 11.5–14.5)
GFR SERPLBLD CREATININE-BSD FMLA CKD-EPI: 38 ML/MIN/1.73/M2
GLUCOSE SERPL-MCNC: 152 MG/DL (ref 70–110)
HCT VFR BLD AUTO: 33.5 % (ref 37–48.5)
HGB BLD-MCNC: 10.5 GM/DL (ref 12–16)
IMM GRANULOCYTES # BLD AUTO: 0.07 K/UL (ref 0–0.04)
IMM GRANULOCYTES NFR BLD AUTO: 1.4 % (ref 0–0.5)
LYMPHOCYTES # BLD AUTO: 0.89 K/UL (ref 1–4.8)
MCH RBC QN AUTO: 28.1 PG (ref 27–31)
MCHC RBC AUTO-ENTMCNC: 31.3 G/DL (ref 32–36)
MCV RBC AUTO: 90 FL (ref 82–98)
NUCLEATED RBC (/100WBC) (OHS): 0 /100 WBC
PLATELET # BLD AUTO: 138 K/UL (ref 150–450)
PMV BLD AUTO: 11.5 FL (ref 9.2–12.9)
POTASSIUM SERPL-SCNC: 4.8 MMOL/L (ref 3.5–5.1)
PROT SERPL-MCNC: 6.1 GM/DL (ref 6–8.4)
RBC # BLD AUTO: 3.74 M/UL (ref 4–5.4)
RELATIVE EOSINOPHIL (OHS): 3.7 %
RELATIVE LYMPHOCYTE (OHS): 18.3 % (ref 18–48)
RELATIVE MONOCYTE (OHS): 10.3 % (ref 4–15)
RELATIVE NEUTROPHIL (OHS): 65.7 % (ref 38–73)
SODIUM SERPL-SCNC: 144 MMOL/L (ref 136–145)
WBC # BLD AUTO: 4.86 K/UL (ref 3.9–12.7)

## 2025-06-05 PROCEDURE — 36415 COLL VENOUS BLD VENIPUNCTURE: CPT | Mod: PO

## 2025-06-05 PROCEDURE — 80197 ASSAY OF TACROLIMUS: CPT

## 2025-06-05 PROCEDURE — 85025 COMPLETE CBC W/AUTO DIFF WBC: CPT

## 2025-06-05 PROCEDURE — 82040 ASSAY OF SERUM ALBUMIN: CPT

## 2025-06-06 LAB — TACROLIMUS BLD-MCNC: 8.5 NG/ML (ref 5–15)

## 2025-06-10 ENCOUNTER — PATIENT MESSAGE (OUTPATIENT)
Dept: PRIMARY CARE CLINIC | Facility: CLINIC | Age: 65
End: 2025-06-10
Payer: MEDICARE

## 2025-06-10 ENCOUNTER — RESULTS FOLLOW-UP (OUTPATIENT)
Dept: HEPATOLOGY | Facility: CLINIC | Age: 65
End: 2025-06-10
Payer: MEDICARE

## 2025-06-10 DIAGNOSIS — Z94.4 S/P LIVER TRANSPLANT: ICD-10-CM

## 2025-06-10 RX ORDER — TACROLIMUS 1 MG/1
1 CAPSULE ORAL EVERY 12 HOURS
Qty: 60 CAPSULE | Refills: 11 | Status: SHIPPED | OUTPATIENT
Start: 2025-06-10 | End: 2026-06-10

## 2025-06-10 NOTE — TELEPHONE ENCOUNTER
Pt informed of dose change and repeat labs needed.  Will postpone CT scans until repeat labs resulted.  ----- Message from James Brambila MD sent at 6/10/2025 12:16 PM CDT -----  Liver tests are stable. Tac remains high. Please decrease tac to 1/1 and repeat labs later this week or early next week.    Kidney function improved.  ----- Message -----  From: Lab, Background User  Sent: 6/5/2025   6:33 PM CDT  To: James Brambila MD

## 2025-06-11 ENCOUNTER — OFFICE VISIT (OUTPATIENT)
Dept: GASTROENTEROLOGY | Facility: CLINIC | Age: 65
End: 2025-06-11
Payer: MEDICARE

## 2025-06-11 VITALS — BODY MASS INDEX: 37.61 KG/M2 | WEIGHT: 239.63 LBS | HEIGHT: 67 IN

## 2025-06-11 DIAGNOSIS — R10.9 ABDOMINAL DISCOMFORT: ICD-10-CM

## 2025-06-11 DIAGNOSIS — R19.7 DIARRHEA, UNSPECIFIED TYPE: Primary | ICD-10-CM

## 2025-06-11 DIAGNOSIS — K58.0 IRRITABLE BOWEL SYNDROME WITH DIARRHEA: ICD-10-CM

## 2025-06-11 DIAGNOSIS — Z94.4 S/P LIVER TRANSPLANT: ICD-10-CM

## 2025-06-11 DIAGNOSIS — R19.4 CHANGE IN BOWEL HABITS: ICD-10-CM

## 2025-06-11 DIAGNOSIS — K21.9 GASTROESOPHAGEAL REFLUX DISEASE WITHOUT ESOPHAGITIS: ICD-10-CM

## 2025-06-11 DIAGNOSIS — R14.0 ABDOMINAL BLOATING: ICD-10-CM

## 2025-06-11 DIAGNOSIS — Z90.49 S/P CHOLECYSTECTOMY: ICD-10-CM

## 2025-06-11 DIAGNOSIS — R14.3 FLATUS: ICD-10-CM

## 2025-06-11 PROCEDURE — 99214 OFFICE O/P EST MOD 30 MIN: CPT | Mod: S$GLB,,,

## 2025-06-11 PROCEDURE — 99999 PR PBB SHADOW E&M-EST. PATIENT-LVL IV: CPT | Mod: PBBFAC,,,

## 2025-06-11 PROCEDURE — 3288F FALL RISK ASSESSMENT DOCD: CPT | Mod: CPTII,S$GLB,,

## 2025-06-11 PROCEDURE — 1160F RVW MEDS BY RX/DR IN RCRD: CPT | Mod: CPTII,S$GLB,,

## 2025-06-11 PROCEDURE — 1159F MED LIST DOCD IN RCRD: CPT | Mod: CPTII,S$GLB,,

## 2025-06-11 PROCEDURE — 3008F BODY MASS INDEX DOCD: CPT | Mod: CPTII,S$GLB,,

## 2025-06-11 PROCEDURE — 4010F ACE/ARB THERAPY RXD/TAKEN: CPT | Mod: CPTII,S$GLB,,

## 2025-06-11 PROCEDURE — 1126F AMNT PAIN NOTED NONE PRSNT: CPT | Mod: CPTII,S$GLB,,

## 2025-06-11 PROCEDURE — 1157F ADVNC CARE PLAN IN RCRD: CPT | Mod: CPTII,S$GLB,,

## 2025-06-11 PROCEDURE — 3044F HG A1C LEVEL LT 7.0%: CPT | Mod: CPTII,S$GLB,,

## 2025-06-11 PROCEDURE — 1101F PT FALLS ASSESS-DOCD LE1/YR: CPT | Mod: CPTII,S$GLB,,

## 2025-06-11 NOTE — PATIENT INSTRUCTIONS
- avoid lactose, alcohol, & caffeine  - avoid known triggers  Recommend high fiber diet (20-30 grams of fiber daily)/OTC fiber supplements daily as directed.

## 2025-06-11 NOTE — PROGRESS NOTES
Subjective:       Patient ID: Yumiko Gonzalez is a 65 y.o. female Body mass index is 37.53 kg/m².    Chief Complaint: Diarrhea    Established patient of Dr. Palomares and myself.  Established patient of Dr. Brambila (hepatology).     Diarrhea   This is a chronic problem. The current episode started more than 1 month ago (started 04/21/25 -  inital GI pathogens panel 05/08/2025 detected E coli and norovirus  - treated with supportive care; diarrhea continued so stool studies were repeated 05/23/2025 and came back normal). The problem occurs 5 to 10 times per day (During diarrheal episodes she can have up to 10 BMs a day). The problem has been waxing and waning. The stool consistency is described as Watery (Varies between 7, 5, and occasionally 4 on [place] scale; 1 day saw and undigested pill in stool). The patient states that diarrhea awakens (Was waking at night every 3 hours during diarrheal episodes) her from sleep. Associated symptoms include bloating and increased flatus (In between episodes of diarrhea). Pertinent negatives include no abdominal pain (Denies pain, but reports LUQ discomfort under ribcage typically before episodes of diarrhea; feels as though it is a pulled muscle; also experiences lower abdominal tenderness prior to diarrheal episodes that is present will palpation), chills, coughing, fever, headaches, vomiting or weight loss. Associated symptoms comments: GERD well-controlled after increasing omeprazole to 40 mg once daily a couple of months ago. Colonoscopy 02/03/22  - Diverticulosis in the sigmoid colon and in the descending colon. Internal hemorrhoids. The examination was otherwise normal. No specimens collected. Exacerbated by: Salad. 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 She has tried anti-motility drug, change of diet and  increased fluids (Has tried Imodium with short term relief; also avoiding possible triggers such as coffee) for the symptoms. The treatment provided moderate (short term relief) relief. There is no history of bowel resection, inflammatory bowel disease, malabsorption or short gut syndrome. 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   HENT:  Negative for sore throat and trouble swallowing.    Respiratory:  Negative for cough, choking and shortness of breath.    Cardiovascular:  Negative for chest pain.   Gastrointestinal:  Positive for bloating, diarrhea and flatus (In between episodes of diarrhea). Negative for abdominal distention, abdominal pain (Denies pain, but reports LUQ discomfort under ribcage typically before episodes of diarrhea; feels as though it is a pulled muscle; also experiences lower abdominal tenderness prior to diarrheal episodes that is present will palpation), anal bleeding, blood in stool, constipation, nausea, rectal pain and vomiting.   Genitourinary:  Negative for dysuria, frequency and hematuria.   Neurological:  Negative for headaches.       No LMP recorded. Patient is postmenopausal.  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, postitive antismooth muscle antibody    Blood transfusion     Bronchitis     bronchospasm on occasion     Cancer 1987    carcinoma in situ    Cervical neck pain with evidence of disc disease 03/22/2012    can bend neck    Cirrhosis     non-alcoholic, compensated    Colon polyps 03/22/2012    Depression 03/22/2012    Hx panic attacks    Diverticular disease  "2012    never diverticulitis    Fatty liver 2012    Fibrocystic breast     Fine tremor     hands,chronic    GERD (gastroesophageal reflux disease)     Hepatosplenomegaly     History of abdominal paracentesis 2023    8.7L of fluid drained    Hypertension 2013    Knee pain, bilateral     chronic, with hands,feet,fingers also painful    Lesion of right lung     Liver disease     "partially sclerosed liver" per pt    Migraines past Hx    Nephrolithiasis     stone episode 2021    Pleural effusion     small, compensated, good bilateral breath sounds per Dr Suresh GUTIERRES (postoperative nausea and vomiting)     twice after childbirth    Postpartum depression     Splenomegaly     Thrombocytopenia     Tremors of nervous system      Past Surgical History:   Procedure Laterality Date    ADENOIDECTOMY      CERVICAL CONIZATION   W/ LASER       SECTION      x3    CHOLECYSTECTOMY      COLONOSCOPY N/A 2016    Procedure: COLONOSCOPY;  Surgeon: James Palomares MD;  Location: [facility];  Service: Endoscopy;  Laterality: N/A;    COLONOSCOPY N/A 2022    Procedure: COLONOSCOPY;  Surgeon: James Palomares MD;  Location: [facility];  Service: Endoscopy;  Laterality: N/A;    EMBOLIZATION N/A 2018    Procedure: EMBOLIZATION, BLOOD VESSEL;  Surgeon: Joselin Surgeon;  Location: [facility];  Service: Radiology;  Laterality: N/A;    ENDOMETRIAL ABLATION      ESOPHAGOGASTRODUODENOSCOPY N/A 2020    Procedure: ESOPHAGOGASTRODUODENOSCOPY (EGD);  Surgeon: James Palomares MD;  Location: [facility];  Service: Endoscopy;  Laterality: N/A;    ESOPHAGOGASTRODUODENOSCOPY N/A 2022    Procedure: EGD (ESOPHAGOGASTRODUODENOSCOPY);  Surgeon: James Palomares MD;  Location: [facility];  Service: Endoscopy;  Laterality: N/A;    ESOPHAGOGASTRODUODENOSCOPY N/A 2024    Procedure: EGD (ESOPHAGOGASTRODUODENOSCOPY);  Surgeon: James Palomares MD;  Location: [facility];  Service: " Gastroenterology;  Laterality: N/A;    LIVER BIOPSY      LIVER TRANSPLANT N/A 2023    Procedure: TRANSPLANT, LIVER;  Surgeon: Gurpreet Raza MD;  Location: [facility] OR [address] [place] [address];  Service: Transplant;  Laterality: N/A;    PELVIC LAPAROSCOPY      TONSILLECTOMY      TUBAL LIGATION      UPPER GASTROINTESTINAL ENDOSCOPY       Family History   Problem Relation Name Age of Onset    Cataracts Mother      Breast cancer Mother  70    Heart disease Mother      Hypertension Mother      Tremor Mother      Cataracts Father      Diabetes Father      Heart disease Father      Ulcers Father      Diabetes Sister      Cancer Sister      Lung cancer Sister          70's    Diabetes Sister      Diabetes Brother      Emphysema Brother      Breast cancer Maternal Aunt  70    Multiple sclerosis Maternal Aunt      Multiple sclerosis Maternal Aunt      Breast cancer Maternal Grandmother  70    Tremor Daughter Casandra     Breast cancer Maternal Cousin 1st 40    Ovarian cancer Neg Hx      Parkinsonism Neg Hx      Glaucoma Neg Hx      Macular degeneration Neg Hx      Colon cancer Neg Hx      Crohn's disease Neg Hx      Stomach cancer Neg Hx      Esophageal cancer Neg Hx      Ulcerative colitis Neg Hx      Amblyopia Neg Hx      Blindness Neg Hx      Retinal detachment Neg Hx      Strabismus Neg Hx       Social History     Tobacco Use    Smoking status: Former     Current packs/day: 0.00     Types: Cigarettes     Quit date: 2/3/2006     Years since quittin.3    Smokeless tobacco: Never   Substance Use Topics    Alcohol use: Not Currently    Drug use: No     Wt Readings from Last 10 Encounters:   25 108.7 kg (239 lb 10.2 oz)   25 108.8 kg (239 lb 13.8 oz)   25 110.7 kg (244 lb 0.8 oz)   04/15/25 112.4 kg (247 lb 12.8 oz)   25 109.4 kg (241 lb 1.2 oz)   24 105.8 kg (233 lb 5.7 oz)   24 104.7 kg (230 lb 13.2 oz)   10/31/24 102.9 kg (226 lb 11.9 oz)   10/14/24 102.4 kg (225 lb 12 oz)   10/07/24 99.3 kg (218  lb 14.7 oz)     Lab Results   Component Value Date    WBC 4.86 06/05/2025    HGB 10.5 (L) 06/05/2025    HCT 33.5 (L) 06/05/2025    MCV 90 06/05/2025     (L) 06/05/2025     CMP  Sodium   Date Value Ref Range Status   06/05/2025 144 136 - 145 mmol/L Final   03/10/2025 141 136 - 145 mmol/L Final     Potassium   Date Value Ref Range Status   06/05/2025 4.8 3.5 - 5.1 mmol/L Final   03/10/2025 4.8 3.5 - 5.1 mmol/L Final     Chloride   Date Value Ref Range Status   06/05/2025 115 (H) 95 - 110 mmol/L Final   03/10/2025 109 95 - 110 mmol/L Final     CO2   Date Value Ref Range Status   06/05/2025 22 (L) 23 - 29 mmol/L Final   03/10/2025 21 (L) 23 - 29 mmol/L Final     Glucose   Date Value Ref Range Status   06/05/2025 152 (H) 70 - 110 mg/dL Final   03/10/2025 158 (H) 70 - 110 mg/dL Final     BUN   Date Value Ref Range Status   06/05/2025 43 (H) 8 - 23 mg/dL Final     Creatinine   Date Value Ref Range Status   06/05/2025 1.5 (H) 0.5 - 1.4 mg/dL Final     Calcium   Date Value Ref Range Status   06/05/2025 9.4 8.7 - 10.5 mg/dL Final   03/10/2025 10.0 8.7 - 10.5 mg/dL Final     Protein Total   Date Value Ref Range Status   06/05/2025 6.1 6.0 - 8.4 gm/dL Final     Total Protein   Date Value Ref Range Status   03/10/2025 6.6 6.0 - 8.4 g/dL Final     Albumin   Date Value Ref Range Status   06/05/2025 3.6 3.5 - 5.2 g/dL Final   03/10/2025 3.8 3.5 - 5.2 g/dL Final     Total Bilirubin   Date Value Ref Range Status   03/10/2025 0.4 0.1 - 1.0 mg/dL Final     Comment:     For infants and newborns, interpretation of results should be based  on gestational age, weight and in agreement with clinical  observations.    Premature Infant recommended reference ranges:  Up to 24 hours.............<8.0 mg/dL  Up to 48 hours............<12.0 mg/dL  3-5 days..................<15.0 mg/dL  6-29 days.................<15.0 mg/dL       Bilirubin Total   Date Value Ref Range Status   06/05/2025 0.2 0.1 - 1.0 mg/dL Final     Comment:     For infants  and newborns, interpretation of results should be based   on gestational age, weight and in agreement with clinical   observations.    Premature Infant recommended reference ranges:   0-24 hours:  <8.0 mg/dL   24-48 hours: <12.0 mg/dL   3-5 days:    <15.0 mg/dL   6-29 days:   <15.0 mg/dL     Alkaline Phosphatase   Date Value Ref Range Status   03/10/2025 98 40 - 150 U/L Final     ALP   Date Value Ref Range Status   06/05/2025 89 40 - 150 unit/L Final     AST   Date Value Ref Range Status   06/05/2025 14 11 - 45 unit/L Final   03/10/2025 40 10 - 40 U/L Final     ALT   Date Value Ref Range Status   06/05/2025 14 10 - 44 unit/L Final   03/10/2025 52 (H) 10 - 44 U/L Final     Anion Gap   Date Value Ref Range Status   06/05/2025 7 (L) 8 - 16 mmol/L Final     eGFR if    Date Value Ref Range Status   06/20/2022 >60 >60 mL/min/1.73 m^2 Final     eGFR if non    Date Value Ref Range Status   06/20/2022 >60 >60 mL/min/1.73 m^2 Final     Comment:     Calculation used to obtain the estimated glomerular filtration  rate (eGFR) is the CKD-EPI equation.        Lab Results   Component Value Date    AMYLASE 36 12/10/2023     Lab Results   Component Value Date    LIPASERES 84 01/17/2023     Lab Results   Component Value Date    TSH 2.728 04/13/2023     Reviewed prior medical records including radiology report of CT abdomen 06/28/24, CT chest 06/28/24 06/28/24, MRI abdomen 10/17/23, CT abdomen 08/08/23 & endoscopy history (see surgical history).    Objective:      Physical Exam  Vitals and nursing note reviewed.   Constitutional:       General: She is not in acute distress.     Appearance: Normal appearance. She is not ill-appearing.   HENT:      Mouth/Throat:      Lips: Pink. No lesions.   Cardiovascular:      Rate and Rhythm: Normal rate.      Heart sounds: Normal heart sounds.   Pulmonary:      Effort: Pulmonary effort is normal. No respiratory distress.      Breath sounds: Normal breath sounds.    Abdominal:      General: Bowel sounds are normal. There is no distension or abdominal bruit. There are no signs of injury.      Palpations: Abdomen is soft. There is no shifting dullness, fluid wave, hepatomegaly or mass.      Tenderness: There is generalized abdominal tenderness (mild). There is no guarding or rebound. Negative signs include Acevedo's sign, Rovsing's sign and McBurney's sign.   Skin:     General: Skin is warm and dry.      Coloration: Skin is not jaundiced or pale.   Neurological:      Mental Status: She is alert and oriented to person, place, and time.   Psychiatric:         Attention and Perception: Attention normal.         Mood and Affect: Mood normal.         Speech: Speech normal.         Behavior: Behavior normal.         Assessment:       1. Diarrhea, unspecified type    2. Change in bowel habits    3. Irritable bowel syndrome with diarrhea    4. Flatus    5. Abdominal bloating    6. Abdominal discomfort    7. Gastroesophageal reflux disease without esophagitis    8. S/P liver transplant    9. S/P cholecystectomy          Plan:       Diarrhea, unspecified type, Change in bowel habits, & Irritable bowel syndrome with diarrhea  - schedule Colonoscopy with bx to rule out MC, discussed procedure with the patient, including risks and benefits, patient verbalized understanding  - post-infectious IBS?  - avoid lactose, alcohol, & caffeine  - avoid known triggers  - Recommended increase fiber in diet, especially soluble fiber since this can help bulk up the stool consistency and may help to slow down how fast the stool goes through the colon and can prevent diarrhea  - discuss possible alternative medication for Olmesartan  with prescribing provider to rule out possible Olmesartan-induced sprue-like enteropathy?   - START: rifAXIMin (XIFAXAN) 550 mg Tab; Take 1 tablet (550 mg total) by mouth 3 (three) times daily. for 14 days  Dispense: 42 tablet; Refill: 0    Flatus, Abdominal bloating, &  Abdominal discomfort  - schedule Colonoscopy, discussed procedure with the patient, including risks and benefits, patient verbalized understanding  - recommend OTC simethicone as directed, such as Phazyme or Gas-x  - recommend low gas diet: Reduce or eliminate these foods from your diet: Broccoli, Cauliflower, [place] sprouts, Cabbage, Cooked dried beans, Carbonated beverages (sparkling water, soda, beer, champagne)  Other Causes Of Excess Gas Include:   1) EATING TOO FAST or TALKING WHILE YOU CHEW may cause you to swallow air. This increases the amount of gas in the stomach and may worsen your symptoms.  --> Chew each mouthful completely before swallowing. Take your time.  2) OVEREATING may increase the feeling of being bloated and cause more gas.  --> When you are full, stop eating.  - Continue with ordered CT A/P as recommneded by Dr. Brambila     Gastroesophageal reflux disease without esophagitis  -Avoid large meals, avoid eating within 2-3 hours of bedtime (avoid late night eating & lying down soon after eating), elevate head of bed if nocturnal symptoms are present, smoking cessation (if current smoker), & weight loss (if overweight).   -Avoid known foods which trigger reflux symptoms & to minimize/avoid high-fat foods, chocolate, caffeine, citrus, alcohol, & tomato products.  -Avoid/limit use of NSAID's, since they can cause GI upset, bleeding, and/or ulcers. If needed, take with food.  - continue Omeprazole 40 mg once daily 30-60 mintues before breakfast    S/P liver transplant  - follow-up with liver transplant for continued evaluation and management     S/P cholecystectomy    Follow up in about 2 months (around 8/11/2025), or if symptoms worsen or fail to improve.      If no improvement in symptoms or symptoms worsen, call/follow-up at clinic or go to ER.        Total time spent on the encounter includes face to face time and non-face to face time preparing to see the patient (eg, review of tests),  Obtaining and/or reviewing separately obtained history, Documenting clinical information in the electronic or other health record, Independently interpreting results (not separately reported) and communicating results to the patient/family/caregiver, or Care coordination (not separately reported).     A dictation software program was used for this note. Please expect some simple typographical  errors in this note.

## 2025-06-12 ENCOUNTER — PATIENT MESSAGE (OUTPATIENT)
Dept: GASTROENTEROLOGY | Facility: CLINIC | Age: 65
End: 2025-06-12
Payer: MEDICARE

## 2025-06-13 NOTE — TELEPHONE ENCOUNTER
Please try to request prior authorization so we are possibly able to get medication covered (Ernestine may be able to help trying to get it covered).  If that does not work I will send in alternative.  Sincerely,  Urszula Reyes, NP

## 2025-06-16 ENCOUNTER — LAB VISIT (OUTPATIENT)
Dept: LAB | Facility: HOSPITAL | Age: 65
End: 2025-06-16
Attending: STUDENT IN AN ORGANIZED HEALTH CARE EDUCATION/TRAINING PROGRAM
Payer: MEDICARE

## 2025-06-16 DIAGNOSIS — Z94.4 S/P LIVER TRANSPLANT: ICD-10-CM

## 2025-06-16 LAB
ABSOLUTE EOSINOPHIL (OHS): 0.14 K/UL
ABSOLUTE MONOCYTE (OHS): 0.43 K/UL (ref 0.3–1)
ABSOLUTE NEUTROPHIL COUNT (OHS): 1.8 K/UL (ref 1.8–7.7)
ALBUMIN SERPL BCP-MCNC: 3.5 G/DL (ref 3.5–5.2)
ALP SERPL-CCNC: 76 UNIT/L (ref 40–150)
ALT SERPL W/O P-5'-P-CCNC: 15 UNIT/L (ref 10–44)
ANION GAP (OHS): 9 MMOL/L (ref 8–16)
AST SERPL-CCNC: 18 UNIT/L (ref 11–45)
BASOPHILS # BLD AUTO: 0.03 K/UL
BASOPHILS NFR BLD AUTO: 1 %
BILIRUB SERPL-MCNC: 0.4 MG/DL (ref 0.1–1)
BUN SERPL-MCNC: 15 MG/DL (ref 8–23)
CALCIUM SERPL-MCNC: 9.2 MG/DL (ref 8.7–10.5)
CHLORIDE SERPL-SCNC: 107 MMOL/L (ref 95–110)
CO2 SERPL-SCNC: 24 MMOL/L (ref 23–29)
CREAT SERPL-MCNC: 1.1 MG/DL (ref 0.5–1.4)
ERYTHROCYTE [DISTWIDTH] IN BLOOD BY AUTOMATED COUNT: 13.7 % (ref 11.5–14.5)
GFR SERPLBLD CREATININE-BSD FMLA CKD-EPI: 56 ML/MIN/1.73/M2
GLUCOSE SERPL-MCNC: 130 MG/DL (ref 70–110)
HCT VFR BLD AUTO: 34.3 % (ref 37–48.5)
HGB BLD-MCNC: 10.3 GM/DL (ref 12–16)
IMM GRANULOCYTES # BLD AUTO: 0.06 K/UL (ref 0–0.04)
IMM GRANULOCYTES NFR BLD AUTO: 1.9 % (ref 0–0.5)
LYMPHOCYTES # BLD AUTO: 0.64 K/UL (ref 1–4.8)
MCH RBC QN AUTO: 27.7 PG (ref 27–31)
MCHC RBC AUTO-ENTMCNC: 30 G/DL (ref 32–36)
MCV RBC AUTO: 92 FL (ref 82–98)
NUCLEATED RBC (/100WBC) (OHS): 0 /100 WBC
PLATELET # BLD AUTO: 123 K/UL (ref 150–450)
PMV BLD AUTO: 11.1 FL (ref 9.2–12.9)
POTASSIUM SERPL-SCNC: 4.3 MMOL/L (ref 3.5–5.1)
PROT SERPL-MCNC: 6 GM/DL (ref 6–8.4)
RBC # BLD AUTO: 3.72 M/UL (ref 4–5.4)
RELATIVE EOSINOPHIL (OHS): 4.5 %
RELATIVE LYMPHOCYTE (OHS): 20.6 % (ref 18–48)
RELATIVE MONOCYTE (OHS): 13.9 % (ref 4–15)
RELATIVE NEUTROPHIL (OHS): 58.1 % (ref 38–73)
SODIUM SERPL-SCNC: 140 MMOL/L (ref 136–145)
WBC # BLD AUTO: 3.1 K/UL (ref 3.9–12.7)

## 2025-06-16 PROCEDURE — 80197 ASSAY OF TACROLIMUS: CPT

## 2025-06-16 PROCEDURE — 85025 COMPLETE CBC W/AUTO DIFF WBC: CPT

## 2025-06-16 PROCEDURE — 36415 COLL VENOUS BLD VENIPUNCTURE: CPT | Mod: PO

## 2025-06-16 PROCEDURE — 82040 ASSAY OF SERUM ALBUMIN: CPT

## 2025-06-17 LAB — TACROLIMUS BLD-MCNC: 3.6 NG/ML (ref 5–15)

## 2025-06-19 ENCOUNTER — RESULTS FOLLOW-UP (OUTPATIENT)
Dept: TRANSPLANT | Facility: CLINIC | Age: 65
End: 2025-06-19
Payer: MEDICARE

## 2025-06-19 ENCOUNTER — TELEPHONE (OUTPATIENT)
Dept: PHARMACY | Facility: CLINIC | Age: 65
End: 2025-06-19
Payer: MEDICARE

## 2025-06-19 NOTE — TELEPHONE ENCOUNTER
First contact attempt has been made via phone, letter, and portal message  . Welcome letter and MAC Enrollment Packet has been sent via letter and portal message . Follow-up will be made in approximately 5 to 10 business days.      Jayden Smith  Pharmacy Patient Assistance Team

## 2025-06-19 NOTE — LETTER
June 19, 2025    Yumiko Gonzalez  [address]  [facility] [address]             Dear Ms. Gonzalez,    My name is Jayden Smith, and I am reaching out on behalf of [facility]s Pharmacy Patient Assistance Team regarding your request for medication assistance. Our goal is to help qualified [facility] patients obtain financial assistance for prescribed medications.    Please note that enrollment into available support may require the following documents:    Completed [facility] authorization forms, Copy of all insurance cards (front and back), Copy of the first 2 pages of your most recent tax return (eg, 1040) , Documentation from your pharmacy or insurance company showing your cost to purchase requested medication, and Proof of household income (such as social security award letter, pension statement or 3 consecutive pay stubs)    If you still need assistance with your medications, please reach out to the phone number listed below. If we do not hear back from you, a second contact attempt will be made via mail or your [facility] portal in 5 to 10 business days.    Thank you for giving us the opportunity to assist you with your healthcare needs. We look forward to working with you.      Sincerely  Jayden Smith @[phone]  Pharmacy Patient Assistance Team  [address] [facility]  [address]  [place] [address]  Fax: [phone]  Email: [email or web address]

## 2025-06-20 NOTE — TELEPHONE ENCOUNTER
Pt informed. Repeat labs requested 6/30/25.  ----- Message from James Brambila MD sent at 6/19/2025  3:55 PM CDT -----  Liver tests are stable. Kidney function normalized. No changes in her immunosuppression. Please repeat labs in 1-2 weeks to make sure tac level is ok.  ----- Message -----  From: Lab, Background User  Sent: 6/16/2025   6:50 PM CDT  To: James Brambila MD

## 2025-06-22 ENCOUNTER — PATIENT MESSAGE (OUTPATIENT)
Dept: TRANSPLANT | Facility: CLINIC | Age: 65
End: 2025-06-22
Payer: MEDICARE

## 2025-06-23 ENCOUNTER — PATIENT MESSAGE (OUTPATIENT)
Dept: GASTROENTEROLOGY | Facility: CLINIC | Age: 65
End: 2025-06-23
Payer: MEDICARE

## 2025-06-23 NOTE — TELEPHONE ENCOUNTER
Please let the patient know I am okay if she wants to hold off on colonoscopy for now since symptoms have resolved.  If symptoms reoccur please let me know and we can reschedule colonoscopy.  Sincerely,  Urszula Reyes, NP

## 2025-06-25 ENCOUNTER — PATIENT MESSAGE (OUTPATIENT)
Dept: TRANSPLANT | Facility: CLINIC | Age: 65
End: 2025-06-25
Payer: MEDICARE

## 2025-06-27 ENCOUNTER — TELEPHONE (OUTPATIENT)
Dept: PRIMARY CARE CLINIC | Facility: CLINIC | Age: 65
End: 2025-06-27
Payer: MEDICARE

## 2025-06-27 NOTE — TELEPHONE ENCOUNTER
LCSW called patient to assess if she was still interested in counseling services, as she was previously seeing Delia. She stated yes, but not in person as she does not have access to a vehicle at this time. LCSW stated we provided virtual appointments, and that a webcam for this LCSW is ordered. Patient stated to call her back once this comes in, LCSW confirmed she will do so.

## 2025-06-30 ENCOUNTER — LAB VISIT (OUTPATIENT)
Dept: LAB | Facility: HOSPITAL | Age: 65
End: 2025-06-30
Attending: STUDENT IN AN ORGANIZED HEALTH CARE EDUCATION/TRAINING PROGRAM
Payer: MEDICARE

## 2025-06-30 DIAGNOSIS — Z94.4 S/P LIVER TRANSPLANT: ICD-10-CM

## 2025-06-30 LAB
ABSOLUTE EOSINOPHIL (OHS): 0.24 K/UL
ABSOLUTE MONOCYTE (OHS): 0.49 K/UL (ref 0.3–1)
ABSOLUTE NEUTROPHIL COUNT (OHS): 2.54 K/UL (ref 1.8–7.7)
ALBUMIN SERPL BCP-MCNC: 3.8 G/DL (ref 3.5–5.2)
ALP SERPL-CCNC: 76 UNIT/L (ref 40–150)
ALT SERPL W/O P-5'-P-CCNC: 16 UNIT/L (ref 10–44)
ANION GAP (OHS): 8 MMOL/L (ref 8–16)
AST SERPL-CCNC: 18 UNIT/L (ref 11–45)
BASOPHILS # BLD AUTO: 0.03 K/UL
BASOPHILS NFR BLD AUTO: 0.7 %
BILIRUB SERPL-MCNC: 0.6 MG/DL (ref 0.1–1)
BUN SERPL-MCNC: 20 MG/DL (ref 8–23)
CALCIUM SERPL-MCNC: 9.3 MG/DL (ref 8.7–10.5)
CHLORIDE SERPL-SCNC: 109 MMOL/L (ref 95–110)
CO2 SERPL-SCNC: 25 MMOL/L (ref 23–29)
CREAT SERPL-MCNC: 1.2 MG/DL (ref 0.5–1.4)
ERYTHROCYTE [DISTWIDTH] IN BLOOD BY AUTOMATED COUNT: 14.5 % (ref 11.5–14.5)
GFR SERPLBLD CREATININE-BSD FMLA CKD-EPI: 50 ML/MIN/1.73/M2
GLUCOSE SERPL-MCNC: 145 MG/DL (ref 70–110)
HCT VFR BLD AUTO: 38.1 % (ref 37–48.5)
HGB BLD-MCNC: 11.9 GM/DL (ref 12–16)
IMM GRANULOCYTES # BLD AUTO: 0.05 K/UL (ref 0–0.04)
IMM GRANULOCYTES NFR BLD AUTO: 1.2 % (ref 0–0.5)
LYMPHOCYTES # BLD AUTO: 0.88 K/UL (ref 1–4.8)
MCH RBC QN AUTO: 28.4 PG (ref 27–31)
MCHC RBC AUTO-ENTMCNC: 31.2 G/DL (ref 32–36)
MCV RBC AUTO: 91 FL (ref 82–98)
NUCLEATED RBC (/100WBC) (OHS): 0 /100 WBC
PLATELET # BLD AUTO: 151 K/UL (ref 150–450)
PMV BLD AUTO: 11.2 FL (ref 9.2–12.9)
POTASSIUM SERPL-SCNC: 4.2 MMOL/L (ref 3.5–5.1)
PROT SERPL-MCNC: 6.5 GM/DL (ref 6–8.4)
RBC # BLD AUTO: 4.19 M/UL (ref 4–5.4)
RELATIVE EOSINOPHIL (OHS): 5.7 %
RELATIVE LYMPHOCYTE (OHS): 20.8 % (ref 18–48)
RELATIVE MONOCYTE (OHS): 11.6 % (ref 4–15)
RELATIVE NEUTROPHIL (OHS): 60 % (ref 38–73)
SODIUM SERPL-SCNC: 142 MMOL/L (ref 136–145)
WBC # BLD AUTO: 4.23 K/UL (ref 3.9–12.7)

## 2025-06-30 PROCEDURE — 80053 COMPREHEN METABOLIC PANEL: CPT

## 2025-06-30 PROCEDURE — 80197 ASSAY OF TACROLIMUS: CPT

## 2025-06-30 PROCEDURE — 36415 COLL VENOUS BLD VENIPUNCTURE: CPT | Mod: PO

## 2025-06-30 PROCEDURE — 85025 COMPLETE CBC W/AUTO DIFF WBC: CPT

## 2025-07-01 ENCOUNTER — RESULTS FOLLOW-UP (OUTPATIENT)
Dept: HEPATOLOGY | Facility: CLINIC | Age: 65
End: 2025-07-01
Payer: MEDICARE

## 2025-07-01 DIAGNOSIS — Z94.4 S/P LIVER TRANSPLANT: Primary | ICD-10-CM

## 2025-07-01 DIAGNOSIS — Z85.05 PERSONAL HISTORY OF MALIGNANT NEOPLASM OF LIVER: ICD-10-CM

## 2025-07-01 LAB — TACROLIMUS BLD-MCNC: 4.4 NG/ML (ref 5–15)

## 2025-07-01 NOTE — TELEPHONE ENCOUNTER
Pt notified via portal of stable labs and that no medication changes are needed. Repeat labs due 8/11/25 per protocol.   Pt advised that HCC CT scans will be rescheduled.  ----- Message from James Brambila MD sent at 7/1/2025 12:10 PM CDT -----  Liver tests are stable. No changes in her immunosuppression. Please continue to monitor labs per transplant protocol.  ----- Message -----  From: Lab, Background User  Sent: 6/30/2025   6:57 PM CDT  To: James Brambila MD

## 2025-07-15 ENCOUNTER — PATIENT MESSAGE (OUTPATIENT)
Dept: PRIMARY CARE CLINIC | Facility: CLINIC | Age: 65
End: 2025-07-15
Payer: MEDICARE

## 2025-07-15 DIAGNOSIS — R10.2 PELVIC PAIN: Primary | ICD-10-CM

## 2025-07-15 RX ORDER — METHOCARBAMOL 500 MG/1
500 TABLET, FILM COATED ORAL 3 TIMES DAILY PRN
Qty: 30 TABLET | Refills: 0 | Status: SHIPPED | OUTPATIENT
Start: 2025-07-15

## 2025-07-15 NOTE — TELEPHONE ENCOUNTER
I sent in a prescription for Robaxin.  Although if she is not certain as muscle spasm may also want to check a UA and culture.  Orders entered.

## 2025-07-16 ENCOUNTER — TELEPHONE (OUTPATIENT)
Dept: TRANSPLANT | Facility: CLINIC | Age: 65
End: 2025-07-16
Payer: MEDICARE

## 2025-07-16 ENCOUNTER — TELEPHONE (OUTPATIENT)
Dept: PRIMARY CARE CLINIC | Facility: CLINIC | Age: 65
End: 2025-07-16

## 2025-07-16 ENCOUNTER — HOSPITAL ENCOUNTER (OUTPATIENT)
Dept: RADIOLOGY | Facility: HOSPITAL | Age: 65
Discharge: HOME | End: 2025-07-16
Attending: STUDENT IN AN ORGANIZED HEALTH CARE EDUCATION/TRAINING PROGRAM
Payer: MEDICARE

## 2025-07-16 DIAGNOSIS — Z94.4 S/P LIVER TRANSPLANT: ICD-10-CM

## 2025-07-16 DIAGNOSIS — Z85.05 HISTORY OF HEPATOCELLULAR CARCINOMA: ICD-10-CM

## 2025-07-16 PROCEDURE — 74178 CT ABD&PLV WO CNTR FLWD CNTR: CPT | Mod: TC,PO

## 2025-07-16 PROCEDURE — 25500020 PHARM REV CODE 255: Mod: PO | Performed by: STUDENT IN AN ORGANIZED HEALTH CARE EDUCATION/TRAINING PROGRAM

## 2025-07-16 PROCEDURE — A9698 NON-RAD CONTRAST MATERIALNOC: HCPCS | Mod: PO | Performed by: STUDENT IN AN ORGANIZED HEALTH CARE EDUCATION/TRAINING PROGRAM

## 2025-07-16 PROCEDURE — 71250 CT THORAX DX C-: CPT | Mod: TC,PO

## 2025-07-16 RX ORDER — AMOXICILLIN AND CLAVULANATE POTASSIUM 875; 125 MG/1; MG/1
1 TABLET, FILM COATED ORAL EVERY 8 HOURS
Qty: 21 TABLET | Refills: 0 | Status: SHIPPED | OUTPATIENT
Start: 2025-07-16 | End: 2025-07-23

## 2025-07-16 RX ADMIN — IOHEXOL 100 ML: 350 INJECTION, SOLUTION INTRAVENOUS at 01:07

## 2025-07-16 RX ADMIN — IOHEXOL 100 ML: 12 SOLUTION ORAL at 01:07

## 2025-07-16 NOTE — TELEPHONE ENCOUNTER
Reviewed CT scan with Dr. Jerez. Per MD, pt needs to reach out to her GI doctor to discuss treatment plan for acute uncomplicated diverticulitis.     Pt informed of above. Asked her to send me an update.     Also sent staff message to ERIN Reyes in GI to ask her to review imaging and discuss with pt.

## 2025-07-17 DIAGNOSIS — Z65.8 PSYCHOSOCIAL STRESSORS: ICD-10-CM

## 2025-07-17 DIAGNOSIS — F43.20 GRIEF REACTION: Primary | ICD-10-CM

## 2025-07-17 DIAGNOSIS — F32.A DEPRESSIVE DISORDER: ICD-10-CM

## 2025-07-23 ENCOUNTER — RESULTS FOLLOW-UP (OUTPATIENT)
Dept: HEPATOLOGY | Facility: CLINIC | Age: 65
End: 2025-07-23
Payer: MEDICARE

## 2025-07-24 NOTE — TELEPHONE ENCOUNTER
Pt informed  ----- Message from James Brambila MD sent at 7/23/2025  8:59 AM CDT -----  Reviewed. No evidence of recurrent liver cancer.  ----- Message -----  From: Interface, Rad Results In  Sent: 7/16/2025   1:45 PM CDT  To: James Brambila MD

## 2025-07-24 NOTE — TELEPHONE ENCOUNTER
----- Message from James Brambila MD sent at 7/23/2025  9:00 AM CDT -----  Reviewed. No evidence of recurrent liver cancer.    CT does show diverticulitis for which she received antibiotics.  ----- Message -----  From: Interface, Rad Results In  Sent: 7/16/2025   1:53 PM CDT  To: James Brambila MD

## 2025-07-25 ENCOUNTER — PATIENT MESSAGE (OUTPATIENT)
Dept: TRANSPLANT | Facility: CLINIC | Age: 65
End: 2025-07-25

## 2025-07-25 ENCOUNTER — OFFICE VISIT (OUTPATIENT)
Dept: TRANSPLANT | Facility: CLINIC | Age: 65
End: 2025-07-25
Payer: MEDICARE

## 2025-07-25 DIAGNOSIS — Z94.4 LIVER TRANSPLANTED: Primary | ICD-10-CM

## 2025-07-25 DIAGNOSIS — Z79.60 LONG-TERM USE OF IMMUNOSUPPRESSANT MEDICATION: ICD-10-CM

## 2025-07-25 DIAGNOSIS — Z85.05 HISTORY OF HEPATOCELLULAR CARCINOMA: ICD-10-CM

## 2025-07-25 NOTE — LETTER
July 27, 2025        Laila Will  [address]  [facility] [address]  Phone: [phone]  Fax: [phone]             [facility] 1st [address]  [address]  [facility] [address]  Phone: [phone]   Patient: Yumiko Gonzalez   MR Number: [id number]   YOB: 1960   Date of Visit: 7/25/2025       Dear Dr. Laila Will    Thank you for referring Yumiko Gonzalez to me for evaluation. Attached you will find relevant portions of my assessment and plan of care.    If you have questions, please do not hesitate to call me. I look forward to following Yumiko Gonzalez along with you.    Sincerely,    James Brambila MD    Enclosure    If you would like to receive this communication electronically, please contact [email or web address] or [phone] to request [employer] Link access.    [employer] Link is a tool which provides read-only access to select patient information with whom you have a relationship. Its easy to use and provides real time access to review your patients record including encounter summaries, notes, results, and demographic information.    If you feel you have received this communication in error or would no longer like to receive these types of communications, please e-mail [email or web address]

## 2025-07-28 NOTE — PROGRESS NOTES
Transplant Hepatology  Liver Transplant Recipient Follow Up    The patient location is: [place]  The chief complaint leading to consultation is: OLT    Visit type: audiovisual    Face to Face time with patient: 10  20 minutes of total time spent on the encounter, which includes face to face time and non-face to face time preparing to see the patient (eg, review of tests), Obtaining and/or reviewing separately obtained history, Documenting clinical information in the electronic or other health record, Independently interpreting results (not separately reported) and communicating results to the patient/family/caregiver, or Care coordination (not separately reported).     Each patient to whom he or she provides medical services by telemedicine is:  (1) informed of the relationship between the physician and patient and the respective role of any other health care provider with respect to management of the patient; and (2) notified that he or she may decline to receive medical services by telemedicine and may withdraw from such care at any time.    PCP: Garrett Webb MD    Transplant History  Transplant Date: 12/9/2023  UNOS Native Liver Dx: Cirrhosis: Fatty Liver (DURAN)    ORGAN: LIVER  Whole or Partial: whole liver  Donor Type: donation after circulatory death   CDC High Risk: no  Donor CMV Status: Negative  Donor HCV Status: Negative  Donor HBcAb: Negative  Donor HBV PEDRO:   Donor HCV PEDRO: Negative  Biliary Anastomosis: end to end  Arterial Anatomy: standard  IVC reconstruction: end to end ivc  Portal vein status: patent    She has had the following complications since transplant: none    Chief complaint: follow up, history of OLT    HPI:  Yumiko Gonzalez is a 65 y.o. 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     She is without complaints today.  No recent hospitalizations or ED visits. Compliant with Prograf.  She does report intermittent diarrhea.   "She denies recent fever, chills, nausea, vomiting, headaches, tremors.    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, postitive antismooth muscle antibody    Blood transfusion     Bronchitis     bronchospasm on occasion     Cancer     carcinoma in situ    Cervical neck pain with evidence of disc disease 2012    can bend neck    Cirrhosis     non-alcoholic, compensated    Colon polyps 2012    Depression 2012    Hx panic attacks    Diverticular disease 2012    never diverticulitis    Fatty liver 2012    Fibrocystic breast     Fine tremor     hands,chronic    GERD (gastroesophageal reflux disease)     Hepatosplenomegaly     History of abdominal paracentesis 2023    8.7L of fluid drained    Hypertension 2013    Knee pain, bilateral     chronic, with hands,feet,fingers also painful    Lesion of right lung     Liver disease     "partially sclerosed liver" per pt    Migraines past Hx    Nephrolithiasis     stone episode 2021    Pleural effusion     small, compensated, good bilateral breath sounds per Dr Suresh GUTIERRES (postoperative nausea and vomiting)     twice after childbirth    Postpartum depression     Splenomegaly     Thrombocytopenia     Tremors of nervous system        Past Surgical History:   Procedure Laterality Date    ADENOIDECTOMY      CERVICAL CONIZATION   W/ LASER       SECTION      x3    CHOLECYSTECTOMY      COLONOSCOPY N/A 2016    Procedure: COLONOSCOPY;  Surgeon: James Palomares MD;  Location: [facility] ENDO;  Service: Endoscopy;  Laterality: N/A;    COLONOSCOPY N/A 2022    Procedure: COLONOSCOPY;  Surgeon: James Palomares MD;  Location: [facility] ENDO;  Service: Endoscopy;  Laterality: N/A;    EMBOLIZATION N/A 2018    Procedure: EMBOLIZATION, " BLOOD VESSEL;  Surgeon: Joselin Surgeon;  Location: [facility];  Service: Radiology;  Laterality: N/A;    ENDOMETRIAL ABLATION      ESOPHAGOGASTRODUODENOSCOPY N/A 01/28/2020    Procedure: ESOPHAGOGASTRODUODENOSCOPY (EGD);  Surgeon: James Palomares MD;  Location: [facility] ENDO;  Service: Endoscopy;  Laterality: N/A;    ESOPHAGOGASTRODUODENOSCOPY N/A 03/08/2022    Procedure: EGD (ESOPHAGOGASTRODUODENOSCOPY);  Surgeon: James Palomares MD;  Location: [facility];  Service: Endoscopy;  Laterality: N/A;    ESOPHAGOGASTRODUODENOSCOPY N/A 11/25/2024    Procedure: EGD (ESOPHAGOGASTRODUODENOSCOPY);  Surgeon: James Palomares MD;  Location: [facility];  Service: Gastroenterology;  Laterality: N/A;    LIVER BIOPSY      LIVER TRANSPLANT N/A 12/09/2023    Procedure: TRANSPLANT, LIVER;  Surgeon: Gurpreet Raza MD;  Location: [address];  Service: Transplant;  Laterality: N/A;    PELVIC LAPAROSCOPY      TONSILLECTOMY      TUBAL LIGATION      UPPER GASTROINTESTINAL ENDOSCOPY         Family History   Problem Relation Name Age of Onset    Cataracts Mother      Breast cancer Mother  70    Heart disease Mother      Hypertension Mother      Tremor Mother      Cataracts Father      Diabetes Father      Heart disease Father      Ulcers Father      Diabetes Sister      Cancer Sister      Lung cancer Sister          70's    Diabetes Sister      Diabetes Brother      Emphysema Brother      Breast cancer Maternal Aunt  70    Multiple sclerosis Maternal Aunt      Multiple sclerosis Maternal Aunt      Breast cancer Maternal Grandmother  70    Tremor Daughter Casandra     Breast cancer Maternal Cousin 1st 40    Ovarian cancer Neg Hx      Parkinsonism Neg Hx      Glaucoma Neg Hx      Macular degeneration Neg Hx      Colon cancer Neg Hx      Crohn's disease Neg Hx      Stomach cancer Neg Hx      Esophageal cancer Neg Hx      Ulcerative colitis Neg Hx      Amblyopia Neg Hx      Blindness Neg Hx      Retinal detachment Neg Hx      Strabismus Neg  Hx         Social History     Tobacco Use    Smoking status: Former     Current packs/day: 0.00     Types: Cigarettes     Quit date: 2/3/2006     Years since quittin.4    Smokeless tobacco: Never   Substance Use Topics    Alcohol use: Not Currently    Drug use: No       Current Outpatient Medications   Medication Sig Dispense Refill    albuterol (PROVENTIL/VENTOLIN HFA) 90 mcg/actuation inhaler Inhale 2 puffs every 4 hours as needed for cough, wheeze, or shortness of breath 18 g 11    ALPRAZolam (XANAX) 0.25 MG tablet Take 1 tablet (0.25 mg total) by mouth nightly as needed for Anxiety. 15 tablet 1    aspirin (ECOTRIN) 81 MG EC tablet Take 1 tablet (81 mg total) by mouth once daily. 30 tablet 11    blood sugar diagnostic (FREESTYLE LITE STRIPS) Strp Test blood glucose 3 (three) times daily. 100 each 1    blood-glucose meter kit Use as instructed 1 each 0    buPROPion (WELLBUTRIN XL) 150 MG TB24 tablet TAKE 2 TABLETS BY MOUTH ONCE  DAILY 200 tablet 2    butalbital-acetaminophen-caffeine -40 mg (FIORICET, ESGIC) -40 mg per tablet Take 1 tablet by mouth every 4 (four) hours as needed for Pain. 20 tablet 0    ipratropium (ATROVENT) 42 mcg (0.06 %) nasal spray Use 2 sprays by Each Nostril route every 6 (six) hours as needed for Rhinitis. 15 mL 5    lancets 28 gauge Misc Test blood glucose 3 (three) times daily. 100 each 1    methocarbamoL (ROBAXIN) 500 MG Tab Take 1 tablet (500 mg total) by mouth 3 (three) times daily as needed (muscle spasm). 30 tablet 0    neomycin-polymyxin-dexamethasone (MAXITROL) 3.5mg/mL-10,000 unit/mL-0.1 % DrpS Place 1 drop into both eyes nightly as needed (to control blepharitis symptoms). 5 mL 1    olmesartan (BENICAR) 20 MG tablet TAKE 1 TABLET BY MOUTH ONCE  DAILY 90 tablet 3    omeprazole (PRILOSEC) 40 MG capsule Take 1 capsule (40 mg total) by mouth once daily. 100 capsule 1    polyethylene glycol (GOLYTELY) 236-22.74-6.74 -5.86 gram suspension Take by mouth as directed by  provider 4000 mL 0    propranoloL (INDERAL) 40 MG tablet Take 1 tablet (40 mg total) by mouth 2 (two) times daily. 200 tablet 1    tacrolimus (PROGRAF) 1 MG Cap Take 1 capsule (1 mg total) by mouth every 12 (twelve) hours. 60 capsule 11     No current facility-administered medications for this visit.       Review of patient's allergies indicates:   Allergen Reactions    No known drug allergies        Review of Systems   Constitutional:  Negative for fever and weight loss.   Gastrointestinal:  Positive for diarrhea. Negative for abdominal pain, blood in stool, constipation, heartburn, melena, nausea and vomiting.   Neurological:  Negative for tremors and headaches.       There were no vitals filed for this visit.      Physical Exam  Constitutional:       General: She is not in acute distress.     Appearance: She is not ill-appearing.   HENT:      Head: Normocephalic and atraumatic.   Eyes:      General: No scleral icterus.     Extraocular Movements: Extraocular movements intact.   Pulmonary:      Effort: No respiratory distress.   Skin:     Coloration: Skin is not jaundiced.   Neurological:      Mental Status: She is alert.         LABS:  Lab Results   Component Value Date    WBC 4.23 06/30/2025    HGB 11.9 (L) 06/30/2025    HCT 38.1 06/30/2025    MCV 91 06/30/2025     06/30/2025       Lab Results   Component Value Date     06/30/2025    K 4.2 06/30/2025     06/30/2025    CO2 25 06/30/2025    BUN 20 06/30/2025    CREATININE 1.2 06/30/2025    CALCIUM 9.3 06/30/2025    ANIONGAP 8 06/30/2025    ESTGFRAFRICA >60 06/20/2022    EGFRNONAA >60 06/20/2022       Lab Results   Component Value Date    ALT 16 06/30/2025    AST 18 06/30/2025     (H) 04/13/2023    ALKPHOS 76 06/30/2025    BILITOT 0.6 06/30/2025       Lab Results   Component Value Date    TACROLIMUS 4.4 (L) 06/30/2025    TACROLIMUS 7.2 03/10/2025         Assessment:  65 y.o. female with history of OLT in 12/2023 for MASH related cirrhosis  and HCC who presents for follow up.    1. Liver transplanted    2. Long-term use of immunosuppressant medication    3. History of hepatocellular carcinoma        Recommendations:  Allograft Function  - Excellent graft function with normal LFTs    Immunosuppression   - Continue Prograf 1mg twice daily     History of HCC: Cross-sectional imaging in 07/2025 without evidence of recurrence. Continue imaging surveillance per protocol.    Kidney function   - Creatinine 1.2  - Avoid NSAIDs as able and maintain adequate hydration    Health Maintenance/Screening:  - Recommend age appropriate cancer screening  - Skin cancer: Recommend use of sunscreen SPF 30 or higher, hat and sunglasses while outside, dermatologist visit annually or sooner if any concerning lesions.  - Osteoporosis: Recommend bone density testing every 2-3 years if previously normal or annually if previously abnormal.    Return to clinic in 6 months.    UNOS Patient Status  Functional Status: 100% - Normal, no complaints, no evidence of disease  Physical Capacity: No Limitations    This note includes dictation done using M*Modal speech recognition program. Word recognition mistakes are occasionally missed on review.    James Brambila MD  Staff Physician  Hepatology and Liver Transplant  [facility]  [facility] [place]

## 2025-07-30 ENCOUNTER — CLINICAL SUPPORT (OUTPATIENT)
Dept: PRIMARY CARE CLINIC | Facility: CLINIC | Age: 65
End: 2025-07-30
Payer: MEDICARE

## 2025-07-30 DIAGNOSIS — F41.1 GENERALIZED ANXIETY DISORDER: ICD-10-CM

## 2025-07-30 DIAGNOSIS — Z65.8 PSYCHOSOCIAL STRESSORS: ICD-10-CM

## 2025-07-30 DIAGNOSIS — F32.A DEPRESSIVE DISORDER: ICD-10-CM

## 2025-07-30 DIAGNOSIS — F43.20 GRIEF REACTION: ICD-10-CM

## 2025-07-30 DIAGNOSIS — F33.1 MAJOR DEPRESSIVE DISORDER, RECURRENT, MODERATE: Primary | ICD-10-CM
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
## 2025-08-11 ENCOUNTER — PATIENT MESSAGE (OUTPATIENT)
Dept: PRIMARY CARE CLINIC | Facility: CLINIC | Age: 65
End: 2025-08-11
Payer: MEDICARE

## 2025-08-11 ENCOUNTER — LAB VISIT (OUTPATIENT)
Dept: LAB | Facility: HOSPITAL | Age: 65
End: 2025-08-11
Attending: STUDENT IN AN ORGANIZED HEALTH CARE EDUCATION/TRAINING PROGRAM
Payer: MEDICARE

## 2025-08-11 ENCOUNTER — OFFICE VISIT (OUTPATIENT)
Dept: GASTROENTEROLOGY | Facility: CLINIC | Age: 65
End: 2025-08-11
Payer: MEDICARE

## 2025-08-11 VITALS — BODY MASS INDEX: 38.3 KG/M2 | HEIGHT: 67 IN | WEIGHT: 244.06 LBS

## 2025-08-11 DIAGNOSIS — Z90.49 S/P CHOLECYSTECTOMY: ICD-10-CM

## 2025-08-11 DIAGNOSIS — K57.92 DIVERTICULITIS: ICD-10-CM

## 2025-08-11 DIAGNOSIS — I10 PRIMARY HYPERTENSION: Chronic | ICD-10-CM

## 2025-08-11 DIAGNOSIS — K21.9 GASTROESOPHAGEAL REFLUX DISEASE WITHOUT ESOPHAGITIS: ICD-10-CM

## 2025-08-11 DIAGNOSIS — K58.0 IRRITABLE BOWEL SYNDROME WITH DIARRHEA: ICD-10-CM

## 2025-08-11 DIAGNOSIS — Z85.05 PERSONAL HISTORY OF MALIGNANT NEOPLASM OF LIVER: ICD-10-CM

## 2025-08-11 DIAGNOSIS — Z94.4 S/P LIVER TRANSPLANT: ICD-10-CM

## 2025-08-11 DIAGNOSIS — R93.5 ABNORMAL CT OF THE ABDOMEN: ICD-10-CM

## 2025-08-11 DIAGNOSIS — R73.02 IMPAIRED GLUCOSE TOLERANCE: ICD-10-CM

## 2025-08-11 DIAGNOSIS — R19.7 INTERMITTENT DIARRHEA: Primary | ICD-10-CM

## 2025-08-11 DIAGNOSIS — Z87.898 HISTORY OF ABDOMINAL PAIN: ICD-10-CM

## 2025-08-11 LAB
ABSOLUTE EOSINOPHIL (OHS): 0.43 K/UL
ABSOLUTE MONOCYTE (OHS): 0.54 K/UL (ref 0.3–1)
ABSOLUTE NEUTROPHIL COUNT (OHS): 3.92 K/UL (ref 1.8–7.7)
AFP SERPL-MCNC: <2 NG/ML
ALBUMIN SERPL BCP-MCNC: 3.8 G/DL (ref 3.5–5.2)
ALP SERPL-CCNC: 87 UNIT/L (ref 40–150)
ALT SERPL W/O P-5'-P-CCNC: 43 UNIT/L (ref 0–55)
ANION GAP (OHS): 10 MMOL/L (ref 8–16)
AST SERPL-CCNC: 34 UNIT/L (ref 0–50)
BASOPHILS # BLD AUTO: 0.04 K/UL
BASOPHILS NFR BLD AUTO: 0.7 %
BILIRUB SERPL-MCNC: 0.5 MG/DL (ref 0.1–1)
BUN SERPL-MCNC: 30 MG/DL (ref 8–23)
CALCIUM SERPL-MCNC: 9.4 MG/DL (ref 8.7–10.5)
CHLORIDE SERPL-SCNC: 111 MMOL/L (ref 95–110)
CO2 SERPL-SCNC: 23 MMOL/L (ref 23–29)
CREAT SERPL-MCNC: 1.4 MG/DL (ref 0.5–1.4)
EAG (OHS): 111 MG/DL (ref 68–131)
ERYTHROCYTE [DISTWIDTH] IN BLOOD BY AUTOMATED COUNT: 13.9 % (ref 11.5–14.5)
GFR SERPLBLD CREATININE-BSD FMLA CKD-EPI: 42 ML/MIN/1.73/M2
GLUCOSE SERPL-MCNC: 142 MG/DL (ref 70–110)
HBA1C MFR BLD: 5.5 % (ref 4–5.6)
HCT VFR BLD AUTO: 39.3 % (ref 37–48.5)
HGB BLD-MCNC: 12.3 GM/DL (ref 12–16)
IMM GRANULOCYTES # BLD AUTO: 0.06 K/UL (ref 0–0.04)
IMM GRANULOCYTES NFR BLD AUTO: 1.1 % (ref 0–0.5)
LYMPHOCYTES # BLD AUTO: 0.63 K/UL (ref 1–4.8)
MCH RBC QN AUTO: 29.1 PG (ref 27–31)
MCHC RBC AUTO-ENTMCNC: 31.3 G/DL (ref 32–36)
MCV RBC AUTO: 93 FL (ref 82–98)
NUCLEATED RBC (/100WBC) (OHS): 0 /100 WBC
PLATELET # BLD AUTO: 147 K/UL (ref 150–450)
PMV BLD AUTO: 10.8 FL (ref 9.2–12.9)
POTASSIUM SERPL-SCNC: 5 MMOL/L (ref 3.5–5.1)
PROT SERPL-MCNC: 7 GM/DL (ref 6–8.4)
RBC # BLD AUTO: 4.23 M/UL (ref 4–5.4)
RELATIVE EOSINOPHIL (OHS): 7.7 %
RELATIVE LYMPHOCYTE (OHS): 11.2 % (ref 18–48)
RELATIVE MONOCYTE (OHS): 9.6 % (ref 4–15)
RELATIVE NEUTROPHIL (OHS): 69.7 % (ref 38–73)
SODIUM SERPL-SCNC: 144 MMOL/L (ref 136–145)
TSH SERPL-ACNC: 2.37 UIU/ML (ref 0.4–4)
WBC # BLD AUTO: 5.62 K/UL (ref 3.9–12.7)

## 2025-08-11 PROCEDURE — 1125F AMNT PAIN NOTED PAIN PRSNT: CPT | Mod: CPTII,S$GLB,,

## 2025-08-11 PROCEDURE — 83036 HEMOGLOBIN GLYCOSYLATED A1C: CPT

## 2025-08-11 PROCEDURE — 82105 ALPHA-FETOPROTEIN SERUM: CPT

## 2025-08-11 PROCEDURE — 85025 COMPLETE CBC W/AUTO DIFF WBC: CPT

## 2025-08-11 PROCEDURE — 3288F FALL RISK ASSESSMENT DOCD: CPT | Mod: CPTII,S$GLB,,

## 2025-08-11 PROCEDURE — 84443 ASSAY THYROID STIM HORMONE: CPT

## 2025-08-11 PROCEDURE — 80053 COMPREHEN METABOLIC PANEL: CPT

## 2025-08-11 PROCEDURE — 3008F BODY MASS INDEX DOCD: CPT | Mod: CPTII,S$GLB,,

## 2025-08-11 PROCEDURE — 36415 COLL VENOUS BLD VENIPUNCTURE: CPT | Mod: PO

## 2025-08-11 PROCEDURE — 4010F ACE/ARB THERAPY RXD/TAKEN: CPT | Mod: CPTII,S$GLB,,

## 2025-08-11 PROCEDURE — 1160F RVW MEDS BY RX/DR IN RCRD: CPT | Mod: CPTII,S$GLB,,

## 2025-08-11 PROCEDURE — 1157F ADVNC CARE PLAN IN RCRD: CPT | Mod: CPTII,S$GLB,,

## 2025-08-11 PROCEDURE — 99999 PR PBB SHADOW E&M-EST. PATIENT-LVL IV: CPT | Mod: PBBFAC,,,

## 2025-08-11 PROCEDURE — 1101F PT FALLS ASSESS-DOCD LE1/YR: CPT | Mod: CPTII,S$GLB,,

## 2025-08-11 PROCEDURE — 3044F HG A1C LEVEL LT 7.0%: CPT | Mod: CPTII,S$GLB,,

## 2025-08-11 PROCEDURE — 99214 OFFICE O/P EST MOD 30 MIN: CPT | Mod: S$GLB,,,

## 2025-08-11 PROCEDURE — 80197 ASSAY OF TACROLIMUS: CPT

## 2025-08-11 PROCEDURE — 1159F MED LIST DOCD IN RCRD: CPT | Mod: CPTII,S$GLB,,

## 2025-08-12 ENCOUNTER — RESULTS FOLLOW-UP (OUTPATIENT)
Dept: TRANSPLANT | Facility: CLINIC | Age: 65
End: 2025-08-12
Payer: MEDICARE

## 2025-08-12 DIAGNOSIS — Z94.4 S/P LIVER TRANSPLANT: ICD-10-CM

## 2025-08-12 LAB — TACROLIMUS BLD-MCNC: 3.6 NG/ML (ref 5–15)

## 2025-08-12 RX ORDER — TACROLIMUS 1 MG/1
CAPSULE ORAL
Qty: 90 CAPSULE | Refills: 11 | Status: SHIPPED | OUTPATIENT
Start: 2025-08-12 | End: 2026-08-12

## 2025-08-19 ENCOUNTER — OFFICE VISIT (OUTPATIENT)
Dept: PRIMARY CARE CLINIC | Facility: CLINIC | Age: 65
End: 2025-08-19
Payer: MEDICARE

## 2025-08-19 ENCOUNTER — CLINICAL SUPPORT (OUTPATIENT)
Dept: PRIMARY CARE CLINIC | Facility: CLINIC | Age: 65
End: 2025-08-19
Payer: MEDICARE

## 2025-08-19 ENCOUNTER — LAB VISIT (OUTPATIENT)
Dept: LAB | Facility: HOSPITAL | Age: 65
End: 2025-08-19
Attending: STUDENT IN AN ORGANIZED HEALTH CARE EDUCATION/TRAINING PROGRAM
Payer: MEDICARE

## 2025-08-19 VITALS — BODY MASS INDEX: 38.58 KG/M2 | HEART RATE: 72 BPM | WEIGHT: 245.81 LBS | HEIGHT: 67 IN

## 2025-08-19 VITALS
DIASTOLIC BLOOD PRESSURE: 76 MMHG | WEIGHT: 245.94 LBS | BODY MASS INDEX: 38.6 KG/M2 | SYSTOLIC BLOOD PRESSURE: 126 MMHG | OXYGEN SATURATION: 98 % | HEIGHT: 67 IN | HEART RATE: 72 BPM

## 2025-08-19 DIAGNOSIS — E66.01 CLASS 2 SEVERE OBESITY DUE TO EXCESS CALORIES WITH SERIOUS COMORBIDITY AND BODY MASS INDEX (BMI) OF 38.0 TO 38.9 IN ADULT: ICD-10-CM

## 2025-08-19 DIAGNOSIS — E66.812 CLASS 2 SEVERE OBESITY DUE TO EXCESS CALORIES WITH SERIOUS COMORBIDITY AND BODY MASS INDEX (BMI) OF 38.0 TO 38.9 IN ADULT: ICD-10-CM

## 2025-08-19 DIAGNOSIS — R19.7 DIARRHEA, UNSPECIFIED TYPE: ICD-10-CM

## 2025-08-19 DIAGNOSIS — Z94.4 S/P LIVER TRANSPLANT: ICD-10-CM

## 2025-08-19 DIAGNOSIS — C22.0 HCC (HEPATOCELLULAR CARCINOMA): Chronic | ICD-10-CM

## 2025-08-19 DIAGNOSIS — Z00.00 ENCOUNTER FOR MEDICARE ANNUAL WELLNESS EXAM: Primary | ICD-10-CM

## 2025-08-19 DIAGNOSIS — R73.02 IMPAIRED GLUCOSE TOLERANCE: ICD-10-CM

## 2025-08-19 DIAGNOSIS — F41.1 GENERALIZED ANXIETY DISORDER: ICD-10-CM

## 2025-08-19 DIAGNOSIS — F33.1 MODERATE EPISODE OF RECURRENT MAJOR DEPRESSIVE DISORDER: Primary | ICD-10-CM

## 2025-08-19 DIAGNOSIS — J47.9 BRONCHIECTASIS WITHOUT COMPLICATION: ICD-10-CM

## 2025-08-19 DIAGNOSIS — F33.1 MODERATE EPISODE OF RECURRENT MAJOR DEPRESSIVE DISORDER: ICD-10-CM

## 2025-08-19 DIAGNOSIS — I10 PRIMARY HYPERTENSION: Primary | Chronic | ICD-10-CM

## 2025-08-19 LAB
ABSOLUTE EOSINOPHIL (OHS): 0.48 K/UL
ABSOLUTE MONOCYTE (OHS): 0.58 K/UL (ref 0.3–1)
ABSOLUTE NEUTROPHIL COUNT (OHS): 2.98 K/UL (ref 1.8–7.7)
ALBUMIN SERPL BCP-MCNC: 3.7 G/DL (ref 3.5–5.2)
ALP SERPL-CCNC: 88 UNIT/L (ref 40–150)
ALT SERPL W/O P-5'-P-CCNC: 45 UNIT/L (ref 0–55)
ANION GAP (OHS): 6 MMOL/L (ref 8–16)
AST SERPL-CCNC: 34 UNIT/L (ref 0–50)
BASOPHILS # BLD AUTO: 0.04 K/UL
BASOPHILS NFR BLD AUTO: 0.8 %
BILIRUB SERPL-MCNC: 0.3 MG/DL (ref 0.1–1)
BUN SERPL-MCNC: 31 MG/DL (ref 8–23)
CALCIUM SERPL-MCNC: 9.9 MG/DL (ref 8.7–10.5)
CHLORIDE SERPL-SCNC: 111 MMOL/L (ref 95–110)
CO2 SERPL-SCNC: 27 MMOL/L (ref 23–29)
CREAT SERPL-MCNC: 1.5 MG/DL (ref 0.5–1.4)
ERYTHROCYTE [DISTWIDTH] IN BLOOD BY AUTOMATED COUNT: 13.4 % (ref 11.5–14.5)
GFR SERPLBLD CREATININE-BSD FMLA CKD-EPI: 38 ML/MIN/1.73/M2
GLUCOSE SERPL-MCNC: 149 MG/DL (ref 70–110)
HCT VFR BLD AUTO: 37.1 % (ref 37–48.5)
HGB BLD-MCNC: 11.8 GM/DL (ref 12–16)
IMM GRANULOCYTES # BLD AUTO: 0.05 K/UL (ref 0–0.04)
IMM GRANULOCYTES NFR BLD AUTO: 1 % (ref 0–0.5)
LYMPHOCYTES # BLD AUTO: 0.79 K/UL (ref 1–4.8)
MCH RBC QN AUTO: 29.3 PG (ref 27–31)
MCHC RBC AUTO-ENTMCNC: 31.8 G/DL (ref 32–36)
MCV RBC AUTO: 92 FL (ref 82–98)
NUCLEATED RBC (/100WBC) (OHS): 0 /100 WBC
PLATELET # BLD AUTO: 149 K/UL (ref 150–450)
PMV BLD AUTO: 10.7 FL (ref 9.2–12.9)
POTASSIUM SERPL-SCNC: 5.3 MMOL/L (ref 3.5–5.1)
PROT SERPL-MCNC: 6.7 GM/DL (ref 6–8.4)
RBC # BLD AUTO: 4.03 M/UL (ref 4–5.4)
RELATIVE EOSINOPHIL (OHS): 9.8 %
RELATIVE LYMPHOCYTE (OHS): 16.1 % (ref 18–48)
RELATIVE MONOCYTE (OHS): 11.8 % (ref 4–15)
RELATIVE NEUTROPHIL (OHS): 60.5 % (ref 38–73)
SODIUM SERPL-SCNC: 144 MMOL/L (ref 136–145)
WBC # BLD AUTO: 4.92 K/UL (ref 3.9–12.7)

## 2025-08-19 PROCEDURE — 3078F DIAST BP <80 MM HG: CPT | Mod: CPTII,S$GLB,, | Performed by: PHYSICIAN ASSISTANT

## 2025-08-19 PROCEDURE — 99999 PR PBB SHADOW E&M-EST. PATIENT-LVL III: CPT | Mod: PBBFAC,,, | Performed by: FAMILY MEDICINE

## 2025-08-19 PROCEDURE — 99499 UNLISTED E&M SERVICE: CPT | Mod: S$GLB,,,

## 2025-08-19 PROCEDURE — 1101F PT FALLS ASSESS-DOCD LE1/YR: CPT | Mod: CPTII,S$GLB,, | Performed by: PHYSICIAN ASSISTANT

## 2025-08-19 PROCEDURE — 1170F FXNL STATUS ASSESSED: CPT | Mod: CPTII,S$GLB,, | Performed by: PHYSICIAN ASSISTANT

## 2025-08-19 PROCEDURE — 1126F AMNT PAIN NOTED NONE PRSNT: CPT | Mod: CPTII,S$GLB,, | Performed by: PHYSICIAN ASSISTANT

## 2025-08-19 PROCEDURE — 3288F FALL RISK ASSESSMENT DOCD: CPT | Mod: CPTII,S$GLB,, | Performed by: PHYSICIAN ASSISTANT

## 2025-08-19 PROCEDURE — 80197 ASSAY OF TACROLIMUS: CPT

## 2025-08-19 PROCEDURE — 85025 COMPLETE CBC W/AUTO DIFF WBC: CPT

## 2025-08-19 PROCEDURE — 82040 ASSAY OF SERUM ALBUMIN: CPT

## 2025-08-19 PROCEDURE — 99999 PR PBB SHADOW E&M-EST. PATIENT-LVL V: CPT | Mod: PBBFAC,,, | Performed by: PHYSICIAN ASSISTANT

## 2025-08-19 PROCEDURE — 3074F SYST BP LT 130 MM HG: CPT | Mod: CPTII,S$GLB,, | Performed by: PHYSICIAN ASSISTANT

## 2025-08-19 PROCEDURE — 1123F ACP DISCUSS/DSCN MKR DOCD: CPT | Mod: CPTII,S$GLB,, | Performed by: PHYSICIAN ASSISTANT

## 2025-08-19 PROCEDURE — 3044F HG A1C LEVEL LT 7.0%: CPT | Mod: CPTII,S$GLB,, | Performed by: PHYSICIAN ASSISTANT

## 2025-08-19 PROCEDURE — 4010F ACE/ARB THERAPY RXD/TAKEN: CPT | Mod: CPTII,S$GLB,, | Performed by: PHYSICIAN ASSISTANT

## 2025-08-19 PROCEDURE — 99999 PR PBB SHADOW E&M-EST. PATIENT-LVL I: CPT | Mod: PBBFAC,,,

## 2025-08-19 PROCEDURE — G0439 PPPS, SUBSEQ VISIT: HCPCS | Mod: S$GLB,,, | Performed by: PHYSICIAN ASSISTANT

## 2025-08-19 PROCEDURE — 36415 COLL VENOUS BLD VENIPUNCTURE: CPT | Mod: PO

## 2025-08-20 ENCOUNTER — RESULTS FOLLOW-UP (OUTPATIENT)
Dept: HEPATOLOGY | Facility: CLINIC | Age: 65
End: 2025-08-20
Payer: MEDICARE

## 2025-08-20 DIAGNOSIS — Z94.4 S/P LIVER TRANSPLANT: ICD-10-CM

## 2025-08-20 DIAGNOSIS — K21.9 GASTROESOPHAGEAL REFLUX DISEASE, UNSPECIFIED WHETHER ESOPHAGITIS PRESENT: ICD-10-CM

## 2025-08-20 LAB — TACROLIMUS BLD-MCNC: 4.9 NG/ML (ref 5–15)

## 2025-08-20 RX ORDER — OMEPRAZOLE 40 MG/1
40 CAPSULE, DELAYED RELEASE ORAL DAILY
Qty: 100 CAPSULE | Refills: 3 | Status: SHIPPED | OUTPATIENT
Start: 2025-08-20

## 2025-08-27 RX ORDER — TACROLIMUS 1 MG/1
2 CAPSULE ORAL EVERY 12 HOURS
Qty: 120 CAPSULE | Refills: 11 | Status: SHIPPED | OUTPATIENT
Start: 2025-08-27 | End: 2026-08-27

## 2025-09-04 ENCOUNTER — TELEPHONE (OUTPATIENT)
Dept: TRANSPLANT | Facility: CLINIC | Age: 65
End: 2025-09-04
Payer: MEDICARE

## 2025-09-04 DIAGNOSIS — R79.89 ELEVATED LFTS: ICD-10-CM

## 2025-09-04 DIAGNOSIS — Z94.4 S/P LIVER TRANSPLANT: Primary | ICD-10-CM

## (undated) DEVICE — CLIP SPRING 6MM

## (undated) DEVICE — SUT SILK 0 STRANDS 30IN BLK

## (undated) DEVICE — HEMOSTAT SURGICEL NU-KNIT 6X9

## (undated) DEVICE — SYR ONLY LUER LOCK 20CC

## (undated) DEVICE — SUT 2/0 30IN SILK BLK BRAI

## (undated) DEVICE — DRAPE HALF SURGICAL 40X58IN

## (undated) DEVICE — DRAIN CHANNEL ROUND 19FR

## (undated) DEVICE — SUT SILK 3-0 STRANDS 30IN

## (undated) DEVICE — SUT 3-0 12-18IN SILK

## (undated) DEVICE — SEALER LIGASURE MARYLAND 23CM

## (undated) DEVICE — SUT SILK 2-0 STRANDS 30IN

## (undated) DEVICE — CONNECTOR TUBING STR 5 IN 1

## (undated) DEVICE — CATH URETHRAL RED RUBBER 18FR

## (undated) DEVICE — SEE MEDLINE ITEM 156901

## (undated) DEVICE — SUT PROLENE 5-0 36IN C-1

## (undated) DEVICE — GOWN SURGICAL X-LARGE

## (undated) DEVICE — HANDSET ARGON PLUS

## (undated) DEVICE — SUT 4-0 12-18IN SILK BLACK

## (undated) DEVICE — TIP YANKAUERS BULB NO VENT

## (undated) DEVICE — TUBING NEPTUNE 2 SMOKE 10IN

## (undated) DEVICE — SUT ETHILON 3-0 PS2 18 BLK

## (undated) DEVICE — DECANTER FLUID TRNSF WHITE 9IN

## (undated) DEVICE — SUT PROLENE 3-0 SH DA 36 BL

## (undated) DEVICE — DRAPE CORETEMP FLD WRM 56X62IN

## (undated) DEVICE — NDL MONOPTY BIOPSY 14GX10CM

## (undated) DEVICE — DRESSING AQUACEL SACRAL 9 X 9

## (undated) DEVICE — TOWEL OR XRAY WHITE 17X26IN

## (undated) DEVICE — SUT PROLENE 6-0 BV-1 30IN

## (undated) DEVICE — TRAY CATH FOL SIL URIMTR 16FR

## (undated) DEVICE — ELECTRODE REM PLYHSV RETURN 9

## (undated) DEVICE — SUT SILK 3-0 SH 18IN BLACK

## (undated) DEVICE — PAD K-THERMIA 24IN X 60IN

## (undated) DEVICE — BOOT AIR FLUID HEEL ADLT STD

## (undated) DEVICE — SET EXTENSION STERILE 30IN

## (undated) DEVICE — ELECTRODE PAD DEFIB STERILE

## (undated) DEVICE — SUT PDS BV 6-0

## (undated) DEVICE — SUT 2-0 12-18IN SILK

## (undated) DEVICE — STAPLER SKIN PROXIMATE WIDE

## (undated) DEVICE — DRESSING ADH ISLAND 3.6 X 14

## (undated) DEVICE — EVACUATOR WOUND BULB 100CC

## (undated) DEVICE — SUT PROLENE 4-0 SH BLU 36IN

## (undated) DEVICE — DRAPE BAG ISOLATION 20 X 20

## (undated) DEVICE — SUT 1 36IN PDS II VIO MONO

## (undated) DEVICE — SOL NS 1000CC

## (undated) DEVICE — WIPE ESENTA BARR STNG FREE 3ML

## (undated) DEVICE — KIT SAHARA DRAPE DRAW/LIFT

## (undated) DEVICE — SET IV ADMIN 15DROP 3 CARESITE

## (undated) DEVICE — COVER LIGHT HANDLE 80/CA

## (undated) DEVICE — SUT 4-0 12-30IN SILK